# Patient Record
Sex: FEMALE | Race: WHITE | NOT HISPANIC OR LATINO | Employment: OTHER | URBAN - METROPOLITAN AREA
[De-identification: names, ages, dates, MRNs, and addresses within clinical notes are randomized per-mention and may not be internally consistent; named-entity substitution may affect disease eponyms.]

---

## 2018-03-08 RX ORDER — INSULIN ASPART 100 [IU]/ML
25 INJECTION, SUSPENSION SUBCUTANEOUS
Qty: 5 PEN | Refills: 5 | Status: SHIPPED | OUTPATIENT
Start: 2018-03-08 | End: 2018-07-19 | Stop reason: SDUPTHER

## 2018-03-14 RX ORDER — ATORVASTATIN CALCIUM 10 MG/1
10 TABLET, FILM COATED ORAL
COMMUNITY
Start: 2017-12-12 | End: 2018-03-14 | Stop reason: SDUPTHER

## 2018-03-14 RX ORDER — ATORVASTATIN CALCIUM 10 MG/1
10 TABLET, FILM COATED ORAL DAILY
Qty: 90 TABLET | Refills: 1 | Status: SHIPPED | OUTPATIENT
Start: 2018-03-14 | End: 2018-09-09 | Stop reason: SDUPTHER

## 2018-03-29 RX ORDER — LOSARTAN POTASSIUM AND HYDROCHLOROTHIAZIDE 12.5; 5 MG/1; MG/1
TABLET ORAL
COMMUNITY
Start: 2017-07-05 | End: 2018-03-29 | Stop reason: SDUPTHER

## 2018-03-29 RX ORDER — LOSARTAN POTASSIUM AND HYDROCHLOROTHIAZIDE 12.5; 5 MG/1; MG/1
1 TABLET ORAL DAILY
Qty: 90 TABLET | Refills: 1 | Status: SHIPPED | OUTPATIENT
Start: 2018-03-29 | End: 2018-09-09 | Stop reason: SDUPTHER

## 2018-06-22 RX ORDER — LEVOTHYROXINE SODIUM 25 UG/1
TABLET ORAL
Qty: 30 TABLET | Refills: 0 | Status: SHIPPED | OUTPATIENT
Start: 2018-06-22 | End: 2018-07-19 | Stop reason: SDUPTHER

## 2018-07-19 RX ORDER — INSULIN ASPART 100 [IU]/ML
25 INJECTION, SUSPENSION SUBCUTANEOUS
Qty: 15 PEN | Refills: 1 | Status: SHIPPED | OUTPATIENT
Start: 2018-07-19 | End: 2018-10-17 | Stop reason: HOSPADM

## 2018-07-19 RX ORDER — LEVOTHYROXINE SODIUM 25 UG/1
TABLET ORAL
Qty: 30 TABLET | Refills: 0 | Status: SHIPPED | OUTPATIENT
Start: 2018-07-19 | End: 2018-10-16 | Stop reason: SDUPTHER

## 2018-07-25 RX ORDER — AMLODIPINE BESYLATE 5 MG/1
TABLET ORAL
Qty: 90 TABLET | Refills: 0 | Status: SHIPPED | OUTPATIENT
Start: 2018-07-25 | End: 2018-10-21 | Stop reason: SDUPTHER

## 2018-07-30 ENCOUNTER — TELEPHONE (OUTPATIENT)
Dept: ENDOCRINOLOGY | Facility: CLINIC | Age: 58
End: 2018-07-30

## 2018-07-30 NOTE — TELEPHONE ENCOUNTER
Patient called to cancel 7/31/18 appointment having a boil removed. Rescheduled for 10/9/18 before her cataract surgery.

## 2018-08-10 RX ORDER — EMPAGLIFLOZIN 10 MG/1
TABLET, FILM COATED ORAL
Qty: 30 TABLET | Refills: 4 | Status: SHIPPED | OUTPATIENT
Start: 2018-08-10 | End: 2018-11-16 | Stop reason: SDUPTHER

## 2018-09-10 RX ORDER — ATORVASTATIN CALCIUM 10 MG/1
10 TABLET, FILM COATED ORAL DAILY
Qty: 90 TABLET | Refills: 1 | Status: SHIPPED | OUTPATIENT
Start: 2018-09-10 | End: 2019-03-06 | Stop reason: SDUPTHER

## 2018-09-10 RX ORDER — LOSARTAN POTASSIUM AND HYDROCHLOROTHIAZIDE 12.5; 5 MG/1; MG/1
1 TABLET ORAL DAILY
Qty: 90 TABLET | Refills: 1 | Status: SHIPPED | OUTPATIENT
Start: 2018-09-10 | End: 2019-02-15 | Stop reason: SDUPTHER

## 2018-10-16 ENCOUNTER — TRANSCRIBE ORDERS (OUTPATIENT)
Dept: RADIOLOGY | Facility: HOSPITAL | Age: 58
End: 2018-10-16

## 2018-10-16 ENCOUNTER — HOSPITAL ENCOUNTER (OUTPATIENT)
Dept: RADIOLOGY | Facility: HOSPITAL | Age: 58
Discharge: HOME | End: 2018-10-16
Attending: PHYSICAL MEDICINE & REHABILITATION
Payer: COMMERCIAL

## 2018-10-16 DIAGNOSIS — M17.12 PRIMARY OSTEOARTHRITIS OF LEFT KNEE: Primary | ICD-10-CM

## 2018-10-16 DIAGNOSIS — M17.12 PRIMARY OSTEOARTHRITIS OF LEFT KNEE: ICD-10-CM

## 2018-10-16 DIAGNOSIS — R26.2 DIFFICULTY IN WALKING, NOT ELSEWHERE CLASSIFIED: ICD-10-CM

## 2018-10-16 PROCEDURE — 73564 X-RAY EXAM KNEE 4 OR MORE: CPT | Mod: LT

## 2018-10-16 RX ORDER — LEVOTHYROXINE SODIUM 25 UG/1
TABLET ORAL
Qty: 90 TABLET | Refills: 0 | Status: SHIPPED | OUTPATIENT
Start: 2018-10-16 | End: 2019-01-16 | Stop reason: SDUPTHER

## 2018-10-22 RX ORDER — AMLODIPINE BESYLATE 5 MG/1
TABLET ORAL
Qty: 90 TABLET | Refills: 0 | Status: SHIPPED | OUTPATIENT
Start: 2018-10-22 | End: 2019-02-02 | Stop reason: SDUPTHER

## 2018-11-06 ENCOUNTER — ANESTHESIA (OUTPATIENT)
Dept: SURGERY | Facility: HOSPITAL | Age: 58
Setting detail: HOSPITAL OUTPATIENT SURGERY
End: 2018-11-06
Payer: COMMERCIAL

## 2018-11-06 ENCOUNTER — HOSPITAL ENCOUNTER (OUTPATIENT)
Facility: HOSPITAL | Age: 58
Setting detail: HOSPITAL OUTPATIENT SURGERY
Discharge: HOME | End: 2018-11-06
Attending: OPHTHALMOLOGY | Admitting: OPHTHALMOLOGY
Payer: COMMERCIAL

## 2018-11-06 VITALS
OXYGEN SATURATION: 97 % | TEMPERATURE: 98.8 F | SYSTOLIC BLOOD PRESSURE: 129 MMHG | RESPIRATION RATE: 17 BRPM | DIASTOLIC BLOOD PRESSURE: 61 MMHG | HEART RATE: 79 BPM

## 2018-11-06 LAB
GLUCOSE BLD-MCNC: 122 MG/DL (ref 70–99)
GLUCOSE BLD-MCNC: 92 MG/DL (ref 70–99)
POCT TEST: ABNORMAL
POCT TEST: NORMAL

## 2018-11-06 PROCEDURE — 71000001 HC PACU PHASE 1 INITIAL 30MIN: Performed by: OPHTHALMOLOGY

## 2018-11-06 PROCEDURE — 08RJ3JZ REPLACEMENT OF RIGHT LENS WITH SYNTHETIC SUBSTITUTE, PERCUTANEOUS APPROACH: ICD-10-PCS | Performed by: OPHTHALMOLOGY

## 2018-11-06 PROCEDURE — 71000011 HC PACU PHASE 1 EA ADDL MIN: Performed by: OPHTHALMOLOGY

## 2018-11-06 PROCEDURE — 25000000 HC PHARMACY GENERAL: Performed by: NURSE ANESTHETIST, CERTIFIED REGISTERED

## 2018-11-06 PROCEDURE — 36000013 HC OR LEVEL 3 EA ADDL MIN: Performed by: OPHTHALMOLOGY

## 2018-11-06 PROCEDURE — 37000002 HC ANESTHESIA MAC: Performed by: OPHTHALMOLOGY

## 2018-11-06 PROCEDURE — 71000002 HC PACU PHASE 2 INITIAL 30MIN: Performed by: OPHTHALMOLOGY

## 2018-11-06 PROCEDURE — V2632 POST CHMBR INTRAOCULAR LENS: HCPCS | Performed by: OPHTHALMOLOGY

## 2018-11-06 PROCEDURE — 27200000 HC STERILE SUPPLY: Performed by: OPHTHALMOLOGY

## 2018-11-06 PROCEDURE — 36000003 HC OR LEVEL 3 INITIAL 30MIN: Performed by: OPHTHALMOLOGY

## 2018-11-06 PROCEDURE — 25000000 HC PHARMACY GENERAL: Performed by: OPHTHALMOLOGY

## 2018-11-06 PROCEDURE — 63700000 HC SELF-ADMINISTRABLE DRUG: Performed by: OPHTHALMOLOGY

## 2018-11-06 PROCEDURE — 25800000 HC PHARMACY IV SOLUTIONS: Performed by: NURSE ANESTHETIST, CERTIFIED REGISTERED

## 2018-11-06 PROCEDURE — 63600000 HC DRUGS/DETAIL CODE: Performed by: OPHTHALMOLOGY

## 2018-11-06 PROCEDURE — 71000012 HC PACU PHASE 2 EA ADDL MIN: Performed by: OPHTHALMOLOGY

## 2018-11-06 PROCEDURE — 63600000 HC DRUGS/DETAIL CODE: Performed by: NURSE ANESTHETIST, CERTIFIED REGISTERED

## 2018-11-06 DEVICE — LENS IOL SN60WF 23.0D: Type: IMPLANTABLE DEVICE | Status: FUNCTIONAL

## 2018-11-06 RX ORDER — PROPARACAINE HYDROCHLORIDE 5 MG/ML
SOLUTION/ DROPS OPHTHALMIC AS NEEDED
Status: DISCONTINUED | OUTPATIENT
Start: 2018-11-06 | End: 2018-11-06 | Stop reason: HOSPADM

## 2018-11-06 RX ORDER — LIDOCAINE HYDROCHLORIDE 10 MG/ML
INJECTION, SOLUTION EPIDURAL; INFILTRATION; INTRACAUDAL; PERINEURAL AS NEEDED
Status: DISCONTINUED | OUTPATIENT
Start: 2018-11-06 | End: 2018-11-06 | Stop reason: SURG

## 2018-11-06 RX ORDER — SODIUM CHLORIDE 9 MG/ML
INJECTION, SOLUTION INTRAVENOUS CONTINUOUS
Status: DISCONTINUED | OUTPATIENT
Start: 2018-11-06 | End: 2018-11-06 | Stop reason: HOSPADM

## 2018-11-06 RX ORDER — SODIUM CHLORIDE 9 MG/ML
INJECTION, SOLUTION INTRAVENOUS CONTINUOUS PRN
Status: DISCONTINUED | OUTPATIENT
Start: 2018-11-06 | End: 2018-11-06 | Stop reason: SURG

## 2018-11-06 RX ORDER — FENTANYL CITRATE 50 UG/ML
INJECTION, SOLUTION INTRAMUSCULAR; INTRAVENOUS AS NEEDED
Status: DISCONTINUED | OUTPATIENT
Start: 2018-11-06 | End: 2018-11-06 | Stop reason: SURG

## 2018-11-06 RX ORDER — POVIDONE-IODINE 5 %
SOLUTION, NON-ORAL OPHTHALMIC (EYE) AS NEEDED
Status: DISCONTINUED | OUTPATIENT
Start: 2018-11-06 | End: 2018-11-06 | Stop reason: HOSPADM

## 2018-11-06 RX ORDER — ONDANSETRON HYDROCHLORIDE 2 MG/ML
INJECTION, SOLUTION INTRAVENOUS AS NEEDED
Status: DISCONTINUED | OUTPATIENT
Start: 2018-11-06 | End: 2018-11-06 | Stop reason: SURG

## 2018-11-06 RX ORDER — SODIUM CHLORIDE 5 %
OINTMENT (GRAM) OPHTHALMIC (EYE) AS NEEDED
Status: DISCONTINUED | OUTPATIENT
Start: 2018-11-06 | End: 2018-11-06 | Stop reason: HOSPADM

## 2018-11-06 RX ORDER — MIDAZOLAM HYDROCHLORIDE 2 MG/2ML
INJECTION, SOLUTION INTRAMUSCULAR; INTRAVENOUS AS NEEDED
Status: DISCONTINUED | OUTPATIENT
Start: 2018-11-06 | End: 2018-11-06 | Stop reason: SURG

## 2018-11-06 RX ORDER — TOBRAMYCIN AND DEXAMETHASONE 3; 1 MG/ML; MG/ML
SUSPENSION/ DROPS OPHTHALMIC AS NEEDED
Status: DISCONTINUED | OUTPATIENT
Start: 2018-11-06 | End: 2018-11-06 | Stop reason: HOSPADM

## 2018-11-06 RX ADMIN — LIDOCAINE HYDROCHLORIDE 2.5 ML: 10 INJECTION, SOLUTION EPIDURAL; INFILTRATION; INTRACAUDAL; PERINEURAL at 07:37

## 2018-11-06 RX ADMIN — FENTANYL CITRATE 25 MCG: 50 INJECTION, SOLUTION INTRAMUSCULAR; INTRAVENOUS at 07:45

## 2018-11-06 RX ADMIN — MIDAZOLAM HYDROCHLORIDE 1 MG: 1 INJECTION, SOLUTION INTRAMUSCULAR; INTRAVENOUS at 07:37

## 2018-11-06 RX ADMIN — MIDAZOLAM HYDROCHLORIDE 1 MG: 1 INJECTION, SOLUTION INTRAMUSCULAR; INTRAVENOUS at 07:32

## 2018-11-06 RX ADMIN — FENTANYL CITRATE 25 MCG: 50 INJECTION, SOLUTION INTRAMUSCULAR; INTRAVENOUS at 07:49

## 2018-11-06 RX ADMIN — FENTANYL CITRATE 25 MCG: 50 INJECTION, SOLUTION INTRAMUSCULAR; INTRAVENOUS at 07:54

## 2018-11-06 RX ADMIN — ONDANSETRON 4 MG: 2 INJECTION INTRAMUSCULAR; INTRAVENOUS at 07:37

## 2018-11-06 RX ADMIN — SODIUM CHLORIDE: 9 INJECTION, SOLUTION INTRAVENOUS at 07:27

## 2018-11-06 ASSESSMENT — PAIN SCALES - GENERAL: PAIN_LEVEL: 0

## 2018-11-06 ASSESSMENT — PAIN - FUNCTIONAL ASSESSMENT: PAIN_FUNCTIONAL_ASSESSMENT: NO/DENIES PAIN

## 2018-11-06 NOTE — ANESTHESIA PREPROCEDURE EVALUATION
ROS/Med Hx    Relevant Problems   No relevant active problems       Physical Exam    Airway   Mallampati: III   TM distance: >3 FB   Neck ROM: full  Cardiovascular    Rhythm: regular   Rate: normal  Pulmonary    clear to auscultation        Anesthesia Plan    Plan: MAC    Technique: MAC     Airway: natural airway / supplemental oxygen       patient did not smoke on day of surgery  ASA 3  Blood Products:   Use of Blood Products Discussed: No   Anesthetic plan and risks discussed with: patient  Induction:    intravenous   Parents Present: No  Postop Plan:   Patient Disposition: phase II then home   Pain Management: IV analgesics

## 2018-11-06 NOTE — ANESTHESIA POSTPROCEDURE EVALUATION
Patient: Bebe Burks    Procedure Summary     Date:  11/06/18 Room / Location:  [facility]    Anesthesia Start:  0727 Anesthesia Stop:  0807    Procedure:  CATARACT PHACO, IOL (Right ) Diagnosis:       Cortical age-related cataract, right eye      (Cataract right eye h25.011)    Surgeon:  Derrick Jeter MD Responsible Provider:  Josef Yip MD    Anesthesia Type:  MAC ASA Status:  3          Anesthesia Type: MAC  PACU Vitals     No data found.            Anesthesia Post Evaluation    Pain score: 0  Pain management: adequate  Patient location during evaluation: PACU  Patient participation: complete - patient participated  Level of consciousness: awake and alert  Cardiovascular status: acceptable  Airway Patency: adequate  Respiratory status: acceptable  Hydration status: acceptable  Anesthetic complications: no

## 2018-11-06 NOTE — OP NOTE
"Patient: Bebe Burks  [id number]    Date: 11/6/2018    Pre OP Dx: Cataract  right eye    Post OP Dx:Cataract  right eye    Procedure: Phacoemulsification of the right eye    IOL: Space Rohith, AcrySof    model: SN60 Wf,      23.0  diopters    Serial number:[id number]    Surgeon: Derrick Jeter MD  Assistant: None  Complications: none  Blood loss: None    Procedure:   Patient was brought to the OR, identified, and positioned appropriately.  A drop of topical proparacaine and 3.5% lidocaine gel was placed on the eye.  The eye was prepped and draped in usual sterile fashion.  MAC was administered and A lid speculum was positioned and an operating microscope was placed over the eye.  A \"timeout\" was done, and the surgery began. Following a clear temporal corneal incision, under Healon 5 and Healon, a continuous tear anterior capsulotomy was performed.  This was followed by hydrodissection, hydrodelineation, phacoemulsification, and irrigation/aspiration.  Now, with the capsular bag filled with Healon the folded IOL was passed through the corneal incision and placed into the bag under direct visualization.  The Healon was removed, Miochol was injected into the anterior chamber, and the wound was hydrated with BSS.  The lid speculum was removed.  A drop of TobraDex and a strip of sodium chloride 5% ointment was placed topically on the eye, and the eye was shielded.  The patient tolerated the procedure beautifully and left the OR for recovery room in satisfactory condition.      ________________________________ 11/6/2018     Derrick Jeter MD        "

## 2018-11-06 NOTE — PERIOPERATIVE NURSING NOTE
Dr Jeter in to speak to pt. Pt dcd to home via wheelchair without complaints. Right eye without redness or drg, shield intact to right eye

## 2018-11-06 NOTE — OR SURGEON
Pt seen  Forehead marked  Meds OK  No change in Dx  Cleared for surgery    Derrick Jeter MD    11/6/2018

## 2018-11-12 RX ORDER — PEN NEEDLE, DIABETIC 31 GX5/16"
NEEDLE, DISPOSABLE MISCELLANEOUS
Qty: 200 EACH | Refills: 3 | Status: SHIPPED | OUTPATIENT
Start: 2018-11-12 | End: 2019-11-29 | Stop reason: SDUPTHER

## 2018-11-16 RX ORDER — EMPAGLIFLOZIN 10 MG/1
TABLET, FILM COATED ORAL
Qty: 30 TABLET | Refills: 0 | Status: SHIPPED | OUTPATIENT
Start: 2018-11-16 | End: 2019-03-13 | Stop reason: SDUPTHER

## 2018-12-03 RX ORDER — INSULIN ASPART 100 [IU]/ML
INJECTION, SUSPENSION SUBCUTANEOUS
Qty: 6 PEN | Refills: 3 | Status: SHIPPED | OUTPATIENT
Start: 2018-12-03 | End: 2019-02-14 | Stop reason: SDUPTHER

## 2018-12-03 NOTE — TELEPHONE ENCOUNTER
----- Message from Speedy Mckeon MD sent at 7/19/2017  7:24 PM CDT -----  Looks Ok  Stable results  Please notify patient   Last ov was 12/2017, please verify and send. Thanks

## 2018-12-20 ENCOUNTER — TRANSCRIBE ORDERS (OUTPATIENT)
Dept: OPHTHALMOLOGY | Facility: HOSPITAL | Age: 58
End: 2018-12-20

## 2018-12-20 ENCOUNTER — APPOINTMENT (OUTPATIENT)
Dept: OPHTHALMOLOGY | Facility: HOSPITAL | Age: 58
End: 2018-12-20
Attending: OPHTHALMOLOGY
Payer: COMMERCIAL

## 2018-12-20 DIAGNOSIS — H26.492 OTHER SECONDARY CATARACT, LEFT EYE: ICD-10-CM

## 2018-12-20 DIAGNOSIS — H26.492 OTHER SECONDARY CATARACT, LEFT EYE: Primary | ICD-10-CM

## 2018-12-20 PROCEDURE — 66821 AFTER CATARACT LASER SURGERY: CPT

## 2018-12-20 PROCEDURE — 085K3ZZ DESTRUCTION OF LEFT LENS, PERCUTANEOUS APPROACH: ICD-10-PCS | Performed by: OPHTHALMOLOGY

## 2019-01-07 ENCOUNTER — TRANSCRIBE ORDERS (OUTPATIENT)
Dept: SCHEDULING | Age: 59
End: 2019-01-07

## 2019-01-07 DIAGNOSIS — M23.002: Primary | ICD-10-CM

## 2019-01-11 ENCOUNTER — HOSPITAL ENCOUNTER (OUTPATIENT)
Dept: RADIOLOGY | Facility: HOSPITAL | Age: 59
Discharge: HOME | End: 2019-01-11
Attending: PHYSICAL MEDICINE & REHABILITATION
Payer: COMMERCIAL

## 2019-01-11 DIAGNOSIS — M23.002: ICD-10-CM

## 2019-01-16 RX ORDER — LEVOTHYROXINE SODIUM 25 UG/1
TABLET ORAL
Qty: 90 TABLET | Refills: 0 | Status: SHIPPED | OUTPATIENT
Start: 2019-01-16 | End: 2019-04-23 | Stop reason: SDUPTHER

## 2019-02-04 RX ORDER — AMLODIPINE BESYLATE 5 MG/1
TABLET ORAL
Qty: 90 TABLET | Refills: 0 | Status: SHIPPED | OUTPATIENT
Start: 2019-02-04 | End: 2019-04-23 | Stop reason: SDUPTHER

## 2019-02-11 ENCOUNTER — TELEPHONE (OUTPATIENT)
Dept: ENDOCRINOLOGY | Facility: CLINIC | Age: 59
End: 2019-02-11

## 2019-02-11 DIAGNOSIS — E11.8 TYPE 2 DIABETES MELLITUS WITH COMPLICATION, WITH LONG-TERM CURRENT USE OF INSULIN (CMS/HCC): Primary | ICD-10-CM

## 2019-02-11 DIAGNOSIS — Z79.4 TYPE 2 DIABETES MELLITUS WITH COMPLICATION, WITH LONG-TERM CURRENT USE OF INSULIN (CMS/HCC): Primary | ICD-10-CM

## 2019-02-14 RX ORDER — INSULIN ASPART 100 [IU]/ML
INJECTION, SUSPENSION SUBCUTANEOUS
Qty: 18 PEN | Refills: 3 | Status: SHIPPED | OUTPATIENT
Start: 2019-02-14 | End: 2019-11-06 | Stop reason: SDUPTHER

## 2019-02-18 RX ORDER — EMPAGLIFLOZIN 10 MG/1
TABLET, FILM COATED ORAL
Qty: 30 TABLET | Refills: 4 | Status: SHIPPED | OUTPATIENT
Start: 2019-02-18 | End: 2019-03-13

## 2019-02-18 RX ORDER — LOSARTAN POTASSIUM AND HYDROCHLOROTHIAZIDE 12.5; 5 MG/1; MG/1
TABLET ORAL
Qty: 90 TABLET | Refills: 1 | Status: SHIPPED | OUTPATIENT
Start: 2019-02-18 | End: 2019-11-18 | Stop reason: ALTCHOICE

## 2019-03-06 RX ORDER — ATORVASTATIN CALCIUM 10 MG/1
TABLET, FILM COATED ORAL
Qty: 30 TABLET | Refills: 1 | Status: SHIPPED | OUTPATIENT
Start: 2019-03-06 | End: 2019-05-06 | Stop reason: SDUPTHER

## 2019-03-11 RX ORDER — EMPAGLIFLOZIN 10 MG/1
TABLET, FILM COATED ORAL
Qty: 30 TABLET | Refills: 4 | Status: SHIPPED | OUTPATIENT
Start: 2019-03-11 | End: 2019-04-01 | Stop reason: SDUPTHER

## 2019-03-13 NOTE — TELEPHONE ENCOUNTER
Per pt's insurance Jardiance #[id number] needs to be send to [facility] as it's currently a maintenance drug. Please verify 5 apts scheduled and cancelled.

## 2019-03-28 NOTE — ASSESSMENT & PLAN NOTE
1. Restart Jardiance 10 mg per day - increase fluids   2. Continue with Janumet XR 50/1000 mg 2 tabs daily   3. Continue with Novolog 70/30 mix 25 units with breakfast and 30 units with dinner   4. Rotate injection sites   5. Freestyle vin system

## 2019-03-28 NOTE — PROGRESS NOTES
Endocrinology  Note       PATIENTS: Bebe Burks  YOB: 1960  DATE: April 1, 2019  VISIT TYPE: follow up visit    History of Present Illness:   HPI  58 -year-old white female with type 2 diabetes. She had cataract removed and L vitrectomy. She does follow closely with ophthalmology for retinopathy.she   continues on Janumet XR 50/1000mg 2 tabs daily and  NovoLog 7030 mix 25 units with breakfast 30  units with dinner. She has not been  taken Jardiance due to the cost. She did not bring her glucometer to the visit today she reports no hypoglycemia.She does  rotate her injection sites. She  continues on Synthroid 25 ?g day. She  continues on Lipitor for  hyperlipidemia. She  does have a history of thyroid nodules reports no difficulty with swallowing or breathing.  She  reports no foot wounds at the present time.    Medical History:  Past Medical History:   Diagnosis Date   • Arthritis    • Hypertension    • Lipid disorder    • Type 2 diabetes mellitus (CMS/HCC) ([facility])        Surgical History:   Past Surgical History:   Procedure Laterality Date   • COMBINED HYSTEROSCOPY DIAGNOSTIC / D&C  2007   • EYE SURGERY Left 2017    cataract   • VITRECTOMY Left 2016       Family History:  No family history on file.    Social history:  Social History     Social History   • Marital status: Single     Spouse name: N/A   • Number of children: N/A   • Years of education: N/A     Occupational History   • Not on file.     Social History Main Topics   • Smoking status: Never Smoker   • Smokeless tobacco: Never Used   • Alcohol use 0.6 oz/week     1 Glasses of wine per week      Comment: occas   • Drug use: No   • Sexual activity: Not on file     Other Topics Concern   • Not on file     Social History Narrative   • No narrative on file       Medication(active prior to today):  Current Outpatient Prescriptions   Medication Sig Dispense Refill   • amLODIPine (NORVASC) 5 mg tablet TAKE 1 TABLET BY MOUTH  "EVERY DAY 90 tablet 0   • atorvastatin (LIPITOR) 10 mg tablet TAKE 1 TABLET BY MOUTH EVERY DAY 30 tablet 1   • BD ULTRA-FINE KRISTIN PEN NEEDLE 32 gauge x 5/32\" needle INJECT TWICE A  each 3   • empagliflozin (JARDIANCE) 10 mg tablet Take 1 tab daily (Patient not taking: Reported on 4/1/2019 ) 90 tablet 1   • empagliflozin (JARDIANCE) 10 mg tablet 1 tab daily 30 tablet 3   • FREESTYLE LAURA 14 DAY READER misc 1 each continuously. 1 reader dx code E 11.9 1 each 11   • FREESTYLE LAURA 14 DAY SENSOR kit 1 each continuously. 1 sensor every 14 days dx code E 11.9 1 kit 11   • gabapentin (NEURONTIN) 300 mg capsule Take 300 mg by mouth 2 (two) times a day.     • JANUMET 50-1,000 mg per tablet TAKE 1 TABLET BY ORAL ROUTE 2 TIMES EVERY DAY WITH MEALS 180 tablet 0   • levothyroxine (SYNTHROID) 25 mcg tablet TAKE 1 TABLET BY MOUTH EVERY DAY 90 tablet 0   • losartan-hydrochlorothiazide (HYZAAR) 50-12.5 mg per tablet TAKE 1 TABLET BY MOUTH EVERY DAY 90 tablet 1   • NOVOLOG MIX 70-30FLEXPEN U-100 100 unit/mL (70-30) subcutaneous pen INJECT SUBCUTANEOUSLY 25 UNITS IN THE MORNING AND 30 UNITS WITH THE EVENING 18 pen 3   • ONETOUCH ULTRA BLUE TEST STRIP strip PT CHECKS BLOOD SUGAR 4 TIMES A  strip 0   • sitagliptin phos/metformin HCl (JANUMET ORAL) Take 500 mg by mouth 2 (two) times a day.       No current facility-administered medications for this visit.        Allergies:  Patient has no known allergies.    Review of Systems:   Constitutional: Negative for fatigue and unexpected weight change.   HENT: Negative for postnasal drip, trouble swallowing and voice change.    Eyes: Negative for visual disturbance. + Decreased vision  Respiratory: Negative for apnea.    Cardiovascular: Negative for leg swelling.   Gastrointestinal: Negative for constipation, diarrhea and nausea.   Endocrine: Negative for polydipsia, polyphagia and polyuria.   Musculoskeletal:+for arthralgias, back pain, neck pain and neck stiffness. " "  Neurological: Negative for numbness and headaches.   Psychiatric/Behavioral: Negative for sleep disturbance.     Vitals Signs:  Vitals:    04/01/19 1056   BP: 122/62   BP Location: Left upper arm   Patient Position: Sitting   Pulse: 91   Weight: 92.4 kg (203 lb 12.8 oz)   Height: 1.702 m (5' 7\")     Body mass index is 31.92 kg/m².      Physical Exam:  Constitutional: Patient is oriented to person, place, and time.  Appears well-developed and well-nourished. + overweight   Eyes: EOM are normal. Pupils are equal, round, and reactive to light.   Neck: Normal range of motion. Neck supple.   Cardiovascular: Normal rate, regular rhythm and normal heart sounds.    Pulmonary/Chest: Effort normal and breath sounds normal.   Abdominal: Soft.   Musculoskeletal: Normal range of motion.   Neurological:  Alert and oriented to person, place, and time.   Skin: Skin is warm and dry.   Psychiatric:  Normal mood and affect. Behavior is normal. Judgment and thought content normal.       Assessment/Plan:  Uncontrolled diabetes mellitus type 2 without complications (CMS/HCC) ([facility])  1. Restart Jardiance 10 mg per day - increase fluids   2. Continue with Janumet XR 50/1000 mg 2 tabs daily   3. Continue with Novolog 70/30 mix 25 units with breakfast and 30 units with dinner   4. Rotate injection sites   5. Freestyle vin system     Essential hypertension  /62- continue with Norvasc     Hyperlipidemia  Continue with Lipitor     Hypothyroidism  Continue with Synthroid 25 mcg per day     Obesity  Continue with diet and exercise     Thyroid nodule  Call me for any difficulty with swallowing or breathing       Labs:  Lab Results   Component Value Date    WBC 6.73 05/02/2016    HGB 14.2 05/02/2016    HCT 41.1 05/02/2016     (L) 05/02/2016    CHOL 159 03/30/2019    TRIG 200 (H) 03/30/2019    HDL 34 (L) 03/30/2019    ALT 26 03/30/2019    AST 27 03/30/2019     03/30/2019    K 4.3 03/30/2019     03/30/2019    CREATININE " 0.8 03/30/2019    BUN 15 03/30/2019    CO2 23 03/30/2019    TSH 0.66 03/30/2019    HGBA1C 10.4 (H) 03/30/2019    MICROALBUR 45.6 (H) 03/30/2019       Total encounter time 25 minutes greater than 50% spent counseling/coordination of care.  I discussed the goals of diet and exercise with the patient. I discussed the goals of A1c, blood pressure, and cholesterol. I discussed the goals of blood sugar pre- and post meals with the patient.I encouraged the patient to rotate the injection sites.  I reviewed the patient's labs/imaging/medications/allergies/problem list.       Electronically signed by: PHIL Carrizales on 4/1/2019 5:15 PM

## 2019-03-30 ENCOUNTER — APPOINTMENT (OUTPATIENT)
Dept: LAB | Facility: HOSPITAL | Age: 59
End: 2019-03-30
Attending: NURSE PRACTITIONER
Payer: COMMERCIAL

## 2019-03-30 DIAGNOSIS — Z79.4 TYPE 2 DIABETES MELLITUS WITH COMPLICATION, WITH LONG-TERM CURRENT USE OF INSULIN (CMS/HCC): ICD-10-CM

## 2019-03-30 DIAGNOSIS — E11.8 TYPE 2 DIABETES MELLITUS WITH COMPLICATION, WITH LONG-TERM CURRENT USE OF INSULIN (CMS/HCC): ICD-10-CM

## 2019-03-30 LAB
ALBUMIN SERPL-MCNC: 4.1 G/DL (ref 3.4–5)
ALBUMIN/CREAT UR: 112 UG/MG
ALP SERPL-CCNC: 68 IU/L (ref 35–126)
ALT SERPL-CCNC: 26 IU/L (ref 11–54)
ANION GAP SERPL CALC-SCNC: 13 MEQ/L (ref 3–15)
AST SERPL-CCNC: 27 IU/L (ref 15–41)
BILIRUB SERPL-MCNC: 1.2 MG/DL (ref 0.3–1.2)
BUN SERPL-MCNC: 15 MG/DL (ref 8–20)
CALCIUM SERPL-MCNC: 9.4 MG/DL (ref 8.9–10.3)
CHLORIDE SERPL-SCNC: 104 MEQ/L (ref 98–109)
CHOLEST SERPL-MCNC: 159 MG/DL
CO2 SERPL-SCNC: 23 MEQ/L (ref 22–32)
CREAT SERPL-MCNC: 0.8 MG/DL
CREAT UR-MCNC: 40.7 MG/DL
GFR SERPL CREATININE-BSD FRML MDRD: >60 ML/MIN/1.73M*2
GLUCOSE SERPL-MCNC: 175 MG/DL (ref 70–99)
HDLC SERPL-MCNC: 34 MG/DL
HDLC SERPL: 4.7 {RATIO}
LDLC SERPL CALC-MCNC: 85 MG/DL
MICROALBUMIN UR-MCNC: 45.6 MG/L
NONHDLC SERPL-MCNC: 125 MG/DL
POTASSIUM SERPL-SCNC: 4.3 MEQ/L (ref 3.6–5.1)
PROT SERPL-MCNC: 6.3 G/DL (ref 6–8.2)
SODIUM SERPL-SCNC: 140 MEQ/L (ref 136–144)
TRIGL SERPL-MCNC: 200 MG/DL (ref 30–149)
TSH SERPL DL<=0.05 MIU/L-ACNC: 0.66 MIU/L (ref 0.34–5.6)

## 2019-03-30 PROCEDURE — 80061 LIPID PANEL: CPT

## 2019-03-30 PROCEDURE — 36415 COLL VENOUS BLD VENIPUNCTURE: CPT

## 2019-03-30 PROCEDURE — 80053 COMPREHEN METABOLIC PANEL: CPT

## 2019-03-30 PROCEDURE — 82043 UR ALBUMIN QUANTITATIVE: CPT

## 2019-03-30 PROCEDURE — 83036 HEMOGLOBIN GLYCOSYLATED A1C: CPT

## 2019-03-30 PROCEDURE — 84443 ASSAY THYROID STIM HORMONE: CPT

## 2019-04-01 ENCOUNTER — OFFICE VISIT (OUTPATIENT)
Dept: ENDOCRINOLOGY | Facility: CLINIC | Age: 59
End: 2019-04-01
Payer: COMMERCIAL

## 2019-04-01 VITALS
WEIGHT: 203.8 LBS | HEIGHT: 67 IN | SYSTOLIC BLOOD PRESSURE: 122 MMHG | HEART RATE: 91 BPM | DIASTOLIC BLOOD PRESSURE: 62 MMHG | BODY MASS INDEX: 31.99 KG/M2

## 2019-04-01 DIAGNOSIS — E66.09 OBESITY DUE TO EXCESS CALORIES, UNSPECIFIED CLASSIFICATION, UNSPECIFIED WHETHER SERIOUS COMORBIDITY PRESENT: ICD-10-CM

## 2019-04-01 DIAGNOSIS — E78.5 HYPERLIPIDEMIA, UNSPECIFIED HYPERLIPIDEMIA TYPE: ICD-10-CM

## 2019-04-01 DIAGNOSIS — E03.9 HYPOTHYROIDISM, UNSPECIFIED TYPE: ICD-10-CM

## 2019-04-01 DIAGNOSIS — I10 ESSENTIAL HYPERTENSION: ICD-10-CM

## 2019-04-01 DIAGNOSIS — E04.1 THYROID NODULE: ICD-10-CM

## 2019-04-01 LAB
EST. AVERAGE GLUCOSE BLD GHB EST-MCNC: 252 MG/DL
HBA1C MFR BLD HPLC: 10.4 %

## 2019-04-01 PROCEDURE — 99214 OFFICE O/P EST MOD 30 MIN: CPT | Performed by: NURSE PRACTITIONER

## 2019-04-01 RX ORDER — FLASH GLUCOSE SCANNING READER
1 EACH MISCELLANEOUS CONTINUOUS
Qty: 1 EACH | Refills: 11 | Status: ON HOLD | OUTPATIENT
Start: 2019-04-01 | End: 2024-03-01 | Stop reason: ENTERED-IN-ERROR

## 2019-04-01 RX ORDER — FLASH GLUCOSE SENSOR
1 KIT MISCELLANEOUS CONTINUOUS
Qty: 1 KIT | Refills: 11 | Status: ON HOLD | OUTPATIENT
Start: 2019-04-01 | End: 2024-03-01 | Stop reason: ENTERED-IN-ERROR

## 2019-04-01 NOTE — PATIENT INSTRUCTIONS
Uncontrolled diabetes mellitus type 2 without complications (CMS/HCC) (HCC)  1. Restart Jardiance 10 mg per day - increase fluids   2. Continue with Janumet XR 50/1000 mg 2 tabs daily   3. Continue with Novolog 70/30 mix 25 units with breakfast and 30 units with dinner   4. Rotate injection sites   5. Freestyle vin system     Essential hypertension  /62- continue with Norvasc     Hyperlipidemia  Continue with Lipitor     Hypothyroidism  Continue with Synthroid 25 mcg per day     Obesity  Continue with diet and exercise     Thyroid nodule  Call me for any difficulty with swallowing or breathing     Patient Education     Diabetes Mellitus and Exercise  Exercising regularly is important for your overall health, especially when you have diabetes (diabetes mellitus). Exercising is not only about losing weight. It has many health benefits, such as increasing muscle strength and bone density and reducing body fat and stress. This leads to improved fitness, flexibility, and endurance, all of which result in better overall health.  Exercise has additional benefits for people with diabetes, including:  · Reducing appetite.  · Helping to lower and control blood glucose.  · Lowering blood pressure.  · Helping to control amounts of fatty substances (lipids) in the blood, such as cholesterol and triglycerides.  · Helping the body to respond better to insulin (improving insulin sensitivity).  · Reducing how much insulin the body needs.  · Decreasing the risk for heart disease by:  ¨ Lowering cholesterol and triglyceride levels.  ¨ Increasing the levels of good cholesterol.  ¨ Lowering blood glucose levels.  What is my activity plan?  Your health care provider or certified diabetes educator can help you make a plan for the type and frequency of exercise (activity plan) that works for you. Make sure that you:  · Do at least 150 minutes of moderate-intensity or vigorous-intensity exercise each week. This could be brisk  walking, biking, or water aerobics.  ¨ Do stretching and strength exercises, such as yoga or weightlifting, at least 2 times a week.  ¨ Spread out your activity over at least 3 days of the week.  · Get some form of physical activity every day.  ¨ Do not go more than 2 days in a row without some kind of physical activity.  ¨ Avoid being inactive for more than 90 minutes at a time. Take frequent breaks to walk or stretch.  · Choose a type of exercise or activity that you enjoy, and set realistic goals.  · Start slowly, and gradually increase the intensity of your exercise over time.  What do I need to know about managing my diabetes?  · Check your blood glucose before and after exercising.  ¨ If your blood glucose is higher than 240 mg/dL (13.3 mmol/L) before you exercise, check your urine for ketones. If you have ketones in your urine, do not exercise until your blood glucose returns to normal.  · Know the symptoms of low blood glucose (hypoglycemia) and how to treat it. Your risk for hypoglycemia increases during and after exercise. Common symptoms of hypoglycemia can include:  ¨ Hunger.  ¨ Anxiety.  ¨ Sweating and feeling clammy.  ¨ Confusion.  ¨ Dizziness or feeling light-headed.  ¨ Increased heart rate or palpitations.  ¨ Blurry vision.  ¨ Tingling or numbness around the mouth, lips, or tongue.  ¨ Tremors or shakes.  ¨ Irritability.  · Keep a rapid-acting carbohydrate snack available before, during, and after exercise to help prevent or treat hypoglycemia.  · Avoid injecting insulin into areas of the body that are going to be exercised. For example, avoid injecting insulin into:  ¨ The arms, when playing tennis.  ¨ The legs, when jogging.  · Keep records of your exercise habits. Doing this can help you and your health care provider adjust your diabetes management plan as needed. Write down:  ¨ Food that you eat before and after you exercise.  ¨ Blood glucose levels before and after you exercise.  ¨ The type and  amount of exercise you have done.  ¨ When your insulin is expected to peak, if you use insulin. Avoid exercising at times when your insulin is peaking.  · When you start a new exercise or activity, work with your health care provider to make sure the activity is safe for you, and to adjust your insulin, medicines, or food intake as needed.  · Drink plenty of water while you exercise to prevent dehydration or heat stroke. Drink enough fluid to keep your urine clear or pale yellow.  This information is not intended to replace advice given to you by your health care provider. Make sure you discuss any questions you have with your health care provider.  Document Released: 03/09/2005 Document Revised: 07/07/2017 Document Reviewed: 05/29/2017  [employer] Interactive Patient Education © 2018 [employer]

## 2019-04-01 NOTE — LETTER
April 1, 2019     Paula Walker MD  [address]  [place] [address]    Patient: Bebe Burks   YOB: 1960   Date of Visit: 4/1/2019       Dear Dr. Walker:    Thank you for referring Bebe Burks to me for evaluation. Below are my notes for this consultation.    If you have questions, please do not hesitate to call me. I look forward to following your patient along with you.         Sincerely,        PHIL Carrizales        CC: No Recipients  Wanda Gipson CRNP  4/1/2019  5:15 PM  Sign at close encounter                        Endocrinology  Note       PATIENTS: Bebe Burks  YOB: 1960  DATE: April 1, 2019  VISIT TYPE: follow up visit    History of Present Illness:   HPI  58 -year-old white female with type 2 diabetes. She had cataract removed and L vitrectomy. She does follow closely with ophthalmology for retinopathy.she   continues on Janumet XR 50/1000mg 2 tabs daily and  NovoLog 7030 mix 25 units with breakfast 30  units with dinner. She has not been  taken Jardiance due to the cost. She did not bring her glucometer to the visit today she reports no hypoglycemia.She does  rotate her injection sites. She  continues on Synthroid 25 ?g day. She  continues on Lipitor for  hyperlipidemia. She  does have a history of thyroid nodules reports no difficulty with swallowing or breathing.  She  reports no foot wounds at the present time.    Medical History:  Past Medical History:   Diagnosis Date   • Arthritis    • Hypertension    • Lipid disorder    • Type 2 diabetes mellitus (CMS/HCC) (H[facility])        Surgical History:   Past Surgical History:   Procedure Laterality Date   • COMBINED HYSTEROSCOPY DIAGNOSTIC / D&C  2007   • EYE SURGERY Left 2017    cataract   • VITRECTOMY Left 2016       Family History:  No family history on file.    Social history:  Social History     Social History   • Marital status: Single     Spouse name: N/A   • Number of children: N/A   • Years of education:  "N/A     Occupational History   • Not on file.     Social History Main Topics   • Smoking status: Never Smoker   • Smokeless tobacco: Never Used   • Alcohol use 0.6 oz/week     1 Glasses of wine per week      Comment: occas   • Drug use: No   • Sexual activity: Not on file     Other Topics Concern   • Not on file     Social History Narrative   • No narrative on file       Medication(active prior to today):  Current Outpatient Prescriptions   Medication Sig Dispense Refill   • amLODIPine (NORVASC) 5 mg tablet TAKE 1 TABLET BY MOUTH EVERY DAY 90 tablet 0   • atorvastatin (LIPITOR) 10 mg tablet TAKE 1 TABLET BY MOUTH EVERY DAY 30 tablet 1   • BD ULTRA-FINE KRISTIN PEN NEEDLE 32 gauge x 5/32\" needle INJECT TWICE A  each 3   • empagliflozin (JARDIANCE) 10 mg tablet Take 1 tab daily (Patient not taking: Reported on 4/1/2019 ) 90 tablet 1   • empagliflozin (JARDIANCE) 10 mg tablet 1 tab daily 30 tablet 3   • FREESTYLE LAURA 14 DAY READER misc 1 each continuously. 1 reader dx code E 11.9 1 each 11   • FREESTYLE LAURA 14 DAY SENSOR kit 1 each continuously. 1 sensor every 14 days dx code E 11.9 1 kit 11   • gabapentin (NEURONTIN) 300 mg capsule Take 300 mg by mouth 2 (two) times a day.     • JANUMET 50-1,000 mg per tablet TAKE 1 TABLET BY ORAL ROUTE 2 TIMES EVERY DAY WITH MEALS 180 tablet 0   • levothyroxine (SYNTHROID) 25 mcg tablet TAKE 1 TABLET BY MOUTH EVERY DAY 90 tablet 0   • losartan-hydrochlorothiazide (HYZAAR) 50-12.5 mg per tablet TAKE 1 TABLET BY MOUTH EVERY DAY 90 tablet 1   • NOVOLOG MIX 70-30FLEXPEN U-100 100 unit/mL (70-30) subcutaneous pen INJECT SUBCUTANEOUSLY 25 UNITS IN THE MORNING AND 30 UNITS WITH THE EVENING 18 pen 3   • ONETOUCH ULTRA BLUE TEST STRIP strip PT CHECKS BLOOD SUGAR 4 TIMES A  strip 0   • sitagliptin phos/metformin HCl (JANUMET ORAL) Take 500 mg by mouth 2 (two) times a day.       No current facility-administered medications for this visit.        Allergies:  Patient has no known " "allergies.    Review of Systems:   Constitutional: Negative for fatigue and unexpected weight change.   HENT: Negative for postnasal drip, trouble swallowing and voice change.    Eyes: Negative for visual disturbance. + Decreased vision  Respiratory: Negative for apnea.    Cardiovascular: Negative for leg swelling.   Gastrointestinal: Negative for constipation, diarrhea and nausea.   Endocrine: Negative for polydipsia, polyphagia and polyuria.   Musculoskeletal:+for arthralgias, back pain, neck pain and neck stiffness.   Neurological: Negative for numbness and headaches.   Psychiatric/Behavioral: Negative for sleep disturbance.     Vitals Signs:  Vitals:    04/01/19 1056   BP: 122/62   BP Location: Left upper arm   Patient Position: Sitting   Pulse: 91   Weight: 92.4 kg (203 lb 12.8 oz)   Height: 1.702 m (5' 7\")     Body mass index is 31.92 kg/m².      Physical Exam:  Constitutional: Patient is oriented to person, place, and time.  Appears well-developed and well-nourished. + overweight   Eyes: EOM are normal. Pupils are equal, round, and reactive to light.   Neck: Normal range of motion. Neck supple.   Cardiovascular: Normal rate, regular rhythm and normal heart sounds.    Pulmonary/Chest: Effort normal and breath sounds normal.   Abdominal: Soft.   Musculoskeletal: Normal range of motion.   Neurological:  Alert and oriented to person, place, and time.   Skin: Skin is warm and dry.   Psychiatric:  Normal mood and affect. Behavior is normal. Judgment and thought content normal.       Assessment/Plan:  Uncontrolled diabetes mellitus type 2 without complications (CMS/HCC) ([facility])  1. Restart Jardiance 10 mg per day - increase fluids   2. Continue with Janumet XR 50/1000 mg 2 tabs daily   3. Continue with Novolog 70/30 mix 25 units with breakfast and 30 units with dinner   4. Rotate injection sites   5. Freestyle vin system     Essential hypertension  /62- continue with Norvasc     Hyperlipidemia  Continue with " Lipitor     Hypothyroidism  Continue with Synthroid 25 mcg per day     Obesity  Continue with diet and exercise     Thyroid nodule  Call me for any difficulty with swallowing or breathing       Labs:  Lab Results   Component Value Date    WBC 6.73 05/02/2016    HGB 14.2 05/02/2016    HCT 41.1 05/02/2016     (L) 05/02/2016    CHOL 159 03/30/2019    TRIG 200 (H) 03/30/2019    HDL 34 (L) 03/30/2019    ALT 26 03/30/2019    AST 27 03/30/2019     03/30/2019    K 4.3 03/30/2019     03/30/2019    CREATININE 0.8 03/30/2019    BUN 15 03/30/2019    CO2 23 03/30/2019    TSH 0.66 03/30/2019    HGBA1C 10.4 (H) 03/30/2019    MICROALBUR 45.6 (H) 03/30/2019       Total encounter time 25 minutes greater than 50% spent counseling/coordination of care.  I discussed the goals of diet and exercise with the patient. I discussed the goals of A1c, blood pressure, and cholesterol. I discussed the goals of blood sugar pre- and post meals with the patient.I encouraged the patient to rotate the injection sites.  I reviewed the patient's labs/imaging/medications/allergies/problem list.       Electronically signed by: PHIL Carrizales on 4/1/2019 5:15 PM

## 2019-04-23 RX ORDER — AMLODIPINE BESYLATE 5 MG/1
TABLET ORAL
Qty: 90 TABLET | Refills: 0 | Status: SHIPPED | OUTPATIENT
Start: 2019-04-23 | End: 2019-07-22 | Stop reason: SDUPTHER

## 2019-04-23 RX ORDER — LEVOTHYROXINE SODIUM 25 UG/1
TABLET ORAL
Qty: 90 TABLET | Refills: 0 | Status: SHIPPED | OUTPATIENT
Start: 2019-04-23 | End: 2019-07-22 | Stop reason: SDUPTHER

## 2019-05-06 RX ORDER — ATORVASTATIN CALCIUM 10 MG/1
TABLET, FILM COATED ORAL
Qty: 30 TABLET | Refills: 1 | Status: SHIPPED | OUTPATIENT
Start: 2019-05-06 | End: 2019-05-28 | Stop reason: SDUPTHER

## 2019-05-20 RX ORDER — SITAGLIPTIN AND METFORMIN HYDROCHLORIDE 1000; 50 MG/1; MG/1
TABLET, FILM COATED ORAL
Qty: 180 TABLET | Refills: 0 | Status: SHIPPED | OUTPATIENT
Start: 2019-05-20 | End: 2019-08-07 | Stop reason: SDUPTHER

## 2019-05-23 ENCOUNTER — TELEPHONE (OUTPATIENT)
Dept: ENDOCRINOLOGY | Facility: CLINIC | Age: 59
End: 2019-05-23

## 2019-05-23 RX ORDER — LOSARTAN POTASSIUM AND HYDROCHLOROTHIAZIDE 25; 100 MG/1; MG/1
0.5 TABLET ORAL DAILY
Qty: 45 TABLET | Refills: 1 | Status: SHIPPED | OUTPATIENT
Start: 2019-05-23 | End: 2019-11-18 | Stop reason: ALTCHOICE

## 2019-05-23 NOTE — TELEPHONE ENCOUNTER
Current dose on back order. Pharmacy is requesting losartan HCTZ 100/25 take 1/2 tab daily. Please verify and send. Thanks

## 2019-05-28 ENCOUNTER — TELEPHONE (OUTPATIENT)
Dept: ENDOCRINOLOGY | Facility: CLINIC | Age: 59
End: 2019-05-28

## 2019-05-28 RX ORDER — ATORVASTATIN CALCIUM 10 MG/1
10 TABLET, FILM COATED ORAL
Qty: 30 TABLET | Refills: 1 | Status: SHIPPED | OUTPATIENT
Start: 2019-05-28 | End: 2019-05-30 | Stop reason: SDUPTHER

## 2019-05-30 ENCOUNTER — TELEPHONE (OUTPATIENT)
Dept: ENDOCRINOLOGY | Facility: CLINIC | Age: 59
End: 2019-05-30

## 2019-05-30 RX ORDER — ATORVASTATIN CALCIUM 10 MG/1
10 TABLET, FILM COATED ORAL
Qty: 90 TABLET | Refills: 3 | Status: SHIPPED | OUTPATIENT
Start: 2019-05-30 | End: 2020-06-08

## 2019-07-22 RX ORDER — LEVOTHYROXINE SODIUM 25 UG/1
TABLET ORAL
Qty: 90 TABLET | Refills: 0 | Status: SHIPPED | OUTPATIENT
Start: 2019-07-22 | End: 2019-10-20 | Stop reason: SDUPTHER

## 2019-07-22 RX ORDER — AMLODIPINE BESYLATE 5 MG/1
TABLET ORAL
Qty: 90 TABLET | Refills: 0 | Status: SHIPPED | OUTPATIENT
Start: 2019-07-22 | End: 2019-10-16 | Stop reason: SDUPTHER

## 2019-08-07 RX ORDER — SITAGLIPTIN AND METFORMIN HYDROCHLORIDE 1000; 50 MG/1; MG/1
TABLET, FILM COATED ORAL
Qty: 180 TABLET | Refills: 0 | Status: SHIPPED | OUTPATIENT
Start: 2019-08-07 | End: 2019-11-16 | Stop reason: SDUPTHER

## 2019-10-16 RX ORDER — AMLODIPINE BESYLATE 5 MG/1
TABLET ORAL
Qty: 90 TABLET | Refills: 0 | Status: SHIPPED | OUTPATIENT
Start: 2019-10-16 | End: 2020-01-08

## 2019-10-21 RX ORDER — LEVOTHYROXINE SODIUM 25 UG/1
TABLET ORAL
Qty: 90 TABLET | Refills: 0 | Status: SHIPPED | OUTPATIENT
Start: 2019-10-21 | End: 2020-01-20

## 2019-11-06 RX ORDER — INSULIN ASPART 100 [IU]/ML
INJECTION, SUSPENSION SUBCUTANEOUS
Qty: 18 PEN | Refills: 3 | Status: SHIPPED | OUTPATIENT
Start: 2019-11-06 | End: 2020-01-20 | Stop reason: SDUPTHER

## 2019-11-07 RX ORDER — HYDROCHLOROTHIAZIDE 25 MG/1
25 TABLET ORAL DAILY
Qty: 90 TABLET | Refills: 3 | Status: CANCELLED | OUTPATIENT
Start: 2019-11-07 | End: 2020-11-06

## 2019-11-07 RX ORDER — LOSARTAN POTASSIUM 100 MG/1
100 TABLET ORAL DAILY
Qty: 90 TABLET | Refills: 1 | Status: CANCELLED | OUTPATIENT
Start: 2019-11-07 | End: 2020-05-05

## 2019-11-18 RX ORDER — SITAGLIPTIN AND METFORMIN HYDROCHLORIDE 1000; 50 MG/1; MG/1
TABLET, FILM COATED ORAL
Qty: 180 TABLET | Refills: 0 | Status: SHIPPED | OUTPATIENT
Start: 2019-11-18 | End: 2019-11-26 | Stop reason: ALTCHOICE

## 2019-11-18 RX ORDER — HYDROCHLOROTHIAZIDE 25 MG/1
25 TABLET ORAL DAILY
Qty: 90 TABLET | Refills: 3 | Status: SHIPPED | OUTPATIENT
Start: 2019-11-18 | End: 2019-12-23

## 2019-11-18 RX ORDER — LOSARTAN POTASSIUM 100 MG/1
100 TABLET ORAL DAILY
Qty: 90 TABLET | Refills: 1 | Status: SHIPPED | OUTPATIENT
Start: 2019-11-18 | End: 2019-12-20 | Stop reason: SDUPTHER

## 2019-11-24 NOTE — PROGRESS NOTES
Endocrinology  Note       PATIENTS: Bebe Burks  YOB: 1960  DATE: November 26, 2019  VISIT TYPE: follow up visit    History of Present Illness:   HPI 59 -year-old white female with type 2 diabetes.  She does follow closely with ophthalmology for retinopathy.She   continues on   NovoLog 7030 mix 25 units with breakfast 30 units with dinner.  She just restarted Jardiance in the past 2 weeks.  She stopped taking Janumet it was expensive and she did not feel she benefited from it she did not bring her glucometer to the visit today she reports no hypoglycemia.She does  rotate her injection sites. She  continues on Synthroid 25 ?g day. She  continues on Lipitor for  hyperlipidemia. She  does have a history of thyroid nodules reports no difficulty with swallowing or breathing.  She  reports no foot wounds at the present time.    Medical History:  Past Medical History:   Diagnosis Date   • Arthritis    • Hypertension    • Lipid disorder    • Type 2 diabetes mellitus (CMS/[facility])        Surgical History:   Past Surgical History:   Procedure Laterality Date   • COMBINED HYSTEROSCOPY DIAGNOSTIC / D&C  2007   • EYE SURGERY Left 2017    cataract   • VITRECTOMY Left 2016       Family History:  No family history on file.    Social history:  Social History     Socioeconomic History   • Marital status: Single     Spouse name: Not on file   • Number of children: Not on file   • Years of education: Not on file   • Highest education level: Not on file   Occupational History   • Not on file   Social Needs   • Financial resource strain: Not on file   • Food insecurity:     Worry: Not on file     Inability: Not on file   • Transportation needs:     Medical: Not on file     Non-medical: Not on file   Tobacco Use   • Smoking status: Never Smoker   • Smokeless tobacco: Never Used   Substance and Sexual Activity   • Alcohol use: Yes     Alcohol/week: 1.0 standard drinks     Types: 1 Glasses of wine per week  "    Comment: occas   • Drug use: No   • Sexual activity: Not on file   Lifestyle   • Physical activity:     Days per week: Not on file     Minutes per session: Not on file   • Stress: Not on file   Relationships   • Social connections:     Talks on phone: Not on file     Gets together: Not on file     Attends Anglican service: Not on file     Active member of club or organization: Not on file     Attends meetings of clubs or organizations: Not on file     Relationship status: Not on file   • Intimate partner violence:     Fear of current or ex partner: Not on file     Emotionally abused: Not on file     Physically abused: Not on file     Forced sexual activity: Not on file   Other Topics Concern   • Not on file   Social History Narrative   • Not on file       Medication(active prior to today):  Current Outpatient Medications   Medication Sig Dispense Refill   • amLODIPine (NORVASC) 5 mg tablet TAKE 1 TABLET BY MOUTH EVERY DAY 90 tablet 0   • atorvastatin (LIPITOR) 10 mg tablet Take 1 tablet (10 mg total) by mouth once daily. 90 tablet 3   • BD ULTRA-FINE KRISTIN PEN NEEDLE 32 gauge x 5/32\" needle INJECT TWICE A  each 3   • empagliflozin (JARDIANCE) 10 mg tablet Take 1 tab daily 90 tablet 1   • FREESTYLE LAURA 14 DAY READER misc 1 each continuously. 1 reader dx code E 11.9 1 each 11   • FREESTYLE LAURA 14 DAY SENSOR kit 1 each continuously. 1 sensor every 14 days dx code E 11.9 1 kit 11   • gabapentin (NEURONTIN) 300 mg capsule Take 300 mg by mouth 2 (two) times a day.     • hydrochlorothiazide (HYDRODIURIL) 25 mg tablet Take 1 tablet (25 mg total) by mouth daily. 90 tablet 3   • levothyroxine (SYNTHROID) 25 mcg tablet TAKE 1 TABLET BY MOUTH EVERY DAY 90 tablet 0   • losartan (COZAAR) 100 mg tablet Take 1 tablet (100 mg total) by mouth daily. 90 tablet 1   • NOVOLOG MIX 70-30FLEXPEN U-100 100 unit/mL (70-30) subcutaneous pen INJECT SUBCUTANEOUSLY 25 UNITS IN THE MORNING AND 30 UNITS IN THE EVENING 18 pen 3   • " "ONETOUCH ULTRA BLUE TEST STRIP strip PT CHECKS BLOOD SUGAR 4 TIMES A  strip 0   • empagliflozin (JARDIANCE) 10 mg tablet 1 tab daily (Patient not taking: Reported on 11/26/2019 ) 30 tablet 3   • semaglutide (RYBELSUS) 3 mg tablet Take 1 tablet (3 mg total) by mouth daily. 30 tablet 0     No current facility-administered medications for this visit.        Allergies:  Patient has no known allergies.    Review of Systems:   Constitutional: Negative for fatigue and unexpected weight change. + weight loss   HENT: Negative for postnasal drip, trouble swallowing and voice change.    Eyes: +for visual disturbance.   Respiratory: Negative for apnea.    Cardiovascular: Negative for leg swelling.   Gastrointestinal: Negative for constipation, diarrhea and nausea.   Endocrine: Negative for polydipsia, polyphagia and polyuria.   Musculoskeletal: Negative for arthralgias, back pain, neck pain and neck stiffness.   Neurological: Negative for numbness and headaches.   Psychiatric/Behavioral: Negative for sleep disturbance.     Vitals Signs:  Vitals:    11/26/19 1544   BP: 138/80   Pulse: 85   SpO2: 98%   Weight: 91.6 kg (202 lb)   Height: 1.702 m (5' 7\")     Body mass index is 31.64 kg/m².      Physical Exam:  Constitutional: Patient is oriented to person, place, and time.  Appears well-developed and well-nourished. + overweight   Eyes: EOM are normal. Pupils are equal, round, and reactive to light.   Neck: Normal range of motion. Neck supple.   Cardiovascular: Normal rate, regular rhythm and normal heart sounds.    Pulmonary/Chest: Effort normal and breath sounds normal.   Abdominal: Soft.   Musculoskeletal: Normal range of motion.   Neurological:  Alert and oriented to person, place, and time.   Skin: Skin is warm and dry.   Psychiatric:  Normal mood and affect. Behavior is normal. Judgment and thought content normal.   Foot Exam: Normal exam, no swelling, tenderness, instability; ligaments intact, full range of motion of " all ankle/foot joints    Assessment/Plan:  Uncontrolled diabetes mellitus type 2 without complications (CMS/HCC) (HCC)  1. continue with  Jardiance 10 mg per day - increase fluids   2. Stop Janumet - start Rybelsus 3 mg per day in am - with 4 ounces of water - no food/meds  for 30 min   3. Continue with Novolog 70/30 mix 25 units with breakfast and 30 units with dinner   4. Rotate injection sites   5. Freestyle vin system   6. A1c 10.0    Essential hypertension  /80- continue with Cozaar     Hyperlipidemia  Continue with Lipitor     Hypothyroidism  Continue with synthroid 25 mcg per day     Obesity  Goal for exercise 30 min 5 days per week     Thyroid nodule  Call me for any difficulty with swallowing       Labs:  Lab Results   Component Value Date    WBC 6.73 05/02/2016    HGB 14.2 05/02/2016    HCT 41.1 05/02/2016     (L) 05/02/2016    CHOL 158 11/25/2019    TRIG 144 11/25/2019    HDL 39 (L) 11/25/2019    ALT 26 11/25/2019    AST 29 11/25/2019     11/25/2019    K 3.6 11/25/2019     11/25/2019    CREATININE 0.8 11/25/2019    BUN 17 11/25/2019    CO2 25 11/25/2019    TSH 0.73 11/25/2019    HGBA1C 10.0 (H) 11/25/2019    MICROALBUR 47.3 (H) 11/25/2019       Total encounter time 25 minutes greater than 50% spent counseling/coordination of care.  I discussed the goals of diet and exercise with the patient. I discussed the goals of A1c, blood pressure, and cholesterol. I discussed the goals of blood sugar pre- and post meals with the patient.I encouraged the patient to rotate the injection sites.  I reviewed the patient's labs/imaging/medications/allergies/problem list.       Electronically signed by: PHIL Carrizales on 11/26/2019 5:28 PM

## 2019-11-24 NOTE — ASSESSMENT & PLAN NOTE
1. continue with  Jardiance 10 mg per day - increase fluids   2. Stop Janumet - start Rybelsus 3 mg per day in am - with 4 ounces of water - no food/meds  for 30 min   3. Continue with Novolog 70/30 mix 25 units with breakfast and 30 units with dinner   4. Rotate injection sites   5. Freestyle vin system   6. A1c 10.0

## 2019-11-25 ENCOUNTER — APPOINTMENT (OUTPATIENT)
Dept: LAB | Facility: HOSPITAL | Age: 59
End: 2019-11-25
Attending: NURSE PRACTITIONER
Payer: COMMERCIAL

## 2019-11-25 LAB
ALBUMIN SERPL-MCNC: 4.1 G/DL (ref 3.4–5)
ALBUMIN/CREAT UR: 77.9 UG/MG
ALP SERPL-CCNC: 57 IU/L (ref 35–126)
ALT SERPL-CCNC: 26 IU/L (ref 11–54)
ANION GAP SERPL CALC-SCNC: 11 MEQ/L (ref 3–15)
AST SERPL-CCNC: 29 IU/L (ref 15–41)
BILIRUB SERPL-MCNC: 1.3 MG/DL (ref 0.3–1.2)
BUN SERPL-MCNC: 17 MG/DL (ref 8–20)
CALCIUM SERPL-MCNC: 9.4 MG/DL (ref 8.9–10.3)
CHLORIDE SERPL-SCNC: 104 MEQ/L (ref 98–109)
CHOLEST SERPL-MCNC: 158 MG/DL
CO2 SERPL-SCNC: 25 MEQ/L (ref 22–32)
CREAT SERPL-MCNC: 0.8 MG/DL
CREAT UR-MCNC: 60.7 MG/DL
EST. AVERAGE GLUCOSE BLD GHB EST-MCNC: 240 MG/DL
GFR SERPL CREATININE-BSD FRML MDRD: >60 ML/MIN/1.73M*2
GLUCOSE SERPL-MCNC: 93 MG/DL (ref 70–99)
HBA1C MFR BLD HPLC: 10 %
HDLC SERPL-MCNC: 39 MG/DL
HDLC SERPL: 4.1 {RATIO}
LDLC SERPL CALC-MCNC: 90 MG/DL
MICROALBUMIN UR-MCNC: 47.3 MG/L
NONHDLC SERPL-MCNC: 119 MG/DL
POTASSIUM SERPL-SCNC: 3.6 MEQ/L (ref 3.6–5.1)
PROT SERPL-MCNC: 6.6 G/DL (ref 6–8.2)
SODIUM SERPL-SCNC: 140 MEQ/L (ref 136–144)
TRIGL SERPL-MCNC: 144 MG/DL (ref 30–149)
TSH SERPL DL<=0.05 MIU/L-ACNC: 0.73 MIU/L (ref 0.34–5.6)

## 2019-11-25 PROCEDURE — 80053 COMPREHEN METABOLIC PANEL: CPT

## 2019-11-25 PROCEDURE — 36415 COLL VENOUS BLD VENIPUNCTURE: CPT

## 2019-11-25 PROCEDURE — 82043 UR ALBUMIN QUANTITATIVE: CPT

## 2019-11-25 PROCEDURE — 80061 LIPID PANEL: CPT

## 2019-11-25 PROCEDURE — 84443 ASSAY THYROID STIM HORMONE: CPT

## 2019-11-25 PROCEDURE — 83036 HEMOGLOBIN GLYCOSYLATED A1C: CPT

## 2019-11-26 ENCOUNTER — OFFICE VISIT (OUTPATIENT)
Dept: ENDOCRINOLOGY | Facility: CLINIC | Age: 59
End: 2019-11-26
Payer: COMMERCIAL

## 2019-11-26 VITALS
BODY MASS INDEX: 31.71 KG/M2 | DIASTOLIC BLOOD PRESSURE: 80 MMHG | HEART RATE: 85 BPM | HEIGHT: 67 IN | WEIGHT: 202 LBS | SYSTOLIC BLOOD PRESSURE: 138 MMHG | OXYGEN SATURATION: 98 %

## 2019-11-26 DIAGNOSIS — I10 ESSENTIAL HYPERTENSION: ICD-10-CM

## 2019-11-26 DIAGNOSIS — E78.5 HYPERLIPIDEMIA, UNSPECIFIED HYPERLIPIDEMIA TYPE: ICD-10-CM

## 2019-11-26 DIAGNOSIS — E66.09 OBESITY DUE TO EXCESS CALORIES, UNSPECIFIED CLASSIFICATION, UNSPECIFIED WHETHER SERIOUS COMORBIDITY PRESENT: ICD-10-CM

## 2019-11-26 DIAGNOSIS — E04.1 THYROID NODULE: ICD-10-CM

## 2019-11-26 PROCEDURE — 99214 OFFICE O/P EST MOD 30 MIN: CPT | Performed by: NURSE PRACTITIONER

## 2019-11-26 NOTE — PATIENT INSTRUCTIONS
Uncontrolled diabetes mellitus type 2 without complications (CMS/HCC) (HCC)  1. continue with  Jardiance 10 mg per day - increase fluids   2. Stop Janumet - start Rybelsus 3 mg per day in am - with 4 ounces of water - no food/meds  for 30 min   3. Continue with Novolog 70/30 mix 25 units with breakfast and 30 units with dinner   4. Rotate injection sites   5. Freestyle vin system   6. A1c 10.0    Essential hypertension  /80- continue with Cozaar     Hyperlipidemia  Continue with Lipitor     Hypothyroidism  Continue with synthroid 25 mcg per day     Obesity  Goal for exercise 30 min 5 days per week     Thyroid nodule  Call me for any difficulty with swallowing     Patient Education     Diabetes Mellitus and Exercise  Exercising regularly is important for your overall health, especially when you have diabetes (diabetes mellitus). Exercising is not only about losing weight. It has many other health benefits, such as increasing muscle strength and bone density and reducing body fat and stress. This leads to improved fitness, flexibility, and endurance, all of which result in better overall health.  Exercise has additional benefits for people with diabetes, including:  · Reducing appetite.  · Helping to lower and control blood glucose.  · Lowering blood pressure.  · Helping to control amounts of fatty substances (lipids) in the blood, such as cholesterol and triglycerides.  · Helping the body to respond better to insulin (improving insulin sensitivity).  · Reducing how much insulin the body needs.  · Decreasing the risk for heart disease by:  ? Lowering cholesterol and triglyceride levels.  ? Increasing the levels of good cholesterol.  ? Lowering blood glucose levels.  What is my activity plan?  Your health care provider or certified diabetes educator can help you make a plan for the type and frequency of exercise (activity plan) that works for you. Make sure that you:  · Do at least 150 minutes of  moderate-intensity or vigorous-intensity exercise each week. This could be brisk walking, biking, or water aerobics.  ? Do stretching and strength exercises, such as yoga or weightlifting, at least 2 times a week.  ? Spread out your activity over at least 3 days of the week.  · Get some form of physical activity every day.  ? Do not go more than 2 days in a row without some kind of physical activity.  ? Avoid being inactive for more than 30 minutes at a time. Take frequent breaks to walk or stretch.  · Choose a type of exercise or activity that you enjoy, and set realistic goals.  · Start slowly, and gradually increase the intensity of your exercise over time.  What do I need to know about managing my diabetes?    · Check your blood glucose before and after exercising.  ? If your blood glucose is 240 mg/dL (13.3 mmol/L) or higher before you exercise, check your urine for ketones. If you have ketones in your urine, do not exercise until your blood glucose returns to normal.  ? If your blood glucose is 100 mg/dL (5.6 mmol/L) or lower, eat a snack containing 15-20 grams of carbohydrate. Check your blood glucose 15 minutes after the snack to make sure that your level is above 100 mg/dL (5.6 mmol/L) before you start your exercise.  · Know the symptoms of low blood glucose (hypoglycemia) and how to treat it. Your risk for hypoglycemia increases during and after exercise. Common symptoms of hypoglycemia can include:  ? Hunger.  ? Anxiety.  ? Sweating and feeling clammy.  ? Confusion.  ? Dizziness or feeling light-headed.  ? Increased heart rate or palpitations.  ? Blurry vision.  ? Tingling or numbness around the mouth, lips, or tongue.  ? Tremors or shakes.  ? Irritability.  · Keep a rapid-acting carbohydrate snack available before, during, and after exercise to help prevent or treat hypoglycemia.  · Avoid injecting insulin into areas of the body that are going to be exercised. For example, avoid injecting insulin  into:  ? The arms, when playing tennis.  ? The legs, when jogging.  · Keep records of your exercise habits. Doing this can help you and your health care provider adjust your diabetes management plan as needed. Write down:  ? Food that you eat before and after you exercise.  ? Blood glucose levels before and after you exercise.  ? The type and amount of exercise you have done.  ? When your insulin is expected to peak, if you use insulin. Avoid exercising at times when your insulin is peaking.  · When you start a new exercise or activity, work with your health care provider to make sure the activity is safe for you, and to adjust your insulin, medicines, or food intake as needed.  · Drink plenty of water while you exercise to prevent dehydration or heat stroke. Drink enough fluid to keep your urine clear or pale yellow.  Summary  · Exercising regularly is important for your overall health, especially when you have diabetes (diabetes mellitus).  · Exercising has many health benefits, such as increasing muscle strength and bone density and reducing body fat and stress.  · Your health care provider or certified diabetes educator can help you make a plan for the type and frequency of exercise (activity plan) that works for you.  · When you start a new exercise or activity, work with your health care provider to make sure the activity is safe for you, and to adjust your insulin, medicines, or food intake as needed.  This information is not intended to replace advice given to you by your health care provider. Make sure you discuss any questions you have with your health care provider.  Document Released: 03/09/2005 Document Revised: 06/28/2018 Document Reviewed: 05/29/2017  [employer] Interactive Patient Education © 2019 [employer]

## 2019-11-26 NOTE — LETTER
November 26, 2019     Paula Walker MD  [address] [facility] [place] [address]    Patient: Bebe Burks  YOB: 1960  Date of Visit: 11/26/2019    Dear Dr. Walker:    Thank you for referring Bebe Burks to me for evaluation. Below are the relevant portions of my assessment and plan of care.    If you have questions, please do not hesitate to call me. I look forward to following Bebe along with you.         Sincerely,        PHIL Carrizales        CC: No Recipients    Progress Notes:

## 2019-12-02 RX ORDER — PEN NEEDLE, DIABETIC 31 GX5/16"
NEEDLE, DISPOSABLE MISCELLANEOUS
Qty: 200 EACH | Refills: 3 | Status: SHIPPED | OUTPATIENT
Start: 2019-12-02 | End: 2020-12-15

## 2019-12-11 RX ORDER — GABAPENTIN 300 MG/1
CAPSULE ORAL
Qty: 360 CAPSULE | Refills: 3 | Status: SHIPPED | OUTPATIENT
Start: 2019-12-11 | End: 2020-11-10

## 2019-12-20 RX ORDER — LOSARTAN POTASSIUM 100 MG/1
100 TABLET ORAL DAILY
Qty: 90 TABLET | Refills: 1 | Status: SHIPPED | OUTPATIENT
Start: 2019-12-20 | End: 2019-12-23

## 2019-12-20 NOTE — TELEPHONE ENCOUNTER
Please verify rx for Losartan, is pt to take 1/2 tab (50mg) or 1 tab (100mg) and dispense. Thanks

## 2019-12-23 ENCOUNTER — TELEPHONE (OUTPATIENT)
Dept: ENDOCRINOLOGY | Facility: CLINIC | Age: 59
End: 2019-12-23

## 2019-12-23 ENCOUNTER — TELEPHONE (OUTPATIENT)
Dept: INTERNAL MEDICINE | Facility: CLINIC | Age: 59
End: 2019-12-23

## 2019-12-23 RX ORDER — HYDROCHLOROTHIAZIDE 12.5 MG/1
12.5 TABLET ORAL DAILY
Qty: 90 TABLET | Refills: 1 | Status: SHIPPED | OUTPATIENT
Start: 2019-12-23 | End: 2020-06-22

## 2019-12-23 RX ORDER — LOSARTAN POTASSIUM 50 MG/1
50 TABLET ORAL DAILY
Qty: 90 TABLET | Refills: 1 | Status: SHIPPED | OUTPATIENT
Start: 2019-12-23 | End: 2020-07-06

## 2019-12-23 NOTE — TELEPHONE ENCOUNTER
Sent new rx sent for decreased dose of losartan 50mg and Hctz 12.5mg and requested pharmacy to d/c losartan 100mg and HCTZ 25mg tabs.

## 2019-12-23 NOTE — TELEPHONE ENCOUNTER
Per pharmacy pt was on Losartan 50mg and HCTZ 12.5mg. Current rx dispensed was for Losartan 100mg and HCTZ 25mg. Which does would you like pt to continue on?

## 2020-01-08 RX ORDER — AMLODIPINE BESYLATE 5 MG/1
TABLET ORAL
Qty: 90 TABLET | Refills: 0 | Status: SHIPPED | OUTPATIENT
Start: 2020-01-08 | End: 2020-04-09

## 2020-01-20 ENCOUNTER — TELEPHONE (OUTPATIENT)
Dept: ENDOCRINOLOGY | Facility: CLINIC | Age: 60
End: 2020-01-20

## 2020-01-20 RX ORDER — INSULIN ASPART 100 [IU]/ML
INJECTION, SUSPENSION SUBCUTANEOUS
Qty: 45 ML | Refills: 3 | Status: SHIPPED | OUTPATIENT
Start: 2020-01-20 | End: 2020-10-28

## 2020-01-20 RX ORDER — LEVOTHYROXINE SODIUM 25 UG/1
TABLET ORAL
Qty: 90 TABLET | Refills: 0 | Status: SHIPPED | OUTPATIENT
Start: 2020-01-20 | End: 2020-04-13

## 2020-02-19 ENCOUNTER — TELEPHONE (OUTPATIENT)
Dept: ENDOCRINOLOGY | Facility: CLINIC | Age: 60
End: 2020-02-19

## 2020-02-28 ENCOUNTER — TELEPHONE (OUTPATIENT)
Dept: ENDOCRINOLOGY | Facility: CLINIC | Age: 60
End: 2020-02-28

## 2020-02-28 NOTE — TELEPHONE ENCOUNTER
Bebe Burks called stating she is out of the [facility] samples.  Sent an rx to local [facility]. Pt notified to activate coupon.

## 2020-04-09 RX ORDER — AMLODIPINE BESYLATE 5 MG/1
TABLET ORAL
Qty: 90 TABLET | Refills: 0 | Status: SHIPPED | OUTPATIENT
Start: 2020-04-09 | End: 2020-07-06

## 2020-04-13 RX ORDER — LEVOTHYROXINE SODIUM 25 UG/1
TABLET ORAL
Qty: 90 TABLET | Refills: 0 | Status: SHIPPED | OUTPATIENT
Start: 2020-04-13 | End: 2020-07-08

## 2020-05-01 ENCOUNTER — TELEPHONE (OUTPATIENT)
Dept: ENDOCRINOLOGY | Facility: CLINIC | Age: 60
End: 2020-05-01

## 2020-06-08 RX ORDER — ATORVASTATIN CALCIUM 10 MG/1
TABLET, FILM COATED ORAL
Qty: 90 TABLET | Refills: 3 | Status: SHIPPED | OUTPATIENT
Start: 2020-06-08 | End: 2021-04-26 | Stop reason: SDUPTHER

## 2020-06-17 RX ORDER — ORAL SEMAGLUTIDE 3 MG/1
TABLET ORAL
Qty: 30 TABLET | Refills: 2 | Status: SHIPPED | OUTPATIENT
Start: 2020-06-17 | End: 2020-09-09

## 2020-06-18 ENCOUNTER — TELEPHONE (OUTPATIENT)
Dept: ENDOCRINOLOGY | Facility: CLINIC | Age: 60
End: 2020-06-18

## 2020-06-18 DIAGNOSIS — E11.8 TYPE 2 DIABETES MELLITUS WITH COMPLICATION, WITH LONG-TERM CURRENT USE OF INSULIN (CMS/HCC): Primary | ICD-10-CM

## 2020-06-18 DIAGNOSIS — Z79.4 TYPE 2 DIABETES MELLITUS WITH COMPLICATION, WITH LONG-TERM CURRENT USE OF INSULIN (CMS/HCC): Primary | ICD-10-CM

## 2020-06-22 RX ORDER — HYDROCHLOROTHIAZIDE 12.5 MG/1
TABLET ORAL
Qty: 90 TABLET | Refills: 0 | Status: SHIPPED | OUTPATIENT
Start: 2020-06-22 | End: 2020-09-21

## 2020-07-06 RX ORDER — AMLODIPINE BESYLATE 5 MG/1
TABLET ORAL
Qty: 90 TABLET | Refills: 0 | Status: SHIPPED | OUTPATIENT
Start: 2020-07-06 | End: 2020-10-01

## 2020-07-06 RX ORDER — LOSARTAN POTASSIUM 50 MG/1
TABLET ORAL
Qty: 90 TABLET | Refills: 0 | Status: SHIPPED | OUTPATIENT
Start: 2020-07-06 | End: 2020-11-06 | Stop reason: SDUPTHER

## 2020-07-08 RX ORDER — LEVOTHYROXINE SODIUM 25 UG/1
TABLET ORAL
Qty: 30 TABLET | Refills: 0 | Status: SHIPPED | OUTPATIENT
Start: 2020-07-08 | End: 2020-08-13

## 2020-09-09 RX ORDER — ORAL SEMAGLUTIDE 3 MG/1
TABLET ORAL
Qty: 30 TABLET | Refills: 2 | Status: SHIPPED | OUTPATIENT
Start: 2020-09-09 | End: 2020-11-06 | Stop reason: ALTCHOICE

## 2020-10-01 RX ORDER — AMLODIPINE BESYLATE 5 MG/1
TABLET ORAL
Qty: 90 TABLET | Refills: 0 | Status: SHIPPED | OUTPATIENT
Start: 2020-10-01 | End: 2020-11-06 | Stop reason: SDUPTHER

## 2020-10-08 RX ORDER — LEVOTHYROXINE SODIUM 25 UG/1
25 TABLET ORAL
Qty: 30 TABLET | Refills: 1 | Status: SHIPPED | OUTPATIENT
Start: 2020-10-08 | End: 2020-11-30

## 2020-10-28 RX ORDER — INSULIN ASPART 100 [IU]/ML
INJECTION, SUSPENSION SUBCUTANEOUS
Qty: 5 PEN | Refills: 1 | Status: SHIPPED | OUTPATIENT
Start: 2020-10-28 | End: 2020-11-06 | Stop reason: SDUPTHER

## 2020-11-03 NOTE — ASSESSMENT & PLAN NOTE
1. continue with  Jardiance 10 mg per day - increase fluids   2.  increase Rybelsus 7 mg per day - after 1 month increase to 14 mg per day    3. Continue with Novolog 70/30 mix 25 units with breakfast and 30 units with dinner   4. Rotate injection sites   5. Freestyle vin system   6. A1c 12.2

## 2020-11-03 NOTE — PROGRESS NOTES
Endocrinology  Note       PATIENTS: Bebe Burks  YOB: 1960  DATE: November 6, 2020  VISIT TYPE: follow up visit    History of Present Illness:   HPI 59 -year-old white female with type 2 diabetes.  She does follow closely with ophthalmology for retinopathy.She   continues on   NovoLog 7030 mix 25 units with breakfast 30 units with dinner needs 10 mg a day and Rybelsus 3 mg daily.  She does have the freestyle vin system though states she has not been using it it keeps falling off.  She is going through a great deal of stress with work.. She  continues on Synthroid 25 ?g day. She  continues on Lipitor for  hyperlipidemia. She  does have a history of thyroid nodules reports no difficulty with swallowing or breathing.  She  reports no foot wounds at the present time.    Medical History:  Past Medical History:   Diagnosis Date   • Arthritis    • Hypertension    • Lipid disorder    • Type 2 diabetes mellitus (CMS/[facility])        Surgical History:   Past Surgical History:   Procedure Laterality Date   • COMBINED HYSTEROSCOPY DIAGNOSTIC / D&C  2007   • EYE SURGERY Left 2017    cataract   • VITRECTOMY Left 2016       Family History:  History reviewed. No pertinent family history.    Social history:  Social History     Socioeconomic History   • Marital status: Single     Spouse name: Not on file   • Number of children: Not on file   • Years of education: Not on file   • Highest education level: Not on file   Occupational History   • Not on file   Social Needs   • Financial resource strain: Not on file   • Food insecurity     Worry: Not on file     Inability: Not on file   • Transportation needs     Medical: Not on file     Non-medical: Not on file   Tobacco Use   • Smoking status: Never Smoker   • Smokeless tobacco: Never Used   Substance and Sexual Activity   • Alcohol use: Yes     Alcohol/week: 1.0 standard drinks     Types: 1 Glasses of wine per week     Comment: occas   • Drug use: No  "  • Sexual activity: Not on file   Lifestyle   • Physical activity     Days per week: Not on file     Minutes per session: Not on file   • Stress: Not on file   Relationships   • Social connections     Talks on phone: Not on file     Gets together: Not on file     Attends Jainism service: Not on file     Active member of club or organization: Not on file     Attends meetings of clubs or organizations: Not on file     Relationship status: Not on file   • Intimate partner violence     Fear of current or ex partner: Not on file     Emotionally abused: Not on file     Physically abused: Not on file     Forced sexual activity: Not on file   Other Topics Concern   • Not on file   Social History Narrative   • Not on file       Medication(active prior to today):  Current Outpatient Medications   Medication Sig Dispense Refill   • amLODIPine (NORVASC) 5 mg tablet Take 1 tablet (5 mg total) by mouth once daily. 90 tablet 3   • atorvastatin (LIPITOR) 10 mg tablet TAKE 1 TABLET BY MOUTH EVERY DAY 90 tablet 3   • BD ULTRA-FINE KRISTIN PEN NEEDLE 32 gauge x 5/32\" needle INJECT TWICE A  each 3   • empagliflozin (JARDIANCE) 10 mg tablet Take 1 tab daily 90 tablet 1   • FREESTYLE LAURA 14 DAY READER misc 1 each continuously. 1 reader dx code E 11.9 1 each 11   • gabapentin (NEURONTIN) 300 mg capsule TAKE ONE CAPSULE BY MOUTH EVERY 8 HOURS WHILE AWAKE & TAKE ONE CAPSULE AT BEDTIME 360 capsule 3   • hydrochlorothiazide (HYDRODIURIL) 12.5 mg tablet TAKE 1 TABLET BY MOUTH EVERY DAY 90 tablet 0   • insulin asp prot-insulin aspart, 70/30, (NovoLOG Mix 70-30FlexPen U-100) 100 unit/mL (70-30) pen INJECT 25 UNITS IN THE MORNING AND 30 UNITS IN THE EVENING 5 pen 3   • levothyroxine (SYNTHROID) 25 mcg tablet Take 1 tablet (25 mcg total) by mouth once daily. 30 tablet 1   • losartan (COZAAR) 50 mg tablet Take 1 tablet (50 mg total) by mouth once daily. 90 tablet 3   • ONETOUCH ULTRA BLUE TEST STRIP strip PT CHECKS BLOOD SUGAR 4 TIMES A " " strip 0   • FREESTYLE LAURA 14 DAY SENSOR kit 1 each continuously. 1 sensor every 14 days dx code E 11.9 1 kit 11   • ONETOUCH DELICA LANCETS 33 gauge misc 1 each 3 (three) times a day. 100 each 11   • ONETOUCH VERIO TEST STRIPS strip 1 each 3 (three) times a day. 100 strip 11   • semaglutide (RYBELSUS) 7 mg tablet Take 1 tablet (7 mg total) by mouth daily. 30 tablet 3     No current facility-administered medications for this visit.        Allergies:  Patient has no known allergies.    Review of Systems:   Constitutional: Negative for fatigue and unexpected weight change. + weight loss   HENT: Negative for postnasal drip, trouble swallowing and voice change.    Eyes: Negative for visual disturbance.   Respiratory: Negative for apnea.    Cardiovascular: Negative for leg swelling.   Gastrointestinal: Negative for constipation, diarrhea and nausea.   Endocrine: Negative for polydipsia, polyphagia and polyuria.   Musculoskeletal: Negative for arthralgias, back pain, neck pain and neck stiffness.   Neurological: Negative for numbness and headaches.   Psychiatric/Behavioral: Negative for sleep disturbance.     Vitals Signs:  Vitals:    11/06/20 0736   BP: 126/74   BP Location: Right upper arm   Patient Position: Sitting   Pulse: 84   Weight: 88.9 kg (196 lb)   Height: 1.676 m (5' 6\")     Body mass index is 31.64 kg/m².      Physical Exam:  Constitutional: Patient is oriented to person, place, and time.  Appears well-developed and well-nourished. + overweight   Eyes: EOM are normal. Pupils are equal, round, and reactive to light.   Neck: Normal range of motion. Neck supple.   Cardiovascular: Normal rate, regular rhythm and normal heart sounds.    Pulmonary/Chest: Effort normal and breath sounds normal.   Abdominal: Soft.   Musculoskeletal: Normal range of motion.   Neurological:  Alert and oriented to person, place, and time.   Skin: Skin is warm and dry.   Psychiatric:  Normal mood and affect. Behavior is normal. " Judgment and thought content normal.   Foot Exam: Normal exam, no swelling, tenderness, instability; ligaments intact, full range of motion of all ankle/foot joints    Assessment/Plan:  Controlled type 2 diabetes mellitus, with long-term current use of insulin (CMS/[facility])  1. continue with  Jardiance 10 mg per day - increase fluids   2.  increase Rybelsus 7 mg per day - after 1 month increase to 14 mg per day    3. Continue with Novolog 70/30 mix 25 units with breakfast and 30 units with dinner   4. Rotate injection sites   5. Freestyle vin system   6. A1c 12.2     Essential hypertension  /74 - continue with Cozaar     Hyperlipidemia  Continue with Lipitor     Hypothyroidism  Continue with Synthroid 25 mcg per day     Obesity  Goal for exercise 30 min 5 days per week     Thyroid nodule  Call me for any difficulty with swallowing or breathing       Labs:  Lab Results   Component Value Date    WBC 6.73 05/02/2016    HGB 14.2 05/02/2016    HCT 41.1 05/02/2016     (L) 05/02/2016    CHOL 185 11/05/2020    TRIG 117 11/05/2020    HDL 40 (L) 11/05/2020    ALT 32 11/05/2020    AST 25 11/05/2020     11/05/2020    K 3.8 11/05/2020     11/05/2020    CREATININE 0.8 11/05/2020    BUN 22 (H) 11/05/2020    CO2 26 11/05/2020    TSH 0.72 11/05/2020    HGBA1C 12.2 (H) 11/05/2020    MICROALBUR 29.0 (H) 11/05/2020       Total encounter time 25 minutes greater than 50% spent counseling/coordination of care.  I reviewed the patient's blood sugars.  I discussed the goals of diet and exercise with the patient. I discussed the goals of A1c, blood pressure, and cholesterol. I discussed the goals of blood sugar pre- and post meals with the patient.I encouraged the patient to rotate the injection sites.  I encouraged the patient to portal me her blood sugars weekly.  She was provided with a One Touch glucometer.  I reviewed the patient's labs/imaging/medications/allergies/problem list.       Electronically signed by:  PHIL Carrizales on 11/6/2020 9:55 AM

## 2020-11-05 ENCOUNTER — APPOINTMENT (OUTPATIENT)
Dept: LAB | Facility: HOSPITAL | Age: 60
End: 2020-11-05
Attending: NURSE PRACTITIONER
Payer: COMMERCIAL

## 2020-11-05 DIAGNOSIS — E11.8 TYPE 2 DIABETES MELLITUS WITH COMPLICATION, WITH LONG-TERM CURRENT USE OF INSULIN (CMS/HCC): ICD-10-CM

## 2020-11-05 DIAGNOSIS — Z79.4 TYPE 2 DIABETES MELLITUS WITH COMPLICATION, WITH LONG-TERM CURRENT USE OF INSULIN (CMS/HCC): ICD-10-CM

## 2020-11-05 DIAGNOSIS — E11.65 UNCONTROLLED TYPE 2 DIABETES MELLITUS WITH HYPERGLYCEMIA (CMS/HCC): ICD-10-CM

## 2020-11-05 LAB
ALBUMIN SERPL-MCNC: 4 G/DL (ref 3.4–5)
ALBUMIN/CREAT UR: 65.9 UG/MG
ALP SERPL-CCNC: 65 IU/L (ref 35–126)
ALT SERPL-CCNC: 32 IU/L (ref 11–54)
ANION GAP SERPL CALC-SCNC: 10 MEQ/L (ref 3–15)
AST SERPL-CCNC: 25 IU/L (ref 15–41)
BILIRUB SERPL-MCNC: 1.2 MG/DL (ref 0.3–1.2)
BUN SERPL-MCNC: 22 MG/DL (ref 8–20)
CALCIUM SERPL-MCNC: 9.6 MG/DL (ref 8.9–10.3)
CHLORIDE SERPL-SCNC: 103 MEQ/L (ref 98–109)
CHOLEST SERPL-MCNC: 185 MG/DL
CO2 SERPL-SCNC: 26 MEQ/L (ref 22–32)
CREAT SERPL-MCNC: 0.8 MG/DL (ref 0.6–1.1)
CREAT UR-MCNC: 44 MG/DL
EST. AVERAGE GLUCOSE BLD GHB EST-MCNC: 303 MG/DL
GFR SERPL CREATININE-BSD FRML MDRD: >60 ML/MIN/1.73M*2
GLUCOSE SERPL-MCNC: 128 MG/DL (ref 70–99)
HBA1C MFR BLD HPLC: 12.2 %
HDLC SERPL-MCNC: 40 MG/DL
HDLC SERPL: 4.6 {RATIO}
LDLC SERPL CALC-MCNC: 122 MG/DL
MICROALBUMIN UR-MCNC: 29 MG/L
NONHDLC SERPL-MCNC: 145 MG/DL
POTASSIUM SERPL-SCNC: 3.8 MEQ/L (ref 3.6–5.1)
PROT SERPL-MCNC: 6.1 G/DL (ref 6–8.2)
SODIUM SERPL-SCNC: 139 MEQ/L (ref 136–144)
TRIGL SERPL-MCNC: 117 MG/DL (ref 30–149)
TSH SERPL DL<=0.05 MIU/L-ACNC: 0.72 MIU/L (ref 0.34–5.6)

## 2020-11-05 PROCEDURE — 83036 HEMOGLOBIN GLYCOSYLATED A1C: CPT

## 2020-11-05 PROCEDURE — 82043 UR ALBUMIN QUANTITATIVE: CPT

## 2020-11-05 PROCEDURE — 84443 ASSAY THYROID STIM HORMONE: CPT

## 2020-11-05 PROCEDURE — 80053 COMPREHEN METABOLIC PANEL: CPT

## 2020-11-05 PROCEDURE — 36415 COLL VENOUS BLD VENIPUNCTURE: CPT

## 2020-11-05 PROCEDURE — 80061 LIPID PANEL: CPT

## 2020-11-06 ENCOUNTER — OFFICE VISIT (OUTPATIENT)
Dept: ENDOCRINOLOGY | Facility: CLINIC | Age: 60
End: 2020-11-06
Payer: COMMERCIAL

## 2020-11-06 VITALS
BODY MASS INDEX: 31.5 KG/M2 | SYSTOLIC BLOOD PRESSURE: 126 MMHG | DIASTOLIC BLOOD PRESSURE: 74 MMHG | HEART RATE: 84 BPM | HEIGHT: 66 IN | WEIGHT: 196 LBS

## 2020-11-06 DIAGNOSIS — I10 ESSENTIAL HYPERTENSION: ICD-10-CM

## 2020-11-06 DIAGNOSIS — E78.5 HYPERLIPIDEMIA, UNSPECIFIED HYPERLIPIDEMIA TYPE: ICD-10-CM

## 2020-11-06 DIAGNOSIS — E03.9 HYPOTHYROIDISM, UNSPECIFIED TYPE: ICD-10-CM

## 2020-11-06 DIAGNOSIS — E66.09 OBESITY DUE TO EXCESS CALORIES, UNSPECIFIED CLASSIFICATION, UNSPECIFIED WHETHER SERIOUS COMORBIDITY PRESENT: ICD-10-CM

## 2020-11-06 DIAGNOSIS — E04.1 THYROID NODULE: ICD-10-CM

## 2020-11-06 DIAGNOSIS — E11.8 CONTROLLED TYPE 2 DIABETES MELLITUS WITH COMPLICATION, WITH LONG-TERM CURRENT USE OF INSULIN (CMS/HCC): Primary | ICD-10-CM

## 2020-11-06 DIAGNOSIS — Z79.4 CONTROLLED TYPE 2 DIABETES MELLITUS WITH COMPLICATION, WITH LONG-TERM CURRENT USE OF INSULIN (CMS/HCC): Primary | ICD-10-CM

## 2020-11-06 LAB — GLUCOSE BLOOD, POC: 228

## 2020-11-06 PROCEDURE — 82962 GLUCOSE BLOOD TEST: CPT | Performed by: NURSE PRACTITIONER

## 2020-11-06 PROCEDURE — 99214 OFFICE O/P EST MOD 30 MIN: CPT | Performed by: NURSE PRACTITIONER

## 2020-11-06 RX ORDER — LOSARTAN POTASSIUM 50 MG/1
50 TABLET ORAL
Qty: 90 TABLET | Refills: 3 | Status: SHIPPED | OUTPATIENT
Start: 2020-11-06 | End: 2021-04-26 | Stop reason: SDUPTHER

## 2020-11-06 RX ORDER — ORAL SEMAGLUTIDE 7 MG/1
7 TABLET ORAL DAILY
Qty: 30 TABLET | Refills: 3 | Status: SHIPPED | OUTPATIENT
Start: 2020-11-06 | End: 2020-12-18

## 2020-11-06 RX ORDER — AMLODIPINE BESYLATE 5 MG/1
5 TABLET ORAL
Qty: 90 TABLET | Refills: 3 | Status: SHIPPED | OUTPATIENT
Start: 2020-11-06 | End: 2021-04-26 | Stop reason: SDUPTHER

## 2020-11-06 RX ORDER — BLOOD-GLUCOSE METER
1 EACH MISCELLANEOUS 3 TIMES DAILY
Qty: 100 STRIP | Refills: 11 | Status: SHIPPED | OUTPATIENT
Start: 2020-11-06 | End: 2023-03-01 | Stop reason: HOSPADM

## 2020-11-06 RX ORDER — INSULIN ASPART 100 [IU]/ML
INJECTION, SUSPENSION SUBCUTANEOUS
Qty: 5 PEN | Refills: 3 | Status: SHIPPED | OUTPATIENT
Start: 2020-11-06 | End: 2020-12-17

## 2020-11-06 NOTE — LETTER
November 6, 2020     Paula Walker MD  [address]  [place] [address]    Patient: Bebe Burks  YOB: 1960  Date of Visit: 11/6/2020      Dear Dr. Walker:    Thank you for referring Bebe Burks to me for evaluation. Below are my notes for this consultation.    If you have questions, please do not hesitate to call me. I look forward to following your patient along with you.         Sincerely,        PHIL Carrizales        CC: No Recipients  Wanda Gipson CRNP  11/6/2020  9:56 AM  Sign when Signing Visit                        Endocrinology  Note       PATIENTS: Bebe Burks  YOB: 1960  DATE: November 6, 2020  VISIT TYPE: follow up visit    History of Present Illness:   HPI 59 -year-old white female with type 2 diabetes.  She does follow closely with ophthalmology for retinopathy.She   continues on   NovoLog 7030 mix 25 units with breakfast 30 units with dinner needs 10 mg a day and Rybelsus 3 mg daily.  She does have the freestyle vin system though states she has not been using it it keeps falling off.  She is going through a great deal of stress with work.. She  continues on Synthroid 25 ?g day. She  continues on Lipitor for  hyperlipidemia. She  does have a history of thyroid nodules reports no difficulty with swallowing or breathing.  She  reports no foot wounds at the present time.    Medical History:  Past Medical History:   Diagnosis Date   • Arthritis    • Hypertension    • Lipid disorder    • Type 2 diabetes mellitus (CMS/HCC)        Surgical History:   Past Surgical History:   Procedure Laterality Date   • COMBINED HYSTEROSCOPY DIAGNOSTIC / D&C  2007   • EYE SURGERY Left 2017    cataract   • VITRECTOMY Left 2016       Family History:  History reviewed. No pertinent family history.    Social history:  Social History     Socioeconomic History   • Marital status: Single     Spouse name: Not on file   • Number of children: Not on file   • Years of education: Not on  "file   • Highest education level: Not on file   Occupational History   • Not on file   Social Needs   • Financial resource strain: Not on file   • Food insecurity     Worry: Not on file     Inability: Not on file   • Transportation needs     Medical: Not on file     Non-medical: Not on file   Tobacco Use   • Smoking status: Never Smoker   • Smokeless tobacco: Never Used   Substance and Sexual Activity   • Alcohol use: Yes     Alcohol/week: 1.0 standard drinks     Types: 1 Glasses of wine per week     Comment: occas   • Drug use: No   • Sexual activity: Not on file   Lifestyle   • Physical activity     Days per week: Not on file     Minutes per session: Not on file   • Stress: Not on file   Relationships   • Social connections     Talks on phone: Not on file     Gets together: Not on file     Attends Gnosticist service: Not on file     Active member of club or organization: Not on file     Attends meetings of clubs or organizations: Not on file     Relationship status: Not on file   • Intimate partner violence     Fear of current or ex partner: Not on file     Emotionally abused: Not on file     Physically abused: Not on file     Forced sexual activity: Not on file   Other Topics Concern   • Not on file   Social History Narrative   • Not on file       Medication(active prior to today):  Current Outpatient Medications   Medication Sig Dispense Refill   • amLODIPine (NORVASC) 5 mg tablet Take 1 tablet (5 mg total) by mouth once daily. 90 tablet 3   • atorvastatin (LIPITOR) 10 mg tablet TAKE 1 TABLET BY MOUTH EVERY DAY 90 tablet 3   • BD ULTRA-FINE KRISTIN PEN NEEDLE 32 gauge x 5/32\" needle INJECT TWICE A  each 3   • empagliflozin (JARDIANCE) 10 mg tablet Take 1 tab daily 90 tablet 1   • FREESTYLE LAURA 14 DAY READER misc 1 each continuously. 1 reader dx code E 11.9 1 each 11   • gabapentin (NEURONTIN) 300 mg capsule TAKE ONE CAPSULE BY MOUTH EVERY 8 HOURS WHILE AWAKE & TAKE ONE CAPSULE AT BEDTIME 360 capsule 3   • " "hydrochlorothiazide (HYDRODIURIL) 12.5 mg tablet TAKE 1 TABLET BY MOUTH EVERY DAY 90 tablet 0   • insulin asp prot-insulin aspart, 70/30, (NovoLOG Mix 70-30FlexPen U-100) 100 unit/mL (70-30) pen INJECT 25 UNITS IN THE MORNING AND 30 UNITS IN THE EVENING 5 pen 3   • levothyroxine (SYNTHROID) 25 mcg tablet Take 1 tablet (25 mcg total) by mouth once daily. 30 tablet 1   • losartan (COZAAR) 50 mg tablet Take 1 tablet (50 mg total) by mouth once daily. 90 tablet 3   • ONETOUCH ULTRA BLUE TEST STRIP strip PT CHECKS BLOOD SUGAR 4 TIMES A  strip 0   • FREESTYLE LAURA 14 DAY SENSOR kit 1 each continuously. 1 sensor every 14 days dx code E 11.9 1 kit 11   • ONETOUCH DELICA LANCETS 33 gauge misc 1 each 3 (three) times a day. 100 each 11   • ONETOUCH VERIO TEST STRIPS strip 1 each 3 (three) times a day. 100 strip 11   • semaglutide (RYBELSUS) 7 mg tablet Take 1 tablet (7 mg total) by mouth daily. 30 tablet 3     No current facility-administered medications for this visit.        Allergies:  Patient has no known allergies.    Review of Systems:   Constitutional: Negative for fatigue and unexpected weight change. + weight loss   HENT: Negative for postnasal drip, trouble swallowing and voice change.    Eyes: Negative for visual disturbance.   Respiratory: Negative for apnea.    Cardiovascular: Negative for leg swelling.   Gastrointestinal: Negative for constipation, diarrhea and nausea.   Endocrine: Negative for polydipsia, polyphagia and polyuria.   Musculoskeletal: Negative for arthralgias, back pain, neck pain and neck stiffness.   Neurological: Negative for numbness and headaches.   Psychiatric/Behavioral: Negative for sleep disturbance.     Vitals Signs:  Vitals:    11/06/20 0736   BP: 126/74   BP Location: Right upper arm   Patient Position: Sitting   Pulse: 84   Weight: 88.9 kg (196 lb)   Height: 1.676 m (5' 6\")     Body mass index is 31.64 kg/m².      Physical Exam:  Constitutional: Patient is oriented to person, " place, and time.  Appears well-developed and well-nourished. + overweight   Eyes: EOM are normal. Pupils are equal, round, and reactive to light.   Neck: Normal range of motion. Neck supple.   Cardiovascular: Normal rate, regular rhythm and normal heart sounds.    Pulmonary/Chest: Effort normal and breath sounds normal.   Abdominal: Soft.   Musculoskeletal: Normal range of motion.   Neurological:  Alert and oriented to person, place, and time.   Skin: Skin is warm and dry.   Psychiatric:  Normal mood and affect. Behavior is normal. Judgment and thought content normal.   Foot Exam: Normal exam, no swelling, tenderness, instability; ligaments intact, full range of motion of all ankle/foot joints    Assessment/Plan:  Controlled type 2 diabetes mellitus, with long-term current use of insulin (CMS/[facility])  1. continue with  Jardiance 10 mg per day - increase fluids   2.  increase Rybelsus 7 mg per day - after 1 month increase to 14 mg per day    3. Continue with Novolog 70/30 mix 25 units with breakfast and 30 units with dinner   4. Rotate injection sites   5. Freestyle vin system   6. A1c 12.2     Essential hypertension  /74 - continue with Cozaar     Hyperlipidemia  Continue with Lipitor     Hypothyroidism  Continue with Synthroid 25 mcg per day     Obesity  Goal for exercise 30 min 5 days per week     Thyroid nodule  Call me for any difficulty with swallowing or breathing       Labs:  Lab Results   Component Value Date    WBC 6.73 05/02/2016    HGB 14.2 05/02/2016    HCT 41.1 05/02/2016     (L) 05/02/2016    CHOL 185 11/05/2020    TRIG 117 11/05/2020    HDL 40 (L) 11/05/2020    ALT 32 11/05/2020    AST 25 11/05/2020     11/05/2020    K 3.8 11/05/2020     11/05/2020    CREATININE 0.8 11/05/2020    BUN 22 (H) 11/05/2020    CO2 26 11/05/2020    TSH 0.72 11/05/2020    HGBA1C 12.2 (H) 11/05/2020    MICROALBUR 29.0 (H) 11/05/2020       Total encounter time 25 minutes greater than 50% spent  counseling/coordination of care.  I reviewed the patient's blood sugars.  I discussed the goals of diet and exercise with the patient. I discussed the goals of A1c, blood pressure, and cholesterol. I discussed the goals of blood sugar pre- and post meals with the patient.I encouraged the patient to rotate the injection sites.  I encouraged the patient to portal me her blood sugars weekly.  She was provided with a One Touch glucometer.  I reviewed the patient's labs/imaging/medications/allergies/problem list.       Electronically signed by: PHIL Carrizales on 11/6/2020 9:55 AM

## 2020-11-06 NOTE — PATIENT INSTRUCTIONS
Controlled type 2 diabetes mellitus, with long-term current use of insulin (CMS/[facility])  1. continue with  Jardiance 10 mg per day - increase fluids   2.  increase Rybelsus 7 mg per day - after 1 month increase to 14 mg per day    3. Continue with Novolog 70/30 mix 25 units with breakfast and 30 units with dinner   4. Rotate injection sites   5. Freestyle vin system   6. A1c 12.2     Essential hypertension  /74 - continue with Cozaar     Hyperlipidemia  Continue with Lipitor     Hypothyroidism  Continue with Synthroid 25 mcg per day     Obesity  Goal for exercise 30 min 5 days per week     Thyroid nodule  Call me for any difficulty with swallowing or breathing     Patient Education     Diabetes and Exercise  Diabetes mellitus is a common, chronic disease in which a person's blood sugar (glucose) levels are often too high. Getting exercise on a regular basis is an important part of diabetes treatment.  WHY SHOULD I EXERCISE REGULARLY IF I HAVE DIABETES?  Exercising regularly provides many benefits for people who have diabetes. Some of these benefits include:  · Lowering your blood glucose level.  · Maintaining a healthy body weight and structure.  · Reducing your risk of heart disease by:    Lowering your LDL cholesterol levels. LDL is sometimes called bad cholesterol.    Lowering your triglyceride levels.    Raising your HDL cholesterol levels. HDL is sometimes called good cholesterol.    Decreasing your blood pressure.  · Lowering your hemoglobin A1C levels. A1C is a blood test that determines your average blood glucose level over a 3-month period. Doing resistance exercises on a regular basis can help to lower these levels.  · Improving your body's ability to use insulin and glucose.  WHAT TYPES OF EXERCISE ARE RECOMMENDED FOR PEOPLE WHO HAVE DIABETES?  A combination of resistance exercises and aerobic exercise is recommended for people who have diabetes. Resistance exercises are those that you do with free  weights or weight machines. Aerobic exercise includes any exercise that raises your heart rate and breathing rate for a sustained amount of time. This can include activities such as:  · Brisk walking.  · Water or dry-land aerobics.  · Bicycling or riding a stationary bike.  · Jogging.  · Dancing.  · Hiking.  · Moderate to vigorous gardening.  Ask your health care provider what types of exercise are safe for you.  HOW LONG AND HOW OFTEN SHOULD I EXERCISE?  The American Diabetes Association and the [place] Department of Health recommend the following:  · Children who have diabetes or are at risk of developing diabetes (have prediabetes) should get 1 hour or more of exercise every day.  · Adults who have diabetes should exercise with moderate intensity for 150 minutes or more per week. Moderate-intensity exercise is any exercise that raises your heart rate to 50-70% of your maximum heart rate. Ask your health care provider how to calculate this.  · Adults who have diabetes should spread exercise over at least 3 days per week, but they should not go more than 2 days in a row without exercising.  · Adults who have diabetes should do resistance exercise at least 2 days per week.  · Adults who have diabetes should avoid sitting for more than 90 minutes at a time.  WHAT SHOULD I DO BEFORE I START AN EXERCISE PROGRAM?  · Talk with your health care provider before you start a new exercise program. If you have, or are at risk for, certain medical conditions, you may need to have a physical exam and testing. Testing may include:    A chest X-ray.    An electrocardiogram (ECG). This test checks the electrical functioning of your heart.    Blood tests.  · If you have type 1 diabetes, talk with your health care provider about managing your blood glucose levels with insulin before, during, and after exercise.  · Understand that exercise affects blood glucose levels differently depending on how long you exercise and depending on other  factors such as whether you are sick. Be ready to treat high blood glucose (hyperglycemia) and low blood glucose (hypoglycemia) right away if either occurs during or after exercise.  · Talk with your health care provider about diabetes-related problems that can occur during exercise. Your health care provider can help you to decide on an exercise plan that allows you to avoid these problems. This plan might include instructions about choosing the proper footwear for exercise and staying well hydrated during and after exercise.  HOW CAN I MANAGE MY BLOOD GLUCOSE LEVEL WHILE I EXERCISE?  · Eat 1-3 hours before you exercise.  · Check your blood glucose level immediately before and after exercise.  · Do not start exercising if:    Your blood glucose is more than 250 mg/dL and you do not feel well.    You have ketones in your urine.    Your blood glucose is less than 100 mg/dL.  · If you do not feel well while exercising, take a break and check your blood glucose.  · Stop exercising if you find that your blood glucose has dropped below 100 mg/dL. If this occurs, do the following:    Eat a 15-gram to 20-gram carbohydrate-rich snack.    Recheck your blood glucose level.    If your blood glucose level does not rise above 100 mg/dL, have another 15-gram to 20-gram carbohydrate snack.  · Stop exercising if you find that your blood glucose has risen above 250 mg/dL while exercising.  · Do not go on with your workout until:    Your blood glucose level rises above 100 mg/dL if it was below this level.    Your blood glucose level drops below 250 mg/dL if it was above this level.  · If you take insulin, you may need to alter your insulin schedule on the days that you exercise. This depends on how your blood glucose responds to exercise. Ask your health care provider how to do this.  · Drink fluids during and after exercise to avoid dehydration. For exercise that lasts a long time, use a sports drink to maintain your glucose  level.  SEEK MEDICAL CARE IF:  · You have stopped exercising because of hypoglycemia and your blood glucose does not rise above 100 mg/dL after you have two 15-gram to 20-gram carbohydrate-rich snacks.  · You notice a loss of sensation in your feet during or after exercise.  · You have increased numbness, tingling, or pins-and-needles sensations after exercise.  · You have a fast, irregular heartbeat (palpitations) during or after exercise.  · Your exercise tolerance gets worse.  · You have dizzy spells or feel faint for brief periods during or after exercise.  SEEK IMMEDIATE MEDICAL CARE IF:  · You have chest pain during or after exercise.  · You pass out (lose consciousness) during or after exercise.  · You have the following in addition to hyperglycemia:    Fatigue.    Dry or flushed skin.    Difficulty breathing.    Confusion.    Nausea, vomiting, or pain in the abdomen.    Fruity-smelling breath.     This information is not intended to replace advice given to you by your health care provider. Make sure you discuss any questions you have with your health care provider.     Document Released: 12/18/2006 Document Revised: 09/07/2016 Document Reviewed: 02/16/2016  [employer] Patient Education ©2016 [employer]

## 2020-11-09 RX ORDER — HYDROCHLOROTHIAZIDE 12.5 MG/1
TABLET ORAL
Qty: 90 TABLET | Refills: 0 | Status: SHIPPED | OUTPATIENT
Start: 2020-11-09 | End: 2020-12-17

## 2020-11-10 RX ORDER — GABAPENTIN 300 MG/1
CAPSULE ORAL
Qty: 360 CAPSULE | Refills: 3 | Status: SHIPPED | OUTPATIENT
Start: 2020-11-10 | End: 2021-10-04

## 2020-12-15 RX ORDER — PEN NEEDLE, DIABETIC 31 GX5/16"
NEEDLE, DISPOSABLE MISCELLANEOUS
Qty: 200 EACH | Refills: 6 | Status: SHIPPED | OUTPATIENT
Start: 2020-12-15 | End: 2021-12-23

## 2020-12-18 RX ORDER — ORAL SEMAGLUTIDE 3 MG/1
TABLET ORAL
Qty: 30 TABLET | Refills: 2 | OUTPATIENT
Start: 2020-12-18

## 2020-12-20 RX ORDER — ORAL SEMAGLUTIDE 14 MG/1
14 TABLET ORAL DAILY
Qty: 30 TABLET | Refills: 2 | Status: SHIPPED | OUTPATIENT
Start: 2020-12-20 | End: 2021-03-01 | Stop reason: SDUPTHER

## 2021-01-22 ENCOUNTER — TELEPHONE (OUTPATIENT)
Dept: ENDOCRINOLOGY | Facility: CLINIC | Age: 61
End: 2021-01-22

## 2021-01-22 NOTE — TELEPHONE ENCOUNTER
I spoke with pt- she will increase her breakfast and dinner insulin by 5 units - she will call me Monday with blood sugars

## 2021-01-25 ENCOUNTER — TELEPHONE (OUTPATIENT)
Dept: ENDOCRINOLOGY | Facility: CLINIC | Age: 61
End: 2021-01-25

## 2021-01-25 NOTE — TELEPHONE ENCOUNTER
Said that tao upped her insulin she has definitely seen an improvement she wanted to let you know

## 2021-03-01 ENCOUNTER — TELEPHONE (OUTPATIENT)
Dept: ENDOCRINOLOGY | Facility: CLINIC | Age: 61
End: 2021-03-01

## 2021-03-01 DIAGNOSIS — E11.8 CONTROLLED TYPE 2 DIABETES MELLITUS WITH COMPLICATION, WITH LONG-TERM CURRENT USE OF INSULIN (CMS/HCC): Primary | ICD-10-CM

## 2021-03-01 DIAGNOSIS — Z79.4 CONTROLLED TYPE 2 DIABETES MELLITUS WITH COMPLICATION, WITH LONG-TERM CURRENT USE OF INSULIN (CMS/HCC): Primary | ICD-10-CM

## 2021-03-01 RX ORDER — ORAL SEMAGLUTIDE 14 MG/1
14 TABLET ORAL DAILY
Qty: 90 TABLET | Refills: 0 | Status: SHIPPED | OUTPATIENT
Start: 2021-03-01 | End: 2021-04-26 | Stop reason: SDUPTHER

## 2021-03-13 RX ORDER — INSULIN ASPART 100 [IU]/ML
INJECTION, SUSPENSION SUBCUTANEOUS
Qty: 5 PEN | Refills: 3 | Status: SHIPPED | OUTPATIENT
Start: 2021-03-13 | End: 2021-04-26 | Stop reason: SDUPTHER

## 2021-04-13 DIAGNOSIS — Z23 ENCOUNTER FOR IMMUNIZATION: ICD-10-CM

## 2021-04-22 NOTE — PROGRESS NOTES
Endocrinology  Note       PATIENTS: Bebe Burks  YOB: 1960  DATE: April 26, 2021  VISIT TYPE: follow up visit    History of Present Illness:   HPI 60 -year-old white female with type 2 diabetes.  She does follow closely with ophthalmology for retinopathy.She   continues on   NovoLog 7030 mix 25 units with breakfast 30 units with dinner Jardiance  10 mg a day and Rybelsus 14 mg daily. .  She is going through a great deal of stress with work.. She did not bring her glucometer to the visit.  She states she has few episodes of hypoglycemia she  continues on Synthroid 25 ?g day. She  continues on Lipitor for  hyperlipidemia. She  does have a history of thyroid nodules reports no difficulty with swallowing or breathing.  She  reports no foot wounds at the present time.    Medical History:  Past Medical History:   Diagnosis Date   • Arthritis    • Hypertension    • Lipid disorder    • Type 2 diabetes mellitus (CMS/HCC)        Surgical History:   Past Surgical History:   Procedure Laterality Date   • COMBINED HYSTEROSCOPY DIAGNOSTIC / D&C  2007   • EYE SURGERY Left 2017    cataract   • VITRECTOMY Left 2016       Family History:  No family history on file.    Social history:  Social History     Socioeconomic History   • Marital status: Single     Spouse name: Not on file   • Number of children: Not on file   • Years of education: Not on file   • Highest education level: Not on file   Occupational History   • Not on file   Tobacco Use   • Smoking status: Never Smoker   • Smokeless tobacco: Never Used   Substance and Sexual Activity   • Alcohol use: Yes     Alcohol/week: 1.0 standard drinks     Types: 1 Glasses of wine per week     Comment: occas   • Drug use: No   • Sexual activity: Not on file   Other Topics Concern   • Not on file   Social History Narrative   • Not on file     Social Determinants of Health     Financial Resource Strain:    • Difficulty of Paying Living Expenses:   "  Food Insecurity:    • Worried About Running Out of Food in the Last Year:    • Ran Out of Food in the Last Year:    Transportation Needs:    • Lack of Transportation (Medical):    • Lack of Transportation (Non-Medical):    Physical Activity:    • Days of Exercise per Week:    • Minutes of Exercise per Session:    Stress:    • Feeling of Stress :    Social Connections:    • Frequency of Communication with Friends and Family:    • Frequency of Social Gatherings with Friends and Family:    • Attends Shinto Services:    • Active Member of Clubs or Organizations:    • Attends Club or Organization Meetings:    • Marital Status:    Intimate Partner Violence:    • Fear of Current or Ex-Partner:    • Emotionally Abused:    • Physically Abused:    • Sexually Abused:        Medication(active prior to today):  Current Outpatient Medications   Medication Sig Dispense Refill   • amLODIPine (NORVASC) 5 mg tablet Take 1 tablet (5 mg total) by mouth once daily. 90 tablet 3   • atorvastatin (LIPITOR) 10 mg tablet Take 1 tablet (10 mg total) by mouth once daily. 90 tablet 3   • BD ULTRA-FINE KRISTIN PEN NEEDLE 32 gauge x 5/32\" needle INJECT TWICE A  each 6   • empagliflozin (JARDIANCE) 10 mg tablet TAKE 1 TABLET BY MOUTH EVERY DAY 90 tablet 3   • gabapentin (NEURONTIN) 300 mg capsule TAKE ONE CAPSULE BY MOUTH EVERY 8 HOURS WHILE AWAKE & TAKE ONE CAPSULE AT BEDTIME 360 capsule 3   • hydrochlorothiazide (HYDRODIURIL) 12.5 mg tablet TAKE 1 TABLET BY MOUTH EVERY DAY 90 tablet 0   • insulin asp prot-insulin aspart, 70/30, (NovoLOG Mix 70-30FlexPen U-100) 100 unit/mL (70-30) pen INJECT 25 UNITS IN THE MORNING AND 30 UNITS IN THE EVENING 5 pen 3   • levothyroxine (SYNTHROID) 25 mcg tablet TAKE 1 TABLET BY MOUTH EVERY DAY 90 tablet 0   • losartan (COZAAR) 50 mg tablet Take 1 tablet (50 mg total) by mouth once daily. 90 tablet 3   • ONETOUCH DELICA LANCETS 33 gauge misc 1 each 3 (three) times a day. 100 each 11   • ONETOUCH ULTRA BLUE " "TEST STRIP strip PT CHECKS BLOOD SUGAR 4 TIMES A  strip 0   • ONETOUCH VERIO TEST STRIPS strip 1 each 3 (three) times a day. 100 strip 11   • semaglutide (RYBELSUS) 14 mg tablet Take 1 tablet (14 mg total) by mouth daily. 90 tablet 3   • FREESTYLE LAURA 14 DAY READER misc 1 each continuously. 1 reader dx code E 11.9 1 each 11   • FREESTYLE LAURA 14 DAY SENSOR kit 1 each continuously. 1 sensor every 14 days dx code E 11.9 1 kit 11     No current facility-administered medications for this visit.       Allergies:  Patient has no known allergies.    Review of Systems:   Constitutional: Negative for fatigue and unexpected weight change. + weight loss   HENT: Negative for postnasal drip, trouble swallowing and voice change.    Eyes: + for visual disturbance.   Respiratory: Negative for apnea.    Cardiovascular: Negative for leg swelling.   Gastrointestinal: Negative for constipation, diarrhea and nausea.   Endocrine: Negative for polydipsia, polyphagia and polyuria.   Musculoskeletal: Negative for arthralgias, back pain, neck pain and neck stiffness.   Neurological: + for numbness and headaches.   Psychiatric/Behavioral: Negative for sleep disturbance. + stress    Vitals Signs:  Vitals:    04/26/21 0921   BP: 124/60   Pulse: 82   SpO2: 98%   Weight: 86.6 kg (191 lb)   Height: 1.702 m (5' 7\")     Body mass index is 29.91 kg/m².      Physical Exam:  Constitutional: Patient is oriented to person, place, and time.  Appears well-developed and well-nourished.   Eyes: EOM are normal. Pupils are equal, round, and reactive to light.   Neck: Normal range of motion. Neck supple.   Cardiovascular: Normal rate, regular rhythm and normal heart sounds.    Pulmonary/Chest: Effort normal and breath sounds normal.   Abdominal: Soft.   Musculoskeletal: Normal range of motion.   Neurological:  Alert and oriented to person, place, and time.   Skin: Skin is warm and dry.   Psychiatric:  Normal mood and affect. Behavior is normal. " Judgment and thought content normal.   Foot Exam: Normal exam, no swelling, tenderness, instability; ligaments intact, full range of motion of all ankle/foot joints    Assessment/Plan:  Controlled type 2 diabetes mellitus, with long-term current use of insulin (CMS/[facility])  1. continue with  Jardiance 10 mg per day - increase fluids   2.  continue with Rybelsus 14 mg per day    3. Continue with Novolog 70/30 mix 25 units with breakfast and 30 units with dinner   4. Rotate injection sites   5. Freestyle vin system   6. A1c 10.4      Essential hypertension  BP- 124/60- continue with Cozaar     Hyperlipidemia  Continue with Lipitor     Hypothyroidism  Continue with Synthroid 25 mcg per day     Obesity  Continue with diet and exercise     Thyroid nodule  Call me for any difficulty with swallowing or breathing       Labs:  Lab Results   Component Value Date    WBC 6.73 05/02/2016    HGB 14.2 05/02/2016    HCT 41.1 05/02/2016     (L) 05/02/2016    CHOL 172 04/24/2021    TRIG 107 04/24/2021    HDL 42 (L) 04/24/2021    ALT 22 04/24/2021    AST 20 04/24/2021     04/24/2021    K 3.6 04/24/2021     04/24/2021    CREATININE 0.7 04/24/2021    BUN 20 04/24/2021    CO2 28 04/24/2021    TSH 0.80 04/24/2021    HGBA1C 10.4 (H) 04/24/2021    MICROALBUR 44.0 (H) 04/24/2021       I spent  25 minutes  on this date of service performing the following activities: obtaining history, performing examination, entering orders, documenting, preparing for visit, obtaining / reviewing records, providing counseling and education, independently reviewing study/studies, and coordinating care.  I discussed the goals of diet and exercise with the patient. I discussed the goals of A1c, blood pressure, and cholesterol. I discussed the goals of blood sugar pre- and post meals with the patient.I encouraged the patient to rotate the injection sites..  I reviewed the patient's labs/imaging/medications/allergies/problem list.        Electronically signed by: PHIL Carrizales on 4/26/2021 4:58 PM

## 2021-04-22 NOTE — ASSESSMENT & PLAN NOTE
1. continue with  Jardiance 10 mg per day - increase fluids   2.  continue with Rybelsus 14 mg per day    3. Continue with Novolog 70/30 mix 25 units with breakfast and 30 units with dinner   4. Rotate injection sites   5. Freestyle vin system   6. A1c 10.4

## 2021-04-24 ENCOUNTER — APPOINTMENT (OUTPATIENT)
Dept: LAB | Facility: HOSPITAL | Age: 61
End: 2021-04-24
Attending: NURSE PRACTITIONER
Payer: COMMERCIAL

## 2021-04-24 DIAGNOSIS — Z79.4 CONTROLLED TYPE 2 DIABETES MELLITUS WITH COMPLICATION, WITH LONG-TERM CURRENT USE OF INSULIN (CMS/HCC): ICD-10-CM

## 2021-04-24 DIAGNOSIS — E11.8 CONTROLLED TYPE 2 DIABETES MELLITUS WITH COMPLICATION, WITH LONG-TERM CURRENT USE OF INSULIN (CMS/HCC): ICD-10-CM

## 2021-04-24 LAB
ALBUMIN SERPL-MCNC: 4 G/DL (ref 3.4–5)
ALBUMIN/CREAT UR: 85.1 UG/MG
ALP SERPL-CCNC: 56 IU/L (ref 35–126)
ALT SERPL-CCNC: 22 IU/L (ref 11–54)
ANION GAP SERPL CALC-SCNC: 8 MEQ/L (ref 3–15)
AST SERPL-CCNC: 20 IU/L (ref 15–41)
BILIRUB SERPL-MCNC: 1.2 MG/DL (ref 0.3–1.2)
BUN SERPL-MCNC: 20 MG/DL (ref 8–20)
CALCIUM SERPL-MCNC: 9.4 MG/DL (ref 8.9–10.3)
CHLORIDE SERPL-SCNC: 101 MEQ/L (ref 98–109)
CHOLEST SERPL-MCNC: 172 MG/DL
CO2 SERPL-SCNC: 28 MEQ/L (ref 22–32)
CREAT SERPL-MCNC: 0.7 MG/DL (ref 0.6–1.1)
CREAT UR-MCNC: 51.7 MG/DL
EST. AVERAGE GLUCOSE BLD GHB EST-MCNC: 252 MG/DL
GFR SERPL CREATININE-BSD FRML MDRD: >60 ML/MIN/1.73M*2
GLUCOSE SERPL-MCNC: 114 MG/DL (ref 70–99)
HBA1C MFR BLD HPLC: 10.4 %
HDLC SERPL-MCNC: 42 MG/DL
HDLC SERPL: 4.1 {RATIO}
LDLC SERPL CALC-MCNC: 109 MG/DL
MICROALBUMIN UR-MCNC: 44 MG/L
NONHDLC SERPL-MCNC: 130 MG/DL
POTASSIUM SERPL-SCNC: 3.6 MEQ/L (ref 3.6–5.1)
PROT SERPL-MCNC: 6.2 G/DL (ref 6–8.2)
SODIUM SERPL-SCNC: 137 MEQ/L (ref 136–144)
TRIGL SERPL-MCNC: 107 MG/DL (ref 30–149)
TSH SERPL DL<=0.05 MIU/L-ACNC: 0.8 MIU/L (ref 0.34–5.6)

## 2021-04-24 PROCEDURE — 84443 ASSAY THYROID STIM HORMONE: CPT

## 2021-04-24 PROCEDURE — 80061 LIPID PANEL: CPT

## 2021-04-24 PROCEDURE — 36415 COLL VENOUS BLD VENIPUNCTURE: CPT

## 2021-04-24 PROCEDURE — 82043 UR ALBUMIN QUANTITATIVE: CPT

## 2021-04-24 PROCEDURE — 82247 BILIRUBIN TOTAL: CPT

## 2021-04-24 PROCEDURE — 83036 HEMOGLOBIN GLYCOSYLATED A1C: CPT

## 2021-04-26 ENCOUNTER — OFFICE VISIT (OUTPATIENT)
Dept: ENDOCRINOLOGY | Facility: CLINIC | Age: 61
End: 2021-04-26
Payer: COMMERCIAL

## 2021-04-26 VITALS
WEIGHT: 191 LBS | DIASTOLIC BLOOD PRESSURE: 60 MMHG | SYSTOLIC BLOOD PRESSURE: 124 MMHG | OXYGEN SATURATION: 98 % | HEART RATE: 82 BPM | BODY MASS INDEX: 29.98 KG/M2 | HEIGHT: 67 IN

## 2021-04-26 DIAGNOSIS — I10 ESSENTIAL HYPERTENSION: ICD-10-CM

## 2021-04-26 DIAGNOSIS — E78.5 HYPERLIPIDEMIA, UNSPECIFIED HYPERLIPIDEMIA TYPE: ICD-10-CM

## 2021-04-26 DIAGNOSIS — E66.09 OBESITY DUE TO EXCESS CALORIES, UNSPECIFIED CLASSIFICATION, UNSPECIFIED WHETHER SERIOUS COMORBIDITY PRESENT: ICD-10-CM

## 2021-04-26 DIAGNOSIS — E04.1 THYROID NODULE: ICD-10-CM

## 2021-04-26 DIAGNOSIS — Z79.4 CONTROLLED TYPE 2 DIABETES MELLITUS WITH COMPLICATION, WITH LONG-TERM CURRENT USE OF INSULIN (CMS/HCC): Primary | ICD-10-CM

## 2021-04-26 DIAGNOSIS — E03.9 HYPOTHYROIDISM, UNSPECIFIED TYPE: ICD-10-CM

## 2021-04-26 DIAGNOSIS — E11.8 CONTROLLED TYPE 2 DIABETES MELLITUS WITH COMPLICATION, WITH LONG-TERM CURRENT USE OF INSULIN (CMS/HCC): Primary | ICD-10-CM

## 2021-04-26 LAB — GLUCOSE BLOOD, POC: 182

## 2021-04-26 PROCEDURE — 82962 GLUCOSE BLOOD TEST: CPT | Performed by: NURSE PRACTITIONER

## 2021-04-26 PROCEDURE — 3078F DIAST BP <80 MM HG: CPT | Performed by: NURSE PRACTITIONER

## 2021-04-26 PROCEDURE — 3074F SYST BP LT 130 MM HG: CPT | Performed by: NURSE PRACTITIONER

## 2021-04-26 PROCEDURE — 3008F BODY MASS INDEX DOCD: CPT | Performed by: NURSE PRACTITIONER

## 2021-04-26 PROCEDURE — 99214 OFFICE O/P EST MOD 30 MIN: CPT | Performed by: NURSE PRACTITIONER

## 2021-04-26 RX ORDER — LOSARTAN POTASSIUM 50 MG/1
50 TABLET ORAL
Qty: 90 TABLET | Refills: 3 | Status: SHIPPED | OUTPATIENT
Start: 2021-04-26 | End: 2022-04-25 | Stop reason: SDUPTHER

## 2021-04-26 RX ORDER — ORAL SEMAGLUTIDE 14 MG/1
14 TABLET ORAL DAILY
Qty: 90 TABLET | Refills: 3 | Status: SHIPPED | OUTPATIENT
Start: 2021-04-26 | End: 2022-04-25 | Stop reason: SDUPTHER

## 2021-04-26 RX ORDER — AMLODIPINE BESYLATE 5 MG/1
5 TABLET ORAL
Qty: 90 TABLET | Refills: 3 | Status: SHIPPED | OUTPATIENT
Start: 2021-04-26 | End: 2021-06-30

## 2021-04-26 RX ORDER — ATORVASTATIN CALCIUM 10 MG/1
10 TABLET, FILM COATED ORAL
Qty: 90 TABLET | Refills: 3 | Status: SHIPPED | OUTPATIENT
Start: 2021-04-26 | End: 2022-04-25 | Stop reason: SDUPTHER

## 2021-04-26 RX ORDER — INSULIN ASPART 100 [IU]/ML
INJECTION, SUSPENSION SUBCUTANEOUS
Qty: 5 PEN | Refills: 3 | Status: SHIPPED | OUTPATIENT
Start: 2021-04-26 | End: 2021-08-04

## 2021-04-26 NOTE — LETTER
April 26, 2021     Paula Walker MD  [address]  [place] [address]    Patient: Bebe Burks  YOB: 1960  Date of Visit: 4/26/2021      Dear Dr. Walker:    Thank you for referring Bebe Burks to me for evaluation. Below are my notes for this consultation.    If you have questions, please do not hesitate to call me. I look forward to following your patient along with you.         Sincerely,        PHIL Carrizales        CC: No Recipients  Wanda Gipson CRNP  4/26/2021  4:58 PM  Sign when Signing Visit                        Endocrinology  Note       PATIENTS: Bebe Burks  YOB: 1960  DATE: April 26, 2021  VISIT TYPE: follow up visit    History of Present Illness:   HPI 60 -year-old white female with type 2 diabetes.  She does follow closely with ophthalmology for retinopathy.She   continues on   NovoLog 7030 mix 25 units with breakfast 30 units with dinner Jardiance  10 mg a day and Rybelsus 14 mg daily. .  She is going through a great deal of stress with work.. She did not bring her glucometer to the visit.  She states she has few episodes of hypoglycemia she  continues on Synthroid 25 ?g day. She  continues on Lipitor for  hyperlipidemia. She  does have a history of thyroid nodules reports no difficulty with swallowing or breathing.  She  reports no foot wounds at the present time.    Medical History:  Past Medical History:   Diagnosis Date   • Arthritis    • Hypertension    • Lipid disorder    • Type 2 diabetes mellitus (CMS/HCC)        Surgical History:   Past Surgical History:   Procedure Laterality Date   • COMBINED HYSTEROSCOPY DIAGNOSTIC / D&C  2007   • EYE SURGERY Left 2017    cataract   • VITRECTOMY Left 2016       Family History:  No family history on file.    Social history:  Social History     Socioeconomic History   • Marital status: Single     Spouse name: Not on file   • Number of children: Not on file   • Years of education: Not on file   • Highest  "education level: Not on file   Occupational History   • Not on file   Tobacco Use   • Smoking status: Never Smoker   • Smokeless tobacco: Never Used   Substance and Sexual Activity   • Alcohol use: Yes     Alcohol/week: 1.0 standard drinks     Types: 1 Glasses of wine per week     Comment: occas   • Drug use: No   • Sexual activity: Not on file   Other Topics Concern   • Not on file   Social History Narrative   • Not on file     Social Determinants of Health     Financial Resource Strain:    • Difficulty of Paying Living Expenses:    Food Insecurity:    • Worried About Running Out of Food in the Last Year:    • Ran Out of Food in the Last Year:    Transportation Needs:    • Lack of Transportation (Medical):    • Lack of Transportation (Non-Medical):    Physical Activity:    • Days of Exercise per Week:    • Minutes of Exercise per Session:    Stress:    • Feeling of Stress :    Social Connections:    • Frequency of Communication with Friends and Family:    • Frequency of Social Gatherings with Friends and Family:    • Attends Spiritism Services:    • Active Member of Clubs or Organizations:    • Attends Club or Organization Meetings:    • Marital Status:    Intimate Partner Violence:    • Fear of Current or Ex-Partner:    • Emotionally Abused:    • Physically Abused:    • Sexually Abused:        Medication(active prior to today):  Current Outpatient Medications   Medication Sig Dispense Refill   • amLODIPine (NORVASC) 5 mg tablet Take 1 tablet (5 mg total) by mouth once daily. 90 tablet 3   • atorvastatin (LIPITOR) 10 mg tablet Take 1 tablet (10 mg total) by mouth once daily. 90 tablet 3   • BD ULTRA-FINE KRISTIN PEN NEEDLE 32 gauge x 5/32\" needle INJECT TWICE A  each 6   • empagliflozin (JARDIANCE) 10 mg tablet TAKE 1 TABLET BY MOUTH EVERY DAY 90 tablet 3   • gabapentin (NEURONTIN) 300 mg capsule TAKE ONE CAPSULE BY MOUTH EVERY 8 HOURS WHILE AWAKE & TAKE ONE CAPSULE AT BEDTIME 360 capsule 3   • " "hydrochlorothiazide (HYDRODIURIL) 12.5 mg tablet TAKE 1 TABLET BY MOUTH EVERY DAY 90 tablet 0   • insulin asp prot-insulin aspart, 70/30, (NovoLOG Mix 70-30FlexPen U-100) 100 unit/mL (70-30) pen INJECT 25 UNITS IN THE MORNING AND 30 UNITS IN THE EVENING 5 pen 3   • levothyroxine (SYNTHROID) 25 mcg tablet TAKE 1 TABLET BY MOUTH EVERY DAY 90 tablet 0   • losartan (COZAAR) 50 mg tablet Take 1 tablet (50 mg total) by mouth once daily. 90 tablet 3   • ONETOUCH DELICA LANCETS 33 gauge misc 1 each 3 (three) times a day. 100 each 11   • ONETOUCH ULTRA BLUE TEST STRIP strip PT CHECKS BLOOD SUGAR 4 TIMES A  strip 0   • ONETOUCH VERIO TEST STRIPS strip 1 each 3 (three) times a day. 100 strip 11   • semaglutide (RYBELSUS) 14 mg tablet Take 1 tablet (14 mg total) by mouth daily. 90 tablet 3   • FREESTYLE LAURA 14 DAY READER misc 1 each continuously. 1 reader dx code E 11.9 1 each 11   • FREESTYLE LAURA 14 DAY SENSOR kit 1 each continuously. 1 sensor every 14 days dx code E 11.9 1 kit 11     No current facility-administered medications for this visit.       Allergies:  Patient has no known allergies.    Review of Systems:   Constitutional: Negative for fatigue and unexpected weight change. + weight loss   HENT: Negative for postnasal drip, trouble swallowing and voice change.    Eyes: + for visual disturbance.   Respiratory: Negative for apnea.    Cardiovascular: Negative for leg swelling.   Gastrointestinal: Negative for constipation, diarrhea and nausea.   Endocrine: Negative for polydipsia, polyphagia and polyuria.   Musculoskeletal: Negative for arthralgias, back pain, neck pain and neck stiffness.   Neurological: + for numbness and headaches.   Psychiatric/Behavioral: Negative for sleep disturbance. + stress    Vitals Signs:  Vitals:    04/26/21 0921   BP: 124/60   Pulse: 82   SpO2: 98%   Weight: 86.6 kg (191 lb)   Height: 1.702 m (5' 7\")     Body mass index is 29.91 kg/m².      Physical Exam:  Constitutional: Patient " is oriented to person, place, and time.  Appears well-developed and well-nourished.   Eyes: EOM are normal. Pupils are equal, round, and reactive to light.   Neck: Normal range of motion. Neck supple.   Cardiovascular: Normal rate, regular rhythm and normal heart sounds.    Pulmonary/Chest: Effort normal and breath sounds normal.   Abdominal: Soft.   Musculoskeletal: Normal range of motion.   Neurological:  Alert and oriented to person, place, and time.   Skin: Skin is warm and dry.   Psychiatric:  Normal mood and affect. Behavior is normal. Judgment and thought content normal.   Foot Exam: Normal exam, no swelling, tenderness, instability; ligaments intact, full range of motion of all ankle/foot joints    Assessment/Plan:  Controlled type 2 diabetes mellitus, with long-term current use of insulin (CMS/formerly [facility])  1. continue with  Jardiance 10 mg per day - increase fluids   2.  continue with Rybelsus 14 mg per day    3. Continue with Novolog 70/30 mix 25 units with breakfast and 30 units with dinner   4. Rotate injection sites   5. Freestyle vin system   6. A1c 10.4      Essential hypertension  BP- 124/60- continue with Cozaar     Hyperlipidemia  Continue with Lipitor     Hypothyroidism  Continue with Synthroid 25 mcg per day     Obesity  Continue with diet and exercise     Thyroid nodule  Call me for any difficulty with swallowing or breathing       Labs:  Lab Results   Component Value Date    WBC 6.73 05/02/2016    HGB 14.2 05/02/2016    HCT 41.1 05/02/2016     (L) 05/02/2016    CHOL 172 04/24/2021    TRIG 107 04/24/2021    HDL 42 (L) 04/24/2021    ALT 22 04/24/2021    AST 20 04/24/2021     04/24/2021    K 3.6 04/24/2021     04/24/2021    CREATININE 0.7 04/24/2021    BUN 20 04/24/2021    CO2 28 04/24/2021    TSH 0.80 04/24/2021    HGBA1C 10.4 (H) 04/24/2021    MICROALBUR 44.0 (H) 04/24/2021       I spent  25 minutes  on this date of service performing the following activities: obtaining history,  performing examination, entering orders, documenting, preparing for visit, obtaining / reviewing records, providing counseling and education, independently reviewing study/studies, and coordinating care.  I discussed the goals of diet and exercise with the patient. I discussed the goals of A1c, blood pressure, and cholesterol. I discussed the goals of blood sugar pre- and post meals with the patient.I encouraged the patient to rotate the injection sites..  I reviewed the patient's labs/imaging/medications/allergies/problem list.       Electronically signed by: PHIL Carrizales on 4/26/2021 4:58 PM

## 2021-04-26 NOTE — PATIENT INSTRUCTIONS
Controlled type 2 diabetes mellitus, with long-term current use of insulin (CMS/[facility])  1. continue with  Jardiance 10 mg per day - increase fluids   2.  continue with Rybelsus 14 mg per day    3. Continue with Novolog 70/30 mix 25 units with breakfast and 30 units with dinner   4. Rotate injection sites   5. Freestyle vin system   6. A1c 10.4      Essential hypertension  BP- 124/60- continue with Cozaar     Hyperlipidemia  Continue with Lipitor     Hypothyroidism  Continue with Synthroid 25 mcg per day     Obesity  Continue with diet and exercise     Thyroid nodule  Call me for any difficulty with swallowing or breathing     Patient Education     Diabetes Mellitus and Exercise  Exercising regularly is important for your overall health, especially when you have diabetes (diabetes mellitus). Exercising is not only about losing weight. It has many other health benefits, such as increasing muscle strength and bone density and reducing body fat and stress. This leads to improved fitness, flexibility, and endurance, all of which result in better overall health.  Exercise has additional benefits for people with diabetes, including:  · Reducing appetite.  · Helping to lower and control blood glucose.  · Lowering blood pressure.  · Helping to control amounts of fatty substances (lipids) in the blood, such as cholesterol and triglycerides.  · Helping the body to respond better to insulin (improving insulin sensitivity).  · Reducing how much insulin the body needs.  · Decreasing the risk for heart disease by:  ? Lowering cholesterol and triglyceride levels.  ? Increasing the levels of good cholesterol.  ? Lowering blood glucose levels.  What is my activity plan?  Your health care provider or certified diabetes educator can help you make a plan for the type and frequency of exercise (activity plan) that works for you. Make sure that you:  · Do at least 150 minutes of moderate-intensity or vigorous-intensity exercise each week.  This could be brisk walking, biking, or water aerobics.  ? Do stretching and strength exercises, such as yoga or weightlifting, at least 2 times a week.  ? Spread out your activity over at least 3 days of the week.  · Get some form of physical activity every day.  ? Do not go more than 2 days in a row without some kind of physical activity.  ? Avoid being inactive for more than 30 minutes at a time. Take frequent breaks to walk or stretch.  · Choose a type of exercise or activity that you enjoy, and set realistic goals.  · Start slowly, and gradually increase the intensity of your exercise over time.  What do I need to know about managing my diabetes?    · Check your blood glucose before and after exercising.  ? If your blood glucose is 240 mg/dL (13.3 mmol/L) or higher before you exercise, check your urine for ketones. If you have ketones in your urine, do not exercise until your blood glucose returns to normal.  ? If your blood glucose is 100 mg/dL (5.6 mmol/L) or lower, eat a snack containing 15-20 grams of carbohydrate. Check your blood glucose 15 minutes after the snack to make sure that your level is above 100 mg/dL (5.6 mmol/L) before you start your exercise.  · Know the symptoms of low blood glucose (hypoglycemia) and how to treat it. Your risk for hypoglycemia increases during and after exercise. Common symptoms of hypoglycemia can include:  ? Hunger.  ? Anxiety.  ? Sweating and feeling clammy.  ? Confusion.  ? Dizziness or feeling light-headed.  ? Increased heart rate or palpitations.  ? Blurry vision.  ? Tingling or numbness around the mouth, lips, or tongue.  ? Tremors or shakes.  ? Irritability.  · Keep a rapid-acting carbohydrate snack available before, during, and after exercise to help prevent or treat hypoglycemia.  · Avoid injecting insulin into areas of the body that are going to be exercised. For example, avoid injecting insulin into:  ? The arms, when playing tennis.  ? The legs, when  jogging.  · Keep records of your exercise habits. Doing this can help you and your health care provider adjust your diabetes management plan as needed. Write down:  ? Food that you eat before and after you exercise.  ? Blood glucose levels before and after you exercise.  ? The type and amount of exercise you have done.  ? When your insulin is expected to peak, if you use insulin. Avoid exercising at times when your insulin is peaking.  · When you start a new exercise or activity, work with your health care provider to make sure the activity is safe for you, and to adjust your insulin, medicines, or food intake as needed.  · Drink plenty of water while you exercise to prevent dehydration or heat stroke. Drink enough fluid to keep your urine clear or pale yellow.  Summary  · Exercising regularly is important for your overall health, especially when you have diabetes (diabetes mellitus).  · Exercising has many health benefits, such as increasing muscle strength and bone density and reducing body fat and stress.  · Your health care provider or certified diabetes educator can help you make a plan for the type and frequency of exercise (activity plan) that works for you.  · When you start a new exercise or activity, work with your health care provider to make sure the activity is safe for you, and to adjust your insulin, medicines, or food intake as needed.  This information is not intended to replace advice given to you by your health care provider. Make sure you discuss any questions you have with your health care provider.  Document Released: 03/09/2005 Document Revised: 07/12/2018 Document Reviewed: 05/29/2017  Elsevier Patient Education © 2020 [employer]

## 2021-05-18 RX ORDER — LEVOTHYROXINE SODIUM 25 UG/1
TABLET ORAL
Qty: 90 TABLET | Refills: 0 | Status: SHIPPED | OUTPATIENT
Start: 2021-05-18 | End: 2021-11-23 | Stop reason: SDUPTHER

## 2021-05-18 RX ORDER — HYDROCHLOROTHIAZIDE 12.5 MG/1
TABLET ORAL
Qty: 90 TABLET | Refills: 0 | Status: SHIPPED | OUTPATIENT
Start: 2021-05-18 | End: 2021-08-17

## 2021-06-30 RX ORDER — AMLODIPINE BESYLATE 5 MG/1
TABLET ORAL
Qty: 90 TABLET | Refills: 3 | Status: SHIPPED | OUTPATIENT
Start: 2021-06-30 | End: 2022-04-25

## 2021-08-04 RX ORDER — INSULIN ASPART 100 [IU]/ML
INJECTION, SUSPENSION SUBCUTANEOUS
Qty: 5 PEN | Refills: 1 | Status: SHIPPED | OUTPATIENT
Start: 2021-08-04 | End: 2021-09-27

## 2021-08-17 RX ORDER — HYDROCHLOROTHIAZIDE 12.5 MG/1
TABLET ORAL
Qty: 90 TABLET | Refills: 0 | Status: SHIPPED | OUTPATIENT
Start: 2021-08-17 | End: 2022-01-31

## 2021-09-24 DIAGNOSIS — Z79.4 CONTROLLED TYPE 2 DIABETES MELLITUS WITH COMPLICATION, WITH LONG-TERM CURRENT USE OF INSULIN (CMS/HCC): Primary | ICD-10-CM

## 2021-09-24 DIAGNOSIS — E11.8 CONTROLLED TYPE 2 DIABETES MELLITUS WITH COMPLICATION, WITH LONG-TERM CURRENT USE OF INSULIN (CMS/HCC): Primary | ICD-10-CM

## 2021-09-27 RX ORDER — INSULIN ASPART 100 [IU]/ML
INJECTION, SUSPENSION SUBCUTANEOUS
Qty: 5 PEN | Refills: 1 | Status: SHIPPED | OUTPATIENT
Start: 2021-09-27 | End: 2021-12-02 | Stop reason: ENTERED-IN-ERROR

## 2021-10-04 RX ORDER — GABAPENTIN 300 MG/1
CAPSULE ORAL
Qty: 360 CAPSULE | Refills: 3 | Status: SHIPPED | OUTPATIENT
Start: 2021-10-04 | End: 2021-12-02 | Stop reason: ENTERED-IN-ERROR

## 2021-10-25 ENCOUNTER — TRANSCRIBE ORDERS (OUTPATIENT)
Dept: OPHTHALMOLOGY | Facility: HOSPITAL | Age: 61
End: 2021-10-25
Payer: COMMERCIAL

## 2021-10-25 ENCOUNTER — APPOINTMENT (OUTPATIENT)
Dept: OPHTHALMOLOGY | Facility: HOSPITAL | Age: 61
End: 2021-10-25
Attending: OPHTHALMOLOGY
Payer: COMMERCIAL

## 2021-10-25 DIAGNOSIS — H26.491 RIGHT POSTERIOR CAPSULAR OPACIFICATION: ICD-10-CM

## 2021-10-25 DIAGNOSIS — H26.491 RIGHT POSTERIOR CAPSULAR OPACIFICATION: Primary | ICD-10-CM

## 2021-10-25 PROCEDURE — 66821 AFTER CATARACT LASER SURGERY: CPT | Mod: RT

## 2021-10-25 PROCEDURE — 085J3ZZ DESTRUCTION OF RIGHT LENS, PERCUTANEOUS APPROACH: ICD-10-PCS | Performed by: OPHTHALMOLOGY

## 2021-11-18 NOTE — ASSESSMENT & PLAN NOTE
1. continue with  Jardiance 10 mg per day - increase fluids   2.  continue with Rybelsus 14 mg per day    3. Continue with Novolog 70/30 mix 25 units with breakfast and 30 units with dinner   4. Rotate injection sites   5.DEXCOM trial   6. A1c 9.9

## 2021-11-18 NOTE — PROGRESS NOTES
Endocrinology  Note       PATIENTS: Bebe Burks  YOB: 1960  DATE: November 23, 2021  VISIT TYPE: follow up visit    History of Present Illness:   HPI 61 -year-old white female with type 2 diabetes.  She does follow closely with ophthalmology for retinopathy.She   continues on   NovoLog 7030 mix 25 units with breakfast 30 units with dinner Jardiance  10 mg a day and Rybelsus 14 mg daily. .  She is going through a great deal of stress with work.. She did not bring her glucometer to the visit.  She states she has few episodes of hypoglycemia she  continues on Synthroid 25 ?g day. She  continues on Lipitor for  hyperlipidemia. She  does have a history of thyroid nodules reports no difficulty with swallowing or breathing.  She  reports no foot wounds at the present time.    Medical History:  Past Medical History:   Diagnosis Date   • Arthritis    • Hypertension    • Lipid disorder    • Type 2 diabetes mellitus (CMS/HCC)        Surgical History:   Past Surgical History:   Procedure Laterality Date   • COMBINED HYSTEROSCOPY DIAGNOSTIC / D&C  2007   • EYE SURGERY Left 2017    cataract   • VITRECTOMY Left 2016       Family History:  History reviewed. No pertinent family history.    Social history:  Social History     Socioeconomic History   • Marital status: Single     Spouse name: Not on file   • Number of children: Not on file   • Years of education: Not on file   • Highest education level: Not on file   Occupational History   • Not on file   Tobacco Use   • Smoking status: Never Smoker   • Smokeless tobacco: Never Used   Substance and Sexual Activity   • Alcohol use: Yes     Alcohol/week: 1.0 standard drink     Types: 1 Glasses of wine per week     Comment: occas   • Drug use: No   • Sexual activity: Not on file   Other Topics Concern   • Not on file   Social History Narrative   • Not on file     Social Determinants of Health     Financial Resource Strain:    • Difficulty of Paying  "Living Expenses: Not on file   Food Insecurity:    • Worried About Running Out of Food in the Last Year: Not on file   • Ran Out of Food in the Last Year: Not on file   Transportation Needs:    • Lack of Transportation (Medical): Not on file   • Lack of Transportation (Non-Medical): Not on file   Physical Activity:    • Days of Exercise per Week: Not on file   • Minutes of Exercise per Session: Not on file   Stress:    • Feeling of Stress : Not on file   Social Connections:    • Frequency of Communication with Friends and Family: Not on file   • Frequency of Social Gatherings with Friends and Family: Not on file   • Attends Oriental orthodox Services: Not on file   • Active Member of Clubs or Organizations: Not on file   • Attends Club or Organization Meetings: Not on file   • Marital Status: Not on file   Intimate Partner Violence:    • Fear of Current or Ex-Partner: Not on file   • Emotionally Abused: Not on file   • Physically Abused: Not on file   • Sexually Abused: Not on file   Housing Stability:    • Unable to Pay for Housing in the Last Year: Not on file   • Number of Places Lived in the Last Year: Not on file   • Unstable Housing in the Last Year: Not on file       Medication(active prior to today):  Current Outpatient Medications   Medication Sig Dispense Refill   • cyclobenzaprine 10 mg tablet 10 mg 2 (two) times a day as needed.       • amLODIPine (NORVASC) 5 mg tablet TAKE 1 TABLET BY MOUTH EVERY DAY 90 tablet 3   • atorvastatin (LIPITOR) 10 mg tablet Take 1 tablet (10 mg total) by mouth once daily. 90 tablet 3   • BD ULTRA-FINE KRISTIN PEN NEEDLE 32 gauge x 5/32\" needle INJECT TWICE A  each 6   • empagliflozin (JARDIANCE) 10 mg tablet TAKE 1 TABLET BY MOUTH EVERY DAY 90 tablet 3   • FREESTYLE LAURA 14 DAY READER misc 1 each continuously. 1 reader dx code E 11.9 1 each 11   • FREESTYLE LAURA 14 DAY SENSOR kit 1 each continuously. 1 sensor every 14 days dx code E 11.9 1 kit 11   • gabapentin (NEURONTIN) 300 " "mg capsule TAKE ONE CAPSULE BY MOUTH EVERY 8 HOURS WHILE AWAKE & TAKE ONE CAPSULE AT BEDTIME 360 capsule 3   • hydrochlorothiazide (HYDRODIURIL) 12.5 mg tablet TAKE 1 TABLET BY MOUTH EVERY DAY 90 tablet 0   • insulin asp prot-insulin aspart, 70/30, (NovoLOG Mix 70-30FlexPen U-100) 100 unit/mL (70-30) pen INJECT 25 UNITS IN THE MORNING AND 30 UNITS IN THE EVENING 5 pen 1   • levothyroxine 25 mcg tablet Take 1 tablet (25 mcg total) by mouth once daily. 90 tablet 3   • losartan (COZAAR) 50 mg tablet Take 1 tablet (50 mg total) by mouth once daily. 90 tablet 3   • ONETOUCH DELICA LANCETS 33 gauge misc 1 each 3 (three) times a day. 100 each 11   • ONETOUCH ULTRA BLUE TEST STRIP strip PT CHECKS BLOOD SUGAR 4 TIMES A  strip 0   • ONETOUCH VERIO TEST STRIPS strip 1 each 3 (three) times a day. 100 strip 11   • semaglutide (RYBELSUS) 14 mg tablet Take 1 tablet (14 mg total) by mouth daily. 90 tablet 3     No current facility-administered medications for this visit.       Allergies:  Patient has no known allergies.    Review of Systems:   Constitutional: Negative for fatigue and unexpected weight change.   HENT: Negative for postnasal drip, trouble swallowing and voice change.    Eyes: + for visual disturbance.   Respiratory: Negative for apnea.    Cardiovascular: Negative for leg swelling.   Gastrointestinal: Negative for constipation, diarrhea and nausea.   Endocrine: Negative for polydipsia, polyphagia and polyuria.   Musculoskeletal: Negative for arthralgias,+ back pain, neck pain and neck stiffness.   Neurological: + for numbness and headaches.   Psychiatric/Behavioral: Negative for sleep disturbance.     Vitals Signs:  Vitals:    11/23/21 0800   BP: 130/70   BP Location: Right upper arm   Patient Position: Sitting   Pulse: 89   Weight: 86.2 kg (190 lb)   Height: 1.676 m (5' 6\")     Body mass index is 30.67 kg/m².      Physical Exam:  Constitutional: Patient is oriented to person, place, and time.  Appears " well-developed and well-nourished.   Eyes: EOM are normal. Pupils are equal, round, and reactive to light.   Neck: Normal range of motion. Neck supple.   Cardiovascular: Normal rate, regular rhythm and normal heart sounds.    Pulmonary/Chest: Effort normal and breath sounds normal.   Abdominal: Soft.   Musculoskeletal: Normal range of motion.   Neurological:  Alert and oriented to person, place, and time.   Skin: Skin is warm and dry.   Psychiatric:  Normal mood and affect. Behavior is normal. Judgment and thought content normal.   Foot Exam: Normal exam, no swelling, tenderness, instability; ligaments intact, full range of motion of all ankle/foot joints    Assessment/Plan:  Controlled type 2 diabetes mellitus, with long-term current use of insulin (CMS/[facility])  1. continue with  Jardiance 10 mg per day - increase fluids   2.  continue with Rybelsus 14 mg per day    3. Continue with Novolog 70/30 mix 25 units with breakfast and 30 units with dinner   4. Rotate injection sites   5.DEXCOM trial   6. A1c 9.9    Essential hypertension  BP- 130/70- continue with Cozaar     Hyperlipidemia  Continue with Lipitor     Hypothyroidism  TSH - 1.00- continue with Synthroid 25 mcg per day     Obesity  Continue with diet and exercise     Thyroid nodule  Call me for any difficulty with swallowing or breathing       Labs:  Lab Results   Component Value Date    WBC 6.73 05/02/2016    HGB 14.2 05/02/2016    HCT 41.1 05/02/2016     (L) 05/02/2016    CHOL 157 11/22/2021    TRIG 106 11/22/2021    HDL 38 (L) 11/22/2021    ALT 26 11/22/2021    AST 22 11/22/2021     11/22/2021    K 3.8 11/22/2021     11/22/2021    CREATININE 0.7 11/22/2021    BUN 20 11/22/2021    CO2 28 11/22/2021    TSH 1.00 11/22/2021    HGBA1C 9.9 (H) 11/22/2021    MICROALBUR 44.0 (H) 04/24/2021       I spent  25 minutes  on this date of service performing the following activities: obtaining history, performing examination, entering orders, documenting,  preparing for visit, obtaining / reviewing records, providing counseling and education, independently reviewing study/studies, and coordinating care.  I discussed the goals of diet and exercise with the patient. I discussed the goals of A1c, blood pressure, and cholesterol. I discussed the goals of blood sugar pre- and post meals with the patient.I encouraged the patient to rotate the injection sites.  I reviewed the patient's labs/imaging/medications/allergies/problem list.       Electronically signed by: PHIL Carrizales on 11/23/2021 8:56 AM

## 2021-11-22 ENCOUNTER — APPOINTMENT (OUTPATIENT)
Dept: LAB | Facility: HOSPITAL | Age: 61
End: 2021-11-22
Attending: NURSE PRACTITIONER
Payer: COMMERCIAL

## 2021-11-22 DIAGNOSIS — E11.8 CONTROLLED TYPE 2 DIABETES MELLITUS WITH COMPLICATION, WITH LONG-TERM CURRENT USE OF INSULIN (CMS/HCC): ICD-10-CM

## 2021-11-22 DIAGNOSIS — Z79.4 CONTROLLED TYPE 2 DIABETES MELLITUS WITH COMPLICATION, WITH LONG-TERM CURRENT USE OF INSULIN (CMS/HCC): ICD-10-CM

## 2021-11-22 LAB
ALBUMIN SERPL-MCNC: 4 G/DL (ref 3.4–5)
ALP SERPL-CCNC: 60 IU/L (ref 35–126)
ALT SERPL-CCNC: 26 IU/L (ref 11–54)
ANION GAP SERPL CALC-SCNC: 10 MEQ/L (ref 3–15)
AST SERPL-CCNC: 22 IU/L (ref 15–41)
BILIRUB SERPL-MCNC: 1.1 MG/DL (ref 0.3–1.2)
BUN SERPL-MCNC: 20 MG/DL (ref 8–20)
CALCIUM SERPL-MCNC: 9.5 MG/DL (ref 8.9–10.3)
CHLORIDE SERPL-SCNC: 102 MEQ/L (ref 98–109)
CHOLEST SERPL-MCNC: 157 MG/DL
CO2 SERPL-SCNC: 28 MEQ/L (ref 22–32)
CREAT SERPL-MCNC: 0.7 MG/DL (ref 0.6–1.1)
EST. AVERAGE GLUCOSE BLD GHB EST-MCNC: 237 MG/DL
GFR SERPL CREATININE-BSD FRML MDRD: >60 ML/MIN/1.73M*2
GLUCOSE SERPL-MCNC: 111 MG/DL (ref 70–99)
HBA1C MFR BLD HPLC: 9.9 %
HDLC SERPL-MCNC: 38 MG/DL
HDLC SERPL: 4.1 {RATIO}
LDLC SERPL CALC-MCNC: 98 MG/DL
NONHDLC SERPL-MCNC: 119 MG/DL
POTASSIUM SERPL-SCNC: 3.8 MEQ/L (ref 3.6–5.1)
PROT SERPL-MCNC: 6.4 G/DL (ref 6–8.2)
SODIUM SERPL-SCNC: 140 MEQ/L (ref 136–144)
TRIGL SERPL-MCNC: 106 MG/DL (ref 30–149)
TSH SERPL DL<=0.05 MIU/L-ACNC: 1 MIU/L (ref 0.34–5.6)

## 2021-11-22 PROCEDURE — 84443 ASSAY THYROID STIM HORMONE: CPT

## 2021-11-22 PROCEDURE — 83036 HEMOGLOBIN GLYCOSYLATED A1C: CPT

## 2021-11-22 PROCEDURE — 36415 COLL VENOUS BLD VENIPUNCTURE: CPT

## 2021-11-22 PROCEDURE — 80061 LIPID PANEL: CPT

## 2021-11-22 PROCEDURE — 80053 COMPREHEN METABOLIC PANEL: CPT

## 2021-11-23 ENCOUNTER — OFFICE VISIT (OUTPATIENT)
Dept: ENDOCRINOLOGY | Facility: CLINIC | Age: 61
End: 2021-11-23
Payer: COMMERCIAL

## 2021-11-23 VITALS
HEART RATE: 89 BPM | SYSTOLIC BLOOD PRESSURE: 130 MMHG | WEIGHT: 190 LBS | DIASTOLIC BLOOD PRESSURE: 70 MMHG | BODY MASS INDEX: 30.53 KG/M2 | HEIGHT: 66 IN

## 2021-11-23 DIAGNOSIS — E04.1 THYROID NODULE: ICD-10-CM

## 2021-11-23 DIAGNOSIS — E03.9 HYPOTHYROIDISM, UNSPECIFIED TYPE: ICD-10-CM

## 2021-11-23 DIAGNOSIS — I10 ESSENTIAL HYPERTENSION: Primary | ICD-10-CM

## 2021-11-23 DIAGNOSIS — Z79.4 CONTROLLED TYPE 2 DIABETES MELLITUS WITH COMPLICATION, WITH LONG-TERM CURRENT USE OF INSULIN (CMS/HCC): ICD-10-CM

## 2021-11-23 DIAGNOSIS — E11.8 CONTROLLED TYPE 2 DIABETES MELLITUS WITH COMPLICATION, WITH LONG-TERM CURRENT USE OF INSULIN (CMS/HCC): ICD-10-CM

## 2021-11-23 DIAGNOSIS — E11.8 TYPE 2 DIABETES MELLITUS WITH COMPLICATION, WITH LONG-TERM CURRENT USE OF INSULIN (CMS/HCC): ICD-10-CM

## 2021-11-23 DIAGNOSIS — Z79.4 TYPE 2 DIABETES MELLITUS WITH COMPLICATION, WITH LONG-TERM CURRENT USE OF INSULIN (CMS/HCC): ICD-10-CM

## 2021-11-23 DIAGNOSIS — E66.09 OBESITY DUE TO EXCESS CALORIES, UNSPECIFIED CLASSIFICATION, UNSPECIFIED WHETHER SERIOUS COMORBIDITY PRESENT: ICD-10-CM

## 2021-11-23 LAB
EXPIRATION DATE: NORMAL
GLUCOSE BLOOD, POC: 118
Lab: NORMAL
POCT MANUFACTURER: NORMAL

## 2021-11-23 PROCEDURE — 99214 OFFICE O/P EST MOD 30 MIN: CPT | Performed by: NURSE PRACTITIONER

## 2021-11-23 PROCEDURE — 3075F SYST BP GE 130 - 139MM HG: CPT | Performed by: NURSE PRACTITIONER

## 2021-11-23 PROCEDURE — 3008F BODY MASS INDEX DOCD: CPT | Performed by: NURSE PRACTITIONER

## 2021-11-23 PROCEDURE — 82962 GLUCOSE BLOOD TEST: CPT | Performed by: NURSE PRACTITIONER

## 2021-11-23 PROCEDURE — 3078F DIAST BP <80 MM HG: CPT | Performed by: NURSE PRACTITIONER

## 2021-11-23 RX ORDER — LEVOTHYROXINE SODIUM 25 UG/1
25 TABLET ORAL
Qty: 90 TABLET | Refills: 3 | Status: SHIPPED | OUTPATIENT
Start: 2021-11-23 | End: 2022-09-06

## 2021-11-23 RX ORDER — CYCLOBENZAPRINE HCL 10 MG
10 TABLET ORAL 2 TIMES DAILY PRN
COMMUNITY
Start: 2021-11-10 | End: 2023-02-22

## 2021-11-23 NOTE — PATIENT INSTRUCTIONS
"Controlled type 2 diabetes mellitus, with long-term current use of insulin (CMS/Formerly [facility])  1. continue with  Jardiance 10 mg per day - increase fluids   2.  continue with Rybelsus 14 mg per day    3. Continue with Novolog 70/30 mix 25 units with breakfast and 30 units with dinner   4. Rotate injection sites   5.DEXCOM trial   6. A1c 9.9    Essential hypertension  BP- 130/70- continue with Cozaar     Hyperlipidemia  Continue with Lipitor     Hypothyroidism  TSH - 1.00- continue with Synthroid 25 mcg per day     Obesity  Continue with diet and exercise     Thyroid nodule  Call me for any difficulty with swallowing or breathing     Patient Education     Diabetes Mellitus and Exercise  Exercising regularly is important for overall health, especially for people who have diabetes mellitus. Exercising is not only about losing weight. It has many other health benefits, such as increasing muscle strength and bone density and reducing body fat and stress. This leads to improved fitness, flexibility, and endurance, all of which result in better overall health.  What are the benefits of exercise if I have diabetes?  Exercise has many benefits for people with diabetes. They include:  · Helping to lower and control blood sugar (glucose).  · Helping the body to respond better to the hormone insulin by improving insulin sensitivity.  · Reducing how much insulin the body needs.  · Lowering the risk for heart disease by:  ? Lowering \"bad\" cholesterol and triglyceride levels.  ? Increasing \"good\" cholesterol levels.  ? Lowering blood pressure.  ? Lowering blood glucose levels.  What is my activity plan?  Your health care provider or certified diabetes educator can help you make a plan for the type and frequency of exercise that works for you. This is called your activity plan. Be sure to:  · Get at least 150 minutes of medium-intensity or high-intensity exercise each week. Exercises may include brisk walking, biking, or water " aerobics.  · Do stretching and strengthening exercises, such as yoga or weight lifting, at least 2 times a week.  · Spread out your activity over at least 3 days of the week.  · Get some form of physical activity each day.  ? Do not go more than 2 days in a row without some kind of physical activity.  ? Avoid being inactive for more than 90 minutes at a time. Take frequent breaks to walk or stretch.  · Choose exercises or activities that you enjoy. Set realistic goals.  · Start slowly and gradually increase your exercise intensity over time.  How do I manage my diabetes during exercise?    Monitor your blood glucose  · Check your blood glucose before and after exercising. If your blood glucose is:  ? 240 mg/dL (13.3 mmol/L) or higher before you exercise, check your urine for ketones. These are chemicals created by the liver. If you have ketones in your urine, do not exercise until your blood glucose returns to normal.  ? 100 mg/dL (5.6 mmol/L) or lower, eat a snack containing 15-20 grams of carbohydrate. Check your blood glucose 15 minutes after the snack to make sure that your glucose level is above 100 mg/dL (5.6 mmol/L) before you start your exercise.  · Know the symptoms of low blood glucose (hypoglycemia) and how to treat it. Your risk for hypoglycemia increases during and after exercise.  Follow these tips and your health care provider's instructions  · Keep a carbohydrate snack that is fast-acting for use before, during, and after exercise to help prevent or treat hypoglycemia.  · Avoid injecting insulin into areas of the body that are going to be exercised. For example, avoid injecting insulin into:  ? Your arms, when you are about to play tennis.  ? Your legs, when you are about to go jogging.  · Keep records of your exercise habits. Doing this can help you and your health care provider adjust your diabetes management plan as needed. Write down:  ? Food that you eat before and after you exercise.  ? Blood  glucose levels before and after you exercise.  ? The type and amount of exercise you have done.  · Work with your health care provider when you start a new exercise or activity. He or she may need to:  ? Make sure that the activity is safe for you.  ? Adjust your insulin, other medicines, and food that you eat.  · Drink plenty of water while you exercise. This prevents loss of water (dehydration) and problems caused by a lot of heat in the body (heat stroke).  Where to find more information  · American Diabetes Association: www.diabetes.org  Summary  · Exercising regularly is important for overall health, especially for people who have diabetes mellitus.  · Exercising has many health benefits. It increases muscle strength and bone density and reduces body fat and stress. It also lowers and controls blood glucose.  · Your health care provider or certified diabetes educator can help you make an activity plan for the type and frequency of exercise that works for you.  · Work with your health care provider to make sure any new activity is safe for you. Also work with your health care provider to adjust your insulin, other medicines, and the food you eat.  This information is not intended to replace advice given to you by your health care provider. Make sure you discuss any questions you have with your health care provider.  Document Revised: 09/14/2020 Document Reviewed: 09/14/2020  Elsevier Patient Education © 2021 [employer]

## 2021-11-23 NOTE — ASSESSMENT & PLAN NOTE
BP- 130/70- continue with Cozaar    

## 2021-12-01 ENCOUNTER — TELEPHONE (OUTPATIENT)
Dept: INFECTIOUS DISEASES | Facility: HOSPITAL | Age: 61
End: 2021-12-01

## 2021-12-02 ENCOUNTER — APPOINTMENT (EMERGENCY)
Dept: RADIOLOGY | Facility: HOSPITAL | Age: 61
End: 2021-12-02
Attending: EMERGENCY MEDICINE
Payer: COMMERCIAL

## 2021-12-02 ENCOUNTER — HOSPITAL ENCOUNTER (OUTPATIENT)
Facility: HOSPITAL | Age: 61
Setting detail: OBSERVATION
Discharge: HOME | End: 2021-12-04
Attending: EMERGENCY MEDICINE | Admitting: INTERNAL MEDICINE
Payer: COMMERCIAL

## 2021-12-02 DIAGNOSIS — R53.1 GENERALIZED WEAKNESS: ICD-10-CM

## 2021-12-02 DIAGNOSIS — U07.1 COVID-19: ICD-10-CM

## 2021-12-02 DIAGNOSIS — R91.1 PULMONARY NODULE: Primary | ICD-10-CM

## 2021-12-02 LAB
ALBUMIN SERPL-MCNC: 3.5 G/DL (ref 3.4–5)
ALP SERPL-CCNC: 70 IU/L (ref 35–126)
ALT SERPL-CCNC: 22 IU/L (ref 11–54)
ANION GAP SERPL CALC-SCNC: 12 MEQ/L (ref 3–15)
AST SERPL-CCNC: 24 IU/L (ref 15–41)
BASOPHILS # BLD: 0.01 K/UL (ref 0.01–0.1)
BASOPHILS NFR BLD: 0.2 %
BILIRUB DIRECT SERPL-MCNC: 0.2 MG/DL
BILIRUB SERPL-MCNC: 1.2 MG/DL (ref 0.3–1.2)
BUN SERPL-MCNC: 20 MG/DL (ref 8–20)
CALCIUM SERPL-MCNC: 9.3 MG/DL (ref 8.9–10.3)
CHLORIDE SERPL-SCNC: 97 MEQ/L (ref 98–109)
CK SERPL-CCNC: 34 U/L (ref 15–200)
CO2 SERPL-SCNC: 24 MEQ/L (ref 22–32)
CREAT SERPL-MCNC: 0.5 MG/DL (ref 0.6–1.1)
CRP SERPL-MCNC: 49.3 MG/L
DIFFERENTIAL METHOD BLD: ABNORMAL
EOSINOPHIL # BLD: 0.16 K/UL (ref 0.04–0.36)
EOSINOPHIL NFR BLD: 3 %
ERYTHROCYTE [DISTWIDTH] IN BLOOD BY AUTOMATED COUNT: 13.2 % (ref 11.7–14.4)
GFR SERPL CREATININE-BSD FRML MDRD: >60 ML/MIN/1.73M*2
GLUCOSE BLD-MCNC: 238 MG/DL (ref 70–99)
GLUCOSE SERPL-MCNC: 296 MG/DL (ref 70–99)
HCT VFR BLDCO AUTO: 41.4 % (ref 35–45)
HGB BLD-MCNC: 14.1 G/DL (ref 11.8–15.7)
IMM GRANULOCYTES # BLD AUTO: 0.01 K/UL (ref 0–0.08)
IMM GRANULOCYTES NFR BLD AUTO: 0.2 %
LDH SERPL L TO P-CCNC: 158 IU/L (ref 98–192)
LYMPHOCYTES # BLD: 0.85 K/UL (ref 1.2–3.5)
LYMPHOCYTES NFR BLD: 15.9 %
MCH RBC QN AUTO: 29.7 PG (ref 28–33.2)
MCHC RBC AUTO-ENTMCNC: 34.1 G/DL (ref 32.2–35.5)
MCV RBC AUTO: 87.3 FL (ref 83–98)
MONOCYTES # BLD: 0.35 K/UL (ref 0.28–0.8)
MONOCYTES NFR BLD: 6.5 %
NEUTROPHILS # BLD: 3.97 K/UL (ref 1.7–7)
NEUTS SEG NFR BLD: 74.2 %
NRBC BLD-RTO: 0 %
PDW BLD AUTO: 11.5 FL (ref 9.4–12.3)
PHOSPHATE SERPL-MCNC: 2.4 MG/DL (ref 2.4–4.7)
PLATELET # BLD AUTO: 114 K/UL (ref 150–369)
PLATELET # BLD EST: ABNORMAL 10*3/UL
POCT TEST: ABNORMAL
POTASSIUM SERPL-SCNC: 4.2 MEQ/L (ref 3.6–5.1)
PROT SERPL-MCNC: 6.2 G/DL (ref 6–8.2)
RBC # BLD AUTO: 4.74 M/UL (ref 3.93–5.22)
SODIUM SERPL-SCNC: 133 MEQ/L (ref 136–144)
TROPONIN I SERPL-MCNC: <0.02 NG/ML
WBC # BLD AUTO: 5.35 K/UL (ref 3.8–10.5)

## 2021-12-02 PROCEDURE — 99220 PR INITIAL OBSERVATION CARE/DAY 70 MINUTES: CPT | Mod: CR | Performed by: INTERNAL MEDICINE

## 2021-12-02 PROCEDURE — 96361 HYDRATE IV INFUSION ADD-ON: CPT

## 2021-12-02 PROCEDURE — 63700000 HC SELF-ADMINISTRABLE DRUG: Performed by: INTERNAL MEDICINE

## 2021-12-02 PROCEDURE — 1124F ACP DISCUSS-NO DSCNMKR DOCD: CPT | Mod: CR | Performed by: INTERNAL MEDICINE

## 2021-12-02 PROCEDURE — 93005 ELECTROCARDIOGRAM TRACING: CPT | Performed by: PHYSICIAN ASSISTANT

## 2021-12-02 PROCEDURE — 25800000 HC PHARMACY IV SOLUTIONS: Performed by: INTERNAL MEDICINE

## 2021-12-02 PROCEDURE — 82550 ASSAY OF CK (CPK): CPT | Performed by: INTERNAL MEDICINE

## 2021-12-02 PROCEDURE — 63600000 HC DRUGS/DETAIL CODE: Performed by: PHYSICIAN ASSISTANT

## 2021-12-02 PROCEDURE — 36415 COLL VENOUS BLD VENIPUNCTURE: CPT | Performed by: PHYSICIAN ASSISTANT

## 2021-12-02 PROCEDURE — 63600000 HC DRUGS/DETAIL CODE: Performed by: INTERNAL MEDICINE

## 2021-12-02 PROCEDURE — G0378 HOSPITAL OBSERVATION PER HR: HCPCS

## 2021-12-02 PROCEDURE — 85025 COMPLETE CBC W/AUTO DIFF WBC: CPT | Performed by: PHYSICIAN ASSISTANT

## 2021-12-02 PROCEDURE — 82728 ASSAY OF FERRITIN: CPT | Performed by: INTERNAL MEDICINE

## 2021-12-02 PROCEDURE — 80048 BASIC METABOLIC PNL TOTAL CA: CPT | Performed by: PHYSICIAN ASSISTANT

## 2021-12-02 PROCEDURE — 83615 LACTATE (LD) (LDH) ENZYME: CPT | Performed by: INTERNAL MEDICINE

## 2021-12-02 PROCEDURE — 93971 EXTREMITY STUDY: CPT | Mod: LT

## 2021-12-02 PROCEDURE — 84100 ASSAY OF PHOSPHORUS: CPT | Performed by: INTERNAL MEDICINE

## 2021-12-02 PROCEDURE — 63700000 HC SELF-ADMINISTRABLE DRUG: Performed by: PHYSICIAN ASSISTANT

## 2021-12-02 PROCEDURE — 96372 THER/PROPH/DIAG INJ SC/IM: CPT | Mod: 59

## 2021-12-02 PROCEDURE — 86140 C-REACTIVE PROTEIN: CPT | Performed by: INTERNAL MEDICINE

## 2021-12-02 PROCEDURE — 96374 THER/PROPH/DIAG INJ IV PUSH: CPT

## 2021-12-02 PROCEDURE — 84484 ASSAY OF TROPONIN QUANT: CPT | Performed by: PHYSICIAN ASSISTANT

## 2021-12-02 PROCEDURE — 99285 EMERGENCY DEPT VISIT HI MDM: CPT | Mod: 25

## 2021-12-02 PROCEDURE — 94640 AIRWAY INHALATION TREATMENT: CPT

## 2021-12-02 PROCEDURE — 71045 X-RAY EXAM CHEST 1 VIEW: CPT

## 2021-12-02 PROCEDURE — 80076 HEPATIC FUNCTION PANEL: CPT | Performed by: INTERNAL MEDICINE

## 2021-12-02 RX ORDER — ENOXAPARIN SODIUM 100 MG/ML
40 INJECTION SUBCUTANEOUS
Status: DISCONTINUED | OUTPATIENT
Start: 2021-12-03 | End: 2021-12-04 | Stop reason: HOSPADM

## 2021-12-02 RX ORDER — IBUPROFEN 200 MG
16-32 TABLET ORAL AS NEEDED
Status: DISCONTINUED | OUTPATIENT
Start: 2021-12-02 | End: 2021-12-04 | Stop reason: HOSPADM

## 2021-12-02 RX ORDER — GABAPENTIN 300 MG/1
600 CAPSULE ORAL NIGHTLY
COMMUNITY
End: 2023-02-22

## 2021-12-02 RX ORDER — DEXTROSE 40 %
15-30 GEL (GRAM) ORAL AS NEEDED
Status: DISCONTINUED | OUTPATIENT
Start: 2021-12-02 | End: 2021-12-04 | Stop reason: HOSPADM

## 2021-12-02 RX ORDER — ALBUTEROL SULFATE 90 UG/1
4 INHALANT RESPIRATORY (INHALATION) ONCE
Status: COMPLETED | OUTPATIENT
Start: 2021-12-02 | End: 2021-12-02

## 2021-12-02 RX ORDER — ACETAMINOPHEN 325 MG/1
650 TABLET ORAL EVERY 4 HOURS PRN
Status: DISCONTINUED | OUTPATIENT
Start: 2021-12-02 | End: 2021-12-04 | Stop reason: HOSPADM

## 2021-12-02 RX ORDER — ALBUTEROL SULFATE 90 UG/1
2 INHALANT RESPIRATORY (INHALATION) EVERY 6 HOURS PRN
Status: DISCONTINUED | OUTPATIENT
Start: 2021-12-02 | End: 2021-12-04 | Stop reason: HOSPADM

## 2021-12-02 RX ORDER — DEXTROSE 50 % IN WATER (D50W) INTRAVENOUS SYRINGE
25 AS NEEDED
Status: DISCONTINUED | OUTPATIENT
Start: 2021-12-02 | End: 2021-12-04 | Stop reason: HOSPADM

## 2021-12-02 RX ORDER — GABAPENTIN 300 MG/1
300 CAPSULE ORAL
Status: DISCONTINUED | OUTPATIENT
Start: 2021-12-03 | End: 2021-12-04 | Stop reason: HOSPADM

## 2021-12-02 RX ORDER — SODIUM CHLORIDE 9 MG/ML
INJECTION, SOLUTION INTRAVENOUS CONTINUOUS
Status: DISCONTINUED | OUTPATIENT
Start: 2021-12-02 | End: 2021-12-03

## 2021-12-02 RX ORDER — ACETAMINOPHEN 650 MG/1
650 SUPPOSITORY RECTAL EVERY 4 HOURS PRN
Status: DISCONTINUED | OUTPATIENT
Start: 2021-12-02 | End: 2021-12-04 | Stop reason: HOSPADM

## 2021-12-02 RX ORDER — GABAPENTIN 300 MG/1
600 CAPSULE ORAL NIGHTLY
Status: DISCONTINUED | OUTPATIENT
Start: 2021-12-02 | End: 2021-12-04 | Stop reason: HOSPADM

## 2021-12-02 RX ORDER — ACETAMINOPHEN 650 MG/20.3ML
650 LIQUID ORAL EVERY 4 HOURS PRN
Status: DISCONTINUED | OUTPATIENT
Start: 2021-12-02 | End: 2021-12-04 | Stop reason: HOSPADM

## 2021-12-02 RX ORDER — ATORVASTATIN CALCIUM 10 MG/1
10 TABLET, FILM COATED ORAL
Status: DISCONTINUED | OUTPATIENT
Start: 2021-12-03 | End: 2021-12-04 | Stop reason: HOSPADM

## 2021-12-02 RX ORDER — ONDANSETRON HYDROCHLORIDE 2 MG/ML
4 INJECTION, SOLUTION INTRAVENOUS EVERY 8 HOURS PRN
Status: DISCONTINUED | OUTPATIENT
Start: 2021-12-02 | End: 2021-12-04 | Stop reason: HOSPADM

## 2021-12-02 RX ORDER — INSULIN ASPART 100 [IU]/ML
25-30 INJECTION, SUSPENSION SUBCUTANEOUS
COMMUNITY
End: 2022-01-31

## 2021-12-02 RX ORDER — LOSARTAN POTASSIUM 50 MG/1
50 TABLET ORAL
Status: DISCONTINUED | OUTPATIENT
Start: 2021-12-03 | End: 2021-12-04 | Stop reason: HOSPADM

## 2021-12-02 RX ORDER — INSULIN LISPRO 100 [IU]/ML
25-30 INJECTION, SUSPENSION SUBCUTANEOUS
Status: CANCELLED | OUTPATIENT
Start: 2021-12-03

## 2021-12-02 RX ORDER — ONDANSETRON HYDROCHLORIDE 2 MG/ML
4 INJECTION, SOLUTION INTRAVENOUS ONCE
Status: COMPLETED | OUTPATIENT
Start: 2021-12-02 | End: 2021-12-02

## 2021-12-02 RX ORDER — GABAPENTIN 300 MG/1
300 CAPSULE ORAL
COMMUNITY
End: 2023-03-29

## 2021-12-02 RX ORDER — AMLODIPINE BESYLATE 5 MG/1
5 TABLET ORAL
Status: DISCONTINUED | OUTPATIENT
Start: 2021-12-03 | End: 2021-12-04 | Stop reason: HOSPADM

## 2021-12-02 RX ORDER — ONDANSETRON 4 MG/1
4 TABLET, ORALLY DISINTEGRATING ORAL EVERY 8 HOURS PRN
Status: DISCONTINUED | OUTPATIENT
Start: 2021-12-02 | End: 2021-12-04 | Stop reason: HOSPADM

## 2021-12-02 RX ORDER — INSULIN ASPART 100 [IU]/ML
2-6 INJECTION, SOLUTION INTRAVENOUS; SUBCUTANEOUS
Status: DISCONTINUED | OUTPATIENT
Start: 2021-12-02 | End: 2021-12-04 | Stop reason: HOSPADM

## 2021-12-02 RX ORDER — ONDANSETRON 4 MG/1
TABLET, FILM COATED ORAL
COMMUNITY
Start: 2021-12-01 | End: 2023-02-22

## 2021-12-02 RX ORDER — LEVOTHYROXINE SODIUM 25 UG/1
25 TABLET ORAL
Status: DISCONTINUED | OUTPATIENT
Start: 2021-12-03 | End: 2021-12-04 | Stop reason: HOSPADM

## 2021-12-02 RX ADMIN — SODIUM CHLORIDE: 9 INJECTION, SOLUTION INTRAVENOUS at 21:33

## 2021-12-02 RX ADMIN — GABAPENTIN 600 MG: 300 CAPSULE ORAL at 23:19

## 2021-12-02 RX ADMIN — INSULIN ASPART 2 UNITS: 100 INJECTION, SOLUTION INTRAVENOUS; SUBCUTANEOUS at 23:28

## 2021-12-02 RX ADMIN — ONDANSETRON 4 MG: 2 INJECTION INTRAMUSCULAR; INTRAVENOUS at 19:16

## 2021-12-02 RX ADMIN — ALBUTEROL SULFATE 4 PUFF: 90 AEROSOL, METERED RESPIRATORY (INHALATION) at 18:06

## 2021-12-02 RX ADMIN — ACETAMINOPHEN 650 MG: 325 TABLET, FILM COATED ORAL at 23:18

## 2021-12-02 ASSESSMENT — ENCOUNTER SYMPTOMS
COUGH: 1
FEVER: 0
ABDOMINAL PAIN: 0
CHEST TIGHTNESS: 0
FATIGUE: 1
SHORTNESS OF BREATH: 1
APPETITE CHANGE: 1
VOMITING: 1
NAUSEA: 1
CHILLS: 1
DIARRHEA: 0
WEAKNESS: 1

## 2021-12-02 ASSESSMENT — PATIENT HEALTH QUESTIONNAIRE - PHQ9: SUM OF ALL RESPONSES TO PHQ9 QUESTIONS 1 & 2: 0

## 2021-12-02 NOTE — LETTER
TO WHOM IT MAY CONCERN        Bebe Burks was admitted to the hospital on 12/2/2021 and discharged on 12/4/2021.She has been sick and unable to work from home since 12/29/2021. She can return to her work-related activities on 12/13/2021. She will need to be excused in the near future for doctor's appointments. Thanks for understanding. Please do not hesitate to contact me if need be.                                                                                Mary Anne Silver MD                                                                Internal Medicine Hospitalist

## 2021-12-02 NOTE — ED ATTESTATION NOTE
Physician Attestation:     I personally saw and evaluated the patient, participated in the management, and agree with the findings in the above note except as where stated.  The Physician Assistant and I discussed  the case, workup, and disposition.      Chief Complaint  Chief Complaint   Patient presents with   • Shortness of Breath     61 f presents for eval  Pt states on Friday started with rhinorrhea and congestion  Turned into sob  Now covid +  Is vaccinated (no booster)  No fever/chills  Scheduled for ab tomorrow    Non toxic  nad  vss  No tachypnea or resp distress.  Bibasilar Conor Falk,   12/02/21 161

## 2021-12-02 NOTE — ED PROVIDER NOTES
Emergency Medicine Note  HPI   HISTORY OF PRESENT ILLNESS     Patient is a 61-year-old female with history of hypertension, hyperlipidemia, type 2 diabetes, tested positive for Covid on 11/29 (symptoms started 11/26) presenting with chief complaint of shortness of breath.  Patient reports on 11/26 she started to have a runny nose and then felt relatively okay 2 days after until she started to develop a cough on 11/29 in which she was tested and confirmed positive for Covid.  Patient reports she was supposed to have an antibody infusion tomorrow morning.  Patient started to feel more short of breath today and became concerned.  She does not have a pulse ox at home and medics stated the lowest pulse ox they got was 91%.  Upon arrival, patient's oxygen is normal on room air.  Patient denies any history of chest pain/tightness.  She has been having subjective chills but no recorded fever.  She reports she was nauseous and vomited once but states nausea has subsided.  Patient states she was vaccinated against Covid and was about to get the booster but did not yet get it.  She denies any history of cardiopulmonary disease.  Patient also mentions she has chronic back pain that radiates down her left leg and the last few days since she has not up walking around as much the left leg seems to be bothering her a little bit more.  Denies any pain in her right leg.  Denies any recent long travel, history of DVT/PE.      History provided by:  Patient        Patient History   PAST HISTORY     Reviewed from Nursing Triage: Tobacco  Med Hx  Surg Hx  Fam Hx  Soc Hx      Past Medical History:   Diagnosis Date   • Arthritis    • Hypertension    • Lipid disorder    • Type 2 diabetes mellitus (CMS/HCC)        Past Surgical History:   Procedure Laterality Date   • COMBINED HYSTEROSCOPY DIAGNOSTIC / D&C  2007   • EYE SURGERY Left 2017    cataract   • VITRECTOMY Left 2016       Family History   Problem Relation Age of Onset   • Diabetes  Biological Mother    • Hypertension Biological Mother    • Stroke Biological Father        Social History     Tobacco Use   • Smoking status: Never Smoker   • Smokeless tobacco: Never Used   Substance Use Topics   • Alcohol use: Yes     Alcohol/week: 1.0 standard drink     Types: 1 Glasses of wine per week     Comment: occas   • Drug use: No         Review of Systems   REVIEW OF SYSTEMS     Review of Systems   Constitutional: Positive for appetite change, chills and fatigue. Negative for fever.   Respiratory: Positive for cough and shortness of breath. Negative for chest tightness.    Cardiovascular: Negative for chest pain and leg swelling.   Gastrointestinal: Positive for nausea and vomiting. Negative for abdominal pain and diarrhea.   Neurological: Positive for weakness (generalized).         VITALS     ED Vitals    Date/Time Temp Pulse Resp BP SpO2 [facility]   12/02/21 1930 -- 96 15 140/69 96 % NMN   12/02/21 1815 -- 94 18 158/77 97 % NMN   12/02/21 1735 -- 93 -- -- -- KLM   12/02/21 1544 35.8 °C (96.5 °F) -- 18 155/66 97 % NMN        Pulse Ox %: 97 % (12/02/21 1611)  Pulse Ox Interpretation: Normal (12/02/21 1611)           Physical Exam   PHYSICAL EXAM     Physical Exam  Constitutional:       Appearance: She is not toxic-appearing.   HENT:      Head: Normocephalic and atraumatic.   Eyes:      Extraocular Movements: Extraocular movements intact.      Pupils: Pupils are equal, round, and reactive to light.   Cardiovascular:      Rate and Rhythm: Normal rate and regular rhythm.   Pulmonary:      Effort: No tachypnea or respiratory distress.      Breath sounds: Decreased breath sounds (mildly diminished throughout) and wheezing (scant, minimal) present.   Musculoskeletal:      Cervical back: Normal range of motion and neck supple.      Right lower leg: No tenderness. No edema.      Left lower leg: Tenderness (mild) present. No edema.   Skin:     General: Skin is warm and dry.   Neurological:      General: No focal  deficit present.      Mental Status: She is alert and oriented to person, place, and time.           PROCEDURES     Procedures     DATA     Results     Procedure Component Value Units Date/Time    Basic metabolic panel [[id number]]  (Abnormal) Collected: 12/02/21 1618    Specimen: Blood, Venous Updated: 12/02/21 1658     Sodium 133 mEQ/L      Potassium 4.2 mEQ/L      Chloride 97 mEQ/L      CO2 24 mEQ/L      BUN 20 mg/dL      Creatinine 0.5 mg/dL      Glucose 296 mg/dL      Calcium 9.3 mg/dL      eGFR >60.0 mL/min/1.73m*2      Anion Gap 12 mEQ/L     Troponin I [[id number]]  (Normal) Collected: 12/02/21 1618    Specimen: Blood, Venous Updated: 12/02/21 1656     Troponin I <0.02 ng/mL     CBC and differential [[id number]]  (Abnormal) Collected: 12/02/21 1618    Specimen: Blood, Venous Updated: 12/02/21 1647     WBC 5.35 K/uL      RBC 4.74 M/uL      Hemoglobin 14.1 g/dL      Hematocrit 41.4 %      MCV 87.3 fL      MCH 29.7 pg      MCHC 34.1 g/dL      RDW 13.2 %      Platelets 114 K/uL      Comment: ALL RESULTS HAVE BEEN RECHECKED        MPV 11.5 fL      Differential Type Auto     nRBC 0.0 %      Immature Granulocytes 0.2 %      Neutrophils 74.2 %      Lymphocytes 15.9 %      Monocytes 6.5 %      Eosinophils 3.0 %      Basophils 0.2 %      Immature Granulocytes, Absolute 0.01 K/uL      Neutrophils, Absolute 3.97 K/uL      Lymphocytes, Absolute 0.85 K/uL      Monocytes, Absolute 0.35 K/uL      Eosinophils, Absolute 0.16 K/uL      Basophils, Absolute 0.01 K/uL      Platelet Estimate Decreased (51,000-149,000)          Imaging Results          US venous leg, LL extremity (Final result)  Result time 12/02/21 17:16:57    Final result                 Impression:    IMPRESSION: No left femoropopliteal thrombosis.                 Narrative:    CLINICAL INDICATION: Left Lower extremity edema.    COMPARISON: None    TECHNIQUE: Duplex/color Doppler evaluation of the deep venous system of the left  lower extremity was  performed.    COMMENT:  LEFT:  COMMON FEMORAL VEIN: Normal compression  DEEP FEMORAL VEIN: Patent by color Doppler  FEMORAL VEIN: Normal compression  POPLITEAL VEIN: Normal compression    Visualized calf veins compress normally.                               X-RAY CHEST 1 VIEW (Final result)  Result time 12/02/21 17:09:20    Final result                 Impression:    IMPRESSION:  A 14 mm rounded opacity in the right lung base, new since 2008. Recommend  further evaluation with chest CT exam to rule out an underlying nodule.  Otherwise no consolidation.        Findings were discussed with Katerine GARCIA at 5:08 PM on 12/2/2021.             Narrative:    CLINICAL HISTORY:   Shortness of breath. COVID.    COMMENT:    Comparison:   Chest radiographs dating back to 6/25/2008.    Single frontal AP view of the chest was obtained.    The cardiac silhouette and mediastinal contour are unremarkable for technique.  There is a 14 mm rounded opacity in the right lung base, not definitively  visualized on prior chest radiographs. The remainder of the lungs are clear. No  pleural effusion or evidence of a pneumothorax.                    ED Interpretation    Nap                                ECG 12 lead             Scoring tools                                 ED Course & [facility]   MDM / ED COURSE and CLINICAL IMPRESSIONS     [facility]    ED Course as of 12/02/21 2021   Thu Dec 02, 2021   1719 X-RAY CHEST 1 VIEW  IMPRESSION:  A 14 mm rounded opacity in the right lung base, new since 2008. Recommend  further evaluation with chest CT exam to rule out an underlying nodule.  Otherwise no consolidation.    [KM]   1719 US venous leg, LL extremity  IMPRESSION: No left femoropopliteal thrombosis. [KM]   1723 Patient resting, discussed all results with her including opacity in right base that will need CT imaging.  Patient admits she still feels pretty tired.  Pulse ox 97% on room air.  Awaiting inhaler and will ambulate [KM]   1751 Spoke to RT  & will be down for inhaler/spacer teaching [KM]   1830 I got patient up to obtain ambulatory pulse ox and she really cannot even complete this trial, she was extremely generally weak and felt very nauseous, had to sit back down.  Patient is very concerned of how she feels and feels too weak to go home.  [facility] for hospitalization [KM]   1916 2nd page to [facility] [KM]   1925 D/w [facility] [KM]      ED Course User Index  [KM] Vanessa Blankenship PA C         Clinical Impressions as of 12/02/21 2021   Pulmonary nodule   COVID-19   Generalized weakness            Vanessa Blankenship PA C  12/02/21 2021

## 2021-12-03 LAB
ALBUMIN SERPL-MCNC: 3.5 G/DL (ref 3.4–5)
ALP SERPL-CCNC: 71 IU/L (ref 35–126)
ALT SERPL-CCNC: 23 IU/L (ref 11–54)
ANION GAP SERPL CALC-SCNC: 11 MEQ/L (ref 3–15)
APTT PPP: 33 SEC (ref 23–35)
AST SERPL-CCNC: 24 IU/L (ref 15–41)
ATRIAL RATE: 106
BASOPHILS # BLD: 0.01 K/UL (ref 0.01–0.1)
BASOPHILS NFR BLD: 0.2 %
BILIRUB SERPL-MCNC: 0.9 MG/DL (ref 0.3–1.2)
BUN SERPL-MCNC: 19 MG/DL (ref 8–20)
CALCIUM SERPL-MCNC: 8.9 MG/DL (ref 8.9–10.3)
CHLORIDE SERPL-SCNC: 100 MEQ/L (ref 98–109)
CO2 SERPL-SCNC: 27 MEQ/L (ref 22–32)
CREAT SERPL-MCNC: 0.5 MG/DL (ref 0.6–1.1)
D DIMER PPP IA.FEU-MCNC: 0.34 UG/ML FEU (ref 0–0.5)
DIFFERENTIAL METHOD BLD: ABNORMAL
EOSINOPHIL # BLD: 0.22 K/UL (ref 0.04–0.36)
EOSINOPHIL NFR BLD: 4.6 %
ERYTHROCYTE [DISTWIDTH] IN BLOOD BY AUTOMATED COUNT: 13.2 % (ref 11.7–14.4)
FERRITIN SERPL-MCNC: 314 NG/ML (ref 11–250)
GFR SERPL CREATININE-BSD FRML MDRD: >60 ML/MIN/1.73M*2
GLUCOSE BLD-MCNC: 132 MG/DL (ref 70–99)
GLUCOSE BLD-MCNC: 260 MG/DL (ref 70–99)
GLUCOSE BLD-MCNC: 271 MG/DL (ref 70–99)
GLUCOSE BLD-MCNC: 332 MG/DL (ref 70–99)
GLUCOSE SERPL-MCNC: 146 MG/DL (ref 70–99)
HCT VFR BLDCO AUTO: 41 % (ref 35–45)
HGB BLD-MCNC: 13.8 G/DL (ref 11.8–15.7)
IMM GRANULOCYTES # BLD AUTO: 0.02 K/UL (ref 0–0.08)
IMM GRANULOCYTES NFR BLD AUTO: 0.4 %
INR PPP: 1.2
LYMPHOCYTES # BLD: 0.95 K/UL (ref 1.2–3.5)
LYMPHOCYTES NFR BLD: 19.8 %
MCH RBC QN AUTO: 29.4 PG (ref 28–33.2)
MCHC RBC AUTO-ENTMCNC: 33.7 G/DL (ref 32.2–35.5)
MCV RBC AUTO: 87.2 FL (ref 83–98)
MONOCYTES # BLD: 0.37 K/UL (ref 0.28–0.8)
MONOCYTES NFR BLD: 7.7 %
NEUTROPHILS # BLD: 3.23 K/UL (ref 1.7–7)
NEUTS SEG NFR BLD: 67.3 %
NRBC BLD-RTO: 0 %
P AXIS: 49
PDW BLD AUTO: 11.3 FL (ref 9.4–12.3)
PLATELET # BLD AUTO: 120 K/UL (ref 150–369)
PLATELET # BLD EST: ABNORMAL 10*3/UL
POCT TEST: ABNORMAL
POTASSIUM SERPL-SCNC: 3.7 MEQ/L (ref 3.6–5.1)
PR INTERVAL: 198
PROT SERPL-MCNC: 6.4 G/DL (ref 6–8.2)
PROTHROMBIN TIME: 14.3 SEC (ref 12.2–14.5)
QRS DURATION: 84
QT INTERVAL: 340
QTC CALCULATION(BAZETT): 451
R AXIS: 2
RBC # BLD AUTO: 4.7 M/UL (ref 3.93–5.22)
SODIUM SERPL-SCNC: 138 MEQ/L (ref 136–144)
T WAVE AXIS: 13
VENTRICULAR RATE: 106
WBC # BLD AUTO: 4.8 K/UL (ref 3.8–10.5)

## 2021-12-03 PROCEDURE — 99225 PR SBSQ OBSERVATION CARE/DAY 25 MINUTES: CPT | Mod: CR | Performed by: INTERNAL MEDICINE

## 2021-12-03 PROCEDURE — 80053 COMPREHEN METABOLIC PANEL: CPT | Performed by: INTERNAL MEDICINE

## 2021-12-03 PROCEDURE — 96372 THER/PROPH/DIAG INJ SC/IM: CPT

## 2021-12-03 PROCEDURE — 63600000 HC DRUGS/DETAIL CODE: Performed by: INTERNAL MEDICINE

## 2021-12-03 PROCEDURE — 63700000 HC SELF-ADMINISTRABLE DRUG: Performed by: INTERNAL MEDICINE

## 2021-12-03 PROCEDURE — 85730 THROMBOPLASTIN TIME PARTIAL: CPT | Performed by: INTERNAL MEDICINE

## 2021-12-03 PROCEDURE — 85610 PROTHROMBIN TIME: CPT | Performed by: INTERNAL MEDICINE

## 2021-12-03 PROCEDURE — 85025 COMPLETE CBC W/AUTO DIFF WBC: CPT | Performed by: INTERNAL MEDICINE

## 2021-12-03 PROCEDURE — 36415 COLL VENOUS BLD VENIPUNCTURE: CPT | Performed by: INTERNAL MEDICINE

## 2021-12-03 PROCEDURE — 85379 FIBRIN DEGRADATION QUANT: CPT | Performed by: INTERNAL MEDICINE

## 2021-12-03 PROCEDURE — G0378 HOSPITAL OBSERVATION PER HR: HCPCS

## 2021-12-03 RX ORDER — IBUPROFEN/PSEUDOEPHEDRINE HCL 200MG-30MG
3 TABLET ORAL ONCE
Status: COMPLETED | OUTPATIENT
Start: 2021-12-03 | End: 2021-12-03

## 2021-12-03 RX ORDER — IBUPROFEN/PSEUDOEPHEDRINE HCL 200MG-30MG
3 TABLET ORAL NIGHTLY PRN
Status: DISCONTINUED | OUTPATIENT
Start: 2021-12-03 | End: 2021-12-04 | Stop reason: HOSPADM

## 2021-12-03 RX ADMIN — GABAPENTIN 300 MG: 300 CAPSULE ORAL at 08:24

## 2021-12-03 RX ADMIN — ENOXAPARIN SODIUM 40 MG: 40 INJECTION SUBCUTANEOUS at 06:16

## 2021-12-03 RX ADMIN — INSULIN ASPART 2 UNITS: 100 INJECTION, SOLUTION INTRAVENOUS; SUBCUTANEOUS at 18:08

## 2021-12-03 RX ADMIN — INSULIN ASPART 6 UNITS: 100 INJECTION, SOLUTION INTRAVENOUS; SUBCUTANEOUS at 13:05

## 2021-12-03 RX ADMIN — ACETAMINOPHEN 650 MG: 325 TABLET, FILM COATED ORAL at 08:58

## 2021-12-03 RX ADMIN — ACETAMINOPHEN 650 MG: 325 TABLET, FILM COATED ORAL at 18:07

## 2021-12-03 RX ADMIN — ATORVASTATIN CALCIUM 10 MG: 10 TABLET, FILM COATED ORAL at 06:17

## 2021-12-03 RX ADMIN — Medication 3 MG: at 23:14

## 2021-12-03 RX ADMIN — LEVOTHYROXINE SODIUM 25 MCG: 0.03 TABLET ORAL at 06:16

## 2021-12-03 RX ADMIN — AMLODIPINE BESYLATE 5 MG: 5 TABLET ORAL at 06:17

## 2021-12-03 RX ADMIN — INSULIN ASPART 2 UNITS: 100 INJECTION, SOLUTION INTRAVENOUS; SUBCUTANEOUS at 21:57

## 2021-12-03 RX ADMIN — GABAPENTIN 600 MG: 300 CAPSULE ORAL at 21:54

## 2021-12-03 RX ADMIN — LOSARTAN POTASSIUM 50 MG: 50 TABLET, FILM COATED ORAL at 06:17

## 2021-12-03 RX ADMIN — Medication 3 MG: at 02:00

## 2021-12-03 NOTE — H&P
"   Hospital Medicine Service -  History & Physical        CHIEF COMPLAINT   COVID infection and fatigue.     HISTORY OF PRESENT ILLNESS      Bebe Burks is a 61 y.o. female with a past medical history of arthritis, HTN, HLD, Type 2 DM who presents with COVID infection and fatigue.    Last Friday didn't feel good- \"cold\", HA, runny nose, cough, poor appetite, tired, couldn't get out of bed. Took Advil and Tylenol.  States was checking temp at home and it was normal/low  Then over the weekend Sat/Sat felt a little better.  On Monday went to get COVID test which came back positive.  On Monday noted lost of smell and taste.   Then since Tuesday having nausea and vomiting.  States since around noon today noted SOB when attempting to stand/exert herself.  C/o worsening fatigue.  Denies CP.  Denies diarrhea.  Denies urinary symptoms.  Blood sugar has been running lower, ~80's, so hasn't been taking some of her DM meds..  Has chronic back pain. Has falls 2/2 back pain and neuropathy.     She says her sister also has tested positive for COVID.  She is scheduled to have monoclonal antibody infusion tomorrow 12/3.  She was fully vaccinated with Moderna Covid vaccine on Feb 25 2021. Hasn't had the COVID vaccine booster.    In ED, on labs - Na 133, Plts 114K  CXR:  A 14 mm rounded opacity in the right lung base, new since 2008  US LLE: no DVT  In ED, pulse 97% on room air at rest.   Unable to ambulate to check ambulatory pulse ox due to generalized weakness/nausea/fatigue.    C/o feeling thirsty and hungry.    PAST MEDICAL AND SURGICAL HISTORY      Past Medical History:   Diagnosis Date   • Arthritis    • Hypertension    • Lipid disorder    • Type 2 diabetes mellitus (CMS/HCC)        Past Surgical History:   Procedure Laterality Date   • COMBINED HYSTEROSCOPY DIAGNOSTIC / D&C  2007   • EYE SURGERY Left 2017    cataract   • VITRECTOMY Left 2016       PCP: Paula Walker MD    MEDICATIONS      Prior to Admission medications  " "    Medications Prior to Admission   Medication Sig Dispense Refill Last Dose   • gabapentin 300 mg capsule Take 300 mg by mouth daily before breakfast.      • gabapentin 300 mg capsule Take 600 mg by mouth nightly.      • insulin asp prot-insulin aspart, 70/30, 100 unit/mL (70-30) pen Inject 25-30 Units under the skin 2 (two) times a day before breakfast and dinner. 25 units AM and 30 units PM  Hold for BS <120       • amLODIPine (NORVASC) 5 mg tablet TAKE 1 TABLET BY MOUTH EVERY DAY 90 tablet 3    • atorvastatin (LIPITOR) 10 mg tablet Take 1 tablet (10 mg total) by mouth once daily. 90 tablet 3    • BD ULTRA-FINE KRISTIN PEN NEEDLE 32 gauge x 5/32\" needle INJECT TWICE A  each 6    • cyclobenzaprine 10 mg tablet 10 mg 2 (two) times a day as needed.        • empagliflozin (JARDIANCE) 10 mg tablet TAKE 1 TABLET BY MOUTH EVERY DAY 90 tablet 3    • FREESTYLE LAURA 14 DAY READER misc 1 each continuously. 1 reader dx code E 11.9 1 each 11    • FREESTYLE LAURA 14 DAY SENSOR kit 1 each continuously. 1 sensor every 14 days dx code E 11.9 1 kit 11    • hydrochlorothiazide (HYDRODIURIL) 12.5 mg tablet TAKE 1 TABLET BY MOUTH EVERY DAY 90 tablet 0    • levothyroxine 25 mcg tablet Take 1 tablet (25 mcg total) by mouth once daily. 90 tablet 3    • losartan (COZAAR) 50 mg tablet Take 1 tablet (50 mg total) by mouth once daily. 90 tablet 3    • ondansetron 4 mg tablet       • ONETOUCH DELICA LANCETS 33 gauge misc 1 each 3 (three) times a day. 100 each 11    • ONETOUCH ULTRA BLUE TEST STRIP strip PT CHECKS BLOOD SUGAR 4 TIMES A  strip 0    • ONETOUCH VERIO TEST STRIPS strip 1 each 3 (three) times a day. 100 strip 11    • semaglutide (RYBELSUS) 14 mg tablet Take 1 tablet (14 mg total) by mouth daily. 90 tablet 3          ALLERGIES      Patient has no known allergies.    FAMILY HISTORY      Family History   Problem Relation Age of Onset   • Diabetes Biological Mother    • Hypertension Biological Mother    • Stroke " Biological Father        SOCIAL HISTORY      Social History     Socioeconomic History   • Marital status: Single     Spouse name: None   • Number of children: None   • Years of education: None   • Highest education level: None   Occupational History   • None   Tobacco Use   • Smoking status: Never Smoker   • Smokeless tobacco: Never Used   Substance and Sexual Activity   • Alcohol use: Yes     Alcohol/week: 1.0 standard drink     Types: 1 Glasses of wine per week     Comment: occas   • Drug use: No   • Sexual activity: None   Other Topics Concern   • None   Social History Narrative   • None     Social Determinants of Health     Financial Resource Strain:    • Difficulty of Paying Living Expenses: Not on file   Food Insecurity: No Food Insecurity   • Worried About Running Out of Food in the Last Year: Never true   • Ran Out of Food in the Last Year: Never true   Transportation Needs:    • Lack of Transportation (Medical): Not on file   • Lack of Transportation (Non-Medical): Not on file   Physical Activity:    • Days of Exercise per Week: Not on file   • Minutes of Exercise per Session: Not on file   Stress:    • Feeling of Stress : Not on file   Social Connections:    • Frequency of Communication with Friends and Family: Not on file   • Frequency of Social Gatherings with Friends and Family: Not on file   • Attends Anabaptism Services: Not on file   • Active Member of Clubs or Organizations: Not on file   • Attends Club or Organization Meetings: Not on file   • Marital Status: Not on file   Intimate Partner Violence:    • Fear of Current or Ex-Partner: Not on file   • Emotionally Abused: Not on file   • Physically Abused: Not on file   • Sexually Abused: Not on file   Housing Stability:    • Unable to Pay for Housing in the Last Year: Not on file   • Number of Places Lived in the Last Year: Not on file   • Unstable Housing in the Last Year: Not on file       REVIEW OF SYSTEMS      All other systems reviewed and  negative except as noted in HPI    PHYSICAL EXAMINATION      Temp:  [35.8 °C (96.5 °F)] 35.8 °C (96.5 °F)  Heart Rate:  [93-94] 94  Resp:  [18] 18  BP: (155-158)/(66-77) 158/77  Body mass index is 29.6 kg/m².    Physical Exam  Vitals and nursing note reviewed.   Constitutional:       General: She is not in acute distress.     Appearance: She is ill-appearing. She is not toxic-appearing.   HENT:      Head: Normocephalic and atraumatic.      Nose: Nose normal.      Mouth/Throat:      Mouth: Mucous membranes are dry.      Pharynx: Oropharynx is clear. No oropharyngeal exudate.   Eyes:      General: No scleral icterus.     Extraocular Movements: Extraocular movements intact.      Conjunctiva/sclera: Conjunctivae normal.      Pupils: Pupils are equal, round, and reactive to light.   Cardiovascular:      Rate and Rhythm: Normal rate and regular rhythm.      Pulses: Normal pulses.      Heart sounds: No murmur heard.      Pulmonary:      Effort: No respiratory distress.      Breath sounds: Rales present. No wheezing or rhonchi.   Abdominal:      General: Bowel sounds are normal. There is no distension.      Palpations: Abdomen is soft.      Tenderness: There is no abdominal tenderness. There is no guarding or rebound.   Musculoskeletal:      Cervical back: Neck supple. No tenderness.      Right lower leg: No edema.      Left lower leg: No edema.   Skin:     General: Skin is warm and dry.      Comments: Patch of dry skin on left shin   Neurological:      General: No focal deficit present.      Mental Status: She is alert and oriented to person, place, and time.      Cranial Nerves: No cranial nerve deficit.      Motor: No weakness.   Psychiatric:         Mood and Affect: Mood normal.         LABS / IMAGING / EKG        Labs  I have reviewed the patient's pertinent labs.   Labs Reviewed   CBC AND DIFF - Abnormal       Result Value    WBC 5.35      RBC 4.74      Hemoglobin 14.1      Hematocrit 41.4      MCV 87.3      MCH 29.7       MCHC 34.1      RDW 13.2      Platelets 114 (*)     MPV 11.5      Differential Type Auto      nRBC 0.0      Immature Granulocytes 0.2      Neutrophils 74.2      Lymphocytes 15.9      Monocytes 6.5      Eosinophils 3.0      Basophils 0.2      Immature Granulocytes, Absolute 0.01      Neutrophils, Absolute 3.97      Lymphocytes, Absolute 0.85 (*)     Monocytes, Absolute 0.35      Eosinophils, Absolute 0.16      Basophils, Absolute 0.01      Platelet Estimate Decreased (51,000-149,000)     BASIC METABOLIC PANEL - Abnormal    Sodium 133 (*)     Potassium 4.2      Chloride 97 (*)     CO2 24      BUN 20      Creatinine 0.5 (*)     Glucose 296 (*)     Calcium 9.3      eGFR >60.0      Anion Gap 12     TROPONIN I - Normal    Troponin I <0.02     HEPATIC FUNCTION PANEL   CK, TOTAL (NO REFLEX)   LACTATE DEHYDROGENASE   MAGNESIUM   PHOSPHORUS   C-REACTIVE PROTEIN   FERRITIN       Imaging  I have independently reviewed the patient's pertinent imaging for this hospital visit.   Results for orders placed during the hospital encounter of 12/02/21    X-RAY CHEST 1 VIEW    Narrative  CLINICAL HISTORY:   Shortness of breath. COVID.    COMMENT:    Comparison:   Chest radiographs dating back to 6/25/2008.    Single frontal AP view of the chest was obtained.    The cardiac silhouette and mediastinal contour are unremarkable for technique.  There is a 14 mm rounded opacity in the right lung base, not definitively  visualized on prior chest radiographs. The remainder of the lungs are clear. No  pleural effusion or evidence of a pneumothorax.    --    Impression  A 14 mm rounded opacity in the right lung base, new since 2008. Recommend  further evaluation with chest CT exam to rule out an underlying nodule.  Otherwise no consolidation.        Findings were discussed with Katerine GARCIA at 5:08 PM on 12/2/2021.    Results for orders placed during the hospital encounter of 12/02/21     venous leg, LL extremity    Narrative  CLINICAL  INDICATION: Left Lower extremity edema.    COMPARISON: None    TECHNIQUE: Duplex/color Doppler evaluation of the deep venous system of the left  lower extremity was performed.    COMMENT:  LEFT:  COMMON FEMORAL VEIN: Normal compression  DEEP FEMORAL VEIN: Patent by color Doppler  FEMORAL VEIN: Normal compression  POPLITEAL VEIN: Normal compression    Visualized calf veins compress normally.    --    Impression  No left femoropopliteal thrombosis.        No results found for: COVID19   COVID-19- positive on  on outpatient testing    ECG/Telemetry  I have independently reviewed the ECG.   Dec 2 2021 15:58PM: Sinus tachy 106bpm. Normal axis and intervals. No acute ischemic changes. Qtc 451ms    ASSESSMENT AND PLAN           * COVID-19 virus infection  Assessment & Plan  - Symptoms since   - tested positive   - fully vaccinated with Moderna COVID vaccine as of 2021  - c/o generalized weakness/fatigue and SOB with minimal exertion  - pulse ox at rest within normal limits on room air. Patient was too weak to ambulate to check ambulatory pulse ox  - CXR - 14mm opacity right lung base but no ground glass opacities or consolidation    P:  - admit to tele  - continuous pulse ox  - currently not candidate for decadron  - IVF   - COVID labs  - consult ID- ? Receive monoclonal Ab in hospital  - f/u labs in AM      Lung nodule  Assessment & Plan  CXR_- A 14 mm rounded opacity in the right lung base, new since     P:  - patient aware. recommended outpatient CT chest        Hypothyroidism  Assessment & Plan  - continue Levothyroxine    Hyperlipidemia  Assessment & Plan  - continue statin    Essential hypertension  Assessment & Plan  - continue amlodipine and losartan  - hold HCTZ with decreased PO intake and n/v    Controlled type 2 diabetes mellitus, with long-term current use of insulin (CMS/[facility])  Overview  HgA.9* % (21 0946), 10.4 (21)      Assessment & Plan  - hold oral DM medications and  home insulin regimen due to poor PO intake  - will do ISS       VTE Assessment: [place] VTE Score: 4  VTE Prophylaxis: Current anticoagulants:  enoxaparin (LOVENOX) syringe 40 mg, subcutaneous, Daily (6a)      Code Status: Full Code - discussed with patient  Emergency Contact(s)    Name Relation Home Work Anna Mar   [phone]           Estimated Discharge Date: 12/3/2021  Disposition Planning: HECTOR Collier DO  12/2/2021

## 2021-12-03 NOTE — CONSULTS
CWOCN consulted for prevention of pressure injury due to Covid 19 status and potentially for proning. Patient is currently not at high risk for skin breakdown and the PREVENT bundle in place where applicable

## 2021-12-03 NOTE — PROGRESS NOTES
Hospital Medicine Service -  Daily Progress Note       SUBJECTIVE   Interval History: Feels weak still and SOB. Still without full taste of smell or scent. No chest pain, abdominal, pain, and or n/v/d.      OBJECTIVE      Vital signs in last 24 hours:  Temp:  [35.8 °C (96.5 °F)-36.9 °C (98.4 °F)] 36.9 °C (98.4 °F)  Heart Rate:  [] 96  Resp:  [15-20] 16  BP: (114-169)/(66-81) 114/78  No intake or output data in the 24 hours ending 12/03/21 1133    PHYSICAL EXAMINATION      Physical Exam  Constitutional:       Appearance: Normal appearance.   HENT:      Head: Normocephalic and atraumatic.      Mouth/Throat:      Mouth: Mucous membranes are moist.   Eyes:      Extraocular Movements: Extraocular movements intact.      Pupils: Pupils are equal, round, and reactive to light.   Cardiovascular:      Rate and Rhythm: Normal rate and regular rhythm.   Pulmonary:      Effort: Pulmonary effort is normal. No respiratory distress.      Breath sounds: Normal breath sounds. No wheezing.   Abdominal:      General: Bowel sounds are normal. There is no distension.      Palpations: Abdomen is soft.      Tenderness: There is no abdominal tenderness.   Musculoskeletal:         General: Normal range of motion.      Cervical back: Normal range of motion and neck supple.   Skin:     General: Skin is warm.      Capillary Refill: Capillary refill takes less than 2 seconds.   Neurological:      General: No focal deficit present.      Mental Status: She is alert and oriented to person, place, and time.   Psychiatric:         Mood and Affect: Mood normal.         Behavior: Behavior normal.            LINES, CATHETERS, DRAINS, AIRWAYS, AND WOUNDS   Lines, Drains, and Airways:  Wounds (agree with documentation and present on admission):  Peripheral IV (Adult) 12/02/21 Left Antecubital (Active)   Number of days: 1         Comments:      LABS / IMAGING / TELE      Labs  BMP Results       12/03/21 12/02/21 11/22/21     0625 1618 0946    NA  138 133 140    K 3.7 4.2 3.8    Cl 100 97 102    CO2 27 24 28    Glucose 146 296 111    BUN 19 20 20    Creatinine 0.5 0.5 0.7    Calcium 8.9 9.3 9.5    Anion Gap 11 12 10    EGFR >60.0 >60.0 >60.0        CBC Results       12/03/21 12/02/21 05/02/16     0625 1618 0745    WBC 4.80 5.35 6.73    RBC 4.70 4.74 4.88    HGB 13.8 14.1 14.2    HCT 41.0 41.4 41.1    MCV 87.2 87.3 84.2    MCH 29.4 29.7 29.1    MCHC 33.7 34.1 34.5     114 140         Comment for PLT at 0625 on 12/03/21: ALL RESULTS HAVE BEEN RECHECKED    Comment for PLT at 1618 on 12/02/21: ALL RESULTS HAVE BEEN RECHECKED            No results found for: COVID19    Imaging  X-RAY CHEST 1 VIEW    Result Date: 12/2/2021  Narrative: CLINICAL HISTORY:   Shortness of breath. COVID. COMMENT: Comparison:   Chest radiographs dating back to 6/25/2008. Single frontal AP view of the chest was obtained. The cardiac silhouette and mediastinal contour are unremarkable for technique. There is a 14 mm rounded opacity in the right lung base, not definitively visualized on prior chest radiographs. The remainder of the lungs are clear. No pleural effusion or evidence of a pneumothorax.     Impression: IMPRESSION: A 14 mm rounded opacity in the right lung base, new since 2008. Recommend further evaluation with chest CT exam to rule out an underlying nodule. Otherwise no consolidation. Findings were discussed with Katerine GARCIA at 5:08 PM on 12/2/2021.    US venous leg, LL extremity    Result Date: 12/2/2021  Narrative: CLINICAL INDICATION: Left Lower extremity edema. COMPARISON: None TECHNIQUE: Duplex/color Doppler evaluation of the deep venous system of the left lower extremity was performed. COMMENT: LEFT: COMMON FEMORAL VEIN: Normal compression DEEP FEMORAL VEIN: Patent by color Doppler FEMORAL VEIN: Normal compression POPLITEAL VEIN: Normal compression Visualized calf veins compress normally.     Impression: IMPRESSION: No left femoropopliteal thrombosis.        ECG/Telemetry  SR   Reviewed     ASSESSMENT AND PLAN      * COVID-19 virus infection  Assessment & Plan  - Symptoms since , tested positive   - fully vaccinated with Moderna COVID vaccine as of 2021  - c/o generalized weakness/fatigue and SOB with minimal exertion  - pulse ox at rest within normal limits on room air.   - CXR - 14mm opacity right lung base but no ground glass opacities or consolidation  -Patient was supposed to recieve monoclonal antibody therapy today 12/3, per ID patient is in the window for monoclonal therapy, and would recommend patient to stay an additional night for observation to monitor for potential hypoxia.      Lung nodule  Assessment & Plan  CXR_- A 14 mm rounded opacity in the right lung base, new since     P:  - patient aware. recommended outpatient CT chest        Hypothyroidism  Assessment & Plan  - continue Levothyroxine    Hyperlipidemia  Assessment & Plan  - continue statin    Essential hypertension  Assessment & Plan  - continue amlodipine and losartan  - hold HCTZ with decreased PO intake and n/v    Controlled type 2 diabetes mellitus, with long-term current use of insulin (CMS/[facility])  Overview  HgA.9* % (21 0946), 10.4 (21)      Assessment & Plan  Hold oral DM agents  SSI  POCT ac/hs         VTE Assessment: Padua VTE Score: 4  VTE Prophylaxis:  Current anticoagulants:  enoxaparin (LOVENOX) syringe 40 mg, subcutaneous, Daily (6a)      Code Status: Full Code      Estimated Discharge Date: 2021     Disposition Planning: Plan to keep additional night to monitor oxygen, anticipate discharge tomorrow if stable.      PHIL Lazcano  12/3/2021

## 2021-12-03 NOTE — ASSESSMENT & PLAN NOTE
- Symptoms since 11/26, tested positive 11/29  - fully vaccinated with Moderna COVID vaccine as of Feb 2021  - c/o generalized weakness/fatigue and SOB with minimal exertion  - pulse ox at rest within normal limits on room air.   - CXR - 14mm opacity right lung base but no ground glass opacities or consolidation  -Patient was supposed to recieve monoclonal antibody therapy today 12/3, per ID patient is in the window for monoclonal therapy, and would recommend patient to stay an additional night for observation to monitor for potential hypoxia.

## 2021-12-03 NOTE — PLAN OF CARE
Problem: Adult Inpatient Plan of Care  Goal: Readiness for Transition of Care  Outcome: Progressing     Problem: Adult Inpatient Plan of Care  Goal: Readiness for Transition of Care  Intervention: Mutually Develop Transition Plan  Flowsheets (Taken 12/3/2021 7266)  Equipment Needed After Discharge: none  Assistive Device/Animal Currently Used at Home:   cane, straight   grab bar  Anticipated Changes Related to Illness: none  Transportation Concerns: car, none  Current Discharge Risk: lives alone  Readmission Within the Last 30 Days: no previous admission in last 30 days  Patient/Family Anticipated Services at Transition: (tbd) other (see comments)  Patient/Family Anticipates Transition to: home with family  Transportation Anticipated: family or friend will provide  Concerns to be Addressed: discharge planning     Pt presented with COVID-19 and fatigue. SW called and spoke with pt via her room phone this afternoon. Pt reported she lives alone in a 1st flr apt with 3STE with railing. Pt has a stall shower with SC and uses an SPC. Pt was independent with ADL PTA but expressed she has been having some issues with this since she got COVID. Pt has no O2, AD/LW, HC, or SNF/[facility] stay. PCP confirmed. Pharmacy is [facility] at [address] [facility] in [place]. Pt's family will transport.    Anticipated Discharge   Home

## 2021-12-03 NOTE — CONSULTS
Infectious Disease Consult Note    Patient Name: Bebe Burks  MR#: [id number]  : 1960  Admission Date: 2021  Consult Date: 21 1:29 PM   Consultant: Sander Ackerman MD    Reason for Consult: COVID-19 infection  Referring Provider:  Jason Burks is a 61 y.o. female who was admitted on 2021.  This patient who had 2 prior Covid shots, with loss of smell and taste and shortness of breath she is satting well on room air chest x-ray has a nodular opacity no consolidation vital signs include O2 sat of 98%  CRP was 49    Allergies: No Known Allergies    Medical History:   Past Medical History:   Diagnosis Date   • Arthritis    • Hypertension    • Lipid disorder    • Type 2 diabetes mellitus (CMS/HCC)        Surgical History:   Past Surgical History:   Procedure Laterality Date   • COMBINED HYSTEROSCOPY DIAGNOSTIC / D&C     • EYE SURGERY Left 2017    cataract   • VITRECTOMY Left 2016       Social History     Tobacco Use   • Smoking status: Never Smoker   • Smokeless tobacco: Never Used   Substance Use Topics   • Alcohol use: Yes     Alcohol/week: 1.0 standard drink     Types: 1 Glasses of wine per week     Comment: occas   • Drug use: No       Family History:   Family History   Problem Relation Age of Onset   • Diabetes Biological Mother    • Hypertension Biological Mother    • Stroke Biological Father        Review of Systems    Review of systems not obtained due to patient factors.    Medications:    Current IP Meds (From admission, onward)        Frequency     gabapentin (NEURONTIN) capsule 300 mg         Daily before breakfast     enoxaparin (LOVENOX) syringe 40 mg  (Non-Critically Ill COVID-19 Patients)         Daily (6a)     amLODIPine (NORVASC) tablet 5 mg         Daily (6a)     atorvastatin (LIPITOR) tablet 10 mg         Daily (6a)     levothyroxine (SYNTHROID) tablet 25 mcg         Daily (6a)     losartan (COZAAR) tablet 50 mg         Daily (6a)     melatonin ODT  "3 mg         Once     gabapentin (NEURONTIN) capsule 600 mg         Nightly     insulin aspart U-100 (NovoLOG) pen 2-6 Units         4 times daily with meals and nightly     glucose chewable tablet 16-32 g of dextrose  (Hypoglycemia Treatment Protocol and Hyperglycemia Validation Protocol)        \"Or\" Linked Group Details    As needed     dextrose 40 % oral gel 15-30 g of dextrose  (Hypoglycemia Treatment Protocol and Hyperglycemia Validation Protocol)        \"Or\" Linked Group Details    As needed     glucagon (GLUCAGEN) injection 1 mg  (Hypoglycemia Treatment Protocol and Hyperglycemia Validation Protocol)        \"Or\" Linked Group Details    As needed     dextrose in water injection 12.5 g  (Hypoglycemia Treatment Protocol and Hyperglycemia Validation Protocol)        \"Or\" Linked Group Details    As needed     acetaminophen (TYLENOL) tablet 650 mg  (Analgesics - acetaminophen pain or fever)        \"Or\" Linked Group Details    Every 4 hours PRN     acetaminophen (TYLENOL) suppository 650 mg  (Analgesics - acetaminophen pain or fever)        \"Or\" Linked Group Details    Every 4 hours PRN     acetaminophen (TYLENOL) 650 mg/20.3 mL solution 650 mg  (Analgesics - acetaminophen pain or fever)        \"Or\" Linked Group Details    Every 4 hours PRN     ondansetron ODT (ZOFRAN-ODT) disintegrating tablet 4 mg  (Nausea/Vomiting)        \"Or\" Linked Group Details    Every 8 hours PRN     ondansetron (ZOFRAN) injection 4 mg  (Nausea/Vomiting)        \"Or\" Linked Group Details    Every 8 hours PRN     albuterol HFA (VENTOLIN HFA) 90 mcg/actuation inhaler 2 puff  (albuterol sulfate (Ventolin HFA) inhaler)         Every 6 hours PRN     sodium chloride 0.9 % infusion  Status:  Discontinued         Continuous     ondansetron (ZOFRAN) injection 4 mg         Once     albuterol HFA (VENTOLIN HFA) 90 mcg/actuation inhaler 4 puff  (Albuterol MDI Subpanel)         Once                Vital Signs:    Temp:  [35.8 °C (96.5 °F)-36.9 °C (98.4 " °F)] 36.9 °C (98.4 °F)  Heart Rate:  [] 96  Resp:  [15-20] 16  BP: (114-169)/(66-81) 114/78    Temp (72hrs), Av.4 °C (97.6 °F), Min:35.8 °C (96.5 °F), Max:36.9 °C (98.4 °F)      Physical Exam     Gen: Aox3  HEENT: OP clear  Neck: Supple  LAD: No cervical LAD  Lungs: CTAB  CV: RRR no murmurs  Abd: Soft NTND +BS  Ext: No c/c/e  Skin: no rash  Neuro: II-XII intact        Lines, Drains, Airways, Wounds:  Peripheral IV (Adult) 21 Left Antecubital (Active)   Number of days: 1       Labs:    Lab Results   Component Value Date    WBC 4.80 2021    HGB 13.8 2021    HCT 41.0 2021    MCV 87.2 2021     (L) 2021     Lab Results   Component Value Date    GLUCOSE 146 (H) 2021    CALCIUM 8.9 2021     2021    K 3.7 2021    CO2 27 2021     2021    BUN 19 2021    CREATININE 0.5 (L) 2021     Lab Results   Component Value Date    ALT 23 2021    AST 24 2021    ALKPHOS 71 2021    BILITOT 0.9 2021     UA Results    No lab values to display.       Microbiology Results     ** No results found for the last 720 hours. **           Pathology Results     ** No results found for the last 720 hours. **          Echo:         Imaging:    Radiology Imaging    XR CHEST 1 VW    Narrative  CLINICAL HISTORY:   Shortness of breath. COVID.    COMMENT:    Comparison:   Chest radiographs dating back to 2008.    Single frontal AP view of the chest was obtained.    The cardiac silhouette and mediastinal contour are unremarkable for technique.  There is a 14 mm rounded opacity in the right lung base, not definitively  visualized on prior chest radiographs. The remainder of the lungs are clear. No  pleural effusion or evidence of a pneumothorax.    --    Impression  A 14 mm rounded opacity in the right lung base, new since . Recommend  further evaluation with chest CT exam to rule out an underlying nodule.  Otherwise  no consolidation.        Findings were discussed with Katerine GARCIA at 5:08 PM on 2021.      Patient Active Problem List   Diagnosis Code   • Controlled type 2 diabetes mellitus, with long-term current use of insulin (CMS/[facility]) E11.9, Z79.4   • Essential hypertension I10   • Hyperlipidemia E78.5   • Hypothyroidism E03.9   • Obesity E66.9   • Thyroid nodule E04.1   • COVID-19 virus infection U07.1   • Lung nodule R91.1     Controlled type 2 diabetes mellitus, with long-term current use of insulin (CMS/[facility])  Overview  HgA.9* % (21 0946), 10.4 (21)      * COVID-19 virus infection  Assessment & Plan  At this point the patient does not qualify for inpatient infusion of Regeneron monoclonal antibody because she was admitted to the hospital with COVID-19 symptoms, this is an exclusion criteria  She does have a relatively high CRP and although there is no pneumonia on the chest x-ray she needs to be monitored overnight to ensure she does not develop hypoxemia.  If she becomes hypoxemic she will qualify for Remdesivir and Decadron        Sander Ackerman MD  12/3/2021 1:29 PM

## 2021-12-03 NOTE — ASSESSMENT & PLAN NOTE
CXR_- A 14 mm rounded opacity in the right lung base, new since 2008    P:  - patient aware. recommended outpatient CT chest

## 2021-12-03 NOTE — PATIENT CARE CONFERENCE
Care Progression Rounds Note  Date: 12/3/2021  Time: 10:59 AM     Patient Name: Bebe Burks     Medical Record Number: [id number]   YOB: 1960  Sex: Female      Room/Bed: 0333    Admitting Diagnosis: Pulmonary nodule [R91.1]  Generalized weakness [R53.1]  COVID-19 virus infection [U07.1]  COVID-19 [U07.1]   Admit Date/Time: 12/2/2021  3:38 PM    Primary Diagnosis: COVID-19 virus infection  Principal Problem: COVID-19 virus infection    GMLOS: pending  Anticipated Discharge Date: 12/3/2021    AM-PAC:  Mobility Score:      Discharge Planning:  Anticipated Discharge Disposition: home without assistance or services    Barriers to Discharge:  None    Comments:  home today    Participants:  advanced practice provider,,nursing,physical therapy,social work/services

## 2021-12-04 VITALS
WEIGHT: 189 LBS | HEIGHT: 67 IN | TEMPERATURE: 97.8 F | HEART RATE: 90 BPM | OXYGEN SATURATION: 91 % | SYSTOLIC BLOOD PRESSURE: 147 MMHG | RESPIRATION RATE: 20 BRPM | BODY MASS INDEX: 29.66 KG/M2 | DIASTOLIC BLOOD PRESSURE: 71 MMHG

## 2021-12-04 LAB
ALBUMIN SERPL-MCNC: 3.5 G/DL (ref 3.4–5)
ALP SERPL-CCNC: 74 IU/L (ref 35–126)
ALT SERPL-CCNC: 26 IU/L (ref 11–54)
ANION GAP SERPL CALC-SCNC: 7 MEQ/L (ref 3–15)
AST SERPL-CCNC: 29 IU/L (ref 15–41)
BASOPHILS # BLD: 0.03 K/UL (ref 0.01–0.1)
BASOPHILS NFR BLD: 0.5 %
BILIRUB SERPL-MCNC: 0.9 MG/DL (ref 0.3–1.2)
BUN SERPL-MCNC: 19 MG/DL (ref 8–20)
CALCIUM SERPL-MCNC: 8.5 MG/DL (ref 8.9–10.3)
CHLORIDE SERPL-SCNC: 104 MEQ/L (ref 98–109)
CK SERPL-CCNC: 32 U/L (ref 15–200)
CO2 SERPL-SCNC: 26 MEQ/L (ref 22–32)
CREAT SERPL-MCNC: 0.6 MG/DL (ref 0.6–1.1)
CRP SERPL-MCNC: 45.5 MG/L
DIFFERENTIAL METHOD BLD: ABNORMAL
EOSINOPHIL # BLD: 0.3 K/UL (ref 0.04–0.36)
EOSINOPHIL NFR BLD: 4.7 %
ERYTHROCYTE [DISTWIDTH] IN BLOOD BY AUTOMATED COUNT: 13.1 % (ref 11.7–14.4)
FERRITIN SERPL-MCNC: 274 NG/ML (ref 11–250)
GFR SERPL CREATININE-BSD FRML MDRD: >60 ML/MIN/1.73M*2
GLUCOSE BLD-MCNC: 159 MG/DL (ref 70–99)
GLUCOSE SERPL-MCNC: 213 MG/DL (ref 70–99)
HCT VFR BLDCO AUTO: 42.8 % (ref 35–45)
HGB BLD-MCNC: 14.4 G/DL (ref 11.8–15.7)
IMM GRANULOCYTES # BLD AUTO: 0.03 K/UL (ref 0–0.08)
IMM GRANULOCYTES NFR BLD AUTO: 0.5 %
LDH SERPL L TO P-CCNC: 166 IU/L (ref 98–192)
LYMPHOCYTES # BLD: 0.93 K/UL (ref 1.2–3.5)
LYMPHOCYTES NFR BLD: 14.5 %
MCH RBC QN AUTO: 29.3 PG (ref 28–33.2)
MCHC RBC AUTO-ENTMCNC: 33.6 G/DL (ref 32.2–35.5)
MCV RBC AUTO: 87 FL (ref 83–98)
MONOCYTES # BLD: 0.43 K/UL (ref 0.28–0.8)
MONOCYTES NFR BLD: 6.7 %
NEUTROPHILS # BLD: 4.68 K/UL (ref 1.7–7)
NEUTS SEG NFR BLD: 73.1 %
NRBC BLD-RTO: 0 %
PDW BLD AUTO: 11 FL (ref 9.4–12.3)
PLATELET # BLD AUTO: 140 K/UL (ref 150–369)
POCT TEST: ABNORMAL
POTASSIUM SERPL-SCNC: 3.8 MEQ/L (ref 3.6–5.1)
PROT SERPL-MCNC: 6.5 G/DL (ref 6–8.2)
RBC # BLD AUTO: 4.92 M/UL (ref 3.93–5.22)
SODIUM SERPL-SCNC: 137 MEQ/L (ref 136–144)
WBC # BLD AUTO: 6.4 K/UL (ref 3.8–10.5)

## 2021-12-04 PROCEDURE — 82550 ASSAY OF CK (CPK): CPT | Performed by: INTERNAL MEDICINE

## 2021-12-04 PROCEDURE — 63700000 HC SELF-ADMINISTRABLE DRUG: Performed by: INTERNAL MEDICINE

## 2021-12-04 PROCEDURE — 97166 OT EVAL MOD COMPLEX 45 MIN: CPT | Mod: GO

## 2021-12-04 PROCEDURE — 63600000 HC DRUGS/DETAIL CODE: Performed by: INTERNAL MEDICINE

## 2021-12-04 PROCEDURE — 97162 PT EVAL MOD COMPLEX 30 MIN: CPT | Mod: GP

## 2021-12-04 PROCEDURE — 85025 COMPLETE CBC W/AUTO DIFF WBC: CPT | Performed by: INTERNAL MEDICINE

## 2021-12-04 PROCEDURE — 96372 THER/PROPH/DIAG INJ SC/IM: CPT

## 2021-12-04 PROCEDURE — 86140 C-REACTIVE PROTEIN: CPT | Performed by: INTERNAL MEDICINE

## 2021-12-04 PROCEDURE — 80053 COMPREHEN METABOLIC PANEL: CPT | Performed by: INTERNAL MEDICINE

## 2021-12-04 PROCEDURE — 83615 LACTATE (LD) (LDH) ENZYME: CPT | Performed by: INTERNAL MEDICINE

## 2021-12-04 PROCEDURE — G0378 HOSPITAL OBSERVATION PER HR: HCPCS

## 2021-12-04 PROCEDURE — 82728 ASSAY OF FERRITIN: CPT | Performed by: INTERNAL MEDICINE

## 2021-12-04 PROCEDURE — 36415 COLL VENOUS BLD VENIPUNCTURE: CPT | Performed by: INTERNAL MEDICINE

## 2021-12-04 RX ADMIN — ACETAMINOPHEN 650 MG: 325 TABLET, FILM COATED ORAL at 09:10

## 2021-12-04 RX ADMIN — ENOXAPARIN SODIUM 40 MG: 40 INJECTION SUBCUTANEOUS at 05:29

## 2021-12-04 RX ADMIN — AMLODIPINE BESYLATE 5 MG: 5 TABLET ORAL at 05:29

## 2021-12-04 RX ADMIN — LEVOTHYROXINE SODIUM 25 MCG: 0.03 TABLET ORAL at 05:29

## 2021-12-04 RX ADMIN — ATORVASTATIN CALCIUM 10 MG: 10 TABLET, FILM COATED ORAL at 05:29

## 2021-12-04 RX ADMIN — GABAPENTIN 300 MG: 300 CAPSULE ORAL at 09:11

## 2021-12-04 RX ADMIN — LOSARTAN POTASSIUM 50 MG: 50 TABLET, FILM COATED ORAL at 05:29

## 2021-12-04 ASSESSMENT — COGNITIVE AND FUNCTIONAL STATUS - GENERAL
MOVING TO AND FROM BED TO CHAIR: 4 - NONE
WALKING IN HOSPITAL ROOM: 4 - NONE
AFFECT: WFL
CLIMB 3 TO 5 STEPS WITH RAILING: 4 - NONE
HELP NEEDED FOR BATHING: 4 - NONE
AFFECT: WFL
TOILETING: 4 - NONE
EATING MEALS: 4 - NONE
DRESSING REGULAR LOWER BODY CLOTHING: 4 - NONE
STANDING UP FROM CHAIR USING ARMS: 4 - NONE
DRESSING REGULAR UPPER BODY CLOTHING: 4 - NONE
HELP NEEDED FOR PERSONAL GROOMING: 4 - NONE

## 2021-12-04 NOTE — HOSPITAL COURSE
Bebe is a 61 y.o. female admitted on 12/2/2021 with Pulmonary nodule [R91.1]  Generalized weakness [R53.1]  COVID-19 virus infection [U07.1]  COVID-19 [U07.1]. Principal problem is COVID-19 virus infection.    Past Medical History  Bebe has a past medical history of Arthritis, Hypertension, Lipid disorder, and Type 2 diabetes mellitus (CMS/HCC).    History of Present Illness  Pt is a 61 y.o. female Admit with SOB and found to be COVID (+)

## 2021-12-04 NOTE — PLAN OF CARE
Problem: Adult Inpatient Plan of Care  Goal: Plan of Care Review  12/4/2021 1356 by Barbara Calvillo OT  Outcome: Progressing  Flowsheets (Taken 12/4/2021 1356)  Progress: improving  Plan of Care Reviewed With: patient  Outcome Summary: OT joyce completed. Pt is a 61 y.o. female adm w/ COVID 2* SOB/fatigue. Pt overall MOD I for bed, STS, func mobility w/ SPC, and LBD. Pt feels safe and confident in her ability to return home and perform necessary ADLs and IADLs w/ some (A) from supportive family. Pt edu on econ/pacing techs and safety w/ ADLs. Plus edu on appropriate use of AD and DMEs to promote optimal IND and safety w/ ADLs - i.e. SC, GBs, and SPC. Pt receptive to all provided edu and recs. Pt safe for d/c home w/ (A) as needed. D/c OT, no further acute OT needs at this time

## 2021-12-04 NOTE — NURSING NOTE
Patient discharged home. IV and heart monitoring removed. Patient given discharge instructions bedside.

## 2021-12-04 NOTE — PROGRESS NOTES
Patient:  Bebe Burks  Location:  [facility] 3B 0333  MRN:  [id number]  Today's date:  12/4/2021     RN aware and cleared for therapy. Pt left seated in bedside recliner - RN cleared pt to be off bed/chair alarm. Call bell and personal items within reach. NAD and VSS. Nurse notified of session progress/outcome.      Bebe is a 61 y.o. female admitted on 12/2/2021 with Pulmonary nodule [R91.1]  Generalized weakness [R53.1]  COVID-19 virus infection [U07.1]  COVID-19 [U07.1]. Principal problem is COVID-19 virus infection.    Past Medical History  Bebe has a past medical history of Arthritis, Hypertension, Lipid disorder, and Type 2 diabetes mellitus (CMS/HCC).    History of Present Illness  Pt is a 61 y.o. female Admit with SOB and found to be COVID (+)      OT Vitals    Date/Time Pulse HR Source Resp SpO2 Pt Activity O2 Therapy BP BP Location BP Method Pt Position [facility]   12/04/21 0911 98 Monitor -- 96 % At rest None (Room air) 154/75 Right upper arm Automatic Sitting VTV   12/04/21 0915 97 Monitor 20 95 % At rest None (Room air) 142/69 Right upper arm Automatic Sitting KL   12/04/21 0928 80 -- -- 98 % At rest None (Room air) 154/74 -- -- Sitting ZJ   12/04/21 0929 90 -- -- 91 % Walking None (Room air) 147/71 -- -- Standing ZJ      OT Pain    Date/Time Pain Type Location Rating: Rest Rating: Activity Rating: Rest Rating: Activity [facility]   12/04/21 0910 Pain Assessment head -- -- 2 - mild pain 2 - mild pain [facility]   12/04/21 0911 Pain Assessment head -- -- 2 - mild pain 2 - mild pain VTV   12/04/21 0928 Pain Assessment -- 0 0 -- -- ZJ          Prior Living Environment      Most Recent Value   Current Living Arrangements apartment   Living Environment Comment Apt alone,  elevator access. [address].        Prior Level of Function      Most Recent Value   Dominant Hand right   Ambulation assistive equipment   Transferring independent   Toileting independent   Bathing assistive equipment   Dressing independent    Prior Level of Function Comment PTA pt reports mod (I) with SPC use and (I) with ADLs aside from shower chair use for bathing. + and retired   Assistive Device Currently Used at Home cane, straight, shower chair        Occupational Profile      Most Recent Value   Reason for Services/Referral Pt adm w/ SOB 2* COVID   Environmental Supports and Barriers Lives alone but supportive family           OT Evaluation and Treatment - 12/04/21 0910        OT Time Calculation    Start Time 0910     Stop Time 0930     Time Calculation (min) 20 min        Session Details    Document Type initial evaluation     Mode of Treatment occupational therapy        General Information    Patient Profile Reviewed yes     Onset of Illness/Injury or Date of Surgery 12/02/21     Patient/Family/Caregiver Comments/Observations Rn aware and cleared for therapy, present at initation of session     General Observations of Patient Pt r'cved in recliner, agreeable to OT and PT session     Existing Precautions/Restrictions contact;droplet;fall        Cognition/Psychosocial    Affect/Mental Status (Cognition) WFL     Orientation Status (Cognition) oriented x 4     Follows Commands (Cognition) WFL     Cognitive Function WFL     Comment, Cognition Pleasant and cooperative        Hearing Assessment    Hearing Status WFL        Vision Assessment/Intervention    Visual Impairment/Limitations WFL        Sensory Assessment (Somatosensory)    Sensory Assessment (Somatosensory) UE sensation intact        Range of Motion (ROM)    Range of Motion ROM is WFL;bilateral upper extremities        Strength (Manual Muscle Testing)    Strength (Manual Muscle Testing) strength is WFL;bilateral upper extremities        Bed Mobility    [place], Supine to Sit modified independence     Assistive Device none     Comment (Bed Mobility) OOB to R. No (A) requried. Denies any difficulties w/ bed mobility        Transfers    Transfers other (see comments)     Comment  Pt able to complete household distance func mobility w/ SPC at MOD I. No overt LOB noted. Good safety awareness and pacing t/o activity. Denies any dizziness or SOB while OOB. RA.        Sit to Stand Transfer    [place], Sit to Stand Transfer modified independence     Assistive Device cane, straight     Comment From EOB and recliner. No (A) required. Good steady rise        Stand to Sit Transfer    [place], Stand to Sit Transfer modified independence     Assistive Device cane, straight     Comment To recliner. No (A) required. Good eccentric control        Toilet Transfer    Comment Pt declined trail but reports she has no difficulties completing toilet transfers. Feels safe and confident in her ability to perform toilet transfers upon d/c. Has GBs at her apt/condo        Safety Issues, Functional Mobility    Comment, Safety Issues/Impairments (Mobility) No safety or func deficits noted at this time. Hx back problem w/ LE weakness - following outpt for it        Balance    Balance Assessment sitting static balance;sitting dynamic balance;sit to stand dynamic balance;standing static balance;standing dynamic balance     Static Sitting Balance WFL     Dynamic Sitting Balance WFL     Sit to Stand Dynamic Balance WFL;supported     Static Standing Balance WFL;supported     Dynamic Standing Balance mild impairment;supported     Comment, Balance Pt overall MOD I for OOB activity w/ SPC. No overt LOB noted. Almost at baseline, mildly limited by dcr strength w/ hospitalization. Edu on econ/pacing and safety w/ resumption of ADLs and IADLs        Lower Body Dressing    Tasks don;pants/bottoms;shoes/slippers;socks     Self-Performance threads left leg, underpants;threads right leg, underpants;pulls underpants up or down;threads left leg, pants/shorts;threads right leg, pants/shorts;pulls pants/shorts up or down;dons/doffs left sock;dons/doffs right sock;dons/doffs left shoe;dons/doffs right shoe     [place]  independent     Position unsupported sitting     Comment Pt able to complete all LBD tasks while seated edge of recliner. Good func reach and LE mobility to complete all LE adls. Figure four tech for socks. Good standing balance while pulling up pants        Toileting    Comment Reports IND w/ toileting tasks no concerns at this time        AM-PAC (TM) - ADL (Current Function)    Putting on and taking off regular lower body clothing? 4 - None     Bathing? 4 - None     Toileting? 4 - None     Putting on/taking off regular upper body clothing? 4 - None     How much help for taking care of personal grooming? 4 - None     Eating meals? 4 - None     AM-PAC (TM) ADL Score 24        Assessment/Plan (OT)    Daily Outcome Statement OT eval completed. Pt is a 61 y.o. female adm w/ COVID 2* SOB/fatigue. Pt overall MOD I for bed, STS, func mobility w/ SPC, and LBD. Pt feels safe and confident in her ability to return home and perform necessary ADLs and IADLs w/ some (A) from supportive family. Pt edu on econ/pacing techs and safety w/ ADLs. Plus edu on appropriate use of AD and DMEs to promote optimal IND and safety w/ ADLs - i.e. SC, GBs, and SPC. Pt receptive to all provided edu and recs. Pt safe for d/c home w/ (A) as needed. D/c OT, no further acute OT needs at this time     Rehab Potential --   d/c OT    Therapy Frequency evaluation only               OT Assessment/Plan      Most Recent Value   OT Recommended Discharge Disposition home with assistance, home at 12/04/2021 0910   Anticipated Equipment Needs At Discharge (OT) none  [has SPC, SC, GBs] at 12/04/2021 0910   Patient/Family Therapy Goal Statement To return home at d/c at 12/04/2021 0910                    Education Documentation  Treatment Plan, taught by Barbara Calvillo OT at 12/4/2021  1:56 PM.  Learner: Patient  Readiness: Acceptance  Method: Explanation  Response: Verbalizes Understanding  Comment: Educated on OT POC, safety and tech during func transfers and  mobility, econ and pacing techs, and adaptive ADL strategies to incr IND and safety w/ ADLs and func tasks to return to PLOF.

## 2021-12-04 NOTE — DISCHARGE INSTRUCTIONS
CXR_- A 14 mm rounded opacity in the right lung base, new since 2008    P:  - patient aware. recommended outpatient CT chest    Isolation for 10 days since your date on diagnosis on 11/29

## 2021-12-04 NOTE — PROGRESS NOTES
Patient: Bebe Burks  Location:  [facility] 3B 0333  MRN:  [id number]  Today's date:  12/4/2021     Pt in bedside chair, personal items and call bell in reach. VSS and NAD. Nurse aware.      Bebe is a 61 y.o. female admitted on 12/2/2021 with Pulmonary nodule [R91.1]  Generalized weakness [R53.1]  COVID-19 virus infection [U07.1]  COVID-19 [U07.1]. Principal problem is COVID-19 virus infection.    Past Medical History  Bebe has a past medical history of Arthritis, Hypertension, Lipid disorder, and Type 2 diabetes mellitus (CMS/HCC).    History of Present Illness   Admit with SOB and found to be COVID (+)      PT Vitals    Date/Time Pulse HR Source Resp SpO2 Pt Activity O2 Therapy BP BP Location BP Method Pt Position [facility]   12/04/21 0915 97 Monitor 20 95 % At rest None (Room air) 142/69 Right upper arm Automatic Sitting KL   12/04/21 0928 80 -- -- 98 % At rest None (Room air) 154/74 -- -- Sitting ZJ   12/04/21 0929 90 -- -- 91 % Walking None (Room air) 147/71 -- -- Standing ZJ      PT Pain    Date/Time Pain Type Location Rating: Rest Rating: Activity Rating: Rest Rating: Activity [facility]   12/04/21 0910 Pain Assessment head -- -- 2 - mild pain 2 - mild pain [facility]   12/04/21 0928 Pain Assessment -- 0 0 -- -- ZJ          Prior Living Environment      Most Recent Value   Current Living Arrangements apartment   Living Environment Comment Apt alone,  elevator access. [address].        Prior Level of Function      Most Recent Value   Dominant Hand right   Ambulation assistive equipment   Transferring independent   Toileting independent   Bathing assistive equipment   Dressing independent   Prior Level of Function Comment PTA pt reports mod (I) with SPC use and (I) with ADLs aside from shower chair use for bathing. + and retired   Assistive Device Currently Used at Home cane, straight, shower chair           PT Evaluation and Treatment - 12/04/21 0929        PT Time Calculation    Start Time 0905     Stop  Time 0929     Time Calculation (min) 24 min        Session Details    Document Type initial evaluation     Mode of Treatment physical therapy        General Information    Patient Profile Reviewed yes     Existing Precautions/Restrictions contact;droplet;fall        Cognition/Psychosocial    Affect/Mental Status (Cognition) WFL     Orientation Status (Cognition) oriented x 4     Follows Commands (Cognition) WFL     Cognitive Function WFL        Sensory    Hearing Status WFL        Vision Assessment/Intervention    Visual Impairment/Limitations WFL        Sensory Assessment (Somatosensory)    Sensory Assessment (Somatosensory) LE sensation intact        Range of Motion (ROM)    Range of Motion ROM is WFL;bilateral lower extremities        Strength (Manual Muscle Testing)    Left Lower Extremity Strength left LE strength is WFL except;ankle;hip     Hip, Left (Strength) grossly 4-/5     Ankle, Left (Strength) DF 3/5     Right Lower Extremity Strength right LE strength is WFL except;hip     Hip, Right (Strength) grossly 4-/5        Bed Mobility    [place], Supine to Sit modified independence     [place], Sit to Supine modified independence        Sit to Stand Transfer    [place], Sit to Stand Transfer modified independence        Stand to Sit Transfer    [place], Stand to Sit Transfer modified independence        Gait Training    [place], Gait modified independence     Assistive Device cane, straight     Distance in Feet 100 feet     Pattern (Gait) step-through     Deviations/Abnormal Patterns (Gait) step length decreased;stride length decreased     Comment (Gait/Stairs) mild step over step pattern with steppage gait on L LE due to impaired DF noted. Pt reports h/o spine dysfunction leading to L LE weakness        Stairs Training    [place], Stairs modified independence     Assistive Device railing;cane, straight     Handrail Location (Stairs) left side (ascending);left side (descending)      Number of Stairs 4     Ascending Stairs Technique step-to-step     Descending Stairs Technique step-to-step     Comment able to complete with mod (I) on 8 inch step stool        Balance    Balance Assessment sitting static balance;sitting dynamic balance;standing static balance;standing dynamic balance     Static Sitting Balance WFL     Dynamic Sitting Balance WFL     Static Standing Balance WFL     Dynamic Standing Balance mild impairment        AM-PAC (TM) - Mobility (Current Function)    Turning from your back to your side while in a flat bed without using bedrails? 4 - None     Moving from lying on your back to sitting on the side of a flat bed without using bedrails? 4 - None     Moving to and from a bed to a chair? 4 - None     Standing up from a chair using your arms? 4 - None     To walk in a hospital room? 4 - None     Climbing 3-5 steps with a railing? 4 - None     AM-PAC (TM) Mobility Score 24        Assessment/Plan (PT)    Daily Outcome Statement PT eval completed and pt mod (I) with all mobility and no further skilled IP PT needs. Rec IP OP given spine dysfunction and progression to LE weakness.     Therapy Frequency evaluation only               PT Assessment/Plan      Most Recent Value   PT Recommended Discharge Disposition home with outpatient services, home with assistance at 12/04/2021 0929   Anticipated Equipment Needs at Discharge (PT) none at 12/04/2021 0929   Patient/Family Therapy Goals Statement to move more at 12/04/2021 0929                    Education Documentation  Treatment Plan, taught by Bernardo Han, PT at 12/4/2021  9:45 AM.  Learner: Patient  Readiness: Acceptance  Method: Explanation, Demonstration  Response: Verbalizes Understanding, Demonstrated Understanding  Comment: PT POC

## 2021-12-04 NOTE — PLAN OF CARE
Problem: Adult Inpatient Plan of Care  Goal: Plan of Care Review  Outcome: Progressing  Flowsheets (Taken 12/4/2021 6695)  Progress: improving  Plan of Care Reviewed With: patient  Outcome Summary: PT eval completed and pt mod (I) with all mobility and no further skilled IP PT needs. Rec IP OP given spine dysfunction and progression to LE weakness.

## 2021-12-13 ENCOUNTER — HOSPITAL ENCOUNTER (EMERGENCY)
Facility: HOSPITAL | Age: 61
Discharge: HOME | End: 2021-12-13
Attending: STUDENT IN AN ORGANIZED HEALTH CARE EDUCATION/TRAINING PROGRAM
Payer: COMMERCIAL

## 2021-12-13 ENCOUNTER — APPOINTMENT (EMERGENCY)
Dept: RADIOLOGY | Facility: HOSPITAL | Age: 61
End: 2021-12-13
Attending: STUDENT IN AN ORGANIZED HEALTH CARE EDUCATION/TRAINING PROGRAM
Payer: COMMERCIAL

## 2021-12-13 VITALS
HEIGHT: 66 IN | TEMPERATURE: 98 F | DIASTOLIC BLOOD PRESSURE: 60 MMHG | SYSTOLIC BLOOD PRESSURE: 134 MMHG | HEART RATE: 90 BPM | WEIGHT: 180 LBS | RESPIRATION RATE: 16 BRPM | OXYGEN SATURATION: 99 % | BODY MASS INDEX: 28.93 KG/M2

## 2021-12-13 DIAGNOSIS — K59.00 CONSTIPATION, UNSPECIFIED CONSTIPATION TYPE: Primary | ICD-10-CM

## 2021-12-13 DIAGNOSIS — K56.41 FECAL IMPACTION (CMS/HCC): ICD-10-CM

## 2021-12-13 LAB
GLUCOSE BLD-MCNC: 204 MG/DL (ref 70–99)
POCT TEST: ABNORMAL

## 2021-12-13 PROCEDURE — 74022 RADEX COMPL AQT ABD SERIES: CPT

## 2021-12-13 PROCEDURE — 99283 EMERGENCY DEPT VISIT LOW MDM: CPT | Mod: 25

## 2021-12-13 NOTE — ED ATTESTATION NOTE
I personally saw and evaluated the patient, participated in the management, and agree with the findings in the note above except as where stated. The physician assistant and I discussed the case, workup, and disposition.     61-year-old female with past medical history significant for hypertension, hyperlipidemia, diabetes mellitus, tested positive for COVID-19 on 11/29 presenting secondary to rectal pain and constipation.  Patient reports that she has been constipated and has not had a bowel movement in the last 1 week.  She has had worsening rectal pain that became unbearable prompting EMS call.  She does report attempting MiraLAX and magnesium citrate as well as prune juice at home without relief.  She otherwise denies any fevers, nausea, vomiting, chest pain, difficulty breathing, abdominal pain.    AAOx3 resting comfortably in no acute distress  Heart is regular rate and rhythm normal S1-S2 no murmurs rubs or gallops  Lungs are clear to auscultation bilaterally  Abdomen nondistended with normoactive bowel sounds, soft, nontender in all quadrants  Rectal exam performed by PA which was notable for large amounts of, nonbloody stool    61-year-old female presenting secondary to  hard stool that were manually disimpacted by PA constipation with fecal impaction.  Patient with significant relief after disimpaction as well as enema.       Carolyn Baker MD  12/13/21 9826

## 2021-12-13 NOTE — ED PROVIDER NOTES
Emergency Medicine Note  HPI   HISTORY OF PRESENT ILLNESS     The patient is a 61-year-old female with a past medical history of diabetes, hypertension, hyperlipidemia, COVID-19 diagnosed on 11/29 who presents emergency room with rectal pain.  She states that she has not had a bowel movement in 1 week.  She states that she has tried everything including MiraLAX, mag citrate and prune juice.  She states tonight the pain in her rectum became unbearable and she had to call 911.  She states it feels like there is stool in her rectum but she cannot pass it.  She denies any abdominal pain, distention, nausea, vomiting, fevers.  She denies any urinary complaints including retention or dysuria. She states that the Covid symptoms have mostly resolved.  She has not had chest pain or shortness of breath.  She denies prior obstructions or abdominal surgeries.  She has been able to eat and drink.      HPI      Patient History   PAST HISTORY     Reviewed from Nursing Triage:       Past Medical History:   Diagnosis Date   • Arthritis    • COVID-19    • Hypertension    • Lipid disorder    • Type 2 diabetes mellitus (CMS/[facility])        Past Surgical History:   Procedure Laterality Date   • COMBINED HYSTEROSCOPY DIAGNOSTIC / D&C  2007   • EYE SURGERY Left 2017    cataract   • VITRECTOMY Left 2016       Family History   Problem Relation Age of Onset   • Diabetes Biological Mother    • Hypertension Biological Mother    • Stroke Biological Father        Social History     Tobacco Use   • Smoking status: Never Smoker   • Smokeless tobacco: Never Used   Substance Use Topics   • Alcohol use: Yes     Comment: occasional   • Drug use: No         Review of Systems   REVIEW OF SYSTEMS     Review of Systems   Constitutional: Negative for fever.   HENT: Negative for sore throat.    Respiratory: Negative for cough and shortness of breath.    Cardiovascular: Negative for chest pain.   Gastrointestinal: Positive for constipation and rectal pain.  Negative for abdominal distention, abdominal pain, blood in stool, diarrhea, nausea and vomiting.   Genitourinary: Negative for decreased urine volume, difficulty urinating and dysuria.   Musculoskeletal: Negative for back pain.   Skin: Negative for color change, pallor and rash.   Neurological: Negative for weakness, numbness and headaches.         VITALS     ED Vitals    Date/Time Temp Pulse Resp BP SpO2 [facility]   12/13/21 0717 -- 90 16 134/60 99 % JSN   12/13/21 0401 36.7 °C (98 °F) 100 20 178/85 100 % JSN        Pulse Ox %: 100 % (12/13/21 0618)  Pulse Ox Interpretation: Normal (12/13/21 0618)  Heart Rate: 100 (12/13/21 0618)        Physical Exam   PHYSICAL EXAM     Physical Exam  Vitals and nursing note reviewed. Exam conducted with a chaperone present.   Constitutional:       Appearance: Normal appearance. She is well-developed.   HENT:      Head: Normocephalic and atraumatic.   Eyes:      Conjunctiva/sclera: Conjunctivae normal.   Cardiovascular:      Rate and Rhythm: Normal rate and regular rhythm.   Pulmonary:      Effort: Pulmonary effort is normal.      Breath sounds: Normal breath sounds.   Abdominal:      General: There is no distension.      Palpations: Abdomen is soft. There is no mass.      Tenderness: There is no abdominal tenderness. There is no guarding or rebound.   Genitourinary:     Rectum: Tenderness present.      Comments: Large amount of hard stool in the rectum. Significant fecal impaction  Musculoskeletal:         General: No tenderness or deformity. Normal range of motion.      Cervical back: Normal range of motion and neck supple.   Skin:     General: Skin is warm and dry.   Neurological:      General: No focal deficit present.      Mental Status: She is alert. Mental status is at baseline.   Psychiatric:         Behavior: Behavior normal.           PROCEDURES     Procedures     The patient has a significant fecal impaction. I was able to digitally remove a large amount of brown hard stool  from the rectum. There was watery stool coming around the hard stool   I removed as much as I could and gave a soap suds enema which improved the patient's symptoms.         DATA     Results     None          Imaging Results          X-RAY OBSTRUCTION SERIES (ABDOMEN 2 VIEWS WITH CHEST 1 VIEW) (Final result)  Result time 12/13/21 15:06:19    Final result                 Impression:    IMPRESSION:  1.  Large colonic stool burden, in keeping with the clinical history of  constipation  2.  One or two air-fluid levels in the midabdomen on the erect view, most likely  ileus  3.  Faint opacity laterally in the right lower lung, likely corresponding to  questioned subtle nodular opacity in the right lower lung on recent chest x-ray  from 12/2/2021.  Nodule or other pleural-parenchymal process not excluded.  Findings could be further evaluated with CT when clinically appropriate.  4.  Mild right basilar subsegmental atelectasis/scarring.  No new lung  consolidation.             Narrative:    CLINICAL HISTORY: Constipation    COMMENT: Supine and erect views of the abdomen are obtained along with a frontal  view of the chest.    Large colonic stool burden, in keeping with the clinical history of  constipation.  One or two air-fluid levels on the erect view in the midabdomen  more likely reflect ileus than obstruction.  No free air.  No radiopaque urinary  tract calculi.  Known cholelithiasis not well seen radiographically.    Linear opacity at the right lung base, most likely subsegmental atelectasis or  scarring.  Faint 1.5 cm density projecting over the right lower lung laterally  probably corresponds with the opacity noted on recent chest x-ray from  12/2/2021; nodule or other pleural-parenchymal process not excluded.  Findings  could be further evaluated with CT if clinically appropriate.  No large pleural  effusion or pneumothorax.  Redemonstration of cardiomegaly.  Mild nonspecific  hilar prominence.                     ED Interpretation    No free air, air-fluid levels but no obvious signs of obstruction  Large amount of stool  Reviewed with Dr. Baker                              No orders to display       Scoring tools                                 ED Course & MDM   MDM / ED COURSE and CLINICAL IMPRESSIONS     MDM    ED Course as of 12/14/21 0901   Mon Dec 13, 2021   0618 Change of shift.  Signed out to the PA.  The patient received the enema and is currently having a bowel movement. [LB]   0630 Patient was able to have a bowel movement and admits to feeling better.  She is currently calling her family to come pick her up.  If they are unable to come, we will call a Lift [LB]      ED Course User Index  [LB] Emilia Marques PA C         Clinical Impressions as of 12/14/21 0901   Constipation, unspecified constipation type   Fecal impaction (CMS/HCC)            Emilia Marques PA C  12/14/21 0901

## 2021-12-13 NOTE — DISCHARGE INSTRUCTIONS
Make sure you are drinking fluids  You can take MiraLAX once a day to help keep your stools regulated.  You can use a butt paste or A&E ointment to help the irritation on the buttocks.    Return to the ER if any increase in fever, worsening abdominal pain, distention, vomiting, unable to eat or drink, chest pain, shortness of breath, headache, dizziness, any concerns

## 2021-12-14 ASSESSMENT — ENCOUNTER SYMPTOMS
BACK PAIN: 0
ABDOMINAL PAIN: 0
NUMBNESS: 0
WEAKNESS: 0
RECTAL PAIN: 1
COUGH: 0
NAUSEA: 0
DIARRHEA: 0
VOMITING: 0
SHORTNESS OF BREATH: 0
DYSURIA: 0
HEADACHES: 0
SORE THROAT: 0
FEVER: 0
DIFFICULTY URINATING: 0
CONSTIPATION: 1
COLOR CHANGE: 0
BLOOD IN STOOL: 0
ABDOMINAL DISTENTION: 0

## 2022-01-31 DIAGNOSIS — E11.8 TYPE 2 DIABETES MELLITUS WITH UNSPECIFIED COMPLICATIONS: ICD-10-CM

## 2022-01-31 RX ORDER — HYDROCHLOROTHIAZIDE 12.5 MG/1
TABLET ORAL
Qty: 90 TABLET | Refills: 0 | Status: SHIPPED | OUTPATIENT
Start: 2022-01-31 | End: 2022-04-25

## 2022-01-31 RX ORDER — INSULIN ASPART 100 [IU]/ML
INJECTION, SUSPENSION SUBCUTANEOUS
Qty: 15 PEN | Refills: 3 | Status: SHIPPED | OUTPATIENT
Start: 2022-01-31 | End: 2022-07-08

## 2022-02-01 ENCOUNTER — TELEPHONE (OUTPATIENT)
Dept: ENDOCRINOLOGY | Facility: CLINIC | Age: 62
End: 2022-02-01
Payer: COMMERCIAL

## 2022-04-25 DIAGNOSIS — E11.8 CONTROLLED TYPE 2 DIABETES MELLITUS WITH COMPLICATION, WITH LONG-TERM CURRENT USE OF INSULIN (CMS/HCC): ICD-10-CM

## 2022-04-25 DIAGNOSIS — Z79.4 CONTROLLED TYPE 2 DIABETES MELLITUS WITH COMPLICATION, WITH LONG-TERM CURRENT USE OF INSULIN (CMS/HCC): ICD-10-CM

## 2022-04-25 RX ORDER — LOSARTAN POTASSIUM 50 MG/1
50 TABLET ORAL
Qty: 90 TABLET | Refills: 3 | Status: SHIPPED | OUTPATIENT
Start: 2022-04-25 | End: 2023-02-22

## 2022-04-25 RX ORDER — LOSARTAN POTASSIUM 50 MG/1
TABLET ORAL
Qty: 90 TABLET | Refills: 3 | Status: SHIPPED
Start: 2022-04-25 | End: 2023-07-27

## 2022-04-25 RX ORDER — HYDROCHLOROTHIAZIDE 12.5 MG/1
TABLET ORAL
Qty: 90 TABLET | Refills: 0 | Status: SHIPPED | OUTPATIENT
Start: 2022-04-25 | End: 2022-08-01

## 2022-04-25 RX ORDER — ATORVASTATIN CALCIUM 10 MG/1
TABLET, FILM COATED ORAL
Qty: 90 TABLET | Refills: 3 | Status: SHIPPED
Start: 2022-04-25 | End: 2023-07-10

## 2022-04-25 RX ORDER — ATORVASTATIN CALCIUM 10 MG/1
10 TABLET, FILM COATED ORAL
Qty: 90 TABLET | Refills: 3 | Status: SHIPPED | OUTPATIENT
Start: 2022-04-25 | End: 2023-02-22 | Stop reason: ENTERED-IN-ERROR

## 2022-04-25 RX ORDER — ORAL SEMAGLUTIDE 14 MG/1
14 TABLET ORAL DAILY
Qty: 90 TABLET | Refills: 3 | Status: SHIPPED
Start: 2022-04-25 | End: 2023-02-22

## 2022-04-25 RX ORDER — AMLODIPINE BESYLATE 5 MG/1
TABLET ORAL
Qty: 90 TABLET | Refills: 3 | Status: SHIPPED | OUTPATIENT
Start: 2022-04-25 | End: 2023-01-16

## 2022-04-25 RX ORDER — ORAL SEMAGLUTIDE 14 MG/1
14 TABLET ORAL DAILY
Qty: 90 TABLET | Refills: 3 | Status: SHIPPED | OUTPATIENT
Start: 2022-04-25 | End: 2023-06-08

## 2022-05-05 ENCOUNTER — TRANSCRIBE ORDERS (OUTPATIENT)
Dept: SCHEDULING | Age: 62
End: 2022-05-05

## 2022-05-05 DIAGNOSIS — M25.561 PAIN IN RIGHT KNEE: Primary | ICD-10-CM

## 2022-05-06 ENCOUNTER — HOSPITAL ENCOUNTER (OUTPATIENT)
Dept: RADIOLOGY | Facility: HOSPITAL | Age: 62
Discharge: HOME | End: 2022-05-06
Attending: FAMILY MEDICINE
Payer: COMMERCIAL

## 2022-05-06 DIAGNOSIS — M25.561 PAIN IN RIGHT KNEE: ICD-10-CM

## 2022-05-06 PROCEDURE — 73564 X-RAY EXAM KNEE 4 OR MORE: CPT | Mod: RT

## 2022-08-01 RX ORDER — HYDROCHLOROTHIAZIDE 12.5 MG/1
TABLET ORAL
Qty: 90 TABLET | Refills: 0 | Status: SHIPPED | OUTPATIENT
Start: 2022-08-01 | End: 2022-11-16

## 2022-09-06 RX ORDER — LEVOTHYROXINE SODIUM 25 UG/1
TABLET ORAL
Qty: 90 TABLET | Refills: 3 | Status: SHIPPED | OUTPATIENT
Start: 2022-09-06 | End: 2023-10-11

## 2022-09-06 NOTE — TELEPHONE ENCOUNTER
Last Office Visit: 11/23/2021  Last Telemedicine Visit: Visit date not found    Next Office Visit: 10/18/2022  Next Telemedicine Visit: Visit date not found     Last labs 11/22/21

## 2022-09-15 RX ORDER — HYDROCHLOROTHIAZIDE 12.5 MG/1
TABLET ORAL
Qty: 90 TABLET | Refills: 0 | OUTPATIENT
Start: 2022-09-15

## 2022-09-15 RX ORDER — GABAPENTIN 300 MG/1
CAPSULE ORAL
Qty: 360 CAPSULE | Refills: 3 | OUTPATIENT
Start: 2022-09-15

## 2022-10-13 DIAGNOSIS — E11.8 TYPE 2 DIABETES MELLITUS WITH UNSPECIFIED COMPLICATIONS: ICD-10-CM

## 2022-10-13 RX ORDER — INSULIN ASPART 100 [IU]/ML
INJECTION, SUSPENSION SUBCUTANEOUS
Qty: 55 ML | Refills: 1 | Status: SHIPPED | OUTPATIENT
Start: 2022-10-13 | End: 2022-10-18 | Stop reason: SDUPTHER

## 2022-10-17 ENCOUNTER — APPOINTMENT (OUTPATIENT)
Dept: LAB | Facility: HOSPITAL | Age: 62
End: 2022-10-17
Attending: NURSE PRACTITIONER
Payer: COMMERCIAL

## 2022-10-17 DIAGNOSIS — Z79.4 TYPE 2 DIABETES MELLITUS WITH COMPLICATION, WITH LONG-TERM CURRENT USE OF INSULIN (CMS/HCC): ICD-10-CM

## 2022-10-17 DIAGNOSIS — E11.8 TYPE 2 DIABETES MELLITUS WITH COMPLICATION, WITH LONG-TERM CURRENT USE OF INSULIN (CMS/HCC): ICD-10-CM

## 2022-10-17 LAB
ALBUMIN SERPL-MCNC: 3.9 G/DL (ref 3.4–5)
ALBUMIN/CREAT UR: 55.1 UG/MG
ALP SERPL-CCNC: 72 IU/L (ref 35–126)
ALT SERPL-CCNC: 24 IU/L (ref 11–54)
ANION GAP SERPL CALC-SCNC: 9 MEQ/L (ref 3–15)
AST SERPL-CCNC: 22 IU/L (ref 15–41)
BILIRUB SERPL-MCNC: 0.6 MG/DL (ref 0.3–1.2)
BUN SERPL-MCNC: 19 MG/DL (ref 8–20)
CALCIUM SERPL-MCNC: 9.1 MG/DL (ref 8.9–10.3)
CHLORIDE SERPL-SCNC: 101 MEQ/L (ref 98–109)
CHOLEST SERPL-MCNC: 143 MG/DL
CO2 SERPL-SCNC: 28 MEQ/L (ref 22–32)
CREAT SERPL-MCNC: 0.7 MG/DL (ref 0.6–1.1)
CREAT UR-MCNC: 39.9 MG/DL
EST. AVERAGE GLUCOSE BLD GHB EST-MCNC: 223 MG/DL
GFR SERPL CREATININE-BSD FRML MDRD: >60 ML/MIN/1.73M*2
GLUCOSE SERPL-MCNC: 58 MG/DL (ref 70–99)
HBA1C MFR BLD HPLC: 9.4 %
HDLC SERPL-MCNC: 46 MG/DL
HDLC SERPL: 3.1 {RATIO}
LDLC SERPL CALC-MCNC: 85 MG/DL
MICROALBUMIN UR-MCNC: 22 MG/L
NONHDLC SERPL-MCNC: 97 MG/DL
POTASSIUM SERPL-SCNC: 4.2 MEQ/L (ref 3.6–5.1)
PROT SERPL-MCNC: 6.3 G/DL (ref 6–8.2)
SODIUM SERPL-SCNC: 138 MEQ/L (ref 136–144)
TRIGL SERPL-MCNC: 62 MG/DL (ref 30–149)
TSH SERPL DL<=0.05 MIU/L-ACNC: 0.54 MIU/L (ref 0.34–5.6)

## 2022-10-17 PROCEDURE — 83036 HEMOGLOBIN GLYCOSYLATED A1C: CPT

## 2022-10-17 PROCEDURE — 80053 COMPREHEN METABOLIC PANEL: CPT

## 2022-10-17 PROCEDURE — 84443 ASSAY THYROID STIM HORMONE: CPT

## 2022-10-17 PROCEDURE — 36415 COLL VENOUS BLD VENIPUNCTURE: CPT

## 2022-10-17 PROCEDURE — 80061 LIPID PANEL: CPT

## 2022-10-17 PROCEDURE — 82043 UR ALBUMIN QUANTITATIVE: CPT

## 2022-10-17 NOTE — ASSESSMENT & PLAN NOTE
1. continue with  Jardiance 10 mg per day - increase fluids   2.  continue with Rybelsus 14 mg per day    3. Continue with Novolog 70/30 mix 25 units with breakfast and 30 units with dinner   4. Rotate injection sites   5.DEXCOM trial   6. A1c 9.4

## 2022-10-17 NOTE — PROGRESS NOTES
Endocrinology  Note       PATIENTS: Bebe Burks  YOB: 1960  DATE: October 18, 2022  VISIT TYPE: follow up visit    History of Present Illness:   HPI 61 -year-old white female with type 2 diabetes.  She does follow closely with ophthalmology for retinopathy.she will be getting a brace for her left dropfoot.  Presently she is out on disability.  She   continues on   NovoLog 7030 mix 25 units with breakfast 30 units with dinner Jardiance  10 mg a day and Rybelsus 14 mg daily.  She did not bring her glucometer to the visit.  She states she has few episodes of hypoglycemia. She  continues on Synthroid 25 ?g day. She  continues on Lipitor for  hyperlipidemia. She  does have a history of thyroid nodules reports no difficulty with swallowing or breathing.  She  reports no foot wounds at the present time.  She has been going to the fitness center routinely    Medical History:  Past Medical History:   Diagnosis Date    Arthritis     COVID-19     Hypertension     Lipid disorder     Type 2 diabetes mellitus (CMS/[facility])        Surgical History:   Past Surgical History:   Procedure Laterality Date    COMBINED HYSTEROSCOPY DIAGNOSTIC / D&C  2007    EYE SURGERY Left 2017    cataract    VITRECTOMY Left 2016       Family History:  Family History   Problem Relation Age of Onset    Diabetes Biological Mother     Hypertension Biological Mother     Stroke Biological Father        Social history:  Social History     Socioeconomic History    Marital status: Single     Spouse name: Not on file    Number of children: Not on file    Years of education: Not on file    Highest education level: Not on file   Occupational History    Not on file   Tobacco Use    Smoking status: Never    Smokeless tobacco: Never   Substance and Sexual Activity    Alcohol use: Yes     Comment: occasional    Drug use: No    Sexual activity: Not on file   Other Topics Concern    Not on file   Social History  "Narrative    Not on file     Social Determinants of Health     Financial Resource Strain: Not on file   Food Insecurity: No Food Insecurity    Worried About Running Out of Food in the Last Year: Never true    Ran Out of Food in the Last Year: Never true   Transportation Needs: Not on file   Physical Activity: Not on file   Stress: Not on file   Social Connections: Not on file   Intimate Partner Violence: Not on file   Housing Stability: Not on file       Medication(active prior to today):  Current Outpatient Medications   Medication Sig Dispense Refill    insulin asp prt-insulin aspart (NovoLOG Mix 70-30FlexPen U-100) 100 unit/mL (70-30) pen INJECT 25 UNITS IN AM AND 30 UNITS IN PM  Strength: 100 unit/mL (70-30) 45 mL 3    levothyroxine (SYNTHROID) 25 mcg tablet TAKE 1 TABLET BY MOUTH ONCE DAILY. 90 tablet 3    amLODIPine (NORVASC) 5 mg tablet TAKE 1 TABLET BY MOUTH EVERY DAY 90 tablet 3    atorvastatin (LIPITOR) 10 mg tablet TAKE 1 TABLET BY MOUTH EVERY DAY 90 tablet 3    atorvastatin (LIPITOR) 10 mg tablet Take 1 tablet (10 mg total) by mouth once daily. 90 tablet 3    BD KRISTIN 2ND GEN PEN NEEDLE 32 gauge x 5/32\" needle INJECT TWICE A  each 6    cyclobenzaprine 10 mg tablet 10 mg 2 (two) times a day as needed.        empagliflozin (JARDIANCE) 10 mg tablet TAKE 1 TABLET BY MOUTH EVERY DAY 90 tablet 3    empagliflozin (JARDIANCE) 10 mg tablet TAKE 1 TABLET BY MOUTH EVERY DAY 90 tablet 3    FREESTYLE LAURA 14 DAY READER misc 1 each continuously. 1 reader dx code E 11.9 (Patient not taking: Reported on 10/18/2022) 1 each 11    FREESTYLE LAURA 14 DAY SENSOR kit 1 each continuously. 1 sensor every 14 days dx code E 11.9 (Patient not taking: Reported on 10/18/2022) 1 kit 11    gabapentin 300 mg capsule Take 300 mg by mouth daily before breakfast.      gabapentin 300 mg capsule Take 600 mg by mouth nightly.      hydrochlorothiazide (HYDRODIURIL) 12.5 mg tablet TAKE 1 TABLET BY MOUTH EVERY DAY 90 " "tablet 0    losartan (COZAAR) 50 mg tablet TAKE 1 TABLET BY MOUTH EVERY DAY 90 tablet 3    losartan (COZAAR) 50 mg tablet Take 1 tablet (50 mg total) by mouth once daily. 90 tablet 3    ondansetron 4 mg tablet       ONETOUCH DELICA LANCETS 33 gauge misc 1 each 3 (three) times a day. 100 each 11    ONETOUCH ULTRA BLUE TEST STRIP strip PT CHECKS BLOOD SUGAR 4 TIMES A  strip 0    ONETOUCH VERIO TEST STRIPS strip 1 each 3 (three) times a day. 100 strip 11    RYBELSUS 14 mg tablet TAKE 1 TABLET (14 MG TOTAL) BY MOUTH DAILY. 90 tablet 3    semaglutide (RYBELSUS) 14 mg tablet Take 1 tablet (14 mg total) by mouth daily. 90 tablet 3     No current facility-administered medications for this visit.       Allergies:  Patient has no known allergies.    Review of Systems:   Constitutional: Negative for fatigue and unexpected weight change. + weight gain   HENT: Negative for postnasal drip, trouble swallowing and voice change.    Eyes: +  for visual disturbance.   Respiratory: Negative for apnea.    Cardiovascular: Negative for leg swelling.   Gastrointestinal: Negative for constipation, diarrhea and nausea.   Endocrine: Negative for polydipsia, polyphagia and polyuria.   Musculoskeletal: + for arthralgias, back pain, neck pain and neck stiffness.   Neurological: + for numbness and headaches.   Psychiatric/Behavioral: Negative for sleep disturbance.     Vitals Signs:  Vitals:    10/18/22 0815   BP: 128/68   BP Location: Right upper arm   Patient Position: Sitting   Pulse: 88   Weight: 85.3 kg (188 lb)   Height: 1.702 m (5' 7\")     Body mass index is 29.44 kg/m².      Physical Exam:  Constitutional: Patient is oriented to person, place, and time.  Appears well-developed and well-nourished. + overweight   Eyes: EOM are normal. Pupils are equal, round, and reactive to light.   Neck: Normal range of motion. Neck supple.   Cardiovascular: Normal rate, regular rhythm and normal heart sounds.    Pulmonary/Chest: Effort " normal and breath sounds normal.   Abdominal: Soft.   Musculoskeletal: Normal range of motion.   Neurological:  Alert and oriented to person, place, and time.   Skin: Skin is warm and dry.   Psychiatric:  Normal mood and affect. Behavior is normal. Judgment and thought content normal.       Assessment/Plan:  Controlled type 2 diabetes mellitus, with long-term current use of insulin (CMS/[facility])  1. continue with  Jardiance 10 mg per day - increase fluids   2.  continue with Rybelsus 14 mg per day    3. Continue with Novolog 70/30 mix 25 units with breakfast and 30 units with dinner   4. Rotate injection sites   5.DEXCOM trial   6. A1c 9.4    Essential hypertension  /68 - continue with Cozaar     Hyperlipidemia  Continue with Lipitor     Hypothyroidism  Continue with Synthroid 25 mcg per day     Obesity  Goal for exercise 30 min 5 days per week     Thyroid nodule  Call me for any difficulty with swallowing or breathing       Labs:  Lab Results   Component Value Date    WBC 6.40 12/04/2021    HGB 14.4 12/04/2021    HCT 42.8 12/04/2021     (L) 12/04/2021    CHOL 143 10/17/2022    TRIG 62 10/17/2022    HDL 46 (L) 10/17/2022    ALT 24 10/17/2022    AST 22 10/17/2022     10/17/2022    K 4.2 10/17/2022     10/17/2022    CREATININE 0.7 10/17/2022    BUN 19 10/17/2022    CO2 28 10/17/2022    TSH 0.54 10/17/2022    INR 1.2 12/03/2021    HGBA1C 9.4 (H) 10/17/2022    MICROALBUR 22.0 (H) 10/17/2022       I spent  25 minutes  on this date of service performing the following activities: obtaining history, performing examination, entering orders, documenting, preparing for visit, obtaining / reviewing records, providing counseling and education, independently reviewing study/studies, and coordinating care.  I discussed the goals of diet and exercise with the patient. I discussed the goals of A1c, blood pressure, and cholesterol. I discussed the goals of blood sugar pre- and post meals with the patient.I  encouraged the patient to rotate the injection sites..  I reviewed the patient's labs/imaging/medications/allergies/problem list.       Electronically signed by: PHIL Carrizales on 10/18/2022 9:52 AM

## 2022-10-18 ENCOUNTER — OFFICE VISIT (OUTPATIENT)
Dept: ENDOCRINOLOGY | Facility: CLINIC | Age: 62
End: 2022-10-18
Payer: COMMERCIAL

## 2022-10-18 VITALS
HEART RATE: 88 BPM | SYSTOLIC BLOOD PRESSURE: 128 MMHG | HEIGHT: 67 IN | WEIGHT: 188 LBS | DIASTOLIC BLOOD PRESSURE: 68 MMHG | BODY MASS INDEX: 29.51 KG/M2

## 2022-10-18 DIAGNOSIS — E11.8 TYPE 2 DIABETES MELLITUS WITH UNSPECIFIED COMPLICATIONS: ICD-10-CM

## 2022-10-18 LAB
EXPIRATION DATE: NORMAL
GLUCOSE BLOOD, POC: 174
Lab: NORMAL
POCT MANUFACTURER: NORMAL

## 2022-10-18 PROCEDURE — 82962 GLUCOSE BLOOD TEST: CPT | Performed by: NURSE PRACTITIONER

## 2022-10-18 PROCEDURE — 3078F DIAST BP <80 MM HG: CPT | Performed by: NURSE PRACTITIONER

## 2022-10-18 PROCEDURE — 3008F BODY MASS INDEX DOCD: CPT | Performed by: NURSE PRACTITIONER

## 2022-10-18 PROCEDURE — 3074F SYST BP LT 130 MM HG: CPT | Performed by: NURSE PRACTITIONER

## 2022-10-18 PROCEDURE — 99214 OFFICE O/P EST MOD 30 MIN: CPT | Performed by: NURSE PRACTITIONER

## 2022-10-18 RX ORDER — INSULIN ASPART 100 [IU]/ML
INJECTION, SUSPENSION SUBCUTANEOUS
Qty: 45 ML | Refills: 3 | Status: SHIPPED | OUTPATIENT
Start: 2022-10-18 | End: 2023-10-30

## 2022-10-18 NOTE — PATIENT INSTRUCTIONS
Controlled type 2 diabetes mellitus, with long-term current use of insulin (CMS/[facility])  1. continue with  Jardiance 10 mg per day - increase fluids   2.  continue with Rybelsus 14 mg per day    3. Continue with Novolog 70/30 mix 25 units with breakfast and 30 units with dinner   4. Rotate injection sites   5.DEXCOM trial   6. A1c 9.4    Essential hypertension  /68 - continue with Cozaar     Hyperlipidemia  Continue with Lipitor     Hypothyroidism  Continue with Synthroid 25 mcg per day     Obesity  Goal for exercise 30 min 5 days per week     Thyroid nodule  Call me for any difficulty with swallowing or breathing

## 2022-11-16 RX ORDER — HYDROCHLOROTHIAZIDE 12.5 MG/1
TABLET ORAL
Qty: 90 TABLET | Refills: 0 | Status: SHIPPED | OUTPATIENT
Start: 2022-11-16 | End: 2022-12-12

## 2022-12-11 DIAGNOSIS — I10 ESSENTIAL HYPERTENSION: Primary | ICD-10-CM

## 2022-12-12 RX ORDER — HYDROCHLOROTHIAZIDE 12.5 MG/1
TABLET ORAL
Qty: 90 TABLET | Refills: 1 | Status: SHIPPED | OUTPATIENT
Start: 2022-12-12 | End: 2023-07-05

## 2023-01-16 DIAGNOSIS — I10 ESSENTIAL HYPERTENSION: Primary | ICD-10-CM

## 2023-01-16 RX ORDER — AMLODIPINE BESYLATE 5 MG/1
TABLET ORAL
Qty: 90 TABLET | Refills: 1 | Status: SHIPPED | OUTPATIENT
Start: 2023-01-16 | End: 2023-04-25

## 2023-02-15 DIAGNOSIS — E11.8 TYPE 2 DIABETES MELLITUS WITH UNSPECIFIED COMPLICATIONS: Primary | ICD-10-CM

## 2023-02-15 RX ORDER — PEN NEEDLE, DIABETIC 32GX 5/32"
NEEDLE, DISPOSABLE MISCELLANEOUS
Qty: 200 EACH | Refills: 1 | Status: SHIPPED | OUTPATIENT
Start: 2023-02-15 | End: 2023-08-22 | Stop reason: SDUPTHER

## 2023-02-22 ENCOUNTER — TRANSCRIBE ORDERS (OUTPATIENT)
Dept: RADIOLOGY | Facility: HOSPITAL | Age: 63
End: 2023-02-22

## 2023-02-22 ENCOUNTER — TRANSCRIBE ORDERS (OUTPATIENT)
Dept: LAB | Facility: HOSPITAL | Age: 63
End: 2023-02-22

## 2023-02-22 ENCOUNTER — OFFICE VISIT (OUTPATIENT)
Dept: PREADMISSION TESTING | Facility: HOSPITAL | Age: 63
DRG: 470 | End: 2023-02-22
Attending: ORTHOPAEDIC SURGERY
Payer: COMMERCIAL

## 2023-02-22 ENCOUNTER — HOSPITAL ENCOUNTER (OUTPATIENT)
Dept: RADIOLOGY | Facility: HOSPITAL | Age: 63
Discharge: HOME | DRG: 470 | End: 2023-02-22
Attending: ORTHOPAEDIC SURGERY
Payer: COMMERCIAL

## 2023-02-22 ENCOUNTER — APPOINTMENT (OUTPATIENT)
Dept: LAB | Facility: HOSPITAL | Age: 63
DRG: 470 | End: 2023-02-22
Attending: ORTHOPAEDIC SURGERY
Payer: COMMERCIAL

## 2023-02-22 VITALS
OXYGEN SATURATION: 100 % | WEIGHT: 185 LBS | HEART RATE: 88 BPM | RESPIRATION RATE: 18 BRPM | DIASTOLIC BLOOD PRESSURE: 66 MMHG | HEIGHT: 66 IN | TEMPERATURE: 98.3 F | SYSTOLIC BLOOD PRESSURE: 143 MMHG | BODY MASS INDEX: 29.73 KG/M2

## 2023-02-22 DIAGNOSIS — M21.372 LEFT FOOT DROP: ICD-10-CM

## 2023-02-22 DIAGNOSIS — M17.11 UNILATERAL PRIMARY OSTEOARTHRITIS, RIGHT KNEE: ICD-10-CM

## 2023-02-22 DIAGNOSIS — E03.9 HYPOTHYROIDISM, UNSPECIFIED TYPE: ICD-10-CM

## 2023-02-22 DIAGNOSIS — M17.11 UNILATERAL PRIMARY OSTEOARTHRITIS, RIGHT KNEE: Primary | ICD-10-CM

## 2023-02-22 DIAGNOSIS — E11.8 TYPE 2 DIABETES MELLITUS WITH UNSPECIFIED COMPLICATIONS: ICD-10-CM

## 2023-02-22 DIAGNOSIS — R94.31 ABNORMAL FINDING ON EKG: ICD-10-CM

## 2023-02-22 DIAGNOSIS — E78.5 HYPERLIPIDEMIA, UNSPECIFIED HYPERLIPIDEMIA TYPE: ICD-10-CM

## 2023-02-22 DIAGNOSIS — I10 ESSENTIAL HYPERTENSION: ICD-10-CM

## 2023-02-22 DIAGNOSIS — Z01.818 PREOP EXAMINATION: Primary | ICD-10-CM

## 2023-02-22 DIAGNOSIS — R93.89 ABNORMAL FINDING ON CHEST XRAY: ICD-10-CM

## 2023-02-22 PROBLEM — M51.26 DISPLACEMENT OF LUMBAR INTERVERTEBRAL DISC WITHOUT MYELOPATHY: Status: ACTIVE | Noted: 2023-02-22

## 2023-02-22 PROBLEM — R26.2 DISABILITY OF WALKING: Status: ACTIVE | Noted: 2023-02-22

## 2023-02-22 PROBLEM — E11.42 POLYNEUROPATHY DUE TO TYPE 2 DIABETES MELLITUS (CMS/HCC): Status: ACTIVE | Noted: 2023-02-22

## 2023-02-22 LAB
ABO + RH BLD: NORMAL
ATRIAL RATE: 86
BLD GP AB SCN SERPL QL: NEGATIVE
BLOOD BANK CMNT PATIENT-IMP: NORMAL
D AG BLD QL: POSITIVE
EST. AVERAGE GLUCOSE BLD GHB EST-MCNC: 206 MG/DL
HBA1C MFR BLD HPLC: 8.8 %
LABORATORY COMMENT REPORT: NORMAL
P AXIS: 46
PR INTERVAL: 200
QRS DURATION: 86
QT INTERVAL: 370
QTC CALCULATION(BAZETT): 442
R AXIS: 27
SARS-COV-2 RNA RESP QL NAA+PROBE: NEGATIVE
SPECIMEN EXP DATE BLD: NORMAL
T WAVE AXIS: 23
TSH SERPL DL<=0.05 MIU/L-ACNC: 0.63 MIU/L (ref 0.34–5.6)
VENTRICULAR RATE: 86

## 2023-02-22 PROCEDURE — 36415 COLL VENOUS BLD VENIPUNCTURE: CPT

## 2023-02-22 PROCEDURE — C9803 HOPD COVID-19 SPEC COLLECT: HCPCS

## 2023-02-22 PROCEDURE — 93005 ELECTROCARDIOGRAM TRACING: CPT

## 2023-02-22 PROCEDURE — U0003 INFECTIOUS AGENT DETECTION BY NUCLEIC ACID (DNA OR RNA); SEVERE ACUTE RESPIRATORY SYNDROME CORONAVIRUS 2 (SARS-COV-2) (CORONAVIRUS DISEASE [COVID-19]), AMPLIFIED PROBE TECHNIQUE, MAKING USE OF HIGH THROUGHPUT TECHNOLOGIES AS DESCRIBED BY CMS-2020-01-R: HCPCS | Performed by: HOSPITALIST

## 2023-02-22 PROCEDURE — 73564 X-RAY EXAM KNEE 4 OR MORE: CPT | Mod: RT

## 2023-02-22 PROCEDURE — 86900 BLOOD TYPING SEROLOGIC ABO: CPT

## 2023-02-22 PROCEDURE — 99214 OFFICE O/P EST MOD 30 MIN: CPT | Performed by: HOSPITALIST

## 2023-02-22 PROCEDURE — 3078F DIAST BP <80 MM HG: CPT | Performed by: HOSPITALIST

## 2023-02-22 PROCEDURE — 84443 ASSAY THYROID STIM HORMONE: CPT | Performed by: HOSPITALIST

## 2023-02-22 PROCEDURE — 3008F BODY MASS INDEX DOCD: CPT | Performed by: HOSPITALIST

## 2023-02-22 PROCEDURE — 93010 ELECTROCARDIOGRAM REPORT: CPT | Performed by: INTERNAL MEDICINE

## 2023-02-22 PROCEDURE — 83036 HEMOGLOBIN GLYCOSYLATED A1C: CPT | Performed by: HOSPITALIST

## 2023-02-22 PROCEDURE — 3077F SYST BP >= 140 MM HG: CPT | Performed by: HOSPITALIST

## 2023-02-22 RX ORDER — ASPIRIN 81 MG/1
81 TABLET ORAL DAILY
Status: ON HOLD | COMMUNITY
End: 2023-03-01 | Stop reason: SDUPTHER

## 2023-02-22 RX ORDER — MELOXICAM 15 MG/1
TABLET ORAL
COMMUNITY
Start: 2023-02-03 | End: 2023-02-23

## 2023-02-22 RX ORDER — ACETAMINOPHEN AND CODEINE PHOSPHATE 300; 30 MG/1; MG/1
TABLET ORAL
COMMUNITY
Start: 2023-02-08 | End: 2023-03-01 | Stop reason: HOSPADM

## 2023-02-22 ASSESSMENT — PAIN SCALES - GENERAL: PAINLEVEL: 0-NO PAIN

## 2023-02-22 NOTE — PRE-PROCEDURE INSTRUCTIONS
1.      You will be called between 3pm -7pm one business day before your procedure  February 24, 2023   to tell you where and when to report.  If you do not receive a phone call by 6pm, please call the Admissions office at [phone] to determine the arrival time for your procedure.      2.        Please report to Admissions or Short Procedure Unit, park in lot A, on the day of your procedure.  Detailed directions will be given to you when you are called with arrival time.      3.      No solid food for EIGHT HOURS prior to surgery- No food after midnight.    Unlimited CLEAR liquids, meaning water or PLAIN black coffee (WITHOUT any milk, cream, sugar, or sweetener) are permitted up to TWO HOURS prior to arrival at the hospital.     4.      Early on the morning of the procedure please take your usual dose of the listed medications  atorvastatin (LIPITOR), levothyroxine (SYNTHROID), gabapentin, amLODIPine (NORVASC), with a sip of water:    DO NOT TAKE losartan (COZAAR) ON THE NIGHT BEFORE YOUR SURGERY    DO NOT TAKE semaglutide (RYBELSUS) , insulin , ON THE MORNING OF SURGERY    STOP empagliflozin (JARDIANCE) 3 DAYS PRIOR TO SURGERY    NO ASPIRIN, MOTRIN, MELOXICAM, EXCEDRIN, IBUPROFEN, ADVIL, ALEVE, VITAMIN E, HERBAL SUPPLEMENTS 1 WEEK PRIOR TO SURGERY    YOU MAY TAKE TYLENOL FOR PAIN     5.      Other Instructions: You may brush your teeth the morning of the procedure. Rinse and spit, do not swallow.  Bring a list of your medications with dosages with you.  Use surgical wash as directed.   6.      If you develop a cold, cough, fever, rash, or other symptom prior to the data of the procedure, please report it to your physician immediately.     7.      If you need to cancel the procedure for any reason, please contact your physician.     8.      Make arrangements to have someone drive you home from the procedure. If you have not arranged for transportation home, your surgery may be cancelled.      9.      You may  not take public transportation unless you are accompanied by a responsible person.     10.      You may not drive a car or operate complex or potentially dangerous machinery for 24 hours following anesthesia and/or sedation.      11.      12.      If it is medically necessary for you to have a longer stay, you will be informed as soon as the decision is made.              Only bring essential items to the hospital.  Do not wear or bring anything of value to the hospital including jewelry of any kind, money, or wallet. Do not wear make-up or contact lenses. Do not BRING MEDICATIONS FROM HOME unless instructed to do so.  DO bring your hearing aids, glasses, and a case.        13.      No lotion, creams, powders, or oils on skin the morning of procedure        14.      Dress in comfortable clothes.     15.      If instructed, please bring a copy of your Advanced Directive (Living Will/Durable Power of ) on the day of your procedure.      16.      17.            18.       19.       Patients need to quarantine from the time of PAT COVID test to day of surgery, regardless of COVID vaccine status.         Ensuring your safety at all times is a very important part of out [facility] Culture of Safety . After having surgery and sedation, you are at risk for falling and balance issues.  Although you may feel awake, the effects of the medication can last up to 24 hours after anesthesia.  If you need to use the bathroom during your recovery period, nursing staff will escort you there and stay with you to ensure your safety.          Refrain from drinking alcohol and smoking cigarettes for 24 hours prior to surgery.         Shower with antibacterial soap (DIAL) the night before and morning of your procedure.  If your procedure indicates the need for CHG antiseptic was (Bactoshield or Hibiclens), please use this instead and follow instructions as discussed at the time of your Pre-Admission Testing visit or phone interview.      Above instructions reviewed with patient and patient acknowledges understanding.

## 2023-02-22 NOTE — CONSULTS
[facility] Medicine Service -  Pre-Operative Consultation       Patient Name: Bebe Burks  Referring Surgeon: Gideon Centeno MD   Reason for Referral: Pre-Operative Evaluation  Surgical Procedure: Right Total Knee Replacement  Operative Date: 2/27/23  Other Providers:      PCP: Paula Walker MD   Endocrinology: Wanda Gipson CRNP       HISTORY OF PRESENT ILLNESS      Bebe Burks is a 62 y.o. female presenting today to the [facility] at [facility] for pre-operative evaluation prior to planned surgery.    Patient reports progressive right knee pain over several years, particularly bothersome since 4/2022. She tells me she has severe pain at times in the right knee, radiating down into the lower leg, that is aggravated by activity, improves while sitting at rest. She tells me the knee is swollen and painful, and she reports instability in the joint and feeling that she will fall. She has been using a cane or walker for stability recently. She tells me the pain is so severe, she very much wants to proceed with joint replacement.   She recently saw her PCP for pre-operative evaluation and has been cleared for surgery.     In regards to medical history:  - Type 2 Diabetes requiring long term use of insulin, complicated by diabetic retinopathy and macular edema, diabetic polyneuropathy, microalbuminuria. Follows with Wanda Gipson for endocrinology as well as podiatry and ophthalmology. Medications include Novolog 70/30, Jardiance, and Rybelsus. She reports compliance with her medications. She tells me she has been stress eating and her sugars have been running high lately, up to 250. She is also taking gabapentin for the neuropathy.  - Essential Hypertension, medically managed with amlodipine, losartan, and hctz.   - Hyperlipidemia on atorvastatin.   - Hypothyroidism, on levothyroxine  - Pulmonary nodule- hospitalized for COVID 19 infwection 12/2021 at  "which time CXR showed a 14mm nodular opacity. Outpatient CT recommended. Reviewed with her and she has not done this yet.  - Left foot drop- wears brace  - Lumbar radiculopathy.     The patient denies any current or recent chest pain or pressure, dyspnea, cough, sputum, fevers, chills, abdominal pain, nausea, vomiting, diarrhea or other symptoms. She tells me other than her orthopedic issues, she feels quite well and has no complaints.   Denies a personal or family history of adverse reactions to anesthesia.   Denies a personal or family history of easy/excessive bleeding or thrombosis.  Functionally, she is quite limited. She is using a walker or cane with all ambulation. She said she hasn't been able to walk 2 blocks in over a year. She climbs the steps very slowly and carefully. She denies angina.      The patient denies, on specific questioning, the following:  No history of MI, arrhythmia,or CHF.  No history of QUINTEN.  No history of DVT/PE.  No history of COPD.  No history of CVA.  No history of CKD.     PAST MEDICAL AND SURGICAL HISTORY      Past Medical History:   Diagnosis Date   • Abnormal finding on chest xray 12/2021   • Arthritis    • COVID-19 12/2021   • COVID-19 vaccine series completed     Moderna: \" 3 doses\"   • Diabetic neuropathy (CMS/HCC)    • DM (diabetes mellitus), type 2 with ophthalmic complications (CMS/HCC)    • Hypertension    • Hypothyroidism    • Left foot drop    • Lipid disorder    • Type 2 diabetes mellitus treated with insulin (CMS/HCC)        Past Surgical History:   Procedure Laterality Date   • CATARACT EXTRACTION Right    • COLONOSCOPY     • COMBINED HYSTEROSCOPY DIAGNOSTIC / D&C  2007   • EYE SURGERY Left 2017    cataract   • VITRECTOMY Left 2016       MEDICATIONS        Current Outpatient Medications:   •  aspirin 81 mg enteric coated tablet, Take 81 mg by mouth daily., Disp: , Rfl:   •  levothyroxine (SYNTHROID) 25 mcg tablet, TAKE 1 TABLET BY MOUTH ONCE DAILY. (Patient taking " "differently: Take 25 mcg by mouth every morning.), Disp: 90 tablet, Rfl: 3  •  acetaminophen-codeine (TYLENOL #3) 300-30 mg per tablet, TAKE 1 TABLET BY MOUTH EVERY 4 TO 6 HOURS AS NEEDED, Disp: , Rfl:   •  amLODIPine (NORVASC) 5 mg tablet, TAKE 1 TABLET BY MOUTH EVERY DAY (Patient taking differently: Take 5 mg by mouth every morning.), Disp: 90 tablet, Rfl: 1  •  atorvastatin (LIPITOR) 10 mg tablet, TAKE 1 TABLET BY MOUTH EVERY DAY (Patient taking differently: Take 10 mg by mouth every morning.), Disp: 90 tablet, Rfl: 3  •  BD KRISTIN 2ND GEN PEN NEEDLE 32 gauge x 5/32\" needle, INJECT TWICE A DAY, Disp: 200 each, Rfl: 1  •  empagliflozin (JARDIANCE) 10 mg tablet, TAKE 1 TABLET BY MOUTH EVERY DAY (Patient taking differently: Take 10 mg by mouth daily. TAKE 1 TABLET BY MOUTH EVERY DAY), Disp: 90 tablet, Rfl: 3  •  FREESTYLE LAURA 14 DAY READER misc, 1 each continuously. 1 reader dx code E 11.9 (Patient not taking: Reported on 10/18/2022), Disp: 1 each, Rfl: 11  •  FREESTYLE LAURA 14 DAY SENSOR kit, 1 each continuously. 1 sensor every 14 days dx code E 11.9 (Patient not taking: Reported on 10/18/2022), Disp: 1 kit, Rfl: 11  •  gabapentin 300 mg capsule, Take 300 mg by mouth daily before breakfast. 600 mg in the evening, Disp: , Rfl:   •  hydrochlorothiazide (HYDRODIURIL) 12.5 mg tablet, TAKE 1 TABLET BY MOUTH EVERY DAY (Patient taking differently: Take 25 mg by mouth every evening.), Disp: 90 tablet, Rfl: 1  •  insulin asp prt-insulin aspart (NovoLOG Mix 70-30FlexPen U-100) 100 unit/mL (70-30) pen, INJECT 25 UNITS IN AM AND 30 UNITS IN PM Strength: 100 unit/mL (70-30) (Patient taking differently: 2 (two) times a day before breakfast and dinner. INJECT 25 UNITS IN AM AND 30 UNITS IN PM Strength: 100 unit/mL (70-30)), Disp: 45 mL, Rfl: 3  •  losartan (COZAAR) 50 mg tablet, TAKE 1 TABLET BY MOUTH EVERY DAY (Patient taking differently: Take 50 mg by mouth every evening.), Disp: 90 tablet, Rfl: 3  •  meloxicam (MOBIC) 15 mg " tablet, TAKE 1 TABLET BY MOUTH EVERY DAY FOR 30 DAYS  Stopped for eurgery, Disp: , Rfl:   •  ONETOUCH DELICA LANCETS 33 gauge misc, 1 each 3 (three) times a day., Disp: 100 each, Rfl: 11  •  ONETOUCH ULTRA BLUE TEST STRIP strip, PT CHECKS BLOOD SUGAR 4 TIMES A DAY, Disp: 200 strip, Rfl: 0  •  ONETOUCH VERIO TEST STRIPS strip, 1 each 3 (three) times a day., Disp: 100 strip, Rfl: 11  •  semaglutide (RYBELSUS) 14 mg tablet, Take 1 tablet (14 mg total) by mouth daily., Disp: 90 tablet, Rfl: 3    ALLERGIES      Citrus and derivatives    FAMILY HISTORY      family history includes Diabetes in her biological mother; Hypertension in her biological mother; Stroke in her biological father.    Denies any prior known family history of DVTs/PEs/clotting disorder    SOCIAL HISTORY      Social History     Tobacco Use   • Smoking status: Never     Passive exposure: Past   • Smokeless tobacco: Never   Substance Use Topics   • Alcohol use: Yes     Comment: occasionally   • Drug use: Never     Lives alone. [place] is all on one level, but there is a full flight of steps to get into her building.   Sister lives locally and is here today but sister cares for her  who is unwell. Patient hopes to go to inpatient rehab.    REVIEW OF SYSTEMS      Constitutional: Denies Fever, Chills, Fatigue, Malaise, Unintentional Weight loss; + anxiety surrounding upcoming surgery  HEENT: Denies Vision changes, Epistaxis, Trouble Swallowing, Sore Throat  Respiratory: Denies Cough, Shortness of Breath, Wheezing  Cardiovascular: Denies Chest Pain, Exertional Dyspnea, Palpitations, Edema  GI: Denies Abdominal pain, Nausea, Vomiting, Changes in bowel movements, Blood in stool; occasional constipation   : Denies Dysuria, Hematuria  Musculoskeletal: Denies Neck pain, + chronic right knee pain and chronic right lower extremity edema. + low back pain with sciatica, + left foot drop.   Skin: Denies rash  Neuro: Denies Headache, Syncope, Weakness,  "Numbness  Heme: Denies Bleeding      PHYSICAL EXAMINATION      Visit Vitals  BP (!) 143/66 (BP Location: Right upper arm, Patient Position: Sitting)   Pulse 88   Temp 36.8 °C (98.3 °F) (Temporal)   Resp 18   Ht 1.676 m (5' 6\")   Wt 83.9 kg (185 lb)   SpO2 100%   BMI 29.86 kg/m²     Body mass index is 29.86 kg/m².    Physical Exam   Constitutional: Well developed, Well Nourished, No apparent distress, Appears Comfortable  HEENT: NCAT, oropharynx clear  Neck: supple, no LAD  Cardiovascular: Normal rate, Regular Rhythm, Normal heart sounds, No murmur noted, No carotid bruits  Pulmonary: Normal effort without distress, Normal breath sounds without wheezing, rhonchi, or rales  Abdomen: Soft, no distension, nontender, normal bowel sounds  Extremities: No edema  Neurologic: No gross abnormalities, patient able to ambulate around exam room independently  Skin: Warm, No rash  Psychiatric: Patient tells me she is very anxious about upcoming surgery      LABS / EKG        Labs 2/17/23 from PCP  Hemoglobin 13.5  MCV 86  Hct 40.5  WBC 8.8  Plts 191  Glucose 71  Na 142  K 4.0  Cl 102  Ca 9.7  BUN 25  Cr 0.77  eGFR 86  AST 27  ALT 25  AlkPhos 96  Bili Total 0.7  PT 11.2  INR 1.1  PTT 30    2/22/23  TSH 0.63  A1C 8.8    ECG/Telemetry  Normal sinus rhythm, Septal infarct, unchanged from prior tEKG 12/2/21    ASSESSMENT AND PLAN         Pre-op examination  Medical management and tabby-operative risk commentary as noted below.    Localized osteoarthritis of right knee  Surgery as planned. Patient will hold her aspirin in preparation for surgery.    Type 2 diabetes mellitus treated with insulin (CMS/[facility])  Suboptimal glucose control, A1C 8.8 today with home glucose readings as high as 250 recently. Perioperative hyperglycemia places patient at increased risk of complications including infection. D/w Dr. Centeno's staff and recommended consideration of postponing surgery pending improved glycemic control and he will take that into " consideration. Should she proceed, she will need to hold her jardiance for 3 days prior to surgery, per protocol. Advised patient to contact her endocrinologist to review glucose readings and discuss mediation regimen adjustments now and leading up to surgery. I defer to her endorinologist's instructions regarding medication instructions and should she proceed with surgery, recommend endocrinology consultation to aid with glycemic control.     Essential hypertension  Blood pressure elevated today, but patient notes considerable anxiety surrounding this visit and upcoming surgery. She will continue her amlodipine uninterrupted. Hold losartan and hctz on the morning of surgery and these may be resumed post-op assuming no renal dysfunction, hypotension, nor volume depletion.     Hyperlipidemia  Continue atorvastatin uninterrupted.     Abnormal finding on EKG  EKG with septal infarct, unchanged from prior. Patient denies prior cardiac work up. Risk factors include insulin dependent diabetes, hypertension, hyperlipidemia. Denies angina, but functionally limited due to osteoarthritis, < 4 METs. Cardiac evaluation would be prudent and discussed with Dr. Centeno's staff.     Hypothyroidism  TSH wnl today. Continue levothyroxine uninterrupted.     Left foot drop  Noted for the record. Patient wears a brace. Falls precautions while in hospital.     Abnormal finding on chest xray  CXR 12/2021 noted 14 mm rounded opacity new from prior CXR. This study was done while patient was in the hospital with Covid. She was to get outpatient follow up CT for further evaluation and has not done that yet. Reviewed this with her and reminded her to get follow up imaging. She is asymptomatic. She verbalized agreement.        In regards to perioperative cardiac risk:  The patient denies any history of ischemic heart disease, denies any history of CHF, denies any history of CVA, and does not have a pre-operative creatinine > 2 mg/dL. She is on  insulin and has an abnormal EKG with a septal infarct, unchanged from prior. From a functional standpoint, she is greatly limited by her knee pain and does not attain 4 METs. She is at intermediate risk for cardiac death, nonfatal MI, and nonfatal cardiac arrest. Preoperative cardiac evaluation is recommended. D/w Dr. Centeno's staff.     Further comments:  Resume supplements when OK with surgical team.  I would encourage incentive spirometry to assist with minimizing tabby-operative pulmonary risk.  DVT prophylaxis and timing of such per the discretion of Dr. Centeno.     Please do not hesitate to contact [facility] during the upcoming hospitalization with any questions or concerns.     Frnaci Harris MD  2/23/2023

## 2023-02-22 NOTE — H&P (VIEW-ONLY)
[facility] Medicine Service -  Pre-Operative Consultation       Patient Name: Bebe Burks  Referring Surgeon: Gideon Centeno MD   Reason for Referral: Pre-Operative Evaluation  Surgical Procedure: Right Total Knee Replacement  Operative Date: 2/27/23  Other Providers:      PCP: Paula Walker MD   Endocrinology: Wanda Gipson CRNP       HISTORY OF PRESENT ILLNESS      Bebe Burks is a 62 y.o. female presenting today to the [facility] at [facility] for pre-operative evaluation prior to planned surgery.    Patient reports progressive right knee pain over several years, particularly bothersome since 4/2022. She tells me she has severe pain at times in the right knee, radiating down into the lower leg, that is aggravated by activity, improves while sitting at rest. She tells me the knee is swollen and painful, and she reports instability in the joint and feeling that she will fall. She has been using a cane or walker for stability recently. She tells me the pain is so severe, she very much wants to proceed with joint replacement.   She recently saw her PCP for pre-operative evaluation and has been cleared for surgery.     In regards to medical history:  - Type 2 Diabetes requiring long term use of insulin, complicated by diabetic retinopathy and macular edema, diabetic polyneuropathy, microalbuminuria. Follows with Wanda Gipson for endocrinology as well as podiatry and ophthalmology. Medications include Novolog 70/30, Jardiance, and Rybelsus. She reports compliance with her medications. She tells me she has been stress eating and her sugars have been running high lately, up to 250. She is also taking gabapentin for the neuropathy.  - Essential Hypertension, medically managed with amlodipine, losartan, and hctz.   - Hyperlipidemia on atorvastatin.   - Hypothyroidism, on levothyroxine  - Pulmonary nodule- hospitalized for COVID 19 infwection 12/2021 at  "which time CXR showed a 14mm nodular opacity. Outpatient CT recommended. Reviewed with her and she has not done this yet.  - Left foot drop- wears brace  - Lumbar radiculopathy.     The patient denies any current or recent chest pain or pressure, dyspnea, cough, sputum, fevers, chills, abdominal pain, nausea, vomiting, diarrhea or other symptoms. She tells me other than her orthopedic issues, she feels quite well and has no complaints.   Denies a personal or family history of adverse reactions to anesthesia.   Denies a personal or family history of easy/excessive bleeding or thrombosis.  Functionally, she is quite limited. She is using a walker or cane with all ambulation. She said she hasn't been able to walk 2 blocks in over a year. She climbs the steps very slowly and carefully. She denies angina.      The patient denies, on specific questioning, the following:  No history of MI, arrhythmia,or CHF.  No history of QUINTEN.  No history of DVT/PE.  No history of COPD.  No history of CVA.  No history of CKD.     PAST MEDICAL AND SURGICAL HISTORY      Past Medical History:   Diagnosis Date   • Abnormal finding on chest xray 12/2021   • Arthritis    • COVID-19 12/2021   • COVID-19 vaccine series completed     Moderna: \" 3 doses\"   • Diabetic neuropathy (CMS/HCC)    • DM (diabetes mellitus), type 2 with ophthalmic complications (CMS/HCC)    • Hypertension    • Hypothyroidism    • Left foot drop    • Lipid disorder    • Type 2 diabetes mellitus treated with insulin (CMS/HCC)        Past Surgical History:   Procedure Laterality Date   • CATARACT EXTRACTION Right    • COLONOSCOPY     • COMBINED HYSTEROSCOPY DIAGNOSTIC / D&C  2007   • EYE SURGERY Left 2017    cataract   • VITRECTOMY Left 2016       MEDICATIONS        Current Outpatient Medications:   •  aspirin 81 mg enteric coated tablet, Take 81 mg by mouth daily., Disp: , Rfl:   •  levothyroxine (SYNTHROID) 25 mcg tablet, TAKE 1 TABLET BY MOUTH ONCE DAILY. (Patient taking " "differently: Take 25 mcg by mouth every morning.), Disp: 90 tablet, Rfl: 3  •  acetaminophen-codeine (TYLENOL #3) 300-30 mg per tablet, TAKE 1 TABLET BY MOUTH EVERY 4 TO 6 HOURS AS NEEDED, Disp: , Rfl:   •  amLODIPine (NORVASC) 5 mg tablet, TAKE 1 TABLET BY MOUTH EVERY DAY (Patient taking differently: Take 5 mg by mouth every morning.), Disp: 90 tablet, Rfl: 1  •  atorvastatin (LIPITOR) 10 mg tablet, TAKE 1 TABLET BY MOUTH EVERY DAY (Patient taking differently: Take 10 mg by mouth every morning.), Disp: 90 tablet, Rfl: 3  •  BD KRISTIN 2ND GEN PEN NEEDLE 32 gauge x 5/32\" needle, INJECT TWICE A DAY, Disp: 200 each, Rfl: 1  •  empagliflozin (JARDIANCE) 10 mg tablet, TAKE 1 TABLET BY MOUTH EVERY DAY (Patient taking differently: Take 10 mg by mouth daily. TAKE 1 TABLET BY MOUTH EVERY DAY), Disp: 90 tablet, Rfl: 3  •  FREESTYLE LAURA 14 DAY READER misc, 1 each continuously. 1 reader dx code E 11.9 (Patient not taking: Reported on 10/18/2022), Disp: 1 each, Rfl: 11  •  FREESTYLE LAURA 14 DAY SENSOR kit, 1 each continuously. 1 sensor every 14 days dx code E 11.9 (Patient not taking: Reported on 10/18/2022), Disp: 1 kit, Rfl: 11  •  gabapentin 300 mg capsule, Take 300 mg by mouth daily before breakfast. 600 mg in the evening, Disp: , Rfl:   •  hydrochlorothiazide (HYDRODIURIL) 12.5 mg tablet, TAKE 1 TABLET BY MOUTH EVERY DAY (Patient taking differently: Take 25 mg by mouth every evening.), Disp: 90 tablet, Rfl: 1  •  insulin asp prt-insulin aspart (NovoLOG Mix 70-30FlexPen U-100) 100 unit/mL (70-30) pen, INJECT 25 UNITS IN AM AND 30 UNITS IN PM Strength: 100 unit/mL (70-30) (Patient taking differently: 2 (two) times a day before breakfast and dinner. INJECT 25 UNITS IN AM AND 30 UNITS IN PM Strength: 100 unit/mL (70-30)), Disp: 45 mL, Rfl: 3  •  losartan (COZAAR) 50 mg tablet, TAKE 1 TABLET BY MOUTH EVERY DAY (Patient taking differently: Take 50 mg by mouth every evening.), Disp: 90 tablet, Rfl: 3  •  meloxicam (MOBIC) 15 mg " tablet, TAKE 1 TABLET BY MOUTH EVERY DAY FOR 30 DAYS  Stopped for eurgery, Disp: , Rfl:   •  ONETOUCH DELICA LANCETS 33 gauge misc, 1 each 3 (three) times a day., Disp: 100 each, Rfl: 11  •  ONETOUCH ULTRA BLUE TEST STRIP strip, PT CHECKS BLOOD SUGAR 4 TIMES A DAY, Disp: 200 strip, Rfl: 0  •  ONETOUCH VERIO TEST STRIPS strip, 1 each 3 (three) times a day., Disp: 100 strip, Rfl: 11  •  semaglutide (RYBELSUS) 14 mg tablet, Take 1 tablet (14 mg total) by mouth daily., Disp: 90 tablet, Rfl: 3    ALLERGIES      Citrus and derivatives    FAMILY HISTORY      family history includes Diabetes in her biological mother; Hypertension in her biological mother; Stroke in her biological father.    Denies any prior known family history of DVTs/PEs/clotting disorder    SOCIAL HISTORY      Social History     Tobacco Use   • Smoking status: Never     Passive exposure: Past   • Smokeless tobacco: Never   Substance Use Topics   • Alcohol use: Yes     Comment: occasionally   • Drug use: Never     Lives alone. Guill[place] is all on one level, but there is a full flight of steps to get into her building.   Sister lives locally and is here today but sister cares for her  who is unwell. Patient hopes to go to inpatient rehab.    REVIEW OF SYSTEMS      Constitutional: Denies Fever, Chills, Fatigue, Malaise, Unintentional Weight loss; + anxiety surrounding upcoming surgery  HEENT: Denies Vision changes, Epistaxis, Trouble Swallowing, Sore Throat  Respiratory: Denies Cough, Shortness of Breath, Wheezing  Cardiovascular: Denies Chest Pain, Exertional Dyspnea, Palpitations, Edema  GI: Denies Abdominal pain, Nausea, Vomiting, Changes in bowel movements, Blood in stool; occasional constipation   : Denies Dysuria, Hematuria  Musculoskeletal: Denies Neck pain, + chronic right knee pain and chronic right lower extremity edema. + low back pain with sciatica, + left foot drop.   Skin: Denies rash  Neuro: Denies Headache, Syncope, Weakness,  "Numbness  Heme: Denies Bleeding      PHYSICAL EXAMINATION      Visit Vitals  BP (!) 143/66 (BP Location: Right upper arm, Patient Position: Sitting)   Pulse 88   Temp 36.8 °C (98.3 °F) (Temporal)   Resp 18   Ht 1.676 m (5' 6\")   Wt 83.9 kg (185 lb)   SpO2 100%   BMI 29.86 kg/m²     Body mass index is 29.86 kg/m².    Physical Exam   Constitutional: Well developed, Well Nourished, No apparent distress, Appears Comfortable  HEENT: NCAT, oropharynx clear  Neck: supple, no LAD  Cardiovascular: Normal rate, Regular Rhythm, Normal heart sounds, No murmur noted, No carotid bruits  Pulmonary: Normal effort without distress, Normal breath sounds without wheezing, rhonchi, or rales  Abdomen: Soft, no distension, nontender, normal bowel sounds  Extremities: No edema  Neurologic: No gross abnormalities, patient able to ambulate around exam room independently  Skin: Warm, No rash  Psychiatric: Patient tells me she is very anxious about upcoming surgery      LABS / EKG        Labs 2/17/23 from PCP  Hemoglobin 13.5  MCV 86  Hct 40.5  WBC 8.8  Plts 191  Glucose 71  Na 142  K 4.0  Cl 102  Ca 9.7  BUN 25  Cr 0.77  eGFR 86  AST 27  ALT 25  AlkPhos 96  Bili Total 0.7  PT 11.2  INR 1.1  PTT 30    2/22/23  TSH 0.63  A1C 8.8    ECG/Telemetry  Normal sinus rhythm, Septal infarct, unchanged from prior tEKG 12/2/21    ASSESSMENT AND PLAN         Pre-op examination  Medical management and tabby-operative risk commentary as noted below.    Localized osteoarthritis of right knee  Surgery as planned. Patient will hold her aspirin in preparation for surgery.    Type 2 diabetes mellitus treated with insulin (CMS/[facility])  Suboptimal glucose control, A1C 8.8 today with home glucose readings as high as 250 recently. Perioperative hyperglycemia places patient at increased risk of complications including infection. D/w Dr. Centeno's staff and recommended consideration of postponing surgery pending improved glycemic control and he will take that into " consideration. Should she proceed, she will need to hold her jardiance for 3 days prior to surgery, per protocol. Advised patient to contact her endocrinologist to review glucose readings and discuss mediation regimen adjustments now and leading up to surgery. I defer to her endorinologist's instructions regarding medication instructions and should she proceed with surgery, recommend endocrinology consultation to aid with glycemic control.     Essential hypertension  Blood pressure elevated today, but patient notes considerable anxiety surrounding this visit and upcoming surgery. She will continue her amlodipine uninterrupted. Hold losartan and hctz on the morning of surgery and these may be resumed post-op assuming no renal dysfunction, hypotension, nor volume depletion.     Hyperlipidemia  Continue atorvastatin uninterrupted.     Abnormal finding on EKG  EKG with septal infarct, unchanged from prior. Patient denies prior cardiac work up. Risk factors include insulin dependent diabetes, hypertension, hyperlipidemia. Denies angina, but functionally limited due to osteoarthritis, < 4 METs. Cardiac evaluation would be prudent and discussed with Dr. Centeno's staff.     Hypothyroidism  TSH wnl today. Continue levothyroxine uninterrupted.     Left foot drop  Noted for the record. Patient wears a brace. Falls precautions while in hospital.     Abnormal finding on chest xray  CXR 12/2021 noted 14 mm rounded opacity new from prior CXR. This study was done while patient was in the hospital with Covid. She was to get outpatient follow up CT for further evaluation and has not done that yet. Reviewed this with her and reminded her to get follow up imaging. She is asymptomatic. She verbalized agreement.        In regards to perioperative cardiac risk:  The patient denies any history of ischemic heart disease, denies any history of CHF, denies any history of CVA, and does not have a pre-operative creatinine > 2 mg/dL. She is on  insulin and has an abnormal EKG with a septal infarct, unchanged from prior. From a functional standpoint, she is greatly limited by her knee pain and does not attain 4 METs. She is at intermediate risk for cardiac death, nonfatal MI, and nonfatal cardiac arrest. Preoperative cardiac evaluation is recommended. D/w Dr. Centeno's staff.     Further comments:  Resume supplements when OK with surgical team.  I would encourage incentive spirometry to assist with minimizing tabby-operative pulmonary risk.  DVT prophylaxis and timing of such per the discretion of Dr. Centeno.     Please do not hesitate to contact [facility] during the upcoming hospitalization with any questions or concerns.     Franci Harris MD  2/23/2023

## 2023-02-23 ENCOUNTER — TELEPHONE (OUTPATIENT)
Dept: ENDOCRINOLOGY | Facility: CLINIC | Age: 63
End: 2023-02-23
Payer: COMMERCIAL

## 2023-02-23 ENCOUNTER — OFFICE VISIT (OUTPATIENT)
Dept: CARDIOLOGY | Facility: CLINIC | Age: 63
End: 2023-02-23
Payer: COMMERCIAL

## 2023-02-23 VITALS
DIASTOLIC BLOOD PRESSURE: 58 MMHG | WEIGHT: 185.2 LBS | OXYGEN SATURATION: 98 % | BODY MASS INDEX: 29.77 KG/M2 | HEART RATE: 96 BPM | HEIGHT: 66 IN | SYSTOLIC BLOOD PRESSURE: 132 MMHG

## 2023-02-23 DIAGNOSIS — Z01.810 PRE-OPERATIVE CARDIOVASCULAR EXAMINATION: Primary | ICD-10-CM

## 2023-02-23 DIAGNOSIS — R94.31 ABNORMAL EKG: ICD-10-CM

## 2023-02-23 DIAGNOSIS — I10 ESSENTIAL HYPERTENSION: ICD-10-CM

## 2023-02-23 DIAGNOSIS — E66.09 OBESITY DUE TO EXCESS CALORIES, UNSPECIFIED CLASSIFICATION, UNSPECIFIED WHETHER SERIOUS COMORBIDITY PRESENT: ICD-10-CM

## 2023-02-23 DIAGNOSIS — E78.5 HYPERLIPIDEMIA, UNSPECIFIED HYPERLIPIDEMIA TYPE: ICD-10-CM

## 2023-02-23 DIAGNOSIS — E11.9 TYPE 2 DIABETES MELLITUS TREATED WITH INSULIN (CMS/HCC): ICD-10-CM

## 2023-02-23 DIAGNOSIS — Z79.4 TYPE 2 DIABETES MELLITUS TREATED WITH INSULIN (CMS/HCC): ICD-10-CM

## 2023-02-23 PROBLEM — M17.11 LOCALIZED OSTEOARTHRITIS OF RIGHT KNEE: Status: ACTIVE | Noted: 2023-02-23

## 2023-02-23 PROBLEM — Z01.818 PRE-OP EXAMINATION: Status: ACTIVE | Noted: 2023-02-23

## 2023-02-23 PROBLEM — R93.89 ABNORMAL FINDING ON CHEST XRAY: Status: ACTIVE | Noted: 2021-12-01

## 2023-02-23 PROCEDURE — 3075F SYST BP GE 130 - 139MM HG: CPT | Performed by: INTERNAL MEDICINE

## 2023-02-23 PROCEDURE — 99205 OFFICE O/P NEW HI 60 MIN: CPT | Performed by: INTERNAL MEDICINE

## 2023-02-23 PROCEDURE — 3078F DIAST BP <80 MM HG: CPT | Performed by: INTERNAL MEDICINE

## 2023-02-23 PROCEDURE — 3008F BODY MASS INDEX DOCD: CPT | Performed by: INTERNAL MEDICINE

## 2023-02-23 NOTE — PROGRESS NOTES
"   [facility] Cardiology  [place] Office       Patient ID: Bebe Burks 62 y.o. female 1960  PCP: Paula Walker MD   Endocrine: Wanda Mai   HPI     Bebe Burks is a 62 y.o. female who presents for an initial office visit.    She has a past medical history significant for hypertension, hyperlipidemia, diabetes, hypothyroidism, obesity, and osteoarthritis.    She presents for preoperative cardiovascular risk assessment in preparation for right total knee replacement on 2/27/2023 by Dr. Centeno.    She was seen in preoperative consultation by Dr. Walker and then at the prevention testing center at [facility] and because she had a mildly abnormal ECG and cardiovascular risk factors in the setting of somewhat limited functional capacity she was referred for urgent cardiovascular preoperative risk assessment.     Most recent lipid panel from 10/17/2022 showed , TG 62, HDL 46, LDL 85..      She denies a history of chest pain or shortness of breath but admittedly she is quite limited by her osteoarthritis and has been for several years.  She does not exercise formally.  She is unable to go up a flight of steps.  When she does get around she uses a cane and she does not have any major chest pain or shortness of breath.    Her ECG in [facility] yesterday was personally reviewed and shows sinus rhythm 86 bpm and poor R wave progression for which an old anteroseptal infarct cannot be excluded.  This pattern is similar to a prior ECG.     Medications     Current Outpatient Medications   Medication Instructions   • acetaminophen-codeine (TYLENOL #3) 300-30 mg per tablet TAKE 1 TABLET BY MOUTH EVERY 4 TO 6 HOURS AS NEEDED   • amLODIPine (NORVASC) 5 mg tablet TAKE 1 TABLET BY MOUTH EVERY DAY   • aspirin 81 mg, oral, Daily   • atorvastatin (LIPITOR) 10 mg tablet TAKE 1 TABLET BY MOUTH EVERY DAY   • BD KRISTIN 2ND GEN PEN NEEDLE 32 gauge x 5/32\" needle INJECT TWICE A DAY   • empagliflozin (JARDIANCE) 10 mg tablet TAKE 1 " "TABLET BY MOUTH EVERY DAY   • FREESTYLE LAURA 14 DAY READER misc 1 each, miscellaneous (specify), Continuous, 1 reader dx code E 11.9   • FREESTYLE LAURA 14 DAY SENSOR kit 1 each, miscellaneous (specify), Continuous, 1 sensor every 14 days dx code E 11.9   • gabapentin (NEURONTIN) 300 mg, oral, Daily before breakfast, 600 mg in the evening   • hydrochlorothiazide (HYDRODIURIL) 12.5 mg tablet TAKE 1 TABLET BY MOUTH EVERY DAY   • insulin asp prt-insulin aspart (NovoLOG Mix 70-30FlexPen U-100) 100 unit/mL (70-30) pen INJECT 25 UNITS IN AM AND 30 UNITS IN PMStrength: 100 unit/mL (70-30)   • levothyroxine (SYNTHROID) 25 mcg tablet TAKE 1 TABLET BY MOUTH ONCE DAILY.   • losartan (COZAAR) 50 mg tablet TAKE 1 TABLET BY MOUTH EVERY DAY   • ONETOUCH DELICA LANCETS 33 gauge misc 1 each, miscellaneous (specify), 3 times daily   • ONETOUCH ULTRA BLUE TEST STRIP strip PT CHECKS BLOOD SUGAR 4 TIMES A DAY   • ONETOUCH VERIO TEST STRIPS strip 1 each, miscellaneous (specify), 3 times daily   • RYBELSUS 14 mg, oral, Daily         Allergies     Citrus and derivatives     Vital Signs/Exam     Vitals:    02/23/23 1412   BP: (!) 132/58   Pulse: 96   SpO2: 98%   Weight: 84 kg (185 lb 3.2 oz)   Height: 1.676 m (5' 6\")     BP Readings from Last 3 Encounters:   02/23/23 (!) 132/58   02/22/23 (!) 143/66   10/18/22 128/68     Wt Readings from Last 3 Encounters:   02/23/23 84 kg (185 lb 3.2 oz)   02/22/23 83.9 kg (185 lb)   10/18/22 85.3 kg (188 lb)      Body mass index is 29.89 kg/m².    Gen: no distress  HEENT: sclera anicteric  Neck: no JVD, no carotid bruit  Lungs: clear bilaterally; no crackles, rhonchi, wheezing  Heart/chest: normal rate and regular rhythm; no murmur  Extremities: no lower extremity edema  Neuro: nonfocal, alert and oriented  Psych: normal mood and affect     Diagnostic Data   Diagnostic data was personally reviewed. Available medical records were reviewed and counseling/education was performed where appropriate.  Speech " recognition software was used. Typographical errors may be present.    Labs:  Lab Results   Component Value Date    CHOL 143 10/17/2022    CHOL 157 11/22/2021     Lab Results   Component Value Date    HDL 46 (L) 10/17/2022    HDL 38 (L) 11/22/2021     Lab Results   Component Value Date    LDLCALC 85 10/17/2022    LDLCALC 98 11/22/2021     Lab Results   Component Value Date    TRIG 62 10/17/2022    TRIG 106 11/22/2021     Lab Results   Component Value Date    WBC 6.40 12/04/2021    HGB 14.4 12/04/2021     (L) 12/04/2021     Lab Results   Component Value Date    GLUCOSE 58 (L) 10/17/2022    CALCIUM 9.1 10/17/2022     10/17/2022    K 4.2 10/17/2022    CO2 28 10/17/2022     10/17/2022    BUN 19 10/17/2022    CREATININE 0.7 10/17/2022     Lab Results   Component Value Date    EGFR >60.0 10/17/2022     Lab Results   Component Value Date    ALBUMIN 3.9 10/17/2022    BILITOT 0.6 10/17/2022    BILIDIR 0.2 12/02/2021    ALKPHOS 72 10/17/2022    ALT 24 10/17/2022    AST 22 10/17/2022     Lab Results   Component Value Date    HGBA1C 8.8 (H) 02/22/2023     No results found for: HSTROPONINI  Lab Results   Component Value Date    TSH 0.63 02/22/2023     CrCl cannot be calculated (Patient's most recent lab result is older than the maximum 28 days allowed.).    Echocardiogram:      Stress tests:       Cardiac cath:      Vascular studies:  No results found for this or any previous visit from the past 730 days.      Peripheral:  No results found for this or any previous visit from the past 730 days.      Cardiac monitoring:     ECG independently reviewed: 2/23/2023 SR 86 GA WP STP   Assessment and Plan      Bebe Burks is a 62 y.o. female who was seen today for an office visit.       Diagnosis Plan   1. Pre-operative cardiovascular examination        2. Essential hypertension        3. Hyperlipidemia, unspecified hyperlipidemia type        4. Obesity due to excess calories, unspecified classification, unspecified  whether serious comorbidity present        5. Type 2 diabetes mellitus treated with insulin (CMS/[facility])              Preop evaluation  Abnormal ECG (PRWP, similar to prior)  Hypertension  Hyperlipidemia  Diabetes    She is scheduled for total knee replacement early next week.    Her ECG is abnormal in that an old anteroseptal MI cannot be excluded however she has no evidence of this by history and her ECG is unchanged compared to previous ECG from December 2021 so I am not highly suspicious about this.  In fact if anything the ECG is improved suggesting this is due to lead positioning.  She does have limited functional capacity but has not had any concerning cardiopulmonary symptoms.    She is at acceptable cardiovascular risk for planned orthopedic surgery on Monday.  I did advise an echocardiogram be done for completeness and suggested this could be done in a few months after recovery from knee surgery which she will schedule..    I advised continued treatment of cardiovascular risk factors including efforts at healthy diet and exercise habits and routine primary care follow-up with Dr. Walker and endocrinology follow-up with Ms. Mai.  I be happy to see her again in the future should the need arise and certainly should she have an unexpected abnormality on future echocardiogram I will plan to see her at that time      Charles Vaughan MD, FACC, RS  Cardiology and Cardiac Electrophysiology  [facility] [place] Office: [phone]  Primary [facility]: [facility]   2/23/2023

## 2023-02-23 NOTE — TELEPHONE ENCOUNTER
Spoke with patient she is not to take any insulin the morning of her surgery also hold Jardiance the day before clearance letter sent to Dr. Centeno

## 2023-02-23 NOTE — TELEPHONE ENCOUNTER
Patient left  stating that she will be having a knee replacement on Monday, 2/27/23.  Her NP advised her to contact you regarding her A1C of 8.8 to ask if there's anything that the patient should do differently prior to her surgery.  Best contact number is [phone].

## 2023-02-23 NOTE — ASSESSMENT & PLAN NOTE
EKG with septal infarct, unchanged from prior. Patient denies prior cardiac work up. Risk factors include insulin dependent diabetes, hypertension, hyperlipidemia. Denies angina, but functionally limited due to osteoarthritis, < 4 METs. Cardiac evaluation would be prudent and discussed with Dr. Centeno's staff.

## 2023-02-23 NOTE — ASSESSMENT & PLAN NOTE
Blood pressure elevated today, but patient notes considerable anxiety surrounding this visit and upcoming surgery. She will continue her amlodipine uninterrupted. Hold losartan and hctz on the morning of surgery and these may be resumed post-op assuming no renal dysfunction, hypotension, nor volume depletion.

## 2023-02-23 NOTE — LETTER
February 23, 2023     Paula Walker MD  [address]  [place] [id number]    Patient: Bebe Burks  YOB: 1960  Date of Visit: 2/23/2023      Dear Dr. Walker:    Thank you for referring Bebe Burks to me for evaluation. Below are my notes for this consultation.    If you have questions, please do not hesitate to call me. I look forward to following your patient along with you.         Sincerely,        Charles Vaughan MD        CC: MD Clement Jordan Matthew B, MD  2/23/2023  3:09 PM  Signed     [facility]  [place] Office       Patient ID: Bebe Burks 62 y.o. female 1960  PCP: Paula Walker MD   Endocrine: Wanda Mai   HPI     Bebe Burks is a 62 y.o. female who presents for an initial office visit.    She has a past medical history significant for hypertension, hyperlipidemia, diabetes, hypothyroidism, obesity, and osteoarthritis.    She presents for preoperative cardiovascular risk assessment in preparation for right total knee replacement on 2/27/2023 by Dr. Centeno.    She was seen in preoperative consultation by Dr. Walker and then at the prevention testing center at [facility] and because she had a mildly abnormal ECG and cardiovascular risk factors in the setting of somewhat limited functional capacity she was referred for urgent cardiovascular preoperative risk assessment.     Most recent lipid panel from 10/17/2022 showed , TG 62, HDL 46, LDL 85..      She denies a history of chest pain or shortness of breath but admittedly she is quite limited by her osteoarthritis and has been for several years.  She does not exercise formally.  She is unable to go up a flight of steps.  When she does get around she uses a cane and she does not have any major chest pain or shortness of breath.    Her ECG in [facility] yesterday was personally reviewed and shows sinus rhythm 86 bpm and poor R wave progression for which an old anteroseptal infarct cannot be excluded.   "This pattern is similar to a prior ECG.     Medications     Current Outpatient Medications   Medication Instructions   • acetaminophen-codeine (TYLENOL #3) 300-30 mg per tablet TAKE 1 TABLET BY MOUTH EVERY 4 TO 6 HOURS AS NEEDED   • amLODIPine (NORVASC) 5 mg tablet TAKE 1 TABLET BY MOUTH EVERY DAY   • aspirin 81 mg, oral, Daily   • atorvastatin (LIPITOR) 10 mg tablet TAKE 1 TABLET BY MOUTH EVERY DAY   • BD KRISTIN 2ND GEN PEN NEEDLE 32 gauge x 5/32\" needle INJECT TWICE A DAY   • empagliflozin (JARDIANCE) 10 mg tablet TAKE 1 TABLET BY MOUTH EVERY DAY   • FREESTYLE LAURA 14 DAY READER misc 1 each, miscellaneous (specify), Continuous, 1 reader dx code E 11.9   • FREESTYLE LAURA 14 DAY SENSOR kit 1 each, miscellaneous (specify), Continuous, 1 sensor every 14 days dx code E 11.9   • gabapentin (NEURONTIN) 300 mg, oral, Daily before breakfast, 600 mg in the evening   • hydrochlorothiazide (HYDRODIURIL) 12.5 mg tablet TAKE 1 TABLET BY MOUTH EVERY DAY   • insulin asp prt-insulin aspart (NovoLOG Mix 70-30FlexPen U-100) 100 unit/mL (70-30) pen INJECT 25 UNITS IN AM AND 30 UNITS IN PMStrength: 100 unit/mL (70-30)   • levothyroxine (SYNTHROID) 25 mcg tablet TAKE 1 TABLET BY MOUTH ONCE DAILY.   • losartan (COZAAR) 50 mg tablet TAKE 1 TABLET BY MOUTH EVERY DAY   • ONETOUCH DELICA LANCETS 33 gauge misc 1 each, miscellaneous (specify), 3 times daily   • ONETOUCH ULTRA BLUE TEST STRIP strip PT CHECKS BLOOD SUGAR 4 TIMES A DAY   • ONETOUCH VERIO TEST STRIPS strip 1 each, miscellaneous (specify), 3 times daily   • RYBELSUS 14 mg, oral, Daily         Allergies     Citrus and derivatives     Vital Signs/Exam     Vitals:    02/23/23 1412   BP: (!) 132/58   Pulse: 96   SpO2: 98%   Weight: 84 kg (185 lb 3.2 oz)   Height: 1.676 m (5' 6\")     BP Readings from Last 3 Encounters:   02/23/23 (!) 132/58   02/22/23 (!) 143/66   10/18/22 128/68     Wt Readings from Last 3 Encounters:   02/23/23 84 kg (185 lb 3.2 oz)   02/22/23 83.9 kg (185 lb) "   10/18/22 85.3 kg (188 lb)      Body mass index is 29.89 kg/m².    Gen: no distress  HEENT: sclera anicteric  Neck: no JVD, no carotid bruit  Lungs: clear bilaterally; no crackles, rhonchi, wheezing  Heart/chest: normal rate and regular rhythm; no murmur  Extremities: no lower extremity edema  Neuro: nonfocal, alert and oriented  Psych: normal mood and affect     Diagnostic Data   Diagnostic data was personally reviewed. Available medical records were reviewed and counseling/education was performed where appropriate.  Speech recognition software was used. Typographical errors may be present.    Labs:  Lab Results   Component Value Date    CHOL 143 10/17/2022    CHOL 157 11/22/2021     Lab Results   Component Value Date    HDL 46 (L) 10/17/2022    HDL 38 (L) 11/22/2021     Lab Results   Component Value Date    LDLCALC 85 10/17/2022    LDLCALC 98 11/22/2021     Lab Results   Component Value Date    TRIG 62 10/17/2022    TRIG 106 11/22/2021     Lab Results   Component Value Date    WBC 6.40 12/04/2021    HGB 14.4 12/04/2021     (L) 12/04/2021     Lab Results   Component Value Date    GLUCOSE 58 (L) 10/17/2022    CALCIUM 9.1 10/17/2022     10/17/2022    K 4.2 10/17/2022    CO2 28 10/17/2022     10/17/2022    BUN 19 10/17/2022    CREATININE 0.7 10/17/2022     Lab Results   Component Value Date    EGFR >60.0 10/17/2022     Lab Results   Component Value Date    ALBUMIN 3.9 10/17/2022    BILITOT 0.6 10/17/2022    BILIDIR 0.2 12/02/2021    ALKPHOS 72 10/17/2022    ALT 24 10/17/2022    AST 22 10/17/2022     Lab Results   Component Value Date    HGBA1C 8.8 (H) 02/22/2023     No results found for: HSTROPONINI  Lab Results   Component Value Date    TSH 0.63 02/22/2023     CrCl cannot be calculated (Patient's most recent lab result is older than the maximum 28 days allowed.).    Echocardiogram:      Stress tests:       Cardiac cath:      Vascular studies:  No results found for this or any previous visit from  the past 730 days.      Peripheral:  No results found for this or any previous visit from the past 730 days.      Cardiac monitoring:     ECG independently reviewed: 2/23/2023 SR 86 TN WP STP   Assessment and Plan      Bebe Burks is a 62 y.o. female who was seen today for an office visit.       Diagnosis Plan   1. Pre-operative cardiovascular examination        2. Essential hypertension        3. Hyperlipidemia, unspecified hyperlipidemia type        4. Obesity due to excess calories, unspecified classification, unspecified whether serious comorbidity present        5. Type 2 diabetes mellitus treated with insulin (CMS/[facility])              Preop evaluation  Abnormal ECG (PRWP, similar to prior)  Hypertension  Hyperlipidemia  Diabetes    She is scheduled for total knee replacement early next week.    Her ECG is abnormal in that an old anteroseptal MI cannot be excluded however she has no evidence of this by history and her ECG is unchanged compared to previous ECG from December 2021 so I am not highly suspicious about this.  In fact if anything the ECG is improved suggesting this is due to lead positioning.  She does have limited functional capacity but has not had any concerning cardiopulmonary symptoms.    She is at acceptable cardiovascular risk for planned orthopedic surgery on Monday.  I did advise an echocardiogram be done for completeness and suggested this could be done in a few months after recovery from knee surgery which she will schedule..    I advised continued treatment of cardiovascular risk factors including efforts at healthy diet and exercise habits and routine primary care follow-up with Dr. Walker and endocrinology follow-up with Ms. Mai.  I be happy to see her again in the future should the need arise and certainly should she have an unexpected abnormality on future echocardiogram I will plan to see her at that time      Charles Vaughan MD, [facility], [facility]  Cardiology and Cardiac  Electrophysiology  [place] Office: [phone]  Primary [facility] [place]: [facility]   2/23/2023

## 2023-02-23 NOTE — ASSESSMENT & PLAN NOTE
Suboptimal glucose control, A1C 8.8 today with home glucose readings as high as 250 recently. Perioperative hyperglycemia places patient at increased risk of complications including infection. D/w Dr. Centeno's staff and recommended consideration of postponing surgery pending improved glycemic control and he will take that into consideration. Should she proceed, she will need to hold her jardiance for 3 days prior to surgery, per protocol. Advised patient to contact her endocrinologist to review glucose readings and discuss mediation regimen adjustments now and leading up to surgery. I defer to her endorinologist's instructions regarding medication instructions and should she proceed with surgery, recommend endocrinology consultation to aid with glycemic control.

## 2023-02-23 NOTE — ASSESSMENT & PLAN NOTE
CXR 12/2021 noted 14 mm rounded opacity new from prior CXR. This study was done while patient was in the hospital with Covid. She was to get outpatient follow up CT for further evaluation and has not done that yet. Reviewed this with her and reminded her to get follow up imaging. She is asymptomatic. She verbalized agreement.

## 2023-02-24 ENCOUNTER — ANESTHESIA EVENT (OUTPATIENT)
Dept: OPERATING ROOM | Facility: HOSPITAL | Age: 63
Setting detail: HOSPITAL OUTPATIENT SURGERY
DRG: 470 | End: 2023-02-24
Payer: COMMERCIAL

## 2023-02-27 ENCOUNTER — APPOINTMENT (OUTPATIENT)
Dept: RADIOLOGY | Facility: HOSPITAL | Age: 63
DRG: 470 | End: 2023-02-27
Attending: PHYSICIAN ASSISTANT
Payer: COMMERCIAL

## 2023-02-27 ENCOUNTER — ANESTHESIA (OUTPATIENT)
Dept: OPERATING ROOM | Facility: HOSPITAL | Age: 63
Setting detail: HOSPITAL OUTPATIENT SURGERY
DRG: 470 | End: 2023-02-27
Payer: COMMERCIAL

## 2023-02-27 ENCOUNTER — HOSPITAL ENCOUNTER (INPATIENT)
Facility: HOSPITAL | Age: 63
LOS: 1 days | Discharge: NURSING, REHAB OR LONG-TERM CARE | DRG: 470 | End: 2023-03-01
Attending: ORTHOPAEDIC SURGERY | Admitting: ORTHOPAEDIC SURGERY
Payer: COMMERCIAL

## 2023-02-27 LAB
ABO + RH BLD: NORMAL
D AG BLD QL: POSITIVE
GLUCOSE BLD-MCNC: 136 MG/DL (ref 70–99)
GLUCOSE BLD-MCNC: 141 MG/DL (ref 70–99)
GLUCOSE BLD-MCNC: 193 MG/DL (ref 70–99)
GLUCOSE BLD-MCNC: 230 MG/DL (ref 70–99)
LABORATORY COMMENT REPORT: NORMAL
POCT TEST: ABNORMAL

## 2023-02-27 PROCEDURE — 63600000 HC DRUGS/DETAIL CODE: Performed by: PHYSICIAN ASSISTANT

## 2023-02-27 PROCEDURE — 25800000 HC PHARMACY IV SOLUTIONS: Performed by: PHYSICIAN ASSISTANT

## 2023-02-27 PROCEDURE — 71000011 HC PACU PHASE 1 EA ADDL MIN: Performed by: ORTHOPAEDIC SURGERY

## 2023-02-27 PROCEDURE — 63700000 HC SELF-ADMINISTRABLE DRUG: Performed by: INTERNAL MEDICINE

## 2023-02-27 PROCEDURE — 99254 IP/OBS CNSLTJ NEW/EST MOD 60: CPT | Performed by: INTERNAL MEDICINE

## 2023-02-27 PROCEDURE — 97530 THERAPEUTIC ACTIVITIES: CPT | Mod: GP

## 2023-02-27 PROCEDURE — 73560 X-RAY EXAM OF KNEE 1 OR 2: CPT | Mod: RT

## 2023-02-27 PROCEDURE — 25000000 HC PHARMACY GENERAL

## 2023-02-27 PROCEDURE — 25000000 HC PHARMACY GENERAL: Performed by: PHYSICIAN ASSISTANT

## 2023-02-27 PROCEDURE — 71000001 HC PACU PHASE 1 INITIAL 30MIN: Performed by: ORTHOPAEDIC SURGERY

## 2023-02-27 PROCEDURE — C1713 ANCHOR/SCREW BN/BN,TIS/BN: HCPCS | Performed by: ORTHOPAEDIC SURGERY

## 2023-02-27 PROCEDURE — 36000006 HC OR LEVEL 6 INITIAL 30MIN: Performed by: ORTHOPAEDIC SURGERY

## 2023-02-27 PROCEDURE — 25800000 HC PHARMACY IV SOLUTIONS

## 2023-02-27 PROCEDURE — C1776 JOINT DEVICE (IMPLANTABLE): HCPCS | Performed by: ORTHOPAEDIC SURGERY

## 2023-02-27 PROCEDURE — 63700000 HC SELF-ADMINISTRABLE DRUG: Performed by: PHYSICIAN ASSISTANT

## 2023-02-27 PROCEDURE — 25000000 HC PHARMACY GENERAL: Performed by: ORTHOPAEDIC SURGERY

## 2023-02-27 PROCEDURE — 27200000 HC STERILE SUPPLY: Performed by: ORTHOPAEDIC SURGERY

## 2023-02-27 PROCEDURE — 36415 COLL VENOUS BLD VENIPUNCTURE: CPT | Performed by: PHYSICIAN ASSISTANT

## 2023-02-27 PROCEDURE — 63600000 HC DRUGS/DETAIL CODE: Mod: JW | Performed by: STUDENT IN AN ORGANIZED HEALTH CARE EDUCATION/TRAINING PROGRAM

## 2023-02-27 PROCEDURE — 63600000 HC DRUGS/DETAIL CODE: Performed by: STUDENT IN AN ORGANIZED HEALTH CARE EDUCATION/TRAINING PROGRAM

## 2023-02-27 PROCEDURE — 63600000 HC DRUGS/DETAIL CODE

## 2023-02-27 PROCEDURE — 0SRC0J9 REPLACEMENT OF RIGHT KNEE JOINT WITH SYNTHETIC SUBSTITUTE, CEMENTED, OPEN APPROACH: ICD-10-PCS | Performed by: ORTHOPAEDIC SURGERY

## 2023-02-27 PROCEDURE — 36000016 HC OR LEVEL 6 EA ADDL MIN: Performed by: ORTHOPAEDIC SURGERY

## 2023-02-27 PROCEDURE — 37000010 HC ANESTHESIA SPINAL: Performed by: ORTHOPAEDIC SURGERY

## 2023-02-27 PROCEDURE — 97162 PT EVAL MOD COMPLEX 30 MIN: CPT | Mod: GP

## 2023-02-27 DEVICE — ATTUNE KNEE SYSTEM TIBIAL BASE FIXED BEARING SIZE 5 CEMENTED
Type: IMPLANTABLE DEVICE | Site: KNEE | Status: FUNCTIONAL
Brand: ATTUNE

## 2023-02-27 DEVICE — ATTUNE PATELLA MEDIALIZED DOME 38MM CEMENTED AOX
Type: IMPLANTABLE DEVICE | Site: KNEE | Status: FUNCTIONAL
Brand: ATTUNE

## 2023-02-27 DEVICE — ATTUNE KNEE SYSTEM FEMORAL POSTERIOR STABILIZED SIZE 7 RIGHT CEMENTED
Type: IMPLANTABLE DEVICE | Site: KNEE | Status: FUNCTIONAL
Brand: ATTUNE

## 2023-02-27 DEVICE — ATTUNE KNEE SYSTEM TIBIAL INSERT FIXED BEARING POSTERIOR STABILIZED 7 6MM AOX
Type: IMPLANTABLE DEVICE | Site: KNEE | Status: FUNCTIONAL
Brand: ATTUNE

## 2023-02-27 DEVICE — CEMENT BONE SIMPLEX FULL DOSE: Type: IMPLANTABLE DEVICE | Site: KNEE | Status: FUNCTIONAL

## 2023-02-27 RX ORDER — INSULIN LISPRO 100 [IU]/ML
3-5 INJECTION, SOLUTION INTRAVENOUS; SUBCUTANEOUS
Status: DISCONTINUED | OUTPATIENT
Start: 2023-02-27 | End: 2023-03-01 | Stop reason: HOSPADM

## 2023-02-27 RX ORDER — PROPOFOL 10 MG/ML
INJECTION, EMULSION INTRAVENOUS CONTINUOUS PRN
Status: DISCONTINUED | OUTPATIENT
Start: 2023-02-27 | End: 2023-02-27 | Stop reason: SURG

## 2023-02-27 RX ORDER — AMLODIPINE BESYLATE 5 MG/1
5 TABLET ORAL DAILY
Status: DISCONTINUED | OUTPATIENT
Start: 2023-02-27 | End: 2023-03-01 | Stop reason: HOSPADM

## 2023-02-27 RX ORDER — ONDANSETRON HYDROCHLORIDE 2 MG/ML
4 INJECTION, SOLUTION INTRAVENOUS EVERY 8 HOURS PRN
Status: DISCONTINUED | OUTPATIENT
Start: 2023-02-27 | End: 2023-03-01 | Stop reason: HOSPADM

## 2023-02-27 RX ORDER — IBUPROFEN 200 MG
16-32 TABLET ORAL AS NEEDED
Status: DISCONTINUED | OUTPATIENT
Start: 2023-02-27 | End: 2023-03-01 | Stop reason: HOSPADM

## 2023-02-27 RX ORDER — OXYCODONE HYDROCHLORIDE 5 MG/1
5 TABLET ORAL ONCE AS NEEDED
Status: DISCONTINUED | OUTPATIENT
Start: 2023-02-27 | End: 2023-02-27 | Stop reason: HOSPADM

## 2023-02-27 RX ORDER — OXYCODONE HYDROCHLORIDE 5 MG/1
10 TABLET ORAL EVERY 4 HOURS PRN
Status: DISCONTINUED | OUTPATIENT
Start: 2023-02-27 | End: 2023-03-01 | Stop reason: HOSPADM

## 2023-02-27 RX ORDER — DEXTROSE 50 % IN WATER (D50W) INTRAVENOUS SYRINGE
25 AS NEEDED
Status: DISCONTINUED | OUTPATIENT
Start: 2023-02-27 | End: 2023-02-27 | Stop reason: HOSPADM

## 2023-02-27 RX ORDER — TRAMADOL HYDROCHLORIDE 50 MG/1
50 TABLET ORAL EVERY 6 HOURS PRN
Status: DISCONTINUED | OUTPATIENT
Start: 2023-02-27 | End: 2023-03-01 | Stop reason: HOSPADM

## 2023-02-27 RX ORDER — LOSARTAN POTASSIUM 50 MG/1
50 TABLET ORAL DAILY
Status: DISCONTINUED | OUTPATIENT
Start: 2023-02-28 | End: 2023-03-01 | Stop reason: HOSPADM

## 2023-02-27 RX ORDER — SODIUM CHLORIDE, SODIUM LACTATE, POTASSIUM CHLORIDE, CALCIUM CHLORIDE 600; 310; 30; 20 MG/100ML; MG/100ML; MG/100ML; MG/100ML
INJECTION, SOLUTION INTRAVENOUS CONTINUOUS
Status: DISCONTINUED | OUTPATIENT
Start: 2023-02-27 | End: 2023-03-01 | Stop reason: HOSPADM

## 2023-02-27 RX ORDER — MIDAZOLAM HYDROCHLORIDE 2 MG/2ML
INJECTION, SOLUTION INTRAMUSCULAR; INTRAVENOUS AS NEEDED
Status: DISCONTINUED | OUTPATIENT
Start: 2023-02-27 | End: 2023-02-27 | Stop reason: SURG

## 2023-02-27 RX ORDER — IBUPROFEN 200 MG
16-32 TABLET ORAL AS NEEDED
Status: DISCONTINUED | OUTPATIENT
Start: 2023-02-27 | End: 2023-02-27 | Stop reason: HOSPADM

## 2023-02-27 RX ORDER — FENTANYL CITRATE/PF 250MCG/5ML
SYRINGE (ML) INTRAVENOUS AS NEEDED
Status: DISCONTINUED | OUTPATIENT
Start: 2023-02-27 | End: 2023-02-27 | Stop reason: SURG

## 2023-02-27 RX ORDER — DEXTROSE 40 %
15-30 GEL (GRAM) ORAL AS NEEDED
Status: DISCONTINUED | OUTPATIENT
Start: 2023-02-27 | End: 2023-02-27 | Stop reason: HOSPADM

## 2023-02-27 RX ORDER — FENTANYL CITRATE 50 UG/ML
50 INJECTION, SOLUTION INTRAMUSCULAR; INTRAVENOUS EVERY 5 MIN PRN
Status: COMPLETED | OUTPATIENT
Start: 2023-02-27 | End: 2023-02-27

## 2023-02-27 RX ORDER — KETOROLAC TROMETHAMINE 15 MG/ML
7.5 INJECTION, SOLUTION INTRAMUSCULAR; INTRAVENOUS
Status: DISPENSED | OUTPATIENT
Start: 2023-02-27 | End: 2023-03-01

## 2023-02-27 RX ORDER — NAPROXEN SODIUM 220 MG/1
81 TABLET, FILM COATED ORAL 2 TIMES DAILY
Status: DISCONTINUED | OUTPATIENT
Start: 2023-02-27 | End: 2023-03-01 | Stop reason: HOSPADM

## 2023-02-27 RX ORDER — HYDROCHLOROTHIAZIDE 12.5 MG/1
12.5 TABLET ORAL DAILY
Status: DISCONTINUED | OUTPATIENT
Start: 2023-02-28 | End: 2023-03-01 | Stop reason: HOSPADM

## 2023-02-27 RX ORDER — ONDANSETRON HYDROCHLORIDE 2 MG/ML
INJECTION, SOLUTION INTRAVENOUS AS NEEDED
Status: DISCONTINUED | OUTPATIENT
Start: 2023-02-27 | End: 2023-02-27 | Stop reason: SURG

## 2023-02-27 RX ORDER — DEXTROSE 40 %
15-30 GEL (GRAM) ORAL AS NEEDED
Status: DISCONTINUED | OUTPATIENT
Start: 2023-02-27 | End: 2023-03-01 | Stop reason: HOSPADM

## 2023-02-27 RX ORDER — ACETAMINOPHEN 325 MG/1
650 TABLET ORAL ONCE AS NEEDED
Status: DISCONTINUED | OUTPATIENT
Start: 2023-02-27 | End: 2023-02-27 | Stop reason: HOSPADM

## 2023-02-27 RX ORDER — ACETAMINOPHEN 325 MG/1
975 TABLET ORAL ONCE
Status: COMPLETED | OUTPATIENT
Start: 2023-02-27 | End: 2023-02-27

## 2023-02-27 RX ORDER — HYDROMORPHONE HYDROCHLORIDE 1 MG/ML
0.5 INJECTION, SOLUTION INTRAMUSCULAR; INTRAVENOUS; SUBCUTANEOUS
Status: DISCONTINUED | OUTPATIENT
Start: 2023-02-27 | End: 2023-02-27 | Stop reason: HOSPADM

## 2023-02-27 RX ORDER — ONDANSETRON 4 MG/1
4 TABLET, ORALLY DISINTEGRATING ORAL EVERY 8 HOURS PRN
Status: DISCONTINUED | OUTPATIENT
Start: 2023-02-27 | End: 2023-03-01 | Stop reason: HOSPADM

## 2023-02-27 RX ORDER — ATORVASTATIN CALCIUM 10 MG/1
10 TABLET, FILM COATED ORAL DAILY
Status: DISCONTINUED | OUTPATIENT
Start: 2023-02-27 | End: 2023-03-01 | Stop reason: HOSPADM

## 2023-02-27 RX ORDER — BISACODYL 10 MG/1
10 SUPPOSITORY RECTAL DAILY PRN
Status: DISCONTINUED | OUTPATIENT
Start: 2023-02-27 | End: 2023-03-01 | Stop reason: HOSPADM

## 2023-02-27 RX ORDER — BUPIVACAINE HYDROCHLORIDE AND EPINEPHRINE 5; 5 MG/ML; UG/ML
INJECTION, SOLUTION EPIDURAL; INTRACAUDAL; PERINEURAL
Status: DISCONTINUED | OUTPATIENT
Start: 2023-02-27 | End: 2023-02-27 | Stop reason: HOSPADM

## 2023-02-27 RX ORDER — ALUMINUM HYDROXIDE, MAGNESIUM HYDROXIDE, AND SIMETHICONE 1200; 120; 1200 MG/30ML; MG/30ML; MG/30ML
30 SUSPENSION ORAL EVERY 4 HOURS PRN
Status: DISCONTINUED | OUTPATIENT
Start: 2023-02-27 | End: 2023-03-01 | Stop reason: HOSPADM

## 2023-02-27 RX ORDER — LEVOTHYROXINE SODIUM 25 UG/1
25 TABLET ORAL DAILY
Status: DISCONTINUED | OUTPATIENT
Start: 2023-02-27 | End: 2023-03-01 | Stop reason: HOSPADM

## 2023-02-27 RX ORDER — GABAPENTIN 300 MG/1
600 CAPSULE ORAL NIGHTLY
Status: DISCONTINUED | OUTPATIENT
Start: 2023-02-27 | End: 2023-03-01 | Stop reason: HOSPADM

## 2023-02-27 RX ORDER — INSULIN LISPRO 100 [IU]/ML
6 INJECTION, SOLUTION INTRAVENOUS; SUBCUTANEOUS
Status: DISCONTINUED | OUTPATIENT
Start: 2023-02-27 | End: 2023-02-28

## 2023-02-27 RX ORDER — HYDROMORPHONE HYDROCHLORIDE 1 MG/ML
0.5 INJECTION, SOLUTION INTRAMUSCULAR; INTRAVENOUS; SUBCUTANEOUS
Status: DISCONTINUED | OUTPATIENT
Start: 2023-02-27 | End: 2023-03-01 | Stop reason: HOSPADM

## 2023-02-27 RX ORDER — FAMOTIDINE 10 MG/ML
INJECTION INTRAVENOUS AS NEEDED
Status: DISCONTINUED | OUTPATIENT
Start: 2023-02-27 | End: 2023-02-27 | Stop reason: SURG

## 2023-02-27 RX ORDER — ACETAMINOPHEN 325 MG/1
975 TABLET ORAL EVERY 8 HOURS
Status: DISCONTINUED | OUTPATIENT
Start: 2023-02-27 | End: 2023-03-01 | Stop reason: HOSPADM

## 2023-02-27 RX ORDER — POLYETHYLENE GLYCOL 3350 17 G/17G
17 POWDER, FOR SOLUTION ORAL DAILY
Status: DISCONTINUED | OUTPATIENT
Start: 2023-02-27 | End: 2023-03-01 | Stop reason: HOSPADM

## 2023-02-27 RX ORDER — AMOXICILLIN 250 MG
1 CAPSULE ORAL 2 TIMES DAILY
Status: DISCONTINUED | OUTPATIENT
Start: 2023-02-27 | End: 2023-03-01 | Stop reason: HOSPADM

## 2023-02-27 RX ORDER — GABAPENTIN 300 MG/1
300 CAPSULE ORAL
Status: DISCONTINUED | OUTPATIENT
Start: 2023-02-28 | End: 2023-03-01 | Stop reason: HOSPADM

## 2023-02-27 RX ORDER — INSULIN GLARGINE 100 [IU]/ML
20 INJECTION, SOLUTION SUBCUTANEOUS NIGHTLY
Status: DISCONTINUED | OUTPATIENT
Start: 2023-02-27 | End: 2023-02-28

## 2023-02-27 RX ORDER — DEXAMETHASONE SODIUM PHOSPHATE 4 MG/ML
6 INJECTION, SOLUTION INTRA-ARTICULAR; INTRALESIONAL; INTRAMUSCULAR; INTRAVENOUS; SOFT TISSUE ONCE
Status: COMPLETED | OUTPATIENT
Start: 2023-02-28 | End: 2023-02-28

## 2023-02-27 RX ORDER — VANCOMYCIN HYDROCHLORIDE
1.25
Status: COMPLETED | OUTPATIENT
Start: 2023-02-27 | End: 2023-02-27

## 2023-02-27 RX ORDER — DIPHENHYDRAMINE HCL 25 MG
25 CAPSULE ORAL EVERY 6 HOURS PRN
Status: DISCONTINUED | OUTPATIENT
Start: 2023-02-27 | End: 2023-03-01 | Stop reason: HOSPADM

## 2023-02-27 RX ORDER — DEXTROSE 50 % IN WATER (D50W) INTRAVENOUS SYRINGE
25 AS NEEDED
Status: DISCONTINUED | OUTPATIENT
Start: 2023-02-27 | End: 2023-03-01 | Stop reason: HOSPADM

## 2023-02-27 RX ORDER — SODIUM CHLORIDE 9 MG/ML
INJECTION, SOLUTION INTRAVENOUS CONTINUOUS PRN
Status: DISCONTINUED | OUTPATIENT
Start: 2023-02-27 | End: 2023-02-27 | Stop reason: SURG

## 2023-02-27 RX ORDER — DIPHENHYDRAMINE HCL 50 MG/ML
25 VIAL (ML) INJECTION EVERY 6 HOURS PRN
Status: DISCONTINUED | OUTPATIENT
Start: 2023-02-27 | End: 2023-03-01 | Stop reason: HOSPADM

## 2023-02-27 RX ORDER — OXYCODONE HYDROCHLORIDE 5 MG/1
5 TABLET ORAL EVERY 4 HOURS PRN
Status: DISCONTINUED | OUTPATIENT
Start: 2023-02-27 | End: 2023-03-01 | Stop reason: HOSPADM

## 2023-02-27 RX ORDER — ONDANSETRON HYDROCHLORIDE 2 MG/ML
4 INJECTION, SOLUTION INTRAVENOUS
Status: DISCONTINUED | OUTPATIENT
Start: 2023-02-27 | End: 2023-02-27 | Stop reason: HOSPADM

## 2023-02-27 RX ORDER — GABAPENTIN 300 MG/1
300 CAPSULE ORAL ONCE
Status: COMPLETED | OUTPATIENT
Start: 2023-02-27 | End: 2023-02-27

## 2023-02-27 RX ADMIN — MIDAZOLAM HYDROCHLORIDE 1 MG: 1 INJECTION, SOLUTION INTRAMUSCULAR; INTRAVENOUS at 09:51

## 2023-02-27 RX ADMIN — KETOROLAC TROMETHAMINE 7.5 MG: 15 INJECTION, SOLUTION INTRAMUSCULAR; INTRAVENOUS at 19:50

## 2023-02-27 RX ADMIN — ACETAMINOPHEN 975 MG: 325 TABLET ORAL at 15:54

## 2023-02-27 RX ADMIN — PROPOFOL 50 MCG/KG/MIN: 10 INJECTION, EMULSION INTRAVENOUS at 10:00

## 2023-02-27 RX ADMIN — OXYCODONE HYDROCHLORIDE 5 MG: 5 TABLET ORAL at 15:08

## 2023-02-27 RX ADMIN — INSULIN LISPRO 3 UNITS: 100 INJECTION, SOLUTION INTRAVENOUS; SUBCUTANEOUS at 18:04

## 2023-02-27 RX ADMIN — ACETAMINOPHEN 975 MG: 325 TABLET ORAL at 08:56

## 2023-02-27 RX ADMIN — FENTANYL CITRATE 50 MCG: 50 INJECTION INTRAMUSCULAR; INTRAVENOUS at 12:13

## 2023-02-27 RX ADMIN — INSULIN LISPRO 3 UNITS: 100 INJECTION, SOLUTION INTRAVENOUS; SUBCUTANEOUS at 23:23

## 2023-02-27 RX ADMIN — VANCOMYCIN HYDROCHLORIDE 1.25 G: 500 INJECTION, POWDER, LYOPHILIZED, FOR SOLUTION INTRAVENOUS at 09:15

## 2023-02-27 RX ADMIN — OXYCODONE HYDROCHLORIDE 5 MG: 5 TABLET ORAL at 15:53

## 2023-02-27 RX ADMIN — HYDROMORPHONE HYDROCHLORIDE 0.5 MG: 1 INJECTION, SOLUTION INTRAMUSCULAR; INTRAVENOUS; SUBCUTANEOUS at 13:04

## 2023-02-27 RX ADMIN — INSULIN LISPRO 6 UNITS: 100 INJECTION, SOLUTION INTRAVENOUS; SUBCUTANEOUS at 18:04

## 2023-02-27 RX ADMIN — GABAPENTIN 300 MG: 300 CAPSULE ORAL at 08:56

## 2023-02-27 RX ADMIN — ASPIRIN 81 MG CHEWABLE TABLET 81 MG: 81 TABLET CHEWABLE at 19:50

## 2023-02-27 RX ADMIN — MEPIVACAINE HYDROCHLORIDE 3.6 ML: 15 INJECTION, SOLUTION EPIDURAL; INFILTRATION at 09:58

## 2023-02-27 RX ADMIN — TRANEXAMIC ACID 1700 MG: 100 INJECTION, SOLUTION INTRAVENOUS at 10:00

## 2023-02-27 RX ADMIN — MIDAZOLAM HYDROCHLORIDE 1 MG: 1 INJECTION, SOLUTION INTRAMUSCULAR; INTRAVENOUS at 09:48

## 2023-02-27 RX ADMIN — ONDANSETRON HYDROCHLORIDE 4 MG: 2 SOLUTION INTRAMUSCULAR; INTRAVENOUS at 09:55

## 2023-02-27 RX ADMIN — INSULIN GLARGINE 20 UNITS: 100 INJECTION, SOLUTION SUBCUTANEOUS at 23:23

## 2023-02-27 RX ADMIN — FENTANYL CITRATE 50 MCG: 50 INJECTION INTRAMUSCULAR; INTRAVENOUS at 12:34

## 2023-02-27 RX ADMIN — SODIUM CHLORIDE, POTASSIUM CHLORIDE, SODIUM LACTATE AND CALCIUM CHLORIDE: 600; 310; 30; 20 INJECTION, SOLUTION INTRAVENOUS at 12:36

## 2023-02-27 RX ADMIN — SODIUM CHLORIDE, POTASSIUM CHLORIDE, SODIUM LACTATE AND CALCIUM CHLORIDE: 600; 310; 30; 20 INJECTION, SOLUTION INTRAVENOUS at 23:39

## 2023-02-27 RX ADMIN — FAMOTIDINE 20 MG: 10 INJECTION, SOLUTION INTRAVENOUS at 10:08

## 2023-02-27 RX ADMIN — Medication 25 MCG: at 09:54

## 2023-02-27 RX ADMIN — SENNOSIDES AND DOCUSATE SODIUM 1 TABLET: 50; 8.6 TABLET ORAL at 19:50

## 2023-02-27 RX ADMIN — OXYCODONE HYDROCHLORIDE 10 MG: 5 TABLET ORAL at 19:50

## 2023-02-27 RX ADMIN — CEFAZOLIN 2 G: 2 INJECTION, POWDER, FOR SOLUTION INTRAMUSCULAR; INTRAVENOUS at 18:05

## 2023-02-27 RX ADMIN — CEFAZOLIN 2 G: 2 INJECTION, POWDER, FOR SOLUTION INTRAMUSCULAR; INTRAVENOUS at 10:02

## 2023-02-27 RX ADMIN — SODIUM CHLORIDE: 9 INJECTION, SOLUTION INTRAVENOUS at 09:48

## 2023-02-27 ASSESSMENT — COGNITIVE AND FUNCTIONAL STATUS - GENERAL
WALKING IN HOSPITAL ROOM: 2 - A LOT
MOVING TO AND FROM BED TO CHAIR: 3 - A LITTLE
CLIMB 3 TO 5 STEPS WITH RAILING: 1 - TOTAL
AFFECT: ANXIOUS;EMOTIONALLY LABILE
STANDING UP FROM CHAIR USING ARMS: 3 - A LITTLE

## 2023-02-27 NOTE — ANESTHESIA PREPROCEDURE EVALUATION
Relevant Problems   CARDIOVASCULAR   (+) Essential hypertension      MUSCULOSKELETAL   (+) Localized osteoarthritis of right knee      NEUROLOGY   (+) Polyneuropathy due to type 2 diabetes mellitus (CMS/HCC)      Other   (+) COVID-19 virus infection   (+) Controlled type 2 diabetes mellitus, with long-term current use of insulin (CMS/HCC)   (+) Hypothyroidism   (+) Type 2 diabetes mellitus treated with insulin (CMS/[facility])       In regards to medical history:  - Type 2 Diabetes requiring long term use of insulin, complicated by diabetic retinopathy and macular edema, diabetic polyneuropathy, microalbuminuria. Follows with Wanda Gipson for endocrinology as well as podiatry and ophthalmology. Medications include Novolog 70/30, Jardiance, and Rybelsus. She reports compliance with her medications. She tells me she has been stress eating and her sugars have been running high lately, up to 250. She is also taking gabapentin for the neuropathy.  - Essential Hypertension, medically managed with amlodipine, losartan, and hctz.   - Hyperlipidemia on atorvastatin.   - Hypothyroidism, on levothyroxine  - Pulmonary nodule- hospitalized for COVID 19 infwection 12/2021 at which time CXR showed a 14mm nodular opacity. Outpatient CT recommended. Reviewed with her and she has not done this yet.  - Left foot drop- wears brace  - Lumbar radiculopathy    Labs 2/17/23 from PCP  Hemoglobin 13.5  MCV 86  Hct 40.5  WBC 8.8  Plts 191  Glucose 71  Na 142  K 4.0  Cl 102  Ca 9.7  BUN 25  Cr 0.77  eGFR 86  AST 27  ALT 25  AlkPhos 96  Bili Total 0.7  PT 11.2  INR 1.1  PTT 30     2/22/23  TSH 0.63  A1C 8.8    Anesthesia ROS/MED HX    Anesthesia History - neg  Pulmonary - neg  Neuro/Psych - neg  Cardiovascular   hypertension  Hematological - neg  GI/Hepatic- neg  Musculoskeletal- neg  Renal Disease- neg  Endo/Other   Diabetes   Hypothyroidism       Past Surgical History:   Procedure Laterality Date   • CATARACT EXTRACTION Right    • COLONOSCOPY     •  COMBINED HYSTEROSCOPY DIAGNOSTIC / D&C  2007   • EYE SURGERY Left 2017    cataract   • VITRECTOMY Left 2016       Physical Exam    Airway   Mallampati: II   TM distance: >3 FB   Neck ROM: full  Cardiovascular - normal   Rhythm: regular   Rate: normalPulmonary - normal   clear to auscultation  Dental - normal        Anesthesia Plan    Plan: spinal    Technique: spinal     Lines and Monitors: PIV     Airway: natural airway / supplemental oxygen   2 ASA  Blood Products:     Use of Blood Products Discussed: Yes     Consented to blood products  Anesthetic plan and risks discussed with: patient  Postop Plan:   Patient Disposition: phase II then home   Pain Management: IV analgesics

## 2023-02-27 NOTE — HOSPITAL COURSE
Bebe is a 62 y.o. female admitted on 2/27/2023 with Osteoarthritis of right knee, unspecified osteoarthritis type [M17.11]  Arthritis of right knee [M17.11]. Principal problem is No Principal Problem: There is no principal problem currently on the Problem List. Please update the Problem List and refresh..    Past Medical History  Bebe has a past medical history of Abnormal finding on chest xray (12/2021), Arthritis, COVID-19 (12/2021), COVID-19 vaccine series completed, Diabetic neuropathy (CMS/[facility]), DM (diabetes mellitus), type 2 with ophthalmic complications (CMS/[facility]), Hypertension, Hypothyroidism, Left foot drop, Lipid disorder, and Type 2 diabetes mellitus treated with insulin (CMS/[facility]).    History of Present Illness   Pt is s/p R TKA on 2/27 w/ Dr Centeno

## 2023-02-27 NOTE — PLAN OF CARE
Problem: Joint Function Impaired (Knee Arthroplasty)  Goal: Optimal Functional Ability  Outcome: Progressing     Problem: Adult Inpatient Plan of Care  Goal: Plan of Care Review  Outcome: Progressing  Flowsheets (Taken 2/27/2023 1630)  Progress: improving  Plan of Care Reviewed With:   patient   sibling  Outcome Summary: PT eval complete. Pt requires min A for bed mobility & transfer to commode w/ RW. Rec d/c to SNF

## 2023-02-27 NOTE — ANESTHESIA POSTPROCEDURE EVALUATION
Patient: Bebe Burks    Procedure Summary     Date: 02/27/23 Room / [facility]: [facility] OR 11 / [facility] OR    Anesthesia Start: 0948 Anesthesia Stop: 1159    Procedure: Right Total Knee Repalcement (Right: Knee) Diagnosis:       Osteoarthritis of right knee, unspecified osteoarthritis type      (M17.11 DJD Right Knee)    Surgeons: Gideon Centeno MD Responsible Provider: Vanessa Jean Baptiste MD    Anesthesia Type: spinal ASA Status: 2          Anesthesia Type: spinal  PACU Vitals  2/27/2023 1156 - 2/27/2023 1207      2/27/2023  1201             BP: 120/56    Temp: 36.8 °C (98.2 °F)    Pulse: 77    Resp: 14    SpO2: 100 %            Anesthesia Post Evaluation    Pain management: adequate  Patient participation: complete - patient participated  Level of consciousness: awake and alert  Cardiovascular status: acceptable  Airway Patency: adequate  Respiratory status: acceptable  Hydration status: acceptable  Anesthetic complications: no

## 2023-02-27 NOTE — PLAN OF CARE
Pt can move both lower extremities with positive sensation in both legs. Doppler used to obtain pluse in RLE.

## 2023-02-27 NOTE — OR SURGEON
Pre-Procedure patient identification:  I am the primary operating surgeon/proceduralist and I have identified the patient on 02/27/23 at 9:14 AM Gideon Centeno MD  Phone Number: [phone]

## 2023-02-27 NOTE — ANESTHESIA PROCEDURE NOTES
Spinal Block    Patient location during procedure: OR  Start time: 2/27/2023 9:55 AM  End time: 2/27/2023 9:58 AM  Staffing  Anesthesiologist: Vanessa Jean Baptiste MD  Reason for block: at surgeon's request  Preanesthetic Checklist  Completed: patient identified, surgical consent, pre-op evaluation, timeout performed, IV checked, risks and benefits discussed, monitors and equipment checked and sterile field maintained during procedure  Spinal Block  Patient position: sitting  Prep: ChloraPrep and site prepped and draped  Patient monitoring: heart rate, cardiac monitor, continuous pulse ox and blood pressure  Approach: midline  Location: L3-4  Injection technique: single-shot  Needle  Needle type: Quincke   Needle gauge: 24 G  Needle length: 3.5 in  Assessment  Events: cerebrospinal fluid  Additional Notes  Procedure well tolerated. Vital signs stable.    Medications Administered -   mepivacaine (PF) 15 mg/mL (1.5 %) - intrathecal   3.6 mL - 2/27/2023 9:58:00 AM           [As Noted in HPI] : as noted in HPI [Negative] : Heme/Lymph

## 2023-02-27 NOTE — CONSULTS
"PATIENTS: Bebe Burks  YOB: 1960  DATE: February 27, 2023  VISIT TYPE: consult  REASON FOR CONSULT: DM    History of Present Illness:   This is a 62-year-old female with past medical history as listed below who came in for right total knee replacement for severe degenerative osteoarthritis.  Endocrine consulted for diabetes management    Patient has type 2 diabetes.  Follows up with Wanda VILLARREAL in our practice.  Diabetes is complicated by retinopathy.  Prior to admission, patient was taking Jardiance 10 mg daily,  70/30 mixture 25 units in a.m. and 30 units p.m. in addition to Rybelsus 14 mg daily.  She has held Jardiance and Rybelsus several days prior to the surgery.  A1c 8.8.  Reports he was controlling his diet well and glucose mostly low 100s in the last week.  Denies significant hypoglycemia.     After admission, patient was started on Humalog sliding scale.  Jardiance and Rybelsus was reordered.  Regular diet is ordered.  Patient reports severe pain.  No nausea vomiting or abdominal pain.    Noted dexamethasone 6 mg 1 dose was ordered for tomorrow morning.    Medical History:  Past Medical History:   Diagnosis Date   • Abnormal finding on chest xray 12/2021   • Arthritis    • COVID-19 12/2021   • COVID-19 vaccine series completed     Moderna: \" 3 doses\"   • Diabetic neuropathy (CMS/HCC)    • DM (diabetes mellitus), type 2 with ophthalmic complications (CMS/HCC)    • Hypertension    • Hypothyroidism    • Left foot drop    • Lipid disorder    • Type 2 diabetes mellitus treated with insulin (CMS/HCC)        Surgical History:   Past Surgical History:   Procedure Laterality Date   • CATARACT EXTRACTION Right    • COLONOSCOPY     • COMBINED HYSTEROSCOPY DIAGNOSTIC / D&C  2007   • EYE SURGERY Left 2017    cataract   • VITRECTOMY Left 2016       Family History:  Family History   Problem Relation Age of Onset   • Diabetes Biological Mother    • Hypertension Biological Mother    • Stroke " Biological Father        Social history:  Social History     Tobacco Use   • Smoking status: Never     Passive exposure: Past   • Smokeless tobacco: Never   Vaping Use   • Vaping Use: Never used   Substance Use Topics   • Alcohol use: Yes     Comment: occasionally   • Drug use: Never       Medication(active prior to today):  Reviewed.     Allergies:  Citrus and derivatives    Review of Systems:   All negative except HPI.     Vitals Signs:  Vitals:    02/27/23 1300 02/27/23 1315 02/27/23 1348 02/27/23 1415   BP: 135/66 (!) 130/59 139/61 127/63   BP Location:   Right upper arm Right upper arm   Patient Position:   Lying Lying   Pulse: 76 76 83 82   Resp:   18 18   Temp:   36.2 °C (97.2 °F) 36.3 °C (97.4 °F)   TempSrc:   Oral Axillary   SpO2: 99% 96% 99% 98%   Weight:       Height:         Body mass index is 29.86 kg/m².    Physical Exam:  Constitutional: Patient is oriented to person, place, and time.  Appears well-developed and well-nourished.   Eyes: EOM are normal. No scleral icterus  Neck: Normal range of motion. Neck supple.   Cardiovascular: Normal rate, regular rhythm and normal heart sounds.    Pulmonary/Chest: Effort normal and breath sounds normal.   Abdominal: Soft. non tender  Musculoskeletal: s/p TKR on the R  Neurological:  Alert and oriented to person, place, and time.   Skin: Skin is warm and dry.   Psychiatric:  Normal mood and affect. Behavior is normal. Judgment and thought content normal.     Labs:  CBC Results       12/04/21 12/03/21 12/02/21     0327 0625 1618    WBC 6.40 4.80 5.35    RBC 4.92 4.70 4.74    HGB 14.4 13.8 14.1    HCT 42.8 41.0 41.4    MCV 87.0 87.2 87.3    MCH 29.3 29.4 29.7    MCHC 33.6 33.7 34.1     120 114         Comment for PLT at 0327 on 12/04/21: ALL RESULTS HAVE BEEN RECHECKED    Comment for PLT at 0625 on 12/03/21: ALL RESULTS HAVE BEEN RECHECKED    Comment for PLT at 1618 on 12/02/21: ALL RESULTS HAVE BEEN RECHECKED        Lab Results   Component Value Date     HGBA1C 8.8 (H) 02/22/2023     Lab Results   Component Value Date    GLUCOSE 58 (L) 10/17/2022    CALCIUM 9.1 10/17/2022     10/17/2022    K 4.2 10/17/2022    CO2 28 10/17/2022     10/17/2022    BUN 19 10/17/2022    CREATININE 0.7 10/17/2022     Lab Results   Component Value Date    ALT 24 10/17/2022    AST 22 10/17/2022    ALKPHOS 72 10/17/2022    BILITOT 0.6 10/17/2022     Lab Results   Component Value Date    CHOL 143 10/17/2022    CHOL 157 11/22/2021    CHOL 172 04/24/2021     Lab Results   Component Value Date    HDL 46 (L) 10/17/2022    HDL 38 (L) 11/22/2021    HDL 42 (L) 04/24/2021     Lab Results   Component Value Date    LDLCALC 85 10/17/2022    LDLCALC 98 11/22/2021    LDLCALC 109 (H) 04/24/2021     Lab Results   Component Value Date    TRIG 62 10/17/2022    TRIG 106 11/22/2021    TRIG 107 04/24/2021     No results found for: CHOLHDL    Lab Results   Component Value Date    TSH 0.63 02/22/2023     Assessment/Plan:  1, type 2 diabetes  - Uncontrolled baseline on 70/30, SGLT2 inhibitor and GLP-1 agonist.  A1c 8.8.   -GLP-1 agonist and SGLT2 inhibitor was held several days prior to surgery.  -Expect glucose will be worsened with stress/steroids use.     -Would use basal bolus in the hospital.   -Lantus 20 units tonight  -Humalog 6 units before each meal for today.      -Consider to increase Humalog to 10 units tomorrow.  Anticipating higher postmeal glucose with steroids tomorrow.   -Humalog sliding scale.   -Continue to hold SGLT2 inhibitor/GLP-1 agonist until patient is more stable    -Patient can be discharged with prior to admission meds.     2, status post right total knee replacement  -Management per primary team    3, hyperlipidemia  -On statin    4, hypertension  -Continue antihypertensives.       Thank you very much for allowing me participating in this patient's care. Please feel free to contact me if any questions.       Electronically signed by: Isabela Claire MD on 2/27/2023 2:46 PM

## 2023-02-27 NOTE — OP NOTE
REPORT TYPE: Operative Note    DATE OF OPERATION: 02/27/2023    PREOPERATIVE DIAGNOSIS:  Severe primary degenerative osteoarthritis, right knee with valgus deformity.    POSTOPERATIVE DIAGNOSIS:  Severe primary degenerative osteoarthritis, right knee with valgus deformity.    PROCEDURE:  Right total knee replacement (Attune).    SURGEON:  Gideon Centeno MD    ASSISTANT: Dr. Samuel Leonardo Ms. .    ANESTHESIA:  Spinal.    ESTIMATED BLOOD LOSS:  Minimal.    TOURNIQUET TIME:  70 minutes.    FLUIDS:  2 liters of crystalloid.    SPECIMENS:  Bony fragments, synovium and soft tissue.    DRAINS:  None.    DESCRIPTION OF PROCEDURE:  The patient was brought to the operating room and placed on the operating table in sitting position.  After induction of spinal anesthesia, placed in the supine position and the right knee was prepped and draped free in sterile   fashion.  After timeout, the limb was exsanguinated, tourniquet raised to 250 mmHg.  Midline longitudinal incision was used to gain access to the knee joint.  Dissection was carried down through the subcutaneous tissues through the medial retinaculum to   the knee joint itself.  Knee was examined.  There were severe degenerative changes throughout the knee with extensive osteophyte formation and bony sclerosis.  There was also attenuation of the MCL, although with good endpoint in full extension and 90   degrees of flexion.  Plans ensued for total knee arthroplasty.    An extramedullary tibia guide was used to osteotomize the proximal tibia, 90 degrees accessed tibia with a 5-degree posterior slope.  Proximal tibia bone was removed.  The PCL recessed.  In similar fashion, distal femur was osteotomized with a 5-degree   valgus cut.  The femur was measured to accept a size 7.  Jigs were applied in proper rotation to perform anterior, posterior oblique cuts as well as intercondylar box cut.  The knee was distracted in flexion.  Debridement of the posterior compartment  was   performed removing all osteophyte meniscal remnants.  Finally, the undersurface of the patella was osteotomized and 3 anchoring holes drilled to accept a 38 mm component.  Trial components were then applied using sizes 7 for the femur, 5 for the tibia   and with a 10 mm spacer, knee was checked for stability as noted above in full extension and 90 degrees of flexion.  The knee was found to be stable in valgus stress.  There was slight laxity at 45 degrees of flexion, although this was not felt to need a   constrained articulation.  At this point, all trial components were removed.  The central tibia stem was formed appropriately in proper rotation.  At this point, all bony surfaces irrigated with pulsatile lavage and dried.  Two bags of cement were mixed   and all components were cemented in place and held until the cement had hardened.  All excess cement was removed.  Tibial trial spacer was removed.  Final cement debridement and irrigation performed.  The actual 10 mm spacer was inserted, locked into   place.  Again, knee was found to be stable as above.  All bleeding was ceased by use of electrocautery.  Drains were not inserted.  Marcaine 0.5% with epinephrine was infiltrated in soft tissues to help with postoperative pain.  Closure was then   performed in layers using #1 Vicryl for the retinaculum, 2-0 Vicryl for subcutaneous tissue, staples for the skin.  Sterile compressive dressing was applied.  The tourniquet was released.  The patient was taken off the operating table, placed on her bed   and sent to the PACU in stable condition.        Gideon Centeno MD    DD: 02/27/2023 12:20  DT: 02/27/2023 13:44  Voice ID: [id number]/Report ID: [id number]

## 2023-02-27 NOTE — PLAN OF CARE
Plan of Care Review  Plan of Care Reviewed With: patient  Progress: improving  Outcome Summary: Received patient from recovery room, vitals stable, medicated for pain, out of bed with PT, call bell with in reach, will continue to monitor

## 2023-02-27 NOTE — ANESTHESIOLOGIST PRE-PROCEDURE ATTESTATION
Pre-Procedure Patient Identification:  I am the Primary Anesthesiologist and have identified the patient on 02/27/23 at 8:16 AM.   I have confirmed the procedure(s) will be performed by the following surgeon/proceduralist Gideon Centeno MD.

## 2023-02-27 NOTE — PROGRESS NOTES
Patient: Bebe Burks  Location:  [place] [address]  MRN:  [id number]  Today's date:  2/27/2023    Bebe is a 62 y.o. female admitted on 2/27/2023 with Osteoarthritis of right knee, unspecified osteoarthritis type [M17.11]  Arthritis of right knee [M17.11]. Principal problem is No Principal Problem: There is no principal problem currently on the Problem List. Please update the Problem List and refresh..    Past Medical History  Bebe has a past medical history of Abnormal finding on chest xray (12/2021), Arthritis, COVID-19 (12/2021), COVID-19 vaccine series completed, Diabetic neuropathy (CMS/[facility]), DM (diabetes mellitus), type 2 with ophthalmic complications (CMS/[facility]), Hypertension, Hypothyroidism, Left foot drop, Lipid disorder, and Type 2 diabetes mellitus treated with insulin (CMS/[facility]).    History of Present Illness   Pt is s/p R TKA on 2/27 w/ Dr Centeno      PT Vitals    Date/Time Pulse SpO2 Pt Activity O2 Therapy BP Pt Position [facility]   02/27/23 1545 82 98 % At rest None (Room air) NC not in pt nose upon arrival 122/63 Lying [facility]   02/27/23 1605 84 98 % At rest None (Room air) 116/64 Lying [facility]      PT Pain    Date/Time Pain Type Side/Orientation Location Rating: Rest Rating: Activity Interventions [facility]   02/27/23 1545 Pain Assessment right knee 10 9 position adjusted RN providing pain meds during session [facility]   02/27/23 1553 Pain Assessment -- knee 10 10 -- AE   02/27/23 1554 Pain Assessment -- knee 10 10 -- AE          Prior Living Environment    Flowsheet Row Most Recent Value   People in Home alone   Current Living Arrangements condominium   Home Accessibility stairs to enter home (Group)   Living Environment Comment lives alone in single-level condo, 8STE        Prior Level of Function    Flowsheet Row Most Recent Value   Dominant Hand right   Ambulation assistive equipment   Transferring assistive equipment   Toileting independent   Bathing independent   Dressing independent    Prior Level of Function Comment IND mobility (usually uses SPC, but recently using RW d/t pain), ADLs, IADLs, works for [employer]   Assistive Device Currently Used at Home walker, front-wheeled, cane, straight           PT Evaluation and Treatment - 02/27/23 1538        PT Time Calculation    Start Time 1538     Stop Time 1611     Time Calculation (min) 33 min        General Information    Document Type initial evaluation     Mode of Treatment physical therapy     Position at Start of Session supine in bed     Status at Start of Session agreeable to therapy;no issues or concerns identified by nurse prior to session     General Observations of Patient pt crying & emotional upon arrival, c/o severe pain but needing to go to the bathroom        Precautions/Limitations/Impairments    Existing Precautions/Restrictions fall;weight bearing     Right LE Weight-Bearing Status weight-bearing as tolerated (WBAT)     Limitations/Impairments safety/cognitive;other (see comments)   chronic L foot drop, wears AFO at baseline        Services    Is an  Needed/Used? No        Cognition/Psychosocial    Affect/Mental Status (Cognition) anxious;emotionally labile     Behavioral Issues (Cognition) difficulty managing stress;overwhelmed easily     Orientation Status (Cognition) oriented x 3     Follows Commands (Cognition) follows one-step commands;follows two-step commands;verbal cues/prompting required     Comment, Cognition pt emotionally labile, from calm to overwhelmed to crying, reports severe pain. follows simple commands, w/ cues for technique, sequencing, and approach to surfaces        Sensory    Hearing Status WFL        Vision Assessment/Intervention    Visual Impairment/Limitations WFL        Sensory Assessment (Somatosensory)    Left LE Sensory Assessment intact     Right LE Sensory Assessment intact        Range of Motion (ROM)    Left Lower Extremity (ROM) left LE ROM is WFL except;ankle     Ankle,  Left (ROM) to neutral DF     Right Lower Extremity (ROM) right LE ROM is WFL except;knee     Knee, Right (ROM) 5-80   limited by pain & bandages       Strength (Manual Muscle Testing)    Hip, Left (Strength) 5/5     Knee, Left (Strength) 5/5     Ankle, Left (Strength) 1/5   baseline foot drop    Hip, Right (Strength) 3+/5 pain limited     Knee, Right (Strength) 3+/5 pain limited     Ankle, Right (Strength) 5/5        Bed Mobility    [place], Roll Left supervision     [place], Roll Right supervision     [place], Supine to Sit minimum assist (75% or more patient effort)     [place], Sit to Supine minimum assist (75% or more patient effort)     Assistive Device head of bed elevated;bed rails     Comment (Bed Mobility) increased time to perform, support provided for RLE mgmt        Bed to Chair Transfer    [place], Bed to Chair minimum assist (75% or more patient effort)     Verbal Cues hand placement;technique;safety;proper use of assistive device     Assistive Device walker, front-wheeled     Comment from EOB to commode. pt took a few small shuffling steps, dec tolerance for WBing thru RLE limiting step length.        Chair to Bed Transfer    [place], Chair to Bed minimum assist (75% or more patient effort)     Verbal Cues hand placement;technique;safety;proper use of assistive device     Assistive Device walker, front-wheeled     Comment from commode to EOB. pt took a few small steps w/ improved step length although still short.        Sit to Stand Transfer    [place], Sit to Stand Transfer minimum assist (75% or more patient effort)     Verbal Cues hand placement;technique;safety;proper use of assistive device;maintaining center of gravity over base of support     Assistive Device walker, front-wheeled     Comment from elevated EOB & commode        Stand to Sit Transfer    [place], Stand to Sit Transfer minimum assist (75% or more patient effort)     Verbal Cues hand  placement;technique;safety;proper use of assistive device;maintaining center of gravity over base of support     Assistive Device walker, front-wheeled     Comment to commode & EOB.        Gait Training    [place], Gait minimum assist (75% or more patient effort)     Assistive Device walker, front-wheeled     Distance in Feet 4 feet   stepping from commode to EOB then sidestepping towards HOB    Comment (Gait/Stairs) dec delta, foot clearance, step length. inc UE support thru RW during RLE stance. dec R knee extension in standing. flexed posture req cues for extension & standing w/in RW.        Balance    Static Sitting Balance WFL     Dynamic Sitting Balance mild impairment     Static Standing Balance mild impairment     Dynamic Standing Balance mild impairment;supported     Comment, Balance standing balance w/ RW        Motor Skills    Functional Endurance limited to transfer to commode d/t pain. pt deferred OOB at this time d/t pain, positioned in modified chair position in bed.        AM-PAC (TM) - Mobility (Current Function)    Turning from your back to your side while in a flat bed without using bedrails? 3 - A Little     Moving from lying on your back to sitting on the side of a flat bed without using bedrails? 3 - A Little     Moving to and from a bed to a chair? 3 - A Little     Standing up from a chair using your arms? 3 - A Little     To walk in a hospital room? 2 - A Lot     Climbing 3-5 steps with a railing? 1 - Total     AM-PAC (TM) Mobility Score 15        Session Outcome    Daily Outcome Statement PT eval complete. [facility] min [facility] Pt limited by severe pain post-op. Pt also limited by dec balance, strength, coordination, activity tolerance. Pt lives alone & currently requires assist for all mobility. Rec d/c to SNF to maximize LOF & independence.     Position at End of Session supine in bed   in modified chair position (so that LLE maintained in  ext)    Safety Factors call light in reach;bed alarm     Status at End of Session all needs met;personal items in reach     Nursing Notified patient's performance;patient's position;patient's response to therapy/activity        Plan    Rehab Potential good, to achieve stated therapy goals     Therapy Frequency 5 times/wk     Planned Therapy Interventions balance training;bed mobility training;gait training;home exercise program;stair training;transfer training               PT Discharge Recommendations    Flowsheet Row Most Recent Value   PT Recommended Discharge Disposition skilled nursing facility at 02/27/2023 1538   Anticipated Equipment Needs at Discharge (PT) none at 02/27/2023 1538   Patient/Family Therapy Goals Statement to go to rehab to regain independence at 02/27/2023 1538                  Education Documentation  Treatment Plan, taught by Radha Witt, PT at 2/27/2023  4:31 PM.  Learner: Patient  Readiness: Acceptance  Method: Explanation  Response: Verbalizes Understanding, Needs Reinforcement  Comment: benefits of mobility, ROM, OOB for meals          PT Goals    Flowsheet Row Most Recent Value   Bed Mobility Goal 1    Activity/Assistive Device bed mobility activities, all at 02/27/2023 1538   [place] supervision required at 02/27/2023 1538   Time Frame short-term goal (STG) at 02/27/2023 1538   Progress/Outcome goal ongoing at 02/27/2023 1538   Transfer Goal 1    Activity/Assistive Device all transfers, walker, front-wheeled at 02/27/2023 1538   [place] supervision required at 02/27/2023 1538   Time Frame short-term goal (STG) at 02/27/2023 1538   Progress/Outcome goal ongoing at 02/27/2023 1538   Gait Training Goal 1    Activity/Assistive Device gait (walking locomotion), walker, front-wheeled at 02/27/2023 1538   [place] minimum assist (75% or more patient effort) at 02/27/2023 1538   Distance 50 at 02/27/2023 1538   Time Frame short-term goal (STG) at 02/27/2023 1538    Progress/Outcome goal ongoing at 02/27/2023 1538   Stairs Goal 1    Activity/Assistive Device stairs, all skills at 02/27/2023 1538   [place] minimum assist (75% or more patient effort) at 02/27/2023 1538   Number of Stairs 4 at 02/27/2023 1538   Time Frame short-term goal (STG) at 02/27/2023 1538   Progress/Outcome goal ongoing at 02/27/2023 1538

## 2023-02-27 NOTE — PLAN OF CARE
Care Coordination Admission Assessment Note  Date: 2/27/2023   Time: 2:49 PM    Patient Name: Bebe Burks  Medical Record Number: [id number]  YOB: 1960  Room/BED: 0193    General Information  Patient-Specific Goals (Include Timeframe)  To go to rehab at [place]  PCP: Paula Walker MD   Insurance Coverage: [employer]ET[employer]  Readmission Within the last 30 days  no previous admission in last 30 days    Advance Directives (for Healthcare)?  Does patient have advance directive?: No  Does patient have current OOH DNR form?: No  Does patient have current POLST?: No  Does patient have mental health advance directive?: No        Information       Living Arrangements  Arrived From: home  Current Living Arrangements: condominium  People in Home: alone  Home Accessibility: stairs to enter home (Group)  Living Arrangement Comments: [address], single-level condo  Able to Return to Prior Arrangements:  (pending therapy eval)    Housing Stability and Financial Resources (SDOH)  In the last 12 months, was there a time when you were not able to pay the mortgage or rent on time?: No  In the last 12 months, was there a time when you did not have a steady place to sleep or slept in a shelter (including now)?: No  How hard is it for you to pay for the very basics like food, housing, medical care, and heating?: Not hard at all    Current Outpatient/Agency/Support Group       Type of Current Home Care Services       Assistive Device/Animal Currently Used at Home  walker, front-wheeled, cane, straight    Functional Status Comments  Patient reports she generally uses SPC, has been using RW last few weeks    IADL Comments       Employment/ Status       Concerns to be Addressed  discharge planning    Current Discharge Risk  lives alone    Anticipated Changes Related to Illness  inability to care for self    Transportation Concerns (SDOH)  Transportation Anticipated: family or friend will provide  In the past 12  months, has lack of transportation kept you from medical appointments or from getting medications?: No  In the past 12 months, has lack of transportation kept you from meetings, work, or from getting things needed for daily living?: No    Concerns Comments  Patient lives alone. She has had 3 Covid shots, card is at home. She would like to go to rehab at d/c, preference for [facility] [place] [facility], [facility] or [facility]. If she does not qualify for rehab, she states she will look into stay with a family member. Would need home PT if she goes home. Has transport at d/c.    Anticipated Clinical Needs       Anticipated Discharge Plan   Anticipated Discharge Disposition: home with home health, skilled nursing facility, family member's home with services  Type of Home Care Services: home PT  Type of Skilled Nursing Care Services: PT, OT, nursing  Patient/Family Anticipates Transition to: family members' home, home with help/services, inpatient rehabilitation facility  Patient/Family Anticipated Services at Transition: home health care, skilled nursing  Met with patient. Provided education and contact information for Care Coordination services.: yes  Screening complete: yes

## 2023-02-28 PROBLEM — M17.11 OSTEOARTHRITIS OF RIGHT KNEE, UNSPECIFIED OSTEOARTHRITIS TYPE: Status: ACTIVE | Noted: 2023-02-28

## 2023-02-28 LAB
ANION GAP SERPL CALC-SCNC: 5 MEQ/L (ref 3–15)
BUN SERPL-MCNC: 16 MG/DL (ref 8–20)
CALCIUM SERPL-MCNC: 8.7 MG/DL (ref 8.9–10.3)
CHLORIDE SERPL-SCNC: 105 MEQ/L (ref 98–109)
CO2 SERPL-SCNC: 23 MEQ/L (ref 22–32)
CREAT SERPL-MCNC: 0.7 MG/DL (ref 0.6–1.1)
ERYTHROCYTE [DISTWIDTH] IN BLOOD BY AUTOMATED COUNT: 13.2 % (ref 11.7–14.4)
GFR SERPL CREATININE-BSD FRML MDRD: >60 ML/MIN/1.73M*2
GLUCOSE BLD-MCNC: 164 MG/DL (ref 70–99)
GLUCOSE BLD-MCNC: 242 MG/DL (ref 70–99)
GLUCOSE BLD-MCNC: 295 MG/DL (ref 70–99)
GLUCOSE BLD-MCNC: 343 MG/DL (ref 70–99)
GLUCOSE SERPL-MCNC: 183 MG/DL (ref 70–99)
HCT VFR BLDCO AUTO: 35.8 % (ref 35–45)
HGB BLD-MCNC: 12 G/DL (ref 11.8–15.7)
MCH RBC QN AUTO: 29.6 PG (ref 28–33.2)
MCHC RBC AUTO-ENTMCNC: 33.5 G/DL (ref 32.2–35.5)
MCV RBC AUTO: 88.4 FL (ref 83–98)
PDW BLD AUTO: 11.2 FL (ref 9.4–12.3)
PLATELET # BLD AUTO: 157 K/UL (ref 150–369)
POCT TEST: ABNORMAL
POTASSIUM SERPL-SCNC: 3.8 MEQ/L (ref 3.6–5.1)
RBC # BLD AUTO: 4.05 M/UL (ref 3.93–5.22)
SODIUM SERPL-SCNC: 133 MEQ/L (ref 136–144)
WBC # BLD AUTO: 11.27 K/UL (ref 3.8–10.5)

## 2023-02-28 PROCEDURE — 97535 SELF CARE MNGMENT TRAINING: CPT | Mod: GO

## 2023-02-28 PROCEDURE — 12000000 HC ROOM AND CARE MED/SURG

## 2023-02-28 PROCEDURE — 97116 GAIT TRAINING THERAPY: CPT | Mod: GP,CQ

## 2023-02-28 PROCEDURE — 63700000 HC SELF-ADMINISTRABLE DRUG: Performed by: PHYSICIAN ASSISTANT

## 2023-02-28 PROCEDURE — 25800000 HC PHARMACY IV SOLUTIONS: Performed by: PHYSICIAN ASSISTANT

## 2023-02-28 PROCEDURE — 99232 SBSQ HOSP IP/OBS MODERATE 35: CPT | Performed by: INTERNAL MEDICINE

## 2023-02-28 PROCEDURE — 85027 COMPLETE CBC AUTOMATED: CPT | Performed by: PHYSICIAN ASSISTANT

## 2023-02-28 PROCEDURE — 82310 ASSAY OF CALCIUM: CPT | Performed by: PHYSICIAN ASSISTANT

## 2023-02-28 PROCEDURE — 97530 THERAPEUTIC ACTIVITIES: CPT | Mod: GP,CQ

## 2023-02-28 PROCEDURE — 36415 COLL VENOUS BLD VENIPUNCTURE: CPT | Performed by: PHYSICIAN ASSISTANT

## 2023-02-28 PROCEDURE — 63600000 HC DRUGS/DETAIL CODE: Performed by: PHYSICIAN ASSISTANT

## 2023-02-28 PROCEDURE — 97166 OT EVAL MOD COMPLEX 45 MIN: CPT | Mod: GO

## 2023-02-28 RX ORDER — INSULIN LISPRO 100 [IU]/ML
12 INJECTION, SOLUTION INTRAVENOUS; SUBCUTANEOUS
Status: DISCONTINUED | OUTPATIENT
Start: 2023-02-28 | End: 2023-02-28

## 2023-02-28 RX ORDER — ACETAMINOPHEN 500 MG
1000 TABLET ORAL EVERY 8 HOURS PRN
Status: CANCELLED
Start: 2023-02-28 | End: 2023-03-30

## 2023-02-28 RX ORDER — OXYCODONE HYDROCHLORIDE 5 MG/1
5 TABLET ORAL EVERY 4 HOURS PRN
Qty: 30 TABLET | Refills: 0 | Status: CANCELLED | OUTPATIENT
Start: 2023-02-28 | End: 2023-03-05

## 2023-02-28 RX ORDER — LIDOCAINE 560 MG/1
1 PATCH PERCUTANEOUS; TOPICAL; TRANSDERMAL DAILY
Status: DISCONTINUED | OUTPATIENT
Start: 2023-02-28 | End: 2023-03-01 | Stop reason: HOSPADM

## 2023-02-28 RX ORDER — POLYETHYLENE GLYCOL 3350 17 G/17G
17 POWDER, FOR SOLUTION ORAL DAILY PRN
Status: CANCELLED
Start: 2023-02-28 | End: 2023-03-30

## 2023-02-28 RX ORDER — AMOXICILLIN 250 MG
1 CAPSULE ORAL 2 TIMES DAILY PRN
Status: CANCELLED
Start: 2023-02-28 | End: 2023-03-30

## 2023-02-28 RX ORDER — INSULIN LISPRO 100 [IU]/ML
20 INJECTION, SOLUTION INTRAVENOUS; SUBCUTANEOUS
Status: DISCONTINUED | OUTPATIENT
Start: 2023-02-28 | End: 2023-03-01

## 2023-02-28 RX ORDER — INSULIN GLARGINE 100 [IU]/ML
25 INJECTION, SOLUTION SUBCUTANEOUS NIGHTLY
Status: DISCONTINUED | OUTPATIENT
Start: 2023-02-28 | End: 2023-03-01 | Stop reason: HOSPADM

## 2023-02-28 RX ORDER — ASPIRIN 81 MG/1
81 TABLET ORAL 2 TIMES DAILY
Qty: 60 TABLET | Refills: 0 | Status: CANCELLED
Start: 2023-02-28 | End: 2023-03-30

## 2023-02-28 RX ADMIN — KETOROLAC TROMETHAMINE 7.5 MG: 15 INJECTION, SOLUTION INTRAMUSCULAR; INTRAVENOUS at 20:14

## 2023-02-28 RX ADMIN — CEFAZOLIN 2 G: 2 INJECTION, POWDER, FOR SOLUTION INTRAMUSCULAR; INTRAVENOUS at 02:09

## 2023-02-28 RX ADMIN — HYDROCHLOROTHIAZIDE 12.5 MG: 12.5 TABLET ORAL at 11:16

## 2023-02-28 RX ADMIN — ASPIRIN 81 MG CHEWABLE TABLET 81 MG: 81 TABLET CHEWABLE at 20:15

## 2023-02-28 RX ADMIN — GABAPENTIN 300 MG: 300 CAPSULE ORAL at 08:56

## 2023-02-28 RX ADMIN — DEXAMETHASONE SODIUM PHOSPHATE 6 MG: 4 INJECTION, SOLUTION INTRA-ARTICULAR; INTRALESIONAL; INTRAMUSCULAR; INTRAVENOUS; SOFT TISSUE at 08:52

## 2023-02-28 RX ADMIN — ACETAMINOPHEN 975 MG: 325 TABLET ORAL at 06:28

## 2023-02-28 RX ADMIN — SENNOSIDES AND DOCUSATE SODIUM 1 TABLET: 50; 8.6 TABLET ORAL at 08:46

## 2023-02-28 RX ADMIN — INSULIN LISPRO 6 UNITS: 100 INJECTION, SOLUTION INTRAVENOUS; SUBCUTANEOUS at 08:51

## 2023-02-28 RX ADMIN — INSULIN GLARGINE 25 UNITS: 100 INJECTION, SOLUTION SUBCUTANEOUS at 21:48

## 2023-02-28 RX ADMIN — POLYETHYLENE GLYCOL 3350 17 G: 17 POWDER, FOR SOLUTION ORAL at 08:46

## 2023-02-28 RX ADMIN — ASPIRIN 81 MG CHEWABLE TABLET 81 MG: 81 TABLET CHEWABLE at 08:47

## 2023-02-28 RX ADMIN — INSULIN LISPRO 5 UNITS: 100 INJECTION, SOLUTION INTRAVENOUS; SUBCUTANEOUS at 17:17

## 2023-02-28 RX ADMIN — KETOROLAC TROMETHAMINE 7.5 MG: 15 INJECTION, SOLUTION INTRAMUSCULAR; INTRAVENOUS at 14:18

## 2023-02-28 RX ADMIN — KETOROLAC TROMETHAMINE 7.5 MG: 15 INJECTION, SOLUTION INTRAMUSCULAR; INTRAVENOUS at 02:09

## 2023-02-28 RX ADMIN — INSULIN LISPRO 20 UNITS: 100 INJECTION, SOLUTION INTRAVENOUS; SUBCUTANEOUS at 17:21

## 2023-02-28 RX ADMIN — SENNOSIDES AND DOCUSATE SODIUM 1 TABLET: 50; 8.6 TABLET ORAL at 20:14

## 2023-02-28 RX ADMIN — OXYCODONE HYDROCHLORIDE 5 MG: 5 TABLET ORAL at 02:09

## 2023-02-28 RX ADMIN — LIDOCAINE 1 PATCH: 4 PATCH TOPICAL at 14:21

## 2023-02-28 RX ADMIN — ACETAMINOPHEN 975 MG: 325 TABLET ORAL at 21:48

## 2023-02-28 RX ADMIN — INSULIN LISPRO 5 UNITS: 100 INJECTION, SOLUTION INTRAVENOUS; SUBCUTANEOUS at 11:46

## 2023-02-28 RX ADMIN — ATORVASTATIN CALCIUM 10 MG: 10 TABLET, FILM COATED ORAL at 08:47

## 2023-02-28 RX ADMIN — GABAPENTIN 600 MG: 300 CAPSULE ORAL at 21:48

## 2023-02-28 RX ADMIN — LEVOTHYROXINE SODIUM 25 MCG: 25 TABLET ORAL at 06:28

## 2023-02-28 RX ADMIN — INSULIN LISPRO 3 UNITS: 100 INJECTION, SOLUTION INTRAVENOUS; SUBCUTANEOUS at 21:48

## 2023-02-28 RX ADMIN — ACETAMINOPHEN 975 MG: 325 TABLET ORAL at 14:21

## 2023-02-28 RX ADMIN — INSULIN LISPRO 12 UNITS: 100 INJECTION, SOLUTION INTRAVENOUS; SUBCUTANEOUS at 11:53

## 2023-02-28 RX ADMIN — AMLODIPINE BESYLATE 5 MG: 5 TABLET ORAL at 08:47

## 2023-02-28 RX ADMIN — OXYCODONE HYDROCHLORIDE 5 MG: 5 TABLET ORAL at 20:14

## 2023-02-28 RX ADMIN — OXYCODONE HYDROCHLORIDE 5 MG: 5 TABLET ORAL at 06:28

## 2023-02-28 RX ADMIN — OXYCODONE HYDROCHLORIDE 5 MG: 5 TABLET ORAL at 11:16

## 2023-02-28 RX ADMIN — OXYCODONE HYDROCHLORIDE 5 MG: 5 TABLET ORAL at 00:19

## 2023-02-28 RX ADMIN — KETOROLAC TROMETHAMINE 7.5 MG: 15 INJECTION, SOLUTION INTRAMUSCULAR; INTRAVENOUS at 08:47

## 2023-02-28 ASSESSMENT — COGNITIVE AND FUNCTIONAL STATUS - GENERAL
DRESSING REGULAR UPPER BODY CLOTHING: 3 - A LITTLE
HELP NEEDED FOR PERSONAL GROOMING: 3 - A LITTLE
WALKING IN HOSPITAL ROOM: 3 - A LITTLE
CLIMB 3 TO 5 STEPS WITH RAILING: 1 - TOTAL
STANDING UP FROM CHAIR USING ARMS: 3 - A LITTLE
MOVING TO AND FROM BED TO CHAIR: 3 - A LITTLE
HELP NEEDED FOR BATHING: 2 - A LOT
AFFECT: WFL;ANXIOUS
AFFECT: ANXIOUS
TOILETING: 2 - A LOT
EATING MEALS: 4 - NONE
DRESSING REGULAR LOWER BODY CLOTHING: 2 - A LOT

## 2023-02-28 NOTE — PROGRESS NOTES
Patient: Bebe Burks   MRN: [id number]   : 1960       Daily Progress Note 1 Day Post-Op s/p Procedure(s):  Right Total Knee Repalcement     Subjective     62 y.o. y/o female s/p Procedure(s):  Right Total Knee Repalcement.         Time of eval 0800.      Patient is doing well. Pain is suboptimally controlled. Denies chest pain, shortness of breath. Denies nausea or vomiting. Admits to flatus but no BM. Voiding without difficulties. Tolerating diet. OOB with Just OOB with PT today. Denies fever or chills.     Patient Active Problem List   Diagnosis   • Controlled type 2 diabetes mellitus, with long-term current use of insulin (CMS/HCC)   • Essential hypertension   • Hyperlipidemia   • Hypothyroidism   • Obesity   • Thyroid nodule   • COVID-19 virus infection   • Lung nodule   • Displacement of lumbar intervertebral disc without myelopathy   • Disability of walking   • Polyneuropathy due to type 2 diabetes mellitus (CMS/HCC)   • Pre-op examination   • Localized osteoarthritis of right knee   • Type 2 diabetes mellitus treated with insulin (CMS/HCC)   • Abnormal finding on EKG   • Left foot drop   • Abnormal finding on chest xray       Objective     LAST VITALS:    Vitals:    23 0445 23 0500 23 0812 23 1116   BP: (!) 141/71  134/60 126/74   BP Location: Right upper arm  Right upper arm    Patient Position: Lying  Sitting    Pulse: 90  92 88   Resp: 20      Temp: (!) 38.3 °C (100.9 °F) 37 °C (98.6 °F) 36.7 °C (98 °F)    TempSrc: Oral Oral Oral    SpO2: 93%  94%    Weight:       Height:           Temp (24hrs), Av.8 °C (98.2 °F), Min:36.1 °C (97 °F), Max:38.3 °C (100.9 °F)        Intake/Output Summary (Last 24 hours) at 2023 1140  Last data filed at 2023 1837  Gross per 24 hour   Intake 900 ml   Output 5 ml   Net 895 ml      No intake/output data recorded.    Allergies: Citrus and derivatives       PHYSICAL EXAM:    Incision: clean, dry and intact with expected postop  edema and ecchymosis. + 5/5 DF/PF/EHL bilateral. +NVI with sensation intact. Distal Pulses: 2+ bilateral,Calves: soft, non tender bilateral      LABS:     Results from last 7 days   Lab Units 02/28/23  0930   WBC K/uL 11.27*   HEMOGLOBIN g/dL 12.0   HEMATOCRIT % 35.8   PLATELETS K/uL 157   SODIUM mEQ/L 133*   POTASSIUM mEQ/L 3.8   CHLORIDE mEQ/L 105   CO2 mEQ/L 23   BUN mg/dL 16   CREATININE mg/dL 0.7   GLUCOSE mg/dL 183*   CALCIUM mg/dL 8.7*         Assessment/Plan     62 y.o. y/o female s/p Procedure(s):  Right Total Knee Repalcement     1 Day Post-Op     1. DVT prophylaxis: Asprin 81 mg po BID for 4 weeks and bilateral SCD  2. PT/OT per protocol.  PT yest amb to commode (4'), a bit better today @ 12' (in room).  Limited by pain.  Await OT.  Reeval later today/tomorrow AM.  Pt requests SNF and SW has acquired facility, goal d/c to SNF tomorrow.  3. Incentive spirometer  4. Pain management: Scheduled: tylenol, toradol PRN: oxycodone, tramadol, IV dilaudid.  Decadron 6mg IV x1 given this Am.  Only rec'd oxycodone 5mg x1 midnight and redose @ 0630.  She notes feels a touch loopy albeit awake/alert/conversant/appropriate.  Discussed consistent dosing intervals, and consider incr'd dose to 10mg oxycodone.  5. Bowel regimen: senokot, colace, miralax  6. IDDM, appreciate endo [facility]'s incr'd lantus to 25u/hs, cont humalog 6u AC, resumed oral antihyperglycemics as she is eating.    Dispo goal for d/c to snf tomorrow    RADHA Landrum  2/28/2023  11:40 AM

## 2023-02-28 NOTE — DISCHARGE INSTRUCTIONS
Admit to medicine   CBC and BMP weekly   PT/OT per [facility] protocol - pls consult PT/OT and PM&R for rehab eval and rec.   Hip dislocation precautions   WBAT   If diabetic: please put on 1800 obed ADA NCS diet with accuchecks 4 times day before meal and at bedtime  Please call or page the surgeon's service before ordering ultrasound to rule out DVT in lower extremities   See discharge instructions below   Bowel regimen: Continue to take either Miralax daily or senna/colace twice a day until your bowel function has returned to normal. If you have not had a bowel movement by postoperative day 3, increase your Miralax to twice a day and/or use a dulcolax suppository.  If you have not had a bowel movement by postoperative day 4 after increasing your bowel medications, contact your primary care provider for further management and treatment.     DR REYES - TOTAL KNEE  REPLACEMENT  DISCHARGE INSTRUCTIONS  You have just had a total knee replacement and it is important to follow these specific rules to ensure your safety. Please look over these instructions before your discharge and ask questions about anything you do not understand.  Call Dr Reyes’s office at Tel # [phone] - to schedule your post-operative appointment in 2 weeks with Dr. Reyes    Do the exercises as instructed by Dr. Reyes and the physical therapist. You will be given exercises to follow at discharge from the hospital.    No tub bath, pools or submerging wound until cleared by physician.  You may shower after 7 days and can get incision wet after that. You have staples and these will be removed in 10 days. NO TEDs or compression stockings    No ointments or creams on incision site for 6 weeks    Incision: remove ace wrap dressing the day after you get home. If incision is not draining, you may leave incision open to air. If there is drainage, cover incision until it is no longer draining.      Only short car rides after until your first post-operative visit  after discharge are permitted. No driving for at least 4 to 6 weeks after surgery.    Ice area several times daily on the inside and outside of the knee as well as the thigh of the operated leg.  Do not place ice directly on incision site. If possible may ice every 2 hours for 15 minutes while awake. Stop using ice 7 days from surgery.    Call ’s office at [phone] for:  Increased pain, redness, warmth, swelling, or drainage from your incision  Temperature elevation above 101 degrees     Whenever you have an operative or invasive procedure (dental, urologic, podiatric, etc.), check with your physician to remind her/him that you have a total knee prosthesis and may need antibiotics before the procedure.  This will be reviewed at your first post-op visit.

## 2023-02-28 NOTE — PLAN OF CARE
Care Coordination Discharge Plan Note   Date: 2/28/2023    Time: 1:26 PM    Patient Name: Bebe Burks  Medical Record Number: [id number]  YOB: 1960  Room/BED: 0193    Discharge Assessment  Current Discharge Risk: lives alone  Concerns to be Addressed: discharge planning  Anticipated Changes Related to Illness: inability to care for self    Concerns Comments: Per info in medical rounds, pt is not stable for d/c today, [facility] tmrw pending auth. MICHELET s/w Donaldo from [facility] who confirmed they can accept pt for admission pending auth. MICHELET s/w pt; she is agreeable to placement there. Donaldo initiated auth; determination pending. MICHELET tentatively arranged transport for tmrw pending auth.    Anticipated Discharge Plan  Anticipated Discharge Disposition: home with home health, skilled nursing facility, family member's home with services  Type of Home Care Services: home PT  Type of Skilled Nursing Care Services: PT, OT, nursing  Patient/Family Anticipates Transition to: family members' home, home with help/services, inpatient rehabilitation facility  Patient/Family Anticipated Services at Transition: home health care, skilled nursing    Patient Choice  Offered/Gave Vendor List: yes  Patient's Choice of Community Agency(s): [facility]    Patient and/or patient guardian/advocate was made aware of their right to choose a provider. A list of eligible providers was presented and reviewed with the patient and/or patient guardian/advocate in written and/or verbal form. The list delineates providers in the patient’s desired geographic area who are participating in the Medicare program and/or providers contracted with the patient’s primary insurance. The Medicare list and quality ratings were obtained from the Medicare.gov [medicare.gov] website.    Discharge Transportation   Does the patient need discharge transport arranged?: Yes  Has discharge transport been arranged?: Yes  Discharge Transportation Vendor:  [facility] ([phone], option 5)  Type of Transportation: wheelchair van  What day is the transport expected?: 03/01/23  What time is the transport expected?: 1500     Discharge Barriers   Barriers to Discharge: Delay in insurance authorization  Participants: advanced practice provider, physical therapy, , physician, nursing, occupational therapy, social work/services      Continued Care and Services - Admitted Since 2/27/2023     Destination Coordination complete.    Service Provider Request Status Selected Services Address Phone Fax Patient Preferred    [facility]  Selected Skilled Nursing [address] [place] [id number] [phone] --              SW will continue to follow for emotional support and dispo planning. KATARINA Gifford [phone]/

## 2023-02-28 NOTE — PLAN OF CARE
Problem: Adult Inpatient Plan of Care  Goal: Plan of Care Review  Outcome: Progressing  Flowsheets (Taken 2/28/2023 8876)  Progress: improving  Plan of Care Reviewed With: patient  Outcome Summary: OT Evaluation completed. minAx1-2 for all functional mobility. Recommending d/c to SNF.     Problem: Self-Care Deficit  Goal: Improved Ability to Complete Activities of Daily Living  Outcome: Progressing  Intervention: Promote Activity and Functional [place]  Flowsheets (Taken 2/28/2023 9359)  Activity Assistance Provided: assistance, 1 person  Self-Care Promotion: BADL personal objects within reach

## 2023-02-28 NOTE — PROGRESS NOTES
Patient:  Bebe Burks  Location:  [place] [address]  MRN:  [id number]  Today's date:  2/28/2023    Bebe is a 62 y.o. female admitted on 2/27/2023 with Osteoarthritis of right knee, unspecified osteoarthritis type [M17.11]  Arthritis of right knee [M17.11]. Principal problem is No Principal Problem: There is no principal problem currently on the Problem List. Please update the Problem List and refresh..    Past Medical History  Bebe has a past medical history of Abnormal finding on chest xray (12/2021), Arthritis, COVID-19 (12/2021), COVID-19 vaccine series completed, Diabetic neuropathy (CMS/[facility]), DM (diabetes mellitus), type 2 with ophthalmic complications (CMS/[facility]), Hypertension, Hypothyroidism, Left foot drop, Lipid disorder, and Type 2 diabetes mellitus treated with insulin (CMS/[facility]).    History of Present Illness   Pt is s/p R TKA on 2/27 w/ Dr Centeno      OT Vitals    Date/Time Pulse HR Source SpO2 Pt Activity O2 Therapy BP BP Location BP Method Pt Position Observations [facility]   02/28/23 1015 89 Monitor 92 % At rest None (Room air) 142/68 Right upper arm Automatic Lying OT Eval ATK      OT Pain    Date/Time Pain Type Side/Orientation Location Rating: Rest Rating: Activity Interventions [facility]   02/28/23 1015 Pain Assessment right knee 8 8 position adjusted ATK          Prior Living Environment    Flowsheet Row Most Recent Value   People in Home alone   Current Living Arrangements condominium   Home Accessibility stairs to enter home (Group)   Living Environment Comment lives alone in single-level condo, 8STE        Prior Level of Function    Flowsheet Row Most Recent Value   Dominant Hand right   Ambulation assistive equipment   Transferring assistive equipment   Toileting independent   Bathing independent   Dressing independent   Prior Level of Function Comment IND mobility (usually uses SPC, but recently using RW d/t pain), ADLs, IADLs, works for [employer]   Assistive Device  Currently Used at Home walker, front-wheeled, cane, straight        Occupational Profile    Flowsheet Row Most Recent Value   Reason for Services/Referral ADL Evaluation/dispo planning   Successful Occupations Works for [employer]   Environmental Supports and Barriers Lives alone   Patient Goals D/c to rehab         OT Evaluation and Treatment - 02/28/23 1013        OT Time Calculation    Start Time 1013     Stop Time 1036     Time Calculation (min) 23 min        General Information    Document Type initial evaluation     Mode of Treatment occupational therapy     Position at Start of Session supine in bed     Status at Start of Session agreeable to therapy     General Observations of Patient Rec'd seated on commode with PCT in room        Precautions/Limitations/Impairments    Existing Precautions/Restrictions fall;weight bearing     Right LE Weight-Bearing Status weight-bearing as tolerated (WBAT)        Cognition/Psychosocial    Affect/Mental Status (Cognition) WFL;anxious     Behavioral Issues (Cognition) difficulty managing stress     Orientation Status (Cognition) oriented x 3     Follows Commands (Cognition) follows two-step commands;75-90% accuracy;delayed response/completion;increased processing time needed;verbal cues/prompting required     Cognitive Function safety deficit     Safety Deficit (Cognition) minimal deficit;insight into deficits/self-awareness;judgment;safety precautions follow-through/compliance     Comment, Cognition AAOx3, very anxious due to increased pain. Benefitting from frequent encouragement. Mildly impulsive during mobility, frequent cues for sequencing and safety awareness with RW.        Hearing Assessment    Hearing Status WFL        Vision Assessment/Intervention    Visual Impairment/Limitations corrective lenses for reading        Sensory Assessment    Sensory Assessment (Somatosensory) UE sensation intact;bilateral UE        Range of Motion (ROM)    Range of Motion bilateral  upper extremities;ROM is WFL        Strength (Manual Muscle Testing)    Strength (Manual Muscle Testing) strength is WFL;bilateral upper extremities   Grossly 5/5 in B/L UEs       Bed Mobility    Bed Mobility scooting/bridging     [place], Scoot/Bridge moderate assist (50-74% patient effort);2 person assist     [place], Sit to Supine minimum assist (75% or more patient effort)     Assistive Device bed rails;draw sheet     Comment (Bed Mobility) Josefina to move back to supine, assist for elevation of LEs. ModAx2 to scoot to HOB        Sit to Stand Transfer    [place], Sit to Stand Transfer minimum assist (75% or more patient effort)     Verbal Cues safety;technique;hand placement     Assistive Device walker, front-wheeled     Comment Josefina to stand from commode, take steps toward EOB. Max cues for sequencing step pattern with RW        Stand to Sit Transfer    [place], Stand to Sit Transfer minimum assist (75% or more patient effort)     Verbal Cues technique;safety;hand placement     Assistive Device walker, front-wheeled     Comment Sitting back to EOB        Toilet Transfer    Transfer Technique stand-sit;sit-stand     [place], Toilet Transfer minimum assist (75% or more patient effort)     Verbal Cues safety;technique     Assistive Device commode, 3-in-1     Comment Josefina for STS from BSC, cues for hand placement        Safety Issues, Functional Mobility    Safety Issues Affecting Function (Mobility) awareness of need for assistance;insight into deficits/self-awareness;sequencing abilities;safety precautions follow-through/compliance     Impairments Affecting Function (Mobility) balance;endurance/activity tolerance;strength;pain        Balance    Static Sitting Balance WFL;sitting, edge of bed     Dynamic Sitting Balance mild impairment;supported     Static Standing Balance mild impairment;supported     Dynamic Standing Balance mild impairment;supported     Comment, Balance Benefitting from  Ax1 and use of RW for stability        Functional Reach Test    Trial One: Functional Reach Test (in) 0 inches   Not assessed in session, pt with increasing pain with BSC>EOB transfer    Trial Two: Functional Reach Test (in) 0 inches     Average (in) 0 inches        Motor Skills    Functional Endurance Pt with increased pain, only completing transfer from commode to EOB.        Upper Body Dressing    Tasks don;hospital gown     [place] minimum assist (75% or more patient effort)     Position edge of bed sitting        Lower Body Dressing    Tasks don;socks     [place] maximum assist (25-49% patient effort)     Position supine     Comment Unable to compelte safe functional reach to LEs        Grooming    Comment Offered, declined        Toileting    [place] perform bladder hygiene;adjust/manage clothing;moderate assist (50-74% patient effort)     Position supported sitting     Comment Able to void when seated on BSC, hygiene completed in static standing        BADL Safety/Performance    Impairments, BADL Safety/Performance balance;endurance/activity tolerance;pain;strength     Skilled BADL Treatment/Intervention BADL process/adaptation training        ADL Interventions    Energy Conservation Techniques activity adapted to sitting;activity pacing encouraged;asking for necessary assistance promoted     Self-Care (BADL) Promotion best position for BADL determined        AM-PAC (TM) - ADL (Current Function)    Putting on and taking off regular lower body clothing? 2 - A Lot     Bathing? 2 - A Lot     Toileting? 2 - A Lot     Putting on/taking off regular upper body clothing? 3 - A Little     How much help for taking care of personal grooming? 3 - A Little     Eating meals? 4 - None     AM-PAC (TM) ADL Score 16        Session Outcome    Daily Outcome Statement OT Evaluation completed. Pt POD#1 s/p R TKA. Session limited 2/2 increased pain. Josefina for commode transfer using RW, bed mobility. Min-maxA for UB/LB  dressing. To benefit from further inpatient OT to maximize functional independence and safety. Limited support available upon d/c, recommending d/c to SNF.     Position at End of Session supine in bed     Safety Factors call light in reach;bed alarm     Status at End of Session all needs met;personal items in reach     Nursing Notified patient's performance;patient's position;patient's response to therapy/activity        Plan    Rehab Potential good, to achieve stated therapy goals     Therapy Frequency 5 times/wk     Planned Therapy Interventions activity tolerance training;BADL retraining;adaptive equipment training;functional balance retraining;occupation/activity based interventions;strengthening exercise;transfer/mobility retraining;patient/caregiver education/training               OT Discharge Recommendations    Flowsheet Row Most Recent Value   OT Recommended Discharge Disposition skilled nursing facility at 02/28/2023 1013   Anticipated Equipment Needs At Discharge (OT) --  [TBD] at 02/28/2023 1013   Patient/Family Therapy Goal Statement D/c to rehab at 02/28/2023 1013                  Education Documentation  Rehabilitation Therapy, taught by Sangeetha Chilel OT at 2/28/2023  2:54 PM.  Learner: Patient  Readiness: Acceptance  Method: Explanation  Response: Verbalizes Understanding  Comment: Role of OT, OT POC, adapted self care strategies, energy conservation techniques, proper use of RW, d/c planning          OT Goals    Flowsheet Row Most Recent Value   Bed Mobility Goal 1    Activity/Assistive Device bed mobility activities, all at 02/28/2023 1013   [place] modified independence at 02/28/2023 1013   Time Frame by discharge at 02/28/2023 1013   Progress/Outcome goal ongoing at 02/28/2023 1013   Transfer Goal 1    Activity/Assistive Device all transfers at 02/28/2023 1013   [place] modified independence at 02/28/2023 1013   Time Frame by discharge at 02/28/2023 1013   Progress/Outcome  goal ongoing at 02/28/2023 1013   Dressing Goal 1    Activity/Adaptive Equipment dressing skills, all at 02/28/2023 1013   [place] modified independence at 02/28/2023 1013   Time Frame by discharge at 02/28/2023 1013   Progress/Outcome goal ongoing at 02/28/2023 1013   Toileting Goal 1    Activity/Assistive Device toileting skills, all at 02/28/2023 1013   [place] modified independence at 02/28/2023 1013   Time Frame by discharge at 02/28/2023 1013   Progress/Outcome goal ongoing at 02/28/2023 1013   Grooming Goal 1    Activity/Assistive Device grooming skills, all at 02/28/2023 1013   [place] modified independence at 02/28/2023 1013   Time Frame by discharge at 02/28/2023 1013   Progress/Outcome goal ongoing at 02/28/2023 1013

## 2023-02-28 NOTE — PLAN OF CARE
Plan of Care Review  Plan of Care Reviewed With: patient  Progress: improving  Outcome Summary: patient is alert, opriented, out of bed with assist, medicated for pain, right knee dressing intact, vitals stable, safety precautions maintained, call bell with in reach, will continue to monitor

## 2023-02-28 NOTE — PROGRESS NOTES
Endocrinology Progress Note       SUBJECTIVE   Interval History:  Patient seen in the morning.  Reports significant pain overnight.  Did not eat much.  Ordered breakfast.  Received the dexamethasone 6 mg this morning     OBJECTIVE      Vital signs in last 24 hours:  Temp:  [36.1 °C (97 °F)-38.3 °C (100.9 °F)] 36.7 °C (98 °F)  Heart Rate:  [76-92] 92  Resp:  [14-20] 20  BP: (106-149)/(56-71) 134/60      PHYSICAL EXAMINATION        Constitutional: well-developed. well- nourished. Not in distress  HENT: Head: Normocephalic and atraumatic.   Eyes: Conjunctivae and EOM are normal. PERRL  Neck: Normal range of motion. Neck supple.    Pulmonary/Chest: normal breathing effort.    Neurological: Alert and oriented to person, place, and time.   Psychiatric: Normal mood and affect. Judgment normal.          LABS / IMAGING / TELE      Labs    Lab Results   Component Value Date    GLUCOSE 183 (H) 02/28/2023    CALCIUM 8.7 (L) 02/28/2023     (L) 02/28/2023    K 3.8 02/28/2023    CO2 23 02/28/2023     02/28/2023    BUN 16 02/28/2023    CREATININE 0.7 02/28/2023     Lab Results   Component Value Date    HGBA1C 8.8 (H) 02/22/2023     Lab Results   Component Value Date    CHOL 143 10/17/2022     Lab Results   Component Value Date    HDL 46 (L) 10/17/2022     Lab Results   Component Value Date    LDLCALC 85 10/17/2022     Lab Results   Component Value Date    TRIG 62 10/17/2022     No results found for: CHOLHDL  Lab Results   Component Value Date    ALT 24 10/17/2022    AST 22 10/17/2022    ALKPHOS 72 10/17/2022    BILITOT 0.6 10/17/2022     Lab Results   Component Value Date    TSH 0.63 02/22/2023       ASSESSMENT AND PLAN      1, type 2 diabetes  - Uncontrolled baseline on 70/30, SGLT2 inhibitor and GLP-1 agonist.  A1c 8.8.   -GLP-1 agonist and SGLT2 inhibitor was held several days prior to surgery.  -Expect glucose will be worsened with stress/steroids use.      -Continue basal bolus in the hospital.   -Increase  Lantus to 25 units tonight  -Humalog 6 units before breakfast this morning.  Will adjust based glucose numbers/oral intake.   -Humalog sliding scale.   -Continue to hold SGLT2 inhibitor/GLP-1 agonist.  Okay to restart both if patient is eating well today     -Patient can be discharged with prior to admission meds.      2, status post right total knee replacement  -Management per primary team     3, hyperlipidemia  -On statin     4, hypertension  -Continue antihypertensives.     Discussed with her nurse.     Thank you very much for allowing me participating in this patient's care. Please feel free to contact me if any questions.          Isabela Claire MD

## 2023-02-28 NOTE — PROGRESS NOTES
Patient: Bebe Burks  Location:  [place] [address]  MRN:  [id number]  Today's date:  2/28/2023    Bebe is a 62 y.o. female admitted on 2/27/2023 with Osteoarthritis of right knee, unspecified osteoarthritis type [M17.11]  Arthritis of right knee [M17.11]. Principal problem is No Principal Problem: There is no principal problem currently on the Problem List. Please update the Problem List and refresh..    Past Medical History  Bebe has a past medical history of Abnormal finding on chest xray (12/2021), Arthritis, COVID-19 (12/2021), COVID-19 vaccine series completed, Diabetic neuropathy (CMS/[facility]), DM (diabetes mellitus), type 2 with ophthalmic complications (CMS/[facility]), Hypertension, Hypothyroidism, Left foot drop, Lipid disorder, and Type 2 diabetes mellitus treated with insulin (CMS/[facility]).    History of Present Illness   Pt is s/p R TKA on 2/27 w/ Dr Centeno      PT Vitals    Date/Time Pulse HR Source SpO2 Pt Activity O2 Therapy BP BP Location BP Method Pt Position [facility]   02/28/23 0812 92 Monitor 94 % At rest None (Room air) 134/60 Right upper arm Automatic Sitting [facility]      PT Pain    Date/Time Pain Type Side/Orientation Location Rating: Rest Rating: Activity Interventions [facility]   02/28/23 0812 Pain Assessment right knee 8 10 position adjusted [facility]          Prior Living Environment    Flowsheet Row Most Recent Value   People in Home alone   Current Living Arrangements condominium   Home Accessibility stairs to enter home (Group)   Living Environment Comment lives alone in single-level condo, 8STE        Prior Level of Function    Flowsheet Row Most Recent Value   Dominant Hand right   Ambulation assistive equipment   Transferring assistive equipment   Toileting independent   Bathing independent   Dressing independent   Prior Level of Function Comment IND mobility (usually uses SPC, but recently using RW d/t pain), ADLs, IADLs, works for [employer]   Assistive Device Currently Used at Home  walker, front-wheeled, cane, straight           PT Evaluation and Treatment - 02/28/23 0812        PT Time Calculation    Start Time 0812     Stop Time 0839     Time Calculation (min) 27 min        General Information    Document Type daily treatment/progress note     Mode of Treatment physical therapy     Position at Start of Session supine in bed     Status at Start of Session agreeable to therapy;no issues or concerns identified by nurse prior to session        Precautions/Limitations/Impairments    Existing Precautions/Restrictions fall;weight bearing     Right LE Weight-Bearing Status weight-bearing as tolerated (WBAT)     Limitations/Impairments other (see comments)   L foot drop       Cognition/Psychosocial    Affect/Mental Status (Cognition) anxious     Orientation Status (Cognition) oriented x 3        Range of Motion (ROM)    Knee, Right (ROM) 8-81        Bed Mobility    [place], Supine to Sit minimum assist (75% or more patient effort);1 person assist;verbal cues     Verbal Cues (Supine to Sit) hand placement;technique     Comment (Bed Mobility) A for RLE advancement off bed surface and upright trunk management.        Chair to Bed Transfer    [place], Chair to Bed minimum assist (75% or more patient effort);1 person assist;verbal cues     Verbal Cues hand placement;preparatory posture;technique     Assistive Device walker, front-wheeled     Comment from edge of bed and recliner.        Sit to Stand Transfer    [place], Sit to Stand Transfer minimum assist (75% or more patient effort);1 person assist;verbal cues     Verbal Cues hand placement;preparatory posture;technique     Assistive Device walker, front-wheeled        Gait Training    [place], Gait minimum assist (75% or more patient effort);1 person assist;verbal cues     Assistive Device walker, front-wheeled     Distance in Feet 12 feet     Pattern (Gait) step-to     Deviations/Abnormal Patterns (Gait) antalgic;delta  decreased;gait speed decreased;step length decreased     Maintains Weight-bearing Status (Gait) able to maintain     Comment (Gait/Stairs) 12' x 2, seated rest break between trials.  Limited distance due to pain.        10 Meter Walk Test (Self-Selected Velocity)    Trial One: Ten Meter Walk Test (sec) --   unable to complete distance safely.       Stairs Training    [place], Stairs unable to assess        Balance    Static Sitting Balance WFL     Static Standing Balance mild impairment     Dynamic Standing Balance mild impairment;supported        AM-PAC (TM) - Mobility (Current Function)    Turning from your back to your side while in a flat bed without using bedrails? 3 - A Little     Moving from lying on your back to sitting on the side of a flat bed without using bedrails? 3 - A Little     Moving to and from a bed to a chair? 3 - A Little     Standing up from a chair using your arms? 3 - A Little     To walk in a hospital room? 3 - A Little     Climbing 3-5 steps with a railing? 1 - Total     AM-PAC (TM) Mobility Score 16        Session Outcome    Daily Outcome Statement Pt requires min A for bed mob, STS transfers, and mob w/ RW.  Limited by dec balance and endurance.  Rec remains SNF at this time to max [facility] mobility and safety prior to return home alone.     Position at End of Session reclined in chair     Safety Factors call light in reach;chair alarm     Status at End of Session all needs met;personal items in reach     Nursing Notified patient's performance;patient's response to therapy/activity        Plan    Rehab Potential good, to achieve stated therapy goals     Therapy Frequency 5 times/wk     Planned Therapy Interventions balance training;bed mobility training;gait training;home exercise program;stair training;transfer training               PT Discharge Recommendations    Flowsheet Row Most Recent Value   PT Recommended Discharge Disposition skilled nursing facility at 02/28/2023 0812    Anticipated Equipment Needs at Discharge (PT) none at 02/28/2023 0812   Patient/Family Therapy Goals Statement to go to rehab to regain independence at 02/27/2023 1538                  Education Documentation  Rehabilitation Therapy, taught by Charles Amaro PTA at 2/28/2023  8:48 AM.  Learner: Patient  Readiness: Acceptance  Method: Explanation  Response: Needs Reinforcement  Comment: safe transfers and mob w/ RW.          PT Goals    Flowsheet Row Most Recent Value   Bed Mobility Goal 1    Activity/Assistive Device bed mobility activities, all at 02/27/2023 1538   [place] supervision required at 02/27/2023 1538   Time Frame short-term goal (STG) at 02/27/2023 1538   Progress/Outcome goal ongoing at 02/27/2023 1538   Transfer Goal 1    Activity/Assistive Device all transfers, walker, front-wheeled at 02/27/2023 1538   [place] supervision required at 02/27/2023 1538   Time Frame short-term goal (STG) at 02/27/2023 1538   Progress/Outcome goal ongoing at 02/27/2023 1538   Gait Training Goal 1    Activity/Assistive Device gait (walking locomotion), walker, front-wheeled at 02/27/2023 1538   [place] minimum assist (75% or more patient effort) at 02/27/2023 1538   Distance 50 at 02/27/2023 1538   Time Frame short-term goal (STG) at 02/27/2023 1538   Progress/Outcome goal ongoing at 02/27/2023 1538   Stairs Goal 1    Activity/Assistive Device stairs, all skills at 02/27/2023 1538   [place] minimum assist (75% or more patient effort) at 02/27/2023 1538   Number of Stairs 4 at 02/27/2023 1538   Time Frame short-term goal (STG) at 02/27/2023 1538   Progress/Outcome goal ongoing at 02/27/2023 1538

## 2023-02-28 NOTE — PLAN OF CARE
Plan of Care Review  Plan of Care Reviewed With: patient  Progress: improving  Outcome Summary: Patient AAOx4. VSS. C/O of pain, prn pain meds given. OOB assist x1-2 w walker to BSC. Voiding well. Call light in reach. Safety precautions maintained.

## 2023-03-01 VITALS
HEART RATE: 92 BPM | OXYGEN SATURATION: 97 % | WEIGHT: 186 LBS | SYSTOLIC BLOOD PRESSURE: 146 MMHG | BODY MASS INDEX: 29.89 KG/M2 | HEIGHT: 66 IN | TEMPERATURE: 97.8 F | RESPIRATION RATE: 18 BRPM | DIASTOLIC BLOOD PRESSURE: 66 MMHG

## 2023-03-01 LAB
GLUCOSE BLD-MCNC: 136 MG/DL (ref 70–99)
GLUCOSE BLD-MCNC: 171 MG/DL (ref 70–99)
GLUCOSE BLD-MCNC: 192 MG/DL (ref 70–99)
POCT TEST: ABNORMAL
SARS-COV-2 RNA RESP QL NAA+PROBE: NEGATIVE

## 2023-03-01 PROCEDURE — 63600000 HC DRUGS/DETAIL CODE: Performed by: PHYSICIAN ASSISTANT

## 2023-03-01 PROCEDURE — 97530 THERAPEUTIC ACTIVITIES: CPT | Mod: GO,CO

## 2023-03-01 PROCEDURE — U0003 INFECTIOUS AGENT DETECTION BY NUCLEIC ACID (DNA OR RNA); SEVERE ACUTE RESPIRATORY SYNDROME CORONAVIRUS 2 (SARS-COV-2) (CORONAVIRUS DISEASE [COVID-19]), AMPLIFIED PROBE TECHNIQUE, MAKING USE OF HIGH THROUGHPUT TECHNOLOGIES AS DESCRIBED BY CMS-2020-01-R: HCPCS | Performed by: PHYSICIAN ASSISTANT

## 2023-03-01 PROCEDURE — 97530 THERAPEUTIC ACTIVITIES: CPT | Mod: GP,CQ

## 2023-03-01 PROCEDURE — 63700000 HC SELF-ADMINISTRABLE DRUG: Performed by: PHYSICIAN ASSISTANT

## 2023-03-01 PROCEDURE — 25800000 HC PHARMACY IV SOLUTIONS: Performed by: PHYSICIAN ASSISTANT

## 2023-03-01 PROCEDURE — 97535 SELF CARE MNGMENT TRAINING: CPT | Mod: GO,CO

## 2023-03-01 PROCEDURE — 99232 SBSQ HOSP IP/OBS MODERATE 35: CPT | Performed by: INTERNAL MEDICINE

## 2023-03-01 RX ORDER — ASPIRIN 81 MG/1
81 TABLET ORAL 2 TIMES DAILY
Qty: 60 TABLET | Refills: 0
Start: 2023-03-01 | End: 2024-01-21 | Stop reason: HOSPADM

## 2023-03-01 RX ORDER — POLYETHYLENE GLYCOL 3350 17 G/17G
17 POWDER, FOR SOLUTION ORAL DAILY PRN
Status: ON HOLD
Start: 2023-03-01 | End: 2024-03-01 | Stop reason: ENTERED-IN-ERROR

## 2023-03-01 RX ORDER — OXYCODONE HYDROCHLORIDE 5 MG/1
5-10 TABLET ORAL EVERY 4 HOURS PRN
Qty: 30 TABLET | Refills: 0 | Status: SHIPPED | OUTPATIENT
Start: 2023-03-01 | End: 2023-03-06

## 2023-03-01 RX ORDER — ASPIRIN 81 MG/1
81 TABLET ORAL DAILY
Qty: 30 TABLET | Refills: 0
Start: 2023-04-05 | End: 2024-01-21 | Stop reason: HOSPADM

## 2023-03-01 RX ORDER — INSULIN LISPRO 100 [IU]/ML
15 INJECTION, SOLUTION INTRAVENOUS; SUBCUTANEOUS
Status: DISCONTINUED | OUTPATIENT
Start: 2023-03-01 | End: 2023-03-01 | Stop reason: HOSPADM

## 2023-03-01 RX ORDER — ACETAMINOPHEN 500 MG
500 TABLET ORAL EVERY 6 HOURS PRN
Start: 2023-03-01 | End: 2023-03-31

## 2023-03-01 RX ADMIN — POLYETHYLENE GLYCOL 3350 17 G: 17 POWDER, FOR SOLUTION ORAL at 08:02

## 2023-03-01 RX ADMIN — OXYCODONE HYDROCHLORIDE 10 MG: 5 TABLET ORAL at 04:08

## 2023-03-01 RX ADMIN — INSULIN LISPRO 15 UNITS: 100 INJECTION, SOLUTION INTRAVENOUS; SUBCUTANEOUS at 11:59

## 2023-03-01 RX ADMIN — KETOROLAC TROMETHAMINE 7.5 MG: 15 INJECTION, SOLUTION INTRAMUSCULAR; INTRAVENOUS at 07:59

## 2023-03-01 RX ADMIN — INSULIN LISPRO 20 UNITS: 100 INJECTION, SOLUTION INTRAVENOUS; SUBCUTANEOUS at 08:01

## 2023-03-01 RX ADMIN — INSULIN LISPRO 15 UNITS: 100 INJECTION, SOLUTION INTRAVENOUS; SUBCUTANEOUS at 16:41

## 2023-03-01 RX ADMIN — INSULIN LISPRO 3 UNITS: 100 INJECTION, SOLUTION INTRAVENOUS; SUBCUTANEOUS at 08:02

## 2023-03-01 RX ADMIN — HYDROCHLOROTHIAZIDE 12.5 MG: 12.5 TABLET ORAL at 11:58

## 2023-03-01 RX ADMIN — AMLODIPINE BESYLATE 5 MG: 5 TABLET ORAL at 08:00

## 2023-03-01 RX ADMIN — LEVOTHYROXINE SODIUM 25 MCG: 25 TABLET ORAL at 05:59

## 2023-03-01 RX ADMIN — LIDOCAINE 1 PATCH: 4 PATCH TOPICAL at 08:02

## 2023-03-01 RX ADMIN — SODIUM CHLORIDE 500 ML: 9 INJECTION, SOLUTION INTRAVENOUS at 11:02

## 2023-03-01 RX ADMIN — GABAPENTIN 300 MG: 300 CAPSULE ORAL at 07:59

## 2023-03-01 RX ADMIN — ATORVASTATIN CALCIUM 10 MG: 10 TABLET, FILM COATED ORAL at 08:00

## 2023-03-01 RX ADMIN — SENNOSIDES AND DOCUSATE SODIUM 1 TABLET: 50; 8.6 TABLET ORAL at 08:00

## 2023-03-01 RX ADMIN — ACETAMINOPHEN 975 MG: 325 TABLET ORAL at 05:59

## 2023-03-01 RX ADMIN — EMPAGLIFLOZIN 10 MG: 10 TABLET, FILM COATED ORAL at 08:00

## 2023-03-01 RX ADMIN — ASPIRIN 81 MG CHEWABLE TABLET 81 MG: 81 TABLET CHEWABLE at 08:00

## 2023-03-01 RX ADMIN — LOSARTAN POTASSIUM 50 MG: 50 TABLET, FILM COATED ORAL at 11:58

## 2023-03-01 RX ADMIN — ACETAMINOPHEN 975 MG: 325 TABLET ORAL at 13:58

## 2023-03-01 RX ADMIN — OXYCODONE HYDROCHLORIDE 5 MG: 5 TABLET ORAL at 13:59

## 2023-03-01 ASSESSMENT — COGNITIVE AND FUNCTIONAL STATUS - GENERAL
HELP NEEDED FOR BATHING: 2 - A LOT
AFFECT: WFL
MOVING TO AND FROM BED TO CHAIR: 3 - A LITTLE
EATING MEALS: 4 - NONE
CLIMB 3 TO 5 STEPS WITH RAILING: 1 - TOTAL
AFFECT: WFL
HELP NEEDED FOR PERSONAL GROOMING: 3 - A LITTLE
DRESSING REGULAR LOWER BODY CLOTHING: 2 - A LOT
TOILETING: 3 - A LITTLE
WALKING IN HOSPITAL ROOM: 3 - A LITTLE
DRESSING REGULAR UPPER BODY CLOTHING: 3 - A LITTLE
STANDING UP FROM CHAIR USING ARMS: 3 - A LITTLE

## 2023-03-01 NOTE — PROGRESS NOTES
Patient: Bebe Burks  Location:  [place] [address]  MRN:  [id number]  Today's date:  3/1/2023    Bebe is a 62 y.o. female admitted on 2/27/2023 with Osteoarthritis of right knee, unspecified osteoarthritis type [M17.11]  Arthritis of right knee [M17.11]. Principal problem is No Principal Problem: There is no principal problem currently on the Problem List. Please update the Problem List and refresh..    Past Medical History  Bebe has a past medical history of Abnormal finding on chest xray (12/2021), Arthritis, COVID-19 (12/2021), COVID-19 vaccine series completed, Diabetic neuropathy (CMS[facility]), DM (diabetes mellitus), type 2 with ophthalmic complications (CMS/[facility]), Hypertension, Hypothyroidism, Left foot drop, Lipid disorder, and Type 2 diabetes mellitus treated with insulin (CMS/[facility]).    History of Present Illness   Pt is s/p R TKA on 2/27 w/ Dr Centeno      PT Pain    Date/Time Pain Type Location Rating: Rest Who   03/01/23 1358 -- knee -- MS   03/01/23 1359 Pain Assessment knee 4 - moderate pain MS          Prior Living Environment    Flowsheet Row Most Recent Value   People in Home alone   Current Living Arrangements condominium   Home Accessibility stairs to enter home (Group)   Living Environment Comment lives alone in single-level condo, 8STE        Prior Level of Function    Flowsheet Row Most Recent Value   Dominant Hand right   Ambulation assistive equipment   Transferring assistive equipment   Toileting independent   Bathing independent   Dressing independent   Prior Level of Function Comment IND mobility (usually uses SPC, but recently using RW d/t pain), ADLs, IADLs, works for [employer]   Assistive Device Currently Used at Home walker, front-wheeled, cane, straight           PT Evaluation and Treatment - 03/01/23 1354        PT Time Calculation    Start Time 1354     Stop Time 1411     Time Calculation (min) 17 min        General Information    Document Type daily  treatment/progress note     Mode of Treatment physical therapy     Position at Start of Session seated in chair     Status at Start of Session agreeable to therapy;no issues or concerns identified by nurse prior to session        Precautions/Limitations/Impairments    Existing Precautions/Restrictions weight bearing     Right LE Weight-Bearing Status weight-bearing as tolerated (WBAT)     Limitations/Impairments other (see comments)   L foot drop       Cognition/Psychosocial    Affect/Mental Status (Cognition) WFL     Orientation Status (Cognition) oriented x 3        Bed Mobility    Comment (Bed Mobility) pt returned back to bed with HERNÁNDEZ at end of session        Sit to Stand Transfer    [place], Sit to Stand Transfer minimum assist (75% or more patient effort);1 person assist;verbal cues     Verbal Cues hand placement;safety;technique     Assistive Device walker, front-wheeled     Comment from recliner x 2 trials.        Stand to Sit Transfer    [place], Stand to Sit Transfer minimum assist (75% or more patient effort);1 person assist;verbal cues     Verbal Cues hand placement;safety;technique     Assistive Device walker, front-wheeled     Comment A for controlled descent to recliner.        Gait Training    [place], Gait minimum assist (75% or more patient effort);1 person assist;verbal cues     Assistive Device walker, front-wheeled     Distance in Feet 40 feet     Pattern (Gait) step-to     Deviations/Abnormal Patterns (Gait) antalgic;gait speed decreased;base of support, narrow;step length decreased;weight shifting decreased     Maintains Weight-bearing Status (Gait) able to maintain     Comment (Gait/Stairs) 40' x 2, seated rest between trials.  Very narrow base of support, poor safety awareness with use of Rw.        Stairs Training    [place], Stairs unable to assess;safety considerations        Safety Issues, Functional Mobility    Impairments Affecting Function (Mobility)  balance;endurance/activity tolerance;strength;pain        Balance    Static Sitting Balance WFL     Static Standing Balance mild impairment;supported     Dynamic Standing Balance mild impairment;supported        Therapeutic Exercise    Therapeutic Exercise --   reviewed ankle pumps.       AM-PAC (TM) - Mobility (Current Function)    Turning from your back to your side while in a flat bed without using bedrails? 3 - A Little     Moving from lying on your back to sitting on the side of a flat bed without using bedrails? 3 - A Little     Moving to and from a bed to a chair? 3 - A Little     Standing up from a chair using your arms? 3 - A Little     To walk in a hospital room? 3 - A Little     Climbing 3-5 steps with a railing? 1 - Total     AM-PAC (TM) Mobility Score 16        Session Outcome    Daily Outcome Statement Pt cont to require min A for STS transfers and mob w/ RW.  Cont to be limited by dec balance, endurance and strength.  Rec remains SNF at this time.     Position at End of Session seated in chair     Safety Factors call light in reach;chair alarm     Status at End of Session personal items in reach;all needs met     Nursing Notified patient's performance        Plan    Rehab Potential good, to achieve stated therapy goals     Therapy Frequency 5 times/wk     Planned Therapy Interventions balance training;bed mobility training;gait training;home exercise program;stair training;transfer training               PT Discharge Recommendations    Flowsheet Row Most Recent Value   PT Recommended Discharge Disposition skilled nursing facility at 03/01/2023 1354   Anticipated Equipment Needs at Discharge (PT) none at 03/01/2023 1354   Patient/Family Therapy Goals Statement to go to rehab to regain independence at 02/27/2023 1538                  Education Documentation  Rehabilitation Therapy, taught by Charles Amaro PTA at 3/1/2023  2:56 PM.  Learner: Patient  Readiness: Acceptance  Method:  Explanation  Response: Verbalizes Understanding  Comment: safe transfers, mob w/ RW, stair navigation.          PT Goals    Flowsheet Row Most Recent Value   Bed Mobility Goal 1    Activity/Assistive Device bed mobility activities, all at 02/27/2023 1538   [place] supervision required at 02/27/2023 1538   Time Frame short-term goal (STG) at 02/27/2023 1538   Progress/Outcome goal ongoing at 02/27/2023 1538   Transfer Goal 1    Activity/Assistive Device all transfers, walker, front-wheeled at 02/27/2023 1538   [place] supervision required at 02/27/2023 1538   Time Frame short-term goal (STG) at 02/27/2023 1538   Progress/Outcome goal ongoing at 02/27/2023 1538   Gait Training Goal 1    Activity/Assistive Device gait (walking locomotion), walker, front-wheeled at 02/27/2023 1538   [place] minimum assist (75% or more patient effort) at 02/27/2023 1538   Distance 50 at 02/27/2023 1538   Time Frame short-term goal (STG) at 02/27/2023 1538   Progress/Outcome goal ongoing at 02/27/2023 1538   Stairs Goal 1    Activity/Assistive Device stairs, all skills at 02/27/2023 1538   [place] minimum assist (75% or more patient effort) at 02/27/2023 1538   Number of Stairs 4 at 02/27/2023 1538   Time Frame short-term goal (STG) at 02/27/2023 1538   Progress/Outcome goal ongoing at 02/27/2023 1538

## 2023-03-01 NOTE — PROGRESS NOTES
Patient:  Bebe Burks  Location:  [place] [address]  MRN:  [id number]  Today's date:  3/1/2023    Bebe is a 62 y.o. female admitted on 2/27/2023 with Osteoarthritis of right knee, unspecified osteoarthritis type [M17.11]  Arthritis of right knee [M17.11]. Principal problem is No Principal Problem: There is no principal problem currently on the Problem List. Please update the Problem List and refresh..    Past Medical History  Bebe has a past medical history of Abnormal finding on chest xray (12/2021), Arthritis, COVID-19 (12/2021), COVID-19 vaccine series completed, Diabetic neuropathy (CMS/[facility]), DM (diabetes mellitus), type 2 with ophthalmic complications (CMS/[facility]), Hypertension, Hypothyroidism, Left foot drop, Lipid disorder, and Type 2 diabetes mellitus treated with insulin (CMS/[facility]).    History of Present Illness   Pt is s/p R TKA on 2/27 w/ Dr Jacobo PATTON Pain    Date/Time Pain Type Location Rating: Rest Who   03/01/23 1358 -- knee -- MS   03/01/23 1359 Pain Assessment knee 4 - moderate pain MS          Prior Living Environment    Flowsheet Row Most Recent Value   People in Home alone   Current Living Arrangements condominium   Home Accessibility stairs to enter home (Group)   Living Environment Comment lives alone in single-level condo, 8STE        Prior Level of Function    Flowsheet Row Most Recent Value   Dominant Hand right   Ambulation assistive equipment   Transferring assistive equipment   Toileting independent   Bathing independent   Dressing independent   Prior Level of Function Comment IND mobility (usually uses SPC, but recently using RW d/t pain), ADLs, IADLs, works for [employer]   Assistive Device Currently Used at Home walker, front-wheeled, cane, straight        Occupational Profile    Flowsheet Row Most Recent Value   Reason for Services/Referral ADL Evaluation/dispo planning   Successful Occupations Works for [employer]   Environmental Supports and Barriers  Lives alone   Patient Goals D/c to rehab         OT Evaluation and Treatment - 03/01/23 1355        OT Time Calculation    Start Time 1355     Stop Time 1420     Time Calculation (min) 25 min        General Information    Document Type daily treatment/progress note     Mode of Treatment occupational therapy;co-treatment     Position at Start of Session seated in chair     Status at Start of Session agreeable to therapy        Precautions/Limitations/Impairments    Right LE Weight-Bearing Status weight-bearing as tolerated (WBAT)        Cognition/Psychosocial    Affect/Mental Status (Cognition) WFL     Orientation Status (Cognition) oriented x 3     Follows Commands (Cognition) follows two-step commands     Cognitive Function safety deficit     Safety Deficit (Cognition) minimal deficit;insight into deficits/self-awareness;problem-solving     Comment, Cognition A+O x3 able to make needs known        Bed Mobility    [place], Sit to Supine minimum assist (75% or more patient effort);1 person assist     Assistive Device bed rails        Transfers    Transfers toilet transfer        Sit to Stand Transfer    [place], Sit to Stand Transfer minimum assist (75% or more patient effort);1 person assist     Verbal Cues hand placement;preparatory posture;safety;technique     Assistive Device walker, front-wheeled     Comment from recliner        Stand to Sit Transfer    [place], Stand to Sit Transfer minimum assist (75% or more patient effort);1 person assist     Verbal Cues hand placement;preparatory posture;proper use of assistive device;safety;technique     Assistive Device walker, front-wheeled     Comment to recliner        Toilet Transfer    [place], Toilet Transfer moderate assist (50-74% patient effort);1 person assist     Verbal Cues hand placement;proper use of assistive device;safety;technique     Assistive Device none     Comment low seat - required incr assist - rec commode        Safety Issues,  Functional Mobility    Safety Issues Affecting Function (Mobility) judgment;problem-solving     Impairments Affecting Function (Mobility) balance;endurance/activity tolerance;pain        Balance    Static Sitting Balance WFL     Dynamic Sitting Balance WFL     Static Standing Balance mild impairment     Dynamic Standing Balance mild impairment        Motor Skills    Functional Endurance fair - below baseline        Upper Body Dressing    Tasks don;hospital gown     [place] minimum assist (75% or more patient effort)     Position supported sitting        Lower Body Dressing    Tasks doff;don;shoes/slippers;socks     [place] moderate assist (50-74% patient effort)     Position supported sitting     Adaptive Equipment none     Comment incr assist for Rt        Grooming    Self-Performance washes, rinses and dries hands     [place] close supervision     Position sink side;supported standing        Toileting    [place] supervision;adjust/manage clothing;perform bladder hygiene     Position supported sitting     Adaptive Equipment none        BADL Safety/Performance    Impairments, BADL Safety/Performance balance;endurance/activity tolerance;pain;strength     Skilled BADL Treatment/Intervention adaptive equipment training;BADL process/adaptation training        ADL Interventions    Energy Conservation Techniques activity adapted to sitting     Self-Care (BADL) Promotion best position for BADL determined        Coping    Observed Emotional State cooperative     Verbalized Emotional State acceptance        AM-PAC (TM) - ADL (Current Function)    Putting on and taking off regular lower body clothing? 2 - A Lot     Bathing? 2 - A Lot     Toileting? 3 - A Little     Putting on/taking off regular upper body clothing? 3 - A Little     How much help for taking care of personal grooming? 3 - A Little     Eating meals? 4 - None     AM-PAC (TM) ADL Score 17        Session Outcome    Daily Outcome Statement Pt  seen for OT session.  [place] for functional txfers.  Mod A for LB drsg.  Pt requires VC's for safety with waler use and pacing activities.  Limited by incr pain, decr endurance and decr balance.  Rec SNF when medically stable.     Position at End of Session supine in bed     Safety Factors call light in reach;bed alarm     Status at End of Session all needs met;personal items in reach     Nursing Notified patient's performance;patient's position        Plan    Rehab Potential good, to achieve stated therapy goals     Therapy Frequency 5 times/wk     Planned Therapy Interventions activity tolerance training               OT Discharge Recommendations    Flowsheet Row Most Recent Value   OT Recommended Discharge Disposition skilled nursing facility at 03/01/2023 1355   Anticipated Equipment Needs At Discharge (OT) --  [TBD] at 02/28/2023 1013   Patient/Family Therapy Goal Statement D/c to rehab at 02/28/2023 1013                  Education Documentation  Equipment/Home Supplies, taught by Zena Hewitt COTA at 3/1/2023  3:59 PM.  Learner: Patient  Readiness: Acceptance  Method: Explanation  Response: Verbalizes Understanding, Needs Reinforcement  Comment: Pt ed on DME.          OT Goals    Flowsheet Row Most Recent Value   Bed Mobility Goal 1    Activity/Assistive Device bed mobility activities, all at 02/28/2023 1013   [place] modified independence at 02/28/2023 1013   Time Frame by discharge at 02/28/2023 1013   Progress/Outcome goal ongoing at 02/28/2023 1013   Transfer Goal 1    Activity/Assistive Device all transfers at 02/28/2023 1013   [place] modified independence at 02/28/2023 1013   Time Frame by discharge at 02/28/2023 1013   Progress/Outcome goal ongoing at 02/28/2023 1013   Dressing Goal 1    Activity/Adaptive Equipment dressing skills, all at 02/28/2023 1013   [place] modified independence at 02/28/2023 1013   Time Frame by discharge at 02/28/2023 1013   Progress/Outcome goal ongoing at  02/28/2023 1013   Toileting Goal 1    Activity/Assistive Device toileting skills, all at 02/28/2023 1013   [place] modified independence at 02/28/2023 1013   Time Frame by discharge at 02/28/2023 1013   Progress/Outcome goal ongoing at 02/28/2023 1013   Grooming Goal 1    Activity/Assistive Device grooming skills, all at 02/28/2023 1013   [place] modified independence at 02/28/2023 1013   Time Frame by discharge at 02/28/2023 1013   Progress/Outcome goal ongoing at 02/28/2023 1013

## 2023-03-01 NOTE — PLAN OF CARE
Plan of Care Review  Plan of Care Reviewed With: patient  Progress: improving  Outcome Summary: Patient AAOx4. VSS. C/O of pain, prn pain meds given. OOB assist x1 w walker. Dressing CDI. Call light in reach. Safety precautions maintained

## 2023-03-01 NOTE — PROGRESS NOTES
Patient: Bebe Burks   MRN: [id number]   : 1960       Daily Progress Note 2 Days Post-Op s/p Procedure(s):  Right Total Knee Repalcement     Subjective     62 y.o. y/o female s/p Procedure(s):  Right Total Knee Repalcement.     Patient is doing well. She endorses positional lightheadedness. Pain is controlled. Denies chest pain, shortness of breath. Admits to intermittent nausea but denies vomiting. Admits to flatus but no BM. Voiding without difficulties. Tolerating diet. OOB with PT today. Denies fever or chills.     Patient Active Problem List   Diagnosis   • Controlled type 2 diabetes mellitus, with long-term current use of insulin (CMS/HCC)   • Essential hypertension   • Hyperlipidemia   • Hypothyroidism   • Obesity   • Thyroid nodule   • COVID-19 virus infection   • Lung nodule   • Displacement of lumbar intervertebral disc without myelopathy   • Disability of walking   • Polyneuropathy due to type 2 diabetes mellitus (CMS/HCC)   • Pre-op examination   • Localized osteoarthritis of right knee   • Type 2 diabetes mellitus treated with insulin (CMS/HCC)   • Abnormal finding on EKG   • Left foot drop   • Abnormal finding on chest xray   • Osteoarthritis of right knee, unspecified osteoarthritis type       Objective     LAST VITALS:    Vitals:    23 1200 23 1600 23 2045 23 0752   BP: 132/62 (!) 129/59 (!) 141/66 136/61   BP Location: Right upper arm  Right upper arm Right upper arm   Patient Position: Sitting  Lying Lying   Pulse: 87 85 84 80   Resp:   18 18   Temp: 36.7 °C (98 °F)  36.7 °C (98.1 °F) 36.6 °C (97.8 °F)   TempSrc: Oral  Oral Oral   SpO2: 94% 96% 98% 99%   Weight:       Height:           Temp (24hrs), Av.7 °C (98 °F), Min:36.6 °C (97.8 °F), Max:36.7 °C (98.1 °F)      No intake or output data in the 24 hours ending 23 0907   No intake/output data recorded.    Allergies: Citrus and derivatives       PHYSICAL EXAM:    Aquacel: clean, dry and intact with  expected postop edema and ecchymosis. + 5/5 DF/PF/EHL bilateral. +NVI with sensation intact. Distal Pulses: 2+ bilateral,Calves: soft, non tender bilateral      LABS:     Results from last 7 days   Lab Units 02/28/23  0930   WBC K/uL 11.27*   HEMOGLOBIN g/dL 12.0   HEMATOCRIT % 35.8   PLATELETS K/uL 157   SODIUM mEQ/L 133*   POTASSIUM mEQ/L 3.8   CHLORIDE mEQ/L 105   CO2 mEQ/L 23   BUN mg/dL 16   CREATININE mg/dL 0.7   GLUCOSE mg/dL 183*   CALCIUM mg/dL 8.7*         Assessment/Plan     62 y.o. y/o female s/p Procedure(s):  Right Total Knee Repalcement     2 Days Post-Op     1. DVT prophylaxis: Asprin 81 mg po BID for 4 weeks and bilateral SCD  2. PT/OT per protocol  3. Incentive spirometer  4. Pain management: Scheduled: tylenol, gabapentin, toradol PRN: oxycodone, tramadol, IV dilaudid   5. Bowel regimen: senokot, colace, miralax  6. Lightheadedness: Fluid bolus given    Dispo: SNF today pending authorization d/c if stable per PT/OT    RADHA De La Cruz  3/1/2023  9:07 AM

## 2023-03-01 NOTE — PLAN OF CARE
Care Coordination Discharge Plan Note   Date: 3/1/2023    Time: 3:42 PM    Patient Name: Bebe Burks  Medical Record Number: [id number]  YOB: 1960  Room/BED: 0193    Discharge Assessment  Current Discharge Risk: lives alone  Concerns to be Addressed: discharge planning  Anticipated Changes Related to Illness: inability to care for self    Concerns Comments: Per info in medical rounds, pt is stable for d/c today pending auth. MICHELET s/w Debbie from [facility] who confirmed auth was approved and they can accept pt for admission today. MICHELET updated team, requested COVID swab, arranged transport. MICHELET s/w pt to confirm dispo plan. SW reviewed patient's medicare right to appeal. They are in agreement with dispo plan. Verbal consent for [facility] letter provided by pt.    Anticipated Discharge Plan  Anticipated Discharge Disposition: skilled nursing facility  Type of Home Care Services: home PT  Type of Skilled Nursing Care Services: PT, OT, nursing  Patient/Family Anticipates Transition to: family members' home, home with help/services, inpatient rehabilitation facility  Patient/Family Anticipated Services at Transition: skilled nursing    Patient Choice  Offered/Gave Vendor List: yes  Patient's Choice of Community Agency(s): [facility]    Patient and/or patient guardian/advocate was made aware of their right to choose a provider. A list of eligible providers was presented and reviewed with the patient and/or patient guardian/advocate in written and/or verbal form. The list delineates providers in the patient’s desired geographic area who are participating in the Medicare program and/or providers contracted with the patient’s primary insurance. The Medicare list and quality ratings were obtained from the Medicare.gov [medicare.gov] website.    Discharge Transportation   Does the patient need discharge transport arranged?: Yes  Has discharge transport been arranged?: Yes  Discharge Transportation Vendor: [employer]  ([phone], option 5)  Type of Transportation: wheelchair van  What day is the transport expected?: 03/01/23  What time is the transport expected?: 1700     Discharge Barriers   Barriers to Discharge: None  Participants: advanced practice provider, physical therapy, , physician, nursing, occupational therapy, social work/services      Continued Care and Services - Admitted Since 2/27/2023     Destination Coordination complete.    Service Provider Request Status Selected Services Address Phone Fax Patient Preferred    [facility]  Selected Skilled Nursing [address] [place] [id number] [phone] --              SW will continue to follow for emotional support and dispo planning. KATARINA Gifford [phone]/

## 2023-03-06 NOTE — DISCHARGE SUMMARY
Ortho Discharge Summary    Admitting Provider: Gideon Centeno MD  Discharge Provider: No att. providers found  Primary Care Physician at Discharge: Paula Walker [phone]     Admission Date: 2/27/2023     Discharge Date: 3/6/2023    Primary Discharge Diagnosis  Osteoarthritis of right knee, unspecified osteoarthritis type    Discharge Disposition  Skilled Nursing Facility - Other   Code Status at Discharge: Full Code    Discharge Medications     Medication List      START taking these medications    acetaminophen 500 mg tablet  Commonly known as: TYLENOL EXTRA STRENGTH  Take 1 tablet (500 mg total) by mouth every 6 (six) hours as needed for mild pain or fever (specify temp in comments):. Please obtain over the counter  Dose: 500 mg     oxyCODONE 5 mg immediate release tablet  Commonly known as: ROXICODONE  Take 1-2 tablets (5-10 mg total) by mouth every 4 (four) hours as needed for severe pain for up to 5 days.  Dose: 5-10 mg     polyethylene glycol 17 gram packet  Commonly known as: MIRALAX  Take 17 g by mouth daily as needed (constipation). Please obtain over the counter  Dose: 17 g        CHANGE how you take these medications    * aspirin 81 mg enteric coated tablet  Take 1 tablet (81 mg total) by mouth 2 (two) times a day. Take Asprin for 4 weeks. Please obtain over the counter.  Dose: 81 mg  What changed: You were already taking a medication with the same name, and this prescription was added. Make sure you understand how and when to take each.     * aspirin 81 mg enteric coated tablet  Start taking on: April 5, 2023  Take 1 tablet (81 mg total) by mouth daily. HOLD for 4 weeks  Dose: 81 mg  What changed:   · additional instructions  · These instructions start on April 5, 2023. If you are unsure what to do until then, ask your doctor or other care provider.         * This list has 2 medication(s) that are the same as other medications prescribed for you. Read the directions carefully, and ask your  "doctor or other care provider to review them with you.            CONTINUE taking these medications    amLODIPine 5 mg tablet  Commonly known as: NORVASC  TAKE 1 TABLET BY MOUTH EVERY DAY     atorvastatin 10 mg tablet  Commonly known as: LIPITOR  TAKE 1 TABLET BY MOUTH EVERY DAY     BD KRISTIN 2ND GEN PEN NEEDLE 32 gauge x 5/32\" needle  INJECT TWICE A DAY  Generic drug: pen needle, diabetic     empagliflozin 10 mg tablet  Commonly known as: JARDIANCE  TAKE 1 TABLET BY MOUTH EVERY DAY     FREESTYLE LAURA 14 DAY READER misc  1 each continuously. 1 reader dx code E 11.9  Dose: 1 each  Generic drug: flash glucose scanning reader     FREESTYLE LAURA 14 DAY SENSOR kit  1 each continuously. 1 sensor every 14 days dx code E 11.9  Dose: 1 each  Generic drug: flash glucose sensor     gabapentin 300 mg capsule  Commonly known as: NEURONTIN  Take 300 mg by mouth daily before breakfast. 600 mg in the evening  Dose: 300 mg     hydrochlorothiazide 12.5 mg tablet  Commonly known as: HYDRODIURIL  TAKE 1 TABLET BY MOUTH EVERY DAY     insulin asp prt-insulin aspart 100 unit/mL (70-30) pen  Commonly known as: NovoLOG Mix 70-30FlexPen U-100  INJECT 25 UNITS IN AM AND 30 UNITS IN PM  Strength: 100 unit/mL (70-30)     levothyroxine 25 mcg tablet  Commonly known as: SYNTHROID  TAKE 1 TABLET BY MOUTH ONCE DAILY.     losartan 50 mg tablet  Commonly known as: COZAAR  TAKE 1 TABLET BY MOUTH EVERY DAY     ONETOUCH DELICA LANCETS 33 gauge misc  1 each 3 (three) times a day.  Dose: 1 each  Generic drug: lancets     RYBELSUS 14 mg tablet  Take 1 tablet (14 mg total) by mouth daily.  Dose: 14 mg  Generic drug: semaglutide        STOP taking these medications    acetaminophen-codeine 300-30 mg per tablet  Commonly known as: TYLENOL #3     ONETOUCH ULTRA BLUE TEST STRIP strip  Generic drug: blood sugar diagnostic     ONETOUCH VERIO TEST STRIPS strip  Generic drug: blood sugar diagnostic          Outpatient Follow-Up            In 3 weeks Wanda Gipson, " [facility]    In 1 month [facility] [place] [facility] in [place]            Test Results Pending at Discharge  Unresulted Labs (From admission, onward)    None          DETAILS OF HOSPITAL STAY    Presenting Problem/History of Present Illness  Osteoarthritis of right knee, unspecified osteoarthritis type [M17.11]  Arthritis of right knee [M17.11]    Hospital Course  DISCHARGE SUMMARY    The patient is a 62 y.o. female who presented with a history of severe knee pain.  Dr. Centeno recommended Procedure(s):  Right Total Knee Repalcement. The patient underwent the procedure on 2/27/2023 and tolerated the procedure well. Details of the procedure can be found in the operative summary. The patient’s post-operative course was  unremarkable. The patient was seen postoperatively by PT, OT, and social work and progressed to the point that on the day of discharge was tolerating a house diet, voiding without difficulty and ambulating without assistance. At the time of discharge the patient’s vital signs were stable with a clean, dry and intact incision. The patient was discharged on Asprin 81 mg po BID for 4 weeks for DVT prophylaxis, continue pain medications, resume preoperative medications and to follow up with their surgeon. The patient was advised to call the office with any problems including drainage from the incision, redness around the incision, worsening pain and fever greater than 101 degrees F.         Operative Procedures Performed  Procedure(s):  Right Total Knee Repalcement  Consults: none

## 2023-03-21 ENCOUNTER — TRANSCRIBE ORDERS (OUTPATIENT)
Dept: SCHEDULING | Facility: REHABILITATION | Age: 63
End: 2023-03-21

## 2023-03-21 DIAGNOSIS — Z96.651 S/P TOTAL KNEE ARTHROPLASTY, RIGHT: Primary | ICD-10-CM

## 2023-03-23 NOTE — PROGRESS NOTES
Endocrinology  Note       PATIENTS: Bebe Burks  YOB: 1960  DATE: March 28, 2023  VISIT TYPE: telemedicine- virtual - audio         Verification of Patient Location:  The patient affirms they are currently located in the following state:[place]     Audio Only Telemedicine Visit    Request for Consent:  You and I are about to have a telemedicine check-in or visit. This is allowed because you are already my patient, and you have requested it.  This telemedicine visit will be billed to your health insurance or you, if you are self-insured.  You understand you will be responsible for any copayments or coinsurances that apply to your telemedicine visit.  Before starting our telemedicine visit, I am required to get your consent for this virtual check-in or visit by telemedicine. Do you consent?      Patient Response to Request for Consent: Yes    The following have been reviewed and updated as appropriate in this visit:   Tobacco  Allergies  Meds  Problems  Med Hx  Surg Hx  Fam Hx  Soc   Hx        Visit Documentation:  25 min         Time Spent:  I spent 25 minutes on this date of service performing the following activities: obtaining history, entering orders, documenting, preparing for visit, obtaining / reviewing records, providing counseling and education, independently reviewing study/studies and coordinating care.              History of Present Illness:   HPI 62 -year-old white female with type 2 diabetes.She did have a right total knee replacement last month and receiveing PT.   She does follow closely with ophthalmology for retinopathy.she will be getting a brace for her left dropfoot.  Presently she is out on disability.  She   continues on   NovoLog 7030 mix 25 units with breakfast 30 units with dinner Jardiance  10 mg a day and Rybelsus 14 mg daily.  She states her blood sugars have be stable. .  She states she has few episodes of hypoglycemia. She  continues  "on Synthroid 25 ?g day. She  continues on Lipitor for hyperlipidemia. She  does have a history of thyroid nodules reports no difficulty with swallowing or breathing.  She  reports no foot wounds at the present time.  She has been going to the fitness center routinely    Medical History:  Past Medical History:   Diagnosis Date   • Abnormal finding on chest xray 12/2021   • Arthritis    • COVID-19 12/2021   • COVID-19 vaccine series completed     Moderna: \" 3 doses\"   • Diabetic neuropathy (CMS/HCC)    • DM (diabetes mellitus), type 2 with ophthalmic complications (CMS/HCC)    • Hypertension    • Hypothyroidism    • Left foot drop    • Lipid disorder    • Type 2 diabetes mellitus treated with insulin (CMS/HCC)        Surgical History:   Past Surgical History:   Procedure Laterality Date   • CATARACT EXTRACTION Right    • COLONOSCOPY     • COMBINED HYSTEROSCOPY DIAGNOSTIC / D&C  2007   • EYE SURGERY Left 2017    cataract   • VITRECTOMY Left 2016       Family History:  Family History   Problem Relation Age of Onset   • Diabetes Biological Mother    • Hypertension Biological Mother    • Stroke Biological Father        Social history:  Social History     Socioeconomic History   • Marital status: Single     Spouse name: Not on file   • Number of children: 0   • Years of education: Not on file   • Highest education level: Not on file   Occupational History   • Occupation: Disabilty   Tobacco Use   • Smoking status: Never     Passive exposure: Past   • Smokeless tobacco: Never   Vaping Use   • Vaping status: Never Used   Substance and Sexual Activity   • Alcohol use: Yes     Comment: occasionally   • Drug use: Never   • Sexual activity: Defer   Other Topics Concern   • Not on file   Social History Narrative    Lives alone in a condo     Social Determinants of Health     Financial Resource Strain: Low Risk  (2/27/2023)    Overall Financial Resource Strain (CARDIA)    • Difficulty of Paying Living Expenses: Not hard at all " "  Food Insecurity: No Food Insecurity (12/2/2021)    Hunger Vital Sign    • Worried About Running Out of Food in the Last Year: Never true    • Ran Out of Food in the Last Year: Never true   Transportation Needs: No Transportation Needs (2/27/2023)    PRAPARE - Transportation    • Lack of Transportation (Medical): No    • Lack of Transportation (Non-Medical): No   Physical Activity: Not on file   Stress: Not on file   Social Connections: Not on file   Intimate Partner Violence: Not on file   Housing Stability: Unknown (2/27/2023)    Housing Stability Vital Sign    • Unable to Pay for Housing in the Last Year: No    • Number of Places Lived in the Last Year: Not on file    • Unstable Housing in the Last Year: No       Medication(active prior to today):  Current Outpatient Medications   Medication Sig Dispense Refill   • acetaminophen (TYLENOL EXTRA STRENGTH) 500 mg tablet Take 1 tablet (500 mg total) by mouth every 6 (six) hours as needed for mild pain or fever (specify temp in comments):. Please obtain over the counter     • amLODIPine (NORVASC) 5 mg tablet TAKE 1 TABLET BY MOUTH EVERY DAY (Patient taking differently: Take 5 mg by mouth every morning.) 90 tablet 1   • [START ON 4/5/2023] aspirin 81 mg enteric coated tablet Take 1 tablet (81 mg total) by mouth daily. HOLD for 4 weeks 30 tablet 0   • aspirin 81 mg enteric coated tablet Take 1 tablet (81 mg total) by mouth 2 (two) times a day. Take Asprin for 4 weeks. Please obtain over the counter. 60 tablet 0   • atorvastatin (LIPITOR) 10 mg tablet TAKE 1 TABLET BY MOUTH EVERY DAY (Patient taking differently: Take 10 mg by mouth every morning.) 90 tablet 3   • BD KRISTIN 2ND GEN PEN NEEDLE 32 gauge x 5/32\" needle INJECT TWICE A  each 1   • DEXCOM G7 SENSOR device device 1 each continuously. Change every 10 days 2 each 11   • empagliflozin (JARDIANCE) 10 mg tablet Take 1 tablet (10 mg total) by mouth daily. TAKE 1 TABLET BY MOUTH EVERY DAY 90 tablet 3   • " FREESTYLE LAURA 14 DAY READER misc 1 each continuously. 1 reader dx code E 11.9 1 each 11   • FREESTYLE LAURA 14 DAY SENSOR kit 1 each continuously. 1 sensor every 14 days dx code E 11.9 1 kit 11   • gabapentin 300 mg capsule Take 300 mg by mouth daily before breakfast. 600 mg in the evening     • hydrochlorothiazide (HYDRODIURIL) 12.5 mg tablet TAKE 1 TABLET BY MOUTH EVERY DAY (Patient taking differently: Take 12.5 mg by mouth every evening.) 90 tablet 1   • insulin asp prt-insulin aspart (NovoLOG Mix 70-30FlexPen U-100) 100 unit/mL (70-30) pen INJECT 25 UNITS IN AM AND 30 UNITS IN PM  Strength: 100 unit/mL (70-30) (Patient taking differently: 2 (two) times a day before breakfast and dinner. INJECT 25 UNITS IN AM AND 30 UNITS IN PM  Strength: 100 unit/mL (70-30)) 45 mL 3   • levothyroxine (SYNTHROID) 25 mcg tablet TAKE 1 TABLET BY MOUTH ONCE DAILY. (Patient taking differently: Take 25 mcg by mouth every morning.) 90 tablet 3   • losartan (COZAAR) 50 mg tablet TAKE 1 TABLET BY MOUTH EVERY DAY (Patient taking differently: Take 50 mg by mouth every evening.) 90 tablet 3   • ONETOUCH DELICA LANCETS 33 gauge misc 1 each 3 (three) times a day. 100 each 11   • polyethylene glycol (MIRALAX) 17 gram packet Take 17 g by mouth daily as needed (constipation). Please obtain over the counter     • semaglutide (RYBELSUS) 14 mg tablet Take 1 tablet (14 mg total) by mouth daily. 90 tablet 3     No current facility-administered medications for this visit.       Allergies:  Citrus and derivatives    Review of Systems:   Constitutional: Negative for fatigue and unexpected weight change.   HENT: Negative for postnasal drip, trouble swallowing and voice change.    Eyes: Negative for visual disturbance.   Respiratory: Negative for apnea.    Cardiovascular: Negative for leg swelling.   Gastrointestinal: Negative for constipation, diarrhea and nausea.   Endocrine: Negative for polydipsia, polyphagia and polyuria.   Musculoskeletal: +for  arthralgias, back pain, neck pain and neck stiffness.   Neurological: Negative for numbness and headaches.   Psychiatric/Behavioral: Negative for sleep disturbance.     Vitals Signs:none   There were no vitals filed for this visit.  There is no height or weight on file to calculate BMI.      Physical Exam:not applicable   Constitutional: Patient is oriented to person, place, and time.  Appears well-developed and well-nourished.   Eyes: EOM are normal. Pupils are equal, round, and reactive to light.   Neck: Normal range of motion. Neck supple.   Cardiovascular: Normal rate, regular rhythm and normal heart sounds.    Pulmonary/Chest: Effort normal and breath sounds normal.   Abdominal: Soft.   Musculoskeletal: Normal range of motion.   Neurological:  Alert and oriented to person, place, and time.   Skin: Skin is warm and dry.   Psychiatric:  Normal mood and affect. Behavior is normal. Judgment and thought content normal.   Foot Exam: Normal exam, no swelling, tenderness, instability; ligaments intact, full range of motion of all ankle/foot joints    Assessment/Plan:  Type 2 diabetes mellitus treated with insulin (CMS/formerly [facility])  1. continue with  Jardiance 10 mg per day - increase fluids   2.  continue with Rybelsus 14 mg per day    3. Continue with Novolog 70/30 mix 25 units with breakfast and 30 units with dinner   4. Rotate injection sites   5.DEXCOM trial   6. A1c 8.8  7. Schedule bone density     Thyroid nodule  Call me for any difficulty and swallowing or breathing     Obesity  Try to be active     Hypothyroidism  Continue with Synthroid 25 mcg per day     Hyperlipidemia  Continue with Lipitor     Essential hypertension  Continue with Cozaar       Labs:  Lab Results   Component Value Date    WBC 11.27 (H) 02/28/2023    HGB 12.0 02/28/2023    HCT 35.8 02/28/2023     02/28/2023    CHOL 143 10/17/2022    TRIG 62 10/17/2022    HDL 46 (L) 10/17/2022    ALT 24 10/17/2022    AST 22 10/17/2022     (L) 02/28/2023     K 3.8 02/28/2023     02/28/2023    CREATININE 0.7 02/28/2023    BUN 16 02/28/2023    CO2 23 02/28/2023    TSH 0.63 02/22/2023    INR 1.2 12/03/2021    HGBA1C 8.8 (H) 02/22/2023    MICROALBUR 22.0 (H) 10/17/2022       I spent  25 minutes  on this date of service performing the following activities: obtaining history, performing examination, entering orders, documenting, preparing for visit, obtaining / reviewing records, providing counseling and education, independently reviewing study/studies, and coordinating care.  I discussed the goals of diet and exercise with the patient. I discussed the goals of A1c, blood pressure, and cholesterol. I discussed the goals of blood sugar pre- and post meals with the patient.I encouraged the patient to rotate the injection sites.  I reviewed the patient's blood sugars.  I reviewed the patient's labs/imaging/medications/allergies/problem list.       Electronically signed by: PHIL Carrizales on 3/28/2023 4:58 PM

## 2023-03-28 ENCOUNTER — TELEMEDICINE (OUTPATIENT)
Dept: ENDOCRINOLOGY | Facility: CLINIC | Age: 63
End: 2023-03-28
Payer: COMMERCIAL

## 2023-03-28 DIAGNOSIS — Z79.4 TYPE 2 DIABETES MELLITUS WITH OTHER SPECIFIED COMPLICATION, WITH LONG-TERM CURRENT USE OF INSULIN: ICD-10-CM

## 2023-03-28 DIAGNOSIS — Z79.4 CONTROLLED TYPE 2 DIABETES MELLITUS WITH COMPLICATION, WITH LONG-TERM CURRENT USE OF INSULIN (CMS/HCC): Primary | ICD-10-CM

## 2023-03-28 DIAGNOSIS — E11.69 TYPE 2 DIABETES MELLITUS WITH OTHER SPECIFIED COMPLICATION, WITH LONG-TERM CURRENT USE OF INSULIN: ICD-10-CM

## 2023-03-28 DIAGNOSIS — E11.8 CONTROLLED TYPE 2 DIABETES MELLITUS WITH COMPLICATION, WITH LONG-TERM CURRENT USE OF INSULIN (CMS/HCC): Primary | ICD-10-CM

## 2023-03-28 PROCEDURE — 99443 PR PHYS/QHP TELEPHONE EVALUATION 21-30 MIN: CPT | Performed by: NURSE PRACTITIONER

## 2023-03-28 RX ORDER — BLOOD-GLUCOSE SENSOR
1 EACH MISCELLANEOUS CONTINUOUS
Qty: 2 EACH | Refills: 11 | Status: ON HOLD | OUTPATIENT
Start: 2023-03-28 | End: 2024-03-01 | Stop reason: ENTERED-IN-ERROR

## 2023-03-28 NOTE — PATIENT INSTRUCTIONS
Type 2 diabetes mellitus treated with insulin (CMS/[facility])  1. continue with  Jardiance 10 mg per day - increase fluids   2.  continue with Rybelsus 14 mg per day    3. Continue with Novolog 70/30 mix 25 units with breakfast and 30 units with dinner   4. Rotate injection sites   5.DEXCOM trial   6. A1c 8.8  7. Schedule bone density     Thyroid nodule  Call me for any difficulty and swallowing or breathing     Obesity  Try to be active     Hypothyroidism  Continue with Synthroid 25 mcg per day     Hyperlipidemia  Continue with Lipitor     Essential hypertension  Continue with Cozaar

## 2023-03-28 NOTE — ASSESSMENT & PLAN NOTE
1. continue with  Jardiance 10 mg per day - increase fluids   2.  continue with Rybelsus 14 mg per day    3. Continue with Novolog 70/30 mix 25 units with breakfast and 30 units with dinner   4. Rotate injection sites   5.DEXCOM trial   6. A1c 8.8  7. Schedule bone density

## 2023-03-29 RX ORDER — GABAPENTIN 300 MG/1
CAPSULE ORAL
Qty: 360 CAPSULE | Refills: 3 | Status: ON HOLD | OUTPATIENT
Start: 2023-03-29 | End: 2024-03-01 | Stop reason: ENTERED-IN-ERROR

## 2023-04-03 ENCOUNTER — HOSPITAL ENCOUNTER (OUTPATIENT)
Dept: PHYSICAL THERAPY | Facility: REHABILITATION | Age: 63
Setting detail: THERAPIES SERIES
Discharge: HOME | End: 2023-04-03
Attending: ORTHOPAEDIC SURGERY
Payer: COMMERCIAL

## 2023-04-03 ENCOUNTER — DOCUMENTATION (OUTPATIENT)
Dept: SOCIAL WORK | Facility: REHABILITATION | Age: 63
End: 2023-04-03
Payer: COMMERCIAL

## 2023-04-03 DIAGNOSIS — Z96.651 S/P TOTAL KNEE ARTHROPLASTY, RIGHT: ICD-10-CM

## 2023-04-03 PROCEDURE — 97162 PT EVAL MOD COMPLEX 30 MIN: CPT | Mod: GP

## 2023-04-03 PROCEDURE — 97530 THERAPEUTIC ACTIVITIES: CPT | Mod: GP

## 2023-04-03 ASSESSMENT — GAIT ASSESSMENTS: TUG TIME: NV

## 2023-04-03 NOTE — PROGRESS NOTES
4/3/23: CM was notified that the patient is in need of transportation. CM sent the request via epic decision tree. TANO Huerta.

## 2023-04-03 NOTE — OP PT TREATMENT LOG
IE 4/3/23   30 Day 5/3/23   60 Day 6/3/23   90 Day 7/3/23   Precautions LLE foot drop- has LLE AFO   Insurance Auth    Treatment  Current Session Time   Modalities Total Time for Session Not performed   Heat/Ice  CPT 45261 MHP/CP to R knee PRN   E. Stimulation Manual  CPT 11819    E. Stim Unattended  CPT 89189    Manual   CPT 79772 Total Time for Session Not performed   STM/MFR/TPR    Instrument Assisted STM     Mobilizations Gr II/III R patellar mobilizations in all direction   ROM/Flexibility R knee PROM flex/ext    Myofascial Decompression    Ther. Exercise  CPT 29485                   Group Total Time for Session Not performed     Sets Reps Load Comment    RB (NuStep if pt unable to handle)                Quad set        SAQ        SLR        Sidelying hip ABD        Prone hip ext        Hip adduction        Hip ABD        Clamshell        Bridge         STS        Toe taps        FSU        LSU                                                               Neuro Re-Ed  CPT 30456   Group Total Time for Session Not performed     Sets Reps Load Comment   FA EO EC     Firm surface   FT EO EC     Firm surface    FA EO EC     Foam    Tandem walking                                           Gait  CPT 49126 Group Total Time for Session Not performed                  Ther. Activity  CPT 26077 Group Total Time for Session 8-22 Minutes   Patient education  Pt educated on PT role, POC, safety during transfers/ambulation, proper use of RW, initial examination findings , future HEP   HEP NV    5x STS NV    OH/FGA NV    10MWT NV    R knee circumference assessment NV              Group  CPT 30355 Total Time for Session Not performed

## 2023-04-03 NOTE — PROGRESS NOTES
"  Referring Provider: By co-signing this Plan of Care (POC) either electronically or physically you agree to the following:    I have reviewed the the Plan of Care established by the therapist within this document and certify that the services are skilled and medically necessary. I have reviewed the plan and recommend that these services continue to meet the goals stated in this document.       EXTERNAL PROVIDER FAXING BACK:    PHYSICIAN SIGNATURE: __________________________________     DATE: ___________________  TIME: _____________    IMPORTANT:  If returning this Plan of Care by fax, please fax back ONLY the signature page.   _________________________________________________________________________      [facility] Fax: [phone]        PT EVALUATION FOR OUTPATIENT THERAPY    Patient: Bebe Burks  MRN: [id number]  : 1960 62 y.o.   Referring Physician: Gideon Centeno MD  Date of Visit: 4/3/2023      Certification Dates:  23 through 23  Begin with 3x/week and transition to 2x/week when appropriate      Recommended Frequency & Duration:  3 times/week for up to 3 months     Diagnosis:   1. S/P total knee arthroplasty, right        Chief Complaints:   Chief Complaint   Patient presents with   • Difficulty Walking   • Pain   • Dec ROM   • Balance Deficits   • Decreased Endurance   •  Decreased Community Integration   • Decreased recreational/play activity   • Decreased Mobility   • Abnormality Of Gait   • Dec Strength       Precautions: other (see comments)  Precautions additional comments: LLE foot drop - has LLE AFO will bring NV following IE    Past Medical History:   Past Medical History:   Diagnosis Date   • Abnormal finding on chest xray 2021   • Arthritis    • COVID-19 2021   • COVID-19 vaccine series completed     Moderna: \" 3 doses\"   • Diabetic neuropathy (CMS/HCC)    • DM (diabetes mellitus), type 2 with ophthalmic complications (CMS/HCC)    • Hypertension    • " Hypothyroidism    • Left foot drop    • Lipid disorder    • Type 2 diabetes mellitus treated with insulin (CMS/HCC)        Past Surgical History:   Past Surgical History:   Procedure Laterality Date   • CATARACT EXTRACTION Right    • COLONOSCOPY     • COMBINED HYSTEROSCOPY DIAGNOSTIC / D&C  2007   • EYE SURGERY Left 2017    cataract   • VITRECTOMY Left 2016         LEARNING ASSESSMENT    Assessment completed:  Yes    Learner name:  Bebe Burks      Learner: Patient    Learning Barriers:  Learning barriers: No Barriers    Preferred Language: English     Needed: No    Education Provided:   Method: Discussion, Handout and Demonstration  Readiness: acceptance  Response: Demonstrated understanding      CO-LEARNER ASSESSMENT:    Completed: Yes          Welcome letter discussed: Yes Patient provided with Welcome Letter, which includes attendance policy. Provided education regarding cancellation and no-show policy. Education regarding the importance of participation and regular attendance to maximize goal attainment.         OBJECTIVE MEASUREMENTS/DATA:    Time In Session:  Start Time: 1005  Stop Time: 1100  Time Calculation (min): 55 min   Assessment and Plan - 04/03/23 1003        Assessment    Plan of Care reviewed and patient/family in agreement Yes     System Pathology/Pathophysiology Noted musculoskeletal     Functional Limitations in Following Categories (PT Eval) self-care;community/leisure;home management;work     Rehab Potential/Prognosis good, to achieve stated therapy goals     Problem List decreased flexibility;decreased endurance;decreased ROM;decreased strength;pain;impaired motor control;impaired sensation;impaired balance     Clinical Assessment Bebe is a 63 yo female who presents to OPPT s/p R TKA. Initial examination findings reveal decreased R knee AROM, impaired BLE strength, (+) tenderness to palpation, impaired LEFS and PSFS outcome measure scoring, gait impairments, and patient  subjective report of decline in functional mobility from baseline. Patient will benefit from skilled PT 3x/week in order to return to highest level of function, address above impairments, and progress toward stated goals.     Plan and Recommendations See treatment log for further assessment including R knee circumference assessment, 5xSTS, OH/FGA, 10MWT, stairs assessment     Planned Services CPT 33412 Gait training;CPT 30714 Manual therapy;CPT 85259 Neuromuscular Reeducation;CPT 71159 Therapeutic exercises;CPT 55709 Therapeutic activities;CPT 39477 Electrical stimulation UNATTENDED;CPT 70542 Hot/Cold Packs therapy     Comments/Additional Services IASTM, taping                General Information - 04/03/23 1003        Session Details    Document Type initial evaluation     Mode of Treatment individual therapy        General Information    Referring Physician Dr. Centeno     History of present illness/functional impairment Bebe is a 63 yo female who presents to OPPT s/p R TKA 2/27/23 w/ Dr. Centeno at [facility] [place] (states no complications during/post surgery). Patient reports she had a 2 night stay at [facility] [place] and then was discharged to [facility] where she stayed for 5-6 nights. She was discharged home with home health PT and OT, she states home health PT stopped last Friday 3/31/23. She reports at baseline she ambulates without AD, but started using SPC about 6 months prior to surgery due to pain/weakness. She states at this time she is ambulating with SPC for household distances and RW for community distances. She reports she lives alone in single level [facility] with 4 JOSE RAFAEL w/ unilateral rail. She states she has a walkin shower with shower chair and grab bars. She reports since her R knee surgery her ability to ascend/descend stairs is difficult, she feels like her walking is impaired, and her balance/endurance decreased. She reports significant PMHx of LLE drop foot (which they are contributing to sciatica, states it began about  3 years ago) which she wears LLE brace for which was issued to her by Peter in September 2022, LBP w/ LLE radicular symptoms, DMII (controlled), HTN (controlled). Patient states her overall goal for PT is to walk again without a SPC. Patient reports she is currently working but on long term leave at this time. Patient reports current R knee pain 0/10 and pain at worst in the past week 1/10.     Patient/Family/Caregiver Comments/Observations Patient arrives ambulating mod(I) with RW accompanied by her sister. She reports chief complaint of R knee pain/impairments following R TKA 2/27/23 by Dr. Centeno at [facility] [place].     Existing Precautions/Restrictions other (see comments)     Precautions comments LLE foot drop - has LLE AFO will bring NV following IE         Services    Do You Speak a Language Other Than English at Home? no                Pain/Vitals - 04/03/23 1003        Pain Assessment    Currently in pain Yes     Preferred Pain Scale number (Numeric Rating Pain Scale)     Pain Side/Orientation right     Pain: Body location Knee     Pain Rating (0-10): Pre Activity 0     Pain Rating (0-10): Activity 2     Pain Rating (0-10): Post Activity 0        Pre Activity Vital Signs    Pulse 86     SpO2 99 %     Oxygen Therapy None (Room air)     /62     BP Location Right upper arm     BP Method Manual     Patient Position Sitting        Pain Intervention    Intervention  IE, pain monitored     Post Intervention Comments IE, pain monitored                Falls/Food Screening - 04/03/23 1003        Initial Falls Assessment    One or more falls in the last year No        Food Insecurity    Within the past 12 months, you worried that your food would run out before you got the money to buy more. Never true     Within the past 12 months, the food you bought just didn't last and you didn't have money to get more. Never true                PT - 04/03/23 1003        Physical Therapy    Physical Therapy Specialty Ortho and  Sports PT        PT Plan    Frequency of treatment 3 times/week     PT Duration 3 months     PT Custom Frequency and Duration Begin with 3x/week and transition to 2x/week when appropriate     PT Cert From 04/03/23     PT Cert To 06/26/23     Date PT POC was sent to provider 04/03/23     Signed PT Plan of Care received?  No                   Sensory Tests    Sensory Testing - 04/03/23 1003        Sensory Assessment    Sensory Assessment sensation intact except     Sensation Impaired Location(s) right LE;left LE     Left LE Sensory Impairment general sensation;light touch awareness   decreased LT to distal BLE due to neuropathy    Right LE Sensory Impairment general sensation;light touch awareness   decreased LT to distal BLE due to neuropathy              Skin    Skin Assessment - 04/03/23 1003        Skin    Skin Condition Intact     Notable findings incision 14 cm length wise, minimal redness, appears to be healing well     Wound Care Follow-Up Needed? No        Girth Measurements    Right Knee Girth measurement NV     Left Knee Girth measurement NV               ROM    Range of Motion - 04/03/23 1000        RIGHT: Lower Extremity AROM Assessment    Hip Flexion   74 degrees     Knee Flexion   101     Knee Extension   --   lacking about 7 deg knee ext    Ankle Dorsiflexion   --   lacking 10 deg       LEFT: Lower Extremity AROM Assessment    Hip Flexion   104 degrees     Knee Flexion   131     Knee Extension   0     Ankle Dorsiflexion   --   lacking ~10 deg DF              MMT    Manual Muscle Tests - 04/03/23 1003        RIGHT: Lower Extremity Manual Muscle Test Assessment    Hip Flexion gross movement (3+/5) fair plus     Hip Extension gross movement (3+/5) fair plus     Hip Abduction gross movement (3+/5) fair plus     Hip Adduction gross movement (3+/5) fair plus     Hip Internal Rotation gross movement (3+/5) fair plus     Hip External Rotation gross movement (3+/5) fair plus     Knee Flexion strength (3+/5)  fair plus     Knee Extension strength (3+/5) fair plus     Ankle Dorsiflexion gross movement (3+/5) fair plus     Ankle Plantarflexion gross movement (3+/5) fair plus        LEFT: Lower Extremity Manual Muscle Test Assessment    Hip Flexion gross movement (3+/5) fair plus     Hip Extension gross movement (3+/5) fair plus     Hip Abduction gross movement (3+/5) fair plus     Hip Adduction gross movement (3+/5) fair plus     Hip Internal Rotation gross movement (3/5) fair     Hip External Rotation gross movement (3/5) fair     Knee Flexion strength (3+/5) fair plus     Knee Extension strength (3+/5) fair plus     Ankle Dorsiflexion gross movement (3-/5) fair minus     Ankle Plantarflexion gross movement (3-/5) fair minus               Palpation    Palpation - 04/03/23 1450        Palpation    LE Palpation  (+) TTP to tabby-incisional area of R knee               Gait and Mobility    Gait and Mobility - 04/03/23 1450        Gait Training    [place], Gait modified independence     Variable surfaces Flat surface     Assistive Device walker, front-wheeled     Pattern step-to     Deviations/Abnormal Patterns antalgic;base of support, wide;delta decreased;step length decreased;stride length decreased     Comment (Gait/Stairs) Patient ambulates mod(I) with RW        Stairs Assessment    Comment NV               Outcome Measures    PT Outcome Measures - 04/03/23 1003        Objective Outcome Measures    6 Minute Walk Test NV     Five Times to Sit to Stand (FTSTS) NV        Subjective Outcome Measures    LEFS 21/80 (26%)        NEW (2/6/23):  PSFS     PSFS ACTIVITY 1 Steps     PSFS ANSWER 1 2     PSFS ACTIVITY 2 Walking     PSFS ANSWER 2 3     PSFS ACTIVITY 3 Kitchen activities/prolonged standing     PSFS ANSWER 3 3     PSFS Sum of Activity Scores 8     PSFS Number of Activities 3     PSFS Percent Score 26.67 %        Timed Up and Go Test    Results, Timed Up and Go Test (Balance) NV                  Goals     •   Mutually agreed upon pain goal       Mutually agreed upon pain goal: 0/10      •  Patient stated R TKA 2023 (pt-stated)       Patient states her overall goal for PT is to walk again without a SPC.       •  R TKA 2023       Short Term Goals Time Frame Result Comment/Progress   Patient will report < 2 /10 pain at worst on average for improved functional performance.    4 Weeks     Patient will improve LEFS outcome score by 7 points in order to demonstrate self-perceived improved functional performance.    4 Weeks  IE: 21/80 (26%)   Patient will improve R knee AROM flex/ext by 10 deg for improved squatting for functional tasks.    4 Weeks     Patient will be IND with HEP.    4 Weeks     Patient will improve PSFS outcome measure scoring by 2 points in all categories in order to demonstrate self-perceived improvement in function.  4 Weeks  IE: Steps (2/10), Walking (3/10), Kitchen activities/prolonged standing (3/10)      4 Weeks       Long Term Goals Time Frame Result Comment/Progress   Patient will report 0/10 pain at worst on average for improved functional performance.    8 Weeks     Patient will improve LEFS outcome score by 14 points in order to demonstrate self-perceived improved functional performance.    8 Weeks  IE: 21/80 (26%)   Patient will improve R knee AROM flex/ext by 20 deg for improved squatting for functional tasks.    8 Weeks     Patient will be IND with HEP upon discharge for carryover of functional gains.    8 Weeks     Patient will improve PSFS outcome measure scoring by 4 points in all categories in order to demonstrate self-perceived improvement in function.  8 Weeks   IE: Steps (2/10), Walking (3/10), Kitchen activities/prolonged standing (3/10)      8 Weeks                      TREATMENT PLAN:    IE 4/3/23   30 Day 5/3/23   60 Day 6/3/23   90 Day 7/3/23   Precautions LLE foot drop- has LLE AFO   Insurance Auth    Treatment  Current Session Time   Modalities Total Time for Session Not performed    Heat/Ice  CPT 27352 MHP/CP to R knee PRN   E. Stimulation Manual  CPT 56033    E. Stim Unattended  CPT 66003    Manual   CPT 87418 Total Time for Session Not performed   STM/MFR/TPR    Instrument Assisted STM     Mobilizations Gr II/III R patellar mobilizations in all direction   ROM/Flexibility R knee PROM flex/ext    Myofascial Decompression    Ther. Exercise  CPT 55423                   Group Total Time for Session Not performed     Sets Reps Load Comment    RB (NuStep if pt unable to handle)                Quad set        SAQ        SLR        Sidelying hip ABD        Prone hip ext        Hip adduction        Hip ABD        Clamshell        Bridge         STS        Toe taps        FSU        LSU                                                               Neuro Re-Ed  CPT 44231   Group Total Time for Session Not performed     Sets Reps Load Comment   FA EO EC     Firm surface   FT EO EC     Firm surface    FA EO EC     Foam    Tandem walking                                           Gait  CPT 90156 Group Total Time for Session Not performed                  Ther. Activity  CPT 21332 Group Total Time for Session 8-22 Minutes   Patient education  Pt educated on PT role, POC, safety during transfers/ambulation, proper use of RW, initial examination findings , future HEP   HEP NV    5x STS NV    OH/FGA NV    10MWT NV    R knee circumference assessment NV              Group  CPT 37816 Total Time for Session Not performed                 ASSESSMENT:    This 62 y.o. year old female presents to PT with above stated diagnosis. Physical Therapy evaluation reveals decreased flexibility, decreased endurance, decreased ROM, decreased strength, pain, impaired motor control, impaired sensation, impaired balance resulting in self-care, community/leisure, home management, work limitations. Bebe Burks will benefit from skilled PT services to address limitation, work towards rehab and patient goals and maximize PLOF of  chosen ADLs.     Planned Services: The patient's treatment will include CPT 40658 Gait training, CPT 40260 Manual therapy, CPT 81108 Neuromuscular Reeducation, CPT 23771 Therapeutic exercises, CPT 27915 Therapeutic activities, CPT 64448 Electrical stimulation UNATTENDED, CPT 37247 Hot/Cold Packs therapy,IASTM, taping.

## 2023-04-03 NOTE — LETTER
[address]  [facility] [place] [address]  [phone]      Dear DR. Centeno,      Thank you for this referral. Please review the attached notes and plan of care for your approval.  Please contact our department with any questions.     Sincerely,     Caitlyn Lagunas, PT      Referring Provider: By co-signing this Plan of Care (POC) either electronically or physically you agree to the following:    I have reviewed the the Plan of Care established by the therapist within this document and certify that the services are skilled and medically necessary. I have reviewed the plan and recommend that these services continue to meet the goals stated in this document.       EXTERNAL PROVIDER FAXING BACK:    PHYSICIAN SIGNATURE: __________________________________     DATE: ___________________  TIME: _____________    IMPORTANT:  If returning this Plan of Care by fax, please fax back ONLY the signature page.   _________________________________________________________________________      [facility] Fax: [phone]        PT EVALUATION FOR OUTPATIENT THERAPY    Patient: Bebe Burks  MRN: [id number]  : 1960 62 y.o.   Referring Physician: Gideon Centeno MD  Date of Visit: 4/3/2023      Certification Dates:  23 through 23  Begin with 3x/week and transition to 2x/week when appropriate      Recommended Frequency & Duration:  3 times/week for up to 3 months     Diagnosis:   1. S/P total knee arthroplasty, right        Chief Complaints:   Chief Complaint   Patient presents with   • Difficulty Walking   • Pain   • Dec ROM   • Balance Deficits   • Decreased Endurance   •  Decreased Community Integration   • Decreased recreational/play activity   • Decreased Mobility   • Abnormality Of Gait   • Dec Strength       Precautions: other (see comments)  Precautions additional comments: LLE foot drop - has LLE AFO will bring NV following IE    Past Medical History:   Past Medical History:   Diagnosis Date   • Abnormal finding on  "chest xray 12/2021   • Arthritis    • COVID-19 12/2021   • COVID-19 vaccine series completed     Moderna: \" 3 doses\"   • Diabetic neuropathy (CMS/HCC)    • DM (diabetes mellitus), type 2 with ophthalmic complications (CMS/HCC)    • Hypertension    • Hypothyroidism    • Left foot drop    • Lipid disorder    • Type 2 diabetes mellitus treated with insulin (CMS/HCC)        Past Surgical History:   Past Surgical History:   Procedure Laterality Date   • CATARACT EXTRACTION Right    • COLONOSCOPY     • COMBINED HYSTEROSCOPY DIAGNOSTIC / D&C  2007   • EYE SURGERY Left 2017    cataract   • VITRECTOMY Left 2016         LEARNING ASSESSMENT    Assessment completed:  Yes    Learner name:  Bebe Burks      Learner: Patient    Learning Barriers:  Learning barriers: No Barriers    Preferred Language: English     Needed: No    Education Provided:   Method: Discussion, Handout and Demonstration  Readiness: acceptance  Response: Demonstrated understanding      CO-LEARNER ASSESSMENT:    Completed: Yes          Welcome letter discussed: Yes Patient provided with Welcome Letter, which includes attendance policy. Provided education regarding cancellation and no-show policy. Education regarding the importance of participation and regular attendance to maximize goal attainment.         OBJECTIVE MEASUREMENTS/DATA:    Time In Session:  Start Time: 1005  Stop Time: 1100  Time Calculation (min): 55 min   Assessment and Plan - 04/03/23 1003        Assessment    Plan of Care reviewed and patient/family in agreement Yes     System Pathology/Pathophysiology Noted musculoskeletal     Functional Limitations in Following Categories (PT Eval) self-care;community/leisure;home management;work     Rehab Potential/Prognosis good, to achieve stated therapy goals     Problem List decreased flexibility;decreased endurance;decreased ROM;decreased strength;pain;impaired motor control;impaired sensation;impaired balance     Clinical Assessment " Bebe is a 63 yo female who presents to OPPT s/p R TKA. Initial examination findings reveal decreased R knee AROM, impaired BLE strength, (+) tenderness to palpation, impaired LEFS and PSFS outcome measure scoring, gait impairments, and patient subjective report of decline in functional mobility from baseline. Patient will benefit from skilled PT 3x/week in order to return to highest level of function, address above impairments, and progress toward stated goals.     Plan and Recommendations See treatment log for further assessment including R knee circumference assessment, 5xSTS, OH/FGA, 10MWT, stairs assessment     Planned Services CPT 30994 Gait training;CPT 88170 Manual therapy;CPT 38504 Neuromuscular Reeducation;CPT 17097 Therapeutic exercises;CPT 35652 Therapeutic activities;CPT 94842 Electrical stimulation UNATTENDED;CPT 62261 Hot/Cold Packs therapy     Comments/Additional Services IASTM, taping                General Information - 04/03/23 1003        Session Details    Document Type initial evaluation     Mode of Treatment individual therapy        General Information    Referring Physician Dr. Centeno     History of present illness/functional impairment Bebe is a 63 yo female who presents to OPPT s/p R TKA 2/27/23 w/ Dr. Centeno at [facility] [place] (states no complications during/post surgery). Patient reports she had a 2 night stay at [facility] and then was discharged to [facility] where she stayed for 5-6 nights. She was discharged home with home health PT and OT, she states home health PT stopped last Friday 3/31/23. She reports at baseline she ambulates without AD, but started using SPC about 6 months prior to surgery due to pain/weakness. She states at this time she is ambulating with SPC for household distances and RW for community distances. She reports she lives alone in single level [facility] with 4 JOSE RAFAEL w/ unilateral rail. She states she has a walkin shower with shower chair and grab bars. She reports since her R  knee surgery her ability to ascend/descend stairs is difficult, she feels like her walking is impaired, and her balance/endurance decreased. She reports significant PMHx of LLE drop foot (which they are contributing to sciatica, states it began about 3 years ago) which she wears LLE brace for which was issued to her by Peter in September 2022, LBP w/ LLE radicular symptoms, DMII (controlled), HTN (controlled). Patient states her overall goal for PT is to walk again without a SPC. Patient reports she is currently working but on long term leave at this time. Patient reports current R knee pain 0/10 and pain at worst in the past week 1/10.     Patient/Family/Caregiver Comments/Observations Patient arrives ambulating mod(I) with RW accompanied by her sister. She reports chief complaint of R knee pain/impairments following R TKA 2/27/23 [place] Dr[place] Centeno [place] [facility]     Existing Precautions/Restrictions other (see comments)     Precautions comments LLE foot drop - has LLE AFO will bring NV following IE         Services    Do You Speak a Language Other Than English at Home? no                Pain/Vitals - 04/03/23 1003        Pain Assessment    Currently in pain Yes     Preferred Pain Scale number (Numeric Rating Pain Scale)     Pain Side/Orientation right     Pain: Body location Knee     Pain Rating (0-10): Pre Activity 0     Pain Rating (0-10): Activity 2     Pain Rating (0-10): Post Activity 0        Pre Activity Vital Signs    Pulse 86     SpO2 99 %     Oxygen Therapy None (Room air)     /62     BP Location Right upper arm     BP Method Manual     Patient Position Sitting        Pain Intervention    Intervention  IE, pain monitored     Post Intervention Comments IE, pain monitored                Falls/Food Screening - 04/03/23 1003        Initial Falls Assessment    One or more falls in the last year No        Food Insecurity    Within the past 12 months, you worried that your food would run out before you got  the money to buy more. Never true     Within the past 12 months, the food you bought just didn't last and you didn't have money to get more. Never true                PT - 04/03/23 1003        Physical Therapy    Physical Therapy [facility] PT        PT Plan    Frequency of treatment 3 times/week     PT Duration 3 months     PT Custom Frequency and Duration Begin with 3x/week and transition to 2x/week when appropriate     PT Cert From 04/03/23     PT Cert To 06/26/23     Date PT POC was sent to provider 04/03/23     Signed PT Plan of Care received?  No                   Sensory Tests    Sensory Testing - 04/03/23 1003        Sensory Assessment    Sensory Assessment sensation intact except     Sensation Impaired Location(s) right LE;left LE     Left LE Sensory Impairment general sensation;light touch awareness   decreased LT to distal BLE due to neuropathy    Right LE Sensory Impairment general sensation;light touch awareness   decreased LT to distal BLE due to neuropathy              Skin    Skin Assessment - 04/03/23 1003        Skin    Skin Condition Intact     Notable findings incision 14 cm length wise, minimal redness, appears to be healing well     Wound Care Follow-Up Needed? No        Girth Measurements    Right Knee Girth measurement NV     Left Knee Girth measurement NV               ROM    Range of Motion - 04/03/23 1000        RIGHT: Lower Extremity AROM Assessment    Hip Flexion   74 degrees     Knee Flexion   101     Knee Extension   --   lacking about 7 deg knee ext    Ankle Dorsiflexion   --   lacking 10 deg       LEFT: Lower Extremity AROM Assessment    Hip Flexion   104 degrees     Knee Flexion   131     Knee Extension   0     Ankle Dorsiflexion   --   lacking ~10 deg DF              MMT    Manual Muscle Tests - 04/03/23 1003        RIGHT: Lower Extremity Manual Muscle Test Assessment    Hip Flexion gross movement (3+/5) fair plus     Hip Extension gross movement (3+/5) fair plus      Hip Abduction gross movement (3+/5) fair plus     Hip Adduction gross movement (3+/5) fair plus     Hip Internal Rotation gross movement (3+/5) fair plus     Hip External Rotation gross movement (3+/5) fair plus     Knee Flexion strength (3+/5) fair plus     Knee Extension strength (3+/5) fair plus     Ankle Dorsiflexion gross movement (3+/5) fair plus     Ankle Plantarflexion gross movement (3+/5) fair plus        LEFT: Lower Extremity Manual Muscle Test Assessment    Hip Flexion gross movement (3+/5) fair plus     Hip Extension gross movement (3+/5) fair plus     Hip Abduction gross movement (3+/5) fair plus     Hip Adduction gross movement (3+/5) fair plus     Hip Internal Rotation gross movement (3/5) fair     Hip External Rotation gross movement (3/5) fair     Knee Flexion strength (3+/5) fair plus     Knee Extension strength (3+/5) fair plus     Ankle Dorsiflexion gross movement (3-/5) fair minus     Ankle Plantarflexion gross movement (3-/5) fair minus               Palpation    Palpation - 04/03/23 1450        Palpation    LE Palpation  (+) TTP to tabby-incisional area of R knee               Gait and Mobility    Gait and Mobility - 04/03/23 1450        Gait Training    [place], Gait modified independence     Variable surfaces Flat surface     Assistive Device walker, front-wheeled     Pattern step-to     Deviations/Abnormal Patterns antalgic;base of support, wide;delta decreased;step length decreased;stride length decreased     Comment (Gait/Stairs) Patient ambulates mod(I) with RW        Stairs Assessment    Comment NV               Outcome Measures    PT Outcome Measures - 04/03/23 1003        Objective Outcome Measures    6 Minute Walk Test NV     Five Times to Sit to Stand (FTSTS) NV        Subjective Outcome Measures    LEFS 21/80 (26%)        NEW (2/6/23):  PSFS     PSFS ACTIVITY 1 Steps     PSFS ANSWER 1 2     PSFS ACTIVITY 2 Walking     PSFS ANSWER 2 3     PSFS ACTIVITY 3 Kitchen  activities/prolonged standing     PSFS ANSWER 3 3     PSFS Sum of Activity Scores 8     PSFS Number of Activities 3     PSFS Percent Score 26.67 %        Timed Up and Go Test    Results, Timed Up and Go Test (Balance) NV                  Goals     •  Mutually agreed upon pain goal       Mutually agreed upon pain goal: 0/10      •  Patient stated R TKA 2023 (pt-stated)       Patient states her overall goal for PT is to walk again without a SPC.       •  R TKA 2023       Short Term Goals Time Frame Result Comment/Progress   Patient will report < 2 /10 pain at worst on average for improved functional performance.    4 Weeks     Patient will improve LEFS outcome score by 7 points in order to demonstrate self-perceived improved functional performance.    4 Weeks  IE: 21/80 (26%)   Patient will improve R knee AROM flex/ext by 10 deg for improved squatting for functional tasks.    4 Weeks     Patient will be IND with HEP.    4 Weeks     Patient will improve PSFS outcome measure scoring by 2 points in all categories in order to demonstrate self-perceived improvement in function.  4 Weeks  IE: Steps (2/10), Walking (3/10), Kitchen activities/prolonged standing (3/10)      4 Weeks       Long Term Goals Time Frame Result Comment/Progress   Patient will report 0/10 pain at worst on average for improved functional performance.    8 Weeks     Patient will improve LEFS outcome score by 14 points in order to demonstrate self-perceived improved functional performance.    8 Weeks  IE: 21/80 (26%)   Patient will improve R knee AROM flex/ext by 20 deg for improved squatting for functional tasks.    8 Weeks     Patient will be IND with HEP upon discharge for carryover of functional gains.    8 Weeks     Patient will improve PSFS outcome measure scoring by 4 points in all categories in order to demonstrate self-perceived improvement in function.  8 Weeks   IE: Steps (2/10), Walking (3/10), Kitchen activities/prolonged standing (3/10)       8 Weeks                      TREATMENT PLAN:    IE 4/3/23   30 Day 5/3/23   60 Day 6/3/23   90 Day 7/3/23   Precautions LLE foot drop- has LLE AFO   Insurance Auth    Treatment  Current Session Time   Modalities Total Time for Session Not performed   Heat/Ice  CPT 74904 MHP/CP to R knee PRN   E. Stimulation Manual  CPT 38514    E. Stim Unattended  CPT 30760    Manual   CPT 36990 Total Time for Session Not performed   STM/MFR/TPR    Instrument Assisted STM     Mobilizations Gr II/III R patellar mobilizations in all direction   ROM/Flexibility R knee PROM flex/ext    Myofascial Decompression    Ther. Exercise  CPT 64040                   Group Total Time for Session Not performed     Sets Reps Load Comment    RB (NuStep if pt unable to handle)                Quad set        SAQ        SLR        Sidelying hip ABD        Prone hip ext        Hip adduction        Hip ABD        Clamshell        Bridge         STS        Toe taps        FSU        LSU                                                               Neuro Re-Ed  CPT 63231   Group Total Time for Session Not performed     Sets Reps Load Comment   FA EO EC     Firm surface   FT EO EC     Firm surface    FA EO EC     Foam    Tandem walking                                           Gait  CPT 16132 Group Total Time for Session Not performed                  Ther. Activity  CPT 11645 Group Total Time for Session 8-22 Minutes   Patient education  Pt educated on PT role, POC, safety during transfers/ambulation, proper use of RW, initial examination findings , future HEP   HEP NV    5x STS NV    OH/FGA NV    10MWT NV    R knee circumference assessment NV              Group  CPT 48647 Total Time for Session Not performed                 ASSESSMENT:    This 62 y.o. year old female presents to PT with above stated diagnosis. Physical Therapy evaluation reveals decreased flexibility, decreased endurance, decreased ROM, decreased strength, pain, impaired motor  control, impaired sensation, impaired balance resulting in self-care, community/leisure, home management, work limitations. Bebe Burks will benefit from skilled PT services to address limitation, work towards rehab and patient goals and maximize PLOF of chosen ADLs.     Planned Services: The patient's treatment will include CPT 98589 Gait training, CPT 30463 Manual therapy, CPT 06323 Neuromuscular Reeducation, CPT 62558 Therapeutic exercises, CPT 88674 Therapeutic activities, CPT 78963 Electrical stimulation UNATTENDED, CPT 84546 Hot/Cold Packs therapy,IASTM, taping.

## 2023-04-06 ENCOUNTER — HOSPITAL ENCOUNTER (OUTPATIENT)
Dept: PHYSICAL THERAPY | Facility: REHABILITATION | Age: 63
Setting detail: THERAPIES SERIES
Discharge: HOME | End: 2023-04-06
Attending: ORTHOPAEDIC SURGERY
Payer: COMMERCIAL

## 2023-04-06 DIAGNOSIS — Z96.651 S/P TOTAL KNEE ARTHROPLASTY, RIGHT: Primary | ICD-10-CM

## 2023-04-06 PROCEDURE — 97116 GAIT TRAINING THERAPY: CPT | Mod: GP

## 2023-04-06 PROCEDURE — 97110 THERAPEUTIC EXERCISES: CPT | Mod: GP

## 2023-04-06 PROCEDURE — 97530 THERAPEUTIC ACTIVITIES: CPT | Mod: GP

## 2023-04-06 NOTE — OP PT TREATMENT LOG
IE 4/3/23   30 Day 5/3/23   60 Day 6/3/23   90 Day 7/3/23   Precautions LLE foot drop- has LLE AFO but does not always wear    Insurance Auth    Treatment  Current Session Time   Modalities Total Time for Session Not performed   Heat/Ice  CPT 02915 MHP/CP to R knee PRN   E. Stimulation Manual  CPT 68521    E. Stim Unattended  CPT 15030    Manual   CPT 43638 Total Time for Session Not performed   STM/MFR/TPR    Instrument Assisted STM     Mobilizations Gr II/III R patellar mobilizations in all direction (NV)   ROM/Flexibility R knee PROM flex/ext (NV)   Myofascial Decompression    Ther. Exercise  CPT 79680                   Group Total Time for Session 8-22 Minutes     Sets Reps Load Comment    NuStep (progress to RB as able)  1 10 min Level 1 Seat 8?, arms 10, SPM 30 goal            Quad set NV       SAQ NV       SLR NV       Sidelying hip ABD NV       Prone hip ext NV    If able to tolerate, if not trial standing hip ABD   Hip adduction  2 10 3s hold Seated    Hip ABD  2 10 Lane Seated, trial pink NV?   LAQ  2 10 3s hold Seated   Clamshell NV       Bridge  NV       STS NV       Toe taps NV       FSU NV       LSU NV       Lateral sidestepping                                                       Neuro Re-Ed  CPT 78930   Group Total Time for Session Not performed     Sets Reps Load Comment   FA EO EC NV    Firm surface   FT EO EC NV    Firm surface    FA EO EC     Foam    Tandem walking                 Pre gait exercises        [place] step over                                  Gait  CPT 47887 Group Total Time for Session 8-22 Minutes   Gait training with SPC  Patient ambulated with  + 150 ft w/ SPC with 1x standing rest break. Verbal cueing for sequencing initially. Patient prefers to ambulate with SPC in RUE vs LUE due to LLE foot drop/weakness.               Ther. Activity  CPT 33426 Group Total Time for Session 23-37 Minutes   Patient education  Pt educated on updated HEP, symptom management   HEP  Issued  patient HEP including hip adduction, hip ABD, LAQ and STS with use of BUE   30 sec STS  See Assessment   OH/FGA NV    R knee circumference assessment NV    Stair assessment / training  Patient ascends/descends 4 6 inch steps w/ step to step pattern and unilateral rail, CS and gait belt.    Patient ascends/descends 4 6 inch steps w/ reciprocal pattern and unilateral rail, CS-Fay and gait belt, min-mod verbal cues for toe clearance (noted compensation of B foot ER to clear BLE)        Group  CPT 39254 Total Time for Session Not performed

## 2023-04-06 NOTE — PROGRESS NOTES
PT DAILY NOTE FOR OUTPATIENT THERAPY    Patient: Bebe Burks MRN: [id number]  : 1960 62 y.o.  Referring Physician: Gideon Centeno MD  Date of Visit: 2023    Certification Dates: 23 through 23    Diagnosis:   1. S/P total knee arthroplasty, right        Chief Complaints:  R TKA    Precautions:   Existing Precautions/Restrictions: other (see comments)  Precautions comments: LLE foot drop - has LLE AFO will bring NV following IE      TODAY'S VISIT    Time In Session:  Start Time: 1405  Stop Time: 1500  Time Calculation (min): 55 min   History/Vitals/Pain/Encounter Info - 23 1416        Injury History/Precautions/Daily Required Info    Document Type daily treatment     Primary Therapist Caitlyn Lagunas, PT     Chief Complaint/Reason for Visit  R TKA     Referring Physician Dr. Centeno     Existing Precautions/Restrictions other (see comments)     Precautions comments LLE foot drop - has LLE AFO will bring NV following IE     History of present illness/functional impairment Bebe is a 63 yo female who presents to OPPT s/p R TKA 23 w/ Dr. Cetneno at [facility] (states no complications during/post surgery). Patient reports she had a 2 night stay at [facility] and then was discharged to [facility] where she stayed for 5-6 nights. She was discharged home with home health PT and OT, she states home health PT stopped last Friday 3/31/23. She reports at baseline she ambulates without AD, but started using SPC about 6 months prior to surgery due to pain/weakness. She states at this time she is ambulating with SPC for household distances and RW for community distances. She reports she lives alone in single level [facility] with 4 JOSE RAFAEL w/ unilateral rail. She states she has a walkin shower with shower chair and grab bars. She reports since her R knee surgery her ability to ascend/descend stairs is difficult, she feels like her walking is impaired, and her balance/endurance decreased. She reports significant PMHx  of LLE drop foot (which they are contributing to sciatica, states it began about 3 years ago) which she wears LLE brace for which was issued to her by Peter in September 2022, LBP w/ LLE radicular symptoms, DMII (controlled), HTN (controlled). Patient states her overall goal for PT is to walk again without a SPC. Patient reports she is currently working but on long term leave at this time. Patient reports current R knee pain 0/10 and pain at worst in the past week 1/10.     Patient/Family/Caregiver Comments/Observations Patient arrives reporting no pain in R knee.     Patient reported fall since last visit No        Pain Assessment    Currently in pain Yes     Preferred Pain Scale number (Numeric Rating Pain Scale)     Pain Side/Orientation right     Pain: Body location Knee     Pain Rating (0-10): Pre Activity 0     Pain Rating (0-10): Activity 2     Pain Rating (0-10): Post Activity 0        Pain Intervention    Intervention  MT, CP, TE, monitored     Post Intervention Comments pain monitored        Pre Activity Vital Signs    Pulse 92     SpO2 99 %     Oxygen Therapy None (Room air)     /62     BP Location Right upper arm     BP Method Manual     Patient Position Sitting         Services    Do You Speak a Language Other Than English at Home? no                Daily Treatment Assessment and Plan - 04/06/23 1416        Daily Treatment Assessment and Plan    Progress toward goals Progressing     Daily Outcome Summary Continued with further assessment this date with patient completing 804 ft during 6MWT, 8 stands during 30 sec STS (with use of BUE due to pt unable to complete without), and requires CS-Fay during stairs when ascending / descending with unilateral hand rail and step over step gait pattern. Initiated POC with BLE strengthening focusing on quad and proximal hip strengthening. Issued HEP including LAQ, hip adduction, hip ABD, and STS w/ use of BUE. Patient will benefit from further  progression of POC including MT and RLE stretching NV in order to maximize R knee range of motion and functional mobility.     Plan and Recommendations See treatment log for further assessment including R knee circumference assessment,  OH/FGA                     OBJECTIVE DATA TAKEN TODAY:    None taken    Today's Treatment:    IE 4/3/23   30 Day 5/3/23   60 Day 6/3/23   90 Day 7/3/23   Precautions LLE foot drop- has LLE AFO but does not always wear    Insurance Auth    Treatment  Current Session Time   Modalities Total Time for Session Not performed   Heat/Ice  CPT 27721 MHP/CP to R knee PRN   E. Stimulation Manual  CPT 90724    E. Stim Unattended  CPT 17365    Manual   CPT 90550 Total Time for Session Not performed   STM/MFR/TPR    Instrument Assisted STM     Mobilizations Gr II/III R patellar mobilizations in all direction (NV)   ROM/Flexibility R knee PROM flex/ext (NV)   Myofascial Decompression    Ther. Exercise  CPT 44930                   Group Total Time for Session 8-22 Minutes     Sets Reps Load Comment    NuStep (progress to RB as able)  1 10 min Level 1 Seat 8?, arms 10, SPM 30 goal            Quad set NV       SAQ NV       SLR NV       Sidelying hip ABD NV       Prone hip ext NV    If able to tolerate, if not trial standing hip ABD   Hip adduction  2 10 3s hold Seated    Hip ABD  2 10 [place] Seated, trial pink NV?   LAQ  2 10 3s hold Seated   Clamshell NV       Bridge  NV       STS NV       Toe taps NV       FSU NV       LSU NV       Lateral sidestepping                                                       Neuro Re-Ed  CPT 98266   Group Total Time for Session Not performed     Sets Reps Load Comment   FA EO EC NV    Firm surface   FT EO EC NV    Firm surface    FA EO EC     Foam    Tandem walking                 Pre gait exercises        Cesia step over                                  Gait  CPT 43939 Group Total Time for Session 8-22 Minutes   Gait training with SPC  Patient ambulated with SPC  150 + 150 ft w/ SPC with 1x standing rest break. Verbal cueing for sequencing initially. Patient prefers to ambulate with SPC in RUE vs LUE due to LLE foot drop/weakness.               Ther. Activity  CPT 01582 Group Total Time for Session 23-37 Minutes   Patient education  Pt educated on updated HEP, symptom management   HEP  Issued patient HEP including hip adduction, hip ABD, LAQ and STS with use of BUE   30 sec STS  See Assessment   OH/FGA NV    R knee circumference assessment NV    Stair assessment / training  Patient ascends/descends 4 6 inch steps w/ step to step pattern and unilateral rail, CS and gait belt.    Patient ascends/descends 4 6 inch steps w/ reciprocal pattern and unilateral rail, CS-Fay and gait belt, min-mod verbal cues for toe clearance (noted compensation of B foot ER to clear BLE)        Group  CPT 60713 Total Time for Session Not performed

## 2023-04-07 ENCOUNTER — HOSPITAL ENCOUNTER (OUTPATIENT)
Dept: PHYSICAL THERAPY | Facility: REHABILITATION | Age: 63
Setting detail: THERAPIES SERIES
Discharge: HOME | End: 2023-04-07
Attending: ORTHOPAEDIC SURGERY
Payer: COMMERCIAL

## 2023-04-07 ENCOUNTER — APPOINTMENT (OUTPATIENT)
Dept: OTHER | Facility: REHABILITATION | Age: 63
Setting detail: THERAPIES SERIES
End: 2023-04-07
Payer: COMMERCIAL

## 2023-04-07 DIAGNOSIS — Z96.651 S/P TOTAL KNEE ARTHROPLASTY, RIGHT: Primary | ICD-10-CM

## 2023-04-07 PROCEDURE — 97140 MANUAL THERAPY 1/> REGIONS: CPT | Mod: GP

## 2023-04-07 PROCEDURE — 97110 THERAPEUTIC EXERCISES: CPT | Mod: GP

## 2023-04-07 NOTE — OP PT TREATMENT LOG
IE 4/3/23   30 Day 5/3/23   60 Day 6/3/23   90 Day 7/3/23   Precautions LLE foot drop- has LLE AFO but does not always wear    Insurance Auth    Treatment  Current Session Time   Modalities Total Time for Session Not performed   Heat/Ice  CPT 57867 MHP/CP to R knee PRN   E. Stimulation Manual  CPT 36649    E. Stim Unattended  CPT 18323    Manual   CPT 77120 Total Time for Session 8-22 Minutes   STM/MFR/TPR R gastroc, hamstring (y)   Instrument Assisted STM     Mobilizations Gr II/III R patellar mobilizations in all direction (y)   ROM/Flexibility R knee PROM flex/ext (y)   Myofascial Decompression    Ther. Exercise  CPT 58777                   Group Total Time for Session 38-52 Minutes     Sets Reps Load Comment    NuStep (progress to RB as able)  1 10 min Level 1 Seat 8?, arms 10, SPM 30 goal    recumbent bike y  6 min     Quad set y 1 10     SAQ y 1 10 3 sec    SLR y 10      Sidelying hip ABD y 1 10     Prone hip ext  1 10  If able to tolerate, if not trial standing hip ABD   Hip adduction  2 10 3s hold Seated    Hip ABD  2 10 [place] Seated, trial pink NV?   LAQ  2 10 3s hold Seated   Clamshell y 2 10     Bridge  y 2 10     STS NV       Toe taps NV       FSU NV       LSU NV       Lateral sidestepping                                                       Neuro Re-Ed  CPT 32897   Group Total Time for Session Not performed     Sets Reps Load Comment   FA EO EC NV    Firm surface   FT EO EC NV    Firm surface    FA EO EC     Foam    Tandem walking                 Pre gait exercises        Cesia step over                                  Gait  CPT 68641 Group Total Time for Session Not performed   Gait training with SPC  Patient ambulated with  + 150 ft w/ SPC with 1x standing rest break. Verbal cueing for sequencing initially. Patient prefers to ambulate with SPC in RUE vs LUE due to LLE foot drop/weakness.               Ther. Activity  CPT 34505 Group Total Time for Session Not performed   Patient education   Pt educated on updated HEP, symptom management   HEP  Issued patient HEP including hip adduction, hip ABD, LAQ and STS with use of BUE   30 sec STS  See Assessment   OH/FGA NV    R knee circumference assessment NV    Stair assessment / training  Patient ascends/descends 4 6 inch steps w/ step to step pattern and unilateral rail, CS and gait belt.    Patient ascends/descends 4 6 inch steps w/ reciprocal pattern and unilateral rail, CS-Fay and gait belt, min-mod verbal cues for toe clearance (noted compensation of B foot ER to clear BLE)        Group  CPT 14153 Total Time for Session Not performed

## 2023-04-07 NOTE — PROGRESS NOTES
PT DAILY NOTE FOR OUTPATIENT THERAPY    Patient: Bebe Burks MRN: [id number]  : 1960 62 y.o.  Referring Physician: Gideon Centeno MD  Date of Visit: 2023    Certification Dates: 23 through 23    Diagnosis:   1. S/P total knee arthroplasty, right        Chief Complaints:  R TKA    Precautions:   Existing Precautions/Restrictions: other (see comments)  Precautions comments: LLE foot drop - has LLE AFO will bring NV following IE      TODAY'S VISIT    Time In Session:  Start Time: 0900  Stop Time: 1000  Time Calculation (min): 60 min   History/Vitals/Pain/Encounter Info - 23 0904        Injury History/Precautions/Daily Required Info    Document Type daily treatment     Primary Therapist Caitlyn Lagunas, PT     Chief Complaint/Reason for Visit  R TKA     Referring Physician Dr. Centeno     Existing Precautions/Restrictions other (see comments)     Precautions comments LLE foot drop - has LLE AFO will bring NV following IE     History of present illness/functional impairment Bebe is a 63 yo female who presents to OPPT s/p R TKA 23 w/ Dr. Centeno [place] [facility] (states no complications during/post surgery). Patient reports she had a 2 night stay at [facility] and then was discharged to [facility] where she stayed for 5-6 nights. She was discharged home with home health PT and OT, she states home health PT stopped last Friday 3/31/23. She reports at baseline she ambulates without AD, but started using SPC about 6 months prior to surgery due to pain/weakness. She states at this time she is ambulating with SPC for household distances and RW for community distances. She reports she lives alone in single level [facility] with 4 JOSE RAFAEL w/ unilateral rail. She states she has a walkin shower with shower chair and grab bars. She reports since her R knee surgery her ability to ascend/descend stairs is difficult, she feels like her walking is impaired, and her balance/endurance decreased. She reports significant PMHx  of LLE drop foot (which they are contributing to sciatica, states it began about 3 years ago) which she wears LLE brace for which was issued to her by [facility] in September 2022, LBP w/ LLE radicular symptoms, DMII (controlled), HTN (controlled). Patient states her overall goal for PT is to walk again without a SPC. Patient reports she is currently working but on long term leave at this time. Patient reports current R knee pain 0/10 and pain at worst in the past week 1/10.     Patient/Family/Caregiver Comments/Observations Patient reports no issues since treatment session yesterday.     Patient reported fall since last visit No        Pain Assessment    Currently in pain No/Denies     Preferred Pain Scale number (Numeric Rating Pain Scale)     Pain: Body location Knee     Pain Rating (0-10): Pre Activity 0     Pain Rating (0-10): Activity 0     Pain Rating (0-10): Post Activity 0     Pain Management Interventions position adjusted        Pain Intervention    Intervention  MT     Post Intervention Comments pain as noted                Daily Treatment Assessment and Plan - 04/07/23 0904        Daily Treatment Assessment and Plan    Progress toward goals Progressing     Daily Outcome Summary Initiated session with STM to R hamstring and gastroc to promote flexibility with knee extension. 113 degrees of passive flexion observed today. Exercises completed as noted without pain, and session ended with recumbent bike. Patient reported feeling like she was having a bit of a low blood sugar at end of session, admits to not eating much this morning, /60, orange juice cup given. No pain post session.     Plan and Recommendations See treatment log for further assessment including R knee circumference assessment,  OH/FGA                     OBJECTIVE DATA TAKEN TODAY:    None taken    Today's Treatment:    IE 4/3/23   30 Day 5/3/23   60 Day 6/3/23   90 Day 7/3/23   Precautions LLE foot drop- has LLE AFO but does not always  wear    Insurance Auth    Treatment  Current Session Time   Modalities Total Time for Session Not performed   Heat/Ice  CPT 54078 MHP/CP to R knee PRN   E. Stimulation Manual  CPT 87415    E. Stim Unattended  CPT 91481    Manual   CPT 27545 Total Time for Session 8-22 Minutes   STM/MFR/TPR R gastroc, hamstring (y)   Instrument Assisted STM     Mobilizations Gr II/III R patellar mobilizations in all direction (y)   ROM/Flexibility R knee PROM flex/ext (y)   Myofascial Decompression    Ther. Exercise  CPT 57609                   Group Total Time for Session 38-52 Minutes     Sets Reps Load Comment    NuStep (progress to RB as able)  1 10 min Level 1 Seat 8?, arms 10, SPM 30 goal    recumbent bike y  6 min     Quad set y 1 10     SAQ y 1 10 3 sec    SLR y 10      Sidelying hip ABD y 1 10     Prone hip ext  1 10  If able to tolerate, if not trial standing hip ABD   Hip adduction  2 10 3s hold Seated    Hip ABD  2 10 [place] Seated, trial pink NV?   LAQ  2 10 3s hold Seated   Clamshell y 2 10     Bridge  y 2 10     STS NV       Toe taps NV       FSU NV       LSU NV       Lateral sidestepping                                                       Neuro Re-Ed  CPT 19273   Group Total Time for Session Not performed     Sets Reps Load Comment   FA EO EC NV    Firm surface   FT EO EC NV    Firm surface    FA EO EC     Foam    Tandem walking                 Pre gait exercises        Cesia step over                                  Gait  CPT 42782 Group Total Time for Session Not performed   Gait training with SPC  Patient ambulated with  + 150 ft w/ SPC with 1x standing rest break. Verbal cueing for sequencing initially. Patient prefers to ambulate with SPC in RUE vs LUE due to LLE foot drop/weakness.               Ther. Activity  CPT 86890 Group Total Time for Session Not performed   Patient education  Pt educated on updated HEP, symptom management   HEP  Issued patient HEP including hip adduction, hip ABD, LAQ and STS  with use of BUE   30 sec STS  See Assessment   OH/FGA NV    R knee circumference assessment NV    Stair assessment / training  Patient ascends/descends 4 6 inch steps w/ step to step pattern and unilateral rail, CS and gait belt.    Patient ascends/descends 4 6 inch steps w/ reciprocal pattern and unilateral rail, CS-Fay and gait belt, min-mod verbal cues for toe clearance (noted compensation of B foot ER to clear BLE)        Group  CPT 88101 Total Time for Session Not performed

## 2023-04-10 ENCOUNTER — APPOINTMENT (OUTPATIENT)
Dept: OTHER | Facility: REHABILITATION | Age: 63
Setting detail: THERAPIES SERIES
End: 2023-04-10
Payer: COMMERCIAL

## 2023-04-10 ENCOUNTER — HOSPITAL ENCOUNTER (OUTPATIENT)
Dept: PHYSICAL THERAPY | Facility: REHABILITATION | Age: 63
Setting detail: THERAPIES SERIES
Discharge: HOME | End: 2023-04-10
Attending: ORTHOPAEDIC SURGERY
Payer: COMMERCIAL

## 2023-04-10 DIAGNOSIS — Z96.651 S/P TOTAL KNEE ARTHROPLASTY, RIGHT: Primary | ICD-10-CM

## 2023-04-10 PROCEDURE — 97140 MANUAL THERAPY 1/> REGIONS: CPT | Mod: GP

## 2023-04-10 PROCEDURE — 97110 THERAPEUTIC EXERCISES: CPT | Mod: GP

## 2023-04-10 NOTE — OP PT TREATMENT LOG
IE 4/3/23   30 Day 5/3/23   60 Day 6/3/23   90 Day 7/3/23   Precautions LLE foot drop- has LLE AFO but does not always wear    Insurance Auth    Treatment  Current Session Time   Modalities Total Time for Session 0-7 Minutes   Heat/Ice  CPT 70791 CP to R knee post session 7 minutes    E. Stimulation Manual  CPT 35697    E. Stim Unattended  CPT 54091    Manual   CPT 18641 Total Time for Session 8-22 Minutes   STM/MFR/TPR R gastroc, hamstring    Instrument Assisted STM     Mobilizations Gr II/III R patellar mobilizations in all direction (y)   ROM/Flexibility R knee PROM flex/ext (y)   Myofascial Decompression    Ther. Exercise  CPT 50297                   Group Total Time for Session 38-52 Minutes     Sets Reps Load Comment    RB y 1 10 min Level 1 Seat 8?, arms 10, able to complete full revolution           Quad set y 2 10 3 sec     SAQ y 1 10 3 sec    SLR y 1 10  RLE only, quad set prior to lift    Sidelying hip ABD y 1 10  RLE only   Standing hip ext y 1 10     Hip adduction  2 10 3s hold Seated    Hip ABD  2 10 [place] Seated, trial pink NV?   LAQ  2 10 3s hold Seated   Clamshell y 2 10     Bridge  y 1 10  Difficulty achieving full ROM   STS y 1 10  From raised mat    Toe taps NV       FSU NV       LSU NV       Lateral sidestepping                                                       Neuro Re-Ed  CPT 56345   Group Total Time for Session Not performed     Sets Reps Load Comment   FA EO EC NV    Firm surface   FT EO EC NV    Firm surface    FA EO EC     Foam    Tandem walking                 Pre gait exercises        Cesia step over                                  Gait  CPT 07625 Group Total Time for Session 0-7 Minutes   Gait training with SPC  Patient ambulated with  + 150 ft w/ SPC. Verbal cueing for sequencing initially. Patient prefers to ambulate with SPC in RUE vs LUE due to LLE foot drop/weakness.               Ther. Activity  CPT 70008 Group Total Time for Session Not performed   Patient education   Pt educated on updated HEP, symptom management   HEP  Issued patient HEP including hip adduction, hip ABD, LAQ and STS with use of BUE   30 sec STS  See Assessment   OH/FGA NV    R knee circumference assessment NV    Stair assessment / training  Patient ascends/descends 4 6 inch steps w/ step to step pattern and unilateral rail, CS and gait belt.    Patient ascends/descends 4 6 inch steps w/ reciprocal pattern and unilateral rail, CS-Fay and gait belt, min-mod verbal cues for toe clearance (noted compensation of B foot ER to clear BLE)        Group  CPT 54261 Total Time for Session Not performed

## 2023-04-10 NOTE — PROGRESS NOTES
PT DAILY NOTE FOR OUTPATIENT THERAPY    Patient: Bebe Burks MRN: [id number]  : 1960 62 y.o.  Referring Physician: Gideon Centeno MD  Date of Visit: 4/10/2023    Certification Dates: 23 through 23    Diagnosis:   1. S/P total knee arthroplasty, right        Chief Complaints:  R TKA    Precautions:   Existing Precautions/Restrictions: other (see comments)  Precautions comments: LLE foot drop - has LLE AFO will bring NV following IE      TODAY'S VISIT    Time In Session:  Start Time: 1200  Stop Time: 1300  Time Calculation (min): 60 min   History/Vitals/Pain/Encounter Info - 04/10/23 1204        Injury History/Precautions/Daily Required Info    Document Type daily treatment     Primary Therapist Caitlyn Lagunas, PT     Chief Complaint/Reason for Visit  R TKA     Referring Physician Dr. Centeno     Existing Precautions/Restrictions other (see comments)     Precautions comments LLE foot drop - has LLE AFO will bring NV following IE     History of present illness/functional impairment Bebe is a 63 yo female who presents to OPPT s/p R TKA 23 w/ Dr. Centeno [place] [facility] [place] (states no complications during/post surgery). Patient reports she had a 2 night stay at [facility] and then was discharged to [facility] where she stayed for 5-6 nights. She was discharged home with home health PT and OT, she states home health PT stopped last Friday 3/31/23. She reports at baseline she ambulates without AD, but started using SPC about 6 months prior to surgery due to pain/weakness. She states at this time she is ambulating with SPC for household distances and RW for community distances. She reports she lives alone in single level [facility] with 4 JOSE RAFAEL w/ unilateral rail. She states she has a walkin shower with shower chair and grab bars. She reports since her R knee surgery her ability to ascend/descend stairs is difficult, she feels like her walking is impaired, and her balance/endurance decreased. She reports significant PMHx  of LLE drop foot (which they are contributing to sciatica, states it began about 3 years ago) which she wears LLE brace for which was issued to her by Peter in September 2022, LBP w/ LLE radicular symptoms, DMII (controlled), HTN (controlled). Patient states her overall goal for PT is to walk again without a SPC. Patient reports she is currently working but on long term leave at this time. Patient reports current R knee pain 0/10 and pain at worst in the past week 1/10.     Patient/Family/Caregiver Comments/Observations Patient arrives reporting she saw the surgeon last Friday who said everything was looking good. She states the surgeon said he believes she is ready to ambulate with the SPC > RW.     Patient reported fall since last visit No        Pain Assessment    Currently in pain No/Denies        Pain Intervention    Intervention  n/a     Post Intervention Comments n/a                Daily Treatment Assessment and Plan - 04/10/23 1433        Daily Treatment Assessment and Plan    Progress toward goals Progressing     Daily Outcome Summary Continued to progress POC with focus on R quad/proximal hip strengthening and promotion of R knee ROM. Patient arrives this date ambulating with SPC which is change from previous sessions of patient ambulating with RW. Patient demonstrates adequate RLE hip/knee flexion and heel strike/toe off, however noted LLE foot drop/slap when ambulating with SPC vs RW. PT educated patient on bringing in previously issued LLE brace to upcoming sessions for gait assessment with brace donned. Patient verbalized understanding and agreement. Initiated hamstring stretch and STS this date. Patient will benefit from continued skilled PT to return to highest level of function.     Plan and Recommendations See treatment log for further assessment including R knee circumference assessment,  OH/FGA. Progress ambulation w/ SPC, steps, assess gait with LLE brace donned.                     OBJECTIVE  DATA TAKEN TODAY:    None taken    Today's Treatment:    IE 4/3/23   30 Day 5/3/23   60 Day 6/3/23   90 Day 7/3/23   Precautions LLE foot drop- has LLE AFO but does not always wear    Insurance Auth    Treatment  Current Session Time   Modalities Total Time for Session 0-7 Minutes   Heat/Ice  CPT 36253 CP to R knee post session 7 minutes    E. Stimulation Manual  CPT 40920    E. Stim Unattended  CPT 23203    Manual   CPT 30231 Total Time for Session 8-22 Minutes   STM/MFR/TPR R gastroc, hamstring    Instrument Assisted STM     Mobilizations Gr II/III R patellar mobilizations in all direction (y)   ROM/Flexibility R knee PROM flex/ext (y)   Myofascial Decompression    Ther. Exercise  CPT 68235                   Group Total Time for Session 38-52 Minutes     Sets Reps Load Comment    RB y 1 10 min Level 1 Seat 8?, arms 10, able to complete full revolution           Quad set y 2 10 3 sec     SAQ y 1 10 3 sec    SLR y 1 10  RLE only, quad set prior to lift    Sidelying hip ABD y 1 10  RLE only   Standing hip ext y 1 10     Hip adduction  2 10 3s hold Seated    Hip ABD  2 10 [place] Seated, trial pink NV?   LAQ  2 10 3s hold Seated   Clamshell y 2 10     Bridge  y 1 10  Difficulty achieving full ROM   STS y 1 10  From raised mat    Toe taps NV       FSU NV       LSU NV       Lateral sidestepping                                                       Neuro Re-Ed  CPT 83242   Group Total Time for Session Not performed     Sets Reps Load Comment   FA EO EC NV    Firm surface   FT EO EC NV    Firm surface    FA EO EC     Foam    Tandem walking                 Pre gait exercises        Cesia step over                                  Gait  CPT 88752 Group Total Time for Session 0-7 Minutes   Gait training with SPC  Patient ambulated with  + 150 ft w/ SPC. Verbal cueing for sequencing initially. Patient prefers to ambulate with SPC in RUE vs LUE due to LLE foot drop/weakness.               Ther. Activity  CPT 62630 Group  Total Time for Session Not performed   Patient education  Pt educated on updated HEP, symptom management   HEP  Issued patient HEP including hip adduction, hip ABD, LAQ and STS with use of BUE   30 sec STS  See Assessment   OH/FGA NV    R knee circumference assessment NV    Stair assessment / training  Patient ascends/descends 4 6 inch steps w/ step to step pattern and unilateral rail, CS and gait belt.    Patient ascends/descends 4 6 inch steps w/ reciprocal pattern and unilateral rail, CS-Fay and gait belt, min-mod verbal cues for toe clearance (noted compensation of B foot ER to clear BLE)        Group  CPT 20483 Total Time for Session Not performed

## 2023-04-12 ENCOUNTER — APPOINTMENT (OUTPATIENT)
Dept: OTHER | Facility: REHABILITATION | Age: 63
Setting detail: THERAPIES SERIES
End: 2023-04-12
Payer: COMMERCIAL

## 2023-04-12 ENCOUNTER — HOSPITAL ENCOUNTER (OUTPATIENT)
Dept: PHYSICAL THERAPY | Facility: REHABILITATION | Age: 63
Setting detail: THERAPIES SERIES
Discharge: HOME | End: 2023-04-12
Attending: ORTHOPAEDIC SURGERY
Payer: COMMERCIAL

## 2023-04-12 DIAGNOSIS — Z96.651 S/P TOTAL KNEE ARTHROPLASTY, RIGHT: Primary | ICD-10-CM

## 2023-04-12 PROCEDURE — 97116 GAIT TRAINING THERAPY: CPT | Mod: GP

## 2023-04-12 PROCEDURE — 97530 THERAPEUTIC ACTIVITIES: CPT | Mod: GP

## 2023-04-12 PROCEDURE — 97110 THERAPEUTIC EXERCISES: CPT | Mod: GP

## 2023-04-12 ASSESSMENT — BALANCE ASSESSMENTS: TOTAL SCORE: 26

## 2023-04-12 NOTE — OP PT TREATMENT LOG
IE 4/3/23   30 Day 5/3/23   60 Day 6/3/23   90 Day 7/3/23   Precautions LLE foot drop- has LLE AFO but does not always wear    Insurance Auth    Treatment  Current Session Time   Modalities Total Time for Session Not performed   Heat/Ice  CPT 74978 CP to R knee post session   E. Stimulation Manual  CPT 41264    E. Stim Unattended  CPT 71498    Manual   CPT 65802 Total Time for Session 0-7 Minutes   STM/MFR/TPR R gastroc, hamstring -y   Instrument Assisted STM     Mobilizations Gr II/III R patellar mobilizations in all direction (y)   ROM/Flexibility R knee PROM flex/ext (y)   Myofascial Decompression    Ther. Exercise  CPT 94468                    Total Time for Session 23-37 Minutes     Sets Reps Load Comment    RB NV 1 10 min Level 1 Seat 8?, arms 10, able to complete full revolution           Quad set y 2 10 5 sec     SAQ y 1 10 3 sec bilateral   SLR y 1 10  RLE only, quad set prior to lift    Sidelying hip ABD y 1 10  RLE only   sidelying clamshell  y 1  10     Standing hip ext  1 10     Hip adduction  2 10 3s hold Seated    Hip ABD  2 10 [place] Seated, trial pink NV?   LAQ Y 2 10 3s hold Seated   Bridge   1 10  Difficulty achieving full ROM   STS y 1 10  From raised mat    Toe taps NV       FSU NV       LSU NV       Lateral sidestepping                                                       Neuro Re-Ed  CPT 00645  Total Time for Session Not performed     Sets Reps Load Comment   FA EO EC NV    Firm surface   FT EO EC NV    Firm surface    FA EO EC     Foam    Tandem walking                 Pre gait exercises        Cesia step over                                  Gait  CPT 67385  Total Time for Session 8-22 Minutes   Gait training with SPC Y        Y Patient ambulated with SPC 50 w/ SPC, with noodle AFO on L foot for foot drop. Also tried flexible L foot drop brace which patient purchased from [employer]- 50 feet, CS, SPC.  Again  ambulated 100 feet with flexible brace on L foot, SPC, CS             Ther.  Activity  CPT 95532  Total Time for Session 8-22 Minutes   Patient education  Pt educated on updated HEP, symptom management   HEP  Issued patient HEP including hip adduction, hip ABD, LAQ and STS with use of BUE   30 sec STS  See Assessment   ADRIANO/LIDIA Arredondo done 4/12/2023 26/56   R knee circumference assessment NV    Stair assessment / training  Patient ascends/descends 4 6 inch steps w/ step to step pattern and unilateral rail, CS and gait belt.    Patient ascends/descends 4 6 inch steps w/ reciprocal pattern and unilateral rail, CS-Fay and gait belt, min-mod verbal cues for toe clearance (noted compensation of B foot ER to clear BLE)        Group  CPT 66637 Total Time for Session Not performed

## 2023-04-12 NOTE — PROGRESS NOTES
PT DAILY NOTE FOR OUTPATIENT THERAPY    Patient: Bebe Burks MRN: [id number]  : 1960 62 y.o.  Referring Physician: Gideon Centeno MD  Date of Visit: 2023    Certification Dates: 23 through 23    Diagnosis:   1. S/P total knee arthroplasty, right        Chief Complaints:  R TKA    Precautions:   Existing Precautions/Restrictions: other (see comments)  Precautions comments: LLE foot drop - has LLE AFO will bring NV following IE      TODAY'S VISIT    Time In Session:  Start Time: 1105  Stop Time: 1207  Time Calculation (min): 62 min   History/Vitals/Pain/Encounter Info - 23 1103        Injury History/Precautions/Daily Required Info    Document Type daily treatment     Primary Therapist Caitlyn Lagunas, PT     Chief Complaint/Reason for Visit  R TKA     Referring Physician Dr. Centeno     Existing Precautions/Restrictions other (see comments)     Precautions comments LLE foot drop - has LLE AFO will bring NV following IE     History of present illness/functional impairment Bebe is a 63 yo female who presents to OPPT s/p R TKA 23 w/ Dr. Centeno at [facility] (states no complications during/post surgery). Patient reports she had a 2 night stay at [facility] and then was discharged to [facility] where she stayed for 5-6 nights. She was discharged home with home health PT and OT, she states home health PT stopped last Friday 3/31/23. She reports at baseline she ambulates without AD, but started using SPC about 6 months prior to surgery due to pain/weakness. She states at this time she is ambulating with SPC for household distances and RW for community distances. She reports she lives alone in single level [facility] with 4 JOSE RAFAEL w/ unilateral rail. She states she has a walkin shower with shower chair and grab bars. She reports since her R knee surgery her ability to ascend/descend stairs is difficult, she feels like her walking is impaired, and her balance/endurance decreased. She reports significant PMHx  of LLE drop foot (which they are contributing to sciatica, states it began about 3 years ago) which she wears LLE brace for which was issued to her by [facility] in September 2022, LBP w/ LLE radicular symptoms, DMII (controlled), HTN (controlled). Patient states her overall goal for PT is to walk again without a SPC. Patient reports she is currently working but on long term leave at this time. Patient reports current R knee pain 0/10 and pain at worst in the past week 1/10.     Patient/Family/Caregiver Comments/Observations Patient arrives with her L foot drop braces, wants to try then out.     Patient reported fall since last visit No        Pain Assessment    Currently in pain Yes     Preferred Pain Scale number (Numeric Rating Pain Scale)     Pain Side/Orientation right     Pain: Body location Knee     Pain Rating (0-10): Pre Activity 2     Pain Rating (0-10): Activity 2        Pain Intervention    Intervention  TE, TA     Post Intervention Comments low pain                Daily Treatment Assessment and Plan - 04/12/23 8059        Daily Treatment Assessment and Plan    Daily Outcome Summary Bebe brought her foot drop braces in, trialed the noodle and the flexible foot drop brace. Better stability with the noodle AFO but patient has not worn it , prefers the flexible brace due to same feeling more comfortable. patient to start wearing both braces at home to get used to them. TE per sheet, getting good AROM of flexion but oedema still present in R knee. Recommended to elevate and apply Cold packs more consistently. Pain low at end of session, Bergs balance test done, scored 26/56 - risk of falls present.     Plan and Recommendations See treatment log for further assessment including R knee circumference assessment, FGA. Progress ambulation w/ SPC, steps, continue gait training  with LLE brace donned.                     OBJECTIVE DATA TAKEN TODAY:    None taken    Today's Treatment:    IE 4/3/23   30 Day 5/3/23    60 Day 6/3/23   90 Day 7/3/23   Precautions LLE foot drop- has LLE AFO but does not always wear    Insurance Auth    Treatment  Current Session Time   Modalities Total Time for Session Not performed   Heat/Ice  CPT 26732 CP to R knee post session   E. Stimulation Manual  CPT 68134    E. Stim Unattended  CPT 35903    Manual   CPT 41581 Total Time for Session 0-7 Minutes   STM/MFR/TPR R gastroc, hamstring -y   Instrument Assisted STM     Mobilizations Gr II/III R patellar mobilizations in all direction (y)   ROM/Flexibility R knee PROM flex/ext (y)   Myofascial Decompression    Ther. Exercise  CPT 18112                    Total Time for Session 23-37 Minutes     Sets Reps Load Comment    RB NV 1 10 min Level 1 Seat 8?, arms 10, able to complete full revolution           Quad set y 2 10 5 sec     SAQ y 1 10 3 sec bilateral   SLR y 1 10  RLE only, quad set prior to lift    Sidelying hip ABD y 1 10  RLE only   sidelying clamshell  y 1  10     Standing hip ext  1 10     Hip adduction  2 10 3s hold Seated    Hip ABD  2 10 [place] Seated, trial pink NV?   LAQ Y 2 10 3s hold Seated   Bridge   1 10  Difficulty achieving full ROM   STS y 1 10  From raised mat    Toe taps NV       FSU NV       LSU NV       Lateral sidestepping                                                       Neuro Re-Ed  CPT 83796  Total Time for Session Not performed     Sets Reps Load Comment   FA EO EC NV    Firm surface   FT EO EC NV    Firm surface    FA EO EC     Foam    Tandem walking                 Pre gait exercises        Cesia step over                                  Gait  CPT 74873  Total Time for Session 8-22 Minutes   Gait training with SPC Y        Y Patient ambulated with SPC 50 w/ SPC, with noodle AFO on L foot for foot drop. Also tried flexible L foot drop brace which patient purchased from [employer]- 50 feet, CS, SPC.  Again  ambulated 100 feet with flexible brace on L foot, SPC, CS             Ther. Activity  CPT 72150  Total Time for  Session 8-22 Minutes   Patient education  Pt educated on updated HEP, symptom management   HEP  Issued patient HEP including hip adduction, hip ABD, LAQ and STS with use of BUE   30 sec STS  See Assessment   ADRIANO/LIDIA Arredondo done 4/12/2023 26/56   R knee circumference assessment NV    Stair assessment / training  Patient ascends/descends 4 6 inch steps w/ step to step pattern and unilateral rail, CS and gait belt.    Patient ascends/descends 4 6 inch steps w/ reciprocal pattern and unilateral rail, CS-Fay and gait belt, min-mod verbal cues for toe clearance (noted compensation of B foot ER to clear BLE)        Group  CPT 45386 Total Time for Session Not performed

## 2023-04-18 ENCOUNTER — HOSPITAL ENCOUNTER (OUTPATIENT)
Dept: PHYSICAL THERAPY | Facility: REHABILITATION | Age: 63
Setting detail: THERAPIES SERIES
Discharge: HOME | End: 2023-04-18
Attending: ORTHOPAEDIC SURGERY
Payer: COMMERCIAL

## 2023-04-18 ENCOUNTER — APPOINTMENT (OUTPATIENT)
Dept: OTHER | Facility: REHABILITATION | Age: 63
Setting detail: THERAPIES SERIES
End: 2023-04-18
Payer: COMMERCIAL

## 2023-04-18 DIAGNOSIS — Z96.651 S/P TOTAL KNEE ARTHROPLASTY, RIGHT: Primary | ICD-10-CM

## 2023-04-18 PROCEDURE — 97110 THERAPEUTIC EXERCISES: CPT | Mod: GP

## 2023-04-18 PROCEDURE — 97530 THERAPEUTIC ACTIVITIES: CPT | Mod: GP

## 2023-04-18 PROCEDURE — 97010 HOT OR COLD PACKS THERAPY: CPT | Mod: GP

## 2023-04-18 PROCEDURE — 97140 MANUAL THERAPY 1/> REGIONS: CPT | Mod: GP

## 2023-04-18 NOTE — OP PT TREATMENT LOG
IE 4/3/23   30 Day 5/3/23   60 Day 6/3/23   90 Day 7/3/23   Precautions LLE foot drop- has LLE AFO but does not always wear    Insurance Auth    Treatment  Current Session Time   Modalities Total Time for Session 0-7 Minutes   Heat/Ice  CPT 88983 CP to R knee post session   E. Stimulation Manual  CPT 98734    E. Stim Unattended  CPT 99493    Manual   CPT 76047 Total Time for Session 8-22 Minutes   STM/MFR/TPR R gastroc, hamstring -y   Instrument Assisted STM     Mobilizations Gr II/III R patellar mobilizations in all direction (y)   ROM/Flexibility R knee PROM flex/ext (y)   Myofascial Decompression    Ther. Exercise  CPT 08619                    Total Time for Session 23-37 Minutes     Sets Reps Load Comment    RB y 1 10 min Level 2 Seat 8?, arms 10, able to complete full revolution           Quad set y 1 10 5 sec     SAQ y 1 10 3 sec, 3lbs bilateral   SLR y 2 10  RLE only, quad set prior to lift    Sidelying hip ABD y 1 10  RLE only   sidelying clamshell  y 1  10     Standing hip ext  1 10     Hip adduction  2 10 3s hold Seated    Hip ABD  2 10 [place] Seated, trial pink NV?   LAQ Y 2 10 3s hold Seated   Bridge   1 10  Difficulty achieving full ROM   STS y 1 10  From raised mat    Toe taps y 2 5 6inch 1 finger tip support   FSU NV       LSU NV       Lateral sidestepping                                                       Neuro Re-Ed  CPT 01312  Total Time for Session Not performed     Sets Reps Load Comment   FA EO EC NV    Firm surface   FT EO EC NV    Firm surface    FA EO EC     Foam    Tandem walking                 Pre gait exercises        Cesia step over                                  Gait  CPT 94348  Total Time for Session Not performed   Gait training with SPC n        n Patient ambulated with SPC 50 w/ SPC, with noodle AFO on L foot for foot drop. Also tried flexible L foot drop brace which patient purchased from [employer]- 50 feet, CS, SPC.  Again  ambulated 100 feet with flexible brace on L foot, SPC,  CS             Ther. Activity  CPT 25358  Total Time for Session 8-22 Minutes   Patient education y Pt educated on knee extension ROM and importance, using L LE braces for safety, long term expectations and answered driving questions. Pt verbalized understanding.   HEP  Issued patient HEP including hip adduction, hip ABD, LAQ and STS with use of BUE   30 sec STS  See Assessment   ADRIANO/LINDAA  Arnulfo done 4/12/2023 26/56   R knee circumference assessment NV    Stair assessment / training  Patient ascends/descends 4 6 inch steps w/ step to step pattern and unilateral rail, CS and gait belt.    Patient ascends/descends 4 6 inch steps w/ reciprocal pattern and unilateral rail, CS-Fay and gait belt, min-mod verbal cues for toe clearance (noted compensation of B foot ER to clear BLE)        Group  CPT 79076 Total Time for Session Not performed

## 2023-04-18 NOTE — PROGRESS NOTES
PT DAILY NOTE FOR OUTPATIENT THERAPY    Patient: Bebe Burks MRN: [id number]  : 1960 62 y.o.  Referring Physician: Gideon Centeno MD  Date of Visit: 2023    Certification Dates: 23 through 23    Diagnosis:   1. S/P total knee arthroplasty, right        Chief Complaints:  R TKA    Precautions:   Existing Precautions/Restrictions: other (see comments)  Precautions comments: LLE foot drop - has LLE AFO will bring NV following IE      TODAY'S VISIT    Time In Session:  Start Time: 1501  Stop Time: 1600  Time Calculation (min): 59 min   History/Vitals/Pain/Encounter Info - 23 1449        Injury History/Precautions/Daily Required Info    Document Type daily treatment     Primary Therapist Caitlyn Lagunas, PT     Chief Complaint/Reason for Visit  R TKA     Referring Physician Dr. Centeno     Existing Precautions/Restrictions other (see comments)     Precautions comments LLE foot drop - has LLE AFO will bring NV following IE     History of present illness/functional impairment Bebe is a 61 yo female who presents to OPPT s/p R TKA 23 w/ Dr. Centeno at [facility] (states no complications during/post surgery). Patient reports she had a 2 night stay at [facility] and then was discharged to [facility] where she stayed for 5-6 nights. She was discharged home with home health PT and OT, she states home health PT stopped last Friday 3/31/23. She reports at baseline she ambulates without AD, but started using SPC about 6 months prior to surgery due to pain/weakness. She states at this time she is ambulating with SPC for household distances and RW for community distances. She reports she lives alone in single level [facility] with 4 JOSE RAFAEL w/ unilateral rail. She states she has a walkin shower with shower chair and grab bars. She reports since her R knee surgery her ability to ascend/descend stairs is difficult, she feels like her walking is impaired, and her balance/endurance decreased. She reports significant PMHx  of LLE drop foot (which they are contributing to sciatica, states it began about 3 years ago) which she wears LLE brace for which was issued to her by Peter in September 2022, LBP w/ LLE radicular symptoms, DMII (controlled), HTN (controlled). Patient states her overall goal for PT is to walk again without a SPC. Patient reports she is currently working but on long term leave at this time. Patient reports current R knee pain 0/10 and pain at worst in the past week 1/10.     Patient/Family/Caregiver Comments/Observations Pt reports a small amount of soreness after last visit but generally has been feeling good. Did have some R hip pain when getting into bed last night but not present this morning     Patient reported fall since last visit No        Pain Assessment    Currently in pain No/Denies                Daily Treatment Assessment and Plan - 04/18/23 1449        Daily Treatment Assessment and Plan    Progress toward goals Progressing     Daily Outcome Summary Presented without braces today so focused on LE strengthening to promote full knee extension range and control, as well as to promote increased carryover between sessions. Educated to bring at least one brace next session to work on standing and ambulation. She did well with increased volume today but still demos considerable L LE weakness that will benefit from continued tx.     Plan and Recommendations Progress ambulation with steps and gait training using brace, progress L quad strength                     OBJECTIVE DATA TAKEN TODAY:    None taken    Today's Treatment:    IE 4/3/23   30 Day 5/3/23   60 Day 6/3/23   90 Day 7/3/23   Precautions LLE foot drop- has LLE AFO but does not always wear    Insurance Auth    Treatment  Current Session Time   Modalities Total Time for Session 0-7 Minutes   Heat/Ice  CPT 62204 CP to R knee post session   E. Stimulation Manual  CPT 28458    E. Stim Unattended  CPT 63548    Manual   CPT 64562 Total Time for Session  8-22 Minutes   STM/MFR/TPR R gastroc, hamstring -y   Instrument Assisted STM     Mobilizations Gr II/III R patellar mobilizations in all direction (y)   ROM/Flexibility R knee PROM flex/ext (y)   Myofascial Decompression    Ther. Exercise  CPT 80306                    Total Time for Session 23-37 Minutes     Sets Reps Load Comment    RB y 1 10 min Level 2 Seat 8?, arms 10, able to complete full revolution           Quad set y 1 10 5 sec     SAQ y 1 10 3 sec, 3lbs bilateral   SLR y 2 10  RLE only, quad set prior to lift    Sidelying hip ABD y 1 10  RLE only   sidelying clamshell  y 1  10     Standing hip ext  1 10     Hip adduction  2 10 3s hold Seated    Hip ABD  2 10 [place] Seated, trial pink NV?   LAQ Y 2 10 3s hold Seated   Bridge   1 10  Difficulty achieving full ROM   STS y 1 10  From raised mat    Toe taps y 2 5 6inch 1 finger tip support   FSU NV       LSU NV       Lateral sidestepping                                                       Neuro Re-Ed  CPT 62021  Total Time for Session Not performed     Sets Reps Load Comment   FA EO EC NV    Firm surface   FT EO EC NV    Firm surface    FA EO EC     Foam    Tandem walking                 Pre gait exercises        Cesia step over                                  Gait  CPT 43694  Total Time for Session Not performed   Gait training with SPC n        n Patient ambulated with SPC 50 w/ SPC, with noodle AFO on L foot for foot drop. Also tried flexible L foot drop brace which patient purchased from [employer]- 50 feet, CS, SPC.  Again  ambulated 100 feet with flexible brace on L foot, SPC, CS             Ther. Activity  CPT 20863  Total Time for Session 8-22 Minutes   Patient education y Pt educated on knee extension ROM and importance, using L LE braces for safety, long term expectations and answered driving questions. Pt verbalized understanding.   HEP  Issued patient HEP including hip adduction, hip ABD, LAQ and STS with use of BUE   30 sec STS  See Assessment    ADRIANO/LIDIA Arredondo done 4/12/2023 26/56   R knee circumference assessment NV    Stair assessment / training  Patient ascends/descends 4 6 inch steps w/ step to step pattern and unilateral rail, CS and gait belt.    Patient ascends/descends 4 6 inch steps w/ reciprocal pattern and unilateral rail, CS-Fay and gait belt, min-mod verbal cues for toe clearance (noted compensation of B foot ER to clear BLE)        Group  CPT 90508 Total Time for Session Not performed

## 2023-04-19 ENCOUNTER — APPOINTMENT (OUTPATIENT)
Dept: OTHER | Facility: REHABILITATION | Age: 63
Setting detail: THERAPIES SERIES
End: 2023-04-19
Payer: COMMERCIAL

## 2023-04-19 ENCOUNTER — HOSPITAL ENCOUNTER (OUTPATIENT)
Dept: PHYSICAL THERAPY | Facility: REHABILITATION | Age: 63
Setting detail: THERAPIES SERIES
Discharge: HOME | End: 2023-04-19
Attending: ORTHOPAEDIC SURGERY
Payer: COMMERCIAL

## 2023-04-19 DIAGNOSIS — Z96.651 S/P TOTAL KNEE ARTHROPLASTY, RIGHT: Primary | ICD-10-CM

## 2023-04-19 PROCEDURE — 97140 MANUAL THERAPY 1/> REGIONS: CPT | Mod: GP,CQ

## 2023-04-19 PROCEDURE — 97530 THERAPEUTIC ACTIVITIES: CPT | Mod: GP,CQ

## 2023-04-19 PROCEDURE — 97110 THERAPEUTIC EXERCISES: CPT | Mod: GP,CQ

## 2023-04-19 NOTE — OP PT TREATMENT LOG
IE 4/3/23   30 Day 5/3/23   60 Day 6/3/23   90 Day 7/3/23   Precautions LLE foot drop- has LLE AFO but does not always wear    Insurance Auth    Treatment  Current Session Time   Modalities Total Time for Session 1 unit   Heat/Ice  CPT 42127 CP to R knee post session   E. Stimulation Manual  CPT 54066    E. Stim Unattended  CPT 89952    Manual   CPT 17972 Total Time for Session 8-22 Minutes   STM/MFR/TPR R gastroc, hamstring -y   Instrument Assisted STM     Mobilizations Gr II/III R patellar mobilizations in all direction    ROM/Flexibility R knee PROM flex/ext (y)   Myofascial Decompression    Ther. Exercise  CPT 71547                    Total Time for Session 23-37 Minutes     Sets Reps Load Comment    RB yes 1 10 min Level 2 Seat 8?, arms 10, able to complete full revolution           Quad set yes 1 10 5 sec     SAQ yes 1 10 3 sec, 3lbs bilateral   SLR yes 2 10  RLE only, quad set prior to lift    Sidelying hip ABD y 1 10  RLE only   sidelying clamshell  y 1  10     Standing hip ext  1 10     Hip adduction  2 10 3s hold Seated    Hip ABD  2 10 [place] Seated, trial pink NV?   LAQ Y 2 10 3s hold Seated   Bridge   1 10  Difficulty achieving full ROM   STS y 1 10  From raised mat    Toe taps y 2 5 6inch 1 finger tip support   FSU y  1o 6''    LSU y  10 6''    Lateral sidestepping                                                       Neuro Re-Ed  CPT 35414  Total Time for Session Billed w/ TE     Sets Reps Load Comment   FT HT/HN yes    Firm surface   FT EO EC yes    Firm surface    FA EO EC     Foam    Tandem walking                 Pre gait exercises        Cesia step over                                  Gait  CPT 69158  Total Time for Session Not performed   Gait training with SPC n        n Patient ambulated with SPC 50 w/ SPC, with noodle AFO on L foot for foot drop. Also tried flexible L foot drop brace which patient purchased from [employer]- 50 feet, CS, SPC.  Again  ambulated 100 feet with flexible brace on L  foot, SPC, CS             Ther. Activity  CPT 13347  Total Time for Session 8-22 Minutes   Patient education y Pt educated on knee extension ROM and importance, using L LE braces for safety, long term expectations and answered driving questions. Pt verbalized understanding.   HEP       yes Issued patient HEP including hip adduction, hip ABD, LAQ and STS with use of BUE    Updated HEP to include 3 way hip, hamcurls and step ups   30 sec STS  See Assessment   OH/FGA  Bergs done 4/12/2023 26/56   R knee circumference assessment NV    Stair assessment / training  Patient ascends/descends 4 6 inch steps w/ step to step pattern and unilateral rail, CS and gait belt.    Patient ascends/descends 4 6 inch steps w/ reciprocal pattern and unilateral rail, CS-Fay and gait belt, min-mod verbal cues for toe clearance (noted compensation of B foot ER to clear BLE)        Group  CPT 08660 Total Time for Session Not performed

## 2023-04-20 ENCOUNTER — APPOINTMENT (OUTPATIENT)
Dept: OTHER | Facility: REHABILITATION | Age: 63
Setting detail: THERAPIES SERIES
End: 2023-04-20
Payer: COMMERCIAL

## 2023-04-20 ENCOUNTER — HOSPITAL ENCOUNTER (OUTPATIENT)
Dept: PHYSICAL THERAPY | Facility: REHABILITATION | Age: 63
Setting detail: THERAPIES SERIES
Discharge: HOME | End: 2023-04-20
Attending: ORTHOPAEDIC SURGERY
Payer: COMMERCIAL

## 2023-04-20 DIAGNOSIS — Z96.651 S/P TOTAL KNEE ARTHROPLASTY, RIGHT: Primary | ICD-10-CM

## 2023-04-20 PROCEDURE — 97112 NEUROMUSCULAR REEDUCATION: CPT | Mod: GP

## 2023-04-20 PROCEDURE — 97140 MANUAL THERAPY 1/> REGIONS: CPT | Mod: GP

## 2023-04-20 PROCEDURE — 97110 THERAPEUTIC EXERCISES: CPT | Mod: GP

## 2023-04-20 PROCEDURE — 97010 HOT OR COLD PACKS THERAPY: CPT | Mod: GP

## 2023-04-20 NOTE — PROGRESS NOTES
PT DAILY NOTE FOR OUTPATIENT THERAPY    Patient: Bebe Burks MRN: [id number]  : 1960 62 y.o.  Referring Physician: Gideon Centeno MD  Date of Visit: 2023    Certification Dates: 23 through 23    Diagnosis:   1. S/P total knee arthroplasty, right        Chief Complaints:  R TKA    Precautions:   Existing Precautions/Restrictions: other (see comments)  Precautions comments: LLE foot drop - has LLE AFO will bring NV following IE      TODAY'S VISIT    Time In Session:  Start Time: 1300  Stop Time: 1400  Time Calculation (min): 60 min   History/Vitals/Pain/Encounter Info - 23 1330        Injury History/Precautions/Daily Required Info    Document Type daily treatment     Primary Therapist Caitlyn Lagunas, PT     Chief Complaint/Reason for Visit  R TKA     Referring Physician Dr. Centeno     Existing Precautions/Restrictions other (see comments)     Precautions comments LLE foot drop - has LLE AFO will bring NV following IE     History of present illness/functional impairment Bebe is a 61 yo female who presents to OPPT s/p R TKA 23 w/ Dr. Centeno at [facility] (states no complications during/post surgery). Patient reports she had a 2 night stay at [facility] and then was discharged to [facility] where she stayed for 5-6 nights. She was discharged home with home health PT and OT, she states home health PT stopped last Friday 3/31/23. She reports at baseline she ambulates without AD, but started using SPC about 6 months prior to surgery due to pain/weakness. She states at this time she is ambulating with SPC for household distances and RW for community distances. She reports she lives alone in single level [facility] with 4 JOSE RAFAEL w/ unilateral rail. She states she has a walkin shower with shower chair and grab bars. She reports since her R knee surgery her ability to ascend/descend stairs is difficult, she feels like her walking is impaired, and her balance/endurance decreased. She reports significant PMHx  of LLE drop foot (which they are contributing to sciatica, states it began about 3 years ago) which she wears LLE brace for which was issued to her by Peter in September 2022, LBP w/ LLE radicular symptoms, DMII (controlled), HTN (controlled). Patient states her overall goal for PT is to walk again without a SPC. Patient reports she is currently working but on long term leave at this time. Patient reports current R knee pain 0/10 and pain at worst in the past week 1/10.     Patient/Family/Caregiver Comments/Observations No significant pain after yesterday's session and only mild soreness at 2/10 today.     Patient reported fall since last visit No        Pain Assessment    Currently in pain Yes     Preferred Pain Scale number (Numeric Rating Pain Scale)     Pain Side/Orientation right     Pain: Body location Knee     Pain Rating (0-10): Pre Activity 2     Pain Rating (0-10): Post Activity 0        Pain Intervention    Intervention  manual, TE, ice     Post Intervention Comments decreased                Daily Treatment Assessment and Plan - 04/20/23 1524        Daily Treatment Assessment and Plan    Progress toward goals Progressing     Daily Outcome Summary Continues to make steady progress but prosper LE strength remains limited, especially quads, and will continue to benefit from skilled PT to address remaining deficits. Encouraged pt to bring brace to PT to practice so she feels more comfortable as their purpose is to increase safety.     Plan and Recommendations Continue prosper quad strengthening                     OBJECTIVE DATA TAKEN TODAY:    None taken    Today's Treatment:    IE 4/3/23   30 Day 5/3/23   60 Day 6/3/23   90 Day 7/3/23   Precautions LLE foot drop- has LLE AFO but does not always wear    Insurance Auth    Treatment  Current Session Time   Modalities Total Time for Session 1 unit   Heat/Ice  CPT 99949 CP to R knee post session   E. Stimulation Manual  CPT 90264    E. Stim Unattended  CPT 39010     Manual   CPT 84610 Total Time for Session 8-22 Minutes   STM/MFR/TPR R gastroc, hamstring -y   Instrument Assisted STM     Mobilizations Gr II/III R patellar mobilizations in all direction (y)   ROM/Flexibility R knee PROM flex/ext (y)   Myofascial Decompression    Ther. Exercise  CPT 78936                    Total Time for Session 23-37 Minutes     Sets Reps Load Comment    RB yes 1 10 min Level 2 Seat 8 able to complete full revolution           Quad set yes 1 10 10 sec     SAQ yes 2 10 3 sec, 3lbs bilateral   SLR yes 2 10  RLE only, quad set prior to lift    Sidelying hip ABD y 2 10  RLE only   sidelying clamshell  y 2 10     Standing hip ext  1 10     Hip adduction  2 10 3s hold Seated    Hip ABD  2 10 [place] Seated, trial pink NV?   LAQ N 2 10 3s hold Seated   Bridge   1 10  Difficulty achieving full ROM   STS y 1 10  From mat so hips are parallel to floor    Toe taps y 2 5 6inch 1 finger tip support   FSU nv  10 6''    LSU nv  10 6''    Lateral sidestepping y 1  6'                                                   Neuro Re-Ed  CPT 87118  Total Time for Session 8-22 minutes     Sets Reps Load Comment   FT HT/HN no    Firm surface   FT EO EC yes 1 30sec  Firm surface    FT EO  yes 1 30sec  Foam    Tandem  yes 1 30sec  foam           Pre gait exercises        Cesia step over                                  Gait  CPT 81978  Total Time for Session Not performed   Gait training with SPC n        n Patient ambulated with SPC 50 w/ SPC, with noodle AFO on L foot for foot drop. Also tried flexible L foot drop brace which patient purchased from [employer]- 50 feet, CS, SPC.  Again  ambulated 100 feet with flexible brace on L foot, SPC, CS             Ther. Activity  CPT 91073  Total Time for Session 8-22 Minutes   Patient education y Pt educated on, using L LE braces for safety, long term expectations. Pt verbalized understanding.   HEP      Issued patient HEP including hip adduction, hip ABD, LAQ and STS with use of  BUE    Updated HEP to include 3 way hip, hamcurls and step ups   30 sec STS  See Assessment   OH/FGA  Arnulfo done 4/12/2023 26/56   R knee circumference assessment NV    Stairs training  Patient ascends/descends 4 6 inch steps w/ reciprocal pattern, SPC, andunilateral rail, CS    Patient ascends/descends 4 6 inch steps w/ reciprocal pattern and unilateral rail, CS-Fay and gait belt, min-mod verbal cues for toe clearance (noted compensation of B foot ER to clear BLE)        Group  CPT 54522 Total Time for Session Not performed

## 2023-04-20 NOTE — OP PT TREATMENT LOG
IE 4/3/23   30 Day 5/3/23   60 Day 6/3/23   90 Day 7/3/23   Precautions LLE foot drop- has LLE AFO but does not always wear    Insurance Auth    Treatment  Current Session Time   Modalities Total Time for Session 1 unit   Heat/Ice  CPT 68764 CP to R knee post session   E. Stimulation Manual  CPT 65571    E. Stim Unattended  CPT 83585    Manual   CPT 41062 Total Time for Session 8-22 Minutes   STM/MFR/TPR R gastroc, hamstring -y   Instrument Assisted STM     Mobilizations Gr II/III R patellar mobilizations in all direction (y)   ROM/Flexibility R knee PROM flex/ext (y)   Myofascial Decompression    Ther. Exercise  CPT 96001                    Total Time for Session 23-37 Minutes     Sets Reps Load Comment    RB yes 1 10 min Level 2 Seat 8 able to complete full revolution           Quad set yes 1 10 10 sec     SAQ yes 2 10 3 sec, 3lbs bilateral   SLR yes 2 10  RLE only, quad set prior to lift    Sidelying hip ABD y 2 10  RLE only   sidelying clamshell  y 2 10     Standing hip ext  1 10     Hip adduction  2 10 3s hold Seated    Hip ABD  2 10 [place] Seated, trial pink NV?   LAQ N 2 10 3s hold Seated   Bridge   1 10  Difficulty achieving full ROM   STS y 1 10  From mat so hips are parallel to floor    Toe taps y 2 5 6inch 1 finger tip support   FSU nv  10 6''    LSU nv  10 6''    Lateral sidestepping y 1  6'                                                   Neuro Re-Ed  CPT 93438  Total Time for Session 8-22 minutes     Sets Reps Load Comment   FT HT/HN no    Firm surface   FT EO EC yes 1 30sec  Firm surface    FT EO  yes 1 30sec  Foam    Tandem  yes 1 30sec  foam           Pre gait exercises        Cesia step over                                  Gait  CPT 97999  Total Time for Session Not performed   Gait training with SPC n        n Patient ambulated with SPC 50 w/ SPC, with noodle AFO on L foot for foot drop. Also tried flexible L foot drop brace which patient purchased from [employer]- 50 feet, CS, SPC.  Again   ambulated 100 feet with flexible brace on L foot, SPC, CS             Ther. Activity  CPT 30435  Total Time for Session 8-22 Minutes   Patient education y Pt educated on, using L LE braces for safety, long term expectations. Pt verbalized understanding.   HEP      Issued patient HEP including hip adduction, hip ABD, LAQ and STS with use of BUE    Updated HEP to include 3 way hip, hamcurls and step ups   30 sec STS  See Assessment   OH/FGA  Bergs done 4/12/2023 26/56   R knee circumference assessment NV    Stairs training  Patient ascends/descends 4 6 inch steps w/ reciprocal pattern, SPC, andunilateral rail, CS    Patient ascends/descends 4 6 inch steps w/ reciprocal pattern and unilateral rail, CS-Fay and gait belt, min-mod verbal cues for toe clearance (noted compensation of B foot ER to clear BLE)        Group  CPT 16258 Total Time for Session Not performed

## 2023-04-24 ENCOUNTER — APPOINTMENT (OUTPATIENT)
Dept: OTHER | Facility: REHABILITATION | Age: 63
Setting detail: THERAPIES SERIES
End: 2023-04-24
Payer: COMMERCIAL

## 2023-04-24 ENCOUNTER — HOSPITAL ENCOUNTER (OUTPATIENT)
Dept: PHYSICAL THERAPY | Facility: REHABILITATION | Age: 63
Setting detail: THERAPIES SERIES
Discharge: HOME | End: 2023-04-24
Attending: ORTHOPAEDIC SURGERY
Payer: COMMERCIAL

## 2023-04-24 DIAGNOSIS — I10 ESSENTIAL HYPERTENSION: ICD-10-CM

## 2023-04-24 DIAGNOSIS — Z96.651 S/P TOTAL KNEE ARTHROPLASTY, RIGHT: Primary | ICD-10-CM

## 2023-04-24 PROCEDURE — 97110 THERAPEUTIC EXERCISES: CPT | Mod: GP

## 2023-04-24 PROCEDURE — 97140 MANUAL THERAPY 1/> REGIONS: CPT | Mod: GP

## 2023-04-24 PROCEDURE — 97530 THERAPEUTIC ACTIVITIES: CPT | Mod: GP

## 2023-04-24 NOTE — OP PT TREATMENT LOG
IE 4/3/23   30 Day 5/3/23   60 Day 6/3/23   90 Day 7/3/23   Precautions LLE foot drop- has LLE AFO but does not always wear    Insurance Auth    Treatment  Current Session Time   Modalities Total Time for Session  5 minutes   Heat/Ice  CPT 93543 CP to R knee post session   E. Stimulation Manual  CPT 79090    E. Stim Unattended  CPT 06931    Manual   CPT 75476 Total Time for Session 8-22 Minutes   STM/MFR/TPR R gastroc, hamstring -y   Instrument Assisted STM     Mobilizations Gr II/III R patellar mobilizations in all direction (y)   ROM/Flexibility R knee PROM flex/ext (y)   Myofascial Decompression    Ther. Exercise  CPT 09298                    Total Time for Session 23-37 Minutes     Sets Reps Load Comment    RB  1 10 min Level 2 Seat 8 able to complete full revolution           Quad set y 1 10 10 sec     SAQ  2 10 3 sec, 3lbs bilateral   SLR y 1 10  RLE only, quad set prior to lift    Sidelying hip ABD  2 10  RLE only   sidelying clamshell  y 2 10     Standing hip ext, abduction y 1 10 each  In // bars. Holding with UE   Hip adduction  2 10 3s hold Seated    Hip ABD y 1 10 [place] Supine, trial pink   LAQ y 1 15 3s hold 2# weight   Bridge  y 1 10  Difficulty achieving full ROM   STS y 1 10  From mat so hips are parallel to floor    Toe taps  2 5 6inch 1 finger tip support   FSU y 1 10 4''    LSU nv  10 6''    Lateral sidestepping  1  6'    stairs y   6 inch 4, 6 inch steps, up leading with L, down lead with R , SPC and bannister.                                          Neuro Re-Ed  CPT 78734  Total Time for Session brief     Sets Reps Load Comment   FT HT/HN no    Firm surface   FT EO y 1 30sec  Firm surface / floor   FT EO   1 30sec  Foam    Tandem  y 1 30sec  floor           Pre gait exercises        Cesia step over                                  Gait  CPT 94539  Total Time for Session Not performed   Gait training with SPC n        n Patient ambulated with SPC 50 w/ SPC, with noodle AFO on L foot for foot  drop. Also tried flexible L foot drop brace which patient purchased from [employer]- 50 feet, CS, SPC.  Again  ambulated 100 feet with flexible brace on L foot, SPC, CS             Ther. Activity  CPT 24625  Total Time for Session 8-22 Minutes   Patient education y Pt educated on, using L LE braces for consistently for safety as came to PT not wearing same.   HEP      Issued patient HEP including hip adduction, hip ABD, LAQ and STS with use of BUE    Updated HEP to include 3 way hip, hamcurls and step ups   30 sec STS  See Assessment   other yes 4/24/2023- donning soft brace for L foot drop.   Seated hip abduction / add 10x bilat to simulate going from accelerator to brake in car.   OH/FGA  Bergs done 4/12/2023 26/56   R knee circumference assessment NV    Stairs training  Patient ascends/descends 4 6 inch steps w/ reciprocal pattern, SPC, andunilateral rail, CS    Patient ascends/descends 4 6 inch steps w/ reciprocal pattern and unilateral rail, CS-Fay and gait belt, min-mod verbal cues for toe clearance (noted compensation of B foot ER to clear BLE)        Group  CPT 81794 Total Time for Session Not performed

## 2023-04-24 NOTE — PROGRESS NOTES
PT DAILY NOTE FOR OUTPATIENT THERAPY    Patient: Bebe Burks MRN: [id number]  : 1960 62 y.o.  Referring Physician: Gideon Centeno MD  Date of Visit: 2023    Certification Dates: 23 through 23    Diagnosis:   1. S/P total knee arthroplasty, right        Chief Complaints:  R TKA    Precautions:   Existing Precautions/Restrictions: other (see comments)  Precautions comments: LLE foot drop - has LLE AFO will bring NV following IE      TODAY'S VISIT    Time In Session:  Start Time: 1002  Stop Time: 1102  Time Calculation (min): 60 min   History/Vitals/Pain/Encounter Info - 23 1004        Injury History/Precautions/Daily Required Info    Document Type daily treatment     Primary Therapist Caitlyn Lagunas, PT     Chief Complaint/Reason for Visit  R TKA     Referring Physician Dr. Centeno     Existing Precautions/Restrictions other (see comments)     Precautions comments LLE foot drop - has LLE AFO will bring NV following IE     History of present illness/functional impairment Bebe is a 63 yo female who presents to OPPT s/p R TKA 23 w/ Dr. Centeno at [facility] (states no complications during/post surgery). Patient reports she had a 2 night stay at [facility] and then was discharged to [facility] where she stayed for 5-6 nights. She was discharged home with home health PT and OT, she states home health PT stopped last Friday 3/31/23. She reports at baseline she ambulates without AD, but started using SPC about 6 months prior to surgery due to pain/weakness. She states at this time she is ambulating with SPC for household distances and RW for community distances. She reports she lives alone in single level [facility] with 4 JOSE RAFAEL w/ unilateral rail. She states she has a walkin shower with shower chair and grab bars. She reports since her R knee surgery her ability to ascend/descend stairs is difficult, she feels like her walking is impaired, and her balance/endurance decreased. She reports significant PMHx  of LLE drop foot (which they are contributing to sciatica, states it began about 3 years ago) which she wears LLE brace for which was issued to her by Peter in September 2022, LBP w/ LLE radicular symptoms, DMII (controlled), HTN (controlled). Patient states her overall goal for PT is to walk again without a SPC. Patient reports she is currently working but on long term leave at this time. Patient reports current R knee pain 0/10 and pain at worst in the past week 1/10.     Patient reported fall since last visit No        Pain Assessment    Currently in pain Yes     Preferred Pain Scale number (Numeric Rating Pain Scale)     Pain Side/Orientation right;left     Pain: Body location Knee   non operated knee 5/10. R knee 1/10    Pain Rating (0-10): Pre Activity 5     Pain Rating (0-10): Activity 2     Pain Rating (0-10): Post Activity 2        Pain Intervention    Intervention  TE, manual     Post Intervention Comments pain low                Daily Treatment Assessment and Plan - 04/24/23 1235        Daily Treatment Assessment and Plan    Progress toward goals Progressing     Daily Outcome Summary No new c/o. TE per sheet, added 2# weights during LAQ and step ups on 4 inch box. Worked on stairs , 1 step at a time as has to go up and [place] stairs at [employer] this [place]. Will benefit from PT to improve balance, LE strength and re-inforce consistent use of foot drop brace.     Plan and Recommendations Continue bilateral strengthening.                     OBJECTIVE DATA TAKEN TODAY:    None taken    Today's Treatment:    IE 4/3/23   30 Day 5/3/23   60 Day 6/3/23   90 Day 7/3/23   Precautions LLE foot drop- has LLE AFO but does not always wear    Insurance Auth    Treatment  Current Session Time   Modalities Total Time for Session  5 minutes   Heat/Ice  CPT 81288 CP to R knee post session   E. Stimulation Manual  CPT 69295    E. Stim Unattended  CPT 21696    Manual   CPT 60854 Total Time for Session 8-22 Minutes    STM/MFR/TPR R gastroc, hamstring -y   Instrument Assisted STM     Mobilizations Gr II/III R patellar mobilizations in all direction (y)   ROM/Flexibility R knee PROM flex/ext (y)   Myofascial Decompression    Ther. Exercise  CPT 86413                    Total Time for Session 23-37 Minutes     Sets Reps Load Comment    RB  1 10 min Level 2 Seat 8 able to complete full revolution           Quad set y 1 10 10 sec     SAQ  2 10 3 sec, 3lbs bilateral   SLR y 1 10  RLE only, quad set prior to lift    Sidelying hip ABD  2 10  RLE only   sidelying clamshell  y 2 10     Standing hip ext, abduction y 1 10 each  In // bars. Holding with UE   Hip adduction  2 10 3s hold Seated    Hip ABD y 1 10 [place] Supine, trial pink   LAQ y 1 15 3s hold 2# weight   Bridge  y 1 10  Difficulty achieving full ROM   STS y 1 10  From mat so hips are parallel to floor    Toe taps  2 5 6inch 1 finger tip support   FSU y 1 10 4''    LSU nv  10 6''    Lateral sidestepping  1  6'    stairs y   6 inch 4, 6 inch steps, up leading with L, down lead with R , SPC and bannister.                                          Neuro Re-Ed  CPT 28012  Total Time for Session brief     Sets Reps Load Comment   FT HT/HN no    Firm surface   FT EO y 1 30sec  Firm surface / floor   FT EO   1 30sec  Foam    Tandem  y 1 30sec  floor           Pre gait exercises        Cesia step over                                  Gait  CPT 44309  Total Time for Session Not performed   Gait training with SPC n        n Patient ambulated with SPC 50 w/ SPC, with noodle AFO on L foot for foot drop. Also tried flexible L foot drop brace which patient purchased from [employer]- 50 feet, CS, SPC.  Again  ambulated 100 feet with flexible brace on L foot, SPC, CS             Ther. Activity  CPT 29339  Total Time for Session 8-22 Minutes   Patient education y Pt educated on, using L LE braces for consistently for safety as came to PT not wearing same.   HEP      Issued patient HEP including hip  adduction, hip ABD, LAQ and STS with use of BUE    Updated HEP to include 3 way hip, hamcurls and step ups   30 sec STS  See Assessment   other yes 4/24/2023- donning soft brace for L foot drop.   Seated hip abduction / add 10x bilat to simulate going from accelerator to brake in car.   ADRIANO/LIDIA Arredondo done 4/12/2023 26/56   R knee circumference assessment NV    Stairs training  Patient ascends/descends 4 6 inch steps w/ reciprocal pattern, SPC, andunilateral rail, CS    Patient ascends/descends 4 6 inch steps w/ reciprocal pattern and unilateral rail, CS-Fay and gait belt, min-mod verbal cues for toe clearance (noted compensation of B foot ER to clear BLE)        Group  CPT 83856 Total Time for Session Not performed

## 2023-04-25 RX ORDER — AMLODIPINE BESYLATE 5 MG/1
TABLET ORAL
Qty: 90 TABLET | Refills: 1 | Status: SHIPPED | OUTPATIENT
Start: 2023-04-25 | End: 2023-10-30

## 2023-04-26 ENCOUNTER — HOSPITAL ENCOUNTER (OUTPATIENT)
Dept: PHYSICAL THERAPY | Facility: REHABILITATION | Age: 63
Setting detail: THERAPIES SERIES
Discharge: HOME | End: 2023-04-26
Attending: ORTHOPAEDIC SURGERY
Payer: COMMERCIAL

## 2023-04-26 ENCOUNTER — APPOINTMENT (OUTPATIENT)
Dept: OTHER | Facility: REHABILITATION | Age: 63
Setting detail: THERAPIES SERIES
End: 2023-04-26
Payer: COMMERCIAL

## 2023-04-26 DIAGNOSIS — Z96.651 S/P TOTAL KNEE ARTHROPLASTY, RIGHT: Primary | ICD-10-CM

## 2023-04-26 PROCEDURE — 97116 GAIT TRAINING THERAPY: CPT | Mod: GP

## 2023-04-26 PROCEDURE — 97110 THERAPEUTIC EXERCISES: CPT | Mod: GP

## 2023-04-26 PROCEDURE — 97140 MANUAL THERAPY 1/> REGIONS: CPT | Mod: GP

## 2023-04-26 NOTE — PROGRESS NOTES
Physical Therapy Visit    PT DAILY NOTE FOR OUTPATIENT THERAPY    Patient: Bebe Burks MRN: [id number]  : 1960 62 y.o.  Referring Physician: Gideon Centeno MD  Date of Visit: 2023    Certification Dates: 23 through 23    Diagnosis:   1. S/P total knee arthroplasty, right        Chief Complaints:  R TKA    Precautions:   Existing Precautions/Restrictions: other (see comments)  Precautions comments: LLE foot drop - has LLE AFO will bring NV following IE      TODAY'S VISIT    Time In Session:  Start Time: 1402  Stop Time: 1502  Time Calculation (min): 60 min   History/Vitals/Pain/Encounter Info - 23 1418        Injury History/Precautions/Daily Required Info    Document Type daily treatment     Primary Therapist Caitlyn Lagunas, PT     Chief Complaint/Reason for Visit  R TKA     Referring Physician Dr. Centeno     Existing Precautions/Restrictions other (see comments)     Precautions comments LLE foot drop - has LLE AFO will bring NV following IE     History of present illness/functional impairment Bebe is a 61 yo female who presents to OPPT s/p R TKA 23 w/ Dr. Centeno at [facility] (states no complications during/post surgery). Patient reports she had a 2 night stay at [facility] and then was discharged to [facility] where she stayed for 5-6 nights. She was discharged home with home health PT and OT, she states home health PT stopped last Friday 3/31/23. She reports at baseline she ambulates without AD, but started using SPC about 6 months prior to surgery due to pain/weakness. She states at this time she is ambulating with SPC for household distances and RW for community distances. She reports she lives alone in single level [facility] with 4 JOSE RAFAEL w/ unilateral rail. She states she has a walkin shower with shower chair and grab bars. She reports since her R knee surgery her ability to ascend/descend stairs is difficult, she feels like her walking is impaired, and her balance/endurance decreased. She  reports significant PMHx of LLE drop foot (which they are contributing to sciatica, states it began about 3 years ago) which she wears LLE brace for which was issued to her by [facility] in September 2022, LBP w/ LLE radicular symptoms, DMII (controlled), HTN (controlled). Patient states her overall goal for PT is to walk again without a SPC. Patient reports she is currently working but on long term leave at this time. Patient reports current R knee pain 0/10 and pain at worst in the past week 1/10.     Patient reported fall since last visit No        Pain Assessment    Currently in pain Yes     Preferred Pain Scale number (Numeric Rating Pain Scale)     Pain Side/Orientation right     Pain: Body location Knee     Pain Rating (0-10): Pre Activity 2     Pain Rating (0-10): Activity 1     Pain Rating (0-10): Post Activity 1        Pain Intervention    Intervention  TE, manual     Post Intervention Comments pain mild                Daily Treatment Assessment and Plan - 04/26/23 1418        Daily Treatment Assessment and Plan    Progress toward goals Progressing     Daily Outcome Summary No new c/o TE per log in siiting and standing. Able to go up and down FF of stairs with reciprocal legs and SPC, 1 bannister. Had good range of motion, but will benefit from PT foer strengthening and baalnce exs. Challenged with static balance exs on the airex.     Plan and Recommendations Continue bilateral strengthening. Progress note next visit.                     OBJECTIVE DATA TAKEN TODAY:    None taken    Today's Treatment:    IE 4/3/23   30 Day 5/3/23   60 Day 6/3/23   90 Day 7/3/23   Precautions LLE foot drop- has LLE AFO but does not always wear    Insurance Auth    Treatment  Current Session Time   Modalities Total Time for Session  5 minutes   Heat/Ice  CPT 72208 CP to R knee post session   E. Stimulation Manual  CPT 75610    E. Stim Unattended  CPT 47957    Manual   CPT 14927 Total Time for Session 8-22 Minutes   STM/MFR/TPR R  gastroc, hamstring -y   Instrument Assisted STM     Mobilizations Gr II/III R patellar mobilizations in all direction (y)   ROM/Flexibility R knee PROM flex/ext (y)   Myofascial Decompression    Ther. Exercise  CPT 54488                    Total Time for Session 23-37 Minutes     Sets Reps Load Comment    RB  1 7 min Level 2  distance 0.53 mile distance           Quad set y 1 10 5 sec     SAQ  2 10 3 sec, 1 # Bilateral, cues to get to end range   SLR y 2 5  RLE only, quad set prior to lift    Sidelying hip ABD Y 2 10  RLE only   sidelying clamshell   2 10     Standing hip  abduction y 2 10 each  In // bars. Holding with UE   Hip adduction  2 10 3s hold Seated    Hip ABD  1 10 [place] Supine, trial pink   LAQ y 1 15 3s hold    Bridge  y 2 10  Cues to  Achieve  full ROM   STS  1 10  From mat so hips are parallel to floor    Toe taps  2 5 6inch 1 finger tip support   FSU Y 1 10 6 inch    LSU nv  10 6''    Lateral sidestepping  1  6'    stairs y   6 inch FF of stairs with reciprocal legs , cane and bannister , CS                                          Neuro Re-Ed  CPT 01581  Total Time for Session brief     Sets Reps Load Comment   FT HT/HN no    Firm surface   FT EO y 1 30sec  airex   FT EO   1 30sec  Foam    Tandem  y 1 30sec  floor           Pre gait exercises        Cesia step over                                  Gait  CPT 15059  Total Time for Session 8-22 Minutes   Gait training with SPC Y        n Patient ambulated with  w/ SPC, no brace, 2x             Ther. Activity  CPT 25488  Total Time for Session Not performed   Patient education y Pt educated on, using L LE braces for consistently for safety as came to PT not wearing same.   HEP      Issued patient HEP including hip adduction, hip ABD, LAQ and STS with use of BUE    Updated HEP to include 3 way hip, hamcurls and step ups   30 sec STS  See Assessment   other  4/24/2023- donning soft brace for L foot drop.   Seated hip abduction / add 10x bilat to  simulate going from accelerator to brake in car.   ADRIANO/LIDIA Arredondo done 4/12/2023 26/56   R knee circumference assessment NV    Stairs training  Patient ascends/descends 4 6 inch steps w/ reciprocal pattern, SPC, andunilateral rail, CS    Patient ascends/descends 4 6 inch steps w/ reciprocal pattern and unilateral rail, CS-Fay and gait belt, min-mod verbal cues for toe clearance (noted compensation of B foot ER to clear BLE)        Group  CPT 27355 Total Time for Session Not performed

## 2023-04-26 NOTE — OP PT TREATMENT LOG
IE 4/3/23   30 Day 5/3/23   60 Day 6/3/23   90 Day 7/3/23   Precautions LLE foot drop- has LLE AFO but does not always wear    Insurance Auth    Treatment  Current Session Time   Modalities Total Time for Session  5 minutes   Heat/Ice  CPT 24642 CP to R knee post session   E. Stimulation Manual  CPT 50624    E. Stim Unattended  CPT 27079    Manual   CPT 17384 Total Time for Session 8-22 Minutes   STM/MFR/TPR R gastroc, hamstring -y   Instrument Assisted STM     Mobilizations Gr II/III R patellar mobilizations in all direction (y)   ROM/Flexibility R knee PROM flex/ext (y)   Myofascial Decompression    Ther. Exercise  CPT 90409                    Total Time for Session 23-37 Minutes     Sets Reps Load Comment    RB  1 7 min Level 2  distance 0.53 mile distance           Quad set y 1 10 5 sec     SAQ  2 10 3 sec, 1 # Bilateral, cues to get to end range   SLR y 2 5  RLE only, quad set prior to lift    Sidelying hip ABD Y 2 10  RLE only   sidelying clamshell   2 10     Standing hip  abduction y 2 10 each  In // bars. Holding with UE   Hip adduction  2 10 3s hold Seated    Hip ABD  1 10 [place] Supine, trial pink   LAQ y 1 15 3s hold    Bridge  y 2 10  Cues to  Achieve  full ROM   STS  1 10  From mat so hips are parallel to floor    Toe taps  2 5 6inch 1 finger tip support   FSU Y 1 10 6 inch    LSU nv  10 6''    Lateral sidestepping  1  6'    stairs y   6 inch FF of stairs with reciprocal legs , cane and johnsonter , CS                                          Neuro Re-Ed  CPT 86731  Total Time for Session brief     Sets Reps Load Comment   FT HT/HN no    Firm surface   FT EO y 1 30sec  airex   FT EO   1 30sec  Foam    Tandem  y 1 30sec  floor           Pre gait exercises        Cesia step over                                  Gait  CPT 48276  Total Time for Session 8-22 Minutes   Gait training with SPC Y        n Patient ambulated with  w/ SPC, no brace, 2x             Ther. Activity  CPT 52879  Total Time for  Session Not performed   Patient education y Pt educated on, using L LE braces for consistently for safety as came to PT not wearing same.   HEP      Issued patient HEP including hip adduction, hip ABD, LAQ and STS with use of BUE    Updated HEP to include 3 way hip, hamcurls and step ups   30 sec STS  See Assessment   other  4/24/2023- donning soft brace for L foot drop.   Seated hip abduction / add 10x bilat to simulate going from accelerator to brake in car.   ADRIANO/LIDIA Arredondo done 4/12/2023 26/56   R knee circumference assessment NV    Stairs training  Patient ascends/descends 4 6 inch steps w/ reciprocal pattern, SPC, andunilateral rail, CS    Patient ascends/descends 4 6 inch steps w/ reciprocal pattern and unilateral rail, CS-Fay and gait belt, min-mod verbal cues for toe clearance (noted compensation of B foot ER to clear BLE)        Group  CPT 34512 Total Time for Session Not performed

## 2023-04-28 ENCOUNTER — APPOINTMENT (OUTPATIENT)
Dept: OTHER | Facility: REHABILITATION | Age: 63
Setting detail: THERAPIES SERIES
End: 2023-04-28
Payer: COMMERCIAL

## 2023-04-28 ENCOUNTER — HOSPITAL ENCOUNTER (OUTPATIENT)
Dept: PHYSICAL THERAPY | Facility: REHABILITATION | Age: 63
Setting detail: THERAPIES SERIES
Discharge: HOME | End: 2023-04-28
Attending: ORTHOPAEDIC SURGERY
Payer: COMMERCIAL

## 2023-04-28 DIAGNOSIS — Z96.651 S/P TOTAL KNEE ARTHROPLASTY, RIGHT: Primary | ICD-10-CM

## 2023-04-28 PROCEDURE — 97140 MANUAL THERAPY 1/> REGIONS: CPT | Mod: GP

## 2023-04-28 PROCEDURE — 97530 THERAPEUTIC ACTIVITIES: CPT | Mod: GP

## 2023-04-28 PROCEDURE — 97110 THERAPEUTIC EXERCISES: CPT | Mod: GP

## 2023-04-28 NOTE — PROGRESS NOTES
Physical Therapy Visit    PT DAILY NOTE FOR OUTPATIENT THERAPY    Patient: Bebe Burks MRN: [id number]  : 1960 62 y.o.  Referring Physician: Gideon Centeno MD  Date of Visit: 2023    Certification Dates: 23 through 23    Diagnosis:   1. S/P total knee arthroplasty, right        Chief Complaints:  R TKA    Precautions:   Existing Precautions/Restrictions: other (see comments)  Precautions comments: LLE foot drop - has LLE AFO will bring NV following IE      TODAY'S VISIT    Time In Session:  Start Time: 902  Stop Time: 1000  Time Calculation (min): 58 min   History/Vitals/Pain/Encounter Info - 23 09        Injury History/Precautions/Daily Required Info    Document Type daily treatment     Primary Therapist Caitlyn Lagunas, PT     Chief Complaint/Reason for Visit  R TKA     Referring Physician Dr. Centeno     Existing Precautions/Restrictions other (see comments)     Precautions comments LLE foot drop - has LLE AFO will bring NV following IE     History of present illness/functional impairment Bebe is a 63 yo female who presents to OPPT s/p R TKA 23 w/ Dr. Centeno at [facility] (states no complications during/post surgery). Patient reports she had a 2 night stay at [facility] and then was discharged to [facility] where she stayed for 5-6 nights. She was discharged home with home health PT and OT, she states home health PT stopped last Friday 3/31/23. She reports at baseline she ambulates without AD, but started using SPC about 6 months prior to surgery due to pain/weakness. She states at this time she is ambulating with SPC for household distances and RW for community distances. She reports she lives alone in single level [facility] with 4 JOSE RAFAEL w/ unilateral rail. She states she has a walkin shower with shower chair and grab bars. She reports since her R knee surgery her ability to ascend/descend stairs is difficult, she feels like her walking is impaired, and her balance/endurance decreased. She  reports significant PMHx of LLE drop foot (which they are contributing to sciatica, states it began about 3 years ago) which she wears LLE brace for which was issued to her by [facility] in September 2022, LBP w/ LLE radicular symptoms, DMII (controlled), HTN (controlled). Patient states her overall goal for PT is to walk again without a SPC. Patient reports she is currently working but on long term leave at this time. Patient reports current R knee pain 0/10 and pain at worst in the past week 1/10.     Patient/Family/Caregiver Comments/Observations Patient arrives ambulating with SPC reporting no R knee pain. She states [place] [place], she'll have to possibly navigate curbs, and will be walking on uneven ground.     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 Patient requires min verbal cueing for sequencing with SPC when ascending / descending stairs. Trialed ramp navigation which patient required Fay for due to difficulty decelerating when descending ramp. Patient initially required mod verbal cueing and demonstration for ascending/descending curb. Initiated slantboard gastroc and soleus stretch for continue promotion of RLE mobility. Patient with (+) gastroc/soleus tightness noted during MT interventions, improved at end of session. Patient demonstrates R knee flex AROM of 115 deg and R knee ext AROM lacking 4 deg.     Plan and Recommendations Follow up about  functional training previous session. Trial prone quad stretch if able to tolerate.                     OBJECTIVE DATA TAKEN TODAY:    None taken    Today's Treatment:    IE 4/3/23   30 Day 5/3/23   60 Day 6/3/23   90 Day 7/3/23   Precautions LLE foot drop- has LLE AFO but does not always wear    Insurance Auth    Treatment  Current Session Time   Modalities Total Time for Session  5 minutes   Heat/Ice  CPT 43662 CP to R knee post session   E. Stimulation Manual  CPT 77373    E. Stim Unattended  CPT 10587    Manual   CPT 82873 Total Time for Session 8-22 Minutes   STM/MFR/TPR R gastroc, hamstring -y   Instrument Assisted STM     Mobilizations Gr II/III R patellar mobilizations in all direction (y)  AP talocrural mobs NV ?   ROM/Flexibility R knee PROM flex/ext (y)  Passive soleus and gastroc stretch, pt prone (y)   Myofascial Decompression    Ther. Exercise  CPT 22596                    Total Time for Session 8-22 Minutes     Sets Reps Load Comment    RB y 1 7 min Level 2  distance 0.53 mile distance           Slantboard gastroc stretch y 1 3 30sec    Slantboard soleus stretch y 1 3 30sec     Hamstring stretch y 1 3 30sec Supine with strap    Quad set y 1 10 5 sec     SAQ  2 10 3 sec, 1 # Bilateral, cues to get to end range   SLR  2 5  RLE only, quad set prior to lift    Sidelying hip ABD  2 10  RLE only   sidelying clamshell   2 10     Standing hip  abduction  2 10 each  In // bars. Holding with UE   Hip adduction  2 10 3s hold Seated    Hip ABD  1 10 [place] Supine, trial pink   LAQ  1 15 3s hold    Bridge  y 2 10  Cues to  Achieve  full ROM   STS  1 10  From mat so hips are parallel to floor    Toe taps  2 5 6inch 1 finger tip support   FSU Y 1 10 6 inch    LSU nv  10 6''    Lateral sidestepping  1  6'    stairs y   6 inch FF of stairs with reciprocal legs , cane and bannister , CS   Prone quad stretch NV                                      Neuro Re-Ed  CPT 67655  Total Time for Session Not performed      Sets  Reps Load Comment   FT HT/HN no    Firm surface   FT EO y 1 30sec  airex   FT EO   1 30sec  Foam    Tandem  y 1 30sec  floor           Pre gait exercises        Cesia step over                                  Gait  CPT 53064  Total Time for Session 0-7 Minutes   Gait training with SPC Y         Patient ambulated with  w/ SPC, no brace, 1x             Ther. Activity  CPT 34927  Total Time for Session 23-37 Minutes   Patient education y Pt educated on, using L LE braces for consistently for safety as came to PT not wearing same.   HEP      Issued patient HEP including hip adduction, hip ABD, LAQ and STS with use of BUE    Updated HEP to include 3 way hip, hamcurls and step ups   30 sec STS  See Assessment   other  4/24/2023- donning soft brace for L foot drop.   Seated hip abduction / add 10x bilat to simulate going from accelerator to brake in car.   OH/LIDIA  Bergs done 4/12/2023 26/56   R knee circumference assessment     Stairs training y      y Patient ascends/descends 4 6 inch steps w/ reciprocal pattern, SPC, and unilateral rail, CS x2 trials.    Patient ascends/descends 4 6 inch steps w/ reciprocal pattern and unilateral rail, CS-Fay and gait belt, min-mod verbal cues for toe clearance (noted compensation of B foot ER to clear BLE)   Ramp training y 5 trials ascending and descending ramp with SPC and Fay. Patient demonstrates difficulty controlling deceleration when descending.    Curb training y 5 trials ascending and descending with SPC, initially req Fay with mod verbal cues and demonstration, progressed to supervision with no verbal cueing.              Group  CPT 08924 Total Time for Session Not performed

## 2023-04-28 NOTE — OP PT TREATMENT LOG
IE 4/3/23   30 Day 5/3/23   60 Day 6/3/23   90 Day 7/3/23   Precautions LLE foot drop- has LLE AFO but does not always wear    Insurance Auth    Treatment  Current Session Time   Modalities Total Time for Session  5 minutes   Heat/Ice  CPT 97551 CP to R knee post session   E. Stimulation Manual  CPT 11609    E. Stim Unattended  CPT 54624    Manual   CPT 00780 Total Time for Session 8-22 Minutes   STM/MFR/TPR R gastroc, hamstring -y   Instrument Assisted STM     Mobilizations Gr II/III R patellar mobilizations in all direction (y)  AP talocrural mobs NV ?   ROM/Flexibility R knee PROM flex/ext (y)  Passive soleus and gastroc stretch, pt prone (y)   Myofascial Decompression    Ther. Exercise  CPT 16729                    Total Time for Session 8-22 Minutes     Sets Reps Load Comment    RB y 1 7 min Level 2  distance 0.53 mile distance           Slantboard gastroc stretch y 1 3 30sec    Slantboard soleus stretch y 1 3 30sec     Hamstring stretch y 1 3 30sec Supine with strap    Quad set y 1 10 5 sec     SAQ  2 10 3 sec, 1 # Bilateral, cues to get to end range   SLR  2 5  RLE only, quad set prior to lift    Sidelying hip ABD  2 10  RLE only   sidelying clamshell   2 10     Standing hip  abduction  2 10 each  In // bars. Holding with UE   Hip adduction  2 10 3s hold Seated    Hip ABD  1 10 [place] Supine, trial pink   LAQ  1 15 3s hold    Bridge  y 2 10  Cues to  Achieve  full ROM   STS  1 10  From mat so hips are parallel to floor    Toe taps  2 5 6inch 1 finger tip support   FSU Y 1 10 6 inch    LSU nv  10 6''    Lateral sidestepping  1  6'    stairs y   6 inch FF of stairs with reciprocal legs , cane and bannister , CS   Prone quad stretch NV                                      Neuro Re-Ed  CPT 73521  Total Time for Session Not performed      Sets Reps Load Comment   FT HT/HN no    Firm surface   FT EO y 1 30sec  airex   FT EO   1 30sec  Foam    Tandem  y 1 30sec  floor           Pre gait exercises        Cesia step  over                                  Gait  CPT 82394  Total Time for Session 0-7 Minutes   Gait training with SPC Y         Patient ambulated with  w/ SPC, no brace, 1x             Ther. Activity  CPT 76967  Total Time for Session 23-37 Minutes   Patient education y Pt educated on, using L LE braces for consistently for safety as came to PT not wearing same.   HEP      Issued patient HEP including hip adduction, hip ABD, LAQ and STS with use of BUE    Updated HEP to include 3 way hip, hamcurls and step ups   30 sec STS  See Assessment   other  4/24/2023- donning soft brace for L foot drop.   Seated hip abduction / add 10x bilat to simulate going from accelerator to brake in car.   OH/LINDAA  Bergs done 4/12/2023 26/56   R knee circumference assessment     Stairs training y      y Patient ascends/descends 4 6 inch steps w/ reciprocal pattern, SPC, and unilateral rail, CS x2 trials.    Patient ascends/descends 4 6 inch steps w/ reciprocal pattern and unilateral rail, CS-Fay and gait belt, min-mod verbal cues for toe clearance (noted compensation of B foot ER to clear BLE)   Ramp training y 5 trials ascending and descending ramp with SPC and Fay. Patient demonstrates difficulty controlling deceleration when descending.    Curb training y 5 trials ascending and descending with SPC, initially req Fay with mod verbal cues and demonstration, progressed to supervision with no verbal cueing.              Group  CPT 60720 Total Time for Session Not performed

## 2023-05-01 ENCOUNTER — APPOINTMENT (OUTPATIENT)
Dept: OTHER | Facility: REHABILITATION | Age: 63
Setting detail: THERAPIES SERIES
End: 2023-05-01
Payer: COMMERCIAL

## 2023-05-01 ENCOUNTER — HOSPITAL ENCOUNTER (OUTPATIENT)
Dept: PHYSICAL THERAPY | Facility: REHABILITATION | Age: 63
Setting detail: THERAPIES SERIES
Discharge: HOME | End: 2023-05-01
Attending: ORTHOPAEDIC SURGERY
Payer: COMMERCIAL

## 2023-05-01 DIAGNOSIS — Z96.651 S/P TOTAL KNEE ARTHROPLASTY, RIGHT: Primary | ICD-10-CM

## 2023-05-01 PROCEDURE — 97140 MANUAL THERAPY 1/> REGIONS: CPT | Mod: GP

## 2023-05-01 PROCEDURE — 97110 THERAPEUTIC EXERCISES: CPT | Mod: GP

## 2023-05-01 PROCEDURE — 97530 THERAPEUTIC ACTIVITIES: CPT | Mod: GP

## 2023-05-01 NOTE — PROGRESS NOTES
Physical Therapy Visit    PT DAILY NOTE FOR OUTPATIENT THERAPY    Patient: Bebe Burks MRN: [id number]  : 1960 62 y.o.  Referring Physician: Gideon Centeno MD  Date of Visit: 2023    Certification Dates: 23 through 23    Diagnosis:   1. S/P total knee arthroplasty, right        Chief Complaints:  R TKA    Precautions:   Existing Precautions/Restrictions: other (see comments)  Precautions comments: LLE foot drop - has LLE AFO will bring NV following IE      TODAY'S VISIT    Time In Session:  Start Time: 1005  Stop Time: 1102  Time Calculation (min): 57 min   History/Vitals/Pain/Encounter Info - 23 1019        Injury History/Precautions/Daily Required Info    Document Type daily treatment     Primary Therapist Caitlyn Lagunas, PT     Chief Complaint/Reason for Visit  R TKA     Referring Physician Dr. Centeno     Existing Precautions/Restrictions other (see comments)     Precautions comments LLE foot drop - has LLE AFO will bring NV following IE     History of present illness/functional impairment Bebe is a 61 yo female who presents to OPPT s/p R TKA 23 w/ Dr. Centeno at [facility] (states no complications during/post surgery). Patient reports she had a 2 night stay at [facility] and then was discharged to [facility] where she stayed for 5-6 nights. She was discharged home with home health PT and OT, she states home health PT stopped last Friday 3/31/23. She reports at baseline she ambulates without AD, but started using SPC about 6 months prior to surgery due to pain/weakness. She states at this time she is ambulating with SPC for household distances and RW for community distances. She reports she lives alone in single level [facility] with 4 JOSE RAFAEL w/ unilateral rail. She states she has a walkin shower with shower chair and grab bars. She reports since her R knee surgery her ability to ascend/descend stairs is difficult, she feels like her walking is impaired, and her balance/endurance decreased. She  reports significant PMHx of LLE drop foot (which they are contributing to sciatica, states it began about 3 years ago) which she wears LLE brace for which was issued to her by Peter in September 2022, LBP w/ LLE radicular symptoms, DMII (controlled), HTN (controlled). Patient states her overall goal for PT is to walk again without a SPC. Patient reports she is currently working but on long term leave at this time. Patient reports current R knee pain 0/10 and pain at worst in the past week 1/10.     Patient/Family/Caregiver Comments/Observations Pt arrived without knee pain per report and states the wedding was okay but a lot of stairs and had to use an escalator which she needed help with. Brought DF assist brace she wants to review.     Patient reported fall since last visit No        Pain Assessment    Currently in pain No/Denies                Daily Treatment Assessment and Plan - 05/01/23 1019        Daily Treatment Assessment and Plan    Progress toward goals Progressing     Daily Outcome Summary Bebe continues to present without DF brace, but did bring new DF assist brace today and required min-mod assist to don. Would benefit from review of same. She did well with exercises otherwise, requiring min VCs for technique and control and will benefit from continued PT to address prosper quad strength and general functional mobility.     Plan and Recommendations Continue strengthening bilateral quads, review donning/doffing DF assist brace, trial prone quad stretch                     OBJECTIVE DATA TAKEN TODAY:    None taken    Today's Treatment:    IE 4/3/23   30 Day 5/3/23   60 Day 6/3/23   90 Day 7/3/23   Precautions LLE foot drop- has LLE AFO but does not always wear    Insurance Auth    Treatment  Current Session Time   Modalities Total Time for Session Not performed   Heat/Ice  CPT 06546 CP to R knee post session   E. Stimulation Manual  CPT 01203    E. Stim Unattended  CPT 07448    Manual   CPT 04757 Total  Time for Session 8-22 Minutes   STM/MFR/TPR R gastroc, hamstring -y   Instrument Assisted STM     Mobilizations Gr II/III R patellar mobilizations in all direction (y)  AP talocrural mobs NV ?   ROM/Flexibility R knee PROM flex/ext (y)  Passive soleus and gastroc stretch, pt prone (y)   Myofascial Decompression    Ther. Exercise  CPT 95245                    Total Time for Session 23-37 Minutes     Sets Reps Load Comment    RB y 1 10 min Level 2  distance not tracked today           Slantboard gastroc stretch y 1 3 30sec    Slantboard soleus stretch y 1 3 30sec     Hamstring stretch y 1 3 30sec Supine with strap    Quad set y 1 10 10 sec     SAQ  2 10 3 sec, 1 # Bilateral, cues to get to end range   SLR y 2 10  RLE only, quad set prior to lift    Sidelying hip ABD  2 10  RLE only   sidelying clamshell   2 10     Standing hip  abduction  2 10 each  In // bars. Holding with UE   Hip adduction  2 10 3s hold Seated    Hip ABD  1 10 [place] Supine, trial pink   LAQ  1 15 3s hold    Bridge  y 2 10  Cues to  Achieve  full ROM   STS  1 10  From mat so hips are parallel to floor    Toe taps  2 5 6inch 1 finger tip support   FSU Y 2 10 6 inch    LSU y 1 10 6''    Lateral sidestepping  1  6'    stairs n   6 inch FF of stairs with reciprocal legs , cane and bannister , CS   Prone quad stretch NV                                      Neuro Re-Ed  CPT 33121  Total Time for Session Not performed      Sets Reps Load Comment   FT HT/HN no    Firm surface   FT EO nv 1 30sec  airex   FT EO   1 30sec  Foam    Tandem  nv 1 30sec  floor           Pre gait exercises        Cesia step over                                  Gait  CPT 18335  Total Time for Session 0-7 Minutes   Gait training with SPC Y         Patient ambulated 200ft w/ SPC, no brace, 1x             Ther. Activity  CPT 58657  Total Time for Session 8-22 Minutes   Patient education y Educated on gait mechanics and drop foot contribution/need for SPC and rationale to promote  improved safety and independence both short and likely long term. Reviewed donning/doffing new shoe attached DF assist brace pt brought - pt needs reinforcement to become independent.    HEP      Issued patient HEP including hip adduction, hip ABD, LAQ and STS with use of BUE    Updated HEP to include 3 way hip, hamcurls and step ups   30 sec STS  See Assessment   other  4/24/2023- donning soft brace for L foot drop.   Seated hip abduction / add 10x bilat to simulate going from accelerator to brake in car.   ADRIANO/LIDIA Arredondo done 4/12/2023 26/56   R knee circumference assessment     Stairs training n        n Patient ascends/descends 4 6 inch steps w/ reciprocal pattern, SPC, and unilateral rail, CS x2 trials.    Patient ascends/descends 4 6 inch steps w/ reciprocal pattern and unilateral rail, CS-Fay and gait belt, min-mod verbal cues for toe clearance (noted compensation of B foot ER to clear BLE)   Ramp training n 5 trials ascending and descending ramp with SPC and Fay. Patient demonstrates difficulty controlling deceleration when descending.    Curb training n 5 trials ascending and descending with SPC, initially req Fay with mod verbal cues and demonstration, progressed to supervision with no verbal cueing.              Group  CPT 64615 Total Time for Session Not performed

## 2023-05-01 NOTE — OP PT TREATMENT LOG
IE 4/3/23   30 Day 5/3/23   60 Day 6/3/23   90 Day 7/3/23   Precautions LLE foot drop- has LLE AFO but does not always wear    Insurance Auth    Treatment  Current Session Time   Modalities Total Time for Session Not performed   Heat/Ice  CPT 01389 CP to R knee post session   E. Stimulation Manual  CPT 37670    E. Stim Unattended  CPT 53002    Manual   CPT 38569 Total Time for Session 8-22 Minutes   STM/MFR/TPR R gastroc, hamstring -y   Instrument Assisted STM     Mobilizations Gr II/III R patellar mobilizations in all direction (y)  AP talocrural mobs NV ?   ROM/Flexibility R knee PROM flex/ext (y)  Passive soleus and gastroc stretch, pt prone (y)   Myofascial Decompression    Ther. Exercise  CPT 42913                    Total Time for Session 23-37 Minutes     Sets Reps Load Comment    RB y 1 10 min Level 2  distance not tracked today           Slantboard gastroc stretch y 1 3 30sec    Slantboard soleus stretch y 1 3 30sec     Hamstring stretch y 1 3 30sec Supine with strap    Quad set y 1 10 10 sec     SAQ  2 10 3 sec, 1 # Bilateral, cues to get to end range   SLR y 2 10  RLE only, quad set prior to lift    Sidelying hip ABD  2 10  RLE only   sidelying clamshell   2 10     Standing hip  abduction  2 10 each  In // bars. Holding with UE   Hip adduction  2 10 3s hold Seated    Hip ABD  1 10 [place] Supine, trial pink   LAQ  1 15 3s hold    Bridge  y 2 10  Cues to  Achieve  full ROM   STS  1 10  From mat so hips are parallel to floor    Toe taps  2 5 6inch 1 finger tip support   FSU Y 2 10 6 inch    LSU y 1 10 6''    Lateral sidestepping  1  6'    stairs n   6 inch FF of stairs with reciprocal legs , cane and bannister , CS   Prone quad stretch NV                                      Neuro Re-Ed  CPT 14530  Total Time for Session Not performed      Sets Reps Load Comment   FT HT/HN no    Firm surface   FT EO nv 1 30sec  airex   FT EO   1 30sec  Foam    Tandem  nv 1 30sec  floor           Pre gait exercises         Cesia step over                                  Gait  CPT 48621  Total Time for Session 0-7 Minutes   Gait training with SPC Y         Patient ambulated 200ft w/ SPC, no brace, 1x             Ther. Activity  CPT 73741  Total Time for Session 8-22 Minutes   Patient education y Educated on gait mechanics and drop foot contribution/need for SPC and rationale to promote improved safety and independence both short and likely long term. Reviewed donning/doffing new shoe attached DF assist brace pt brought - pt needs reinforcement to become independent.    HEP      Issued patient HEP including hip adduction, hip ABD, LAQ and STS with use of BUE    Updated HEP to include 3 way hip, hamcurls and step ups   30 sec STS  See Assessment   other  4/24/2023- donning soft brace for L foot drop.   Seated hip abduction / add 10x bilat to simulate going from accelerator to brake in car.   OH/LIDIA Arredondo done 4/12/2023 26/56   R knee circumference assessment     Stairs training n        n Patient ascends/descends 4 6 inch steps w/ reciprocal pattern, SPC, and unilateral rail, CS x2 trials.    Patient ascends/descends 4 6 inch steps w/ reciprocal pattern and unilateral rail, CS-Fay and gait belt, min-mod verbal cues for toe clearance (noted compensation of B foot ER to clear BLE)   Ramp training n 5 trials ascending and descending ramp with SPC and Fay. Patient demonstrates difficulty controlling deceleration when descending.    Curb training n 5 trials ascending and descending with SPC, initially req Fay with mod verbal cues and demonstration, progressed to supervision with no verbal cueing.              Group  CPT 25718 Total Time for Session Not performed

## 2023-05-02 ENCOUNTER — APPOINTMENT (OUTPATIENT)
Dept: OTHER | Facility: REHABILITATION | Age: 63
Setting detail: THERAPIES SERIES
End: 2023-05-02
Payer: COMMERCIAL

## 2023-05-02 ENCOUNTER — HOSPITAL ENCOUNTER (OUTPATIENT)
Dept: PHYSICAL THERAPY | Facility: REHABILITATION | Age: 63
Setting detail: THERAPIES SERIES
Discharge: HOME | End: 2023-05-02
Attending: ORTHOPAEDIC SURGERY
Payer: COMMERCIAL

## 2023-05-02 DIAGNOSIS — Z96.651 S/P TOTAL KNEE ARTHROPLASTY, RIGHT: Primary | ICD-10-CM

## 2023-05-02 PROCEDURE — 97110 THERAPEUTIC EXERCISES: CPT | Mod: GP,CQ

## 2023-05-02 PROCEDURE — 97530 THERAPEUTIC ACTIVITIES: CPT | Mod: GP,CQ

## 2023-05-02 PROCEDURE — 97140 MANUAL THERAPY 1/> REGIONS: CPT | Mod: GP,CQ

## 2023-05-02 NOTE — PROGRESS NOTES
Physical Therapy Visit    PT DAILY NOTE FOR OUTPATIENT THERAPY    Patient: Bebe Burks MRN: [id number]  : 1960 62 y.o.  Referring Physician: Gideon Centeno MD  Date of Visit: 2023    Certification Dates: 23 through 23    Diagnosis:   1. S/P total knee arthroplasty, right        Chief Complaints:  R TKA    Precautions:   Existing Precautions/Restrictions: other (see comments)  Precautions comments: LLE foot drop - has LLE AFO will bring NV following IE      TODAY'S VISIT    Time In Session:  Start Time: 0900  Stop Time: 1000  Time Calculation (min): 60 min   History/Vitals/Pain/Encounter Info - 23 0901        Injury History/Precautions/Daily Required Info    Document Type daily treatment     Primary Therapist Caitlyn Lagunas, PT     Chief Complaint/Reason for Visit  R TKA     Referring Physician Dr. Centeno     Existing Precautions/Restrictions other (see comments)     Precautions comments LLE foot drop - has LLE AFO will bring NV following IE     History of present illness/functional impairment Bebe is a 63 yo female who presents to OPPT s/p R TKA 23 w/ Dr. Centeno at [facility] (states no complications during/post surgery). Patient reports she had a 2 night stay at [facility] and then was discharged to [facility] where she stayed for 5-6 nights. She was discharged home with home health PT and OT, she states home health PT stopped last Friday 3/31/23. She reports at baseline she ambulates without AD, but started using SPC about 6 months prior to surgery due to pain/weakness. She states at this time she is ambulating with SPC for household distances and RW for community distances. She reports she lives alone in single level [facility] with 4 JOSE RAFAEL w/ unilateral rail. She states she has a walkin shower with shower chair and grab bars. She reports since her R knee surgery her ability to ascend/descend stairs is difficult, she feels like her walking is impaired, and her balance/endurance decreased. She  reports significant PMHx of LLE drop foot (which they are contributing to sciatica, states it began about 3 years ago) which she wears LLE brace for which was issued to her by Peter in September 2022, LBP w/ LLE radicular symptoms, DMII (controlled), HTN (controlled). Patient states her overall goal for PT is to walk again without a SPC. Patient reports she is currently working but on long term leave at this time. Patient reports current R knee pain 0/10 and pain at worst in the past week 1/10.     Patient/Family/Caregiver Comments/Observations 0/10 pain No falls or change in med     Patient reported fall since last visit No        Pain Assessment    Currently in pain No/Denies     Pain Rating (0-10): Pre Activity 0        Pain Intervention    Intervention  no pain     Post Intervention Comments no pain        Pre Activity Vital Signs    /70     BP Location Left upper arm     BP Method Manual     Patient Position Sitting                Daily Treatment Assessment and Plan - 05/02/23 0901        Daily Treatment Assessment and Plan    Progress toward goals Progressing     Daily Outcome Summary Added prone quad stretch and prone ham curls. Pt was able to complete FT w/ EO/EC and HT/HN without UE assist. Pt continues to have difficulty w/ STS due to weakness. 2/10 pain post session. Pt will continue to benefit from review donning/doffing DF brace. CP given post session     Plan and Recommendations Continue strengthening bilateral quads, review donning/doffing DF assist brace,                     OBJECTIVE DATA TAKEN TODAY:    None taken    Today's Treatment:    IE 4/3/23   30 Day 5/3/23   60 Day 6/3/23   90 Day 7/3/23   Precautions LLE foot drop- has LLE AFO but does not always wear    Insurance Auth    Treatment  Current Session Time   Modalities Total Time for Session Not performed   Heat/Ice  CPT 22898 CP to R knee post session   E. Stimulation Manual  CPT 78081    E. Stim Unattended  CPT 25667    Manual   CPT  12359 Total Time for Session 8-22 Minutes   STM/MFR/TPR R gastroc, hamstring -y   Instrument Assisted STM     Mobilizations Gr II/III R patellar mobilizations in all direction   AP talocrural mobs NV ?   ROM/Flexibility R knee PROM flex/ext (y)  Passive soleus and gastroc stretch, pt prone (y)   Myofascial Decompression    Ther. Exercise  CPT 85531                    Total Time for Session 23-37 Minutes     Sets Reps Load Comment    RB yes 1 10 min Level 2  distance not tracked today           Slantboard gastroc stretch y 1 3 30sec    Slantboard soleus stretch y 1 3 30sec     Hamstring stretch yes 1 3 30sec Supine with strap    Quad set yes 1 10 10 sec     SAQ  2 10 3 sec, 1 # Bilateral, cues to get to end range   SLR yes 2 10  RLE only, quad set prior to lift    Sidelying hip ABD  2 10  RLE only   sidelying clamshell   2 10     Standing hip  abduction  2 10 each  In // bars. Holding with UE   Hip adduction  2 10 3s hold Seated    Hip ABD  1 10 [place] Supine, trial pink   LAQ  1 15 3s hold    Bridge  yes 2 10  Cues to  Achieve  full ROM   STS  1 10  From mat so hips are parallel to floor    Toe taps  2 5 6inch 1 finger tip support   FSU  2 10 6 inch    LSU  1 10 6''    Lateral sidestepping  1  6'    stairs n   6 inch FF of stairs with reciprocal legs , cane and bannister , CS   Prone quad   stretch    Prone hamcurls Yes      yes  30''x3      x10                                    Neuro Re-Ed  CPT 09821  Total Time for Session Billed w/ TE      Sets Reps Load Comment   FT HT/HN yes    Firm surface   FT EO yes 1 30sec  firm   FT EO  yes 1 30sec  Firm   Tandem  nv 1 30sec  floor           Pre gait exercises        Cesia step over                                  Gait  CPT 19793  Total Time for Session 0-7 Minutes   Gait training with SPC        Patient ambulated 200ft w/ SPC, no brace, 1x             Ther. Activity  CPT 56210  Total Time for Session 15 min   Patient education yes Educated on gait mechanics and drop  foot contribution/need for SPC and rationale to promote improved safety and independence both short and likely long term. Reviewed donning/doffing new shoe attached DF assist brace pt brought - pt needs reinforcement to become independent.    HEP      Issued patient HEP including hip adduction, hip ABD, LAQ and STS with use of BUE    Updated HEP to include 3 way hip, hamcurls and step ups   30 sec STS  See Assessment   other  4/24/2023- donning soft brace for L foot drop.   Seated hip abduction / add 10x bilat to simulate going from accelerator to brake in car.   OH/LIDIA Arredondo done 4/12/2023 26/56   R knee circumference assessment     Stairs training n        n Patient ascends/descends 4 6 inch steps w/ reciprocal pattern, SPC, and unilateral rail, CS x2 trials.    Patient ascends/descends 4 6 inch steps w/ reciprocal pattern and unilateral rail, CS-Fay and gait belt, min-mod verbal cues for toe clearance (noted compensation of B foot ER to clear BLE)   Ramp training n 5 trials ascending and descending ramp with SPC and Fay. Patient demonstrates difficulty controlling deceleration when descending.    Curb training n 5 trials ascending and descending with SPC, initially req Fay with mod verbal cues and demonstration, progressed to supervision with no verbal cueing.              Group  CPT 01291 Total Time for Session Not performed

## 2023-05-02 NOTE — OP PT TREATMENT LOG
IE 4/3/23   30 Day 5/3/23   60 Day 6/3/23   90 Day 7/3/23   Precautions LLE foot drop- has LLE AFO but does not always wear    Insurance Auth    Treatment  Current Session Time   Modalities Total Time for Session Not performed   Heat/Ice  CPT 59480 CP to R knee post session   E. Stimulation Manual  CPT 53670    E. Stim Unattended  CPT 48215    Manual   CPT 11575 Total Time for Session 8-22 Minutes   STM/MFR/TPR R gastroc, hamstring -y   Instrument Assisted STM     Mobilizations Gr II/III R patellar mobilizations in all direction   AP talocrural mobs NV ?   ROM/Flexibility R knee PROM flex/ext (y)  Passive soleus and gastroc stretch, pt prone (y)   Myofascial Decompression    Ther. Exercise  CPT 64590                    Total Time for Session 23-37 Minutes     Sets Reps Load Comment    RB yes 1 10 min Level 2  distance not tracked today           Slantboard gastroc stretch y 1 3 30sec    Slantboard soleus stretch y 1 3 30sec     Hamstring stretch yes 1 3 30sec Supine with strap    Quad set yes 1 10 10 sec     SAQ  2 10 3 sec, 1 # Bilateral, cues to get to end range   SLR yes 2 10  RLE only, quad set prior to lift    Sidelying hip ABD  2 10  RLE only   sidelying clamshell   2 10     Standing hip  abduction  2 10 each  In // bars. Holding with UE   Hip adduction  2 10 3s hold Seated    Hip ABD  1 10 [place] Supine, trial pink   LAQ  1 15 3s hold    Bridge  yes 2 10  Cues to  Achieve  full ROM   STS  1 10  From mat so hips are parallel to floor    Toe taps  2 5 6inch 1 finger tip support   FSU  2 10 6 inch    LSU  1 10 6''    Lateral sidestepping  1  6'    stairs n   6 inch FF of stairs with reciprocal legs , cane and bannister , CS   Prone quad   stretch    Prone hamcurls Yes      yes  30''x3      x10                                    Neuro Re-Ed  CPT 78914  Total Time for Session Billed w/ TE      Sets Reps Load Comment   FT HT/HN yes    Firm surface   FT EO yes 1 30sec  firm   FT EO  yes 1 30sec  Firm   Tandem  nv 1  30sec  floor           Pre gait exercises        Cesia step over                                  Gait  CPT 64241  Total Time for Session 0-7 Minutes   Gait training with SPC        Patient ambulated 200ft w/ SPC, no brace, 1x             Ther. Activity  CPT 02654  Total Time for Session 15 min   Patient education yes Educated on gait mechanics and drop foot contribution/need for SPC and rationale to promote improved safety and independence both short and likely long term. Reviewed donning/doffing new shoe attached DF assist brace pt brought - pt needs reinforcement to become independent.    HEP      Issued patient HEP including hip adduction, hip ABD, LAQ and STS with use of BUE    Updated HEP to include 3 way hip, hamcurls and step ups   30 sec STS  See Assessment   other  4/24/2023- donning soft brace for L foot drop.   Seated hip abduction / add 10x bilat to simulate going from accelerator to brake in car.   OH/LIDIA Arredondo done 4/12/2023 26/56   R knee circumference assessment     Stairs training n        n Patient ascends/descends 4 6 inch steps w/ reciprocal pattern, SPC, and unilateral rail, CS x2 trials.    Patient ascends/descends 4 6 inch steps w/ reciprocal pattern and unilateral rail, CS-Fay and gait belt, min-mod verbal cues for toe clearance (noted compensation of B foot ER to clear BLE)   Ramp training n 5 trials ascending and descending ramp with SPC and Fay. Patient demonstrates difficulty controlling deceleration when descending.    Curb training n 5 trials ascending and descending with SPC, initially req Fay with mod verbal cues and demonstration, progressed to supervision with no verbal cueing.              Group  CPT 54928 Total Time for Session Not performed

## 2023-05-04 ENCOUNTER — TELEPHONE (OUTPATIENT)
Dept: CARDIOLOGY | Facility: CLINIC | Age: 63
End: 2023-05-04

## 2023-05-04 ENCOUNTER — HOSPITAL ENCOUNTER (OUTPATIENT)
Dept: CARDIOLOGY | Facility: CLINIC | Age: 63
Discharge: HOME | End: 2023-05-04
Attending: INTERNAL MEDICINE
Payer: COMMERCIAL

## 2023-05-04 VITALS
DIASTOLIC BLOOD PRESSURE: 84 MMHG | HEIGHT: 66 IN | SYSTOLIC BLOOD PRESSURE: 166 MMHG | BODY MASS INDEX: 30.05 KG/M2 | WEIGHT: 187 LBS

## 2023-05-04 DIAGNOSIS — R94.31 ABNORMAL EKG: ICD-10-CM

## 2023-05-04 DIAGNOSIS — Z01.810 PRE-OPERATIVE CARDIOVASCULAR EXAMINATION: ICD-10-CM

## 2023-05-04 DIAGNOSIS — I34.2 NONRHEUMATIC MITRAL VALVE STENOSIS: Primary | ICD-10-CM

## 2023-05-04 LAB
AORTIC ROOT ANNULUS: 3.3 CM
ASCENDING AORTA: 3.4 CM
AV PEAK GRADIENT: 7 MMHG
AV PEAK VELOCITY-S: 1.33 M/S
AV VALVE AREA: 1.82 CM2
BSA FOR ECHO PROCEDURE: 1.99 M2
CUSP SEPARATION: 1.4 CM
E WAVE DECELERATION TIME: 85 MS
E/A RATIO: 0.8
E/E' RATIO: 13.1
E/LAT E' RATIO: 20.4
EDV (BP): 65.9 CM3
EF (A4C): 71.2 %
EF A2C: 62.6 %
EJECTION FRACTION: 66.6 %
EST RIGHT VENT SYSTOLIC PRESSURE BY TRICUSPID REGURGITATION JET: 30 MMHG
ESV (BP): 22 CM3
FRACTIONAL SHORTENING: 43.88 %
INTERVENTRICULAR SEPTUM: 1 CM
LA ESV (BP): 58 CM3
LA ESV INDEX (A2C): 28.04 CM3/M2
LA ESV INDEX (BP): 29.15 CM3/M2
LA/AORTA RATIO: 1.42
LAAS-AP2: 20.7 CM2
LAAS-AP4: 18.1 CM2
LAD 2D: 4.7 CM
LALD A4C: 5.18 CM
LALD A4C: 6.24 CM
LAV-S: 55.8 CM3
LEFT ATRIUM VOLUME INDEX: 25.23 CM3/M2
LEFT ATRIUM VOLUME: 50.2 CM3
LEFT INTERNAL DIMENSION IN SYSTOLE: 2.2 CM (ref 2.76–4.17)
LEFT VENTRICLE DIASTOLIC VOLUME INDEX: 36.08 CM3/M2
LEFT VENTRICLE DIASTOLIC VOLUME: 71.8 CM3
LEFT VENTRICLE SYSTOLIC VOLUME INDEX: 10.4 CM3/M2
LEFT VENTRICLE SYSTOLIC VOLUME: 20.7 CM3
LEFT VENTRICULAR INTERNAL DIMENSION IN DIASTOLE: 3.92 CM (ref 4.68–6.49)
LEFT VENTRICULAR POSTERIOR WALL IN END DIASTOLE: 1.14 CM (ref 0.61–1.14)
LV DIASTOLIC VOLUME: 60 CM3
LV ESV (APICAL 2 CHAMBER): 22.4 CM3
LVAD-AP2: 22.9 CM2
LVAD-AP4: 25 CM2
LVAS-AP2: 12.5 CM2
LVAS-AP4: 12.4 CM2
LVEDVI(A2C): 30.15 CM3/M2
LVEDVI(BP): 33.12 CM3/M2
LVESVI(A2C): 11.26 CM3/M2
LVESVI(BP): 11.06 CM3/M2
LVLD-AP2: 7.32 CM
LVLD-AP4: 7.4 CM
LVLS-AP2: 6.16 CM
LVLS-AP4: 6.5 CM
LVOT 2D: 2.1 CM
LVOT A: 3.46 CM2
LVOT PEAK VELOCITY: 0.89 M/S
LVOT PG: 3 MMHG
MLH CV ECHO AVA INDEX VELOCITY RATIO: 0.9
MV E'TISSUE VEL-LAT: 0.05 M/S
MV E'TISSUE VEL-MED: 0.08 M/S
MV MEAN GRADIENT: 4 MMHG
MV PEAK A VEL: 1.28 M/S
MV PEAK E VEL: 1.02 M/S
MV PEAK GRADIENT: 7 MMHG
MV VTI: 27.4 CM
POSTERIOR WALL: 1.14 CM
RAP: 10 MMHG
RVOT VMAX: 0.74 M/S
SEPTAL TISSUE DOPPLER FREE WALL LATE DIA VELOCITY (APICAL 4 CHAMBER VIEW): 0.12 M/S
TR MAX PG: 19.71 MMHG
TRICUSPID VALVE PEAK REGURGITATION VELOCITY: 2.22 M/S
Z-SCORE OF LEFT VENTRICULAR DIMENSION IN END DIASTOLE: -3.41
Z-SCORE OF LEFT VENTRICULAR DIMENSION IN END SYSTOLE: -3.42
Z-SCORE OF LEFT VENTRICULAR POSTERIOR WALL IN END DIASTOLE: 1.64

## 2023-05-04 PROCEDURE — 93306 TTE W/DOPPLER COMPLETE: CPT | Performed by: INTERNAL MEDICINE

## 2023-05-04 NOTE — PROGRESS NOTES
I called patient with echo result.  Showed normal EF and very mild mitral stenosis with restricted posterior leaflet.    She feels well and has no complaints.  Knee replacement surgery went well.    Given this echo abnormality I advised she see me for clinical follow-up in the office and repeat echocardiogram in 1 year which we will arrange.  I be happy to see her sooner should the need arise.

## 2023-05-05 ENCOUNTER — HOSPITAL ENCOUNTER (OUTPATIENT)
Dept: PHYSICAL THERAPY | Facility: REHABILITATION | Age: 63
Setting detail: THERAPIES SERIES
Discharge: HOME | End: 2023-05-05
Attending: ORTHOPAEDIC SURGERY
Payer: COMMERCIAL

## 2023-05-05 ENCOUNTER — APPOINTMENT (OUTPATIENT)
Dept: OTHER | Facility: REHABILITATION | Age: 63
Setting detail: THERAPIES SERIES
End: 2023-05-05
Payer: COMMERCIAL

## 2023-05-05 DIAGNOSIS — Z96.651 S/P TOTAL KNEE ARTHROPLASTY, RIGHT: Primary | ICD-10-CM

## 2023-05-05 PROCEDURE — 97530 THERAPEUTIC ACTIVITIES: CPT | Mod: GP

## 2023-05-05 PROCEDURE — 97110 THERAPEUTIC EXERCISES: CPT | Mod: GP

## 2023-05-05 ASSESSMENT — BALANCE ASSESSMENTS: TOTAL SCORE: 33

## 2023-05-05 NOTE — OP PT TREATMENT LOG
IE 4/3/23   30 Day 5/3/23   60 Day 6/3/23   90 Day 7/3/23   Precautions LLE foot drop- has LLE AFO but does not always wear    Insurance Auth    Treatment  Current Session Time   Modalities Total Time for Session Not performed   Heat/Ice  CPT 48808 CP to R knee post session   E. Stimulation Manual  CPT 99443    E. Stim Unattended  CPT 98933    Manual   CPT 64990 Total Time for Session Not performed   STM/MFR/TPR R gastroc, hamstring -y   Instrument Assisted STM     Mobilizations Gr II/III R patellar mobilizations in all direction   AP talocrural mobs NV ?   ROM/Flexibility R knee PROM flex/ext (y)  Passive soleus and gastroc stretch, pt prone (y)   Myofascial Decompression    Ther. Exercise  CPT 26577                    Total Time for Session 8-22 Minutes     Sets Reps Load Comment    RB yes 1 10 min Level 2  distance not tracked today           Slantboard gastroc stretch y 1 3 30sec    Slantboard soleus stretch y 1 3 30sec     Hamstring stretch yes 1 3 30sec Supine with strap    Quad set yes 1 10 10 sec     SAQ  2 10 3 sec, 1 # Bilateral, cues to get to end range   SLR yes 2 10  RLE only, quad set prior to lift    Sidelying hip ABD  2 10  RLE only   sidelying clamshell   2 10     Standing hip  abduction  2 10 each  In // bars. Holding with UE   Hip adduction  2 10 3s hold Seated    Hip ABD  1 10 [place] Supine, trial pink   LAQ  1 15 3s hold    Bridge  yes 2 10  Cues to  Achieve  full ROM   STS  1 10  From mat so hips are parallel to floor    Toe taps  2 5 6inch 1 finger tip support   FSU  2 10 6 inch    LSU  1 10 6''    Lateral sidestepping  1  6'    stairs n   6 inch FF of stairs with reciprocal legs , cane and bannister , CS   Prone quad   stretch    Prone hamcurls Yes      yes  30''x3      x10                                    Neuro Re-Ed  CPT 43864  Total Time for Session Not performed     Sets Reps Load Comment   FT HT/HN yes    Firm surface   FT EO yes 1 30sec  firm   FT EO  yes 1 30sec  Firm   Tandem  nv 1  30sec  floor           Pre gait exercises        Cesia step over                                  Gait  CPT 10147  Total Time for Session 0-7 Minutes   Gait training with SPC        Patient ambulated 200ft w/ SPC, no brace, 1x             Ther. Activity  CPT 16175  Total Time for Session 38-52 Minutes   Patient education               yes Educated on gait mechanics and drop foot contribution/need for SPC and rationale to promote improved safety and independence both short and likely long term. Reviewed donning/doffing new shoe attached DF assist brace pt brought - pt needs reinforcement to become independent.     Patient educated on re-examination findings, continued progression of POC, continued compliance with HEP, PT rec of continued use of SPC at this time,    HEP      Issued patient HEP including hip adduction, hip ABD, LAQ and STS with use of BUE    Updated HEP to include 3 way hip, hamcurls and step ups   30 sec STS  See Assessment   other  4/24/2023- donning soft brace for L foot drop.   Seated hip abduction / add 10x bilat to simulate going from accelerator to brake in car.   OH/LIDIA Arredondo done 4/12/2023 26/56   R knee circumference assessment     Stairs training n        n Patient ascends/descends 4 6 inch steps w/ reciprocal pattern, SPC, and unilateral rail, CS x2 trials.    Patient ascends/descends 4 6 inch steps w/ reciprocal pattern and unilateral rail, CS-Fay and gait belt, min-mod verbal cues for toe clearance (noted compensation of B foot ER to clear BLE)   Ramp training n 5 trials ascending and descending ramp with SPC and Fay. Patient demonstrates difficulty controlling deceleration when descending.    Curb training n 5 trials ascending and descending with SPC, initially req Fay with mod verbal cues and demonstration, progressed to supervision with no verbal cueing.    PN 5/5/23  All subjective and objective measurements completed         Group  CPT 63744 Total Time for Session Not  performed

## 2023-05-05 NOTE — PROGRESS NOTES
Physical Therapy Progress Note    Referring Provider: By co-signing this Plan of Care (POC) either electronically or physically you agree to the following:    I have reviewed the the Plan of Care established by the therapist within this document and certify that the services are skilled and medically necessary. I have reviewed the plan and recommend that these services continue to meet the goals stated in this document.       EXTERNAL PROVIDER FAXING BACK:    PHYSICIAN SIGNATURE: __________________________________     DATE: ___________________    TIME: _________    IMPORTANT:  If returning this Plan of Care by fax, please fax back ONLY the signature page.   _____________________________________________________________________    [facility] Fax: [phone]      PT RE-EVALUATION FOR OUTPATIENT THERAPY    Patient: Bebe Burks   MRN: [id number]  : 1960 62 y.o.  Referring Physician: Gideon Centeno MD  Date of Visit: 2023      New Certification Dates: 23 through 23    Recommended Frequency & Duration:  3 times/week for up to 3 months   Begin with 3x/week and transition to 2x/week when appropriate      Diagnosis:   1. S/P total knee arthroplasty, right        Chief Complaints:  Chief Complaint   Patient presents with   • Difficulty Walking   • Balance Deficits   • Pain   • Dec ROM   • Dec Strength   • Decreased Endurance   • Decreased recreational/play activity   • Decreased Mobility   • Abnormality Of Gait       Precautions:   Existing Precautions/Restrictions: other (see comments)  Precautions comments: LLE foot drop - has LLE AFO will bring NV following IE      TODAY'S VISIT:    Time In Session:  Start Time: 0900  Stop Time: 1000  Time Calculation (min): 60 min   General Information - 23 0920        Session Details    Document Type re-evaluation     Mode of Treatment individual therapy        General Information    Referring Physician Dr. Centeno     History of present  illness/functional impairment Bebe is a 61 yo female who presents to OPPT s/p R TKA 2/27/23 w/ Dr. Centeno at [facility] [place] (states no complications during/post surgery). Patient reports she had a 2 night stay at [facility] and then was discharged to [facility] where she stayed for 5-6 nights. She was discharged home with home health PT and OT, she states home health PT stopped last Friday 3/31/23. She reports at baseline she ambulates without AD, but started using SPC about 6 months prior to surgery due to pain/weakness. She states at this time she is ambulating with SPC for household distances and RW for community distances. She reports she lives alone in single level [facility] with 4 JOSE RAFAEL w/ unilateral rail. She states she has a walkin shower with shower chair and grab bars. She reports since her R knee surgery her ability to ascend/descend stairs is difficult, she feels like her walking is impaired, and her balance/endurance decreased. She reports significant PMHx of LLE drop foot (which they are contributing to sciatica, states it began about 3 years ago) which she wears LLE brace for which was issued to her by [employer] in September 2022, LBP w/ LLE radicular symptoms, DMII (controlled), HTN (controlled). Patient states her overall goal for PT is to walk again without a SPC. Patient reports she is currently working but on long term leave at this time. Patient reports current R knee pain 0/10 and pain at worst in the past week 1/10.     Patient/Family/Caregiver Comments/Observations Pt reports she feels like she has met her goals about 60% at this time. She reports 0/10 knee pain in the past week.     Existing Precautions/Restrictions other (see comments)     Precautions comments LLE foot drop - has LLE AFO will bring NV following IE         Services    Do You Speak a Language Other Than English at Home? no                Daily Falls Screen - 05/05/23 0920        Daily Falls Assessment    Patient reported fall since last  visit No                Pain/Vitals - 05/05/23 0920        Pain Assessment    Currently in pain No/Denies        Pre Activity Vital Signs    Pulse 86     SpO2 98 %     Oxygen Therapy None (Room air)     /74     BP Location Left upper arm     BP Method Manual     Patient Position Sitting        Pain Intervention    Intervention  n/a     Post Intervention Comments n/a                PT - 05/05/23 0920        Physical Therapy    Physical Therapy [facility] PT        PT Plan    Frequency of treatment 3 times/week     PT Duration 3 months     PT Custom Frequency and Duration Begin with 3x/week and transition to 2x/week when appropriate     PT Cert From 04/03/23     PT Cert To 06/26/23     Date PT POC was sent to provider 04/03/23     Signed PT Plan of Care received?  Yes                Assessment and Plan - 05/05/23 0920        Assessment    Plan of Care reviewed and patient/family in agreement Yes     System Pathology/Pathophysiology Noted musculoskeletal     Functional Limitations in Following Categories (PT Eval) self-care;community/leisure;home management;work     Rehab Potential/Prognosis good, to achieve stated therapy goals     Problem List decreased flexibility;decreased endurance;decreased ROM;decreased strength;pain;impaired motor control;impaired sensation;impaired balance     Clinical Assessment Bebe is a 63 yo female who presents today for re-examination s/p R TKA. Re-examination findings reveal improved BLE strength, improved 30 sec STS scoring indicating improved functional BLE strength, improved OH outcome measure testing indicating improved static balance, increased 6MWT distance indicating improved functional endurance, and improved PSFS and LEFS outcome measure scoring. At initial evaluation patient was ambulating mod(I) with use of RW, now able to ambulate mod(I) with SPC. Patient continues to be limited overall due to LLE foot drop, will continue to monitor. Patient will  continue to benefit from skilled PT 2x/week in order to address remaining deficits, progress toward stated goals, and return to highest level of function.     Plan and Recommendations Assess R knee ROM. Continue to progress static/ dynamic balance. progress STS challenge to progress toward able to complete without BUE.     Planned Services CPT 53816 Gait training;CPT 74283 Manual therapy;CPT 01269 Neuromuscular Reeducation;CPT 56542 Therapeutic activities;CPT 80479 Therapeutic exercises;CPT 88863 Electrical stimulation UNATTENDED;CPT 89199 Hot/Cold Packs therapy;CPT 83949 Orthotics training (Initial encounter);CPT 12335 Orthotics/Prosthetics management and training (Subsequent encounters);CPT 37529 Electrical stimulation ATTENDED     Comments/Additional Services IASTM, taping                   OBJECTIVE MEASUREMENTS/DATA:         MMT    Manual Muscle Tests - 05/05/23 0920        RIGHT: Lower Extremity Manual Muscle Test Assessment    Hip Flexion gross movement (4-/5) good minus     Hip Extension gross movement (4-/5) good minus     Hip Abduction gross movement (4-/5) good minus     Hip Adduction gross movement (4-/5) good minus     Hip Internal Rotation gross movement (4-/5) good minus     Hip External Rotation gross movement (4-/5) good minus     Knee Flexion strength (4-/5) good minus     Knee Extension strength (4-/5) good minus     Ankle Dorsiflexion gross movement (4-/5) good minus     Ankle Plantarflexion gross movement (4-/5) good minus        LEFT: Lower Extremity Manual Muscle Test Assessment    Hip Flexion gross movement (4-/5) good minus     Hip Extension gross movement (3+/5) fair plus     Hip Abduction gross movement (4-/5) good minus     Hip Adduction gross movement (4-/5) good minus     Hip Internal Rotation gross movement (3+/5) fair plus     Hip External Rotation gross movement (3+/5) fair plus     Knee Flexion strength (4-/5) good minus     Knee Extension strength (4-/5) good minus     Ankle  Dorsiflexion gross movement (3-/5) fair minus     Ankle Plantarflexion gross movement (3-/5) fair minus               Palpation    Palpation - 05/05/23 0920        Palpation    LE Palpation  (+) TTP to tabby-incisional area of R knee               Outcome Measures    PT Outcome Measures - 05/05/23 0920        Objective Outcome Measures    6 Minute Walk Test 811 ft w/ SPC, CS and gait belt, LLE brace donned (previously: 804 ft w/ RW, CS and gait belt)     2 Minute Walk Test TBD at next PN     30 Second Sit to Stand Test 10 repetitions   from standard red/purple chair w/ arms, use of BUE due to pt unable to complete without       Subjective Outcome Measures    LEFS 43/80= 53% (previously 21/80=26%)        NEW (2/6/23):  PSFS     PSFS ACTIVITY 1 Steps     PSFS ANSWER 1 5     PSFS ACTIVITY 2 Walking     PSFS ANSWER 2 5     PSFS ACTIVITY 3 Kitchen activities/prolonged standing     PSFS ANSWER 3 6     PSFS Sum of Activity Scores 16     PSFS Number of Activities 3     PSFS Percent Score 53.33 %        Person Balance Scale    Sitting to Standing Able to stand independently using hands     Standing Unsupported Able to stand safely for 2 minutes     Sitting with Back Unsupported but Feet Supported on Floor or on a Stool Able to sit safely and securely for 2 minutes     Standing to Sitting Controls descent by using hands     Transfers Able to transfer safely definite need of hands     Standing Unsupported with Eyes Closed Able to stand 10 seconds with supervision     Standing Unsupported with Feet Together Able to place feet together independently and stand 1 minute with supervision     Reach Forward with Outstretched Arm While Standing Can reach forward 5 cm (2 inches)      Object from Floor from a Standing Position Unable to  and needs supervision while trying     Turning to Look Behind Over Left and Right Shoulders While Standing Looks behind one side only other side shows less weight shift     Turn 360 Degrees  Needs close supervision or verbal cueing     Place Alternate Foot on Step or Stool While Standing Unsupported Able to complete greater than 2 steps needs minimal assist     Standing Unsupported One Foot in Front Needs help to step but can hold 15 seconds     Standing on One Leg Tries to lift leg unable to hold 3 seconds but remains standing independently     Person Balance Score 33                 ROM and MMT        4/3/2023 5/5/2023    09:20   PT LE ROM Measurements   AROM: Right Hip Flexion 74 degrees    AROM: Left Hip Flexion 104 degrees    AROM: Right Knee Flexion 101    AROM: Left Knee Flexion 131    AROM: Right Knee Extension --        lacking about 7 deg knee ext    AROM: Left Knee Extension 0    AROM: Right Ankle DF --        lacking 10 deg    AROM: Left Ankle DF --        lacking ~10 deg DF    PT Cervical/Lumbar/Other ROM Measurements   Right Knee Girth measurement NV    Left Knee Girth measurement NV    PT LE MMT   Right Hip Flexion (3+/5) fair plus (4-/5) good minus   Left Hip Flexion (3+/5) fair plus (4-/5) good minus   Right Hip Extension (3+/5) fair plus (4-/5) good minus   Left Hip Extension (3+/5) fair plus (3+/5) fair plus   Right Hip ABD (3+/5) fair plus (4-/5) good minus   Left Hip ABD (3+/5) fair plus (4-/5) good minus   Right Hip ADD (3+/5) fair plus (4-/5) good minus   Left Hip ADD (3+/5) fair plus (4-/5) good minus   Right Hip IR (3+/5) fair plus (4-/5) good minus   Left Hip IR (3/5) fair (3+/5) fair plus   Right Hip ER (3+/5) fair plus (4-/5) good minus   Left Hip ER (3/5) fair (3+/5) fair plus   Right Knee Flexion (3+/5) fair plus (4-/5) good minus   Left Knee Flexion (3+/5) fair plus (4-/5) good minus   Right Knee Extension (3+/5) fair plus (4-/5) good minus   Left Knee Extension (3+/5) fair plus (4-/5) good minus   Right Ankle DF (3+/5) fair plus (4-/5) good minus   Left Ankle DF (3-/5) fair minus (3-/5) fair minus   Right Ankle PF (3+/5) fair plus (4-/5) good minus   Left Ankle PF (3-/5)  fair minus (3-/5) fair minus     Outcome Measures        4/3/2023    10:03 4/6/2023    14:26 4/12/2023    11:52 5/5/2023    09:20   PT OBJECTIVE Outcome Measures   6 Minute Walk Test  ft w/ RW, CS and gait belt  811 ft w/ SPC, CS and gait belt, LLE brace donned (previously: 804 ft w/ RW, CS and gait belt)   2 Minute Walk Test  TBD at PN  TBD at next PN   Five Times Sit to Stand NV      30 Second Sit to Stand  8 repetitions       from standard red/purple chair w/ arms, use of BUE due to pt unable to complete without  10 repetitions       from standard red/purple chair w/ arms, use of BUE due to pt unable to complete without   Person Balance Score   26 33   TUG - Results NV      PT SUBJECTIVE Outcome Measures   LEFS 21/80 (26%)   43/80= 53% (previously 21/80=26%)   PSFS % Score 26.67 % 26.67 %  53.33 %       Today's Treatment::    IE 4/3/23   30 Day 5/3/23   60 Day 6/3/23   90 Day 7/3/23   Precautions LLE foot drop- has LLE AFO but does not always wear    Insurance Auth    Treatment  Current Session Time   Modalities Total Time for Session Not performed   Heat/Ice  CPT 63020 CP to R knee post session   E. Stimulation Manual  CPT 71803    E. Stim Unattended  CPT 02751    Manual   CPT 24759 Total Time for Session Not performed   STM/MFR/TPR R gastroc, hamstring -y   Instrument Assisted STM     Mobilizations Gr II/III R patellar mobilizations in all direction   AP talocrural mobs NV ?   ROM/Flexibility R knee PROM flex/ext (y)  Passive soleus and gastroc stretch, pt prone (y)   Myofascial Decompression    Ther. Exercise  CPT 80305                    Total Time for Session 8-22 Minutes     Sets Reps Load Comment    RB yes 1 10 min Level 2  distance not tracked today           Slantboard gastroc stretch y 1 3 30sec    Slantboard soleus stretch y 1 3 30sec     Hamstring stretch yes 1 3 30sec Supine with strap    Quad set yes 1 10 10 sec     SAQ  2 10 3 sec, 1 # Bilateral, cues to get to end range   SLR yes 2 10  RLE  only, quad set prior to lift    Sidelying hip ABD  2 10  RLE only   sidelying clamshell   2 10     Standing hip  abduction  2 10 each  In // bars. Holding with UE   Hip adduction  2 10 3s hold Seated    Hip ABD  1 10 Gentry Supine, trial pink   LAQ  1 15 3s hold    Bridge  yes 2 10  Cues to  Achieve  full ROM   STS  1 10  From mat so hips are parallel to floor    Toe taps  2 5 6inch 1 finger tip support   FSU  2 10 6 inch    LSU  1 10 6''    Lateral sidestepping  1  6'    stairs n   6 inch FF of stairs with reciprocal legs , cane and bannister , CS   Prone quad   stretch    Prone hamcurls Yes      yes  30''x3      x10                                    Neuro Re-Ed  CPT 14544  Total Time for Session Not performed     Sets Reps Load Comment   FT HT/HN yes    Firm surface   FT EO yes 1 30sec  firm   FT EO  yes 1 30sec  Firm   Tandem  nv 1 30sec  floor           Pre gait exercises        Ceisa step over                                  Gait  CPT 77095  Total Time for Session 0-7 Minutes   Gait training with SPC        Patient ambulated 200ft w/ SPC, no brace, 1x             Ther. Activity  CPT 93435  Total Time for Session 38-52 Minutes   Patient education               yes Educated on gait mechanics and drop foot contribution/need for SPC and rationale to promote improved safety and independence both short and likely long term. Reviewed donning/doffing new shoe attached DF assist brace pt brought - pt needs reinforcement to become independent.     Patient educated on re-examination findings, continued progression of POC, continued compliance with HEP, PT rec of continued use of SPC at this time,    HEP      Issued patient HEP including hip adduction, hip ABD, LAQ and STS with use of BUE    Updated HEP to include 3 way hip, hamcurls and step ups   30 sec STS  See Assessment   other  4/24/2023- donning soft brace for L foot drop.   Seated hip abduction / add 10x bilat to simulate going from accelerator to brake in car.    ADRIANO/LIDIA Arredondo done 4/12/2023 26/56   R knee circumference assessment     Stairs training n        n Patient ascends/descends 4 6 inch steps w/ reciprocal pattern, SPC, and unilateral rail, CS x2 trials.    Patient ascends/descends 4 6 inch steps w/ reciprocal pattern and unilateral rail, CS-Fay and gait belt, min-mod verbal cues for toe clearance (noted compensation of B foot ER to clear BLE)   Ramp training n 5 trials ascending and descending ramp with SPC and Fay. Patient demonstrates difficulty controlling deceleration when descending.    Curb training n 5 trials ascending and descending with SPC, initially req Fay with mod verbal cues and demonstration, progressed to supervision with no verbal cueing.    PN 5/5/23  All subjective and objective measurements completed         Group  CPT 80121 Total Time for Session Not performed             Goals:   Goals     •  Mutually agreed upon pain goal       Mutually agreed upon pain goal: 0/10    PN 5/5/23: MET - pt reports no R knee pain      •  Patient stated R TKA 2023 (pt-stated)       Patient states her overall goal for PT is to walk again without a SPC.     PN 5/5/23: pt reports feeling like she has met this goal 60% at this time      •  R TKA 2023       Short Term Goals Time Frame Result Comment/Progress   Patient will report < 2 /10 pain at worst on average for improved functional performance.    4 Weeks 5/5: MET    Patient will improve LEFS outcome score by 7 points in order to demonstrate self-perceived improved functional performance.    4 Weeks 5/5: MET IE: 21/80 (26%)    5/5: 43/80    Patient will improve R knee AROM flex by 10 deg for improved squatting for functional tasks.    4 Weeks 5/5: not assessed    Patient will be IND with HEP.    4 Weeks 5/5: MET    Patient will improve PSFS outcome measure scoring by 2 points in all categories in order to demonstrate self-perceived improvement in function.  4 Weeks 5/5:  IE: Steps (2/10), Walking (3/10),  Kitchen activities/prolonged standing (3/10)    5/5: Steps (5/10), Walking (5/10), Kitchen activities/prolonged standing (6/10)      4 Weeks       Long Term Goals Time Frame Result Comment/Progress   Patient will report 0/10 pain at worst on average for improved functional performance.    8 Weeks Ongoing    Patient will improve LEFS outcome score by 14 points  (from PN) in order to demonstrate self-perceived improved functional performance.    8 Weeks Ongoing IE: 21/80 (26%)   Patient will improve R knee AROM flex by 20 deg for improved squatting for functional tasks.    8 Weeks Ongoing    Patient will be IND with HEP upon discharge for carryover of functional gains.    8 Weeks Ongoing    Patient will improve PSFS outcome measure scoring by 4 points in all categories in order to demonstrate self-perceived improvement in function.  8 Weeks  Ongoing IE: Steps (2/10), Walking (3/10), Kitchen activities/prolonged standing (3/10)    5/5: Steps (5/10), Walking (5/10), Kitchen activities/prolonged standing (6/10)   Patient will achieve 0 deg R knee ext.   8 Weeks Ongoing                   This 62 y.o. year old female presents to PT with above stated diagnosis. Physical Therapy evaluation reveals decreased flexibility, decreased endurance, decreased ROM, decreased strength, pain, impaired motor control, impaired sensation, impaired balance resulting in self-care, community/leisure, home management, work limitations. Bebe Burks will benefit from skilled PT services to address limitation, work towards rehab and patient goals and maximize PLOF of chosen ADLs.     Planned Services: The patient's treatment will include CPT 66087 Gait training, CPT 16151 Manual therapy, CPT 63196 Neuromuscular Reeducation, CPT 17304 Therapeutic activities, CPT 65565 Therapeutic exercises, CPT 36761 Electrical stimulation UNATTENDED, CPT 32842 Hot/Cold Packs therapy, CPT 68879 Orthotics training (Initial encounter), CPT 53841  Orthotics/Prosthetics management and training (Subsequent encounters), CPT 20395 Electrical stimulation ATTENDED,IASTM, taping.

## 2023-05-08 ENCOUNTER — APPOINTMENT (OUTPATIENT)
Dept: OTHER | Facility: REHABILITATION | Age: 63
Setting detail: THERAPIES SERIES
End: 2023-05-08
Payer: COMMERCIAL

## 2023-05-08 ENCOUNTER — HOSPITAL ENCOUNTER (OUTPATIENT)
Dept: PHYSICAL THERAPY | Facility: REHABILITATION | Age: 63
Setting detail: THERAPIES SERIES
Discharge: HOME | End: 2023-05-08
Attending: ORTHOPAEDIC SURGERY
Payer: COMMERCIAL

## 2023-05-08 DIAGNOSIS — Z96.651 S/P TOTAL KNEE ARTHROPLASTY, RIGHT: Primary | ICD-10-CM

## 2023-05-08 PROCEDURE — 97110 THERAPEUTIC EXERCISES: CPT | Mod: GP

## 2023-05-08 PROCEDURE — 97530 THERAPEUTIC ACTIVITIES: CPT | Mod: GP

## 2023-05-08 PROCEDURE — 97116 GAIT TRAINING THERAPY: CPT | Mod: GP

## 2023-05-08 NOTE — OP PT TREATMENT LOG
IE 4/3/23   30 Day 5/3/23   60 Day 6/3/23   90 Day 7/3/23   Precautions LLE foot drop- has LLE AFO but does not always wear    Insurance Auth    Treatment  Current Session Time   Modalities Total Time for Session Not performed   Heat/Ice  CPT 09357 CP to R knee post session   E. Stimulation Manual  CPT 95583    E. Stim Unattended  CPT 73196    Manual   CPT 12179 Total Time for Session Not performed   STM/MFR/TPR R gastroc, hamstring -y   Instrument Assisted STM     Mobilizations Gr II/III R patellar mobilizations in all direction   AP talocrural mobs NV ?   ROM/Flexibility R knee PROM flex/ext (y)  Passive soleus and gastroc stretch, pt prone (y)   Myofascial Decompression    Ther. Exercise  CPT 46989                    Total Time for Session 23-37 Minutes     Sets Reps Load Comment    RB yes 1 10 min Level 2  distance not tracked today           Slantboard gastroc stretch yes 1 3 30sec    Slantboard soleus stretch yesw 1 3 30sec     Hamstring stretch yes 1 3 30sec Supine with strap    Quad set no 1 10 10 sec     SAQ  2 10 3 sec, 1 # Bilateral, cues to get to end range   SLR no 2 10  RLE only, quad set prior to lift    Sidelying hip ABD  2 10  RLE only   sidelying clamshell   2 10     Standing hip  abduction  2 10 each  In // bars. Holding with UE   Hip adduction  2 10 3s hold Seated    Hip ABD  1 10 [place] Supine, trial pink   LAQ  1 15 3s hold    Bridge  yes 2 10  Cues to  Achieve  full ROM   STS Yes  yes 1  1 10  10  Mat at 24 inches  Mat at 22 inches   Toe taps  2 5 6inch 1 finger tip support   FSU  2 10 6 inch    LSU  1 10 6''    Lateral sidestepping  1  6'    stairs n   6 inch FF of stairs with reciprocal legs , cane and bannister , CS   Prone quad   stretch    Prone hamcurls Yes      yes  30''x3      x10     Standing hip ext yes 1 10  B   Standing hip ABD yes 1 10  B   Standing hip flexion yes 1 10  B                                                  Neuro Re-Ed  CPT 93495  Total Time for Session Not performed      Sets Reps Load Comment   FT HT/HN yes    Firm surface   FT EO yes 1 30sec  firm   FT EO  yes 1 30sec  Firm   Tandem  nv 1 30sec  floor           Pre gait exercises        Cesia step over                                  Gait  CPT 57318  Total Time for Session 8-22 Minutes   Gait training with SPC and LLE AFO donned  yes     Patient ambulates 300 ft w/ SPC, LLE AFO, and CS. Patient demonstrates improved BLE step length, improved BLE weight shift, improved LLE clearance, and overall improvement in BLE gait mechanics.              Ther. Activity  CPT 17545  Total Time for Session 8-22 Minutes   Patient education               yes Educated on gait mechanics and drop foot contribution/need for SPC and rationale to promote improved safety and independence both short and likely long term. Reviewed donning/doffing new shoe attached DF assist brace pt brought - pt needs reinforcement to become independent.     Patient educated on re-examination findings, continued progression of POC, continued compliance with HEP, PT rec of continued use of SPC at this time,    HEP      Issued patient HEP including hip adduction, hip ABD, LAQ and STS with use of BUE    Updated HEP to include 3 way hip, hamcurls and step ups   30 sec STS  See Assessment   other  4/24/2023- donning soft brace for L foot drop.   Seated hip abduction / add 10x bilat to simulate going from accelerator to brake in car.   ADRIANO/LIDIA Arredondo done 4/12/2023 26/56   R knee circumference assessment     Stairs training n        n Patient ascends/descends 4 6 inch steps w/ reciprocal pattern, SPC, and unilateral rail, CS x2 trials.    Patient ascends/descends 4 6 inch steps w/ reciprocal pattern and unilateral rail, CS-Fay and gait belt, min-mod verbal cues for toe clearance (noted compensation of B foot ER to clear BLE)   Ramp training n 5 trials ascending and descending ramp with SPC and Fay. Patient demonstrates difficulty controlling deceleration when descending.     Curb training n 5 trials ascending and descending with SPC, initially req Fay with mod verbal cues and demonstration, progressed to supervision with no verbal cueing.    PN 5/5/23  All subjective and objective measurements completed    Gait assessment with LLE AFO donned yes Patient IND in donning/doffing LLE posterior leafspring AFO        Group  CPT 96277 Total Time for Session Not performed

## 2023-05-08 NOTE — PROGRESS NOTES
Physical Therapy Visit    PT DAILY NOTE FOR OUTPATIENT THERAPY    Patient: Bebe Burks MRN: [id number]  : 1960 62 y.o.  Referring Physician: Gideon Centeno MD  Date of Visit: 2023    Certification Dates: 23 through 23    Diagnosis:   1. S/P total knee arthroplasty, right        Chief Complaints:  R TKA    Precautions:   Existing Precautions/Restrictions: other (see comments)  Precautions comments: LLE foot drop - has LLE AFO will bring NV following IE      TODAY'S VISIT    Time In Session:  Start Time: 1000  Stop Time: 1100  Time Calculation (min): 60 min   History/Vitals/Pain/Encounter Info - 23 1035        Injury History/Precautions/Daily Required Info    Document Type daily treatment     Primary Therapist Caitlyn Lagunas, PT     Chief Complaint/Reason for Visit  R TKA     Referring Physician Dr. Centeno     Existing Precautions/Restrictions other (see comments)     Precautions comments LLE foot drop - has LLE AFO will bring NV following IE     History of present illness/functional impairment Bebe is a 63 yo female who presents to OPPT s/p R TKA 23 w/ Dr. Centeno [place] [facility] [place]states no complications during/post surgery). Patient reports she had a 2 night stay at [facility] and then was discharged to [facility] where she stayed for 5-6 nights. She was discharged home with home health PT and OT, she states home health PT stopped last Friday 3/31/23. She reports at baseline she ambulates without AD, but started using SPC about 6 months prior to surgery due to pain/weakness. She states at this time she is ambulating with SPC for household distances and RW for community distances. She reports she lives alone in single level [facility] with 4 JOSE RAFAEL w/ unilateral rail. She states she has a walkin shower with shower chair and grab bars. She reports since her R knee surgery her ability to ascend/descend stairs is difficult, she feels like her walking is impaired, and her balance/endurance decreased. She  reports significant PMHx of LLE drop foot (which they are contributing to sciatica, states it began about 3 years ago) which she wears LLE brace for which was issued to her by Peter in September 2022, LBP w/ LLE radicular symptoms, DMII (controlled), HTN (controlled). Patient states her overall goal for PT is to walk again without a SPC. Patient reports she is currently working but on long term leave at this time. Patient reports current R knee pain 0/10 and pain at worst in the past week 1/10.     Patient/Family/Caregiver Comments/Observations Patient arrives reporting she had a shower yesterday where she had to do a lot of steps. She reports she is starting to feel more confident on steps but still needs intermittent assistance.     Patient reported fall since last visit No        Pain Assessment    Currently in pain No/Denies        Pain Intervention    Intervention  n/a     Post Intervention Comments n/a        Pre Activity Vital Signs    Pulse 84     SpO2 98 %     Oxygen Therapy None (Room air)     /64     BP Location Left upper arm     BP Method Manual     Patient Position Sitting         Services    Do You Speak a Language Other Than English at Home? no                Daily Treatment Assessment and Plan - 05/08/23 1035        Daily Treatment Assessment and Plan    Progress toward goals Progressing     Daily Outcome Summary Patient arrives with LLE AFO this date. Assessed gait with LLE AFO donned, with patient appearing to have improved overall gait mechanics, improved LLE foot clearance, improved BLE weight shift and BLE step length. Patient able to perform STS from raised mat without use of BUE which is improvement from previous sessions. She will benefit from continued progression of POC NV.     Plan and Recommendations Assess R knee ROM. Continue to progress static/ dynamic balance. progress STS challenge to progress toward able to complete without BUE.                     OBJECTIVE DATA  TAKEN TODAY:    None taken    Today's Treatment:    IE 4/3/23   30 Day 5/3/23   60 Day 6/3/23   90 Day 7/3/23   Precautions LLE foot drop- has LLE AFO but does not always wear    Insurance Auth    Treatment  Current Session Time   Modalities Total Time for Session Not performed   Heat/Ice  CPT 26340 CP to R knee post session   E. Stimulation Manual  CPT 50453    E. Stim Unattended  CPT 39928    Manual   CPT 82945 Total Time for Session Not performed   STM/MFR/TPR R gastroc, hamstring -y   Instrument Assisted STM     Mobilizations Gr II/III R patellar mobilizations in all direction   AP talocrural mobs NV ?   ROM/Flexibility R knee PROM flex/ext (y)  Passive soleus and gastroc stretch, pt prone (y)   Myofascial Decompression    Ther. Exercise  CPT 77347                    Total Time for Session 23-37 Minutes     Sets Reps Load Comment    RB yes 1 10 min Level 2  distance not tracked today           Slantboard gastroc stretch yes 1 3 30sec    Slantboard soleus stretch yesw 1 3 30sec     Hamstring stretch yes 1 3 30sec Supine with strap    Quad set no 1 10 10 sec     SAQ  2 10 3 sec, 1 # Bilateral, cues to get to end range   SLR no 2 10  RLE only, quad set prior to lift    Sidelying hip ABD  2 10  RLE only   sidelying clamshell   2 10     Standing hip  abduction  2 10 each  In // bars. Holding with UE   Hip adduction  2 10 3s hold Seated    Hip ABD  1 10 [place] Supine, trial pink   LAQ  1 15 3s hold    Bridge  yes 2 10  Cues to  Achieve  full ROM   STS Yes  yes 1  1 10  10  Mat at 24 inches  Mat at 22 inches   Toe taps  2 5 6inch 1 finger tip support   FSU  2 10 6 inch    LSU  1 10 6''    Lateral sidestepping  1  6'    stairs n   6 inch FF of stairs with reciprocal legs , cane and bannister , CS   Prone quad   stretch    Prone hamcurls Yes      yes  30''x3      x10     Standing hip ext yes 1 10  B   Standing hip ABD yes 1 10  B   Standing hip flexion yes 1 10  B                                                  Neuro  Re-Ed  CPT 70009  Total Time for Session Not performed     Sets Reps Load Comment   FT HT/HN yes    Firm surface   FT EO yes 1 30sec  firm   FT EO  yes 1 30sec  Firm   Tandem  nv 1 30sec  floor           Pre gait exercises        Cesia step over                                  Gait  CPT 91011  Total Time for Session 8-22 Minutes   Gait training with SPC and LLE AFO donned  yes     Patient ambulates 300 ft w/ SPC, LLE AFO, and CS. Patient demonstrates improved BLE step length, improved BLE weight shift, improved LLE clearance, and overall improvement in BLE gait mechanics.              Ther. Activity  CPT 68926  Total Time for Session 8-22 Minutes   Patient education               yes Educated on gait mechanics and drop foot contribution/need for SPC and rationale to promote improved safety and independence both short and likely long term. Reviewed donning/doffing new shoe attached DF assist brace pt brought - pt needs reinforcement to become independent.     Patient educated on re-examination findings, continued progression of POC, continued compliance with HEP, PT rec of continued use of SPC at this time,    HEP      Issued patient HEP including hip adduction, hip ABD, LAQ and STS with use of BUE    Updated HEP to include 3 way hip, hamcurls and step ups   30 sec STS  See Assessment   other  4/24/2023- donning soft brace for L foot drop.   Seated hip abduction / add 10x bilat to simulate going from accelerator to brake in car.   ADRIANO/LIDIA Arredondo done 4/12/2023 26/56   R knee circumference assessment     Stairs training n        n Patient ascends/descends 4 6 inch steps w/ reciprocal pattern, SPC, and unilateral rail, CS x2 trials.    Patient ascends/descends 4 6 inch steps w/ reciprocal pattern and unilateral rail, CS-Fay and gait belt, min-mod verbal cues for toe clearance (noted compensation of B foot ER to clear BLE)   Ramp training n 5 trials ascending and descending ramp with SPC and Fay. Patient  demonstrates difficulty controlling deceleration when descending.    Curb training n 5 trials ascending and descending with SPC, initially req Fay with mod verbal cues and demonstration, progressed to supervision with no verbal cueing.    PN 5/5/23  All subjective and objective measurements completed    Gait assessment with LLE AFO donned yes Patient IND in donning/doffing LLE posterior leafspring AFO        Group  CPT 76271 Total Time for Session Not performed

## 2023-05-11 ENCOUNTER — APPOINTMENT (OUTPATIENT)
Dept: OTHER | Facility: REHABILITATION | Age: 63
Setting detail: THERAPIES SERIES
End: 2023-05-11
Payer: COMMERCIAL

## 2023-05-11 ENCOUNTER — HOSPITAL ENCOUNTER (OUTPATIENT)
Dept: PHYSICAL THERAPY | Facility: REHABILITATION | Age: 63
Setting detail: THERAPIES SERIES
Discharge: HOME | End: 2023-05-11
Attending: ORTHOPAEDIC SURGERY
Payer: COMMERCIAL

## 2023-05-11 DIAGNOSIS — Z96.651 S/P TOTAL KNEE ARTHROPLASTY, RIGHT: Primary | ICD-10-CM

## 2023-05-11 PROCEDURE — 97110 THERAPEUTIC EXERCISES: CPT | Mod: GP

## 2023-05-11 PROCEDURE — 97530 THERAPEUTIC ACTIVITIES: CPT | Mod: GP

## 2023-05-11 PROCEDURE — 97140 MANUAL THERAPY 1/> REGIONS: CPT | Mod: GP

## 2023-05-11 NOTE — OP PT TREATMENT LOG
IE 4/3/23   30 Day 5/3/23   60 Day 6/3/23   90 Day 7/3/23   Precautions LLE foot drop- has LLE AFO but does not always wear    Insurance Auth    Treatment  Current Session Time   Modalities Total Time for Session 5 minutes   Heat/Ice  CPT 55936 CP to R knee post session   E. Stimulation Manual  CPT 59276    E. Stim Unattended  CPT 66164    Manual   CPT 76323 Total Time for Session 23-37 Minutes   STM/MFR/TPR R gastroc, hamstring -n  R LE retrograde massage with elevation to reduce edema - y   Instrument Assisted STM     Mobilizations Gr II/III R patellar mobilizations in all direction   AP talocrural mobs NV ?   ROM/Flexibility R knee PROM flex/ext (y)  R HS, gastroc, soleus (y)  Passive soleus and gastroc stretch, pt prone (n)   Myofascial Decompression    Ther. Exercise  CPT 22429                    Total Time for Session 8-22 Minutes     Sets Reps Load Comment    RB yes 1 10 min  [employer] Bike, Seat 19, [place].            Slantboard gastroc stretch no 1 3 30sec    Slantboard soleus stretch no 1 3 30sec     Hamstring stretch no 1 3 30sec Supine with strap    Quad set no 1 10 10 sec     SAQ  2 10 3 sec, 1 # Bilateral, cues to get to end range   SLR no 2 10  RLE only, quad set prior to lift    Sidelying hip ABD  2 10  RLE only   sidelying clamshell   2 10     Standing hip  abduction  2 10 each  In // bars. Holding with UE   Hip adduction  2 10 3s hold Seated    Hip ABD  1 10 [place] Supine, trial pink   LAQ  1 15 3s hold    Bridge  yes 2 10  Cues to  Achieve  full ROM   STS Yes  yes 1  1 10  10  Mat at 22 inches  Mat at 21 inches   Toe taps  2 5 6inch 1 finger tip support   FSU  2 10 6 inch    LSU  1 10 6''    Lateral sidestepping  1  6'    stairs n   6 inch FF of stairs with reciprocal legs , cane and bannister , CS   Prone quad   stretch    Prone hamcurls no      n  30''x3      x10     Standing hip ext n 1 10  B   Standing hip ABD n 1 10  B   Standing hip flexion n 1 10  B                                                   Neuro Re-Ed  CPT 65091  Total Time for Session Not performed     Sets Reps Load Comment   FT HT/HN n    Firm surface   FT EO n 1 30sec  firm   FT EO  n 1 30sec  Firm   Tandem  nv 1 30sec  floor           Pre gait exercises        Cesia step over                                  Gait  CPT 02743  Total Time for Session 8-22 Minutes   Gait training with SPC and LLE AFO donned  yes     Patient ambulates 300 ft w/ SPC, LLE AFO, and CS. Patient demonstrates improved BLE step length, improved BLE weight shift, improved LLE clearance, and overall improvement in BLE gait mechanics. VC for upright posture, to look ahead.             Ther. Activity  CPT 39276  Total Time for Session 8-22 Minutes   VS, falls, medication review, subjective review yes Reviewed all with pt    Patient education               yes Educated pt on rationale of treatment with manual therapy, on use of compression socks for swelling control, on retrograde massage with elevation, on technique/form of exercises, on sit <> stand mechanics, on POC, review of HEP - pt verbalized and demonstrated understanding     Educated on gait mechanics and drop foot contribution/need for SPC and rationale to promote improved safety and independence both short and likely long term. Reviewed donning/doffing new shoe attached DF assist brace pt brought - pt needs reinforcement to become independent.     Patient educated on re-examination findings, continued progression of POC, continued compliance with HEP, PT rec of continued use of SPC at this time,    HEP      Issued patient HEP including hip adduction, hip ABD, LAQ and STS with use of BUE    Updated HEP to include 3 way hip, hamcurls and step ups   30 sec STS  See Assessment   other  4/24/2023- donning soft brace for L foot drop.   Seated hip abduction / add 10x bilat to simulate going from accelerator to brake in car.   ADRIANO/LIDIA Arredondo done 4/12/2023 26/56   R knee circumference assessment     R knee AROM y  See AROM chapter   Stairs training n        n Patient ascends/descends 4 6 inch steps w/ reciprocal pattern, SPC, and unilateral rail, CS x2 trials.    Patient ascends/descends 4 6 inch steps w/ reciprocal pattern and unilateral rail, CS-Fay and gait belt, min-mod verbal cues for toe clearance (noted compensation of B foot ER to clear BLE)   Ramp training n 5 trials ascending and descending ramp with SPC and Fay. Patient demonstrates difficulty controlling deceleration when descending.    Curb training n 5 trials ascending and descending with SPC, initially req Fay with mod verbal cues and demonstration, progressed to supervision with no verbal cueing.    PN 5/5/23  All subjective and objective measurements completed    Gait assessment with LLE AFO donned n Patient IND in donning/doffing LLE posterior leafspring AFO        Group  CPT 79317 Total Time for Session Not performed

## 2023-05-11 NOTE — PROGRESS NOTES
Physical Therapy Visit    PT DAILY NOTE FOR OUTPATIENT THERAPY    Patient: Bebe Burks MRN: [id number]  : 1960 62 y.o.  Referring Physician: Gideon Centeno MD  Date of Visit: 2023    Certification Dates: 23 through 23    Diagnosis:   1. S/P total knee arthroplasty, right        Chief Complaints:  R TKA    Precautions:   Existing Precautions/Restrictions: other (see comments)  Precautions comments: LLE foot drop - has LLE AFO will bring NV following IE      TODAY'S VISIT    Time In Session:  Start Time: 1000  Stop Time: 1100  Time Calculation (min): 60 min   History/Vitals/Pain/Encounter Info - 23 1008        Injury History/Precautions/Daily Required Info    Document Type daily treatment     Primary Therapist Caitlyn Lagunas, PT     Chief Complaint/Reason for Visit  R TKA     Referring Physician Dr. Centeno     Existing Precautions/Restrictions other (see comments)     Precautions comments LLE foot drop - has LLE AFO will bring NV following IE     History of present illness/functional impairment Bebe is a 61 yo female who presents to OPPT s/p R TKA 23 w/ Dr. Centeno at [facility] (states no complications during/post surgery). Patient reports she had a 2 night stay at [facility] and then was discharged to [facility] where she stayed for 5-6 nights. She was discharged home with home health PT and OT, she states home health PT stopped last Friday 3/31/23. She reports at baseline she ambulates without AD, but started using SPC about 6 months prior to surgery due to pain/weakness. She states at this time she is ambulating with SPC for household distances and RW for community distances. She reports she lives alone in single level [facility] with 4 JOSE RAFAEL w/ unilateral rail. She states she has a walkin shower with shower chair and grab bars. She reports since her R knee surgery her ability to ascend/descend stairs is difficult, she feels like her walking is impaired, and her balance/endurance decreased. She  "reports significant PMHx of LLE drop foot (which they are contributing to sciatica, states it began about 3 years ago) which she wears LLE brace for which was issued to her by [employer] in September 2022, LBP w/ LLE radicular symptoms, DMII (controlled), HTN (controlled). Patient states her overall goal for PT is to walk again without a SPC. Patient reports she is currently working but on long term leave at this time. Patient reports current R knee pain 0/10 and pain at worst in the past week 1/10.     Patient/Family/Caregiver Comments/Observations Pt received seated in main gym waiting room.  Pt observed ambulating to neuro gym with SPC in R hand, stating \"I have drop foot on the left.  I was wearing the brace from [employer] on Monday, but it was very uncomfortable and painful, so today I have on one of my other ones\".  Reports R knee pain 3/10.     Patient reported fall since last visit No        Pain Assessment    Currently in pain Yes     Preferred Pain Scale number (Numeric Rating Pain Scale)     Pain: Body location Knee     Pain Rating (0-10): Pre Activity 3     Pain Rating (0-10): Post Activity 1        Pain Intervention    Intervention  monitored, gentle movement, manual therapy, CP at end of session     Post Intervention Comments improvement        Pre Activity Vital Signs    Pulse 92     SpO2 98 %     Oxygen Therapy None (Room air)     /52     BP Location Left upper arm     BP Method Manual     Patient Position Sitting         Services    Do You Speak a Language Other Than English at Home? no                Daily Treatment Assessment and Plan - 05/11/23 1008        Daily Treatment Assessment and Plan    Progress toward goals Progressing     Daily Outcome Summary Pt ambulating with SPC in RUE, was advised to use in hand opposite weakest side however pt states her L is weaker compared to R despite R TKA.  AROM measurements reveal significant improvement in both flexion and extension since last " "assessed in early April, suggesting good response to current POC.  Pt able to progress sit <> stand as evidenced by ability to perform without UE support from lower height today, however benefitted from mod VC for technique of \"nose over toes\" and eccentric lowering.     Plan and Recommendations Continue per primary therapist POC: Progress static/dyn balance, progress sit <> stand challenge to progress toward able to complete without BUE.                     OBJECTIVE DATA TAKEN TODAY:    ROM    Range of Motion - 05/11/23 1200        RIGHT: Lower Extremity AROM Assessment    Knee Flexion   125     Knee Extension   0                 Today's Treatment:    IE 4/3/23   30 Day 5/3/23   60 Day 6/3/23   90 Day 7/3/23   Precautions LLE foot drop- has LLE AFO but does not always wear    Insurance Auth    Treatment  Current Session Time   Modalities Total Time for Session 5 minutes   Heat/Ice  CPT 91927 CP to R knee post session   E. Stimulation Manual  CPT 72441    E. Stim Unattended  CPT 94702    Manual   CPT 12956 Total Time for Session 23-37 Minutes   STM/MFR/TPR R gastroc, hamstring -n  R LE retrograde massage with elevation to reduce edema - y   Instrument Assisted STM     Mobilizations Gr II/III R patellar mobilizations in all direction   AP talocrural mobs NV ?   ROM/Flexibility R knee PROM flex/ext (y)  R HS, gastroc, soleus (y)  Passive soleus and gastroc stretch, pt prone (n)   Myofascial Decompression    Ther. Exercise  CPT 71729                    Total Time for Session 8-22 Minutes     Sets Reps Load Comment    RB yes 1 10 min  [employer]ike, Seat 19, [place] Dash.            Slantboard gastroc stretch no 1 3 30sec    Slantboard soleus stretch no 1 3 30sec     Hamstring stretch no 1 3 30sec Supine with strap    Quad set no 1 10 10 sec     SAQ  2 10 3 sec, 1 # Bilateral, cues to get to end range   SLR no 2 10  RLE only, quad set prior to lift    Sidelying hip ABD  2 10  RLE only   sidelying clamshell   2 10   "   Standing hip  abduction  2 10 each  In // bars. Holding with UE   Hip adduction  2 10 3s hold Seated    Hip ABD  1 10 [place] Supine, trial pink   LAQ  1 15 3s hold    Bridge  yes 2 10  Cues to  Achieve  full ROM   STS Yes  yes 1  1 10  10  Mat at 22 inches  Mat at 21 inches   Toe taps  2 5 6inch 1 finger tip support   FSU  2 10 6 inch    LSU  1 10 6''    Lateral sidestepping  1  6'    stairs n   6 inch FF of stairs with reciprocal legs , cane and bannister , CS   Prone quad   stretch    Prone hamcurls no      n  30''x3      x10     Standing hip ext n 1 10  B   Standing hip ABD n 1 10  B   Standing hip flexion n 1 10  B                                                  Neuro Re-Ed  CPT 41943  Total Time for Session Not performed     Sets Reps Load Comment   FT HT/HN n    Firm surface   FT EO n 1 30sec  firm   FT EO  n 1 30sec  Firm   Tandem  nv 1 30sec  floor           Pre gait exercises        Cesia step over                                  Gait  CPT 35439  Total Time for Session 8-22 Minutes   Gait training with SPC and LLE AFO donned  yes     Patient ambulates 300 ft w/ SPC, LLE AFO, and CS. Patient demonstrates improved BLE step length, improved BLE weight shift, improved LLE clearance, and overall improvement in BLE gait mechanics. VC for upright posture, to look ahead.             Ther. Activity  CPT 93117  Total Time for Session 8-22 Minutes   VS, falls, medication review, subjective review yes Reviewed all with pt    Patient education               yes Educated pt on rationale of treatment with manual therapy, on use of compression socks for swelling control, on retrograde massage with elevation, on technique/form of exercises, on sit <> stand mechanics, on POC, review of HEP - pt verbalized and demonstrated understanding     Educated on gait mechanics and drop foot contribution/need for SPC and rationale to promote improved safety and independence both short and likely long term. Reviewed donning/doffing  new shoe attached DF assist brace pt brought - pt needs reinforcement to become independent.     Patient educated on re-examination findings, continued progression of POC, continued compliance with HEP, PT rec of continued use of SPC at this time,    HEP      Issued patient HEP including hip adduction, hip ABD, LAQ and STS with use of BUE    Updated HEP to include 3 way hip, hamcurls and step ups   30 sec STS  See Assessment   other  4/24/2023- donning soft brace for L foot drop.   Seated hip abduction / add 10x bilat to simulate going from accelerator to brake in car.   OH/LIDIA Arredondo done 4/12/2023 26/56   R knee circumference assessment     R knee AROM y See AROM chapter   Stairs training n        n Patient ascends/descends 4 6 inch steps w/ reciprocal pattern, SPC, and unilateral rail, CS x2 trials.    Patient ascends/descends 4 6 inch steps w/ reciprocal pattern and unilateral rail, CS-Fay and gait belt, min-mod verbal cues for toe clearance (noted compensation of B foot ER to clear BLE)   Ramp training n 5 trials ascending and descending ramp with SPC and Fay. Patient demonstrates difficulty controlling deceleration when descending.    Curb training n 5 trials ascending and descending with SPC, initially req Fay with mod verbal cues and demonstration, progressed to supervision with no verbal cueing.    PN 5/5/23  All subjective and objective measurements completed    Gait assessment with LLE AFO donned n Patient IND in donning/doffing LLE posterior leafspring AFO        Group  CPT 27599 Total Time for Session Not performed

## 2023-05-12 ENCOUNTER — APPOINTMENT (OUTPATIENT)
Dept: OTHER | Facility: REHABILITATION | Age: 63
Setting detail: THERAPIES SERIES
End: 2023-05-12
Payer: COMMERCIAL

## 2023-05-12 ENCOUNTER — HOSPITAL ENCOUNTER (OUTPATIENT)
Dept: PHYSICAL THERAPY | Facility: REHABILITATION | Age: 63
Setting detail: THERAPIES SERIES
Discharge: HOME | End: 2023-05-12
Attending: ORTHOPAEDIC SURGERY
Payer: COMMERCIAL

## 2023-05-12 DIAGNOSIS — Z96.651 S/P TOTAL KNEE ARTHROPLASTY, RIGHT: Primary | ICD-10-CM

## 2023-05-12 PROCEDURE — 97110 THERAPEUTIC EXERCISES: CPT | Mod: GP

## 2023-05-12 PROCEDURE — 97140 MANUAL THERAPY 1/> REGIONS: CPT | Mod: GP

## 2023-05-12 NOTE — PROGRESS NOTES
Physical Therapy Visit    PT DAILY NOTE FOR OUTPATIENT THERAPY    Patient: Bebe Burks MRN: [id number]  : 1960 62 y.o.  Referring Physician: Gideon Centeno MD  Date of Visit: 2023    Certification Dates: 23 through 23    Diagnosis:   1. S/P total knee arthroplasty, right        Chief Complaints:  R TKA    Precautions:   Existing Precautions/Restrictions: other (see comments)  Precautions comments: LLE foot drop - has LLE AFO will bring NV following IE      TODAY'S VISIT    Time In Session:  Start Time: 0900  Stop Time: 1000  Time Calculation (min): 60 min   History/Vitals/Pain/Encounter Info - 23 0947        Injury History/Precautions/Daily Required Info    Document Type daily treatment     Primary Therapist Caitlyn Lagunas, PT     Chief Complaint/Reason for Visit  R TKA     Referring Physician Dr. Centeno     Existing Precautions/Restrictions other (see comments)     Precautions comments LLE foot drop - has LLE AFO will bring NV following IE     History of present illness/functional impairment Bebe is a 61 yo female who presents to OPPT s/p R TKA 23 w/ Dr. Centeno at [facility] (states no complications during/post surgery). Patient reports she had a 2 night stay at [facility] and then was discharged to [facility] where she stayed for 5-6 nights. She was discharged home with home health PT and OT, she states home health PT stopped last Friday 3/31/23. She reports at baseline she ambulates without AD, but started using SPC about 6 months prior to surgery due to pain/weakness. She states at this time she is ambulating with SPC for household distances and RW for community distances. She reports she lives alone in single level [facility] with 4 JOSE RAFAEL w/ unilateral rail. She states she has a walkin shower with shower chair and grab bars. She reports since her R knee surgery her ability to ascend/descend stairs is difficult, she feels like her walking is impaired, and her balance/endurance decreased. She  reports significant PMHx of LLE drop foot (which they are contributing to sciatica, states it began about 3 years ago) which she wears LLE brace for which was issued to her by [employer] in September 2022, LBP w/ LLE radicular symptoms, DMII (controlled), HTN (controlled). Patient states her overall goal for PT is to walk again without a SPC. Patient reports she is currently working but on long term leave at this time. Patient reports current R knee pain 0/10 and pain at worst in the past week 1/10.     Patient/Family/Caregiver Comments/Observations Patient arrives reporting she did have LLE discomfort following wearing LLE brace after Monday's session. She reports she felt good after Wednesdays session.     Patient reported fall since last visit No        Pain Assessment    Currently in pain No/Denies        Pain Intervention    Intervention  n/a     Post Intervention Comments n/a        Pre Activity Vital Signs    Pulse 78     SpO2 98 %     Oxygen Therapy None (Room air)     /64     BP Location Right upper arm     BP Method Manual     Patient Position Sitting         Services    Do You Speak a Language Other Than English at Home? no                Daily Treatment Assessment and Plan - 05/12/23 0947        Daily Treatment Assessment and Plan    Progress toward goals Progressing     Daily Outcome Summary Initiated session with active warm up on RB following by MT with focus on STM to gastroc/soleus with noted tightness specifically at posterior knee region, improved following MT. Continued focus on proximal hip and quad strengthening. Pt with slight R quad lag during SLR requiring tactile cues to correct - continue to monitor.     Plan and Recommendations Continue per primary therapist POC: Progress static/dyn balance, progress sit <> stand challenge to progress toward able to complete without BUE.                     OBJECTIVE DATA TAKEN TODAY:    None taken    Today's Treatment:    IE 4/3/23   30 Day  5/3/23   60 Day 6/3/23   90 Day 7/3/23   Precautions LLE foot drop- has LLE AFO but does not always wear    Insurance Auth    Treatment  Current Session Time   Modalities Total Time for Session 5 minutes   Heat/Ice  CPT 87606 CP to R knee post session   E. Stimulation Manual  CPT 32010    E. Stim Unattended  CPT 35826    Manual   CPT 02410 Total Time for Session 23-37 Minutes   STM/MFR/TPR R gastroc, hamstring -yes  R LE retrograde massage with elevation to reduce edema - yes   Instrument Assisted STM     Mobilizations Gr II/III R patellar mobilizations in all direction   AP talocrural mobs NV - yes   ROM/Flexibility R knee PROM flex/ext (y)  R HS, gastroc, soleus (y)  Passive soleus and gastroc stretch, pt prone (n)   Myofascial Decompression    Ther. Exercise  CPT 06696                    Total Time for Session 23-37 Minutes     Sets Reps Load Comment    RB yes 1 10 min  [employer], Seat 19, [place] Dash.            Slantboard gastroc stretch no 1 3 30sec    Slantboard soleus stretch no 1 3 30sec     Hamstring stretch no 1 3 30sec Supine with strap    Quad set yes 1 10 10 sec     SAQ Yes  2 10 3 sec, 1 # Bilateral, cues to get to end range (trial 2# NV)   SLR yes 2 10  RLE only, quad set prior to lift - watch for extensor lag   Sidelying hip ABD  2 10  RLE only   sidelying clamshell   2 10     Standing hip  abduction  2 10 each  In // bars. Holding with UE   Hip adduction yes 2 10 3s hold Seated    Hip ABD yes 1 10 [place] Supine, trial pink   LAQ  1 15 3s hold    Bridge  yes 2 10  Cues to  Achieve  full ROM   STS Yes  yes 1  1 10  10  Mat at 22 inches  Mat at 21 inches   Toe taps  2 5 6inch 1 finger tip support   FSU  2 10 6 inch    LSU  1 10 6''    Lateral sidestepping  1  6'    stairs n   6 inch FF of stairs with reciprocal legs , cane and bannister , CS   Prone quad   stretch    Prone hamcurls no      n  30''x3      x10     Standing hip ext n 1 10  B   Standing hip ABD n 1 10  B   Standing hip flexion n 1 10   B                                                  Neuro Re-Ed  CPT 04008  Total Time for Session Not performed     Sets Reps Load Comment   FT HT/HN n    Firm surface   FT EO n 1 30sec  firm   FT EO  n 1 30sec  Firm   Tandem  nv 1 30sec  floor           Pre gait exercises        Cesia step over                                  Gait  CPT 69074  Total Time for Session 0-7 Minutes   Gait training with SPC and LLE AFO donned  yes     Patient ambulates 300 ft w/ SPC, LLE AFO, and CS. Patient demonstrates improved BLE step length, improved BLE weight shift, improved LLE clearance, and overall improvement in BLE gait mechanics. VC for upright posture, to look ahead.             Ther. Activity  CPT 14750  Total Time for Session 0-7 Minutes   VS, falls, medication review, subjective review yes Reviewed all with pt    Patient education               yes Educated pt on rationale of treatment with manual therapy, on use of compression socks for swelling control, on retrograde massage with elevation, on technique/form of exercises, on sit <> stand mechanics, on POC, review of HEP - pt verbalized and demonstrated understanding     Educated on gait mechanics and drop foot contribution/need for SPC and rationale to promote improved safety and independence both short and likely long term. Reviewed donning/doffing new shoe attached DF assist brace pt brought - pt needs reinforcement to become independent.     Patient educated on re-examination findings, continued progression of POC, continued compliance with HEP, PT rec of continued use of SPC at this time,    HEP      Issued patient HEP including hip adduction, hip ABD, LAQ and STS with use of BUE    Updated HEP to include 3 way hip, hamcurls and step ups   30 sec STS  See Assessment   other  4/24/2023- donning soft brace for L foot drop.   Seated hip abduction / add 10x bilat to simulate going from accelerator to brake in car.   ADRIANO/LIDIA Arredondo done 4/12/2023 26/56   R knee  circumference assessment     R knee AROM n See AROM chapter   Stairs training n        n Patient ascends/descends 4 6 inch steps w/ reciprocal pattern, SPC, and unilateral rail, CS x2 trials.    Patient ascends/descends 4 6 inch steps w/ reciprocal pattern and unilateral rail, CS-Fay and gait belt, min-mod verbal cues for toe clearance (noted compensation of B foot ER to clear BLE)   Ramp training n 5 trials ascending and descending ramp with SPC and Fay. Patient demonstrates difficulty controlling deceleration when descending.    Curb training n 5 trials ascending and descending with SPC, initially req Fay with mod verbal cues and demonstration, progressed to supervision with no verbal cueing.    PN 5/5/23  All subjective and objective measurements completed    Gait assessment with LLE AFO donned n Patient IND in donning/doffing LLE posterior leafspring AFO        Group  CPT 81084 Total Time for Session Not performed

## 2023-05-12 NOTE — OP PT TREATMENT LOG
IE 4/3/23   30 Day 5/3/23   60 Day 6/3/23   90 Day 7/3/23   Precautions LLE foot drop- has LLE AFO but does not always wear    Insurance Auth    Treatment  Current Session Time   Modalities Total Time for Session 5 minutes   Heat/Ice  CPT 47812 CP to R knee post session   E. Stimulation Manual  CPT 75490    E. Stim Unattended  CPT 85449    Manual   CPT 53229 Total Time for Session 23-37 Minutes   STM/MFR/TPR R gastroc, hamstring -yes  R LE retrograde massage with elevation to reduce edema - yes   Instrument Assisted STM     Mobilizations Gr II/III R patellar mobilizations in all direction   AP talocrural mobs NV - yes   ROM/Flexibility R knee PROM flex/ext (y)  R HS, gastroc, soleus (y)  Passive soleus and gastroc stretch, pt prone (n)   Myofascial Decompression    Ther. Exercise  CPT 04223                    Total Time for Session 23-37 Minutes     Sets Reps Load Comment    RB yes 1 10 min  [employer] B[employer], Seat 19, [place] Dash.            Slantboard gastroc stretch no 1 3 30sec    Slantboard soleus stretch no 1 3 30sec     Hamstring stretch no 1 3 30sec Supine with strap    Quad set yes 1 10 10 sec     SAQ Yes  2 10 3 sec, 1 # Bilateral, cues to get to end range (trial 2# NV)   SLR yes 2 10  RLE only, quad set prior to lift - watch for extensor lag   Sidelying hip ABD  2 10  RLE only   sidelying clamshell   2 10     Standing hip  abduction  2 10 each  In // bars. Holding with UE   Hip adduction yes 2 10 3s hold Seated    Hip ABD yes 1 10 [place] Supine, trial pink   LAQ  1 15 3s hold    Bridge  yes 2 10  Cues to  Achieve  full ROM   STS Yes  yes 1  1 10  10  Mat at 22 inches  Mat at 21 inches   Toe taps  2 5 6inch 1 finger tip support   FSU  2 10 6 inch    LSU  1 10 6''    Lateral sidestepping  1  6'    stairs n   6 inch FF of stairs with reciprocal legs , cane and bannister , CS   Prone quad   stretch    Prone hamcurls no      n  30''x3      x10     Standing hip ext n 1 10  B   Standing hip ABD n 1 10  B   Standing  hip flexion n 1 10  B                                                  Neuro Re-Ed  CPT 00303  Total Time for Session Not performed     Sets Reps Load Comment   FT HT/HN n    Firm surface   FT EO n 1 30sec  firm   FT EO  n 1 30sec  Firm   Tandem  nv 1 30sec  floor           Pre gait exercises        Cesia step over                                  Gait  CPT 73651  Total Time for Session 0-7 Minutes   Gait training with SPC and LLE AFO donned  yes     Patient ambulates 300 ft w/ SPC, LLE AFO, and CS. Patient demonstrates improved BLE step length, improved BLE weight shift, improved LLE clearance, and overall improvement in BLE gait mechanics. VC for upright posture, to look ahead.             Ther. Activity  CPT 99074  Total Time for Session 0-7 Minutes   VS, falls, medication review, subjective review yes Reviewed all with pt    Patient education               yes Educated pt on rationale of treatment with manual therapy, on use of compression socks for swelling control, on retrograde massage with elevation, on technique/form of exercises, on sit <> stand mechanics, on POC, review of HEP - pt verbalized and demonstrated understanding     Educated on gait mechanics and drop foot contribution/need for SPC and rationale to promote improved safety and independence both short and likely long term. Reviewed donning/doffing new shoe attached DF assist brace pt brought - pt needs reinforcement to become independent.     Patient educated on re-examination findings, continued progression of POC, continued compliance with HEP, PT rec of continued use of SPC at this time,    HEP      Issued patient HEP including hip adduction, hip ABD, LAQ and STS with use of BUE    Updated HEP to include 3 way hip, hamcurls and step ups   30 sec STS  See Assessment   other  4/24/2023- donning soft brace for L foot drop.   Seated hip abduction / add 10x bilat to simulate going from accelerator to brake in car.   ADRIANO/LIDIA Arredondo done 4/12/2023  26/56   R knee circumference assessment     R knee AROM n See AROM chapter   Stairs training n        n Patient ascends/descends 4 6 inch steps w/ reciprocal pattern, SPC, and unilateral rail, CS x2 trials.    Patient ascends/descends 4 6 inch steps w/ reciprocal pattern and unilateral rail, CS-Fay and gait belt, min-mod verbal cues for toe clearance (noted compensation of B foot ER to clear BLE)   Ramp training n 5 trials ascending and descending ramp with SPC and Fay. Patient demonstrates difficulty controlling deceleration when descending.    Curb training n 5 trials ascending and descending with SPC, initially req Fay with mod verbal cues and demonstration, progressed to supervision with no verbal cueing.    PN 5/5/23  All subjective and objective measurements completed    Gait assessment with LLE AFO donned n Patient IND in donning/doffing LLE posterior leafspring AFO        Group  CPT 35600 Total Time for Session Not performed

## 2023-05-15 ENCOUNTER — HOSPITAL ENCOUNTER (OUTPATIENT)
Dept: PHYSICAL THERAPY | Facility: REHABILITATION | Age: 63
Setting detail: THERAPIES SERIES
Discharge: HOME | End: 2023-05-15
Attending: ORTHOPAEDIC SURGERY
Payer: COMMERCIAL

## 2023-05-15 ENCOUNTER — APPOINTMENT (OUTPATIENT)
Dept: OTHER | Facility: REHABILITATION | Age: 63
Setting detail: THERAPIES SERIES
End: 2023-05-15
Payer: COMMERCIAL

## 2023-05-15 DIAGNOSIS — Z96.651 S/P TOTAL KNEE ARTHROPLASTY, RIGHT: Primary | ICD-10-CM

## 2023-05-15 PROCEDURE — 97110 THERAPEUTIC EXERCISES: CPT | Mod: GP

## 2023-05-15 PROCEDURE — 97112 NEUROMUSCULAR REEDUCATION: CPT | Mod: GP

## 2023-05-15 PROCEDURE — 97140 MANUAL THERAPY 1/> REGIONS: CPT | Mod: GP

## 2023-05-15 NOTE — PROGRESS NOTES
Physical Therapy Visit    PT DAILY NOTE FOR OUTPATIENT THERAPY    Patient: Bebe Burks MRN: [id number]  : 1960 62 y.o.  Referring Physician: Gideon Centeno MD  Date of Visit: 5/15/2023    Certification Dates: 23 through 23    Diagnosis:   1. S/P total knee arthroplasty, right        Chief Complaints:  R TKA    Precautions:   Existing Precautions/Restrictions: other (see comments)  Precautions comments: LLE foot drop - has LLE AFO will bring NV following IE      TODAY'S VISIT    Time In Session:  Start Time: 1000  Stop Time: 1100  Time Calculation (min): 60 min   History/Vitals/Pain/Encounter Info - 05/15/23 1009        Injury History/Precautions/Daily Required Info    Document Type daily treatment     Primary Therapist Caitlyn Lagunas, PT     Chief Complaint/Reason for Visit  R TKA     Referring Physician Dr. Centeno     Existing Precautions/Restrictions other (see comments)     Precautions comments LLE foot drop - has LLE AFO will bring NV following IE     History of present illness/functional impairment Bebe is a 63 yo female who presents to OPPT s/p R TKA 23 w/ Dr. Centeno at [facility] (states no complications during/post surgery). Patient reports she had a 2 night stay at [facility] and then was discharged to [facility] where she stayed for 5-6 nights. She was discharged home with home health PT and OT, she states home health PT stopped last Friday 3/31/23. She reports at baseline she ambulates without AD, but started using SPC about 6 months prior to surgery due to pain/weakness. She states at this time she is ambulating with SPC for household distances and RW for community distances. She reports she lives alone in single level [facility] with 4 JOSE RAFAEL w/ unilateral rail. She states she has a walkin shower with shower chair and grab bars. She reports since her R knee surgery her ability to ascend/descend stairs is difficult, she feels like her walking is impaired, and her balance/endurance decreased. She  reports significant PMHx of LLE drop foot (which they are contributing to sciatica, states it began about 3 years ago) which she wears LLE brace for which was issued to her by Peter in September 2022, LBP w/ LLE radicular symptoms, DMII (controlled), HTN (controlled). Patient states her overall goal for PT is to walk again without a SPC. Patient reports she is currently working but on long term leave at this time. Patient reports current R knee pain 0/10 and pain at worst in the past week 1/10.     Patient/Family/Caregiver Comments/Observations Patient arrives reporting feeling tired from the weekend.     Patient reported fall since last visit No        Pain Assessment    Currently in pain No/Denies        Pain Intervention    Intervention  n/a     Post Intervention Comments n/a        Pre Activity Vital Signs    Pulse 78     SpO2 98 %     Oxygen Therapy None (Room air)     /62     BP Location Left upper arm     BP Method Manual     Patient Position Sitting         Services    Do You Speak a Language Other Than English at Home? no                Daily Treatment Assessment and Plan - 05/15/23 1009        Daily Treatment Assessment and Plan    Progress toward goals Progressing     Daily Outcome Summary Continued with progression of POC this date with increased resistance during hip ABD and SAQ demonstrating continued improvement in BLE strength. Patient continues to demonstrate (+) gastroc/soleus tightness, specifically at location of posterior knee benefiting from STM to this area. Continued intermittent R quad lag during SLR, continue to monitor and focus on progression of R quad strengthening. Ended session focusing on static NMRE which patient req CS-Fay to maintain balance throughout with most difficulty performing FT EO.     Plan and Recommendations Continue per primary therapist POC: Progress static/dyn balance, progress sit <> stand challenge to progress toward able to complete without BUE.                      OBJECTIVE DATA TAKEN TODAY:    None taken    Today's Treatment:    IE 4/3/23   30 Day 5/3/23   60 Day 6/3/23   90 Day 7/3/23   Precautions LLE foot drop- has LLE AFO but does not always wear    Insurance Auth    Treatment  Current Session Time   Modalities Total Time for Session 5 minutes   Heat/Ice  CPT 56290 CP to R knee post session   E. Stimulation Manual  CPT 51742    E. Stim Unattended  CPT 26470    Manual   CPT 26293 Total Time for Session 8-22 Minutes   STM/MFR/TPR R gastroc/soleus, hamstring -yes  R LE retrograde massage with elevation to reduce edema - no   Instrument Assisted STM     Mobilizations Gr II/III R patellar mobilizations in all direction -yes  AP talocrural mobs NV - yes   ROM/Flexibility R knee PROM flex/ext (n)  R HS, gastroc, soleus (n)  Passive soleus and gastroc stretch, pt prone (n)   Myofascial Decompression    Ther. Exercise  CPT 06256                    Total Time for Session 23-37 Minutes     Sets Reps Load Comment    RB yes 1 6 min  [employer], [address]at [address], [place] [address].            Slantboard gastroc stretch no 1 3 30sec    Slantboard soleus stretch no 1 3 30sec     Hamstring stretch no 1 3 30sec Supine with strap    Quad set no 1 10 10 sec     SAQ Yes  2 10 3 sec, 2 # Bilateral, cues to get to end range    SLR yes 2 10 Trial 1/2# NV RLE only, quad set prior to lift - watch for extensor lag   Sidelying hip ABD  2 10  RLE only   sidelying clamshell   2 10     Standing hip  abduction  2 10 each  In // bars. Holding with UE   Hip adduction yes 2 10 3s hold Supine   Hip ABD yes 1 10 Pink Supine   LAQ  1 15 3s hold    Bridge  yes 2 10  Cues to  Achieve  full ROM, trial with add NV   STS Yes  yes 1  1 10  10  Mat at 22 inches  Mat at 21 inches   Toe taps  2 5 6inch 1 finger tip support   FSU  2 10 6 inch    LSU  1 10 6''    Lateral sidestepping  1  6'    stairs n   6 inch FF of stairs with reciprocal legs , cane and bannister , CS   Prone quad   stretch    Prone hamcurls  no      n  30''x3      x10     Standing hip ext n 1 10  B   Standing hip ABD n 1 10  B   Standing hip flexion n 1 10  B                                                  Neuro Re-Ed  CPT 22753  Total Time for Session 8-22 Minutes     Sets Reps Load Comment   FT HT/HN n    Firm    FT EO yes 3 30sec  Firm   FA EC yes 3 30sec  Firm   FA EO yes 1 30sec  FOAM   Tandem  nv 1 30sec  Floor           Pre gait exercises        Cesia step over                                  Gait  CPT 88224  Total Time for Session 0-7 Minutes   Gait training with SPC and LLE AFO donned  yes     Patient ambulates 300 ft w/ SPC, LLE AFO, and CS. Patient demonstrates improved BLE step length, improved BLE weight shift, improved LLE clearance, and overall improvement in BLE gait mechanics. VC for upright posture, to look ahead.             Ther. Activity  CPT 00276  Total Time for Session 0-7 Minutes   VS, falls, medication review, subjective review yes Reviewed all with pt    Patient education               no Educated pt on rationale of treatment with manual therapy, on use of compression socks for swelling control, on retrograde massage with elevation, on technique/form of exercises, on sit <> stand mechanics, on POC, review of HEP - pt verbalized and demonstrated understanding     Educated on gait mechanics and drop foot contribution/need for SPC and rationale to promote improved safety and independence both short and likely long term. Reviewed donning/doffing new shoe attached DF assist brace pt brought - pt needs reinforcement to become independent.     Patient educated on re-examination findings, continued progression of POC, continued compliance with HEP, PT rec of continued use of SPC at this time,    HEP      Issued patient HEP including hip adduction, hip ABD, LAQ and STS with use of BUE    Updated HEP to include 3 way hip, hamcurls and step ups   30 sec STS  See Assessment   other  4/24/2023- donning soft brace for L foot drop.    Seated hip abduction / add 10x bilat to simulate going from accelerator to brake in car.   ADRIANO/LIDIA Arredondo done 4/12/2023 26/56   R knee circumference assessment     R knee AROM n See AROM chapter   Stairs training n        n Patient ascends/descends 4 6 inch steps w/ reciprocal pattern, SPC, and unilateral rail, CS x2 trials.    Patient ascends/descends 4 6 inch steps w/ reciprocal pattern and unilateral rail, CS-Fay and gait belt, min-mod verbal cues for toe clearance (noted compensation of B foot ER to clear BLE)   Ramp training n 5 trials ascending and descending ramp with SPC and Fay. Patient demonstrates difficulty controlling deceleration when descending.    Curb training n 5 trials ascending and descending with SPC, initially req Fay with mod verbal cues and demonstration, progressed to supervision with no verbal cueing.    PN 5/5/23  All subjective and objective measurements completed    Gait assessment with LLE AFO donned n Patient IND in donning/doffing LLE posterior leafspring AFO        Group  CPT 38069 Total Time for Session Not performed

## 2023-05-15 NOTE — OP PT TREATMENT LOG
IE 4/3/23   30 Day 5/3/23   60 Day 6/3/23   90 Day 7/3/23   Precautions LLE foot drop- has LLE AFO but does not always wear    Insurance Auth    Treatment  Current Session Time   Modalities Total Time for Session 5 minutes   Heat/Ice  CPT 54815 CP to R knee post session   E. Stimulation Manual  CPT 48961    E. Stim Unattended  CPT 23548    Manual   CPT 28156 Total Time for Session 8-22 Minutes   STM/MFR/TPR R gastroc/soleus, hamstring -yes  R LE retrograde massage with elevation to reduce edema - no   Instrument Assisted STM     Mobilizations Gr II/III R patellar mobilizations in all direction -yes  AP talocrural mobs NV - yes   ROM/Flexibility R knee PROM flex/ext (n)  R HS, gastroc, soleus (n)  Passive soleus and gastroc stretch, pt prone (n)   Myofascial Decompression    Ther. Exercise  CPT 22859                    Total Time for Session 23-37 Minutes     Sets Reps Load Comment    RB yes 1 6 min  [employer], Seat 19, [place] Dash.            Slantboard gastroc stretch no 1 3 30sec    Slantboard soleus stretch no 1 3 30sec     Hamstring stretch no 1 3 30sec Supine with strap    Quad set no 1 10 10 sec     SAQ Yes  2 10 3 sec, 2 # Bilateral, cues to get to end range    SLR yes 2 10 Trial 1/2# NV RLE only, quad set prior to lift - watch for extensor lag   Sidelying hip ABD  2 10  RLE only   sidelying clamshell   2 10     Standing hip  abduction  2 10 each  In // bars. Holding with UE   Hip adduction yes 2 10 3s hold Supine   Hip ABD yes 1 10 Pink Supine   LAQ  1 15 3s hold    Bridge  yes 2 10  Cues to  Achieve  full ROM, trial with add NV   STS Yes  yes 1  1 10  10  Mat at 22 inches  Mat at 21 inches   Toe taps  2 5 6inch 1 finger tip support   FSU  2 10 6 inch    LSU  1 10 6''    Lateral sidestepping  1  6'    stairs n   6 inch FF of stairs with reciprocal legs , cane and bannister , CS   Prone quad   stretch    Prone hamcurls no      n  30''x3      x10     Standing hip ext n 1 10  B   Standing hip ABD n 1 10  B    Standing hip flexion n 1 10  B                                                  Neuro Re-Ed  CPT 73886  Total Time for Session 8-22 Minutes     Sets Reps Load Comment   FT HT/HN n    Firm    FT EO yes 3 30sec  Firm   FA EC yes 3 30sec  Firm   FA EO yes 1 30sec  FOAM   Tandem  nv 1 30sec  Floor           Pre gait exercises        Cesia step over                                  Gait  CPT 38489  Total Time for Session 0-7 Minutes   Gait training with SPC and LLE AFO donned  yes     Patient ambulates 300 ft w/ SPC, LLE AFO, and CS. Patient demonstrates improved BLE step length, improved BLE weight shift, improved LLE clearance, and overall improvement in BLE gait mechanics. VC for upright posture, to look ahead.             Ther. Activity  CPT 11336  Total Time for Session 0-7 Minutes   VS, falls, medication review, subjective review yes Reviewed all with pt    Patient education               no Educated pt on rationale of treatment with manual therapy, on use of compression socks for swelling control, on retrograde massage with elevation, on technique/form of exercises, on sit <> stand mechanics, on POC, review of HEP - pt verbalized and demonstrated understanding     Educated on gait mechanics and drop foot contribution/need for SPC and rationale to promote improved safety and independence both short and likely long term. Reviewed donning/doffing new shoe attached DF assist brace pt brought - pt needs reinforcement to become independent.     Patient educated on re-examination findings, continued progression of POC, continued compliance with HEP, PT rec of continued use of SPC at this time,    HEP      Issued patient HEP including hip adduction, hip ABD, LAQ and STS with use of BUE    Updated HEP to include 3 way hip, hamcurls and step ups   30 sec STS  See Assessment   other  4/24/2023- donning soft brace for L foot drop.   Seated hip abduction / add 10x bilat to simulate going from accelerator to brake in car.    ADRIANO/LIDIA Arredondo done 4/12/2023 26/56   R knee circumference assessment     R knee AROM n See AROM chapter   Stairs training n        n Patient ascends/descends 4 6 inch steps w/ reciprocal pattern, SPC, and unilateral rail, CS x2 trials.    Patient ascends/descends 4 6 inch steps w/ reciprocal pattern and unilateral rail, CS-Fay and gait belt, min-mod verbal cues for toe clearance (noted compensation of B foot ER to clear BLE)   Ramp training n 5 trials ascending and descending ramp with SPC and Fay. Patient demonstrates difficulty controlling deceleration when descending.    Curb training n 5 trials ascending and descending with SPC, initially req Fay with mod verbal cues and demonstration, progressed to supervision with no verbal cueing.    PN 5/5/23  All subjective and objective measurements completed    Gait assessment with LLE AFO donned n Patient IND in donning/doffing LLE posterior leafspring AFO        Group  CPT 93310 Total Time for Session Not performed

## 2023-05-19 ENCOUNTER — HOSPITAL ENCOUNTER (OUTPATIENT)
Dept: PHYSICAL THERAPY | Facility: REHABILITATION | Age: 63
Setting detail: THERAPIES SERIES
Discharge: HOME | End: 2023-05-19
Attending: ORTHOPAEDIC SURGERY
Payer: COMMERCIAL

## 2023-05-19 ENCOUNTER — APPOINTMENT (OUTPATIENT)
Dept: OTHER | Facility: REHABILITATION | Age: 63
Setting detail: THERAPIES SERIES
End: 2023-05-19
Payer: COMMERCIAL

## 2023-05-19 DIAGNOSIS — Z96.651 S/P TOTAL KNEE ARTHROPLASTY, RIGHT: Primary | ICD-10-CM

## 2023-05-19 PROCEDURE — 97110 THERAPEUTIC EXERCISES: CPT | Mod: GP

## 2023-05-19 PROCEDURE — 97140 MANUAL THERAPY 1/> REGIONS: CPT | Mod: GP

## 2023-05-19 PROCEDURE — 97112 NEUROMUSCULAR REEDUCATION: CPT | Mod: GP

## 2023-05-19 NOTE — OP PT TREATMENT LOG
IE 4/3/23   30 Day 5/3/23   60 Day 6/3/23   90 Day 7/3/23   Precautions LLE foot drop- has LLE AFO but does not always wear    Insurance Auth    Treatment  Current Session Time   Modalities Total Time for Session Not performed   Heat/Ice  CPT 01052 CP to R knee post session   E. Stimulation Manual  CPT 19132    E. Stim Unattended  CPT 17093    Manual   CPT 47781 Total Time for Session 8-22 Minutes   STM/MFR/TPR R gastroc/soleus, hamstring -yes  R LE retrograde massage with elevation to reduce edema - no   Instrument Assisted STM     Mobilizations Gr II/III R patellar mobilizations in all direction -yes  AP talocrural mobs NV - yes   ROM/Flexibility R knee PROM flex/ext (n)  R HS, gastroc, soleus (n)  Passive soleus and gastroc stretch, pt prone (n)   Myofascial Decompression    Ther. Exercise  CPT 32592                    Total Time for Session 23-37 Minutes     Sets Reps Load Comment    RB no 1 6 min  [employer], Se[address] 19, [place] Dash.            Slantboard gastroc stretch no 1 3 30sec    Slantboard soleus stretch no 1 3 30sec     Hamstring stretch no 1 3 30sec Supine with strap    Quad set no 1 10 10 sec     SAQ Yes  2 10 3 sec,  3# Bilateral, cues to get to end range    SLR yes 2 10 0# RLE only, quad set prior to lift - watch for extensor lag   Sidelying hip ABD  2 10  RLE only   sidelying clamshell   2 10     Standing hip  abduction  2 10 each  In // bars. Holding with UE   Hip adduction yes 2 10 3s hold Supine   Hip ABD yes 1 10 [place] Supine   LAQ NV 1 15 3s hold    Bridge with hip add yes 2 10  Cues to  Achieve  full ROM, trial with add NV   STS no BUE use Yes  yes 1  1 10  10  Mat at 22 inches  Mat at 21 inches   Toe taps  2 5 6inch 1 finger tip support   FSU NV 2 10 6 inch    LSU NV 1 10 6''    Lateral sidestepping  1  6'    stairs n   6 inch FF of stairs with reciprocal legs , cane and bannister , CS   Prone quad   stretch    Prone hamcurls no      n  30''x3      x10     Standing hip ext n 1 10  B    Standing hip ABD n 1 10  B   Standing hip flexion n 1 10  B                                                  Neuro Re-Ed  CPT 20583  Total Time for Session 8-22 Minutes     Sets Reps Load Comment   FT HT/HN n    Firm    FT EO yes 3 30sec  Firm   FA EC yes 3 30sec  Firm   FA EO yes 1 30sec  FOAM   Modified tandem yes 1 30sec  Firm   Tandem  nv 1 30sec  Floor           Pre gait exercises        Cesia step over                                  Gait  CPT 06519  Total Time for Session Not performed   Gait training with SPC and LLE AFO donned  yes     Patient ambulates 300 ft w/ SPC, LLE AFO, and CS. Patient demonstrates improved BLE step length, improved BLE weight shift, improved LLE clearance, and overall improvement in BLE gait mechanics. VC for upright posture, to look ahead.             Ther. Activity  CPT 63308  Total Time for Session 0-7 Minutes   VS, falls, medication review, subjective review yes Reviewed all with pt    Patient education               no Educated pt on rationale of treatment with manual therapy, on use of compression socks for swelling control, on retrograde massage with elevation, on technique/form of exercises, on sit <> stand mechanics, on POC, review of HEP - pt verbalized and demonstrated understanding     Educated on gait mechanics and drop foot contribution/need for SPC and rationale to promote improved safety and independence both short and likely long term. Reviewed donning/doffing new shoe attached DF assist brace pt brought - pt needs reinforcement to become independent.     Patient educated on re-examination findings, continued progression of POC, continued compliance with HEP, PT rec of continued use of SPC at this time,    HEP      Issued patient HEP including hip adduction, hip ABD, LAQ and STS with use of BUE    Updated HEP to include 3 way hip, hamcurls and step ups   30 sec STS  See Assessment   other  4/24/2023- donning soft brace for L foot drop.   Seated hip abduction /  add 10x bilat to simulate going from accelerator to brake in car.   ADRIANO/LIDIA Arredondo done 4/12/2023 26/56   R knee circumference assessment     R knee AROM n See AROM chapter   Stairs training n        n Patient ascends/descends 4 6 inch steps w/ reciprocal pattern, SPC, and unilateral rail, CS x2 trials.    Patient ascends/descends 4 6 inch steps w/ reciprocal pattern and unilateral rail, CS-Fay and gait belt, min-mod verbal cues for toe clearance (noted compensation of B foot ER to clear BLE)   Ramp training n 5 trials ascending and descending ramp with SPC and Fay. Patient demonstrates difficulty controlling deceleration when descending.    Curb training n 5 trials ascending and descending with SPC, initially req Fay with mod verbal cues and demonstration, progressed to supervision with no verbal cueing.    PN 5/5/23  All subjective and objective measurements completed    Gait assessment with LLE AFO donned n Patient IND in donning/doffing LLE posterior leafspring AFO        Group  CPT 22132 Total Time for Session Not performed

## 2023-05-19 NOTE — PROGRESS NOTES
Physical Therapy Visit    PT DAILY NOTE FOR OUTPATIENT THERAPY    Patient: Bebe Burks MRN: [id number]  : 1960 62 y.o.  Referring Physician: Gideon Centeno MD  Date of Visit: 2023    Certification Dates: 23 through 23    Diagnosis:   1. S/P total knee arthroplasty, right        Chief Complaints:  R TKA    Precautions:   Existing Precautions/Restrictions: other (see comments)  Precautions comments: LLE foot drop - has LLE AFO will bring NV following IE      TODAY'S VISIT    Time In Session:  Start Time: 905  Stop Time: 1000  Time Calculation (min): 55 min   History/Vitals/Pain/Encounter Info - 23 1119        Injury History/Precautions/Daily Required Info    Document Type daily treatment     Primary Therapist Caitlyn Lagunas, PT     Chief Complaint/Reason for Visit  R TKA     Referring Physician Dr. Centeno     Existing Precautions/Restrictions other (see comments)     Precautions comments LLE foot drop - has LLE AFO will bring NV following IE     History of present illness/functional impairment Bebe is a 63 yo female who presents to OPPT s/p R TKA 23 w/ Dr. Centeno at [facility] (states no complications during/post surgery). Patient reports she had a 2 night stay at [facility] and then was discharged to [facility] where she stayed for 5-6 nights. She was discharged home with home health PT and OT, she states home health PT stopped last Friday 3/31/23. She reports at baseline she ambulates without AD, but started using SPC about 6 months prior to surgery due to pain/weakness. She states at this time she is ambulating with SPC for household distances and RW for community distances. She reports she lives alone in single level [facility] with 4 JOSE RAFAEL w/ unilateral rail. She states she has a walkin shower with shower chair and grab bars. She reports since her R knee surgery her ability to ascend/descend stairs is difficult, she feels like her walking is impaired, and her balance/endurance decreased. She  reports significant PMHx of LLE drop foot (which they are contributing to sciatica, states it began about 3 years ago) which she wears LLE brace for which was issued to her by Peter in September 2022, LBP w/ LLE radicular symptoms, DMII (controlled), HTN (controlled). Patient states her overall goal for PT is to walk again without a SPC. Patient reports she is currently working but on long term leave at this time. Patient reports current R knee pain 0/10 and pain at worst in the past week 1/10.     Patient/Family/Caregiver Comments/Observations Patient arrives she was sick earlier this week so is feeling wiped out today.     Patient reported fall since last visit No        Pain Assessment    Currently in pain No/Denies        Pain Intervention    Intervention  n/a     Post Intervention Comments n/a        Pre Activity Vital Signs    Oxygen Therapy None (Room air)         Services    Do You Speak a Language Other Than English at Home? no                Daily Treatment Assessment and Plan - 05/19/23 1119        Daily Treatment Assessment and Plan    Progress toward goals Progressing     Daily Outcome Summary Deferred active warm up on recumbant bike this date due to patient reporting feeling worn out upon arrival due to sickness earlier in the week. Initiated session with MT interventions, followed by mat Cesar focusing on BLE strengthening. Patient continues to demonstrate intermittent RLE quad lag during SLR- will benefit from continued focus on quad strengthening and monitoring this to progress toward maximal RLE strength. Followed mat Cesar with seated Cesar including STS and hamstring curl. Ended session with static NMRE which patient will benefit from focus of in future sessions.     Plan and Recommendations Continue per primary therapist POC: Progress static/dyn balance, progress sit <> stand challenge to progress toward able to complete without BUE.                     OBJECTIVE DATA TAKEN TODAY:    None  taken    Today's Treatment:    IE 4/3/23   30 Day 5/3/23   60 Day 6/3/23   90 Day 7/3/23   Precautions LLE foot drop- has LLE AFO but does not always wear    Insurance Auth    Treatment  Current Session Time   Modalities Total Time for Session Not performed   Heat/Ice  CPT 54525 CP to R knee post session   E. Stimulation Manual  CPT 80285    E. Stim Unattended  CPT 74358    Manual   CPT 15027 Total Time for Session 8-22 Minutes   STM/MFR/TPR R gastroc/soleus, hamstring -yes  R LE retrograde massage with elevation to reduce edema - no   Instrument Assisted STM     Mobilizations Gr II/III R patellar mobilizations in all direction -yes  AP talocrural mobs NV - yes   ROM/Flexibility R knee PROM flex/ext (n)  R HS, gastroc, soleus (n)  Passive soleus and gastroc stretch, pt prone (n)   Myofascial Decompression    Ther. Exercise  CPT 76210                    Total Time for Session 23-37 Minutes     Sets Reps Load Comment    RB no 1 6 min  [employer] Bike, Seat 19, Garvin Dash.            Slantboard gastroc stretch no 1 3 30sec    Slantboard soleus stretch no 1 3 30sec     Hamstring stretch no 1 3 30sec Supine with strap    Quad set no 1 10 10 sec     SAQ Yes  2 10 3 sec,  3# Bilateral, cues to get to end range    SLR yes 2 10 0# RLE only, quad set prior to lift - watch for extensor lag   Sidelying hip ABD  2 10  RLE only   sidelying clamshell   2 10     Standing hip  abduction  2 10 each  In // bars. Holding with UE   Hip adduction yes 2 10 3s hold Supine   Hip ABD yes 1 10 [place] Supine   LAQ NV 1 15 3s hold    Bridge with hip add yes 2 10  Cues to  Achieve  full ROM, trial with add NV   STS no BUE use Yes  yes 1  1 10  10  Mat at 22 inches  Mat at 21 inches   Toe taps  2 5 6inch 1 finger tip support   FSU NV 2 10 6 inch    LSU NV 1 10 6''    Lateral sidestepping  1  6'    stairs n   6 inch FF of stairs with reciprocal legs , cane and bannister , CS   Prone quad   stretch    Prone hamcurls no      n  30''x3      x10      Standing hip ext n 1 10  B   Standing hip ABD n 1 10  B   Standing hip flexion n 1 10  B                                                  Neuro Re-Ed  CPT 22953  Total Time for Session 8-22 Minutes     Sets Reps Load Comment   FT HT/HN n    Firm    FT EO yes 3 30sec  Firm   FA EC yes 3 30sec  Firm   FA EO yes 1 30sec  FOAM   Modified tandem yes 1 30sec  Firm   Tandem  nv 1 30sec  Floor           Pre gait exercises        Cesia step over                                  Gait  CPT 98676  Total Time for Session Not performed   Gait training with SPC and LLE AFO donned  yes     Patient ambulates 300 ft w/ SPC, LLE AFO, and CS. Patient demonstrates improved BLE step length, improved BLE weight shift, improved LLE clearance, and overall improvement in BLE gait mechanics. VC for upright posture, to look ahead.             Ther. Activity  CPT 61325  Total Time for Session 0-7 Minutes   VS, falls, medication review, subjective review yes Reviewed all with pt    Patient education               no Educated pt on rationale of treatment with manual therapy, on use of compression socks for swelling control, on retrograde massage with elevation, on technique/form of exercises, on sit <> stand mechanics, on POC, review of HEP - pt verbalized and demonstrated understanding     Educated on gait mechanics and drop foot contribution/need for SPC and rationale to promote improved safety and independence both short and likely long term. Reviewed donning/doffing new shoe attached DF assist brace pt brought - pt needs reinforcement to become independent.     Patient educated on re-examination findings, continued progression of POC, continued compliance with HEP, PT rec of continued use of SPC at this time,    HEP      Issued patient HEP including hip adduction, hip ABD, LAQ and STS with use of BUE    Updated HEP to include 3 way hip, hamcurls and step ups   30 sec STS  See Assessment   other  4/24/2023- donning soft brace for L foot  drop.   Seated hip abduction / add 10x bilat to simulate going from accelerator to brake in car.   ADRIANO/LIDIA Arredondo done 4/12/2023 26/56   R knee circumference assessment     R knee AROM n See AROM chapter   Stairs training n        n Patient ascends/descends 4 6 inch steps w/ reciprocal pattern, SPC, and unilateral rail, CS x2 trials.    Patient ascends/descends 4 6 inch steps w/ reciprocal pattern and unilateral rail, CS-Fay and gait belt, min-mod verbal cues for toe clearance (noted compensation of B foot ER to clear BLE)   Ramp training n 5 trials ascending and descending ramp with SPC and Fay. Patient demonstrates difficulty controlling deceleration when descending.    Curb training n 5 trials ascending and descending with SPC, initially req Fay with mod verbal cues and demonstration, progressed to supervision with no verbal cueing.    PN 5/5/23  All subjective and objective measurements completed    Gait assessment with LLE AFO donned n Patient IND in donning/doffing LLE posterior leafspring AFO        Group  CPT 06200 Total Time for Session Not performed

## 2023-05-22 ENCOUNTER — APPOINTMENT (OUTPATIENT)
Dept: OTHER | Facility: REHABILITATION | Age: 63
Setting detail: THERAPIES SERIES
End: 2023-05-22
Payer: COMMERCIAL

## 2023-05-22 ENCOUNTER — HOSPITAL ENCOUNTER (OUTPATIENT)
Dept: PHYSICAL THERAPY | Facility: REHABILITATION | Age: 63
Setting detail: THERAPIES SERIES
Discharge: HOME | End: 2023-05-22
Attending: ORTHOPAEDIC SURGERY
Payer: COMMERCIAL

## 2023-05-22 DIAGNOSIS — Z96.651 S/P TOTAL KNEE ARTHROPLASTY, RIGHT: Primary | ICD-10-CM

## 2023-05-22 PROCEDURE — 97112 NEUROMUSCULAR REEDUCATION: CPT | Mod: GP

## 2023-05-22 PROCEDURE — 97530 THERAPEUTIC ACTIVITIES: CPT | Mod: GP

## 2023-05-22 PROCEDURE — 97110 THERAPEUTIC EXERCISES: CPT | Mod: GP

## 2023-05-22 ASSESSMENT — BALANCE ASSESSMENTS: TOTAL SCORE: 39

## 2023-05-22 NOTE — OP PT TREATMENT LOG
IE 4/3/23   30 Day 5/3/23   60 Day 6/3/23   90 Day 7/3/23   Precautions LLE foot drop- has LLE AFO but does not always wear    Insurance Auth    Treatment  Current Session Time   Modalities Total Time for Session Not performed   Heat/Ice  CPT 78958 CP to R knee post session   E. Stimulation Manual  CPT 90857    E. Stim Unattended  CPT 69080    Manual   CPT 27575 Total Time for Session Not performed   STM/MFR/TPR R gastroc/soleus, hamstring -yes  R LE retrograde massage with elevation to reduce edema - no   Instrument Assisted STM     Mobilizations Gr II/III R patellar mobilizations in all direction -yes  AP talocrural mobs NV - yes   ROM/Flexibility R knee PROM flex/ext (n)  R HS, gastroc, soleus (n)  Passive soleus and gastroc stretch, pt prone (n)   Myofascial Decompression    Ther. Exercise  CPT 17706                    Total Time for Session 8-22 Minutes     Sets Reps Load Comment    RB yes 1 8 min  [employer] Bike, Seat 19, [place] Dash.            Slantboard gastroc stretch no 1 3 30sec    Slantboard soleus stretch no 1 3 30sec     Hamstring stretch no 1 3 30sec Supine with strap    Quad set no 1 10 10 sec     SAQ Yes  2 10 3 sec,  3# Bilateral, cues to get to end range    SLR yes 2 10 0# RLE only, quad set prior to lift - watch for extensor lag   Sidelying hip ABD  2 10  RLE only   sidelying clamshell   2 10     Standing hip  abduction  2 10 each  In // bars. Holding with UE   Hip adduction yes 2 10 3s hold Supine   Hip ABD yes 1 10 [place] Supine   LAQ NV 1 15 3s hold    Bridge with hip add yes 2 10  Cues to  Achieve  full ROM, trial with add NV   STS no BUE use Yes  yes 1  1 10  10  Mat at 22 inches  Mat at 21 inches   Toe taps  2 5 6inch 1 finger tip support   FSU NV 2 10 6 inch    LSU NV 1 10 6''    Lateral sidestepping  1  6'    stairs n   6 inch FF of stairs with reciprocal legs , cane and bannister , CS   Prone quad   stretch    Prone hamcurls no      n  30''x3      x10     Standing hip ext n 1 10  B    Standing hip ABD n 1 10  B   Standing hip flexion n 1 10  B                                                  Neuro Re-Ed  CPT 40657  Total Time for Session 8-22 Minutes     Sets Reps Load Comment   FT HT/HN n    Firm    FT EO yes 3 30sec  Firm   FA EC yes 3 30sec  Firm   FA EO yes 1 30sec  FOAM   Modified tandem yes 1 30sec  Firm   Tandem  nv 1 30sec  Floor           Pre gait exercises        Cesia step over                                  Gait  CPT 25685  Total Time for Session Not performed   Gait training with SPC and LLE AFO donned  yes     Patient ambulates 300 ft w/ SPC, LLE AFO, and CS. Patient demonstrates improved BLE step length, improved BLE weight shift, improved LLE clearance, and overall improvement in BLE gait mechanics. VC for upright posture, to look ahead.             Ther. Activity  CPT 34733  Total Time for Session 23-37 Minutes   VS, falls, medication review, subjective review yes Reviewed all with pt    Patient education                                     yes Educated pt on rationale of treatment with manual therapy, on use of compression socks for swelling control, on retrograde massage with elevation, on technique/form of exercises, on sit <> stand mechanics, on POC, review of HEP - pt verbalized and demonstrated understanding     Educated on gait mechanics and drop foot contribution/need for SPC and rationale to promote improved safety and independence both short and likely long term. Reviewed donning/doffing new shoe attached DF assist brace pt brought - pt needs reinforcement to become independent.     Patient educated on re-examination findings, continued progression of POC, continued compliance with HEP, PT rec of continued use of SPC at this time    Patient educated on re-examination findings, continued focus of POC, fall prevention strategies    HEP      Issued patient HEP including hip adduction, hip ABD, LAQ and STS with use of BUE    Updated HEP to include 3 way hip, hamcurls  and step ups   30 sec STS  See Assessment   other  4/24/2023- donning soft brace for L foot drop.   Seated hip abduction / add 10x bilat to simulate going from accelerator to brake in car.   ADRIANO/LIDIA Arredondo done 4/12/2023 26/56   R knee circumference assessment     R knee AROM n See AROM chapter   Stairs training n        n Patient ascends/descends 4 6 inch steps w/ reciprocal pattern, SPC, and unilateral rail, CS x2 trials.    Patient ascends/descends 4 6 inch steps w/ reciprocal pattern and unilateral rail, CS-Fay and gait belt, min-mod verbal cues for toe clearance (noted compensation of B foot ER to clear BLE)   Ramp training n 5 trials ascending and descending ramp with SPC and Fay. Patient demonstrates difficulty controlling deceleration when descending.    Curb training n 5 trials ascending and descending with SPC, initially req Fay with mod verbal cues and demonstration, progressed to supervision with no verbal cueing.    PN 5/22/23 yes All subjective and objective measurements completed    Gait assessment with LLE AFO donned n Patient IND in donning/doffing LLE posterior leafspring AFO        Group  CPT 23892 Total Time for Session Not performed

## 2023-05-22 NOTE — PROGRESS NOTES
Physical Therapy Progress Note    PT PROGRESS NOTE FOR OUTPATIENT THERAPY    Patient: Bebe Burks MRN: [id number]  : 1960 62 y.o.  Referring Physician: Gideon Centeno MD  Date of Visit: 2023      Certification Dates: 23 through 23    Recommended Frequency & Duration:  3 times/week for up to 3 months   Begin with 3x/week and transition to 2x/week when appropriate      Diagnosis:   1. S/P total knee arthroplasty, right        Chief Complaints:  Chief Complaint   Patient presents with   • Difficulty Walking   • Balance Deficits   • Dec ROM   • Pain   • Dec Strength   • Decreased Endurance   •  Decreased Community Integration   • Decreased recreational/play activity   • Decreased Mobility       Precautions:   Existing Precautions/Restrictions: other (see comments)  Precautions comments: LLE foot drop - has LLE AFO will bring NV following IE      TODAY'S VISIT:    Time In Session:  Start Time: 1000  Stop Time: 1100  Time Calculation (min): 60 min   General Information - 23 0953        Session Details    Document Type progress note     Mode of Treatment individual therapy        General Information    Referring Physician Dr. Centeno     History of present illness/functional impairment Bebe is a 63 yo female who presents to OPPT s/p R TKA 23 w/ Dr. Centeno at [facility] (states no complications during/post surgery). Patient reports she had a 2 night stay at [facility] and then was discharged to [facility] where she stayed for 5-6 nights. She was discharged home with home health PT and OT, she states home health PT stopped last Friday 3/31/23. She reports at baseline she ambulates without AD, but started using SPC about 6 months prior to surgery due to pain/weakness. She states at this time she is ambulating with SPC for household distances and RW for community distances. She reports she lives alone in single level [facility] with 4 JOSE RAFAEL w/ unilateral rail. She states she has a walkin shower with shower  chair and grab bars. She reports since her R knee surgery her ability to ascend/descend stairs is difficult, she feels like her walking is impaired, and her balance/endurance decreased. She reports significant PMHx of LLE drop foot (which they are contributing to sciatica, states it began about 3 years ago) which she wears LLE brace for which was issued to her by Peter in September 2022, LBP w/ LLE radicular symptoms, DMII (controlled), HTN (controlled). Patient states her overall goal for PT is to walk again without a SPC. Patient reports she is currently working but on long term leave at this time. Patient reports current R knee pain 0/10 and pain at worst in the past week 1/10.     Patient/Family/Caregiver Comments/Observations Patient arrives she had a busy weekend but is feeling okay coming in today. She denies pain, falls, or changes in medication.     Existing Precautions/Restrictions other (see comments)     Precautions comments LLE foot drop - has LLE AFO will bring NV following IE         Services    Do You Speak a Language Other Than English at Home? no                Daily Falls Screen - 05/22/23 0953        Daily Falls Assessment    Patient reported fall since last visit No                Pain/Vitals - 05/22/23 0953        Pain Assessment    Currently in pain No/Denies        Pre Activity Vital Signs    Pulse 82     SpO2 98 %     Oxygen Therapy None (Room air)     /64     BP Location Left upper arm     BP Method Manual     Patient Position Sitting        Pain Intervention    Intervention  n/a     Post Intervention Comments n/a                PT - 05/22/23 0953        Physical Therapy    Physical Therapy [facility] PT        PT Plan    Frequency of treatment 3 times/week     PT Duration 3 months     PT Custom Frequency and Duration Begin with 3x/week and transition to 2x/week when appropriate     PT Cert From 04/03/23     PT Cert To 06/26/23     Date PT POC was sent to  provider 04/03/23     Signed PT Plan of Care received?  Yes                Assessment and Plan - 05/22/23 1257        Assessment    Plan of Care reviewed and patient/family in agreement Yes     System Pathology/Pathophysiology Noted musculoskeletal     Functional Limitations in Following Categories (PT Eval) self-care;community/leisure;home management;work     Rehab Potential/Prognosis good, to achieve stated therapy goals     Problem List decreased flexibility;decreased endurance;decreased ROM;decreased strength;pain;impaired motor control;impaired sensation;impaired balance     Clinical Assessment Bebe is a 61 yo female who presents today for re-examination following R TKA. Re-examination findings reveal improved BLE strength, maintained 30 sec STS scoring, improved OH outcome measure testing indicating improved static balance, improved PSFS outcome measure scoring in regards to prolonged standing and daily activities, and patient subjective report of overall improved function. Patient continues to be limited overall due to LLE foot drop, will continue to monitor. Patient will continue to benefit from skilled PT 2x/week in order to address remaining deficits, progress toward stated goals, and return to highest level of function.     Plan and Recommendations Continue per primary therapist POC: Progress static/dyn balance, progress sit <> stand challenge to progress toward able to complete without BUE.     Planned Services CPT 11205 Gait training;CPT 57023 Manual therapy;CPT 88447 Neuromuscular Reeducation;CPT 62973 Orthotics training (Initial encounter);CPT 24774 Orthotics/Prosthetics management and training (Subsequent encounters);CPT 13934 Therapeutic activities;CPT 19178 Therapeutic exercises;CPT 44476 Electrical stimulation ATTENDED;CPT 03508 Electrical stimulation UNATTENDED;CPT 07343 Hot/Cold Packs therapy     Comments/Additional Services IASTM, taping                 OBJECTIVE  MEASUREMENTS/DATA:        MMT    Manual Muscle Tests - 05/22/23 1257        RIGHT: Lower Extremity Manual Muscle Test Assessment    Hip Flexion gross movement (4-/5) good minus     Hip Extension gross movement (4-/5) good minus     Hip Abduction gross movement (4-/5) good minus     Hip Adduction gross movement (4-/5) good minus     Hip Internal Rotation gross movement (4-/5) good minus     Hip External Rotation gross movement (4-/5) good minus     Knee Flexion strength (4-/5) good minus     Knee Extension strength (4-/5) good minus     Ankle Dorsiflexion gross movement (4-/5) good minus     Ankle Plantarflexion gross movement (4-/5) good minus        LEFT: Lower Extremity Manual Muscle Test Assessment    Hip Flexion gross movement (4-/5) good minus     Hip Extension gross movement (3+/5) fair plus     Hip Abduction gross movement (4-/5) good minus     Hip Adduction gross movement (4-/5) good minus     Hip Internal Rotation gross movement (3+/5) fair plus     Hip External Rotation gross movement (3+/5) fair plus     Knee Flexion strength (4-/5) good minus     Knee Extension strength (4-/5) good minus     Ankle Dorsiflexion gross movement (3-/5) fair minus     Ankle Plantarflexion gross movement (3-/5) fair minus               Palpation    Palpation - 05/22/23 0953        Palpation    LE Palpation  (-) TTP to tabby-incisional area of R knee               Gait and Mobility    Gait and Mobility - 05/22/23 1257        Gait Training    Bacon, Gait modified independence     Variable surfaces Flat surface     Assistive Device cane, straight     Deviations/Abnormal Patterns left sided deviations;delta decreased;gait speed decreased;stride length decreased;step length decreased;weight shifting decreased     Comment (Gait/Stairs) Patient ambulates mod(I) with SPC.        Gait/Stairs (Locomotion)    Left Sided Gait Deviations foot drop/toe drag               Outcome Measures    PT Outcome Measures - 05/22/23 0920         Objective Outcome Measures    6 Minute Walk Test Not assessed 5/22/23- (previously 811 ft w/ SPC, CS and gait belt, LLE brace donned)     2 Minute Walk Test TBD at next PN     30 Second Sit to Stand Test 10 repetitions   from standard red/purple chair w/ arms, use of BUE due to pt unable to complete without       Subjective Outcome Measures    LEFS 35/80= 43% (previously 43/80=53%)        Person Balance Scale    Sitting to Standing Able to stand independently using hands     Standing Unsupported Able to stand safely for 2 minutes     Sitting with Back Unsupported but Feet Supported on Floor or on a Stool Able to sit safely and securely for 2 minutes     Standing to Sitting Controls descent by using hands     Transfers Able to transfer safely definite need of hands     Standing Unsupported with Eyes Closed Able to stand 10 seconds with supervision     Standing Unsupported with Feet Together Able to place feet together independently and stand 1 minute safely     Reach Forward with Outstretched Arm While Standing Can reach forward 12 cm (5 inches)      Object from Floor from a Standing Position Able to  slipper but needs supervision     Turning to Look Behind Over Left and Right Shoulders While Standing Looks behind one side only other side shows less weight shift     Turn 360 Degrees Able to turn 360 degrees safely but slowly     Place Alternate Foot on Step or Stool While Standing Unsupported Able to complete 4 steps without aid with supervision     Standing Unsupported One Foot in Front Needs help to step but can hold 15 seconds     Standing on One Leg Tries to lift leg unable to hold 3 seconds but remains standing independently     Person Balance Score 39        NEW (2/6/23):  PSFS     PSFS ANSWER 1 4   previously 5/10    PSFS ACTIVITY 2 Walking     PSFS ANSWER 2 4   previously 5/10    PSFS ACTIVITY 3 Kitchen activities/prolonged standing     PSFS ANSWER 3 8   previously 6/10    PSFS Sum of Activity  Scores 16     PSFS Number of Activities 3     PSFS Percent Score 53.33 %                 ROM and MMT        4/3/2023 5/5/2023    09:20 5/11/2023    12:00 5/22/2023    12:57   PT LE ROM Measurements   AROM: Right Hip Flexion 74 degrees      AROM: Left Hip Flexion 104 degrees      AROM: Right Knee Flexion 101  125    AROM: Left Knee Flexion 131      AROM: Right Knee Extension --        lacking about 7 deg knee ext  0    AROM: Left Knee Extension 0      AROM: Right Ankle DF --        lacking 10 deg      AROM: Left Ankle DF --        lacking ~10 deg DF      PT Cervical/Lumbar/Other ROM Measurements   Right Knee Girth measurement NV      Left Knee Girth measurement NV      PT LE MMT   Right Hip Flexion (3+/5) fair plus (4-/5) good minus  (4-/5) good minus   Left Hip Flexion (3+/5) fair plus (4-/5) good minus  (4-/5) good minus   Right Hip Extension (3+/5) fair plus (4-/5) good minus  (4-/5) good minus   Left Hip Extension (3+/5) fair plus (3+/5) fair plus  (3+/5) fair plus   Right Hip ABD (3+/5) fair plus (4-/5) good minus  (4-/5) good minus   Left Hip ABD (3+/5) fair plus (4-/5) good minus  (4-/5) good minus   Right Hip ADD (3+/5) fair plus (4-/5) good minus  (4-/5) good minus   Left Hip ADD (3+/5) fair plus (4-/5) good minus  (4-/5) good minus   Right Hip IR (3+/5) fair plus (4-/5) good minus  (4-/5) good minus   Left Hip IR (3/5) fair (3+/5) fair plus  (3+/5) fair plus   Right Hip ER (3+/5) fair plus (4-/5) good minus  (4-/5) good minus   Left Hip ER (3/5) fair (3+/5) fair plus  (3+/5) fair plus   Right Knee Flexion (3+/5) fair plus (4-/5) good minus  (4-/5) good minus   Left Knee Flexion (3+/5) fair plus (4-/5) good minus  (4-/5) good minus   Right Knee Extension (3+/5) fair plus (4-/5) good minus  (4-/5) good minus   Left Knee Extension (3+/5) fair plus (4-/5) good minus  (4-/5) good minus   Right Ankle DF (3+/5) fair plus (4-/5) good minus  (4-/5) good minus   Left Ankle DF (3-/5) fair minus (3-/5) fair minus   (3-/5) fair minus   Right Ankle PF (3+/5) fair plus (4-/5) good minus  (4-/5) good minus   Left Ankle PF (3-/5) fair minus (3-/5) fair minus  (3-/5) fair minus     Outcome Measures        4/3/2023    10:03 4/6/2023    14:26 4/12/2023    11:52 5/5/2023    09:20 5/22/2023    09:53   PT OBJECTIVE Outcome Measures   6 Minute Walk Test  ft w/ RW, CS and gait belt  811 ft w/ SPC, CS and gait belt, LLE brace donned (previously: 804 ft w/ RW, CS and gait belt) Not assessed 5/22/23- (previously 811 ft w/ SPC, CS and gait belt, LLE brace donned)   2 Minute Walk Test  TBD at PN  TBD at next PN TBD at next PN   Five Times Sit to Stand NV       30 Second Sit to Stand  8 repetitions       from standard red/purple chair w/ arms, use of BUE due to pt unable to complete without  10 repetitions       from standard red/purple chair w/ arms, use of BUE due to pt unable to complete without 10 repetitions       from standard red/purple chair w/ arms, use of BUE due to pt unable to complete without   Person Balance Score   26 33 39   TUG - Results NV       PT SUBJECTIVE Outcome Measures   LEFS 21/80 (26%)   43/80= 53% (previously 21/80=26%) 35/80= 43% (previously 43/80=53%)   PSFS % Score 26.67 % 26.67 %  53.33 % 53.33 %       Today's Treatment::    Education provided:  Yes: See treatment log for details of education provided    IE 4/3/23   30 Day 5/3/23   60 Day 6/3/23   90 Day 7/3/23   Precautions LLE foot drop- has LLE AFO but does not always wear    Insurance Auth    Treatment  Current Session Time   Modalities Total Time for Session Not performed   Heat/Ice  CPT 47899 CP to R knee post session   E. Stimulation Manual  CPT 51802    E. Stim Unattended  CPT 70381    Manual   CPT 93487 Total Time for Session Not performed   STM/MFR/TPR R gastroc/soleus, hamstring -yes  R LE retrograde massage with elevation to reduce edema - no   Instrument Assisted STM     Mobilizations Gr II/III R patellar mobilizations in all direction -yes  AP  talocrural mobs NV - yes   ROM/Flexibility R knee PROM flex/ext (n)  R HS, gastroc, soleus (n)  Passive soleus and gastroc stretch, pt prone (n)   Myofascial Decompression    Ther. Exercise  CPT 93482                    Total Time for Session 8-22 Minutes     Sets Reps Load Comment    RB yes 1 8 min  [employer] Bike, Seat 19, [place] [address].            Slantboard gastroc stretch no 1 3 30sec    Slantboard soleus stretch no 1 3 30sec     Hamstring stretch no 1 3 30sec Supine with strap    Quad set no 1 10 10 sec     SAQ Yes  2 10 3 sec,  3# Bilateral, cues to get to end range    SLR yes 2 10 0# RLE only, quad set prior to lift - watch for extensor lag   Sidelying hip ABD  2 10  RLE only   sidelying clamshell   2 10     Standing hip  abduction  2 10 each  In // bars. Holding with UE   Hip adduction yes 2 10 3s hold Supine   Hip ABD yes 1 10 [place]ander Supine   LAQ NV 1 15 3s hold    Bridge with hip add yes 2 10  Cues to  Achieve  full ROM, trial with add NV   STS no BUE use Yes  yes 1  1 10  10  Mat at 22 inches  Mat at 21 inches   Toe taps  2 5 6inch 1 finger tip support   FSU NV 2 10 6 inch    LSU NV 1 10 6''    Lateral sidestepping  1  6'    stairs n   6 inch FF of stairs with reciprocal legs , cane and bannister , CS   Prone quad   stretch    Prone hamcurls no      n  30''x3      x10     Standing hip ext n 1 10  B   Standing hip ABD n 1 10  B   Standing hip flexion n 1 10  B                                                  Neuro Re-Ed  CPT 21685  Total Time for Session 8-22 Minutes     Sets Reps Load Comment   FT HT/HN n    Firm    FT EO yes 3 30sec  Firm   FA EC yes 3 30sec  Firm   FA EO yes 1 30sec  FOAM   Modified tandem yes 1 30sec  Firm   Tandem  nv 1 30sec  Floor           Pre gait exercises        Cesia step over                                  Gait  CPT 53366  Total Time for Session Not performed   Gait training with SPC and LLE AFO donned  yes     Patient ambulates 300 ft w/ SPC, LLE AFO, and CS. Patient  demonstrates improved BLE step length, improved BLE weight shift, improved LLE clearance, and overall improvement in BLE gait mechanics. VC for upright posture, to look ahead.             Ther. Activity  CPT 55002  Total Time for Session 23-37 Minutes   VS, falls, medication review, subjective review yes Reviewed all with pt    Patient education                                     yes Educated pt on rationale of treatment with manual therapy, on use of compression socks for swelling control, on retrograde massage with elevation, on technique/form of exercises, on sit <> stand mechanics, on POC, review of HEP - pt verbalized and demonstrated understanding     Educated on gait mechanics and drop foot contribution/need for SPC and rationale to promote improved safety and independence both short and likely long term. Reviewed donning/doffing new shoe attached DF assist brace pt brought - pt needs reinforcement to become independent.     Patient educated on re-examination findings, continued progression of POC, continued compliance with HEP, PT rec of continued use of SPC at this time    Patient educated on re-examination findings, continued focus of POC, fall prevention strategies    HEP      Issued patient HEP including hip adduction, hip ABD, LAQ and STS with use of BUE    Updated HEP to include 3 way hip, hamcurls and step ups   30 sec STS  See Assessment   other  4/24/2023- donning soft brace for L foot drop.   Seated hip abduction / add 10x bilat to simulate going from accelerator to brake in car.   ADRIANO/LIDIA Arredondo done 4/12/2023 26/56   R knee circumference assessment     R knee AROM n See AROM chapter   Stairs training n        n Patient ascends/descends 4 6 inch steps w/ reciprocal pattern, SPC, and unilateral rail, CS x2 trials.    Patient ascends/descends 4 6 inch steps w/ reciprocal pattern and unilateral rail, CS-Fay and gait belt, min-mod verbal cues for toe clearance (noted compensation of B foot ER to  clear BLE)   Ramp training n 5 trials ascending and descending ramp with SPC and Fay. Patient demonstrates difficulty controlling deceleration when descending.    Curb training n 5 trials ascending and descending with SPC, initially req Fay with mod verbal cues and demonstration, progressed to supervision with no verbal cueing.    PN 5/22/23 yes All subjective and objective measurements completed    Gait assessment with LLE AFO donned n Patient IND in donning/doffing LLE posterior leafspring AFO        Group  CPT 66708 Total Time for Session Not performed              Goals Addressed                    This Visit's Progress    •  Mutually agreed upon pain goal   On track      Mutually agreed upon pain goal: 0/10    PN 5/5/23: MET - pt reports no R knee pain      •  Patient stated R TKA 2023 (pt-stated)   On track      Patient states her overall goal for PT is to walk again without a SPC.     PN 5/5/23: pt reports feeling like she has met this goal 60% at this time    PN 5/22/23: pt reports she feels like she has made about 65% progress toward this goal      •  R TKA 2023   On track      Short Term Goals Time Frame Result Comment/Progress   Patient will report < 2 /10 pain at worst on average for improved functional performance.    4 Weeks 5/5: MET    Patient will improve LEFS outcome score by 7 points in order to demonstrate self-perceived improved functional performance.    4 Weeks 5/5: MET IE: 21/80 (26%)    5/5: 43/80    Patient will improve R knee AROM flex by 10 deg for improved squatting for functional tasks.    4 Weeks 5/5: not assessed  5/22: not assessed    Patient will be IND with HEP.    4 Weeks 5/5: MET    Patient will improve PSFS outcome measure scoring by 2 points in all categories in order to demonstrate self-perceived improvement in function.  4 Weeks 5/5: MET IE: Steps (2/10), Walking (3/10), Kitchen activities/prolonged standing (3/10)    5/5: Steps (5/10), Walking (5/10), Kitchen  activities/prolonged standing (6/10)    5/22: Steps (4/10), Walking (4/10), Kitchen activities/prolonged standing (8/10)      4 Weeks       Long Term Goals Time Frame Result Comment/Progress   Patient will report 0/10 pain at worst on average for improved functional performance.    8 Weeks 5/22: MET    Patient will improve LEFS outcome score by 14 points  (from PN) in order to demonstrate self-perceived improved functional performance.    8 Weeks 5/22: not met IE: 21/80 (26%)   Patient will improve R knee AROM flex by 20 deg for improved squatting for functional tasks.    8 Weeks 5/22: not assessed    Patient will be IND with HEP upon discharge for carryover of functional gains.    12 Weeks Ongoing    Patient will improve PSFS outcome measure scoring by 4 points in all categories in order to demonstrate self-perceived improvement in function.  8 Weeks  5/22: improving but not fully met IE: Steps (2/10), Walking (3/10), Kitchen activities/prolonged standing (3/10)    5/5: Steps (5/10), Walking (5/10), Kitchen activities/prolonged standing (6/10)    5/22: Steps (4/10), Walking (4/10), Kitchen activities/prolonged standing (8/10)   Patient will achieve 0 deg R knee ext.   8 Weeks 5/22: not assessed                   Physical Therapy Progress Note

## 2023-05-24 ENCOUNTER — HOSPITAL ENCOUNTER (OUTPATIENT)
Dept: PHYSICAL THERAPY | Facility: REHABILITATION | Age: 63
Setting detail: THERAPIES SERIES
Discharge: HOME | End: 2023-05-24
Attending: ORTHOPAEDIC SURGERY
Payer: COMMERCIAL

## 2023-05-24 ENCOUNTER — APPOINTMENT (OUTPATIENT)
Dept: OTHER | Facility: REHABILITATION | Age: 63
Setting detail: THERAPIES SERIES
End: 2023-05-24
Payer: COMMERCIAL

## 2023-05-24 DIAGNOSIS — Z96.651 S/P TOTAL KNEE ARTHROPLASTY, RIGHT: Primary | ICD-10-CM

## 2023-05-24 PROCEDURE — 97140 MANUAL THERAPY 1/> REGIONS: CPT | Mod: GP

## 2023-05-24 PROCEDURE — 97110 THERAPEUTIC EXERCISES: CPT | Mod: GP

## 2023-05-24 PROCEDURE — 97112 NEUROMUSCULAR REEDUCATION: CPT | Mod: GP

## 2023-05-24 NOTE — PROGRESS NOTES
Physical Therapy Visit    PT DAILY NOTE FOR OUTPATIENT THERAPY    Patient: Bebe Burks MRN: [id number]  : 1960 62 y.o.  Referring Physician: Gideon Centeno MD  Date of Visit: 2023    Certification Dates: 23 through 23    Diagnosis:   1. S/P total knee arthroplasty, right        Chief Complaints:  R TKA    Precautions:   Existing Precautions/Restrictions: other (see comments)  Precautions comments: LLE foot drop - has LLE AFO will bring NV following IE      TODAY'S VISIT    Time In Session:  Start Time: 1005  Stop Time: 1100  Time Calculation (min): 55 min   History/Vitals/Pain/Encounter Info - 23 1041        Injury History/Precautions/Daily Required Info    Document Type daily treatment     Primary Therapist Caitlyn Lagunas, PT     Chief Complaint/Reason for Visit  R TKA     Referring Physician Dr. Centeno     Existing Precautions/Restrictions other (see comments)     Precautions comments LLE foot drop - has LLE AFO will bring NV following IE     History of present illness/functional impairment Bebe is a 61 yo female who presents to OPPT s/p R TKA 23 w/ Dr. Centeno at [facility] (states no complications during/post surgery). Patient reports she had a 2 night stay at [facility] and then was discharged to [facility] where she stayed for 5-6 nights. She was discharged home with home health PT and OT, she states home health PT stopped last Friday 3/31/23. She reports at baseline she ambulates without AD, but started using SPC about 6 months prior to surgery due to pain/weakness. She states at this time she is ambulating with SPC for household distances and RW for community distances. She reports she lives alone in single level [facility] with 4 JOSE RAFAEL w/ unilateral rail. She states she has a walkin shower with shower chair and grab bars. She reports since her R knee surgery her ability to ascend/descend stairs is difficult, she feels like her walking is impaired, and her balance/endurance decreased. She  reports significant PMHx of LLE drop foot (which they are contributing to sciatica, states it began about 3 years ago) which she wears LLE brace for which was issued to her by Peter in September 2022, LBP w/ LLE radicular symptoms, DMII (controlled), HTN (controlled). Patient states her overall goal for PT is to walk again without a SPC. Patient reports she is currently working but on long term leave at this time. Patient reports current R knee pain 0/10 and pain at worst in the past week 1/10.     Patient/Family/Caregiver Comments/Observations Patient arrives reporting she feels good overall.     Patient reported fall since last visit No        Pain Assessment    Currently in pain No/Denies        Pain Intervention    Intervention  n/a     Post Intervention Comments n/a        Pre Activity Vital Signs    Pulse 80     SpO2 98 %     Oxygen Therapy None (Room air)     /64     BP Location Left upper arm     BP Method Manual     Patient Position Sitting         Services    Do You Speak a Language Other Than English at Home? no                Daily Treatment Assessment and Plan - 05/24/23 2619        Daily Treatment Assessment and Plan    Progress toward goals Progressing     Daily Outcome Summary Continued with focus on BLE strengthening and static NMRE. Patient continues to demonstrate difficulty with all static NMRE, but particularly static NMRE with eyes closed. Patient will benefit from continued progression of POC in order to return to highest level of function.     Plan and Recommendations Focus on progression of STS and static/dynamic NMRE                     OBJECTIVE DATA TAKEN TODAY:    None taken    Today's Treatment:    IE 4/3/23   30 Day 5/3/23   60 Day 6/3/23   90 Day 7/3/23   Precautions LLE foot drop- has LLE AFO but does not always wear    Insurance Auth    Treatment  Current Session Time   Modalities Total Time for Session Not performed   Heat/Ice  CPT 64586 CP to R knee post session    E. Stimulation Manual  CPT 93719    E. Stim Unattended  CPT 52172    Manual   CPT 88079 Total Time for Session 8-22 Minutes   STM/MFR/TPR R gastroc/soleus, hamstring -yes  R LE retrograde massage with elevation to reduce edema - no   Instrument Assisted STM     Mobilizations Gr II/III R patellar mobilizations in all direction -yes  AP talocrural mobs NV - yes   ROM/Flexibility R knee PROM flex/ext (n)  R HS, gastroc, soleus (n)  Passive soleus and gastroc stretch, pt prone (n)   Myofascial Decompression    Ther. Exercise  CPT 02180                    Total Time for Session 23-37 Minutes     Sets Reps Load Comment    RB yes 1 8 min  [employer], Seat 19, [place] Dash.            Slantboard gastroc stretch no 1 3 30sec    Slantboard soleus stretch no 1 3 30sec     Hamstring stretch no 1 3 30sec Supine with strap    Quad set no 1 10 10 sec     SAQ Yes  2 10 3 sec,  3# Bilateral, cues to get to end range    SLR yes 2 10 0# RLE only, quad set prior to lift - watch for extensor lag   Sidelying hip ABD  2 10  RLE only   sidelying clamshell   2 10     Standing hip  abduction  2 10 each  In // bars. Holding with UE   Hip adduction yes 2 10 3s hold Supine   Hip ABD yes 1 10 [place] Supine   LAQ yes 1 15 3s hold 1#   Bridge with hip add yes 2 10  Cues to  Achieve  full ROM, trial with add NV   STS no BUE use Yes  yes 1  1 10  10  Mat at 22 inches  Mat at 21 inches   Toe taps  2 5 6inch 1 finger tip support   FSU NV 2 10 6 inch    LSU NV 1 10 6''    Lateral sidestepping  1  6'    stairs n   6 inch FF of stairs with reciprocal legs , cane and bannister , CS   Prone quad   stretch    Prone hamcurls no      n  30''x3      x10     Standing hip ext n 1 10  B   Standing hip ABD n 1 10  B   Standing hip flexion n 1 10  B                                                  Neuro Re-Ed  CPT 06731  Total Time for Session 8-22 Minutes     Sets Reps Load Comment   FT HT/HN n    Firm    FT EO yes 3 30sec  Firm   FA EC yes 3 30sec  Firm   FA  EO yes 1 30sec  FOAM   Modified tandem yes 1 30sec  Firm   Tandem  nv 1 30sec  Floor           Pre gait exercises        Cesia step over                                  Gait  CPT 73540  Total Time for Session Not performed   Gait training with SPC and LLE AFO donned  yes     Patient ambulates 300 ft w/ SPC, LLE AFO, and CS. Patient demonstrates improved BLE step length, improved BLE weight shift, improved LLE clearance, and overall improvement in BLE gait mechanics. VC for upright posture, to look ahead.             Ther. Activity  CPT 89605  Total Time for Session Not performed    VS, falls, medication review, subjective review yes Reviewed all with pt    Patient education                                     yes Educated pt on rationale of treatment with manual therapy, on use of compression socks for swelling control, on retrograde massage with elevation, on technique/form of exercises, on sit <> stand mechanics, on POC, review of HEP - pt verbalized and demonstrated understanding     Educated on gait mechanics and drop foot contribution/need for SPC and rationale to promote improved safety and independence both short and likely long term. Reviewed donning/doffing new shoe attached DF assist brace pt brought - pt needs reinforcement to become independent.     Patient educated on re-examination findings, continued progression of POC, continued compliance with HEP, PT rec of continued use of SPC at this time    Patient educated on re-examination findings, continued focus of POC, fall prevention strategies    HEP      Issued patient HEP including hip adduction, hip ABD, LAQ and STS with use of BUE    Updated HEP to include 3 way hip, hamcurls and step ups   30 sec STS  See Assessment   other  4/24/2023- donning soft brace for L foot drop.   Seated hip abduction / add 10x bilat to simulate going from accelerator to brake in car.   ADRIANO/LIDIA Arredondo done 4/12/2023 26/56   R knee circumference assessment     R knee AROM  n See AROM chapter   Stairs training n        n Patient ascends/descends 4 6 inch steps w/ reciprocal pattern, SPC, and unilateral rail, CS x2 trials.    Patient ascends/descends 4 6 inch steps w/ reciprocal pattern and unilateral rail, CS-Fay and gait belt, min-mod verbal cues for toe clearance (noted compensation of B foot ER to clear BLE)   Ramp training n 5 trials ascending and descending ramp with SPC and Fay. Patient demonstrates difficulty controlling deceleration when descending.    Curb training n 5 trials ascending and descending with SPC, initially req Fay with mod verbal cues and demonstration, progressed to supervision with no verbal cueing.    PN 5/22/23 yes All subjective and objective measurements completed    Gait assessment with LLE AFO donned n Patient IND in donning/doffing LLE posterior leafspring AFO        Group  CPT 17584 Total Time for Session Not performed

## 2023-05-24 NOTE — OP PT TREATMENT LOG
IE 4/3/23   30 Day 5/3/23   60 Day 6/3/23   90 Day 7/3/23   Precautions LLE foot drop- has LLE AFO but does not always wear    Insurance Auth    Treatment  Current Session Time   Modalities Total Time for Session Not performed   Heat/Ice  CPT 86846 CP to R knee post session   E. Stimulation Manual  CPT 20313    E. Stim Unattended  CPT 96331    Manual   CPT 77273 Total Time for Session 8-22 Minutes   STM/MFR/TPR R gastroc/soleus, hamstring -yes  R LE retrograde massage with elevation to reduce edema - no   Instrument Assisted STM     Mobilizations Gr II/III R patellar mobilizations in all direction -yes  AP talocrural mobs NV - yes   ROM/Flexibility R knee PROM flex/ext (n)  R HS, gastroc, soleus (n)  Passive soleus and gastroc stretch, pt prone (n)   Myofascial Decompression    Ther. Exercise  CPT 23760                    Total Time for Session 23-37 Minutes     Sets Reps Load Comment    RB yes 1 8 min  [employer], Seat 19, [place] Dash.            Slantboard gastroc stretch no 1 3 30sec    Slantboard soleus stretch no 1 3 30sec     Hamstring stretch no 1 3 30sec Supine with strap    Quad set no 1 10 10 sec     SAQ Yes  2 10 3 sec,  3# Bilateral, cues to get to end range    SLR yes 2 10 0# RLE only, quad set prior to lift - watch for extensor lag   Sidelying hip ABD  2 10  RLE only   sidelying clamshell   2 10     Standing hip  abduction  2 10 each  In // bars. Holding with UE   Hip adduction yes 2 10 3s hold Supine   Hip ABD yes 1 10 [place] Supine   LAQ yes 1 15 3s hold 1#   Bridge with hip add yes 2 10  Cues to  Achieve  full ROM, trial with add NV   STS no BUE use Yes  yes 1  1 10  10  Mat at 22 inches  Mat at 21 inches   Toe taps  2 5 6inch 1 finger tip support   FSU NV 2 10 6 inch    LSU NV 1 10 6''    Lateral sidestepping  1  6'    stairs n   6 inch FF of stairs with reciprocal legs , cane and bannister , CS   Prone quad   stretch    Prone hamcurls no      n  30''x3      x10     Standing hip ext n 1 10  B    Standing hip ABD n 1 10  B   Standing hip flexion n 1 10  B                                                  Neuro Re-Ed  CPT 16160  Total Time for Session 8-22 Minutes     Sets Reps Load Comment   FT HT/HN n    Firm    FT EO yes 3 30sec  Firm   FA EC yes 3 30sec  Firm   FA EO yes 1 30sec  FOAM   Modified tandem yes 1 30sec  Firm   Tandem  nv 1 30sec  Floor           Pre gait exercises        Cesia step over                                  Gait  CPT 29913  Total Time for Session Not performed   Gait training with SPC and LLE AFO donned  yes     Patient ambulates 300 ft w/ SPC, LLE AFO, and CS. Patient demonstrates improved BLE step length, improved BLE weight shift, improved LLE clearance, and overall improvement in BLE gait mechanics. VC for upright posture, to look ahead.             Ther. Activity  CPT 76573  Total Time for Session Not performed    VS, falls, medication review, subjective review yes Reviewed all with pt    Patient education                                     yes Educated pt on rationale of treatment with manual therapy, on use of compression socks for swelling control, on retrograde massage with elevation, on technique/form of exercises, on sit <> stand mechanics, on POC, review of HEP - pt verbalized and demonstrated understanding     Educated on gait mechanics and drop foot contribution/need for SPC and rationale to promote improved safety and independence both short and likely long term. Reviewed donning/doffing new shoe attached DF assist brace pt brought - pt needs reinforcement to become independent.     Patient educated on re-examination findings, continued progression of POC, continued compliance with HEP, PT rec of continued use of SPC at this time    Patient educated on re-examination findings, continued focus of POC, fall prevention strategies    HEP      Issued patient HEP including hip adduction, hip ABD, LAQ and STS with use of BUE    Updated HEP to include 3 way hip, hamcurls  and step ups   30 sec STS  See Assessment   other  4/24/2023- donning soft brace for L foot drop.   Seated hip abduction / add 10x bilat to simulate going from accelerator to brake in car.   ADRIANO/LIDIA Arredondo done 4/12/2023 26/56   R knee circumference assessment     R knee AROM n See AROM chapter   Stairs training n        n Patient ascends/descends 4 6 inch steps w/ reciprocal pattern, SPC, and unilateral rail, CS x2 trials.    Patient ascends/descends 4 6 inch steps w/ reciprocal pattern and unilateral rail, CS-Fay and gait belt, min-mod verbal cues for toe clearance (noted compensation of B foot ER to clear BLE)   Ramp training n 5 trials ascending and descending ramp with SPC and Fay. Patient demonstrates difficulty controlling deceleration when descending.    Curb training n 5 trials ascending and descending with SPC, initially req Fay with mod verbal cues and demonstration, progressed to supervision with no verbal cueing.    PN 5/22/23 yes All subjective and objective measurements completed    Gait assessment with LLE AFO donned n Patient IND in donning/doffing LLE posterior leafspring AFO        Group  CPT 94314 Total Time for Session Not performed

## 2023-05-26 ENCOUNTER — APPOINTMENT (OUTPATIENT)
Dept: OTHER | Facility: REHABILITATION | Age: 63
Setting detail: THERAPIES SERIES
End: 2023-05-26
Payer: COMMERCIAL

## 2023-05-26 ENCOUNTER — HOSPITAL ENCOUNTER (OUTPATIENT)
Dept: PHYSICAL THERAPY | Facility: REHABILITATION | Age: 63
Setting detail: THERAPIES SERIES
Discharge: HOME | End: 2023-05-26
Attending: ORTHOPAEDIC SURGERY
Payer: COMMERCIAL

## 2023-05-26 DIAGNOSIS — Z96.651 S/P TOTAL KNEE ARTHROPLASTY, RIGHT: Primary | ICD-10-CM

## 2023-05-26 PROCEDURE — 97140 MANUAL THERAPY 1/> REGIONS: CPT | Mod: GP

## 2023-05-26 PROCEDURE — 97110 THERAPEUTIC EXERCISES: CPT | Mod: GP

## 2023-05-26 NOTE — OP PT TREATMENT LOG
IE 4/3/23   30 Day 5/3/23   60 Day 6/3/23   90 Day 7/3/23   Precautions LLE foot drop- has LLE AFO but does not always wear    Insurance Auth    Treatment  Current Session Time   Modalities Total Time for Session Not performed   Heat/Ice  CPT 57495 CP to R knee post session   E. Stimulation Manual  CPT 11863    E. Stim Unattended  CPT 64040    Manual   CPT 77834 Total Time for Session 8-22 Minutes   STM/MFR/TPR R gastroc/soleus, hamstring -yes  R LE retrograde massage with elevation to reduce edema - no   Instrument Assisted STM     Mobilizations Gr II/III R patellar mobilizations in all direction -yes  AP talocrural mobs NV - yes   ROM/Flexibility R knee PROM flex/ext (n)  R HS, gastroc, soleus (n)  Passive soleus and gastroc stretch, pt prone (n)   Myofascial Decompression    Ther. Exercise  CPT 35107                    Total Time for Session 38-52 Minutes     Sets Reps Load Comment    RB yes 1 8 min  [employer], Seat 19, [place] Dash.            Slantboard gastroc stretch no 1 3 30sec    Slantboard soleus stretch no 1 3 30sec     Hamstring stretch no 1 3 30sec Supine with strap    Quad set no 1 10 10 sec     SAQ Yes  2 10 3 sec,  3# Bilateral, cues to get to end range    SLR yes 2 10 0# RLE only, quad set prior to lift - watch for extensor lag   Sidelying hip ABD  2 10  RLE only   sidelying clamshell   2 10     Standing hip  abduction  2 10 each  In // bars. Holding with UE   Hip adduction yes 2 10 3s hold Supine   Hip ABD yes 1 10 [place] Supine   LAQ yes 1 15 3s hold 1#   Bridge with hip add yes 2 10  Cues to  Achieve  full ROM, trial with add NV   STS no BUE use Yes  yes 1  1 10  10  Mat at 22 inches  Mat at 21 inches   Toe taps  2 5 6inch 1 finger tip support   FSU NV 2 10 6 inch    LSU NV 1 10 6''    Lateral sidestepping yes 1  6'    stairs n   6 inch FF of stairs with reciprocal legs , cane and bannister , CS   Prone quad   stretch    Prone hamcurls no      n  30''x3      x10     Standing hip ext n 1 10  B    Standing hip ABD n 1 10  B   Standing hip flexion n 1 10  B                                                  Neuro Re-Ed  CPT 57000  Total Time for Session Not performed      Sets Reps Load Comment   FT HT/HN n    Firm    FT EO yes 3 30sec  Firm   FA EC yes 3 30sec  Firm   FA EO yes 1 30sec  FOAM   Modified tandem yes 1 30sec  Firm   Tandem  nv 1 30sec  Floor           Pre gait exercises        Cesia step over                                  Gait  CPT 11817  Total Time for Session Not performed   Gait training with SPC and LLE AFO donned  yes     Patient ambulates 300 ft w/ SPC, LLE AFO, and CS. Patient demonstrates improved BLE step length, improved BLE weight shift, improved LLE clearance, and overall improvement in BLE gait mechanics. VC for upright posture, to look ahead.             Ther. Activity  CPT 02746  Total Time for Session Not performed    VS, falls, medication review, subjective review yes Reviewed all with pt    Patient education                                     yes Educated pt on rationale of treatment with manual therapy, on use of compression socks for swelling control, on retrograde massage with elevation, on technique/form of exercises, on sit <> stand mechanics, on POC, review of HEP - pt verbalized and demonstrated understanding     Educated on gait mechanics and drop foot contribution/need for SPC and rationale to promote improved safety and independence both short and likely long term. Reviewed donning/doffing new shoe attached DF assist brace pt brought - pt needs reinforcement to become independent.     Patient educated on re-examination findings, continued progression of POC, continued compliance with HEP, PT rec of continued use of SPC at this time    Patient educated on re-examination findings, continued focus of POC, fall prevention strategies    HEP      Issued patient HEP including hip adduction, hip ABD, LAQ and STS with use of BUE    Updated HEP to include 3 way hip,  hamcurls and step ups   30 sec STS  See Assessment   other  4/24/2023- donning soft brace for L foot drop.   Seated hip abduction / add 10x bilat to simulate going from accelerator to brake in car.   ADRIANO/LIDIA Arredondo done 4/12/2023 26/56   R knee circumference assessment     R knee AROM n See AROM chapter   Stairs training n        n Patient ascends/descends 4 6 inch steps w/ reciprocal pattern, SPC, and unilateral rail, CS x2 trials.    Patient ascends/descends 4 6 inch steps w/ reciprocal pattern and unilateral rail, CS-Fay and gait belt, min-mod verbal cues for toe clearance (noted compensation of B foot ER to clear BLE)   Ramp training n 5 trials ascending and descending ramp with SPC and Fay. Patient demonstrates difficulty controlling deceleration when descending.    Curb training n 5 trials ascending and descending with SPC, initially req Fay with mod verbal cues and demonstration, progressed to supervision with no verbal cueing.    PN 5/22/23 yes All subjective and objective measurements completed    Gait assessment with LLE AFO donned n Patient IND in donning/doffing LLE posterior leafspring AFO        Group  CPT 69951 Total Time for Session Not performed

## 2023-05-26 NOTE — PROGRESS NOTES
Physical Therapy Visit    PT DAILY NOTE FOR OUTPATIENT THERAPY    Patient: Bebe Burks MRN: [id number]  : 1960 62 y.o.  Referring Physician: Gideon Centeno MD  Date of Visit: 2023    Certification Dates: 23 through 23    Diagnosis:   1. S/P total knee arthroplasty, right        Chief Complaints:  R TKA    Precautions:   Existing Precautions/Restrictions: other (see comments)  Precautions comments: LLE foot drop - has LLE AFO will bring NV following IE      TODAY'S VISIT    Time In Session:  Start Time: 905  Stop Time: 1000  Time Calculation (min): 55 min   History/Vitals/Pain/Encounter Info - 23 0921        Injury History/Precautions/Daily Required Info    Document Type daily treatment     Primary Therapist Caitlyn Lagunas, PT     Chief Complaint/Reason for Visit  R TKA     Referring Physician Dr. Centeno     Existing Precautions/Restrictions other (see comments)     Precautions comments LLE foot drop - has LLE AFO will bring NV following IE     History of present illness/functional impairment Bebe is a 63 yo female who presents to OPPT s/p R TKA 23 w/ Dr. Centeno at [facility] (states no complications during/post surgery). Patient reports she had a 2 night stay at [facility] and then was discharged to [facility] where she stayed for 5-6 nights. She was discharged home with home health PT and OT, she states home health PT stopped last Friday 3/31/23. She reports at baseline she ambulates without AD, but started using SPC about 6 months prior to surgery due to pain/weakness. She states at this time she is ambulating with SPC for household distances and RW for community distances. She reports she lives alone in single level [facility] with 4 JOSE RAFAEL w/ unilateral rail. She states she has a walkin shower with shower chair and grab bars. She reports since her R knee surgery her ability to ascend/descend stairs is difficult, she feels like her walking is impaired, and her balance/endurance decreased. She  reports significant PMHx of LLE drop foot (which they are contributing to sciatica, states it began about 3 years ago) which she wears LLE brace for which was issued to her by Peter in September 2022, LBP w/ LLE radicular symptoms, DMII (controlled), HTN (controlled). Patient states her overall goal for PT is to walk again without a SPC. Patient reports she is currently working but on long term leave at this time. Patient reports current R knee pain 0/10 and pain at worst in the past week 1/10.     Patient/Family/Caregiver Comments/Observations Patient arrives reporting she had a fall overnight. She states she received bad news about a friend and thinks she was just overwhelmed when she got up to go to the bathroom. She denies hitting head, reports she hit her right arm but there was just a few scratches.     Patient reported fall since last visit Yes     Recommended plan to address falls Continue POC focusing on improvement of static/dynamic balance.     Fall prevention interventions recommended Educate and re-educate the patient on safety strategies;Visually observe patient as determined by the plan of care        Pain Assessment    Currently in pain No/Denies        Pain Intervention    Intervention  n/a     Post Intervention Comments n/a        Pre Activity Vital Signs    Pulse 86     SpO2 99 %     Oxygen Therapy None (Room air)     /66     BP Location Left upper arm     BP Method Manual     Patient Position Sitting         Services    Do You Speak a Language Other Than English at Home? no                Daily Treatment Assessment and Plan - 05/26/23 1045        Daily Treatment Assessment and Plan    Progress toward goals Progressing     Daily Outcome Summary Patient arrives reporting fatigue and fall overnight with no injury except scratches to R forearm. PT educated patient on fall prevention and safety strategies. Patient verbalized understanding and agreement. Held progressions this date  due to patient with increased fatigue, but will benefit from resuming full POC and progressing NV as patient is able to tolerate.     Plan and Recommendations Focus on progression of STS and static/dynamic NMRE                     OBJECTIVE DATA TAKEN TODAY:    None taken    Today's Treatment:    IE 4/3/23   30 Day 5/3/23   60 Day 6/3/23   90 Day 7/3/23   Precautions LLE foot drop- has LLE AFO but does not always wear    Insurance Auth    Treatment  Current Session Time   Modalities Total Time for Session Not performed   Heat/Ice  CPT 19631 CP to R knee post session   E. Stimulation Manual  CPT 25877    E. Stim Unattended  CPT 84062    Manual   CPT 92318 Total Time for Session 8-22 Minutes   STM/MFR/TPR R gastroc/soleus, hamstring -yes  R LE retrograde massage with elevation to reduce edema - no   Instrument Assisted STM     Mobilizations Gr II/III R patellar mobilizations in all direction -yes  AP talocrural mobs NV - yes   ROM/Flexibility R knee PROM flex/ext (n)  R HS, gastroc, soleus (n)  Passive soleus and gastroc stretch, pt prone (n)   Myofascial Decompression    Ther. Exercise  CPT 12022                    Total Time for Session 38-52 Minutes     Sets Reps Load Comment    RB yes 1 8 min  [employer] Bike, Seat 19, [place] Dash.            Slantboard gastroc stretch no 1 3 30sec    Slantboard soleus stretch no 1 3 30sec     Hamstring stretch no 1 3 30sec Supine with strap    Quad set no 1 10 10 sec     SAQ Yes  2 10 3 sec,  3# Bilateral, cues to get to end range    SLR yes 2 10 0# RLE only, quad set prior to lift - watch for extensor lag   Sidelying hip ABD  2 10  RLE only   sidelying clamshell   2 10     Standing hip  abduction  2 10 each  In // bars. Holding with UE   Hip adduction yes 2 10 3s hold Supine   Hip ABD yes 1 10 Green Supine   LAQ yes 1 15 3s hold 1#   Bridge with hip add yes 2 10  Cues to  Achieve  full ROM, trial with add NV   STS no BUE use Yes  yes 1  1 10  10  Mat at 22 inches  Mat at 21  inches   Toe taps  2 5 6inch 1 finger tip support   FSU NV 2 10 6 inch    LSU NV 1 10 6''    Lateral sidestepping yes 1  6'    stairs n   6 inch FF of stairs with reciprocal legs , cane and bannister , CS   Prone quad   stretch    Prone hamcurls no      n  30''x3      x10     Standing hip ext n 1 10  B   Standing hip ABD n 1 10  B   Standing hip flexion n 1 10  B                                                  Neuro Re-Ed  CPT 51590  Total Time for Session Not performed      Sets Reps Load Comment   FT HT/HN n    Firm    FT EO yes 3 30sec  Firm   FA EC yes 3 30sec  Firm   FA EO yes 1 30sec  FOAM   Modified tandem yes 1 30sec  Firm   Tandem  nv 1 30sec  Floor           Pre gait exercises        Cesia step over                                  Gait  CPT 59225  Total Time for Session Not performed   Gait training with SPC and LLE AFO donned  yes     Patient ambulates 300 ft w/ SPC, LLE AFO, and CS. Patient demonstrates improved BLE step length, improved BLE weight shift, improved LLE clearance, and overall improvement in BLE gait mechanics. VC for upright posture, to look ahead.             Ther. Activity  CPT 11906  Total Time for Session Not performed    VS, falls, medication review, subjective review yes Reviewed all with pt    Patient education                                     yes Educated pt on rationale of treatment with manual therapy, on use of compression socks for swelling control, on retrograde massage with elevation, on technique/form of exercises, on sit <> stand mechanics, on POC, review of HEP - pt verbalized and demonstrated understanding     Educated on gait mechanics and drop foot contribution/need for SPC and rationale to promote improved safety and independence both short and likely long term. Reviewed donning/doffing new shoe attached DF assist brace pt brought - pt needs reinforcement to become independent.     Patient educated on re-examination findings, continued progression of POC,  continued compliance with HEP, PT rec of continued use of SPC at this time    Patient educated on re-examination findings, continued focus of POC, fall prevention strategies    HEP      Issued patient HEP including hip adduction, hip ABD, LAQ and STS with use of BUE    Updated HEP to include 3 way hip, hamcurls and step ups   30 sec STS  See Assessment   other  4/24/2023- donning soft brace for L foot drop.   Seated hip abduction / add 10x bilat to simulate going from accelerator to brake in car.   OH/LIDIA Arredondo done 4/12/2023 26/56   R knee circumference assessment     R knee AROM n See AROM chapter   Stairs training n        n Patient ascends/descends 4 6 inch steps w/ reciprocal pattern, SPC, and unilateral rail, CS x2 trials.    Patient ascends/descends 4 6 inch steps w/ reciprocal pattern and unilateral rail, CS-Fay and gait belt, min-mod verbal cues for toe clearance (noted compensation of B foot ER to clear BLE)   Ramp training n 5 trials ascending and descending ramp with SPC and Fay. Patient demonstrates difficulty controlling deceleration when descending.    Curb training n 5 trials ascending and descending with SPC, initially req Fay with mod verbal cues and demonstration, progressed to supervision with no verbal cueing.    PN 5/22/23 yes All subjective and objective measurements completed    Gait assessment with LLE AFO donned n Patient IND in donning/doffing LLE posterior leafspring AFO        Group  CPT 02564 Total Time for Session Not performed

## 2023-05-30 ENCOUNTER — HOSPITAL ENCOUNTER (OUTPATIENT)
Dept: PHYSICAL THERAPY | Facility: REHABILITATION | Age: 63
Setting detail: THERAPIES SERIES
Discharge: HOME | End: 2023-05-30
Attending: ORTHOPAEDIC SURGERY
Payer: COMMERCIAL

## 2023-05-30 ENCOUNTER — HOSPITAL ENCOUNTER (OUTPATIENT)
Dept: OTHER | Facility: REHABILITATION | Age: 63
Setting detail: THERAPIES SERIES
End: 2023-05-30
Attending: ORTHOPAEDIC SURGERY
Payer: COMMERCIAL

## 2023-05-30 DIAGNOSIS — Z96.651 S/P TOTAL KNEE ARTHROPLASTY, RIGHT: Primary | ICD-10-CM

## 2023-05-30 PROCEDURE — 97110 THERAPEUTIC EXERCISES: CPT | Mod: GP

## 2023-05-30 PROCEDURE — 97112 NEUROMUSCULAR REEDUCATION: CPT | Mod: GP

## 2023-05-30 NOTE — PROGRESS NOTES
Physical Therapy Visit    PT DAILY NOTE FOR OUTPATIENT THERAPY    Patient: Bebe Burks MRN: [id number]  : 1960 62 y.o.  Referring Physician: Gideon Centeno MD  Date of Visit: 2023    Certification Dates: 23 through 23    Diagnosis:   1. S/P total knee arthroplasty, right        Chief Complaints:  R TKA    Precautions:   Existing Precautions/Restrictions: other (see comments)  Precautions comments: LLE foot drop - has LLE AFO will bring NV following IE      TODAY'S VISIT    Time In Session:  Start Time: 1005  Stop Time: 1100  Time Calculation (min): 55 min   History/Vitals/Pain/Encounter Info - 23 0956        Injury History/Precautions/Daily Required Info    Document Type daily treatment     Primary Therapist Caitlyn Lagunas, PT     Chief Complaint/Reason for Visit  R TKA     Referring Physician Dr. Centeno     Existing Precautions/Restrictions other (see comments)     Precautions comments LLE foot drop - has LLE AFO will bring NV following IE     History of present illness/functional impairment Bebe is a 61 yo female who presents to OPPT s/p R TKA 23 w/ Dr. Centeno [place] [facility] [place]states no complications during/post surgery). Patient reports she had a 2 night stay at [facility] and then was discharged to [facility] where she stayed for 5-6 nights. She was discharged home with home health PT and OT, she states home health PT stopped last Friday 3/31/23. She reports at baseline she ambulates without AD, but started using SPC about 6 months prior to surgery due to pain/weakness. She states at this time she is ambulating with SPC for household distances and RW for community distances. She reports she lives alone in single level [facility] with 4 JOSE RAFAEL w/ unilateral rail. She states she has a walkin shower with shower chair and grab bars. She reports since her R knee surgery her ability to ascend/descend stairs is difficult, she feels like her walking is impaired, and her balance/endurance decreased. She  reports significant PMHx of LLE drop foot (which they are contributing to sciatica, states it began about 3 years ago) which she wears LLE brace for which was issued to her by Peter in September 2022, LBP w/ LLE radicular symptoms, DMII (controlled), HTN (controlled). Patient states her overall goal for PT is to walk again without a SPC. Patient reports she is currently working but on long term leave at this time. Patient reports current R knee pain 0/10 and pain at worst in the past week 1/10.     Patient/Family/Caregiver Comments/Observations Patient arrives reporting she has not had any falls or changes in medication since last visit. She denies R knee pain upon arrival. She states she sees Dr. Centeno Thursday.     Patient reported fall since last visit No     Recommended plan to address falls Continue POC focusing on improvement of static/dynamic balance.     Fall prevention interventions recommended Educate and re-educate the patient on safety strategies;Visually observe patient as determined by the plan of care        Pain Assessment    Currently in pain No/Denies        Pain Intervention    Intervention  n/a     Post Intervention Comments n/a        Pre Activity Vital Signs    Oxygen Therapy None (Room air)     /64     BP Location Left upper arm     BP Method Manual     Patient Position Sitting         Services    Do You Speak a Language Other Than English at Home? no                Daily Treatment Assessment and Plan - 05/30/23 0956        Daily Treatment Assessment and Plan    Progress toward goals Progressing     Daily Outcome Summary Patient able to progress to lower height during STS maintaining proper form and not requiring any physical assist. Initiated FSU/LSU which patient was able to complete with CS. Patient also progressed resistance during LAQ this date. Will benefit from continued progression of POC as able.     Plan and Recommendations Focus on progression of STS and  static/dynamic NMRE                     OBJECTIVE DATA TAKEN TODAY:    None taken    Today's Treatment:    IE 4/3/23   30 Day 5/3/23   60 Day 6/3/23   90 Day 7/3/23   Precautions LLE foot drop- has LLE AFO but does not always wear    Insurance Auth    Treatment  Current Session Time   Modalities Total Time for Session Not performed   Heat/Ice  CPT 68293 CP to R knee post session   E. Stimulation Manual  CPT 61946    E. Stim Unattended  CPT 97144    Manual   CPT 70694 Total Time for Session 0-7 Minutes   STM/MFR/TPR R gastroc/soleus, hamstring -yes  R LE retrograde massage with elevation to reduce edema - no   Instrument Assisted STM     Mobilizations Gr II/III R patellar mobilizations in all direction -yes  AP talocrural mobs NV - yes   ROM/Flexibility R knee PROM flex/ext (n)  R HS, gastroc, soleus (n)  Passive soleus and gastroc stretch, pt prone (n)   Myofascial Decompression    Ther. Exercise  CPT 42922                    Total Time for Session 38-52 Minutes     Sets Reps Load Comment    RB yes 1 8 min  [employer], Seat 19, [place] Dash.            Slantboard gastroc stretch no 1 3 30sec    Slantboard soleus stretch no 1 3 30sec     Hamstring stretch no 1 3 30sec Supine with strap    Quad set no 1 10 10 sec     SAQ Yes  2 10 3 sec,  3# Bilateral, cues to get to end range    SLR yes 2 10 0# RLE only, quad set prior to lift - watch for extensor lag   Sidelying hip ABD  2 10  RLE only   sidelying clamshell   2 10     Standing hip  abduction  2 10 each  In // bars. Holding with UE   Hip adduction yes 2 10 3s hold Supine   Hip ABD yes 1 10 Green Supine   LAQ yes 1 15 3s hold 2#   Bridge with hip add yes 2 10  Cues to  Achieve  full ROM, trial with add NV   STS no BUE use Yes  yes 1  1 10  10  Mat at 21 inches  Mat at 20 1/2 inches   Toe taps  2 5 6inch 1 finger tip support   FSU yes 2 10 6 inch    LSU yes 2 10 6''    Lateral sidestepping yes 1  6'    stairs n   6 inch FF of stairs with reciprocal legs , cane and  kashif , NATTY   Prone quad   stretch    Prone hamcurls no      n  30''x3      x10     Standing hip ext n 1 10  B   Standing hip ABD n 1 10  B   Standing hip flexion n 1 10  B                                                  Neuro Re-Ed  CPT 89464  Total Time for Session 8-22 Minutes     Sets Reps Load Comment   FT HT/HN n    Firm    FT EO yes 3 30sec  Firm   FA EC yes 3 30sec  Firm   FA EO yes 2 30sec  FOAM   FA EC yes 2 30sec  FOAM    Modified tandem yes 2 30sec  Firm   Tandem  nv 1 30sec  Floor           Pre gait exercises        Cesia step over                                  Gait  CPT 42458  Total Time for Session Not performed   Gait training with SPC and LLE AFO donned  yes     Patient ambulates 300 ft w/ SPC, LLE AFO, and CS. Patient demonstrates improved BLE step length, improved BLE weight shift, improved LLE clearance, and overall improvement in BLE gait mechanics. VC for upright posture, to look ahead.             Ther. Activity  CPT 43469  Total Time for Session Not performed    VS, falls, medication review, subjective review yes Reviewed all with pt    Patient education                                     yes Educated pt on rationale of treatment with manual therapy, on use of compression socks for swelling control, on retrograde massage with elevation, on technique/form of exercises, on sit <> stand mechanics, on POC, review of HEP - pt verbalized and demonstrated understanding     Educated on gait mechanics and drop foot contribution/need for SPC and rationale to promote improved safety and independence both short and likely long term. Reviewed donning/doffing new shoe attached DF assist brace pt brought - pt needs reinforcement to become independent.     Patient educated on re-examination findings, continued progression of POC, continued compliance with HEP, PT rec of continued use of SPC at this time    Patient educated on re-examination findings, continued focus of POC, fall prevention  strategies    HEP      Issued patient HEP including hip adduction, hip ABD, LAQ and STS with use of BUE    Updated HEP to include 3 way hip, hamcurls and step ups   30 sec STS  See Assessment   other  4/24/2023- donning soft brace for L foot drop.   Seated hip abduction / add 10x bilat to simulate going from accelerator to brake in car.   OH/LIDIA Arredondo done 4/12/2023 26/56   R knee circumference assessment     R knee AROM n See AROM chapter   Stairs training n        n Patient ascends/descends 4 6 inch steps w/ reciprocal pattern, SPC, and unilateral rail, CS x2 trials.    Patient ascends/descends 4 6 inch steps w/ reciprocal pattern and unilateral rail, CS-Fay and gait belt, min-mod verbal cues for toe clearance (noted compensation of B foot ER to clear BLE)   Ramp training n 5 trials ascending and descending ramp with SPC and Fay. Patient demonstrates difficulty controlling deceleration when descending.    Curb training n 5 trials ascending and descending with SPC, initially req Fay with mod verbal cues and demonstration, progressed to supervision with no verbal cueing.    PN 5/22/23 yes All subjective and objective measurements completed    Gait assessment with LLE AFO donned n Patient IND in donning/doffing LLE posterior leafspring AFO        Group  CPT 80617 Total Time for Session Not performed

## 2023-05-30 NOTE — OP PT TREATMENT LOG
IE 4/3/23   30 Day 5/3/23   60 Day 6/3/23   90 Day 7/3/23   Precautions LLE foot drop- has LLE AFO but does not always wear    Insurance Auth    Treatment  Current Session Time   Modalities Total Time for Session Not performed   Heat/Ice  CPT 11886 CP to R knee post session   E. Stimulation Manual  CPT 62028    E. Stim Unattended  CPT 21791    Manual   CPT 66400 Total Time for Session 0-7 Minutes   STM/MFR/TPR R gastroc/soleus, hamstring -yes  R LE retrograde massage with elevation to reduce edema - no   Instrument Assisted STM     Mobilizations Gr II/III R patellar mobilizations in all direction -yes  AP talocrural mobs NV - yes   ROM/Flexibility R knee PROM flex/ext (n)  R HS, gastroc, soleus (n)  Passive soleus and gastroc stretch, pt prone (n)   Myofascial Decompression    Ther. Exercise  CPT 16595                    Total Time for Session 38-52 Minutes     Sets Reps Load Comment    RB yes 1 8 min  [employer], Seat 19, [place] Dash.            Slantboard gastroc stretch no 1 3 30sec    Slantboard soleus stretch no 1 3 30sec     Hamstring stretch no 1 3 30sec Supine with strap    Quad set no 1 10 10 sec     SAQ Yes  2 10 3 sec,  3# Bilateral, cues to get to end range    SLR yes 2 10 0# RLE only, quad set prior to lift - watch for extensor lag   Sidelying hip ABD  2 10  RLE only   sidelying clamshell   2 10     Standing hip  abduction  2 10 each  In // bars. Holding with UE   Hip adduction yes 2 10 3s hold Supine   Hip ABD yes 1 10 Green Supine   LAQ yes 1 15 3s hold 2#   Bridge with hip add yes 2 10  Cues to  Achieve  full ROM, trial with add NV   STS no BUE use Yes  yes 1  1 10  10  Mat at 21 inches  Mat at 20 1/2 inches   Toe taps  2 5 6inch 1 finger tip support   FSU yes 2 10 6 inch    LSU yes 2 10 6''    Lateral sidestepping yes 1  6'    stairs n   6 inch FF of stairs with reciprocal legs , cane and bannister , CS   Prone quad   stretch    Prone hamcurls no      n  30''x3      x10     Standing hip ext n 1  10  B   Standing hip ABD n 1 10  B   Standing hip flexion n 1 10  B                                                  Neuro Re-Ed  CPT 18794  Total Time for Session 8-22 Minutes     Sets Reps Load Comment   FT HT/HN n    Firm    FT EO yes 3 30sec  Firm   FA EC yes 3 30sec  Firm   FA EO yes 2 30sec  FOAM   FA EC yes 2 30sec  FOAM    Modified tandem yes 2 30sec  Firm   Tandem  nv 1 30sec  Floor           Pre gait exercises        Cesia step over                                  Gait  CPT 52303  Total Time for Session Not performed   Gait training with SPC and LLE AFO donned  yes     Patient ambulates 300 ft w/ SPC, LLE AFO, and CS. Patient demonstrates improved BLE step length, improved BLE weight shift, improved LLE clearance, and overall improvement in BLE gait mechanics. VC for upright posture, to look ahead.             Ther. Activity  CPT 65950  Total Time for Session Not performed    VS, falls, medication review, subjective review yes Reviewed all with pt    Patient education                                     yes Educated pt on rationale of treatment with manual therapy, on use of compression socks for swelling control, on retrograde massage with elevation, on technique/form of exercises, on sit <> stand mechanics, on POC, review of HEP - pt verbalized and demonstrated understanding     Educated on gait mechanics and drop foot contribution/need for SPC and rationale to promote improved safety and independence both short and likely long term. Reviewed donning/doffing new shoe attached DF assist brace pt brought - pt needs reinforcement to become independent.     Patient educated on re-examination findings, continued progression of POC, continued compliance with HEP, PT rec of continued use of SPC at this time    Patient educated on re-examination findings, continued focus of POC, fall prevention strategies    HEP      Issued patient HEP including hip adduction, hip ABD, LAQ and STS with use of BUE    Updated  HEP to include 3 way hip, hamcurls and step ups   30 sec STS  See Assessment   other  4/24/2023- donning soft brace for L foot drop.   Seated hip abduction / add 10x bilat to simulate going from accelerator to brake in car.   ADRIANO/LIDIA Arredondo done 4/12/2023 26/56   R knee circumference assessment     R knee AROM n See AROM chapter   Stairs training n        n Patient ascends/descends 4 6 inch steps w/ reciprocal pattern, SPC, and unilateral rail, CS x2 trials.    Patient ascends/descends 4 6 inch steps w/ reciprocal pattern and unilateral rail, CS-Fay and gait belt, min-mod verbal cues for toe clearance (noted compensation of B foot ER to clear BLE)   Ramp training n 5 trials ascending and descending ramp with SPC and Fay. Patient demonstrates difficulty controlling deceleration when descending.    Curb training n 5 trials ascending and descending with SPC, initially req Fay with mod verbal cues and demonstration, progressed to supervision with no verbal cueing.    PN 5/22/23 yes All subjective and objective measurements completed    Gait assessment with LLE AFO donned n Patient IND in donning/doffing LLE posterior leafspring AFO        Group  CPT 13227 Total Time for Session Not performed

## 2023-06-05 ENCOUNTER — HOSPITAL ENCOUNTER (OUTPATIENT)
Dept: PHYSICAL THERAPY | Facility: REHABILITATION | Age: 63
Setting detail: THERAPIES SERIES
Discharge: HOME | End: 2023-06-05
Attending: ORTHOPAEDIC SURGERY
Payer: COMMERCIAL

## 2023-06-05 ENCOUNTER — APPOINTMENT (OUTPATIENT)
Dept: OTHER | Facility: REHABILITATION | Age: 63
Setting detail: THERAPIES SERIES
End: 2023-06-05
Payer: COMMERCIAL

## 2023-06-05 DIAGNOSIS — Z96.651 S/P TOTAL KNEE ARTHROPLASTY, RIGHT: Primary | ICD-10-CM

## 2023-06-05 PROCEDURE — 97110 THERAPEUTIC EXERCISES: CPT | Mod: GP

## 2023-06-05 NOTE — PROGRESS NOTES
Physical Therapy Visit    PT DAILY NOTE FOR OUTPATIENT THERAPY    Patient: Bebe Burks MRN: [id number]  : 1960 62 y.o.  Referring Physician: Gideon Centeno MD  Date of Visit: 2023    Certification Dates: 23 through 23    Diagnosis:   1. S/P total knee arthroplasty, right        Chief Complaints:  R TKA    Precautions:   Existing Precautions/Restrictions: other (see comments)  Precautions comments: LLE foot drop - has LLE AFO will bring NV following IE      TODAY'S VISIT    Time In Session:  Start Time: 902  Stop Time:   Time Calculation (min): 60 min   History/Vitals/Pain/Encounter Info - 23 0917        Injury History/Precautions/Daily Required Info    Document Type daily treatment     Primary Therapist Caitlyn Lagunas, PT     Chief Complaint/Reason for Visit  R TKA     Referring Physician Dr. Centeno     Existing Precautions/Restrictions other (see comments)     Precautions comments LLE foot drop - has LLE AFO will bring NV following IE     History of present illness/functional impairment Bebe is a 61 yo female who presents to OPPT s/p R TKA 23 w/ Dr. Centeno at [facility] [place] (states no complications during/post surgery). Patient reports she had a 2 night stay at [facility] [place] and then was discharged to [facility] where she stayed for 5-6 nights. She was discharged home with home health PT and OT, she states home health PT stopped last Friday 3/31/23. She reports at baseline she ambulates without AD, but started using SPC about 6 months prior to surgery due to pain/weakness. She states at this time she is ambulating with SPC for household distances and RW for community distances. She reports she lives alone in single level [facility] with 4 JOSE RAFAEL w/ unilateral rail. She states she has a walkin shower with shower chair and grab bars. She reports since her R knee surgery her ability to ascend/descend stairs is difficult, she feels like her walking is impaired, and her balance/endurance decreased. She  reports significant PMHx of LLE drop foot (which they are contributing to sciatica, states it began about 3 years ago) which she wears LLE brace for which was issued to her by Peter in September 2022, LBP w/ LLE radicular symptoms, DMII (controlled), HTN (controlled). Patient states her overall goal for PT is to walk again without a SPC. Patient reports she is currently working but on long term leave at this time. Patient reports current R knee pain 0/10 and pain at worst in the past week 1/10.     Patient reported fall since last visit No     Recommended plan to address falls Continue POC focusing on improvement of static/dynamic balance.     Fall prevention interventions recommended Educate and re-educate the patient on safety strategies;Visually observe patient as determined by the plan of care        Pain Assessment    Currently in pain No/Denies        Pain Intervention    Intervention  Exs     Post Intervention Comments NA        Pre Activity Vital Signs    Oxygen Therapy None (Room air)         Services    Do You Speak a Language Other Than English at Home? no                Daily Treatment Assessment and Plan - 06/05/23 0917        Daily Treatment Assessment and Plan    Progress toward goals Progressing     Daily Outcome Summary No pain on arrival,continued to work on LE strenghtening and sit to stands, needs cues to perform STS correctly without pushing knees back. Difficulty with side stepping, occasioanl loss of balance when done in open space. Doing better on stairs.     Plan and Recommendations Focus on progression of STS and static/dynamic NMRE                     OBJECTIVE DATA TAKEN TODAY:    None taken    Today's Treatment:    IE 4/3/23   30 Day 5/3/23   60 Day 6/3/23   90 Day 7/3/23   Precautions LLE foot drop- has LLE AFO but does not always wear    Insurance Auth    Treatment  Current Session Time   Modalities Total Time for Session Not performed   Heat/Ice  CPT 15329 CP to R knee  post session   E. Stimulation Manual  CPT 97350    E. Stim Unattended  CPT 32639    Manual   CPT 44861 Total Time for Session 0-7 Minutes   STM/MFR/TPR R gastroc/soleus, hamstring   R LE retrograde massage with elevation to reduce edema - no   Instrument Assisted STM     Mobilizations Gr II/III R patellar mobilizations in all direction -yes  AP talocrural mobs NV - yes   ROM/Flexibility R knee PROM flex/ext yes  R HS, gastroc, soleus yes  Passive soleus and gastroc stretch, pt prone (n)   Myofascial Decompression    Ther. Exercise  CPT 95370                    Total Time for Session 53-67 Minutes     Sets Reps Load Comment    RB yes 1 10 min  [employer], Seat 19, [place] Dash.            Slantboard gastroc stretch no 1 3 30sec    Slantboard soleus stretch no 1 3 30sec     Hamstring stretch no 1 3 30sec Supine by therapist   Quad set no 1 10 10 sec     SAQ Yes  2 10 3 sec,  3# Bilateral, cues to get to end range    SLR  2 10 0# RLE only, quad set prior to lift - watch for extensor lag   Sidelying hip ABD  2 10  RLE only   sidelying clamshell  yes 2 10     Standing hip  abduction  2 10 each  In // bars. Holding with UE   Hip adduction  2 10 3s hold Supine   Hip ABD yes 1 10 Green Supine   LAQ yes 1 15 3s hold 2#   Bridge with hip add yes 2 10  Cues to  Achieve  full ROM, trial with add NV   STS no BUE use Yes  yes 1  1 10  10  Mat at 21 inches  Mat at 20 1/2 inches   Toe taps  2 5 6inch 1 finger tip support   FSU yes 1 10 6 inch    LSU yes 1 10 6''    Lateral sidestepping yes 1  With cane and CS    stairs y   6 inch FF of stairs with reciprocal legs , 2 bannisters bannister , CS   Prone quad   stretch    Prone hamcurls no      n  30''x3      x10     Standing hip ext n 1 10  B   Standing hip ABD n 1 10  B   Standing hip flexion n 1 10  B                                                  Neuro Re-Ed  CPT 14906  Total Time for Session 8-22 Minutes     Sets Reps Load Comment   FT HT/HN n    Firm    FT EO yes 3 30sec   Firm   FA EC yes 3 30sec  Firm   FA EO yes 2 30sec  FOAM   FA EC yes 2 30sec  FOAM    Modified tandem yes 2 30sec  Firm   Tandem  nv 1 30sec  Floor           Pre gait exercises        Cesia step over                                  Gait  CPT 88472  Total Time for Session Not performed   Gait training with SPC and LLE AFO donned  yes     Patient ambulates 300 ft w/ SPC, LLE AFO, and CS. Patient demonstrates improved BLE step length, improved BLE weight shift, improved LLE clearance, and overall improvement in BLE gait mechanics. VC for upright posture, to look ahead.             Ther. Activity  CPT 56610  Total Time for Session Not performed    VS, falls, medication review, subjective review yes Reviewed all with pt    Patient education                                     yes Educated pt on rationale of treatment with manual therapy, on use of compression socks for swelling control, on retrograde massage with elevation, on technique/form of exercises, on sit <> stand mechanics, on POC, review of HEP - pt verbalized and demonstrated understanding     Educated on gait mechanics and drop foot contribution/need for SPC and rationale to promote improved safety and independence both short and likely long term. Reviewed donning/doffing new shoe attached DF assist brace pt brought - pt needs reinforcement to become independent.     Patient educated on re-examination findings, continued progression of POC, continued compliance with HEP, PT rec of continued use of SPC at this time    Patient educated on re-examination findings, continued focus of POC, fall prevention strategies    HEP      Issued patient HEP including hip adduction, hip ABD, LAQ and STS with use of BUE    Updated HEP to include 3 way hip, hamcurls and step ups   30 sec STS  See Assessment   other  4/24/2023- donning soft brace for L foot drop.   Seated hip abduction / add 10x bilat to simulate going from accelerator to brake in car.   ADRIANO/LIDIA Arredondo done  4/12/2023 26/56   R knee circumference assessment     R knee AROM n See AROM chapter   Stairs training n        n Patient ascends/descends 4 6 inch steps w/ reciprocal pattern, SPC, and unilateral rail, CS x2 trials.    Patient ascends/descends 4 6 inch steps w/ reciprocal pattern and unilateral rail, CS-Fay and gait belt, min-mod verbal cues for toe clearance (noted compensation of B foot ER to clear BLE)   Ramp training n 5 trials ascending and descending ramp with SPC and Fay. Patient demonstrates difficulty controlling deceleration when descending.    Curb training n 5 trials ascending and descending with SPC, initially req Fay with mod verbal cues and demonstration, progressed to supervision with no verbal cueing.    PN 5/22/23 yes All subjective and objective measurements completed    Gait assessment with LLE AFO donned n Patient IND in donning/doffing LLE posterior leafspring AFO        Group  CPT 99341 Total Time for Session Not performed

## 2023-06-05 NOTE — OP PT TREATMENT LOG
IE 4/3/23   30 Day 5/3/23   60 Day 6/3/23   90 Day 7/3/23   Precautions LLE foot drop- has LLE AFO but does not always wear    Insurance Auth    Treatment  Current Session Time   Modalities Total Time for Session Not performed   Heat/Ice  CPT 75018 CP to R knee post session   E. Stimulation Manual  CPT 81983    E. Stim Unattended  CPT 96471    Manual   CPT 44834 Total Time for Session 0-7 Minutes   STM/MFR/TPR R gastroc/soleus, hamstring   R LE retrograde massage with elevation to reduce edema - no   Instrument Assisted STM     Mobilizations Gr II/III R patellar mobilizations in all direction -yes  AP talocrural mobs NV - yes   ROM/Flexibility R knee PROM flex/ext yes  R HS, gastroc, soleus yes  Passive soleus and gastroc stretch, pt prone (n)   Myofascial Decompression    Ther. Exercise  CPT 98463                    Total Time for Session 53-67 Minutes     Sets Reps Load Comment    RB yes 1 10 min  [employer], Seat 19, [place] Dash.            Slantboard gastroc stretch no 1 3 30sec    Slantboard soleus stretch no 1 3 30sec     Hamstring stretch no 1 3 30sec Supine by therapist   Quad set no 1 10 10 sec     SAQ Yes  2 10 3 sec,  3# Bilateral, cues to get to end range    SLR  2 10 0# RLE only, quad set prior to lift - watch for extensor lag   Sidelying hip ABD  2 10  RLE only   sidelying clamshell  yes 2 10     Standing hip  abduction  2 10 each  In // bars. Holding with UE   Hip adduction  2 10 3s hold Supine   Hip ABD yes 1 10 Green Supine   LAQ yes 1 15 3s hold 2#   Bridge with hip add yes 2 10  Cues to  Achieve  full ROM, trial with add NV   STS no BUE use Yes  yes 1  1 10  10  Mat at 21 inches  Mat at 20 1/2 inches   Toe taps  2 5 6inch 1 finger tip support   FSU yes 1 10 6 inch    LSU yes 1 10 6''    Lateral sidestepping yes 1  With cane and CS    stairs y   6 inch FF of stairs with reciprocal legs , 2 bannisters bannister , CS   Prone quad   stretch    Prone hamcurls no      n  30''x3      x10     Standing  hip ext n 1 10  B   Standing hip ABD n 1 10  B   Standing hip flexion n 1 10  B                                                  Neuro Re-Ed  CPT 68833  Total Time for Session 8-22 Minutes     Sets Reps Load Comment   FT HT/HN n    Firm    FT EO yes 3 30sec  Firm   FA EC yes 3 30sec  Firm   FA EO yes 2 30sec  FOAM   FA EC yes 2 30sec  FOAM    Modified tandem yes 2 30sec  Firm   Tandem  nv 1 30sec  Floor           Pre gait exercises        Cesia step over                                  Gait  CPT 83622  Total Time for Session Not performed   Gait training with SPC and LLE AFO donned  yes     Patient ambulates 300 ft w/ SPC, LLE AFO, and CS. Patient demonstrates improved BLE step length, improved BLE weight shift, improved LLE clearance, and overall improvement in BLE gait mechanics. VC for upright posture, to look ahead.             Ther. Activity  CPT 68309  Total Time for Session Not performed    VS, falls, medication review, subjective review yes Reviewed all with pt    Patient education                                     yes Educated pt on rationale of treatment with manual therapy, on use of compression socks for swelling control, on retrograde massage with elevation, on technique/form of exercises, on sit <> stand mechanics, on POC, review of HEP - pt verbalized and demonstrated understanding     Educated on gait mechanics and drop foot contribution/need for SPC and rationale to promote improved safety and independence both short and likely long term. Reviewed donning/doffing new shoe attached DF assist brace pt brought - pt needs reinforcement to become independent.     Patient educated on re-examination findings, continued progression of POC, continued compliance with HEP, PT rec of continued use of SPC at this time    Patient educated on re-examination findings, continued focus of POC, fall prevention strategies    HEP      Issued patient HEP including hip adduction, hip ABD, LAQ and STS with use of  VERONA    Updated HEP to include 3 way hip, hamcurls and step ups   30 sec STS  See Assessment   other  4/24/2023- donning soft brace for L foot drop.   Seated hip abduction / add 10x bilat to simulate going from accelerator to brake in car.   ADRIANO/LIDIA Arredondo done 4/12/2023 26/56   R knee circumference assessment     R knee AROM n See AROM chapter   Stairs training n        n Patient ascends/descends 4 6 inch steps w/ reciprocal pattern, SPC, and unilateral rail, CS x2 trials.    Patient ascends/descends 4 6 inch steps w/ reciprocal pattern and unilateral rail, CS-Fay and gait belt, min-mod verbal cues for toe clearance (noted compensation of B foot ER to clear BLE)   Ramp training n 5 trials ascending and descending ramp with SPC and Fay. Patient demonstrates difficulty controlling deceleration when descending.    Curb training n 5 trials ascending and descending with SPC, initially req Fay with mod verbal cues and demonstration, progressed to supervision with no verbal cueing.    PN 5/22/23 yes All subjective and objective measurements completed    Gait assessment with LLE AFO donned n Patient IND in donning/doffing LLE posterior leafspring AFO        Group  CPT 45985 Total Time for Session Not performed

## 2023-06-07 DIAGNOSIS — E11.8 CONTROLLED TYPE 2 DIABETES MELLITUS WITH COMPLICATION, WITH LONG-TERM CURRENT USE OF INSULIN (CMS/HCC): ICD-10-CM

## 2023-06-07 DIAGNOSIS — Z79.4 CONTROLLED TYPE 2 DIABETES MELLITUS WITH COMPLICATION, WITH LONG-TERM CURRENT USE OF INSULIN (CMS/HCC): ICD-10-CM

## 2023-06-08 ENCOUNTER — APPOINTMENT (OUTPATIENT)
Dept: OTHER | Facility: REHABILITATION | Age: 63
Setting detail: THERAPIES SERIES
End: 2023-06-08
Payer: COMMERCIAL

## 2023-06-08 ENCOUNTER — HOSPITAL ENCOUNTER (OUTPATIENT)
Dept: PHYSICAL THERAPY | Facility: REHABILITATION | Age: 63
Setting detail: THERAPIES SERIES
Discharge: HOME | End: 2023-06-08
Attending: ORTHOPAEDIC SURGERY
Payer: COMMERCIAL

## 2023-06-08 DIAGNOSIS — Z96.651 S/P TOTAL KNEE ARTHROPLASTY, RIGHT: Primary | ICD-10-CM

## 2023-06-08 PROCEDURE — 97110 THERAPEUTIC EXERCISES: CPT | Mod: GP

## 2023-06-08 PROCEDURE — 97112 NEUROMUSCULAR REEDUCATION: CPT | Mod: GP

## 2023-06-08 RX ORDER — ORAL SEMAGLUTIDE 14 MG/1
14 TABLET ORAL DAILY
Qty: 90 TABLET | Refills: 1 | Status: SHIPPED | OUTPATIENT
Start: 2023-06-08 | End: 2023-12-28

## 2023-06-08 NOTE — PROGRESS NOTES
Physical Therapy Visit    PT DAILY NOTE FOR OUTPATIENT THERAPY    Patient: Bebe Burks MRN: [id number]  : 1960 62 y.o.  Referring Physician: Gideon Centeno MD  Date of Visit: 2023    Certification Dates: 23 through 23    Diagnosis:   1. S/P total knee arthroplasty, right        Chief Complaints:  R TKA    Precautions:   Existing Precautions/Restrictions: other (see comments)  Precautions comments: LLE foot drop - has LLE AFO will bring NV following IE      TODAY'S VISIT    Time In Session:  Start Time: 1000  Stop Time: 1100  Time Calculation (min): 60 min   History/Vitals/Pain/Encounter Info - 23 1003        Injury History/Precautions/Daily Required Info    Document Type daily treatment     Primary Therapist Caitlyn Lagunas, PT     Chief Complaint/Reason for Visit  R TKA     Referring Physician Dr. Centeno     Existing Precautions/Restrictions other (see comments)     Precautions comments LLE foot drop - has LLE AFO will bring NV following IE     History of present illness/functional impairment Bebe is a 61 yo female who presents to OPPT s/p R TKA 23 w/ Dr. Centeno at [facility] [place] (states no complications during/post surgery). Patient reports she had a 2 night stay at [facility] [place] and then was discharged to [facility] where she stayed for 5-6 nights. She was discharged home with home health PT and OT, she states home health PT stopped last Friday 3/31/23. She reports at baseline she ambulates without AD, but started using SPC about 6 months prior to surgery due to pain/weakness. She states at this time she is ambulating with SPC for household distances and RW for community distances. She reports she lives alone in single level [facility] with 4 JOSE RAFAEL w/ unilateral rail. She states she has a walkin shower with shower chair and grab bars. She reports since her R knee surgery her ability to ascend/descend stairs is difficult, she feels like her walking is impaired, and her balance/endurance decreased. She  reports significant PMHx of LLE drop foot (which they are contributing to sciatica, states it began about 3 years ago) which she wears LLE brace for which was issued to her by Peter in September 2022, LBP w/ LLE radicular symptoms, DMII (controlled), HTN (controlled). Patient states her overall goal for PT is to walk again without a SPC. Patient reports she is currently working but on long term leave at this time. Patient reports current R knee pain 0/10 and pain at worst in the past week 1/10.     Patient/Family/Caregiver Comments/Observations Patient arrives reporting her appointment with Dr. Centeno got changed to tomorrow (Friday) at 9:30.     Patient reported fall since last visit No     Recommended plan to address falls Continue POC focusing on improvement of static/dynamic balance.     Fall prevention interventions recommended Educate and re-educate the patient on safety strategies;Visually observe patient as determined by the plan of care        Pain Assessment    Currently in pain No/Denies        Pain Intervention    Intervention  n/a     Post Intervention Comments n/a        Pre Activity Vital Signs    Pulse 86     SpO2 100 %     Oxygen Therapy None (Room air)     /66     BP Location Right upper arm     BP Method Manual     Patient Position Sitting         Services    Do You Speak a Language Other Than English at Home? no                Daily Treatment Assessment and Plan - 06/08/23 1003        Daily Treatment Assessment and Plan    Progress toward goals Progressing     Daily Outcome Summary Patient demonstrates improvement in static NMRE this date, with no noted LOB and decreased use of reaching / hip strategy to maintain balance. Progressed POC with addition of tandem walking with SPC. Patient demonstrates good ability to maintain stance on RLE, however difficulty clearing LLE during swing through during tandem walking secondary to patient's LLE foot drop. Discussed with patient  importance of use of LLE brace for improved gait. Also discussed future script for LLE to address LLE foot drop and maximize return to highest level of function. patient issued updated HEP this date including hip adduction, hip ABD, LAQ, hip 3 way, and heel / toe raises.     Plan and Recommendations Focus on progression of STS and static/dynamic NMRE                     OBJECTIVE DATA TAKEN TODAY:    None taken    Today's Treatment:    IE 4/3/23   30 Day 5/3/23   60 Day 6/3/23   90 Day 7/3/23   Precautions LLE foot drop- has LLE AFO but does not always wear    Insurance Auth    Treatment  Current Session Time   Modalities Total Time for Session Not performed   Heat/Ice  CPT 03177 CP to R knee post session   E. Stimulation Manual  CPT 76072    E. Stim Unattended  CPT 66888    Manual   CPT 79567 Total Time for Session 0-7 Minutes   STM/MFR/TPR R gastroc/soleus, hamstring- yes   R LE retrograde massage with elevation to reduce edema - no   Instrument Assisted STM     Mobilizations Gr II/III R patellar mobilizations in all direction -yes  AP talocrural mobs NV - no   ROM/Flexibility R knee PROM flex/ext - yes  R HS, gastroc, soleus - yes  Passive soleus and gastroc stretch, pt prone (n)   Myofascial Decompression    Ther. Exercise  CPT 43078                    Total Time for Session 23-37 Minutes     Sets Reps Load Comment    RB yes 1 8 min  [employer], Seat 19, [place] Dash.            Slantboard gastroc stretch no 1 3 30sec    Slantboard soleus stretch no 1 3 30sec     Hamstring stretch no 1 3 30sec Supine by therapist   Quad set no 1 10 10 sec     SAQ no 2 10 3 sec,  3# Bilateral, cues to get to end range    SLR  2 10 0# RLE only, quad set prior to lift - watch for extensor lag   Sidelying hip ABD  2 10  RLE only   sidelying clamshell  no 2 10     Standing hip  abduction  2 10 each  In // bars. Holding with UE   Hip adduction  2 10 3s hold Supine   Hip ABD no 1 10 Green Supine   LAQ no 1 15 3s hold 2#   Bridge  with hip add no 2 10  Cues to  Achieve  full ROM, trial with add NV   STS no BUE use Yes  yes 1  1 10  10  Mat at 21 inches  Mat at 20 1/2 inches   Toe taps  2 5 6inch 1 finger tip support   FSU yes 1 10 6 inch    LSU yes 1 10 6''    Lateral sidestepping yes 1  With cane and CS    stairs y   6 inch FF of stairs with reciprocal legs , 2 bannisters bannister , CS   Prone quad   stretch    Prone hamcurls no      n  30''x3      x10     Standing hip ext n 1 10  B   Standing hip ABD n 1 10  B   Standing hip flexion n 1 10  B   Heel raises yes 2 10  BUE support   Toe raises yes 2 10  BUE support                                   Neuro Re-Ed  CPT 69605  Total Time for Session 23-37 Minutes     Sets Reps Load Comment   FT HT/HN yes 2 10  Firm    FT EO yes 1 30sec  Firm   FT EC yes 3 30sec  Firm   FA EO yes 1 30sec  FOAM   FA EC yes 2 30sec  FOAM    FT EO  yes 3 30sec  FOAM   Modified tandem yes 3 30sec  Firm   Modified tandem on FOAM NV       Tandem  yes 2 10ft  Floor, with SPC           Pre gait exercises        Cesia step over NV                                 Gait  CPT 79683  Total Time for Session Not performed   Gait training with SPC and LLE AFO donned  yes     Patient ambulates 300 ft w/ SPC, LLE AFO, and CS. Patient demonstrates improved BLE step length, improved BLE weight shift, improved LLE clearance, and overall improvement in BLE gait mechanics. VC for upright posture, to look ahead.             Ther. Activity  CPT 18372  Total Time for Session 0-7 Minutes   VS, falls, medication review, subjective review yes Reviewed all with pt    Patient education                                     yes Educated pt on rationale of treatment with manual therapy, on use of compression socks for swelling control, on retrograde massage with elevation, on technique/form of exercises, on sit <> stand mechanics, on POC, review of HEP - pt verbalized and demonstrated understanding     Educated on gait mechanics and drop foot  contribution/need for SPC and rationale to promote improved safety and independence both short and likely long term. Reviewed donning/doffing new shoe attached DF assist brace pt brought - pt needs reinforcement to become independent.     Patient educated on re-examination findings, continued progression of POC, continued compliance with HEP, PT rec of continued use of SPC at this time    Patient educated on re-examination findings, continued focus of POC, fall prevention strategies    HEP       yes Issued patient HEP including hip adduction, hip ABD, LAQ and STS with use of BUE    Patient provided on updated HEP including    30 sec STS  See Assessment   other  4/24/2023- donning soft brace for L foot drop.   Seated hip abduction / add 10x bilat to simulate going from accelerator to brake in car.   ADRIANO/LIDIA Arredondo done 4/12/2023 26/56   R knee circumference assessment     R knee AROM n See AROM chapter   Stairs training n        n Patient ascends/descends 4 6 inch steps w/ reciprocal pattern, SPC, and unilateral rail, CS x2 trials.    Patient ascends/descends 4 6 inch steps w/ reciprocal pattern and unilateral rail, CS-Fay and gait belt, min-mod verbal cues for toe clearance (noted compensation of B foot ER to clear BLE)   Ramp training n 5 trials ascending and descending ramp with SPC and Fay. Patient demonstrates difficulty controlling deceleration when descending.    Curb training n 5 trials ascending and descending with SPC, initially req Fay with mod verbal cues and demonstration, progressed to supervision with no verbal cueing.    PN 5/22/23 yes All subjective and objective measurements completed    Gait assessment with LLE AFO donned n Patient IND in donning/doffing LLE posterior leafspring AFO        Group  CPT 85464 Total Time for Session Not performed     HEP 6/8: Heel raises, toe raises, hip 3 way, LAQ, hip adduction, hip ABD

## 2023-06-08 NOTE — OP PT TREATMENT LOG
IE 4/3/23   30 Day 5/3/23   60 Day 6/3/23   90 Day 7/3/23   Precautions LLE foot drop- has LLE AFO but does not always wear    Insurance Auth    Treatment  Current Session Time   Modalities Total Time for Session Not performed   Heat/Ice  CPT 81679 CP to R knee post session   E. Stimulation Manual  CPT 69498    E. Stim Unattended  CPT 35461    Manual   CPT 32989 Total Time for Session 0-7 Minutes   STM/MFR/TPR R gastroc/soleus, hamstring- yes   R LE retrograde massage with elevation to reduce edema - no   Instrument Assisted STM     Mobilizations Gr II/III R patellar mobilizations in all direction -yes  AP talocrural mobs NV - no   ROM/Flexibility R knee PROM flex/ext - yes  R HS, gastroc, soleus - yes  Passive soleus and gastroc stretch, pt prone (n)   Myofascial Decompression    Ther. Exercise  CPT 51954                    Total Time for Session 23-37 Minutes     Sets Reps Load Comment    RB yes 1 8 min  [employer] Bike, Seat 19, [place].            Slantboard gastroc stretch no 1 3 30sec    Slantboard soleus stretch no 1 3 30sec     Hamstring stretch no 1 3 30sec Supine by therapist   Quad set no 1 10 10 sec     SAQ no 2 10 3 sec,  3# Bilateral, cues to get to end range    SLR  2 10 0# RLE only, quad set prior to lift - watch for extensor lag   Sidelying hip ABD  2 10  RLE only   sidelying clamshell  no 2 10     Standing hip  abduction  2 10 each  In // bars. Holding with UE   Hip adduction  2 10 3s hold Supine   Hip ABD no 1 10 Green Supine   LAQ no 1 15 3s hold 2#   Bridge with hip add no 2 10  Cues to  Achieve  full ROM, trial with add NV   STS no BUE use Yes  yes 1  1 10  10  Mat at 21 inches  Mat at 20 1/2 inches   Toe taps  2 5 6inch 1 finger tip support   FSU yes 1 10 6 inch    LSU yes 1 10 6''    Lateral sidestepping yes 1  With cane and CS    stairs y   6 inch FF of stairs with reciprocal legs , 2 bannisters bannister , CS   Prone quad   stretch    Prone hamcurls no      n  30''x3      x10      Standing hip ext n 1 10  B   Standing hip ABD n 1 10  B   Standing hip flexion n 1 10  B   Heel raises yes 2 10  BUE support   Toe raises yes 2 10  BUE support                                   Neuro Re-Ed  CPT 99819  Total Time for Session 23-37 Minutes     Sets Reps Load Comment   FT HT/HN yes 2 10  Firm    FT EO yes 1 30sec  Firm   FT EC yes 3 30sec  Firm   FA EO yes 1 30sec  FOAM   FA EC yes 2 30sec  FOAM    FT EO  yes 3 30sec  FOAM   Modified tandem yes 3 30sec  Firm   Modified tandem on FOAM NV       Tandem  yes 2 10ft  Floor, with SPC           Pre gait exercises        Cesia step over NV                                 Gait  CPT 66922  Total Time for Session Not performed   Gait training with SPC and LLE AFO donned  yes     Patient ambulates 300 ft w/ SPC, LLE AFO, and CS. Patient demonstrates improved BLE step length, improved BLE weight shift, improved LLE clearance, and overall improvement in BLE gait mechanics. VC for upright posture, to look ahead.             Ther. Activity  CPT 59119  Total Time for Session 0-7 Minutes   VS, falls, medication review, subjective review yes Reviewed all with pt    Patient education                                     yes Educated pt on rationale of treatment with manual therapy, on use of compression socks for swelling control, on retrograde massage with elevation, on technique/form of exercises, on sit <> stand mechanics, on POC, review of HEP - pt verbalized and demonstrated understanding     Educated on gait mechanics and drop foot contribution/need for SPC and rationale to promote improved safety and independence both short and likely long term. Reviewed donning/doffing new shoe attached DF assist brace pt brought - pt needs reinforcement to become independent.     Patient educated on re-examination findings, continued progression of POC, continued compliance with HEP, PT rec of continued use of SPC at this time    Patient educated on re-examination findings,  continued focus of POC, fall prevention strategies    HEP       yes Issued patient HEP including hip adduction, hip ABD, LAQ and STS with use of BUE    Patient provided on updated HEP including    30 sec STS  See Assessment   other  4/24/2023- donning soft brace for L foot drop.   Seated hip abduction / add 10x bilat to simulate going from accelerator to brake in car.   ADRIANO/LIDIA Arredondo done 4/12/2023 26/56   R knee circumference assessment     R knee AROM n See AROM chapter   Stairs training n        n Patient ascends/descends 4 6 inch steps w/ reciprocal pattern, SPC, and unilateral rail, CS x2 trials.    Patient ascends/descends 4 6 inch steps w/ reciprocal pattern and unilateral rail, CS-Fay and gait belt, min-mod verbal cues for toe clearance (noted compensation of B foot ER to clear BLE)   Ramp training n 5 trials ascending and descending ramp with SPC and Fay. Patient demonstrates difficulty controlling deceleration when descending.    Curb training n 5 trials ascending and descending with SPC, initially req Fay with mod verbal cues and demonstration, progressed to supervision with no verbal cueing.    PN 5/22/23 yes All subjective and objective measurements completed    Gait assessment with LLE AFO donned n Patient IND in donning/doffing LLE posterior leafspring AFO        Group  CPT 42906 Total Time for Session Not performed     HEP 6/8: Heel raises, toe raises, hip 3 way, LAQ, hip adduction, hip ABD

## 2023-06-12 ENCOUNTER — APPOINTMENT (OUTPATIENT)
Dept: OTHER | Facility: REHABILITATION | Age: 63
Setting detail: THERAPIES SERIES
End: 2023-06-12
Payer: COMMERCIAL

## 2023-06-12 ENCOUNTER — HOSPITAL ENCOUNTER (OUTPATIENT)
Dept: PHYSICAL THERAPY | Facility: REHABILITATION | Age: 63
Setting detail: THERAPIES SERIES
Discharge: HOME | End: 2023-06-12
Attending: ORTHOPAEDIC SURGERY
Payer: COMMERCIAL

## 2023-06-12 DIAGNOSIS — Z96.651 S/P TOTAL KNEE ARTHROPLASTY, RIGHT: Primary | ICD-10-CM

## 2023-06-12 PROCEDURE — 97530 THERAPEUTIC ACTIVITIES: CPT | Mod: GP,CQ

## 2023-06-12 PROCEDURE — 97140 MANUAL THERAPY 1/> REGIONS: CPT | Mod: GP,CQ

## 2023-06-12 PROCEDURE — 97110 THERAPEUTIC EXERCISES: CPT | Mod: GP,CQ

## 2023-06-12 NOTE — PROGRESS NOTES
Physical Therapy Visit    PT DAILY NOTE FOR OUTPATIENT THERAPY    Patient: Bebe uBrks MRN: [id number]  : 1960 62 y.o.  Referring Physician: Gideon Centeno MD  Date of Visit: 2023    Certification Dates: 23 through 23    Diagnosis:   1. S/P total knee arthroplasty, right        Chief Complaints:  R TKA    Precautions:   Existing Precautions/Restrictions: other (see comments)  Precautions comments: LLE foot drop - has LLE AFO will bring NV following IE      TODAY'S VISIT    Time In Session:  Start Time: 905  Stop Time: 1000  Time Calculation (min): 55 min   History/Vitals/Pain/Encounter Info - 23 0859        Injury History/Precautions/Daily Required Info    Document Type daily treatment     Primary Therapist Caitlyn Lagunas, PT     Chief Complaint/Reason for Visit  R TKA     Referring Physician Dr. Centeno     Existing Precautions/Restrictions other (see comments)     Precautions comments LLE foot drop - has LLE AFO will bring NV following IE     History of present illness/functional impairment Bebe is a 61 yo female who presents to OPPT s/p R TKA 23 [place] [facility] [place] complications during/post [place]). Patient reports she had a 2 night stay [place] [facility] [place] and then was discharged to [facility] where she stayed for 5-6 nights. She was discharged home with home health PT and OT, she states home health PT stopped last Friday 3/31/23. She reports at baseline she ambulates without AD, but started using SPC about 6 months prior to surgery due to pain/weakness. She states at this time she is ambulating with SPC for household distances and RW for community distances. She reports she lives alone in single level [facility] with 4 JOSE RAFAEL w/ unilateral rail. She states she has a walkin shower with shower chair and grab bars. She reports since her R knee surgery her ability to ascend/descend stairs is difficult, she feels like her walking is impaired, and her balance/endurance decreased. She  reports significant PMHx of LLE drop foot (which they are contributing to sciatica, states it began about 3 years ago) which she wears LLE brace for which was issued to her by Peter in September 2022, LBP w/ LLE radicular symptoms, DMII (controlled), HTN (controlled). Patient states her overall goal for PT is to walk again without a SPC. Patient reports she is currently working but on long term leave at this time. Patient reports current R knee pain 0/10 and pain at worst in the past week 1/10.     Patient/Family/Caregiver Comments/Observations Sydney arrived with no pain, just stiffness. No falls or medication changes. She said Dr. Centeno was pleased with her progress. Gave her a script for the L foot drop, but she forgot to bring it today. She also requested to review some of her HEP exercises and manual work to B calves as she is very tight today due to being very busy over the weekend and not being able to do her HEP.     Patient reported fall since last visit No        Pain Assessment    Currently in pain No/Denies        Pain Intervention    Intervention  monitored     Post Intervention Comments no pain         Services    Do You Speak a Language Other Than English at Home? no                Daily Treatment Assessment and Plan - 06/12/23 1003        Daily Treatment Assessment and Plan    Progress toward goals Progressing     Daily Outcome Summary Bebe arrived with no pain, just very tight and stiff due to being busy over the weekend and not able to perform her HEP. She asked to review some of the exercises. She needs a lot of work with hip abduction due to weakness and cues for proper form. She has a script for her L foot drop, but forgot to bring it today. She will bring it next visit.     Plan and Recommendations Continue with POC per primary PT.                       Today's Treatment:    IE  4/3/23   30 Day  5/3/23   60 Day  6/3/23   90 Day  7/3/23   Precautions  LLE foot drop- has LLE AFO but  does not always wear    Insurance Auth     Treatment   Current Session Time   Modalities  Total Time for Session Not performed   Heat/Ice  CPT 48458  CP to R knee post session   E. Stimulation Manual  CPT 93971     E. Stim Unattended  CPT 00850     Manual   CPT 89554  Total Time for Session 15 minutes    STM/MFR/TPR y  y R gastroc/soleus, hamstring  R LE retrograde massage with elevation to reduce edema    Instrument Assisted STM      Mobilizations n  n Gr II/III R patellar mobilizations in all direction  AP talocrural mobs NV    ROM/Flexibility y  n  n R knee PROM flex/ext   R HS, gastroc, soleus  Passive soleus and gastroc stretch, pt prone    Myofascial Decompression     Ther. Exercise  CPT 31288                     Total Time for Session 30 minutes      Sets Reps Load Comment    RB seat #12 y  1 10 min               Slantboard gastroc stretch n  1 3 30sec    Slantboard soleus stretch n  1 3 30sec     Hamstring stretch n  1 3 30sec Supine by therapist   Quad set n  1 10 10 sec     SAQ n  2 10 3 sec,  3# Bilateral, cues to get to end range    SLR y  2 10 0# RLE only, quad set prior to lift - watch for extensor lag   Sidelying hip ABD y  2 10  Needed extensive cues   sidelying clamshell  y  2 10  B   Standing hip  abduction   2 10 each  In // bars. Holding with UE   Hip adduction   2 10 3s hold Supine   Hip ABD n  1 10 Green Supine   LAQ n  1 15 3s hold 2#   Bridge with hip add y  2 10  Cues to  Achieve  full ROM, trial with add NV   STS no BUE use n  y  1  1 10  10  Mat at 21 inches  Mat at 20 1/2 inches   Toe taps   2 5 6inch 1 finger tip support   Step up forward n  1 10 6 inch    Step up lateral n  1 10 6''    Lateral sidestepping n  1  With cane and CS    stairs n    6 inch FF of stairs with reciprocal legs , 2 bannisters bannister , CS   Prone quad   stretch    Prone hamcurls n      n   30''x3      x10     Standing hip ext n  1 10  B   Standing hip ABD n  1 10  B   Standing hip flexion n  1 10  B   Heel  raises n  2 10  BUE support   Toe raises n  2 10  BUE support                                        Neuro Re-Ed  CPT 70794   Total Time for Session Not performed      Sets Reps Load Comment   FT HT/HN n  2 10  Firm    FT EO n  1 30sec  Firm   FT EC n  3 30sec  Firm   FA EO n  1 30sec  FOAM   FA EC n  2 30sec  FOAM    FT EO  n  3 30sec  FOAM   Modified tandem n  3 30sec  Firm   Modified tandem on FOAM NV        Tandem  n  2 10ft  Floor, with SPC            Pre gait exercises         Cesia step over NV                                      Gait  CPT 33491   Total Time for Session Not performed   Gait training with SPC and LLE AFO donned  n      Patient ambulates 300 ft w/ SPC, LLE AFO, and CS. Patient demonstrates improved BLE step length, improved BLE weight shift, improved LLE clearance, and overall improvement in BLE gait mechanics. VC for upright posture, to look ahead.               Ther. Activity  CPT 12608   Total Time for Session 10 minutes   VS, falls, medication review, subjective review y  Reviewed all with pt    Patient education                                     n  Educated pt on rationale of treatment with manual therapy, on use of compression socks for swelling control, on retrograde massage with elevation, on technique/form of exercises, on sit <> stand mechanics, on POC, review of HEP - pt verbalized and demonstrated understanding     Educated on gait mechanics and drop foot contribution/need for SPC and rationale to promote improved safety and independence both short and likely long term. Reviewed donning/doffing new shoe attached DF assist brace pt brought - pt needs reinforcement to become independent.     Patient educated on re-examination findings, continued progression of POC, continued compliance with HEP, PT rec of continued use of SPC at this time    Patient educated on re-examination findings, continued focus of POC, fall prevention strategies    HEP       n  y  Issued patient HEP  including hip adduction, hip ABD, LAQ and STS with use of BUE    Patient provided on updated HEP including   Encouraged HEP consistency especially Hip Abd - patient with good understanding   30 sec STS   See Assessment   other   4/24/2023- donning soft brace for L foot drop.   Seated hip abduction / add 10x bilat to simulate going from accelerator to brake in car.   ADRIANO/LIDIA Arredondo done 4/12/2023 26/56   R knee circumference assessment      R knee AROM n  See AROM chapter   Stairs training n        n  Patient ascends/descends 4 6 inch steps w/ reciprocal pattern, SPC, and unilateral rail, CS x2 trials.    Patient ascends/descends 4 6 inch steps w/ reciprocal pattern and unilateral rail, CS-Fay and gait belt, min-mod verbal cues for toe clearance (noted compensation of B foot ER to clear BLE)   Ramp training n  5 trials ascending and descending ramp with SPC and Fay. Patient demonstrates difficulty controlling deceleration when descending.    Curb training n  5 trials ascending and descending with SPC, initially req Fay with mod verbal cues and demonstration, progressed to supervision with no verbal cueing.    PN 5/22/23 n  All subjective and objective measurements completed    Gait assessment with LLE AFO donned n  Patient IND in donning/doffing LLE posterior leafspring AFO         Group  CPT 70888  Total Time for Session Not performed     HEP 6/8: Heel raises, toe raises, hip 3 way, LAQ, hip adduction, hip ABD

## 2023-06-12 NOTE — OP PT TREATMENT LOG
IE  4/3/23   30 Day  5/3/23   60 Day  6/3/23   90 Day  7/3/23   Precautions  LLE foot drop- has LLE AFO but does not always wear    Insurance Auth     Treatment   Current Session Time   Modalities  Total Time for Session Not performed   Heat/Ice  CPT 60843  CP to R knee post session   E. Stimulation Manual  CPT 88826     E. Stim Unattended  CPT 33786     Manual   CPT 78880  Total Time for Session 15 minutes    STM/MFR/TPR y  y R gastroc/soleus, hamstring  R LE retrograde massage with elevation to reduce edema    Instrument Assisted STM      Mobilizations n  n Gr II/III R patellar mobilizations in all direction  AP talocrural mobs NV    ROM/Flexibility y  n  n R knee PROM flex/ext   R HS, gastroc, soleus  Passive soleus and gastroc stretch, pt prone    Myofascial Decompression     Ther. Exercise  CPT 94159                     Total Time for Session 30 minutes      Sets Reps Load Comment    RB seat #12 y  1 10 min               Slantboard gastroc stretch n  1 3 30sec    Slantboard soleus stretch n  1 3 30sec     Hamstring stretch n  1 3 30sec Supine by therapist   Quad set n  1 10 10 sec     SAQ n  2 10 3 sec,  3# Bilateral, cues to get to end range    SLR y  2 10 0# RLE only, quad set prior to lift - watch for extensor lag   Sidelying hip ABD y  2 10  Needed extensive cues   sidelying clamshell  y  2 10  B   Standing hip  abduction   2 10 each  In // bars. Holding with UE   Hip adduction   2 10 3s hold Supine   Hip ABD n  1 10 Green Supine   LAQ n  1 15 3s hold 2#   Bridge with hip add y  2 10  Cues to  Achieve  full ROM, trial with add NV   STS no BUE use n  y  1  1 10  10  Mat at 21 inches  Mat at 20 1/2 inches   Toe taps   2 5 6inch 1 finger tip support   Step up forward n  1 10 6 inch    Step up lateral n  1 10 6''    Lateral sidestepping n  1  With cane and CS    stairs n    6 inch FF of stairs with reciprocal legs , 2 bannisters bannister , CS   Prone quad   stretch    Prone hamcurls n      n   30''x3      x10      Standing hip ext n  1 10  B   Standing hip ABD n  1 10  B   Standing hip flexion n  1 10  B   Heel raises n  2 10  BUE support   Toe raises n  2 10  BUE support                                        Neuro Re-Ed  CPT 36383   Total Time for Session Not performed      Sets Reps Load Comment   FT HT/HN n  2 10  Firm    FT EO n  1 30sec  Firm   FT EC n  3 30sec  Firm   FA EO n  1 30sec  FOAM   FA EC n  2 30sec  FOAM    FT EO  n  3 30sec  FOAM   Modified tandem n  3 30sec  Firm   Modified tandem on FOAM NV        Tandem  n  2 10ft  Floor, with SPC            Pre gait exercises         Cesia step over NV                                      Gait  CPT 06035   Total Time for Session Not performed   Gait training with SPC and LLE AFO donned  n      Patient ambulates 300 ft w/ SPC, LLE AFO, and CS. Patient demonstrates improved BLE step length, improved BLE weight shift, improved LLE clearance, and overall improvement in BLE gait mechanics. VC for upright posture, to look ahead.               Ther. Activity  CPT 61623   Total Time for Session 10 minutes   VS, falls, medication review, subjective review y  Reviewed all with pt    Patient education                                     n  Educated pt on rationale of treatment with manual therapy, on use of compression socks for swelling control, on retrograde massage with elevation, on technique/form of exercises, on sit <> stand mechanics, on POC, review of HEP - pt verbalized and demonstrated understanding     Educated on gait mechanics and drop foot contribution/need for SPC and rationale to promote improved safety and independence both short and likely long term. Reviewed donning/doffing new shoe attached DF assist brace pt brought - pt needs reinforcement to become independent.     Patient educated on re-examination findings, continued progression of POC, continued compliance with HEP, PT rec of continued use of SPC at this time    Patient educated on re-examination  findings, continued focus of POC, fall prevention strategies    HEP       n  y  Issued patient HEP including hip adduction, hip ABD, LAQ and STS with use of BUE    Patient provided on updated HEP including   Encouraged HEP consistency especially Hip Abd - patient with good understanding   30 sec STS   See Assessment   other   4/24/2023- donning soft brace for L foot drop.   Seated hip abduction / add 10x bilat to simulate going from accelerator to brake in car.   OH/FGA   Bergs done 4/12/2023 26/56   R knee circumference assessment      R knee AROM n  See AROM chapter   Stairs training n        n  Patient ascends/descends 4 6 inch steps w/ reciprocal pattern, SPC, and unilateral rail, CS x2 trials.    Patient ascends/descends 4 6 inch steps w/ reciprocal pattern and unilateral rail, CS-Fay and gait belt, min-mod verbal cues for toe clearance (noted compensation of B foot ER to clear BLE)   Ramp training n  5 trials ascending and descending ramp with SPC and Fay. Patient demonstrates difficulty controlling deceleration when descending.    Curb training n  5 trials ascending and descending with SPC, initially req Fay with mod verbal cues and demonstration, progressed to supervision with no verbal cueing.    PN 5/22/23 n  All subjective and objective measurements completed    Gait assessment with LLE AFO donned n  Patient IND in donning/doffing LLE posterior leafspring AFO         Group  CPT 42580  Total Time for Session Not performed     HEP 6/8: Heel raises, toe raises, hip 3 way, LAQ, hip adduction, hip ABD

## 2023-06-16 ENCOUNTER — HOSPITAL ENCOUNTER (OUTPATIENT)
Dept: PHYSICAL THERAPY | Facility: REHABILITATION | Age: 63
Setting detail: THERAPIES SERIES
Discharge: HOME | End: 2023-06-16
Attending: ORTHOPAEDIC SURGERY
Payer: COMMERCIAL

## 2023-06-16 ENCOUNTER — APPOINTMENT (OUTPATIENT)
Dept: OTHER | Facility: REHABILITATION | Age: 63
Setting detail: THERAPIES SERIES
End: 2023-06-16
Payer: COMMERCIAL

## 2023-06-16 ENCOUNTER — DOCUMENTATION (OUTPATIENT)
Dept: SOCIAL WORK | Facility: REHABILITATION | Age: 63
End: 2023-06-16
Payer: COMMERCIAL

## 2023-06-16 DIAGNOSIS — Z96.651 S/P TOTAL KNEE ARTHROPLASTY, RIGHT: Primary | ICD-10-CM

## 2023-06-16 PROCEDURE — 97116 GAIT TRAINING THERAPY: CPT | Mod: GP

## 2023-06-16 PROCEDURE — 97110 THERAPEUTIC EXERCISES: CPT | Mod: GP

## 2023-06-16 NOTE — OP PT TREATMENT LOG
IE  4/3/23   30 Day  5/3/23   60 Day  6/3/23   90 Day  7/3/23   Precautions  LLE foot drop- has LLE AFO but does not always wear    Insurance Auth     Treatment   Current Session Time   Modalities  Total Time for Session Not performed   Heat/Ice  CPT 53446  CP to R knee post session   E. Stimulation Manual  CPT 79696     E. Stim Unattended  CPT 74042     Manual   CPT 11551  Total Time for Session minutes    STM/MFR/TPR  R gastroc/soleus, hamstring  R LE retrograde massage with elevation to reduce edema    Instrument Assisted STM      Mobilizations n  n Gr II/III R patellar mobilizations in all direction  AP talocrural mobs NV    ROM/Flexibility   n  n R knee PROM flex/ext   R HS, gastroc, soleus  Passive soleus and gastroc stretch, pt prone    Myofascial Decompression     Ther. Exercise  CPT 14388                     Total Time for Session 45 minutes      Sets Reps Load Comment    RB seat #12 y  1 10 min               Slantboard gastroc stretch Y  1 3 30sec    Slantboard soleus stretch n  1 3 30sec     Hamstring stretch n  1 3 30sec Supine by therapist   Quad set n  1 10 10 sec     SAQ n  2 10 3 sec,  3# Bilateral, cues to get to end range    SLR   2 10 0# RLE only, quad set prior to lift - watch for extensor lag   Sidelying hip ABD   2 10  Needed extensive cues   sidelying clamshell  Y  2 10  B   Standing hip  abduction   2 10 each  In // bars. Holding with UE   Hip adduction   2 10 3s hold Supine   Hip ABD n  1 10 Green Supine   LAQ n  1 15 3s hold 2#   Bridge with hip add y  2 10  Will ball squeeze   STS no BUE use n  y  1  1 10  10  Mat at 21 inches  Mat at 20 1/2 inches   Toe taps   2 5 6inch 1 finger tip support   Step up forward  Step overs Y  Y  1 5   5 8 inch   bilateral   Step up lateral n  1 10 6''    Lateral sidestepping n  1  With cane and CS    stairs n    6 inch FF of stairs with reciprocal legs , 2 bannisters bannister , CS   Prone quad   stretch    Prone hamcurls Y      n   30''x3      x10   bilateral   Standing hip ext Y  1 10  B orange TB   Standing hip ABD Y  1 10  B OTB   Standing hip flexion Y  1 10  B OTB   Heel raises n  2 10  BUE support   Toe raises n  2 10  BUE support                                        Neuro Re-Ed  CPT 95931   Total Time for Session Yes/ billed with TE      Sets Reps Load Comment   FT HT/HN n  2 10  Firm    FT EO n  1 30sec  Firm   FT EC n  3 30sec  Firm   FA EO Y  2 30sec  FOAM, dynamic arms with ball   FA EC n  2 30sec  FOAM    FT EO  n  3 30sec  FOAM   Modified tandem Y  3 30sec  airex   Modified tandem on FOAM NV        Tandem  n  2 10ft  Floor, with SPC            Pre gait exercises         Cesia step over NV                                      Gait  CPT 66607   Total Time for Session 10 minutes   Gait training with SPC and LLE AFO donned  n      Patient ambulates 300 ft w/ SPC, LLE AFO, and CS. Patient demonstrates improved BLE step length, improved BLE weight shift, improved LLE clearance, and overall improvement in BLE gait mechanics. VC for upright posture, to look ahead.   Gait training Y  With SPC, outside on uneven areas, on concrete, grass, needs gait belt and minimum to mod assist on grass 100 feet         Ther. Activity  CPT 18819   Total Time for Session 10 minutes   VS, falls, medication review, subjective review y  Reviewed all with pt    Patient education                                     n  Educated pt on rationale of treatment with manual therapy, on use of compression socks for swelling control, on retrograde massage with elevation, on technique/form of exercises, on sit <> stand mechanics, on POC, review of HEP - pt verbalized and demonstrated understanding     Educated on gait mechanics and drop foot contribution/need for SPC and rationale to promote improved safety and independence both short and likely long term. Reviewed donning/doffing new shoe attached DF assist brace pt brought - pt needs reinforcement to become independent.     Patient  educated on re-examination findings, continued progression of POC, continued compliance with HEP, PT rec of continued use of SPC at this time    Patient educated on re-examination findings, continued focus of POC, fall prevention strategies    HEP       n  y  Issued patient HEP including hip adduction, hip ABD, LAQ and STS with use of BUE    Patient provided on updated HEP including   Encouraged HEP consistency especially Hip Abd - patient with good understanding   30 sec STS   See Assessment   other   4/24/2023- donning soft brace for L foot drop.   Seated hip abduction / add 10x bilat to simulate going from accelerator to brake in car.   OH/LINDAA   Bergs done 4/12/2023 26/56   R knee circumference assessment      R knee AROM n  See AROM chapter   Stairs training n        n  Patient ascends/descends 4 6 inch steps w/ reciprocal pattern, SPC, and unilateral rail, CS x2 trials.    Patient ascends/descends 4 6 inch steps w/ reciprocal pattern and unilateral rail, CS-Fay and gait belt, min-mod verbal cues for toe clearance (noted compensation of B foot ER to clear BLE)   Ramp training n  5 trials ascending and descending ramp with SPC and Fay. Patient demonstrates difficulty controlling deceleration when descending.    Curb training n  5 trials ascending and descending with SPC, initially req Fay with mod verbal cues and demonstration, progressed to supervision with no verbal cueing.    PN 5/22/23 n  All subjective and objective measurements completed    Gait assessment with LLE AFO donned n  Patient IND in donning/doffing LLE posterior leafspring AFO         Group  CPT 46153  Total Time for Session Not performed     HEP 6/8: Heel raises, toe raises, hip 3 way, LAQ, hip adduction, hip ABD

## 2023-06-19 ENCOUNTER — APPOINTMENT (OUTPATIENT)
Dept: OTHER | Facility: REHABILITATION | Age: 63
Setting detail: THERAPIES SERIES
End: 2023-06-19
Payer: COMMERCIAL

## 2023-06-19 ENCOUNTER — HOSPITAL ENCOUNTER (OUTPATIENT)
Dept: PHYSICAL THERAPY | Facility: REHABILITATION | Age: 63
Setting detail: THERAPIES SERIES
Discharge: HOME | End: 2023-06-19
Attending: ORTHOPAEDIC SURGERY
Payer: COMMERCIAL

## 2023-06-19 DIAGNOSIS — Z96.651 S/P TOTAL KNEE ARTHROPLASTY, RIGHT: Primary | ICD-10-CM

## 2023-06-19 PROCEDURE — 97140 MANUAL THERAPY 1/> REGIONS: CPT | Mod: GP

## 2023-06-19 PROCEDURE — 97112 NEUROMUSCULAR REEDUCATION: CPT | Mod: GP

## 2023-06-19 PROCEDURE — 97110 THERAPEUTIC EXERCISES: CPT | Mod: GP

## 2023-06-19 NOTE — PROGRESS NOTES
Physical Therapy Visit    PT DAILY NOTE FOR OUTPATIENT THERAPY    Patient: Bebe Burks MRN: [id number]  : 1960 62 y.o.  Referring Physician: Gideon Centeno MD  Date of Visit: 2023    Certification Dates: 23 through 23    Diagnosis:   1. S/P total knee arthroplasty, right        Chief Complaints:  R TKA    Precautions:   Existing Precautions/Restrictions: other (see comments)  Precautions comments: LLE foot drop - has LLE AFO will bring NV following IE      TODAY'S VISIT    Time In Session:  Start Time: 901  Stop Time: 1000  Time Calculation (min): 59 min   History/Vitals/Pain/Encounter Info - 23 0930        Injury History/Precautions/Daily Required Info    Document Type daily treatment     Primary Therapist Caitlyn Lagunas, PT     Chief Complaint/Reason for Visit  R TKA     Referring Physician Dr. Centeno     Existing Precautions/Restrictions other (see comments)     Precautions comments LLE foot drop - has LLE AFO will bring NV following IE     History of present illness/functional impairment Bebe is a 63 yo female who presents to OPPT s/p R TKA 23 w/ Dr. Centeno [place] [facility] [place]states no complications during/post surgery). Patient reports she had a 2 night stay [place] [facility] and then was discharged to [facility] where she stayed for 5-6 nights. She was discharged home with home health PT and OT, she states home health PT stopped last Friday 3/31/23. She reports at baseline she ambulates without AD, but started using SPC about 6 months prior to surgery due to pain/weakness. She states at this time she is ambulating with SPC for household distances and RW for community distances. She reports she lives alone in single level [facility] with 4 JOSE RAFAEL w/ unilateral rail. She states she has a walkin shower with shower chair and grab bars. She reports since her R knee surgery her ability to ascend/descend stairs is difficult, she feels like her walking is impaired, and her balance/endurance decreased. She  reports significant PMHx of LLE drop foot (which they are contributing to sciatica, states it began about 3 years ago) which she wears LLE brace for which was issued to her by Peter in September 2022, LBP w/ LLE radicular symptoms, DMII (controlled), HTN (controlled). Patient states her overall goal for PT is to walk again without a SPC. Patient reports she is currently working but on long term leave at this time. Patient reports current R knee pain 0/10 and pain at worst in the past week 1/10.     Patient reported fall since last visit No        Pain Assessment    Currently in pain Yes     Pain Side/Orientation left     Pain Rating (0-10): Pre Activity 5   L calf, non operated leg    Pain Rating (0-10): Activity 3     Pain Rating (0-10): Post Activity 2        Pain Intervention    Intervention  exs, manual     Post Intervention Comments L calf pain lowered         Services    Do You Speak a Language Other Than English at Home? no                Daily Treatment Assessment and Plan - 06/19/23 0930        Daily Treatment Assessment and Plan    Progress toward goals Progressing     Daily Outcome Summary Bebe arrived with L calf pain . Has been walking more on the weekend. TE per sheet, Continues to have difficulty with balance exercises on the airex.L calf pain lowered after gentle soft tissue mobilization, stretcing. Discussed DC next vist. Patient agreeable.     Plan and Recommendations Dc next visit.                     OBJECTIVE DATA TAKEN TODAY:    None taken    Today's Treatment:    IE  4/3/23   30 Day  5/3/23   60 Day  6/3/23   90 Day  7/3/23   Precautions  LLE foot drop- has LLE AFO but does not always wear    Insurance Auth     Treatment   Current Session Time   Modalities  Total Time for Session Not performed   Heat/Ice  CPT 28953  CP to R knee post session   E. Stimulation Manual  CPT 17881     E. Stim Unattended  CPT 85492     Manual   CPT 61184  Total Time for Session  8 minutes     STM/MFR/TPR Y  N    Y bilat gastroc/soleus, hamstring  R LE retrograde massage with elevation to reduce edema   L calf massage   Instrument Assisted STM      Mobilizations n  n Gr II/III R patellar mobilizations in all direction  AP talocrural mobs NV    ROM/Flexibility   n  n R knee PROM flex/ext   R HS, gastroc, soleus  Passive soleus and gastroc stretch, pt prone    Myofascial Decompression     Ther. Exercise  CPT 07341                     Total Time for Session 35 minutes      Sets Reps Load Comment    RB seat #12 y  1 10 min   level 3            Slantboard gastroc stretch Y  1 3 30sec    Slantboard soleus stretch n  1 3 30sec     Hamstring stretch Y  1 3 30sec    Quad set n  1 10 10 sec     SAQ n  2 10 3 sec,  3# Bilateral, cues to get to end range    SLR Y  1 10 0# RLE only, quad set prior to lift - watch for extensor lag   Sidelying hip ABD Y  1 10  Needed extensive cues   sidelying clamshell    2 10  B   Standing hip  abduction   2 10 each  In // bars. Holding with UE   Hip adduction   2 10 3s hold Supine   Hip ABD n  1 10 Green Supine   LAQ n  1 15 3s hold 2#   Bridge with hip add y  2 10  Will ball squeeze   STS no BUE use n  y  1  2 10  10  Mat at 21 inches  Mat at 20 1/2 inches   Toe taps   2 5 6inch 1 finger tip support   Step up forward  Step overs   Y    1 10   10x 8 inch     bilateral   Step up lateral n  1 10 6''    Lateral sidestepping n  1  With cane and CS    stairs n    6 inch FF of stairs with reciprocal legs , 2 bannisters bannister , CS   Prone quad   stretch    Prone hamcurls       n   30''x3      x10  bilateral   Standing hip ext   1 10  B orange TB   Standing hip ABD   1 10  B OTB   Standing hip flexion Y  1 10  B OTB   Heel raises n  2 10  BUE support   Toe raises n  2 10  BUE support                                        Neuro Re-Ed  CPT 08117   Total Time for Session 15       Sets Reps Load Comment   FT HT/HN n  2 10  Firm    FT EO n  1 30sec     FT EC n  3 30sec  Firm   FA EO Y   multiple  times 30sec  FOAM, dynamic arms with ball   FA EC n  2 30sec  FOAM    FT EO  n  3 30sec  FOAM   Modified tandem Y  3 30sec     Modified tandem on FOAM Y   30 sec  Mini hold on bars   Tandem  n  2 10ft  Floor, with SPC            Pre gait exercises         Cesia step over                                       Gait  CPT 23253   Total Time for Session 10 minutes   Gait training with SPC and LLE AFO donned  n      Patient ambulates 300 ft w/ SPC, LLE AFO, and CS. Patient demonstrates improved BLE step length, improved BLE weight shift, improved LLE clearance, and overall improvement in BLE gait mechanics. VC for upright posture, to look ahead.   Gait training Y  With SPC, outside on uneven areas, on concrete, grass, needs gait belt and minimum to mod assist on grass 100 feet         Ther. Activity  CPT 56480   Total Time for Session 10 minutes   VS, falls, medication review, subjective review y  Reviewed all with pt    Patient education                                     n  Educated pt on rationale of treatment with manual therapy, on use of compression socks for swelling control, on retrograde massage with elevation, on technique/form of exercises, on sit <> stand mechanics, on POC, review of HEP - pt verbalized and demonstrated understanding     Educated on gait mechanics and drop foot contribution/need for SPC and rationale to promote improved safety and independence both short and likely long term. Reviewed donning/doffing new shoe attached DF assist brace pt brought - pt needs reinforcement to become independent.     Patient educated on re-examination findings, continued progression of POC, continued compliance with HEP, PT rec of continued use of SPC at this time    Patient educated on re-examination findings, continued focus of POC, fall prevention strategies    HEP       n  y  Issued patient HEP including hip adduction, hip ABD, LAQ and STS with use of BUE    Patient provided on updated HEP including    Encouraged HEP consistency especially Hip Abd - patient with good understanding   30 sec STS   See Assessment   other   4/24/2023- donning soft brace for L foot drop.   Seated hip abduction / add 10x bilat to simulate going from accelerator to brake in car.   ADRIANO/LIDIA Arredondo done 4/12/2023 26/56   R knee circumference assessment      R knee AROM n  See AROM chapter   Stairs training n        n  Patient ascends/descends 4 6 inch steps w/ reciprocal pattern, SPC, and unilateral rail, CS x2 trials.    Patient ascends/descends 4 6 inch steps w/ reciprocal pattern and unilateral rail, CS-Fay and gait belt, min-mod verbal cues for toe clearance (noted compensation of B foot ER to clear BLE)   Ramp training n  5 trials ascending and descending ramp with SPC and Fay. Patient demonstrates difficulty controlling deceleration when descending.    Curb training n  5 trials ascending and descending with SPC, initially req Fay with mod verbal cues and demonstration, progressed to supervision with no verbal cueing.    PN 5/22/23 n  All subjective and objective measurements completed    Gait assessment with LLE AFO donned n  Patient IND in donning/doffing LLE posterior leafspring AFO         Group  CPT 83772  Total Time for Session Not performed     HEP 6/8: Heel raises, toe raises, hip 3 way, LAQ, hip adduction, hip ABD

## 2023-06-19 NOTE — OP PT TREATMENT LOG
IE  4/3/23   30 Day  5/3/23   60 Day  6/3/23   90 Day  7/3/23   Precautions  LLE foot drop- has LLE AFO but does not always wear    Insurance Auth     Treatment   Current Session Time   Modalities  Total Time for Session Not performed   Heat/Ice  CPT 94769  CP to R knee post session   E. Stimulation Manual  CPT 58380     E. Stim Unattended  CPT 65964     Manual   CPT 51838  Total Time for Session  8 minutes    STM/MFR/TPR Y  N    Y bilat gastroc/soleus, hamstring  R LE retrograde massage with elevation to reduce edema   L calf massage   Instrument Assisted STM      Mobilizations n  n Gr II/III R patellar mobilizations in all direction  AP talocrural mobs NV    ROM/Flexibility   n  n R knee PROM flex/ext   R HS, gastroc, soleus  Passive soleus and gastroc stretch, pt prone    Myofascial Decompression     Ther. Exercise  CPT 68161                     Total Time for Session 35 minutes      Sets Reps Load Comment    RB seat #12 y  1 10 min   level 3            Slantboard gastroc stretch Y  1 3 30sec    Slantboard soleus stretch n  1 3 30sec     Hamstring stretch Y  1 3 30sec    Quad set n  1 10 10 sec     SAQ n  2 10 3 sec,  3# Bilateral, cues to get to end range    SLR Y  1 10 0# RLE only, quad set prior to lift - watch for extensor lag   Sidelying hip ABD Y  1 10  Needed extensive cues   sidelying clamshell    2 10  B   Standing hip  abduction   2 10 each  In // bars. Holding with UE   Hip adduction   2 10 3s hold Supine   Hip ABD n  1 10 Green Supine   LAQ n  1 15 3s hold 2#   Bridge with hip add y  2 10  Will ball squeeze   STS no BUE use n  y  1  2 10  10  Mat at 21 inches  Mat at 20 1/2 inches   Toe taps   2 5 6inch 1 finger tip support   Step up forward  Step overs   Y    1 10   10x 8 inch     bilateral   Step up lateral n  1 10 6''    Lateral sidestepping n  1  With cane and CS    stairs n    6 inch FF of stairs with reciprocal legs , 2 bannisters bannister , CS   Prone quad   stretch    Prone hamcurls       n    30''x3      x10  bilateral   Standing hip ext   1 10  B orange TB   Standing hip ABD   1 10  B OTB   Standing hip flexion Y  1 10  B OTB   Heel raises n  2 10  BUE support   Toe raises n  2 10  BUE support                                        Neuro Re-Ed  CPT 99290   Total Time for Session 15       Sets Reps Load Comment   FT HT/HN n  2 10  Firm    FT EO n  1 30sec     FT EC n  3 30sec  Firm   FA EO Y  multiple  times 30sec  FOAM, dynamic arms with ball   FA EC n  2 30sec  FOAM    FT EO  n  3 30sec  FOAM   Modified tandem Y  3 30sec     Modified tandem on FOAM Y   30 sec  Mini hold on bars   Tandem  n  2 10ft  Floor, with SPC            Pre gait exercises         Cesia step over                                       Gait  CPT 67617   Total Time for Session 10 minutes   Gait training with SPC and LLE AFO donned  n      Patient ambulates 300 ft w/ SPC, LLE AFO, and CS. Patient demonstrates improved BLE step length, improved BLE weight shift, improved LLE clearance, and overall improvement in BLE gait mechanics. VC for upright posture, to look ahead.   Gait training Y  With SPC, outside on uneven areas, on concrete, grass, needs gait belt and minimum to mod assist on grass 100 feet         Ther. Activity  CPT 29011   Total Time for Session 10 minutes   VS, falls, medication review, subjective review y  Reviewed all with pt    Patient education                                     n  Educated pt on rationale of treatment with manual therapy, on use of compression socks for swelling control, on retrograde massage with elevation, on technique/form of exercises, on sit <> stand mechanics, on POC, review of HEP - pt verbalized and demonstrated understanding     Educated on gait mechanics and drop foot contribution/need for SPC and rationale to promote improved safety and independence both short and likely long term. Reviewed donning/doffing new shoe attached DF assist brace pt brought - pt needs reinforcement to become  independent.     Patient educated on re-examination findings, continued progression of POC, continued compliance with HEP, PT rec of continued use of SPC at this time    Patient educated on re-examination findings, continued focus of POC, fall prevention strategies    HEP       n  y  Issued patient HEP including hip adduction, hip ABD, LAQ and STS with use of BUE    Patient provided on updated HEP including   Encouraged HEP consistency especially Hip Abd - patient with good understanding   30 sec STS   See Assessment   other   4/24/2023- donning soft brace for L foot drop.   Seated hip abduction / add 10x bilat to simulate going from accelerator to brake in car.   OH/LINDAA   Bergs done 4/12/2023 26/56   R knee circumference assessment      R knee AROM n  See AROM chapter   Stairs training n        n  Patient ascends/descends 4 6 inch steps w/ reciprocal pattern, SPC, and unilateral rail, CS x2 trials.    Patient ascends/descends 4 6 inch steps w/ reciprocal pattern and unilateral rail, CS-Fay and gait belt, min-mod verbal cues for toe clearance (noted compensation of B foot ER to clear BLE)   Ramp training n  5 trials ascending and descending ramp with SPC and Fay. Patient demonstrates difficulty controlling deceleration when descending.    Curb training n  5 trials ascending and descending with SPC, initially req Fay with mod verbal cues and demonstration, progressed to supervision with no verbal cueing.    PN 5/22/23 n  All subjective and objective measurements completed    Gait assessment with LLE AFO donned n  Patient IND in donning/doffing LLE posterior leafspring AFO         Group  CPT 15193  Total Time for Session Not performed     HEP 6/8: Heel raises, toe raises, hip 3 way, LAQ, hip adduction, hip ABD

## 2023-06-23 ENCOUNTER — HOSPITAL ENCOUNTER (OUTPATIENT)
Dept: OTHER | Facility: REHABILITATION | Age: 63
Setting detail: THERAPIES SERIES
End: 2023-06-23
Attending: ORTHOPAEDIC SURGERY
Payer: COMMERCIAL

## 2023-06-23 ENCOUNTER — HOSPITAL ENCOUNTER (OUTPATIENT)
Dept: PHYSICAL THERAPY | Facility: REHABILITATION | Age: 63
Setting detail: THERAPIES SERIES
Discharge: HOME | End: 2023-06-23
Attending: ORTHOPAEDIC SURGERY
Payer: COMMERCIAL

## 2023-06-23 DIAGNOSIS — Z96.651 S/P TOTAL KNEE ARTHROPLASTY, RIGHT: Primary | ICD-10-CM

## 2023-06-23 PROCEDURE — 97110 THERAPEUTIC EXERCISES: CPT | Mod: GP

## 2023-06-23 PROCEDURE — 97530 THERAPEUTIC ACTIVITIES: CPT | Mod: GP

## 2023-06-23 ASSESSMENT — BALANCE ASSESSMENTS: TOTAL SCORE: 37

## 2023-06-24 NOTE — ADDENDUM NOTE
Encounter addended by: Laura Beard, PT on: 6/23/2023 10:13 PM   Actions taken: Patient Goal completed

## 2023-06-24 NOTE — PROGRESS NOTES
Physical Therapy Discharge      PT DISCHARGE NOTE FOR OUTPATIENT THERAPY    Patient: Bebe Burks MRN: [id number]  : 1960 62 y.o.  Referring Physician: Gideon Centeno MD  Date of Visit: 2023      Certification Dates: 23 through 23    Total Visit Count: 29    Chief Complaints:  Chief Complaint   Patient presents with   • Pain     L calf ( non operated leg) 6/10   • Difficulty Walking     Has foot drop L side, peripheral neuropathy       Precautions:  Existing Precautions/Restrictions: other (see comments)  Precautions comments: LLE foot drop - has LLE AFO not wearing      TODAY'S VISIT:    Time In Session:  Start Time: 901  Stop Time: 1000  Time Calculation (min): 59 min   General Information - 23 0911        Session Details    Document Type discharge evaluation     Mode of Treatment individual therapy        General Information    Referring Physician Dr. Centeno     History of present illness/functional impairment Bebe is a 61 yo female who presents to OPPT s/p R TKA 23 w/ Dr. Centeno at [facility] (states no complications during/post surgery). Patient reports she had a 2 night stay at [facility] and then was discharged to [facility] where she stayed for 5-6 nights. She was discharged home with home health PT and OT, she states home health PT stopped last Friday 3/31/23. She reports at baseline she ambulates without AD, but started using SPC about 6 months prior to surgery due to pain/weakness. She states at this time she is ambulating with SPC for household distances and RW for community distances. She reports she lives alone in single level [facility] with 4 JOSE RAFAEL w/ unilateral rail. She states she has a walkin shower with shower chair and grab bars. She reports since her R knee surgery her ability to ascend/descend stairs is difficult, she feels like her walking is impaired, and her balance/endurance decreased. She reports significant PMHx of LLE drop foot (which they are contributing to  sciatica, states it began about 3 years ago) which she wears LLE brace for which was issued to her by Peter in September 2022, LBP w/ LLE radicular symptoms, DMII (controlled), HTN (controlled). Patient states her overall goal for PT is to walk again without a SPC. Patient reports she is currently working but on long term leave at this time. Patient reports current R knee pain 0/10 and pain at worst in the past week 1/10.     Patient/Family/Caregiver Comments/Observations Sydney is planning to get PT for L foot drop after finishing PT for R TKR.     Existing Precautions/Restrictions other (see comments)     Precautions comments LLE foot drop - has LLE AFO not wearing         Services    Do You Speak a Language Other Than English at Home? no                Daily Falls Screen - 06/23/23 0911        Daily Falls Assessment    Patient reported fall since last visit No                Pain/Vitals - 06/23/23 0911        Pain Assessment    Currently in pain Yes     Preferred Pain Scale number (Numeric Rating Pain Scale)     Pain Side/Orientation left     Pain: Body location Leg     Pain Rating (0-10): Pre Activity 6   L calf    Pain Rating (0-10): Activity 3     Pain Rating (0-10): Post Activity 3        Pain Intervention    Intervention  exs     Post Intervention Comments pain lowered in L Leg                PT - 06/23/23 0911        Physical Therapy    Physical Therapy [facility] PT        PT Plan    Frequency of treatment 3 times/week     PT Duration 3 months     PT Custom Frequency and Duration Begin with 3x/week and transition to 2x/week when appropriate     PT Cert From 04/03/23     PT Cert To 06/26/23     Date PT POC was sent to provider 04/03/23     Signed PT Plan of Care received?  Yes                Assessment and Plan - 06/23/23 1948        Assessment    Clinical Assessment Bebe has attended 29 visits to date in physical therapy for her R TKR. She has made nice gains in physical  therapy with her R knee AROM , which has improved from 101 deg knee flexion on evalaution  to 122 def  on dc. Her extension has improved from - 7 degrees to 0 deg on dc. Her Bergs balance score improved for 33/56 on eval to 37/56 on dc but she is still at a risk of falls as falls cut off score is 45/56. She has a L foot drop due to peripheral neuropathy and has a flexible brace and an AFO but does not wear her braces. Her PSFS has improved from 53.3 %( last progress note) to 63.3% on dc. Her 6MAT has improved from 811 feet to 849 feet on dc  , using a SPC. She has made nice gains in her R LE strength and is independent with her HEP. She has met many of her Long term goals in PT for her R knee and hence is DC. She is plaaning to seek further pT for L foot drop, get adjustments made to her L AFO and for gait training with same. She has new script for same and will scheule PT in near future, DC PT from R TKR episode today.     Plan and Recommendations Dc PT                 OBJECTIVE MEASUREMENTS/DATA:    ROM    Range of Motion - 06/23/23 0900        RIGHT: Lower Extremity AROM Assessment    Knee Flexion   122     Knee Extension   0               Transfers    Transfers - 06/23/23 1948        Sit to Stand Transfer    Andrew, Sit to Stand Transfer independent     Comment needs arm rests for sit to stand               Gait and Mobility    Gait and Mobility - 06/23/23 1948        Gait Training    Andrew, Gait modified independence     Assistive Device cane, straight     Deviations/Abnormal Patterns left sided deviations;gait speed decreased     Comment (Gait/Stairs) Left foot drop present, wears flexible brace( similar to Elevate brace) but not consistenlty. Has AFO but does not wear.               Outcome Measures    PT Outcome Measures - 06/23/23 0911        Objective Outcome Measures    6 Minute Walk Test 849 feet with SPC     2 Minute Walk Test 324 feet with SPC     30 Second Sit to Stand Test 9 repetitions    from high mat, no arms    Five Times to Sit to Stand (FTSTS) 14.43 sec from high mat        Subjective Outcome Measures    LEFS 45/80        Person Balance Scale    Sitting to Standing Able to stand independently using hands     Standing Unsupported Able to stand safely for 2 minutes     Sitting with Back Unsupported but Feet Supported on Floor or on a Stool Able to sit safely and securely for 2 minutes     Standing to Sitting Controls descent by using hands     Transfers Able to transfer safely definite need of hands     Standing Unsupported with Eyes Closed Able to stand 10 seconds safely     Standing Unsupported with Feet Together Able to place feet together independently but unable to hold for 30 seconds     Reach Forward with Outstretched Arm While Standing Can reach forward 5 cm (2 inches)      Object from Floor from a Standing Position Able to  slipper but needs supervision     Turning to Look Behind Over Left and Right Shoulders While Standing Looks behind from both sides and weight shifts well     Turn 360 Degrees Able to turn 360 degrees safely but slowly     Place Alternate Foot on Step or Stool While Standing Unsupported Able to complete greater than 2 steps needs minimal assist     Standing Unsupported One Foot in Front Needs help to step but can hold 15 seconds     Standing on One Leg Tries to lift leg unable to hold 3 seconds but remains standing independently     Person Balance Score 37        NEW (2/6/23):  PSFS     PSFS ACTIVITY 1 steps     PSFS ANSWER 1 5     PSFS ACTIVITY 2 walking     PSFS ANSWER 2 6     PSFS ACTIVITY 3 Kitchen work, including standing     PSFS ANSWER 3 8     PSFS Sum of Activity Scores 19     PSFS Number of Activities 3     PSFS Percent Score 63.33 %                 ROM and MMT        4/3/2023 5/5/2023    09:20 5/11/2023    12:00 5/22/2023    12:57 6/23/2023   PT LE ROM Measurements   AROM: Right Hip Flexion 74 degrees       AROM: Left Hip Flexion 104 degrees        AROM: Right Knee Flexion 101  125  122   AROM: Left Knee Flexion 131       AROM: Right Knee Extension --        lacking about 7 deg knee ext  0  0   AROM: Left Knee Extension 0       AROM: Right Ankle DF --        lacking 10 deg       AROM: Left Ankle DF --        lacking ~10 deg DF       PT Cervical/Lumbar/Other ROM Measurements   Right Knee Girth measurement NV       Left Knee Girth measurement NV       PT LE MMT   Right Hip Flexion (3+/5) fair plus (4-/5) good minus  (4-/5) good minus (4+/5) good plus   Left Hip Flexion (3+/5) fair plus (4-/5) good minus  (4-/5) good minus (4/5) good   Right Hip Extension (3+/5) fair plus (4-/5) good minus  (4-/5) good minus (4/5) good   Left Hip Extension (3+/5) fair plus (3+/5) fair plus  (3+/5) fair plus (4-/5) good minus   Right Hip ABD (3+/5) fair plus (4-/5) good minus  (4-/5) good minus (4-/5) good minus   Left Hip ABD (3+/5) fair plus (4-/5) good minus  (4-/5) good minus (4-/5) good minus   Right Hip ADD (3+/5) fair plus (4-/5) good minus  (4-/5) good minus    Left Hip ADD (3+/5) fair plus (4-/5) good minus  (4-/5) good minus    Right Hip IR (3+/5) fair plus (4-/5) good minus  (4-/5) good minus (4+/5) good plus   Left Hip IR (3/5) fair (3+/5) fair plus  (3+/5) fair plus    Right Hip ER (3+/5) fair plus (4-/5) good minus  (4-/5) good minus (4+/5) good plus   Left Hip ER (3/5) fair (3+/5) fair plus  (3+/5) fair plus    Right Knee Flexion (3+/5) fair plus (4-/5) good minus  (4-/5) good minus (4/5) good   Left Knee Flexion (3+/5) fair plus (4-/5) good minus  (4-/5) good minus (4/5) good   Right Knee Extension (3+/5) fair plus (4-/5) good minus  (4-/5) good minus (4+/5) good plus   Left Knee Extension (3+/5) fair plus (4-/5) good minus  (4-/5) good minus (4/5) good   Right Ankle DF (3+/5) fair plus (4-/5) good minus  (4-/5) good minus (4+/5) good plus   Left Ankle DF (3-/5) fair minus (3-/5) fair minus  (3-/5) fair minus    Right Ankle PF (3+/5) fair plus (4-/5) good minus   (4-/5) good minus (4+/5) good plus   Left Ankle PF (3-/5) fair minus (3-/5) fair minus  (3-/5) fair minus      Outcome Measures        4/3/2023    10:03 4/6/2023    14:26 4/12/2023    11:52 5/5/2023    09:20 5/22/2023    09:53   PT OBJECTIVE Outcome Measures   6 Minute Walk Test  ft w/ RW, CS and gait belt  811 ft w/ SPC, CS and gait belt, LLE brace donned (previously: 804 ft w/ RW, CS and gait belt) Not assessed 5/22/23- (previously 811 ft w/ SPC, CS and gait belt, LLE brace donned)   2 Minute Walk Test  TBD at PN  TBD at next PN TBD at next PN   Five Times Sit to Stand NV       30 Second Sit to Stand  8 repetitions       from standard red/purple chair w/ arms, use of BUE due to pt unable to complete without  10 repetitions       from standard red/purple chair w/ arms, use of BUE due to pt unable to complete without 10 repetitions       from standard red/purple chair w/ arms, use of BUE due to pt unable to complete without   Person Balance Score   26 33 39   TUG - Results NV       PT SUBJECTIVE Outcome Measures   LEFS 21/80 (26%)   43/80= 53% (previously 21/80=26%) 35/80= 43% (previously 43/80=53%)   PSFS % Score 26.67 % 26.67 %  53.33 % 53.33 %           6/23/2023    09:11   PT OBJECTIVE Outcome Measures   6 Minute Walk Test 849 feet with SPC   2 Minute Walk Test 324 feet with SPC   Five Times Sit to Stand 14.43 sec from high mat   30 Second Sit to Stand 9 repetitions       from high mat, no arms   Person Balance Score 37   TUG - Results    PT SUBJECTIVE Outcome Measures   LEFS 45/80   PSFS % Score 63.33 %       Today's Treatment:    Education provided:  Yes: See treatment log for details of education provided  Methods: Discussion  Response: Verbalizes understanding    IE  4/3/23   30 Day  5/3/23   60 Day  6/3/23   90 Day  7/3/23   Precautions  LLE foot drop- has LLE AFO but does not always wear    Insurance Auth     Treatment   Current Session Time   Modalities  Total Time for Session Not performed    Heat/Ice  CPT 30758  CP to R knee post session   E. Stimulation Manual  CPT 13254     E. Stim Unattended  CPT 70101     Manual   CPT 08551  Total Time for Session   minutes    STM/MFR/TPR  bilat gastroc/soleus, hamstring  R LE retrograde massage with elevation to reduce edema   L calf massage   Instrument Assisted STM      Mobilizations n  n Gr II/III R patellar mobilizations in all direction  AP talocrural mobs NV    ROM/Flexibility   n  n R knee PROM flex/ext   R HS, gastroc, soleus  Passive soleus and gastroc stretch, pt prone    Myofascial Decompression     Ther. Exercise  CPT 81406                     Total Time for Session 15 minutes      Sets Reps Load Comment    RB seat #12 y  1 10 min   level 3            Slantboard gastroc stretch Y  1 3 30sec    Slantboard soleus stretch n  1 3 30sec     Hamstring stretch   1 3 30sec    Quad set n  1 10 10 sec     SAQ n  2 10 3 sec,  3# Bilateral, cues to get to end range    SLR   1 10 0# RLE only, quad set prior to lift - watch for extensor lag   Sidelying hip ABD   1 10  Needed extensive cues   sidelying clamshell    2 10  B   Standing hip  abduction   2 10 each  In // bars. Holding with UE   Hip adduction   2 10 3s hold Supine   Hip ABD n  1 10 Green Supine   LAQ n  1 15 3s hold 2#   Bridge with hip add   2 10  Will ball squeeze   STS no BUE use n  y  1  1 10  10  Mat at 21 inches   Toe taps   2 5 6inch 1 finger tip support   Step up forward  Step overs       1 10   10x 8 inch     bilateral   Step up lateral n  1 10 6''    Lateral sidestepping n  1  With cane and CS    stairs n    6 inch FF of stairs with reciprocal legs , 2 bannisters bannister , CS   Prone quad   stretch    Prone hamcurls       n   30''x3      x10  bilateral   Standing hip ext   1 10  B orange TB   Standing hip ABD   1 10  B OTB   Standing hip flexion   1 10  B OTB   Heel raises n  2 10  BUE support   Toe raises n  2 10  BUE support                                        Neuro Re-Ed  CPT 26398    Total Time for Session        Sets Reps Load Comment   FT HT/HN n  2 10  Firm    FT EO n  1 30sec     FT EC n  3 30sec  Firm   FA EO Y  multiple  times 30sec  FOAM, dynamic arms with ball   FA EC n  2 30sec  FOAM    FT EO  n  3 30sec  FOAM   Modified tandem   3 30sec     Modified tandem on FOAM    30 sec  Mini hold on bars   Tandem  n  2 10ft  Floor, with SPC            Pre gait exercises         Cesia step over                                       Gait  CPT 49804   Total Time for Session  minutes   Gait training with SPC and LLE AFO donned  n      Patient ambulates 300 ft w/ SPC, LLE AFO, and CS. Patient demonstrates improved BLE step length, improved BLE weight shift, improved LLE clearance, and overall improvement in BLE gait mechanics. VC for upright posture, to look ahead.   Gait training   With SPC, outside on uneven areas, on concrete, grass, needs gait belt and minimum to mod assist on grass 100 feet         Ther. Activity  CPT 93258   Total Time for Session 40 minutes   VS, falls, medication review, subjective review   Reviewed all with pt    Patient education                                     n  Educated pt on rationale of treatment with manual therapy, on use of compression socks for swelling control, on retrograde massage with elevation, on technique/form of exercises, on sit <> stand mechanics, on POC, review of HEP - pt verbalized and demonstrated understanding     Educated on gait mechanics and drop foot contribution/need for SPC and rationale to promote improved safety and independence both short and likely long term. Reviewed donning/doffing new shoe attached DF assist brace pt brought - pt needs reinforcement to become independent.     Patient educated on re-examination findings, continued progression of POC, continued compliance with HEP, PT rec of continued use of SPC at this time    Patient educated on re-examination findings, continued focus of POC, fall prevention strategies    HEP        n    Issued patient HEP including hip adduction, hip ABD, LAQ and STS with use of BUE    Patient provided on updated HEP including   Encouraged HEP consistency especially Hip Abd - patient with good understanding   30 sec STS   See Assessment   other   4/24/2023- donning soft brace for L foot drop.   Seated hip abduction / add 10x bilat to simulate going from accelerator to brake in car.   Dc assessment y  Bergs done , 6MWT, 30 sec STS, ROM, MMT and discussion on goals done. Discussion on progess made.   R knee circumference assessment      R knee AROM n  See AROM chapter   Stairs training n        n  Patient ascends/descends 4 6 inch steps w/ reciprocal pattern, SPC, and unilateral rail, CS x2 trials.    Patient ascends/descends 4 6 inch steps w/ reciprocal pattern and unilateral rail, CS-Fay and gait belt, min-mod verbal cues for toe clearance (noted compensation of B foot ER to clear BLE)   Ramp training n  5 trials ascending and descending ramp with SPC and Fay. Patient demonstrates difficulty controlling deceleration when descending.    Curb training n  5 trials ascending and descending with SPC, initially req Fay with mod verbal cues and demonstration, progressed to supervision with no verbal cueing.    PN 5/22/23 n  All subjective and objective measurements completed    Gait assessment with LLE AFO donned n  Patient IND in donning/doffing LLE posterior leafspring AFO         Group  CPT 07168  Total Time for Session Not performed     HEP 6/8: Heel raises, toe raises, hip 3 way, LAQ, hip adduction, hip ABD          Goals Addressed                    This Visit's Progress       Patient Stated    •  Patient stated R TKA 2023 (pt-stated)         Patient states her overall goal for PT is to walk again without a SPC.     PN 5/5/23: pt reports feeling like she has met this goal 60% at this time-   met 80% progress /goals regarding R TKR on dc- 6/23/2023    PN 5/22/23: pt reports she feels like she has made  about 65% progress toward this goal         Other    •  R TKA 2023         Short Term Goals Time Frame Result Comment/Progress   Patient will report < 2 /10 pain at worst on average for improved functional performance.    4 Weeks 5/5: MET    Patient will improve LEFS outcome score by 7 points in order to demonstrate self-perceived improved functional performance.    4 Weeks 5/5: MET IE: 21/80 (26%)    5/5: 43/80    Patient will improve R knee AROM flex by 10 deg for improved squatting for functional tasks.    4 Weeks 5/5: not assessed  5/22: not assessed    Patient will be IND with HEP.    4 Weeks 5/5: MET    Patient will improve PSFS outcome measure scoring by 2 points in all categories in order to demonstrate self-perceived improvement in function.  4 Weeks 5/5: MET IE: Steps (2/10), Walking (3/10), Kitchen activities/prolonged standing (3/10)    5/5: Steps (5/10), Walking (5/10), Kitchen activities/prolonged standing (6/10)    5/22: Steps (4/10), Walking (4/10), Kitchen activities/prolonged standing (8/10)      4 Weeks       Long Term Goals Time Frame Result Comment/Progress   Patient will report 0/10 pain at worst on average for improved functional performance.    8 Weeks 5/22: MET    Patient will improve LEFS outcome score by 14 points  (from PN) in order to demonstrate self-perceived improved functional performance.    8 Weeks 5/22: not met IE: 21/80 (26%)    Dc improved to 45/80 on dc   Patient will improve R knee AROM flex by 20 deg for improved squatting for functional tasks.    8 Weeks Met  On dc AROM R knee flexion 120deg   Patient will be IND with HEP upon discharge for carryover of functional gains.    12 Weeks met    Patient will improve PSFS outcome measure scoring by 4 points in all categories in order to demonstrate self-perceived improvement in function.  8 Weeks  On dc improved to 63.3% IE: Steps (2/10), Walking (3/10), Kitchen activities/prolonged standing (3/10)    5/5: Steps (5/10), Walking  (5/10), Kitchen activities/prolonged standing (6/10)    5/22: Steps (4/10), Walking (4/10), Kitchen activities/prolonged standing (8/10)   Patient will achieve 0 deg R knee ext.   8 Weeks met                 Discharge information for CAR[facility]:    Reason for D/C: Goals met  Was care interrupted for medical reason?: No  Did the patient demonstrate increased independence: Yes  Demonstration of independece:: Becker in HEP, Increased functional activity  Has the patient returned to productive activity?: Yes  Increased production assessment:: Return to driving  Skin integrity: Intact

## 2023-06-24 NOTE — OP PT TREATMENT LOG
IE  4/3/23   30 Day  5/3/23   60 Day  6/3/23   90 Day  7/3/23   Precautions  LLE foot drop- has LLE AFO but does not always wear    Insurance Auth     Treatment   Current Session Time   Modalities  Total Time for Session Not performed   Heat/Ice  CPT 28239  CP to R knee post session   E. Stimulation Manual  CPT 95929     E. Stim Unattended  CPT 40531     Manual   CPT 24300  Total Time for Session   minutes    STM/MFR/TPR  bilat gastroc/soleus, hamstring  R LE retrograde massage with elevation to reduce edema   L calf massage   Instrument Assisted STM      Mobilizations n  n Gr II/III R patellar mobilizations in all direction  AP talocrural mobs NV    ROM/Flexibility   n  n R knee PROM flex/ext   R HS, gastroc, soleus  Passive soleus and gastroc stretch, pt prone    Myofascial Decompression     Ther. Exercise  CPT 28328                     Total Time for Session 15 minutes      Sets Reps Load Comment    RB seat #12 y  1 10 min   level 3            Slantboard gastroc stretch Y  1 3 30sec    Slantboard soleus stretch n  1 3 30sec     Hamstring stretch   1 3 30sec    Quad set n  1 10 10 sec     SAQ n  2 10 3 sec,  3# Bilateral, cues to get to end range    SLR   1 10 0# RLE only, quad set prior to lift - watch for extensor lag   Sidelying hip ABD   1 10  Needed extensive cues   sidelying clamshell    2 10  B   Standing hip  abduction   2 10 each  In // bars. Holding with UE   Hip adduction   2 10 3s hold Supine   Hip ABD n  1 10 Green Supine   LAQ n  1 15 3s hold 2#   Bridge with hip add   2 10  Will ball squeeze   STS no BUE use n  y  1  1 10  10  Mat at 21 inches   Toe taps   2 5 6inch 1 finger tip support   Step up forward  Step overs       1 10   10x 8 inch     bilateral   Step up lateral n  1 10 6''    Lateral sidestepping n  1  With cane and CS    stairs n    6 inch FF of stairs with reciprocal legs , 2 bannisters bannister , CS   Prone quad   stretch    Prone hamcurls       n   30''x3      x10  bilateral    Standing hip ext   1 10  B orange TB   Standing hip ABD   1 10  B OTB   Standing hip flexion   1 10  B OTB   Heel raises n  2 10  BUE support   Toe raises n  2 10  BUE support                                        Neuro Re-Ed  CPT 42696   Total Time for Session        Sets Reps Load Comment   FT HT/HN n  2 10  Firm    FT EO n  1 30sec     FT EC n  3 30sec  Firm   FA EO Y  multiple  times 30sec  FOAM, dynamic arms with ball   FA EC n  2 30sec  FOAM    FT EO  n  3 30sec  FOAM   Modified tandem   3 30sec     Modified tandem on FOAM    30 sec  Mini hold on bars   Tandem  n  2 10ft  Floor, with SPC            Pre gait exercises         Cesia step over                                       Gait  CPT 42146   Total Time for Session  minutes   Gait training with SPC and LLE AFO donned  n      Patient ambulates 300 ft w/ SPC, LLE AFO, and CS. Patient demonstrates improved BLE step length, improved BLE weight shift, improved LLE clearance, and overall improvement in BLE gait mechanics. VC for upright posture, to look ahead.   Gait training   With SPC, outside on uneven areas, on concrete, grass, needs gait belt and minimum to mod assist on grass 100 feet         Ther. Activity  CPT 89728   Total Time for Session 40 minutes   VS, falls, medication review, subjective review   Reviewed all with pt    Patient education                                     n  Educated pt on rationale of treatment with manual therapy, on use of compression socks for swelling control, on retrograde massage with elevation, on technique/form of exercises, on sit <> stand mechanics, on POC, review of HEP - pt verbalized and demonstrated understanding     Educated on gait mechanics and drop foot contribution/need for SPC and rationale to promote improved safety and independence both short and likely long term. Reviewed donning/doffing new shoe attached DF assist brace pt brought - pt needs reinforcement to become independent.     Patient educated on  re-examination findings, continued progression of POC, continued compliance with HEP, PT rec of continued use of SPC at this time    Patient educated on re-examination findings, continued focus of POC, fall prevention strategies    HEP       n    Issued patient HEP including hip adduction, hip ABD, LAQ and STS with use of BUE    Patient provided on updated HEP including   Encouraged HEP consistency especially Hip Abd - patient with good understanding   30 sec STS   See Assessment   other   4/24/2023- donning soft brace for L foot drop.   Seated hip abduction / add 10x bilat to simulate going from accelerator to brake in car.   Dc assessment y  Bergs done , 6MWT, 30 sec STS, ROM, MMT and discussion on goals done. Discussion on progess made.   R knee circumference assessment      R knee AROM n  See AROM chapter   Stairs training n        n  Patient ascends/descends 4 6 inch steps w/ reciprocal pattern, SPC, and unilateral rail, CS x2 trials.    Patient ascends/descends 4 6 inch steps w/ reciprocal pattern and unilateral rail, CS-Fay and gait belt, min-mod verbal cues for toe clearance (noted compensation of B foot ER to clear BLE)   Ramp training n  5 trials ascending and descending ramp with SPC and Fay. Patient demonstrates difficulty controlling deceleration when descending.    Curb training n  5 trials ascending and descending with SPC, initially req Fay with mod verbal cues and demonstration, progressed to supervision with no verbal cueing.    PN 5/22/23 n  All subjective and objective measurements completed    Gait assessment with LLE AFO donned n  Patient IND in donning/doffing LLE posterior leafspring AFO         Group  CPT 09372  Total Time for Session Not performed     HEP 6/8: Heel raises, toe raises, hip 3 way, LAQ, hip adduction, hip ABD

## 2023-07-01 DIAGNOSIS — I10 ESSENTIAL HYPERTENSION: ICD-10-CM

## 2023-07-05 RX ORDER — HYDROCHLOROTHIAZIDE 12.5 MG/1
TABLET ORAL
Qty: 90 TABLET | Refills: 1 | Status: SHIPPED | OUTPATIENT
Start: 2023-07-05 | End: 2023-12-29

## 2023-07-10 RX ORDER — ATORVASTATIN CALCIUM 10 MG/1
TABLET, FILM COATED ORAL
Qty: 90 TABLET | Refills: 1 | Status: SHIPPED | OUTPATIENT
Start: 2023-07-10 | End: 2023-11-28

## 2023-07-27 DIAGNOSIS — I10 ESSENTIAL HYPERTENSION: Primary | ICD-10-CM

## 2023-07-27 RX ORDER — LOSARTAN POTASSIUM 50 MG/1
50 TABLET ORAL DAILY
Qty: 90 TABLET | Refills: 1 | Status: SHIPPED | OUTPATIENT
Start: 2023-07-27 | End: 2023-10-30

## 2023-08-04 ENCOUNTER — TRANSCRIBE ORDERS (OUTPATIENT)
Dept: REGISTRATION | Facility: REHABILITATION | Age: 63
End: 2023-08-04

## 2023-08-04 DIAGNOSIS — R29.898 OTHER SYMPTOMS AND SIGNS INVOLVING THE MUSCULOSKELETAL SYSTEM: Primary | ICD-10-CM

## 2023-08-14 ENCOUNTER — TRANSCRIBE ORDERS (OUTPATIENT)
Dept: REGISTRATION | Facility: REHABILITATION | Age: 63
End: 2023-08-14

## 2023-08-14 DIAGNOSIS — M17.11 UNILATERAL PRIMARY OSTEOARTHRITIS, RIGHT KNEE: Primary | ICD-10-CM

## 2023-08-16 ENCOUNTER — HOSPITAL ENCOUNTER (OUTPATIENT)
Dept: PHYSICAL THERAPY | Facility: REHABILITATION | Age: 63
Setting detail: THERAPIES SERIES
Discharge: HOME | End: 2023-08-16
Attending: ORTHOPAEDIC SURGERY
Payer: MEDICARE

## 2023-08-16 DIAGNOSIS — R26.9 GAIT ABNORMALITY: ICD-10-CM

## 2023-08-16 DIAGNOSIS — M21.372 FOOT DROP, LEFT FOOT: ICD-10-CM

## 2023-08-16 DIAGNOSIS — M17.11 UNILATERAL PRIMARY OSTEOARTHRITIS, RIGHT KNEE: ICD-10-CM

## 2023-08-16 DIAGNOSIS — R29.898 OTHER SYMPTOMS AND SIGNS INVOLVING THE MUSCULOSKELETAL SYSTEM: Primary | ICD-10-CM

## 2023-08-16 PROCEDURE — 97112 NEUROMUSCULAR REEDUCATION: CPT | Mod: GP

## 2023-08-16 PROCEDURE — 97530 THERAPEUTIC ACTIVITIES: CPT | Mod: GP

## 2023-08-16 PROCEDURE — 97162 PT EVAL MOD COMPLEX 30 MIN: CPT | Mod: GP

## 2023-08-16 ASSESSMENT — BALANCE ASSESSMENTS: TOTAL SCORE: 36

## 2023-08-16 NOTE — LETTER
Dear DR. Centeno,    Thank you for this referral. Please review the attached notes and plan of care for your approval.  Please contact our department with any questions.     Sincerely,     Shauna Way, PT  [address] [place] [place] [address]  Phone [phone]  Fax  [phone]    By co-signing this Plan of Care (POC) you agree to the following:  I have reviewed the the Plan of Care established by the therapist within this document and certify that the services are skilled and medically necessary. I have reviewed the plan and recommend that these services continue to meet the goals stated in this document.    PHYSICIAN SIGNATURE: __________________________________     DATE: ___________________  TIME: _____________           Physical Therapy Plan of Care 23   Effective from: 2023  Effective to: 2023    Plan ID: [id number]            Participants as of 2023    Name Type Comments Contact Info    Gideon Centeno MD Surgeon  [phone]    Shauna Way, PT Physical Therapist         Last Progress Notes Note     Author: Shauna Way, PT Status: Signed Last edited: 2023 12:00 PM         Physical Therapy Evaluation    [facility] Fax: [phone]    PT EVALUATION FOR OUTPATIENT THERAPY    Patient: Bebe Burks    MRN: [id number]  : 1960 62 y.o.     Referring Physician: Gideon Centeno MD  Date of Visit: 2023      Certification Dates:  23 through 23         Recommended Frequency & Duration:  2 times/week for up to 3 months     Diagnosis:   1. Other symptoms and signs involving the musculoskeletal system    2. Foot drop, left foot    3. Gait abnormality    4. Unilateral primary osteoarthritis, right knee        Chief Complaints:   Chief Complaint   Patient presents with   • Difficulty Walking   • Balance Deficits   • Dec Strength   • Dec ROM   • Dec Coordination   • Decreased Endurance   • Abnormality Of Gait   •  Decreased Community Integration   •  "Decreased recreational/play activity       Precautions:    Precautions additional comments:      Past Medical History:   Past Medical History:   Diagnosis Date   • Abnormal finding on chest xray 12/2021   • Arthritis    • COVID-19 12/2021   • COVID-19 vaccine series completed     Moderna: \" 3 doses\"   • Diabetic neuropathy (CMS/HCC)    • DM (diabetes mellitus), type 2 with ophthalmic complications (CMS/[facility])    • Hypertension    • Hypothyroidism    • Left foot drop    • Lipid disorder    • Type 2 diabetes mellitus treated with insulin (CMS/[facility])        Past Surgical History:   Past Surgical History:   Procedure Laterality Date   • CATARACT EXTRACTION Right    • COLONOSCOPY     • COMBINED HYSTEROSCOPY DIAGNOSTIC / D&C  2007   • EYE SURGERY Left 2017    cataract   • VITRECTOMY Left 2016         LEARNING ASSESSMENT    Assessment completed:  Yes    Learner name:  Bebe    Learner: Patient    Learning Barriers:  Learning barriers: No Barriers    Preferred Language: English     Needed: No    Education Provided:   Method: Discussion and Demonstration  Readiness: acceptance  Response: Demonstrated understanding and Verbalizes understanding      CO-LEARNER ASSESSMENT:    Completed: No          Welcome letter discussed: Yes Patient provided with Welcome Letter, which includes attendance policy. Provided education regarding cancellation and no-show policy. Education regarding the importance of participation and regular attendance to maximize goal attainment.         OBJECTIVE MEASUREMENTS/DATA:    Time In Session:  Start Time: 1202  Stop Time: 1305  Time Calculation (min): 63 min   Assessment and Plan - 08/21/23 1511        Assessment    Plan of Care reviewed and patient/family in agreement Yes     System Pathology/Pathophysiology Noted cardiovascular;musculoskeletal;neuromuscular     Functional Limitations in Following Categories (PT Eval) self-care;home management;community/leisure     Rehab Potential/Prognosis " adequate, monitor progress closely;good, to achieve stated therapy goals     Problem List decreased endurance;decreased flexibility;decreased ROM;decreased strength;impaired balance     Clinical Assessment Bebe is a 63 y/o female who presents to OP PT with chief complaints of LLE weakness with L foot drop that has progressively worsened over the last few years effecting her balance and overall confidence with ambulation. She demonstrates increased risk for falls per performance on the OH (36/56), LE weakness especially in the distal LLE with functional LE strength per the 30 sec STS below age matched norms (7 repetitions). Pt is able to activate the muscles in the distal LLE but demonstrates limited ROM and notable weakness and would benefit from an AFO which she reports she was prescribed but it is too uncomfortable to wear so she wears a soft velcro ankle brace which does not provide sufficient support. Discussed the benefits of an AFO and realistic goals due to the chronicity of her LLE symptoms but that she is a good candidate for skilled physical therapy to maximize her LE strength and ROM as well as her balance to optimize her gait mechanics for improved safety, functional independence, and overall quality of life.     Plan and Recommendations Pt returning ABC and LEFS. Complete TUG, 6MWT and FGA w SPC. Stretches to improve L DF ROM + LE strength to improve distal LLE and B hip extension. Static balance tasks w NBOS     Planned Services CPT 16479 Gait training;CPT 09023 Manual therapy;CPT 75661 Neuromuscular Reeducation;CPT 49443 Orthotics training (Initial encounter);CPT 46844 Orthotics/Prosthetics management and training (Subsequent encounters);CPT 23635 Self-care/Home management training;CPT 51241 Therapeutic activities;CPT 03091 Therapeutic exercises;CPT 22554 Therapeutic Massage;CPT 63774 Electrical stimulation ATTENDED;CPT 28397 Electrical stimulation UNATTENDED;CPT 78611 Hot/Cold Packs therapy                 General Information - 08/16/23 1203        Session Details    Document Type initial evaluation     Mode of Treatment individual therapy        General Information    Referring Physician Dr. Centeno     History of present illness/functional impairment Bebe is a 61 yo female who presents to OPPT with chief complaint of L foot drop and weakness impacting her gait and balance. She reports pain and weakness in the LLE began about 9 yeas ago with involvement of her sciatic nerve that progressively worsended over time but was able to make improvements with physical therapy walking independently for many years without use of bracing for the LLE. Then, September 2021, she had a bad fall and hit her right knee which she feels caused a slow regression resulting in use of cane due to the damage to her R knee and overall deconditioning. She eventually had a R TKA on 2/27/23 w/ Dr. Centeno at  and was discharged to [facility] where she stayed for 5-6 nights. She was discharged home with home health PT and OT and then began outpatient PT with improvements in her R knee ROM and LE strength but continued weakness/deficits of the LLE. She reports she lives alone in single level condo with 4 [address] w/ rail with notable difficulty on the steps. She continues to use a cane for ambulation and has a few braces, two over the counter and one which was issued to her by [employer] in September 2022 that she feels is poorly fit so doesn't wear.                Pain/Vitals - 08/16/23 1203        Pain Assessment    Currently in pain No/Denies        Pre Activity Vital Signs    Pulse 75     SpO2 97 %     Oxygen Therapy None (Room air)     /58     BP Location Left upper arm     BP Method Manual     Patient Position Sitting        Pain Intervention    Intervention  n/a     Post Intervention Comments n/a                Falls/Food Screening - 08/16/23 1203        Initial Falls Assessment    One or more falls in the last year Yes     How  many times 2 or more   3-4    Was the patient injured in any fall No     Fall prevention interventions recommended Keep personal items within reach;Use assistive and mobility devices;[place] and re-orient the patient to the environment;Educate and re-educate the patient on safety strategies;Instruct the patient to change position slowly;Visually observe patient as determined by the plan of care;Schedule individual therapy sessions as appropriate        Food Insecurity    Within the past 12 months, you worried that your food would run out before you got the money to buy more. Never true     Within the past 12 months, the food you bought just didn't last and you didn't have money to get more. Never true                PT - 08/16/23 1203        Physical Therapy    Physical Therapy Specialty Traditional Neuro PT        PT Plan    Frequency of treatment 2 times/week     PT Duration 3 months     PT Cert From 08/16/23     PT Cert To 11/14/23     Date PT POC was sent to provider 08/16/23     Signed PT Plan of Care received?  No                   Living Environment    Living Environment - 08/16/23 1203        Living Environment    People in Home alone     Living Arrangements condominium     Living Environment Comment 2 BR, BATH condo no steps inside. 3 [address] with railing in middle of steps. Shower chair in stall shower and grab bars     Transportation Concerns other (see comments)   visual deficits       Resource/Environmental Concerns    Transportation Concerns Other Pt has an eye surgery on 9/11 so is unable to drive until 2 weeks post               PLOF:    Prior Level of Function - 08/16/23 1203        OTHER    Previous level of function Ambulation ind about 2 years ago, stairs reciprocally               Sensory Tests    Sensory Testing - 08/21/23 1511        Sensory Assessment    Sensory Assessment sensation intact except     Sensation Impaired Location(s) left LE;right LE     Left UE Sensory Impairment light touch  awareness;light touch localization;diminished;absent   stocking pattern BLE diminished in distal BLE, absent in B feet    Left LE Sensory Impairment --   proprioception at ankle intact, hallux 3/5    Right LE Sensory Impairment --   RLE proprioception intact at ankle and hallux              Skin    Skin Assessment - 08/21/23 1511        LE Skin    Skin Condition Intact               ROM    Range of Motion - 08/21/23 1500        RIGHT: Lower Extremity PROM Assessment    Ankle Dorsiflexion  5 degrees        LEFT: Lower Extremity PROM Assessment    Ankle Dorsiflexion  0 degrees        RIGHT: Lower Extremity AROM Assessment    Ankle Dorsiflexion   0 degrees        LEFT: Lower Extremity AROM Assessment    Ankle Dorsiflexion   -10 degrees               MMT    Manual Muscle Tests - 08/16/23 1203        RIGHT: Lower Extremity Manual Muscle Test Assessment    Hip Flexion gross movement (4/5) good     Hip Internal Rotation gross movement (4+/5) good plus     Hip External Rotation gross movement (4+/5) good plus     Knee Flexion strength (5/5) normal     Knee Extension strength (5/5) normal     Ankle Dorsiflexion gross movement (4+/5) good plus     Ankle Plantarflexion gross movement (4+/5) good plus     Ankle Inversion gross movement (4/5) good     Ankle Eversion gross movement (4/5) good        LEFT: Lower Extremity Manual Muscle Test Assessment    Hip Flexion gross movement (4+/5) good plus     Hip Internal Rotation gross movement (3+/5) fair plus     Hip External Rotation gross movement (4/5) good     Knee Flexion strength (4+/5) good plus     Knee Extension strength (4/5) good     Ankle Dorsiflexion gross movement (3-/5) fair minus     Ankle Plantarflexion gross movement (3/5) fair     Ankle Inversion gross movement (2+/5) poor plus     Ankle Eversion gross movement (2/5) poor               Gait and Mobility    Gait and Mobility - 08/16/23 1203        Gait Training    [place], Gait modified independence     Variable  surfaces Flat surface     Assistive Device cane, straight     Deviations/Abnormal Patterns --   Pt ambulates with decreaed heel strike due to L foot drop and poor eccentric control through stance phase with increaed knee flexion, minimal toe-off in terminal stance B, slowed gait speed with heavy reliance on SPC              Orthotic Management    Orthotics - 08/21/23 1511        Orthotics & Prosthetics Management    Orthosis Location AFO (ankle foot orthosis)     Additional Documentation Orthosis Location (Row)        Ankle Foot Orthosis Management    Type (Ankle/Foot Orthosis) left     Fabrication Comment (Ankle Foot Orthosis) TBA               Outcome Measures    PT Outcome Measures - 08/16/23 1203        Objective Outcome Measures    6 Minute Walk Test TBA w SPC     30 Second Sit to Stand Test 7 repetitions   from EOM no UE use       Subjective Outcome Measures    ABC To be returned nv     LEFS To be returned nv        Person Balance Scale    Sitting to Standing Able to stand independently using hands     Standing Unsupported Able to stand safely for 2 minutes     Sitting with Back Unsupported but Feet Supported on Floor or on a Stool Able to sit safely and securely for 2 minutes     Standing to Sitting Controls descent by using hands     Transfers Able to transfer safely definite need of hands     Standing Unsupported with Eyes Closed Able to stand 10 seconds with supervision     Standing Unsupported with Feet Together Able to place feet together independently and stand 1 minute with supervision     Reach Forward with Outstretched Arm While Standing Can reach forward 5 cm (2 inches)      Object from Floor from a Standing Position Able to  slipper but needs supervision     Turning to Look Behind Over Left and Right Shoulders While Standing Turns sideways only but maintains balance     Turn 360 Degrees Able to turn 360 degrees safely but slowly     Place Alternate Foot on Step or Stool While Standing  Unsupported Able to complete greater than 2 steps needs minimal assist     Standing Unsupported One Foot in Front Able to take small step independently and hold 30 seconds     Standing on One Leg Tries to lift leg unable to hold 3 seconds but remains standing independently     Oh Balance Score 36        Timed Up and Go Test    Results, Timed Up and Go Test (Balance) TBA w SPC        NEW (2/6/23):  PSFS     PSFS ACTIVITY 1 Navigate stairs     PSFS ANSWER 1 4     PSFS ACTIVITY 2  the kitchen to make a meal     PSFS ANSWER 2 5     PSFS ACTIVITY 3 Walk without fear of falling or tripping     PSFS ANSWER 3 0     PSFS Sum of Activity Scores 9     PSFS Number of Activities 3     PSFS Percent Score 30 %                  Goals     •  Mutually agreed upon pain goal       Mutually agreed upon pain goal: Pain is not an issue for the pt currently.       •  OP PT - L foot drop Goals       Short term goals   Short Term Goals Time Frame Result Comment/Progress   Pt will complete 6MWT, FGA, TUG 6 weeks     Pt will be mod I for FF with rail  6 weeks     Pt will report improvements in her confidence in her balance with ADLs via improvements in ABC by =/> 20% 6 weeks     Tolerate 10  min of CV activity with VSS 6 weeks     Progress Timed Up and Go by =/> 5 sec sec without decline in safety or quality 6 weeks     Pt and caregiver will be mod I with HEP 6 weeks     Pt will demonstrate improvements in static balance via improvements in OH by =/> 5 points  6 weeks     Pts current AFO will be assessed and adjusted to allow for comfort with daily use 6 weeks       Short term goals   Short Term Goals Time Frame Result Comment/Progress   Pt will demonstrate improved ankle DF ROM to =/> 10 degrees to allow for normalized gait mechanics and stair use 12 weeks     Pt will report improvements in her confidence in her balance with ADLs via improvements in ABC by =/> 30% 12 weeks     Pt will demonstrate improvements in functional LE  strength via improvements in the 30 second STS to =/> 10 repetitions 12 weeks     Pt will demonstrate improved functional endurance via improvements in 6MWT distance by =/> 150ft 12 weeks     Progress Timed Up and Go by =/> 10 sec (or =/<13.5 sec) without decline in safety or quality for decreased falls risk 12 weeks     Pt will be mod I with updated HEP 12 weeks     Pt will demonstrate improvements in static balance via improvements in OH by =/> 45 points  12 weeks     Pt will demonstrate improvements in dynamic balance via improvements in performance on the FGA by =/> 4 points 12 weeks           •  Patient stated (pt-stated)       Patient reports she would like to improve her LLE strength and balance to improve her confidence and ability to ambulate community distances.               TREATMENT PLAN:    PT Neuro Exercises Current Session Time   THER ACT   CPT 60246 TOTAL TIME FOR SESSION 8-22 Minutes   Assessment Pain, vitals, subjective  LEFS  ABC   Home exercise program    Patient Education Educated pt on realistic expectations due to chronicity of her LLE symptoms and need for further assessment of her LLE brace to allow for daily use and improved LLE clearance. Reviewed goals and POC w pt report of understanding.   THER EX  CPT 59132 TOTAL TIME FOR SESSION Not performed   STRETCHING    Stretching by patient    CARDIOVASCULAR    Nu Step    Stationary Bike    Treadmill    Standing Ther-Ex    Supine Ther-Ex    NEURO RE-ED  CPT 70742 TOTAL TIME FOR SESSION 8-22 Minutes   ASSESSMENT OH  FGA   POSTURAL RE-ED    PRE-GAIT ACTIVITIES     BALANCE TRAINING     Sitting balance    Standing balance - static FA w lateral weight shifts   Standing balance - dynamic    Standing balance - varied surface    GAIT TRAINING  CPT 72437 TOTAL TIME FOR SESSION 8-22 Minutes   Ambulation with AD  w SPC and soft ankle brace with emphasis on heel strike and control through stance phase over level ground around main gym and down ramp to  waiting room   Dynamic gait     Stair negotiation    Curb negotiation    Ramp negotiation    Outdoor ambulation    MANUAL  CPT 59165 TOTAL TIME FOR SESSION Not performed   Stretching by therapist/PROM    Mobilization     MODALITIES  CPT 17087 TOTAL TIME FOR SESSION Not performed   Ice    Heat    ATTENDED E-STIM  CPT 87003 TOTAL TIME FOR SESSION Not performed   Attended E-Stim      GROUP  CPT 23279 TOTAL TIME FOR SESSION Not performed                ASSESSMENT:    This 62 y.o. year old female presents to PT with above stated diagnosis. Physical Therapy evaluation reveals decreased endurance, decreased flexibility, decreased ROM, decreased strength, impaired balance resulting in self-care, home management, community/leisure limitations. Bebe Burks will benefit from skilled PT services to address limitation, work towards rehab and patient goals and maximize PLOF of chosen ADLs.     Planned Services: The patient's treatment will include CPT 43786 Gait training, CPT 36407 Manual therapy, CPT 87321 Neuromuscular Reeducation, CPT 33599 Orthotics training (Initial encounter), CPT 65770 Orthotics/Prosthetics management and training (Subsequent encounters), CPT 56452 Self-care/Home management training, CPT 83316 Therapeutic activities, CPT 47187 Therapeutic exercises, CPT 29476 Therapeutic Massage, CPT 67404 Electrical stimulation ATTENDED, CPT 93332 Electrical stimulation UNATTENDED, CPT 58083 Hot/Cold Packs therapy, .     Shauna Way, PT                         Current Participants as of 8/21/2023    Name Type Comments Contact Info    Gideon Centeno MD Surgeon  [phone]    Signature pending    Shauna Way, PT Physical Therapist      Signature pending

## 2023-08-16 NOTE — OP PT TREATMENT LOG
PT Neuro Exercises Current Session Time   THER ACT   CPT 14401 TOTAL TIME FOR SESSION 8-22 Minutes   Assessment Pain, vitals, subjective  LEFS  ABC   Home exercise program    Patient Education Educated pt on realistic expectations due to chronicity of her LLE symptoms and need for further assessment of her LLE brace to allow for daily use and improved LLE clearance. Reviewed goals and POC w pt report of understanding.   THER EX  CPT 75650 TOTAL TIME FOR SESSION Not performed   STRETCHING    Stretching by patient    CARDIOVASCULAR    Nu Step    Stationary Bike    Treadmill    Standing Ther-Ex    Supine Ther-Ex    NEURO RE-ED  CPT 05519 TOTAL TIME FOR SESSION 8-22 Minutes   ASSESSMENT OH  FGA   POSTURAL RE-ED    PRE-GAIT ACTIVITIES     BALANCE TRAINING     Sitting balance    Standing balance - static FA w lateral weight shifts   Standing balance - dynamic    Standing balance - varied surface    GAIT TRAINING  CPT 77229 TOTAL TIME FOR SESSION 8-22 Minutes   Ambulation with AD  w SPC and soft ankle brace with emphasis on heel strike and control through stance phase over level ground around main gym and down ramp to waiting room   Dynamic gait     Stair negotiation    Curb negotiation    Ramp negotiation    Outdoor ambulation    MANUAL  CPT 13091 TOTAL TIME FOR SESSION Not performed   Stretching by therapist/PROM    Mobilization     MODALITIES  CPT 20141 TOTAL TIME FOR SESSION Not performed   Ice    Heat    ATTENDED E-STIM  CPT 11908 TOTAL TIME FOR SESSION Not performed   Attended E-Stim      GROUP  CPT 22273 TOTAL TIME FOR SESSION Not performed

## 2023-08-21 ENCOUNTER — TELEPHONE (OUTPATIENT)
Dept: REGISTRATION | Facility: REHABILITATION | Age: 63
End: 2023-08-21
Payer: MEDICARE

## 2023-08-21 NOTE — TELEPHONE ENCOUNTER
Left vm to go over schedule and let patient know transportation was provided for visits, but there are some days transportation is not provided 5000# call back

## 2023-08-21 NOTE — PROGRESS NOTES
"Physical Therapy Evaluation    [facility] Fax: [phone]    PT EVALUATION FOR OUTPATIENT THERAPY    Patient: Bebe Burks    MRN: [id number]  : 1960 62 y.o.     Referring Physician: Gideon Centeno MD  Date of Visit: 2023      Certification Dates:  23 through 23         Recommended Frequency & Duration:  2 times/week for up to 3 months     Diagnosis:   1. Other symptoms and signs involving the musculoskeletal system    2. Foot drop, left foot    3. Gait abnormality    4. Unilateral primary osteoarthritis, right knee        Chief Complaints:   Chief Complaint   Patient presents with   • Difficulty Walking   • Balance Deficits   • Dec Strength   • Dec ROM   • Dec Coordination   • Decreased Endurance   • Abnormality Of Gait   •  Decreased Community Integration   • Decreased recreational/play activity       Precautions:    Precautions additional comments:      Past Medical History:   Past Medical History:   Diagnosis Date   • Abnormal finding on chest xray 2021   • Arthritis    • COVID-19 2021   • COVID-19 vaccine series completed     Moderna: \" 3 doses\"   • Diabetic neuropathy (CMS/HCC)    • DM (diabetes mellitus), type 2 with ophthalmic complications (CMS/HCC)    • Hypertension    • Hypothyroidism    • Left foot drop    • Lipid disorder    • Type 2 diabetes mellitus treated with insulin (CMS/HCC)        Past Surgical History:   Past Surgical History:   Procedure Laterality Date   • CATARACT EXTRACTION Right    • COLONOSCOPY     • COMBINED HYSTEROSCOPY DIAGNOSTIC / D&C     • EYE SURGERY Left 2017    cataract   • VITRECTOMY Left          LEARNING ASSESSMENT    Assessment completed:  Yes    Learner name:  Bebe    Learner: Patient    Learning Barriers:  Learning barriers: No Barriers    Preferred Language: English     Needed: No    Education Provided:   Method: Discussion and Demonstration  Readiness: acceptance  Response: Demonstrated understanding and " Verbalizes understanding      CO-LEARNER ASSESSMENT:    Completed: No          Welcome letter discussed: Yes Patient provided with Welcome Letter, which includes attendance policy. Provided education regarding cancellation and no-show policy. Education regarding the importance of participation and regular attendance to maximize goal attainment.         OBJECTIVE MEASUREMENTS/DATA:    Time In Session:  Start Time: 1202  Stop Time: 1305  Time Calculation (min): 63 min   Assessment and Plan - 08/21/23 1511        Assessment    Plan of Care reviewed and patient/family in agreement Yes     System Pathology/Pathophysiology Noted cardiovascular;musculoskeletal;neuromuscular     Functional Limitations in Following Categories (PT Eval) self-care;home management;community/leisure     Rehab Potential/Prognosis adequate, monitor progress closely;good, to achieve stated therapy goals     Problem List decreased endurance;decreased flexibility;decreased ROM;decreased strength;impaired balance     Clinical Assessment Bebe is a 63 y/o female who presents to OP PT with chief complaints of LLE weakness with L foot drop that has progressively worsened over the last few years effecting her balance and overall confidence with ambulation. She demonstrates increased risk for falls per performance on the OH (36/56), LE weakness especially in the distal LLE with functional LE strength per the 30 sec STS below age matched norms (7 repetitions). Pt is able to activate the muscles in the distal LLE but demonstrates limited ROM and notable weakness and would benefit from an AFO which she reports she was prescribed but it is too uncomfortable to wear so she wears a soft velcro ankle brace which does not provide sufficient support. Discussed the benefits of an AFO and realistic goals due to the chronicity of her LLE symptoms but that she is a good candidate for skilled physical therapy to maximize her LE strength and ROM as well as her  balance to optimize her gait mechanics for improved safety, functional independence, and overall quality of life.     Plan and Recommendations Pt returning ABC and LEFS. Complete TUG, 6MWT and FGA w SPC. Stretches to improve L DF ROM + LE strength to improve distal LLE and B hip extension. Static balance tasks w NBOS     Planned Services CPT 59110 Gait training;CPT 42669 Manual therapy;CPT 03904 Neuromuscular Reeducation;CPT 94630 Orthotics training (Initial encounter);CPT 57528 Orthotics/Prosthetics management and training (Subsequent encounters);CPT 84519 Self-care/Home management training;CPT 64245 Therapeutic activities;CPT 90499 Therapeutic exercises;CPT 20853 Therapeutic Massage;CPT 84016 Electrical stimulation ATTENDED;CPT 77444 Electrical stimulation UNATTENDED;CPT 15919 Hot/Cold Packs therapy                General Information - 08/16/23 1203        Session Details    Document Type initial evaluation     Mode of Treatment individual therapy        General Information    Referring Physician Dr. Centeno     History of present illness/functional impairment Bebe is a 61 yo female who presents to OPPT with chief complaint of L foot drop and weakness impacting her gait and balance. She reports pain and weakness in the LLE began about 9 yeas ago with involvement of her sciatic nerve that progressively worsended over time but was able to make improvements with physical therapy walking independently for many years without use of bracing for the LLE. Then, September 2021, she had a bad fall and hit her right knee which she feels caused a slow regression resulting in use of cane due to the damage to her R knee and overall deconditioning. She eventually had a R TKA on 2/27/23 w/ Dr. Centeno at  and was discharged to [facility] where she stayed for 5-6 nights. She was discharged home with home health PT and OT and then began outpatient PT with improvements in her R knee ROM and LE strength but continued  weakness/deficits of the LLE. She reports she lives alone in single level [facility] [employer] with 4 [address] w/ rail with notable difficulty on the steps. She continues to use a cane for ambulation and has a few braces, two over the counter and one which was issued to her by Peter in September 2022 that she feels is poorly fit so doesn't wear.                Pain/Vitals - 08/16/23 1203        Pain Assessment    Currently in pain No/Denies        Pre Activity Vital Signs    Pulse 75     SpO2 97 %     Oxygen Therapy None (Room air)     /58     BP Location Left upper arm     BP Method Manual     Patient Position Sitting        Pain Intervention    Intervention  n/a     Post Intervention Comments n/a                Falls/Food Screening - 08/16/23 1203        Initial Falls Assessment    One or more falls in the last year Yes     How many times 2 or more   3-4    Was the patient injured in any fall No     Fall prevention interventions recommended Keep personal items within reach;Use assistive and mobility devices;Winters and re-orient the patient to the environment;Educate and re-educate the patient on safety strategies;Instruct the patient to change position slowly;Visually observe patient as determined by the plan of care;Schedule individual therapy sessions as appropriate        Food Insecurity    Within the past 12 months, you worried that your food would run out before you got the money to buy more. Never true     Within the past 12 months, the food you bought just didn't last and you didn't have money to get more. Never true                PT - 08/16/23 1203        Physical Therapy    Physical Therapy Specialty Traditional Neuro PT        PT Plan    Frequency of treatment 2 times/week     PT Duration 3 months     PT Cert From 08/16/23     PT Cert To 11/14/23     Date PT POC was sent to provider 08/16/23     Signed PT Plan of Care received?  No                   Living Environment    Living Environment - 08/16/23 1203         Living Environment    People in Home alone     Living Arrangements condominium     Living Environment Comment 2 BR, BATH condo no steps inside. 3 [address] with railing in middle of steps. Shower chair in stall shower and grab bars     Transportation Concerns other (see comments)   visual deficits       Resource/Environmental Concerns    Transportation Concerns Other Pt has an eye surgery on 9/11 so is unable to drive until 2 weeks post               PLOF:    Prior Level of Function - 08/16/23 1203        OTHER    Previous level of function Ambulation ind about 2 years ago, stairs reciprocally               Sensory Tests    Sensory Testing - 08/21/23 1511        Sensory Assessment    Sensory Assessment sensation intact except     Sensation Impaired Location(s) left LE;right LE     Left UE Sensory Impairment light touch awareness;light touch localization;diminished;absent   stocking pattern BLE diminished in distal BLE, absent in B feet    Left LE Sensory Impairment --   proprioception at ankle intact, hallux 3/5    Right LE Sensory Impairment --   RLE proprioception intact at ankle and hallux              Skin    Skin Assessment - 08/21/23 1511        LE Skin    Skin Condition Intact               ROM    Range of Motion - 08/21/23 1500        RIGHT: Lower Extremity PROM Assessment    Ankle Dorsiflexion  5 degrees        LEFT: Lower Extremity PROM Assessment    Ankle Dorsiflexion  0 degrees        RIGHT: Lower Extremity AROM Assessment    Ankle Dorsiflexion   0 degrees        LEFT: Lower Extremity AROM Assessment    Ankle Dorsiflexion   -10 degrees               MMT    Manual Muscle Tests - 08/16/23 1203        RIGHT: Lower Extremity Manual Muscle Test Assessment    Hip Flexion gross movement (4/5) good     Hip Internal Rotation gross movement (4+/5) good plus     Hip External Rotation gross movement (4+/5) good plus     Knee Flexion strength (5/5) normal     Knee Extension strength (5/5) normal     Ankle Dorsiflexion  gross movement (4+/5) good plus     Ankle Plantarflexion gross movement (4+/5) good plus     Ankle Inversion gross movement (4/5) good     Ankle Eversion gross movement (4/5) good        LEFT: Lower Extremity Manual Muscle Test Assessment    Hip Flexion gross movement (4+/5) good plus     Hip Internal Rotation gross movement (3+/5) fair plus     Hip External Rotation gross movement (4/5) good     Knee Flexion strength (4+/5) good plus     Knee Extension strength (4/5) good     Ankle Dorsiflexion gross movement (3-/5) fair minus     Ankle Plantarflexion gross movement (3/5) fair     Ankle Inversion gross movement (2+/5) poor plus     Ankle Eversion gross movement (2/5) poor               Gait and Mobility    Gait and Mobility - 08/16/23 1203        Gait Training    Cascade, Gait modified independence     Variable surfaces Flat surface     Assistive Device cane, straight     Deviations/Abnormal Patterns --   Pt ambulates with decreaed heel strike due to L foot drop and poor eccentric control through stance phase with increaed knee flexion, minimal toe-off in terminal stance B, slowed gait speed with heavy reliance on SPC              Orthotic Management    Orthotics - 08/21/23 1511        Orthotics & Prosthetics Management    Orthosis Location AFO (ankle foot orthosis)     Additional Documentation Orthosis Location (Row)        Ankle Foot Orthosis Management    Type (Ankle/Foot Orthosis) left     Fabrication Comment (Ankle Foot Orthosis) TBA               Outcome Measures    PT Outcome Measures - 08/16/23 1203        Objective Outcome Measures    6 Minute Walk Test TBA w SPC     30 Second Sit to Stand Test 7 repetitions   from EOM no UE use       Subjective Outcome Measures    ABC To be returned nv     LEFS To be returned nv        Person Balance Scale    Sitting to Standing Able to stand independently using hands     Standing Unsupported Able to stand safely for 2 minutes     Sitting with Back Unsupported but Feet  Supported on Floor or on a Stool Able to sit safely and securely for 2 minutes     Standing to Sitting Controls descent by using hands     Transfers Able to transfer safely definite need of hands     Standing Unsupported with Eyes Closed Able to stand 10 seconds with supervision     Standing Unsupported with Feet Together Able to place feet together independently and stand 1 minute with supervision     Reach Forward with Outstretched Arm While Standing Can reach forward 5 cm (2 inches)      Object from Floor from a Standing Position Able to  slipper but needs supervision     Turning to Look Behind Over Left and Right Shoulders While Standing Turns sideways only but maintains balance     Turn 360 Degrees Able to turn 360 degrees safely but slowly     Place Alternate Foot on Step or Stool While Standing Unsupported Able to complete greater than 2 steps needs minimal assist     Standing Unsupported One Foot in Front Able to take small step independently and hold 30 seconds     Standing on One Leg Tries to lift leg unable to hold 3 seconds but remains standing independently     Person Balance Score 36        Timed Up and Go Test    Results, Timed Up and Go Test (Balance) TBA w SPC        NEW (2/6/23):  PSFS     PSFS ACTIVITY 1 Navigate stairs     PSFS ANSWER 1 4     PSFS ACTIVITY 2  the kitchen to make a meal     PSFS ANSWER 2 5     PSFS ACTIVITY 3 Walk without fear of falling or tripping     PSFS ANSWER 3 0     PSFS Sum of Activity Scores 9     PSFS Number of Activities 3     PSFS Percent Score 30 %                  Goals     •  Mutually agreed upon pain goal       Mutually agreed upon pain goal: Pain is not an issue for the pt currently.       •  OP PT - L foot drop Goals       Short term goals   Short Term Goals Time Frame Result Comment/Progress   Pt will complete 6MWT, FGA, TUG 6 weeks     Pt will be mod I for FF with rail  6 weeks     Pt will report improvements in her confidence in her balance  with ADLs via improvements in ABC by =/> 20% 6 weeks     Tolerate 10  min of CV activity with VSS 6 weeks     Progress Timed Up and Go by =/> 5 sec sec without decline in safety or quality 6 weeks     Pt and caregiver will be mod I with HEP 6 weeks     Pt will demonstrate improvements in static balance via improvements in OH by =/> 5 points  6 weeks     Pts current AFO will be assessed and adjusted to allow for comfort with daily use 6 weeks       Short term goals   Short Term Goals Time Frame Result Comment/Progress   Pt will demonstrate improved ankle DF ROM to =/> 10 degrees to allow for normalized gait mechanics and stair use 12 weeks     Pt will report improvements in her confidence in her balance with ADLs via improvements in ABC by =/> 30% 12 weeks     Pt will demonstrate improvements in functional LE strength via improvements in the 30 second STS to =/> 10 repetitions 12 weeks     Pt will demonstrate improved functional endurance via improvements in 6MWT distance by =/> 150ft 12 weeks     Progress Timed Up and Go by =/> 10 sec (or =/<13.5 sec) without decline in safety or quality for decreased falls risk 12 weeks     Pt will be mod I with updated HEP 12 weeks     Pt will demonstrate improvements in static balance via improvements in OH by =/> 45 points  12 weeks     Pt will demonstrate improvements in dynamic balance via improvements in performance on the FGA by =/> 4 points 12 weeks           •  Patient stated (pt-stated)       Patient reports she would like to improve her LLE strength and balance to improve her confidence and ability to ambulate community distances.               TREATMENT PLAN:    PT Neuro Exercises Current Session Time   THER ACT   CPT 16568 TOTAL TIME FOR SESSION 8-22 Minutes   Assessment Pain, vitals, subjective  LEFS  ABC   Home exercise program    Patient Education Educated pt on realistic expectations due to chronicity of her LLE symptoms and need for further assessment of her  LLE brace to allow for daily use and improved LLE clearance. Reviewed goals and POC w pt report of understanding.   THER EX  CPT 65545 TOTAL TIME FOR SESSION Not performed   STRETCHING    Stretching by patient    CARDIOVASCULAR    Nu Step    Stationary Bike    Treadmill    Standing Ther-Ex    Supine Ther-Ex    NEURO RE-ED  CPT 88541 TOTAL TIME FOR SESSION 8-22 Minutes   ASSESSMENT OH  FGA   POSTURAL RE-ED    PRE-GAIT ACTIVITIES     BALANCE TRAINING     Sitting balance    Standing balance - static FA w lateral weight shifts   Standing balance - dynamic    Standing balance - varied surface    GAIT TRAINING  CPT 84945 TOTAL TIME FOR SESSION 8-22 Minutes   Ambulation with AD  w SPC and soft ankle brace with emphasis on heel strike and control through stance phase over level ground around main gym and down ramp to waiting room   Dynamic gait     Stair negotiation    Curb negotiation    Ramp negotiation    Outdoor ambulation    MANUAL  CPT 62307 TOTAL TIME FOR SESSION Not performed   Stretching by therapist/PROM    Mobilization     MODALITIES  CPT 73034 TOTAL TIME FOR SESSION Not performed   Ice    Heat    ATTENDED E-STIM  CPT 75077 TOTAL TIME FOR SESSION Not performed   Attended E-Stim      GROUP  CPT 23850 TOTAL TIME FOR SESSION Not performed                ASSESSMENT:    This 62 y.o. year old female presents to PT with above stated diagnosis. Physical Therapy evaluation reveals decreased endurance, decreased flexibility, decreased ROM, decreased strength, impaired balance resulting in self-care, home management, community/leisure limitations. Bebe Burks will benefit from skilled PT services to address limitation, work towards rehab and patient goals and maximize PLOF of chosen ADLs.     Planned Services: The patient's treatment will include CPT 01842 Gait training, CPT 64624 Manual therapy, CPT 01930 Neuromuscular Reeducation, CPT 42396 Orthotics training (Initial encounter), CPT 68019 Orthotics/Prosthetics  management and training (Subsequent encounters), CPT 91423 Self-care/Home management training, CPT 90774 Therapeutic activities, CPT 36401 Therapeutic exercises, CPT 33269 Therapeutic Massage, CPT 69153 Electrical stimulation ATTENDED, CPT 00755 Electrical stimulation UNATTENDED, CPT 26279 Hot/Cold Packs therapy, .     Shauna Way, PT

## 2023-08-22 DIAGNOSIS — E11.8 TYPE 2 DIABETES MELLITUS WITH UNSPECIFIED COMPLICATIONS: ICD-10-CM

## 2023-08-22 RX ORDER — PEN NEEDLE, DIABETIC 30 GX3/16"
NEEDLE, DISPOSABLE MISCELLANEOUS
Qty: 200 EACH | Refills: 1 | Status: SHIPPED | OUTPATIENT
Start: 2023-08-22 | End: 2024-02-23

## 2023-08-29 ENCOUNTER — HOSPITAL ENCOUNTER (OUTPATIENT)
Dept: PHYSICAL THERAPY | Facility: REHABILITATION | Age: 63
Setting detail: THERAPIES SERIES
Discharge: HOME | End: 2023-08-29
Attending: ORTHOPAEDIC SURGERY
Payer: MEDICARE

## 2023-08-29 ENCOUNTER — APPOINTMENT (OUTPATIENT)
Dept: OTHER | Facility: REHABILITATION | Age: 63
Setting detail: THERAPIES SERIES
End: 2023-08-29
Payer: MEDICARE

## 2023-08-29 DIAGNOSIS — R29.898 OTHER SYMPTOMS AND SIGNS INVOLVING THE MUSCULOSKELETAL SYSTEM: Primary | ICD-10-CM

## 2023-08-29 DIAGNOSIS — R26.9 GAIT ABNORMALITY: ICD-10-CM

## 2023-08-29 DIAGNOSIS — M21.372 FOOT DROP, LEFT FOOT: ICD-10-CM

## 2023-08-29 PROCEDURE — 97116 GAIT TRAINING THERAPY: CPT | Mod: GP

## 2023-08-29 PROCEDURE — 97112 NEUROMUSCULAR REEDUCATION: CPT | Mod: GP

## 2023-08-29 PROCEDURE — 97530 THERAPEUTIC ACTIVITIES: CPT | Mod: GP

## 2023-08-29 ASSESSMENT — GAIT ASSESSMENTS
TUG TIME: 12.9
TUG TIME: 13.4
TUG TIME: 13.61
TUG TIME: 13.78

## 2023-08-29 NOTE — PROGRESS NOTES
Physical Therapy Visit    PT DAILY NOTE FOR OUTPATIENT THERAPY    Patient: Bebe Burks MRN: [id number]  : 1960 63 y.o.  Referring Physician: Gideon Centeno MD  Date of Visit: 2023    Certification Dates: 23 through 23    Diagnosis:   1. Other symptoms and signs involving the musculoskeletal system    2. Foot drop, left foot    3. Gait abnormality        Chief Complaints:       Precautions:   Existing Precautions/Restrictions: fall  Precautions comments: LLE foot drop      TODAY'S VISIT    Time In Session:  Start Time: 08  Stop Time: 858  Time Calculation (min): 58 min     Daily Treatment Assessment and Plan - 23 0851        Daily Treatment Assessment and Plan    Progress toward goals Progressing     Daily Outcome Summary Bebe arrives with soft ankle dorsiflexion support brace but brought her PLS with report of trying to wear it once in a while last week with report of pain in the calf and overall 'uncomfortable' when walking with it donned. Assessed TUG with and without SPC with notable challenge turning without AD resulting in increased time to complete, 13.4 seconds with SPC and 19.3 seconds without AD. Patients FGA, , indicative of increased risk of falls and 6MWT below age matched norms at 858ft with increased steppage gait to clear LLE after ~3 minutes of ambulation. Pt forgot to bring back ABC and LEFS and forgot her glasses so unable to complete during session, educated pt on completion at home to return next visit as well as reasoning behind PLS and that we will assess fit and make adjustment as able next session w pt report of understanding.     Plan and Recommendations Stretches to improve L DF ROM, + LE strength to improve distal LLE and B hip strength. Static balance tasks w NBOS                Daily Falls Screen - 23 0809        Daily Falls Assessment    Patient reported fall since last visit No                   OBJECTIVE DATA TAKEN  TODAY:    Outcome Measures    PT Outcome Measures - 08/29/23 0809        Objective Outcome Measures    6 Minute Walk Test 858     FGA Score (out of 30 total) 12   1, 2, 2, 1, 2, 1, 0, 1, 1, 1       Timed Up and Go Test    Trial One: Timed Up and Go Test 13.78     Trial Two: Timed Up and Go Test 13.61     Trial Three: Timed Up and Go Test 12.9     Mean of 3 Trials: Timed Up and Go Test 13.4     Results, Timed Up and Go Test (Balance) from red chair w SPC, 19.3 sec from red chair w/o AD and Cl S (most challenge turning)                 Today's Treatment:    PT Neuro Exercises Current Session Time   THER ACT   CPT 26204 TOTAL TIME FOR SESSION 23-37 Minutes   Assessment Pain, vitals, subjective  6 MWT w SPC  TUG w SPC and no AD   Home exercise program    Patient Education Educated pt on realistic expectations due to chronicity of her LLE symptoms and need for further assessment of her LLE brace to allow for daily use and improved LLE clearance. Discussed performance on objective testing indicative of increased falls risk and need for use of SPC w pt report of understanding. Provided ABC and LEFS to pt again to complete and return next visit.    THER EX  CPT 72292 TOTAL TIME FOR SESSION Not performed   STRETCHING    Stretching by patient    CARDIOVASCULAR    Nu Step    Stationary Bike    Treadmill    Standing Ther-Ex    Supine Ther-Ex    NEURO RE-ED  CPT 22109 TOTAL TIME FOR SESSION 8-22 Minutes   ASSESSMENT FGA   POSTURAL RE-ED    PRE-GAIT ACTIVITIES     BALANCE TRAINING     Sitting balance    Standing balance - static    Standing balance - dynamic    Standing balance - varied surface    GAIT TRAINING  CPT 54752 TOTAL TIME FOR SESSION 8-22 Minutes   Ambulation with AD  w SPC and soft ankle brace with emphasis on heel strike and control through stance phase over level ground around main gym and down ramp to waiting room   Dynamic gait     Stair negotiation    Curb negotiation    Ramp negotiation    Outdoor ambulation     MANUAL  CPT 31995 TOTAL TIME FOR SESSION Not performed   Stretching by therapist/PROM    Mobilization     MODALITIES  CPT 30144 TOTAL TIME FOR SESSION Not performed   Ice    Heat    ATTENDED E-STIM  CPT 32221 TOTAL TIME FOR SESSION Not performed   Attended E-Stim      GROUP  CPT 17355 TOTAL TIME FOR SESSION Not performed

## 2023-08-29 NOTE — OP PT TREATMENT LOG
PT Neuro Exercises Current Session Time   THER ACT   CPT 82175 TOTAL TIME FOR SESSION 23-37 Minutes   Assessment Pain, vitals, subjective  6 MWT w SPC  TUG w SPC and no AD   Home exercise program    Patient Education Educated pt on realistic expectations due to chronicity of her LLE symptoms and need for further assessment of her LLE brace to allow for daily use and improved LLE clearance. Discussed performance on objective testing indicative of increased falls risk and need for use of SPC w pt report of understanding. Provided ABC and LEFS to pt again to complete and return next visit.    THER EX  CPT 03620 TOTAL TIME FOR SESSION Not performed   STRETCHING    Stretching by patient    CARDIOVASCULAR    Nu Step    Stationary Bike    Treadmill    Standing Ther-Ex    Supine Ther-Ex    NEURO RE-ED  CPT 85235 TOTAL TIME FOR SESSION 8-22 Minutes   ASSESSMENT FGA   POSTURAL RE-ED    PRE-GAIT ACTIVITIES     BALANCE TRAINING     Sitting balance    Standing balance - static    Standing balance - dynamic    Standing balance - varied surface    GAIT TRAINING  CPT 32677 TOTAL TIME FOR SESSION 8-22 Minutes   Ambulation with AD  w SPC and soft ankle brace with emphasis on heel strike and control through stance phase over level ground around main gym and down ramp to waiting room   Dynamic gait     Stair negotiation    Curb negotiation    Ramp negotiation    Outdoor ambulation    MANUAL  CPT 49709 TOTAL TIME FOR SESSION Not performed   Stretching by therapist/PROM    Mobilization     MODALITIES  CPT 16513 TOTAL TIME FOR SESSION Not performed   Ice    Heat    ATTENDED E-STIM  CPT 20978 TOTAL TIME FOR SESSION Not performed   Attended E-Stim      GROUP  CPT 65209 TOTAL TIME FOR SESSION Not performed

## 2023-08-30 NOTE — PROGRESS NOTES
Detail Level: Detailed Physical Therapy Visit    PT DAILY NOTE FOR OUTPATIENT THERAPY    Patient: Bebe Burks MRN: [id number]  : 1960 62 y.o.  Referring Physician: Gideon Centeno MD  Date of Visit: 2023    Certification Dates: 23 through 23    Diagnosis:   1. S/P total knee arthroplasty, right        Chief Complaints:  R TKA    Precautions:   Existing Precautions/Restrictions: other (see comments)  Precautions comments: LLE foot drop - has LLE AFO will bring NV following IE      TODAY'S VISIT    Time In Session:  Start Time: 902  Stop Time: 1000  Time Calculation (min): 58 min   History/Vitals/Pain/Encounter Info - 23 0924        Injury History/Precautions/Daily Required Info    Document Type daily treatment     Primary Therapist Caitlyn Lagunas, PT     Chief Complaint/Reason for Visit  R TKA     Referring Physician Dr. Centeno     Existing Precautions/Restrictions other (see comments)     Precautions comments LLE foot drop - has LLE AFO will bring NV following IE     History of present illness/functional impairment Bebe is a 61 yo female who presents to OPPT s/p R TKA 23 w/ Dr. Centeno [place] [facility] (states no complications during/post surgery). Patient reports she had a 2 night stay at [facility] and then was discharged to [facility] where she stayed for 5-6 nights. She was discharged home with home health PT and OT, she states home health PT stopped last Friday 3/31/23. She reports at baseline she ambulates without AD, but started using SPC about 6 months prior to surgery due to pain/weakness. She states at this time she is ambulating with SPC for household distances and RW for community distances. She reports she lives alone in single level [facility] with 4 JOSE RAFAEL w/ unilateral rail. She states she has a walkin shower with shower chair and grab bars. She reports since her R knee surgery her ability to ascend/descend stairs is difficult, she feels like her walking is impaired, and her balance/endurance decreased. She  reports significant PMHx of LLE drop foot (which they are contributing to sciatica, states it began about 3 years ago) which she wears LLE brace for which was issued to her by Peter in September 2022, LBP w/ LLE radicular symptoms, DMII (controlled), HTN (controlled). Patient states her overall goal for PT is to walk again without a SPC. Patient reports she is currently working but on long term leave at this time. Patient reports current R knee pain 0/10 and pain at worst in the past week 1/10.     Patient reported fall since last visit No        Pain Assessment    Currently in pain No/Denies     Pain: Body location Knee        Pain Intervention    Intervention  exs     Post Intervention Comments no pain         Services    Do You Speak a Language Other Than English at Home? no                Daily Treatment Assessment and Plan - 06/16/23 0924        Daily Treatment Assessment and Plan    Daily Outcome Summary Bebe arrived with no pain, has been doing stat bike at home which ahs helped lower stiffness reported in previous session. Worked on gait outside on uneven areas, did on patient[place] request [place]. Advised not to walk on sand but stay on baord walk and supervision of family. Worked more on standing exs, increased step up height to 8 inches, tolerated well. Added airex with static balance exs.     Plan and Recommendations Dc next week. Patient aware.                     OBJECTIVE DATA TAKEN TODAY:    None taken    Today's Treatment:    IE  4/3/23   30 Day  5/3/23   60 Day  6/3/23   90 Day  7/3/23   Precautions  LLE foot drop- has LLE AFO but does not always wear    Insurance Auth     Treatment   Current Session Time   Modalities  Total Time for Session Not performed   Heat/Ice  CPT 01165  CP to R knee post session   E. Stimulation Manual  CPT 12339     E. Stim Unattended  CPT 74535     Manual   CPT 72062  Total Time for Session minutes    STM/MFR/TPR  R  gastroc/soleus, hamstring  R LE retrograde massage with elevation to reduce edema    Instrument Assisted STM      Mobilizations n  n Gr II/III R patellar mobilizations in all direction  AP talocrural mobs NV    ROM/Flexibility   n  n R knee PROM flex/ext   R HS, gastroc, soleus  Passive soleus and gastroc stretch, pt prone    Myofascial Decompression     Ther. Exercise  CPT 06437                     Total Time for Session 45 minutes      Sets Reps Load Comment    RB seat #12 y  1 10 min               Slantboard gastroc stretch Y  1 3 30sec    Slantboard soleus stretch n  1 3 30sec     Hamstring stretch n  1 3 30sec Supine by therapist   Quad set n  1 10 10 sec     SAQ n  2 10 3 sec,  3# Bilateral, cues to get to end range    SLR   2 10 0# RLE only, quad set prior to lift - watch for extensor lag   Sidelying hip ABD   2 10  Needed extensive cues   sidelying clamshell  Y  2 10  B   Standing hip  abduction   2 10 each  In // bars. Holding with UE   Hip adduction   2 10 3s hold Supine   Hip ABD n  1 10 Green Supine   LAQ n  1 15 3s hold 2#   Bridge with hip add y  2 10  Will ball squeeze   STS no BUE use n  y  1  1 10  10  Mat at 21 inches  Mat at 20 1/2 inches   Toe taps   2 5 6inch 1 finger tip support   Step up forward  Step overs Y  Y  1 5   5 8 inch   bilateral   Step up lateral n  1 10 6''    Lateral sidestepping n  1  With cane and CS    stairs n    6 inch FF of stairs with reciprocal legs , 2 bannisters bannister , CS   Prone quad   stretch    Prone hamcurls Y      n   30''x3      x10  bilateral   Standing hip ext Y  1 10  B orange TB   Standing hip ABD Y  1 10  B OTB   Standing hip flexion Y  1 10  B OTB   Heel raises n  2 10  BUE support   Toe raises n  2 10  BUE support                                        Neuro Re-Ed  CPT 49554   Total Time for Session Yes/ billed with TE      Sets Reps Load Comment   FT HT/HN n  2 10  Firm    FT EO n  1 30sec  Firm   FT EC n  3 30sec  Firm   FA EO Y  2 30sec  FOAM,  dynamic arms with ball   FA EC n  2 30sec  FOAM    FT EO  n  3 30sec  FOAM   Modified tandem Y  3 30sec  airex   Modified tandem on FOAM NV        Tandem  n  2 10ft  Floor, with SPC            Pre gait exercises         Cesia step over NV                                      Gait  CPT 68332   Total Time for Session 10 minutes   Gait training with SPC and LLE AFO donned  n      Patient ambulates 300 ft w/ SPC, LLE AFO, and CS. Patient demonstrates improved BLE step length, improved BLE weight shift, improved LLE clearance, and overall improvement in BLE gait mechanics. VC for upright posture, to look ahead.   Gait training Y  With SPC, outside on uneven areas, on concrete, grass, needs gait belt and minimum to mod assist on grass 100 feet         Ther. Activity  CPT 70685   Total Time for Session 10 minutes   VS, falls, medication review, subjective review y  Reviewed all with pt    Patient education                                     n  Educated pt on rationale of treatment with manual therapy, on use of compression socks for swelling control, on retrograde massage with elevation, on technique/form of exercises, on sit <> stand mechanics, on POC, review of HEP - pt verbalized and demonstrated understanding     Educated on gait mechanics and drop foot contribution/need for SPC and rationale to promote improved safety and independence both short and likely long term. Reviewed donning/doffing new shoe attached DF assist brace pt brought - pt needs reinforcement to become independent.     Patient educated on re-examination findings, continued progression of POC, continued compliance with HEP, PT rec of continued use of SPC at this time    Patient educated on re-examination findings, continued focus of POC, fall prevention strategies    HEP       n  y  Issued patient HEP including hip adduction, hip ABD, LAQ and STS with use of BUE    Patient provided on updated HEP including   Encouraged HEP consistency especially Hip  Abd - patient with good understanding   30 sec STS   See Assessment   other   4/24/2023- donning soft brace for L foot drop.   Seated hip abduction / add 10x bilat to simulate going from accelerator to brake in car.   ADRIANO/LIDIA Arredondo done 4/12/2023 26/56   R knee circumference assessment      R knee AROM n  See AROM chapter   Stairs training n        n  Patient ascends/descends 4 6 inch steps w/ reciprocal pattern, SPC, and unilateral rail, CS x2 trials.    Patient ascends/descends 4 6 inch steps w/ reciprocal pattern and unilateral rail, CS-Fay and gait belt, min-mod verbal cues for toe clearance (noted compensation of B foot ER to clear BLE)   Ramp training n  5 trials ascending and descending ramp with SPC and Fay. Patient demonstrates difficulty controlling deceleration when descending.    Curb training n  5 trials ascending and descending with SPC, initially req Fay with mod verbal cues and demonstration, progressed to supervision with no verbal cueing.    PN 5/22/23 n  All subjective and objective measurements completed    Gait assessment with LLE AFO donned n  Patient IND in donning/doffing LLE posterior leafspring AFO         Group  CPT 91917  Total Time for Session Not performed     HEP 6/8: Heel raises, toe raises, hip 3 way, LAQ, hip adduction, hip ABD

## 2023-08-31 ENCOUNTER — HOSPITAL ENCOUNTER (OUTPATIENT)
Dept: PHYSICAL THERAPY | Facility: REHABILITATION | Age: 63
Setting detail: THERAPIES SERIES
Discharge: HOME | End: 2023-08-31
Attending: ORTHOPAEDIC SURGERY
Payer: MEDICARE

## 2023-08-31 ENCOUNTER — APPOINTMENT (OUTPATIENT)
Dept: OTHER | Facility: REHABILITATION | Age: 63
Setting detail: THERAPIES SERIES
End: 2023-08-31
Payer: MEDICARE

## 2023-08-31 DIAGNOSIS — R29.898 OTHER SYMPTOMS AND SIGNS INVOLVING THE MUSCULOSKELETAL SYSTEM: Primary | ICD-10-CM

## 2023-08-31 DIAGNOSIS — M21.372 FOOT DROP, LEFT FOOT: ICD-10-CM

## 2023-08-31 DIAGNOSIS — R26.9 GAIT ABNORMALITY: ICD-10-CM

## 2023-08-31 PROCEDURE — 97112 NEUROMUSCULAR REEDUCATION: CPT | Mod: GP

## 2023-08-31 PROCEDURE — 97110 THERAPEUTIC EXERCISES: CPT | Mod: GP

## 2023-08-31 PROCEDURE — 97530 THERAPEUTIC ACTIVITIES: CPT | Mod: GP

## 2023-08-31 NOTE — OP PT TREATMENT LOG
PT Neuro Exercises Current Session Time   THER ACT   CPT 90148 TOTAL TIME FOR SESSION 8-22 Minutes   Assessment Pain, vitals, subjective  LEFS, KAELA returned   Home exercise program    Patient Education Educated pt on use of sock/stockinette under AFO to protect her skin w pt report of plan to go to store and get better socks. Also educated on reasoning for carbon fiber PLS and the benefits to her gait cycle but that it will feel unusual and take time to get comfortable as it will alter GRF/forces on the knee and hip - pt agreed to wear brace in PT and at home as much as she can to improve confidence and tolerance with daily skin checks to progress to full time wear once she is confident. Educated on proper use of soft ankle brace to improve amount of dorsiflexion assist for community ambulation for now w pt demonstration and report of understanding.     THER EX  CPT 34712 TOTAL TIME FOR SESSION 23-37 Minutes   STRETCHING    Stretching by therapist L Gastroc/soleus, toe flexors, hamstrings in supine, 30-60sec holds   Stretching by patient Slantboard lvl 2 3 x 30sec   CARDIOVASCULAR    Nu Step Lvl 3 Seat 10, UEs 12 x 8 min    Stationary Bike    Treadmill    Standing Ther-Ex    Seated Ther- Ex Rockerboard a/p taps, Fay/MCs for max DF, 2 x 60sec  Seated calf raises on 1/2 foam roll, x 10   Mat based Ther-Ex L Ankle DF pink TB, x 10  SLR w dorsiflexion x 10   Bridges x 5, + DF x 10 (max VCs)  Sidelying clamshells x 10  Sidelying hip abduction x10    NEURO RE-ED  CPT 39475 TOTAL TIME FOR SESSION 8-22 Minutes   ASSESSMENT    POSTURAL RE-ED    PRE-GAIT ACTIVITIES     BALANCE TRAINING     Sitting balance    Standing balance - static   --w AFO donned -- FT x 30sec, HT/HN x 30 sec ea  FT EC 2 x 30sec  Semi-tandem x 30sec ea  Split stance x 30 sec ea  Split stance w HT x 30sec ea   Standing balance - dynamic    Standing balance - varied surface    GAIT TRAINING  CPT 58717 TOTAL TIME FOR SESSION 8-22 Minutes   Ambulation with  AD  w SPC and L carbon fiber PLS with emphasis on heel strike and control through stance phase over tile around main gym turning L and R, down ramp to waiting room   Dynamic gait     Stair negotiation    Curb negotiation    Ramp negotiation    Outdoor ambulation    MANUAL  CPT 46722 TOTAL TIME FOR SESSION Not performed   Stretching by therapist/PROM    Mobilization     MODALITIES  CPT 04264 TOTAL TIME FOR SESSION Not performed   Ice    Heat    ATTENDED E-STIM  CPT 84555 TOTAL TIME FOR SESSION Not performed   Attended E-Stim      GROUP  CPT 98352 TOTAL TIME FOR SESSION Not performed

## 2023-09-03 NOTE — PROGRESS NOTES
Physical Therapy Visit    PT DAILY NOTE FOR OUTPATIENT THERAPY    Patient: Bebe Burks MRN: [id number]  : 1960 63 y.o.  Referring Physician: Gideon Centeno MD  Date of Visit: 2023    Certification Dates: 23 through 23    Diagnosis:   1. Other symptoms and signs involving the musculoskeletal system    2. Foot drop, left foot    3. Gait abnormality        Chief Complaints:  L foot drop, LE weakness, difficulty walking    Precautions:   Existing Precautions/Restrictions: fall  Precautions comments: LLE foot drop      TODAY'S VISIT    Time In Session:  Start Time: 1000  Stop Time: 1100  Time Calculation (min): 60 min   History/Vitals/Pain/Encounter Info - 23 1012        Injury History/Precautions/Daily Required Info    Document Type daily treatment     Primary Therapist Shauna Way, PT     Chief Complaint/Reason for Visit  L foot drop, LE weakness, difficulty walking     Referring Physician Dr. Centeno     Existing Precautions/Restrictions fall     Precautions comments LLE foot drop     History of present illness/functional impairment Bebe is a 61 yo female who presents to OPPT with chief complaint of L foot drop and weakness impacting her gait and balance. She reports pain and weakness in the LLE began about 9 yeas ago with involvement of her sciatic nerve that progressively worsended over time but was able to make improvements with physical therapy walking independently for many years without use of bracing for the LLE. Then, 2021, she had a bad fall and hit her right knee which she feels caused a slow regression resulting in use of cane due to the damage to her R knee and overall deconditioning. She eventually had a R TKA on 23 w/ Dr. Centeno at  and was discharged to [facility] where she stayed for 5-6 nights. She was discharged home with home health PT and OT and then began outpatient PT with improvements in her R knee ROM and LE strength but continued  weakness/deficits of the LLE. She reports she lives alone in single level condo with 4 JOSE RAFAEL w/ rail with notable difficulty on the steps. She continues to use a cane for ambulation and has a few braces, two over the counter and one which was issued to her by Peter in September 2022 that she feels is poorly fit so doesn't wear.     Patient/Family/Caregiver Comments/Observations Pt reports she tried to wear her AFO but felt off balance with little confidence walking so stopped.     Patient reported fall since last visit No        Pain Assessment    Currently in pain No/Denies        Pain Intervention    Intervention  n/a     Post Intervention Comments n/a        Pre Activity Vital Signs    Pulse 80     Oxygen Therapy None (Room air)     /64     BP Location Right upper arm     BP Method Automatic     Patient Position Sitting         Services    Do You Speak a Language Other Than English at Home? no                Daily Treatment Assessment and Plan - 08/31/23 1315        Daily Treatment Assessment and Plan    Progress toward goals Progressing     Daily Outcome Summary Able to initiate cardiovascular exercise on the NuStep today to address endurance as well as provide increased dorsiflexion ROM with LLE strapped into foot plate with pt report of feeling a 'good stretch' in the lower left leg. Pt reported improvements in stiffness and overall better feeling of the L foot and ankle after manual stretches into dorsiflexion intermittently with toe extension with goal of improved LE clearance as well as tibial translation through L stance phase with ambulation. Trial of L carbon fiber AFO today with notable improvements in heel strike and pt report of feeling unsteady initially which improved after education on the effects of the AFO on her gait and practice walking over level ground with SPC. Discussed plan to continue to work on gait training with the brace in PT sessions on various terrain and levels as  well as use at home until she feels confident to wear it for community ambulation, with use of soft brace in the mean time to provide mild dorsiflexion assist with pt report of understanding.     Plan and Recommendations NuStep w feet straps 'press through heel' to promote inc DF. Stretches to improve L DF ROM, + LE strength (inc to 2 sets as tolerated) with goal of transition to an HEP. NMRE (challenged w NBOS layered with UE/head movements). GT w L carbon fiber AFO and SPC to improve confidence/tolerance for full time wear                     OBJECTIVE DATA TAKEN TODAY:    Outcome Measures    PT Outcome Measures - 08/31/23 1004        Subjective Outcome Measures    /1600: 21% confidence     LEFS 28/80: 35% function                 Today's Treatment:    PT Neuro Exercises Current Session Time   THER ACT   CPT 18726 TOTAL TIME FOR SESSION 8-22 Minutes   Assessment Pain, vitals, subjective  LEFS, KAELA returned   Home exercise program    Patient Education Educated pt on use of sock/stockinette under AFO to protect her skin w pt report of plan to go to store and get better socks. Also educated on reasoning for carbon fiber PLS and the benefits to her gait cycle but that it will feel unusual and take time to get comfortable as it will alter GRF/forces on the knee and hip - pt agreed to wear brace in PT and at home as much as she can to improve confidence and tolerance with daily skin checks to progress to full time wear once she is confident. Educated on proper use of soft ankle brace to improve amount of dorsiflexion assist for community ambulation for now w pt demonstration and report of understanding.     THER EX  CPT 81447 TOTAL TIME FOR SESSION 23-37 Minutes   STRETCHING    Stretching by therapist L Gastroc/soleus, toe flexors, hamstrings in supine, 30-60sec holds   Stretching by patient Slantboard lvl 2 3 x 30sec   CARDIOVASCULAR    Nu Step Lvl 3 Seat 10, UEs 12 x 8 min    Stationary Bike    Treadmill     Standing Ther-Ex    Seated Ther- Ex Rockerboard a/p taps, Fay/MCs for max DF, 2 x 60sec  Seated calf raises on 1/2 foam roll, x 10   Mat based Ther-Ex L Ankle DF pink TB, x 10  SLR w dorsiflexion x 10   Bridges x 5, + DF x 10 (max VCs)  Sidelying clamshells x 10  Sidelying hip abduction x10    NEURO RE-ED  CPT 50387 TOTAL TIME FOR SESSION 8-22 Minutes   ASSESSMENT    POSTURAL RE-ED    PRE-GAIT ACTIVITIES     BALANCE TRAINING     Sitting balance    Standing balance - static   --w AFO donned -- FT x 30sec, HT/HN x 30 sec ea  FT EC 2 x 30sec  Semi-tandem x 30sec ea  Split stance x 30 sec ea  Split stance w HT x 30sec ea   Standing balance - dynamic    Standing balance - varied surface    GAIT TRAINING  CPT 16069 TOTAL TIME FOR SESSION 8-22 Minutes   Ambulation with AD  w SPC and L carbon fiber PLS with emphasis on heel strike and control through stance phase over tile around main gym turning L and R, down ramp to waiting room   Dynamic gait     Stair negotiation    Curb negotiation    Ramp negotiation    Outdoor ambulation    MANUAL  CPT 32829 TOTAL TIME FOR SESSION Not performed   Stretching by therapist/PROM    Mobilization     MODALITIES  CPT 90382 TOTAL TIME FOR SESSION Not performed   Ice    Heat    ATTENDED E-STIM  CPT 01405 TOTAL TIME FOR SESSION Not performed   Attended E-Stim      GROUP  CPT 99126 TOTAL TIME FOR SESSION Not performed

## 2023-09-05 ENCOUNTER — APPOINTMENT (OUTPATIENT)
Dept: OTHER | Facility: REHABILITATION | Age: 63
Setting detail: THERAPIES SERIES
End: 2023-09-05
Payer: MEDICARE

## 2023-09-05 ENCOUNTER — HOSPITAL ENCOUNTER (OUTPATIENT)
Dept: PHYSICAL THERAPY | Facility: REHABILITATION | Age: 63
Setting detail: THERAPIES SERIES
Discharge: HOME | End: 2023-09-05
Attending: ORTHOPAEDIC SURGERY
Payer: MEDICARE

## 2023-09-05 DIAGNOSIS — R29.898 OTHER SYMPTOMS AND SIGNS INVOLVING THE MUSCULOSKELETAL SYSTEM: Primary | ICD-10-CM

## 2023-09-05 DIAGNOSIS — M21.372 FOOT DROP, LEFT FOOT: ICD-10-CM

## 2023-09-05 PROCEDURE — 97530 THERAPEUTIC ACTIVITIES: CPT | Mod: GP

## 2023-09-05 PROCEDURE — 97032 APPL MODALITY 1+ESTIM EA 15: CPT | Mod: GP

## 2023-09-05 PROCEDURE — 97110 THERAPEUTIC EXERCISES: CPT | Mod: GP

## 2023-09-05 NOTE — OP PT TREATMENT LOG
PT Neuro Exercises Current Session Time   THER ACT   CPT 36948 TOTAL TIME FOR SESSION 23-37 Minutes   Assessment Pain, vitals, subjective  LEFS, KAELA returned   Brace trial Trialed elevate brace for improved R ankle DF with ambulation with SPC. Noted improved toe clearance but also steppage gait pattern.    Home exercise program Educated on runners stretch for improved ankle ROM   Patient Education Continued education on how to tighten current [employer] brace to improve DF with gait. Educated on possible risks and benefits of FES and patient in agreement with trial. Educated on use of elevate brace. Educated on ankle DF stretch in runners position. Educated on importance of proximal strength and stability. Bebe verbalized understanding of all education but will benefit from continued reinforcement   THER EX  CPT 92175 TOTAL TIME FOR SESSION 8-22 Minutes   STRETCHING    Stretching by therapist L ankle DF stretching x 2 min; L AP talocrural mobs grade IV for increased ankle DF ROM 3 x 30 sec bout as well as AP distal tibialfibular mobs 3 x 30 sec bout grade IV.   Stretching by patient Runners stretch at sink 3 x 30 sec B - added for home    CARDIOVASCULAR    Nu Step Lvl 3 Seat 10, UEs 12 x 10 min for increased strength, endurance and ROM   Stationary Bike    Treadmill    Standing Ther-Ex    Seated Ther- Ex    Mat based Ther-Ex L Ankle DF AROM x 15 seated - encouraged frequent performance  SLR w dorsiflexion x 10      NEURO RE-ED  CPT 76062 TOTAL TIME FOR SESSION Not performed   ASSESSMENT    POSTURAL RE-ED    PRE-GAIT ACTIVITIES     BALANCE TRAINING     Sitting balance    Standing balance - static   --w AFO donned --    Standing balance - dynamic    Standing balance - varied surface    GAIT TRAINING  CPT 14792 TOTAL TIME FOR SESSION Not performed   Ambulation with AD     Dynamic gait     Stair negotiation    Curb negotiation    Ramp negotiation    Outdoor ambulation    MANUAL  CPT 03568 TOTAL TIME FOR SESSION Not  performed   Stretching by therapist/PROM    Mobilization     MODALITIES  CPT 19617 TOTAL TIME FOR SESSION Not performed   Ice    Heat    ATTENDED E-STIM  CPT 76180 TOTAL TIME FOR SESSION 8-22 Minutes   Attended E-Stim Trialed NMES to L ankle dorsiflexors to assess for response and see if NMES could be utilized to help with improved ankle DF ROM. No muscular response to stim at various settings, although patient did feel it slightly.     GROUP  CPT 59379 TOTAL TIME FOR SESSION Not performed

## 2023-09-05 NOTE — PROGRESS NOTES
Physical Therapy Visit    PT DAILY NOTE FOR OUTPATIENT THERAPY    Patient: Bebe Burks MRN: [id number]  : 1960 63 y.o.  Referring Physician: Gideon Centeno MD  Date of Visit: 2023    Certification Dates: 23 through 23    Diagnosis:   1. Other symptoms and signs involving the musculoskeletal system    2. Foot drop, left foot        Chief Complaints:  L foot drop, LE weakness, difficulty walking    Precautions:   Existing Precautions/Restrictions: fall  Precautions comments: LLE foot drop      TODAY'S VISIT    Time In Session:  Start Time: 1100  Stop Time: 1200  Time Calculation (min): 60 min   History/Vitals/Pain/Encounter Info - 23 1110        Injury History/Precautions/Daily Required Info    Document Type daily treatment     Primary Therapist Shauna Way, PT     Chief Complaint/Reason for Visit  L foot drop, LE weakness, difficulty walking     Referring Physician Dr. Centeno     Existing Precautions/Restrictions fall     Precautions comments LLE foot drop     History of present illness/functional impairment Bebe is a 63 yo female who presents to OPPT with chief complaint of L foot drop and weakness impacting her gait and balance. She reports pain and weakness in the LLE began about 9 yeas ago with involvement of her sciatic nerve that progressively worsended over time but was able to make improvements with physical therapy walking independently for many years without use of bracing for the LLE. Then, 2021, she had a bad fall and hit her right knee which she feels caused a slow regression resulting in use of cane due to the damage to her R knee and overall deconditioning. She eventually had a R TKA on 23 w/ Dr. Centeno at  and was discharged to [facility] where she stayed for 5-6 nights. She was discharged home with home health PT and OT and then began outpatient PT with improvements in her R knee ROM and LE strength but continued weakness/deficits of the LLE.  She reports [facility] with 4 JOSE RAFAEL w/ rail with notable difficulty on the steps. She continues to use a cane for ambulation and has a few braces, two over the counter and one which was issued to her by Peter in September 2022 that she feels is poorly fit so doesn't wear.     Patient/Family/Caregiver Comments/Observations No new complaints/concerns. reny reports the stretching has felt good. She did a lot of walking at the beach this past weekend.     Patient reported fall since last visit No        Pain Assessment    Currently in pain No/Denies        Pain Intervention    Intervention  n/a     Post Intervention Comments n/a        Pre Activity Vital Signs    Oxygen Therapy None (Room air)         Services    Do You Speak a Language Other Than English at Home? no                Daily Treatment Assessment and Plan - 09/05/23 1110        Daily Treatment Assessment and Plan    Progress toward goals Progressing     Daily Outcome Summary Reny arrived with her amazon brace again not donned tight enough - practiced and continued education on how to julian this brace tight enough. Traled elevate brace and she reported liking it and feeling like it was helpful - provided information for ordering. Also trialed handheld NMES unit for DF musculature but unable to ellicit any motor response so discontinued. Encouraged frequent performance of ankle DF stretching in runners stretch position making sure to prevent knee hyperextension     Plan and Recommendations Follow up on stretching at home, NuStep w feet straps 'press through heel' to promote inc DF. Stretches to improve L DF ROM, + LE strength (inc to 2 sets as tolerated) with goal of transition to an HEP. NMRE (challenged w NBOS layered with UE/head movements). GT w L carbon fiber AFO and SPC to improve confidence/tolerance for full time wear                     OBJECTIVE DATA TAKEN TODAY:    None taken    Today's Treatment:    PT  Neuro Exercises Current Session Time   THER ACT   CPT 44313 TOTAL TIME FOR SESSION 23-37 Minutes   Assessment Pain, vitals, subjective  LEFS, KAELA returned   Brace trial Trialed elevate brace for improved R ankle DF with ambulation with SPC. Noted improved toe clearance but also steppage gait pattern.    Home exercise program Educated on runners stretch for improved ankle ROM   Patient Education Continued education on how to tighten current amazon brace to improve DF with gait. Educated on possible risks and benefits of FES and patient in agreement with trial. Educated on use of elevate brace. Educated on ankle DF stretch in runners position. Educated on importance of proximal strength and stability. Bebe verbalized understanding of all education but will benefit from continued reinforcement   THER EX  CPT 77979 TOTAL TIME FOR SESSION 8-22 Minutes   STRETCHING    Stretching by therapist L ankle DF stretching x 2 min; L AP talocrural mobs grade IV for increased ankle DF ROM 3 x 30 sec bout as well as AP distal tibialfibular mobs 3 x 30 sec bout grade IV.   Stretching by patient Runners stretch at sink 3 x 30 sec B - added for home    CARDIOVASCULAR    Nu Step Lvl 3 Seat 10, UEs 12 x 10 min for increased strength, endurance and ROM   Stationary Bike    Treadmill    Standing Ther-Ex    Seated Ther- Ex    Mat based Ther-Ex L Ankle DF AROM x 15 seated - encouraged frequent performance  SLR w dorsiflexion x 10      NEURO RE-ED  CPT 70334 TOTAL TIME FOR SESSION Not performed   ASSESSMENT    POSTURAL RE-ED    PRE-GAIT ACTIVITIES     BALANCE TRAINING     Sitting balance    Standing balance - static   --w AFO donned --    Standing balance - dynamic    Standing balance - varied surface    GAIT TRAINING  CPT 36036 TOTAL TIME FOR SESSION Not performed   Ambulation with AD     Dynamic gait     Stair negotiation    Curb negotiation    Ramp negotiation    Outdoor ambulation    MANUAL  CPT 16357 TOTAL TIME FOR SESSION Not  performed   Stretching by therapist/PROM    Mobilization     MODALITIES  CPT 71130 TOTAL TIME FOR SESSION Not performed   Ice    Heat    ATTENDED E-STIM  CPT 45290 TOTAL TIME FOR SESSION 8-22 Minutes   Attended E-Stim Trialed NMES to L ankle dorsiflexors to assess for response and see if NMES could be utilized to help with improved ankle DF ROM. No muscular response to stim at various settings, although patient did feel it slightly.     GROUP  CPT 85406 TOTAL TIME FOR SESSION Not performed

## 2023-09-07 ENCOUNTER — HOSPITAL ENCOUNTER (OUTPATIENT)
Dept: PHYSICAL THERAPY | Facility: REHABILITATION | Age: 63
Setting detail: THERAPIES SERIES
Discharge: HOME | End: 2023-09-07
Attending: ORTHOPAEDIC SURGERY
Payer: MEDICARE

## 2023-09-07 ENCOUNTER — APPOINTMENT (OUTPATIENT)
Dept: OTHER | Facility: REHABILITATION | Age: 63
Setting detail: THERAPIES SERIES
End: 2023-09-07
Payer: MEDICARE

## 2023-09-07 DIAGNOSIS — R29.898 OTHER SYMPTOMS AND SIGNS INVOLVING THE MUSCULOSKELETAL SYSTEM: Primary | ICD-10-CM

## 2023-09-07 PROCEDURE — 97116 GAIT TRAINING THERAPY: CPT | Mod: GP,CQ

## 2023-09-07 PROCEDURE — 97110 THERAPEUTIC EXERCISES: CPT | Mod: GP,CQ

## 2023-09-07 NOTE — OP PT TREATMENT LOG
PT Neuro Exercises Current Session Time    THER ACT   CPT 75852 TOTAL TIME FOR SESSION 0-7 Minutes   Y Assessment Pain, vitals, subjective  LEFS, KAELA returned   Y Brace trial Trialed elevate brace for improved R ankle DF with ambulation with SPC. Noted improved toe clearance but also steppage gait pattern.     Home exercise program Educated on runners stretch for improved ankle ROM    Patient Education Continued education on how to tighten current amazon brace to improve DF with gait. Educated on possible risks and benefits of FES and patient in agreement with trial. Educated on use of elevate brace. Educated on ankle DF stretch in runners position. Educated on importance of proximal strength and stability. Bebe verbalized understanding of all education but will benefit from continued reinforcement    THER EX  CPT 55833 TOTAL TIME FOR SESSION 38-52 Minutes    STRETCHING    Yes Stretching by therapist L ankle DF stretching x 2 min; L AP talocrural mobs grade IV for increased ankle DF ROM 3 x 30 sec bout as well as AP distal tibialfibular mobs 3 x 30 sec bout grade IV.    Stretching by patient Runners stretch at sink 3 x 30 sec B - added for home     CARDIOVASCULAR    Yes Nu Step Lvl 3 Seat 10, UEs 5(inc NV to 10) min for increased strength, endurance and ROM    Stationary Bike     Treadmill     Standing Ther-Ex     Seated Ther- Ex    Yes    Yes  Yes  Yes Mat based Ther-Ex L Ankle DF AROM x 20 supine - encouraged frequent performance  SLR w dorsiflexion x 10   Bridges  DF w/ PTB 20x      NEURO RE-ED  CPT 30396 TOTAL TIME FOR SESSION Not performed    ASSESSMENT     POSTURAL RE-ED     PRE-GAIT ACTIVITIES      BALANCE TRAINING      Sitting balance     Standing balance - static   --w AFO donned --     Standing balance - dynamic     Standing balance - varied surface     GAIT TRAINING  CPT 88887 TOTAL TIME FOR SESSION 8-22 Minutes    Ambulation with AD  Amb 400ft around main gym w/ SPC and emphasis on heel strike and  "DF for LLE.    Dynamic gait  Amb w/ SPC over 4\" hurdles w/ emphasis on DF and heel strike    Stair negotiation     Curb negotiation     Ramp negotiation     Outdoor ambulation     MANUAL  CPT 46324 TOTAL TIME FOR SESSION Not performed    Stretching by therapist/PROM     Mobilization      MODALITIES  CPT 57366 TOTAL TIME FOR SESSION Not performed    Ice     Heat     ATTENDED E-STIM  CPT 25178 TOTAL TIME FOR SESSION 8-22 Minutes    Attended E-Stim Trialed NMES to L ankle dorsiflexors to assess for response and see if NMES could be utilized to help with improved ankle DF ROM. No muscular response to stim at various settings, although patient did feel it slightly.     GROUP  CPT 18570 TOTAL TIME FOR SESSION Not performed            "

## 2023-09-07 NOTE — PROGRESS NOTES
Physical Therapy Visit    PT DAILY NOTE FOR OUTPATIENT THERAPY    Patient: Bebe Burks MRN: [id number]  : 1960 63 y.o.  Referring Physician: Gideon Centeno MD  Date of Visit: 2023    Certification Dates: 23 through 23    Diagnosis:   1. Other symptoms and signs involving the musculoskeletal system        Chief Complaints:  L foot drop, LE weakness, difficulty walking    Precautions:   Existing Precautions/Restrictions: fall  Precautions comments: LLE foot drop      TODAY'S VISIT    Time In Session:  Start Time: 08  Stop Time: 900  Time Calculation (min): 60 min   History/Vitals/Pain/Encounter Info - 23 0759        Injury History/Precautions/Daily Required Info    Document Type daily treatment     Primary Therapist Shauna Way, PT     Chief Complaint/Reason for Visit  L foot drop, LE weakness, difficulty walking     Referring Physician Dr. Centeno     Existing Precautions/Restrictions fall     Precautions comments LLE foot drop     History of present illness/functional impairment Bebe is a 61 yo female who presents to OPPT with chief complaint of L foot drop and weakness impacting her gait and balance. She reports pain and weakness in the LLE began about 9 yeas ago with involvement of her sciatic nerve that progressively worsended over time but was able to make improvements with physical therapy walking independently for many years without use of bracing for the LLE. Then, 2021, she had a bad fall and hit her right knee which she feels caused a slow regression resulting in use of cane due to the damage to her R knee and overall deconditioning. She eventually had a R TKA on 23 w/ Dr. Centeno at  and was discharged to [facility] where she stayed for 5-6 nights. She was discharged home with home health PT and OT and then began outpatient PT with improvements in her R knee ROM and LE strength but continued weakness/deficits of the LLE. She reports she lives  alone in single level condo with 4 [address] w/ rail with notable difficulty on the steps. She continues to use a cane for ambulation and has a few braces, two over the counter and one which was issued to her by [facility] in September 2022 that she feels is poorly fit so doesn't wear.     Patient/Family/Caregiver Comments/Observations Pt reports some stiffness in L ankle and foot but no pain. She is utilizing the elevate brace which she believes his helping her w/ toe clearance during ambulation. New script provided by MD. Pt has eye surgery scheduled for next week and will need MD clearance to return to PT.     Patient reported fall since last visit No     Recommended plan to address falls Continue POC focusing on improvement of static/dynamic balance.     Fall prevention interventions recommended Educate and re-educate the patient on safety strategies;Visually observe patient as determined by the plan of care        Pain Assessment    Currently in pain No/Denies        Pre Activity Vital Signs    Oxygen Therapy None (Room air)     /54     BP Location Left upper arm     Patient Position Sitting         Services    Do You Speak a Language Other Than English at Home? no                Daily Treatment Assessment and Plan - 09/07/23 5662        Daily Treatment Assessment and Plan    Progress toward goals Progressing     Daily Outcome Summary Pt arrives w/ elevate brace donned. Subjective report of improved quality of gait w/ brace. Pt denies any benefit to utilizing L carbon fiber AFO. Pt presents w/ inc stiffness/tightness in L ankle and hamstrings and benefits from extensive manual therapy. She benefits from hip and core stability/strengthening exercises and tolerates well. She tends to use toe flexion vs DF when performing AROM exercise and benefits from vc/tc for correct body mechanics. Pt requires cueing for heel strike and DF w/ gait training activity. Reduced active hip/knee flexion noted while  ambulating. She is able to achieve heel strike w/ momentum and  kicking through swing phase of LLE. Pt again encouraged to focus on active DF as often as possible.     Plan and Recommendations Follow up on stretching at home, NuStep w feet straps 'press through heel' to promote inc DF. Stretches to improve L DF ROM, + LE strength (inc to 2 sets as tolerated) with goal of transition to an HEP. NMRE (challenged w NBOS layered with UE/head movements). GT w L carbon fiber AFO and SPC to improve confidence/tolerance for full time wear                     OBJECTIVE DATA TAKEN TODAY:    None taken    Today's Treatment:     PT Neuro Exercises Current Session Time    THER ACT   CPT 75289 TOTAL TIME FOR SESSION 0-7 Minutes   Y Assessment Pain, vitals, subjective  LEFS, KAELA returned   Y Brace trial Trialed elevate brace for improved R ankle DF with ambulation with SPC. Noted improved toe clearance but also steppage gait pattern.     Home exercise program Educated on runners stretch for improved ankle ROM    Patient Education Continued education on how to tighten current amazon brace to improve DF with gait. Educated on possible risks and benefits of FES and patient in agreement with trial. Educated on use of elevate brace. Educated on ankle DF stretch in runners position. Educated on importance of proximal strength and stability. Bebe verbalized understanding of all education but will benefit from continued reinforcement    THER EX  CPT 30654 TOTAL TIME FOR SESSION 38-52 Minutes    STRETCHING    Yes Stretching by therapist L ankle DF stretching x 2 min; L AP talocrural mobs grade IV for increased ankle DF ROM 3 x 30 sec bout as well as AP distal tibialfibular mobs 3 x 30 sec bout grade IV.    Stretching by patient Runners stretch at sink 3 x 30 sec B - added for home     CARDIOVASCULAR    Yes Nu Step Lvl 3 Seat 10, UEs 5(inc NV to 10) min for increased strength, endurance and ROM    Stationary Bike     Treadmill      "Standing Ther-Ex     Seated Ther- Ex    Yes    Yes  Yes  Yes Mat based Ther-Ex L Ankle DF AROM x 20 supine - encouraged frequent performance  SLR w dorsiflexion x 10   Bridges  DF w/ PTB 20x      NEURO RE-ED  CPT 36467 TOTAL TIME FOR SESSION Not performed    ASSESSMENT     POSTURAL RE-ED     PRE-GAIT ACTIVITIES      BALANCE TRAINING      Sitting balance     Standing balance - static   --w AFO donned --     Standing balance - dynamic     Standing balance - varied surface     GAIT TRAINING  CPT 88463 TOTAL TIME FOR SESSION 8-22 Minutes    Ambulation with AD  Amb 400ft around main gym w/ SPC and emphasis on heel strike and DF for LLE.    Dynamic gait  Amb w/ SPC over 4\" hurdles w/ emphasis on DF and heel strike    Stair negotiation     Curb negotiation     Ramp negotiation     Outdoor ambulation     MANUAL  CPT 57083 TOTAL TIME FOR SESSION Not performed    Stretching by therapist/PROM     Mobilization      MODALITIES  CPT 30678 TOTAL TIME FOR SESSION Not performed    Ice     Heat     ATTENDED E-STIM  CPT 29590 TOTAL TIME FOR SESSION 8-22 Minutes    Attended E-Stim Trialed NMES to L ankle dorsiflexors to assess for response and see if NMES could be utilized to help with improved ankle DF ROM. No muscular response to stim at various settings, although patient did feel it slightly.     GROUP  CPT 93603 TOTAL TIME FOR SESSION Not performed                                   "

## 2023-09-27 ENCOUNTER — APPOINTMENT (OUTPATIENT)
Dept: OTHER | Facility: REHABILITATION | Age: 63
Setting detail: THERAPIES SERIES
End: 2023-09-27
Payer: MEDICARE

## 2023-09-27 ENCOUNTER — HOSPITAL ENCOUNTER (OUTPATIENT)
Dept: PHYSICAL THERAPY | Facility: REHABILITATION | Age: 63
Setting detail: THERAPIES SERIES
Discharge: HOME | End: 2023-09-27
Attending: ORTHOPAEDIC SURGERY
Payer: MEDICARE

## 2023-09-27 DIAGNOSIS — R29.898 OTHER SYMPTOMS AND SIGNS INVOLVING THE MUSCULOSKELETAL SYSTEM: Primary | ICD-10-CM

## 2023-09-27 DIAGNOSIS — M21.372 FOOT DROP, LEFT FOOT: ICD-10-CM

## 2023-09-27 PROCEDURE — 97116 GAIT TRAINING THERAPY: CPT | Mod: GP,CQ

## 2023-09-27 PROCEDURE — 97110 THERAPEUTIC EXERCISES: CPT | Mod: GP,CQ

## 2023-09-27 PROCEDURE — 97112 NEUROMUSCULAR REEDUCATION: CPT | Mod: GP,CQ

## 2023-09-27 NOTE — OP PT TREATMENT LOG
Time         THER ACT   CPT 81369 TOTAL TIME FOR SESSION 0-7 Minutes   Y Assessment Pain, vitals, subjective  LEFS, KAELA returned    Brace trial Trialed elevate brace for improved R ankle DF with ambulation with SPC. Noted improved toe clearance but also steppage gait pattern.      Home exercise program Educated on runners stretch for improved ankle ROM     Patient Education                        Y Continued education on how to tighten current amazon brace to improve DF with gait. Educated on possible risks and benefits of FES and patient in agreement with trial. Educated on use of elevate brace. Educated on ankle DF stretch in runners position. Educated on importance of proximal strength and stability. Bebe verbalized understanding of all education but will benefit from continued reinforcement    Pt reeducated on HEP and importance of compliance to promote carry over w/ therapy     THER EX  CPT 34788 TOTAL TIME FOR SESSION 22-37 minutes     STRETCHING     Yes Stretching by therapist L ankle DF stretching x 2 min; L AP talocrural mobs grade IV for increased ankle DF ROM 3 x 30 sec bout as well as AP distal tibialfibular mobs 3 x 30 sec bout grade IV.     Stretching by patient Runners stretch at sink 3 x 30 sec B - added for home      CARDIOVASCULAR     Yes Nu Step Lvl 3 Seat 10, UEs 10 min for increased strength, endurance and ROM     Stationary Bike       Treadmill       Standing Ther-Ex       Seated Ther- Ex     Yes     Yes  Yes  Yes Mat based Ther-Ex L Ankle DF AROM x 20 supine - encouraged frequent performance  SLR w dorsiflexion x 10   Bridges  DF w/ PTB 20x        NEURO RE-ED  CPT 84098 TOTAL TIME FOR SESSION 8-22minutes     ASSESSMENT       POSTURAL RE-ED       PRE-GAIT ACTIVITIES        BALANCE TRAINING        Sitting balance      Y Standing balance - static   --w AFO donned --  FT w/ HT/HN [id number]     Standing balance - dynamic       Standing balance - varied surface       GAIT TRAINING  CPT 65958 TOTAL  "TIME FOR SESSION 8-22 Minutes    Y Ambulation with AD  Amb 400ft around main gym w/ SPC and emphasis on heel strike and DF for LLE.     Dynamic gait  Amb w/ SPC over 4\" hurdles w/ emphasis on DF and heel strike     Stair negotiation       Curb negotiation       Ramp negotiation       Outdoor ambulation       MANUAL  CPT 43061 TOTAL TIME FOR SESSION Not performed     Stretching by therapist/PROM       Mobilization        MODALITIES  CPT 19877 TOTAL TIME FOR SESSION Not performed     Ice       Heat       ATTENDED E-STIM  CPT 52138 TOTAL TIME FOR SESSION 8-22 Minutes     Attended E-Stim Trialed NMES to L ankle dorsiflexors to assess for response and see if NMES could be utilized to help with improved ankle DF ROM. No muscular response to stim at various settings, although patient did feel it slightly.      GROUP  CPT 46268 TOTAL TIME FOR SESSION Not performed              "

## 2023-09-27 NOTE — PROGRESS NOTES
Physical Therapy Visit    PT DAILY NOTE FOR OUTPATIENT THERAPY    Patient: Bebe Burks MRN: [id number]  : 1960 63 y.o.  Referring Physician: Gideon Centeno MD  Date of Visit: 2023    Certification Dates: 23 through 23    Diagnosis:   1. Other symptoms and signs involving the musculoskeletal system    2. Foot drop, left foot        Chief Complaints:  L foot drop, LE weakness, difficulty walking    Precautions:   Existing Precautions/Restrictions: fall  Precautions comments: LLE foot drop      TODAY'S VISIT    Time In Session:  Start Time: 0800  Stop Time: 0855  Time Calculation (min): 55 min   History/Vitals/Pain/Encounter Info - 23 0958        Injury History/Precautions/Daily Required Info    Document Type daily treatment     Primary Therapist Shauna Way, PT     Chief Complaint/Reason for Visit  L foot drop, LE weakness, difficulty walking     Referring Physician Dr. Centeno     Existing Precautions/Restrictions fall     Precautions comments LLE foot drop     History of present illness/functional impairment Bebe is a 61 yo female who presents to OPPT with chief complaint of L foot drop and weakness impacting her gait and balance. She reports pain and weakness in the LLE began about 9 yeas ago with involvement of her sciatic nerve that progressively worsended over time but was able to make improvements with physical therapy walking independently for many years without use of bracing for the LLE. Then, 2021, she had a bad fall and hit her right knee which she feels caused a slow regression resulting in use of cane due to the damage to her R knee and overall deconditioning. She eventually had a R TKA on 23 w/ Dr. Centeno at  and was discharged to [facility] where she stayed for 5-6 nights. She was discharged home with home health PT and OT and then began outpatient PT with improvements in her R knee ROM and LE strength but continued weakness/deficits of the  LLE. She reports she lives alone in single level [facility] [place] with 4 JOSE RAFAEL w/ rail with notable difficulty on the steps. She continues to use a cane for ambulation and has a few braces, two over the counter and one which was issued to her by Peter in September 2022 that she feels is poorly fit so doesn't wear.     Patient/Family/Caregiver Comments/Observations Pt states eye surgery went well. She has a follow up w/ MD next Thursday. She notes no changes to pain, meds and no falls. Stiffness in L foot reduced w/ stretches.     Patient reported fall since last visit No     Recommended plan to address falls Continue POC focusing on improvement of static/dynamic balance.     Fall prevention interventions recommended Educate and re-educate the patient on safety strategies;Visually observe patient as determined by the plan of care        Pain Assessment    Currently in pain Yes     Pain Side/Orientation right     Pain: Body location Knee   medial    Pain Rating (0-10): Pre Activity 1        Pain Intervention    Intervention  monitored     Post Intervention Comments No change        Pre Activity Vital Signs    Pulse 80     SpO2 99 %     Oxygen Therapy None (Room air)     /57     BP Location Left upper arm     BP Method Manual     Patient Position Sitting         Services    Do You Speak a Language Other Than English at Home? no                Daily Treatment Assessment and Plan - 09/27/23 0958        Daily Treatment Assessment and Plan    Progress toward goals Progressing     Daily Outcome Summary Reviewed HEP and encouraged pt to work on diligence w/ stretches and strengthening at home. Noted persistant stiffness w/ manual DF stretches, but it is reduced post treatment. Pt demonstrates improved gait fluidity and no toe catching during gait training w/ SPC. Initial cues for heel strike proved benefitial and pt was able to maintain correct technique for the remainder of the session.     Plan and Recommendations  Follow up on stretching at home, NuStep w feet straps 'press through heel' to promote inc DF. Stretches to improve L DF ROM, + LE strength (inc to 2 sets as tolerated) with goal of transition to an HEP. NMRE (challenged w NBOS layered with UE/head movements). GT w L carbon fiber AFO and SPC to improve confidence/tolerance for full time wear                     OBJECTIVE DATA TAKEN TODAY:    None taken    Today's Treatment:    Time         THER ACT   CPT 13176 TOTAL TIME FOR SESSION 0-7 Minutes   Y Assessment Pain, vitals, subjective  LEFS, KAELA returned    Brace trial Trialed elevate brace for improved R ankle DF with ambulation with SPC. Noted improved toe clearance but also steppage gait pattern.      Home exercise program Educated on runners stretch for improved ankle ROM     Patient Education                        Y Continued education on how to tighten current amazon brace to improve DF with gait. Educated on possible risks and benefits of FES and patient in agreement with trial. Educated on use of elevate brace. Educated on ankle DF stretch in runners position. Educated on importance of proximal strength and stability. Bebe verbalized understanding of all education but will benefit from continued reinforcement    Pt reeducated on HEP and importance of compliance to promote carry over w/ therapy     THER EX  CPT 86645 TOTAL TIME FOR SESSION 22-37 minutes     STRETCHING     Yes Stretching by therapist L ankle DF stretching x 2 min; L AP talocrural mobs grade IV for increased ankle DF ROM 3 x 30 sec bout as well as AP distal tibialfibular mobs 3 x 30 sec bout grade IV.     Stretching by patient Runners stretch at sink 3 x 30 sec B - added for home      CARDIOVASCULAR     Yes Nu Step Lvl 3 Seat 10, UEs 10 min for increased strength, endurance and ROM     Stationary Bike       Treadmill       Standing Ther-Ex       Seated Ther- Ex     Yes     Yes  Yes  Yes Mat based Ther-Ex L Ankle DF AROM x 20 supine -  "encouraged frequent performance  SLR w dorsiflexion x 10   Bridges  DF w/ PTB 20x        NEURO RE-ED  CPT 60128 TOTAL TIME FOR SESSION 8-22minutes     ASSESSMENT       POSTURAL RE-ED       PRE-GAIT ACTIVITIES        BALANCE TRAINING        Sitting balance      Y Standing balance - static   --w AFO donned --  FT w/ HT/HN [id number]     Standing balance - dynamic       Standing balance - varied surface       GAIT TRAINING  CPT 59625 TOTAL TIME FOR SESSION 8-22 Minutes    Y Ambulation with AD  Amb 400ft around main gym w/ SPC and emphasis on heel strike and DF for LLE.     Dynamic gait  Amb w/ SPC over 4\" hurdles w/ emphasis on DF and heel strike     Stair negotiation       Curb negotiation       Ramp negotiation       Outdoor ambulation       MANUAL  CPT 31688 TOTAL TIME FOR SESSION Not performed     Stretching by therapist/PROM       Mobilization        MODALITIES  CPT 69332 TOTAL TIME FOR SESSION Not performed     Ice       Heat       ATTENDED E-STIM  CPT 18168 TOTAL TIME FOR SESSION 8-22 Minutes     Attended E-Stim Trialed NMES to L ankle dorsiflexors to assess for response and see if NMES could be utilized to help with improved ankle DF ROM. No muscular response to stim at various settings, although patient did feel it slightly.      GROUP  CPT 26069 TOTAL TIME FOR SESSION Not performed                                     "

## 2023-09-29 ENCOUNTER — HOSPITAL ENCOUNTER (OUTPATIENT)
Dept: PHYSICAL THERAPY | Facility: REHABILITATION | Age: 63
Setting detail: THERAPIES SERIES
Discharge: HOME | End: 2023-09-29
Attending: ORTHOPAEDIC SURGERY
Payer: MEDICARE

## 2023-09-29 ENCOUNTER — APPOINTMENT (OUTPATIENT)
Dept: OTHER | Facility: REHABILITATION | Age: 63
Setting detail: THERAPIES SERIES
End: 2023-09-29
Payer: MEDICARE

## 2023-09-29 DIAGNOSIS — R29.898 OTHER SYMPTOMS AND SIGNS INVOLVING THE MUSCULOSKELETAL SYSTEM: Primary | ICD-10-CM

## 2023-09-29 DIAGNOSIS — M21.372 FOOT DROP, LEFT FOOT: ICD-10-CM

## 2023-09-29 DIAGNOSIS — R26.9 GAIT ABNORMALITY: ICD-10-CM

## 2023-09-29 PROCEDURE — 97112 NEUROMUSCULAR REEDUCATION: CPT | Mod: GP

## 2023-09-29 PROCEDURE — 97110 THERAPEUTIC EXERCISES: CPT | Mod: GP

## 2023-09-29 NOTE — OP PT TREATMENT LOG
Time         THER ACT   CPT 16870 TOTAL TIME FOR SESSION 0-7 Minutes Brief billed w TE   Yes Assessment Pain, vitals, subjective  LEFS, KAELA returned    Brace trial Trialed elevate brace for improved R ankle DF with ambulation with SPC. Noted improved toe clearance but also steppage gait pattern.      Home exercise program Educated on runners stretch for improved ankle ROM     Patient Education                      Yes Continued education on how to tighten current amazon brace to improve DF with gait. Educated on possible risks and benefits of FES and patient in agreement with trial. Educated on use of elevate brace. Educated on ankle DF stretch in runners position. Educated on importance of proximal strength and stability. Bebe verbalized understanding of all education but will benefit from continued reinforcement    Pt re-educated on HEP and importance of compliance to promote carry over w/ therapy     THER EX  CPT 54086 TOTAL TIME FOR SESSION 22-37 minutes     STRETCHING     Yes Stretching by therapist L ankle DF stretching x 2 min; L AP talocrural mobs grade IV for increased ankle DF ROM 3 x 30 sec bout as well as AP distal tibialfibular mobs 3 x 30 sec bout grade IV.     Stretching by patient Runners stretch at sink 3 x 30 sec B - added for home      CARDIOVASCULAR     Yes Nu Step Lvl 3 Seat 10, LE/UEs 10 min for increased strength, endurance and ROM     Stationary Bike       Treadmill       Standing Ther-Ex      yes    yes Seated Ther- Ex -rockerboard a/p tap just LLE x 10  -STS from elevated mat w toes elevated on black foam wedge for increased DF x 10, Cl S   Yes         Yes  Yes  Yes  Yes    nv Mat based Ther-Ex L Ankle DF AROM x 10 supine - encouraged frequent performance at home w or w/o resistance  SLR w dorsiflexion x 10   Bridges w dorsiflexion x 10  DF w/ OTB 20x  Sidelying clamshell LLE BTB 2 x10  sidelying hip abduction LLE 2 x 10        NEURO RE-ED  CPT 68745 TOTAL TIME FOR SESSION 8-22 minutes  "    ASSESSMENT       POSTURAL RE-ED       PRE-GAIT ACTIVITIES        BALANCE TRAINING        Sitting balance     Yes  yes Standing balance - static   --w AFO donned --  -FT w/ HT/HN 2 x 30 sec ea  -Split stance w HT/HN x30 sec ea, ea position     Standing balance - dynamic      yes Standing balance - varied surface -Airex FA HT/HN 2 x 30sec ea  -Airex FA EC 2 x 30 sec      GAIT TRAINING  CPT 78454 TOTAL TIME FOR SESSION 0-7 Minutes    Y Ambulation with AD  Amb [id number] around main gym w/ SPC and emphasis on heel strike and DF for LLE.     Dynamic gait  Amb w/ SPC over 4\" hurdles w/ emphasis on DF and heel strike     Stair negotiation       Curb negotiation       Ramp negotiation       Outdoor ambulation       MANUAL  CPT 85478 TOTAL TIME FOR SESSION Not performed     Stretching by therapist/PROM       Mobilization        MODALITIES  CPT 63866 TOTAL TIME FOR SESSION Not performed     Ice       Heat       ATTENDED E-STIM  CPT 70694 TOTAL TIME FOR SESSION Not performed     Attended E-Stim Trialed NMES to L ankle dorsiflexors to assess for response and see if NMES could be utilized to help with improved ankle DF ROM. No muscular response to stim at various settings, although patient did feel it slightly.      GROUP  CPT 37542 TOTAL TIME FOR SESSION Not performed              "

## 2023-10-02 NOTE — PROGRESS NOTES
Physical Therapy Visit    PT DAILY NOTE FOR OUTPATIENT THERAPY    Patient: Bebe Burks MRN: [id number]  : 1960 63 y.o.  Referring Physician: Gideon Centeno MD  Date of Visit: 2023    Certification Dates: 23 through 23    Diagnosis:   1. Other symptoms and signs involving the musculoskeletal system    2. Foot drop, left foot    3. Gait abnormality        Chief Complaints:  L foot drop, LE weakness, difficulty walking    Precautions:   Existing Precautions/Restrictions: fall  Precautions comments: LLE foot drop      TODAY'S VISIT    Time In Session:  Start Time: 1002  Stop Time: 1100  Time Calculation (min): 58 min   History/Vitals/Pain/Encounter Info - 23 1010        Injury History/Precautions/Daily Required Info    Document Type daily treatment     Primary Therapist Shauna Way, PT     Chief Complaint/Reason for Visit  L foot drop, LE weakness, difficulty walking     Referring Physician Dr. Centeno     Existing Precautions/Restrictions fall     Precautions comments LLE foot drop     History of present illness/functional impairment Bebe is a 63 yo female who presents to OPPT with chief complaint of L foot drop and weakness impacting her gait and balance. She reports pain and weakness in the LLE began about 9 yeas ago with involvement of her sciatic nerve that progressively worsended over time but was able to make improvements with physical therapy walking independently for many years without use of bracing for the LLE. Then, 2021, she had a bad fall and hit her right knee which she feels caused a slow regression resulting in use of cane due to the damage to her R knee and overall deconditioning. She eventually had a R TKA on 23 w/ Dr. Centeno at  and was discharged to [facility] where she stayed for 5-6 nights. She was discharged home with home health PT and OT and then began outpatient PT with improvements in her R knee ROM and LE strength but continued  weakness/deficits of the LLE. She reports she lives alone in single level condo with 4 JO[address] RAFAEL w/ rail with notable difficulty on the steps. She continues to use a cane for ambulation and has a few braces, two over the counter and one which was issued to her by [facility] in September 2022 that she feels is poorly fit so doesn't wear.     Patient/Family/Caregiver Comments/Observations Pt reports she has been doing better since her eye surgery and has been trying to wear her 'real brace' at home but is not comfortable wearing outside of the house anymore.     Patient reported fall since last visit No     Recommended plan to address falls Continue POC focusing on improvement of static/dynamic balance.     Fall prevention interventions recommended Educate and re-educate the patient on safety strategies;Visually observe patient as determined by the plan of care        Pain Assessment    Currently in pain No/Denies        Pain Intervention    Intervention  n/a     Post Intervention Comments n/a        Pre Activity Vital Signs    Oxygen Therapy None (Room air)     /76     BP Location Right upper arm     BP Method Automatic     Patient Position Sitting         Services    Do You Speak a Language Other Than English at Home? no                Daily Treatment Assessment and Plan - 10/01/23 2322        Daily Treatment Assessment and Plan    Progress toward goals Progressing     Daily Outcome Summary Continue to emphasize importance of home program w pt report of understanding. Progressed balance activities today following mat based exercises to improve L dorsiflexion ROM and LLE strength w most notable challeng balancing on compliant airex pad.     Plan and Recommendations Follow up on stretching at home, NuStep w feet straps 'press through heel' to promote inc DF. Stretches to improve L DF ROM, + LE strength (inc to 2 sets as tolerated) with goal of transition to an HEP. NMRE (challenged w NBOS layered with UE/head  movements). GT w L carbon fiber AFO and SPC to improve confidence/tolerance for full time wear                     OBJECTIVE DATA TAKEN TODAY:    None taken    Today's Treatment:    Time         THER ACT   CPT 12054 TOTAL TIME FOR SESSION 0-7 Minutes Brief billed w TE   Yes Assessment Pain, vitals, subjective  LEFS, KAELA returned    Brace trial Trialed elevate brace for improved R ankle DF with ambulation with SPC. Noted improved toe clearance but also steppage gait pattern.      Home exercise program Educated on runners stretch for improved ankle ROM     Patient Education                      Yes Continued education on how to tighten current amazon brace to improve DF with gait. Educated on possible risks and benefits of FES and patient in agreement with trial. Educated on use of elevate brace. Educated on ankle DF stretch in runners position. Educated on importance of proximal strength and stability. Bebe verbalized understanding of all education but will benefit from continued reinforcement    Pt re-educated on HEP and importance of compliance to promote carry over w/ therapy     THER EX  CPT 49216 TOTAL TIME FOR SESSION 22-37 minutes     STRETCHING     Yes Stretching by therapist L ankle DF stretching x 2 min; L AP talocrural mobs grade IV for increased ankle DF ROM 3 x 30 sec bout as well as AP distal tibialfibular mobs 3 x 30 sec bout grade IV.     Stretching by patient Runners stretch at sink 3 x 30 sec B - added for home      CARDIOVASCULAR     Yes Nu Step Lvl 3 Seat 10, LE/UEs 10 min for increased strength, endurance and ROM     Stationary Bike       Treadmill       Standing Ther-Ex      yes    yes Seated Ther- Ex -rockerboard a/p tap just LLE x 10  -STS from elevated mat w toes elevated on black foam wedge for increased DF x 10, Cl S   Yes         Yes  Yes  Yes  Yes    nv Mat based Ther-Ex L Ankle DF AROM x 10 supine - encouraged frequent performance at home w or w/o resistance  SLR w dorsiflexion x 10  "  Bridges w dorsiflexion x 10  DF w/ OTB 20x  Sidelying clamshell LLE BTB 2 x10  sidelying hip abduction LLE 2 x 10        NEURO RE-ED  CPT 84506 TOTAL TIME FOR SESSION 8-22 minutes     ASSESSMENT       POSTURAL RE-ED       PRE-GAIT ACTIVITIES        BALANCE TRAINING        Sitting balance     Yes  yes Standing balance - static   --w AFO donned --  -FT w/ HT/HN 2 x 30 sec ea  -Split stance w HT/HN x30 sec ea, ea position     Standing balance - dynamic      yes Standing balance - varied surface -Airex FA HT/HN 2 x 30sec ea  -Airex FA EC 2 x 30 sec      GAIT TRAINING  CPT 57543 TOTAL TIME FOR SESSION 0-7 Minutes    Y Ambulation with AD  Amb [id number] around main gym w/ SPC and emphasis on heel strike and DF for LLE.     Dynamic gait  Amb w/ SPC over 4\" hurdles w/ emphasis on DF and heel strike     Stair negotiation       Curb negotiation       Ramp negotiation       Outdoor ambulation       MANUAL  CPT 07118 TOTAL TIME FOR SESSION Not performed     Stretching by therapist/PROM       Mobilization        MODALITIES  CPT 07221 TOTAL TIME FOR SESSION Not performed     Ice       Heat       ATTENDED E-STIM  CPT 29633 TOTAL TIME FOR SESSION Not performed     Attended E-Stim Trialed NMES to L ankle dorsiflexors to assess for response and see if NMES could be utilized to help with improved ankle DF ROM. No muscular response to stim at various settings, although patient did feel it slightly.      GROUP  CPT 63975 TOTAL TIME FOR SESSION Not performed                                     "

## 2023-10-03 ENCOUNTER — APPOINTMENT (OUTPATIENT)
Dept: OTHER | Facility: REHABILITATION | Age: 63
Setting detail: THERAPIES SERIES
End: 2023-10-03
Payer: MEDICARE

## 2023-10-03 ENCOUNTER — HOSPITAL ENCOUNTER (OUTPATIENT)
Dept: PHYSICAL THERAPY | Facility: REHABILITATION | Age: 63
Setting detail: THERAPIES SERIES
Discharge: HOME | End: 2023-10-03
Attending: ORTHOPAEDIC SURGERY
Payer: MEDICARE

## 2023-10-03 DIAGNOSIS — M21.372 FOOT DROP, LEFT FOOT: ICD-10-CM

## 2023-10-03 DIAGNOSIS — R26.9 GAIT ABNORMALITY: ICD-10-CM

## 2023-10-03 DIAGNOSIS — M17.11 UNILATERAL PRIMARY OSTEOARTHRITIS, RIGHT KNEE: ICD-10-CM

## 2023-10-03 DIAGNOSIS — R29.898 OTHER SYMPTOMS AND SIGNS INVOLVING THE MUSCULOSKELETAL SYSTEM: Primary | ICD-10-CM

## 2023-10-03 PROCEDURE — 97110 THERAPEUTIC EXERCISES: CPT | Mod: GP

## 2023-10-03 PROCEDURE — 97530 THERAPEUTIC ACTIVITIES: CPT | Mod: GP

## 2023-10-03 PROCEDURE — 97116 GAIT TRAINING THERAPY: CPT | Mod: GP

## 2023-10-03 ASSESSMENT — GAIT ASSESSMENTS
TUG TIME: 13.1
TUG TIME: 13.3
TUG TIME: 13.2
TUG TIME: 12.8

## 2023-10-03 ASSESSMENT — BALANCE ASSESSMENTS: TOTAL SCORE: 42

## 2023-10-03 NOTE — OP PT TREATMENT LOG
Time         THER ACT   CPT 01892 TOTAL TIME FOR SESSION 23-37 minutes   Yes  Yes  yes Assessment Pain, vitals, subjective  LEFS, KAELA   TUG   yes Brace trial Trialed elevate brace for improved R ankle DF with ambulation with SPC. Noted improved toe clearance and decreased steppage gait pattern.      Home exercise program Educated on runners stretch for improved ankle ROM     Patient Education                      Yes Continued education on how to tighten current amazon brace to improve DF with gait. Educated on possible risks and benefits of FES and patient in agreement with trial. Educated on use of elevate brace. Educated on ankle DF stretch in runners position. Educated on importance of proximal strength and stability. Bebe verbalized understanding of all education but will benefit from continued reinforcement    Pt re-educated on HEP and importance of compliance to promote carry over w/ therapy     THER EX  CPT 27576 TOTAL TIME FOR SESSION 8-22 minutes     STRETCHING       Stretching by patient Runners stretch at sink 3 x 30 sec B - added for home      CARDIOVASCULAR     Yes Nu Step Seat 10, LEs x5 min lvl 1, LE/UEs lvl3 x5min      Stationary Bike       Treadmill       Standing Ther-Ex      Seated Ther- Ex -rockerboard a/p tap just LLE x 10  -STS from elevated mat w toes elevated on black foam wedge for increased DF x 10, Cl S   Yes         Yes  Yes  Yes  Yes    nv Mat based Ther-Ex L Ankle DF AROM x 10 supine - encouraged frequent performance at home w or w/o resistance  SLR w dorsiflexion x 10   Bridges w dorsiflexion x 10  DF w/ OTB 20x  Sidelying clamshell LLE BTB 2 x10  sidelying hip abduction LLE 2 x 10        NEURO RE-ED  CPT 95161 TOTAL TIME FOR SESSION Billed w TA    yes ASSESSMENT  OH     POSTURAL RE-ED       PRE-GAIT ACTIVITIES        BALANCE TRAINING        Sitting balance      Standing balance - static   --w AFO donned --  -FT w/ HT/HN 2 x 30 sec ea  -Split stance w HT/HN x30 sec ea, ea  "position     Standing balance - dynamic       Standing balance - varied surface -Airex FA HT/HN 2 x 30sec ea  -Airex FA EC 2 x 30 sec      GAIT TRAINING  CPT 85442 TOTAL TIME FOR SESSION 8-22 Minutes    Y Ambulation with AD  Amb x200ft around main gym w/ SPC and emphasis on heel strike and DF for LLE    Amb x200ft around main gym w SPC and elevate 360 brace     amb x100ft over tile no AD w elevate 360 brace,  ClS      Dynamic gait  Amb w/ SPC over 4\" hurdles w/ emphasis on DF and heel strike     Stair negotiation       Curb negotiation       Ramp negotiation       Outdoor ambulation       MANUAL  CPT 30565 TOTAL TIME FOR SESSION 7 minutes     Stretching by therapist/PROM Into ankle dorsiflexion + eversion, hamstring stretch, L gluteals     Mobilization   AP TC mob w passive DF ROM     MODALITIES  CPT 23773 TOTAL TIME FOR SESSION Not performed     Ice       Heat       ATTENDED E-STIM  CPT 26147 TOTAL TIME FOR SESSION Not performed     Attended E-Stim Trialed NMES to L ankle dorsiflexors to assess for response and see if NMES could be utilized to help with improved ankle DF ROM. No muscular response to stim at various settings, although patient did feel it slightly.      GROUP  CPT 94389 TOTAL TIME FOR SESSION Not performed              "

## 2023-10-07 NOTE — PROGRESS NOTES
Physical Therapy Progress Note    PT PROGRESS NOTE FOR OUTPATIENT THERAPY    Patient: Bebe Burks   MRN: [id number]  : 1960 63 y.o.    Referring Physician: Gideon Centeno MD  Date of Visit: 10/3/2023    Certification Dates: 23 through 23    Recommended Frequency & Duration:  2 times/week for up to 3 months        Diagnosis:   1. Other symptoms and signs involving the musculoskeletal system    2. Foot drop, left foot    3. Gait abnormality    4. Unilateral primary osteoarthritis, right knee        Chief Complaints:  Chief Complaint   Patient presents with    Difficulty Walking    Balance Deficits    Dec Strength    Abnormality Of Gait     Decreased Community Integration       Precautions:   Existing Precautions/Restrictions: fall  Precautions comments: LLE foot drop    TODAY'S VISIT:    Time In Session:  Start Time: 1004  Stop Time: 1104  Time Calculation (min): 60 min   General Information - 10/03/23 1104        Session Details    Document Type progress note     Mode of Treatment individual therapy        General Information    Referring Physician Dr. Centeno     History of present illness/functional impairment Bebe is a 61 yo female who presents to OPPT with chief complaint of L foot drop and weakness impacting her gait and balance. She reports pain and weakness in the LLE began about 9 yeas ago with involvement of her sciatic nerve that progressively worsended over time but was able to make improvements with physical therapy walking independently for many years without use of bracing for the LLE. Then, 2021, she had a bad fall and hit her right knee which she feels caused a slow regression resulting in use of cane due to the damage to her R knee and overall deconditioning. She eventually had a R TKA on 23 w/ Dr. Centeno at  and was discharged to [facility] where she stayed for 5-6 nights. She was discharged home with home health PT and OT and then began outpatient PT  with improvements in her R knee ROM and LE strength but continued weakness/deficits of the LLE. She reports she lives alone in single level condo with 4 [address] w/ rail with notable difficulty on the steps. She continues to use a cane for ambulation and has a few braces, two over the counter and one which was issued to her by Peter in September 2022 that she feels is poorly fit so doesn't wear.     Patient/Family/Caregiver Comments/Observations Pt reports she had some spasms by the L heel after last session but feels it was a good thing, it was better by the next day. she has been working on her heel strike more.     Existing Precautions/Restrictions fall     Precautions comments LLE foot drop         Services    Do You Speak a Language Other Than English at Home? no                Daily Falls Screen - 10/03/23 1104        Daily Falls Assessment    Patient reported fall since last visit No                Pain/Vitals - 10/03/23 1104        Pain Assessment    Currently in pain No/Denies        Pre Activity Vital Signs    Oxygen Therapy None (Room air)        Pain Intervention    Intervention  n/a     Post Intervention Comments n/a                PT - 10/03/23 1249        Physical Therapy    Physical Therapy Specialty Traditional Neuro PT        PT Plan    Frequency of treatment 2 times/week     PT Duration 3 months     PT Cert From 08/16/23     PT Cert To 11/14/23     Date PT POC was sent to provider 08/16/23     Signed PT Plan of Care received?  Yes                Assessment and Plan - 10/06/23 2025        Assessment    Clinical Assessment Bebe has demonstrated improvements in her functional LE strength (30 second sit to stand of 9 repeitions) as well as her dynamic balance (FGA 15/30) and static balance (OH 42/56) with report of more confidence walking in the community with a SPC. However, she is still below cut off for the OH and FGA putting her at an increased risk of falls with continued LLE  weakness and limited dorsiflexion ROM. Bebe reports her carbon fiber AFO still causes her to feel imbalanced but that she feels more confident with the elevate 360 brace for dorsiflexion assist and agrees to continue to trial various dorsiflexion assist braces with ambulation to provide her with optimal gait mechanics and safety with ambulation. Therefore Bebe would benefit from continued skilled physical therapy at this time to maximize her safety and functional mobility.     Plan and Recommendations Follow up on stretching at home, NuStep w feet straps 'press through heel' to promote inc DF. Stretches to improve L DF ROM, + LE strength (inc to 2 sets as tolerated) with goal of transition to an HEP. NMRE (challenged w NBOS layered with UE/head movements). GT w L carbon fiber AFO and SPC to improve confidence/tolerance for full time wear                 OBJECTIVE MEASUREMENTS/DATA:    Outcome Measures    PT Outcome Measures - 10/03/23 2117        Objective Outcome Measures    6 Minute Walk Test TBA nv     FGA Score (out of 30 total) 15     30 Second Sit to Stand Test 9 repetitions   from EOM no UE use       Subjective Outcome Measures    /1600: 40%     LEFS 36/80: 45% function        Person Balance Scale    Sitting to Standing Able to stand without using hands and stabilize independently     Standing Unsupported Able to stand safely for 2 minutes     Sitting with Back Unsupported but Feet Supported on Floor or on a Stool Able to sit safely and securely for 2 minutes     Standing to Sitting Sits safely with minimal use of hands     Transfers Able to transfer safely with minor use of hands     Standing Unsupported with Eyes Closed Able to stand 10 seconds with supervision     Standing Unsupported with Feet Together Able to place feet together independently and stand 1 minute with supervision     Reach Forward with Outstretched Arm While Standing Can reach forward 12 cm (5 inches)      Object from  Floor from a Standing Position Able to  slipper but needs supervision     Turning to Look Behind Over Left and Right Shoulders While Standing Looks behind one side only other side shows less weight shift     Turn 360 Degrees Able to turn 360 degrees safely but slowly     Place Alternate Foot on Step or Stool While Standing Unsupported Able to complete greater than 2 steps needs minimal assist     Standing Unsupported One Foot in Front Able to place foot ahead independently and hold 30 seconds     Standing on One Leg Tries to lift leg unable to hold 3 seconds but remains standing independently     Person Balance Score 42        Timed Up and Go Test    Trial One: Timed Up and Go Test 13.2     Trial Two: Timed Up and Go Test 12.8     Trial Three: Timed Up and Go Test 13.3     Mean of 3 Trials: Timed Up and Go Test 13.1     Results, Timed Up and Go Test (Balance) from red chair w SPC, 16.4 sec from red chair w/o AD and Cl S                 ROM and MMT        5/11/2023    12:00 5/22/2023    12:57 6/23/2023 8/16/2023    12:03 8/21/2023    15:00 10/3/2023    12:00   PT LE ROM Measurements   AROM: Right Knee Flexion 125  122      AROM: Right Knee Extension 0  0      PROM: Right Ankle DF     5 degrees    AROM: Right Ankle DF     0 degrees    PROM: Left Ankle DF     0 degrees 6 degrees   AROM: Left Ankle DF     -10 degrees -4 degrees   PT LE MMT   Right Hip Flexion  (4-/5) good minus (4+/5) good plus (4/5) good     Left Hip Flexion  (4-/5) good minus (4/5) good (4+/5) good plus     Right Hip Extension  (4-/5) good minus (4/5) good      Left Hip Extension  (3+/5) fair plus (4-/5) good minus      Right Hip ABD  (4-/5) good minus (4-/5) good minus      Left Hip ABD  (4-/5) good minus (4-/5) good minus      Right Hip ADD  (4-/5) good minus       Left Hip ADD  (4-/5) good minus       Right Hip IR  (4-/5) good minus (4+/5) good plus (4+/5) good plus     Left Hip IR  (3+/5) fair plus  (3+/5) fair plus     Right Hip ER  (4-/5)  good minus (4+/5) good plus (4+/5) good plus     Left Hip ER  (3+/5) fair plus  (4/5) good     Right Knee Flexion  (4-/5) good minus (4/5) good (5/5) normal     Left Knee Flexion  (4-/5) good minus (4/5) good (4+/5) good plus     Right Knee Extension  (4-/5) good minus (4+/5) good plus (5/5) normal     Left Knee Extension  (4-/5) good minus (4/5) good (4/5) good     Right Ankle DF  (4-/5) good minus (4+/5) good plus (4+/5) good plus     Left Ankle DF  (3-/5) fair minus  (3-/5) fair minus     Right Ankle PF  (4-/5) good minus (4+/5) good plus (4+/5) good plus     Left Ankle PF  (3-/5) fair minus  (3/5) fair     Right Ankle Inversion    (4/5) good     Left Ankle Inversion    (2+/5) poor plus     Right Ankle Eversion    (4/5) good     Left Ankle Eversion    (2/5) poor       Outcome Measures        5/22/2023    09:53 6/23/2023    09:11 8/16/2023    12:03 8/29/2023    08:09 8/31/2023    10:04 10/3/2023    21:17   PT OBJECTIVE Outcome Measures   6 Minute Walk Test Not assessed 5/22/23- (previously 811 ft w/ SPC, CS and gait belt, LLE brace donned) 849 feet with SPC TBA w   TBA nv   2 Minute Walk Test TBD at next  feet with SPC       Five Times Sit to Stand  14.43 sec from high mat       30 Second Sit to Stand 10 repetitions       from standard red/purple chair w/ arms, use of BUE due to pt unable to complete without 9 repetitions       from high mat, no arms 7 repetitions       from EOM no UE use   9 repetitions       from EOM no UE use   Person Balance Score 39 37 36   42   FGA    12       1, 2, 2, 1, 2, 1, 0, 1, 1, 1  15   TUG (Mean)    13.4  13.1   TUG - Results   TBA w SPC from red chair w SPC, 19.3 sec from red chair w/o AD and Cl S (most challenge turning)  from red chair w SPC, 16.4 sec from red chair w/o AD and Cl S   PT SUBJECTIVE Outcome Measures   ABC   To be returned nv  340/1600: 21% confidence 630/1600: 40%   LEFS 35/80= 43% (previously 43/80=53%) 45/80 To be returned nv  28/80: 35% function  36/80: 45% function   PSFS % Score 53.33 % 63.33 % 30 %          Today's Treatment::    Education provided:  Yes: See treatment log for details of education provided    Time         THER ACT   CPT 15782 TOTAL TIME FOR SESSION 23-37 minutes   Yes  Yes  yes Assessment Pain, vitals, subjective  LEFS, KAELA   TUG   yes Brace trial Trialed elevate brace for improved R ankle DF with ambulation with SPC. Noted improved toe clearance and decreased steppage gait pattern.      Home exercise program Educated on runners stretch for improved ankle ROM     Patient Education                      Yes Continued education on how to tighten current [employer] brace to improve DF with gait. Educated on possible risks and benefits of FES and patient in agreement with trial. Educated on use of elevate brace. Educated on ankle DF stretch in runners position. Educated on importance of proximal strength and stability. Bebe verbalized understanding of all education but will benefit from continued reinforcement    Pt re-educated on HEP and importance of compliance to promote carry over w/ therapy     THER EX  CPT 19848 TOTAL TIME FOR SESSION 8-22 minutes     STRETCHING       Stretching by patient Runners stretch at sink 3 x 30 sec B - added for home      CARDIOVASCULAR     Yes Nu Step Seat 10, LEs x5 min lvl 1, LE/UEs lvl3 x5min      Stationary Bike       Treadmill       Standing Ther-Ex      Seated Ther- Ex -rockerboard a/p tap just LLE x 10  -STS from elevated mat w toes elevated on black foam wedge for increased DF x 10, Cl S   Yes         Yes  Yes  Yes  Yes    nv Mat based Ther-Ex L Ankle DF AROM x 10 supine - encouraged frequent performance at home w or w/o resistance  SLR w dorsiflexion x 10   Bridges w dorsiflexion x 10  DF w/ OTB 20x  Sidelying clamshell LLE BTB 2 x10  sidelying hip abduction LLE 2 x 10        NEURO RE-ED  CPT 53050 TOTAL TIME FOR SESSION Billed w TA    yes ASSESSMENT  HO     POSTURAL RE-ED       PRE-GAIT ACTIVITIES   "      BALANCE TRAINING        Sitting balance      Standing balance - static   --w AFO donned --  -FT w/ HT/HN 2 x 30 sec ea  -Split stance w HT/HN x30 sec ea, ea position     Standing balance - dynamic       Standing balance - varied surface -Airex FA HT/HN 2 x 30sec ea  -Airex FA EC 2 x 30 sec      GAIT TRAINING  CPT 00478 TOTAL TIME FOR SESSION 8-22 Minutes    Y Ambulation with AD  Amb x200ft around main gym w/ SPC and emphasis on heel strike and DF for LLE    Amb x200ft around main gym w SPC and elevate 360 brace     amb x100ft over tile no AD w elevate 360 brace,  ClS      Dynamic gait  Amb w/ SPC over 4\" hurdles w/ emphasis on DF and heel strike     Stair negotiation       Curb negotiation       Ramp negotiation       Outdoor ambulation       MANUAL  CPT 98581 TOTAL TIME FOR SESSION 7 minutes     Stretching by therapist/PROM Into ankle dorsiflexion + eversion, hamstring stretch, L gluteals     Mobilization   AP TC mob w passive DF ROM     MODALITIES  CPT 57432 TOTAL TIME FOR SESSION Not performed     Ice       Heat       ATTENDED E-STIM  CPT 24645 TOTAL TIME FOR SESSION Not performed     Attended E-Stim Trialed NMES to L ankle dorsiflexors to assess for response and see if NMES could be utilized to help with improved ankle DF ROM. No muscular response to stim at various settings, although patient did feel it slightly.      GROUP  CPT 06836 TOTAL TIME FOR SESSION Not performed                   Goals Addressed                    This Visit's Progress      OP PT - L foot drop Goals         Short term goals   Short Term Goals Time Frame Result Comment/Progress   Pt will complete 6MWT, FGA, TUG 6 weeks met See 8/29 note   Pt will be mod I for FF with rail  6 weeks ongoing Pt reports uneasiness performing without supervision from PT   Pt will report improvements in her confidence in her balance with ADLs via improvements in ABC by =/> 20% 6 weeks met    Tolerate 10  min of CV activity with VSS 6 weeks met  "   Progress Timed Up and Go by =/> 5 sec sec without decline in safety or quality 6 weeks ongoing    Pt will be mod I with HEP 6 weeks met    Pt will demonstrate improvements in static balance via improvements in OH by =/> 5 points  6 weeks met    Pts current AFO will be assessed and adjusted to allow for comfort with daily use 6 weeks ongoing Pt only wearing AFO in her home, trial of elevate 360 with pt report of more comfort/stability     Short term goals   Short Term Goals Time Frame Result Comment/Progress   Pt will demonstrate improved ankle DF ROM to =/> 10 degrees to allow for normalized gait mechanics and stair use 12 weeks     Pt will report improvements in her confidence in her balance with ADLs via improvements in ABC by =/> 30% 12 weeks     Pt will demonstrate improvements in functional LE strength via improvements in the 30 second STS to =/> 10 repetitions 12 weeks     Pt will demonstrate improved functional endurance via improvements in 6MWT distance by =/> 150ft 12 weeks     Progress Timed Up and Go by =/> 10 sec (or =/<13.5 sec) without decline in safety or quality for decreased falls risk 12 weeks     Pt will be mod I with updated HEP 12 weeks     Pt will demonstrate improvements in static balance via improvements in OH by =/> 45 points  12 weeks     Pt will demonstrate improvements in dynamic balance via improvements in performance on the FGA by =/> 4 points 12 weeks             Patient stated (pt-stated)         Patient reports she would like to improve her LLE strength and balance to improve her confidence and ability to ambulate community distances.     10/3: Pt reports her balance is better allowing her to walk some household distances without her cane. She is more conscious of her gait mechanics (heel strike) but finds some difficulty still w community ambulation. Ongoing            Shauna Way, PT

## 2023-10-11 RX ORDER — LEVOTHYROXINE SODIUM 25 UG/1
25 TABLET ORAL DAILY
Qty: 90 TABLET | Refills: 3 | Status: ON HOLD | OUTPATIENT
Start: 2023-10-11 | End: 2024-03-01 | Stop reason: ENTERED-IN-ERROR

## 2023-10-17 ENCOUNTER — DOCUMENTATION (OUTPATIENT)
Dept: PHYSICAL THERAPY | Facility: REHABILITATION | Age: 63
End: 2023-10-17
Payer: MEDICARE

## 2023-10-17 ENCOUNTER — TELEPHONE (OUTPATIENT)
Dept: PHYSICAL THERAPY | Facility: REHABILITATION | Age: 63
End: 2023-10-17
Payer: MEDICARE

## 2023-10-17 NOTE — PROGRESS NOTES
Physical Therapy Discharge      PT DISCHARGE NOTE FOR OUTPATIENT THERAPY    Patient: Bebe Burks MRN: [id number]  : 1960 63 y.o.  Referring Physician: No ref. provider found  Date of Visit: 10/17/2023      Certification Dates: 23 through 23    Total Visit Count: 8    Chief Complaints:  Chief Complaint   Patient presents with    PT Discharge     No visit discharge       Precautions:         TODAY'S VISIT:    Time In Session:            PT - 10/17/23 1022        Physical Therapy    Physical Therapy Specialty Traditional Neuro PT        PT Plan    Frequency of treatment 2 times/week     PT Duration 3 months     PT Cert From 23     PT Cert To 23     Date PT POC was sent to provider 23     Signed PT Plan of Care received?  Yes                   OBJECTIVE MEASUREMENTS/DATA:    None taken    ROM and MMT        2023    12:00 2023    12:57 2023    12:03 2023    15:00 10/3/2023    12:00   PT LE ROM Measurements   AROM: Right Knee Flexion 125  122      AROM: Right Knee Extension 0  0      PROM: Right Ankle DF     5 degrees    AROM: Right Ankle DF     0 degrees    PROM: Left Ankle DF     0 degrees 6 degrees   AROM: Left Ankle DF     -10 degrees -4 degrees   PT LE MMT   Right Hip Flexion  (4-/5) good minus (4+/5) good plus (4/5) good     Left Hip Flexion  (4-/5) good minus (4/5) good (4+/5) good plus     Right Hip Extension  (4-/5) good minus (4/5) good      Left Hip Extension  (3+/5) fair plus (4-/5) good minus      Right Hip ABD  (4-/5) good minus (4-/5) good minus      Left Hip ABD  (4-/5) good minus (4-/5) good minus      Right Hip ADD  (4-/5) good minus       Left Hip ADD  (4-/5) good minus       Right Hip IR  (4-/5) good minus (4+/5) good plus (4+/5) good plus     Left Hip IR  (3+/5) fair plus  (3+/5) fair plus     Right Hip ER  (4-/5) good minus (4+/5) good plus (4+/5) good plus     Left Hip ER  (3+/5) fair plus  (4/5) good     Right Knee Flexion   (4-/5) good minus (4/5) good (5/5) normal     Left Knee Flexion  (4-/5) good minus (4/5) good (4+/5) good plus     Right Knee Extension  (4-/5) good minus (4+/5) good plus (5/5) normal     Left Knee Extension  (4-/5) good minus (4/5) good (4/5) good     Right Ankle DF  (4-/5) good minus (4+/5) good plus (4+/5) good plus     Left Ankle DF  (3-/5) fair minus  (3-/5) fair minus     Right Ankle PF  (4-/5) good minus (4+/5) good plus (4+/5) good plus     Left Ankle PF  (3-/5) fair minus  (3/5) fair     Right Ankle Inversion    (4/5) good     Left Ankle Inversion    (2+/5) poor plus     Right Ankle Eversion    (4/5) good     Left Ankle Eversion    (2/5) poor       Outcome Measures        5/22/2023    09:53 6/23/2023    09:11 8/16/2023    12:03 8/29/2023    08:09 8/31/2023    10:04 10/3/2023    21:17   PT OBJECTIVE Outcome Measures   6 Minute Walk Test Not assessed 5/22/23- (previously 811 ft w/ SPC, CS and gait belt, LLE brace donned) 849 feet with SPC TBA w   TBA nv   2 Minute Walk Test TBD at next  feet with SPC       Five Times Sit to Stand  14.43 sec from high mat       30 Second Sit to Stand 10 repetitions       from standard red/purple chair w/ arms, use of BUE due to pt unable to complete without 9 repetitions       from high mat, no arms 7 repetitions       from EOM no UE use   9 repetitions       from EOM no UE use   Person Balance Score 39 37 36   42   FGA    12       1, 2, 2, 1, 2, 1, 0, 1, 1, 1  15   TUG (Mean)    13.4  13.1   TUG - Results   TBA w SPC from red chair w SPC, 19.3 sec from red chair w/o AD and Cl S (most challenge turning)  from red chair w SPC, 16.4 sec from red chair w/o AD and Cl S   PT SUBJECTIVE Outcome Measures   ABC   To be returned nv  340/1600: 21% confidence 630/1600: 40%   LEFS 35/80= 43% (previously 43/80=53%) 45/80 To be returned nv  28/80: 35% function 36/80: 45% function   PSFS % Score 53.33 % 63.33 % 30 %          Today's Treatment:    Education  provided:  None/NA         Goals Addressed                    This Visit's Progress      COMPLETED: Mutually agreed upon pain goal         Mutually agreed upon pain goal: Pain is not an issue for the pt currently.         OP PT - L foot drop Goals         Short term goals   Short Term Goals Time Frame Result Comment/Progress   Pt will complete 6MWT, FGA, TUG 6 weeks met See 8/29 note   Pt will be mod I for FF with rail  6 weeks * Pt reports uneasiness performing without supervision from PT   Pt will report improvements in her confidence in her balance with ADLs via improvements in ABC by =/> 20% 6 weeks met    Tolerate 10  min of CV activity with VSS 6 weeks met    Progress Timed Up and Go by =/> 5 sec sec without decline in safety or quality 6 weeks *    Pt will be mod I with HEP 6 weeks met    Pt will demonstrate improvements in static balance via improvements in OH by =/> 5 points  6 weeks met    Pts current AFO will be assessed and adjusted to allow for comfort with daily use 6 weeks * Pt only wearing AFO in her home, trial of elevate 360 with pt report of more comfort/stability     Short term goals   Short Term Goals Time Frame Result Comment/Progress   Pt will demonstrate improved ankle DF ROM to =/> 10 degrees to allow for normalized gait mechanics and stair use 12 weeks *    Pt will report improvements in her confidence in her balance with ADLs via improvements in ABC by =/> 30% 12 weeks *    Pt will demonstrate improvements in functional LE strength via improvements in the 30 second STS to =/> 10 repetitions 12 weeks *    Pt will demonstrate improved functional endurance via improvements in 6MWT distance by =/> 150ft 12 weeks *    Progress Timed Up and Go by =/> 10 sec (or =/<13.5 sec) without decline in safety or quality for decreased falls risk 12 weeks *    Pt will be mod I with updated HEP 12 weeks *    Pt will demonstrate improvements in static balance via improvements in OH by =/> 45 points  12  weeks *    Pt will demonstrate improvements in dynamic balance via improvements in performance on the FGA by =/> 4 points 12 weeks *      *Unable to assess goals at this time - no visit discharge due to difficulty with consistent attendance attributed to unrelated medical procedures.         Patient stated (pt-stated)         Patient reports she would like to improve her LLE strength and balance to improve her confidence and ability to ambulate community distances.     10/3: Pt reports her balance is better allowing her to walk some household distances without her cane. She is more conscious of her gait mechanics (heel strike) but finds some difficulty still w community ambulation. Ongoing    Discharge: unable to assess officially - no visit discharge            Discharge information for [facility]:    Reason for D/C:  (Lack of consistent attendance, discharge until pt able to return consistently)  Was care interrupted for medical reason?: Yes  Care was interrupted due to hospitalization?: No  Did the patient demonstrate increased independence: Yes  Demonstration of independece:: [place] in HEP  Has the patient returned to productive activity?: Yes  Increased production assessment:: Increased community independence  Skin integrity: Unable to assess

## 2023-10-26 DIAGNOSIS — E11.8 TYPE 2 DIABETES MELLITUS WITH UNSPECIFIED COMPLICATIONS: ICD-10-CM

## 2023-10-26 DIAGNOSIS — I10 ESSENTIAL HYPERTENSION: ICD-10-CM

## 2023-10-30 RX ORDER — LOSARTAN POTASSIUM 50 MG/1
50 TABLET ORAL DAILY
Qty: 90 TABLET | Refills: 1 | Status: ON HOLD | OUTPATIENT
Start: 2023-10-30 | End: 2024-03-01 | Stop reason: ENTERED-IN-ERROR

## 2023-10-30 RX ORDER — AMLODIPINE BESYLATE 5 MG/1
TABLET ORAL
Qty: 90 TABLET | Refills: 1 | Status: ON HOLD | OUTPATIENT
Start: 2023-10-30 | End: 2024-03-01 | Stop reason: ENTERED-IN-ERROR

## 2023-10-30 RX ORDER — INSULIN ASPART 100 [IU]/ML
INJECTION, SUSPENSION SUBCUTANEOUS
Qty: 45 ML | Refills: 1 | Status: SHIPPED | OUTPATIENT
Start: 2023-10-30 | End: 2024-02-20

## 2023-11-28 RX ORDER — ATORVASTATIN CALCIUM 10 MG/1
TABLET, FILM COATED ORAL
Qty: 90 TABLET | Refills: 1 | Status: SHIPPED | OUTPATIENT
Start: 2023-11-28 | End: 2024-01-21 | Stop reason: HOSPADM

## 2023-12-25 DIAGNOSIS — E11.8 CONTROLLED TYPE 2 DIABETES MELLITUS WITH COMPLICATION, WITH LONG-TERM CURRENT USE OF INSULIN (CMS/HCC): ICD-10-CM

## 2023-12-25 DIAGNOSIS — Z79.4 CONTROLLED TYPE 2 DIABETES MELLITUS WITH COMPLICATION, WITH LONG-TERM CURRENT USE OF INSULIN (CMS/HCC): ICD-10-CM

## 2023-12-28 DIAGNOSIS — I10 ESSENTIAL HYPERTENSION: ICD-10-CM

## 2023-12-28 RX ORDER — ORAL SEMAGLUTIDE 14 MG/1
14 TABLET ORAL DAILY
Qty: 90 TABLET | Refills: 1 | Status: ON HOLD | OUTPATIENT
Start: 2023-12-28 | End: 2024-03-01 | Stop reason: ENTERED-IN-ERROR

## 2023-12-29 RX ORDER — HYDROCHLOROTHIAZIDE 12.5 MG/1
TABLET ORAL
Qty: 90 TABLET | Refills: 1 | Status: ON HOLD | OUTPATIENT
Start: 2023-12-29 | End: 2024-03-01 | Stop reason: ENTERED-IN-ERROR

## 2024-01-15 ENCOUNTER — HOSPITAL ENCOUNTER (INPATIENT)
Facility: HOSPITAL | Age: 64
LOS: 6 days | Discharge: HOME HEALTH | DRG: 854 | End: 2024-01-21
Attending: EMERGENCY MEDICINE | Admitting: STUDENT IN AN ORGANIZED HEALTH CARE EDUCATION/TRAINING PROGRAM
Payer: MEDICARE

## 2024-01-15 ENCOUNTER — APPOINTMENT (EMERGENCY)
Dept: RADIOLOGY | Facility: HOSPITAL | Age: 64
DRG: 854 | End: 2024-01-15
Payer: MEDICARE

## 2024-01-15 DIAGNOSIS — M86.8X7 OTHER OSTEOMYELITIS OF LEFT FOOT: ICD-10-CM

## 2024-01-15 DIAGNOSIS — M86.9 OSTEOMYELITIS OF SECOND TOE OF LEFT FOOT (CMS/HCC): Primary | ICD-10-CM

## 2024-01-15 DIAGNOSIS — R11.2 NAUSEA AND VOMITING, UNSPECIFIED VOMITING TYPE: ICD-10-CM

## 2024-01-15 PROBLEM — R10.9 ABDOMINAL PAIN: Status: ACTIVE | Noted: 2024-01-15

## 2024-01-15 PROBLEM — A41.9 SEPSIS (CMS/HCC): Status: ACTIVE | Noted: 2024-01-15

## 2024-01-15 PROBLEM — G62.9 PERIPHERAL NEUROPATHY: Status: ACTIVE | Noted: 2024-01-15

## 2024-01-15 LAB
ALBUMIN SERPL-MCNC: 4.2 G/DL (ref 3.5–5.7)
ALP SERPL-CCNC: 70 IU/L (ref 34–125)
ALT SERPL-CCNC: 14 IU/L (ref 7–52)
ANION GAP SERPL CALC-SCNC: 15 MEQ/L (ref 3–15)
AST SERPL-CCNC: 18 IU/L (ref 13–39)
BASOPHILS # BLD: 0.57 K/UL (ref 0.01–0.1)
BASOPHILS NFR BLD: 1 %
BILIRUB SERPL-MCNC: 1.5 MG/DL (ref 0.3–1.2)
BUN SERPL-MCNC: 29 MG/DL (ref 7–25)
BURR CELLS BLD QL SMEAR: ABNORMAL
CALCIUM SERPL-MCNC: 10.7 MG/DL (ref 8.6–10.3)
CHLORIDE SERPL-SCNC: 97 MEQ/L (ref 98–107)
CO2 SERPL-SCNC: 24 MEQ/L (ref 21–31)
CREAT SERPL-MCNC: 0.8 MG/DL (ref 0.6–1.2)
CRP SERPL-MCNC: 158 MG/L
DIFFERENTIAL METHOD BLD: ABNORMAL
EGFRCR SERPLBLD CKD-EPI 2021: >60 ML/MIN/1.73M*2
EOSINOPHIL # BLD: 1.14 K/UL (ref 0.04–0.36)
EOSINOPHIL NFR BLD: 2 %
ERYTHROCYTE [DISTWIDTH] IN BLOOD BY AUTOMATED COUNT: 13.6 % (ref 11.7–14.4)
ERYTHROCYTE [SEDIMENTATION RATE] IN BLOOD BY WESTERGREN METHOD: 65 MM/HR
FLUAV RNA SPEC QL NAA+PROBE: NEGATIVE
FLUBV RNA SPEC QL NAA+PROBE: NEGATIVE
GIANT PLATELETS BLD QL SMEAR: ABNORMAL
GLUCOSE BLD-MCNC: 213 MG/DL (ref 70–99)
GLUCOSE SERPL-MCNC: 269 MG/DL (ref 70–99)
HCT VFR BLDCO AUTO: 45.2 % (ref 35–45)
HGB BLD-MCNC: 14.3 G/DL (ref 11.8–15.7)
LACTATE SERPL-SCNC: 1.7 MMOL/L (ref 0.4–2)
LIPASE SERPL-CCNC: 4 U/L (ref 11–82)
LYMPHOCYTES # BLD: 5.69 K/UL (ref 1.2–3.5)
LYMPHOCYTES NFR BLD: 10 %
MCH RBC QN AUTO: 28.7 PG (ref 28–33.2)
MCHC RBC AUTO-ENTMCNC: 31.6 G/DL (ref 32.2–35.5)
MCV RBC AUTO: 90.6 FL (ref 83–98)
MONOCYTES # BLD: 3.42 K/UL (ref 0.28–0.8)
MONOCYTES NFR BLD: 6 %
NEUTS BAND # BLD: 46.11 K/UL (ref 1.7–7)
NEUTS SEG NFR BLD: 81 %
PDW BLD AUTO: 11.1 FL (ref 9.4–12.3)
PLATELET # BLD AUTO: 499 K/UL (ref 150–369)
PLATELET # BLD EST: ABNORMAL 10*3/UL
PLATELET CLUMP BLD QL SMEAR: PRESENT
POCT TEST: ABNORMAL
POTASSIUM SERPL-SCNC: 4.4 MEQ/L (ref 3.5–5.1)
PROT SERPL-MCNC: 7.6 G/DL (ref 6–8.2)
RBC # BLD AUTO: 4.99 M/UL (ref 3.93–5.22)
RSV RNA SPEC QL NAA+PROBE: NEGATIVE
SARS-COV-2 RNA RESP QL NAA+PROBE: NEGATIVE
SODIUM SERPL-SCNC: 136 MEQ/L (ref 136–145)
WBC # BLD AUTO: 56.92 K/UL (ref 3.8–10.5)

## 2024-01-15 PROCEDURE — 63600000 HC DRUGS/DETAIL CODE: Mod: JZ | Performed by: PHYSICAL THERAPIST

## 2024-01-15 PROCEDURE — 96376 TX/PRO/DX INJ SAME DRUG ADON: CPT

## 2024-01-15 PROCEDURE — 87040 BLOOD CULTURE FOR BACTERIA: CPT | Mod: 91

## 2024-01-15 PROCEDURE — 96361 HYDRATE IV INFUSION ADD-ON: CPT

## 2024-01-15 PROCEDURE — 87637 SARSCOV2&INF A&B&RSV AMP PRB: CPT | Performed by: EMERGENCY MEDICINE

## 2024-01-15 PROCEDURE — 25000000 HC PHARMACY GENERAL: Performed by: EMERGENCY MEDICINE

## 2024-01-15 PROCEDURE — 80053 COMPREHEN METABOLIC PANEL: CPT | Performed by: EMERGENCY MEDICINE

## 2024-01-15 PROCEDURE — 83605 ASSAY OF LACTIC ACID: CPT | Performed by: EMERGENCY MEDICINE

## 2024-01-15 PROCEDURE — 85652 RBC SED RATE AUTOMATED: CPT

## 2024-01-15 PROCEDURE — 85025 COMPLETE CBC W/AUTO DIFF WBC: CPT | Performed by: EMERGENCY MEDICINE

## 2024-01-15 PROCEDURE — 99285 EMERGENCY DEPT VISIT HI MDM: CPT | Mod: 25

## 2024-01-15 PROCEDURE — 63600000 HC DRUGS/DETAIL CODE: Performed by: EMERGENCY MEDICINE

## 2024-01-15 PROCEDURE — 25800000 HC PHARMACY IV SOLUTIONS: Performed by: PHYSICAL THERAPIST

## 2024-01-15 PROCEDURE — 63700000 HC SELF-ADMINISTRABLE DRUG: Performed by: PHYSICAL THERAPIST

## 2024-01-15 PROCEDURE — 83036 HEMOGLOBIN GLYCOSYLATED A1C: CPT | Performed by: STUDENT IN AN ORGANIZED HEALTH CARE EDUCATION/TRAINING PROGRAM

## 2024-01-15 PROCEDURE — 86140 C-REACTIVE PROTEIN: CPT

## 2024-01-15 PROCEDURE — 25800000 HC PHARMACY IV SOLUTIONS

## 2024-01-15 PROCEDURE — 83690 ASSAY OF LIPASE: CPT

## 2024-01-15 PROCEDURE — 99223 1ST HOSP IP/OBS HIGH 75: CPT | Performed by: STUDENT IN AN ORGANIZED HEALTH CARE EDUCATION/TRAINING PROGRAM

## 2024-01-15 PROCEDURE — 36415 COLL VENOUS BLD VENIPUNCTURE: CPT | Performed by: EMERGENCY MEDICINE

## 2024-01-15 PROCEDURE — 25800000 HC PHARMACY IV SOLUTIONS: Performed by: EMERGENCY MEDICINE

## 2024-01-15 PROCEDURE — 1123F ACP DISCUSS/DSCN MKR DOCD: CPT | Performed by: STUDENT IN AN ORGANIZED HEALTH CARE EDUCATION/TRAINING PROGRAM

## 2024-01-15 PROCEDURE — 63600000 HC DRUGS/DETAIL CODE

## 2024-01-15 PROCEDURE — 63600000 HC DRUGS/DETAIL CODE: Mod: JZ

## 2024-01-15 PROCEDURE — 21400000 HC ROOM AND CARE CCU/INTERMEDIATE

## 2024-01-15 PROCEDURE — 93005 ELECTROCARDIOGRAM TRACING: CPT

## 2024-01-15 PROCEDURE — 63700000 HC SELF-ADMINISTRABLE DRUG

## 2024-01-15 PROCEDURE — 96374 THER/PROPH/DIAG INJ IV PUSH: CPT

## 2024-01-15 PROCEDURE — 73660 X-RAY EXAM OF TOE(S): CPT | Mod: LT

## 2024-01-15 RX ORDER — INSULIN LISPRO 100 [IU]/ML
3-5 INJECTION, SOLUTION INTRAVENOUS; SUBCUTANEOUS
Status: DISCONTINUED | OUTPATIENT
Start: 2024-01-15 | End: 2024-01-21 | Stop reason: HOSPADM

## 2024-01-15 RX ORDER — AMLODIPINE BESYLATE 5 MG/1
5 TABLET ORAL DAILY
Status: DISCONTINUED | OUTPATIENT
Start: 2024-01-16 | End: 2024-01-21 | Stop reason: HOSPADM

## 2024-01-15 RX ORDER — IBUPROFEN 200 MG
16-32 TABLET ORAL AS NEEDED
Status: DISCONTINUED | OUTPATIENT
Start: 2024-01-15 | End: 2024-01-21 | Stop reason: HOSPADM

## 2024-01-15 RX ORDER — ATORVASTATIN CALCIUM 20 MG/1
10 TABLET, FILM COATED ORAL DAILY
Status: DISCONTINUED | OUTPATIENT
Start: 2024-01-15 | End: 2024-01-18

## 2024-01-15 RX ORDER — HYDROCHLOROTHIAZIDE 12.5 MG/1
12.5 TABLET ORAL DAILY
Status: DISCONTINUED | OUTPATIENT
Start: 2024-01-16 | End: 2024-01-21 | Stop reason: HOSPADM

## 2024-01-15 RX ORDER — LEVOTHYROXINE SODIUM 25 UG/1
25 TABLET ORAL DAILY
Status: DISCONTINUED | OUTPATIENT
Start: 2024-01-16 | End: 2024-01-21 | Stop reason: HOSPADM

## 2024-01-15 RX ORDER — VANCOMYCIN HYDROCHLORIDE
1250 EVERY 12 HOURS
Status: DISCONTINUED | OUTPATIENT
Start: 2024-01-16 | End: 2024-01-18

## 2024-01-15 RX ORDER — LOSARTAN POTASSIUM 25 MG/1
50 TABLET ORAL DAILY
Status: DISCONTINUED | OUTPATIENT
Start: 2024-01-15 | End: 2024-01-21 | Stop reason: HOSPADM

## 2024-01-15 RX ORDER — DEXTROSE 50 % IN WATER (D50W) INTRAVENOUS SYRINGE
25 AS NEEDED
Status: DISCONTINUED | OUTPATIENT
Start: 2024-01-15 | End: 2024-01-21 | Stop reason: HOSPADM

## 2024-01-15 RX ORDER — ACETAMINOPHEN 325 MG/1
650 TABLET ORAL EVERY 6 HOURS PRN
Status: DISCONTINUED | OUTPATIENT
Start: 2024-01-15 | End: 2024-01-21 | Stop reason: HOSPADM

## 2024-01-15 RX ORDER — INSULIN GLARGINE 100 [IU]/ML
10 INJECTION, SOLUTION SUBCUTANEOUS NIGHTLY
Status: DISCONTINUED | OUTPATIENT
Start: 2024-01-15 | End: 2024-01-16

## 2024-01-15 RX ORDER — ONDANSETRON HYDROCHLORIDE 2 MG/ML
4 INJECTION, SOLUTION INTRAVENOUS ONCE
Status: COMPLETED | OUTPATIENT
Start: 2024-01-15 | End: 2024-01-15

## 2024-01-15 RX ORDER — DEXTROSE 40 %
15-30 GEL (GRAM) ORAL AS NEEDED
Status: DISCONTINUED | OUTPATIENT
Start: 2024-01-15 | End: 2024-01-21 | Stop reason: HOSPADM

## 2024-01-15 RX ORDER — VANCOMYCIN 1.75 GRAM/500 ML IN 0.9 % SODIUM CHLORIDE INTRAVENOUS
20 ONCE
Status: COMPLETED | OUTPATIENT
Start: 2024-01-15 | End: 2024-01-15

## 2024-01-15 RX ORDER — ONDANSETRON HYDROCHLORIDE 2 MG/ML
4 INJECTION, SOLUTION INTRAVENOUS EVERY 6 HOURS PRN
Status: DISCONTINUED | OUTPATIENT
Start: 2024-01-15 | End: 2024-01-21 | Stop reason: HOSPADM

## 2024-01-15 RX ORDER — POLYETHYLENE GLYCOL 3350 17 G/17G
17 POWDER, FOR SOLUTION ORAL DAILY
Status: DISCONTINUED | OUTPATIENT
Start: 2024-01-15 | End: 2024-01-21

## 2024-01-15 RX ORDER — SODIUM CHLORIDE 9 MG/ML
INJECTION, SOLUTION INTRAVENOUS CONTINUOUS
Status: ACTIVE | OUTPATIENT
Start: 2024-01-15 | End: 2024-01-16

## 2024-01-15 RX ORDER — GABAPENTIN 300 MG/1
300 CAPSULE ORAL 3 TIMES DAILY
Status: DISCONTINUED | OUTPATIENT
Start: 2024-01-15 | End: 2024-01-21 | Stop reason: HOSPADM

## 2024-01-15 RX ADMIN — ONDANSETRON 4 MG: 2 INJECTION INTRAMUSCULAR; INTRAVENOUS at 14:27

## 2024-01-15 RX ADMIN — ONDANSETRON 4 MG: 2 INJECTION INTRAMUSCULAR; INTRAVENOUS at 15:04

## 2024-01-15 RX ADMIN — SODIUM CHLORIDE: 9 INJECTION, SOLUTION INTRAVENOUS at 18:34

## 2024-01-15 RX ADMIN — ONDANSETRON 4 MG: 2 INJECTION INTRAMUSCULAR; INTRAVENOUS at 22:34

## 2024-01-15 RX ADMIN — INSULIN LISPRO 2 UNITS: 100 INJECTION, SOLUTION INTRAVENOUS; SUBCUTANEOUS at 22:54

## 2024-01-15 RX ADMIN — GABAPENTIN 300 MG: 300 CAPSULE ORAL at 22:35

## 2024-01-15 RX ADMIN — LOSARTAN POTASSIUM 50 MG: 25 TABLET, FILM COATED ORAL at 22:29

## 2024-01-15 RX ADMIN — VANCOMYCIN HYDROCHLORIDE 1750 MG: 500 INJECTION, POWDER, LYOPHILIZED, FOR SOLUTION INTRAVENOUS at 15:07

## 2024-01-15 RX ADMIN — POLYETHYLENE GLYCOL 3350 17 G: 17 POWDER, FOR SOLUTION ORAL at 18:37

## 2024-01-15 RX ADMIN — ACETAMINOPHEN 650 MG: 325 TABLET ORAL at 22:33

## 2024-01-15 RX ADMIN — SODIUM CHLORIDE 1000 ML: 9 INJECTION, SOLUTION INTRAVENOUS at 14:27

## 2024-01-15 RX ADMIN — ATORVASTATIN CALCIUM 10 MG: 20 TABLET, FILM COATED ORAL at 18:36

## 2024-01-15 RX ADMIN — INSULIN GLARGINE 5 UNITS: 100 INJECTION, SOLUTION SUBCUTANEOUS at 22:54

## 2024-01-15 RX ADMIN — CEFTRIAXONE SODIUM 2 G: 2 INJECTION, POWDER, FOR SOLUTION INTRAMUSCULAR; INTRAVENOUS at 18:36

## 2024-01-15 ASSESSMENT — ENCOUNTER SYMPTOMS
RHINORRHEA: 0
APPETITE CHANGE: 0
FREQUENCY: 0
SHORTNESS OF BREATH: 0
FEVER: 0
ARTHRALGIAS: 0
WEAKNESS: 0
ABDOMINAL PAIN: 0
HEMATURIA: 0
DIARRHEA: 0
SORE THROAT: 0
LIGHT-HEADEDNESS: 0
BACK PAIN: 0
BLOOD IN STOOL: 0
CHILLS: 0
DYSURIA: 0
COUGH: 0
MYALGIAS: 0
VOMITING: 1
WOUND: 1
NAUSEA: 1

## 2024-01-15 NOTE — H&P
"   Hospital Medicine Service -  History & Physical        CHIEF COMPLAINT   (L) foot 2nd toe pain      HISTORY OF PRESENT ILLNESS      Bebe Burks is a 63 y.o. female with a past medical history of type 2 diabetes on insulin, peripheral neuropathy, hypertension who presents with initial c/o persistent vomiting for approximately 24 hours (1/14- present, and was found to significant ulceration and necrosis of the (L) foot 2nd metatarsal.  Patient reports injuring left second toe after dropping an object on it approximately 2 weeks ago after having a callus removed.  Patient followed up with her podiatrist Dr. Bassett, who started patient on 2-week course of doxycycline.  Currently patient reports 6-7/10 pain in (L) foot, described as \"dull, pressure\".     Pt admit to chills, fatigue, abdominal pain, vomiting   Pt denies dizziness, SOB, Chest pain, diarrhea,     Patient presented to the ED, vital signs noted for heart rate 100 to 114 bpm;   BP:175/78 mmHg -> 137/68 mmHg -   LABS: WBC: 56 ; Plt; 499; ANC 46;  Glucose: 269  CRP: 158    Started on IV Vancomycin; IV fluid bolus 1000 mL and Zofran 4mg IV x 2     PAST MEDICAL AND SURGICAL HISTORY      Past Medical History:   Diagnosis Date   • Abnormal finding on chest xray 12/2021   • Arthritis    • COVID-19 12/2021   • COVID-19 vaccine series completed     Moderna: \" 3 doses\"   • Diabetic neuropathy (CMS/HCC)    • DM (diabetes mellitus), type 2 with ophthalmic complications (CMS/[facility])    • Hypertension    • Hypothyroidism    • Left foot drop    • Lipid disorder    • Type 2 diabetes mellitus treated with insulin (CMS/[facility])        Past Surgical History:   Procedure Laterality Date   • CATARACT EXTRACTION Right    • COLONOSCOPY     • COMBINED HYSTEROSCOPY DIAGNOSTIC / D&C  2007   • EYE SURGERY Left 2017    cataract   • VITRECTOMY Left 2016       PCP: Paula Walker MD    MEDICATIONS      Prior to Admission medications    Medication Sig Start Date End Date Taking? " "Authorizing Provider   amLODIPine (NORVASC) 5 mg tablet TAKE 1 TABLET BY MOUTH EVERY DAY 10/30/23  Yes Wanda Gipson CRNP   atorvastatin (LIPITOR) 10 mg tablet TAKE 1 TABLET BY MOUTH EVERY DAY 11/28/23  Yes Wanda Gipson CRNP   DEXCOM G7 SENSOR device device 1 each continuously. Change every 10 days 3/28/23  Yes Wanda Gipson CRNP   empagliflozin (JARDIANCE) 10 mg tablet Take 1 tablet (10 mg total) by mouth daily. TAKE 1 TABLET BY MOUTH EVERY DAY 3/28/23  Yes Wanda Gipson CRNP   FREESTYLE LAURA 14 DAY READER misc 1 each continuously. 1 reader dx code E 11.9 4/1/19  Yes Wanda Gipson CRNP   FREESTYLE LAURA 14 DAY SENSOR kit 1 each continuously. 1 sensor every 14 days dx code E 11.9 4/1/19  Yes Wanda Gipson CRNP   gabapentin (NEURONTIN) 300 mg capsule TAKE ONE CAPSULE BY MOUTH EVERY 8 HOURS WHILE AWAKE & TAKE ONE CAPSULE AT BEDTIME 3/29/23  Yes Wanda Gipson CRNP   hydrochlorothiazide 12.5 mg tablet TAKE 1 TABLET BY MOUTH EVERY DAY 12/29/23  Yes Wanda Gipson CRNP   insulin asp prt-insulin aspart (NovoLOG Mix 70-30FlexPen U-100) 100 unit/mL (70-30) pen INJECT 25 UNITS under the skin  IN AM AND 30 UNITS IN PM 10/30/23  Yes Wanda Gipson CRNP   levothyroxine (SYNTHROID) 25 mcg tablet TAKE 1 TABLET BY MOUTH EVERY DAY 10/11/23  Yes Wanda Gipson CRNP   losartan (COZAAR) 50 mg tablet TAKE 1 TABLET BY MOUTH EVERY DAY 10/30/23  Yes Wanda Gipson CRNP   ONETOUCH DELICA LANCETS 33 gauge misc 1 each 3 (three) times a day. 11/6/20  Yes Wanda Gipson CRNP   pen needle, diabetic (BD KRISTIN 2ND GEN PEN NEEDLE) 32 gauge x 5/32\" needle USE TWICE A DAY 8/22/23  Yes Wanda Gipson CRNP   RYBELSUS 14 mg tablet TAKE 1 TABLET (14 MG TOTAL) BY MOUTH DAILY 12/28/23  Yes Wanda Gipson CRNP   aspirin 81 mg enteric coated tablet Take 1 tablet (81 mg total) by mouth daily. HOLD for 4 weeks 4/5/23 5/5/23  Miranda Williamson, RADHA VALENZUELA   aspirin 81 mg enteric coated tablet Take 1 tablet (81 mg total) by mouth 2 (two) times a " day. Take Asprin for 4 weeks. Please obtain over the counter. 3/1/23 3/31/23  Miranda Williamson PA C   polyethylene glycol (MIRALAX) 17 gram packet Take 17 g by mouth daily as needed (constipation). Please obtain over the counter 3/1/23 3/31/23  Miranda Williamson PA C       ALLERGIES      Citrus and derivatives    FAMILY HISTORY      Family History   Problem Relation Age of Onset   • Diabetes Biological Mother    • Hypertension Biological Mother    • Stroke Biological Father        SOCIAL HISTORY      Social History     Socioeconomic History   • Marital status: Single     Spouse name: None   • Number of children: 0   • Years of education: None   • Highest education level: None   Occupational History   • Occupation: Disabilty   Tobacco Use   • Smoking status: Never     Passive exposure: Past   • Smokeless tobacco: Never   Vaping Use   • Vaping Use: Never used   Substance and Sexual Activity   • Alcohol use: Yes     Comment: occasionally   • Drug use: Never   • Sexual activity: Defer   Social History Narrative    Lives alone in a condo     Social Determinants of Health     Financial Resource Strain: Low Risk  (2/27/2023)    Overall Financial Resource Strain (CARDIA)    • Difficulty of Paying Living Expenses: Not hard at all   Food Insecurity: No Food Insecurity (1/15/2024)    Hunger Vital Sign    • Worried About Running Out of Food in the Last Year: Never true    • Ran Out of Food in the Last Year: Never true   Transportation Needs: No Transportation Needs (2/27/2023)    PRAPARE - Transportation    • Lack of Transportation (Medical): No    • Lack of Transportation (Non-Medical): No   Housing Stability: Unknown (2/27/2023)    Housing Stability Vital Sign    • Unable to Pay for Housing in the Last Year: No    • Unstable Housing in the Last Year: No       REVIEW OF SYSTEMS      All other systems reviewed and negative except as noted in HPI    PHYSICAL EXAMINATION      Temp:  [36.2 °C (97.2 °F)-37.3 °C (99.2 °F)] 37.3 °C  (99.2 °F)  Heart Rate:  [] 95  Resp:  [17-20] 18  BP: (137-175)/(63-78) 144/66  Body mass index is 28.98 kg/m².    Physical Exam  Vitals and nursing note reviewed.   Constitutional:       General: She is not in acute distress.     Appearance: She is ill-appearing.   HENT:      Head: Normocephalic.      Right Ear: External ear normal.      Left Ear: External ear normal.   Eyes:      General: No scleral icterus.        Right eye: No discharge.         Left eye: No discharge.      Extraocular Movements: Extraocular movements intact.   Cardiovascular:      Rate and Rhythm: Regular rhythm. Tachycardia present.      Pulses:           Dorsalis pedis pulses are 1+ on the left side.        Posterior tibial pulses are 2+ on the right side and 2+ on the left side.      Heart sounds: Normal heart sounds. No murmur heard.     No gallop.   Pulmonary:      Effort: Pulmonary effort is normal. No respiratory distress.      Breath sounds: Normal breath sounds. No wheezing or rales.   Abdominal:      General: There is no distension.      Palpations: Abdomen is soft. There is no mass.      Tenderness: There is abdominal tenderness (all 4 quadrants). There is no right CVA tenderness, left CVA tenderness, guarding or rebound.      Hernia: No hernia is present.   Musculoskeletal:         General: Normal range of motion.      Right lower leg: No edema.      Left lower leg: No edema.      Right foot: No foot drop.      Left foot: Deformity present. No foot drop.        Feet:    Feet:      Right foot:      Skin integrity: Warmth present. No skin breakdown.      Toenail Condition: Right toenails are normal.      Left foot:      Skin integrity: Ulcer, skin breakdown and warmth present.      Toenail Condition: Fungal disease present.  Skin:     General: Skin is warm and dry.      Capillary Refill: Capillary refill takes less than 2 seconds.      Coloration: Skin is not jaundiced.      Findings: Lesion present.   Neurological:      Mental  "Status: She is alert and oriented to person, place, and time.      Sensory: Sensory deficit (light touch, bilateral plantar surface of feet) present.   Psychiatric:         Mood and Affect: Mood normal.         LABS / IMAGING / EKG        Labs    Results from last 7 days   Lab Units 01/15/24  1345   WBC K/uL 56.92*   HEMOGLOBIN g/dL 14.3   HEMATOCRIT % 45.2*   PLATELETS K/uL 499*     basic metabolic panel  Results from last 7 days   Lab Units 01/15/24  1345   SODIUM mEQ/L 136   POTASSIUM mEQ/L 4.4   CHLORIDE mEQ/L 97*   CO2 mEQ/L 24   BUN mg/dL 29*   CREATININE mg/dL 0.8   GLUCOSE mg/dL 269*   CALCIUM mg/dL 10.7*       Imaging  X-RAY TOE LEFT 2+ VIEWS    Result Date: 1/15/2024  IMPRESSION: Ulceration of the distal aspect of the left second digit with findings of osteomyelitis involving the distal phalanx and possibly the mid phalanx as well.      ECG/Telemetry  I have independently reviewed the ECG. No significant findings.    ASSESSMENT AND PLAN           Sepsis (CMS/[facility])  Assessment & Plan  Sepsis 2/2 Osteomyelitis of the (L) 2nd toe  ED: afebrile , HR: 100-114 bpm,   Labs: Leukocytosis WBC 56; CRP: 158; ESR: 65  Xray (L) Foot: Ulcerations of (L) 2nd digit consistent with OM.  Started IV Vancomycin,  IVF 1L, dietary consulted      Start empiric ceftriaxone 2 g and continue IV vancomycin (1/15-  NPO at Midnight,    IVF NS 1L bolus  F/U Blood cultures  Follow-up podiatry recs  ID consulted    * Osteomyelitis of second toe of left foot (CMS/[facility])  Assessment & Plan  See \"sepsis \"problem    Peripheral neuropathy  Assessment & Plan  Hx of Peripheral neuropathy  PTA meds Gabapentin 300mg, 3 times days    Plan  Continue PTA Gabapentin 300mg, 3 times daily    Controlled type 2 diabetes mellitus, with long-term current use of insulin (CMS/[facility])  Assessment & Plan  Home meds: Jardiance, insulin, rybelsus   Last A1c 8.8  3/2023  OA: Bld glucose 269    Plan  Start Lantus 10 + SSI  Hold Home Meds Jardiance, Rybelsus, home " insulin regiment      Essential hypertension  Assessment & Plan  Home Meds: Amlodipine, Hctz, and Losartan (last taken 1/14/2024)  OA: BP: 175/78 mmHg -> 137/68 mmHg    Plan  Cont PTA Losartan and amlodipine   Will start PTA Hctz on 1/16/204    Abdominal pain  Assessment & Plan  C/o persistent vomiting for 24 hrs  Recent hx doxycycline course for 2 weeks  PE: Tenderness in all 4 quadrants,  Negative rebound tenderness  Received Zofran 4mg x 2 in the ED (2:30pm and 3:00pm)    Plan  Cont Zofran 4mg every 6 hrs prn   Start Miralax 17g daily     Hypothyroidism  Assessment & Plan  Hx of hypothyroid, currently taking synthroid     Plan  Continue PTA Synthroid        VTE Assessment: Padua VTE Score: 1  VTE Prophylaxis: SCDS  Code Status: Full Code      Discussed advanced care planning. Bebe has an ACP and Bebe's surrogate decision maker is Anna Paz   Estimated Discharge Date: 1/19/2024  Disposition Planning: HECTOR Thornton DO  1/15/2024

## 2024-01-15 NOTE — ASSESSMENT & PLAN NOTE
Osteomyelitis of the (L) 2nd toe.  Patient with improving leukocytosis since admission.  Afebrile.  On exam patient denies any pain and on day of discharge patient with dressing changed and transition to oral antibiotics.    Xray (L) Foot: Ulcerations of (L) 2nd digit consistent with OM.  Blood cultures negative 18 to 24 hours    MRI left foot showing gangrenous second toe with marked erosion in the second distal phalanx suggestive of osteomyelitis    S/p peripheral angiogram with atherectomy\PTA of left AT artery 1/17  S/p left foot I&D, left second toe amputation 1/18    - Start Keflex 500 mg twice daily for 10 days, ending on 1/21/2024  - Discontinue IV vancomycin  - Follow-up with primary care physician for repeat CBC  - Continue with wound care upon discharge  - Follow-up with podiatry upon discharge

## 2024-01-15 NOTE — ASSESSMENT & PLAN NOTE
Resolved.     Sepsis 2/2 Osteomyelitis of the (L) 2nd toe  Xray (L) Foot: Ulcerations of (L) 2nd digit consistent with OM.  Blood cultures negative 18 to 24 hours  MRI left foot showing gangrenous second toe with marked erosion in the second distal phalanx suggestive of osteomyelitis  S/p peripheral angiogram with atherectomy\PTA of left AT artery 1/17  S/p left foot I&D, left second toe amputation 1/18

## 2024-01-15 NOTE — ED PROVIDER NOTES
"Emergency Medicine Note  HPI   HISTORY OF PRESENT ILLNESS     63-year-old female with history of insulin-dependent diabetes, hypertension presents to the ED for evaluation of nausea and vomiting.  Patient states she has a wound to left second toe after dropping something on it 2 weeks ago.  Patient was evaluated by her podiatrist, Dr. Bassett, who started her on a course of doxycycline which she has been taking for the last 2 weeks or so.  Patient states she has had persistent nausea with vomiting over the last 24 hours which prompted her to call podiatry who subsequently referred her to the ED.  Denies fevers, black or bloody stools, abdominal pain, hematemesis, dysuria, hematuria, urinary frequency, upper respiratory symptoms, chest pain, shortness of breath.      History provided by:  Patient   used: No          Patient History   PAST HISTORY     Reviewed from Nursing Triage:       Past Medical History:   Diagnosis Date   • Abnormal finding on chest xray 12/2021   • Arthritis    • COVID-19 12/2021   • COVID-19 vaccine series completed     Moderna: \" 3 doses\"   • Diabetic neuropathy (CMS/HCC)    • DM (diabetes mellitus), type 2 with ophthalmic complications (CMS/HCC)    • Hypertension    • Hypothyroidism    • Left foot drop    • Lipid disorder    • Type 2 diabetes mellitus treated with insulin (CMS/HCC)        Past Surgical History:   Procedure Laterality Date   • CATARACT EXTRACTION Right    • COLONOSCOPY     • COMBINED HYSTEROSCOPY DIAGNOSTIC / D&C  2007   • EYE SURGERY Left 2017    cataract   • VITRECTOMY Left 2016       Family History   Problem Relation Age of Onset   • Diabetes Biological Mother    • Hypertension Biological Mother    • Stroke Biological Father        Social History     Tobacco Use   • Smoking status: Never     Passive exposure: Past   • Smokeless tobacco: Never   Vaping Use   • Vaping Use: Never used   Substance Use Topics   • Alcohol use: Yes     Comment: occasionally "   • Drug use: Never         Review of Systems   REVIEW OF SYSTEMS     Review of Systems   Constitutional: Negative for appetite change, chills and fever.   HENT: Negative for congestion, rhinorrhea and sore throat.    Respiratory: Negative for cough and shortness of breath.    Cardiovascular: Negative for chest pain.   Gastrointestinal: Positive for nausea and vomiting. Negative for abdominal pain, blood in stool and diarrhea.   Genitourinary: Negative for dysuria, frequency and hematuria.   Musculoskeletal: Negative for arthralgias, back pain and myalgias.   Skin: Positive for wound. Negative for rash.   Neurological: Negative for syncope, weakness and light-headedness.         VITALS     ED Vitals    Date/Time Temp Pulse Resp BP SpO2 [facility]   01/15/24 1840 -- 95 18 144/66 98 % MB   01/15/24 1743 37.3 °C (99.2 °F) 96 18 138/64 96 % MB   01/15/24 1642 -- 105 18 137/63 97 % MB   01/15/24 1510 -- 114 17 171/76 98 % MB   01/15/24 1337 36.2 °C (97.2 °F) 100 20 175/78 98 % JLG        Pulse Ox %: 98 % (01/15/24 1401)  Pulse Ox Interpretation: Normal (01/15/24 1401)           Physical Exam   PHYSICAL EXAM     Physical Exam  Vitals and nursing note reviewed.   Constitutional:       General: She is not in acute distress.     Appearance: Normal appearance. She is well-developed.   HENT:      Head: Normocephalic and atraumatic.      Right Ear: External ear normal.      Left Ear: External ear normal.   Eyes:      Conjunctiva/sclera: Conjunctivae normal.   Cardiovascular:      Rate and Rhythm: Normal rate and regular rhythm.      Pulses: Normal pulses.           Dorsalis pedis pulses are 2+ on the right side and 2+ on the left side.        Posterior tibial pulses are 2+ on the right side and 2+ on the left side.   Pulmonary:      Effort: Pulmonary effort is normal.      Breath sounds: Normal breath sounds.   Abdominal:      Palpations: Abdomen is soft.      Tenderness: There is no abdominal tenderness. There is no right CVA  tenderness, left CVA tenderness, guarding or rebound.   Musculoskeletal:         General: Normal range of motion.      Cervical back: Neck supple.      Right lower leg: No edema.      Left lower leg: No edema.   Skin:     General: Skin is warm and dry.      Capillary Refill: Capillary refill takes less than 2 seconds.      Findings: No rash.      Comments: Wound to left second toe with purulence, necrosis, foul-smelling drainage, erythema, warmth extending to the dorsal aspect of left foot   Neurological:      Mental Status: She is alert and oriented to person, place, and time. Mental status is at baseline.      Sensory: No sensory deficit.      Motor: No weakness.           PROCEDURES     Procedures     DATA     Results     Procedure Component Value Units Date/Time    [place] ([place] [facility] [[id number]]  (Abnormal) Collected: 01/15/24 1345    Specimen: Blood, Venous Updated: 01/15/24 1820     Sed Rate 65 mm/hr     Lactate, w/ reflex repeat if > 2.0 [[id number]]  (Normal) Collected: 01/15/24 1443    Specimen: Blood, Venous Updated: 01/15/24 1452     Lactate 1.7 mmol/L     Blood Culture Blood, Venous [[id number]] Collected: 01/15/24 1427    Specimen: Blood, Venous Updated: 01/15/24 1437    Blood Culture Blood, Venous [[id number]] Collected: 01/15/24 1427    Specimen: Blood, Venous Updated: 01/15/24 1436    SARS-CoV-2 (COVID-19) Nasopharynx [[id number]]  (Normal) Collected: 01/15/24 1345    Specimen: Nasopharyngeal Swab from Nasopharynx Updated: 01/15/24 1434    Narrative:      The following orders were created for panel order SARS-CoV-2 (COVID-19) Nasopharynx.  Procedure                               Abnormality         Status                     ---------                               -----------         ------                     SARS-COV-2 (COVID-19)/ F...[[id number]]  Normal              Final result                 Please view results for these tests on the individual orders.    SARS-COV-2 (COVID-19)/ FLU A/B, AND RSV,  PCR Nasopharynx [[id number]]  (Normal) Collected: 01/15/24 1345    Specimen: Nasopharyngeal Swab from Nasopharynx Updated: 01/15/24 1434     SARS-CoV-2 (COVID-19) Negative     Influenza A Negative     Influenza B Negative     Respiratory Syncytial Virus Negative    Narrative:      Testing performed using real-time PCR for detection of COVID-19. EUA approved validation studies performed on site.     Comprehensive metabolic panel [[id number]]  (Abnormal) Collected: 01/15/24 1345    Specimen: Blood, Venous Updated: 01/15/24 1431     Sodium 136 mEQ/L      Potassium 4.4 mEQ/L      Comment: Results obtained on plasma. Plasma Potassium values may be up to 0.4 mEQ/L less than serum values. The differences may be greater for patients with high platelet or white cell counts.        Chloride 97 mEQ/L      CO2 24 mEQ/L      BUN 29 mg/dL      Creatinine 0.8 mg/dL      Glucose 269 mg/dL      Calcium 10.7 mg/dL      AST (SGOT) 18 IU/L      ALT (SGPT) 14 IU/L      Alkaline Phosphatase 70 IU/L      Total Protein 7.6 g/dL      Comment: Test performed on plasma which typically contains approximately 0.4 g/dL more protein than serum.        Albumin 4.2 g/dL      Bilirubin, Total 1.5 mg/dL      eGFR >60.0 mL/min/1.73m*2      Comment: Calculation based on the Chronic Kidney Disease Epidemiology Collaboration (CKD-EPI) equation refit without adjustment for race.        Anion Gap 15 mEQ/L     Lipase [[id number]]  (Abnormal) Collected: 01/15/24 1345    Specimen: Blood, Venous Updated: 01/15/24 1431     Lipase 4 U/L     C-reactive protein [[id number]]  (Abnormal) Collected: 01/15/24 1345    Specimen: Blood, Venous Updated: 01/15/24 1431     .00 mg/L     CBC and differential [[id number]]  (Abnormal) Collected: 01/15/24 1345    Specimen: Blood, Venous Updated: 01/15/24 1428     WBC 56.92 K/uL      Comment: This result has been called to DR AUSTYN AGUILAR by Tana Urban on 01/15/2024 14:10:15, and has been read back.         RBC 4.99 M/uL       Hemoglobin 14.3 g/dL      Hematocrit 45.2 %      MCV 90.6 fL      MCH 28.7 pg      MCHC 31.6 g/dL      RDW 13.6 %      Platelets 499 K/uL      MPV 11.1 fL      Differential Type Manu     Neutrophils 81 %      Lymphocytes 10 %      Monocytes 6 %      Eosinophils 2 %      Basophils 1 %      Neutrophils, Absolute 46.11 K/uL      Lymphocytes, Absolute 5.69 K/uL      Monocytes, Absolute 3.42 K/uL      Eosinophils, Absolute 1.14 K/uL      Basophils, Absolute 0.57 K/uL      Platelet Estimate Adequate (150,000-400,000)     Giant Platelets Occasional     Clumped Platelets Present     [place] Cells Occasional          Imaging Results          X-RAY TOE LEFT 2+ VIEWS (Final result)  Result time 01/15/24 15:36:59    Final result                 Impression:    IMPRESSION: Ulceration of the distal aspect of the left second digit with  findings of osteomyelitis involving the distal phalanx and possibly the mid  phalanx as well.             Narrative:    CLINICAL HISTORY: Diabetic wound.    COMMENT: 3 views of the left foot is performed with attention to the second toe.  There is soft tissue swelling of the second toe and ulceration in the distal  aspect of the second toe. There is fragmentation involving the distal phalanx of  the second digit. These findings are suspicious for osteomyelitis. Question of  erosion along the medial aspect of the mid phalanx of the second digit. These  findings could be assessed with MRI.                                ECG 12 lead          Scoring tools                                  ED Course & MDM   MDM / ED COURSE / CLINICAL IMPRESSION / DISPO     Medical Decision Making  Nausea and vomiting, unspecified vomiting type: acute illness or injury  Osteomyelitis of second toe of left foot (CMS/HCC): acute illness or injury  Amount and/or Complexity of Data Reviewed  Labs: ordered.  Radiology: ordered. Decision-making details documented in ED Course.  ECG/medicine tests: ordered.      Risk  Prescription  drug management.  Decision regarding hospitalization.          ED Course as of 01/15/24 1929   Mon David 15, 2024   1423 PT with very elevated lactate.  Will give dose of IV vanc [WS]   1438 Paged podiatry [EG]   1511 Paged podiatry x 2  [EG]   1549 X-RAY TOE LEFT 2+ VIEWS  --  IMPRESSION: Ulceration of the distal aspect of the left second digit with  findings of osteomyelitis involving the distal phalanx and possibly the mid  phalanx as well. [EG]   1610 We have attempted multiple pages to podiatry.  2nd pager is off.  US working on reaching out to answering service [EG]   1612 S/w podiatry resident who will evaluate  [EG]   1615 Case s/o at change of shift [EG]   1655 [place]iatry [place] [facility] [place]. [place] [facility] [place]EK]      ED Course User Index  [EG] Radha Villanueva PA C  [EK] Radha Doty PA C  [WS] Neil Fletcher MD     Clinical Impression      Osteomyelitis of second toe of left foot (CMS/HCC)   Nausea and vomiting, unspecified vomiting type     _________________     ED Disposition   Admit / Observation                   Radha Villanueva PA C  01/15/24 1929

## 2024-01-15 NOTE — PROGRESS NOTES
"Vancomycin Dosing by Pharmacy Consult Initiated    Bebe Burks is a 63 y.o. female who has been consulted for vancomycin dosing for bone/joint infection prescribed by Howie .    Reviewed relevant clinical data including weight, renal function, previous vancomycin doses, and vancomycin levels:  Creatinine   Date/Time Value Ref Range Status   01/15/2024 1345 0.8 0.6 - 1.2 mg/dL Final     No results found for: \"VANCORANDOM\", \"VANCOTROUGH\", \"VANCOPEAK\"      Vancomycin Administrations (last 96 hours)       Date/Time Action Medication Dose    01/15/24 1507 New Bag    vancomycin 1.75 gram/500 mL IVPB in NSS 1,750 mg            Assessment/Plan  The patient is ordered vancomycin dosing by pharmacy.    The dose will be based on:         Wt Readings from Last 1 Encounters:   01/15/24 83.9 kg (185 lb)         Estimated Creatinine Clearance: 78 mL/min by (C-G formula)      Will initiate a loading dose   1750 mg IV x1 and maintenance dose of 1250 mg IV every 12 hours.    Target a trough of 15-20 ug/mL per vancomycin dosing per pharmacy order.  Pharmacy will continue to follow the patient's vancomycin dosing daily.      Please call vancomycin levels to the pharmacy.  Jerod Soria, PharmD    "

## 2024-01-15 NOTE — ASSESSMENT & PLAN NOTE
Home meds: Jardiance, insulin, rybelsus   Last A1c 10.5 (8.8 on 3/2023)  OA: Bld glucose 269      - Follow-up outpatient with PCP regarding diabetes management  - Continue Home Meds Eduardiance, Rybelsus, home insulin regiment

## 2024-01-15 NOTE — ED ATTESTATION NOTE
I have personally seen and examined the patient.  I reviewed and agree with physician assistant / nurse practitioner’s assessment and plan of care, with the following exceptions: None    I personally performed the key components of the encounter and provided a substantive portion of the care and medical decision making    My examination, assessment, and plan of care of Bebe Burks is as follows:     PT injured left second toe 2 weeks ago. Seen by podiatry, on antibiotics.  CO NV over the last 24 hours.  Exam: awake, alert. Abd soft, nt.  LLE: necrotic second toe with erythema extending to the foot.       Neil Fletcher MD  01/15/24 1580

## 2024-01-16 ENCOUNTER — APPOINTMENT (INPATIENT)
Dept: RADIOLOGY | Facility: HOSPITAL | Age: 64
DRG: 854 | End: 2024-01-16
Payer: MEDICARE

## 2024-01-16 ENCOUNTER — APPOINTMENT (INPATIENT)
Dept: CARDIOLOGY | Facility: HOSPITAL | Age: 64
DRG: 854 | End: 2024-01-16
Payer: MEDICARE

## 2024-01-16 LAB
ANION GAP SERPL CALC-SCNC: 13 MEQ/L (ref 3–15)
ATRIAL RATE: 120
BASOPHILS # BLD: 0 K/UL (ref 0.01–0.1)
BASOPHILS NFR BLD: 0 %
BSA FOR ECHO PROCEDURE: 1.99 M2
BUN SERPL-MCNC: 31 MG/DL (ref 7–25)
BURR CELLS BLD QL SMEAR: ABNORMAL
CALCIUM SERPL-MCNC: 8.9 MG/DL (ref 8.6–10.3)
CHLORIDE SERPL-SCNC: 105 MEQ/L (ref 98–107)
CO2 SERPL-SCNC: 21 MEQ/L (ref 21–31)
CREAT SERPL-MCNC: 0.7 MG/DL (ref 0.6–1.2)
DIFFERENTIAL METHOD BLD: ABNORMAL
EGFRCR SERPLBLD CKD-EPI 2021: >60 ML/MIN/1.73M*2
EOSINOPHIL # BLD: 0 K/UL (ref 0.04–0.36)
EOSINOPHIL NFR BLD: 0 %
ERYTHROCYTE [DISTWIDTH] IN BLOOD BY AUTOMATED COUNT: 13.7 % (ref 11.7–14.4)
EST. AVERAGE GLUCOSE BLD GHB EST-MCNC: 255 MG/DL
GLUCOSE BLD-MCNC: 143 MG/DL (ref 70–99)
GLUCOSE BLD-MCNC: 160 MG/DL (ref 70–99)
GLUCOSE BLD-MCNC: 188 MG/DL (ref 70–99)
GLUCOSE BLD-MCNC: 236 MG/DL (ref 70–99)
GLUCOSE SERPL-MCNC: 180 MG/DL (ref 70–99)
HBA1C MFR BLD: 10.5 %
HCT VFR BLDCO AUTO: 41.6 % (ref 35–45)
HGB BLD-MCNC: 12.5 G/DL (ref 11.8–15.7)
LEFT 1ST TOE INDEX: 0.53
LEFT 1ST TOE: 61 MMHG
LEFT ARM BP: 115 MMHG
LEFT TBI: 0.53
LYMPHOCYTES # BLD: 1.47 K/UL (ref 1.2–3.5)
LYMPHOCYTES NFR BLD: 3 %
MAGNESIUM SERPL-MCNC: 2.2 MG/DL (ref 1.8–2.5)
MCH RBC QN AUTO: 28.3 PG (ref 28–33.2)
MCHC RBC AUTO-ENTMCNC: 30 G/DL (ref 32.2–35.5)
MCV RBC AUTO: 94.1 FL (ref 83–98)
METAMYELOCYTES # BLD MANUAL: 1.47 K/UL
METAMYELOCYTES NFR BLD MANUAL: 3 %
MONOCYTES # BLD: 0.49 K/UL (ref 0.28–0.8)
MONOCYTES NFR BLD: 1 %
MYELOCYTES # BLD MANUAL: 1.47 K/UL
MYELOCYTES NFR BLD MANUAL: 3 %
NEUTS BAND # BLD: 43.59 K/UL (ref 1.7–7)
NEUTS SEG NFR BLD: 89 %
P AXIS: 38
PDW BLD AUTO: 11.3 FL (ref 9.4–12.3)
PLAT MORPH BLD: NORMAL
PLATELET # BLD AUTO: 420 K/UL (ref 150–369)
PLATELET # BLD EST: ABNORMAL 10*3/UL
POCT TEST: ABNORMAL
POTASSIUM SERPL-SCNC: 4.5 MEQ/L (ref 3.5–5.1)
QRS DURATION: 76
QT INTERVAL: 334
QTC CALCULATION(BAZETT): 447
R AXIS: 20
RBC # BLD AUTO: 4.42 M/UL (ref 3.93–5.22)
RIGHT 1ST TOE INDEX: 0.63
RIGHT 1ST TOE: 73 MMHG
RIGHT ARM BP: 114 MMHG
RIGHT TBI: 0.63
SODIUM SERPL-SCNC: 139 MEQ/L (ref 136–145)
T WAVE AXIS: 17
VARIANT LYMPHS # BLD MANUAL: 0.49 K/UL
VARIANT LYMPHS NFR BLD: 1 %
VENTRICULAR RATE: 108
WBC # BLD AUTO: 48.98 K/UL (ref 3.8–10.5)

## 2024-01-16 PROCEDURE — 21400000 HC ROOM AND CARE CCU/INTERMEDIATE

## 2024-01-16 PROCEDURE — 63600000 HC DRUGS/DETAIL CODE

## 2024-01-16 PROCEDURE — 80048 BASIC METABOLIC PNL TOTAL CA: CPT

## 2024-01-16 PROCEDURE — A9573: HCPCS

## 2024-01-16 PROCEDURE — 25000000 HC PHARMACY GENERAL

## 2024-01-16 PROCEDURE — 85025 COMPLETE CBC W/AUTO DIFF WBC: CPT

## 2024-01-16 PROCEDURE — 25800000 HC PHARMACY IV SOLUTIONS

## 2024-01-16 PROCEDURE — 63700000 HC SELF-ADMINISTRABLE DRUG: Performed by: PHYSICAL THERAPIST

## 2024-01-16 PROCEDURE — 83735 ASSAY OF MAGNESIUM: CPT

## 2024-01-16 PROCEDURE — 63700000 HC SELF-ADMINISTRABLE DRUG

## 2024-01-16 PROCEDURE — 99233 SBSQ HOSP IP/OBS HIGH 50: CPT | Performed by: STUDENT IN AN ORGANIZED HEALTH CARE EDUCATION/TRAINING PROGRAM

## 2024-01-16 PROCEDURE — 73720 MRI LWR EXTREMITY W/O&W/DYE: CPT | Mod: LT,MG

## 2024-01-16 PROCEDURE — 93922 UPR/L XTREMITY ART 2 LEVELS: CPT

## 2024-01-16 PROCEDURE — 36415 COLL VENOUS BLD VENIPUNCTURE: CPT

## 2024-01-16 PROCEDURE — A9579 GAD-BASE MR CONTRAST NOS,1ML: HCPCS

## 2024-01-16 RX ORDER — BISACODYL 5 MG
5 TABLET, DELAYED RELEASE (ENTERIC COATED) ORAL DAILY PRN
Status: DISCONTINUED | OUTPATIENT
Start: 2024-01-16 | End: 2024-01-17

## 2024-01-16 RX ORDER — INSULIN GLARGINE 100 [IU]/ML
5 INJECTION, SOLUTION SUBCUTANEOUS NIGHTLY
Status: DISCONTINUED | OUTPATIENT
Start: 2024-01-16 | End: 2024-01-16

## 2024-01-16 RX ORDER — LIDOCAINE 560 MG/1
1 PATCH PERCUTANEOUS; TOPICAL; TRANSDERMAL DAILY PRN
Status: DISCONTINUED | OUTPATIENT
Start: 2024-01-16 | End: 2024-01-21 | Stop reason: HOSPADM

## 2024-01-16 RX ORDER — ACETAMINOPHEN 325 MG/1
650 TABLET ORAL EVERY 6 HOURS PRN
Status: DISCONTINUED | OUTPATIENT
Start: 2024-01-16 | End: 2024-01-16

## 2024-01-16 RX ORDER — INSULIN GLARGINE 100 [IU]/ML
8 INJECTION, SOLUTION SUBCUTANEOUS NIGHTLY
Status: DISCONTINUED | OUTPATIENT
Start: 2024-01-16 | End: 2024-01-17

## 2024-01-16 RX ADMIN — HYDROCHLOROTHIAZIDE 12.5 MG: 12.5 TABLET ORAL at 09:19

## 2024-01-16 RX ADMIN — VANCOMYCIN HYDROCHLORIDE 1250 MG: 500 INJECTION, POWDER, LYOPHILIZED, FOR SOLUTION INTRAVENOUS at 17:32

## 2024-01-16 RX ADMIN — BISACODYL 5 MG: 5 TABLET, COATED ORAL at 22:59

## 2024-01-16 RX ADMIN — LOSARTAN POTASSIUM 50 MG: 25 TABLET, FILM COATED ORAL at 09:19

## 2024-01-16 RX ADMIN — INSULIN LISPRO 3 UNITS: 100 INJECTION, SOLUTION INTRAVENOUS; SUBCUTANEOUS at 22:59

## 2024-01-16 RX ADMIN — INSULIN GLARGINE 8 UNITS: 100 INJECTION, SOLUTION SUBCUTANEOUS at 22:57

## 2024-01-16 RX ADMIN — VANCOMYCIN HYDROCHLORIDE 1250 MG: 500 INJECTION, POWDER, LYOPHILIZED, FOR SOLUTION INTRAVENOUS at 05:30

## 2024-01-16 RX ADMIN — GABAPENTIN 300 MG: 300 CAPSULE ORAL at 09:18

## 2024-01-16 RX ADMIN — GABAPENTIN 300 MG: 300 CAPSULE ORAL at 19:42

## 2024-01-16 RX ADMIN — GABAPENTIN 300 MG: 300 CAPSULE ORAL at 15:24

## 2024-01-16 RX ADMIN — ATORVASTATIN CALCIUM 10 MG: 20 TABLET, FILM COATED ORAL at 09:18

## 2024-01-16 RX ADMIN — GADOPICLENOL 8.6 ML: 485.1 INJECTION INTRAVENOUS at 13:58

## 2024-01-16 RX ADMIN — ACETAMINOPHEN 650 MG: 325 TABLET ORAL at 23:00

## 2024-01-16 RX ADMIN — LIDOCAINE 1 PATCH: 4 PATCH TOPICAL at 15:24

## 2024-01-16 RX ADMIN — INSULIN LISPRO 3 UNITS: 100 INJECTION, SOLUTION INTRAVENOUS; SUBCUTANEOUS at 09:17

## 2024-01-16 RX ADMIN — LEVOTHYROXINE SODIUM 25 MCG: 0.03 TABLET ORAL at 05:30

## 2024-01-16 RX ADMIN — AMLODIPINE BESYLATE 5 MG: 5 TABLET ORAL at 09:19

## 2024-01-16 RX ADMIN — ACETAMINOPHEN 650 MG: 325 TABLET ORAL at 09:18

## 2024-01-16 ASSESSMENT — COGNITIVE AND FUNCTIONAL STATUS - GENERAL
WALKING IN HOSPITAL ROOM: 3 - A LITTLE
CLIMB 3 TO 5 STEPS WITH RAILING: 2 - A LOT
MOVING TO AND FROM BED TO CHAIR: 3 - A LITTLE
STANDING UP FROM CHAIR USING ARMS: 3 - A LITTLE

## 2024-01-16 NOTE — ASSESSMENT & PLAN NOTE
Home Meds: Amlodipine, Hctz, and Losartan (last taken 1/14/2024)  OA: BP: 175/78 mmHg -> 137/68 mmHg    - Continue home losartan and amlodipine  - Hydrochlorothiazide started on 1/16/2024  - Follow-up with PCP for further blood pressure management

## 2024-01-16 NOTE — PROGRESS NOTES
Subjective:   Pt seen at bedside for continued care of left second digit.NAD. No overnight events. Dressing clean, dry, and intact. Denies any N/V/F/C/CP/SOB.     ROS:   Past Medical/Surgical history, Allergies, Meds reviewed in detail as charted  FH/SH reviewed in detail as charted  Review of Systems:  Head and Neck: No complaints  Chest : No complaints  Abdomen: No Complaints  Constitutional: Unremarkable     Vitals: I have reviewed the patient's current vital signs as documented in the patient's EMR.    Radiology: No new Radiology.    Labs:   CBC Results       01/16/24 01/15/24 02/28/23     0757 1345 0930    WBC 48.98 56.92 11.27    RBC 4.42 4.99 4.05    HGB 12.5 14.3 12.0    HCT 41.6 45.2 35.8    MCV 94.1 90.6 88.4    MCH 28.3 28.7 29.6    MCHC 30.0 31.6 33.5     499 157         Comment for WBC at 0757 on 01/16/24: This result has been called to NONE by Casper on 01/16/2024 08:18:59, and has not been read back. CONSISTENT WITH PREVIOUS RESULTS.    Comment for WBC at 1345 on 01/15/24: This result has been called to DR AUSTYN AGUILAR by Tana Urban on 01/15/2024 14:10:15, and has been read back.            Objective:     Physical Exam:  -Vascular: dp/pt pulses palpable bilaterally, CRT <3s  -Orthopedic: decreased ROM at ankle jt b/l, +DF/PF all digits  -Neurologic: light touch and epicritic sensation diminished  -Dermatologic:   Left second digit has necrotic changes to the distal aspect of the toe. The proximal toe has edema and erythema. The toe is noted to be enlarged with xerotic skin peeling off. Erythema noted to the dorsum of the foot extending back to the midfoot. Mild malodor. No purulence, no crepitus, no fluctuance.         Assessment : Bebe Burks is a 63 year old with t2dm presenting with concern of osteomyelitis to the left 2nd digit        Plan   -Examined and evaluated patient with all questions and concerns addressed.  -Discussed plan with attending   -Dressed with betadine dsd.  -Will follow  patient while in hospital.   -X ray: Ulceration of the distal aspect of the left second digit with  findings of osteomyelitis involving the distal phalanx and possibly the mid  phalanx as well.  -MRI ordered:   Gangrenous appearing second toe with marked erosion of the second distal phalanx  either due to osteomalacia gangrene.  Findings are also suggestive of osteomyelitis in the middle phalanx of the  second toe with nonspecific marrow changes in the distal portions of the  proximal phalanx of the second toe.  No evidence of osteomyelitis elsewhere.  -Blood cultures pending.  -Plan to take patient to OR Thursday for amputation with Dr. Damian.  - Labs and radiographs reviewed.  -please contact on call podiatry resident with any questions or concerns     RENEE Cee DPM  PGY-1 #[id number]

## 2024-01-16 NOTE — CONSULTS
"  Division of Infectious Diseases - Consultation Report    Patient Name: Bebe Burks  MR#: [id number]  : 1960  Admission Date: 1/15/2024  Consult Date: 24 9:12 AM   Consultant: Rambo Gonzalez MD  Requesting: Dr Thornton  Reason for Consult: antibiotic stewardship  History of Present Illness:  Bebe Burks is a 63 y.o. woman with peripheral neuropathy due to diabetes mellitus who was admitted to [facility] yesterday with nausea and vomiting. She was recently prescribed doxycycline for an infection of the left second toe. The tip of her toe looked darker to her. She developed fever, chills, nausea and vomiting. She came to the emergency room. Her temperature was 97.2F upon arrival to the hospital and later abebe to 99.2F; WBC was 56,920 81P 10L 6M 2eos 1 basophil. Platelet count was 499,000. Distal phalanx of left second toe appeared necrotic.     Allergies:   Allergies   Allergen Reactions   • Citrus And Derivatives      \" Flushed\"     Medical History:   Past Medical History:   Diagnosis Date   • Abnormal finding on chest xray 2021   • Arthritis    • COVID-19 2021   • COVID-19 vaccine series completed     Moderna: \" 3 doses\"   • Diabetic neuropathy (CMS/HCC)    • DM (diabetes mellitus), type 2 with ophthalmic complications (CMS/HCC)    • Hypertension    • Hypothyroidism    • Left foot drop    • Lipid disorder    • Type 2 diabetes mellitus treated with insulin (CMS/HCC)      Surgical History:   Past Surgical History:   Procedure Laterality Date   • CATARACT EXTRACTION Right    • COLONOSCOPY     • COMBINED HYSTEROSCOPY DIAGNOSTIC / D&C     • EYE SURGERY Left 2017    cataract   • VITRECTOMY Left 2016     Social History     Tobacco Use   • Smoking status: Never     Passive exposure: Past   • Smokeless tobacco: Never   Vaping Use   • Vaping Use: Never used   Substance Use Topics   • Alcohol use: Yes     Comment: occasionally   • Drug use: Never   No IVDA  Family History: " noncontributory  Medications:  Current IP Meds (From admission, onward)        Frequency     amLODIPine (NORVASC) tablet 5 mg         Daily     hydrochlorothiazide (HYDRODIURIL) tablet 12.5 mg         Daily     acetaminophen (TYLENOL) tablet 650 mg  Status:  Discontinued         Every 6 hours PRN     levothyroxine (SYNTHROID) tablet 25 mcg         Daily     vancomycin 1.25 gram/250 mL IVPB in NSS  (Vancomycin IV therapy by Pharmacy Protocol)        See Hyperspace for full Linked Orders Report.    Every 12 hours     insulin lispro U-100 (HumaLOG) pen 3-5 Units  (Humalog Insulin Correction Factor for patient weight < 200 lb)         4 times daily with meals and nightly     insulin glargine U-100 (LANTUS/BASAGLAR) pen 10 Units         Nightly     gabapentin (NEURONTIN) capsule 300 mg         3 times daily     acetaminophen (TYLENOL) tablet 650 mg         Every 6 hours PRN     ondansetron (ZOFRAN) injection 4 mg         Every 6 hours PRN     atorvastatin (LIPITOR) tablet 10 mg         Daily     losartan (COZAAR) tablet 50 mg         Daily     glucose chewable tablet 16-32 g of dextrose  (Hypoglycemia Treatment Protocol and Hyperglycemia Validation Protocol)        See Hyperspace for full Linked Orders Report.    As needed     dextrose 40 % oral gel 15-30 g of dextrose  (Hypoglycemia Treatment Protocol and Hyperglycemia Validation Protocol)        See Hyperspace for full Linked Orders Report.    As needed     glucagon (GLUCAGEN) injection 1 mg  (Hypoglycemia Treatment Protocol and Hyperglycemia Validation Protocol)        See Hyperspace for full Linked Orders Report.    As needed     dextrose 50 % in water (D50) injection 12.5 g  (Hypoglycemia Treatment Protocol and Hyperglycemia Validation Protocol)        See Hyperspace for full Linked Orders Report.    As needed     polyethylene glycol (MIRALAX) 17 gram packet 17 g         Daily     cefTRIAXone (ROCEPHIN) IVPB 2 g in 100 mL NSS vial in bag         Every 24 hours  "interval     sodium chloride 0.9 % infusion         Continuous     ondansetron (ZOFRAN) injection 4 mg         Once     vancomycin 1.75 gram/500 mL IVPB in NSS         Once     sodium chloride 0.9 % bolus 1,000 mL         Once     ondansetron (ZOFRAN) injection 4 mg         Once        Physical Examination:  Vital signs reviewed  Temp (24hrs), Av.7 °C (98.1 °F), Min:36.2 °C (97.2 °F), Max:37.3 °C (99.2 °F)    Visit Vitals  BP (!) 142/65 (Patient Position: Lying)   Pulse 85   Temp 36.5 °C (97.7 °F) (Oral)   Resp 18   Ht 1.702 m (5' 7\")   Wt 86.3 kg (190 lb 4.8 oz)   SpO2 98%   BMI 29.81 kg/m²     General: no respiratory distress   Neurologic: awake and alert, moves all extremities  Head: normocephalic, atraumatic  Eyes: conjunctivae normal without injection or discharge, no scleral icterus  Ears: external ears normal, no discharge from canals  Oropharynx: moist mucous membranes with no lesions  Heart: regular rate, regular rhythm, normal S1, normal S2  Lungs: clear to auscultation bilaterally  Abdomen: soft, normal bowel sounds, no distension, no mass, no tenderness  Skin: warm and dry, no rash  Musculoskeletal: left second toe necrotic, foul odor, no purulence  Labs:  CBC Results       01/16/24 01/15/24 02/28/23     0757 1345 0930    WBC 48.98 56.92 11.27    RBC 4.42 4.99 4.05    HGB 12.5 14.3 12.0    HCT 41.6 45.2 35.8    MCV 94.1 90.6 88.4    MCH 28.3 28.7 29.6    MCHC 30.0 31.6 33.5     499 157         Comment for WBC at 0757 on 24: This result has been called to NONE by Casper on 2024 08:18:59, and has not been read back. CONSISTENT WITH PREVIOUS RESULTS.    Comment for WBC at 1345 on 01/15/24: This result has been called to DR AUSTYN AGUILAR by Tana Urban on 01/15/2024 14:10:15, and has been read back.       BMP Results       01/16/24 01/15/24 02/28/23     0757 1345 0930     136 133    K 4.5 4.4 3.8    Cl 105 97 105    CO2 21 24 23    Glucose 180 269 183    BUN 31 29 16    Creatinine " 0.7 0.8 0.7    Calcium 8.9 10.7 8.7    Anion Gap 13 15 5    EGFR >60.0 >60.0 >60.0         Comment for K at 1345 on 01/15/24: Results obtained on plasma. Plasma Potassium values may be up to 0.4 mEQ/L less than serum values. The differences may be greater for patients with high platelet or white cell counts.    Comment for EGFR at 0757 on 01/16/24: Calculation based on the Chronic Kidney Disease Epidemiology Collaboration (CKD-EPI) equation refit without adjustment for race.    Comment for EGFR at 1345 on 01/15/24: Calculation based on the Chronic Kidney Disease Epidemiology Collaboration (CKD-EPI) equation refit without adjustment for race.      PT/PTT Results       12/03/21     0625    PT 14.3    INR 1.2    PTT 33         Comment for INR at 0625 on 12/03/21: INR has no defined significance when PT is within Reference Range.      UA Results    No lab values to display.     Lactate Results       01/15/24     1443    Lactate 1.7        Microbiology Results     Procedure Component Value Units Date/Time    SARS-CoV-2 (COVID-19) Nasopharynx [[id number]]  (Normal) Collected: 01/15/24 1345    Specimen: Nasopharyngeal Swab from Nasopharynx Updated: 01/15/24 1434    Narrative:      The following orders were created for panel order SARS-CoV-2 (COVID-19) Nasopharynx.  Procedure                               Abnormality         Status                     ---------                               -----------         ------                     SARS-COV-2 (COVID-19)/ F...[[id number]]  Normal              Final result                 Please view results for these tests on the individual orders.    SARS-COV-2 (COVID-19)/ FLU A/B, AND RSV, PCR Nasopharynx [[id number]]  (Normal) Collected: 01/15/24 1345    Specimen: Nasopharyngeal Swab from Nasopharynx Updated: 01/15/24 1434     SARS-CoV-2 (COVID-19) Negative     Influenza A Negative     Influenza B Negative     Respiratory Syncytial Virus Negative    Narrative:      Testing performed  using real-time PCR for detection of COVID-19. EUA approved validation studies performed on site.           Imaging:   Recent Results (from the past 1008 hour(s))   X-RAY TOE LEFT 2+ VIEWS    Collection Time: 01/15/24  3:14 PM    Narrative    CLINICAL HISTORY: Diabetic wound.    COMMENT: 3 views of the left foot is performed with attention to the second toe.  There is soft tissue swelling of the second toe and ulceration in the distal  aspect of the second toe. There is fragmentation involving the distal phalanx of  the second digit. These findings are suspicious for osteomyelitis. Question of  erosion along the medial aspect of the mid phalanx of the second digit. These  findings could be assessed with MRI.      Impression    IMPRESSION: Ulceration of the distal aspect of the left second digit with  findings of osteomyelitis involving the distal phalanx and possibly the mid  phalanx as well.       Anti-infectives (From admission, onward)    Start     Dose/Rate Route Frequency Ordered Stop    01/16/24 0600  vancomycin 1.25 gram/250 mL IVPB in NSS        See Hyperspace for full Linked Orders Report.    1,250 mg  166.7 mL/hr over 90 Minutes intravenous Every 12 hours 01/15/24 1802      01/15/24 1815  cefTRIAXone (ROCEPHIN) IVPB 2 g in 100 mL NSS vial in bag         2 g  200 mL/hr over 30 Minutes intravenous Every 24 hours interval 01/15/24 1814            Assessment:  1. Osteomyelitis left second toe, microbiology typically Staphylococcus aureus or beta hemolytic streptococci  2. Type 2 diabetes mellitus with polyneuropathy, managed by primary service    Plan:   continue vancomycin dosing by pharmacy protocol   discontinue ceftriaxone    monitor symptom report, temperature and other vital signs, examination   follow up on pending microbiology    Thank you for the courtesy of the consultation request. Please contact me if you have any questions about this case.     Rambo Gonzalez MD    [phone]    1/16/2024  9:12 AM

## 2024-01-16 NOTE — ASSESSMENT & PLAN NOTE
Hx of Peripheral neuropathy  PTA meds Gabapentin 300mg, 3 times days    Plan  Continue PTA Gabapentin 300mg, 3 times daily

## 2024-01-16 NOTE — ASSESSMENT & PLAN NOTE
Resolved.    C/o persistent vomiting for 24 hrs, reports constipation for 2 days  Recent hx doxycycline course for 2 weeks  PE: Tenderness in all 4 quadrants,  Negative rebound tenderness  Received Zofran 4mg x 2 in the ED (2:30pm and 3:00pm)  Suspect abdominal pain secondary to constipation versus septic      - Continue Miralax 17g daily as needed  - Given one-time dose Dulcolax suppository

## 2024-01-16 NOTE — CONSULTS
"  Podiatry Consult Note    Subjective     Bebe Burks is a 63 y.o. female who was admitted for Osteomyelitis of second toe of left foot (CMS/HCC) [M86.9]. Patient was referred by primary for management recommendations. Patient is being consulted for left second toe wound. Patient states that she had a callus debridement done months ago on that toe and then over the holidays she dropped an object on her toe. She states that last week it was looking better. Then she noticed this week it was turning dark. She reports having constant vomiting and fever for over 24 hours. She is known to Dr. Damian. Patient has PMH of t2dm, hypothyroidism, neuropathy.     Outside records reviewed..    Medical History:   Past Medical History:   Diagnosis Date   • Abnormal finding on chest xray 12/2021   • Arthritis    • COVID-19 12/2021   • COVID-19 vaccine series completed     Moderna: \" 3 doses\"   • Diabetic neuropathy (CMS/HCC)    • DM (diabetes mellitus), type 2 with ophthalmic complications (CMS/HCC)    • Hypertension    • Hypothyroidism    • Left foot drop    • Lipid disorder    • Type 2 diabetes mellitus treated with insulin (CMS/HCC)        Surgical History:   Past Surgical History:   Procedure Laterality Date   • CATARACT EXTRACTION Right    • COLONOSCOPY     • COMBINED HYSTEROSCOPY DIAGNOSTIC / D&C  2007   • EYE SURGERY Left 2017    cataract   • VITRECTOMY Left 2016       Social History:   Social History     Social History Narrative    Lives alone in a condo       Family History:   Family History   Problem Relation Age of Onset   • Diabetes Biological Mother    • Hypertension Biological Mother    • Stroke Biological Father        Allergies: Citrus and derivatives    Home Medications:  Not in a hospital admission.    Current Medications:  •  acetaminophen, 650 mg, oral, q6h PRN  •  [START ON 1/16/2024] amLODIPine, 5 mg, oral, Daily  •  atorvastatin, 10 mg, oral, Daily  •  cefTRIAXone, 2 g, intravenous, q24h INT  •  glucose, " "16-32 g of dextrose, oral, PRN **OR** dextrose, 15-30 g of dextrose, oral, PRN **OR** glucagon, 1 mg, intramuscular, PRN **OR** dextrose 50 % in water (D50), 25 mL, intravenous, PRN  •  gabapentin, 300 mg, oral, TID  •  [START ON 2024] hydrochlorothiazide, 12.5 mg, oral, Daily  •  insulin glargine U-100, 10 Units, subcutaneous, Nightly  •  insulin lispro U-100, 3-5 Units, subcutaneous, With meals & nightly  •  [START ON 2024] levothyroxine, 25 mcg, oral, Daily  •  losartan, 50 mg, oral, Daily  •  ondansetron, 4 mg, intravenous, q6h PRN  •  polyethylene glycol, 17 g, oral, Daily  •  sodium chloride 0.9 %, , intravenous, Continuous  •  [START ON 2024] vancomycin, 1,250 mg, intravenous, q12h HENOK **AND** [] IV Vancomycin Therapy by Pharmacy Protocol, , , Once  •  amLODIPine  •  atorvastatin  •  DEXCOM G7 SENSOR  •  empagliflozin  •  FREESTYLE LAURA 14 DAY READER  •  FREESTYLE LAURA 14 DAY SENSOR  •  gabapentin  •  hydrochlorothiazide  •  insulin asp prt-insulin aspart  •  levothyroxine  •  losartan  •  ONETOUCH DELICA LANCETS  •  pen needle, diabetic  •  RYBELSUS  •  aspirin  •  aspirin  •  polyethylene glycol    Labs  No new labs.    Imaging  I have reviewed the Imaging from the last 24 hrs.    Review of Systems  Pertinent items are noted in HPI.    Objective     Physicial Exam  Visit Vitals  BP (!) 121/58 (BP Location: Right upper arm, Patient Position: Lying)   Pulse 98   Temp 37.3 °C (99.2 °F) (Tympanic)   Resp 18   Ht 1.702 m (5' 7\")   Wt 83.9 kg (185 lb)   SpO2 98%   BMI 28.98 kg/m²         General appearance: alert, appears stated age and cooperative  Head: normocephalic, without obvious abnormality, atraumatic  Eyes: conjunctivae/corneas clear. PERRL, EOM's intact. Fundi benign.  Ears: normal TM's and external ear canals both ears  Nose: Nares normal. Septum midline. Mucosa normal. No drainage or sinus tenderness.    Physical Exam:  -Vascular: dp/pt pulses palpable bilaterally, CRT " <3s  -Orthopedic: decreased ROM at ankle jt b/l, +DF/PF all digits  -Neurologic: light touch and epicritic sensation diminished  -Dermatologic:   Left second digit has necrotic changes to the distal aspect of the toe. The proximal toe has edema and erythema. The toe is noted to be enlarged with xerotic skin peeling off. Erythema noted to the dorsum of the foot extending back to the midfoot. Mild malodor. No purulence, no crepitus, no fluctuance.       Assessment : Bebe Burks is a 63 year old with t2dm presenting with concern of osteomyelitis to the left 2nd digit          Plan   -Examined and evaluated patient with all questions and concerns addressed.  -Discussed plan with attending   -Dressed with betadine dsd.  -Will follow patient while in hospital.   -X ray: Ulceration of the distal aspect of the left second digit with  findings of osteomyelitis involving the distal phalanx and possibly the mid  phalanx as well.  -MRI ordered: pending  -Following MRI results will plan for surgical intervention.  - Labs and radiographs reviewed.  -Thank you for this consult, please contact on call podiatry resident with any questions or concerns    RENEE Cee DPM  PGY-1 #[id number]    RENEE Cee DPM  PGY-1 #[id number]

## 2024-01-16 NOTE — PLAN OF CARE
Problem: Adult Inpatient Plan of Care  Goal: Plan of Care Review  Outcome: Progressing  Flowsheets (Taken 1/16/2024 2166)  Progress: no change  Outcome Evaluation: Patient remains free from falls throughout shift. No acute event VSS. MRI done.  Plan of Care Reviewed With: patient  Goal: Patient-Specific Goal (Individualized)  Outcome: Progressing  Goal: Absence of Hospital-Acquired Illness or Injury  Outcome: Progressing  Goal: Optimal Comfort and Wellbeing  Outcome: Progressing  Goal: Readiness for Transition of Care  Outcome: Progressing     Problem: Skin Injury Risk Increased  Goal: Skin Health and Integrity  Outcome: Progressing     Problem: Pain Acute  Goal: Optimal Pain Control and Function  Outcome: Progressing     Problem: Fall Injury Risk  Goal: Absence of Fall and Fall-Related Injury  Outcome: Progressing   Plan of Care Review  Plan of Care Reviewed With: patient  Progress: no change  Outcome Evaluation: Patient remains free from falls throughout shift. No acute event VSS. MRI done.

## 2024-01-16 NOTE — PLAN OF CARE
Care Coordination Admission Assessment Note    General Information:  Readmission Within the last 30 days: no previous admission in last 30 days  Does patient have a :    Patient-Specific Goals (include timeframe): discharge home once medically cleared    Living Arrangements:  Arrived From: home  Current Living Arrangements: home  People in Home: alone  Home Accessibility: not wheelchair accessible, stairs to enter home (Group)  Living Arrangement Comments: pt resides in an apartment, 2 steps to enter, pt resides alone    Housing Stability and Utility Access (SDOH):  In the last 12 months, was there a time when you were not able to pay the mortgage or rent on time?: No  In the last 12 months, how many places have you lived?:    In the last 12 months, was there a time when you did not have a steady place to sleep or slept in a shelter (including now)?: No  In the past 12 months has the electric, gas, oil, or water company threatened to shut off services in your home?: No    Functional Status Prior to Admission:   Assistive Device/Animal Currently Used at Home: cane, straight, walker, front-wheeled  Functional Status Comments: pt occassionally utilized a cane to assist with ambulation, independent.  Pt does have access to a walker from a previous knee surgey last year  IADL Comments: at baseline pt is independent with ADL's     Supports and Services:  Current Outpatient/Agency/Support Group:    Type of Current Home Care Services: home PT, nursing  History of home care episode or rehab stay: Pt has a hx of [facility] and hx of SNF stay at [facility].    Discharge Needs Assessment:   Concerns to be Addressed: discharge planning  Current Discharge Risk: lives alone  Anticipated Changes Related to Illness:      Patient/Family Anticipated Discharge Plan:  Patient/Family Anticipates Transition To:    Patient/Family Anticipated Services at Transition: skilled nursing, home health care    Connection to  Community  Patient declined offered resources.      Patient Choice:   Offered/Gave Vendor List: yes  Patient's Choice of Community Agency(s): if pt needs HC at d/c, pt prefers [facility].  Pt noted if needs SNF LOC would be interested in [facility]  Patient and/or patient guardian/advocate was made aware of their right to choose a provider. A list of eligible providers was presented and reviewed with the patient and/or patient guardian/advocate in written and/or verbal form. The list delineates providers in the patient’s desired geographic area who are participating in the Medicare program and/or providers contracted with the patient’s primary insurance. The Medicare list and quality ratings were obtained from the Medicare.gov [medicare.gov] website.    Anticipated Discharge Plan:  Met with patient. Provided education and contact information for Care Coordination services.: yes  Anticipated Discharge Disposition: home with home health, skilled nursing facility  Type of Home Care Services: home PT, home OT, nursing    Type of Skilled Nursing Care Services: OT, PT, nursing      Transportation Needs (SDOH):  Transportation Concerns:    Transportation Anticipated: car, drives self, family or friend will provide  Is Out of Hospital DNR needed at discharge?:      In the past 12 months, has lack of transportation kept you from medical appointments or from getting medications?: No  In the past 12 months, has lack of transportation kept you from meetings, work, or from getting things needed for daily living?: No    Concerns - comments: CC met w/ pt bedside, pt resides alone in an apartment, at baseline pt independent and occassionally uses cane.  Pt has railings to enter into apartments with 2 steps to enter.  Pt confirmed preferred pharmacy is the [facility] at [address] [place] [address] to have no problems filling medication.  Pt did confirm PCP and states to see PCP quarterly.  Pt states to have a good support system with sisters,  emergency contact info confirmed.  Pt has a hx of SNF at [facility], pt states she would not want to return.  Pt noted that if needs SNF at d/c she would be interested in [facility].  Pt also states if cleared for home would like [facility].  Pt went for MRI this morning which will determine if pt requires surgery.  Will look for podiatry plan.       Discharge plan  TBD  [facility]- vs SNF: [facility]- alerted and following

## 2024-01-16 NOTE — PROGRESS NOTES
Hospital Medicine Service -  Daily Progress Note       SUBJECTIVE   Interval History:   DUY CATALINA YUEN  Patient seen this morning at bedside.  Patient denies pain in right foot, however, reports right-sided hip pain worse on palpation and mentions resolution of nausea and bilious vomiting from day prior. Patient denies any complaints of N/V/D, SOB, CP, Headaches.   OBJECTIVE      Vital signs in last 24 hours:  Temp:  [36.2 °C (97.2 °F)-37.3 °C (99.2 °F)] 36.5 °C (97.7 °F)  Heart Rate:  [] 85  Resp:  [17-20] 18  BP: (108-175)/(52-78) 114/54    Intake/Output Summary (Last 24 hours) at 1/16/2024 1222  Last data filed at 1/16/2024 0800  Gross per 24 hour   Intake 110 ml   Output --   Net 110 ml       PHYSICAL EXAMINATION      Physical Exam  Vitals reviewed.   Constitutional:       Appearance: She is not ill-appearing.   HENT:      Head: Normocephalic and atraumatic.   Cardiovascular:      Rate and Rhythm: Normal rate and regular rhythm.      Pulses:           Dorsalis pedis pulses are 2+ on the right side and 2+ on the left side.        Posterior tibial pulses are 2+ on the right side and 2+ on the left side.   Pulmonary:      Effort: Pulmonary effort is normal.      Breath sounds: Normal breath sounds.   Musculoskeletal:         General: Tenderness present.      Right lower leg: No edema.      Left lower leg: No edema.      Comments: Right-sided tenderness to palpation along the IT band, no erythema/edema   Feet:      Comments: Left foot second digit gangrenous in appearance with erythema and edema extending to midfoot; no tenderness to palpation  Neurological:      Mental Status: She is alert and oriented to person, place, and time.   Psychiatric:         Mood and Affect: Mood normal.         Behavior: Behavior normal.        LINES, CATHETERS, DRAINS, AIRWAYS, AND WOUNDS   Lines, Drains, and Airways:  Wounds (agree with documentation and present on admission):  Peripheral IV (Adult) 01/15/24 Right Antecubital  (Active)   Number of days: 1       Peripheral IV (Adult) 01/15/24 Left Antecubital (Active)   Number of days: 1       Wound Anterior;Left Toe (2nd) (Active)   Number of days: 1          LABS / IMAGING / TELE      Labs  Results from last 7 days   Lab Units 01/16/24  0757 01/15/24  1345   WBC K/uL 48.98* 56.92*   HEMOGLOBIN g/dL 12.5 14.3   HEMATOCRIT % 41.6 45.2*   PLATELETS K/uL 420* 499*         Results from last 7 days   Lab Units 01/16/24  0757 01/15/24  1345   SODIUM mEQ/L 139 136   POTASSIUM mEQ/L 4.5 4.4   CHLORIDE mEQ/L 105 97*   CO2 mEQ/L 21 24   BUN mg/dL 31* 29*   CREATININE mg/dL 0.7 0.8   CALCIUM mg/dL 8.9 10.7*   ALBUMIN g/dL  --  4.2   BILIRUBIN TOTAL mg/dL  --  1.5*   ALK PHOS IU/L  --  70   ALT IU/L  --  14   AST IU/L  --  18   GLUCOSE mg/dL 180* 269*               SARS-CoV-2 (COVID-19) (no units)   Date/Time Value   01/15/2024 1345 Negative       Imaging  I have independently reviewed the pertinent imaging from the last 24 hrs.  X-RAY TOE LEFT 2+ VIEWS    Result Date: 1/15/2024  CLINICAL HISTORY: Diabetic wound. COMMENT: 3 views of the left foot is performed with attention to the second toe. There is soft tissue swelling of the second toe and ulceration in the distal aspect of the second toe. There is fragmentation involving the distal phalanx of the second digit. These findings are suspicious for osteomyelitis. Question of erosion along the medial aspect of the mid phalanx of the second digit. These findings could be assessed with MRI.     IMPRESSION: Ulceration of the distal aspect of the left second digit with findings of osteomyelitis involving the distal phalanx and possibly the mid phalanx as well.        ECG/Telemetry  I have independently reviewed the telemetry. No events for the last 24 hours.    ASSESSMENT AND PLAN      Sepsis (CMS/[facility])  Assessment & Plan  Sepsis 2/2 Osteomyelitis of the (L) 2nd toe  ED: afebrile , HR: 100-114 bpm,   Labs: Leukocytosis WBC 56; CRP: 158; ESR: 65  Xray (L)  "Foot: Ulcerations of (L) 2nd digit consistent with OM.  Started IV Vancomycin,  IVF 1L, dietary consulted  S/p  1L IV fluid bolus    Start empiric ceftriaxone 2 g and continue IV vancomycin (1/15-  NPO pending possible surgery  MRI pending  F/U Blood cultures  Follow-up podiatry recs  ID consulted    Abdominal pain  Assessment & Plan  C/o persistent vomiting for 24 hrs  Recent hx doxycycline course for 2 weeks  PE: Tenderness in all 4 quadrants,  Negative rebound tenderness  Received Zofran 4mg x 2 in the ED (2:30pm and 3:00pm)  Resolved     Plan  Cont Zofran 4mg every 6 hrs prn   Start Miralax 17g daily     Peripheral neuropathy  Assessment & Plan  Hx of Peripheral neuropathy  PTA meds Gabapentin 300mg, 3 times days    Plan  Continue PTA Gabapentin 300mg, 3 times daily    Hypothyroidism  Assessment & Plan  Hx of hypothyroid, currently taking synthroid     Plan  Continue PTA Synthroid     Essential hypertension  Assessment & Plan  Home Meds: Amlodipine, Hctz, and Losartan (last taken 1/14/2024)  OA: BP: 175/78 mmHg -> 137/68 mmHg    Plan  Cont PTA Losartan and amlodipine   Will start PTA Hctz on 1/16/204    Controlled type 2 diabetes mellitus, with long-term current use of insulin (CMS/[facility])  Assessment & Plan  Home meds: Jardiance, insulin, rybelsus   Last A1c 8.8  3/2023  OA: Bld glucose 269    Plan  Start Lantus 10 + SSI  Hold Home Meds Jardiance, Rybelsus, home insulin regiment      * Osteomyelitis of second toe of left foot (CMS/[facility])  Assessment & Plan  See \"sepsis \"problem       VTE Assessment: Padua VTE Score: 1  VTE Prophylaxis:  Current anticoagulants:    •None      Code Status: Full Code      Estimated Discharge Date: 1/19/2024     Disposition Planning: Pending further medical management     Bryanna Solorio DO Pager #[phone]  Hospital Medicine Service  [facility] Resident  1/16/2024   Please see attending attestation for final recommendations             "

## 2024-01-17 PROBLEM — I96 GANGRENE (CMS/HCC): Status: ACTIVE | Noted: 2024-01-17

## 2024-01-17 PROBLEM — K59.04 CHRONIC IDIOPATHIC CONSTIPATION: Status: ACTIVE | Noted: 2024-01-17

## 2024-01-17 LAB
ANION GAP SERPL CALC-SCNC: 8 MEQ/L (ref 3–15)
BUN SERPL-MCNC: 24 MG/DL (ref 7–25)
CALCIUM SERPL-MCNC: 8.7 MG/DL (ref 8.6–10.3)
CHLORIDE SERPL-SCNC: 105 MEQ/L (ref 98–107)
CO2 SERPL-SCNC: 25 MEQ/L (ref 21–31)
CREAT SERPL-MCNC: 0.5 MG/DL (ref 0.6–1.2)
EGFRCR SERPLBLD CKD-EPI 2021: >60 ML/MIN/1.73M*2
ERYTHROCYTE [DISTWIDTH] IN BLOOD BY AUTOMATED COUNT: 13.6 % (ref 11.7–14.4)
GLUCOSE BLD-MCNC: 146 MG/DL (ref 70–99)
GLUCOSE BLD-MCNC: 151 MG/DL (ref 70–99)
GLUCOSE BLD-MCNC: 195 MG/DL (ref 70–99)
GLUCOSE BLD-MCNC: 241 MG/DL (ref 70–99)
GLUCOSE SERPL-MCNC: 171 MG/DL (ref 70–99)
HCT VFR BLDCO AUTO: 38 % (ref 35–45)
HGB BLD-MCNC: 11.9 G/DL (ref 11.8–15.7)
MAGNESIUM SERPL-MCNC: 2.1 MG/DL (ref 1.8–2.5)
MCH RBC QN AUTO: 28.3 PG (ref 28–33.2)
MCHC RBC AUTO-ENTMCNC: 31.3 G/DL (ref 32.2–35.5)
MCV RBC AUTO: 90.3 FL (ref 83–98)
PDW BLD AUTO: 11.2 FL (ref 9.4–12.3)
PLATELET # BLD AUTO: 418 K/UL (ref 150–369)
POCT ACT-LR: 199 SEC (ref 116–155)
POCT TEST: ABNORMAL
POTASSIUM SERPL-SCNC: 3.7 MEQ/L (ref 3.5–5.1)
RBC # BLD AUTO: 4.21 M/UL (ref 3.93–5.22)
SODIUM SERPL-SCNC: 138 MEQ/L (ref 136–145)
WBC # BLD AUTO: 36.81 K/UL (ref 3.8–10.5)

## 2024-01-17 PROCEDURE — 83735 ASSAY OF MAGNESIUM: CPT

## 2024-01-17 PROCEDURE — 75716 ARTERY X-RAYS ARMS/LEGS: CPT | Mod: XU | Performed by: INTERNAL MEDICINE

## 2024-01-17 PROCEDURE — C1760 CLOSURE DEV, VASC: HCPCS | Performed by: INTERNAL MEDICINE

## 2024-01-17 PROCEDURE — 63700000 HC SELF-ADMINISTRABLE DRUG: Performed by: PHYSICAL THERAPIST

## 2024-01-17 PROCEDURE — 63600000 HC DRUGS/DETAIL CODE: Mod: JW | Performed by: INTERNAL MEDICINE

## 2024-01-17 PROCEDURE — C1887 CATHETER, GUIDING: HCPCS | Performed by: INTERNAL MEDICINE

## 2024-01-17 PROCEDURE — C1894 INTRO/SHEATH, NON-LASER: HCPCS | Performed by: INTERNAL MEDICINE

## 2024-01-17 PROCEDURE — 25800000 HC PHARMACY IV SOLUTIONS

## 2024-01-17 PROCEDURE — 99233 SBSQ HOSP IP/OBS HIGH 50: CPT | Performed by: STUDENT IN AN ORGANIZED HEALTH CARE EDUCATION/TRAINING PROGRAM

## 2024-01-17 PROCEDURE — 047S3ZZ DILATION OF LEFT POSTERIOR TIBIAL ARTERY, PERCUTANEOUS APPROACH: ICD-10-PCS | Performed by: INTERNAL MEDICINE

## 2024-01-17 PROCEDURE — 21400000 HC ROOM AND CARE CCU/INTERMEDIATE

## 2024-01-17 PROCEDURE — 25000000 HC PHARMACY GENERAL: Performed by: INTERNAL MEDICINE

## 2024-01-17 PROCEDURE — 63700000 HC SELF-ADMINISTRABLE DRUG

## 2024-01-17 PROCEDURE — 85027 COMPLETE CBC AUTOMATED: CPT

## 2024-01-17 PROCEDURE — 25000000 HC PHARMACY GENERAL

## 2024-01-17 PROCEDURE — 04CU3ZZ EXTIRPATION OF MATTER FROM LEFT PERONEAL ARTERY, PERCUTANEOUS APPROACH: ICD-10-PCS | Performed by: INTERNAL MEDICINE

## 2024-01-17 PROCEDURE — 63600000 HC DRUGS/DETAIL CODE

## 2024-01-17 PROCEDURE — 36415 COLL VENOUS BLD VENIPUNCTURE: CPT

## 2024-01-17 PROCEDURE — C1726 CATH, BAL DIL, NON-VASCULAR: HCPCS | Performed by: INTERNAL MEDICINE

## 2024-01-17 PROCEDURE — 63700000 HC SELF-ADMINISTRABLE DRUG: Performed by: FAMILY MEDICINE

## 2024-01-17 PROCEDURE — 80048 BASIC METABOLIC PNL TOTAL CA: CPT

## 2024-01-17 PROCEDURE — 63600105 HC IODINE BASED CONTRAST: Performed by: INTERNAL MEDICINE

## 2024-01-17 PROCEDURE — 85347 COAGULATION TIME ACTIVATED: CPT | Performed by: INTERNAL MEDICINE

## 2024-01-17 PROCEDURE — 71000011 HC PACU PHASE 1 EA ADDL MIN: Performed by: INTERNAL MEDICINE

## 2024-01-17 PROCEDURE — C1725 CATH, TRANSLUMIN NON-LASER: HCPCS | Performed by: INTERNAL MEDICINE

## 2024-01-17 PROCEDURE — 63700000 HC SELF-ADMINISTRABLE DRUG: Performed by: INTERNAL MEDICINE

## 2024-01-17 PROCEDURE — 71000001 HC PACU PHASE 1 INITIAL 30MIN: Performed by: INTERNAL MEDICINE

## 2024-01-17 PROCEDURE — 047U3ZZ DILATION OF LEFT PERONEAL ARTERY, PERCUTANEOUS APPROACH: ICD-10-PCS | Performed by: INTERNAL MEDICINE

## 2024-01-17 PROCEDURE — 75710 ARTERY X-RAYS ARM/LEG: CPT | Mod: XU,LT | Performed by: INTERNAL MEDICINE

## 2024-01-17 PROCEDURE — B41D1ZZ FLUOROSCOPY OF AORTA AND BILATERAL LOWER EXTREMITY ARTERIES USING LOW OSMOLAR CONTRAST: ICD-10-PCS | Performed by: INTERNAL MEDICINE

## 2024-01-17 PROCEDURE — 63700000 HC SELF-ADMINISTRABLE DRUG: Performed by: NURSE PRACTITIONER

## 2024-01-17 PROCEDURE — 25800000 HC PHARMACY IV SOLUTIONS: Performed by: NURSE PRACTITIONER

## 2024-01-17 PROCEDURE — 37229 HC REVASC TIB,PERO,UNI W/ ATHER: CPT | Mod: LT | Performed by: INTERNAL MEDICINE

## 2024-01-17 PROCEDURE — C1769 GUIDE WIRE: HCPCS | Performed by: INTERNAL MEDICINE

## 2024-01-17 PROCEDURE — 04CS3ZZ EXTIRPATION OF MATTER FROM LEFT POSTERIOR TIBIAL ARTERY, PERCUTANEOUS APPROACH: ICD-10-PCS | Performed by: INTERNAL MEDICINE

## 2024-01-17 PROCEDURE — C1885 CATH, TRANSLUMIN ANGIO LASER: HCPCS | Performed by: INTERNAL MEDICINE

## 2024-01-17 DEVICE — ANGIO-SEAL VIP VASCULAR CLOSURE DEVICE
Type: IMPLANTABLE DEVICE | Site: GROIN | Status: FUNCTIONAL
Brand: ANGIO-SEAL

## 2024-01-17 RX ORDER — MIDAZOLAM HYDROCHLORIDE 2 MG/2ML
INJECTION, SOLUTION INTRAMUSCULAR; INTRAVENOUS
Status: DISCONTINUED | OUTPATIENT
Start: 2024-01-17 | End: 2024-01-17 | Stop reason: HOSPADM

## 2024-01-17 RX ORDER — ASPIRIN 81 MG/1
81 TABLET ORAL DAILY
Status: DISCONTINUED | OUTPATIENT
Start: 2024-01-18 | End: 2024-01-18

## 2024-01-17 RX ORDER — CLOPIDOGREL BISULFATE 75 MG/1
TABLET ORAL
Status: DISCONTINUED | OUTPATIENT
Start: 2024-01-17 | End: 2024-01-17 | Stop reason: HOSPADM

## 2024-01-17 RX ORDER — IOPAMIDOL 612 MG/ML
INJECTION, SOLUTION INTRAVASCULAR
Status: DISCONTINUED | OUTPATIENT
Start: 2024-01-17 | End: 2024-01-17 | Stop reason: HOSPADM

## 2024-01-17 RX ORDER — MELATONIN 5 MG
5 CAPSULE ORAL ONCE
Status: COMPLETED | OUTPATIENT
Start: 2024-01-18 | End: 2024-01-18

## 2024-01-17 RX ORDER — CLOPIDOGREL BISULFATE 75 MG/1
75 TABLET ORAL DAILY
Status: DISCONTINUED | OUTPATIENT
Start: 2024-01-18 | End: 2024-01-18

## 2024-01-17 RX ORDER — SODIUM CHLORIDE 9 MG/ML
INJECTION, SOLUTION INTRAVENOUS CONTINUOUS
Status: ACTIVE | OUTPATIENT
Start: 2024-01-17 | End: 2024-01-18

## 2024-01-17 RX ORDER — HEPARIN SODIUM 1000 [USP'U]/ML
INJECTION, SOLUTION INTRAVENOUS; SUBCUTANEOUS
Status: DISCONTINUED | OUTPATIENT
Start: 2024-01-17 | End: 2024-01-17 | Stop reason: HOSPADM

## 2024-01-17 RX ORDER — FENTANYL CITRATE 50 UG/ML
INJECTION, SOLUTION INTRAMUSCULAR; INTRAVENOUS
Status: DISCONTINUED | OUTPATIENT
Start: 2024-01-17 | End: 2024-01-17 | Stop reason: HOSPADM

## 2024-01-17 RX ORDER — LIDOCAINE HYDROCHLORIDE 10 MG/ML
INJECTION, SOLUTION INFILTRATION; PERINEURAL
Status: DISCONTINUED | OUTPATIENT
Start: 2024-01-17 | End: 2024-01-17 | Stop reason: HOSPADM

## 2024-01-17 RX ORDER — BISACODYL 10 MG/1
10 SUPPOSITORY RECTAL ONCE
Status: COMPLETED | OUTPATIENT
Start: 2024-01-17 | End: 2024-01-17

## 2024-01-17 RX ORDER — INSULIN GLARGINE 100 [IU]/ML
4 INJECTION, SOLUTION SUBCUTANEOUS NIGHTLY
Status: DISCONTINUED | OUTPATIENT
Start: 2024-01-17 | End: 2024-01-18

## 2024-01-17 RX ORDER — NAPROXEN SODIUM 220 MG/1
324 TABLET, FILM COATED ORAL ONCE
Status: COMPLETED | OUTPATIENT
Start: 2024-01-17 | End: 2024-01-17

## 2024-01-17 RX ORDER — BISACODYL 10 MG/1
10 SUPPOSITORY RECTAL DAILY PRN
Status: DISCONTINUED | OUTPATIENT
Start: 2024-01-17 | End: 2024-01-17

## 2024-01-17 RX ADMIN — HYDROCHLOROTHIAZIDE 12.5 MG: 12.5 TABLET ORAL at 08:06

## 2024-01-17 RX ADMIN — GABAPENTIN 300 MG: 300 CAPSULE ORAL at 21:28

## 2024-01-17 RX ADMIN — ACETAMINOPHEN 650 MG: 325 TABLET ORAL at 21:28

## 2024-01-17 RX ADMIN — GABAPENTIN 300 MG: 300 CAPSULE ORAL at 08:06

## 2024-01-17 RX ADMIN — LOSARTAN POTASSIUM 50 MG: 25 TABLET, FILM COATED ORAL at 08:07

## 2024-01-17 RX ADMIN — SODIUM CHLORIDE: 9 INJECTION, SOLUTION INTRAVENOUS at 18:49

## 2024-01-17 RX ADMIN — BISACODYL 10 MG: 10 SUPPOSITORY RECTAL at 11:59

## 2024-01-17 RX ADMIN — INSULIN LISPRO 3 UNITS: 100 INJECTION, SOLUTION INTRAVENOUS; SUBCUTANEOUS at 11:53

## 2024-01-17 RX ADMIN — ASPIRIN 81 MG CHEWABLE TABLET 324 MG: 81 TABLET CHEWABLE at 11:59

## 2024-01-17 RX ADMIN — VANCOMYCIN HYDROCHLORIDE 1250 MG: 500 INJECTION, POWDER, LYOPHILIZED, FOR SOLUTION INTRAVENOUS at 20:30

## 2024-01-17 RX ADMIN — ATORVASTATIN CALCIUM 10 MG: 20 TABLET, FILM COATED ORAL at 08:05

## 2024-01-17 RX ADMIN — LEVOTHYROXINE SODIUM 25 MCG: 0.03 TABLET ORAL at 05:32

## 2024-01-17 RX ADMIN — INSULIN LISPRO 3 UNITS: 100 INJECTION, SOLUTION INTRAVENOUS; SUBCUTANEOUS at 22:02

## 2024-01-17 RX ADMIN — POLYETHYLENE GLYCOL 3350 17 G: 17 POWDER, FOR SOLUTION ORAL at 08:07

## 2024-01-17 RX ADMIN — VANCOMYCIN HYDROCHLORIDE 1250 MG: 500 INJECTION, POWDER, LYOPHILIZED, FOR SOLUTION INTRAVENOUS at 05:32

## 2024-01-17 RX ADMIN — AMLODIPINE BESYLATE 5 MG: 5 TABLET ORAL at 08:05

## 2024-01-17 RX ADMIN — INSULIN GLARGINE 4 UNITS: 100 INJECTION, SOLUTION SUBCUTANEOUS at 22:02

## 2024-01-17 ASSESSMENT — ENCOUNTER SYMPTOMS
BRUISES/BLEEDS EASILY: 0
PALPITATIONS: 0
CHEST TIGHTNESS: 0
HEMATURIA: 0
SHORTNESS OF BREATH: 0
LIGHT-HEADEDNESS: 0
WEAKNESS: 0
FATIGUE: 0
SEIZURES: 0
SPEECH DIFFICULTY: 0
COLOR CHANGE: 0
COUGH: 0
ACTIVITY CHANGE: 0
BLOOD IN STOOL: 0
UNEXPECTED WEIGHT CHANGE: 0
APPETITE CHANGE: 0
MYALGIAS: 0
NAUSEA: 0
WOUND: 1
DIAPHORESIS: 0
VOMITING: 0
NUMBNESS: 0
FEVER: 0
APNEA: 0
DIZZINESS: 0

## 2024-01-17 NOTE — DISCHARGE INSTRUCTIONS
1. Hospital course  You were admitted to the hospital for reason of bone infection of your toe.  During hospitalization you received intravenous antibiotics and seen by a vascular surgeon and podiatry.  You had a surgery of your toe performed to resect the infection and you were started on IV antibiotics and transition to oral antibiotics for total of 10 days.  It is recommended that you follow-up with your primary care physician upon discharge to have repeat lab work performed and to follow-up with wound care.    We did basic test investigations to determine the cause of this and consulted relevant specialist doctors to assist with your management.  Please follow the following instructions on the day of your discharge.    2. Medications    START taking:  Keflex 500 mg twice daily for 10 days  Plavix 75 mg, daily  Aspirin 81 mg, daily    CHANGE how you take:  Atorvastatin 40 mg, nightly    CONTINUE ALL OTHER HOME MEDICATIONS      3. Follow up    Please make an appointment with your Primary Care Provider Dr. Denise MD. Anytime you are in the hospital you should be seen by your PCP within one to two weeks after discharge.    Please follow up with wound care and your primary care physician to have a repeat CBC lab drawn to monitor your white blood cell count.    Please follow-up with cardiology upon your discharge restarted on aspirin and Plavix 75 mg, daily aspirin 81 mg Gaffney no one read this      Periperhal angiogram with atherectomy\PTA left AT performed by Dr Adam Aguilar 1-17-24    Avoid ibuprofen-containing products such as Motrin, Advil, Aleve etc. as they can increase your risk of bleeding. You may use Tylenol or its generic, Acetaminophen as a substitute as directed on the label.     Report any bleeding problems immediately: blood in urine, stool, frequent or severe nosebleeds, or dark or tarry stools.           Puncture Site Care Instructions:  Report any puncture site problems immediately: Redness,  swelling > golf ball size, drainage, increasing pain, numbness, or weakness of the extremity.   Remove dressing 24 hours after catheterization. May shower 24 hours after catheterization. No vigorous rubbing at the puncture site. Rinse, pat dry.   No tub baths, hot tubs, or swimming X 5 days. No lotions, colognes, perfumes, or powders at the puncture site X 1 week.   No heavy lifting of > 8 pounds for 1 week.   Avoid any strenuous activity x 1 week.       [facility]  [address], [place] [address]  [phone]    Dr. Getachew Damian      Post Operative Instructions    Keep foot elevated as much as possible above the level of the heart.    Keep bandage clean and dry.      Wound care: betadine, adaptic, 4x4 gauze, kerlix, light ACE wrap.     Take mediation as prescribed:  Keflex twice a day for 10 days    Weight bearing as tolerated in surgical shoe.       Should you have excessive bleeding (through the bandage) or excessive discomfort, please call the office immediately.    Please call the office number above: to set up your 1st follow up appointment.

## 2024-01-17 NOTE — ASSESSMENT & PLAN NOTE
Continues on Rybelsus, Jardiance and insulin.  Followed by [facility].

## 2024-01-17 NOTE — PROGRESS NOTES
Infectious Disease Progress Note    Patient Name: Bebe Burks  MR#: [id number]  : 1960  Admission Date: 1/15/2024  Date: 24   Time: 4:42 PM   Author: Sander Ackerman MD    OBJECTIVE:  The patient went for angiogram with cardiology    Antibiotics:    Anti-infectives (From admission, onward)    Start     Dose/Rate Route Frequency Ordered Stop    24 0600  [MAR Hold]  vancomycin 1.25 gram/250 mL IVPB in NSS        (MAR Hold since Wed 2024 at 1540.Hold Reason: Procedural Area Transfer Orders)   See Hyperspace for full Linked Orders Report.    1,250 mg  166.7 mL/hr over 90 Minutes intravenous Every 12 hours 01/15/24 1802            ROS    Vital Signs:    Temp:  [36.1 °C (97 °F)-37 °C (98.6 °F)] 37 °C (98.6 °F)  Heart Rate:  [] 73  Resp:  [14-20] 14  BP: (117-173)/(55-80) 173/80    Temp (72hrs), Av.7 °C (98 °F), Min:36.1 °C (97 °F), Max:37.3 °C (99.2 °F)      Physical Exam    Gen: Aox3  HEENT: OP clear  Neck: Supple  LAD: No cervical LAD  Lungs: CTAB  CV: RRR no murmurs  Abd: Soft NTND +BS  Ext: Gangrenous foot  Skin: no rash  Neuro: II-XII intact        Lines, Drains, Airways, Wounds:  Peripheral IV (Adult) 01/15/24 Right Antecubital (Active)   Number of days: 2       Wound Anterior;Left Toe (2nd) (Active)   Number of days: 2       Labs:    Lab Results   Component Value Date    WBC 36.81 (HH) 2024    HGB 11.9 2024    HCT 38.0 2024    MCV 90.3 2024     (H) 2024     Lab Results   Component Value Date    GLUCOSE 171 (H) 2024    CALCIUM 8.7 2024     2024    K 3.7 2024    CO2 25 2024     2024    BUN 24 2024    CREATININE 0.5 (L) 2024     Lab Results   Component Value Date    ALT 14 01/15/2024    AST 18 01/15/2024    ALKPHOS 70 01/15/2024    BILITOT 1.5 (H) 01/15/2024         Patient Active Problem List   Diagnosis Code   • Controlled type 2 diabetes mellitus, with long-term current use of  insulin (CMS/[facility]) E11.9, Z79.4   • Essential hypertension I10   • Hyperlipidemia E78.5   • Hypothyroidism E03.9   • Obesity E66.9   • Thyroid nodule E04.1   • COVID-19 virus infection U07.1   • Lung nodule R91.1   • Displacement of lumbar intervertebral disc without myelopathy M51.26   • Disability of walking R26.2   • Polyneuropathy due to type 2 diabetes mellitus (CMS/[facility]) E11.42   • Pre-op examination Z01.818   • Localized osteoarthritis of right knee M17.11   • Type 2 diabetes mellitus treated with insulin (CMS/[facility]) E11.9, Z79.4   • Abnormal finding on EKG R94.31   • Left foot drop M21.372   • Abnormal finding on chest xray R93.89   • Osteoarthritis of right knee, unspecified osteoarthritis type M17.11   • S/P total knee arthroplasty, right Z96.651   • Sepsis (CMS/[facility]) A41.9   • Osteomyelitis of left foot (CMS/[facility]) M86.9   • Peripheral neuropathy G62.9   • Abdominal pain R10.9   • Chronic idiopathic constipation K59.04   • Gangrene (CMS/H[facility]) I96     Gangrene (CMS/[facility])  Assessment & Plan  Continue with vancomycin  Recommend obtaining cultures    * Osteomyelitis of left foot (CMS/[facility])  Assessment & Plan  Awaiting for surgical treatment plan following his angiogram          Sander Ackerman MD  1/17/20244:42 PM

## 2024-01-17 NOTE — PROGRESS NOTES
Subjective:   Pt seen at bedside for continued care of left second digit.NAD. No overnight events. Dressing clean, dry, and intact. Denies any N/V/F/C/CP/SOB.     ROS:   Past Medical/Surgical history, Allergies, Meds reviewed in detail as charted  FH/SH reviewed in detail as charted  Review of Systems:  Head and Neck: No complaints  Chest : No complaints  Abdomen: No Complaints  Constitutional: Unremarkable     Vitals: I have reviewed the patient's current vital signs as documented in the patient's EMR.    Radiology: No new Radiology.  Labs:   CBC Results       01/17/24 01/16/24 01/15/24     0536 0757 1345    WBC 36.81 48.98 56.92    RBC 4.21 4.42 4.99    HGB 11.9 12.5 14.3    HCT 38.0 41.6 45.2    MCV 90.3 94.1 90.6    MCH 28.3 28.3 28.7    MCHC 31.3 30.0 31.6     420 499         Comment for WBC at 0536 on 01/17/24: ALL RESULTS HAVE BEEN CHECKED This result has been called to ashley hein by Cait on 01/17/2024 06:28:34, and has been read back.     Comment for WBC at 0757 on 01/16/24: This result has been called to JARED by Casper on 01/16/2024 08:18:59, and has not been read back. CONSISTENT WITH PREVIOUS RESULTS.    Comment for WBC at 1345 on 01/15/24: This result has been called to DR AUSTYN AGUILAR by Tana Urban on 01/15/2024 14:10:15, and has been read back.           BMP Results       01/17/24 01/16/24 01/15/24     0536 0757 1345     139 136    K 3.7 4.5 4.4    Cl 105 105 97    CO2 25 21 24    Glucose 171 180 269    BUN 24 31 29    Creatinine 0.5 0.7 0.8    Calcium 8.7 8.9 10.7    Anion Gap 8 13 15    EGFR >60.0 >60.0 >60.0         Comment for K at 1345 on 01/15/24: Results obtained on plasma. Plasma Potassium values may be up to 0.4 mEQ/L less than serum values. The differences may be greater for patients with high platelet or white cell counts.    Comment for EGFR at 0536 on 01/17/24: Calculation based on the Chronic Kidney Disease Epidemiology Collaboration (CKD-EPI) equation refit without  adjustment for race.    Comment for EGFR at 0757 on 01/16/24: Calculation based on the Chronic Kidney Disease Epidemiology Collaboration (CKD-EPI) equation refit without adjustment for race.    Comment for EGFR at 1345 on 01/15/24: Calculation based on the Chronic Kidney Disease Epidemiology Collaboration (CKD-EPI) equation refit without adjustment for race.          Objective:     Physical Exam:  -Vascular: dp/pt pulses palpable bilaterally, CRT <3s  -Orthopedic: decreased ROM at ankle jt b/l, +DF/PF all digits  -Neurologic: light touch and epicritic sensation diminished  -Dermatologic:   Left second digit has necrotic changes to the distal aspect of the toe. The proximal toe has edema and erythema. The toe is noted to be enlarged with xerotic skin peeling off. Erythema noted to the dorsum of the foot extending back to the midfoot. Mild malodor. No purulence, no crepitus, no fluctuance.         Assessment : Bebe Burks is a 63 year old with t2dm presenting with concern of osteomyelitis to the left 2nd digit        Plan   -Patient seen at bedside by Dr. Damian, discussed with patient concern for blood flow and told her that there would be a vascular consult placed before surgical intervention.  Also discussed courses of treatment between IV antibiotics and amputation.  Advised patient that amputation would be the best course of action to ensure removal of infection from foot.  Patient agreeable to second digit amputation.  Patient's sister on the phone during encounter also agreeable to the second digit amputation.  -Interventional cardiology consulted Dr. Aguilar for evaluation of blood flow and pre-operative clearance  -Examined and evaluated patient with all questions and concerns addressed.  -Dressed with betadine dsd.  -Will follow patient while in hospital.   -X ray: Ulceration of the distal aspect of the left second digit with  findings of osteomyelitis involving the distal phalanx and possibly the  mid  phalanx as well.  -MRI :   Gangrenous appearing second toe with marked erosion of the second distal phalanx  either due to osteomalacia gangrene.  Findings are also suggestive of osteomyelitis in the middle phalanx of the  second toe with nonspecific marrow changes in the distal portions of the  proximal phalanx of the second toe.  No evidence of osteomyelitis elsewhere.  -Blood cultures pending.  -Plan to take patient to OR Thursday for amputation with Dr. Damian.  - Labs and radiographs reviewed.  -please contact on call podiatry resident with any questions or concerns     RENEE Cee DPM  PGY-1 #[id number]

## 2024-01-17 NOTE — PROGRESS NOTES
Status post peripheral angiogram with atherectomy\PTA of left AT artery.  Right groin site stable with no bleeding or hematoma noted.  Distal pulses verified with Doppler.  Verified Plavix load.  She will need to continue on Plavix 75 mg daily as well as aspirin 81 mg daily thru her surgery.  Reviewed with patient activity restrictions and puncture site care and the importance of DAPT compliance. Also, updated patient's sister's per pt request.

## 2024-01-17 NOTE — ASSESSMENT & PLAN NOTE
Continues on atorvastatin.  Patient states her lipids are managed by PHIL Valdez.  Consider up titration after results of angiogram are known.

## 2024-01-17 NOTE — PRE-PROCEDURE NOTE
Peripheral Vascular Pre-procedure Note    - Patient was seen and examined at bedside.  - The patient's chart and all data was reviewed.  - The procedure, treatment alternatives, risks and benefits were explained with specific risks discussed.  - Patient's case was found appropriate for dual antiplatelet therapy.    Pertinent Past Medical History  Prior CABG status: none  Prior PCI status: none  Prior CEA/DESMOND status: Neither:   Prior aneurysm repair status: None    Previous Peripheral Vascular Treatment  Inflow treatment: Right - None, Left - None  Leg treatment: Right - None, Left - None  Femoral endarterectomy: None      Current Physical Presentation  Pathology - Occlusive disease  Leg Symptoms:   · Right lower extremity - Asymptomatic  · Left lower extremity - Ulcer/necrosis         Pre-sedation assessment  ASA 2   Mallampati class: II - soft palate, uvula, fauces visible.

## 2024-01-17 NOTE — PROGRESS NOTES
Hospital Medicine Service -  Daily Progress Note       SUBJECTIVE   Interval History:   TOMMY GORDILLO    Patient seen this morning reports resolution of right-sided hip pain.  Slight nausea, however was able to tolerate dinner last night.  Discussed recent A1c and blood work as well as recent MRI. Patient denies any complaints of N/V/D, SOB, CP, Headaches. Patient is resting comfortably in bed.      OBJECTIVE      Vital signs in last 24 hours:  Temp:  [36.1 °C (97 °F)-37 °C (98.6 °F)] 37 °C (98.6 °F)  Heart Rate:  [] 89  Resp:  [18-20] 18  BP: (117-172)/(55-80) 142/69  No intake or output data in the 24 hours ending 01/17/24 1328    PHYSICAL EXAMINATION      Physical Exam  Vitals reviewed.   Constitutional:       Appearance: She is not ill-appearing.   HENT:      Head: Normocephalic and atraumatic.   Cardiovascular:      Rate and Rhythm: Normal rate and regular rhythm.      Pulses:           Dorsalis pedis pulses are 2+ on the right side and 2+ on the left side.        Posterior tibial pulses are 2+ on the right side and 2+ on the left side.   Pulmonary:      Effort: Pulmonary effort is normal.      Breath sounds: Normal breath sounds.   Musculoskeletal:         General: Tenderness present.      Right lower leg: No edema.      Left lower leg: No edema.      Comments: Right-sided tenderness to palpation along the IT band, no erythema/edema   Feet:      Comments: Left foot second digit gangrenous in appearance with erythema and edema extending to midfoot; reports no sensation on second digit left foot  Neurological:      Mental Status: She is alert and oriented to person, place, and time.   Psychiatric:         Mood and Affect: Mood normal.         Behavior: Behavior normal.    LINES, CATHETERS, DRAINS, AIRWAYS, AND WOUNDS   Lines, Drains, and Airways:  Wounds (agree with documentation and present on admission):  Peripheral IV (Adult) 01/15/24 Right Antecubital (Active)   Number of days: 2       Peripheral IV  (Adult) 01/15/24 Left Antecubital (Active)   Number of days: 2       Wound Anterior;Left Toe (2nd) (Active)   Number of days: 2          LABS / IMAGING / TELE      Labs  Results from last 7 days   Lab Units 01/17/24  0536 01/16/24  0757 01/15/24  1345   WBC K/uL 36.81* 48.98* 56.92*   HEMOGLOBIN g/dL 11.9 12.5 14.3   HEMATOCRIT % 38.0 41.6 45.2*   PLATELETS K/uL 418* 420* 499*         Results from last 7 days   Lab Units 01/17/24  0536 01/16/24  0757 01/15/24  1345   SODIUM mEQ/L 138 139 136   POTASSIUM mEQ/L 3.7 4.5 4.4   CHLORIDE mEQ/L 105 105 97*   CO2 mEQ/L 25 21 24   BUN mg/dL 24 31* 29*   CREATININE mg/dL 0.5* 0.7 0.8   CALCIUM mg/dL 8.7 8.9 10.7*   ALBUMIN g/dL  --   --  4.2   BILIRUBIN TOTAL mg/dL  --   --  1.5*   ALK PHOS IU/L  --   --  70   ALT IU/L  --   --  14   AST IU/L  --   --  18   GLUCOSE mg/dL 171* 180* 269*               SARS-CoV-2 (COVID-19) (no units)   Date/Time Value   01/15/2024 1345 Negative       Imaging  I have independently reviewed the pertinent imaging from the last 24 hrs.  MRI FOOT LEFT WITH AND WITHOUT CONTRAST    Result Date: 1/16/2024  CLINICAL HISTORY: Abscess / om/ gas    second toe wound.     IMPRESSION: Gangrenous appearing second toe with marked erosion of the second distal phalanx either due to osteomalacia gangrene. Findings are also suggestive of osteomyelitis in the middle phalanx of the second toe with nonspecific marrow changes in the distal portions of the proximal phalanx of the second toe. No evidence of osteomyelitis elsewhere. Limited study due to significant motion artifact. COMMENT: Comparison: Wound care pictures from 1/16/2024 and x-rays from 1/15/2024. Technique: MRI of the left foot without and with contrast. Study is significantly limited due to motion artifact. 8.6mL of gadopiclenol (VUEWAY) injection 8.6 mL Findings: In the second toe the distal phalanx is not identified and is probably nearly completely eroded with a tiny irregular stump visible on the  x-ray. This could be related to gangrene and osteomyelitis. Patellar and wound care pictures does appear to demonstrate severe wound possibly gangrene. Please correlate with clinical evaluation. Abnormal marrow edema in the second toe middle phalanx. The T1 signal is somewhat difficult to assess although does appear to be slightly replaced suspicious for gangrene and/or osteomyelitis. Small amount of nonspecific marrow edema in the head/distal portions of the proximal phalanx of the second toe without evidence of definite T1 marrow signal abnormality. No evidence of osteomyelitis elsewhere in the toes. No fracture. Alignment appears anatomic with intact Lisfranc ligament and satisfactory Lisfranc alignment. Mild scattered degenerative joint disease is noted. Nonspecific edema and partial fatty atrophy of the intrinsic musculature of the foot suspicious for denervation. Tendons are difficult to assess due to degraded image quality particularly distally. There is no evidence of drainable soft tissue abscess.    Ultrasound ERIKA extremity    Result Date: 1/16/2024  •  Comments:      The blood pressure cuff at the right post tibial, right dorsalis pedis, left post tibial and left dorsalis pedis is unable to compress the underlying vessel. ERIKA and waveform analysis complete Noncompressible DP PT bilaterally preclude accurate assessment of ABIs.  Likely secondary to significant calcification. Dampening of waveforms bilaterally indicative of at least moderate proximal atherosclerotic disease Reduction in toe pressures bilaterally likely indicative of at least moderate microvascular disease         ECG/Telemetry  I have independently reviewed the telemetry. No events for the last 24 hours.    ASSESSMENT AND PLAN      Sepsis (CMS/[facility])  Assessment & Plan  Sepsis 2/2 Osteomyelitis of the (L) 2nd toe  Xray (L) Foot: Ulcerations of (L) 2nd digit consistent with OM.  Blood cultures negative 18 to 24 hours  MRI left foot showing  "gangrenous second toe with marked erosion in the second distal phalanx suggestive of osteomyelitis    P:   Discontinued ceftriaxone 2 g on 1/16  Continue IV vancomycin (1/15-  Surgery scheduled for tomorrow  NPO at midnight  Podiatry/ID following    * Osteomyelitis of left foot (CMS/[facility])  Assessment & Plan  See \"sepsis \"problem    Abdominal pain  Assessment & Plan  C/o persistent vomiting for 24 hrs, reports constipation for 2 days  Recent hx doxycycline course for 2 weeks  PE: Tenderness in all 4 quadrants,  Negative rebound tenderness  Received Zofran 4mg x 2 in the ED (2:30pm and 3:00pm)  Suspect abdominal pain secondary to constipation versus septic    Plan  Cont Zofran 4mg every 6 hrs prn   Continue Miralax 17g daily   Given one-time dose Dulcolax suppository    Chronic idiopathic constipation  Assessment & Plan  Patient complains of constipation today.  Has been ongoing for 2 days.   Normally takes Dulcolax at home    P:   Abdominal pain    Peripheral neuropathy  Assessment & Plan  Hx of Peripheral neuropathy  PTA meds Gabapentin 300mg, 3 times days    Plan  Continue PTA Gabapentin 300mg, 3 times daily    Hypothyroidism  Assessment & Plan  Hx of hypothyroid, currently taking synthroid     Plan  Continue PTA Synthroid     Essential hypertension  Assessment & Plan  Home Meds: Amlodipine, Hctz, and Losartan (last taken 1/14/2024)  OA: BP: 175/78 mmHg -> 137/68 mmHg    Plan  Cont PTA Losartan and amlodipine   Will start PTA Hctz on 1/16/204    Controlled type 2 diabetes mellitus, with long-term current use of insulin (CMS/[facility])  Assessment & Plan  Home meds: Jardiance, insulin, rybelsus   Last A1c 10.5 (8.8 on 3/2023)  OA: Bld glucose 269    Plan  Lantus 4 units + SSI (due to n.p.o. at midnight).  Return to Lantus 10 units following operation.   Follow-up outpatient with PCP regarding diabetes management  Hold Home Meds Ld Blandbelsus, home insulin regiment         VTE Assessment: [place] VTE Score: 1  VTE " Prophylaxis:  Current anticoagulants:    •None      Code Status: Full Code      Estimated Discharge Date: 1/19/2024     Disposition Planning: Pending further medical management     Bryanna Solorio DO Pager #[phone]  Hospital Medicine Service  [facility] Resident  1/17/2024   Please see attending attestation for final recommendations

## 2024-01-17 NOTE — ASSESSMENT & PLAN NOTE
EKG notes sinus rhythm with septal infarct in which anteriorlateral MI cannot be ruled out.  Her EKG is unchanged from prior.  Reports not having a stress test in years.  She denies chest pain and shortness of breath at her limited functional capacity due to left foot drop and use of a cane.  She will require cardiac clearance prior to her upcoming podiatry surgery.  Consider cardiac catheterization at the same time of her angiogram to help risk stratify.  Patient has multiple cardiac risk factors as well as a family history of premature coronary disease in her mother who had an MI at 64 and her father had a CVA at age 56.

## 2024-01-17 NOTE — ASSESSMENT & PLAN NOTE
Osteomyelitis left second toe.  Arterial studies noted noncompressible vessels.  Distal pulses diminished.  High likelihood of PAD.  Recommend peripheral angiogram with endovascular intervention as needed.  The risks and benefits of the procedure were explained.  Dr. Aguilar will obtain consent.  To get an aspirin load precath.  Patient reports having urinary retention after anesthesia in the past.  Consider giving more Versed then fentanyl to minimize risk of urinary retention.

## 2024-01-17 NOTE — H&P (VIEW-ONLY)
Cardiology Consult      Subjective     Bebe Burks is a 63 y.o. female who was admitted for Osteomyelitis of second toe of left foot (CMS/[facility]) [M86.9]  Nausea and vomiting, unspecified vomiting type [R11.2]. Patient was referred by podiatry for vascular evaluation. Patient has a history of DM II on insulin, periperhal neuropathy, abnormal EKG that an anterior septal MI cannot be excluded, hyperlipidemia and HTN who presented to [facility] with N/V chills, fatigue abdominal pain and found to have a significant gangrenous left 2nd toe necrosis. She reports dropping an objet on her left foot about 2 weeks ago after having a callus removed. On presentation in the ER, her WBC count was elevated to 56 and she was tachycardiac.Pt was seen by ID, antibiotics started MRI was suggested of osteomyelitis in the middle phalanx of the second toe with nonspecific marrow changes in the distal portions of the proximal phalanx of the second toe.  He reports limited functional activity due to left foot drop and the use of a cane and denies having any obvious claudication symptoms prior to injuring her left foot.  She denies having any leg pain at rest but notes she has some peripheral neuropathy.  She underwent arterial studies which noted noncompressible vessels.  She denies having any chest pain, shortness of breath, lightheadedness, dizziness, syncope or presyncope.  She denies having ever smoked.  She states she had a stress test years ago and had an echocardiogram in May when she was told she had an abnormal EKG in February.  The echo was fairly unremarkable with a preserved ejection fraction with mild mitral stenosis noted.  Podiatry plans to take her to the OR for possible toe amputation on January 18.  She is recommended to undergo a peripheral angiogram with possible vascular intervention to promote wound healing potential.    Paula Walker MD  Medical History:   Past Medical History:   Diagnosis Date   • Abnormal ECG    •  "Abnormal finding on chest xray 12/2021   • Arthritis    • Colon cancer (CMS/HCC)    • COVID-19 12/2021   • COVID-19 vaccine series completed     Moderna: \" 3 doses\"   • Diabetic neuropathy (CMS/HCC)    • DM (diabetes mellitus), type 2 with ophthalmic complications (CMS/HCC)    • Hypertension    • Hypothyroidism    • Left foot drop    • Lipid disorder    • Type 2 diabetes mellitus treated with insulin (CMS/HCC)        Surgical History:   Past Surgical History:   Procedure Laterality Date   • ABCESS DRAINAGE  10/2023   • CATARACT EXTRACTION Bilateral    • COLONOSCOPY     • COMBINED HYSTEROSCOPY DIAGNOSTIC / D&C  2007   • EYE SURGERY Left 2017    cataract   • JOINT REPLACEMENT Right 02/2023    knee replacement   • VITRECTOMY Left 2016       Social History:   Social History     Social History Narrative    Lives alone in a condo     Social History     Tobacco Use   • Smoking status: Never     Passive exposure: Past   • Smokeless tobacco: Never   Vaping Use   • Vaping Use: Never used   Substance Use Topics   • Alcohol use: Yes     Comment: occasionally   • Drug use: Never       Family History:   Family History   Problem Relation Age of Onset   • Diabetes Biological Mother    • Hypertension Biological Mother    • Stroke Biological Father        Allergies: Citrus and derivatives    Home Medications:  •  amLODIPine, TAKE 1 TABLET BY MOUTH EVERY DAY  •  atorvastatin, TAKE 1 TABLET BY MOUTH EVERY DAY  •  DEXCOM G7 SENSOR, 1 each continuously. Change every 10 days  •  empagliflozin, Take 1 tablet (10 mg total) by mouth daily. TAKE 1 TABLET BY MOUTH EVERY DAY  •  FREESTYLE LAURA 14 DAY READER, 1 each continuously. 1 reader dx code E 11.9  •  FREESTYLE LAURA 14 DAY SENSOR, 1 each continuously. 1 sensor every 14 days dx code E 11.9  •  gabapentin, TAKE ONE CAPSULE BY MOUTH EVERY 8 HOURS WHILE AWAKE & TAKE ONE CAPSULE AT BEDTIME  •  hydrochlorothiazide, TAKE 1 TABLET BY MOUTH EVERY DAY  •  insulin asp prt-insulin aspart, INJECT 25 " UNITS under the skin  IN AM AND 30 UNITS IN PM  •  levothyroxine, TAKE 1 TABLET BY MOUTH EVERY DAY  •  losartan, TAKE 1 TABLET BY MOUTH EVERY DAY  •  ONETOUCH DELICA LANCETS, 1 each 3 (three) times a day.  •  pen needle, diabetic, USE TWICE A DAY  •  RYBELSUS, TAKE 1 TABLET (14 MG TOTAL) BY MOUTH DAILY  •  aspirin, Take 1 tablet (81 mg total) by mouth daily. HOLD for 4 weeks  •  aspirin, Take 1 tablet (81 mg total) by mouth 2 (two) times a day. Take Asprin for 4 weeks. Please obtain over the counter.  •  polyethylene glycol, Take 17 g by mouth daily as needed (constipation). Please obtain over the counter    Current Facility-Administered Medications:   •  acetaminophen (TYLENOL) tablet 650 mg, 650 mg, oral, q6h PRN, Oyesile, Shon, DO, 650 mg at 01/16/24 2300  •  amLODIPine (NORVASC) tablet 5 mg, 5 mg, oral, Daily, Oyesile, Shon, DO, 5 mg at 01/17/24 0805  •  atorvastatin (LIPITOR) tablet 10 mg, 10 mg, oral, Daily, Oyesile, Shon, DO, 10 mg at 01/17/24 0805  •  glucose chewable tablet 16-32 g of dextrose, 16-32 g of dextrose, oral, PRN **OR** dextrose 40 % oral gel 15-30 g of dextrose, 15-30 g of dextrose, oral, PRN **OR** glucagon (GLUCAGEN) injection 1 mg, 1 mg, intramuscular, PRN **OR** dextrose 50 % in water (D50) injection 12.5 g, 25 mL, intravenous, PRN, Oyesile, Shon, DO  •  gabapentin (NEURONTIN) capsule 300 mg, 300 mg, oral, TID, Oyesile, Shon, DO, 300 mg at 01/17/24 0806  •  hydrochlorothiazide (HYDRODIURIL) tablet 12.5 mg, 12.5 mg, oral, Daily, Oyesile, Shon, DO, 12.5 mg at 01/17/24 0806  •  insulin glargine U-100 (LANTUS/BASAGLAR) pen 4 Units, 4 Units, subcutaneous, Nightly, Bárbara Brown MD  •  insulin lispro U-100 (HumaLOG) pen 3-5 Units, 3-5 Units, subcutaneous, With meals & nightly, Emanuel Denise DO, 3 Units at 01/17/24 1153  •  levothyroxine (SYNTHROID) tablet 25 mcg, 25 mcg, oral, Daily, Shon Thornton DO, 25 mcg at 01/17/24 0568  •  lidocaine (ASPERCREME) 4 % topical  patch 1 patch, 1 patch, Topical, Daily PRN, Bryanna Elliott, , 1 patch at 24 1524  •  losartan (COZAAR) tablet 50 mg, 50 mg, oral, Daily, OyesilDaisy plazaShon, , 50 mg at 24 0807  •  ondansetron (ZOFRAN) injection 4 mg, 4 mg, intravenous, q6h PRN, OyesileDaisyShon, DO, 4 mg at 01/15/24 2234  •  polyethylene glycol (MIRALAX) 17 gram packet 17 g, 17 g, oral, Daily, Emanuel Denise DO, 17 g at 24 0807  •  vancomycin 1.25 gram/250 mL IVPB in NSS, 1,250 mg, intravenous, q12h HENOK, Stopped at 24 0700 **AND** [] IV Vancomycin Therapy by Pharmacy Protocol, , , Once, Emanuel Denise DO  Review of Systems  Review of Systems   Constitutional: Negative for activity change, appetite change, diaphoresis, fatigue, fever and unexpected weight change.   HENT: Negative for congestion and nosebleeds.    Eyes: Negative for visual disturbance.   Respiratory: Negative for apnea, cough, chest tightness and shortness of breath.    Cardiovascular: Negative for chest pain, palpitations and leg swelling.   Gastrointestinal: Negative for blood in stool, nausea and vomiting.   Genitourinary: Negative for hematuria.   Musculoskeletal: Negative for myalgias.   Skin: Positive for wound (left 2nd toe). Negative for color change and pallor.   Neurological: Negative for dizziness, seizures, syncope, speech difficulty, weakness, light-headedness and numbness.        Balance issues, walks with cane reports left foot drop   Hematological: Does not bruise/bleed easily.       Remaining 14 point review of systems are negative.  Objective   Labs      Results from last 7 days   Lab Units 24  0536   SODIUM mEQ/L 138   POTASSIUM mEQ/L 3.7   CHLORIDE mEQ/L 105   CO2 mEQ/L 25   BUN mg/dL 24   CREATININE mg/dL 0.5*   GLUCOSE mg/dL 171*   CALCIUM mg/dL 8.7     Results from last 7 days   Lab Units 24  0536   WBC K/uL 36.81*   HEMOGLOBIN g/dL 11.9   HEMATOCRIT % 38.0   PLATELETS K/uL 418*     Troponin I Results        12/02/21     1618    Troponin I <0.02        Microbiology Results     Procedure Component Value Units Date/Time    Blood Culture Blood, Venous [[id number]]  (Normal) Collected: 01/15/24 1427    Specimen: Blood, Venous Updated: 01/16/24 1901     Culture No growth at 18-24 hours    Blood Culture Blood, Venous [[id number]]  (Normal) Collected: 01/15/24 1427    Specimen: Blood, Venous Updated: 01/16/24 1901     Culture No growth at 18-24 hours    SARS-CoV-2 (COVID-19) Nasopharynx [[id number]]  (Normal) Collected: 01/15/24 1345    Specimen: Nasopharyngeal Swab from Nasopharynx Updated: 01/15/24 1434    Narrative:      The following orders were created for panel order SARS-CoV-2 (COVID-19) Nasopharynx.  Procedure                               Abnormality         Status                     ---------                               -----------         ------                     SARS-COV-2 (COVID-19)/ F...[[id number]]  Normal              Final result                 Please view results for these tests on the individual orders.    SARS-COV-2 (COVID-19)/ FLU A/B, AND RSV, PCR Nasopharynx [[id number]]  (Normal) Collected: 01/15/24 1345    Specimen: Nasopharyngeal Swab from Nasopharynx Updated: 01/15/24 1434     SARS-CoV-2 (COVID-19) Negative     Influenza A Negative     Influenza B Negative     Respiratory Syncytial Virus Negative    Narrative:      Testing performed using real-time PCR for detection of COVID-19. EUA approved validation studies performed on site.       US Arterial 1-16-24  Interpretation Summary       •  Comments:      The blood pressure cuff at the right post tibial, right dorsalis pedis, left post tibial and left dorsalis pedis is unable to compress the underlying vessel.     ERIKA and waveform analysis complete     Noncompressible DP PT bilaterally preclude accurate assessment of ABIs.  Likely secondary to significant calcification.  Dampening of waveforms bilaterally indicative of at least moderate proximal  atherosclerotic disease  Reduction in toe pressures bilaterally likely indicative of at least moderate microvascular disease       MRI FOOT LEFT WITH AND WITHOUT CONTRAST    Result Date: 1/16/2024  Narrative: CLINICAL HISTORY: Abscess / om/ gas    second toe wound.     Impression: IMPRESSION: Gangrenous appearing second toe with marked erosion of the second distal phalanx either due to osteomalacia gangrene. Findings are also suggestive of osteomyelitis in the middle phalanx of the second toe with nonspecific marrow changes in the distal portions of the proximal phalanx of the second toe. No evidence of osteomyelitis elsewhere. Limited study due to significant motion artifact. COMMENT: Comparison: Wound care pictures from 1/16/2024 and x-rays from 1/15/2024. Technique: MRI of the left foot without and with contrast. Study is significantly limited due to motion artifact. 8.6mL of gadopiclenol (VUEWAY) injection 8.6 mL Findings: In the second toe the distal phalanx is not identified and is probably nearly completely eroded with a tiny irregular stump visible on the x-ray. This could be related to gangrene and osteomyelitis. Patellar and wound care pictures does appear to demonstrate severe wound possibly gangrene. Please correlate with clinical evaluation. Abnormal marrow edema in the second toe middle phalanx. The T1 signal is somewhat difficult to assess although does appear to be slightly replaced suspicious for gangrene and/or osteomyelitis. Small amount of nonspecific marrow edema in the head/distal portions of the proximal phalanx of the second toe without evidence of definite T1 marrow signal abnormality. No evidence of osteomyelitis elsewhere in the toes. No fracture. Alignment appears anatomic with intact Lisfranc ligament and satisfactory Lisfranc alignment. Mild scattered degenerative joint disease is noted. Nonspecific edema and partial fatty atrophy of the intrinsic musculature of the foot suspicious for  denervation. Tendons are difficult to assess due to degraded image quality particularly distally. There is no evidence of drainable soft tissue abscess.    Ultrasound ERIKA extremity    Result Date: 1/16/2024  Narrative: •  Comments:      The blood pressure cuff at the right post tibial, right dorsalis pedis, left post tibial and left dorsalis pedis is unable to compress the underlying vessel. ERIKA and waveform analysis complete Noncompressible DP PT bilaterally preclude accurate assessment of ABIs.  Likely secondary to significant calcification. Dampening of waveforms bilaterally indicative of at least moderate proximal atherosclerotic disease Reduction in toe pressures bilaterally likely indicative of at least moderate microvascular disease     X-RAY TOE LEFT 2+ VIEWS    Result Date: 1/15/2024  Narrative: CLINICAL HISTORY: Diabetic wound. COMMENT: 3 views of the left foot is performed with attention to the second toe. There is soft tissue swelling of the second toe and ulceration in the distal aspect of the second toe. There is fragmentation involving the distal phalanx of the second digit. These findings are suspicious for osteomyelitis. Question of erosion along the medial aspect of the mid phalanx of the second digit. These findings could be assessed with MRI.     Impression: IMPRESSION: Ulceration of the distal aspect of the left second digit with findings of osteomyelitis involving the distal phalanx and possibly the mid phalanx as well.    Imaging:    Cardiovascular testing:    Echocardiogram:5-4-23    Interpretation Summary       •  Left Ventricle: Normal ventricle size. Normal wall thickness. Preserved systolic function. Estimated EF 65%. No regional wall motion abnormalities. Normal diastolic filling pattern for age.  •  Right Ventricle: Normal ventricle size. Normal systolic function.  •  Right Atrium: Normal sized atrium.  •  Left Atrium: Normal sized atrium.  •  Aortic Valve: Tricuspid valve. No  "regurgitation. No stenosis.  •  Mitral Valve:.Moderate calcification of the posterior leaflet. No regurgitation. There appears to be very mild nonrheumatic mitral stenosis as a result of relative immobility of the posterior mitral valve leaflet.  Peak gradient 7 mean gradient 4 at a heart rate of 70 bpm.  Valve area was not calculated.  •  Tricuspid Valve: Normal structure. Trace regurgitation. Estimated RVSP = 30 mmHg.  •  Pulmonic Valve: Normal structure. Trace regurgitation. No stenosis.  •  Pericardium: No evidence of pericardial effusion.  •  IVC/SVC: Inferior vena cava is dilated (>2.1cm). Inferior vena cava demonstrates normal respiratory collapse.  •  Aorta: Aortic root normal. Sinuses of Valsalva normal-sized. Ascending aorta normal-sized. Aortic arch normal-sized. Descending aorta normal-sized.       Stress test: 'years ago\"    Cardiac catheterization: none    Peripheral angiogram:none    EKG:     Physical Exam  Physical Exam  Vitals reviewed.   Constitutional:       Appearance: She is well-developed.   HENT:      Head: Normocephalic and atraumatic.   Eyes:      Conjunctiva/sclera: Conjunctivae normal.      Pupils: Pupils are equal, round, and reactive to light.   Neck:      Thyroid: No thyromegaly.      Vascular: No JVD.   Cardiovascular:      Rate and Rhythm: Normal rate and regular rhythm.      Pulses:           Radial pulses are 2+ on the right side and 2+ on the left side.        Femoral pulses are 1+ on the right side and 1+ on the left side.       Dorsalis pedis pulses are detected w/ Doppler on the right side and detected w/ Doppler on the left side.        Posterior tibial pulses are detected w/ Doppler on the right side and detected w/ Doppler on the left side.      Heart sounds: Normal heart sounds. No murmur heard.     No friction rub. No gallop.      Comments: Normal Jason's test bilaterally. No femoral bruits noted bilaterally.  Left foot drop with dressing dry and intact.    Pulmonary:      " Effort: Pulmonary effort is normal. No respiratory distress.      Breath sounds: Normal breath sounds. No wheezing or rales.   Chest:      Chest wall: No tenderness.   Abdominal:      General: Bowel sounds are normal.      Palpations: Abdomen is soft.   Musculoskeletal:         General: Normal range of motion.      Cervical back: Neck supple.      Right lower leg: No edema.      Left lower leg: No edema.   Skin:     General: Skin is warm and dry.   Neurological:      Mental Status: She is alert and oriented to person, place, and time.   Psychiatric:         Behavior: Behavior normal.         Sedation Preparation  The procedure, treatment alternatives, risks and benefits were explained with specific risks discussed.  Patient's case was found appropriate for dual antiplatelet therapy.  Sedation Preparation  Assessment/Plan:  Assessment    * Osteomyelitis of second toe of left foot (CMS/[facility])  Assessment & Plan  Osteomyelitis left second toe.  Arterial studies noted noncompressible vessels.  Distal pulses diminished.  High likelihood of PAD.  Recommend peripheral angiogram with endovascular intervention as needed.  The risks and benefits of the procedure were explained.  Dr. Aguilar will obtain consent.  To get an aspirin load precath.    Abnormal finding on EKG  Assessment & Plan  EKG notes sinus rhythm with septal infarct in which anteriorlateral MI cannot be ruled out.  Her EKG is unchanged from prior.  Reports not having a stress test in years.  She denies chest pain and shortness of breath at her limited functional capacity due to left foot drop and use of a cane.  She will require cardiac clearance prior to her upcoming podiatry surgery.  Consider cardiac catheterization at the same time of her angiogram to help risk stratify.  Patient has multiple cardiac risk factors as well as a family history of premature coronary disease in her mother who had an MI at 64 and her father had a CVA at age 56.    Type 2  diabetes mellitus treated with insulin (CMS/[facility])  Assessment & Plan  Continues on Rybelsus, Jardiance and insulin.  Followed by [facility].    Hyperlipidemia  Assessment & Plan  Continues on atorvastatin.  Patient states her lipids are managed by PHIL Valdez.  Consider up titration after results of angiogram are known.    Essential hypertension  Assessment & Plan  Continues on amlodipine, losartan, & Hydrochlorothiazide.    Per patient request, spoke to patient's Sister -Anna and reviewed angiogram with her as well as risks and potential benefits.  PHIL Juan

## 2024-01-17 NOTE — ASSESSMENT & PLAN NOTE
Patient complains of constipation today.  Has been ongoing for 2 days.   Normally takes Dulcolax at home    P:   Abdominal pain

## 2024-01-17 NOTE — CONSULTS
Cardiology Consult      Subjective     Bebe Burks is a 63 y.o. female who was admitted for Osteomyelitis of second toe of left foot (CMS/[facility]) [M86.9]  Nausea and vomiting, unspecified vomiting type [R11.2]. Patient was referred by podiatry for vascular evaluation. Patient has a history of DM II on insulin, periperhal neuropathy, abnormal EKG that an anterior septal MI cannot be excluded, hyperlipidemia and HTN who presented to [facility] with N/V chills, fatigue abdominal pain and found to have a significant gangrenous left 2nd toe necrosis. She reports dropping an objet on her left foot about 2 weeks ago after having a callus removed. On presentation in the ER, her WBC count was elevated to 56 and she was tachycardiac.Pt was seen by ID, antibiotics started MRI was suggested of osteomyelitis in the middle phalanx of the second toe with nonspecific marrow changes in the distal portions of the proximal phalanx of the second toe.  He reports limited functional activity due to left foot drop and the use of a cane and denies having any obvious claudication symptoms prior to injuring her left foot.  She denies having any leg pain at rest but notes she has some peripheral neuropathy.  She underwent arterial studies which noted noncompressible vessels.  She denies having any chest pain, shortness of breath, lightheadedness, dizziness, syncope or presyncope.  She denies having ever smoked.  She states she had a stress test years ago and had an echocardiogram in May when she was told she had an abnormal EKG in February.  The echo was fairly unremarkable with a preserved ejection fraction with mild mitral stenosis noted.  Podiatry plans to take her to the OR for possible toe amputation on January 18.  She is recommended to undergo a peripheral angiogram with possible vascular intervention to promote wound healing potential.    Paula Walker MD  Medical History:   Past Medical History:   Diagnosis Date   • Abnormal ECG    •  "Abnormal finding on chest xray 12/2021   • Arthritis    • Colon cancer (CMS/HCC)    • COVID-19 12/2021   • COVID-19 vaccine series completed     Moderna: \" 3 doses\"   • Diabetic neuropathy (CMS/HCC)    • DM (diabetes mellitus), type 2 with ophthalmic complications (CMS/HCC)    • Hypertension    • Hypothyroidism    • Left foot drop    • Lipid disorder    • Type 2 diabetes mellitus treated with insulin (CMS/HCC)        Surgical History:   Past Surgical History:   Procedure Laterality Date   • ABCESS DRAINAGE  10/2023   • CATARACT EXTRACTION Bilateral    • COLONOSCOPY     • COMBINED HYSTEROSCOPY DIAGNOSTIC / D&C  2007   • EYE SURGERY Left 2017    cataract   • JOINT REPLACEMENT Right 02/2023    knee replacement   • VITRECTOMY Left 2016       Social History:   Social History     Social History Narrative    Lives alone in a condo     Social History     Tobacco Use   • Smoking status: Never     Passive exposure: Past   • Smokeless tobacco: Never   Vaping Use   • Vaping Use: Never used   Substance Use Topics   • Alcohol use: Yes     Comment: occasionally   • Drug use: Never       Family History:   Family History   Problem Relation Age of Onset   • Diabetes Biological Mother    • Hypertension Biological Mother    • Stroke Biological Father        Allergies: Citrus and derivatives    Home Medications:  •  amLODIPine, TAKE 1 TABLET BY MOUTH EVERY DAY  •  atorvastatin, TAKE 1 TABLET BY MOUTH EVERY DAY  •  DEXCOM G7 SENSOR, 1 each continuously. Change every 10 days  •  empagliflozin, Take 1 tablet (10 mg total) by mouth daily. TAKE 1 TABLET BY MOUTH EVERY DAY  •  FREESTYLE LAURA 14 DAY READER, 1 each continuously. 1 reader dx code E 11.9  •  FREESTYLE LAURA 14 DAY SENSOR, 1 each continuously. 1 sensor every 14 days dx code E 11.9  •  gabapentin, TAKE ONE CAPSULE BY MOUTH EVERY 8 HOURS WHILE AWAKE & TAKE ONE CAPSULE AT BEDTIME  •  hydrochlorothiazide, TAKE 1 TABLET BY MOUTH EVERY DAY  •  insulin asp prt-insulin aspart, INJECT 25 " UNITS under the skin  IN AM AND 30 UNITS IN PM  •  levothyroxine, TAKE 1 TABLET BY MOUTH EVERY DAY  •  losartan, TAKE 1 TABLET BY MOUTH EVERY DAY  •  ONETOUCH DELICA LANCETS, 1 each 3 (three) times a day.  •  pen needle, diabetic, USE TWICE A DAY  •  RYBELSUS, TAKE 1 TABLET (14 MG TOTAL) BY MOUTH DAILY  •  aspirin, Take 1 tablet (81 mg total) by mouth daily. HOLD for 4 weeks  •  aspirin, Take 1 tablet (81 mg total) by mouth 2 (two) times a day. Take Asprin for 4 weeks. Please obtain over the counter.  •  polyethylene glycol, Take 17 g by mouth daily as needed (constipation). Please obtain over the counter    Current Facility-Administered Medications:   •  acetaminophen (TYLENOL) tablet 650 mg, 650 mg, oral, q6h PRN, Oyesile, Shon, DO, 650 mg at 01/16/24 2300  •  amLODIPine (NORVASC) tablet 5 mg, 5 mg, oral, Daily, Oyesile, Shon, DO, 5 mg at 01/17/24 0805  •  atorvastatin (LIPITOR) tablet 10 mg, 10 mg, oral, Daily, Oyesile, Shon, DO, 10 mg at 01/17/24 0805  •  glucose chewable tablet 16-32 g of dextrose, 16-32 g of dextrose, oral, PRN **OR** dextrose 40 % oral gel 15-30 g of dextrose, 15-30 g of dextrose, oral, PRN **OR** glucagon (GLUCAGEN) injection 1 mg, 1 mg, intramuscular, PRN **OR** dextrose 50 % in water (D50) injection 12.5 g, 25 mL, intravenous, PRN, Oyesile, Shon, DO  •  gabapentin (NEURONTIN) capsule 300 mg, 300 mg, oral, TID, Oyesile, Shon, DO, 300 mg at 01/17/24 0806  •  hydrochlorothiazide (HYDRODIURIL) tablet 12.5 mg, 12.5 mg, oral, Daily, Oyesile, Shon, DO, 12.5 mg at 01/17/24 0806  •  insulin glargine U-100 (LANTUS/BASAGLAR) pen 4 Units, 4 Units, subcutaneous, Nightly, Bárbara Brown MD  •  insulin lispro U-100 (HumaLOG) pen 3-5 Units, 3-5 Units, subcutaneous, With meals & nightly, Emanuel Denise DO, 3 Units at 01/17/24 1153  •  levothyroxine (SYNTHROID) tablet 25 mcg, 25 mcg, oral, Daily, Shon Thornton DO, 25 mcg at 01/17/24 0527  •  lidocaine (ASPERCREME) 4 % topical  patch 1 patch, 1 patch, Topical, Daily PRN, Bryanna Elliott, , 1 patch at 24 1524  •  losartan (COZAAR) tablet 50 mg, 50 mg, oral, Daily, OyesilDaisy plazaShon, , 50 mg at 24 0807  •  ondansetron (ZOFRAN) injection 4 mg, 4 mg, intravenous, q6h PRN, OyesileDaisyShon, DO, 4 mg at 01/15/24 2234  •  polyethylene glycol (MIRALAX) 17 gram packet 17 g, 17 g, oral, Daily, Emanuel Denise DO, 17 g at 24 0807  •  vancomycin 1.25 gram/250 mL IVPB in NSS, 1,250 mg, intravenous, q12h HENOK, Stopped at 24 0700 **AND** [] IV Vancomycin Therapy by Pharmacy Protocol, , , Once, Emanuel Denise DO  Review of Systems  Review of Systems   Constitutional: Negative for activity change, appetite change, diaphoresis, fatigue, fever and unexpected weight change.   HENT: Negative for congestion and nosebleeds.    Eyes: Negative for visual disturbance.   Respiratory: Negative for apnea, cough, chest tightness and shortness of breath.    Cardiovascular: Negative for chest pain, palpitations and leg swelling.   Gastrointestinal: Negative for blood in stool, nausea and vomiting.   Genitourinary: Negative for hematuria.   Musculoskeletal: Negative for myalgias.   Skin: Positive for wound (left 2nd toe). Negative for color change and pallor.   Neurological: Negative for dizziness, seizures, syncope, speech difficulty, weakness, light-headedness and numbness.        Balance issues, walks with cane reports left foot drop   Hematological: Does not bruise/bleed easily.       Remaining 14 point review of systems are negative.  Objective   Labs      Results from last 7 days   Lab Units 24  0536   SODIUM mEQ/L 138   POTASSIUM mEQ/L 3.7   CHLORIDE mEQ/L 105   CO2 mEQ/L 25   BUN mg/dL 24   CREATININE mg/dL 0.5*   GLUCOSE mg/dL 171*   CALCIUM mg/dL 8.7     Results from last 7 days   Lab Units 24  0536   WBC K/uL 36.81*   HEMOGLOBIN g/dL 11.9   HEMATOCRIT % 38.0   PLATELETS K/uL 418*     Troponin I Results        12/02/21     1618    Troponin I <0.02        Microbiology Results     Procedure Component Value Units Date/Time    Blood Culture Blood, Venous [[id number]]  (Normal) Collected: 01/15/24 1427    Specimen: Blood, Venous Updated: 01/16/24 1901     Culture No growth at 18-24 hours    Blood Culture Blood, Venous [[id number]]  (Normal) Collected: 01/15/24 1427    Specimen: Blood, Venous Updated: 01/16/24 1901     Culture No growth at 18-24 hours    SARS-CoV-2 (COVID-19) Nasopharynx [[id number]]  (Normal) Collected: 01/15/24 1345    Specimen: Nasopharyngeal Swab from Nasopharynx Updated: 01/15/24 1434    Narrative:      The following orders were created for panel order SARS-CoV-2 (COVID-19) Nasopharynx.  Procedure                               Abnormality         Status                     ---------                               -----------         ------                     SARS-COV-2 (COVID-19)/ F...[[id number]]  Normal              Final result                 Please view results for these tests on the individual orders.    SARS-COV-2 (COVID-19)/ FLU A/B, AND RSV, PCR Nasopharynx [[id number]]  (Normal) Collected: 01/15/24 1345    Specimen: Nasopharyngeal Swab from Nasopharynx Updated: 01/15/24 1434     SARS-CoV-2 (COVID-19) Negative     Influenza A Negative     Influenza B Negative     Respiratory Syncytial Virus Negative    Narrative:      Testing performed using real-time PCR for detection of COVID-19. EUA approved validation studies performed on site.       US Arterial 1-16-24  Interpretation Summary       •  Comments:      The blood pressure cuff at the right post tibial, right dorsalis pedis, left post tibial and left dorsalis pedis is unable to compress the underlying vessel.     ERIKA and waveform analysis complete     Noncompressible DP PT bilaterally preclude accurate assessment of ABIs.  Likely secondary to significant calcification.  Dampening of waveforms bilaterally indicative of at least moderate proximal  atherosclerotic disease  Reduction in toe pressures bilaterally likely indicative of at least moderate microvascular disease       MRI FOOT LEFT WITH AND WITHOUT CONTRAST    Result Date: 1/16/2024  Narrative: CLINICAL HISTORY: Abscess / om/ gas    second toe wound.     Impression: IMPRESSION: Gangrenous appearing second toe with marked erosion of the second distal phalanx either due to osteomalacia gangrene. Findings are also suggestive of osteomyelitis in the middle phalanx of the second toe with nonspecific marrow changes in the distal portions of the proximal phalanx of the second toe. No evidence of osteomyelitis elsewhere. Limited study due to significant motion artifact. COMMENT: Comparison: Wound care pictures from 1/16/2024 and x-rays from 1/15/2024. Technique: MRI of the left foot without and with contrast. Study is significantly limited due to motion artifact. 8.6mL of gadopiclenol (VUEWAY) injection 8.6 mL Findings: In the second toe the distal phalanx is not identified and is probably nearly completely eroded with a tiny irregular stump visible on the x-ray. This could be related to gangrene and osteomyelitis. Patellar and wound care pictures does appear to demonstrate severe wound possibly gangrene. Please correlate with clinical evaluation. Abnormal marrow edema in the second toe middle phalanx. The T1 signal is somewhat difficult to assess although does appear to be slightly replaced suspicious for gangrene and/or osteomyelitis. Small amount of nonspecific marrow edema in the head/distal portions of the proximal phalanx of the second toe without evidence of definite T1 marrow signal abnormality. No evidence of osteomyelitis elsewhere in the toes. No fracture. Alignment appears anatomic with intact Lisfranc ligament and satisfactory Lisfranc alignment. Mild scattered degenerative joint disease is noted. Nonspecific edema and partial fatty atrophy of the intrinsic musculature of the foot suspicious for  denervation. Tendons are difficult to assess due to degraded image quality particularly distally. There is no evidence of drainable soft tissue abscess.    Ultrasound ERIKA extremity    Result Date: 1/16/2024  Narrative: •  Comments:      The blood pressure cuff at the right post tibial, right dorsalis pedis, left post tibial and left dorsalis pedis is unable to compress the underlying vessel. ERIKA and waveform analysis complete Noncompressible DP PT bilaterally preclude accurate assessment of ABIs.  Likely secondary to significant calcification. Dampening of waveforms bilaterally indicative of at least moderate proximal atherosclerotic disease Reduction in toe pressures bilaterally likely indicative of at least moderate microvascular disease     X-RAY TOE LEFT 2+ VIEWS    Result Date: 1/15/2024  Narrative: CLINICAL HISTORY: Diabetic wound. COMMENT: 3 views of the left foot is performed with attention to the second toe. There is soft tissue swelling of the second toe and ulceration in the distal aspect of the second toe. There is fragmentation involving the distal phalanx of the second digit. These findings are suspicious for osteomyelitis. Question of erosion along the medial aspect of the mid phalanx of the second digit. These findings could be assessed with MRI.     Impression: IMPRESSION: Ulceration of the distal aspect of the left second digit with findings of osteomyelitis involving the distal phalanx and possibly the mid phalanx as well.    Imaging:    Cardiovascular testing:    Echocardiogram:5-4-23    Interpretation Summary       •  Left Ventricle: Normal ventricle size. Normal wall thickness. Preserved systolic function. Estimated EF 65%. No regional wall motion abnormalities. Normal diastolic filling pattern for age.  •  Right Ventricle: Normal ventricle size. Normal systolic function.  •  Right Atrium: Normal sized atrium.  •  Left Atrium: Normal sized atrium.  •  Aortic Valve: Tricuspid valve. No  "regurgitation. No stenosis.  •  Mitral Valve:.Moderate calcification of the posterior leaflet. No regurgitation. There appears to be very mild nonrheumatic mitral stenosis as a result of relative immobility of the posterior mitral valve leaflet.  Peak gradient 7 mean gradient 4 at a heart rate of 70 bpm.  Valve area was not calculated.  •  Tricuspid Valve: Normal structure. Trace regurgitation. Estimated RVSP = 30 mmHg.  •  Pulmonic Valve: Normal structure. Trace regurgitation. No stenosis.  •  Pericardium: No evidence of pericardial effusion.  •  IVC/SVC: Inferior vena cava is dilated (>2.1cm). Inferior vena cava demonstrates normal respiratory collapse.  •  Aorta: Aortic root normal. Sinuses of Valsalva normal-sized. Ascending aorta normal-sized. Aortic arch normal-sized. Descending aorta normal-sized.       Stress test: 'years ago\"    Cardiac catheterization: none    Peripheral angiogram:none    EKG:     Physical Exam  Physical Exam  Vitals reviewed.   Constitutional:       Appearance: She is well-developed.   HENT:      Head: Normocephalic and atraumatic.   Eyes:      Conjunctiva/sclera: Conjunctivae normal.      Pupils: Pupils are equal, round, and reactive to light.   Neck:      Thyroid: No thyromegaly.      Vascular: No JVD.   Cardiovascular:      Rate and Rhythm: Normal rate and regular rhythm.      Pulses:           Radial pulses are 2+ on the right side and 2+ on the left side.        Femoral pulses are 1+ on the right side and 1+ on the left side.       Dorsalis pedis pulses are detected w/ Doppler on the right side and detected w/ Doppler on the left side.        Posterior tibial pulses are detected w/ Doppler on the right side and detected w/ Doppler on the left side.      Heart sounds: Normal heart sounds. No murmur heard.     No friction rub. No gallop.      Comments: Normal Jason's test bilaterally. No femoral bruits noted bilaterally.  Left foot drop with dressing dry and intact.    Pulmonary:      " Effort: Pulmonary effort is normal. No respiratory distress.      Breath sounds: Normal breath sounds. No wheezing or rales.   Chest:      Chest wall: No tenderness.   Abdominal:      General: Bowel sounds are normal.      Palpations: Abdomen is soft.   Musculoskeletal:         General: Normal range of motion.      Cervical back: Neck supple.      Right lower leg: No edema.      Left lower leg: No edema.   Skin:     General: Skin is warm and dry.   Neurological:      Mental Status: She is alert and oriented to person, place, and time.   Psychiatric:         Behavior: Behavior normal.         Sedation Preparation  The procedure, treatment alternatives, risks and benefits were explained with specific risks discussed.  Patient's case was found appropriate for dual antiplatelet therapy.  Sedation Preparation  Assessment/Plan:  Assessment    * Osteomyelitis of second toe of left foot (CMS/[facility])  Assessment & Plan  Osteomyelitis left second toe.  Arterial studies noted noncompressible vessels.  Distal pulses diminished.  High likelihood of PAD.  Recommend peripheral angiogram with endovascular intervention as needed.  The risks and benefits of the procedure were explained.  Dr. Aguilar will obtain consent.  To get an aspirin load precath.    Abnormal finding on EKG  Assessment & Plan  EKG notes sinus rhythm with septal infarct in which anteriorlateral MI cannot be ruled out.  Her EKG is unchanged from prior.  Reports not having a stress test in years.  She denies chest pain and shortness of breath at her limited functional capacity due to left foot drop and use of a cane.  She will require cardiac clearance prior to her upcoming podiatry surgery.  Consider cardiac catheterization at the same time of her angiogram to help risk stratify.  Patient has multiple cardiac risk factors as well as a family history of premature coronary disease in her mother who had an MI at 64 and her father had a CVA at age 56.    Type 2  diabetes mellitus treated with insulin (CMS/[facility])  Assessment & Plan  Continues on Rybelsus, Jardiance and insulin.  Followed by [facility].    Hyperlipidemia  Assessment & Plan  Continues on atorvastatin.  Patient states her lipids are managed by PHIL Valdez.  Consider up titration after results of angiogram are known.    Essential hypertension  Assessment & Plan  Continues on amlodipine, losartan, & Hydrochlorothiazide.    Per patient request, spoke to patient's Sister -Anna and reviewed angiogram with her as well as risks and potential benefits.  PHIL Juan

## 2024-01-18 ENCOUNTER — ANESTHESIA (INPATIENT)
Dept: OPERATING ROOM | Facility: HOSPITAL | Age: 64
DRG: 854 | End: 2024-01-18
Payer: MEDICARE

## 2024-01-18 ENCOUNTER — ANESTHESIA EVENT (INPATIENT)
Dept: OPERATING ROOM | Facility: HOSPITAL | Age: 64
DRG: 854 | End: 2024-01-18
Payer: MEDICARE

## 2024-01-18 PROBLEM — I70.202: Status: ACTIVE | Noted: 2024-01-18

## 2024-01-18 LAB
ANION GAP SERPL CALC-SCNC: 7 MEQ/L (ref 3–15)
BUN SERPL-MCNC: 16 MG/DL (ref 7–25)
CALCIUM SERPL-MCNC: 8.4 MG/DL (ref 8.6–10.3)
CHLORIDE SERPL-SCNC: 105 MEQ/L (ref 98–107)
CO2 SERPL-SCNC: 25 MEQ/L (ref 21–31)
CREAT SERPL-MCNC: 0.4 MG/DL (ref 0.6–1.2)
DATE+TIME DOSE: NORMAL
DATE+TIME DOSE: NORMAL
EGFRCR SERPLBLD CKD-EPI 2021: >60 ML/MIN/1.73M*2
ERYTHROCYTE [DISTWIDTH] IN BLOOD BY AUTOMATED COUNT: 13.6 % (ref 11.7–14.4)
GLUCOSE BLD-MCNC: 135 MG/DL (ref 70–99)
GLUCOSE BLD-MCNC: 135 MG/DL (ref 70–99)
GLUCOSE BLD-MCNC: 140 MG/DL (ref 70–99)
GLUCOSE BLD-MCNC: 198 MG/DL (ref 70–99)
GLUCOSE BLD-MCNC: 214 MG/DL (ref 70–99)
GLUCOSE BLD-MCNC: 263 MG/DL (ref 70–99)
GLUCOSE SERPL-MCNC: 154 MG/DL (ref 70–99)
HCT VFR BLD AUTO: 37.7 % (ref 35–45)
HGB BLD-MCNC: 12 G/DL (ref 11.8–15.7)
MAGNESIUM SERPL-MCNC: 2 MG/DL (ref 1.8–2.5)
MCH RBC QN AUTO: 28.3 PG (ref 28–33.2)
MCHC RBC AUTO-ENTMCNC: 31.8 G/DL (ref 32.2–35.5)
MCV RBC AUTO: 88.9 FL (ref 83–98)
PDW BLD AUTO: 10.8 FL (ref 9.4–12.3)
PLATELET # BLD AUTO: 433 K/UL (ref 150–369)
POCT TEST: ABNORMAL
POTASSIUM SERPL-SCNC: 3.3 MEQ/L (ref 3.5–5.1)
RBC # BLD AUTO: 4.24 M/UL (ref 3.93–5.22)
SODIUM SERPL-SCNC: 137 MEQ/L (ref 136–145)
VANCOMYCIN TROUGH SERPL-MCNC: 10.2 UG/ML (ref 10–15)
WBC # BLD AUTO: 32.01 K/UL (ref 3.8–10.5)

## 2024-01-18 PROCEDURE — 25000000 HC PHARMACY GENERAL

## 2024-01-18 PROCEDURE — 63600000 HC DRUGS/DETAIL CODE: Mod: JZ | Performed by: STUDENT IN AN ORGANIZED HEALTH CARE EDUCATION/TRAINING PROGRAM

## 2024-01-18 PROCEDURE — 80202 ASSAY OF VANCOMYCIN: CPT | Performed by: INTERNAL MEDICINE

## 2024-01-18 PROCEDURE — 87070 CULTURE OTHR SPECIMN AEROBIC: CPT | Performed by: PODIATRIST

## 2024-01-18 PROCEDURE — 36415 COLL VENOUS BLD VENIPUNCTURE: CPT | Performed by: NURSE PRACTITIONER

## 2024-01-18 PROCEDURE — 36000012 HC OR LEVEL 2 EA ADDL MIN: Performed by: PODIATRIST

## 2024-01-18 PROCEDURE — 25800000 HC PHARMACY IV SOLUTIONS: Performed by: NURSE PRACTITIONER

## 2024-01-18 PROCEDURE — 25800000 HC PHARMACY IV SOLUTIONS

## 2024-01-18 PROCEDURE — 63600000 HC DRUGS/DETAIL CODE: Mod: JG | Performed by: PODIATRIST

## 2024-01-18 PROCEDURE — 71000011 HC PACU PHASE 1 EA ADDL MIN: Performed by: PODIATRIST

## 2024-01-18 PROCEDURE — 71000001 HC PACU PHASE 1 INITIAL 30MIN: Performed by: PODIATRIST

## 2024-01-18 PROCEDURE — 88305 TISSUE EXAM BY PATHOLOGIST: CPT | Performed by: PODIATRIST

## 2024-01-18 PROCEDURE — 0Y6S0Z0 DETACHMENT AT LEFT 2ND TOE, COMPLETE, OPEN APPROACH: ICD-10-PCS | Performed by: PODIATRIST

## 2024-01-18 PROCEDURE — 25000000 HC PHARMACY GENERAL: Performed by: STUDENT IN AN ORGANIZED HEALTH CARE EDUCATION/TRAINING PROGRAM

## 2024-01-18 PROCEDURE — 99233 SBSQ HOSP IP/OBS HIGH 50: CPT | Performed by: STUDENT IN AN ORGANIZED HEALTH CARE EDUCATION/TRAINING PROGRAM

## 2024-01-18 PROCEDURE — 27200000 HC STERILE SUPPLY: Performed by: PODIATRIST

## 2024-01-18 PROCEDURE — 0L9W0ZZ DRAINAGE OF LEFT FOOT TENDON, OPEN APPROACH: ICD-10-PCS | Performed by: PODIATRIST

## 2024-01-18 PROCEDURE — 25000000 HC PHARMACY GENERAL: Performed by: PODIATRIST

## 2024-01-18 PROCEDURE — 36000002 HC OR LEVEL 2 INITIAL 30MIN: Performed by: PODIATRIST

## 2024-01-18 PROCEDURE — 63600000 HC DRUGS/DETAIL CODE

## 2024-01-18 PROCEDURE — 83735 ASSAY OF MAGNESIUM: CPT | Performed by: STUDENT IN AN ORGANIZED HEALTH CARE EDUCATION/TRAINING PROGRAM

## 2024-01-18 PROCEDURE — 63700000 HC SELF-ADMINISTRABLE DRUG

## 2024-01-18 PROCEDURE — 63700000 HC SELF-ADMINISTRABLE DRUG: Performed by: NURSE PRACTITIONER

## 2024-01-18 PROCEDURE — 21400000 HC ROOM AND CARE CCU/INTERMEDIATE

## 2024-01-18 PROCEDURE — 63700000 HC SELF-ADMINISTRABLE DRUG: Performed by: PHYSICAL THERAPIST

## 2024-01-18 PROCEDURE — 85027 COMPLETE CBC AUTOMATED: CPT | Performed by: NURSE PRACTITIONER

## 2024-01-18 PROCEDURE — 37000001 HC ANESTHESIA GENERAL: Performed by: PODIATRIST

## 2024-01-18 PROCEDURE — 80048 BASIC METABOLIC PNL TOTAL CA: CPT | Performed by: NURSE PRACTITIONER

## 2024-01-18 PROCEDURE — 88311 DECALCIFY TISSUE: CPT | Performed by: PODIATRIST

## 2024-01-18 RX ORDER — OXYCODONE HYDROCHLORIDE 5 MG/1
5 TABLET ORAL EVERY 4 HOURS PRN
Status: DISCONTINUED | OUTPATIENT
Start: 2024-01-18 | End: 2024-01-21 | Stop reason: HOSPADM

## 2024-01-18 RX ORDER — EPHEDRINE SULFATE/0.9% NACL/PF 50 MG/5 ML
10 SYRINGE (ML) INTRAVENOUS AS NEEDED
Status: DISCONTINUED | OUTPATIENT
Start: 2024-01-18 | End: 2024-01-18 | Stop reason: HOSPADM

## 2024-01-18 RX ORDER — PROPOFOL 10 MG/ML
INJECTION, EMULSION INTRAVENOUS AS NEEDED
Status: DISCONTINUED | OUTPATIENT
Start: 2024-01-18 | End: 2024-01-18 | Stop reason: SURG

## 2024-01-18 RX ORDER — DIPHENHYDRAMINE HCL 50 MG/ML
12.5 VIAL (ML) INJECTION
Status: DISCONTINUED | OUTPATIENT
Start: 2024-01-18 | End: 2024-01-18 | Stop reason: HOSPADM

## 2024-01-18 RX ORDER — ATORVASTATIN CALCIUM 40 MG/1
40 TABLET, FILM COATED ORAL DAILY
Status: DISCONTINUED | OUTPATIENT
Start: 2024-01-19 | End: 2024-01-21 | Stop reason: HOSPADM

## 2024-01-18 RX ORDER — FENTANYL CITRATE 50 UG/ML
50 INJECTION, SOLUTION INTRAMUSCULAR; INTRAVENOUS EVERY 5 MIN PRN
Status: DISCONTINUED | OUTPATIENT
Start: 2024-01-18 | End: 2024-01-18 | Stop reason: HOSPADM

## 2024-01-18 RX ORDER — MORPHINE SULFATE 4 MG/ML
2 INJECTION, SOLUTION INTRAMUSCULAR; INTRAVENOUS EVERY 4 HOURS PRN
Status: DISCONTINUED | OUTPATIENT
Start: 2024-01-18 | End: 2024-01-21 | Stop reason: HOSPADM

## 2024-01-18 RX ORDER — HYDRALAZINE HYDROCHLORIDE 20 MG/ML
5 INJECTION INTRAMUSCULAR; INTRAVENOUS
Status: DISCONTINUED | OUTPATIENT
Start: 2024-01-18 | End: 2024-01-18 | Stop reason: HOSPADM

## 2024-01-18 RX ORDER — DEXTROSE 50 % IN WATER (D50W) INTRAVENOUS SYRINGE
25 AS NEEDED
Status: DISCONTINUED | OUTPATIENT
Start: 2024-01-18 | End: 2024-01-18 | Stop reason: HOSPADM

## 2024-01-18 RX ORDER — POTASSIUM CHLORIDE 750 MG/1
40 TABLET, EXTENDED RELEASE ORAL ONCE
Status: COMPLETED | OUTPATIENT
Start: 2024-01-18 | End: 2024-01-18

## 2024-01-18 RX ORDER — MEPERIDINE HYDROCHLORIDE 25 MG/ML
12.5 INJECTION INTRAMUSCULAR; INTRAVENOUS; SUBCUTANEOUS EVERY 10 MIN PRN
Status: DISCONTINUED | OUTPATIENT
Start: 2024-01-18 | End: 2024-01-18 | Stop reason: HOSPADM

## 2024-01-18 RX ORDER — LIDOCAINE HYDROCHLORIDE 10 MG/ML
INJECTION, SOLUTION EPIDURAL; INFILTRATION; INTRACAUDAL; PERINEURAL AS NEEDED
Status: DISCONTINUED | OUTPATIENT
Start: 2024-01-18 | End: 2024-01-18 | Stop reason: SURG

## 2024-01-18 RX ORDER — BUPIVACAINE HYDROCHLORIDE 5 MG/ML
INJECTION, SOLUTION PERINEURAL
Status: DISCONTINUED | OUTPATIENT
Start: 2024-01-18 | End: 2024-01-18 | Stop reason: HOSPADM

## 2024-01-18 RX ORDER — IBUPROFEN 200 MG
16-32 TABLET ORAL AS NEEDED
Status: DISCONTINUED | OUTPATIENT
Start: 2024-01-18 | End: 2024-01-18 | Stop reason: HOSPADM

## 2024-01-18 RX ORDER — DEXTROSE 40 %
15-30 GEL (GRAM) ORAL AS NEEDED
Status: DISCONTINUED | OUTPATIENT
Start: 2024-01-18 | End: 2024-01-18 | Stop reason: HOSPADM

## 2024-01-18 RX ORDER — VANCOMYCIN 1.75 GRAM/500 ML IN 0.9 % SODIUM CHLORIDE INTRAVENOUS
1750 EVERY 12 HOURS
Status: DISCONTINUED | OUTPATIENT
Start: 2024-01-18 | End: 2024-01-21 | Stop reason: HOSPADM

## 2024-01-18 RX ORDER — FENTANYL CITRATE 50 UG/ML
INJECTION, SOLUTION INTRAMUSCULAR; INTRAVENOUS AS NEEDED
Status: DISCONTINUED | OUTPATIENT
Start: 2024-01-18 | End: 2024-01-18 | Stop reason: SURG

## 2024-01-18 RX ORDER — METOCLOPRAMIDE HYDROCHLORIDE 5 MG/ML
10 INJECTION INTRAMUSCULAR; INTRAVENOUS
Status: DISCONTINUED | OUTPATIENT
Start: 2024-01-18 | End: 2024-01-18 | Stop reason: HOSPADM

## 2024-01-18 RX ORDER — ONDANSETRON HYDROCHLORIDE 2 MG/ML
INJECTION, SOLUTION INTRAVENOUS AS NEEDED
Status: DISCONTINUED | OUTPATIENT
Start: 2024-01-18 | End: 2024-01-18 | Stop reason: SURG

## 2024-01-18 RX ORDER — LIDOCAINE HYDROCHLORIDE 10 MG/ML
INJECTION, SOLUTION INFILTRATION; PERINEURAL
Status: DISCONTINUED | OUTPATIENT
Start: 2024-01-18 | End: 2024-01-18 | Stop reason: HOSPADM

## 2024-01-18 RX ORDER — MIDAZOLAM HYDROCHLORIDE 2 MG/2ML
INJECTION, SOLUTION INTRAMUSCULAR; INTRAVENOUS AS NEEDED
Status: DISCONTINUED | OUTPATIENT
Start: 2024-01-18 | End: 2024-01-18 | Stop reason: SURG

## 2024-01-18 RX ORDER — HYDROMORPHONE HYDROCHLORIDE 1 MG/ML
0.5 INJECTION, SOLUTION INTRAMUSCULAR; INTRAVENOUS; SUBCUTANEOUS
Status: DISCONTINUED | OUTPATIENT
Start: 2024-01-18 | End: 2024-01-18 | Stop reason: HOSPADM

## 2024-01-18 RX ORDER — ONDANSETRON HYDROCHLORIDE 2 MG/ML
4 INJECTION, SOLUTION INTRAVENOUS
Status: DISCONTINUED | OUTPATIENT
Start: 2024-01-18 | End: 2024-01-18 | Stop reason: HOSPADM

## 2024-01-18 RX ORDER — METOCLOPRAMIDE HYDROCHLORIDE 5 MG/ML
INJECTION INTRAMUSCULAR; INTRAVENOUS AS NEEDED
Status: DISCONTINUED | OUTPATIENT
Start: 2024-01-18 | End: 2024-01-18 | Stop reason: SURG

## 2024-01-18 RX ORDER — INSULIN GLARGINE 100 [IU]/ML
8 INJECTION, SOLUTION SUBCUTANEOUS NIGHTLY
Status: DISCONTINUED | OUTPATIENT
Start: 2024-01-18 | End: 2024-01-19

## 2024-01-18 RX ADMIN — ATORVASTATIN CALCIUM 10 MG: 20 TABLET, FILM COATED ORAL at 09:02

## 2024-01-18 RX ADMIN — VANCOMYCIN HYDROCHLORIDE 1250 MG: 500 INJECTION, POWDER, LYOPHILIZED, FOR SOLUTION INTRAVENOUS at 08:59

## 2024-01-18 RX ADMIN — LIDOCAINE HYDROCHLORIDE 10 ML: 10 INJECTION, SOLUTION EPIDURAL; INFILTRATION; INTRACAUDAL; PERINEURAL at 11:30

## 2024-01-18 RX ADMIN — ACETAMINOPHEN 650 MG: 325 TABLET ORAL at 07:01

## 2024-01-18 RX ADMIN — VANCOMYCIN HYDROCHLORIDE 1750 MG: 500 INJECTION, POWDER, LYOPHILIZED, FOR SOLUTION INTRAVENOUS at 20:12

## 2024-01-18 RX ADMIN — INSULIN LISPRO 3 UNITS: 100 INJECTION, SOLUTION INTRAVENOUS; SUBCUTANEOUS at 18:09

## 2024-01-18 RX ADMIN — INSULIN LISPRO 3 UNITS: 100 INJECTION, SOLUTION INTRAVENOUS; SUBCUTANEOUS at 22:12

## 2024-01-18 RX ADMIN — GABAPENTIN 300 MG: 300 CAPSULE ORAL at 09:03

## 2024-01-18 RX ADMIN — INSULIN GLARGINE 8 UNITS: 100 INJECTION, SOLUTION SUBCUTANEOUS at 22:13

## 2024-01-18 RX ADMIN — GABAPENTIN 300 MG: 300 CAPSULE ORAL at 20:16

## 2024-01-18 RX ADMIN — LEVOTHYROXINE SODIUM 25 MCG: 0.03 TABLET ORAL at 05:34

## 2024-01-18 RX ADMIN — CLOPIDOGREL BISULFATE 75 MG: 75 TABLET ORAL at 09:02

## 2024-01-18 RX ADMIN — ACETAMINOPHEN 650 MG: 325 TABLET ORAL at 17:51

## 2024-01-18 RX ADMIN — ONDANSETRON 4 MG: 2 INJECTION INTRAMUSCULAR; INTRAVENOUS at 11:57

## 2024-01-18 RX ADMIN — HYDROCHLOROTHIAZIDE 12.5 MG: 12.5 TABLET ORAL at 09:04

## 2024-01-18 RX ADMIN — POTASSIUM CHLORIDE 40 MEQ: 750 TABLET, EXTENDED RELEASE ORAL at 09:04

## 2024-01-18 RX ADMIN — GABAPENTIN 300 MG: 300 CAPSULE ORAL at 15:16

## 2024-01-18 RX ADMIN — FENTANYL CITRATE 50 MCG: 50 INJECTION INTRAMUSCULAR; INTRAVENOUS at 11:30

## 2024-01-18 RX ADMIN — ASPIRIN 81 MG: 81 TABLET, COATED ORAL at 09:02

## 2024-01-18 RX ADMIN — AMLODIPINE BESYLATE 5 MG: 5 TABLET ORAL at 09:03

## 2024-01-18 RX ADMIN — FENTANYL CITRATE 25 MCG: 50 INJECTION INTRAMUSCULAR; INTRAVENOUS at 11:59

## 2024-01-18 RX ADMIN — OXYCODONE HYDROCHLORIDE 5 MG: 5 TABLET ORAL at 20:15

## 2024-01-18 RX ADMIN — SODIUM CHLORIDE: 9 INJECTION, SOLUTION INTRAVENOUS at 11:24

## 2024-01-18 RX ADMIN — METOCLOPRAMIDE 10 MG: 5 INJECTION, SOLUTION INTRAMUSCULAR; INTRAVENOUS at 11:43

## 2024-01-18 RX ADMIN — MIDAZOLAM HYDROCHLORIDE 2 MG: 1 INJECTION, SOLUTION INTRAMUSCULAR; INTRAVENOUS at 11:22

## 2024-01-18 RX ADMIN — Medication 5 MG: at 00:18

## 2024-01-18 RX ADMIN — PROPOFOL 150 MG: 10 INJECTION, EMULSION INTRAVENOUS at 11:30

## 2024-01-18 ASSESSMENT — COGNITIVE AND FUNCTIONAL STATUS - GENERAL
WALKING IN HOSPITAL ROOM: 4 - NONE
CLIMB 3 TO 5 STEPS WITH RAILING: 3 - A LITTLE
MOVING TO AND FROM BED TO CHAIR: 4 - NONE
STANDING UP FROM CHAIR USING ARMS: 4 - NONE

## 2024-01-18 NOTE — PROGRESS NOTES
Vancomycin Dosing by Pharmacy Consult Follow up    Bebe Burks is a 63 y.o. female who has been consulted for vancomycin dosing for bone/joint infection.      Reviewed relevant clinical data including weight, renal function, previous vancomycin doses, and vancomycin levels  Creatinine   Date/Time Value Ref Range Status   01/18/2024 0510 0.4 (L) 0.6 - 1.2 mg/dL Final   01/17/2024 0536 0.5 (L) 0.6 - 1.2 mg/dL Final   01/16/2024 0757 0.7 0.6 - 1.2 mg/dL Final     Vancomycin Tr   Date/Time Value Ref Range Status   01/18/2024 0815 10.2 10.0 - 15.0 ug/mL Final     Comment:     For all patients with complicated infections with methicillin resistant Staphylococcus aureus (MRSA), e.g. endocarditis, osteomyelitis, meningitis, pneumonia; the vancomycin trough therpeutic range is 15-20 ug/mL.          Vancomycin Administrations (last 96 hours)       Date/Time Action Medication Dose Rate    01/18/24 0859 New Bag    vancomycin 1.25 gram/250 mL IVPB in NSS 1,250 mg 166.7 mL/hr    01/17/24 2030 New Bag    vancomycin 1.25 gram/250 mL IVPB in NSS 1,250 mg 166.7 mL/hr    01/17/24 0532 New Bag    vancomycin 1.25 gram/250 mL IVPB in NSS 1,250 mg 166.7 mL/hr    01/16/24 1732 New Bag    vancomycin 1.25 gram/250 mL IVPB in NSS 1,250 mg 166.7 mL/hr    01/16/24 0530 New Bag    vancomycin 1.25 gram/250 mL IVPB in NSS 1,250 mg 166.7 mL/hr    01/15/24 1507 New Bag    vancomycin 1.75 gram/500 mL IVPB in NSS 1,750 mg               Assessment/Plan  The patient is ordered vancomycin dosing by pharmacy.      Vancomycin trough level 10.2 on maintenance dose of 1250 mg IV Q 12 H with a goal trough 15-20.     Will increase dose to vancomycin 1750 mg IV Q 12 H.    Pharmacy will continue to follow the patient’s vancomycin dosing daily during this course of therapy.      Please call vancomycin levels to the pharmacy.  Franci Knott, [facility]

## 2024-01-18 NOTE — ASSESSMENT & PLAN NOTE
Peripheral catheterization showing  99% stenosis proximally in the left anterior tibial artery and left and the right posterior tibial artery chronically occluded.  S/p peripheral angiogram with atherectomy\PTA of left AT artery    P:  DAPT completed  Wound care following

## 2024-01-18 NOTE — ANESTHESIA POSTPROCEDURE EVALUATION
Patient: Bebe Burks    Procedure Summary     Date: 01/18/24 Room / Location: [facility] OR  / [facility] OR [facility]    Anesthesia Start: 1124 Anesthesia Stop: 1216    Procedure: AMPUTATION TOE (RAY RESECTION) (Left: Second Toe) Diagnosis:       Osteomyelitis of second toe of left foot (CMS/HCC)      (Osteomyelitis of second toe of left foot (CMS/HCC) [M86.9])    Surgeons: Getachew Damian DPM Responsible Provider: Efrain Wren MD    Anesthesia Type: general ASA Status: 4          Anesthesia Type: general  PACU Vitals  1/18/2024 1211 - 1/18/2024 1219      1/18/2024  1215             BP: 138/81    Temp: 36.7 °C (98.1 °F)    Pulse: 82    Resp: 21    SpO2: 97 %            Anesthesia Post Evaluation    Pain management: adequate  Patient location during evaluation: PACU  Patient participation: complete - patient participated  Level of consciousness: awake and alert  Cardiovascular status: acceptable  Airway Patency: adequate  Respiratory status: acceptable  Hydration status: acceptable  Anesthetic complications: no

## 2024-01-18 NOTE — PROGRESS NOTES
Infectious Disease Progress Note    Patient Name: Bebe Burks  MR#: [id number]  : 1960  Admission Date: 1/15/2024  Date: 24   Time: 6:01 PM   Author: Sander Ackerman MD    OBJECTIVE:  Went for toe amputation today cultures are pending  The white count is 32 which is lower than before  Antibiotics:    Anti-infectives (From admission, onward)    Start     Dose/Rate Route Frequency Ordered Stop    24  vancomycin 1.75 gram/500 mL IVPB in NSS        See Hyperspace for full Linked Orders Report.    1,750 mg  over 120 Minutes intravenous Every 12 hours 24 1430            ROS  As above  Vital Signs:    Temp:  [35.7 °C (96.2 °F)-37 °C (98.6 °F)] 36.7 °C (98 °F)  Heart Rate:  [78-92] 87  Resp:  [12-21] 18  BP: (116-178)/(58-97) 149/97    Temp (72hrs), Av.6 °C (97.9 °F), Min:35.7 °C (96.2 °F), Max:37.3 °C (99.2 °F)      Physical Exam    Gen: Aox3  HEENT: OP clear  Neck: Supple  LAD: No cervical LAD  Lungs: CTAB  CV: RRR no murmurs  Abd: Soft NTND +BS  Ext: Foot dressing is in place  Skin: no rash  Neuro: II-XII intact        Lines, Drains, Airways, Wounds:  Peripheral IV (Adult) 01/15/24 Right Antecubital (Active)   Number of days: 2       Wound Anterior;Left Toe (2nd) (Active)   Number of days: 2       Labs:    Lab Results   Component Value Date    WBC 32.01 (HH) 2024    HGB 12.0 2024    HCT 37.7 2024    MCV 88.9 2024     (H) 2024     Lab Results   Component Value Date    GLUCOSE 154 (H) 2024    CALCIUM 8.4 (L) 2024     2024    K 3.3 (L) 2024    CO2 25 2024     2024    BUN 16 2024    CREATININE 0.4 (L) 2024     Lab Results   Component Value Date    ALT 14 01/15/2024    AST 18 01/15/2024    ALKPHOS 70 01/15/2024    BILITOT 1.5 (H) 01/15/2024         Patient Active Problem List   Diagnosis Code   • Controlled type 2 diabetes mellitus, with long-term current use of insulin (CMS/[facility]) E11.9,  Z79.4   • Essential hypertension I10   • Hyperlipidemia E78.5   • Hypothyroidism E03.9   • Obesity E66.9   • Thyroid nodule E04.1   • COVID-19 virus infection U07.1   • Lung nodule R91.1   • Displacement of lumbar intervertebral disc without myelopathy M51.26   • Disability of walking R26.2   • Polyneuropathy due to type 2 diabetes mellitus (CMS/[facility]) E11.42   • Pre-op examination Z01.818   • Localized osteoarthritis of right knee M17.11   • Type 2 diabetes mellitus treated with insulin (CMS/[facility]) E11.9, Z79.4   • Abnormal finding on EKG R94.31   • Left foot drop M21.372   • Abnormal finding on chest xray R93.89   • Osteoarthritis of right knee, unspecified osteoarthritis type M17.11   • S/P total knee arthroplasty, right Z96.651   • Sepsis (CMS/[facility]) A41.9   • Osteomyelitis of left foot (CMS/[facility]) M86.9   • Peripheral neuropathy G62.9   • Abdominal pain R10.9   • Chronic idiopathic constipation K59.04   • Gangrene (CMS/[facility]) I96   • Tibial artery stenosis, left (CMS/[facility]) I70.202     Gangrene (CMS/[facility])  Assessment & Plan  If podiatry thinks that negative clean margins are obtained then treatment will consist of soft tissue coverage with oral antibiotics we will continue with vancomycin while we await for final culture results        Sander Ackerman MD  1/18/20246:01 PM

## 2024-01-18 NOTE — ANESTHESIA PREPROCEDURE EVALUATION
Relevant Problems   CARDIOVASCULAR   (+) Essential hypertension      MUSCULOSKELETAL   (+) Localized osteoarthritis of right knee   (+) Osteoarthritis of right knee, unspecified osteoarthritis type      NEUROLOGY   (+) Peripheral neuropathy   (+) Polyneuropathy due to type 2 diabetes mellitus (CMS/HCC)      Other   (+) COVID-19 virus infection   (+) Controlled type 2 diabetes mellitus, with long-term current use of insulin (CMS/HCC)   (+) Hypothyroidism   (+) Osteomyelitis of left foot (CMS/HCC)   (+) Type 2 diabetes mellitus treated with insulin (CMS/HCC)        Narrative    Sinus tachycardia with 2nd degree A-V block (Mobitz I) with Premature supraventricular complexes   Septal infarct , age undetermined   Confirmed by Cecy Pickard ([id number]) on 1/16/2024 10:11:54 PM         •  Left Ventricle: Normal ventricle size. Normal wall thickness. Preserved systolic function. Estimated EF 65%. No regional wall motion abnormalities. Normal diastolic filling pattern for age.  •  Right Ventricle: Normal ventricle size. Normal systolic function.  •  Right Atrium: Normal sized atrium.  •  Left Atrium: Normal sized atrium.  •  Aortic Valve: Tricuspid valve. No regurgitation. No stenosis.  •  Mitral Valve:.Moderate calcification of the posterior leaflet. No regurgitation. There appears to be very mild nonrheumatic mitral stenosis as a result of relative immobility of the posterior mitral valve leaflet.  Peak gradient 7 mean gradient 4 at a heart rate of 70 bpm.  Valve area was not calculated.  •  Tricuspid Valve: Normal structure. Trace regurgitation. Estimated RVSP = 30 mmHg.  •  Pulmonic Valve: Normal structure. Trace regurgitation. No stenosis.  •  Pericardium: No evidence of pericardial effusion.  •  IVC/SVC: Inferior vena cava is dilated (>2.1cm). Inferior vena cava demonstrates normal respiratory collapse.  •  Aorta: Aortic root normal. Sinuses of Valsalva normal-sized. Ascending aorta normal-sized. Aortic arch normal-sized.  Descending aorta normal-sized.        Anesthesia ROS/MED HX    Anesthesia History    History of anesthetic complications (urinary retention)  Neuro/Psych    Neuropathy  Cardiovascular   hypertension   Echocardiogram reviewed and ECG reviewed  Comments: Poor dentition  Hematological - neg  GI/Hepatic- neg  Renal Disease- neg  Endo/Other   Diabetes   Hypothyroidism  ROS/MED HX Comments:    Pulmonary: Lung nodule   Cardiology: Tibial artery stenosis   Musculoskeletal: Gangrene  Osteomyelitis  Left foot drop       Past Surgical History:   Procedure Laterality Date   • ABCESS DRAINAGE  10/2023   • CATARACT EXTRACTION Bilateral    • COLONOSCOPY     • COMBINED HYSTEROSCOPY DIAGNOSTIC / D&C  2007   • EYE SURGERY Left 2017    cataract   • JOINT REPLACEMENT Right 02/2023    knee replacement   • VITRECTOMY Left 2016       Physical Exam    Airway   Mallampati: III   TM distance: >3 FB   Neck ROM: full  Cardiovascular - normal   Rhythm: regular   Rate: abnormalPulmonary - normal   clear to auscultation  Other Findings   Poor dentition        Anesthesia Plan    Plan: general    Technique: general LMA   4 ASA  Comments:    Plan: Risk of cardiac arrest/aspiration/airway trauma/prolonged intubation/bleeding/nerve injury discussed. Patient would like to proceed.    Medicine and cariology notes read and appreciated.

## 2024-01-18 NOTE — PROGRESS NOTES
Hospital Medicine Service -  Daily Progress Note       SUBJECTIVE   Interval History:   TOMMY GORDILLO  Patient seen this AM at bedside. Endorses left sided ear pain, denies discharge from ear/ tinnitus/ change in hearing. States she has a history of otitis media.   Patient denies any complaints of N/V/D, SOB, CP, Headaches. Patient is resting comfortably in bed.    OBJECTIVE      Vital signs in last 24 hours:  Temp:  [36.1 °C (97 °F)-37 °C (98.6 °F)] 36.6 °C (97.9 °F)  Heart Rate:  [] 81  Resp:  [13-18] 18  BP: (116-173)/(58-80) 130/60    Intake/Output Summary (Last 24 hours) at 1/18/2024 0700  Last data filed at 1/17/2024 1747  Gross per 24 hour   Intake --   Output 10 ml   Net -10 ml       PHYSICAL EXAMINATION      Physical Exam  Vitals reviewed.   Constitutional:       Appearance: She is not ill-appearing.   HENT:      Head: Normocephalic and atraumatic.   Cardiovascular:      Rate and Rhythm: Normal rate and regular rhythm.      Pulses:           Dorsalis pedis pulses are 2+ on the right side and 2+ on the left side.        Posterior tibial pulses are 2+ on the right side and 2+ on the left side.   Pulmonary:      Effort: Pulmonary effort is normal.      Breath sounds: Normal breath sounds.   Musculoskeletal:         General: Tenderness present.      Right lower leg: No edema.      Left lower leg: No edema.      Comments: Right-sided tenderness to palpation along the IT band, no erythema/edema   Feet:      Comments: Left foot second digit gangrenous in appearance with erythema and edema extending to midfoot; reports no sensation on second digit left foot  Neurological:      Mental Status: She is alert and oriented to person, place, and time.   Psychiatric:         Mood and Affect: Mood anxious.         Behavior: Behavior anxious.     LINES, CATHETERS, DRAINS, AIRWAYS, AND WOUNDS   Lines, Drains, and Airways:  Wounds (agree with documentation and present on admission):  Peripheral IV (Adult) 01/17/24  Anterior;Left Forearm (Active)   Number of days: 1       Wound Anterior;Left Toe (2nd) (Active)   Number of days: 3       Catheterization Site - Arterial Right Femoral 6 Fr. (Active)   Number of days: 1          LABS / IMAGING / TELE      Labs  Results from last 7 days   Lab Units 01/18/24  0510 01/17/24  0536 01/16/24  0757   WBC K/uL 32.01* 36.81* 48.98*   HEMOGLOBIN g/dL 12.0 11.9 12.5   HEMATOCRIT % 37.7 38.0 41.6   PLATELETS K/uL 433* 418* 420*         Results from last 7 days   Lab Units 01/18/24  0510 01/17/24  0536 01/16/24  0757 01/15/24  1345   SODIUM mEQ/L 137 138 139 136   POTASSIUM mEQ/L 3.3* 3.7 4.5 4.4   CHLORIDE mEQ/L 105 105 105 97*   CO2 mEQ/L 25 25 21 24   BUN mg/dL 16 24 31* 29*   CREATININE mg/dL 0.4* 0.5* 0.7 0.8   CALCIUM mg/dL 8.4* 8.7 8.9 10.7*   ALBUMIN g/dL  --   --   --  4.2   BILIRUBIN TOTAL mg/dL  --   --   --  1.5*   ALK PHOS IU/L  --   --   --  70   ALT IU/L  --   --   --  14   AST IU/L  --   --   --  18   GLUCOSE mg/dL 154* 171* 180* 269*               SARS-CoV-2 (COVID-19) (no units)   Date/Time Value   01/15/2024 1345 Negative       Imaging  I have independently reviewed the pertinent imaging from the last 24 hrs.  Peripheral catheterization    Result Date: 1/17/2024  •  Patent bilateral iliac arteries. •  Luminal irregularities in the bilateral femoral-popliteal arteries. •  RIGHT posterior tibial artery is chronically occluded with two-vessel runoff to the right foot.  Severe AT disease. •  LEFT posterior tibial artery is chronically occluded with two-vessel runoff to the left foot.  LEFT anterior tibial artery exhibits 99% stenosis proximally. •  Laser arthrectomy and serranator PTA of the LEFT AT reducing the lesion to luminal irregularities and maintaining excellent distal flow. Recommendations: Aspirin and clopidogrel. Wound care.         ECG/Telemetry  I have independently reviewed the telemetry. No events for the last 24 hours.    ASSESSMENT AND PLAN      Sepsis  "(CMS/[facility])  Assessment & Plan  Sepsis 2/2 Osteomyelitis of the (L) 2nd toe  Xray (L) Foot: Ulcerations of (L) 2nd digit consistent with OM.  Blood cultures negative 18 to 24 hours  MRI left foot showing gangrenous second toe with marked erosion in the second distal phalanx suggestive of osteomyelitis  S/p peripheral angiogram with atherectomy\PTA of left AT artery     P:   Continue IV vancomycin (1/15-  Surgery scheduled for today  NPO at midnight  Podiatry/ID following    * Osteomyelitis of left foot (CMS/[facility])  Assessment & Plan  See \"sepsis \"problem    Abdominal pain  Assessment & Plan  C/o persistent vomiting for 24 hrs, reports constipation for 2 days  Recent hx doxycycline course for 2 weeks  PE: Tenderness in all 4 quadrants,  Negative rebound tenderness  Received Zofran 4mg x 2 in the ED (2:30pm and 3:00pm)  Suspect abdominal pain secondary to constipation versus septic    Plan  Cont Zofran 4mg every 6 hrs prn   Continue Miralax 17g daily   Given one-time dose Dulcolax suppository    Tibial artery stenosis, left (CMS/[facility])  Assessment & Plan  Peripheral catheterization showing  99% stenosis proximally in the left anterior tibial artery and left and the right posterior tibial artery chronically occluded.  S/p peripheral angiogram with atherectomy\PTA of left AT artery    P:  DAPT started  Wound care    Chronic idiopathic constipation  Assessment & Plan  Patient complains of constipation today.  Has been ongoing for 2 days.   Normally takes Dulcolax at home    P:   Abdominal pain    Peripheral neuropathy  Assessment & Plan  Hx of Peripheral neuropathy  PTA meds Gabapentin 300mg, 3 times days    Plan  Continue PTA Gabapentin 300mg, 3 times daily    Hypothyroidism  Assessment & Plan  Hx of hypothyroid, currently taking synthroid     Plan  Continue PTA Synthroid     Essential hypertension  Assessment & Plan  Home Meds: Amlodipine, Hctz, and Losartan (last taken 1/14/2024)  OA: BP: 175/78 mmHg -> 137/68 " mmHg    Plan  Cont PTA Losartan and amlodipine   Will start PTA Hctz on 1/16/204    Controlled type 2 diabetes mellitus, with long-term current use of insulin (CMS/[facility])  Assessment & Plan  Home meds: Jardiance, insulin, rybelsus   Last A1c 10.5 (8.8 on 3/2023)  OA: Bld glucose 269    Plan  Lantus 4 units + SSI (due to n.p.o. at midnight).  Return to Lantus 10 units following operation.   Follow-up outpatient with PCP regarding diabetes management  Hold Home Meds Elmira Bland, home insulin regiment         VTE Assessment: Padua VTE Score: 1  VTE Prophylaxis:  Current anticoagulants:    •None      Code Status: Full Code      Estimated Discharge Date: 1/19/2024     Disposition Planning: Pending further medical management     Bryanna Solorio DO Pager #[phone]  Hospital Medicine Service  [facility] Resident  1/18/2024   Please see attending attestation for final recommendations

## 2024-01-18 NOTE — PROGRESS NOTES
A/P: 63 y.o. patient POD#0 s/p left foot I&D, left second toe amputation  -Patient tolerated the procedure well, stable in PACU. Will return to floor when ready per PACU protocol  -Abx per ID  -OR findings: Left second toe is necrotic with approximately 1cc of purulence noted at the MTPJ. After debridement, no further purulence is noted and remaining soft tissue appears viable. Adequate soft tissue bleeding noted   -Bone from the left second toe to path: pending  -Deep wound culture of left second toe: pending   -Surgical site was dressed with betadine soaked adaptic, gauze, chio, web roll, coban  -neurovascular checks every 4 hours  -Ice and elevate to the LLE  -Heel WB to LLE  -DVT ppx: may be resumed tomorrow   -Pain management ordered: as needed by patient  -continue home meds  -adult regular diet ordered  -Case management to set up home dressing care changes consisting of adaptic, betadine soaked gauze,chio, coban  -PT/OT to evaluate ambulatory ability/strenthening  -Nursing to reinforce strike through. If strike through is excessive, please page Podiatry resident on call    -Please contact on call podiatry resident with any questions or concerns. #[phone] before 5 pm

## 2024-01-18 NOTE — ANESTHESIA PROCEDURE NOTES
Airway  Urgency: elective    Start Time: 1/18/2024 11:32 AM  Airway not difficult    General Information and Staff    Patient location during procedure: OR  Anesthesiologist: Efrain Wren MD  Resident/CRNA: Charles Mckenna CRNA  Performed: resident/CRNA   Performed by: Charles Mckenna CRNA  Authorized by: Efrain Wren MD      Indications and Patient Condition  Indications for airway management: anesthesia  Sedation level: deep  Preoxygenated: yes  Mask difficulty assessment: 0 - not attempted    Final Airway Details  Final airway type: supraglottic airway      Successful airway: iGel  Size 4     Number of attempts at approach: 1  Number of other approaches attempted: 0  Atraumatic airway insertion

## 2024-01-18 NOTE — PLAN OF CARE
Care Coordination Discharge Plan Note     Discharge Needs Assessment  Concerns to be Addressed: discharge planning  Current Discharge Risk: lives alone    Anticipated Discharge Plan  Anticipated Discharge Disposition: home with home health, skilled nursing facility  Type of Home Care Services: home PT, home OT, nursing    Type of Skilled Nursing Care Services: OT, PT, nursing        Patient Choice  Offered/Gave Vendor List: yes  Patient's Choice of Community Agency(s): if pt needs HC at d/c, pt prefers [facility].  Pt noted if needs SNF LOC would be interested in [facility]    Patient and/or patient guardian/advocate was made aware of their right to choose a provider. A list of eligible providers was presented and reviewed with the patient and/or patient guardian/advocate in written and/or verbal form. The list delineates providers in the patient’s desired geographic area who are participating in the Medicare program and/or providers contracted with the patient’s primary insurance. The Medicare list and quality ratings were obtained from the Medicare.gov [medicare.gov] website.    ---------------------------------------------------------------------------------------------------------------------    Interdisciplinary Discharge Plan Review:  Participants:nursing, social work/services    Concerns Comments: PT back from the OR. Will follow for post op PT/OT and abx plan.    Discharge Plan:   Disposition/Destination:   /    Discharge Facility:    Community Resources:      Discharge Transportation:  Is Out of Hospital DNR needed at Discharge:    Does patient need discharge transport?        Dispo- TBD, hopeful home  Follow for PT/OT recs and abx plan

## 2024-01-18 NOTE — OP NOTE
Hospital: [facility]  Medical Record Number:  [id number]  Patient Name:  Bebe Burks  YOB: 1960   Date of Operation: 1/18/2024  Primary Surgeon:  Getachew Damian DPM  First Assistant: Mic Renteria DPM    Preoperative Diagnosis:  Left foot infection    Postoperative Diagnosis: same as above    Procedure:   1. Left foot incision and drainage  2. Left second toe amputation    Anesthesia: MAC + local (5ml of 1% lidocaine plain + 5ml of 0.5% marcaine plain)    Hemostasis: anatomic dissection    Operative Findings: none    Estimated Blood Loss: 10 mL    Specimens:   1. Deep wound culture of left second toe  2. Left second toe to pathology     Implants: * No implants in log *    Injectables: none          Complications:  None; patient tolerated the procedure well.           Disposition: PACU - hemodynamically stable.           Condition: stable    Operative Note:   Bebe Burks  presents to  for surgical intervention of their left foot. The patient has failed most conservative modalities and has opted for surgical intervention at this time. In pre-op the patients’ vital signs are stable and neurovascular status is intact.     The patient was transferred to the operating room and placed on the operating room table in the supine position.  The correct surgical site was identified which is the left side. Once anesthesia was achieved, the above mentioned blocked was administered. The foot and ankle were prepped and draped in the usual aseptic technique and a timeout was performed identifying the patient side as well as procedure.  Due to patient's recent vascular procedure, will the decision was made to not utilize the tourniquet during this procedure.    1.  Left second toe amputation  Attention was then brought to left foot, where the second toe was noted to be necrotic with macerated soft tissue.  A #15 blade was used to make a modified racquet shaped incision just proximal to the MTPJ,  circumscribing the base of the second toe.  The #15 blade was used to disarticulate the second toe at the level of the MTPJ.  Trace purulence is noted at the joint at this time.  A deep wound culture swab of the abscess was obtained. The disarticulated toe was passed off the field and sent to pathology for further evaluation.    2.  Left foot incision and drainage  A  hemostat was used to bluntly dissect through the soft tissue plane to assess for any remaining abscess formations.  No further purulence is noted along the flexor or extensor tendons.  The extensor tendon was retracted proximally and transected.  A rongeur was used to remove all fibronecrotic soft tissue from the surgical field.  Adequate soft tissue bleeding is noted this time.  The surgical sites copiously irrigated with normal sterile saline.  3-0 Vicryl was used for deep closure.  3-0 nylon in interrupted, horizontal mattress stitches were used for skin closure.    Betadine soaked Adaptic, gauze, Cesar, Webril, Coban were used for dressings.    The patient tolerated the procedure well with all vital signs stable and neurovascular status intact. Once aroused from anesthesia the patient was transferred from the operating room to PACU and discharged to the floors per protocol.    Patient is instructed to be heel weightbearing to the left foot.  We are confident the clean margin was achieved.  Podiatry will continue to follow along and monitor.  We will perform first dressing change over the weekend.       Getachew Damian DPM  Phone Number: [phone]

## 2024-01-18 NOTE — ANESTHESIOLOGIST PRE-PROCEDURE ATTESTATION
Pre-Procedure Patient Identification:  I am the Primary Anesthesiologist and have identified the patient on 01/18/24 at 11:14 AM.   I have confirmed the procedure(s) will be performed by the following surgeon/proceduralist Getachew Damian DPM.

## 2024-01-18 NOTE — OR SURGEON
Pre-Procedure patient identification:  I am the primary operating surgeon/proceduralist and I have reviewed the applicable pathology reports and radiology studies for this procedure. I have identified the patient and confirmed laterality is {left 2nd toe on 01/18/24 at 10:30 AM Getachew Damian DPM  Phone Number: [phone]

## 2024-01-18 NOTE — PROGRESS NOTES
GEOVANNI with Dr. Damian to follow his morning cases. NPO intact since midnight.    RENEE Cee DPM  PGY-1 #[id number]

## 2024-01-19 PROBLEM — Z87.2 H/O PILONIDAL CYST: Status: ACTIVE | Noted: 2024-01-19

## 2024-01-19 LAB
ANION GAP SERPL CALC-SCNC: 8 MEQ/L (ref 3–15)
BACTERIA BLD CULT: NORMAL
BACTERIA BLD CULT: NORMAL
BUN SERPL-MCNC: 12 MG/DL (ref 7–25)
CALCIUM SERPL-MCNC: 8.1 MG/DL (ref 8.6–10.3)
CHLORIDE SERPL-SCNC: 107 MEQ/L (ref 98–107)
CO2 SERPL-SCNC: 23 MEQ/L (ref 21–31)
CREAT SERPL-MCNC: 0.4 MG/DL (ref 0.6–1.2)
EGFRCR SERPLBLD CKD-EPI 2021: >60 ML/MIN/1.73M*2
ERYTHROCYTE [DISTWIDTH] IN BLOOD BY AUTOMATED COUNT: 13.7 % (ref 11.7–14.4)
GLUCOSE BLD-MCNC: 252 MG/DL (ref 70–99)
GLUCOSE BLD-MCNC: 285 MG/DL (ref 70–99)
GLUCOSE BLD-MCNC: 307 MG/DL (ref 70–99)
GLUCOSE BLD-MCNC: 317 MG/DL (ref 70–99)
GLUCOSE SERPL-MCNC: 191 MG/DL (ref 70–99)
HCT VFR BLD AUTO: 37.5 % (ref 35–45)
HGB BLD-MCNC: 11.9 G/DL (ref 11.8–15.7)
MAGNESIUM SERPL-MCNC: 1.8 MG/DL (ref 1.8–2.5)
MCH RBC QN AUTO: 28.5 PG (ref 28–33.2)
MCHC RBC AUTO-ENTMCNC: 31.7 G/DL (ref 32.2–35.5)
MCV RBC AUTO: 89.7 FL (ref 83–98)
PDW BLD AUTO: 10.7 FL (ref 9.4–12.3)
PLATELET # BLD AUTO: 413 K/UL (ref 150–369)
POCT TEST: ABNORMAL
POTASSIUM SERPL-SCNC: 3.7 MEQ/L (ref 3.5–5.1)
RBC # BLD AUTO: 4.18 M/UL (ref 3.93–5.22)
SODIUM SERPL-SCNC: 138 MEQ/L (ref 136–145)
WBC # BLD AUTO: 31.67 K/UL (ref 3.8–10.5)

## 2024-01-19 PROCEDURE — 63700000 HC SELF-ADMINISTRABLE DRUG

## 2024-01-19 PROCEDURE — 12000000 HC ROOM AND CARE MED/SURG

## 2024-01-19 PROCEDURE — 83735 ASSAY OF MAGNESIUM: CPT

## 2024-01-19 PROCEDURE — 85027 COMPLETE CBC AUTOMATED: CPT

## 2024-01-19 PROCEDURE — 63600000 HC DRUGS/DETAIL CODE: Mod: JZ

## 2024-01-19 PROCEDURE — 25000000 HC PHARMACY GENERAL

## 2024-01-19 PROCEDURE — 63600000 HC DRUGS/DETAIL CODE

## 2024-01-19 PROCEDURE — 99233 SBSQ HOSP IP/OBS HIGH 50: CPT | Performed by: STUDENT IN AN ORGANIZED HEALTH CARE EDUCATION/TRAINING PROGRAM

## 2024-01-19 PROCEDURE — 36415 COLL VENOUS BLD VENIPUNCTURE: CPT

## 2024-01-19 PROCEDURE — 80048 BASIC METABOLIC PNL TOTAL CA: CPT

## 2024-01-19 PROCEDURE — 25800000 HC PHARMACY IV SOLUTIONS

## 2024-01-19 RX ORDER — ASPIRIN 81 MG/1
81 TABLET ORAL DAILY
Status: DISCONTINUED | OUTPATIENT
Start: 2024-01-19 | End: 2024-01-19

## 2024-01-19 RX ORDER — CLOPIDOGREL BISULFATE 75 MG/1
75 TABLET ORAL DAILY
Status: DISCONTINUED | OUTPATIENT
Start: 2024-01-19 | End: 2024-01-19

## 2024-01-19 RX ORDER — INSULIN GLARGINE 100 [IU]/ML
10 INJECTION, SOLUTION SUBCUTANEOUS NIGHTLY
Status: DISCONTINUED | OUTPATIENT
Start: 2024-01-19 | End: 2024-01-20

## 2024-01-19 RX ORDER — INSULIN LISPRO 100 [IU]/ML
3 INJECTION, SOLUTION INTRAVENOUS; SUBCUTANEOUS ONCE
Status: COMPLETED | OUTPATIENT
Start: 2024-01-19 | End: 2024-01-19

## 2024-01-19 RX ORDER — INSULIN LISPRO 100 [IU]/ML
4 INJECTION, SOLUTION INTRAVENOUS; SUBCUTANEOUS
Status: DISCONTINUED | OUTPATIENT
Start: 2024-01-19 | End: 2024-01-20

## 2024-01-19 RX ORDER — ENOXAPARIN SODIUM 100 MG/ML
1 INJECTION SUBCUTANEOUS
Status: DISCONTINUED | OUTPATIENT
Start: 2024-01-19 | End: 2024-01-19

## 2024-01-19 RX ORDER — ENOXAPARIN SODIUM 100 MG/ML
40 INJECTION SUBCUTANEOUS
Status: DISCONTINUED | OUTPATIENT
Start: 2024-01-19 | End: 2024-01-21 | Stop reason: HOSPADM

## 2024-01-19 RX ADMIN — GABAPENTIN 300 MG: 300 CAPSULE ORAL at 21:57

## 2024-01-19 RX ADMIN — ENOXAPARIN SODIUM 40 MG: 40 INJECTION SUBCUTANEOUS at 18:35

## 2024-01-19 RX ADMIN — GABAPENTIN 300 MG: 300 CAPSULE ORAL at 08:22

## 2024-01-19 RX ADMIN — VANCOMYCIN HYDROCHLORIDE 1750 MG: 500 INJECTION, POWDER, LYOPHILIZED, FOR SOLUTION INTRAVENOUS at 08:23

## 2024-01-19 RX ADMIN — OXYCODONE HYDROCHLORIDE 5 MG: 5 TABLET ORAL at 08:22

## 2024-01-19 RX ADMIN — ATORVASTATIN CALCIUM 40 MG: 40 TABLET, FILM COATED ORAL at 08:22

## 2024-01-19 RX ADMIN — INSULIN LISPRO 3 UNITS: 100 INJECTION, SOLUTION INTRAVENOUS; SUBCUTANEOUS at 13:00

## 2024-01-19 RX ADMIN — OXYCODONE HYDROCHLORIDE 5 MG: 5 TABLET ORAL at 00:09

## 2024-01-19 RX ADMIN — LOSARTAN POTASSIUM 50 MG: 25 TABLET, FILM COATED ORAL at 08:27

## 2024-01-19 RX ADMIN — INSULIN LISPRO 5 UNITS: 100 INJECTION, SOLUTION INTRAVENOUS; SUBCUTANEOUS at 22:42

## 2024-01-19 RX ADMIN — INSULIN LISPRO 1 UNITS: 100 INJECTION, SOLUTION INTRAVENOUS; SUBCUTANEOUS at 18:33

## 2024-01-19 RX ADMIN — HYDROCHLOROTHIAZIDE 12.5 MG: 12.5 TABLET ORAL at 08:22

## 2024-01-19 RX ADMIN — AMLODIPINE BESYLATE 5 MG: 5 TABLET ORAL at 08:22

## 2024-01-19 RX ADMIN — INSULIN LISPRO 3 UNITS: 100 INJECTION, SOLUTION INTRAVENOUS; SUBCUTANEOUS at 08:28

## 2024-01-19 RX ADMIN — OXYCODONE HYDROCHLORIDE 5 MG: 5 TABLET ORAL at 22:13

## 2024-01-19 RX ADMIN — ACETAMINOPHEN 650 MG: 325 TABLET ORAL at 00:09

## 2024-01-19 RX ADMIN — INSULIN LISPRO 4 UNITS: 100 INJECTION, SOLUTION INTRAVENOUS; SUBCUTANEOUS at 18:34

## 2024-01-19 RX ADMIN — INSULIN GLARGINE 10 UNITS: 100 INJECTION, SOLUTION SUBCUTANEOUS at 22:42

## 2024-01-19 RX ADMIN — GABAPENTIN 300 MG: 300 CAPSULE ORAL at 13:11

## 2024-01-19 RX ADMIN — LEVOTHYROXINE SODIUM 25 MCG: 0.03 TABLET ORAL at 06:40

## 2024-01-19 RX ADMIN — VANCOMYCIN HYDROCHLORIDE 1750 MG: 500 INJECTION, POWDER, LYOPHILIZED, FOR SOLUTION INTRAVENOUS at 22:19

## 2024-01-19 RX ADMIN — ACETAMINOPHEN 650 MG: 325 TABLET ORAL at 22:13

## 2024-01-19 RX ADMIN — POLYETHYLENE GLYCOL 3350 17 G: 17 POWDER, FOR SOLUTION ORAL at 08:23

## 2024-01-19 ASSESSMENT — COGNITIVE AND FUNCTIONAL STATUS - GENERAL
CLIMB 3 TO 5 STEPS WITH RAILING: 3 - A LITTLE
MOVING TO AND FROM BED TO CHAIR: 4 - NONE
CLIMB 3 TO 5 STEPS WITH RAILING: 3 - A LITTLE
STANDING UP FROM CHAIR USING ARMS: 4 - NONE
STANDING UP FROM CHAIR USING ARMS: 4 - NONE
CLIMB 3 TO 5 STEPS WITH RAILING: 3 - A LITTLE
WALKING IN HOSPITAL ROOM: 4 - NONE
STANDING UP FROM CHAIR USING ARMS: 4 - NONE
WALKING IN HOSPITAL ROOM: 4 - NONE
WALKING IN HOSPITAL ROOM: 4 - NONE

## 2024-01-19 NOTE — PROGRESS NOTES
Infectious Disease Progress Note    Patient Name: Bebe Burks  MR#: [id number]  : 1960  Admission Date: 1/15/2024  Date: 24   Time: 12:36 PM   Author: Sander Ackerman MD    OBJECTIVE:    Cultures are pending  Antibiotics:    Anti-infectives (From admission, onward)    Start     Dose/Rate Route Frequency Ordered Stop    24  vancomycin 1.75 gram/500 mL IVPB in NSS        See Hyperspace for full Linked Orders Report.    1,750 mg  over 120 Minutes intravenous Every 12 hours 24 1430            ROS  As above  Vital Signs:    Temp:  [36.3 °C (97.4 °F)-36.7 °C (98.1 °F)] 36.7 °C (98.1 °F)  Heart Rate:  [78-91] 86  Resp:  [12-18] 16  BP: (112-169)/(59-97) 165/74    Temp (72hrs), Av.6 °C (97.8 °F), Min:35.7 °C (96.2 °F), Max:37 °C (98.6 °F)      Physical Exam    Gen: Aox3  HEENT: OP clear  Neck: Supple  LAD: No cervical LAD  Lungs: CTAB  CV: RRR no murmurs  Abd: Soft NTND +BS  Ext: Left foot dressing is clean dry intact no rash  Neuro: II-XII intact        Lines, Drains, Airways, Wounds:  Peripheral IV (Adult) 24 Anterior;Left Forearm (Active)   Number of days: 2       Wound Anterior;Left Toe (2nd) (Active)   Number of days: 4       Surgical Incision Foot Left (Active)   Number of days: 1       Catheterization Site - Arterial Right Femoral 6 Fr. (Active)   Number of days: 2       Labs:    Lab Results   Component Value Date    WBC 31.67 (HH) 2024    HGB 11.9 2024    HCT 37.5 2024    MCV 89.7 2024     (H) 2024     Lab Results   Component Value Date    GLUCOSE 191 (H) 2024    CALCIUM 8.1 (L) 2024     2024    K 3.7 2024    CO2 23 2024     2024    BUN 12 2024    CREATININE 0.4 (L) 2024     Lab Results   Component Value Date    ALT 14 01/15/2024    AST 18 01/15/2024    ALKPHOS 70 01/15/2024    BILITOT 1.5 (H) 01/15/2024         Patient Active Problem List   Diagnosis Code   • Controlled  type 2 diabetes mellitus, with long-term current use of insulin (CMS/[facility]) E11.9, Z79.4   • Essential hypertension I10   • Hyperlipidemia E78.5   • Hypothyroidism E03.9   • Obesity E66.9   • Thyroid nodule E04.1   • COVID-19 virus infection U07.1   • Lung nodule R91.1   • Displacement of lumbar intervertebral disc without myelopathy M51.26   • Disability of walking R26.2   • Polyneuropathy due to type 2 diabetes mellitus (CMS/[facility]) E11.42   • Pre-op examination Z01.818   • Localized osteoarthritis of right knee M17.11   • Type 2 diabetes mellitus treated with insulin (CMS/[facility]) E11.9, Z79.4   • Abnormal finding on EKG R94.31   • Left foot drop M21.372   • Abnormal finding on chest xray R93.89   • Osteoarthritis of right knee, unspecified osteoarthritis type M17.11   • S/P total knee arthroplasty, right Z96.651   • Sepsis (CMS/[facility]) A41.9   • Osteomyelitis of left foot (CMS/[facility]) M86.9   • Peripheral neuropathy G62.9   • Abdominal pain R10.9   • Chronic idiopathic constipation K59.04   • Gangrene (CMS/[facility]) I96   • Tibial artery stenosis, left (CMS/[facility]) I70.202   • H/O pilonidal cyst Z87.2     Gangrene (CMS/[facility])  Assessment & Plan  If all infected bone was comfortably removed by podiatry the treatment plan will be for 10 days of oral antibiotics for soft tissue coverage, we are still waiting for final culture results the white count is still high but better 31,000          Sander Ackerman MD  1/19/202412:36 PM

## 2024-01-19 NOTE — PROGRESS NOTES
Subjective:   Pt seen at bedside POD#1 s/p left foot I&D, left second toe amputation .NAD. No overnight events. Dressing clean, dry, and intact. Denies any N/V/F/C/CP/SOB.     ROS:   Past Medical/Surgical history, Allergies, Meds reviewed in detail as charted  FH/SH reviewed in detail as charted  Review of Systems:  Head and Neck: No complaints  Chest : No complaints  Abdomen: No Complaints  Constitutional: Unremarkable     Vitals: I have reviewed the patient's current vital signs as documented in the patient's EMR.    Radiology: No new Radiology.    Labs:   CBC Results       01/19/24 01/18/24 01/17/24     0718 0510 0536    WBC 31.67- 32.01- 36.81-    RBC 4.18 4.24 4.21    HGB 11.9 12.0 11.9    HCT 37.5 37.7 38.0    MCV 89.7 88.9 90.3    MCH 28.5 28.3 28.3    MCHC 31.7- 31.8- 31.3-    - 433- 418-         Comment for WBC at 0718 on 01/19/24: ALL RESULTS HAVE BEEN CHECKED This result has been called to dinora bowen by Jeevan on 01/19/2024 07:38:00, and has been read back.     Comment for WBC at 0510 on 01/18/24: CONSISTENT WITH PREVIOUS RESULTS    Comment for WBC at 0536 on 01/17/24: ALL RESULTS HAVE BEEN CHECKED This result has been called to ashley hein by Cait on 01/17/2024 06:28:34, and has been read back.     Comment for PLT at 0510 on 01/18/24: RESULTS OBTAINED AFTER VORTEXING TO ELIMINATE PLT CLUMPS           Objective:     LE Exam  Vascular: Dressings remained intact.  No strike through noted.         Capillary refill time <3 seconds.  MSK: +DP/PF ankle intact.   No pain on palpation to the posterior legs B/L. No signs of clinical DVT B/L.   Derm: Dressings C/D/I. No strike through.  Neuro: Light touch and protective sensation is intact.       Assessment:  Bebe Burks is a 63 y.o. female presents POD#1 s/p left foot I&D, left second toe amputation    Plan:  -Patient examined, evaluated, and treated. All questions and concerns addressed  -Abx per ID  -OR findings: Left second toe is necrotic  with approximately 1cc of purulence noted at the MTPJ. After debridement, no further purulence is noted and remaining soft tissue appears viable. Adequate soft tissue bleeding noted   -Bone from the left second toe to path: pending  -Deep wound culture of left second toe: pending   -Surgical site was dressed with betadine soaked adaptic, gauze, chio, web roll, coban  -neurovascular checks every 4 hours  -Ice and elevate to the LLE  -Heel WB to LLE  -Pain management ordered: as needed by patient  -Case management to set up home dressing care changes consisting of adaptic, betadine soaked gauze,chio, coban  -PT/OT to evaluate ambulatory ability/strenthening  -Nursing to reinforce strike through. If strike through is excessive, please page Podiatry resident on call    RENEE Cee DPM  PGY-1 #[id number]

## 2024-01-19 NOTE — PLAN OF CARE
Care Coordination Discharge Plan Note     Discharge Needs Assessment  Concerns to be Addressed: discharge planning  Current Discharge Risk: lives alone    Anticipated Discharge Plan  Anticipated Discharge Disposition: home with home health, skilled nursing facility  Type of Home Care Services: home PT, home OT, nursing    Type of Skilled Nursing Care Services: OT, PT, nursing        Patient Choice  Offered/Gave Vendor List: yes  Patient's Choice of Community Agency(s): if pt needs HC at d/c, pt prefers [facility].  Pt noted if needs SNF LOC would be interested in [facility]    Patient and/or patient guardian/advocate was made aware of their right to choose a provider. A list of eligible providers was presented and reviewed with the patient and/or patient guardian/advocate in written and/or verbal form. The list delineates providers in the patient’s desired geographic area who are participating in the Medicare program and/or providers contracted with the patient’s primary insurance. The Medicare list and quality ratings were obtained from the Medicare.gov [medicare.gov] website.    ---------------------------------------------------------------------------------------------------------------------    Interdisciplinary Discharge Plan Review:  Participants:nursing, social work/services    Concerns Comments: Waiting on PT/OT evals for dispo planning. Put on priority list for tomorrow. Per ID, if margins clear no IV abx needed. Waiting on final cultures [facility] following and SW sent SNF refs in case she needs rehab.    Discharge Plan:   Disposition/Destination:   /    Discharge Facility:    Community Resources:      Discharge Transportation:  Is Out of Hospital DNR needed at Discharge:    Does patient need discharge transport?        Dispo- TBD pending PT/OT evals  [facility] vs SNF- refs sent

## 2024-01-19 NOTE — NURSING NOTE
Report given to malu SANTOS. Pt will be transferred to [facility], all belongings packed up for transfer

## 2024-01-19 NOTE — PLAN OF CARE
Plan of Care Review  Plan of Care Reviewed With: patient  Progress: no change  Outcome Evaluation: Transferred from , O with supervision with walker. Minimal pain reported. Cultures pending, IV abx continued. Plan for d/c home when medically cleared.

## 2024-01-19 NOTE — NURSING NOTE
. Dr. Rodríguez, [facility], made aware. Orders received to administer standing order for 4 units of insulin plus 1 unit of SSI for a total of 5 units of insulin at this time.

## 2024-01-19 NOTE — PROGRESS NOTES
Hospital Medicine Service -  Daily Progress Note       SUBJECTIVE   Interval History:   TOMMY GORDILLO  Patient seen this morning at bedside.  States she believes that the surgery went well and is feeling good today.  Reports that following OR yesterday diet allowed sweets; requesting a diabetic diet.  States that she has been having really great urine output following her procedure. Patient denies any complaints of N/V/D, SOB, CP, Headaches. Patient is resting comfortably in bed.      OBJECTIVE      Vital signs in last 24 hours:  Temp:  [36.3 °C (97.4 °F)-36.7 °C (98.1 °F)] 36.7 °C (98.1 °F)  Heart Rate:  [81-91] 86  Resp:  [16-18] 16  BP: (112-169)/(59-97) 165/74  No intake or output data in the 24 hours ending 01/19/24 1346    PHYSICAL EXAMINATION      Physical Exam  Vitals reviewed.   Constitutional:       Appearance: She is not ill-appearing.   HENT:      Head: Normocephalic and atraumatic.   Cardiovascular:      Rate and Rhythm: Normal rate and regular rhythm.      Pulses:           Dorsalis pedis pulses are 2+ on the right side and 2+ on the left side.        Posterior tibial pulses are 2+ on the right side and 2+ on the left side.   Pulmonary:      Effort: Pulmonary effort is normal.      Breath sounds: Normal breath sounds.   Musculoskeletal:        Right lower leg: No edema.      Left lower leg: No edema.      Feet:      Comments: Left foot dressings intact, no pain on palpation to the posterior legs bilaterally.  No signs of clinical DVT bilaterally.  Sensation of the left big toe intact.  neurological:      Mental Status: She is alert and oriented to person, place, and time.   Psychiatric:         Mood and Affect: Mood anxious.         Behavior: Behavior anxious.     LINES, CATHETERS, DRAINS, AIRWAYS, AND WOUNDS   Lines, Drains, and Airways:  Wounds (agree with documentation and present on admission):  Peripheral IV (Adult) 01/17/24 Anterior;Left Forearm (Active)   Number of days: 2       Wound  "Anterior;Left Toe (2nd) (Active)   Number of days: 4       Surgical Incision Foot Left (Active)   Number of days: 1       Catheterization Site - Arterial Right Femoral 6 Fr. (Active)   Number of days: 2          LABS / IMAGING / TELE      Labs  Results from last 7 days   Lab Units 01/19/24  0718 01/18/24  0510 01/17/24  0536   WBC K/uL 31.67* 32.01* 36.81*   HEMOGLOBIN g/dL 11.9 12.0 11.9   HEMATOCRIT % 37.5 37.7 38.0   PLATELETS K/uL 413* 433* 418*         Results from last 7 days   Lab Units 01/19/24  0718 01/18/24  0510 01/17/24  0536 01/16/24  0757 01/15/24  1345   SODIUM mEQ/L 138 137 138   < > 136   POTASSIUM mEQ/L 3.7 3.3* 3.7   < > 4.4   CHLORIDE mEQ/L 107 105 105   < > 97*   CO2 mEQ/L 23 25 25   < > 24   BUN mg/dL 12 16 24   < > 29*   CREATININE mg/dL 0.4* 0.4* 0.5*   < > 0.8   CALCIUM mg/dL 8.1* 8.4* 8.7   < > 10.7*   ALBUMIN g/dL  --   --   --   --  4.2   BILIRUBIN TOTAL mg/dL  --   --   --   --  1.5*   ALK PHOS IU/L  --   --   --   --  70   ALT IU/L  --   --   --   --  14   AST IU/L  --   --   --   --  18   GLUCOSE mg/dL 191* 154* 171*   < > 269*    < > = values in this interval not displayed.               SARS-CoV-2 (COVID-19) (no units)   Date/Time Value   01/15/2024 1345 Negative       ECG/Telemetry  I have independently reviewed the telemetry. No events for the last 24 hours.    ASSESSMENT AND PLAN      Sepsis (CMS/[facility])  Assessment & Plan  Sepsis 2/2 Osteomyelitis of the (L) 2nd toe  Xray (L) Foot: Ulcerations of (L) 2nd digit consistent with OM.  Blood cultures negative 18 to 24 hours  MRI left foot showing gangrenous second toe with marked erosion in the second distal phalanx suggestive of osteomyelitis  S/p peripheral angiogram with atherectomy\PTA of left AT artery 1/17  S/p left foot I&D, left second toe amputation 1/18    P:   Continue IV vancomycin (1/15-  Wound following   PT/ OT  Podiatry/ID following    * Osteomyelitis of left foot (CMS/[facility])  Assessment & Plan  See \"sepsis " "\"problem    Abdominal pain  Assessment & Plan  C/o persistent vomiting for 24 hrs, reports constipation for 2 days  Recent hx doxycycline course for 2 weeks  PE: Tenderness in all 4 quadrants,  Negative rebound tenderness  Received Zofran 4mg x 2 in the ED (2:30pm and 3:00pm)  Suspect abdominal pain secondary to constipation versus septic    Plan  Cont Zofran 4mg every 6 hrs prn   Continue Miralax 17g daily   Given one-time dose Dulcolax suppository    H/O pilonidal cyst  Assessment & Plan  Pt reports removal in Oct 2023  Continues to have irritation at site  PE: no erythema, tenderness; notable for excessive granulation    Would care c/s    Tibial artery stenosis, left (CMS/[facility])  Assessment & Plan  Peripheral catheterization showing  99% stenosis proximally in the left anterior tibial artery and left and the right posterior tibial artery chronically occluded.  S/p peripheral angiogram with atherectomy\PTA of left AT artery    P:  DAPT completed  Wound care following      Chronic idiopathic constipation  Assessment & Plan  Patient complains of constipation today.  Has been ongoing for 2 days.   Normally takes Dulcolax at home    P:   Abdominal pain    Peripheral neuropathy  Assessment & Plan  Hx of Peripheral neuropathy  PTA meds Gabapentin 300mg, 3 times days    Plan  Continue PTA Gabapentin 300mg, 3 times daily    Hypothyroidism  Assessment & Plan  Hx of hypothyroid, currently taking synthroid     Plan  Continue PTA Synthroid     Essential hypertension  Assessment & Plan  Home Meds: Amlodipine, Hctz, and Losartan (last taken 1/14/2024)  OA: BP: 175/78 mmHg -> 137/68 mmHg    Plan  Cont PTA Losartan and amlodipine   Will start PTA Hctz on 1/16/204    Controlled type 2 diabetes mellitus, with long-term current use of insulin (CMS/[facility])  Assessment & Plan  Home meds: Jardiance, insulin, rybelsus   Last A1c 10.5 (8.8 on 3/2023)  OA: Bld glucose 269    Plan  Lantus 4 units + SSI (due to n.p.o. at midnight).  Return to " Lantus 10 units following operation.   Follow-up outpatient with PCP regarding diabetes management  Hold Home Meds Elmira Bland, home insulin regiment         VTE Assessment: Padua VTE Score: 1  VTE Prophylaxis:  Current anticoagulants:  enoxaparin (LOVENOX) syringe 40 mg, subcutaneous, Daily (6p)      Code Status: Full Code      Estimated Discharge Date: 1/20/2024     Disposition Planning: Pending further medical management     Bryanna Solorio DO Pager #[phone]  Hospital Medicine Service  [facility] Resident  1/19/2024   Please see attending attestation for final recommendations

## 2024-01-19 NOTE — ASSESSMENT & PLAN NOTE
Pt reports removal in Oct 2023  Continues to have irritation at site  PE: no erythema, tenderness; notable for excessive granulation    Would care c/s

## 2024-01-20 LAB
ANION GAP SERPL CALC-SCNC: 5 MEQ/L (ref 3–15)
BACTERIA URNS QL MICRO: ABNORMAL /HPF
BILIRUB UR QL STRIP.AUTO: NEGATIVE MG/DL
BUN SERPL-MCNC: 11 MG/DL (ref 7–25)
CALCIUM SERPL-MCNC: 8.2 MG/DL (ref 8.6–10.3)
CHLORIDE SERPL-SCNC: 105 MEQ/L (ref 98–107)
CLARITY UR REFRACT.AUTO: CLEAR
CO2 SERPL-SCNC: 27 MEQ/L (ref 21–31)
COLOR UR AUTO: YELLOW
CREAT SERPL-MCNC: 0.4 MG/DL (ref 0.6–1.2)
EGFRCR SERPLBLD CKD-EPI 2021: >60 ML/MIN/1.73M*2
ERYTHROCYTE [DISTWIDTH] IN BLOOD BY AUTOMATED COUNT: 13.8 % (ref 11.7–14.4)
GLUCOSE BLD-MCNC: 184 MG/DL (ref 70–99)
GLUCOSE BLD-MCNC: 204 MG/DL (ref 70–99)
GLUCOSE BLD-MCNC: 276 MG/DL (ref 70–99)
GLUCOSE BLD-MCNC: 298 MG/DL (ref 70–99)
GLUCOSE SERPL-MCNC: 196 MG/DL (ref 70–99)
GLUCOSE UR STRIP.AUTO-MCNC: >=1000 MG/DL
HCT VFR BLD AUTO: 37.5 % (ref 35–45)
HGB BLD-MCNC: 11.7 G/DL (ref 11.8–15.7)
HGB UR QL STRIP.AUTO: NEGATIVE
HYALINE CASTS #/AREA URNS LPF: ABNORMAL /LPF
KETONES UR STRIP.AUTO-MCNC: NEGATIVE MG/DL
LEUKOCYTE ESTERASE UR QL STRIP.AUTO: NEGATIVE
MAGNESIUM SERPL-MCNC: 1.9 MG/DL (ref 1.8–2.5)
MCH RBC QN AUTO: 27.9 PG (ref 28–33.2)
MCHC RBC AUTO-ENTMCNC: 31.2 G/DL (ref 32.2–35.5)
MCV RBC AUTO: 89.5 FL (ref 83–98)
MUCOUS THREADS URNS QL MICRO: ABNORMAL /LPF
NITRITE UR QL STRIP.AUTO: NEGATIVE
PDW BLD AUTO: 10.7 FL (ref 9.4–12.3)
PH UR STRIP.AUTO: 6 [PH]
PLATELET # BLD AUTO: 434 K/UL (ref 150–369)
POCT TEST: ABNORMAL
POTASSIUM SERPL-SCNC: 3.8 MEQ/L (ref 3.5–5.1)
PROT UR QL STRIP.AUTO: NEGATIVE
RBC # BLD AUTO: 4.19 M/UL (ref 3.93–5.22)
RBC #/AREA URNS HPF: ABNORMAL /HPF
SODIUM SERPL-SCNC: 137 MEQ/L (ref 136–145)
SP GR UR REFRACT.AUTO: 1.03
SQUAMOUS URNS QL MICRO: ABNORMAL /HPF
UROBILINOGEN UR STRIP-ACNC: 0.2 EU/DL
WBC # BLD AUTO: 30.39 K/UL (ref 3.8–10.5)
WBC #/AREA URNS HPF: ABNORMAL /HPF

## 2024-01-20 PROCEDURE — 63700000 HC SELF-ADMINISTRABLE DRUG: Performed by: STUDENT IN AN ORGANIZED HEALTH CARE EDUCATION/TRAINING PROGRAM

## 2024-01-20 PROCEDURE — 81001 URINALYSIS AUTO W/SCOPE: CPT

## 2024-01-20 PROCEDURE — 25800000 HC PHARMACY IV SOLUTIONS

## 2024-01-20 PROCEDURE — 63600000 HC DRUGS/DETAIL CODE: Mod: JZ

## 2024-01-20 PROCEDURE — 83735 ASSAY OF MAGNESIUM: CPT

## 2024-01-20 PROCEDURE — 63700000 HC SELF-ADMINISTRABLE DRUG

## 2024-01-20 PROCEDURE — 63600000 HC DRUGS/DETAIL CODE

## 2024-01-20 PROCEDURE — 25000000 HC PHARMACY GENERAL

## 2024-01-20 PROCEDURE — 12000000 HC ROOM AND CARE MED/SURG

## 2024-01-20 PROCEDURE — 97162 PT EVAL MOD COMPLEX 30 MIN: CPT | Mod: GP

## 2024-01-20 PROCEDURE — 36415 COLL VENOUS BLD VENIPUNCTURE: CPT

## 2024-01-20 PROCEDURE — 97166 OT EVAL MOD COMPLEX 45 MIN: CPT | Mod: GO

## 2024-01-20 PROCEDURE — 85027 COMPLETE CBC AUTOMATED: CPT

## 2024-01-20 PROCEDURE — 80048 BASIC METABOLIC PNL TOTAL CA: CPT

## 2024-01-20 PROCEDURE — 99233 SBSQ HOSP IP/OBS HIGH 50: CPT | Performed by: STUDENT IN AN ORGANIZED HEALTH CARE EDUCATION/TRAINING PROGRAM

## 2024-01-20 PROCEDURE — 81003 URINALYSIS AUTO W/O SCOPE: CPT

## 2024-01-20 RX ORDER — SENNOSIDES 8.6 MG/1
2 TABLET ORAL DAILY
Status: DISCONTINUED | OUTPATIENT
Start: 2024-01-20 | End: 2024-01-21 | Stop reason: HOSPADM

## 2024-01-20 RX ORDER — INSULIN LISPRO 100 [IU]/ML
5 INJECTION, SOLUTION INTRAVENOUS; SUBCUTANEOUS
Status: DISCONTINUED | OUTPATIENT
Start: 2024-01-20 | End: 2024-01-21 | Stop reason: HOSPADM

## 2024-01-20 RX ORDER — INSULIN GLARGINE 100 [IU]/ML
13 INJECTION, SOLUTION SUBCUTANEOUS NIGHTLY
Status: DISCONTINUED | OUTPATIENT
Start: 2024-01-20 | End: 2024-01-20

## 2024-01-20 RX ORDER — INSULIN LISPRO 100 [IU]/ML
5 INJECTION, SOLUTION INTRAVENOUS; SUBCUTANEOUS
Status: DISCONTINUED | OUTPATIENT
Start: 2024-01-20 | End: 2024-01-20

## 2024-01-20 RX ORDER — INSULIN LISPRO 100 [IU]/ML
7 INJECTION, SOLUTION INTRAVENOUS; SUBCUTANEOUS
Status: DISCONTINUED | OUTPATIENT
Start: 2024-01-20 | End: 2024-01-20

## 2024-01-20 RX ORDER — INSULIN GLARGINE 100 [IU]/ML
14 INJECTION, SOLUTION SUBCUTANEOUS NIGHTLY
Status: DISCONTINUED | OUTPATIENT
Start: 2024-01-20 | End: 2024-01-21 | Stop reason: HOSPADM

## 2024-01-20 RX ADMIN — GABAPENTIN 300 MG: 300 CAPSULE ORAL at 13:22

## 2024-01-20 RX ADMIN — ENOXAPARIN SODIUM 40 MG: 40 INJECTION SUBCUTANEOUS at 18:23

## 2024-01-20 RX ADMIN — GABAPENTIN 300 MG: 300 CAPSULE ORAL at 08:37

## 2024-01-20 RX ADMIN — HYDROCHLOROTHIAZIDE 12.5 MG: 12.5 TABLET ORAL at 08:37

## 2024-01-20 RX ADMIN — LOSARTAN POTASSIUM 50 MG: 25 TABLET, FILM COATED ORAL at 08:36

## 2024-01-20 RX ADMIN — INSULIN LISPRO 3 UNITS: 100 INJECTION, SOLUTION INTRAVENOUS; SUBCUTANEOUS at 18:22

## 2024-01-20 RX ADMIN — VANCOMYCIN HYDROCHLORIDE 1750 MG: 500 INJECTION, POWDER, LYOPHILIZED, FOR SOLUTION INTRAVENOUS at 20:07

## 2024-01-20 RX ADMIN — SENNOSIDES 2 TABLET: 8.6 TABLET, FILM COATED ORAL at 11:06

## 2024-01-20 RX ADMIN — VANCOMYCIN HYDROCHLORIDE 1750 MG: 500 INJECTION, POWDER, LYOPHILIZED, FOR SOLUTION INTRAVENOUS at 08:40

## 2024-01-20 RX ADMIN — INSULIN LISPRO 3 UNITS: 100 INJECTION, SOLUTION INTRAVENOUS; SUBCUTANEOUS at 13:21

## 2024-01-20 RX ADMIN — INSULIN LISPRO 5 UNITS: 100 INJECTION, SOLUTION INTRAVENOUS; SUBCUTANEOUS at 21:56

## 2024-01-20 RX ADMIN — ACETAMINOPHEN 650 MG: 325 TABLET ORAL at 08:36

## 2024-01-20 RX ADMIN — POLYETHYLENE GLYCOL 3350 17 G: 17 POWDER, FOR SOLUTION ORAL at 08:36

## 2024-01-20 RX ADMIN — OXYCODONE HYDROCHLORIDE 5 MG: 5 TABLET ORAL at 08:36

## 2024-01-20 RX ADMIN — ATORVASTATIN CALCIUM 40 MG: 40 TABLET, FILM COATED ORAL at 08:37

## 2024-01-20 RX ADMIN — GABAPENTIN 300 MG: 300 CAPSULE ORAL at 20:07

## 2024-01-20 RX ADMIN — INSULIN LISPRO 5 UNITS: 100 INJECTION, SOLUTION INTRAVENOUS; SUBCUTANEOUS at 18:22

## 2024-01-20 RX ADMIN — OXYCODONE HYDROCHLORIDE 5 MG: 5 TABLET ORAL at 22:24

## 2024-01-20 RX ADMIN — INSULIN LISPRO 5 UNITS: 100 INJECTION, SOLUTION INTRAVENOUS; SUBCUTANEOUS at 13:21

## 2024-01-20 RX ADMIN — INSULIN LISPRO 3 UNITS: 100 INJECTION, SOLUTION INTRAVENOUS; SUBCUTANEOUS at 09:07

## 2024-01-20 RX ADMIN — INSULIN LISPRO 5 UNITS: 100 INJECTION, SOLUTION INTRAVENOUS; SUBCUTANEOUS at 09:07

## 2024-01-20 RX ADMIN — AMLODIPINE BESYLATE 5 MG: 5 TABLET ORAL at 08:37

## 2024-01-20 RX ADMIN — INSULIN GLARGINE 14 UNITS: 100 INJECTION, SOLUTION SUBCUTANEOUS at 21:59

## 2024-01-20 RX ADMIN — LEVOTHYROXINE SODIUM 25 MCG: 0.03 TABLET ORAL at 05:44

## 2024-01-20 RX ADMIN — ACETAMINOPHEN 650 MG: 325 TABLET ORAL at 18:25

## 2024-01-20 ASSESSMENT — COGNITIVE AND FUNCTIONAL STATUS - GENERAL
CLIMB 3 TO 5 STEPS WITH RAILING: 3 - A LITTLE
MOVING TO AND FROM BED TO CHAIR: 4 - NONE
STANDING UP FROM CHAIR USING ARMS: 4 - NONE
WALKING IN HOSPITAL ROOM: 4 - NONE
HELP NEEDED FOR PERSONAL GROOMING: 4 - NONE
TOILETING: 3 - A LITTLE
CLIMB 3 TO 5 STEPS WITH RAILING: 4 - NONE
STANDING UP FROM CHAIR USING ARMS: 4 - NONE
AFFECT: WFL
WALKING IN HOSPITAL ROOM: 4 - NONE
MOVING TO AND FROM BED TO CHAIR: 4 - NONE
WALKING IN HOSPITAL ROOM: 4 - NONE
DRESSING REGULAR UPPER BODY CLOTHING: 4 - NONE
EATING MEALS: 4 - NONE
DRESSING REGULAR LOWER BODY CLOTHING: 4 - NONE
HELP NEEDED FOR BATHING: 4 - NONE
STANDING UP FROM CHAIR USING ARMS: 4 - NONE
MOVING TO AND FROM BED TO CHAIR: 4 - NONE
CLIMB 3 TO 5 STEPS WITH RAILING: 3 - A LITTLE
AFFECT: WFL

## 2024-01-20 NOTE — PROGRESS NOTES
Physical Therapy -  Initial Evaluation     Patient: Bebe Burks  Location: [facility] 5PAV [id number]  MRN: [id number]  Today's date: 1/20/2024    HISTORY OF PRESENT ILLNESS     Bebe is a 63 y.o. female admitted on 1/15/2024 with Osteomyelitis of second toe of left foot (CMS/HCC) [M86.9]  Nausea and vomiting, unspecified vomiting type [R11.2]. Principal problem is Osteomyelitis of left foot (CMS/HCC).    Past Medical History  Bebe has a past medical history of Abnormal ECG, Abnormal finding on chest xray (12/2021), Arthritis, Colon cancer (CMS/[facility]), COVID-19 (12/2021), COVID-19 vaccine series completed, Diabetic neuropathy (CMS/[facility]), DM (diabetes mellitus), type 2 with ophthalmic complications (CMS/[facility]), Hypertension, Hypothyroidism, Left foot drop, Lipid disorder, and Type 2 diabetes mellitus treated with insulin (CMS/[facility]).    History of Present Illness   Sepsis 2/2 Osteomyelitis of the (L) 2nd toe. S/p peripheral angiogram with atherectomy\PTA of left AT artery 1/17  S/p left foot I&D, left second toe amputation 1/18. Heel WB L LE    PRIOR LEVEL OF FUNCTION AND LIVING ENVIRONMENT     Prior Level of Function    Flowsheet Row Most Recent Value   Dominant Hand right   Ambulation assistive equipment   Transferring assistive equipment   Toileting independent   Bathing independent   Dressing independent   Eating independent   IADLs independent   Prior Level of Function Comment Patient reports at home she ambulates using a SPC, independent for ADLs   Assistive Device Currently Used at Home cane, straight, walker, front-wheeled        Prior Living Environment    Flowsheet Row Most Recent Value   People in Home alone   Current Living Arrangements condominium   Home Accessibility not wheelchair accessible, stairs to enter home (Group)   Living Environment Comment resides in a 1 floor condo, no JOSE RAFAEL, stall shower, will have family members staying with patient at [place]        VITALS AND PAIN     PT Vitals     Date/Time Pulse SpO2 Pt Activity O2 Therapy BP BP Method Pt Position [facility]   01/20/24 0725 83 96 % At rest None (Room air) 145/63 Automatic Lying LA      PT Pain    Date/Time Pain Type Side/Orientation Location Rating: Rest Rating: Activity Interventions [facility]   01/20/24 0725 Pain Assessment left foot 4 - moderate pain 6 - moderate-severe pain position adjusted LA           Objective   OBJECTIVE     Start time:  0725  End time:  0740  Session Length: 15 min  Mode of Treatment: individual therapy, physical therapy    General Observations  Patient received supine, in bed. She was no issues or concerns identified by nurse prior to session, agreeable to therapy.      Precautions: weight bearing, brace worn when out of bed, fall  Extremity Weight-Bearing Status: left lower extremity  Left LE Weight-Bearing Status: partial weight-bearing (PWB), other (see comments) (HWB with surgical shoe)           PT Eval and Treat - 01/20/24 0725        Cognition    Orientation Status oriented x 4     Affect/Mental Status WFL     Follows Commands WFL     Cognitive Function WFL        Vision Assessment/Intervention    Vision Assessment corrective lenses for reading        Hearing Assessment    Hearing Status WFL        Sensory Assessment    Sensory Assessment sensation intact, lower extremities        Lower Extremity Assessment    LE Assessment ROM and Strength WFL        Bed Mobility    Bed Mobility Activities supine to sit     [place] modified independence     Assistive Device head of bed elevated     Comment bed exit to the L, no (A) needed, denies dizziness sitting at EOB        Mobility Belt    Mobility Belt Used for All Out of Bed Activity no     Reason Mobility Belt Not Used other (see comments)     Reason Mobility Belt Not Used pt is modified independent for mobility        Sit/Stand Transfer    Surface edge of bed     [place] modified independence     Assistive Device walker, front-wheeled     Transfer Comments from  EOB, surgical shoe donned, cues provided for HWB to LLE, no LOB        Gait Training    [place], Gait modified independence     Assistive Device walker, front-wheeled     Distance in Feet 50 feet     Pattern step-to     Deviations/Abnormal Patterns stride length decreased;step length decreased;weight shifting decreased;antalgic     Comment (Gait/Stairs) ambulated using RW, maintains HWB to LLE, no overt LOB, denies dizziness        Stairs Training    [place], Stairs other (see comments)     Comment 1 floor condo, no JOSE RAFAEL        Balance    Static Sitting Balance WFL     Sit to Stand Dynamic Balance WFL     Static Standing Balance WFL     Dynamic Standing Balance WFL     Comment, Balance no balance deficits        Lower Extremity (Therapeutic Exercise)    Exercise Position/Type seated;AROM (active range of motion)     General Exercise bilateral;LAQ (long arc quad);gluteal sets     Comment Educated on the role of exercises to promote ROM and LE strength                        Orthosis Foot Left (Active)   Date Obtained/Time Obtained: 01/18/24 1700   Location: Foot  Laterality: Left  Type: Healing Shoe  Features: Surgical Shoe  Therapeutic Indications: stabilization and support  Wearing Schedule: wear when out of bed     Orthosis Foot Left (Active)   Skin Assessment intact;no redness;no breakdown 01/20/24 0836   Compliance/Wearing Issues patient;compliant with wearing schedule 01/20/24 0836       Education Documentation  Unresolved/Worsening Symptoms, taught by Marita Estes PT at 1/20/2024 10:05 AM.  Learner: Patient  Readiness: Acceptance  Method: Explanation, Demonstration  Response: Demonstrated Understanding, Verbalizes Understanding  Comment: Reviewed with pt the role of PT in acute care, importance of mobility, safety with mobility, proper transfer technique, gait training using an AD. WB precautions    Joint Mobility/Strength, taught by Marita Estes PT at 1/20/2024 10:05 AM.  Learner:  Patient  Readiness: Acceptance  Method: Explanation, Demonstration  Response: Demonstrated Understanding, Verbalizes Understanding  Comment: Reviewed with pt the role of PT in acute care, importance of mobility, safety with mobility, proper transfer technique, gait training using an AD. WB precautions    Home Safety, taught by Marita Estes PT at 1/20/2024 10:05 AM.  Learner: Patient  Readiness: Acceptance  Method: Explanation, Demonstration  Response: Demonstrated Understanding, Verbalizes Understanding  Comment: Reviewed with pt the role of PT in acute care, importance of mobility, safety with mobility, proper transfer technique, gait training using an AD. WB precautions    Energy Conservation, taught by Marita Estes PT at 1/20/2024 10:05 AM.  Learner: Patient  Readiness: Acceptance  Method: Explanation, Demonstration  Response: Demonstrated Understanding, Verbalizes Understanding  Comment: Reviewed with pt the role of PT in acute care, importance of mobility, safety with mobility, proper transfer technique, gait training using an AD. WB precautions    Assistive/Adaptive Devices, taught by Marita Estes PT at 1/20/2024 10:05 AM.  Learner: Patient  Readiness: Acceptance  Method: Explanation, Demonstration  Response: Demonstrated Understanding, Verbalizes Understanding  Comment: Reviewed with pt the role of PT in acute care, importance of mobility, safety with mobility, proper transfer technique, gait training using an AD. WB precautions    Signs/Symptoms, taught by Marita Estes PT at 1/20/2024 10:05 AM.  Learner: Patient  Readiness: Acceptance  Method: Explanation, Demonstration  Response: Demonstrated Understanding, Verbalizes Understanding  Comment: Reviewed with pt the role of PT in acute care, importance of mobility, safety with mobility, proper transfer technique, gait training using an AD. WB precautions    Risk Factors, taught by Marita Estes PT at 1/20/2024 10:05 AM.  Learner:  Patient  Readiness: Acceptance  Method: Explanation, Demonstration  Response: Demonstrated Understanding, Verbalizes Understanding  Comment: Reviewed with pt the role of PT in acute care, importance of mobility, safety with mobility, proper transfer technique, gait training using an AD. WB precautions        Session Outcome  Patient upright, in chair at end of session, call light in reach, personal items in reach, all needs met, chair alarm on. Nursing notified about patient's performance, patient's position, and patient's response to therapy/activity.    AM-PAC™ - Mobility (Current Function)     Turning form your back to your side while in flat bed without using bedrails 4 - None   Moving from lying on your back to sitting on the side of a flat bed without using bedrails 4 - None   Moving to and from a bed to a chair 4 - None   Standing up from a chair using your arms 4 - None   To walk in a hospital room 4 - None   Climbing 3-5 steps with a railing 4 - None   AM-PAC™ Mobility Score 24      ASSESSMENT AND PLAN     Progress Summary  1/20/24 Pt is a 63 year old female s/p left foot I&D, left second toe amputation. Pt seen for PT session. The patient was educated on the role of PT in the acute care setting. The patient was instructed on proper technique when performing OOB transfers to ensure their safety. Gait training was performed using the necessary AD along with instructing the patient regarding the proper set up and use of the device. All questions and concerns addressed and answered during session. Pt tolerated session well  The pt can complete functional tasks including performing transfers from low surface using a RW without assist, she is ambulating community length distances without difficulty using a RW,  pt was receptive to PT education; no further skilled PT needs identified; pt plans to return home at [place] with home care         PT Plan    Flowsheet Row Most Recent Value   Therapy Frequency evaluation  only at 01/20/2024 0725          PT Discharge Recommendations    Flowsheet Row Most Recent Value   PT Recommended Discharge Disposition home with assistance, home with home health at 01/20/2024 0725   Anticipated Equipment Needs if Discharged Home (PT) none at 01/20/2024 0725

## 2024-01-20 NOTE — HOSPITAL COURSE
Bebe is a 63 y.o. female admitted on 1/15/2024 with Osteomyelitis of second toe of left foot (CMS/[facility]) [M86.9]  Nausea and vomiting, unspecified vomiting type [R11.2]. Principal problem is Osteomyelitis of left foot (CMS/[facility]).    Past Medical History  Bebe has a past medical history of Abnormal ECG, Abnormal finding on chest xray (12/2021), Arthritis, Colon cancer (CMS/[facility]), COVID-19 (12/2021), COVID-19 vaccine series completed, Diabetic neuropathy (CMS/[facility]), DM (diabetes mellitus), type 2 with ophthalmic complications (CMS/[facility]), Hypertension, Hypothyroidism, Left foot drop, Lipid disorder, and Type 2 diabetes mellitus treated with insulin (CMS/[facility]).    History of Present Illness   Sepsis 2/2 Osteomyelitis of the (L) 2nd toe. S/p peripheral angiogram with atherectomy\PTA of left AT artery 1/17  S/p left foot I&D, left second toe amputation 1/18. Heel WB L LE

## 2024-01-20 NOTE — PROGRESS NOTES
Occupational Therapy -  Initial Evaluation     Patient: Bebe Burks  Location: [facility] 5PAV 5408  MRN: [id number]  Today's date: 1/20/2024    HISTORY OF PRESENT ILLNESS     Bebe is a 63 y.o. female admitted on 1/15/2024 with Osteomyelitis of second toe of left foot (CMS/HCC) [M86.9]  Nausea and vomiting, unspecified vomiting type [R11.2]. Principal problem is Osteomyelitis of left foot (CMS/HCC).    Past Medical History  Bebe has a past medical history of Abnormal ECG, Abnormal finding on chest xray (12/2021), Arthritis, Colon cancer (CMS/[facility]), COVID-19 (12/2021), COVID-19 vaccine series completed, Diabetic neuropathy (CMS/[facility]), DM (diabetes mellitus), type 2 with ophthalmic complications (CMS/[facility]), Hypertension, Hypothyroidism, Left foot drop, Lipid disorder, and Type 2 diabetes mellitus treated with insulin (CMS/[facility]).    History of Present Illness   Sepsis 2/2 Osteomyelitis of the (L) 2nd toe. S/p peripheral angiogram with atherectomy\PTA of left AT artery 1/17  S/p left foot I&D, left second toe amputation 1/18. Heel WB L LE    PRIOR LEVEL OF FUNCTION AND LIVING ENVIRONMENT     Prior Level of Function    Flowsheet Row Most Recent Value   Dominant Hand right   Ambulation assistive equipment   Transferring assistive equipment   Toileting independent   Bathing independent   Dressing independent   Eating independent   IADLs independent   Prior Level of Function Comment Patient reports at home she ambulates using a SPC, independent for ADLs   Assistive Device Currently Used at Home cane, straight, walker, front-wheeled        Prior Living Environment    Flowsheet Row Most Recent Value   People in Home alone   Current Living Arrangements condominium   Home Accessibility not wheelchair accessible, stairs to enter home (Group)   Living Environment Comment resides in a 1 floor condo, no JOSE RAFAEL, stall shower, will have family members staying with patient at [place]        Occupational Profile    Flowsheet  Row Most Recent Value   Successful Occupations retired   Environmental Supports and Barriers Has supportive assistance available to stay with pt at time of dc        VITALS AND PAIN     OT Vitals    Date/Time Pulse SpO2 Pt Activity O2 Therapy BP BP Location BP Method Pt Position [facility]   01/20/24 0951 85 97 % At rest None (Room air) 128/60 Left upper arm Automatic Sitting [place]   01/20/24 1005 87 97 % At rest None (Room air) 138/83 Left upper arm Automatic Sitting [facility]      OT Pain    Date/Time Pain Type Rating: Rest [facility]   01/20/24 0951 Pain Assessment 0 [place]           Objective   OBJECTIVE     Start time:  0949  End time:  1009  Session Length: 20 min  Mode of Treatment: individual therapy, occupational therapy    General Observations  Patient received upright, in chair. She was agreeable to therapy, no issues or concerns identified by nurse prior to session. Seated in bedside chair; agreeable to therapy- L foot surgical shoe donned    Precautions: weight bearing, brace worn when out of bed, fall  Extremity Weight-Bearing Status: left lower extremity  Left LE Weight-Bearing Status: partial weight-bearing (PWB), other (see comments) (Heel WB'ing to L LE)           OT Eval and Treat - 01/20/24 0949        Cognition    Orientation Status oriented x 4     Affect/Mental Status WFL     Follows Commands WFL     Cognitive Function WFL     Comment, Cognition pleasant and cooperative        Upper Extremity Assessment    UE Assessment ROM and Strength WFL     General Observations no deformities noted        Bed Mobility    Comment rec'd OOB        Mobility Belt    Mobility Belt Used for All Out of Bed Activity no     Reason Mobility Belt Not Used patient refused        Sit/Stand Transfer    Surface chair with arm rests     [place] supervision     Assistive Device walker, front-wheeled     Transfer Comments to / from recliner; cueing for heel WB'ing        Toilet Transfer    Transfer Technique sit/stand     [place]  supervision     Safety/Cues verbal cues;hand placement;increased time to complete     Assistive Device grab bars/safety frame;walker, front-wheeled     Comment incr'd time; grab bar assist for controlled STS from standard height toilet; reports GB assist is baseline for toilet transfer        Functional Mobility    Distance in room/bathroom     Functional Mobility Cook supervision     Safety/Cues increased time to complete;verbal cues;technique     Assistive Device walker, front-wheeled     Functional Mobility Comments cueing for heel WB'ing; ambulated short room distance to/from bathroom w/ RW & surgcal show; denied LH/dizziness        Lower Body Dressing    Tasks doff;don;socks     Cook set up     Position supported sitting     Comment figure four tech for donning sock to R LE        Grooming    Tasks washes, rinses and dries hands     Cook supervision     Safety/Cues --     Position sink side     Comment cueing for positioning to maintain heel WB'ing; pt able to adjust w/o assist        Toileting    Cook supervision     Position supported sitting     Adaptive Equipment grab bar/safety frame     Comment no assist for tabby care/ hygiene following continent void to toilet        Balance    Static Sitting Balance WFL     Dynamic Sitting Balance WFL     Sit to Stand Dynamic Balance WFL     Static Standing Balance WFL     Dynamic Standing Balance WFL     Comment, Balance no overt LOB noted; Sup w/ RW; limited by heel WB to L LE + surgical shoe        Impairments/Safety Issues    Impairments Affecting Function --                        Orthosis Foot Left (Active)   Date Obtained/Time Obtained: 01/18/24 1700   Location: Foot  Laterality: Left  Type: Healing Shoe  Features: Surgical Shoe  Therapeutic Indications: stabilization and support  Wearing Schedule: wear when out of bed     Orthosis Foot Left (Active)   Skin Assessment intact;no redness;no breakdown 01/20/24 0967    Compliance/Wearing Issues patient;compliant with wearing schedule 01/20/24 0836       Education Documentation  Safety Techniques, taught by Mario Kebede OT at 1/20/2024 12:32 PM.  Learner: Patient  Readiness: Acceptance  Method: Explanation  Response: Verbalizes Understanding  Comment: OT POC; safety with ADLs and functional transfers; Post op precautions - WB'ing status    Equipment/Methods, taught by Mario Kebede OT at 1/20/2024 12:32 PM.  Learner: Patient  Readiness: Acceptance  Method: Explanation  Response: Verbalizes Understanding  Comment: OT POC; safety with ADLs and functional transfers; Post op precautions - WB'ing status    Self-Care, taught by Mario Kebede OT at 1/20/2024 12:32 PM.  Learner: Patient  Readiness: Acceptance  Method: Explanation  Response: Verbalizes Understanding  Comment: OT POC; safety with ADLs and functional transfers; Post op precautions - WB'ing status    Treatment Plan, taught by Mario Kebede OT at 1/20/2024 12:32 PM.  Learner: Patient  Readiness: Acceptance  Method: Explanation  Response: Verbalizes Understanding  Comment: OT POC; safety with ADLs and functional transfers; Post op precautions - WB'ing status        Session Outcome  Patient upright, in chair at end of session, call light in reach, chair alarm on, personal items in reach, all needs met. Nursing notified about patient's response to therapy/activity, patient's performance, and patient's position.    AM-PAC™ - ADL (Current Function)     Putting on/taking off regular lower body clothing 4 - None   Bathing 4 - None   Toileting 3 - A Little   Putting on/taking off regular upper body clothing 4 - None   Help for taking care of personal grooming 4 - None   Eating meals 4 - None   AM-PAC™ ADL Score 23      ASSESSMENT AND PLAN     Progress Summary  OT eval complete; Pt presents close to baseline completing func tasks during today's session. Pt overall req's S/u - Sup (A) for completing  Functional transfers, Ambulating short room distances w/ RW and completing LBD and Bathroom ADLs. AM PAC 23/24; Pt maintains Heel WB'ing status follwing edu on positioning & tech. OT to follow acutely. No further OT services indicated at this time. Rec pt return Home with Family Assist & HH OT once medically cleared for dc.    Patient/Family Therapy Goal Statement: to go home    OT Plan    Flowsheet Row Most Recent Value   Therapy Frequency evaluation only at 01/20/2024 0949          OT Discharge Recommendations    Flowsheet Row Most Recent Value   OT Recommended Discharge Disposition home with assistance, home with home health at 01/20/2024 0949   Anticipated Equipment Needs if Discharged Home (OT) walker, 4-wheeled, bathing equipment, dressing equipment, reacher at 01/20/2024 0949               OT Goals    Flowsheet Row Most Recent Value   Dressing Goal 1    Activity/Adaptive Equipment dressing skills, all at 01/20/2024 0949   [place] modified independence at 01/20/2024 0949   Time Frame by discharge at 01/20/2024 0949   Progress/Outcome goal ongoing at 01/20/2024 0949

## 2024-01-20 NOTE — PLAN OF CARE
Plan of Care Review  Plan of Care Reviewed With: patient  Progress: improving  Outcome Evaluation: pt pain control, slept during the night. bedalarm activated. no new concern reported

## 2024-01-20 NOTE — PLAN OF CARE
Care Coordination Discharge Plan Note     Discharge Needs Assessment  Concerns to be Addressed: care coordination/care conferences, discharge planning  Current Discharge Risk: lives alone    Anticipated Discharge Plan  Anticipated Discharge Disposition: home with home health  Type of Home Care Services: home PT, home OT, nursing    Patient Choice  Offered/Gave Vendor List: yes  Patient's Choice of Community Agency(s): if pt needs HC at d/c, pt prefers [facility].  Pt noted if needs SNF LOC would be interested in [facility]    Patient and/or patient guardian/advocate was made aware of their right to choose a provider. A list of eligible providers was presented and reviewed with the patient and/or patient guardian/advocate in written and/or verbal form. The list delineates providers in the patient’s desired geographic area who are participating in the Medicare program and/or providers contracted with the patient’s primary insurance. The Medicare list and quality ratings were obtained from the Medicare.gov [medicare.gov] website.    ---------------------------------------------------------------------------------------------------------------------    Interdisciplinary Discharge Plan Review:  Participants:nursing, physician, social work/services    Concerns Comments: Pt listed on weekend handoff to follow. Chart reviewed. Secure chat sent to podiatry resident and RN for updates/anticipated DC. Per resident, cultures are pending and will result tomorrow. Anticipated DC Sun/Mon. Pending ID recs/abx plan. Care Coordination will continue to follow.    Discharge Plan:   Disposition/Destination: Home Health Care - [facility] / Home  Discharge Facility:    Community Resources:      Discharge Transportation:  Is Out of Hospital DNR needed at Discharge:    Does patient need discharge transport?      Anticipated Discharge Plan: Home alone with [facility]  Pending ID recs/abx plan

## 2024-01-20 NOTE — PROGRESS NOTES
Hospital Medicine Service -  Daily Progress Note       SUBJECTIVE   Interval History:   Elevated  blood sugars overnight, AFVSS    Patient seen this morning sitting in chair.  Reports tenderness in the right hip along the IT band, helped in the past with lidocaine patch.  Requesting another lidocaine patch today.  Offers no other complaints. Denies any complaints of N/V/D, SOB, CP, Headaches.    OBJECTIVE      Vital signs in last 24 hours:  Temp:  [36.6 °C (97.8 °F)-37.3 °C (99.1 °F)] 36.9 °C (98.5 °F)  Heart Rate:  [82-91] 88  Resp:  [16-18] 16  BP: (128-160)/(60-83) 130/67    Intake/Output Summary (Last 24 hours) at 1/20/2024 1636  Last data filed at 1/20/2024 0836  Gross per 24 hour   Intake 240 ml   Output --   Net 240 ml       PHYSICAL EXAMINATION      Physical Exam  Vitals reviewed.   Constitutional:       Appearance: She is not ill-appearing.   HENT:      Head: Normocephalic and atraumatic.   Cardiovascular:      Rate and Rhythm: Normal rate and regular rhythm.      Pulses:           Dorsalis pedis pulses are 2+ on the right side and 2+ on the left side.        Posterior tibial pulses are 2+ on the right side and 2+ on the left side.   Pulmonary:      Effort: Pulmonary effort is normal.      Breath sounds: Normal breath sounds.   Musculoskeletal:        Right lower leg: No edema.      Left lower leg: No edema.      Feet:      Comments: Left foot dressings intact, no pain on palpation to the posterior legs bilaterally.  No signs of clinical DVT bilaterally.  Sensation of the left big toe intact.  neurological:      Mental Status: She is alert and oriented to person, place, and time.   Psychiatric:         Mood and Affect: Mood normal.         Behavior: Behavior normal.     LINES, CATHETERS, DRAINS, AIRWAYS, AND WOUNDS   Lines, Drains, and Airways:  Wounds (agree with documentation and present on admission):  Peripheral IV (Adult) 01/17/24 Anterior;Left Forearm (Active)   Number of days: 3       Wound  "Anterior;Left Toe (2nd) (Active)   Number of days: 5       Surgical Incision Foot Left (Active)   Number of days: 2       Catheterization Site - Arterial Right Femoral 6 Fr. (Active)   Number of days: 3          LABS / IMAGING / TELE      Labs  Results from last 7 days   Lab Units 01/20/24  0537 01/19/24  0718 01/18/24  0510   WBC K/uL 30.39* 31.67* 32.01*   HEMOGLOBIN g/dL 11.7* 11.9 12.0   HEMATOCRIT % 37.5 37.5 37.7   PLATELETS K/uL 434* 413* 433*         Results from last 7 days   Lab Units 01/20/24  0537 01/19/24  0718 01/18/24  0510 01/16/24  0757 01/15/24  1345   SODIUM mEQ/L 137 138 137   < > 136   POTASSIUM mEQ/L 3.8 3.7 3.3*   < > 4.4   CHLORIDE mEQ/L 105 107 105   < > 97*   CO2 mEQ/L 27 23 25   < > 24   BUN mg/dL 11 12 16   < > 29*   CREATININE mg/dL 0.4* 0.4* 0.4*   < > 0.8   CALCIUM mg/dL 8.2* 8.1* 8.4*   < > 10.7*   ALBUMIN g/dL  --   --   --   --  4.2   BILIRUBIN TOTAL mg/dL  --   --   --   --  1.5*   ALK PHOS IU/L  --   --   --   --  70   ALT IU/L  --   --   --   --  14   AST IU/L  --   --   --   --  18   GLUCOSE mg/dL 196* 191* 154*   < > 269*    < > = values in this interval not displayed.               SARS-CoV-2 (COVID-19) (no units)   Date/Time Value   01/15/2024 1345 Negative       ECG/Telemetry  Patient is not on telemetry.    ASSESSMENT AND PLAN      Sepsis (CMS/Formerly [facility])  Assessment & Plan  Sepsis 2/2 Osteomyelitis of the (L) 2nd toe  Xray (L) Foot: Ulcerations of (L) 2nd digit consistent with OM.  Blood cultures negative 18 to 24 hours  MRI left foot showing gangrenous second toe with marked erosion in the second distal phalanx suggestive of osteomyelitis  S/p peripheral angiogram with atherectomy\PTA of left AT artery 1/17  S/p left foot I&D, left second toe amputation 1/18    P:   Continue IV vancomycin (1/15-1/25 AM dose)  Wound following   PT/ OT  Podiatry/ID following    * Osteomyelitis of left foot (CMS/Formerly [facility])  Assessment & Plan  See \"sepsis \"problem    Abdominal pain  Assessment & Plan  C/o " persistent vomiting for 24 hrs, reports constipation for 2 days  Recent hx doxycycline course for 2 weeks  PE: Tenderness in all 4 quadrants,  Negative rebound tenderness  Received Zofran 4mg x 2 in the ED (2:30pm and 3:00pm)  Suspect abdominal pain secondary to constipation versus septic    Plan  Cont Zofran 4mg every 6 hrs prn   Continue Miralax 17g daily   Given one-time dose Dulcolax suppository    H/O pilonidal cyst  Assessment & Plan  Pt reports removal in Oct 2023  Continues to have irritation at site  PE: no erythema, tenderness; notable for excessive granulation    Would care c/s    Tibial artery stenosis, left (CMS/Formerly [facility])  Assessment & Plan  Peripheral catheterization showing  99% stenosis proximally in the left anterior tibial artery and left and the right posterior tibial artery chronically occluded.  S/p peripheral angiogram with atherectomy\PTA of left AT artery    P:  DAPT completed  Wound care following      Chronic idiopathic constipation  Assessment & Plan  Patient complains of constipation today.  Has been ongoing for 2 days.   Normally takes Dulcolax at home    P:   Abdominal pain    Peripheral neuropathy  Assessment & Plan  Hx of Peripheral neuropathy  PTA meds Gabapentin 300mg, 3 times days    Plan  Continue PTA Gabapentin 300mg, 3 times daily    Hypothyroidism  Assessment & Plan  Hx of hypothyroid, currently taking synthroid     Plan  Continue PTA Synthroid     Essential hypertension  Assessment & Plan  Home Meds: Amlodipine, Hctz, and Losartan (last taken 1/14/2024)  OA: BP: 175/78 mmHg -> 137/68 mmHg    Plan  Cont PTA Losartan and amlodipine   Will start PTA Hctz on 1/16/204    Controlled type 2 diabetes mellitus, with long-term current use of insulin (CMS/Formerly [facility])  Assessment & Plan  Home meds: Jardiance, insulin, rybelsus   Last A1c 10.5 (8.8 on 3/2023)  OA: Bld glucose 269    P:  Lantus increased to 14 units  Lispro increased to 5 units  SSI  Monitor point-of-care glucose  Follow-up outpatient  with PCP regarding diabetes management  Hold Home Meds Elmira Bland, home insulin regiment         VTE Assessment: Padua VTE Score: 1  VTE Prophylaxis:  Current anticoagulants:  enoxaparin (LOVENOX) syringe 40 mg, subcutaneous, Daily (6p)      Code Status: Full Code      Estimated Discharge Date: 1/21/2024     Disposition Planning: Pending further medical management     Bryanna Solorio DO Pager #[phone]  Hospital Medicine Service  [facility] Resident  1/20/2024   Please see attending attestation for final recommendations

## 2024-01-20 NOTE — PLAN OF CARE
Problem: Adult Inpatient Plan of Care  Goal: Plan of Care Review  Outcome: Progressing  Flowsheets (Taken 1/20/2024 1232)  Progress: improving  Outcome Evaluation: OT eval complete  Plan of Care Reviewed With: patient     Problem: BADL (Basic Activities of Daily Living) Impairment  Goal: Optimal Safe BADL Performance  Outcome: Progressing

## 2024-01-20 NOTE — PLAN OF CARE
Problem: Adult Inpatient Plan of Care  Goal: Plan of Care Review  Outcome: Progressing  Flowsheets (Taken 1/20/2024 1005)  Outcome Evaluation:   1/20/24 Pt is a 63 year old female s/p left foot I&D, left second toe amputation. Pt seen for PT session. The patient was educated on the role of PT in the acute care setting. The patient was instructed on proper technique when performing OOB transfers to ensure their safety. Gait training was performed using the necessary AD along with instructing the patient regarding the proper set up and use of the device. All questions and concerns addressed and answered during session. Pt tolerated session well  The pt can complete functional tasks including performing transfers from low surface using a RW without assist, she is ambulating community length distances without difficulty using a RW,  pt was receptive to PT education   no further skilled PT needs identified   pt plans to return home at [place] with home care  Plan of Care Reviewed With: patient

## 2024-01-20 NOTE — PROGRESS NOTES
Subjective:   Pt seen at bedside POD#2 s/p left foot I&D, left second toe amputation .NAD. No overnight events. Dressing clean, dry, and intact. Denies any N/V/F/C/CP/SOB.     ROS:   Past Medical/Surgical history, Allergies, Meds reviewed in detail as charted  FH/SH reviewed in detail as charted  Review of Systems:  Head and Neck: No complaints  Chest : No complaints  Abdomen: No Complaints  Constitutional: Unremarkable     Vitals: I have reviewed the patient's current vital signs as documented in the patient's EMR.    Radiology: No new Radiology.    Labs:   CBC Results       01/20/24 01/19/24 01/18/24     0537 0718 0510    WBC 30.39 31.67 32.01    RBC 4.19 4.18 4.24    HGB 11.7 11.9 12.0    HCT 37.5 37.5 37.7    MCV 89.5 89.7 88.9    MCH 27.9 28.5 28.3    MCHC 31.2 31.7 31.8     413 433         Comment for WBC at 0537 on 01/20/24: CONSISTENT WITH PREVIOUS RESULTS    Comment for WBC at 0718 on 01/19/24: ALL RESULTS HAVE BEEN CHECKED This result has been called to dinora bowen by Jeevan on 01/19/2024 07:38:00, and has been read back.     Comment for WBC at 0510 on 01/18/24: CONSISTENT WITH PREVIOUS RESULTS    Comment for PLT at 0537 on 01/20/24: RESULTS CHECKED    Comment for PLT at 0510 on 01/18/24: RESULTS OBTAINED AFTER VORTEXING TO ELIMINATE PLT CLUMPS          BMP Results       01/20/24 01/19/24 01/18/24     0537 0718 0510     138 137    K 3.8 3.7 3.3    Cl 105 107 105    CO2 27 23 25    Glucose 196 191 154    BUN 11 12 16    Creatinine 0.4 0.4 0.4    Calcium 8.2 8.1 8.4    Anion Gap 5 8 7    EGFR >60.0 >60.0 >60.0         Comment for EGFR at 0537 on 01/20/24: Calculation based on the Chronic Kidney Disease Epidemiology Collaboration (CKD-EPI) equation refit without adjustment for race.    Comment for EGFR at 0718 on 01/19/24: Calculation based on the Chronic Kidney Disease Epidemiology Collaboration (CKD-EPI) equation refit without adjustment for race.    Comment for EGFR at 0510 on 01/18/24:  Calculation based on the Chronic Kidney Disease Epidemiology Collaboration (CKD-EPI) equation refit without adjustment for race.          Objective:     LE Exam  Vascular: Dressings remained intact.  No strike through noted.         Capillary refill time <3 seconds.  MSK: +DP/PF ankle intact.   No pain on palpation to the posterior legs B/L. No signs of clinical DVT B/L.   Neuro: Light touch and protective sensation is intact.   Derm: Dressings C/D/I. No strike through. Incision site is well coapted, with no signs of dehiscence. Sutures are intact. There is moderate serosanguinous drainage on dressing. There is erythema and edema localized to surgical site consistent with post op changes. Periwound maceration noted. Dorsal foot erythema noted extending back to the midfoot.             Assessment:  Bebe Burks is a 63 y.o. female presents POD#2 s/p left foot I&D, left second toe amputation    Plan:  -Patient examined, evaluated, and treated. All questions and concerns addressed  -Abx per ID  -Dressed with betadine, adaptic, dsd, ace  -OR findings: Left second toe is necrotic with approximately 1cc of purulence noted at the MTPJ. After debridement, no further purulence is noted and remaining soft tissue appears viable. Adequate soft tissue bleeding noted.  -WBC count still elevated: recommend UA if one not ordered.   -Bone from the left second toe to path: pending  -Deep wound culture of left second toe: pending   -Surgical site was dressed with betadine soaked adaptic, gauze, chio, web roll, coban  -Ice and elevate to the LLE  -Heel WB to LLE  -Pain management ordered: as needed by patient  -Case management to set up home dressing care changes consisting of adaptic, betadine soaked gauze,chio, coban   -PT/OT to evaluate ambulatory ability/strenthening  -Nursing to reinforce strike through. If strike through is excessive, please page Podiatry resident on call    RENEE Cee DPM  PGY-1 #[phone]

## 2024-01-21 VITALS
DIASTOLIC BLOOD PRESSURE: 65 MMHG | OXYGEN SATURATION: 94 % | TEMPERATURE: 98.5 F | BODY MASS INDEX: 29.87 KG/M2 | SYSTOLIC BLOOD PRESSURE: 146 MMHG | HEART RATE: 81 BPM | WEIGHT: 190.3 LBS | HEIGHT: 67 IN | RESPIRATION RATE: 16 BRPM

## 2024-01-21 LAB
ANION GAP SERPL CALC-SCNC: 6 MEQ/L (ref 3–15)
BASOPHILS # BLD: 1.73 K/UL (ref 0.01–0.1)
BASOPHILS NFR BLD: 5 %
BUN SERPL-MCNC: 13 MG/DL (ref 7–25)
BURR CELLS BLD QL SMEAR: ABNORMAL
CALCIUM SERPL-MCNC: 8.2 MG/DL (ref 8.6–10.3)
CHLORIDE SERPL-SCNC: 106 MEQ/L (ref 98–107)
CO2 SERPL-SCNC: 25 MEQ/L (ref 21–31)
CREAT SERPL-MCNC: 0.5 MG/DL (ref 0.6–1.2)
DIFFERENTIAL METHOD BLD: ABNORMAL
EGFRCR SERPLBLD CKD-EPI 2021: >60 ML/MIN/1.73M*2
EOSINOPHIL # BLD: 1.73 K/UL (ref 0.04–0.36)
EOSINOPHIL NFR BLD: 5 %
ERYTHROCYTE [DISTWIDTH] IN BLOOD BY AUTOMATED COUNT: 13.7 % (ref 11.7–14.4)
GIANT PLATELETS BLD QL SMEAR: ABNORMAL
GLUCOSE BLD-MCNC: 237 MG/DL (ref 70–99)
GLUCOSE BLD-MCNC: 262 MG/DL (ref 70–99)
GLUCOSE SERPL-MCNC: 256 MG/DL (ref 70–99)
HCT VFR BLD AUTO: 36 % (ref 35–45)
HGB BLD-MCNC: 11.7 G/DL (ref 11.8–15.7)
HYPOCHROMIA BLD QL SMEAR: ABNORMAL
LYMPHOCYTES # BLD: 2.42 K/UL (ref 1.2–3.5)
LYMPHOCYTES NFR BLD: 7 %
MCH RBC QN AUTO: 28.5 PG (ref 28–33.2)
MCHC RBC AUTO-ENTMCNC: 32.5 G/DL (ref 32.2–35.5)
MCV RBC AUTO: 87.8 FL (ref 83–98)
METAMYELOCYTES # BLD MANUAL: 1.39 K/UL
METAMYELOCYTES NFR BLD MANUAL: 4 %
MONOCYTES # BLD: 1.39 K/UL (ref 0.28–0.8)
MONOCYTES NFR BLD: 4 %
MYELOCYTES # BLD MANUAL: 1.04 K/UL
MYELOCYTES NFR BLD MANUAL: 3 %
NEUTS BAND # BLD: 1.04 K/UL (ref 0–0.53)
NEUTS BAND # BLD: 23.55 K/UL (ref 1.7–7)
NEUTS BAND NFR BLD: 3 %
NEUTS SEG NFR BLD: 68 %
PDW BLD AUTO: 10.4 FL (ref 9.4–12.3)
PLATELET # BLD AUTO: 475 K/UL (ref 150–369)
PLATELET # BLD EST: ABNORMAL 10*3/UL
POCT TEST: ABNORMAL
POCT TEST: ABNORMAL
POTASSIUM SERPL-SCNC: 4.1 MEQ/L (ref 3.5–5.1)
RBC # BLD AUTO: 4.1 M/UL (ref 3.93–5.22)
SODIUM SERPL-SCNC: 137 MEQ/L (ref 136–145)
VARIANT LYMPHS # BLD MANUAL: 0.35 K/UL
VARIANT LYMPHS NFR BLD: 1 %
WBC # BLD AUTO: 34.63 K/UL (ref 3.8–10.5)

## 2024-01-21 PROCEDURE — 63600000 HC DRUGS/DETAIL CODE

## 2024-01-21 PROCEDURE — 25000000 HC PHARMACY GENERAL

## 2024-01-21 PROCEDURE — 25800000 HC PHARMACY IV SOLUTIONS

## 2024-01-21 PROCEDURE — 63700000 HC SELF-ADMINISTRABLE DRUG: Performed by: STUDENT IN AN ORGANIZED HEALTH CARE EDUCATION/TRAINING PROGRAM

## 2024-01-21 PROCEDURE — 63700000 HC SELF-ADMINISTRABLE DRUG

## 2024-01-21 PROCEDURE — 80048 BASIC METABOLIC PNL TOTAL CA: CPT

## 2024-01-21 PROCEDURE — 99239 HOSP IP/OBS DSCHRG MGMT >30: CPT | Performed by: STUDENT IN AN ORGANIZED HEALTH CARE EDUCATION/TRAINING PROGRAM

## 2024-01-21 PROCEDURE — 36415 COLL VENOUS BLD VENIPUNCTURE: CPT

## 2024-01-21 PROCEDURE — 85027 COMPLETE CBC AUTOMATED: CPT

## 2024-01-21 RX ORDER — POLYETHYLENE GLYCOL 3350 17 G/17G
34 POWDER, FOR SOLUTION ORAL DAILY
Status: DISCONTINUED | OUTPATIENT
Start: 2024-01-21 | End: 2024-01-21 | Stop reason: HOSPADM

## 2024-01-21 RX ORDER — ATORVASTATIN CALCIUM 40 MG/1
40 TABLET, FILM COATED ORAL DAILY
Qty: 30 TABLET | Refills: 0 | Status: ON HOLD | OUTPATIENT
Start: 2024-01-22 | End: 2024-03-01 | Stop reason: ENTERED-IN-ERROR

## 2024-01-21 RX ORDER — OXYCODONE HYDROCHLORIDE 5 MG/1
5 TABLET ORAL EVERY 8 HOURS PRN
Qty: 9 TABLET | Refills: 0 | Status: SHIPPED | OUTPATIENT
Start: 2024-01-21 | End: 2024-01-24

## 2024-01-21 RX ORDER — CLOPIDOGREL BISULFATE 75 MG/1
75 TABLET ORAL DAILY
Qty: 30 TABLET | Refills: 0 | Status: ON HOLD | OUTPATIENT
Start: 2024-01-21 | End: 2024-03-01 | Stop reason: ENTERED-IN-ERROR

## 2024-01-21 RX ORDER — CEPHALEXIN 500 MG/1
500 CAPSULE ORAL 2 TIMES DAILY
Qty: 20 CAPSULE | Refills: 0 | Status: SHIPPED | OUTPATIENT
Start: 2024-01-21 | End: 2024-01-31

## 2024-01-21 RX ORDER — ASPIRIN 81 MG/1
81 TABLET ORAL DAILY
Qty: 30 TABLET | Refills: 0 | Status: ON HOLD | OUTPATIENT
Start: 2024-01-21 | End: 2024-03-01 | Stop reason: ENTERED-IN-ERROR

## 2024-01-21 RX ADMIN — INSULIN LISPRO 5 UNITS: 100 INJECTION, SOLUTION INTRAVENOUS; SUBCUTANEOUS at 09:14

## 2024-01-21 RX ADMIN — INSULIN LISPRO 3 UNITS: 100 INJECTION, SOLUTION INTRAVENOUS; SUBCUTANEOUS at 13:02

## 2024-01-21 RX ADMIN — POLYETHYLENE GLYCOL 3350 34 G: 17 POWDER, FOR SOLUTION ORAL at 09:18

## 2024-01-21 RX ADMIN — ATORVASTATIN CALCIUM 40 MG: 40 TABLET, FILM COATED ORAL at 09:18

## 2024-01-21 RX ADMIN — OXYCODONE HYDROCHLORIDE 5 MG: 5 TABLET ORAL at 13:06

## 2024-01-21 RX ADMIN — AMLODIPINE BESYLATE 5 MG: 5 TABLET ORAL at 09:18

## 2024-01-21 RX ADMIN — GABAPENTIN 300 MG: 300 CAPSULE ORAL at 13:06

## 2024-01-21 RX ADMIN — LOSARTAN POTASSIUM 50 MG: 25 TABLET, FILM COATED ORAL at 09:18

## 2024-01-21 RX ADMIN — SENNOSIDES 2 TABLET: 8.6 TABLET, FILM COATED ORAL at 09:18

## 2024-01-21 RX ADMIN — HYDROCHLOROTHIAZIDE 12.5 MG: 12.5 TABLET ORAL at 09:18

## 2024-01-21 RX ADMIN — GABAPENTIN 300 MG: 300 CAPSULE ORAL at 09:18

## 2024-01-21 RX ADMIN — LEVOTHYROXINE SODIUM 25 MCG: 0.03 TABLET ORAL at 05:49

## 2024-01-21 RX ADMIN — ACETAMINOPHEN 650 MG: 325 TABLET ORAL at 13:06

## 2024-01-21 RX ADMIN — ACETAMINOPHEN 650 MG: 325 TABLET ORAL at 05:49

## 2024-01-21 RX ADMIN — INSULIN LISPRO 5 UNITS: 100 INJECTION, SOLUTION INTRAVENOUS; SUBCUTANEOUS at 13:02

## 2024-01-21 RX ADMIN — INSULIN LISPRO 3 UNITS: 100 INJECTION, SOLUTION INTRAVENOUS; SUBCUTANEOUS at 09:13

## 2024-01-21 RX ADMIN — VANCOMYCIN HYDROCHLORIDE 1750 MG: 500 INJECTION, POWDER, LYOPHILIZED, FOR SOLUTION INTRAVENOUS at 09:18

## 2024-01-21 ASSESSMENT — COGNITIVE AND FUNCTIONAL STATUS - GENERAL
CLIMB 3 TO 5 STEPS WITH RAILING: 3 - A LITTLE
MOVING TO AND FROM BED TO CHAIR: 4 - NONE
WALKING IN HOSPITAL ROOM: 4 - NONE
STANDING UP FROM CHAIR USING ARMS: 4 - NONE

## 2024-01-21 NOTE — PLAN OF CARE
Care Coordination Discharge Plan Summary    Admission Assessment Summary    General Information  Readmission Within the last 30 days: no previous admission in last 30 days  Does patient have a :    Patient-Specific Goals (include timeframe): discharge home once medically cleared    Living Arrangements  Arrived From: home  Current Living Arrangements: condominium  People in Home: alone  Home Accessibility: not wheelchair accessible, stairs to enter home (Group)  Living Arrangement Comments: pt resides in an apartment, 2 steps to enter, pt resides alone    Social Determinants of Health - Screenings  Housing Stability  In the last 12 months, was there a time when you were not able to pay the mortgage or rent on time?: No  In the last 12 months, was there a time when you did not have a steady place to sleep or slept in a shelter (including now)?: No  Utility Access  In the past 12 months has the electric, gas, oil, or water company threatened to shut off services in your home?: No  Transportation Needs  In the past 12 months, has lack of transportation kept you from medical appointments or from getting medications?: No  In the past 12 months, has lack of transportation kept you from meetings, work, or from getting things needed for daily living?: No    Functional Status Prior to Admission  Assistive Device/Animal Currently Used at Home: cane, straight, walker, front-wheeled  Functional Status Comments: pt occassionally utilized a cane to assist with ambulation, independent.  Pt does have access to a walker from a previous knee surgey last year  IADL Comments: at baseline pt is independent with ADL's    Discharge Needs Assessment    Concerns to be Addressed: care coordination/care conferences, discharge planning  Current Discharge Risk: lives alone  Anticipated Changes Related to Illness: none    Discharge Plan Summary    Patient Choice  Offered/Gave Vendor List: yes  Patient's Choice of Community  Agency(s): if pt needs HC at d/c, pt prefers [facility].  Pt noted if needs SNF LOC would be interested in [facility]  Patient and/or patient guardian/advocate was made aware of their right to choose a provider. A list of eligible providers was presented and reviewed with the patient and/or patient guardian/advocate in written and/or verbal form. The list delineates providers in the patient’s desired geographic area who are participating in the Medicare program and/or providers contracted with the patient’s primary insurance. The Medicare list and quality ratings were obtained from the Medicare.gov [medicare.gov] website.    Concerns / Comments: Chart reviewed. MICHELET s/w [facility] resident and RN - pt for DC today, DC with PO keflex BID 10 days. MICHELET s/joey Scott ([facility]) to notify of DC. MICHELET s/w pt to confirm DC details, verbally delivered IMM. Care Coordination will remain available.    Discharge Plan:  Disposition/Destination: Home Health Care - [facility] / Home    Connection to Community  Patient declined offered resources.    Community Resources:      Discharge Transportation:  Is Out of Hospital DNR needed at Discharge:  no  Does patient need discharge transport?  no    Discharge Plan: Home alone with [facility]

## 2024-01-21 NOTE — DISCHARGE SUMMARY
[facility] Medicine Service -  Inpatient Discharge Summary        BRIEF OVERVIEW   Admitting Provider: Yifan Rivera DO  Attending Provider: Yifan Rivera, * Attending phys phone: [phone]    PCP: Paula Walker [phone]    Admission Date: 1/15/2024  Discharge Date: 1/21/2024     DISCHARGE DIAGNOSES      Primary Discharge Diagnosis  Osteomyelitis of left foot (CMS/[facility])    Secondary Discharge Diagnoses  Active Hospital Problems    Diagnosis Date Noted   • Osteomyelitis of left foot (CMS/[facility]) 01/15/2024     Priority: High   • H/O pilonidal cyst 01/19/2024   • Tibial artery stenosis, left (CMS/[facility]) 01/18/2024   • Chronic idiopathic constipation 01/17/2024   • Gangrene (CMS/HCC) 01/17/2024   • Sepsis (CMS/HCC) 01/15/2024   • Peripheral neuropathy 01/15/2024   • Abdominal pain 01/15/2024   • Type 2 diabetes mellitus treated with insulin (CMS/HCC)    • Abnormal finding on EKG    • Controlled type 2 diabetes mellitus, with long-term current use of insulin (CMS/HCC) 10/03/2014   • Essential hypertension 10/03/2014   • Hypothyroidism 10/03/2014   • Hyperlipidemia 10/03/2014      Resolved Hospital Problems   No resolved problems to display.       Problem List on Day of Discharge  * Osteomyelitis of left foot (CMS/[facility])  Assessment & Plan  Osteomyelitis of the (L) 2nd toe.  Patient with improving leukocytosis since admission.  Afebrile.  On exam patient denies any pain and on day of discharge patient with dressing changed and transition to oral antibiotics.    Xray (L) Foot: Ulcerations of (L) 2nd digit consistent with OM.  Blood cultures negative 18 to 24 hours    MRI left foot showing gangrenous second toe with marked erosion in the second distal phalanx suggestive of osteomyelitis    S/p peripheral angiogram with atherectomy\PTA of left AT artery 1/17  S/p left foot I&D, left second toe amputation 1/18    - Start Keflex 500 mg twice daily for 10 days, ending on 1/21/2024  - Discontinue IV  vancomycin  - Follow-up with primary care physician for repeat CBC  - Continue with wound care upon discharge  - Follow-up with podiatry upon discharge      H/O pilonidal cyst  Assessment & Plan  Pt reports removal in Oct 2023  Continues to have irritation at site  PE: no erythema, tenderness; notable for excessive granulation    Would care c/s    Tibial artery stenosis, left (CMS/HCC)  Assessment & Plan  Peripheral catheterization showing  99% stenosis proximally in the left anterior tibial artery and left and the right posterior tibial artery chronically occluded.  S/p peripheral angiogram with atherectomy\PTA of left AT artery    P:  DAPT completed  Wound care following      Chronic idiopathic constipation  Assessment & Plan  Patient complains of constipation today.  Has been ongoing for 2 days.   Normally takes Dulcolax at home    P:   Abdominal pain    Abdominal pain  Assessment & Plan  Resolved.    C/o persistent vomiting for 24 hrs, reports constipation for 2 days  Recent hx doxycycline course for 2 weeks  PE: Tenderness in all 4 quadrants,  Negative rebound tenderness  Received Zofran 4mg x 2 in the ED (2:30pm and 3:00pm)  Suspect abdominal pain secondary to constipation versus septic      - Continue Miralax 17g daily as needed  - Given one-time dose Dulcolax suppository    Peripheral neuropathy  Assessment & Plan  Hx of Peripheral neuropathy  PTA meds Gabapentin 300mg, 3 times days    Plan  Continue PTA Gabapentin 300mg, 3 times daily    Sepsis (CMS/HCC)  Assessment & Plan  Resolved.     Sepsis 2/2 Osteomyelitis of the (L) 2nd toe  Xray (L) Foot: Ulcerations of (L) 2nd digit consistent with OM.  Blood cultures negative 18 to 24 hours  MRI left foot showing gangrenous second toe with marked erosion in the second distal phalanx suggestive of osteomyelitis  S/p peripheral angiogram with atherectomy\PTA of left AT artery 1/17  S/p left foot I&D, left second toe amputation 1/18        Hypothyroidism  Assessment  "& Plan  Hx of hypothyroid, currently taking synthroid     Plan  Continue PTA Synthroid     Essential hypertension  Assessment & Plan  Home Meds: Amlodipine, Hctz, and Losartan (last taken 1/14/2024)  OA: BP: 175/78 mmHg -> 137/68 mmHg    - Continue home losartan and amlodipine  - Hydrochlorothiazide started on 1/16/2024  - Follow-up with PCP for further blood pressure management    Controlled type 2 diabetes mellitus, with long-term current use of insulin (CMS/[facility])  Assessment & Plan  Home meds: Jardiance, insulin, rybelsus   Last A1c 10.5 (8.8 on 3/2023)  OA: Bld glucose 269      - Follow-up outpatient with PCP regarding diabetes management  - Continue Home Meds Jardiance, Rybelsus, home insulin regiment      SUMMARY OF HOSPITALIZATION      Presenting Problem/History of Present Illness  Osteomyelitis of second toe of left foot (CMS/[facility]) [M86.9]  Nausea and vomiting, unspecified vomiting type [R11.2]    This is a 63 y.o. year-old female admitted on 1/15/2024 with Osteomyelitis of second toe of left foot (CMS/[facility]) [M86.9]  Nausea and vomiting, unspecified vomiting type [R11.2].    As per HPI  \"Bebe Burks is a 63 y.o. female with a past medical history of type 2 diabetes on insulin, peripheral neuropathy, hypertension who presents with initial c/o persistent vomiting for approximately 24 hours (1/14- present, and was found to significant ulceration and necrosis of the (L) foot 2nd metatarsal.  Patient reports injuring left second toe after dropping an object on it approximately 2 weeks ago after having a callus removed.  Patient followed up with her podiatrist Dr. Bassett, who started patient on 2-week course of doxycycline.  Currently patient reports 6-7/10 pain in (L) foot, described as \"dull, pressure\".      Pt admit to chills, fatigue, abdominal pain, vomiting   Pt denies dizziness, SOB, Chest pain, diarrhea,      Patient presented to the ED, vital signs noted for heart rate 100 to 114 bpm;   BP:175/78 mmHg " "-> 137/68 mmHg -   LABS: WBC: 56 ; Plt; 499; ANC 46;  Glucose: 269  CRP: 158     Started on IV Vancomycin; IV fluid bolus 1000 mL and Zofran 4mg IV x 2\"    Hospital Course    #Osteomyelitis of second toe  #Peripheral neuropathy  Patient presented with pain of the left foot and was found to be septic in the emergency department with documented tachycardia, white blood cell count elevation and source of left foot wound.  Blood cultures were obtained which were negative throughout hospitalization.  MRI left foot showed gangrenous second toe with marked erosion in the second distal phalanx suggestive of osteomyelitis.  Patient received a peripheral angiogram with PTA of left AT artery on 1/17.  Podiatry was consulted and patient underwent a left foot incision and drainage with left second toe amputation on 1/18.  Patient was continued on IV vancomycin and transition to oral Keflex for 10 days starting on day of discharge on 1/21.  Patient was seen by infectious disease and given markedly elevated white blood cell count patient was recommended to follow-up with a repeat CBC upon completion of antibiotic course.  Patient's leukocytosis significantly decreased throughout hospitalization.  On day of discharge patient was afebrile and vital signs were stable.  Patient was able to ambulate and was recommended to follow-up with wound care and primary care physician upon discharge.      Things to follow-up on:  - Follow-up with primary care physician for repeat CBC upon antibiotic completion  - Start Keflex 500 mg twice daily for 10 days on 1/21/2024  - Follow-up with wound care  - Follow-up with Dr. Bassett of podiatry    Exam on Day of Discharge    Physical Exam  Vitals reviewed.   Constitutional:       Appearance: She is not ill-appearing.   HENT:      Head: Normocephalic and atraumatic.   Cardiovascular:      Rate and Rhythm: Normal rate and regular rhythm.      Pulses:           Dorsalis pedis pulses are 2+ on the right " side and 2+ on the left side.        Posterior tibial pulses are 2+ on the right side and 2+ on the left side.   Pulmonary:      Effort: Pulmonary effort is normal.      Breath sounds: Normal breath sounds.   Musculoskeletal:        Right lower leg: No edema.      Left lower leg: No edema.      Feet:      Comments: Left foot dressings intact, no pain on palpation to the posterior legs bilaterally.  Sensation of the left big toe intact.  neurological:      Mental Status: She is alert and oriented to person, place, and time.   Psychiatric:         Mood and Affect: Mood normal.         Behavior: Behavior normal.     Consults During Admission  IP CONSULT TO PODIATRY  IP CONSULT TO INFECTIOUS DISEASE  IP CONSULT TO CARDIOLOGY  IP CONSULT TO WOUND OSTOMY CONTINENCE    DISCHARGE MEDICATIONS        Medication List      START taking these medications    cephalexin 500 mg capsule  Commonly known as: KEFLEX  Take 1 capsule (500 mg total) by mouth 2 (two) times a day for 10 days.  Dose: 500 mg        CHANGE how you take these medications    atorvastatin 40 mg tablet  Commonly known as: LIPITOR  Start taking on: January 22, 2024  Take 1 tablet (40 mg total) by mouth daily.  Dose: 40 mg  What changed:   · medication strength  · how much to take        CONTINUE taking these medications    amLODIPine 5 mg tablet  Commonly known as: NORVASC  TAKE 1 TABLET BY MOUTH EVERY DAY     DEXCOM G7 SENSOR device device  1 each continuously. Change every 10 days  Dose: 1 each  Generic drug: blood-glucose sensor     empagliflozin 10 mg tablet  Commonly known as: JARDIANCE  Take 1 tablet (10 mg total) by mouth daily. TAKE 1 TABLET BY MOUTH EVERY DAY  Dose: 10 mg     FREESTYLE LAURA 14 DAY READER misc  1 each continuously. 1 reader dx code E 11.9  Dose: 1 each  Generic drug: flash glucose scanning reader     FREESTYLE LAURA 14 DAY SENSOR kit  1 each continuously. 1 sensor every 14 days dx code E 11.9  Dose: 1 each  Generic drug: flash glucose  "sensor     gabapentin 300 mg capsule  Commonly known as: NEURONTIN  TAKE ONE CAPSULE BY MOUTH EVERY 8 HOURS WHILE AWAKE & TAKE ONE CAPSULE AT BEDTIME     hydrochlorothiazide 12.5 mg tablet  TAKE 1 TABLET BY MOUTH EVERY DAY     insulin asp prt-insulin aspart 100 unit/mL (70-30) pen  Commonly known as: NovoLOG Mix 70-30FlexPen U-100  INJECT 25 UNITS under the skin  IN AM AND 30 UNITS IN PM     levothyroxine 25 mcg tablet  Commonly known as: SYNTHROID  TAKE 1 TABLET BY MOUTH EVERY DAY  Dose: 25 mcg     losartan 50 mg tablet  Commonly known as: COZAAR  TAKE 1 TABLET BY MOUTH EVERY DAY  Dose: 50 mg     ONETOUCH DELICA LANCETS 33 gauge misc  1 each 3 (three) times a day.  Dose: 1 each  Generic drug: lancets     pen needle, diabetic 32 gauge x 5/32\" needle  Commonly known as: BD KRISTIN 2ND GEN PEN NEEDLE  USE TWICE A DAY     polyethylene glycol 17 gram packet  Commonly known as: MIRALAX  Take 17 g by mouth daily as needed (constipation). Please obtain over the counter  Dose: 17 g     RYBELSUS 14 mg tablet  TAKE 1 TABLET (14 MG TOTAL) BY MOUTH DAILY  Dose: 14 mg  Generic drug: semaglutide        STOP taking these medications    aspirin 81 mg enteric coated tablet            Instructions for after discharge     Apply dressing      Call provider for:  severe uncontrolled pain      Call provider for:  temperature >100.4      Change dressing      Follow-up with primary physician (PCP)      Post-Discharge Activity: Normal activity as tolerated.      Normal activity as tolerated.    CBC      Which Provider would you like to Cc?: NEVAEH ROTHMAN    Release to patient: Immediate             PROCEDURES / LABS / IMAGING      Operative Procedures  S/p peripheral angiogram with atherectomy\PTA of left AT artery 1/17  S/p left foot I&D, left second toe amputation 1/18      Pertinent Labs  Recent Labs   Lab 01/21/24  0554 01/20/24  0537 01/19/24  0718   WBC 34.63* 30.39* 31.67*   HGB 11.7* 11.7* 11.9   * 434* 413*     Results from " last 7 days   Lab Units 01/21/24  0554 01/16/24  0757 01/15/24  1345   SODIUM mEQ/L 137   < > 136   POTASSIUM mEQ/L 4.1   < > 4.4   CHLORIDE mEQ/L 106   < > 97*   CO2 mEQ/L 25   < > 24   BUN mg/dL 13   < > 29*   CREATININE mg/dL 0.5*   < > 0.8   CALCIUM mg/dL 8.2*   < > 10.7*   ALBUMIN g/dL  --   --  4.2   BILIRUBIN TOTAL mg/dL  --   --  1.5*   ALK PHOS IU/L  --   --  70   ALT IU/L  --   --  14   AST IU/L  --   --  18   GLUCOSE mg/dL 256*   < > 269*    < > = values in this interval not displayed.       Pertinent Imaging  MRI FOOT LEFT WITH AND WITHOUT CONTRAST    Result Date: 1/16/2024  IMPRESSION: Gangrenous appearing second toe with marked erosion of the second distal phalanx either due to osteomalacia gangrene. Findings are also suggestive of osteomyelitis in the middle phalanx of the second toe with nonspecific marrow changes in the distal portions of the proximal phalanx of the second toe. No evidence of osteomyelitis elsewhere. Limited study due to significant motion artifact. COMMENT: Comparison: Wound care pictures from 1/16/2024 and x-rays from 1/15/2024. Technique: MRI of the left foot without and with contrast. Study is significantly limited due to motion artifact. 8.6mL of gadopiclenol (VUEWAY) injection 8.6 mL Findings: In the second toe the distal phalanx is not identified and is probably nearly completely eroded with a tiny irregular stump visible on the x-ray. This could be related to gangrene and osteomyelitis. Patellar and wound care pictures does appear to demonstrate severe wound possibly gangrene. Please correlate with clinical evaluation. Abnormal marrow edema in the second toe middle phalanx. The T1 signal is somewhat difficult to assess although does appear to be slightly replaced suspicious for gangrene and/or osteomyelitis. Small amount of nonspecific marrow edema in the head/distal portions of the proximal phalanx of the second toe without evidence of definite T1 marrow signal abnormality.  No evidence of osteomyelitis elsewhere in the toes. No fracture. Alignment appears anatomic with intact Lisfranc ligament and satisfactory Lisfranc alignment. Mild scattered degenerative joint disease is noted. Nonspecific edema and partial fatty atrophy of the intrinsic musculature of the foot suspicious for denervation. Tendons are difficult to assess due to degraded image quality particularly distally. There is no evidence of drainable soft tissue abscess.    X-RAY TOE LEFT 2+ VIEWS    Result Date: 1/15/2024  IMPRESSION: Ulceration of the distal aspect of the left second digit with findings of osteomyelitis involving the distal phalanx and possibly the mid phalanx as well.      OUTPATIENT  FOLLOW-UP / REFERRALS / PENDING TESTS        Outpatient Follow-Up Appointments            In 1 week PHIL Carrizales [facility] at [facility]    In 3 months [facility] in [place]    In 3 months Charles Vaughan MD [facility] in [place]          Referrals  No orders of the defined types were placed in this encounter.      Test Results Pending at Discharge  Unresulted Labs (From admission, onward)    None          Important Issues to Address in Follow-Up  As seen above    DISCHARGE DISPOSITION      Disposition: Home     Code Status At Discharge: Full Code    Physician Order for Life-Sustaining Treatment Document Status      No documents found

## 2024-01-21 NOTE — PLAN OF CARE
Plan of Care Review  Plan of Care Reviewed With: patient  Progress: improving  Outcome Evaluation: OOB with superviison with walker. Tylenol and oxycodone PRN for pain relief. Plan for d/c home this afternoon with PO abx.

## 2024-01-21 NOTE — PROGRESS NOTES
Subjective:   Pt seen at bedside POD#3 s/p left foot I&D, left second toe amputation .NAD. No overnight events. Dressing clean, dry, and intact. Denies any N/V/F/C/CP/SOB.     ROS:   Past Medical/Surgical history, Allergies, Meds reviewed in detail as charted  FH/SH reviewed in detail as charted  Review of Systems:  Head and Neck: No complaints  Chest : No complaints  Abdomen: No Complaints  Constitutional: Unremarkable     Vitals: I have reviewed the patient's current vital signs as documented in the patient's EMR.    Radiology: No new Radiology.    Labs:   Labs:   CBC Results       01/21/24 01/20/24 01/19/24     0554 0537 0718    WBC 34.63 30.39 31.67    RBC 4.10 4.19 4.18    HGB 11.7 11.7 11.9    HCT 36.0 37.5 37.5    MCV 87.8 89.5 89.7    MCH 28.5 27.9 28.5    MCHC 32.5 31.2 31.7     434 413         Comment for WBC at 0537 on 01/20/24: CONSISTENT WITH PREVIOUS RESULTS    Comment for WBC at 0718 on 01/19/24: ALL RESULTS HAVE BEEN CHECKED This result has been called to dinora bowen by Jeevan on 01/19/2024 07:38:00, and has been read back.     Comment for PLT at 0537 on 01/20/24: RESULTS CHECKED          BMP Results       01/21/24 01/20/24 01/19/24     0554 0537 0718     137 138    K 4.1 3.8 3.7    Cl 106 105 107    CO2 25 27 23    Glucose 256 196 191    BUN 13 11 12    Creatinine 0.5 0.4 0.4    Calcium 8.2 8.2 8.1    Anion Gap 6 5 8    EGFR >60.0 >60.0 >60.0         Comment for EGFR at 0554 on 01/21/24: Calculation based on the Chronic Kidney Disease Epidemiology Collaboration (CKD-EPI) equation refit without adjustment for race.    Comment for EGFR at 0537 on 01/20/24: Calculation based on the Chronic Kidney Disease Epidemiology Collaboration (CKD-EPI) equation refit without adjustment for race.    Comment for EGFR at 0718 on 01/19/24: Calculation based on the Chronic Kidney Disease Epidemiology Collaboration (CKD-EPI) equation refit without adjustment for race.          Objective:     LE  Exam  Vascular: Dressings remained intact.  No strike through noted.         Capillary refill time <3 seconds.  MSK: +DP/PF ankle intact.   No pain on palpation to the posterior legs B/L. No signs of clinical DVT B/L.   Neuro: Light touch and protective sensation is intact.   Derm: Dressings C/D/I. No strike through. Incision site is well coapted, with no signs of dehiscence. Sutures are intact. There is moderate serosanguinous drainage on dressing. There is erythema and edema localized to surgical site consistent with post op changes. Periwound maceration noted. Dorsal foot erythema noted extending back to the midfoot.             Assessment:  Bebe Burks is a 63 y.o. female presents POD#3 s/p left foot I&D, left second toe amputation    Plan:  -Patient examined, evaluated, and treated. All questions and concerns addressed  -Abx per ID  -Dressed with betadine, adaptic, dsd, ace  -OR findings: Left second toe is necrotic with approximately 1cc of purulence noted at the MTPJ. After debridement, no further purulence is noted and remaining soft tissue appears viable. Adequate soft tissue bleeding noted.  -WBC count still elevated: recommend UA if one not ordered.   -Bone from the left second toe to path: pending  -Deep wound culture of left second toe: pending   -Ice and elevate to the LLE  -Heel WB to LLE  -Pain management ordered: as needed by patient  -Case management to set up home dressing care changes consisting of adaptic, betadine soaked gauze,chio, coban   -PT/OT to evaluate ambulatory ability/strenthening  -Patient spoke with Dr. Damian at length on the phone. Patient is stable for discharge from podiatry standpoint. Surgical cure obtained in OR. To follow up with Dr. Damian for wound care.   -Nursing to reinforce strike through. If strike through is excessive, please page Podiatry resident on call    RENEE Cee DPM  PGY-1 #[id number]

## 2024-01-21 NOTE — PLAN OF CARE
Plan of Care Review  Plan of Care Reviewed With: patient  Progress: no change  Outcome Evaluation: AAOx4. OOB x joby CASTILLO. Pain controlled with tylenol and oxy. Voiding in the bathroom. Recieving IV vanco. OR cultures pending. D/c when medically cleared.

## 2024-01-22 LAB
CASE RPRT: NORMAL
CLINICAL INFO: NORMAL
PATH REPORT.FINAL DX SPEC: NORMAL
PATH REPORT.GROSS SPEC: NORMAL

## 2024-01-23 LAB
GRAM STN SPEC: ABNORMAL
GRAM STN SPEC: ABNORMAL
MICROORGANISM SPEC CULT: ABNORMAL

## 2024-01-24 ASSESSMENT — ENCOUNTER SYMPTOMS: FEVER: 1

## 2024-01-25 ENCOUNTER — DOCUMENTATION (OUTPATIENT)
Dept: FAMILY MEDICINE | Facility: CLINIC | Age: 64
End: 2024-01-25
Payer: MEDICARE

## 2024-01-25 RX ORDER — METRONIDAZOLE 500 MG/1
500 TABLET ORAL 3 TIMES DAILY
Qty: 30 TABLET | Refills: 0 | Status: SHIPPED | OUTPATIENT
Start: 2024-01-25 | End: 2024-02-04

## 2024-01-25 RX ORDER — FLUCONAZOLE 150 MG/1
150 TABLET ORAL ONCE
Qty: 1 TABLET | Refills: 0 | Status: SHIPPED | OUTPATIENT
Start: 2024-01-25 | End: 2024-01-25

## 2024-01-25 NOTE — PROGRESS NOTES
Called patient to discuss adding Flagyl to current antibiotic regimen given that recent cultures growing bacteria already.  Antibiotic change was discussed with infectious disease at [facility] [place].  Patient also states she has a history of yeast infections while taking antibiotics.  1 dose of Diflucan was sent in addition to Flagyl 500 mg 3 times daily for 10 days.

## 2024-01-30 NOTE — PROGRESS NOTES
Endocrinology  Note       PATIENTS: Bebe Burks  YOB: 1960  DATE: January 31, 2024  VISIT TYPE: telemedicine- virtual-audio        Verification of Patient Location:  The patient affirms they are currently located in the following state:[place]     Audio Only Telemedicine Visit    Request for Consent:  You and I are about to have a telemedicine check-in or visit. This is allowed because you are already my patient, and you have requested it.  This telemedicine visit will be billed to your health insurance or you, if you are self-insured.  You understand you will be responsible for any copayments or coinsurances that apply to your telemedicine visit.  Before starting our telemedicine visit, I am required to get your consent for this virtual check-in or visit by telemedicine. Do you consent?      Patient Response to Request for Consent: Yes    The following have been reviewed and updated as appropriate in this visit:   Tobacco  Allergies  Med Hx  Surg Hx  Fam Hx  Soc Hx        Visit Documentation:  40 min         Time Spent:  I spent 40  minutes on this date of service performing the following activities: obtaining history, entering orders, documenting, preparing for visit, obtaining / reviewing records, providing counseling and education, independently reviewing study/studies and coordinating care.              History of Present Illness:   HPI 62 -year-old white female with type 2 diabetes.She did have a  Left 2nd toe amputation and is currenlty on antibiotics. She just saw vascular yesterday.   She does follow closely with ophthalmology for retinopathy.  She   continues on   NovoLog 7030 mix 25 units with breakfast 30 units with dinner Jardiance  10 mg a day and Rybelsus 14 mg daily.  She states her blood sugars have be stable. .  She states she has few episodes of hypoglycemia. She  continues on Synthroid 25 ?g day. She  continues on Lipitor for  "hyperlipidemia. She  does have a history of thyroid nodules reports no difficulty with swallowing or breathing.  She  reports no foot wounds at the present time.  She has been going to the fitness center routinely    Medical History:  Past Medical History:   Diagnosis Date   • Abnormal ECG    • Abnormal finding on chest xray 12/2021   • Arthritis    • Colon cancer (CMS/HCC)    • COVID-19 12/2021   • COVID-19 vaccine series completed     Moderna: \" 3 doses\"   • Diabetic neuropathy (CMS/HCC)    • DM (diabetes mellitus), type 2 with ophthalmic complications (CMS/HCC)    • Hypertension    • Hypothyroidism    • Left foot drop    • Lipid disorder    • Type 2 diabetes mellitus treated with insulin (CMS/HCC)        Surgical History:   Past Surgical History:   Procedure Laterality Date   • ABCESS DRAINAGE  10/2023   • CATARACT EXTRACTION Bilateral    • COLONOSCOPY     • COMBINED HYSTEROSCOPY DIAGNOSTIC / D&C  2007   • EYE SURGERY Left 2017    cataract   • JOINT REPLACEMENT Right 02/2023    knee replacement   • TOE AMPUTATION     • VITRECTOMY Left 2016       Family History:  Family History   Problem Relation Age of Onset   • Heart disease Biological Mother    • Diabetes Biological Mother    • Hypertension Biological Mother    • Stroke Biological Father        Social history:  Social History     Socioeconomic History   • Marital status: Single     Spouse name: Not on file   • Number of children: 0   • Years of education: Not on file   • Highest education level: Not on file   Occupational History   • Occupation: Disabilty   Tobacco Use   • Smoking status: Never     Passive exposure: Past   • Smokeless tobacco: Never   Vaping Use   • Vaping Use: Never used   Substance and Sexual Activity   • Alcohol use: Yes     Comment: occasionally   • Drug use: Never   • Sexual activity: Defer   Other Topics Concern   • Not on file   Social History Narrative    Lives alone in a condo     Social Determinants of Health     Financial Resource " Strain: Low Risk  (2/27/2023)    Overall Financial Resource Strain (CARDIA)    • Difficulty of Paying Living Expenses: Not hard at all   Food Insecurity: No Food Insecurity (1/15/2024)    Hunger Vital Sign    • Worried About Running Out of Food in the Last Year: Never true    • Ran Out of Food in the Last Year: Never true   Transportation Needs: No Transportation Needs (1/16/2024)    PRAPARE - Transportation    • Lack of Transportation (Medical): No    • Lack of Transportation (Non-Medical): No   Physical Activity: Not on file   Stress: Not on file   Social Connections: Not on file   Intimate Partner Violence: Not on file   Housing Stability: Unknown (1/16/2024)    Housing Stability Vital Sign    • Unable to Pay for Housing in the Last Year: No    • Number of Places Lived in the Last Year: Not on file    • Unstable Housing in the Last Year: No       Medication(active prior to today):  Current Outpatient Medications   Medication Sig Dispense Refill   • amLODIPine (NORVASC) 5 mg tablet TAKE 1 TABLET BY MOUTH EVERY DAY 90 tablet 1   • aspirin 81 mg enteric coated tablet Take 1 tablet (81 mg total) by mouth daily. 30 tablet 0   • atorvastatin (LIPITOR) 40 mg tablet Take 1 tablet (40 mg total) by mouth daily. 30 tablet 0   • cephalexin (KEFLEX) 500 mg capsule Take 1 capsule (500 mg total) by mouth 2 (two) times a day for 10 days. 20 capsule 0   • clopidogreL (PLAVIX) 75 mg tablet Take 1 tablet (75 mg total) by mouth daily. 30 tablet 0   • empagliflozin (JARDIANCE) 10 mg tablet Take 1 tablet (10 mg total) by mouth daily. TAKE 1 TABLET BY MOUTH EVERY DAY 90 tablet 3   • gabapentin (NEURONTIN) 300 mg capsule TAKE ONE CAPSULE BY MOUTH EVERY 8 HOURS WHILE AWAKE & TAKE ONE CAPSULE AT BEDTIME 360 capsule 3   • hydrochlorothiazide 12.5 mg tablet TAKE 1 TABLET BY MOUTH EVERY DAY 90 tablet 1   • insulin asp prt-insulin aspart (NovoLOG Mix 70-30FlexPen U-100) 100 unit/mL (70-30) pen INJECT 25 UNITS under the skin  IN AM AND 30  "UNITS IN PM 45 mL 1   • levothyroxine (SYNTHROID) 25 mcg tablet TAKE 1 TABLET BY MOUTH EVERY DAY 90 tablet 3   • losartan (COZAAR) 50 mg tablet TAKE 1 TABLET BY MOUTH EVERY DAY 90 tablet 1   • metroNIDAZOLE (FLAGYL) 500 mg tablet Take 1 tablet (500 mg total) by mouth 3 (three) times a day for 10 days. 30 tablet 0   • ONETOUCH DELICA LANCETS 33 gauge misc 1 each 3 (three) times a day. 100 each 11   • pen needle, diabetic (BD KRISTIN 2ND GEN PEN NEEDLE) 32 gauge x 5/32\" needle USE TWICE A  each 1   • polyethylene glycol (MIRALAX) 17 gram packet Take 17 g by mouth daily as needed (constipation). Please obtain over the counter     • RYBELSUS 14 mg tablet TAKE 1 TABLET (14 MG TOTAL) BY MOUTH DAILY 90 tablet 1   • DEXCOM G7 SENSOR device device 1 each continuously. Change every 10 days (Patient not taking: Reported on 1/31/2024) 2 each 11   • FREESTYLE LAURA 14 DAY READER misc 1 each continuously. 1 reader dx code E 11.9 (Patient not taking: Reported on 1/31/2024) 1 each 11   • FREESTYLE LAURA 14 DAY SENSOR kit 1 each continuously. 1 sensor every 14 days dx code E 11.9 (Patient not taking: Reported on 1/31/2024) 1 kit 11     No current facility-administered medications for this visit.       Allergies:  Citrus and derivatives    Review of Systems:   Constitutional: Negative for fatigue and unexpected weight change.   HENT: Negative for postnasal drip, trouble swallowing and voice change.    Eyes: + for visual disturbance.   Respiratory: Negative for apnea.    Cardiovascular: Negative for leg swelling.   Gastrointestinal: Negative for constipation, diarrhea and nausea.   Endocrine: Negative for polydipsia, polyphagia and polyuria.   Musculoskeletal: Negative for arthralgias, back pain, neck pain and neck stiffness.   Neurological: + for numbness and headaches.   Psychiatric/Behavioral: Negative for sleep disturbance.     Vitals Signs:none   There were no vitals filed for this visit.  There is no height or weight on file " to calculate BMI.      Physical Exam:nto applicable   Constitutional: Patient is oriented to person, place, and time.  Appears well-developed and well-nourished.   Eyes: EOM are normal. Pupils are equal, round, and reactive to light.   Neck: Normal range of motion. Neck supple.   Cardiovascular: Normal rate, regular rhythm and normal heart sounds.    Pulmonary/Chest: Effort normal and breath sounds normal.   Abdominal: Soft.   Musculoskeletal: Normal range of motion.   Neurological:  Alert and oriented to person, place, and time.   Skin: Skin is warm and dry.   Psychiatric:  Normal mood and affect. Behavior is normal. Judgment and thought content normal.   Foot Exam: Normal exam, no swelling, tenderness, instability; ligaments intact, full range of motion of all ankle/foot joints    Assessment/Plan:  Type 2 diabetes mellitus treated with insulin (CMS/[facility])  1. continue with  Jardiance 10 mg per day - increase fluids   2.  continue with Rybelsus 14 mg per day    3. Continue with Novolog 70/30 mix 25 units with breakfast and 30 units with dinner   4. Rotate injection sites   5.DEXCOM trial   6. A1c 8.8  7. Schedule bone density     Thyroid nodule  Call me for any difficulty with swallowing or breathing     Obesity  Goal for exercise 30 min 5 days per week     Hypothyroidism  Continue with Synthroid 25 mcg per day     Hyperlipidemia  Continue with Lipitor       Labs:  Lab Results   Component Value Date    WBC 34.63 (HH) 01/21/2024    HGB 11.7 (L) 01/21/2024    HCT 36.0 01/21/2024     (H) 01/21/2024    CHOL 143 10/17/2022    TRIG 62 10/17/2022    HDL 46 (L) 10/17/2022    ALT 14 01/15/2024    AST 18 01/15/2024     01/21/2024    K 4.1 01/21/2024     01/21/2024    CREATININE 0.5 (L) 01/21/2024    BUN 13 01/21/2024    CO2 25 01/21/2024    TSH 0.63 02/22/2023    INR 1.2 12/03/2021    HGBA1C 10.5 (H) 01/15/2024    MICROALBUR 22.0 (H) 10/17/2022       I spent  40 minutes  on this date of service performing  the following activities: obtaining history, performing examination, entering orders, documenting, preparing for visit, obtaining / reviewing records, providing counseling and education, independently reviewing study/studies, and coordinating care.  I discussed the goals of diet and exercise with the patient. I discussed the goals of A1c, blood pressure, and cholesterol. I discussed the goals of blood sugar pre- and post meals with the patient.I encouraged the patient to rotate the injection sites.  I reviewed the patient's blood sugars.  I reviewed the patient's labs/imaging/medications/allergies/problem list.       Electronically signed by: PHIL Carrizales on 1/31/2024 5:39 PM

## 2024-01-31 ENCOUNTER — APPOINTMENT (OUTPATIENT)
Dept: LAB | Facility: HOSPITAL | Age: 64
End: 2024-01-31
Attending: NURSE PRACTITIONER
Payer: MEDICARE

## 2024-01-31 ENCOUNTER — TELEMEDICINE (OUTPATIENT)
Dept: ENDOCRINOLOGY | Facility: CLINIC | Age: 64
End: 2024-01-31
Payer: MEDICARE

## 2024-01-31 DIAGNOSIS — E04.1 THYROID NODULE: ICD-10-CM

## 2024-01-31 DIAGNOSIS — Z79.4 TYPE 2 DIABETES MELLITUS TREATED WITH INSULIN (CMS/HCC): ICD-10-CM

## 2024-01-31 DIAGNOSIS — E66.09 OBESITY DUE TO EXCESS CALORIES, UNSPECIFIED CLASSIFICATION, UNSPECIFIED WHETHER SERIOUS COMORBIDITY PRESENT: ICD-10-CM

## 2024-01-31 DIAGNOSIS — E11.9 TYPE 2 DIABETES MELLITUS TREATED WITH INSULIN (CMS/HCC): ICD-10-CM

## 2024-01-31 DIAGNOSIS — E03.9 HYPOTHYROIDISM, UNSPECIFIED TYPE: ICD-10-CM

## 2024-01-31 DIAGNOSIS — E78.5 HYPERLIPIDEMIA, UNSPECIFIED HYPERLIPIDEMIA TYPE: ICD-10-CM

## 2024-01-31 LAB
ALBUMIN SERPL-MCNC: 4 G/DL (ref 3.5–5.7)
ALP SERPL-CCNC: 71 IU/L (ref 34–125)
ALT SERPL-CCNC: 28 IU/L (ref 7–52)
ANION GAP SERPL CALC-SCNC: 6 MEQ/L (ref 3–15)
AST SERPL-CCNC: 23 IU/L (ref 13–39)
BILIRUB SERPL-MCNC: 0.7 MG/DL (ref 0.3–1.2)
BUN SERPL-MCNC: 24 MG/DL (ref 7–25)
CALCIUM SERPL-MCNC: 9.2 MG/DL (ref 8.6–10.3)
CHLORIDE SERPL-SCNC: 102 MEQ/L (ref 98–107)
CHOLEST SERPL-MCNC: 121 MG/DL
CO2 SERPL-SCNC: 28 MEQ/L (ref 21–31)
CREAT SERPL-MCNC: 0.7 MG/DL (ref 0.6–1.2)
EGFRCR SERPLBLD CKD-EPI 2021: >60 ML/MIN/1.73M*2
EST. AVERAGE GLUCOSE BLD GHB EST-MCNC: 243 MG/DL
GLUCOSE SERPL-MCNC: 203 MG/DL (ref 70–99)
HBA1C MFR BLD: 10.1 %
HDLC SERPL-MCNC: 36 MG/DL
HDLC SERPL: 3.4 {RATIO}
LDLC SERPL CALC-MCNC: 59 MG/DL
NONHDLC SERPL-MCNC: 85 MG/DL
POTASSIUM SERPL-SCNC: 4.4 MEQ/L (ref 3.5–5.1)
PROT SERPL-MCNC: 7 G/DL (ref 6–8.2)
SODIUM SERPL-SCNC: 136 MEQ/L (ref 136–145)
TRIGL SERPL-MCNC: 132 MG/DL
TSH SERPL DL<=0.05 MIU/L-ACNC: 0.51 MIU/L (ref 0.34–5.6)

## 2024-01-31 PROCEDURE — 83036 HEMOGLOBIN GLYCOSYLATED A1C: CPT

## 2024-01-31 PROCEDURE — 80053 COMPREHEN METABOLIC PANEL: CPT

## 2024-01-31 PROCEDURE — 80061 LIPID PANEL: CPT

## 2024-01-31 PROCEDURE — 36415 COLL VENOUS BLD VENIPUNCTURE: CPT

## 2024-01-31 PROCEDURE — 99443 PR PHYS/QHP TELEPHONE EVALUATION 21-30 MIN: CPT | Mod: 95 | Performed by: NURSE PRACTITIONER

## 2024-01-31 PROCEDURE — 84443 ASSAY THYROID STIM HORMONE: CPT

## 2024-01-31 NOTE — PATIENT INSTRUCTIONS
Type 2 diabetes mellitus treated with insulin (CMS/[facility])  1. continue with  Jardiance 10 mg per day - increase fluids   2.  continue with Rybelsus 14 mg per day    3. Continue with Novolog 70/30 mix 25 units with breakfast and 30 units with dinner   4. Rotate injection sites   5.DEXCOM trial   6. A1c 8.8  7. Schedule bone density     Thyroid nodule  Call me for any difficulty with swallowing or breathing     Obesity  Goal for exercise 30 min 5 days per week     Hypothyroidism  Continue with Synthroid 25 mcg per day     Hyperlipidemia  Continue with Lipitor

## 2024-02-01 ENCOUNTER — TELEPHONE (OUTPATIENT)
Dept: ENDOCRINOLOGY | Facility: CLINIC | Age: 64
End: 2024-02-01
Payer: MEDICARE

## 2024-02-01 DIAGNOSIS — Z79.4 TYPE 2 DIABETES MELLITUS TREATED WITH INSULIN (CMS/HCC): Primary | ICD-10-CM

## 2024-02-01 DIAGNOSIS — E11.9 TYPE 2 DIABETES MELLITUS TREATED WITH INSULIN (CMS/HCC): Primary | ICD-10-CM

## 2024-02-01 NOTE — TELEPHONE ENCOUNTER
Please call  Pt - I did not order a cbc - she said her surgeon would - her A1c was 10.1- please let me know if you need anything

## 2024-02-01 NOTE — TELEPHONE ENCOUNTER
----- Message from Jeanette Toussaint, Med Ass't sent at 3/3/2020 12:02 PM PST -----    ----- Message -----  From: Brenda Mornoy M.D.  Sent: 3/3/2020  11:54 AM PST  To: Jeanette Toussaint, Med Ass't    Please call don and say his PSA is climbing and I need him to keep his urology appt and go to it. I also need him to start an iron supplement over the counter  ----- Message -----  From: Urmila, Lab  Sent: 3/2/2020   5:43 PM PST  To: Brenda Monroy M.D.         rn called pt and let pt know and reviewed A1c.

## 2024-02-01 NOTE — TELEPHONE ENCOUNTER
Patient left .  Lab results back.  Patient states that unless she is missing it, no WBC listed.  She is seeing her surgeon today.  Please advise.

## 2024-02-02 ENCOUNTER — TRANSCRIBE ORDERS (OUTPATIENT)
Dept: LAB | Facility: CLINIC | Age: 64
End: 2024-02-02

## 2024-02-02 ENCOUNTER — APPOINTMENT (OUTPATIENT)
Dept: LAB | Facility: CLINIC | Age: 64
End: 2024-02-02
Payer: MEDICARE

## 2024-02-02 DIAGNOSIS — M86.8X7 OTHER OSTEOMYELITIS, ANKLE AND FOOT: Primary | ICD-10-CM

## 2024-02-02 DIAGNOSIS — M86.8X7 OTHER OSTEOMYELITIS, ANKLE AND FOOT: ICD-10-CM

## 2024-02-02 LAB
ERYTHROCYTE [DISTWIDTH] IN BLOOD BY AUTOMATED COUNT: 14.6 % (ref 11.7–14.4)
HCT VFR BLD AUTO: 43.2 % (ref 35–45)
HGB BLD-MCNC: 13.3 G/DL (ref 11.8–15.7)
MCH RBC QN AUTO: 28.1 PG (ref 28–33.2)
MCHC RBC AUTO-ENTMCNC: 30.8 G/DL (ref 32.2–35.5)
MCV RBC AUTO: 91.3 FL (ref 83–98)
PDW BLD AUTO: 10.8 FL (ref 9.4–12.3)
PLATELET # BLD AUTO: 538 K/UL (ref 150–369)
RBC # BLD AUTO: 4.73 M/UL (ref 3.93–5.22)
WBC # BLD AUTO: 37.38 K/UL (ref 3.8–10.5)

## 2024-02-02 PROCEDURE — 36415 COLL VENOUS BLD VENIPUNCTURE: CPT

## 2024-02-02 PROCEDURE — 85027 COMPLETE CBC AUTOMATED: CPT

## 2024-02-06 RX ORDER — ASPIRIN 81 MG/1
81 TABLET ORAL DAILY
Qty: 30 TABLET | Refills: 0 | OUTPATIENT
Start: 2024-02-06

## 2024-02-06 RX ORDER — CLOPIDOGREL BISULFATE 75 MG/1
75 TABLET ORAL DAILY
Qty: 30 TABLET | Refills: 0 | OUTPATIENT
Start: 2024-02-06

## 2024-02-09 NOTE — PROGRESS NOTES
PT DAILY NOTE FOR OUTPATIENT THERAPY    Patient: Bebe Burks MRN: [id number]  : 1960 62 y.o.  Referring Physician: Gideon Centeno MD  Date of Visit: 2023    Certification Dates: 23 through 23    Diagnosis:   1. S/P total knee arthroplasty, right        Chief Complaints:  R TKA    Precautions:   Existing Precautions/Restrictions: other (see comments)  Precautions comments: LLE foot drop - has LLE AFO will bring NV following IE      TODAY'S VISIT    Time In Session:  Start Time: 1200  Stop Time: 1300  Time Calculation (min): 60 min   History/Vitals/Pain/Encounter Info - 23 1202        Injury History/Precautions/Daily Required Info    Document Type daily treatment     Primary Therapist Caitlyn Lagunas, PT     Chief Complaint/Reason for Visit  R TKA     Referring Physician Dr. Centeno     Existing Precautions/Restrictions other (see comments)     Precautions comments LLE foot drop - has LLE AFO will bring NV following IE     History of present illness/functional impairment Bebe is a 61 yo female who presents to OPPT s/p R TKA 23 w/ Dr. Centeno at [facility] (states no complications during/post surgery). Patient reports she had a 2 night stay at [facility] and then was discharged to [facility] where she stayed for 5-6 nights. She was discharged home with home health PT and OT, she states home health PT stopped last Friday 3/31/23. She reports at baseline she ambulates without AD, but started using SPC about 6 months prior to surgery due to pain/weakness. She states at this time she is ambulating with SPC for household distances and RW for community distances. She reports she lives alone in single level [facility] with 4 JOSE RAFAEL w/ unilateral rail. She states she has a walkin shower with shower chair and grab bars. She reports since her R knee surgery her ability to ascend/descend stairs is difficult, she feels like her walking is impaired, and her balance/endurance decreased. She reports significant PMHx  14 days of LLE drop foot (which they are contributing to sciatica, states it began about 3 years ago) which she wears LLE brace for which was issued to her by Peter in September 2022, LBP w/ LLE radicular symptoms, DMII (controlled), HTN (controlled). Patient states her overall goal for PT is to walk again without a SPC. Patient reports she is currently working but on long term leave at this time. Patient reports current R knee pain 0/10 and pain at worst in the past week 1/10.     Patient/Family/Caregiver Comments/Observations 1-2/10 pain. No falls or change in meds     Patient reported fall since last visit No        Pain Assessment    Currently in pain Yes     Pain Rating (0-10): Pre Activity 2        Pain Intervention    Intervention  monitored     Post Intervention Comments see daily assessment                Daily Treatment Assessment and Plan - 04/19/23 1202        Daily Treatment Assessment and Plan    Daily Outcome Summary R knee AROM 111*, PROM 116*. reviewed scar massage and different brace options with patient. Issued/reviewed HEP to include standing 3 way hip, hamcurls and step ups. Pt verbalized good understanding to education provided. Added step ups and FT EO/EC and FT HT/HN. CP given post session for pain relief     Plan and Recommendations continue w/ primary PT POC                     OBJECTIVE DATA TAKEN TODAY:    None taken    Today's Treatment:    IE 4/3/23   30 Day 5/3/23   60 Day 6/3/23   90 Day 7/3/23   Precautions LLE foot drop- has LLE AFO but does not always wear    Insurance Auth    Treatment  Current Session Time   Modalities Total Time for Session 1 unit   Heat/Ice  CPT 70218 CP to R knee post session   E. Stimulation Manual  CPT 33050    E. Stim Unattended  CPT 94116    Manual   CPT 90071 Total Time for Session 8-22 Minutes   STM/MFR/TPR R gastroc, hamstring -y   Instrument Assisted STM     Mobilizations Gr II/III R patellar mobilizations in all direction    ROM/Flexibility R knee PROM  flex/ext (y)   Myofascial Decompression    Ther. Exercise  CPT 45581                    Total Time for Session 23-37 Minutes     Sets Reps Load Comment    RB yes 1 10 min Level 2 Seat 8?, arms 10, able to complete full revolution           Quad set yes 1 10 5 sec     SAQ yes 1 10 3 sec, 3lbs bilateral   SLR yes 2 10  RLE only, quad set prior to lift    Sidelying hip ABD y 1 10  RLE only   sidelying clamshell  y 1  10     Standing hip ext  1 10     Hip adduction  2 10 3s hold Seated    Hip ABD  2 10 [place] Seated, trial pink NV?   LAQ Y 2 10 3s hold Seated   Bridge   1 10  Difficulty achieving full ROM   STS y 1 10  From raised mat    Toe taps y 2 5 6inch 1 finger tip support   FSU y  1o 6''    LSU y  10 6''    Lateral sidestepping                                                       Neuro Re-Ed  CPT 89669  Total Time for Session Billed w/ TE     Sets Reps Load Comment   FT HT/HN yes    Firm surface   FT EO EC yes    Firm surface    FA EO EC     Foam    Tandem walking                 Pre gait exercises        Cesia step over                                  Gait  CPT 61201  Total Time for Session Not performed   Gait training with SPC n        n Patient ambulated with SPC 50 w/ SPC, with noodle AFO on L foot for foot drop. Also tried flexible L foot drop brace which patient purchased from [employer]- 50 feet, CS, SPC.  Again  ambulated 100 feet with flexible brace on L foot, SPC, CS             Ther. Activity  CPT 51825  Total Time for Session 8-22 Minutes   Patient education y Pt educated on knee extension ROM and importance, using L LE braces for safety, long term expectations and answered driving questions. Pt verbalized understanding.   HEP       yes Issued patient HEP including hip adduction, hip ABD, LAQ and STS with use of BUE    Updated HEP to include 3 way hip, hamcurls and step ups   30 sec STS  See Assessment   OH/FGA  Bergs done 4/12/2023 26/56   R knee circumference assessment NV    Stair assessment /  training  Patient ascends/descends 4 6 inch steps w/ step to step pattern and unilateral rail, CS and gait belt.    Patient ascends/descends 4 6 inch steps w/ reciprocal pattern and unilateral rail, CS-Fay and gait belt, min-mod verbal cues for toe clearance (noted compensation of B foot ER to clear BLE)        Group  CPT 53095 Total Time for Session Not performed

## 2024-02-12 ENCOUNTER — TRANSCRIBE ORDERS (OUTPATIENT)
Dept: WOUND CARE | Facility: HOSPITAL | Age: 64
End: 2024-02-12

## 2024-02-12 ENCOUNTER — APPOINTMENT (OUTPATIENT)
Dept: LAB | Facility: CLINIC | Age: 64
End: 2024-02-12
Attending: PODIATRIST
Payer: MEDICARE

## 2024-02-12 DIAGNOSIS — L03.116 CELLULITIS OF LEFT LOWER LIMB: Primary | ICD-10-CM

## 2024-02-12 DIAGNOSIS — L03.116 CELLULITIS OF LEFT LOWER LIMB: ICD-10-CM

## 2024-02-12 LAB
ALBUMIN SERPL-MCNC: 3.6 G/DL (ref 3.5–5.7)
ALP SERPL-CCNC: 82 IU/L (ref 34–125)
ALT SERPL-CCNC: 26 IU/L (ref 7–52)
ANION GAP SERPL CALC-SCNC: 10 MEQ/L (ref 3–15)
AST SERPL-CCNC: 20 IU/L (ref 13–39)
BASOPHILS # BLD: 1.13 K/UL (ref 0.01–0.1)
BASOPHILS NFR BLD: 3 %
BILIRUB SERPL-MCNC: 0.6 MG/DL (ref 0.3–1.2)
BUN SERPL-MCNC: 16 MG/DL (ref 7–25)
BURR CELLS BLD QL SMEAR: ABNORMAL
CALCIUM SERPL-MCNC: 9.6 MG/DL (ref 8.6–10.3)
CHLORIDE SERPL-SCNC: 101 MEQ/L (ref 98–107)
CO2 SERPL-SCNC: 28 MEQ/L (ref 21–31)
CREAT SERPL-MCNC: 0.6 MG/DL (ref 0.6–1.2)
DIFFERENTIAL METHOD BLD: ABNORMAL
EGFRCR SERPLBLD CKD-EPI 2021: >60 ML/MIN/1.73M*2
EOSINOPHIL # BLD: 0.75 K/UL (ref 0.04–0.36)
EOSINOPHIL NFR BLD: 2 %
ERYTHROCYTE [DISTWIDTH] IN BLOOD BY AUTOMATED COUNT: 14.7 % (ref 11.7–14.4)
ERYTHROCYTE [SEDIMENTATION RATE] IN BLOOD BY WESTERGREN METHOD: 102 MM/HR
GLUCOSE SERPL-MCNC: 155 MG/DL (ref 70–99)
HCT VFR BLD AUTO: 41.3 % (ref 35–45)
HGB BLD-MCNC: 12.9 G/DL (ref 11.8–15.7)
LYMPHOCYTES # BLD: 3.01 K/UL (ref 1.2–3.5)
LYMPHOCYTES NFR BLD: 8 %
MCH RBC QN AUTO: 28.4 PG (ref 28–33.2)
MCHC RBC AUTO-ENTMCNC: 31.2 G/DL (ref 32.2–35.5)
MCV RBC AUTO: 90.8 FL (ref 83–98)
METAMYELOCYTES # BLD MANUAL: 1.13 K/UL
METAMYELOCYTES NFR BLD MANUAL: 3 %
MONOCYTES # BLD: 1.51 K/UL (ref 0.28–0.8)
MONOCYTES NFR BLD: 4 %
MYELOCYTES # BLD MANUAL: 1.51 K/UL
MYELOCYTES NFR BLD MANUAL: 4 %
NEUTS BAND # BLD: 0.38 K/UL (ref 0–0.53)
NEUTS BAND # BLD: 27.86 K/UL (ref 1.7–7)
NEUTS BAND NFR BLD: 1 %
NEUTS SEG NFR BLD: 74 %
PDW BLD AUTO: 10.5 FL (ref 9.4–12.3)
PLAT MORPH BLD: NORMAL
PLATELET # BLD AUTO: 614 K/UL (ref 150–369)
PLATELET # BLD EST: ABNORMAL 10*3/UL
POTASSIUM SERPL-SCNC: 4.8 MEQ/L (ref 3.5–5.1)
PROT SERPL-MCNC: 7 G/DL (ref 6–8.2)
RBC # BLD AUTO: 4.55 M/UL (ref 3.93–5.22)
SODIUM SERPL-SCNC: 139 MEQ/L (ref 136–145)
VARIANT LYMPHS # BLD MANUAL: 0.38 K/UL
VARIANT LYMPHS NFR BLD: 1 %
WBC # BLD AUTO: 37.65 K/UL (ref 3.8–10.5)

## 2024-02-12 PROCEDURE — 85025 COMPLETE CBC W/AUTO DIFF WBC: CPT

## 2024-02-12 PROCEDURE — 80053 COMPREHEN METABOLIC PANEL: CPT

## 2024-02-12 PROCEDURE — 85652 RBC SED RATE AUTOMATED: CPT

## 2024-02-12 PROCEDURE — 36415 COLL VENOUS BLD VENIPUNCTURE: CPT

## 2024-02-15 ENCOUNTER — LAB REQUISITION (OUTPATIENT)
Dept: LAB | Facility: HOSPITAL | Age: 64
End: 2024-02-15
Attending: PODIATRIST
Payer: MEDICARE

## 2024-02-15 DIAGNOSIS — L03.116 CELLULITIS OF LEFT LOWER LIMB: ICD-10-CM

## 2024-02-15 PROCEDURE — 87186 SC STD MICRODIL/AGAR DIL: CPT | Performed by: PODIATRIST

## 2024-02-20 LAB
GRAM STN SPEC: ABNORMAL
GRAM STN SPEC: ABNORMAL
MICROORGANISM SPEC CULT: ABNORMAL

## 2024-02-22 RX ORDER — CLOPIDOGREL BISULFATE 75 MG/1
75 TABLET ORAL DAILY
Qty: 30 TABLET | Refills: 0 | OUTPATIENT
Start: 2024-02-22

## 2024-02-23 DIAGNOSIS — E11.8 TYPE 2 DIABETES MELLITUS WITH UNSPECIFIED COMPLICATIONS: ICD-10-CM

## 2024-02-23 RX ORDER — PEN NEEDLE, DIABETIC 32GX 5/32"
NEEDLE, DISPOSABLE MISCELLANEOUS 2 TIMES DAILY
Qty: 200 EACH | Refills: 2 | Status: ON HOLD | OUTPATIENT
Start: 2024-02-23 | End: 2024-03-01 | Stop reason: ENTERED-IN-ERROR

## 2024-02-29 ENCOUNTER — APPOINTMENT (EMERGENCY)
Dept: RADIOLOGY | Facility: HOSPITAL | Age: 64
DRG: 629 | End: 2024-02-29
Payer: MEDICARE

## 2024-02-29 ENCOUNTER — APPOINTMENT (EMERGENCY)
Dept: RADIOLOGY | Facility: HOSPITAL | Age: 64
DRG: 629 | End: 2024-02-29
Attending: HOSPITALIST
Payer: MEDICARE

## 2024-02-29 ENCOUNTER — HOSPITAL ENCOUNTER (INPATIENT)
Facility: HOSPITAL | Age: 64
LOS: 8 days | Discharge: NURSING, REHAB OR LONG-TERM CARE | DRG: 629 | End: 2024-03-08
Attending: EMERGENCY MEDICINE | Admitting: HOSPITALIST
Payer: MEDICARE

## 2024-02-29 DIAGNOSIS — S92.332A: ICD-10-CM

## 2024-02-29 DIAGNOSIS — S92.322A CLOSED FRACTURE OF SECOND METATARSAL BONE OF LEFT FOOT, PHYSEAL INVOLVEMENT UNSPECIFIED, INITIAL ENCOUNTER: ICD-10-CM

## 2024-02-29 DIAGNOSIS — L08.9 TOE INFECTION: Primary | ICD-10-CM

## 2024-02-29 LAB
ALBUMIN SERPL-MCNC: 3.8 G/DL (ref 3.5–5.7)
ALP SERPL-CCNC: 93 IU/L (ref 34–125)
ALT SERPL-CCNC: 22 IU/L (ref 7–52)
ANION GAP SERPL CALC-SCNC: 8 MEQ/L (ref 3–15)
AST SERPL-CCNC: 19 IU/L (ref 13–39)
BASOPHILS # BLD: 2.62 K/UL (ref 0.01–0.1)
BASOPHILS NFR BLD: 5 %
BILIRUB SERPL-MCNC: 0.5 MG/DL (ref 0.3–1.2)
BNP SERPL-MCNC: 52 PG/ML
BUN SERPL-MCNC: 21 MG/DL (ref 7–25)
CALCIUM SERPL-MCNC: 9.4 MG/DL (ref 8.6–10.3)
CHLORIDE SERPL-SCNC: 102 MEQ/L (ref 98–107)
CO2 SERPL-SCNC: 28 MEQ/L (ref 21–31)
CREAT SERPL-MCNC: 0.7 MG/DL (ref 0.6–1.2)
CRP SERPL-MCNC: 37 MG/L
DIFFERENTIAL METHOD BLD: ABNORMAL
EGFRCR SERPLBLD CKD-EPI 2021: >60 ML/MIN/1.73M*2
EOSINOPHIL # BLD: 3.67 K/UL (ref 0.04–0.36)
EOSINOPHIL NFR BLD: 7 %
ERYTHROCYTE [DISTWIDTH] IN BLOOD BY AUTOMATED COUNT: 15.2 % (ref 11.7–14.4)
ERYTHROCYTE [SEDIMENTATION RATE] IN BLOOD BY WESTERGREN METHOD: 89 MM/HR
GLUCOSE SERPL-MCNC: 157 MG/DL (ref 70–99)
HCT VFR BLD AUTO: 39 % (ref 35–45)
HGB BLD-MCNC: 12.1 G/DL (ref 11.8–15.7)
LACTATE SERPL-SCNC: 1 MMOL/L (ref 0.4–2)
LYMPHOCYTES # BLD: 3.15 K/UL (ref 1.2–3.5)
LYMPHOCYTES NFR BLD: 6 %
MCH RBC QN AUTO: 27.3 PG (ref 28–33.2)
MCHC RBC AUTO-ENTMCNC: 31 G/DL (ref 32.2–35.5)
MCV RBC AUTO: 88 FL (ref 83–98)
METAMYELOCYTES # BLD MANUAL: 1.57 K/UL
METAMYELOCYTES NFR BLD MANUAL: 3 %
MONOCYTES # BLD: 0.52 K/UL (ref 0.28–0.8)
MONOCYTES NFR BLD: 1 %
MYELOCYTES # BLD MANUAL: 2.62 K/UL
MYELOCYTES NFR BLD MANUAL: 5 %
NEUTS BAND # BLD: 1.05 K/UL (ref 0–0.53)
NEUTS BAND # BLD: 37.22 K/UL (ref 1.7–7)
NEUTS BAND NFR BLD: 2 %
NEUTS SEG NFR BLD: 71 %
PDW BLD AUTO: 9.7 FL (ref 9.4–12.3)
PLAT MORPH BLD: NORMAL
PLATELET # BLD AUTO: 633 K/UL (ref 150–369)
PLATELET # BLD EST: ABNORMAL 10*3/UL
POTASSIUM SERPL-SCNC: 4 MEQ/L (ref 3.5–5.1)
PROT SERPL-MCNC: 7.6 G/DL (ref 6–8.2)
RBC # BLD AUTO: 4.43 M/UL (ref 3.93–5.22)
RBC MORPH BLD: NORMAL
SODIUM SERPL-SCNC: 138 MEQ/L (ref 136–145)
WBC # BLD AUTO: 52.42 K/UL (ref 3.8–10.5)

## 2024-02-29 PROCEDURE — 81001 URINALYSIS AUTO W/SCOPE: CPT

## 2024-02-29 PROCEDURE — 83880 ASSAY OF NATRIURETIC PEPTIDE: CPT

## 2024-02-29 PROCEDURE — 83605 ASSAY OF LACTIC ACID: CPT

## 2024-02-29 PROCEDURE — 36415 COLL VENOUS BLD VENIPUNCTURE: CPT | Performed by: EMERGENCY MEDICINE

## 2024-02-29 PROCEDURE — 87040 BLOOD CULTURE FOR BACTERIA: CPT

## 2024-02-29 PROCEDURE — 1123F ACP DISCUSS/DSCN MKR DOCD: CPT | Performed by: HOSPITALIST

## 2024-02-29 PROCEDURE — 71045 X-RAY EXAM CHEST 1 VIEW: CPT

## 2024-02-29 PROCEDURE — 86140 C-REACTIVE PROTEIN: CPT

## 2024-02-29 PROCEDURE — 25800000 HC PHARMACY IV SOLUTIONS: Performed by: HOSPITALIST

## 2024-02-29 PROCEDURE — 80053 COMPREHEN METABOLIC PANEL: CPT | Performed by: EMERGENCY MEDICINE

## 2024-02-29 PROCEDURE — 73630 X-RAY EXAM OF FOOT: CPT | Mod: LT

## 2024-02-29 PROCEDURE — 25800000 HC PHARMACY IV SOLUTIONS

## 2024-02-29 PROCEDURE — 84100 ASSAY OF PHOSPHORUS: CPT | Performed by: HOSPITALIST

## 2024-02-29 PROCEDURE — 96374 THER/PROPH/DIAG INJ IV PUSH: CPT

## 2024-02-29 PROCEDURE — 12000000 HC ROOM AND CARE MED/SURG

## 2024-02-29 PROCEDURE — 99285 EMERGENCY DEPT VISIT HI MDM: CPT | Mod: 25

## 2024-02-29 PROCEDURE — 25000000 HC PHARMACY GENERAL

## 2024-02-29 PROCEDURE — 85025 COMPLETE CBC W/AUTO DIFF WBC: CPT | Performed by: EMERGENCY MEDICINE

## 2024-02-29 PROCEDURE — 99223 1ST HOSP IP/OBS HIGH 75: CPT | Performed by: HOSPITALIST

## 2024-02-29 PROCEDURE — 73660 X-RAY EXAM OF TOE(S): CPT | Mod: LT

## 2024-02-29 PROCEDURE — 85652 RBC SED RATE AUTOMATED: CPT

## 2024-02-29 PROCEDURE — 63600000 HC DRUGS/DETAIL CODE

## 2024-02-29 RX ORDER — AMOXICILLIN AND CLAVULANATE POTASSIUM 875; 125 MG/1; MG/1
1 TABLET, FILM COATED ORAL 2 TIMES DAILY
Status: ON HOLD | COMMUNITY
Start: 2024-02-20 | End: 2024-03-01 | Stop reason: ENTERED-IN-ERROR

## 2024-02-29 RX ORDER — VANCOMYCIN HYDROCHLORIDE
15
Status: DISCONTINUED | OUTPATIENT
Start: 2024-02-29 | End: 2024-03-01

## 2024-02-29 RX ORDER — CIPROFLOXACIN 500 MG/1
TABLET ORAL
Status: ON HOLD | COMMUNITY
Start: 2024-02-20 | End: 2024-03-01 | Stop reason: ENTERED-IN-ERROR

## 2024-02-29 RX ADMIN — VANCOMYCIN HYDROCHLORIDE 1250 MG: 500 INJECTION, POWDER, LYOPHILIZED, FOR SOLUTION INTRAVENOUS at 20:59

## 2024-02-29 RX ADMIN — SODIUM CHLORIDE 1000 ML: 9 INJECTION, SOLUTION INTRAVENOUS at 22:40

## 2024-02-29 ASSESSMENT — ENCOUNTER SYMPTOMS
SHORTNESS OF BREATH: 0
CHILLS: 0
COLOR CHANGE: 1
ABDOMINAL PAIN: 0
FEVER: 0
WOUND: 1
MYALGIAS: 1

## 2024-02-29 NOTE — ED PROVIDER NOTES
"Emergency Medicine Note  HPI   HISTORY OF PRESENT ILLNESS     Patient is a 63-year-old female with past medical history of osteomyelitis of the 2nd left toe with amputation on 1/15/24, T2DM, HTN, hypothyroidism, HLD, colon cancer who presents for evaluation of left 3rd toe pain x 1 month but worse over the last day.  Patient also complains of new color change compared to yesterday of that digit.  Patient reports she saw her podiatrist Dr. Bassett yesterday who rewrapped the dressing and considered debriding it but did not at that time according to the patient.  She is currently taking Augmentin and ciprofloxacin for this.  She denies fever, chills, chest pain, abdominal pain.            Patient History   PAST HISTORY     Reviewed from Nursing Triage:       Past Medical History:   Diagnosis Date   • Abnormal ECG    • Abnormal finding on chest xray 12/2021   • Arthritis    • Colon cancer (CMS/HCC)    • COVID-19 12/2021   • COVID-19 vaccine series completed     Moderna: \" 3 doses\"   • Diabetic neuropathy (CMS/HCC)    • DM (diabetes mellitus), type 2 with ophthalmic complications (CMS/HCC)    • Hypertension    • Hypothyroidism    • Left foot drop    • Lipid disorder    • Type 2 diabetes mellitus treated with insulin (CMS/HCC)        Past Surgical History:   Procedure Laterality Date   • ABCESS DRAINAGE  10/2023   • CATARACT EXTRACTION Bilateral    • COLONOSCOPY     • COMBINED HYSTEROSCOPY DIAGNOSTIC / D&C  2007   • EYE SURGERY Left 2017    cataract   • JOINT REPLACEMENT Right 02/2023    knee replacement   • TOE AMPUTATION     • VITRECTOMY Left 2016       Family History   Problem Relation Age of Onset   • Heart disease Biological Mother    • Diabetes Biological Mother    • Hypertension Biological Mother    • Stroke Biological Father        Social History     Tobacco Use   • Smoking status: Never     Passive exposure: Past   • Smokeless tobacco: Never   Vaping Use   • Vaping Use: Never used   Substance Use Topics   • " Alcohol use: Yes     Comment: occasionally   • Drug use: Never         Review of Systems   REVIEW OF SYSTEMS     Review of Systems   Constitutional: Negative for chills and fever.   Respiratory: Negative for shortness of breath.    Cardiovascular: Negative for chest pain.   Gastrointestinal: Negative for abdominal pain.   Musculoskeletal: Positive for myalgias (L 3rd toe pain).   Skin: Positive for color change (Left third toe) and wound.         VITALS     ED Vitals    Date/Time Temp Pulse Resp BP SpO2 [facility]   24 2240 -- 90 16 157/90 97 % CK   24 2059 -- 90 -- 149/73 100 % CK   24 1826 36.4 °C (97.5 °F) 98 16 155/68 100 % NADIA        Pulse Ox %: 100 % (24)  Pulse Ox Interpretation: Normal (24)           Physical Exam   PHYSICAL EXAM     Physical Exam  Constitutional:       General: She is not in acute distress.     Appearance: She is not ill-appearing, toxic-appearing or diaphoretic.   HENT:      Head: Normocephalic and atraumatic.   Cardiovascular:      Rate and Rhythm: Normal rate.      Pulses:           Dorsalis pedis pulses are 1+ on the right side and 1+ on the left side.      Heart sounds: No murmur heard.     No friction rub. No gallop.   Pulmonary:      Effort: Pulmonary effort is normal. No respiratory distress.      Breath sounds: No wheezing, rhonchi or rales.   Abdominal:      General: There is no distension.      Palpations: Abdomen is soft.      Tenderness: There is no abdominal tenderness. There is no guarding or rebound.   Musculoskeletal:      Right lower le+ Pitting Edema present.      Left lower le+ Pitting Edema present.   Feet:      Comments: Left 3rd digit: black discoloration distally.  Purulent discharge from the area between the 2nd and 3rd digits.      2nd digit of L foot: amputated  Neurological:      Mental Status: She is alert.           PROCEDURES     Procedures     DATA     Results     Procedure Component Value Units Date/Time    B-type  natriuretic peptide [[id number]]  (Normal) Collected: 02/29/24 1928    Specimen: Blood, Venous Updated: 02/29/24 2023     BNP 52 pg/mL     Lactic acid, Venous [[id number]]  (Normal) Collected: 02/29/24 1928    Specimen: Blood, Venous Updated: 02/29/24 1952     Lactate 1.0 mmol/L     Blood Culture Blood, Venous [[id number]] Collected: 02/29/24 1928    Specimen: Blood, Venous Updated: 02/29/24 1944    Comprehensive metabolic panel [[id number]]  (Abnormal) Collected: 02/29/24 1830    Specimen: Blood, Venous Updated: 02/29/24 1918     Sodium 138 mEQ/L      Potassium 4.0 mEQ/L      Comment: Results obtained on plasma. Plasma Potassium values may be up to 0.4 mEQ/L less than serum values. The differences may be greater for patients with high platelet or white cell counts.        Chloride 102 mEQ/L      CO2 28 mEQ/L      BUN 21 mg/dL      Creatinine 0.7 mg/dL      Glucose 157 mg/dL      Calcium 9.4 mg/dL      AST (SGOT) 19 IU/L      ALT (SGPT) 22 IU/L      Alkaline Phosphatase 93 IU/L      Total Protein 7.6 g/dL      Comment: Test performed on plasma which typically contains approximately 0.4 g/dL more protein than serum.        Albumin 3.8 g/dL      Bilirubin, Total 0.5 mg/dL      eGFR >60.0 mL/min/1.73m*2      Comment: Calculation based on the Chronic Kidney Disease Epidemiology Collaboration (CKD-EPI) equation refit without adjustment for race.        Anion Gap 8 mEQ/L     C-reactive protein [[id number]]  (Abnormal) Collected: 02/29/24 1830    Specimen: Blood, Venous Updated: 02/29/24 1918     CRP 37.00 mg/L     CBC and differential [[id number]]  (Abnormal) Collected: 02/29/24 1830    Specimen: Blood, Venous Updated: 02/29/24 1910     WBC 52.42 K/uL      Comment: ALL RESULTS HAVE BEEN CHECKED This result has been called to Jt Lo by Lisa Ornelas on 02/29/2024 18:50:51, and has been read back.         RBC 4.43 M/uL      Hemoglobin 12.1 g/dL      Hematocrit 39.0 %      MCV 88.0 fL      MCH 27.3 pg      MCHC 31.0  g/dL      RDW 15.2 %      Platelets 633 K/uL      MPV 9.7 fL      Differential Type Manu     Neutrophils 71 %      Lymphocytes 6 %      Monocytes 1 %      Eosinophils 7 %      Basophils 5 %      Bands 2 %      Metamyelocytes 3 %      Myelocytes 5 %      Neutrophils, Absolute 37.22 K/uL      Lymphocytes, Absolute 3.15 K/uL      Monocytes, Absolute 0.52 K/uL      Eosinophils, Absolute 3.67 K/uL      Basophils, Absolute 2.62 K/uL      Bands, Absolute 1.05 K/uL      Metamyelocytes, Absolute 1.57 K/uL      Myelocytes, Absolute 2.62 K/uL      RBC Morphology Normal     PLT Morphology Normal     Platelet Estimate Increased (>400,000)    Blood Culture Blood, Venous [[id number]] Collected: 02/29/24 1830    Specimen: Blood, Venous Updated: 02/29/24 1858    Sedimentation rate [[id number]] Collected: 02/29/24 1830    Specimen: Blood, Venous Updated: 02/29/24 1858          Imaging Results          X-RAY FOOT LEFT 3+ VIEWS (Final result)  Result time 02/29/24 20:27:12    Final result                 Impression:    IMPRESSION:  Prior second toe amputation with new acute osteomyelitis throughout the second  metatarsal where there is pathological fracturing.  Acute osteomyelitis and displaced pathological fracturing of the third  metatarsal.  Probable mild osteomyelitis in the base of the proximal third phalanx.  Probable osteomyelitis involving the thi fourth metatarsal head medially as well  as probably in the fourth proximal phalanx.  Suggest MRI for more definitive assessment of the extent of infection.  Plantar calcaneal spurring and mild degenerative disease. Extensive  atherosclerotic vascular calcifications.  Wound overlying the second toe amputation site with irregular soft tissue.  Possible small amounts of gas in the third toe.    4 views of the left foot.  COMMENT:    Comparison: X-rays from 1 1/15/2024.    See impression             Narrative:    CLINICAL HISTORY: r/o gas gangrene                               X-RAY TOE  LEFT 2+ VIEWS (In process)                 No orders to display       Scoring tools                                  ED Course & MDM   MDM / ED COURSE / CLINICAL IMPRESSION / DISPO     Medical Decision Making  Patient is a 63-year-old female with past medical history of osteomyelitis of the 2nd left toe with amputation on 1/15/24, T2DM, HTN, hypothyroidism, HLD, colon cancer who presents for evaluation of left 3rd toe pain x 1 month but worse over the last day.  Patient also complains of new color change compared to yesterday of that digit. Pt currently taking augmentin and ciprofloxacin.  On exam patient has black discoloration to the L 3rd toe with surrounding purulent discharge.  DP pulses 1+.  Diminished light touch sensation to remaining toes.  WBC count elevated to 52.42.  Vital signs stable.  Will plan for inflammatory markers, CXR, CRP, ESR, blood cultures, lactate, and consult podiatry.      Amount and/or Complexity of Data Reviewed  Labs: ordered. Decision-making details documented in ED Course.  Radiology: ordered.          ED Course as of 02/29/24 2257   Thu Feb 29, 2024   1852 WBC(!!): 52.42  Called from lab [CM]   1919 Podiatry paged [CM]   1928 D/w with podiatry on call Dr. Houston, will call Dr. Bassett and call back regarding dispo [CM]   1952 D/w podiatry, patient has a chronically elevated WBC count and has been seen by hematology.  Per him Dr. Bassett saw the patient 2 days ago and reports the toe would not cause that high of a WBC count after 2 days.  They do not recommend admission to their service at this time but if the patient is admitted they will follow the patient as a consulting service.   [CM]   2001 [facility] paged [CM]   2014 D/w [facility], will see pt [CM]   2123 X-RAY FOOT LEFT 3+ VIEWS  Concerning for osteomyelitis, possible small amount of gas in third toe, and new 2nd/3rd metatarsal fractures, will recall podiatry [CM]   2142 Recalled podiatry regarding x ray findings, would not be anything  to reduce here today.  They will still follow up with the patient in the hospital tomorrow.  They are ok with starting vancomycin [CM]      ED Course User Index  [CM] Spenser Lo PA C     Clinical Impression      Toe infection   Closed fracture of second metatarsal bone of left foot, physeal involvement unspecified, initial encounter   Closed fracture of third metatarsal bone of left foot, physeal involvement unspecified, initial encounter     _________________     ED Disposition   Admit / Observation                   Spenser Lo PA C  02/29/24 [id number]       Spenser Lo PA C  03/01/24 0317

## 2024-03-01 ENCOUNTER — APPOINTMENT (INPATIENT)
Dept: RADIOLOGY | Facility: HOSPITAL | Age: 64
DRG: 629 | End: 2024-03-01
Attending: HOSPITALIST
Payer: MEDICARE

## 2024-03-01 PROBLEM — D72.823 LEUKEMOID REACTION: Status: ACTIVE | Noted: 2024-03-01

## 2024-03-01 PROBLEM — E78.5 HLD (HYPERLIPIDEMIA): Status: ACTIVE | Noted: 2024-03-01

## 2024-03-01 PROBLEM — E03.9 HYPOTHYROIDISM: Status: ACTIVE | Noted: 2024-03-01

## 2024-03-01 PROBLEM — E11.628 TYPE 2 DIABETES MELLITUS WITH LEFT DIABETIC FOOT INFECTION (CMS/HCC): Status: ACTIVE | Noted: 2024-02-29

## 2024-03-01 PROBLEM — E11.40 TYPE 2 DIABETES MELLITUS WITH DIABETIC NEUROPATHY (CMS/HCC): Status: ACTIVE | Noted: 2024-03-01

## 2024-03-01 PROBLEM — M86.172 ACUTE OSTEOMYELITIS OF LEFT FOOT (CMS/HCC): Status: ACTIVE | Noted: 2024-03-01

## 2024-03-01 PROBLEM — I10 PRIMARY HYPERTENSION: Status: ACTIVE | Noted: 2024-03-01

## 2024-03-01 LAB
ALBUMIN SERPL-MCNC: 3.1 G/DL (ref 3.5–5.7)
ALP SERPL-CCNC: 77 IU/L (ref 34–125)
ALT SERPL-CCNC: 17 IU/L (ref 7–52)
ANION GAP SERPL CALC-SCNC: 8 MEQ/L (ref 3–15)
ANISOCYTOSIS BLD QL SMEAR: ABNORMAL
AST SERPL-CCNC: 17 IU/L (ref 13–39)
BACTERIA URNS QL MICRO: ABNORMAL /HPF
BASOPHILS # BLD: 0.87 K/UL (ref 0.01–0.1)
BASOPHILS NFR BLD: 2 %
BILIRUB SERPL-MCNC: 0.5 MG/DL (ref 0.3–1.2)
BILIRUB UR QL STRIP.AUTO: NEGATIVE MG/DL
BILIRUB UR QL STRIP.AUTO: NEGATIVE MG/DL
BUN SERPL-MCNC: 15 MG/DL (ref 7–25)
CALCIUM SERPL-MCNC: 8.3 MG/DL (ref 8.6–10.3)
CHLORIDE SERPL-SCNC: 105 MEQ/L (ref 98–107)
CLARITY UR REFRACT.AUTO: CLEAR
CLARITY UR REFRACT.AUTO: CLEAR
CO2 SERPL-SCNC: 26 MEQ/L (ref 21–31)
COLOR UR AUTO: YELLOW
COLOR UR AUTO: YELLOW
CREAT SERPL-MCNC: 0.5 MG/DL (ref 0.6–1.2)
DIFFERENTIAL METHOD BLD: ABNORMAL
EGFRCR SERPLBLD CKD-EPI 2021: >60 ML/MIN/1.73M*2
EOSINOPHIL # BLD: 3.48 K/UL (ref 0.04–0.36)
EOSINOPHIL NFR BLD: 8 %
ERYTHROCYTE [DISTWIDTH] IN BLOOD BY AUTOMATED COUNT: 14.9 % (ref 11.7–14.4)
GLUCOSE BLD-MCNC: 115 MG/DL (ref 70–99)
GLUCOSE BLD-MCNC: 124 MG/DL (ref 70–99)
GLUCOSE BLD-MCNC: 187 MG/DL (ref 70–99)
GLUCOSE BLD-MCNC: 195 MG/DL (ref 70–99)
GLUCOSE BLD-MCNC: 277 MG/DL (ref 70–99)
GLUCOSE SERPL-MCNC: 128 MG/DL (ref 70–99)
GLUCOSE UR STRIP.AUTO-MCNC: >=1000 MG/DL
GLUCOSE UR STRIP.AUTO-MCNC: >=1000 MG/DL
HCT VFR BLD AUTO: 34.5 % (ref 35–45)
HGB BLD-MCNC: 10.7 G/DL (ref 11.8–15.7)
HGB UR QL STRIP.AUTO: NEGATIVE
HGB UR QL STRIP.AUTO: NEGATIVE
HYALINE CASTS #/AREA URNS LPF: ABNORMAL /LPF
KETONES UR STRIP.AUTO-MCNC: ABNORMAL MG/DL
KETONES UR STRIP.AUTO-MCNC: ABNORMAL MG/DL
LEUKOCYTE ESTERASE UR QL STRIP.AUTO: NEGATIVE
LEUKOCYTE ESTERASE UR QL STRIP.AUTO: NEGATIVE
LYMPHOCYTES # BLD: 2.17 K/UL (ref 1.2–3.5)
LYMPHOCYTES NFR BLD: 5 %
MAGNESIUM SERPL-MCNC: 1.8 MG/DL (ref 1.8–2.5)
MCH RBC QN AUTO: 27.2 PG (ref 28–33.2)
MCHC RBC AUTO-ENTMCNC: 31 G/DL (ref 32.2–35.5)
MCV RBC AUTO: 87.6 FL (ref 83–98)
METAMYELOCYTES # BLD MANUAL: 2.17 K/UL
METAMYELOCYTES NFR BLD MANUAL: 5 %
MONOCYTES # BLD: 0.87 K/UL (ref 0.28–0.8)
MONOCYTES NFR BLD: 2 %
MYELOCYTES # BLD MANUAL: 2.61 K/UL
MYELOCYTES NFR BLD MANUAL: 6 %
NEUTS BAND # BLD: 2.17 K/UL (ref 0–0.53)
NEUTS BAND # BLD: 29.11 K/UL (ref 1.7–7)
NEUTS BAND NFR BLD: 5 %
NEUTS HYPERSEG BLD QL SMEAR: ABNORMAL
NEUTS SEG NFR BLD: 67 %
NITRITE UR QL STRIP.AUTO: NEGATIVE
NITRITE UR QL STRIP.AUTO: NEGATIVE
PDW BLD AUTO: 9.7 FL (ref 9.4–12.3)
PH UR STRIP.AUTO: 6 [PH]
PH UR STRIP.AUTO: 6 [PH]
PHOSPHATE SERPL-MCNC: 3.5 MG/DL (ref 2.4–4.7)
PLAT MORPH BLD: NORMAL
PLATELET # BLD AUTO: 539 K/UL (ref 150–369)
PLATELET # BLD EST: ABNORMAL 10*3/UL
POCT TEST: ABNORMAL
POTASSIUM SERPL-SCNC: 3.5 MEQ/L (ref 3.5–5.1)
PROT SERPL-MCNC: 5.7 G/DL (ref 6–8.2)
PROT UR QL STRIP.AUTO: ABNORMAL
PROT UR QL STRIP.AUTO: ABNORMAL
RBC # BLD AUTO: 3.94 M/UL (ref 3.93–5.22)
RBC #/AREA URNS HPF: ABNORMAL /HPF
SODIUM SERPL-SCNC: 139 MEQ/L (ref 136–145)
SP GR UR REFRACT.AUTO: 1.02
SP GR UR REFRACT.AUTO: 1.02
SQUAMOUS URNS QL MICRO: ABNORMAL /HPF
UROBILINOGEN UR STRIP-ACNC: 0.2 EU/DL
UROBILINOGEN UR STRIP-ACNC: 0.2 EU/DL
WBC # BLD AUTO: 43.45 K/UL (ref 3.8–10.5)
WBC #/AREA URNS HPF: ABNORMAL /HPF

## 2024-03-01 PROCEDURE — 99233 SBSQ HOSP IP/OBS HIGH 50: CPT | Performed by: STUDENT IN AN ORGANIZED HEALTH CARE EDUCATION/TRAINING PROGRAM

## 2024-03-01 PROCEDURE — 63600000 HC DRUGS/DETAIL CODE: Mod: JZ | Performed by: HOSPITALIST

## 2024-03-01 PROCEDURE — 85025 COMPLETE CBC W/AUTO DIFF WBC: CPT | Performed by: HOSPITALIST

## 2024-03-01 PROCEDURE — 63600000 HC DRUGS/DETAIL CODE: Performed by: INTERNAL MEDICINE

## 2024-03-01 PROCEDURE — 25000000 HC PHARMACY GENERAL: Performed by: HOSPITALIST

## 2024-03-01 PROCEDURE — 63600000 HC DRUGS/DETAIL CODE: Mod: JZ | Performed by: INTERNAL MEDICINE

## 2024-03-01 PROCEDURE — 63700000 HC SELF-ADMINISTRABLE DRUG: Performed by: STUDENT IN AN ORGANIZED HEALTH CARE EDUCATION/TRAINING PROGRAM

## 2024-03-01 PROCEDURE — 0Q9P0ZX DRAINAGE OF LEFT METATARSAL, OPEN APPROACH, DIAGNOSTIC: ICD-10-PCS | Performed by: PODIATRIST

## 2024-03-01 PROCEDURE — 87077 CULTURE AEROBIC IDENTIFY: CPT

## 2024-03-01 PROCEDURE — 36415 COLL VENOUS BLD VENIPUNCTURE: CPT | Performed by: HOSPITALIST

## 2024-03-01 PROCEDURE — 63700000 HC SELF-ADMINISTRABLE DRUG: Performed by: HOSPITALIST

## 2024-03-01 PROCEDURE — 25800000 HC PHARMACY IV SOLUTIONS: Performed by: INTERNAL MEDICINE

## 2024-03-01 PROCEDURE — G1004 CDSM NDSC: HCPCS

## 2024-03-01 PROCEDURE — 21400000 HC ROOM AND CARE CCU/INTERMEDIATE

## 2024-03-01 PROCEDURE — 83735 ASSAY OF MAGNESIUM: CPT | Performed by: HOSPITALIST

## 2024-03-01 PROCEDURE — 82310 ASSAY OF CALCIUM: CPT | Performed by: HOSPITALIST

## 2024-03-01 PROCEDURE — 25800000 HC PHARMACY IV SOLUTIONS: Performed by: HOSPITALIST

## 2024-03-01 PROCEDURE — 87186 SC STD MICRODIL/AGAR DIL: CPT

## 2024-03-01 PROCEDURE — 87205 SMEAR GRAM STAIN: CPT

## 2024-03-01 RX ORDER — INSULIN LISPRO 100 [IU]/ML
7 INJECTION, SOLUTION INTRAVENOUS; SUBCUTANEOUS
Status: DISCONTINUED | OUTPATIENT
Start: 2024-03-01 | End: 2024-03-04

## 2024-03-01 RX ORDER — CLOPIDOGREL BISULFATE 75 MG/1
75 TABLET ORAL DAILY
Status: DISCONTINUED | OUTPATIENT
Start: 2024-03-01 | End: 2024-03-08 | Stop reason: HOSPADM

## 2024-03-01 RX ORDER — IBUPROFEN/PSEUDOEPHEDRINE HCL 200MG-30MG
6 TABLET ORAL ONCE
Status: COMPLETED | OUTPATIENT
Start: 2024-03-01 | End: 2024-03-01

## 2024-03-01 RX ORDER — LOSARTAN POTASSIUM 50 MG/1
50 TABLET ORAL NIGHTLY
COMMUNITY
End: 2024-06-30

## 2024-03-01 RX ORDER — TRAMADOL HYDROCHLORIDE 50 MG/1
50 TABLET ORAL 2 TIMES DAILY PRN
Status: ON HOLD | COMMUNITY
End: 2024-03-07

## 2024-03-01 RX ORDER — ATORVASTATIN CALCIUM 40 MG/1
40 TABLET, FILM COATED ORAL NIGHTLY
COMMUNITY
End: 2024-12-10 | Stop reason: HOSPADM

## 2024-03-01 RX ORDER — INSULIN LISPRO 100 [IU]/ML
30 INJECTION, SUSPENSION SUBCUTANEOUS
Status: DISCONTINUED | OUTPATIENT
Start: 2024-03-01 | End: 2024-03-01

## 2024-03-01 RX ORDER — INSULIN LISPRO 100 [IU]/ML
2-10 INJECTION, SOLUTION INTRAVENOUS; SUBCUTANEOUS
Status: DISCONTINUED | OUTPATIENT
Start: 2024-03-01 | End: 2024-03-08 | Stop reason: HOSPADM

## 2024-03-01 RX ORDER — AMOXICILLIN AND CLAVULANATE POTASSIUM 875; 125 MG/1; MG/1
1 TABLET, FILM COATED ORAL 2 TIMES DAILY
COMMUNITY
Start: 2024-02-20 | End: 2024-03-08 | Stop reason: HOSPADM

## 2024-03-01 RX ORDER — INSULIN GLARGINE 100 [IU]/ML
20 INJECTION, SOLUTION SUBCUTANEOUS NIGHTLY
Status: DISCONTINUED | OUTPATIENT
Start: 2024-03-01 | End: 2024-03-06

## 2024-03-01 RX ORDER — LEVOTHYROXINE SODIUM 25 UG/1
25 TABLET ORAL
Status: ON HOLD | COMMUNITY
End: 2024-12-09

## 2024-03-01 RX ORDER — GABAPENTIN 300 MG/1
300 CAPSULE ORAL 3 TIMES DAILY
COMMUNITY
End: 2024-04-30

## 2024-03-01 RX ORDER — SODIUM CHLORIDE 9 MG/ML
INJECTION, SOLUTION INTRAVENOUS ONCE
Status: COMPLETED | OUTPATIENT
Start: 2024-03-01 | End: 2024-03-01

## 2024-03-01 RX ORDER — CLOPIDOGREL BISULFATE 75 MG/1
75 TABLET ORAL DAILY
COMMUNITY
End: 2024-12-10 | Stop reason: HOSPADM

## 2024-03-01 RX ORDER — VANCOMYCIN HYDROCHLORIDE
1250
Status: DISCONTINUED | OUTPATIENT
Start: 2024-03-01 | End: 2024-03-02

## 2024-03-01 RX ORDER — LEVOTHYROXINE SODIUM 25 UG/1
25 TABLET ORAL
Status: DISCONTINUED | OUTPATIENT
Start: 2024-03-01 | End: 2024-03-08 | Stop reason: HOSPADM

## 2024-03-01 RX ORDER — ORAL SEMAGLUTIDE 14 MG/1
14 TABLET ORAL DAILY
COMMUNITY
End: 2024-12-13 | Stop reason: SDUPTHER

## 2024-03-01 RX ORDER — OXYCODONE HYDROCHLORIDE 5 MG/1
5 TABLET ORAL EVERY 4 HOURS PRN
Status: DISCONTINUED | OUTPATIENT
Start: 2024-03-01 | End: 2024-03-01

## 2024-03-01 RX ORDER — INSULIN LISPRO 100 [IU]/ML
15 INJECTION, SUSPENSION SUBCUTANEOUS ONCE
Status: COMPLETED | OUTPATIENT
Start: 2024-03-01 | End: 2024-03-01

## 2024-03-01 RX ORDER — NAPROXEN SODIUM 220 MG/1
81 TABLET, FILM COATED ORAL DAILY
COMMUNITY
End: 2024-12-10 | Stop reason: HOSPADM

## 2024-03-01 RX ORDER — INSULIN LISPRO 100 [IU]/ML
2-10 INJECTION, SOLUTION INTRAVENOUS; SUBCUTANEOUS
Status: DISCONTINUED | OUTPATIENT
Start: 2024-03-01 | End: 2024-03-01

## 2024-03-01 RX ORDER — DOCUSATE SODIUM 100 MG/1
100 CAPSULE, LIQUID FILLED ORAL 2 TIMES DAILY
Status: DISCONTINUED | OUTPATIENT
Start: 2024-03-01 | End: 2024-03-08 | Stop reason: HOSPADM

## 2024-03-01 RX ORDER — TRAMADOL HYDROCHLORIDE 50 MG/1
50 TABLET ORAL EVERY 6 HOURS PRN
Status: DISCONTINUED | OUTPATIENT
Start: 2024-03-01 | End: 2024-03-05

## 2024-03-01 RX ORDER — ACETAMINOPHEN 325 MG/1
650 TABLET ORAL EVERY 4 HOURS PRN
Status: DISCONTINUED | OUTPATIENT
Start: 2024-03-01 | End: 2024-03-05

## 2024-03-01 RX ORDER — CIPROFLOXACIN 500 MG/1
500 TABLET ORAL 2 TIMES DAILY
COMMUNITY
Start: 2024-02-20 | End: 2024-03-08 | Stop reason: HOSPADM

## 2024-03-01 RX ORDER — DOCUSATE SODIUM 100 MG/1
100 CAPSULE, LIQUID FILLED ORAL 2 TIMES DAILY
COMMUNITY
End: 2024-12-10 | Stop reason: HOSPADM

## 2024-03-01 RX ORDER — POLYETHYLENE GLYCOL 3350 17 G/17G
17 POWDER, FOR SOLUTION ORAL DAILY PRN
Status: DISCONTINUED | OUTPATIENT
Start: 2024-03-01 | End: 2024-03-08 | Stop reason: HOSPADM

## 2024-03-01 RX ORDER — LOSARTAN POTASSIUM 50 MG/1
50 TABLET ORAL NIGHTLY
Status: DISCONTINUED | OUTPATIENT
Start: 2024-03-01 | End: 2024-03-08 | Stop reason: HOSPADM

## 2024-03-01 RX ORDER — AMLODIPINE BESYLATE 5 MG/1
5 TABLET ORAL DAILY
Status: DISCONTINUED | OUTPATIENT
Start: 2024-03-01 | End: 2024-03-08 | Stop reason: HOSPADM

## 2024-03-01 RX ORDER — AMLODIPINE BESYLATE 5 MG/1
5 TABLET ORAL EVERY MORNING
COMMUNITY
End: 2024-07-13

## 2024-03-01 RX ORDER — GABAPENTIN 300 MG/1
300 CAPSULE ORAL 3 TIMES DAILY
Status: DISCONTINUED | OUTPATIENT
Start: 2024-03-01 | End: 2024-03-08 | Stop reason: HOSPADM

## 2024-03-01 RX ORDER — OXYCODONE HYDROCHLORIDE 5 MG/1
10 TABLET ORAL ONCE
Status: DISPENSED | OUTPATIENT
Start: 2024-03-01 | End: 2024-03-01

## 2024-03-01 RX ORDER — OXYCODONE HYDROCHLORIDE 5 MG/1
10 TABLET ORAL EVERY 4 HOURS PRN
Status: DISCONTINUED | OUTPATIENT
Start: 2024-03-01 | End: 2024-03-01

## 2024-03-01 RX ORDER — ATORVASTATIN CALCIUM 40 MG/1
40 TABLET, FILM COATED ORAL
Status: DISCONTINUED | OUTPATIENT
Start: 2024-03-01 | End: 2024-03-08 | Stop reason: HOSPADM

## 2024-03-01 RX ORDER — HYDROCHLOROTHIAZIDE 12.5 MG/1
12.5 TABLET ORAL DAILY
COMMUNITY
End: 2024-07-15

## 2024-03-01 RX ORDER — ASPIRIN 81 MG/1
81 TABLET ORAL DAILY
Status: DISCONTINUED | OUTPATIENT
Start: 2024-03-01 | End: 2024-03-08 | Stop reason: HOSPADM

## 2024-03-01 RX ORDER — HYDROCHLOROTHIAZIDE 12.5 MG/1
12.5 TABLET ORAL DAILY
Status: DISCONTINUED | OUTPATIENT
Start: 2024-03-01 | End: 2024-03-08 | Stop reason: HOSPADM

## 2024-03-01 RX ADMIN — INSULIN LISPRO 6 UNITS: 100 INJECTION, SOLUTION INTRAVENOUS; SUBCUTANEOUS at 12:25

## 2024-03-01 RX ADMIN — GABAPENTIN 300 MG: 300 CAPSULE ORAL at 14:28

## 2024-03-01 RX ADMIN — HYDROCHLOROTHIAZIDE 12.5 MG: 12.5 TABLET ORAL at 09:21

## 2024-03-01 RX ADMIN — INSULIN LISPRO 2 UNITS: 100 INJECTION, SOLUTION INTRAVENOUS; SUBCUTANEOUS at 22:38

## 2024-03-01 RX ADMIN — PIPERACILLIN AND TAZOBACTAM 4.5 G: 4; .5 INJECTION, POWDER, LYOPHILIZED, FOR SOLUTION INTRAVENOUS; PARENTERAL at 21:14

## 2024-03-01 RX ADMIN — DOCUSATE SODIUM 100 MG: 100 CAPSULE, LIQUID FILLED ORAL at 09:21

## 2024-03-01 RX ADMIN — ATORVASTATIN CALCIUM 40 MG: 40 TABLET, FILM COATED ORAL at 18:16

## 2024-03-01 RX ADMIN — CLOPIDOGREL BISULFATE 75 MG: 75 TABLET ORAL at 09:57

## 2024-03-01 RX ADMIN — ASPIRIN 81 MG: 81 TABLET, COATED ORAL at 09:57

## 2024-03-01 RX ADMIN — PIPERACILLIN AND TAZOBACTAM 3.38 G: 3; .375 INJECTION, POWDER, LYOPHILIZED, FOR SOLUTION INTRAVENOUS; PARENTERAL at 02:13

## 2024-03-01 RX ADMIN — TRAMADOL HYDROCHLORIDE 50 MG: 50 TABLET, COATED ORAL at 09:57

## 2024-03-01 RX ADMIN — INSULIN LISPRO 2 UNITS: 100 INJECTION, SOLUTION INTRAVENOUS; SUBCUTANEOUS at 18:16

## 2024-03-01 RX ADMIN — VANCOMYCIN HYDROCHLORIDE 1250 MG: 500 INJECTION, POWDER, LYOPHILIZED, FOR SOLUTION INTRAVENOUS at 21:24

## 2024-03-01 RX ADMIN — AMLODIPINE BESYLATE 5 MG: 5 TABLET ORAL at 09:21

## 2024-03-01 RX ADMIN — TRAMADOL HYDROCHLORIDE 50 MG: 50 TABLET, COATED ORAL at 21:13

## 2024-03-01 RX ADMIN — GABAPENTIN 300 MG: 300 CAPSULE ORAL at 02:22

## 2024-03-01 RX ADMIN — LEVOTHYROXINE SODIUM 25 MCG: 0.03 TABLET ORAL at 06:07

## 2024-03-01 RX ADMIN — OXYCODONE HYDROCHLORIDE 10 MG: 5 TABLET ORAL at 02:21

## 2024-03-01 RX ADMIN — ACETAMINOPHEN 650 MG: 325 TABLET ORAL at 21:13

## 2024-03-01 RX ADMIN — INSULIN LISPRO 7 UNITS: 100 INJECTION, SOLUTION INTRAVENOUS; SUBCUTANEOUS at 12:25

## 2024-03-01 RX ADMIN — VANCOMYCIN HYDROCHLORIDE 1250 MG: 500 INJECTION, POWDER, LYOPHILIZED, FOR SOLUTION INTRAVENOUS at 10:24

## 2024-03-01 RX ADMIN — DOCUSATE SODIUM 100 MG: 100 CAPSULE, LIQUID FILLED ORAL at 02:22

## 2024-03-01 RX ADMIN — PIPERACILLIN AND TAZOBACTAM 3.38 G: 3; .375 INJECTION, POWDER, LYOPHILIZED, FOR SOLUTION INTRAVENOUS; PARENTERAL at 09:23

## 2024-03-01 RX ADMIN — GABAPENTIN 300 MG: 300 CAPSULE ORAL at 09:21

## 2024-03-01 RX ADMIN — DOCUSATE SODIUM 100 MG: 100 CAPSULE, LIQUID FILLED ORAL at 21:07

## 2024-03-01 RX ADMIN — INSULIN LISPRO 15 UNITS: 100 INJECTION, SUSPENSION SUBCUTANEOUS at 02:07

## 2024-03-01 RX ADMIN — SODIUM CHLORIDE: 9 INJECTION, SOLUTION INTRAVENOUS at 04:06

## 2024-03-01 RX ADMIN — PIPERACILLIN AND TAZOBACTAM 4.5 G: 4; .5 INJECTION, POWDER, LYOPHILIZED, FOR SOLUTION INTRAVENOUS; PARENTERAL at 15:34

## 2024-03-01 RX ADMIN — GABAPENTIN 300 MG: 300 CAPSULE ORAL at 21:06

## 2024-03-01 RX ADMIN — INSULIN GLARGINE 20 UNITS: 100 INJECTION, SOLUTION SUBCUTANEOUS at 22:39

## 2024-03-01 RX ADMIN — INSULIN LISPRO 7 UNITS: 100 INJECTION, SOLUTION INTRAVENOUS; SUBCUTANEOUS at 18:17

## 2024-03-01 RX ADMIN — VANCOMYCIN HYDROCHLORIDE 750 MG: 750 INJECTION, POWDER, LYOPHILIZED, FOR SOLUTION INTRAVENOUS at 02:52

## 2024-03-01 RX ADMIN — Medication 6 MG: at 02:21

## 2024-03-01 RX ADMIN — LOSARTAN POTASSIUM 50 MG: 50 TABLET, FILM COATED ORAL at 21:54

## 2024-03-01 ASSESSMENT — COGNITIVE AND FUNCTIONAL STATUS - GENERAL
STANDING UP FROM CHAIR USING ARMS: 3 - A LITTLE
CLIMB 3 TO 5 STEPS WITH RAILING: 3 - A LITTLE
CLIMB 3 TO 5 STEPS WITH RAILING: 3 - A LITTLE
WALKING IN HOSPITAL ROOM: 3 - A LITTLE
MOVING TO AND FROM BED TO CHAIR: 4 - NONE
WALKING IN HOSPITAL ROOM: 3 - A LITTLE
MOVING TO AND FROM BED TO CHAIR: 4 - NONE
CLIMB 3 TO 5 STEPS WITH RAILING: 3 - A LITTLE
MOVING TO AND FROM BED TO CHAIR: 3 - A LITTLE
STANDING UP FROM CHAIR USING ARMS: 4 - NONE
STANDING UP FROM CHAIR USING ARMS: 3 - A LITTLE
WALKING IN HOSPITAL ROOM: 3 - A LITTLE

## 2024-03-01 NOTE — PROCEDURES
Hospital:  [facility]  Medical Record Number:  [id number]  Patient Name:  Bebe Burks  YOB: 1960  Date of procedure:   3/1/2024  Procedure performed by: Dr. Cee PGY1     Preoperative Diagnosis: Left foot abscess     Postoperative Diagnosis: Same as above     Procedure: Incision and drainage     Specimens: Wound culture     Indications for procedure:  Abscess and osteomyelitis            Procedure:  Incision and drainage     The left foot was prepped and draped according to proper aseptic technique.      Attention was directed to the left plantar forefoot at the level of the third metatarsal head.  A #15 blade was used to make a small incision down to level of bone at the plantar aspect of the third metatarsal head.  Sterile suture scissors were used for blunt dissection to open up the planes within the soft tissues. Manual expression was performed to decompress the tissues.    Less than 1 cc of purulent drainage was expressed.    Deep culture taken down to bone at the third metatarsal head.     A dressing consisting of Betadine soaked 4 x 4 gauze, 4 x 4 gauze, Kerlix was then applied. The patient tolerated the procedure well without complication.    RENEE Cee DPM   PGY-1 #[id number]

## 2024-03-01 NOTE — PLAN OF CARE
Care Coordination Admission Assessment Note    General Information:  Readmission Within the last 30 days: no previous admission in last 30 days  Does patient have a : No  Patient-Specific Goals (include timeframe): to safely return home with [facility] for PT and RN (wound care)    Living Arrangements:  Arrived From: home  Current Living Arrangements: condominium  People in Home: alone  Home Accessibility: stairs to enter home (Group), railings on outside stairs  Living Arrangement Comments: pt lives alone in a 1st fl condo with 2STE with HR. Walk-in shower.    Housing Stability and Utility Access (SDOH):  In the last 12 months, was there a time when you were not able to pay the mortgage or rent on time?: No  In the last 12 months, how many places have you lived?: 1  In the last 12 months, was there a time when you did not have a steady place to sleep or slept in a shelter (including now)?: No  In the past 12 months has the electric, gas, oil, or water company threatened to shut off services in your home?: No    Functional Status Prior to Admission:   Assistive Device/Animal Currently Used at Home: cane, straight, walker, front-wheeled  Functional Status Comments: Pt reported she recently started using a walker. Prior to walker she was using a cane.  IADL Comments: IND with IADLs and ADLs PTA     Supports and Services:  Current Outpatient/Agency/Support Group: homecare agency ([facility]  Type of Current Home Care Services: home PT, nursing  History of home care episode or rehab stay: Pt is currently open to [facility] for RN (wound care) PT just DC'd pt.    Discharge Needs Assessment:   Concerns to be Addressed: care coordination/care conferences, discharge planning  Current Discharge Risk: lives alone  Anticipated Changes Related to Illness: none    Patient/Family Anticipated Discharge Plan:  Patient/Family Anticipates Transition To: home with help/services  Patient/Family Anticipated Services at Transition:  home health care    Connection to Community  Not applicable      Patient Choice:   Offered/Gave Vendor List: no  Patient's Choice of Community Agency(s): open to [facility]- PTA       Anticipated Discharge Plan:  Met with patient. Provided education and contact information for Care Coordination services.: yes  Anticipated Discharge Disposition: home with home health  Type of Home Care Services: home PT, nursing      Transportation Needs (SDOH):  Transportation Concerns: none  Transportation Anticipated: family or friend will provide  Is Out of Hospital DNR needed at discharge?: no    In the past 12 months, has lack of transportation kept you from medical appointments or from getting medications?: No  In the past 12 months, has lack of transportation kept you from meetings, work, or from getting things needed for daily living?: No    Concerns - comments: Pt open to [facility]- PTA. Pt currently has [facility] for wound care. PT DC'd pt. Pt would like PT after DC. Carla called Fatoumata with [facility] to inform her.     Pt presented with type 2 diabetes mellitus with let foot infection. Carla met with pt at bedside this morning. Pts sister was present. Pt fine with sw speaking in front of her sister. Carla provided pt with POA brochure. Pt confirmed demo, PCP, pharm, insurance, and emergency contacts. Pts sister to transport home.     CARROLL reviewed    Anticipated DC Plans  Home with [facility]- open Yessi SOFIA with [facility] following  Family will transport

## 2024-03-01 NOTE — H&P
Hospital Medicine Service -  History & Physical        CHIEF COMPLAINT   Pain in my left foot and toes     HISTORY OF PRESENT ILLNESS      Bebe Burks is a 63 y.o. female with a past medical history of peripheral arterial disease in her left lower extremity.  From personal review of records tonight, I did note on January 17, 2024, patient underwent a catheterization by Dr. Aguilar, interventional cardiologist who performed a left tibial/peroneal angioplasty, atherectomy and stenting of the left anterior tibial artery due to significant occlusion.  She was maintained on aspirin and Plavix.  She has a history of type 2 diabetes mellitus on insulin with diabetic foot neuropathy.  She has a history of diabetic retinopathy.  She follows with [facility] in Endocrinology, Wanda Gipson at [facility].  Patient has a history of chronic left foot infection with osteomyelitis, most recently of her left second toe.  On January 18, 2024, underwent a left foot I&D along with left second toe amputation by Dr. Getachew Damian, her podiatrist.  The patient was treated with IV antibiotics with vancomycin under the direction of infectious disease who transitioned the patient on hospital discharge to Keflex.    Patient tells me that when she left the hospital, she felt that her infection did not improve. The patient had outpatient follow-up with her podiatrist, Dr. Damian, who continued to monitor and treat her infection.  Patient was on doxycycline, then switched to Keflex on February 12, 2024.  Eventually, patient tells me that she went to see infectious disease.  She saw Dr. Gonzalez on February 20, 2024, who changed the antibiotics to ciprofloxacin as well as Augmentin to continue for 30 days.    Patient tells me that over the course of the last week she had noted some worsening changes.  Last Friday, February 23, 2024, she noted that her third left toe appeared purple.  On Monday, when patient's home visiting nurse evaluated her, she  "felt that the patient's wound looked worse.  She noted darkened areas along the distal aspect of her toes raising the concerns for necrotic toes.  Patient made an appointment with Dr. Damian, who noted that her left third toe was darkened, erythematous.  She was advised to continue on the antibiotics.    Patient tells me that she has been very frustrated with her whole situation.  She tells me that she came to the emergency department today to get further evaluated as she started experiencing sudden onset of pain in her left foot and toes this afternoon. She tells me she is afraid of losing her foot.    She tells me that she normally has diabetic peripheral neuropathy in both of her feet and it is extremely unusual for her to have any sensation or pain like this.  Her pain level was moderate-severe as discussed with the patient.  She could not find any relief at home with tramadol, gabapentin.    Patient denies fever, chills and rigors.    Patient has noted increased erythema, darkening of the distal third toe along with denuded skin and some drainage from the left foot.    Radiologist on-call noting X-rays of her Left foot and toe -     \"Prior second toe amputation with new acute osteomyelitis throughout the second  metatarsal where there is pathological fracturing.  Acute osteomyelitis and displaced pathological fracturing of the third  metatarsal.  Probable mild osteomyelitis in the base of the proximal third phalanx.  Probable osteomyelitis involving the thi fourth metatarsal head medially as well  as probably in the fourth proximal phalanx.  Suggest MRI for more definitive assessment of the extent of infection.  Plantar calcaneal spurring and mild degenerative disease. Extensive  atherosclerotic vascular calcifications.  Wound overlying the second toe amputation site with irregular soft tissue.  Possible small amounts of gas in the third toe.\"    Blood cultures were drawn.  Patient was started on empiric " antibiotics with vancomycin by ER staff.    Patient's white count was noted to be 52.42 in the emergency department.    The patient denies history of known leukemia/lymphoma.    During my evaluation, I ordered further studies including a chest x-ray which I personally reviewed noting no acute consolidation, infiltrates (pneumonia).    I also ordered a urinalysis which I reviewed noting no acute urinary infection.  Negative leukocyte esterase.  Negative nitrite.  0-3 white blood cells per high-power field.  No bacteria in the urine    Review of systems:    Patient reports a history of chronic low back pain without symptoms of saddle anesthesia, urinary retention, urinary or stool incontinence.    No rhinorrhea, sore throat, otalgia, postnasal drip.  No visual changes, headaches, lightheadedness or dizziness, focal weakness, speech disturbances.  No dysphagia.  No cough or shortness of breath or dyspnea on exertion.  No chest pain, palpitations, leg edema, orthopnea, paroxysmal nocturnal dyspnea.  No melena or bright red blood per rectum.  No abdominal pain, nausea or vomiting, diarrhea or constipation.  No dysuria or hematuria.  No flank pain.  No myalgias or joint pain.  No fever, chills, rigors.  No depression. No suicidal thoughts or homicidal thoughts.     Other than what has been mentioned, the remainder of the review of systems otherwise negative.        PAST MEDICAL AND SURGICAL HISTORY        Past medical history: Type 2 diabetes mellitus on insulin with history of diabetic peripheral neuropathy in her feet bilaterally.  She has a history of diabetic retinopathy.  Patient follows with [facility] in Endocrinology, Wanda Gipson.  Hypertension, hyperlipidemia, hypothyroidism.  Patient tells me that she was vaccinated against COVID and had booster vaccination.  She did contract COVID infection in December 2021.  History of chronic low back pain with right-sided sciatica.  History of osteomyelitis of her left second toe.   Peripheral arterial disease.      Past surgical history: Cataract surgery bilaterally.  History of pilonidal cyst excision.  On January 18, 2024, she underwent I&D of her left foot and amputation of the left second toe secondary to osteomyelitis.  History of left vitrectomy in 2016.  Right total knee arthroplasty.  History of hysteroscopy with D&C in 2007.  History of colonoscopy.  On January 17, 2024, patient underwent a peripheral catheterization by Dr. Aguilar of her left lower extremity where it was noted from personal review of records tonight that she had a left anterior tibial artery with significant occlusion.  She underwent a tibial and peroneal angioplasty, atherectomy and stenting of the left anterior tibial artery.      MEDICATIONS      Prior to Admission medications    Medication Sig Start Date End Date Taking? Authorizing Provider   amLODIPine (NORVASC) 5 mg tablet Take 5 mg by mouth every morning.   Yes ProviderJillian MD   amoxicillin-pot clavulanate (AUGMENTIN) 875-125 mg per tablet Take 1 tablet by mouth 2 (two) times a day. 2/20/24 3/21/24 Yes ProviderJillian MD   aspirin 81 mg chewable tablet Take 81 mg by mouth daily.   Yes ProviderJillian MD   atorvastatin (LIPITOR) 40 mg tablet Take 40 mg by mouth nightly.   Yes Jillian Loving MD   ciprofloxacin (CIPRO) 500 mg tablet Take 500 mg by mouth 2 (two) times a day. 2/20/24 3/21/24 Yes ProviderJillian MD   clopidogreL (PLAVIX) 75 mg tablet Take 75 mg by mouth daily.   Yes ProviderJillian MD   docusate sodium (COLACE) 100 mg capsule Take 100 mg by mouth 2 (two) times a day.   Yes ProviderJillian MD   empagliflozin (JARDIANCE) 10 mg tablet Take 10 mg by mouth daily.   Yes ProviderJillian MD   gabapentin (NEURONTIN) 300 mg capsule Take 300 mg by mouth 3 (three) times a day.   Yes Jillian Loving MD   hydrochlorothiazide 12.5 mg tablet Take 12.5 mg by mouth  daily.   Yes Provider, Jillian Orellana MD   levothyroxine (SYNTHROID) 25 mcg tablet Take 25 mcg by mouth daily.   Yes ProviderJillian MD   losartan (COZAAR) 50 mg tablet Take 50 mg by mouth nightly.   Yes ProviderJillian MD   semaglutide (RYBELSUS) 14 mg tablet Take 14 mg by mouth daily.   Yes ProviderJillian MD   traMADoL (ULTRAM) 50 mg tablet Take 50 mg by mouth 2 (two) times a day as needed for pain.   Yes ProviderJillian MD                     ALLERGIES      Citrus and derivatives    FAMILY HISTORY        Family history: Mother  of complications of heart failure at age 82.  She had a history of diabetes.    Her father  of complications from asbestosis.  He  at age 75.  Her father had a prior history of CVA.    SOCIAL HISTORY        Social history: The patient is single and has no children.  Patient designates her sister, Anna Paz, [phone], as her emergency medical proxy.    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    Patient denies tobacco use.  Patient currently does not consume alcohol.  In the past, she consumes alcohol very rarely.  No substance use.        REVIEW OF SYSTEMS      All other systems reviewed and negative except as noted in HPI    PHYSICAL EXAMINATION      Temp:  [36.3 °C (97.4 °F)-36.4 °C (97.5 °F)] 36.3 °C (97.4 °F)  Heart Rate:  [90-98] 92  Resp:  [16] 16  BP: (149-169)/(68-90) 169/79  Body mass index is 29.69 kg/m².    Physical Exam     General: Alert and oriented ×3, not in acute distress.  HEENT: Normocephalic atraumatic.  Pupils equal round reactive to light.  No anisocoria or nystagmus.  Extraocular movements are intact.  Sclera anicteric.  No tragus tenderness.  Nares are patent.  Oropharynx is clear without exudates.  There is no sinus tenderness.  Moist mucous membranes.  Cardiovascular: S1, S2 without S3 or S4.  There is no murmurs, rubs, or gallops.  Pulses are 2+.  Neck: No jugular venous distention,  no carotid bruits, no thyromegaly.  Pulmonary: Clear to auscultation bilaterally.  There is no wheezing, rhonchi, or crackles.  Abdomen: Soft, bowel sounds are present, nontender, nondistended.  No rebound or guarding.  Extremities: The patient has noted bilateral lower extremity trace pitting edema.  No calf tenderness.  Patient's left foot is notable for amputation of her left second toe.  She has noted overlying erythema from the distal left midfoot to her toes.  There are noted concerns for left third toe necrotic areas with noted deformities.  Left fourth toe is notable for erythema, tenderness to palpation.  Denuded skin is noted.  Patient also has noted some purulence emanating from the left first and second webspace.  Midfoot is tender to palpation.  No cyanosis or lymphadenopathy, or tenderness or significant findings of her other extremities.  Neurologic examination: Cranial nerves II through XII are grossly intact.  Sensation intact to light touch in her upper extremities.  She has diminished sensation in her lower legs.  Absent sensation in her feet bilaterally.  Power is noted to be equal and symmetric at 5 out of 5.  There is no pronator drift, slurring of speech or facial droop.  Babinski signs are downgoing bilaterally.  Deep tendon reflexes noted be equal and symmetric at 2+.  Finger to nose and heel to shin coordination or intact and symmetric.  Skin:  Patient's left foot is notable for amputation of her left second toe.  She has noted overlying erythema from the distal left midfoot to her toes.  There are noted concerns for left third toe necrotic areas with noted deformities.  Left fourth toe is notable for erythema, tenderness to palpation.  Denuded skin is noted.  Patient also has noted some purulence emanating from the left first and second webspace.  Midfoot is tender to palpation.  No cyanosis or lymphadenopathy, or tenderness or significant findings of her other extremities. Skin is warm,  dry, well perfused.  Musculoskeletal: Full range of motion in all extremities without joint effusions or noted tenderness.  No CVA tenderness.  No palpable step-offs, crepitus, tenderness on palpation over vertebral bodies or spinous processes.  Negative straight leg raise testing bilaterally.  Psychiatric: Patient appears very frustrated, dejected.  Fair insight and cognition.  No hallucinations, delusions, or suicidal thoughts.      LABS / IMAGING / EKG        Labs    Sodium 138.  Potassium 4.0.  Chloride 102.  Bicarb 28.  BUN 21.  Creatinine 0.7.  Glucose 157.  Calcium 9.4.  AST 19.  ALT 22.  Alkaline phosphatase 93.  Total protein 7.6.  Albumin 3.8.  Total bilirubin 0.5.  Anion gap 8.  Phosphorus 3.5.    Lactate 1.0.    BNP 52.    CRP 37.  ESR 89.    White cell count 52.42.  Hemoglobin 12.1.  Hematocrit 39.  MCV 88.  MCH 27.3.  MCHC 31.  RDW 15.2.  Platelet count 633.  MPV 9.7.  Differential: 71% neutrophils, 6% lymphocytes, 1% monocytes, 7% eosinophils, 5% basophils, 2% bands, 3% metamyelocytes, 5% monocytes.    Urinalysis was reviewed noting no acute urinary infection.  Negative leukocyte esterase.  Negative nitrite.  0-3 white blood cells per high-power field.  No bacteria in the urine.  5-9 red blood cells per high-power field.  Trace protein.  Greater than 1000 mg/deciliter of glucose, trace ketones.    Blood cultures pending.    Accu-Chek 124.      Imaging    X-RAY FOOT LEFT 3+ VIEWS [[id number]]  Status: Final result     PACS Images     Show images for X-RAY FOOT LEFT 3+ VIEWS    Comments Released to [employer]     Add Comments   Add Notifications      Study Result    Narrative & Impression   CLINICAL HISTORY: r/o gas gangrene     --  IMPRESSION:  Prior second toe amputation with new acute osteomyelitis throughout the second  metatarsal where there is pathological fracturing.  Acute osteomyelitis and displaced pathological fracturing of the third  metatarsal.  Probable mild osteomyelitis in the base of the  proximal third phalanx.  Probable osteomyelitis involving the thi fourth metatarsal head medially as well  as probably in the fourth proximal phalanx.  Suggest MRI for more definitive assessment of the extent of infection.  Plantar calcaneal spurring and mild degenerative disease. Extensive  atherosclerotic vascular calcifications.  Wound overlying the second toe amputation site with irregular soft tissue.  Possible small amounts of gas in the third toe.     4 views of the left foot.  COMMENT:     Comparison: X-rays from 1 1/15/2024.     See impression     Result History    X-RAY FOOT LEFT 3+ VIEWS (Order #[id number]) on 2/29/2024 - Order Result History Report    Result Read / Acknowledged    No acknowledgement history exists for this order.     Signed by    Signed Time Phone Pager   Duy White MD 2/29/2024 20:27 [phone]         Resident Info    Reading Resident Date/Time     2/29/2024 20:24        Transcription Info    Transcribed by: Date/Time     2/29/2024 20:24         Preliminary Info    Preliminary Radiologist Radiology Status Prelimed Date    Final [99]      Exam Information    Status Exam Begun  Exam Ended    Final [99] 2/29/2024 19:26 2/29/2024 19:36           Chest x-ray which I personally reviewed notes low lung volumes, film is rotated to the patient's left.  No acute consolidation, infiltrates, effusions or pneumothorax.  No pneumonia.        ASSESSMENT AND PLAN           * Type 2 diabetes mellitus with left diabetic foot infection   Assessment & Plan  Patient has noted concerns for osteomyelitis of her Left foot. X-rays noting these findings of Left 2nd/3rd toe with involvement of the 2nd and 3rd metatarsals and ?proximal base OM of 3rd phalanx and 4th phalanx.    [place] [facility] [place] care and evaluation  Check MRI Left foot to evaluate extent of infection  Follow pending blood cultures  Empiric Abx with Vancomycin and Zosyn  ID consult for anti-infective stewardship  Consult  Podiatry  Patient voiced secondary opinion - to consider vascular surgery evaluation      Patient confirms she is a full code today. She designates her sister, Anna Paz, [phone], as her emergency medical proxy. Plan of care d/w patient and her sister tonight. All of their questions were answered to their satisfaction. Anna was given [facility] main [phone] and [facility] [phone] to call for updates        Leukemoid reaction  Assessment & Plan  Suspect elevated WBC is a leukemoid reaction from severe diabetic left foot infection  She has no history of leukemia/lymphoma  I checked a U/A and reviewed which did not reveal a UTI.  I checked a CXR which I reviewed and didn't note a pneumonia  Recheck CBC with differential in AM  ID consult  Empiric Abx with Vanc and Zosyn for Left foot infection    Hypothyroidism  Assessment & Plan  I reviewed her most recent TSH from 1/31/2024 = 0.51 which is WNL  Restart Levothyroxine 25 mcg daily without any dosing adjustments    Type 2 diabetes mellitus with diabetic neuropathy (CMS/HCC)  Assessment & Plan  I personally reviewed patient's last HbA1c from 1/31/2024 = 10.1 indicating poor control  She follows with Wanda VILLARREAL Endocrinology at [facility]  Restart patient on Humalog 75/25 (NPH 70/30 is not on hospital formulary) =  she didn't eat much for dinner - will administer half of normal dose tonight = 15 Units.   Start Accucheks  Start SSI  ADA, NCS diet  Restart Gabapentin for neuropathy pain  Hold oral antihyperglycemics at this time (Rybelsus and Jardiance) in setting of potential surgical planning  Endocrinology consult to assist with DM management    HLD (hyperlipidemia)  Assessment & Plan  Restart Atorvastatin    Primary hypertension  Assessment & Plan  Monitor BP closely  Restart Amlodipine, HCTZ, Losartan with hold parameters  Recheck electrolytes and RFTs in AM       VTE Assessment: Pad[place] VTE Score: 1  Code Status: Full Code  Estimated discharge date:  3/4/2024     Maxwell Linda MD  3/1/2024

## 2024-03-01 NOTE — ASSESSMENT & PLAN NOTE
Patient presenting with worsening left foot wound  - MRI Left foot 3/1/2024: Plantar wound with multifocal acute osteomyelitis   - Wound Cx: Serratia, Diptheroids, Enterobacter, Acinetobacter- sensitivities noted    - Podiatry consult appreciated: rec surgical intervention but patient declining amputation at this time and would like to try abx therapy alone first  - ID consult appreciated: rec 6 weeks of IV rocephin  - Patient requested secondary opinion - vascular Surgery consult appreciated   - Pain control with Tylenol prn, Tramadol prn  - Patient will require a PICC line for IV abx outpatient  - PT/OT consulted for dispo planning

## 2024-03-01 NOTE — ASSESSMENT & PLAN NOTE
Longstanding history of uncontrolled type 2 diabetes, here with worsening left foot infection after recent toe amputation.  Last A1c on file worsened to 10.1%.  Reports a home regimen of NovoLog 70/30 25 units with breakfast and 30 units with dinner, Rybelsus 14 mg daily, and Jardiance 10 mg daily.  She is not quite able to tell me whether or not if her uncontrolled diabetes is related to her foot/wound issues.  Is amenable to switching to basal bolus on discharge for tighter glycemic control.    While inpatient:  Basal insulin:  glargine 20 units nightly  Nutritional insulin:  lispro 9 units TIDAC  Correction insulin: Lispro 2:50 > 150 ACHS and 3 am    On discharge:  - Final insulin recs TBD - will stop mixed insulin  - She can resume her home Rybelsus and Jardiance  - She is welcome to follow up with her endocrine NP or she can follow up in our office (she expressed interest in transferring care here)

## 2024-03-01 NOTE — ED ATTESTATION NOTE
I have personally seen and examined the patient.  I reviewed and agree with physician assistant / nurse practitioner’s assessment and plan of care, with the following exceptions: None    I personally performed the key components of the encounter and provided a substantive portion of the care and medical decision making    My examination, assessment, and plan of care of Bebe Burks is as follows:     Pt was here last month, had toe on left foot amputated. Has had some pain since, but has got progressively worse over the last few weeks. Has been seeing podiatry and is on cipro and augmentin.  Exam: Left foot: 3rd toe has black tip. The web spaces between the toes have whitish exudate present and there is erythema on the dorsum of foot.     Neil Fletcher MD  02/29/24 1913

## 2024-03-01 NOTE — PROGRESS NOTES
[facility] following. Patient is open to home care for RN/PT/RD services.Will resume services when ready for discharge.

## 2024-03-01 NOTE — PROGRESS NOTES
Hospital Medicine Service -  Daily Progress Note       SUBJECTIVE   Interval History:     Patient was seen and examined at bedside.  She reports her foot pain is better controlled with tramadol. She is aware that MRI left foot is concerning for osteomyelitis. She would like to defer amputation for now and treat with antibiotics     OBJECTIVE      Vital signs in last 24 hours:  Temp:  [36.3 °C (97.4 °F)-37.1 °C (98.7 °F)] 37.1 °C (98.7 °F)  Heart Rate:  [83-98] 94  Resp:  [16-18] 18  BP: (130-169)/(61-90) 130/61    Intake/Output Summary (Last 24 hours) at 3/1/2024 1331  Last data filed at 3/1/2024 0921  Gross per 24 hour   Intake 490 ml   Output --   Net 490 ml       PHYSICAL EXAMINATION        GENERAL APPEARANCE Well developed, well nourished, NAD   MUCUS MEMBRANES Moist   LUNGS CTAB, no signs of respiratory distress   CHEST S1/S2, RRR, no m/r/g appreciated   EXTREMITIES CHOE x4, left foot dressing CDI +pedal pulse   SKIN No obvious rash   HEENT  NEURO Anicteric  Follows commands, no dysarthria or facial asymmetry         LINES, CATHETERS, DRAINS, AIRWAYS, AND WOUNDS   Lines, Drains, and Airways:  Wounds (agree with documentation and present on admission):  Peripheral IV (Adult) 02/29/24 Left Antecubital (Active)   Number of days: 1       Peripheral IV (Adult) 02/29/24 Right Hand (Active)   Number of days: 1       Wound Anterior;Left Toe (2nd) (Active)   Number of days: 46       Wound Other (comment) Anterior;Right Toe (5th) (Active)   Number of days: 0         Comments:      LABS / IMAGING / TELE      Labs  Results from last 7 days   Lab Units 03/01/24  0335 02/29/24  1830   SODIUM mEQ/L 139 138   POTASSIUM mEQ/L 3.5 4.0   CHLORIDE mEQ/L 105 102   CO2 mEQ/L 26 28   BUN mg/dL 15 21   CREATININE mg/dL 0.5* 0.7   GLUCOSE mg/dL 128* 157*   CALCIUM mg/dL 8.3* 9.4      Results from last 7 days   Lab Units 03/01/24  0335 02/29/24  1830   WBC K/uL 43.45* 52.42*   HEMOGLOBIN g/dL 10.7* 12.1   HEMATOCRIT % 34.5* 39.0    PLATELETS K/uL 539* 633*        SARS-CoV-2 (COVID-19) (no units)   Date/Time Value   01/15/2024 1345 Negative       Imaging  MRI FOOT LEFT WITHOUT CONTRAST    Result Date: 3/1/2024  Narrative: CLINICAL HISTORY: Osteomyelitis, foot Left foot infection/concerns for osteomyelitis     Impression: IMPRESSION: Plantar wound with multifocal acute osteomyelitis and pathological fractures in the left foot as described in detail below. Pockets of abscess are seen extending from the plantar wound about the regions of osteomyelitis. Please see the body of the report for additional findings and descriptions. COMMENT: Comparison: X-rays dated 2/29/2024. MRI from January 1624. Technique: Noncontrast MRI left forefoot. Findings: Since the previous MRI patient has undergone second toe amputation. However, there is new osteomyelitis in pathological fracture involving the head, neck and distal to mid shaft of the second metatarsal. There is also nonspecific marrow changes possibly representing early osteomyelitis extending to the second metatarsal base. - Marked bony erosion, pathological displaced fracture in and osteomyelitis is present throughout the third metatarsal with the distal shaft/neck fracture displaced laterally. Nonspecific marrow edema without T1 replacement in the proximal portions of the third metatarsal shaft are noted. There is acute osteomyelitis in the third toe and the proximal phalanx and possibly very small amounts in the middle phalanx but not in the distal phalanx. - Acute osteomyelitis in the head and neck of the fourth metatarsal. Erosions and small osteomyelitis in the base of the fourth proximal phalanx with septic arthritis of the fourth metatarsal phalangeal joint. Suspected pathological fracture through osteomyelitis of the fourth proximal phalanx best seen on sagittal image 7. - In the fifth proximal phalanx there is marrow edema like signal but no convincing T1 marrow replacement. Findings are  nonspecific. In the fifth metatarsal head medially there is a small amount of bone marrow edema as well as some early patchy T1 marrow replacement axial image 19 where early ostomy myelitis cannot be excluded. Otherwise no fifth metatarsal osteomyelitis. - At the first metatarsophalangeal joint there is osteoarthritis with mild areas of patchy bone marrow edema in the first metatarsal distally as well as the proximal phalanx. In the lateral aspect of the first metatarsal head there is a small focus of questionable early bony erosion and T1 marrow replacement on axial image 13 where small focus of osteitis cannot be excluded. There is a nonspecific first metatarsophalangeal joint effusion. Nonspecific bone marrow edema in the sesamoids more so in the fibular sesamoid with there is some questionable early T1 marrow replacement and small focus of early isthmus cannot be excluded. --- No evidence of osteomyelitis in the imaged tarsal bones noting mild degenerative joint disease. - There is a wound in the plantar forefoot deep to the third metatarsophalangeal joint with multiple pockets of abscess about the regions of osteomyelitis described above. Diffuse abnormal muscle edema could be related to myositis in the appropriate clinical setting although denervation changes are also probably present. - The Lisfranc ligament appears intact. Diffuse nonspecific soft tissue edema is noted. This should be correlated with the clinical extent of soft tissue infection.     X-RAY CHEST 1 VIEW    Result Date: 3/1/2024  Narrative: CLINICAL HISTORY: Leukocytosis r/o pneumonia     Impression: IMPRESSION: No acute disease in chest. Right rotator cuff calcific tendinitis. COMMENT: Comparison: Chest x-ray 12/13/2021. Technique: A single frontal AP portable upright projection of the chest was obtained. FINDINGS: The lungs are hypoexpanded but grossly clear without focal airspace consolidation, pleural effusion or pneumothorax. The  cardiomediastinal silhouette is within normal limits. The upper abdomen is unremarkable. There is no acute osseous abnormality.  There is amorphous calcification adjacent to the right greater tuberosity which can be seen in the setting of rotator cuff calcific tendinitis.     X-RAY FOOT LEFT 3+ VIEWS    Result Date: 2/29/2024  Narrative: CLINICAL HISTORY: r/o gas gangrene     Impression: IMPRESSION: Prior second toe amputation with new acute osteomyelitis throughout the second metatarsal where there is pathological fracturing. Acute osteomyelitis and displaced pathological fracturing of the third metatarsal. Probable mild osteomyelitis in the base of the proximal third phalanx. Probable osteomyelitis involving the thi fourth metatarsal head medially as well as probably in the fourth proximal phalanx. Suggest MRI for more definitive assessment of the extent of infection. Plantar calcaneal spurring and mild degenerative disease. Extensive atherosclerotic vascular calcifications. Wound overlying the second toe amputation site with irregular soft tissue. Possible small amounts of gas in the third toe. 4 views of the left foot. COMMENT: Comparison: X-rays from 1 1/15/2024. See impression    ASSESSMENT AND PLAN      * Type 2 diabetes mellitus with left diabetic foot infection   Assessment & Plan  Patient presenting with worsening left foot wound  Recent admission  1/17/2024 peripheral angiogram with atherectomy\PTA of left AT artery   1/18/2024 left foot I&D, left second toe amputation  S/p IV vancomycin and discharged on Keflex through 1/21/2024  - MRI Left foot 3/1/2024: Plantar wound with multifocal acute osteomyelitis     - Patient would like to defer amputation if indicated and continue with abx therapy  - Podiatry consult appreciated  - ID consult appreciated: continue Vanc/Fotaz, follow wound cx  - Patient requested secondary opinion -vascular Surgery consult appreciated   - Pain control with Tylenol prn, Tramadol  prn    HLD (hyperlipidemia)  Assessment & Plan  - Continue Atorvastatin    Leukemoid reaction  Assessment & Plan  Suspect elevated WBC is a leukemoid reaction from severe diabetic left foot infection  She has no history of leukemia/lymphoma  UA and CXR not consistent with infection    - Abx as above under diabetic foot infection  - WBC improving  - Follow CBC    Primary hypertension  Assessment & Plan  BP stable     - Continue home Amlodipine, HCTZ, Losartan with hold parameters    Hypothyroidism  Assessment & Plan  - Continue Levothyroxine    Type 2 diabetes mellitus with diabetic neuropathy (CMS/HCC)  Assessment & Plan  A1c 1/31/2024 10.1 indicating poor control  She follows with W[facility]    [facility]  - ADA, NCS diet  - Restart Gabapentin for neuropathy pain  - Hold oral antihyperglycemics at this time (Rybelsus and Jardiance) in setting of potential surgical planning  - Endocrinology consult to assist with DM management       VTE Assessment: Padua VTE Score: 1  VTE Prophylaxis:  Current anticoagulants:    •None      Code Status: Full Code      Estimated Discharge Date: 3/4/2024   Disposition Planning: Continue as inpatient     Luisa JOHAN Herrera DO  3/1/2024

## 2024-03-01 NOTE — PROGRESS NOTES
"Vancomycin Dosing by Pharmacy Consult Initiated    Bebe Burks is a 63 y.o. female who has been consulted for vancomycin dosing for bacterial skin and skin structure infection prescribed by RADHA Lo .    Reviewed relevant clinical data including weight, renal function, previous vancomycin doses, and vancomycin levels:  Creatinine   Date/Time Value Ref Range Status   02/29/2024 1830 0.7 0.6 - 1.2 mg/dL Final     No results found for: \"VANCORANDOM\", \"VANCOTROUGH\", \"VANCOPEAK\"      Vancomycin Administrations (last 96 hours)       None            Assessment/Plan  The patient is ordered vancomycin dosing by pharmacy.    The dose will be based on:         Wt Readings from Last 1 Encounters:   02/29/24 83 kg (183 lb)         Estimated Creatinine Clearance: 88.8 mL/min by (C-G formula)      Will initiate maintenance dose of 1250 mg IV every 12 hours.    Target a trough of 10-15 ug/mL per vancomycin dosing per pharmacy order.  Pharmacy will continue to follow the patient's vancomycin dosing daily.      Please call vancomycin levels to the pharmacy.  Patricia Reyes, PharmD    "

## 2024-03-01 NOTE — CONSULTS
"Infectious Disease Consult Note    Patient Name: Bebe Burks  MR#: [id number]  : 1960  Admission Date: 2024  Consult Date: 24 11:50 AM   Consultant: Sander Ackerman MD    Reason for Consult: Left foot/toe infection, concerns for acute osteomyelitis, anti-infective stewardship  Referring Provider: Dr. Sharon Burks is a 63 y.o. female who was admitted on 2024.  This patient was hospitalized in mid January with toe infection that was amputated.  She received IV antibiotics and was discharged on oral.  Unfortunately the infection came back and new culture showed Acinetobacter and Enterobacter.  Previously she had Streptococcus and Bacteroides.  She was seen by Dr. Gonzalez in the office was started Cipro and Augmentin.  Despite antibiotics her infection progressed.  She is now admitted with foot pain and swelling she has discharge.  MRI performed this morning is consistent with a plantar wound and multifocal acute osteomyelitis with pathological fractures.  There is a pocket of abscess extending from the plantar wound into the regions of osteomyelitis.  Vital signs show no fever but her white count is elevated to 43,000.      Allergies:   Allergies   Allergen Reactions   • Citrus And Derivatives Other (see comments)     \" Flushed\"       Medical History:   Past Medical History:   Diagnosis Date   • Abnormal ECG    • Abnormal finding on chest xray 2021   • Arthritis    • Colon cancer (CMS/HCC)    • COVID-19 2021   • COVID-19 vaccine series completed     Moderna: \" 3 doses\"   • Diabetic neuropathy (CMS/HCC)    • DM (diabetes mellitus), type 2 with ophthalmic complications (CMS/HCC)    • Hypertension    • Hypothyroidism    • Left foot drop    • Lipid disorder    • Type 2 diabetes mellitus treated with insulin (CMS/HCC)        Surgical History:   Past Surgical History:   Procedure Laterality Date   • ABCESS DRAINAGE  10/2023   • CATARACT EXTRACTION Bilateral    • COLONOSCOPY "     • COMBINED HYSTEROSCOPY DIAGNOSTIC / D&C  2007   • EYE SURGERY Left 2017    cataract   • JOINT REPLACEMENT Right 02/2023    knee replacement   • TOE AMPUTATION     • VITRECTOMY Left 2016       Social History     Tobacco Use   • Smoking status: Never     Passive exposure: Past   • Smokeless tobacco: Never   Vaping Use   • Vaping Use: Never used   Substance Use Topics   • Alcohol use: Not Currently     Comment: occasional in past, none current   • Drug use: Never       Family History:   Family History   Problem Relation Age of Onset   • Heart disease Biological Mother    • Diabetes Biological Mother    • Hypertension Biological Mother    • Stroke Biological Father        Review of Systems    All other systems reviewed and negative except as noted in the HPI.    Medications:    Current IP Meds (From admission, onward)        Frequency     losartan (COZAAR) tablet 50 mg         Nightly     insulin glargine U-100 (LANTUS/BASAGLAR) pen 20 Units         Nightly     atorvastatin (LIPITOR) tablet 40 mg         Daily (6p)     insulin lispro U-100 (HumaLOG) pen 7 Units         3 times daily with meals     insulin lispro U-100 (HumaLOG) pen 2-10 Units         4 times daily with meals and nightly     traMADoL (ULTRAM) tablet 50 mg         Every 6 hours PRN     vancomycin 1.25 gram/250 mL IVPB in NSS  (Vancomycin IV therapy by Pharmacy Protocol)        See Hyperspace for full Linked Orders Report.    Every 12 hours interval     hydrochlorothiazide tablet 12.5 mg         Daily     amLODIPine (NORVASC) tablet 5 mg         Daily     aspirin enteric coated tablet 81 mg         Daily     clopidogreL (PLAVIX) tablet 75 mg         Daily     insulin lispro protamine-lispro (75/25) (HumaLOG MIX 75/25) pen 30 Units  Status:  Discontinued         2 times daily before breakfast and dinner     levothyroxine (SYNTHROID) tablet 25 mcg         Daily (6:30a)     sodium chloride 0.9 % infusion         Once     melatonin ODT 6 mg         Once      docusate sodium (COLACE) capsule 100 mg         2 times daily     gabapentin (NEURONTIN) capsule 300 mg         3 times daily     oxyCODONE (ROXICODONE) immediate release tablet 10 mg         Once     insulin lispro protamine-lispro (75/25) (HumaLOG MIX 75/25) pen 15 Units         Once     polyethylene glycol (MIRALAX) 17 gram packet 17 g         Daily PRN     vancomycin 750 mg/250 mL IVPB in NSS         Once     acetaminophen (TYLENOL) tablet 650 mg         Every 4 hours PRN     oxyCODONE (ROXICODONE) immediate release tablet 5 mg  Status:  Discontinued         Every 4 hours PRN     oxyCODONE (ROXICODONE) immediate release tablet 10 mg  Status:  Discontinued         Every 4 hours PRN     piperacillin-tazobactam (ZOSYN) 3.375 g/100 mL IVPB in NSS  (piperacillin-tazobactam (ZOSYN) IV (standard or extended infusion) and consult to infectious disease)         Every 6 hours     sodium chloride 0.9 % bolus 1,000 mL         Once     vancomycin 1.25 gram/250 mL IVPB in NSS  (Vancomycin IV therapy by Pharmacy Protocol)  Status:  Discontinued        See Hyperspace for full Linked Orders Report.    Every 12 hours interval     vancomycin (VANCOCIN) IV therapy by pharmacy protocol  (Vancomycin IV therapy by Pharmacy Protocol)  Status:  Discontinued        See Hyperspace for full Linked Orders Report.    As needed          Anti-infectives (From admission, onward)    Start     Dose/Rate Route Frequency Ordered Stop    03/01/24 0900  vancomycin 1.25 gram/250 mL IVPB in NSS        See Hyperspace for full Linked Orders Report.    1,250 mg  166.7 mL/hr over 90 Minutes intravenous Every 12 hours interval 03/01/24 0135      03/01/24 0115  piperacillin-tazobactam (ZOSYN) 3.375 g/100 mL IVPB in NSS         3.375 g  200 mL/hr over 30 Minutes intravenous Every 6 hours 03/01/24 0021              Vital Signs:    Temp:  [36.3 °C (97.4 °F)-37.1 °C (98.7 °F)] 37.1 °C (98.7 °F)  Heart Rate:  [83-98] 94  Resp:  [16-18] 18  BP:  (130-169)/(61-90) 130/61    Temp (72hrs), Av.7 °C (98 °F), Min:36.3 °C (97.4 °F), Max:37.1 °C (98.7 °F)      Physical Exam     Gen: Aox3  HEENT: OP clear  Neck: Supple  LAD: No cervical LAD  Lungs: CTAB  CV: RRR no murmurs  Abd: Soft NTND +BS  Ext: The left foot is malodorous with purulence swelling and tenderness  Skin: no rash  Neuro: II-XII intact        Lines, Drains, Airways, Wounds:  Peripheral IV (Adult) 24 Left Antecubital (Active)   Number of days: 1       Peripheral IV (Adult) 24 Right Hand (Active)   Number of days: 1       Wound Anterior;Left Toe (2nd) (Active)   Number of days: 46       Wound Other (comment) Anterior;Right Toe (5th) (Active)   Number of days: 0       Labs:    Lab Results   Component Value Date    WBC 43.45 (HH) 2024    HGB 10.7 (L) 2024    HCT 34.5 (L) 2024    MCV 87.6 2024     (H) 2024     Lab Results   Component Value Date    GLUCOSE 128 (H) 2024    CALCIUM 8.3 (L) 2024     2024    K 3.5 2024    CO2 26 2024     2024    BUN 15 2024    CREATININE 0.5 (L) 2024     Lab Results   Component Value Date    ALT 17 2024    AST 17 2024    ALKPHOS 77 2024    BILITOT 0.5 2024     UA Results       242    Color Yellow     Yellow    Clarity Clear     Clear    Glucose >=1000     >=1000    Bilirubin Negative     Negative    Ketones Trace     Trace    Sp Grav 1.023     1.023    Blood Negative     Negative    Ph 6.0     6.0    Protein Trace     Trace    Urobilinogen 0.2     0.2    Nitrite Negative     Negative    Leuk Est Negative     Negative    WBC 0 TO 3    RBC 5 TO 9    Bacteria None Seen         Comment for Ketones at 2352 on 24: Free sulfhydryl drugs such as Mesna, Capoten, and Acetylcysteine (Mucomyst) may cause false positive ketonuria.    Comment for Ketones at 2352 on 24: Free sulfhydryl drugs such as Mesna, Capoten, and  Acetylcysteine (Mucomyst) may cause false positive ketonuria.    Comment for Blood at 2352 on 02/29/24: The sensitivity of the occult blood test is equivalent to approximately 4 intact RBC/HPF.    Comment for Blood at 2352 on 02/29/24: The sensitivity of the occult blood test is equivalent to approximately 4 intact RBC/HPF.    Comment for Protein at 2352 on 02/29/24: Trace False Positive Protein can be seen with alkaline or highly buffered urines or urine with high specific gravity. Suggest clinical correlation.    Comment for Protein at 2352 on 02/29/24: Trace False Positive Protein can be seen with alkaline or highly buffered urines or urine with high specific gravity. Suggest clinical correlation.    Comment for Leuk Est at 2352 on 02/29/24: Results can be falsely negative due to high specific gravity, some antibiotics, glucose >3 g/dl, or WBC other than neutrophils.  Results can be falsely negative due to high specific gravity, some antibiotics, glucose >3 g/dl, or WBC other than neutrophils.    Comment for Leuk Est at 2352 on 02/29/24: Results can be falsely negative due to high specific gravity, some antibiotics, glucose >3 g/dl, or WBC other than neutrophils.        Microbiology Results     Procedure Component Value Units Date/Time    Anaerobic Culture / Smear (includes Aerobic Culture) [[id number]]  (Abnormal)  (Susceptibility) Collected: 02/15/24 1200    Specimen: Wound Swab from Toe, Left Updated: 02/20/24 1035     Culture **Positive Culture**      4+ Enterobacter cloacae Complex      4+ Acinetobacter species      No Anaerobes Recovered     Gram Stain Result No WBC Seen      No organisms seen           Pathology Results     ** No results found for the last 720 hours. **          Echo:     Cardiac Imaging    TRANSTHORACIC ECHO (TTE) COMPLETE 05/04/2023    Interpretation Summary  •  Left Ventricle: Normal ventricle size. Normal wall thickness. Preserved systolic function. Estimated EF 65%. No regional wall  motion abnormalities. Normal diastolic filling pattern for age.  •  Right Ventricle: Normal ventricle size. Normal systolic function.  •  Right Atrium: Normal sized atrium.  •  Left Atrium: Normal sized atrium.  •  Aortic Valve: Tricuspid valve. No regurgitation. No stenosis.  •  Mitral Valve:.Moderate calcification of the posterior leaflet. No regurgitation. There appears to be very mild nonrheumatic mitral stenosis as a result of relative immobility of the posterior mitral valve leaflet.  Peak gradient 7 mean gradient 4 at a heart rate of 70 bpm.  Valve area was not calculated.  •  Tricuspid Valve: Normal structure. Trace regurgitation. Estimated RVSP = 30 mmHg.  •  Pulmonic Valve: Normal structure. Trace regurgitation. No stenosis.  •  Pericardium: No evidence of pericardial effusion.  •  IVC/SVC: Inferior vena cava is dilated (>2.1cm). Inferior vena cava demonstrates normal respiratory collapse.  •  Aorta: Aortic root normal. Sinuses of Valsalva normal-sized. Ascending aorta normal-sized. Aortic arch normal-sized. Descending aorta normal-sized.      Imaging:    Radiology Imaging    XR CHEST 1 VW    Narrative  CLINICAL HISTORY: Leukocytosis r/o pneumonia    Impression  IMPRESSION:  No acute disease in chest.    Right rotator cuff calcific tendinitis.    COMMENT:    Comparison: Chest x-ray 12/13/2021.    Technique: A single frontal AP portable upright projection of the chest was  obtained.    FINDINGS:    The lungs are hypoexpanded but grossly clear without focal airspace  consolidation, pleural effusion or pneumothorax. The cardiomediastinal  silhouette is within normal limits. The upper abdomen is unremarkable. There is  no acute osseous abnormality.  There is amorphous calcification adjacent to the  right greater tuberosity which can be seen in the setting of rotator cuff  calcific tendinitis.      Patient Active Problem List   Diagnosis Code   • Controlled type 2 diabetes mellitus, with long-term current use  of insulin (CMS/[facility]) E11.9, Z79.4   • Essential hypertension I10   • Hyperlipidemia E78.5   • Hypothyroidism E03.9   • Obesity E66.9   • Thyroid nodule E04.1   • COVID-19 virus infection U07.1   • Lung nodule R91.1   • Displacement of lumbar intervertebral disc without myelopathy M51.26   • Disability of walking R26.2   • Polyneuropathy due to type 2 diabetes mellitus (CMS/[facility]) E11.42   • Pre-op examination Z01.818   • Localized osteoarthritis of right knee M17.11   • Type 2 diabetes mellitus treated with insulin (CMS/[facility]) E11.9, Z79.4   • Abnormal finding on EKG R94.31   • Left foot drop M21.372   • Abnormal finding on chest xray R93.89   • Osteoarthritis of right knee, unspecified osteoarthritis type M17.11   • S/P total knee arthroplasty, right Z96.651   • Sepsis (CMS/[facility]) A41.9   • Osteomyelitis of left foot (CMS/[facility]) M86.9   • Peripheral neuropathy G62.9   • Abdominal pain R10.9   • Chronic idiopathic constipation K59.04   • Gangrene (CMS/[facility]) I96   • Tibial artery stenosis, left (CMS/[facility]) I70.202   • H/O pilonidal cyst Z87.2   • Type 2 diabetes mellitus with left diabetic foot infection  E11.628, L08.9   • Type 2 diabetes mellitus with diabetic neuropathy (CMS/[facility]) E11.40   • Hypothyroidism E03.9   • Primary hypertension I10   • Leukemoid reaction D72.823   • HLD (hyperlipidemia) E78.5   • Acute osteomyelitis of left foot (CMS/HCC) M86.172     Acute osteomyelitis of left foot (CMS/[facility])  Assessment & Plan  I changed the Zosyn to 4.5 g every 6 extended dosing and we will continue with vancomycin dosed by pharmacy with a trough goal between 15 and 20 while we await from surgical plans per podiatry        Sander Ackerman MD  3/1/2024 11:50 AM

## 2024-03-01 NOTE — ASSESSMENT & PLAN NOTE
Suspect elevated WBC is a leukemoid reaction from severe diabetic left foot infection  She has no history of leukemia/lymphoma  UA and CXR not consistent with infection  - WBC elevated and fluctuating    - Abx as above under diabetic foot infection  - Follow CBC  - may need continued hematologic work up outpatient is no further improvements

## 2024-03-01 NOTE — ASSESSMENT & PLAN NOTE
A1c 1/31/2024 10.1 indicating poor control  She follows with Wanda VILLARREAL Endocrinology at [facility]    - [facility] glycemic control  - continue Accucheks, SSI  - ADA, NCS diet  - Continue Gabapentin  - On Rybelsus (non formulary) and Jardiance  - Endocrinology consult to assist with DM management

## 2024-03-01 NOTE — PLAN OF CARE
Plan of Care Review  Plan of Care Reviewed With: patient  Progress: improving  Outcome Evaluation: Patient OOB w/ 1x assist, WBAT w/ surgi-shoe. PRN Tramadol given for pain. Continues on IV Vanco and Zosyn, cultures pending. VSS.

## 2024-03-01 NOTE — PLAN OF CARE
Plan of Care Review  Plan of Care Reviewed With: patient  Progress: no change  Outcome Evaluation: Pt. up ad edel. Pain controlled with prn Oxycodone. IV Vanco and Zosyn given. R foot wound cleansed with wound spray and wrapped with gauze. Thick yellow drainage. NSR on monitor.

## 2024-03-01 NOTE — CONSULTS
"Endocrinology Consult Note  Subjective     Bebe Burks is a 63 y.o. female who was admitted for Toe infection [L08.9]  Closed fracture of second metatarsal bone of left foot, physeal involvement unspecified, initial encounter [S92.322A]  Closed fracture of third metatarsal bone of left foot, physeal involvement unspecified, initial encounter [S92.332A].     63-year-old woman with PMH uncontrolled type 2 diabetes, hypothyroidism, PAD, HTN, HLD with recent admission for left toe osteomyelitis s/p left lower extremity atherectomy and left second toe amputation who presented yesterday with poor healing of left foot infection, found to have hyperglycemia.  Endocrinology consulted for further diabetes management.    She follows with Wanda Gipson at [facility] endocrine for DM management.  Last visit just a few weeks ago noted regimen of NovoLog 70/30 25 units with breakfast and 30 units with dinner, Rybelsus 14 mg daily, and Jardiance 10 mg daily.    Since admission, her blood sugars have been well-controlled in the 120s to 150s.  She is currently ordered for 75/25 mixed insulin 30 units before breakfast and dinner.  It appears that she received a half dose of this at 2 AM today.    Currently, she is feeling well.  Is on IV antibiotics.  Would be amenable to switching to basal bolus insulin on discharge to ensure proper wound healing.    Medical History:   Past Medical History:   Diagnosis Date   • Abnormal ECG    • Abnormal finding on chest xray 12/2021   • Arthritis    • Colon cancer (CMS/HCC)    • COVID-19 12/2021   • COVID-19 vaccine series completed     Moderna: \" 3 doses\"   • Diabetic neuropathy (CMS/HCC)    • DM (diabetes mellitus), type 2 with ophthalmic complications (CMS/HCC)    • Hypertension    • Hypothyroidism    • Left foot drop    • Lipid disorder    • Type 2 diabetes mellitus treated with insulin (CMS/HCC)        Surgical History:   Past Surgical History:   Procedure Laterality Date   • ABCESS DRAINAGE  10/2023 "   • CATARACT EXTRACTION Bilateral    • COLONOSCOPY     • COMBINED HYSTEROSCOPY DIAGNOSTIC / D&C  2007   • EYE SURGERY Left 2017    cataract   • JOINT REPLACEMENT Right 02/2023    knee replacement   • TOE AMPUTATION     • VITRECTOMY Left 2016       Social History:   Social History     Social History Narrative    Patient is single and has no children. She       Family History:   Family History   Problem Relation Age of Onset   • Heart disease Biological Mother    • Diabetes Biological Mother    • Hypertension Biological Mother    • Stroke Biological Father        Allergies: Citrus and derivatives    Current Inpatient Medications   Medication Dose Route Frequency Provider Last Rate Last Admin   • acetaminophen (TYLENOL) tablet 650 mg  650 mg oral q4h PRN Maxwell Linda MD       • amLODIPine (NORVASC) tablet 5 mg  5 mg oral Daily Maxwell Linda MD   5 mg at 03/01/24 0921   • aspirin enteric coated tablet 81 mg  81 mg oral Daily Maxwell Linda MD   81 mg at 03/01/24 0957   • atorvastatin (LIPITOR) tablet 40 mg  40 mg oral Daily (6p) Maxwell Linda MD       • clopidogreL (PLAVIX) tablet 75 mg  75 mg oral Daily Maxwell Linda MD   75 mg at 03/01/24 0957   • docusate sodium (COLACE) capsule 100 mg  100 mg oral BID Maxwell Linda MD   100 mg at 03/01/24 0921   • gabapentin (NEURONTIN) capsule 300 mg  300 mg oral TID Maxwell Linda MD   300 mg at 03/01/24 1428   • hydrochlorothiazide tablet 12.5 mg  12.5 mg oral Daily Maxwell Linda MD   12.5 mg at 03/01/24 0921   • insulin glargine U-100 (LANTUS/BASAGLAR) pen 20 Units  20 Units subcutaneous Nightly Gail Deleon DO       • insulin lispro U-100 (HumaLOG) pen 2-10 Units  2-10 Units subcutaneous With meals & nightly Gail Deleon DO   6 Units at 03/01/24 1225   • insulin lispro U-100 (HumaLOG) pen 7 Units  7 Units subcutaneous TID with meals Gail Deleon DO   7 Units at 03/01/24 1225   • levothyroxine (SYNTHROID) tablet 25 mcg  25 mcg oral Daily (6:30a) Maxwell Linda MD    25 mcg at 03/01/24 0607   • losartan (COZAAR) tablet 50 mg  50 mg oral Nightly Maxwell Linda MD       • piperacillin-tazobactam (ZOSYN) 4.5 g/100 mL IVPB in NSS  4.5 g intravenous q6h INT Sander Ackerman MD       • polyethylene glycol (MIRALAX) 17 gram packet 17 g  17 g oral Daily PRN Maxwell Linda MD       • traMADoL (ULTRAM) tablet 50 mg  50 mg oral q6h PRN Luias Herrera Q, DO   50 mg at 03/01/24 0957   • vancomycin 1.25 gram/250 mL IVPB in NSS  1,250 mg intravenous q12h INT Maxwell Linda MD   Stopped at 03/01/24 1156       Review of Systems  All other systems reviewed and negative except as noted in the HPI.    Objective   Physical Exam  Gen: well nourished, no acute distress  Eyes:  normal sclera, no proptosis  HENT:  normocephalic, normal tongue, MMM  Neck:  no thyromegaly  CV:  no edema bilaterally  Pulm:  no use of accessory muscles  Abd:  obese, soft, nontender  Neuro:  CN 2-12 grossly intact  Skin:  no rashes, warm and dry, LLE dressing c/d/i  Psych: normal mood, affect    Labs  Lab Results   Component Value Date    GLUCOSE 128 (H) 03/01/2024    CALCIUM 8.3 (L) 03/01/2024     03/01/2024    K 3.5 03/01/2024    CO2 26 03/01/2024     03/01/2024    BUN 15 03/01/2024    CREATININE 0.5 (L) 03/01/2024     Lab Results   Component Value Date    ALT 17 03/01/2024    AST 17 03/01/2024    ALKPHOS 77 03/01/2024    BILITOT 0.5 03/01/2024     Lab Results   Component Value Date    WBC 43.45 (HH) 03/01/2024    HGB 10.7 (L) 03/01/2024    HCT 34.5 (L) 03/01/2024    MCV 87.6 03/01/2024     (H) 03/01/2024     Lab Results   Component Value Date    TSH 0.51 01/31/2024    P3VEFAE 98 05/02/2016     Lab Results   Component Value Date    HGBA1C 10.1 (H) 01/31/2024           * Type 2 diabetes mellitus with left diabetic foot infection   Assessment & Plan  Longstanding history of uncontrolled type 2 diabetes, here with worsening left foot infection after recent toe amputation.  Last A1c on file worsened to  10.1%.  Reports a home regimen of NovoLog 70/30 25 units with breakfast and 30 units with dinner, Rybelsus 14 mg daily, and Jardiance 10 mg daily.  She is not quite able to tell me whether or not if her uncontrolled diabetes is related to her foot/wound issues.  Is amenable to switching to basal bolus on discharge for tighter glycemic control.    While inpatient:  Basal insulin: STOP mixed insulin and start glargine 20 units nightly  Nutritional insulin: Start lispro 7 units TIDAC  Correction insulin: Lispro 2:50 > 150 ACHS and 3 am    On discharge:  - Final insulin recs TBD  - She can resume her home Rybelsus and Jardiance  - She is welcome to follow up with her endocrine NP or she can follow up in our office (she expressed interest in transferring care here)    Acute osteomyelitis of left foot (CMS/HCC)  Assessment & Plan  S/p recent left toe amputation, on vancomycin and pip-tazo    Hypothyroidism  Assessment & Plan  Clinically and biochemically euthyroid on levothyroxine 25 mcg daily. Continue at this dose,    Primary hypertension  Assessment & Plan  Controlled on HCTZ and losartan      Thank you for this consult. Endocrine will continue to follow.    Gail Deleon DO   Endocrinology, Diabetes and Metabolism  [facility]  Epic Chat preferred  Office: [phone]

## 2024-03-01 NOTE — CONSULTS
"  Podiatry Consult Note    Subjective     Bebe Burks is a 63 y.o. female who was admitted for Toe infection [L08.9]  Closed fracture of second metatarsal bone of left foot, physeal involvement unspecified, initial encounter [S92.322A]  Closed fracture of third metatarsal bone of left foot, physeal involvement unspecified, initial encounter [S92.332A]. Patient was referred by  for management recommendations. Patient is being consulted on for left foot wound.  Patient known to Dr. Damian.  Patient was admitted in January for osteomyelitis of the left second digit where she had amputation.  Patient had high white blood cell count during that encounter.  Patient was discharged with remaining high white blood cell count.  Patient reports that she sees a hematologist in [place] who mentioned her neutrophils count being abnormal.  She states that her third toe has become more painful and she saw her podiatrist in the office 3 days ago.  She reports that she has had incision and drainages done several times as well as multiple cultures been taken.  She denies fever, nausea, vomiting.  Patient states that she is adamant that she does not want any amputation or surgical intervention at this time.    Outside records reviewed..    Medical History:   Past Medical History:   Diagnosis Date   • Abnormal ECG    • Abnormal finding on chest xray 12/2021   • Arthritis    • Colon cancer (CMS/HCC)    • COVID-19 12/2021   • COVID-19 vaccine series completed     Moderna: \" 3 doses\"   • Diabetic neuropathy (CMS/HCC)    • DM (diabetes mellitus), type 2 with ophthalmic complications (CMS/HCC)    • Hypertension    • Hypothyroidism    • Left foot drop    • Lipid disorder    • Type 2 diabetes mellitus treated with insulin (CMS/HCC)        Surgical History:   Past Surgical History:   Procedure Laterality Date   • ABCESS DRAINAGE  10/2023   • CATARACT EXTRACTION Bilateral    • COLONOSCOPY     • COMBINED HYSTEROSCOPY DIAGNOSTIC / D&C  2007 "   • EYE SURGERY Left 2017    cataract   • JOINT REPLACEMENT Right 02/2023    knee replacement   • TOE AMPUTATION     • VITRECTOMY Left 2016       Social History:   Social History     Social History Narrative    Patient is single and has no children. She       Family History:   Family History   Problem Relation Age of Onset   • Heart disease Biological Mother    • Diabetes Biological Mother    • Hypertension Biological Mother    • Stroke Biological Father        Allergies: Citrus and derivatives    Home Medications:  •  amLODIPine, Take 5 mg by mouth every morning.  •  amoxicillin-pot clavulanate, Take 1 tablet by mouth 2 (two) times a day.  •  aspirin, Take 81 mg by mouth daily.  •  atorvastatin, Take 40 mg by mouth nightly.  •  ciprofloxacin, Take 500 mg by mouth 2 (two) times a day.  •  clopidogreL, Take 75 mg by mouth daily.  •  docusate sodium, Take 100 mg by mouth 2 (two) times a day.  •  empagliflozin, Take 10 mg by mouth daily.  •  gabapentin, Take 300 mg by mouth 3 (three) times a day.  •  hydrochlorothiazide, Take 12.5 mg by mouth daily.  •  levothyroxine, Take 25 mcg by mouth daily.  •  losartan, Take 50 mg by mouth nightly.  •  RYBELSUS, Take 14 mg by mouth daily.  •  traMADoL, Take 50 mg by mouth 2 (two) times a day as needed for pain.    Current Medications:  •  acetaminophen, 650 mg, oral, q4h PRN  •  amLODIPine, 5 mg, oral, Daily  •  aspirin, 81 mg, oral, Daily  •  atorvastatin, 40 mg, oral, Daily (6p)  •  clopidogreL, 75 mg, oral, Daily  •  docusate sodium, 100 mg, oral, BID  •  gabapentin, 300 mg, oral, TID  •  hydrochlorothiazide, 12.5 mg, oral, Daily  •  insulin glargine U-100, 20 Units, subcutaneous, Nightly  •  insulin lispro U-100, 2-10 Units, subcutaneous, ACHS+3AM  •  insulin lispro U-100, 7 Units, subcutaneous, TID with meals  •  levothyroxine, 25 mcg, oral, Daily (6:30a)  •  losartan, 50 mg, oral, Nightly  •  piperacillin-tazobactam, 4.5 g, intravenous, q6h INT  •  polyethylene glycol, 17  g, oral, Daily PRN  •  traMADoL, 50 mg, oral, q6h PRN  •  vancomycin, 1,250 mg, intravenous, q12h INT **AND** [] IV Vancomycin Therapy by Pharmacy Protocol, , , Once    Labs  Labs:   CBC Results       24     0335 1830 1346    WBC 43.45 52.42 37.65    RBC 3.94 4.43 4.55    HGB 10.7 12.1 12.9    HCT 34.5 39.0 41.3    MCV 87.6 88.0 90.8    MCH 27.2 27.3 28.4    MCHC 31.0 31.0 31.2     633 614         Comment for WBC at 0335 on 24: CONSISTENT WITH PREVIOUS RESULTS    Comment for WBC at 1830 on 24: ALL RESULTS HAVE BEEN CHECKED This result has been called to Jt Lo by Lisa Ornelas on 2024 18:50:51, and has been read back.     Comment for WBC at 1346 on 24: CONSISTENT WITH PREVIOUS RESULTS    Comment for PLT at 0335 on 24: RESULTS CHECKED          BMP Results       24     0335 1830 1346     138 139    K 3.5 4.0 4.8    Cl 105 102 101    CO2 26 28 28    Glucose 128 157 155    BUN 15 21 16    Creatinine 0.5 0.7 0.6    Calcium 8.3 9.4 9.6    Anion Gap 8 8 10    EGFR >60.0 >60.0 >60.0         Comment for K at 1830 on 24: Results obtained on plasma. Plasma Potassium values may be up to 0.4 mEQ/L less than serum values. The differences may be greater for patients with high platelet or white cell counts.    Comment for EGFR at 0335 on 24: Calculation based on the Chronic Kidney Disease Epidemiology Collaboration (CKD-EPI) equation refit without adjustment for race.    Comment for EGFR at 1830 on 24: Calculation based on the Chronic Kidney Disease Epidemiology Collaboration (CKD-EPI) equation refit without adjustment for race.    Comment for EGFR at 1346 on 24: Calculation based on the Chronic Kidney Disease Epidemiology Collaboration (CKD-EPI) equation refit without adjustment for race.            Imaging  I have reviewed the Imaging from the last 24 hrs.    Review of Systems  Pertinent items are  "noted in HPI.    Objective     Physicial Exam  Visit Vitals  /63 (BP Location: Right upper arm, Patient Position: Sitting)   Pulse 73   Temp 36.7 °C (98 °F) (Oral)   Resp 18   Ht 1.702 m (5' 7\")   Wt 86 kg (189 lb 9.5 oz)   SpO2 96%   BMI 29.69 kg/m²         Physical Exam:  -Vascular: DP/PT 1/4 bilaterally, CRT <3 seconds  -Orthopedic: decreased ROM at ankle jt b/l, +DF/PF all remaining digits  -Neurologic: light touch and epicritic sensation diminished  -Dermatologic:   LLE: Fibrotic and macerated wound of the plantar aspect of the forefoot.  Third digit noted to be necrotic.  Mild fluctuance noted over the third metatarsal head consistent with MRI concern of abscess.  Periwound is macerated.  Interdigital spaces are macerated.  Mild serosanguineous drainage on dressings noted.  Mild malodor noted.  Less than 1 cc of purulence expressed on incision and drainage.      Assessment Bebe Burks is a 63-year-old being consulted on for osteomyelitis and pathological fractures of the left foot          Plan   -Examined and evaluated patient with all questions and concerns addressed  -Patient seen at bedside by Dr. Vernon.   -Patient adamant about not having amputation and no surgical intervention during this encounter.  She would like to pursue IV antibiotics  -Bedside incision and drainage done at the left third metatarsal head.  Mild purulence expressed.  Deep culture taken down to bone.  See procedure note.  -X-ray showing: Acute osteomyelitis of the second metatarsal where there is pathological fracturing.  Acute osteomyelitis and displaced pathological fracture and third metatarsal.  Probable mild osteomyelitis in the base of the proximal third phalanx.  Probable osteomyelitis involving the fourth metatarsal head medially as well as probably in the fourth proximal phalanx.  -MRI: Plantar wound with multifocal acute osteomyelitis and pathological fractures in the left foot.  Pockets of abscess are seen " extending from the plantar wound about the regions of osteomyelitis.  See MRI for full description.  -Discussed with patient that recommendation from podiatry is surgical intervention. Patient understands but would like to do IV antibiotics at this time.   -Appreciate infectious disease input on patient pursuing 6 weeks IV antibiotic course using deep cultures taken down to bone about the third metatarsal head.  - Labs and radiographs reviewed.  -Thank you for this consult, please contact on call podiatry resident with any questions or concerns      RENEE Cee DPM   PGY-1 #[id number]    I spent greater than 40 minutes with the patient and her family discussing treatment plans and alternatives.  I do feel that primarily her elevated white blood cell count could also be from something systemic as it is quite elevated for an osteomyelitic infection.  Medicine currently ruling out any systemic ailments.  I spoke with the patient and family regarding her condition, and my recommendation was a transmetatarsal amputation for source control.  Patient, and her family are not interested in surgical intervention and would like to discuss conservative alternatives.  I did tell them that this would be IV antibiotics as dictated by the infectious disease team.  My concern is that with the level of infection, patient will likely at some point require the transmetatarsal amputation.  They are aware of this and would like to proceed with conservative modalities.

## 2024-03-02 LAB
ANION GAP SERPL CALC-SCNC: 7 MEQ/L (ref 3–15)
ANISOCYTOSIS BLD QL SMEAR: ABNORMAL
BASOPHILS # BLD: 0.39 K/UL (ref 0.01–0.1)
BASOPHILS NFR BLD: 1 %
BUN SERPL-MCNC: 14 MG/DL (ref 7–25)
CALCIUM SERPL-MCNC: 8.6 MG/DL (ref 8.6–10.3)
CHLORIDE SERPL-SCNC: 104 MEQ/L (ref 98–107)
CO2 SERPL-SCNC: 27 MEQ/L (ref 21–31)
CREAT SERPL-MCNC: 0.7 MG/DL (ref 0.6–1.2)
DIFFERENTIAL METHOD BLD: ABNORMAL
EGFRCR SERPLBLD CKD-EPI 2021: >60 ML/MIN/1.73M*2
EOSINOPHIL # BLD: 1.57 K/UL (ref 0.04–0.36)
EOSINOPHIL NFR BLD: 4 %
ERYTHROCYTE [DISTWIDTH] IN BLOOD BY AUTOMATED COUNT: 15.3 % (ref 11.7–14.4)
GLUCOSE BLD-MCNC: 135 MG/DL (ref 70–99)
GLUCOSE BLD-MCNC: 168 MG/DL (ref 70–99)
GLUCOSE BLD-MCNC: 197 MG/DL (ref 70–99)
GLUCOSE BLD-MCNC: 210 MG/DL (ref 70–99)
GLUCOSE BLD-MCNC: 276 MG/DL (ref 70–99)
GLUCOSE SERPL-MCNC: 255 MG/DL (ref 70–99)
HCT VFR BLD AUTO: 36.1 % (ref 35–45)
HGB BLD-MCNC: 11 G/DL (ref 11.8–15.7)
LYMPHOCYTES # BLD: 4.71 K/UL (ref 1.2–3.5)
LYMPHOCYTES NFR BLD: 12 %
MAGNESIUM SERPL-MCNC: 2 MG/DL (ref 1.8–2.5)
MCH RBC QN AUTO: 27.3 PG (ref 28–33.2)
MCHC RBC AUTO-ENTMCNC: 30.5 G/DL (ref 32.2–35.5)
MCV RBC AUTO: 89.6 FL (ref 83–98)
METAMYELOCYTES # BLD MANUAL: 2.75 K/UL
METAMYELOCYTES NFR BLD MANUAL: 7 %
MONOCYTES # BLD: 0 K/UL (ref 0.28–0.8)
MONOCYTES NFR BLD: 0 %
MYELOCYTES # BLD MANUAL: 0.78 K/UL
MYELOCYTES NFR BLD MANUAL: 2 %
NEUTS BAND # BLD: 1.18 K/UL (ref 0–0.53)
NEUTS BAND # BLD: 27.08 K/UL (ref 1.7–7)
NEUTS BAND NFR BLD: 3 %
NEUTS HYPERSEG BLD QL SMEAR: ABNORMAL
NEUTS SEG NFR BLD: 69 %
PDW BLD AUTO: 9.9 FL (ref 9.4–12.3)
PLAT MORPH BLD: NORMAL
PLATELET # BLD AUTO: 542 K/UL (ref 150–369)
PLATELET # BLD EST: ABNORMAL 10*3/UL
POCT TEST: ABNORMAL
POIKILOCYTOSIS BLD QL SMEAR: ABNORMAL
POLYCHROMASIA BLD QL SMEAR: ABNORMAL
POTASSIUM SERPL-SCNC: 4 MEQ/L (ref 3.5–5.1)
RBC # BLD AUTO: 4.03 M/UL (ref 3.93–5.22)
SODIUM SERPL-SCNC: 138 MEQ/L (ref 136–145)
VARIANT LYMPHS # BLD MANUAL: 0.78 K/UL
VARIANT LYMPHS NFR BLD: 2 %
WBC # BLD AUTO: 39.24 K/UL (ref 3.8–10.5)

## 2024-03-02 PROCEDURE — 99233 SBSQ HOSP IP/OBS HIGH 50: CPT | Performed by: STUDENT IN AN ORGANIZED HEALTH CARE EDUCATION/TRAINING PROGRAM

## 2024-03-02 PROCEDURE — 36415 COLL VENOUS BLD VENIPUNCTURE: CPT | Performed by: STUDENT IN AN ORGANIZED HEALTH CARE EDUCATION/TRAINING PROGRAM

## 2024-03-02 PROCEDURE — 63700000 HC SELF-ADMINISTRABLE DRUG: Performed by: STUDENT IN AN ORGANIZED HEALTH CARE EDUCATION/TRAINING PROGRAM

## 2024-03-02 PROCEDURE — 80048 BASIC METABOLIC PNL TOTAL CA: CPT | Performed by: STUDENT IN AN ORGANIZED HEALTH CARE EDUCATION/TRAINING PROGRAM

## 2024-03-02 PROCEDURE — 25800000 HC PHARMACY IV SOLUTIONS: Performed by: INTERNAL MEDICINE

## 2024-03-02 PROCEDURE — 83735 ASSAY OF MAGNESIUM: CPT | Performed by: STUDENT IN AN ORGANIZED HEALTH CARE EDUCATION/TRAINING PROGRAM

## 2024-03-02 PROCEDURE — 63700000 HC SELF-ADMINISTRABLE DRUG: Performed by: HOSPITALIST

## 2024-03-02 PROCEDURE — 25000000 HC PHARMACY GENERAL: Performed by: HOSPITALIST

## 2024-03-02 PROCEDURE — 85025 COMPLETE CBC W/AUTO DIFF WBC: CPT | Performed by: STUDENT IN AN ORGANIZED HEALTH CARE EDUCATION/TRAINING PROGRAM

## 2024-03-02 PROCEDURE — 21400000 HC ROOM AND CARE CCU/INTERMEDIATE

## 2024-03-02 PROCEDURE — 63600000 HC DRUGS/DETAIL CODE: Performed by: HOSPITALIST

## 2024-03-02 PROCEDURE — 63600000 HC DRUGS/DETAIL CODE: Mod: JZ | Performed by: INTERNAL MEDICINE

## 2024-03-02 PROCEDURE — 25800000 HC PHARMACY IV SOLUTIONS: Performed by: HOSPITALIST

## 2024-03-02 RX ORDER — IBUPROFEN/PSEUDOEPHEDRINE HCL 200MG-30MG
6 TABLET ORAL NIGHTLY
Status: DISCONTINUED | OUTPATIENT
Start: 2024-03-02 | End: 2024-03-08 | Stop reason: HOSPADM

## 2024-03-02 RX ADMIN — DOCUSATE SODIUM 100 MG: 100 CAPSULE, LIQUID FILLED ORAL at 09:13

## 2024-03-02 RX ADMIN — LEVOTHYROXINE SODIUM 25 MCG: 0.03 TABLET ORAL at 05:55

## 2024-03-02 RX ADMIN — GABAPENTIN 300 MG: 300 CAPSULE ORAL at 15:08

## 2024-03-02 RX ADMIN — CLOPIDOGREL BISULFATE 75 MG: 75 TABLET ORAL at 09:13

## 2024-03-02 RX ADMIN — ACETAMINOPHEN 650 MG: 325 TABLET ORAL at 15:08

## 2024-03-02 RX ADMIN — DOCUSATE SODIUM 100 MG: 100 CAPSULE, LIQUID FILLED ORAL at 21:24

## 2024-03-02 RX ADMIN — INSULIN LISPRO 7 UNITS: 100 INJECTION, SOLUTION INTRAVENOUS; SUBCUTANEOUS at 09:13

## 2024-03-02 RX ADMIN — GABAPENTIN 300 MG: 300 CAPSULE ORAL at 21:24

## 2024-03-02 RX ADMIN — GABAPENTIN 300 MG: 300 CAPSULE ORAL at 09:13

## 2024-03-02 RX ADMIN — PIPERACILLIN AND TAZOBACTAM 4.5 G: 4; .5 INJECTION, POWDER, LYOPHILIZED, FOR SOLUTION INTRAVENOUS; PARENTERAL at 22:28

## 2024-03-02 RX ADMIN — TRAMADOL HYDROCHLORIDE 50 MG: 50 TABLET, COATED ORAL at 21:27

## 2024-03-02 RX ADMIN — PIPERACILLIN AND TAZOBACTAM 4.5 G: 4; .5 INJECTION, POWDER, LYOPHILIZED, FOR SOLUTION INTRAVENOUS; PARENTERAL at 16:59

## 2024-03-02 RX ADMIN — VANCOMYCIN HYDROCHLORIDE 1250 MG: 500 INJECTION, POWDER, LYOPHILIZED, FOR SOLUTION INTRAVENOUS at 09:20

## 2024-03-02 RX ADMIN — PIPERACILLIN AND TAZOBACTAM 4.5 G: 4; .5 INJECTION, POWDER, LYOPHILIZED, FOR SOLUTION INTRAVENOUS; PARENTERAL at 03:02

## 2024-03-02 RX ADMIN — ACETAMINOPHEN 650 MG: 325 TABLET ORAL at 21:24

## 2024-03-02 RX ADMIN — ASPIRIN 81 MG: 81 TABLET, COATED ORAL at 09:13

## 2024-03-02 RX ADMIN — ACETAMINOPHEN 650 MG: 325 TABLET ORAL at 05:55

## 2024-03-02 RX ADMIN — AMLODIPINE BESYLATE 5 MG: 5 TABLET ORAL at 09:13

## 2024-03-02 RX ADMIN — INSULIN LISPRO 7 UNITS: 100 INJECTION, SOLUTION INTRAVENOUS; SUBCUTANEOUS at 12:49

## 2024-03-02 RX ADMIN — INSULIN LISPRO 6 UNITS: 100 INJECTION, SOLUTION INTRAVENOUS; SUBCUTANEOUS at 12:48

## 2024-03-02 RX ADMIN — TRAMADOL HYDROCHLORIDE 50 MG: 50 TABLET, COATED ORAL at 15:08

## 2024-03-02 RX ADMIN — INSULIN LISPRO 7 UNITS: 100 INJECTION, SOLUTION INTRAVENOUS; SUBCUTANEOUS at 17:49

## 2024-03-02 RX ADMIN — TRAMADOL HYDROCHLORIDE 50 MG: 50 TABLET, COATED ORAL at 05:58

## 2024-03-02 RX ADMIN — INSULIN LISPRO 2 UNITS: 100 INJECTION, SOLUTION INTRAVENOUS; SUBCUTANEOUS at 09:14

## 2024-03-02 RX ADMIN — INSULIN LISPRO 4 UNITS: 100 INJECTION, SOLUTION INTRAVENOUS; SUBCUTANEOUS at 17:48

## 2024-03-02 RX ADMIN — PIPERACILLIN AND TAZOBACTAM 4.5 G: 4; .5 INJECTION, POWDER, LYOPHILIZED, FOR SOLUTION INTRAVENOUS; PARENTERAL at 11:14

## 2024-03-02 RX ADMIN — Medication 6 MG: at 23:33

## 2024-03-02 RX ADMIN — INSULIN LISPRO 2 UNITS: 100 INJECTION, SOLUTION INTRAVENOUS; SUBCUTANEOUS at 22:28

## 2024-03-02 RX ADMIN — ATORVASTATIN CALCIUM 40 MG: 40 TABLET, FILM COATED ORAL at 17:48

## 2024-03-02 RX ADMIN — INSULIN GLARGINE 20 UNITS: 100 INJECTION, SOLUTION SUBCUTANEOUS at 22:28

## 2024-03-02 RX ADMIN — LOSARTAN POTASSIUM 50 MG: 50 TABLET, FILM COATED ORAL at 21:24

## 2024-03-02 ASSESSMENT — COGNITIVE AND FUNCTIONAL STATUS - GENERAL
WALKING IN HOSPITAL ROOM: 3 - A LITTLE
CLIMB 3 TO 5 STEPS WITH RAILING: 3 - A LITTLE
STANDING UP FROM CHAIR USING ARMS: 3 - A LITTLE
CLIMB 3 TO 5 STEPS WITH RAILING: 3 - A LITTLE
MOVING TO AND FROM BED TO CHAIR: 4 - NONE
WALKING IN HOSPITAL ROOM: 3 - A LITTLE
MOVING TO AND FROM BED TO CHAIR: 4 - NONE
MOVING TO AND FROM BED TO CHAIR: 4 - NONE
STANDING UP FROM CHAIR USING ARMS: 3 - A LITTLE
CLIMB 3 TO 5 STEPS WITH RAILING: 3 - A LITTLE
STANDING UP FROM CHAIR USING ARMS: 3 - A LITTLE
WALKING IN HOSPITAL ROOM: 3 - A LITTLE

## 2024-03-02 NOTE — PLAN OF CARE
Plan of Care Review  Progress: improving  Outcome Evaluation: Pt OOB x 1 with RW.  Cultures pending, ID consulted-IV Vanco and IV Zosyn.  VSS, SR on tele.  Pain controlled with PRN tylenol and tramadol.  Able to sleep better tonight per patient.

## 2024-03-02 NOTE — PLAN OF CARE
Problem: Adult Inpatient Plan of Care  Goal: Plan of Care Review  Outcome: Progressing  Flowsheets (Taken 3/2/2024 3658)  Progress: improving

## 2024-03-02 NOTE — PLAN OF CARE
Care Coordination Discharge Plan Note     Discharge Needs Assessment  Concerns to be Addressed: care coordination/care conferences, discharge planning  Current Discharge Risk: lives alone    Anticipated Discharge Plan  Anticipated Discharge Disposition: home with home health  Type of Home Care Services: home PT, nursing    Patient Choice  Offered/Gave Vendor List: no  Patient's Choice of Community Agency(s): open to [facility]    ---------------------------------------------------------------------------------------------------------------------    Interdisciplinary Discharge Plan Review:  Participants:social work/services    Concerns Comments: Pt listed on weekend handoff to follow. Chart reviewed. Per notes, pt not stable for DC today. [facility] confirmed [facility] in 2 days. Remains on IV abx, following for plan. Care Coordination will continue to follow.    Discharge Plan:   Disposition/Destination: Home Health Care - [facility] / Home  Discharge Facility:    Community Resources:      Discharge Transportation:  Is Out of Hospital DNR needed at Discharge: no  Does patient need discharge transport? No (family)    Anticipated Discharge Plan: Home alone[facility]  Following for abx plan  Family to transport   I will sign up on any home health orders for the patient.

## 2024-03-02 NOTE — PLAN OF CARE
Plan of Care Review  Plan of Care Reviewed With: patient  Progress: no change  Outcome Evaluation: pt aaox4. Assist x1 with RW. Receiving iv vanco and zosyn...cultures pending. Tele shows NSR. Drsg changed to left foot as ordered. pain controlled with tylenol and tramadol.

## 2024-03-02 NOTE — PROGRESS NOTES
Hospital Medicine Service -  Daily Progress Note       SUBJECTIVE   Interval History:     Patient was seen and examined with her sister at the bedside. She reports pain is currently controlled. She reiterated that she does not want surgical intervention on her foot and would like to treat with antibiotics first.     OBJECTIVE      Vital signs in last 24 hours:  Temp:  [36.5 °C (97.7 °F)-37.1 °C (98.7 °F)] 36.6 °C (97.8 °F)  Heart Rate:  [73-98] 85  Resp:  [14-18] 18  BP: (108-158)/(54-72) 136/63    Intake/Output Summary (Last 24 hours) at 3/2/2024 1038  Last data filed at 3/2/2024 0602  Gross per 24 hour   Intake 710 ml   Output --   Net 710 ml       PHYSICAL EXAMINATION        GENERAL APPEARANCE Well developed, well nourished, NAD   MUCUS MEMBRANES Moist   LUNGS CTAB, no signs of respiratory distress   CHEST S1/S2, RRR, no m/r/g appreciated   EXTREMITIES CHOE x4, left foot dressing with purulent drainage between 2nd and 3rd digits, +pedal pulse   SKIN No obvious rash   HEENT  NEURO Anicteric  Follows commands, no dysarthria or facial asymmetry         LINES, CATHETERS, DRAINS, AIRWAYS, AND WOUNDS   Lines, Drains, and Airways:  Wounds (agree with documentation and present on admission):  Peripheral IV (Adult) 02/29/24 Left Antecubital (Active)   Number of days: 1       Peripheral IV (Adult) 02/29/24 Right Hand (Active)   Number of days: 1       Wound Anterior;Left Toe (2nd) (Active)   Number of days: 46       Wound Other (comment) Anterior;Right Toe (5th) (Active)   Number of days: 0         Comments:      LABS / IMAGING / TELE      Labs  Results from last 7 days   Lab Units 03/01/24  0335 02/29/24  1830   SODIUM mEQ/L 139 138   POTASSIUM mEQ/L 3.5 4.0   CHLORIDE mEQ/L 105 102   CO2 mEQ/L 26 28   BUN mg/dL 15 21   CREATININE mg/dL 0.5* 0.7   GLUCOSE mg/dL 128* 157*   CALCIUM mg/dL 8.3* 9.4      Results from last 7 days   Lab Units 03/01/24  0335 02/29/24  1830   WBC K/uL 43.45* 52.42*   HEMOGLOBIN g/dL 10.7* 12.1    HEMATOCRIT % 34.5* 39.0   PLATELETS K/uL 539* 633*        SARS-CoV-2 (COVID-19) (no units)   Date/Time Value   01/15/2024 1345 Negative       Imaging  MRI FOOT LEFT WITHOUT CONTRAST    Result Date: 3/1/2024  Narrative: CLINICAL HISTORY: Osteomyelitis, foot Left foot infection/concerns for osteomyelitis     Impression: IMPRESSION: Plantar wound with multifocal acute osteomyelitis and pathological fractures in the left foot as described in detail below. Pockets of abscess are seen extending from the plantar wound about the regions of osteomyelitis. Please see the body of the report for additional findings and descriptions. COMMENT: Comparison: X-rays dated 2/29/2024. MRI from January 1624. Technique: Noncontrast MRI left forefoot. Findings: Since the previous MRI patient has undergone second toe amputation. However, there is new osteomyelitis in pathological fracture involving the head, neck and distal to mid shaft of the second metatarsal. There is also nonspecific marrow changes possibly representing early osteomyelitis extending to the second metatarsal base. - Marked bony erosion, pathological displaced fracture in and osteomyelitis is present throughout the third metatarsal with the distal shaft/neck fracture displaced laterally. Nonspecific marrow edema without T1 replacement in the proximal portions of the third metatarsal shaft are noted. There is acute osteomyelitis in the third toe and the proximal phalanx and possibly very small amounts in the middle phalanx but not in the distal phalanx. - Acute osteomyelitis in the head and neck of the fourth metatarsal. Erosions and small osteomyelitis in the base of the fourth proximal phalanx with septic arthritis of the fourth metatarsal phalangeal joint. Suspected pathological fracture through osteomyelitis of the fourth proximal phalanx best seen on sagittal image 7. - In the fifth proximal phalanx there is marrow edema like signal but no convincing T1 marrow  replacement. Findings are nonspecific. In the fifth metatarsal head medially there is a small amount of bone marrow edema as well as some early patchy T1 marrow replacement axial image 19 where early ostomy myelitis cannot be excluded. Otherwise no fifth metatarsal osteomyelitis. - At the first metatarsophalangeal joint there is osteoarthritis with mild areas of patchy bone marrow edema in the first metatarsal distally as well as the proximal phalanx. In the lateral aspect of the first metatarsal head there is a small focus of questionable early bony erosion and T1 marrow replacement on axial image 13 where small focus of osteitis cannot be excluded. There is a nonspecific first metatarsophalangeal joint effusion. Nonspecific bone marrow edema in the sesamoids more so in the fibular sesamoid with there is some questionable early T1 marrow replacement and small focus of early isthmus cannot be excluded. --- No evidence of osteomyelitis in the imaged tarsal bones noting mild degenerative joint disease. - There is a wound in the plantar forefoot deep to the third metatarsophalangeal joint with multiple pockets of abscess about the regions of osteomyelitis described above. Diffuse abnormal muscle edema could be related to myositis in the appropriate clinical setting although denervation changes are also probably present. - The Lisfranc ligament appears intact. Diffuse nonspecific soft tissue edema is noted. This should be correlated with the clinical extent of soft tissue infection.     X-RAY CHEST 1 VIEW    Result Date: 3/1/2024  Narrative: CLINICAL HISTORY: Leukocytosis r/o pneumonia     Impression: IMPRESSION: No acute disease in chest. Right rotator cuff calcific tendinitis. COMMENT: Comparison: Chest x-ray 12/13/2021. Technique: A single frontal AP portable upright projection of the chest was obtained. FINDINGS: The lungs are hypoexpanded but grossly clear without focal airspace consolidation, pleural effusion or  pneumothorax. The cardiomediastinal silhouette is within normal limits. The upper abdomen is unremarkable. There is no acute osseous abnormality.  There is amorphous calcification adjacent to the right greater tuberosity which can be seen in the setting of rotator cuff calcific tendinitis.     X-RAY FOOT LEFT 3+ VIEWS    Result Date: 2/29/2024  Narrative: CLINICAL HISTORY: r/o gas gangrene     Impression: IMPRESSION: Prior second toe amputation with new acute osteomyelitis throughout the second metatarsal where there is pathological fracturing. Acute osteomyelitis and displaced pathological fracturing of the third metatarsal. Probable mild osteomyelitis in the base of the proximal third phalanx. Probable osteomyelitis involving the thi fourth metatarsal head medially as well as probably in the fourth proximal phalanx. Suggest MRI for more definitive assessment of the extent of infection. Plantar calcaneal spurring and mild degenerative disease. Extensive atherosclerotic vascular calcifications. Wound overlying the second toe amputation site with irregular soft tissue. Possible small amounts of gas in the third toe. 4 views of the left foot. COMMENT: Comparison: X-rays from 1 1/15/2024. See impression    ASSESSMENT AND PLAN      * Type 2 diabetes mellitus with left diabetic foot infection   Assessment & Plan  Patient presenting with worsening left foot wound  Recent admission  1/17/2024 peripheral angiogram with atherectomy\PTA of left AT artery   1/18/2024 left foot I&D, left second toe amputation  S/p IV vancomycin and discharged on Keflex through 1/21/2024  - MRI Left foot 3/1/2024: Plantar wound with multifocal acute osteomyelitis     - Podiatry consult appreciated: rec surgical intervention but patient declining amputation at this time and would like to try abx therapy alone first  - ID consult appreciated: continue Vanc/Fotaz, follow wound cx  - Patient requested secondary opinion - vascular Surgery consult  appreciated   - Pain control with Tylenol prn, Tramadol prn    HLD (hyperlipidemia)  Assessment & Plan  - Continue Atorvastatin    Leukemoid reaction  Assessment & Plan  Suspect elevated WBC is a leukemoid reaction from severe diabetic left foot infection  She has no history of leukemia/lymphoma  UA and CXR not consistent with infection    - Abx as above under diabetic foot infection  - WBC improving  - Follow CBC    Primary hypertension  Assessment & Plan  BP stable     - Continue home Amlodipine, HCTZ, Losartan with hold parameters    Hypothyroidism  Assessment & Plan  - Continue Levothyroxine    Type 2 diabetes mellitus with diabetic neuropathy (CMS/HCC)  Assessment & Plan  A1c 1/31/2024 10.1 indicating poor control  She follows with Wanda VILLARREAL Endocrinology at [facility], [facility]  - ADA, NCS diet  - Continue Gabapentin  - On Rybelsus (non formulary) and Jardiance) in setting of potential surgical planning  - Endocrinology consult to assist with DM management       VTE Assessment: Padua VTE Score: 1  VTE Prophylaxis:  Current anticoagulants:    •None      Code Status: Full Code      Estimated Discharge Date: 3/4/2024   Disposition Planning: Continue as inpatient     Luisa JOHAN Herrera DO  3/2/2024

## 2024-03-02 NOTE — CONSULTS
Vascular Surgery Consult Note    Subjective   Pt is a 63 year old female who presents with nonhealing wound of the LEFT lower extremtiy. 2nd toe previously amputated but reportedly healing, but 3rd toe that is also necrotic.     Of note; patient has recently undergone intervention with Dr. Aguilar with tibial stenting.    She is motor and sensory exam at baseline her wounds are chronic, MRI with plantar osteo.      Assessment   63 y.o. female being consulted for wounds of the left forefoot.  Plan     I will defer care of our patient's forefoot gangrene to Dr. aguilar as he sees patients in [facility] and our patient voiced that she would like to continue under his care for continuity purposes.    As always as vascular surgeons, we are available to assist in any way including catheter based interventions as well as traditional surgical bypasses. If there is no further catheter based interventions that can optimize our patient's revascularization attempts, please feel free to call us again to re-evaluate for surgical bypass options.    If the foot becomes nonsalvageable, and requires major amputation, please reach out and alert us as well.    Please call back with any further questions or concerns but vascular will sign off.

## 2024-03-03 LAB
ANION GAP SERPL CALC-SCNC: 7 MEQ/L (ref 3–15)
ANISOCYTOSIS BLD QL SMEAR: ABNORMAL
BASOPHILS # BLD: 1.1 K/UL (ref 0.01–0.1)
BASOPHILS NFR BLD: 3 %
BUN SERPL-MCNC: 16 MG/DL (ref 7–25)
CALCIUM SERPL-MCNC: 8.4 MG/DL (ref 8.6–10.3)
CHLORIDE SERPL-SCNC: 105 MEQ/L (ref 98–107)
CO2 SERPL-SCNC: 26 MEQ/L (ref 21–31)
CREAT SERPL-MCNC: 0.7 MG/DL (ref 0.6–1.2)
DIFFERENTIAL METHOD BLD: ABNORMAL
EGFRCR SERPLBLD CKD-EPI 2021: >60 ML/MIN/1.73M*2
EOSINOPHIL # BLD: 2.21 K/UL (ref 0.04–0.36)
EOSINOPHIL NFR BLD: 6 %
ERYTHROCYTE [DISTWIDTH] IN BLOOD BY AUTOMATED COUNT: 15.2 % (ref 11.7–14.4)
GLUCOSE BLD-MCNC: 135 MG/DL (ref 70–99)
GLUCOSE BLD-MCNC: 162 MG/DL (ref 70–99)
GLUCOSE BLD-MCNC: 187 MG/DL (ref 70–99)
GLUCOSE BLD-MCNC: 192 MG/DL (ref 70–99)
GLUCOSE BLD-MCNC: 215 MG/DL (ref 70–99)
GLUCOSE BLD-MCNC: 221 MG/DL (ref 70–99)
GLUCOSE SERPL-MCNC: 166 MG/DL (ref 70–99)
HCT VFR BLD AUTO: 33.8 % (ref 35–45)
HGB BLD-MCNC: 10.4 G/DL (ref 11.8–15.7)
LYMPHOCYTES # BLD: 6.62 K/UL (ref 1.2–3.5)
LYMPHOCYTES NFR BLD: 18 %
MAGNESIUM SERPL-MCNC: 1.8 MG/DL (ref 1.8–2.5)
MCH RBC QN AUTO: 27.3 PG (ref 28–33.2)
MCHC RBC AUTO-ENTMCNC: 30.8 G/DL (ref 32.2–35.5)
MCV RBC AUTO: 88.7 FL (ref 83–98)
MONOCYTES # BLD: 0.74 K/UL (ref 0.28–0.8)
MONOCYTES NFR BLD: 2 %
NEUTS BAND # BLD: 0.37 K/UL (ref 0–0.53)
NEUTS BAND # BLD: 25.75 K/UL (ref 1.7–7)
NEUTS BAND NFR BLD: 1 %
NEUTS SEG NFR BLD: 70 %
PDW BLD AUTO: 10.1 FL (ref 9.4–12.3)
PLAT MORPH BLD: NORMAL
PLATELET # BLD AUTO: 496 K/UL (ref 150–369)
PLATELET # BLD EST: ABNORMAL 10*3/UL
POCT TEST: ABNORMAL
POTASSIUM SERPL-SCNC: 4 MEQ/L (ref 3.5–5.1)
RBC # BLD AUTO: 3.81 M/UL (ref 3.93–5.22)
SODIUM SERPL-SCNC: 138 MEQ/L (ref 136–145)
WBC # BLD AUTO: 36.79 K/UL (ref 3.8–10.5)

## 2024-03-03 PROCEDURE — 99233 SBSQ HOSP IP/OBS HIGH 50: CPT | Performed by: STUDENT IN AN ORGANIZED HEALTH CARE EDUCATION/TRAINING PROGRAM

## 2024-03-03 PROCEDURE — 83735 ASSAY OF MAGNESIUM: CPT | Performed by: STUDENT IN AN ORGANIZED HEALTH CARE EDUCATION/TRAINING PROGRAM

## 2024-03-03 PROCEDURE — 63700000 HC SELF-ADMINISTRABLE DRUG: Performed by: HOSPITALIST

## 2024-03-03 PROCEDURE — 63700000 HC SELF-ADMINISTRABLE DRUG: Performed by: STUDENT IN AN ORGANIZED HEALTH CARE EDUCATION/TRAINING PROGRAM

## 2024-03-03 PROCEDURE — 36415 COLL VENOUS BLD VENIPUNCTURE: CPT | Performed by: STUDENT IN AN ORGANIZED HEALTH CARE EDUCATION/TRAINING PROGRAM

## 2024-03-03 PROCEDURE — 80048 BASIC METABOLIC PNL TOTAL CA: CPT | Performed by: STUDENT IN AN ORGANIZED HEALTH CARE EDUCATION/TRAINING PROGRAM

## 2024-03-03 PROCEDURE — 85025 COMPLETE CBC W/AUTO DIFF WBC: CPT | Performed by: STUDENT IN AN ORGANIZED HEALTH CARE EDUCATION/TRAINING PROGRAM

## 2024-03-03 PROCEDURE — 25800000 HC PHARMACY IV SOLUTIONS: Performed by: INTERNAL MEDICINE

## 2024-03-03 PROCEDURE — 21400000 HC ROOM AND CARE CCU/INTERMEDIATE

## 2024-03-03 PROCEDURE — 63600000 HC DRUGS/DETAIL CODE: Mod: JZ | Performed by: INTERNAL MEDICINE

## 2024-03-03 RX ADMIN — PIPERACILLIN AND TAZOBACTAM 4.5 G: 4; .5 INJECTION, POWDER, LYOPHILIZED, FOR SOLUTION INTRAVENOUS; PARENTERAL at 17:32

## 2024-03-03 RX ADMIN — INSULIN LISPRO 2 UNITS: 100 INJECTION, SOLUTION INTRAVENOUS; SUBCUTANEOUS at 18:50

## 2024-03-03 RX ADMIN — INSULIN LISPRO 7 UNITS: 100 INJECTION, SOLUTION INTRAVENOUS; SUBCUTANEOUS at 18:46

## 2024-03-03 RX ADMIN — ATORVASTATIN CALCIUM 40 MG: 40 TABLET, FILM COATED ORAL at 17:32

## 2024-03-03 RX ADMIN — ACETAMINOPHEN 650 MG: 325 TABLET ORAL at 22:33

## 2024-03-03 RX ADMIN — GABAPENTIN 300 MG: 300 CAPSULE ORAL at 20:38

## 2024-03-03 RX ADMIN — INSULIN LISPRO 4 UNITS: 100 INJECTION, SOLUTION INTRAVENOUS; SUBCUTANEOUS at 13:23

## 2024-03-03 RX ADMIN — DOCUSATE SODIUM 100 MG: 100 CAPSULE, LIQUID FILLED ORAL at 20:38

## 2024-03-03 RX ADMIN — AMLODIPINE BESYLATE 5 MG: 5 TABLET ORAL at 08:46

## 2024-03-03 RX ADMIN — DOCUSATE SODIUM 100 MG: 100 CAPSULE, LIQUID FILLED ORAL at 08:46

## 2024-03-03 RX ADMIN — GABAPENTIN 300 MG: 300 CAPSULE ORAL at 08:46

## 2024-03-03 RX ADMIN — PIPERACILLIN AND TAZOBACTAM 4.5 G: 4; .5 INJECTION, POWDER, LYOPHILIZED, FOR SOLUTION INTRAVENOUS; PARENTERAL at 12:25

## 2024-03-03 RX ADMIN — ASPIRIN 81 MG: 81 TABLET, COATED ORAL at 08:46

## 2024-03-03 RX ADMIN — INSULIN GLARGINE 20 UNITS: 100 INJECTION, SOLUTION SUBCUTANEOUS at 22:35

## 2024-03-03 RX ADMIN — PIPERACILLIN AND TAZOBACTAM 4.5 G: 4; .5 INJECTION, POWDER, LYOPHILIZED, FOR SOLUTION INTRAVENOUS; PARENTERAL at 22:32

## 2024-03-03 RX ADMIN — TRAMADOL HYDROCHLORIDE 50 MG: 50 TABLET, COATED ORAL at 22:33

## 2024-03-03 RX ADMIN — CLOPIDOGREL BISULFATE 75 MG: 75 TABLET ORAL at 08:46

## 2024-03-03 RX ADMIN — TRAMADOL HYDROCHLORIDE 50 MG: 50 TABLET, COATED ORAL at 03:36

## 2024-03-03 RX ADMIN — INSULIN LISPRO 4 UNITS: 100 INJECTION, SOLUTION INTRAVENOUS; SUBCUTANEOUS at 22:35

## 2024-03-03 RX ADMIN — INSULIN LISPRO 7 UNITS: 100 INJECTION, SOLUTION INTRAVENOUS; SUBCUTANEOUS at 13:23

## 2024-03-03 RX ADMIN — HYDROCHLOROTHIAZIDE 12.5 MG: 12.5 TABLET ORAL at 08:46

## 2024-03-03 RX ADMIN — GABAPENTIN 300 MG: 300 CAPSULE ORAL at 14:51

## 2024-03-03 RX ADMIN — LEVOTHYROXINE SODIUM 25 MCG: 0.03 TABLET ORAL at 05:29

## 2024-03-03 RX ADMIN — INSULIN LISPRO 7 UNITS: 100 INJECTION, SOLUTION INTRAVENOUS; SUBCUTANEOUS at 09:30

## 2024-03-03 RX ADMIN — LOSARTAN POTASSIUM 50 MG: 50 TABLET, FILM COATED ORAL at 22:33

## 2024-03-03 RX ADMIN — Medication 6 MG: at 22:40

## 2024-03-03 RX ADMIN — PIPERACILLIN AND TAZOBACTAM 4.5 G: 4; .5 INJECTION, POWDER, LYOPHILIZED, FOR SOLUTION INTRAVENOUS; PARENTERAL at 05:29

## 2024-03-03 RX ADMIN — ACETAMINOPHEN 650 MG: 325 TABLET ORAL at 09:28

## 2024-03-03 RX ADMIN — INSULIN LISPRO 2 UNITS: 100 INJECTION, SOLUTION INTRAVENOUS; SUBCUTANEOUS at 03:43

## 2024-03-03 ASSESSMENT — COGNITIVE AND FUNCTIONAL STATUS - GENERAL
STANDING UP FROM CHAIR USING ARMS: 3 - A LITTLE
WALKING IN HOSPITAL ROOM: 3 - A LITTLE
MOVING TO AND FROM BED TO CHAIR: 4 - NONE
WALKING IN HOSPITAL ROOM: 3 - A LITTLE
MOVING TO AND FROM BED TO CHAIR: 4 - NONE
STANDING UP FROM CHAIR USING ARMS: 3 - A LITTLE
CLIMB 3 TO 5 STEPS WITH RAILING: 3 - A LITTLE
CLIMB 3 TO 5 STEPS WITH RAILING: 3 - A LITTLE

## 2024-03-03 NOTE — PLAN OF CARE
Care Coordination Discharge Plan Note     Discharge Needs Assessment  Concerns to be Addressed: care coordination/care conferences, discharge planning  Current Discharge Risk: lives alone    Anticipated Discharge Plan  Anticipated Discharge Disposition: home with home health  Type of Home Care Services: home PT, nursing    Patient Choice  Offered/Gave Vendor List: no  Patient's Choice of Community Agency(s): open to [facility]    ---------------------------------------------------------------------------------------------------------------------    Interdisciplinary Discharge Plan Review:  Participants:physician, social work/services    Concerns Comments: Chart reviewed. [place] [facility] [place] not for DC today, WBC trending down but still high, cx pending. Pt remains on IV abx. Care Coordination will continue to follow.    Discharge Plan:   Disposition/Destination: Home Health Care - [facility] / Home  Discharge Facility:    Community Resources:      Discharge Transportation:  Is Out of Hospital DNR needed at Discharge: no  Does patient need discharge transport? No (family)    Anticipated Discharge Plan: Home alone, resume [facility]  Following for abx plan  Family to transport

## 2024-03-03 NOTE — PROGRESS NOTES
Hospital Medicine Service -  Daily Progress Note       SUBJECTIVE   Interval History:     Patient was seen and examined at the bedside. She reports right foot pain is well controlled. She states she would like to go to a SNF if IV abx are indicated on discharge     OBJECTIVE      Vital signs in last 24 hours:  Temp:  [36.2 °C (97.2 °F)-36.8 °C (98.2 °F)] 36.2 °C (97.2 °F)  Heart Rate:  [76-93] 85  Resp:  [16-18] 16  BP: (125-163)/(59-74) 125/62    Intake/Output Summary (Last 24 hours) at 3/3/2024 1242  Last data filed at 3/3/2024 1100  Gross per 24 hour   Intake 346 ml   Output --   Net 346 ml       PHYSICAL EXAMINATION        GENERAL APPEARANCE Well developed, well nourished, NAD   MUCUS MEMBRANES Moist   LUNGS CTAB, no signs of respiratory distress   CHEST S1/S2, RRR, no m/r/g appreciated   EXTREMITIES CHOE x4, left foot dressing CDI, +pedal pulse   SKIN No obvious rash   HEENT  NEURO Anicteric  Follows commands, no dysarthria or facial asymmetry         LINES, CATHETERS, DRAINS, AIRWAYS, AND WOUNDS   Lines, Drains, and Airways:  Wounds (agree with documentation and present on admission):  Peripheral IV (Adult) 02/29/24 Left Antecubital (Active)   Number of days: 1       Peripheral IV (Adult) 02/29/24 Right Hand (Active)   Number of days: 1       Wound Anterior;Left Toe (2nd) (Active)   Number of days: 46       Wound Other (comment) Anterior;Right Toe (5th) (Active)   Number of days: 0         Comments:      LABS / IMAGING / TELE      Labs  Results from last 7 days   Lab Units 03/03/24  0342 03/02/24  1053 03/01/24  0335   SODIUM mEQ/L 138 138 139   POTASSIUM mEQ/L 4.0 4.0 3.5   CHLORIDE mEQ/L 105 104 105   CO2 mEQ/L 26 27 26   BUN mg/dL 16 14 15   CREATININE mg/dL 0.7 0.7 0.5*   GLUCOSE mg/dL 166* 255* 128*   CALCIUM mg/dL 8.4* 8.6 8.3*      Results from last 7 days   Lab Units 03/03/24  0342 03/02/24  1053 03/01/24  0335   WBC K/uL 36.79* 39.24* 43.45*   HEMOGLOBIN g/dL 10.4* 11.0* 10.7*   HEMATOCRIT % 33.8*  36.1 34.5*   PLATELETS K/uL 496* 542* 539*        SARS-CoV-2 (COVID-19) (no units)   Date/Time Value   01/15/2024 1345 Negative       Imaging  MRI FOOT LEFT WITHOUT CONTRAST    Result Date: 3/1/2024  Narrative: CLINICAL HISTORY: Osteomyelitis, foot Left foot infection/concerns for osteomyelitis     Impression: IMPRESSION: Plantar wound with multifocal acute osteomyelitis and pathological fractures in the left foot as described in detail below. Pockets of abscess are seen extending from the plantar wound about the regions of osteomyelitis. Please see the body of the report for additional findings and descriptions. COMMENT: Comparison: X-rays dated 2/29/2024. MRI from January 1624. Technique: Noncontrast MRI left forefoot. Findings: Since the previous MRI patient has undergone second toe amputation. However, there is new osteomyelitis in pathological fracture involving the head, neck and distal to mid shaft of the second metatarsal. There is also nonspecific marrow changes possibly representing early osteomyelitis extending to the second metatarsal base. - Marked bony erosion, pathological displaced fracture in and osteomyelitis is present throughout the third metatarsal with the distal shaft/neck fracture displaced laterally. Nonspecific marrow edema without T1 replacement in the proximal portions of the third metatarsal shaft are noted. There is acute osteomyelitis in the third toe and the proximal phalanx and possibly very small amounts in the middle phalanx but not in the distal phalanx. - Acute osteomyelitis in the head and neck of the fourth metatarsal. Erosions and small osteomyelitis in the base of the fourth proximal phalanx with septic arthritis of the fourth metatarsal phalangeal joint. Suspected pathological fracture through osteomyelitis of the fourth proximal phalanx best seen on sagittal image 7. - In the fifth proximal phalanx there is marrow edema like signal but no convincing T1 marrow replacement.  Findings are nonspecific. In the fifth metatarsal head medially there is a small amount of bone marrow edema as well as some early patchy T1 marrow replacement axial image 19 where early ostomy myelitis cannot be excluded. Otherwise no fifth metatarsal osteomyelitis. - At the first metatarsophalangeal joint there is osteoarthritis with mild areas of patchy bone marrow edema in the first metatarsal distally as well as the proximal phalanx. In the lateral aspect of the first metatarsal head there is a small focus of questionable early bony erosion and T1 marrow replacement on axial image 13 where small focus of osteitis cannot be excluded. There is a nonspecific first metatarsophalangeal joint effusion. Nonspecific bone marrow edema in the sesamoids more so in the fibular sesamoid with there is some questionable early T1 marrow replacement and small focus of early isthmus cannot be excluded. --- No evidence of osteomyelitis in the imaged tarsal bones noting mild degenerative joint disease. - There is a wound in the plantar forefoot deep to the third metatarsophalangeal joint with multiple pockets of abscess about the regions of osteomyelitis described above. Diffuse abnormal muscle edema could be related to myositis in the appropriate clinical setting although denervation changes are also probably present. - The Lisfranc ligament appears intact. Diffuse nonspecific soft tissue edema is noted. This should be correlated with the clinical extent of soft tissue infection.     X-RAY CHEST 1 VIEW    Result Date: 3/1/2024  Narrative: CLINICAL HISTORY: Leukocytosis r/o pneumonia     Impression: IMPRESSION: No acute disease in chest. Right rotator cuff calcific tendinitis. COMMENT: Comparison: Chest x-ray 12/13/2021. Technique: A single frontal AP portable upright projection of the chest was obtained. FINDINGS: The lungs are hypoexpanded but grossly clear without focal airspace consolidation, pleural effusion or pneumothorax.  The cardiomediastinal silhouette is within normal limits. The upper abdomen is unremarkable. There is no acute osseous abnormality.  There is amorphous calcification adjacent to the right greater tuberosity which can be seen in the setting of rotator cuff calcific tendinitis.     X-RAY FOOT LEFT 3+ VIEWS    Result Date: 2/29/2024  Narrative: CLINICAL HISTORY: r/o gas gangrene     Impression: IMPRESSION: Prior second toe amputation with new acute osteomyelitis throughout the second metatarsal where there is pathological fracturing. Acute osteomyelitis and displaced pathological fracturing of the third metatarsal. Probable mild osteomyelitis in the base of the proximal third phalanx. Probable osteomyelitis involving the thi fourth metatarsal head medially as well as probably in the fourth proximal phalanx. Suggest MRI for more definitive assessment of the extent of infection. Plantar calcaneal spurring and mild degenerative disease. Extensive atherosclerotic vascular calcifications. Wound overlying the second toe amputation site with irregular soft tissue. Possible small amounts of gas in the third toe. 4 views of the left foot. COMMENT: Comparison: X-rays from 1 1/15/2024. See impression    ASSESSMENT AND PLAN      * Type 2 diabetes mellitus with left diabetic foot infection   Assessment & Plan  Patient presenting with worsening left foot wound  Recent admission  1/17/2024 peripheral angiogram with atherectomy\PTA of left AT artery   1/18/2024 left foot I&D, left second toe amputation  S/p IV vancomycin and discharged on Keflex through 1/21/2024  - MRI Left foot 3/1/2024: Plantar wound with multifocal acute osteomyelitis   - Wound Cx: Serratia, Diptheroids, Enterobacter, Acinetobacter- sensitivities pending    - Podiatry consult appreciated: rec surgical intervention but patient declining amputation at this time and would like to try abx therapy alone first  - ID consult appreciated: continue Vanc/Fotaz, follow wound  cx  - Patient requested secondary opinion - vascular Surgery consult appreciated   - Pain control with Tylenol prn, Tramadol prn    HLD (hyperlipidemia)  Assessment & Plan  - Continue Atorvastatin    Leukemoid reaction  Assessment & Plan  Suspect elevated WBC is a leukemoid reaction from severe diabetic left foot infection  She has no history of leukemia/lymphoma  UA and CXR not consistent with infection  - WBC elevated but improving    - Abx as above under diabetic foot infection  - Follow CBC    Primary hypertension  Assessment & Plan  BP stable     - Continue home Amlodipine, HCTZ, Losartan with hold parameters    Hypothyroidism  Assessment & Plan  - Continue Levothyroxine    Type 2 diabetes mellitus with diabetic neuropathy (CMS/HCC)  Assessment & Plan  A1c 1/31/2024 10.1 indicating poor control  She follows with Wanda VILLARREAL Endocrinology at [facility]    - [place], [facility]  - ADA, NCS diet  - Continue Gabapentin  - On Rybelsus (non formulary) and Jardiance) in setting of potential surgical planning  - Endocrinology consult to assist with DM management       VTE Assessment: Padua VTE Score: 1  VTE Prophylaxis:  Current anticoagulants:    •None      Code Status: Full Code      Estimated Discharge Date: 3/4/2024   Disposition Planning: Continue as inpatient. If recommended for IV abx on discharge, patient prefers dc to SNF     Luisa Herrera, DO  3/3/2024

## 2024-03-03 NOTE — PLAN OF CARE
Plan of Care Review  Plan of Care Reviewed With: patient  Progress: improving  Outcome Evaluation: Pt AAOx4.  OOB with assist x1.  IV Zosyn continues. VSS.  Blood cultures pending. SR on telemonitor. Denied chest pain or sob.  Left foot Dressing is CDI.  left foot pain controlled with tramadol. Blood sugar was 187 mg/DL at 3 AM. Insulin given per SSI. WBC trending down to 36.79 in AM.

## 2024-03-03 NOTE — PROGRESS NOTES
Vancomycin Dosing by Pharmacy Consult Discontinued    IV Vancomycin Dosing by Pharmacy Protocol was discontinued.  Pharmacy will sign off and no longer dose vancomycin for this patient.    Danielle Damico, PharmD

## 2024-03-04 LAB
ANISOCYTOSIS BLD QL SMEAR: ABNORMAL
BACTERIA BLD CULT: NORMAL
BACTERIA BLD CULT: NORMAL
BASOPHILS # BLD: 1.69 K/UL (ref 0.01–0.1)
BASOPHILS NFR BLD: 4 %
DIFFERENTIAL METHOD BLD: ABNORMAL
EOSINOPHIL # BLD: 0.84 K/UL (ref 0.04–0.36)
EOSINOPHIL NFR BLD: 2 %
ERYTHROCYTE [DISTWIDTH] IN BLOOD BY AUTOMATED COUNT: 15.5 % (ref 11.7–14.4)
GLUCOSE BLD-MCNC: 136 MG/DL (ref 70–99)
GLUCOSE BLD-MCNC: 161 MG/DL (ref 70–99)
GLUCOSE BLD-MCNC: 181 MG/DL (ref 70–99)
GLUCOSE BLD-MCNC: 220 MG/DL (ref 70–99)
GLUCOSE BLD-MCNC: 227 MG/DL (ref 70–99)
HCT VFR BLD AUTO: 35.9 % (ref 35–45)
HGB BLD-MCNC: 11.1 G/DL (ref 11.8–15.7)
HYPOCHROMIA BLD QL SMEAR: ABNORMAL
LYMPHOCYTES # BLD: 6.33 K/UL (ref 1.2–3.5)
LYMPHOCYTES NFR BLD: 15 %
MCH RBC QN AUTO: 27.5 PG (ref 28–33.2)
MCHC RBC AUTO-ENTMCNC: 30.9 G/DL (ref 32.2–35.5)
MCV RBC AUTO: 88.9 FL (ref 83–98)
MONOCYTES # BLD: 2.11 K/UL (ref 0.28–0.8)
MONOCYTES NFR BLD: 5 %
NEUTS BAND # BLD: 31.23 K/UL (ref 1.7–7)
NEUTS SEG NFR BLD: 74 %
PDW BLD AUTO: 10.1 FL (ref 9.4–12.3)
PLAT MORPH BLD: NORMAL
PLATELET # BLD AUTO: 476 K/UL (ref 150–369)
PLATELET # BLD EST: ABNORMAL 10*3/UL
POCT TEST: ABNORMAL
RBC # BLD AUTO: 4.04 M/UL (ref 3.93–5.22)
WBC # BLD AUTO: 42.2 K/UL (ref 3.8–10.5)

## 2024-03-04 PROCEDURE — 63600000 HC DRUGS/DETAIL CODE: Mod: JZ | Performed by: STUDENT IN AN ORGANIZED HEALTH CARE EDUCATION/TRAINING PROGRAM

## 2024-03-04 PROCEDURE — 63700000 HC SELF-ADMINISTRABLE DRUG: Performed by: HOSPITALIST

## 2024-03-04 PROCEDURE — 02HV33Z INSERTION OF INFUSION DEVICE INTO SUPERIOR VENA CAVA, PERCUTANEOUS APPROACH: ICD-10-PCS | Performed by: STUDENT IN AN ORGANIZED HEALTH CARE EDUCATION/TRAINING PROGRAM

## 2024-03-04 PROCEDURE — 85025 COMPLETE CBC W/AUTO DIFF WBC: CPT | Performed by: STUDENT IN AN ORGANIZED HEALTH CARE EDUCATION/TRAINING PROGRAM

## 2024-03-04 PROCEDURE — 21400000 HC ROOM AND CARE CCU/INTERMEDIATE

## 2024-03-04 PROCEDURE — 99232 SBSQ HOSP IP/OBS MODERATE 35: CPT | Performed by: STUDENT IN AN ORGANIZED HEALTH CARE EDUCATION/TRAINING PROGRAM

## 2024-03-04 PROCEDURE — 63600000 HC DRUGS/DETAIL CODE: Mod: JZ | Performed by: INTERNAL MEDICINE

## 2024-03-04 PROCEDURE — 25800000 HC PHARMACY IV SOLUTIONS: Performed by: INTERNAL MEDICINE

## 2024-03-04 PROCEDURE — 63700000 HC SELF-ADMINISTRABLE DRUG: Performed by: STUDENT IN AN ORGANIZED HEALTH CARE EDUCATION/TRAINING PROGRAM

## 2024-03-04 PROCEDURE — 36415 COLL VENOUS BLD VENIPUNCTURE: CPT | Performed by: STUDENT IN AN ORGANIZED HEALTH CARE EDUCATION/TRAINING PROGRAM

## 2024-03-04 PROCEDURE — 25800000 HC PHARMACY IV SOLUTIONS: Performed by: STUDENT IN AN ORGANIZED HEALTH CARE EDUCATION/TRAINING PROGRAM

## 2024-03-04 RX ORDER — ENOXAPARIN SODIUM 100 MG/ML
40 INJECTION SUBCUTANEOUS
Status: DISCONTINUED | OUTPATIENT
Start: 2024-03-04 | End: 2024-03-08 | Stop reason: HOSPADM

## 2024-03-04 RX ORDER — SODIUM CHLORIDE 0.9 % (FLUSH) 0.9 %
10 SYRINGE (ML) INJECTION
Status: DISCONTINUED | OUTPATIENT
Start: 2024-03-04 | End: 2024-03-08 | Stop reason: HOSPADM

## 2024-03-04 RX ORDER — SODIUM CHLORIDE 0.9 % (FLUSH) 0.9 %
10 SYRINGE (ML) INJECTION AS NEEDED
Status: DISCONTINUED | OUTPATIENT
Start: 2024-03-04 | End: 2024-03-08 | Stop reason: HOSPADM

## 2024-03-04 RX ORDER — INSULIN LISPRO 100 [IU]/ML
9 INJECTION, SOLUTION INTRAVENOUS; SUBCUTANEOUS
Status: DISCONTINUED | OUTPATIENT
Start: 2024-03-04 | End: 2024-03-06

## 2024-03-04 RX ADMIN — INSULIN LISPRO 4 UNITS: 100 INJECTION, SOLUTION INTRAVENOUS; SUBCUTANEOUS at 13:40

## 2024-03-04 RX ADMIN — INSULIN GLARGINE 20 UNITS: 100 INJECTION, SOLUTION SUBCUTANEOUS at 23:08

## 2024-03-04 RX ADMIN — TRAMADOL HYDROCHLORIDE 50 MG: 50 TABLET, COATED ORAL at 07:47

## 2024-03-04 RX ADMIN — GABAPENTIN 300 MG: 300 CAPSULE ORAL at 14:54

## 2024-03-04 RX ADMIN — LOSARTAN POTASSIUM 50 MG: 50 TABLET, FILM COATED ORAL at 23:05

## 2024-03-04 RX ADMIN — ASPIRIN 81 MG: 81 TABLET, COATED ORAL at 08:58

## 2024-03-04 RX ADMIN — TRAMADOL HYDROCHLORIDE 50 MG: 50 TABLET, COATED ORAL at 23:05

## 2024-03-04 RX ADMIN — HYDROCHLOROTHIAZIDE 12.5 MG: 12.5 TABLET ORAL at 08:58

## 2024-03-04 RX ADMIN — ATORVASTATIN CALCIUM 40 MG: 40 TABLET, FILM COATED ORAL at 17:31

## 2024-03-04 RX ADMIN — INSULIN LISPRO 9 UNITS: 100 INJECTION, SOLUTION INTRAVENOUS; SUBCUTANEOUS at 13:40

## 2024-03-04 RX ADMIN — PIPERACILLIN AND TAZOBACTAM 4.5 G: 4; .5 INJECTION, POWDER, LYOPHILIZED, FOR SOLUTION INTRAVENOUS; PARENTERAL at 05:48

## 2024-03-04 RX ADMIN — DOCUSATE SODIUM 100 MG: 100 CAPSULE, LIQUID FILLED ORAL at 08:59

## 2024-03-04 RX ADMIN — LEVOTHYROXINE SODIUM 25 MCG: 0.03 TABLET ORAL at 05:48

## 2024-03-04 RX ADMIN — Medication 6 MG: at 23:05

## 2024-03-04 RX ADMIN — ACETAMINOPHEN 650 MG: 325 TABLET ORAL at 07:47

## 2024-03-04 RX ADMIN — ACETAMINOPHEN 650 MG: 325 TABLET ORAL at 23:05

## 2024-03-04 RX ADMIN — DOCUSATE SODIUM 100 MG: 100 CAPSULE, LIQUID FILLED ORAL at 20:43

## 2024-03-04 RX ADMIN — ACETAMINOPHEN 650 MG: 325 TABLET ORAL at 17:37

## 2024-03-04 RX ADMIN — AMLODIPINE BESYLATE 5 MG: 5 TABLET ORAL at 08:59

## 2024-03-04 RX ADMIN — INSULIN LISPRO 2 UNITS: 100 INJECTION, SOLUTION INTRAVENOUS; SUBCUTANEOUS at 02:48

## 2024-03-04 RX ADMIN — GABAPENTIN 300 MG: 300 CAPSULE ORAL at 08:59

## 2024-03-04 RX ADMIN — CLOPIDOGREL BISULFATE 75 MG: 75 TABLET ORAL at 08:59

## 2024-03-04 RX ADMIN — ENOXAPARIN SODIUM 40 MG: 40 INJECTION SUBCUTANEOUS at 17:31

## 2024-03-04 RX ADMIN — INSULIN LISPRO 9 UNITS: 100 INJECTION, SOLUTION INTRAVENOUS; SUBCUTANEOUS at 17:40

## 2024-03-04 RX ADMIN — GABAPENTIN 300 MG: 300 CAPSULE ORAL at 20:44

## 2024-03-04 RX ADMIN — INSULIN LISPRO 4 UNITS: 100 INJECTION, SOLUTION INTRAVENOUS; SUBCUTANEOUS at 23:08

## 2024-03-04 RX ADMIN — SODIUM CHLORIDE 2 G: 900 INJECTION INTRAVENOUS at 11:50

## 2024-03-04 RX ADMIN — INSULIN LISPRO 9 UNITS: 100 INJECTION, SOLUTION INTRAVENOUS; SUBCUTANEOUS at 08:57

## 2024-03-04 ASSESSMENT — COGNITIVE AND FUNCTIONAL STATUS - GENERAL
STANDING UP FROM CHAIR USING ARMS: 4 - NONE
CLIMB 3 TO 5 STEPS WITH RAILING: 3 - A LITTLE
STANDING UP FROM CHAIR USING ARMS: 3 - A LITTLE
MOVING TO AND FROM BED TO CHAIR: 4 - NONE
WALKING IN HOSPITAL ROOM: 4 - NONE
WALKING IN HOSPITAL ROOM: 3 - A LITTLE
MOVING TO AND FROM BED TO CHAIR: 4 - NONE
CLIMB 3 TO 5 STEPS WITH RAILING: 4 - NONE

## 2024-03-04 NOTE — PLAN OF CARE
Plan of Care Review  Plan of Care Reviewed With: patient  Progress: improving  Outcome Evaluation: Pt oob assist x1 with wlaker. Tramadol and Tylenol given for pain to left foor. SR on the monitor. Continues on Zosyn. Dressin CDI. Blood cultures pending.

## 2024-03-04 NOTE — PLAN OF CARE
Plan of Care Review  Plan of Care Reviewed With: patient  Progress: improving  Outcome Evaluation: OOB assist x1 w RW to bathroom. Ceftriaxone Q24hr. NSR w PVC on tele. Daily dressing changes. Tylenol + tramadol for pain control. Will need PICC at d/c. Plan for 6 weeks of abx therapy.

## 2024-03-04 NOTE — PROGRESS NOTES
Endocrinology Progress Note  Subjective     Bebe Burks is a 63 y.o. female who was admitted for Toe infection [L08.9]  Closed fracture of second metatarsal bone of left foot, physeal involvement unspecified, initial encounter [S92.322A]  Closed fracture of third metatarsal bone of left foot, physeal involvement unspecified, initial encounter [S92.332A].     She has no complaints this morning. Amenable to basal/bolus insulin on discharge.    Vitals:    03/04/24 1045   BP: (!) 110/54   Pulse: 86   Resp: 20   Temp: 36.4 °C (97.6 °F)   SpO2: 98%       Objective   Physical Exam  Gen: well nourished, no acute distress  Eyes:  normal sclera, no proptosis  HENT:  normocephalic, normal tongue, MMM  Neck:  no thyromegaly  CV:  no edema bilaterally  Pulm:  no use of accessory muscles  Abd:  obese, soft, nontender  Neuro:  CN 2-12 grossly intact  Skin:  no rashes, warm and dry, LLE dressing c/d/i  Psych: normal mood, affect    Labs  Lab Results   Component Value Date    GLUCOSE 166 (H) 03/03/2024    CALCIUM 8.4 (L) 03/03/2024     03/03/2024    K 4.0 03/03/2024    CO2 26 03/03/2024     03/03/2024    BUN 16 03/03/2024    CREATININE 0.7 03/03/2024     Lab Results   Component Value Date    ALT 17 03/01/2024    AST 17 03/01/2024    ALKPHOS 77 03/01/2024    BILITOT 0.5 03/01/2024     Lab Results   Component Value Date    WBC 42.20 (HH) 03/04/2024    HGB 11.1 (L) 03/04/2024    HCT 35.9 03/04/2024    MCV 88.9 03/04/2024     (H) 03/04/2024     Lab Results   Component Value Date    TSH 0.51 01/31/2024    I2JVVGY 98 05/02/2016     Lab Results   Component Value Date    HGBA1C 10.1 (H) 01/31/2024           * Type 2 diabetes mellitus with left diabetic foot infection   Assessment & Plan  Longstanding history of uncontrolled type 2 diabetes, here with worsening left foot infection after recent toe amputation.  Last A1c on file worsened to 10.1%.  Reports a home regimen of NovoLog 70/30 25 units with breakfast and 30  units with dinner, Rybelsus 14 mg daily, and Jardiance 10 mg daily.  She is not quite able to tell me whether or not if her uncontrolled diabetes is related to her foot/wound issues.  Is amenable to switching to basal bolus on discharge for tighter glycemic control.    While inpatient:  Basal insulin:  glargine 20 units nightly  Nutritional insulin:  lispro 9 units TIDAC  Correction insulin: Lispro 2:50 > 150 ACHS and 3 am    On discharge:  - Final insulin recs TBD - will stop mixed insulin  - She can resume her home Rybelsus and Jardiance  - She is welcome to follow up with her endocrine NP or she can follow up in our office (she expressed interest in transferring care here)    Acute osteomyelitis of left foot (CMS/HCC)  Assessment & Plan  S/p recent left toe amputation, on vancomycin and pip-tazo    Hypothyroidism  Assessment & Plan  Clinically and biochemically euthyroid on levothyroxine 25 mcg daily. Continue at this dose,    Primary hypertension  Assessment & Plan  Controlled on HCTZ and losartan      Gail Deleon DO   Endocrinology, Diabetes and Metabolism  [facility]  Epic Chat preferred  Office: [phone]

## 2024-03-04 NOTE — PROGRESS NOTES
Hospital Medicine Service -  Daily Progress Note       SUBJECTIVE   Interval History: Patient was seen and examined at bedside. No acute events overnight. No fever, chills, cp, sob, nausea, vomiting, abdominal pain, headache or dizziness. Reports no significant left foot pain. Endorses chronic right sciatic pain, but this is fairly managed with tramadol     OBJECTIVE      Vital signs in last 24 hours:  Temp:  [36.4 °C (97.6 °F)-36.7 °C (98 °F)] 36.7 °C (98 °F)  Heart Rate:  [79-90] 90  Resp:  [16-20] 18  BP: (110-165)/(54-73) 165/73    Intake/Output Summary (Last 24 hours) at 3/4/2024 1627  Last data filed at 3/4/2024 0832  Gross per 24 hour   Intake 240 ml   Output --   Net 240 ml       Current Facility-Administered Medications   Medication Dose Route Frequency   • acetaminophen  650 mg oral q4h PRN   • amLODIPine  5 mg oral Daily   • aspirin  81 mg oral Daily   • atorvastatin  40 mg oral Daily (6p)   • cefTRIAXone  2 g intravenous q24h INT   • clopidogreL  75 mg oral Daily   • docusate sodium  100 mg oral BID   • enoxaparin  40 mg subcutaneous Daily (6p)   • gabapentin  300 mg oral TID   • hydrochlorothiazide  12.5 mg oral Daily   • insulin glargine U-100  20 Units subcutaneous Nightly   • insulin lispro U-100  2-10 Units subcutaneous ACHS+3AM   • insulin lispro U-100  9 Units subcutaneous TID with meals   • levothyroxine  25 mcg oral Daily (6:30a)   • losartan  50 mg oral Nightly   • melatonin  6 mg oral Nightly   • polyethylene glycol  17 g oral Daily PRN   • sodium chloride  10 mL intravenous q8h INT   • sodium chloride  10 mL intravenous PRN   • traMADoL  50 mg oral q6h PRN        PHYSICAL EXAMINATION      Physical Exam  General: NAD, pleasant, nontoxic, well developed, appears stated age, sitting up in chair comfortably  HEENT: atraumatic, normocephalic, mmm, sclera anicteric  Cardiovascular: RRR, no murmurs; S1/S2+  Pulmonary: CTAB; no rales, rhonchi or wheezing  Gi: Soft, nontender, nondistended, BS  present  Ext: trace peripheral edema, left foot wrapped and in surgical boot  Neuro: Alert and oriented x3; sensations and motor function grossly intact  Psych: Calm, cooperative, appropriate answers       LINES, CATHETERS, DRAINS, AIRWAYS, AND WOUNDS   Lines, Drains, and Airways:  Wounds (agree with documentation and present on admission):  Peripheral IV (Adult) 03/04/24 Left;Posterior Forearm (Active)   Number of days: 0       Wound Anterior;Left Toe (2nd) (Active)   Number of days: 49       Wound Other (comment) Anterior;Right Toe (5th) (Active)   Number of days: 3         Comments:      LABS / IMAGING / TELE      Labs  I have reviewed the patient's pertinent labs.  Significant abnormals are as below.  Results from last 7 days   Lab Units 03/04/24  1102 03/03/24  0342 03/02/24  1053   WBC K/uL 42.20* 36.79* 39.24*   HEMOGLOBIN g/dL 11.1* 10.4* 11.0*   HEMATOCRIT % 35.9 33.8* 36.1   PLATELETS K/uL 476* 496* 542*    Results from last 7 days   Lab Units 03/03/24  0342 03/02/24  1053 03/01/24  0335   SODIUM mEQ/L 138 138 139   POTASSIUM mEQ/L 4.0 4.0 3.5   CHLORIDE mEQ/L 105 104 105   CO2 mEQ/L 26 27 26   BUN mg/dL 16 14 15   CREATININE mg/dL 0.7 0.7 0.5*   GLUCOSE mg/dL 166* 255* 128*            Results from last 7 days   Lab Units 02/29/24  1830   CRP mg/L 37.00*    Results from last 7 days   Lab Units 02/29/24  1830   SED RATE mm/hr 89*          Results from last 7 days   Lab Units 03/01/24  0335 02/29/24  1830   AST IU/L 17 19   ALT IU/L 17 22   ALK PHOS IU/L 77 93   BILIRUBIN TOTAL mg/dL 0.5 0.5   PROTEIN TOTAL g/dL 5.7* 7.6   ALBUMIN g/dL 3.1* 3.8                   Microbiology Results     Procedure Component Value Units Date/Time    Anaerobic Culture / Smear (includes Aerobic Culture) Foot, Right [[id number]]  (Abnormal)  (Susceptibility) Collected: 03/01/24 1046    Specimen: Wound Swab from Foot, Right Updated: 03/04/24 0905     Culture **Positive Culture**      3+ Serratia marcescens      4+ Diphtheroids      Gram Stain Result No WBC Seen      2+ Gram positive bacilli    Blood Culture Blood, Venous [[id number]]  (Normal) Collected: 02/29/24 1928    Specimen: Blood, Venous Updated: 03/03/24 2300     Culture No growth at 72 hours    Blood Culture Blood, Venous [[id number]]  (Normal) Collected: 02/29/24 1830    Specimen: Blood, Venous Updated: 03/03/24 2300     Culture No growth at 72 hours    Anaerobic Culture / Smear (includes Aerobic Culture) [[id number]]  (Abnormal)  (Susceptibility) Collected: 02/15/24 1200    Specimen: Wound Swab from Toe, Left Updated: 02/20/24 1035     Culture **Positive Culture**      4+ Enterobacter cloacae Complex      4+ Acinetobacter species      No Anaerobes Recovered     Gram Stain Result No WBC Seen      No organisms seen             Imaging  I have independently reviewed the patient's pertinent imaging for this hospital visit.   MRI FOOT LEFT WITHOUT CONTRAST    Result Date: 3/1/2024  CLINICAL HISTORY: Osteomyelitis, foot Left foot infection/concerns for osteomyelitis     IMPRESSION: Plantar wound with multifocal acute osteomyelitis and pathological fractures in the left foot as described in detail below. Pockets of abscess are seen extending from the plantar wound about the regions of osteomyelitis. Please see the body of the report for additional findings and descriptions. COMMENT: Comparison: X-rays dated 2/29/2024. MRI from January 1624. Technique: Noncontrast MRI left forefoot. Findings: Since the previous MRI patient has undergone second toe amputation. However, there is new osteomyelitis in pathological fracture involving the head, neck and distal to mid shaft of the second metatarsal. There is also nonspecific marrow changes possibly representing early osteomyelitis extending to the second metatarsal base. - Marked bony erosion, pathological displaced fracture in and osteomyelitis is present throughout the third metatarsal with the distal shaft/neck fracture displaced laterally.  Nonspecific marrow edema without T1 replacement in the proximal portions of the third metatarsal shaft are noted. There is acute osteomyelitis in the third toe and the proximal phalanx and possibly very small amounts in the middle phalanx but not in the distal phalanx. - Acute osteomyelitis in the head and neck of the fourth metatarsal. Erosions and small osteomyelitis in the base of the fourth proximal phalanx with septic arthritis of the fourth metatarsal phalangeal joint. Suspected pathological fracture through osteomyelitis of the fourth proximal phalanx best seen on sagittal image 7. - In the fifth proximal phalanx there is marrow edema like signal but no convincing T1 marrow replacement. Findings are nonspecific. In the fifth metatarsal head medially there is a small amount of bone marrow edema as well as some early patchy T1 marrow replacement axial image 19 where early ostomy myelitis cannot be excluded. Otherwise no fifth metatarsal osteomyelitis. - At the first metatarsophalangeal joint there is osteoarthritis with mild areas of patchy bone marrow edema in the first metatarsal distally as well as the proximal phalanx. In the lateral aspect of the first metatarsal head there is a small focus of questionable early bony erosion and T1 marrow replacement on axial image 13 where small focus of osteitis cannot be excluded. There is a nonspecific first metatarsophalangeal joint effusion. Nonspecific bone marrow edema in the sesamoids more so in the fibular sesamoid with there is some questionable early T1 marrow replacement and small focus of early isthmus cannot be excluded. --- No evidence of osteomyelitis in the imaged tarsal bones noting mild degenerative joint disease. - There is a wound in the plantar forefoot deep to the third metatarsophalangeal joint with multiple pockets of abscess about the regions of osteomyelitis described above. Diffuse abnormal muscle edema could be related to myositis in the  appropriate clinical setting although denervation changes are also probably present. - The Lisfranc ligament appears intact. Diffuse nonspecific soft tissue edema is noted. This should be correlated with the clinical extent of soft tissue infection.     X-RAY CHEST 1 VIEW    Result Date: 3/1/2024  CLINICAL HISTORY: Leukocytosis r/o pneumonia     IMPRESSION: No acute disease in chest. Right rotator cuff calcific tendinitis. COMMENT: Comparison: Chest x-ray 12/13/2021. Technique: A single frontal AP portable upright projection of the chest was obtained. FINDINGS: The lungs are hypoexpanded but grossly clear without focal airspace consolidation, pleural effusion or pneumothorax. The cardiomediastinal silhouette is within normal limits. The upper abdomen is unremarkable. There is no acute osseous abnormality.  There is amorphous calcification adjacent to the right greater tuberosity which can be seen in the setting of rotator cuff calcific tendinitis.     X-RAY FOOT LEFT 3+ VIEWS    Result Date: 2/29/2024  CLINICAL HISTORY: r/o gas gangrene     IMPRESSION: Prior second toe amputation with new acute osteomyelitis throughout the second metatarsal where there is pathological fracturing. Acute osteomyelitis and displaced pathological fracturing of the third metatarsal. Probable mild osteomyelitis in the base of the proximal third phalanx. Probable osteomyelitis involving the thi fourth metatarsal head medially as well as probably in the fourth proximal phalanx. Suggest MRI for more definitive assessment of the extent of infection. Plantar calcaneal spurring and mild degenerative disease. Extensive atherosclerotic vascular calcifications. Wound overlying the second toe amputation site with irregular soft tissue. Possible small amounts of gas in the third toe. 4 views of the left foot. COMMENT: Comparison: X-rays from 1 1/15/2024. See impression        ECG/Telemetry  I have independently reviewed the telemetry. No events for  the last 24 hours.    ASSESSMENT AND PLAN      * Type 2 diabetes mellitus with left diabetic foot infection   Assessment & Plan  Patient presenting with worsening left foot wound  - MRI Left foot 3/1/2024: Plantar wound with multifocal acute osteomyelitis   - Wound Cx: Serratia, Diptheroids, Enterobacter, Acinetobacter- sensitivities noted    - Podiatry consult appreciated: rec surgical intervention but patient declining amputation at this time and would like to try abx therapy alone first  - ID consult appreciated: rec 6 weeks of IV rocephin  - Patient requested secondary opinion - vascular Surgery consult appreciated   - Pain control with Tylenol prn, Tramadol prn  - Patient will require a PICC line for IV abx outpatient  - PT/OT consulted for dispo planning    Leukemoid reaction  Assessment & Plan  Suspect elevated WBC is a leukemoid reaction from severe diabetic left foot infection  She has no history of leukemia/lymphoma  UA and CXR not consistent with infection  - WBC elevated and fluctuating    - Abx as above under diabetic foot infection  - Follow CBC  - may need continued hematologic work up outpatient is no further improvements    Type 2 diabetes mellitus with diabetic neuropathy (CMS/HCC)  Assessment & Plan  A1c 1/31/2024 10.1 indicating poor control  She follows with Wanda [facility]ILLARRE[facility] Endocrin[facility] at [facility]    - fair glycemic control  - continue Accucheks, SSI  - ADA, NCS diet  - Continue Gabapentin  - On Rybelsus (non formulary) and Jardiance  - Endocrinology consult to assist with DM management    HLD (hyperlipidemia)  Assessment & Plan  - Continue Atorvastatin    Primary hypertension  Assessment & Plan  BP stable     - Continue home Amlodipine, HCTZ, Losartan with hold parameters    Hypothyroidism  Assessment & Plan  - Continue Levothyroxine       VTE Assessment: Padua VTE Score: 1  VTE Prophylaxis:  Current anticoagulants:  enoxaparin (LOVENOX) syringe 40 mg, subcutaneous, Daily (6p)      Code  Status: Full Code      Estimated Discharge Date: 3/5/2024     Disposition Planning: SW assisting with placement, PT/OT consulted, IV team consult for picc prior to dc     Lisa Monk,   3/4/2024

## 2024-03-04 NOTE — PROGRESS NOTES
Infectious Disease Progress Note    Patient Name: Bebe Burks  MR#: [id number]  : 1960  Admission Date: 2024  Date: 24   Time: 2:19 PM   Author: Sander Ackerman MD    OBJECTIVE:  The patient was offered amputation but she refused  The culture showed Serratia and diphtheroids  Antibiotics:    Anti-infectives (From admission, onward)    Start     Dose/Rate Route Frequency Ordered Stop    24 1100  cefTRIAXone (ROCEPHIN) IVPB 2 g in 100 mL NSS vial in bag         2 g  200 mL/hr over 30 Minutes intravenous Every 24 hours interval 24 1009 24 2359          ROS  As above  Vital Signs:    Temp:  [36.4 °C (97.6 °F)-36.7 °C (98 °F)] 36.4 °C (97.6 °F)  Heart Rate:  [79-87] 86  Resp:  [16-20] 20  BP: (110-163)/(54-72) 110/54    Temp (72hrs), Av.6 °C (97.8 °F), Min:36.2 °C (97.2 °F), Max:36.8 °C (98.2 °F)      Physical Exam    Gen: Aox3  HEENT: OP clear  Neck: Supple  LAD: No cervical LAD  Lungs: CTAB  CV: RRR no murmurs  Abd: Soft NTND +BS  Ext: Foot dressing in place  Skin: no rash  Neuro: II-XII intact        Lines, Drains, Airways, Wounds:  Peripheral IV (Adult) 24 Left;Posterior Forearm (Active)   Number of days: 0       Wound Anterior;Left Toe (2nd) (Active)   Number of days: 49       Wound Other (comment) Anterior;Right Toe (5th) (Active)   Number of days: 3       Labs:    Lab Results   Component Value Date    WBC 42.20 (HH) 2024    HGB 11.1 (L) 2024    HCT 35.9 2024    MCV 88.9 2024     (H) 2024     Lab Results   Component Value Date    GLUCOSE 166 (H) 2024    CALCIUM 8.4 (L) 2024     2024    K 4.0 2024    CO2 26 2024     2024    BUN 16 2024    CREATININE 0.7 2024     Lab Results   Component Value Date    ALT 17 2024    AST 17 2024    ALKPHOS 77 2024    BILITOT 0.5 2024         Patient Active Problem List   Diagnosis Code   • Controlled type 2  diabetes mellitus, with long-term current use of insulin (CMS/[facility]) E11.9, Z79.4   • Essential hypertension I10   • Hyperlipidemia E78.5   • Hypothyroidism E03.9   • Obesity E66.9   • Thyroid nodule E04.1   • COVID-19 virus infection U07.1   • Lung nodule R91.1   • Displacement of lumbar intervertebral disc without myelopathy M51.26   • Disability of walking R26.2   • Polyneuropathy due to type 2 diabetes mellitus (CMS/[facility]) E11.42   • Pre-op examination Z01.818   • Localized osteoarthritis of right knee M17.11   • Type 2 diabetes mellitus treated with insulin (CMS/[facility]) E11.9, Z79.4   • Abnormal finding on EKG R94.31   • Left foot drop M21.372   • Abnormal finding on chest xray R93.89   • Osteoarthritis of right knee, unspecified osteoarthritis type M17.11   • S/P total knee arthroplasty, right Z96.651   • Sepsis (CMS/[facility]) A41.9   • Osteomyelitis of left foot (CMS/[facility]) M86.9   • Peripheral neuropathy G62.9   • Abdominal pain R10.9   • Chronic idiopathic constipation K59.04   • Gangrene (CMS/[facility]) I96   • Tibial artery stenosis, left (CMS/[facility]) I70.202   • H/O pilonidal cyst Z87.2   • Type 2 diabetes mellitus with left diabetic foot infection  E11.628, L08.9   • Type 2 diabetes mellitus with diabetic neuropathy (CMS/[facility]) E11.40   • Hypothyroidism E03.9   • Primary hypertension I10   • Leukemoid reaction D72.823   • HLD (hyperlipidemia) E78.5   • Acute osteomyelitis of left foot (CMS/[facility]) M86.172     Acute osteomyelitis of left foot (CMS/[facility])  Assessment & Plan  I had a alexa conversation with the patient and I told her that the chances of clearing the infection without further surgery are small to nil however she is not ready to commit to surgery and would like to try with 6 weeks of IV antibiotics.  We can set her up for Rocephin 2 g daily via PICC line with weekly labs          Sander Ackerman MD  3/4/06410:19 PM

## 2024-03-04 NOTE — PROGRESS NOTES
Subjective:   Pt seen at bedside for  Left foot infection      .NAD. No overnight events. Pt denies any pedal pain. Dressing clean, dry, and intact. Denies any N/V/F/C/CP/SOB.        Past Medical/Surgical history, Allergies, Meds reviewed in detail as charted  FH/SH reviewed in detail as charted    ROS:  Head and Neck: No complaints  Chest : No complaints  Abdomen: No Complaints  Constitutional: Unremarkable     Vitals: I have reviewed the patient's pertinent vital signs.     Radiology: Reviewed     Labs:   CBC Results       03/03/24 03/02/24 03/01/24     0342 1053 0335    WBC 36.79 39.24 43.45    RBC 3.81 4.03 3.94    HGB 10.4 11.0 10.7    HCT 33.8 36.1 34.5    MCV 88.7 89.6 87.6    MCH 27.3 27.3 27.2    MCHC 30.8 30.5 31.0     542 539         Comment for WBC at 0342 on 03/03/24: ALL RESULTS HAVE BEEN CHECKED This result has been called to larissa cooper by [place] on 03/03/2024 04:34:21, and has been read back.     Comment for WBC at 1053 on 03/02/24: ALL RESULTS HAVE BEEN CHECKEDCONSISTENT WITH PREVIOUS RESULTS This result has been called to marta walters by Yarelis on 03/02/2024 11:11:57, and has been read back.     Comment for WBC at 0335 on 03/01/24: CONSISTENT WITH PREVIOUS RESULTS    Comment for HGB at 0342 on 03/03/24: RESULT CHECKED    Comment for PLT at 0342 on 03/03/24: RESULTS CHECKED    Comment for PLT at 0335 on 03/01/24: RESULTS CHECKED        BMP Results       03/03/24 03/02/24 03/01/24     0342 1053 0335     138 139    K 4.0 4.0 3.5    Cl 105 104 105    CO2 26 27 26    Glucose 166 255 128    BUN 16 14 15    Creatinine 0.7 0.7 0.5    Calcium 8.4 8.6 8.3    Anion Gap 7 7 8    EGFR >60.0 >60.0 >60.0         Comment for EGFR at 0342 on 03/03/24: Calculation based on the Chronic Kidney Disease Epidemiology Collaboration (CKD-EPI) equation refit without adjustment for race.    Comment for EGFR at 1053 on 03/02/24: Calculation based on the Chronic Kidney Disease Epidemiology Collaboration  (CKD-EPI) equation refit without adjustment for race.    Comment for EGFR at 0335 on 03/01/24: Calculation based on the Chronic Kidney Disease Epidemiology Collaboration (CKD-EPI) equation refit without adjustment for race.            LE Objective:   -Vascular: DP/PT 1/4 bilaterally, CRT <3 seconds  -Orthopedic: decreased ROM at ankle jt b/l, +DF/PF all remaining digits  -Neurologic: light touch and epicritic sensation diminished  -Dermatologic:   LLE: Fibrotic and macerated wound of the plantar aspect of the forefoot.  Third digit noted to be necrotic.  Mild fluctuance noted over the third metatarsal head consistent with MRI concern of abscess.  Periwound is macerated.  Interdigital spaces are macerated.  Mild serosanguineous drainage on dressings noted.  Mild malodor noted.  Less than 1 cc of purulence expressed on incision and drainage.             Assessment: 63-year-old being consulted on for osteomyelitis and pathological fractures of the left foot    Plan:     -Patient seen by attending Dr. Vernon   -Pt to dc to snf with Picc line and IV abx  -Dressing changed with beta dsd   -Pt will require close follow up outpatient  -Patient high risk for limb loss  --X-ray showing: Acute osteomyelitis of the second metatarsal where there is pathological fracturing.  Acute osteomyelitis and displaced pathological fracture and third metatarsal.  Probable mild osteomyelitis in the base of the proximal third phalanx.  Probable osteomyelitis involving the fourth metatarsal head medially as well as probably in the fourth proximal phalanx.  -MRI: Plantar wound with multifocal acute osteomyelitis and pathological fractures in the left foot.  Pockets of abscess are seen extending from the plantar wound about the regions of osteomyelitis.  See MRI for full description.  -Discussed with patient that recommendation from podiatry is surgical intervention. Patient understands but would like to do IV antibiotics at this time.    -Appreciate infectious disease input on patient pursuing 6 weeks IV antibiotic course using deep cultures taken down to bone about the third metatarsal head.  - Labs and radiographs reviewed.  -Thank you for this consult, please contact on call podiatry resident with any questions or concerns     Dwight Mills PGY-2  #[phone]

## 2024-03-04 NOTE — NURSING NOTE
VAT Note:  Patient for discharge to SNF with 6 weeks of IV Abx therapy.  PICC can be placed on day of discharge once disposition is finalized.

## 2024-03-05 ENCOUNTER — APPOINTMENT (INPATIENT)
Dept: RADIOLOGY | Facility: HOSPITAL | Age: 64
DRG: 629 | End: 2024-03-05
Attending: STUDENT IN AN ORGANIZED HEALTH CARE EDUCATION/TRAINING PROGRAM
Payer: MEDICARE

## 2024-03-05 LAB
ANION GAP SERPL CALC-SCNC: 5 MEQ/L (ref 3–15)
ANISOCYTOSIS BLD QL SMEAR: ABNORMAL
BASOPHILS # BLD: 1.29 K/UL (ref 0.01–0.1)
BASOPHILS NFR BLD: 3 %
BUN SERPL-MCNC: 19 MG/DL (ref 7–25)
CALCIUM SERPL-MCNC: 8.9 MG/DL (ref 8.6–10.3)
CHLORIDE SERPL-SCNC: 103 MEQ/L (ref 98–107)
CO2 SERPL-SCNC: 29 MEQ/L (ref 21–31)
CREAT SERPL-MCNC: 0.6 MG/DL (ref 0.6–1.2)
DIFFERENTIAL METHOD BLD: ABNORMAL
EGFRCR SERPLBLD CKD-EPI 2021: >60 ML/MIN/1.73M*2
EOSINOPHIL # BLD: 2.15 K/UL (ref 0.04–0.36)
EOSINOPHIL NFR BLD: 5 %
ERYTHROCYTE [DISTWIDTH] IN BLOOD BY AUTOMATED COUNT: 15.4 % (ref 11.7–14.4)
GLUCOSE BLD-MCNC: 202 MG/DL (ref 70–99)
GLUCOSE BLD-MCNC: 203 MG/DL (ref 70–99)
GLUCOSE BLD-MCNC: 205 MG/DL (ref 70–99)
GLUCOSE BLD-MCNC: 214 MG/DL (ref 70–99)
GLUCOSE BLD-MCNC: 243 MG/DL (ref 70–99)
GLUCOSE BLD-MCNC: 265 MG/DL (ref 70–99)
GLUCOSE SERPL-MCNC: 167 MG/DL (ref 70–99)
HCT VFR BLD AUTO: 36.7 % (ref 35–45)
HGB BLD-MCNC: 11.5 G/DL (ref 11.8–15.7)
HYPOCHROMIA BLD QL SMEAR: ABNORMAL
LYMPHOCYTES # BLD: 3.87 K/UL (ref 1.2–3.5)
LYMPHOCYTES NFR BLD: 9 %
MCH RBC QN AUTO: 27.7 PG (ref 28–33.2)
MCHC RBC AUTO-ENTMCNC: 31.3 G/DL (ref 32.2–35.5)
MCV RBC AUTO: 88.4 FL (ref 83–98)
METAMYELOCYTES # BLD MANUAL: 2.15 K/UL
METAMYELOCYTES NFR BLD MANUAL: 5 %
MONOCYTES # BLD: 1.72 K/UL (ref 0.28–0.8)
MONOCYTES NFR BLD: 4 %
MYELOCYTES # BLD MANUAL: 2.58 K/UL
MYELOCYTES NFR BLD MANUAL: 6 %
NEUTS BAND # BLD: 0.86 K/UL (ref 0–0.53)
NEUTS BAND # BLD: 28.36 K/UL (ref 1.7–7)
NEUTS BAND NFR BLD: 2 %
NEUTS SEG NFR BLD: 66 %
PATH REV BLD -IMP: NORMAL
PDW BLD AUTO: 10.2 FL (ref 9.4–12.3)
PLAT MORPH BLD: NORMAL
PLATELET # BLD AUTO: 508 K/UL (ref 150–369)
PLATELET # BLD EST: ABNORMAL 10*3/UL
POCT TEST: ABNORMAL
POTASSIUM SERPL-SCNC: 4.1 MEQ/L (ref 3.5–5.1)
RBC # BLD AUTO: 4.15 M/UL (ref 3.93–5.22)
SODIUM SERPL-SCNC: 137 MEQ/L (ref 136–145)
WBC # BLD AUTO: 42.97 K/UL (ref 3.8–10.5)

## 2024-03-05 PROCEDURE — 63700000 HC SELF-ADMINISTRABLE DRUG: Performed by: HOSPITALIST

## 2024-03-05 PROCEDURE — 82728 ASSAY OF FERRITIN: CPT | Performed by: PHYSICIAN ASSISTANT

## 2024-03-05 PROCEDURE — 80048 BASIC METABOLIC PNL TOTAL CA: CPT | Performed by: STUDENT IN AN ORGANIZED HEALTH CARE EDUCATION/TRAINING PROGRAM

## 2024-03-05 PROCEDURE — 63700000 HC SELF-ADMINISTRABLE DRUG: Performed by: STUDENT IN AN ORGANIZED HEALTH CARE EDUCATION/TRAINING PROGRAM

## 2024-03-05 PROCEDURE — 97162 PT EVAL MOD COMPLEX 30 MIN: CPT | Mod: GP

## 2024-03-05 PROCEDURE — 36573 INSJ PICC RS&I 5 YR+: CPT

## 2024-03-05 PROCEDURE — 25800000 HC PHARMACY IV SOLUTIONS: Performed by: STUDENT IN AN ORGANIZED HEALTH CARE EDUCATION/TRAINING PROGRAM

## 2024-03-05 PROCEDURE — 81206 BCR/ABL1 GENE MAJOR BP: CPT | Performed by: PHYSICIAN ASSISTANT

## 2024-03-05 PROCEDURE — 63600000 HC DRUGS/DETAIL CODE: Mod: JZ | Performed by: STUDENT IN AN ORGANIZED HEALTH CARE EDUCATION/TRAINING PROGRAM

## 2024-03-05 PROCEDURE — 63600105 HC IODINE BASED CONTRAST: Mod: JZ | Performed by: STUDENT IN AN ORGANIZED HEALTH CARE EDUCATION/TRAINING PROGRAM

## 2024-03-05 PROCEDURE — 82607 VITAMIN B-12: CPT | Performed by: PHYSICIAN ASSISTANT

## 2024-03-05 PROCEDURE — C1751 CATH, INF, PER/CENT/MIDLINE: HCPCS

## 2024-03-05 PROCEDURE — 88185 FLOWCYTOMETRY/TC ADD-ON: CPT | Performed by: PHYSICIAN ASSISTANT

## 2024-03-05 PROCEDURE — 74177 CT ABD & PELVIS W/CONTRAST: CPT

## 2024-03-05 PROCEDURE — 21400000 HC ROOM AND CARE CCU/INTERMEDIATE

## 2024-03-05 PROCEDURE — 36415 COLL VENOUS BLD VENIPUNCTURE: CPT | Performed by: STUDENT IN AN ORGANIZED HEALTH CARE EDUCATION/TRAINING PROGRAM

## 2024-03-05 PROCEDURE — 82746 ASSAY OF FOLIC ACID SERUM: CPT | Performed by: PHYSICIAN ASSISTANT

## 2024-03-05 PROCEDURE — 81270 JAK2 GENE: CPT | Performed by: PHYSICIAN ASSISTANT

## 2024-03-05 PROCEDURE — 99232 SBSQ HOSP IP/OBS MODERATE 35: CPT | Performed by: STUDENT IN AN ORGANIZED HEALTH CARE EDUCATION/TRAINING PROGRAM

## 2024-03-05 PROCEDURE — 85025 COMPLETE CBC W/AUTO DIFF WBC: CPT | Performed by: STUDENT IN AN ORGANIZED HEALTH CARE EDUCATION/TRAINING PROGRAM

## 2024-03-05 PROCEDURE — 83550 IRON BINDING TEST: CPT | Performed by: PHYSICIAN ASSISTANT

## 2024-03-05 RX ORDER — ACETAMINOPHEN 325 MG/1
975 TABLET ORAL EVERY 6 HOURS
Status: DISCONTINUED | OUTPATIENT
Start: 2024-03-05 | End: 2024-03-06

## 2024-03-05 RX ORDER — IOPAMIDOL 755 MG/ML
100 INJECTION, SOLUTION INTRAVASCULAR
Status: COMPLETED | OUTPATIENT
Start: 2024-03-05 | End: 2024-03-05

## 2024-03-05 RX ORDER — SODIUM CHLORIDE 0.9 % (FLUSH) 0.9 %
10 SYRINGE (ML) INJECTION
Status: DISCONTINUED | OUTPATIENT
Start: 2024-03-05 | End: 2024-03-08 | Stop reason: HOSPADM

## 2024-03-05 RX ORDER — HYDROCORTISONE 1 %
CREAM (GRAM) TOPICAL 3 TIMES DAILY PRN
Status: DISCONTINUED | OUTPATIENT
Start: 2024-03-05 | End: 2024-03-08 | Stop reason: HOSPADM

## 2024-03-05 RX ORDER — SODIUM CHLORIDE 0.9 % (FLUSH) 0.9 %
10 SYRINGE (ML) INJECTION AS NEEDED
Status: DISCONTINUED | OUTPATIENT
Start: 2024-03-05 | End: 2024-03-05

## 2024-03-05 RX ORDER — TRAMADOL HYDROCHLORIDE 50 MG/1
50 TABLET ORAL EVERY 6 HOURS PRN
Status: DISCONTINUED | OUTPATIENT
Start: 2024-03-05 | End: 2024-03-07

## 2024-03-05 RX ADMIN — CLOPIDOGREL BISULFATE 75 MG: 75 TABLET ORAL at 09:18

## 2024-03-05 RX ADMIN — DOCUSATE SODIUM 100 MG: 100 CAPSULE, LIQUID FILLED ORAL at 20:24

## 2024-03-05 RX ADMIN — Medication 10 ML: at 02:44

## 2024-03-05 RX ADMIN — INSULIN LISPRO 4 UNITS: 100 INJECTION, SOLUTION INTRAVENOUS; SUBCUTANEOUS at 02:42

## 2024-03-05 RX ADMIN — INSULIN LISPRO 9 UNITS: 100 INJECTION, SOLUTION INTRAVENOUS; SUBCUTANEOUS at 09:20

## 2024-03-05 RX ADMIN — DOCUSATE SODIUM 100 MG: 100 CAPSULE, LIQUID FILLED ORAL at 09:18

## 2024-03-05 RX ADMIN — Medication 10 ML: at 11:27

## 2024-03-05 RX ADMIN — LOSARTAN POTASSIUM 50 MG: 50 TABLET, FILM COATED ORAL at 22:37

## 2024-03-05 RX ADMIN — INSULIN LISPRO 6 UNITS: 100 INJECTION, SOLUTION INTRAVENOUS; SUBCUTANEOUS at 13:16

## 2024-03-05 RX ADMIN — ENOXAPARIN SODIUM 40 MG: 40 INJECTION SUBCUTANEOUS at 18:47

## 2024-03-05 RX ADMIN — ACETAMINOPHEN 975 MG: 325 TABLET ORAL at 23:48

## 2024-03-05 RX ADMIN — ACETAMINOPHEN 975 MG: 325 TABLET ORAL at 18:46

## 2024-03-05 RX ADMIN — HYDROCHLOROTHIAZIDE 12.5 MG: 12.5 TABLET ORAL at 09:18

## 2024-03-05 RX ADMIN — INSULIN LISPRO 9 UNITS: 100 INJECTION, SOLUTION INTRAVENOUS; SUBCUTANEOUS at 13:16

## 2024-03-05 RX ADMIN — AMLODIPINE BESYLATE 5 MG: 5 TABLET ORAL at 09:18

## 2024-03-05 RX ADMIN — GABAPENTIN 300 MG: 300 CAPSULE ORAL at 13:14

## 2024-03-05 RX ADMIN — INSULIN GLARGINE 20 UNITS: 100 INJECTION, SOLUTION SUBCUTANEOUS at 22:41

## 2024-03-05 RX ADMIN — INSULIN LISPRO 4 UNITS: 100 INJECTION, SOLUTION INTRAVENOUS; SUBCUTANEOUS at 18:49

## 2024-03-05 RX ADMIN — GABAPENTIN 300 MG: 300 CAPSULE ORAL at 20:24

## 2024-03-05 RX ADMIN — Medication 10 ML: at 20:24

## 2024-03-05 RX ADMIN — INSULIN LISPRO 4 UNITS: 100 INJECTION, SOLUTION INTRAVENOUS; SUBCUTANEOUS at 22:38

## 2024-03-05 RX ADMIN — GABAPENTIN 300 MG: 300 CAPSULE ORAL at 09:18

## 2024-03-05 RX ADMIN — Medication 6 MG: at 23:48

## 2024-03-05 RX ADMIN — SODIUM CHLORIDE 2 G: 900 INJECTION INTRAVENOUS at 11:28

## 2024-03-05 RX ADMIN — ASPIRIN 81 MG: 81 TABLET, COATED ORAL at 09:18

## 2024-03-05 RX ADMIN — ACETAMINOPHEN 975 MG: 325 TABLET ORAL at 11:27

## 2024-03-05 RX ADMIN — INSULIN LISPRO 9 UNITS: 100 INJECTION, SOLUTION INTRAVENOUS; SUBCUTANEOUS at 18:49

## 2024-03-05 RX ADMIN — IOPAMIDOL 100 ML: 755 INJECTION, SOLUTION INTRAVENOUS at 18:27

## 2024-03-05 RX ADMIN — INSULIN LISPRO 4 UNITS: 100 INJECTION, SOLUTION INTRAVENOUS; SUBCUTANEOUS at 09:20

## 2024-03-05 RX ADMIN — Medication 10 ML: at 18:48

## 2024-03-05 RX ADMIN — LEVOTHYROXINE SODIUM 25 MCG: 0.03 TABLET ORAL at 06:30

## 2024-03-05 RX ADMIN — TRAMADOL HYDROCHLORIDE 50 MG: 50 TABLET, COATED ORAL at 06:30

## 2024-03-05 RX ADMIN — ACETAMINOPHEN 650 MG: 325 TABLET ORAL at 03:19

## 2024-03-05 RX ADMIN — ATORVASTATIN CALCIUM 40 MG: 40 TABLET, FILM COATED ORAL at 18:47

## 2024-03-05 ASSESSMENT — COGNITIVE AND FUNCTIONAL STATUS - GENERAL
AFFECT: WFL
MOVING TO AND FROM BED TO CHAIR: 4 - NONE
CLIMB 3 TO 5 STEPS WITH RAILING: 3 - A LITTLE
STANDING UP FROM CHAIR USING ARMS: 3 - A LITTLE
STANDING UP FROM CHAIR USING ARMS: 4 - NONE
STANDING UP FROM CHAIR USING ARMS: 4 - NONE
WALKING IN HOSPITAL ROOM: 4 - NONE
MOVING TO AND FROM BED TO CHAIR: 3 - A LITTLE
CLIMB 3 TO 5 STEPS WITH RAILING: 2 - A LOT
MOVING TO AND FROM BED TO CHAIR: 4 - NONE
CLIMB 3 TO 5 STEPS WITH RAILING: 4 - NONE
WALKING IN HOSPITAL ROOM: 4 - NONE
WALKING IN HOSPITAL ROOM: 3 - A LITTLE

## 2024-03-05 NOTE — CONSULTS
"   Heme/Onc Consult       SUBJECTIVE     Bebe Burks is a 63 y.o. female with a history of PAD, DM II w/ neuropathy and retinopathy, hx of L toe osteo s/p toe ampuation. Patient admitted for Toe infection [L08.9]  Closed fracture of second metatarsal bone of left foot, physeal involvement unspecified, initial encounter [S92.322A]  Closed fracture of third metatarsal bone of left foot, physeal involvement unspecified, initial encounter [S92.332A]. Bebe was seen in consultation at the request of Lisa Monk DO for recommendations related to leukocytosis     Bebe presented to [facility] on 2/29 for c/f worsening L foot infection. On Jan 18th underwent foot I&D and 2nd toe w/ her podiatrist. She was was treated w/ IV abx then d/c'd w/ continued oral abx. Originally on keflex, then switched to doxy then back to kelfex. She saw ID who then changed regimen to cipro/augmentin. Unfortunately she felt her toes/ foot continued to worsen so presented to [facility]. On presentation her VSS were stable but w/ elevated WBC.     She was seen by podiatry and ID. MRI of L foot showed: \"Plantar wound with multifocal acute osteomyelitis and pathological fractures in the left foot as described in detail below.  Pockets of abscess are seen extending from the plantar wound about the regions of osteomyelitis.\"  She was started on IV abx, now narrowed to ceftriaxone. Wound cx + serratia marcescens and diphtheroids.   Podiatry rec'd ampuation but declined and is opting for continued abx treatment.    In terms of blood counts:   Pt reports she was first noted to have an elevated WBC in sept/oct when she underwent procedure for pilonidal cyst. Per chart review WBC was 21.1k at that time, neutrophil predom.  Her white count did normalize from October-December per chart review.   This admission- WBC 56.92k on admission 2/29/24. Neutrophil predom w/ bands, eos, basophils, myelocytes also elevated.  Since then WBC has fluctuated in 30-40ks. " "42.97k today.     Pt denies any personal or family hx of heme malignancy. No issues with bruising, bleeding, history of clots, new rashes, night sweats/chills, or unintentional weight loss.    Outside records reviewed. Pertinent radiology results reviewed. Pertinent lab results reviewed..  All other systems were reviewed and are negative other than as stated in the HPI.    Past Medical History:   Diagnosis Date   • Abnormal ECG    • Abnormal finding on chest xray 12/2021   • Arthritis    • Colon cancer (CMS/HCC)    • COVID-19 12/2021   • COVID-19 vaccine series completed     Moderna: \" 3 doses\"   • Diabetic neuropathy (CMS/HCC)    • DM (diabetes mellitus), type 2 with ophthalmic complications (CMS/HCC)    • Hypertension    • Hypothyroidism    • Left foot drop    • Lipid disorder    • Type 2 diabetes mellitus treated with insulin (CMS/HCC)      Past Surgical History:   Procedure Laterality Date   • ABCESS DRAINAGE  10/2023   • CATARACT EXTRACTION Bilateral    • COLONOSCOPY     • COMBINED HYSTEROSCOPY DIAGNOSTIC / D&C  2007   • EYE SURGERY Left 2017    cataract   • JOINT REPLACEMENT Right 02/2023    knee replacement   • TOE AMPUTATION     • VITRECTOMY Left 2016     Social History     Socioeconomic History   • Marital status: Single     Spouse name: Not on file   • Number of children: 0   • Years of education: Not on file   • Highest education level: Not on file   Occupational History   • Occupation: Disabilty   Tobacco Use   • Smoking status: Never     Passive exposure: Past   • Smokeless tobacco: Never   Vaping Use   • Vaping Use: Never used   Substance and Sexual Activity   • Alcohol use: Not Currently     Comment: occasional in past, none current   • Drug use: Never   • Sexual activity: Not on file   Other Topics Concern   • Not on file   Social History Narrative    Patient is single and has no children. She     Social Determinants of Health     Financial Resource Strain: Low Risk  (2/27/2023)    Overall Financial " Resource Strain (CARDIA)    • Difficulty of Paying Living Expenses: Not hard at all   Food Insecurity: No Food Insecurity (2/29/2024)    Hunger Vital Sign    • Worried About Running Out of Food in the Last Year: Never true    • Ran Out of Food in the Last Year: Never true   Transportation Needs: No Transportation Needs (3/1/2024)    PRAPARE - Transportation    • Lack of Transportation (Medical): No    • Lack of Transportation (Non-Medical): No   Physical Activity: Not on file   Stress: Not on file   Social Connections: Not on file   Intimate Partner Violence: Not on file   Housing Stability: Low Risk  (3/1/2024)    Housing Stability Vital Sign    • Unable to Pay for Housing in the Last Year: No    • Number of Places Lived in the Last Year: 1    • Unstable Housing in the Last Year: No     Family History   Problem Relation Age of Onset   • Heart disease Biological Mother    • Diabetes Biological Mother    • Hypertension Biological Mother    • Stroke Biological Father      No Known Allergies    Home Medications:  •  amLODIPine, Take 5 mg by mouth every morning.  •  amoxicillin-pot clavulanate, Take 1 tablet by mouth 2 (two) times a day.  •  aspirin, Take 81 mg by mouth daily.  •  atorvastatin, Take 40 mg by mouth nightly.  •  ciprofloxacin, Take 500 mg by mouth 2 (two) times a day.  •  clopidogreL, Take 75 mg by mouth daily.  •  docusate sodium, Take 100 mg by mouth 2 (two) times a day.  •  empagliflozin, Take 10 mg by mouth daily.  •  gabapentin, Take 300 mg by mouth 3 (three) times a day.  •  hydrochlorothiazide, Take 12.5 mg by mouth daily.  •  levothyroxine, Take 25 mcg by mouth daily.  •  losartan, Take 50 mg by mouth nightly.  •  RYBELSUS, Take 14 mg by mouth daily.  •  traMADoL, Take 50 mg by mouth 2 (two) times a day as needed for pain.    Current Medications:  •  acetaminophen, 975 mg, oral, q6h HENOK  •  amLODIPine, 5 mg, oral, Daily  •  aspirin, 81 mg, oral, Daily  •  atorvastatin, 40 mg, oral, Daily (6p)  •   "cefTRIAXone, 2 g, intravenous, q24h INT  •  clopidogreL, 75 mg, oral, Daily  •  docusate sodium, 100 mg, oral, BID  •  enoxaparin, 40 mg, subcutaneous, Daily (6p)  •  gabapentin, 300 mg, oral, TID  •  hydrochlorothiazide, 12.5 mg, oral, Daily  •  hydrocortisone, , Topical, 3x daily PRN  •  insulin glargine U-100, 20 Units, subcutaneous, Nightly  •  insulin lispro U-100, 2-10 Units, subcutaneous, ACHS+3AM  •  insulin lispro U-100, 9 Units, subcutaneous, TID with meals  •  levothyroxine, 25 mcg, oral, Daily (6:30a)  •  losartan, 50 mg, oral, Nightly  •  melatonin, 6 mg, oral, Nightly  •  polyethylene glycol, 17 g, oral, Daily PRN  •  sodium chloride, 10 mL, intravenous, q8h INT  •  sodium chloride, 10 mL, intravenous, PRN  •  sodium chloride, 10 mL, intravenous, q8h INT  •  traMADoL, 50 mg, oral, q6h PRN     OBJECTIVE     Physical Exam  Visit Vitals  /61 (Patient Position: Sitting)   Pulse 85   Temp 36.7 °C (98 °F) (Oral)   Resp 18   Ht 1.702 m (5' 7\")   Wt 86 kg (189 lb 9.5 oz)   SpO2 96%   BMI 29.69 kg/m²       General: Awake and alert.  Skin: Warm and dry.  No rash on examined skin.  HEENT: Normocephalic.  Sclera anicteric.  Conjunctiva pink. Mucous membranes moist. No mucositis.  Heart: Regular rate and rhythm. No murmur.  Lungs: Clear to auscultation.  Abdomen: Soft.  No distension. No tenderness. No hepatomegaly or splenomegaly.   Extremities: L foot w/ guaze/ dressing on toes. +1 b/l LE edema  Lymphatic: No enlarged cervical, supraclavicular, axillary or inguinal nodes.  Psychiatric: Normal affect.  Neurologic: Memory intact. Grossly normal       LABS & IMAGING     Labs:   Labs reviewed   Lab Results   Component Value Date    WBC 42.97 (HH) 03/05/2024    HGB 11.5 (L) 03/05/2024    HCT 36.7 03/05/2024    MCV 88.4 03/05/2024     (H) 03/05/2024     Lab Results   Component Value Date    DIFFTYPE Manu 03/05/2024    SEGSABS 28.36 (H) 03/05/2024    LYMPHOABS 3.87 (H) 03/05/2024    MONOABS 1.72 (H) " 03/05/2024    EOSABS 2.15 (H) 03/05/2024    BASOABS 1.29 (H) 03/05/2024     Lab Results   Component Value Date    GLUCOSE 167 (H) 03/05/2024    CALCIUM 8.9 03/05/2024     03/05/2024    K 4.1 03/05/2024    CO2 29 03/05/2024     03/05/2024    BUN 19 03/05/2024    CREATININE 0.6 03/05/2024     Lab Results   Component Value Date    ALT 17 03/01/2024    AST 17 03/01/2024    ALKPHOS 77 03/01/2024    BILITOT 0.5 03/01/2024       Imaging: I have reviewed the Imaging from the last 24 hrs.  MRI FOOT LEFT WITHOUT CONTRAST   Final Result   IMPRESSION:   Plantar wound with multifocal acute osteomyelitis and pathological fractures in   the left foot as described in detail below.   Pockets of abscess are seen extending from the plantar wound about the regions   of osteomyelitis.   Please see the body of the report for additional findings and descriptions.      COMMENT:      Comparison: X-rays dated 2/29/2024. MRI from January 1624.   Technique:   Noncontrast MRI left forefoot.      Findings:   Since the previous MRI patient has undergone second toe amputation. However,   there is new osteomyelitis in pathological fracture involving the head, neck and   distal to mid shaft of the second metatarsal. There is also nonspecific marrow   changes possibly representing early osteomyelitis extending to the second   metatarsal base.   -   Marked bony erosion, pathological displaced fracture in and osteomyelitis is   present throughout the third metatarsal with the distal shaft/neck fracture   displaced laterally. Nonspecific marrow edema without T1 replacement in the   proximal portions of the third metatarsal shaft are noted.   There is acute osteomyelitis in the third toe and the proximal phalanx and   possibly very small amounts in the middle phalanx but not in the distal phalanx.   -   Acute osteomyelitis in the head and neck of the fourth metatarsal. Erosions and   small osteomyelitis in the base of the fourth proximal  phalanx with septic   arthritis of the fourth metatarsal phalangeal joint. Suspected pathological   fracture through osteomyelitis of the fourth proximal phalanx best seen on   sagittal image 7.   -   In the fifth proximal phalanx there is marrow edema like signal but no   convincing T1 marrow replacement. Findings are nonspecific.   In the fifth metatarsal head medially there is a small amount of bone marrow   edema as well as some early patchy T1 marrow replacement axial image 19 where   early ostomy myelitis cannot be excluded. Otherwise no fifth metatarsal   osteomyelitis.   -   At the first metatarsophalangeal joint there is osteoarthritis with mild areas   of patchy bone marrow edema in the first metatarsal distally as well as the   proximal phalanx. In the lateral aspect of the first metatarsal head there is a   small focus of questionable early bony erosion and T1 marrow replacement on   axial image 13 where small focus of osteitis cannot be excluded.   There is a nonspecific first metatarsophalangeal joint effusion.   Nonspecific bone marrow edema in the sesamoids more so in the fibular sesamoid   with there is some questionable early T1 marrow replacement and small focus of   early isthmus cannot be excluded.   ---   No evidence of osteomyelitis in the imaged tarsal bones noting mild degenerative   joint disease.   -   There is a wound in the plantar forefoot deep to the third metatarsophalangeal   joint with multiple pockets of abscess about the regions of osteomyelitis   described above.   Diffuse abnormal muscle edema could be related to myositis in the appropriate   clinical setting although denervation changes are also probably present.   -   The Lisfranc ligament appears intact.      Diffuse nonspecific soft tissue edema is noted. This should be correlated with   the clinical extent of soft tissue infection.            X-RAY CHEST 1 VIEW   Final Result   IMPRESSION:   No acute disease in chest.       Right rotator cuff calcific tendinitis.      COMMENT:      Comparison: Chest x-ray 12/13/2021.      Technique: A single frontal AP portable upright projection of the chest was   obtained.      FINDINGS:      The lungs are hypoexpanded but grossly clear without focal airspace   consolidation, pleural effusion or pneumothorax. The cardiomediastinal   silhouette is within normal limits. The upper abdomen is unremarkable. There is   no acute osseous abnormality.  There is amorphous calcification adjacent to the   right greater tuberosity which can be seen in the setting of rotator cuff   calcific tendinitis.         X-RAY FOOT LEFT 3+ VIEWS   Final Result   IMPRESSION:   Prior second toe amputation with new acute osteomyelitis throughout the second   metatarsal where there is pathological fracturing.   Acute osteomyelitis and displaced pathological fracturing of the third   metatarsal.   Probable mild osteomyelitis in the base of the proximal third phalanx.   Probable osteomyelitis involving the thi fourth metatarsal head medially as well   as probably in the fourth proximal phalanx.   Suggest MRI for more definitive assessment of the extent of infection.   Plantar calcaneal spurring and mild degenerative disease. Extensive   atherosclerotic vascular calcifications.   Wound overlying the second toe amputation site with irregular soft tissue.   Possible small amounts of gas in the third toe.      4 views of the left foot.   COMMENT:      Comparison: X-rays from 1 1/15/2024.      See impression           ASSESSMENT & PLAN     Principal Problem:    Type 2 diabetes mellitus with left diabetic foot infection   Active Problems:    Type 2 diabetes mellitus with diabetic neuropathy (CMS/HCC)    Hypothyroidism    Primary hypertension    Leukemoid reaction    HLD (hyperlipidemia)    Acute osteomyelitis of left foot (CMS/HCC)      In summary, this is a 62 y/o female w/ uncontrolled DM II presenting w/ L foot osteo being treated  w/ IV abx. Consulted for leukocytosis     # Leukocytosis   - Pt reports she was first noted to have an elevated WBC in sept/oct when she underwent procedure for pilonidal cyst. Per chart review WBC was 21.1k at that time, neutrophil predom.  Her white count did normalize from October-December per chart review.   - This admission- WBC 56.92k on admission 2/29/24. Neutrophil predom w/ bands, eos, basophils, myelocytes also elevated. Since then WBC has fluctuated in 30-40ks.    - Her L foot osteo seems quite severe on MRI, so hopefully her leukocytosis is attributable to this. However hematologic malignancies also on the differential. I have a low concern for an acute leukemia d/t stability of other counts, but a chronic leukemia or MPN is possible   - Rec peripheral smear, flow cytometry, JAK2, and BCR-ABL testing to eval (all ordered)  - Cont to treat L foot osteo w/ IV abx per ID and podiatry  - Reportedly pt is considering transfer to [place]   - CT daily CBC w/ diff    # Anemia  - mild, possibly r/t ongoing infection/ inflammation  - iron/ b12/ folate pending  - CTM daily CBC     # DVT ppx  - ppx acx, SCDs        RADHA Hunter  3/5/2024

## 2024-03-05 NOTE — ASSESSMENT & PLAN NOTE
Patient presenting with worsening left foot wound  - MRI Left foot 3/1/2024: Plantar wound with multifocal acute osteomyelitis   - Wound Cx: Serratia, Diptheroids, Enterobacter, Acinetobacter- sensitivities noted    - Podiatry consult appreciated: rec surgical intervention but patient declining amputation at this time and would like to try abx therapy alone first  - Wound care management per podiatry  - ID consult appreciated: rec 6 weeks of IV rocephin  - Patient requested secondary opinion - vascular Surgery consult appreciated   - Pain control with Tylenol prn, Tramadol prn  - PICC placed  - Weight bearing as tolerated to left leg - concentrate weight on left heel in surgical shoe  - PT/OT consulted for dispo planning, discharge to SNF  - OP f/u with Podiatry for wound care management  - OP f/u with ID, rec weekly blood work (CBC, BMP, Mg, CRP, ESR) while on IV abx

## 2024-03-05 NOTE — ASSESSMENT & PLAN NOTE
"Suspect elevated WBC is a leukemoid reaction from severe diabetic left foot infection  She has no history of leukemia/lymphoma  UA and CXR not consistent with infection  CT abd/pelvis noted a lesion near the coccyx. Surgery team reviewed images and reported it is likely scar tissue related to previous surgery, low suspicion for infection  XR right hip and knee unremarkable    - WBC remains elevated, trending flat  - Abx as above under diabetic foot infection  - Consult Hematology, recs appreciated   - Iron panel and B12 level within normal limits   - Folate level low normal; recommended to start supplementation.    - Peripheral smear reported \"leukocytosis with a neutrophilia and a left shift.  The findings are likely due to the patient's known foot infection/osteomyelitis\".    - Flow cytometry showed \"No evidence of immunophenotypically abnormal T or B-cell population.  CD34 positive cells comprise approximately 8.3% of total cells analysis and show myeloid differentiation.\"  Remaining hematologic workup pending (Jak2, BCR-ABL1) and will be followed up outpatient with hematology/oncologist Dr. Love.    -Patient requested transfer to [place], but was declined due to lack of medical necessity.    - Discussed with Heme/Onc team, no further inpatient work up required  - Dr. Love will follow up on remaining pending work up as an outpatient. If remains elevated and work up otherwise unrevealing, then they may consider a bone marrow biopsy  "

## 2024-03-05 NOTE — PLAN OF CARE
Plan of Care Review  Plan of Care Reviewed With: patient  Progress: improving  Outcome Evaluation: pt oob supervision w/ RW and surgical shoe. Tylenol and tramadol given for pain. Plan for PICC placement for home infusion

## 2024-03-05 NOTE — ASSESSMENT & PLAN NOTE
A1c 1/31/2024 10.1 indicating poor control  She follows with Wanda VILLARREAL Endocrinology at [facility]  Uses mixed insulin 70/30 outpatient    - fair glycemic control while inpatient  - ADA, NCS diet  - Continue Gabapentin  - Rybelsus (non formulary) and Jardiance can be continued on discharge  - Discussed with Dr. Gabriel, recommend basal bolus insulin regimen with glargine 22u qhs, prandial insulin 10u AC, and SSI  - Rec Endocrinology f/u outpatient

## 2024-03-05 NOTE — PROGRESS NOTES
Patient: Bebe Burks  Location: [facility] [id number] [id number]  MRN:  [id number]  Today's date:  3/5/2024    Attempted to see patient for therapy. Unable due to patient at test or procedure (bedside PICC placement, will follow and eval when able).

## 2024-03-05 NOTE — PROGRESS NOTES
Physical Therapy -  Initial Evaluation     Patient: Bebe Burks  Location: [facility] 5PAV [id number]  MRN: [id number]  Today's date: 3/5/2024    HISTORY OF PRESENT ILLNESS     Bebe is a 63 y.o. female admitted on 2/29/2024 with Toe infection [L08.9]  Closed fracture of second metatarsal bone of left foot, physeal involvement unspecified, initial encounter [S92.322A]  Closed fracture of third metatarsal bone of left foot, physeal involvement unspecified, initial encounter [S92.332A]. Principal problem is Type 2 diabetes mellitus with left diabetic foot infection .    Past Medical History  Bebe has a past medical history of Abnormal ECG, Abnormal finding on chest xray (12/2021), Arthritis, Colon cancer (CMS/[facility]), COVID-19 (12/2021), COVID-19 vaccine series completed, Diabetic neuropathy (CMS/[facility]), DM (diabetes mellitus), type 2 with ophthalmic complications (CMS/[facility]), Hypertension, Hypothyroidism, Left foot drop, Lipid disorder, and Type 2 diabetes mellitus treated with insulin (CMS/[facility]).    History of Present Illness   63-year-old adm for osteomyelitis and pathological fractures of the left foot  WBAT with concentration to L heel in surgical shoe    PRIOR LEVEL OF FUNCTION AND LIVING ENVIRONMENT     Prior Level of Function    Flowsheet Row Most Recent Value   Dominant Hand right   Ambulation assistive equipment   Transferring assistive equipment   Toileting independent   Bathing independent   Dressing independent   Eating independent   IADLs independent   Prior Level of Function Comment Pt reports she has been ambulating using a SPC at home, independent for ADLs   Assistive Device Currently Used at Home cane, straight, walker, front-wheeled        Prior Living Environment    Flowsheet Row Most Recent Value   People in Home alone   Current Living Arrangements condominium   Home Accessibility stairs to enter home (Group), railings on outside stairs   Living Environment Comment resides in a 1 floor  condo, no JOSE RAFAEL, stall shower        VITALS AND PAIN     PT Vitals    Date/Time BP BP Method Pt Position [facility]   03/05/24 1345 132/61 Automatic Sitting LA      PT Pain    Date/Time Pain Type Side/Orientation Location Rating: Rest Rating: Activity Interventions [facility]   03/05/24 1345 Pain Assessment left foot 4 - moderate pain 4 - moderate pain care clustered LA           Objective   OBJECTIVE     Start time:  1345  End time:  1400  Session Length: 15 min  Mode of Treatment: individual therapy, physical therapy    General Observations  Patient received upright, in chair. She was no issues or concerns identified by nurse prior to session, agreeable to therapy.      Precautions: fall, weight bearing  Extremity Weight-Bearing Status: left lower extremity  Left LE Weight-Bearing Status: weight-bearing as tolerated (WBAT) (concentration to L heel, in surgical shoe)           PT Eval and Treat - 03/05/24 1345        Cognition    Orientation Status oriented x 4     Affect/Mental Status WFL     Follows Commands WFL     Cognitive Function WFL        Vision Assessment/Intervention    Vision Assessment corrective lenses for reading        Hearing Assessment    Hearing Status WFL        Sensory Assessment    Sensory Assessment sensation intact, lower extremities        Lower Extremity Strength    Hip, Left (Strength) 3/5     Knee, Left (Strength) 4/5     Ankle, Left (Strength) 5/5     Hip, Right (Strength) 3/5     Knee, Right (Strength) 4/5     Ankle, Right (Strength) 5/5        Mobility Belt    Mobility Belt Used for All Out of Bed Activity no     Reason Mobility Belt Not Used other (see comments)     Reason Mobility Belt Not Used independent for mobility        Sit/Stand Transfer    Surface chair with arm rests     [place] modified independence     Assistive Device walker, front-wheeled     Transfer Comments from bedside recliner, no (A) needed, cues for WB to L heel        Gait Training    [place], Gait modified  independence     Assistive Device walker, front-wheeled     Distance in Feet 40 feet     Pattern step-to     Comment (Gait/Stairs) ambulated using RW, no LOB, denies dizziness, good WB to L heel        Stairs Training    [place], Stairs other (see comments)     Comment 1 floor condo, no JOSE RAFAEL        Balance    Static Sitting Balance WFL     Sit to Stand Dynamic Balance WFL     Static Standing Balance WFL     Dynamic Standing Balance WFL     Comment, Balance no balance deficits                                Education Documentation  Unresolved/Worsening Symptoms, taught by Marita Estes PT at 3/5/2024  2:18 PM.  Learner: Patient  Readiness: Acceptance  Method: Explanation, Demonstration  Response: Verbalizes Understanding, Demonstrated Understanding  Comment: reviewed PT POC, role of PT, WB to L heel    Joint Mobility/Strength, taught by Marita Estes PT at 3/5/2024  2:18 PM.  Learner: Patient  Readiness: Acceptance  Method: Explanation, Demonstration  Response: Verbalizes Understanding, Demonstrated Understanding  Comment: reviewed PT POC, role of PT, WB to L heel    Home Safety, taught by Mariat Estes PT at 3/5/2024  2:18 PM.  Learner: Patient  Readiness: Acceptance  Method: Explanation, Demonstration  Response: Verbalizes Understanding, Demonstrated Understanding  Comment: reviewed PT POC, role of PT, WB to L heel    Energy Conservation, taught by Marita Estes PT at 3/5/2024  2:18 PM.  Learner: Patient  Readiness: Acceptance  Method: Explanation, Demonstration  Response: Verbalizes Understanding, Demonstrated Understanding  Comment: reviewed PT POC, role of PT, WB to L heel    Assistive/Adaptive Devices, taught by Marita Estes PT at 3/5/2024  2:18 PM.  Learner: Patient  Readiness: Acceptance  Method: Explanation, Demonstration  Response: Verbalizes Understanding, Demonstrated Understanding  Comment: reviewed PT POC, role of PT, WB to L heel    Signs/Symptoms, taught by Franklyn  OPHELIA Kirkland at 3/5/2024  2:18 PM.  Learner: Patient  Readiness: Acceptance  Method: Explanation, Demonstration  Response: Verbalizes Understanding, Demonstrated Understanding  Comment: reviewed PT POC, role of PT, WB to L heel    Risk Factors, taught by Marita Estes PT at 3/5/2024  2:18 PM.  Learner: Patient  Readiness: Acceptance  Method: Explanation, Demonstration  Response: Verbalizes Understanding, Demonstrated Understanding  Comment: reviewed PT POC, role of PT, WB to L heel        Session Outcome  Patient upright, in chair at end of session, call light in reach, personal items in reach, all needs met, chair alarm on. Nursing notified about patient's performance, patient's position, and patient's response to therapy/activity.    AM-PAC™ - Mobility (Current Function)     Turning form your back to your side while in flat bed without using bedrails 4 - None   Moving from lying on your back to sitting on the side of a flat bed without using bedrails 4 - None   Moving to and from a bed to a chair 4 - None   Standing up from a chair using your arms 4 - None   To walk in a hospital room 4 - None   Climbing 3-5 steps with a railing 4 - None   AM-PAC™ Mobility Score 24      ASSESSMENT AND PLAN     Progress Summary  3/5/24 Pt is a 63 year old female adm for osteomyelitis and pathological fractures of the left foot. Pt seen for PT session. Pt is WBAT to L heel with concentration to L heel in surgical shoe. The patient was educated on the role of PT in the acute care setting. The patient was instructed on proper technique when performing OOB transfers to ensure their safety. Gait training was performed using the necessary AD along with instructing the patient regarding the proper set up and use of the device. All questions and concerns addressed and answered during session. Pt tolerated session well. Completed functional txs without assist, amb using RW and maintains WB to L heel. No balance deficits or strength  deficits noted. No further inpatient PT needs identified. Pt plans to be DC to SNF for IV antibiotics.         PT Plan    Flowsheet Row Most Recent Value   Therapy Frequency evaluation only at 03/05/2024 1345          PT Discharge Recommendations    Flowsheet Row Most Recent Value   PT Recommended Discharge Disposition home with assistance at 03/05/2024 1345   Anticipated Equipment Needs if Discharged Home (PT) none at 03/05/2024 1345

## 2024-03-05 NOTE — HOSPITAL COURSE
Bebe is a 63 y.o. female admitted on 2/29/2024 with Toe infection [L08.9]  Closed fracture of second metatarsal bone of left foot, physeal involvement unspecified, initial encounter [S92.322A]  Closed fracture of third metatarsal bone of left foot, physeal involvement unspecified, initial encounter [S92.332A]. Principal problem is Type 2 diabetes mellitus with left diabetic foot infection .    Past Medical History  Bebe has a past medical history of Abnormal ECG, Abnormal finding on chest xray (12/2021), Arthritis, Colon cancer (CMS/[facility]), COVID-19 (12/2021), COVID-19 vaccine series completed, Diabetic neuropathy (CMS/[facility]), DM (diabetes mellitus), type 2 with ophthalmic complications (CMS/[facility]), Hypertension, Hypothyroidism, Left foot drop, Lipid disorder, and Type 2 diabetes mellitus treated with insulin (CMS/[facility]).    History of Present Illness   63-year-old adm for osteomyelitis and pathological fractures of the left foot  WBAT with concentration to L heel in surgical shoe (Pt has premorbid left foot drop)

## 2024-03-05 NOTE — PROGRESS NOTES
Infectious Disease Progress Note    Patient Name: Bebe Burks  MR#: [id number]  : 1960  Admission Date: 2024  Date: 24   Time: 12:16 PM   Author: Sander Ackerman MD    OBJECTIVE:  Tolerating the antibiotics well with ongoing leukocytosis of which some immature cells such as myelocytes and metamyelocytes are seen  Antibiotics:    Anti-infectives (From admission, onward)    Start     Dose/Rate Route Frequency Ordered Stop    24 1100  cefTRIAXone (ROCEPHIN) IVPB 2 g in 100 mL NSS vial in bag         2 g  200 mL/hr over 30 Minutes intravenous Every 24 hours interval 24 1009 24 2359          ROS  As above  Vital Signs:    Temp:  [36.7 °C (98 °F)-36.9 °C (98.4 °F)] 36.7 °C (98 °F)  Heart Rate:  [84-90] 85  Resp:  [18] 18  BP: (125-165)/(62-73) 125/62    Temp (72hrs), Av.6 °C (97.9 °F), Min:36.2 °C (97.2 °F), Max:36.9 °C (98.4 °F)      Physical Exam    Gen: Aox3  HEENT: OP clear  Neck: Supple  LAD: No cervical LAD  Lungs: CTAB  CV: RRR no murmurs  Abd: Soft NTND +BS  Ext: Foot dressing in place  Skin: no rash  Neuro: II-XII intact        Lines, Drains, Airways, Wounds:  Peripheral IV (Adult) 24 Left;Posterior Forearm (Active)   Number of days: 0       Wound Anterior;Left Toe (2nd) (Active)   Number of days: 49       Wound Other (comment) Anterior;Right Toe (5th) (Active)   Number of days: 3       Labs:    Lab Results   Component Value Date    WBC 42.97 (HH) 2024    HGB 11.5 (L) 2024    HCT 36.7 2024    MCV 88.4 2024     (H) 2024     Lab Results   Component Value Date    GLUCOSE 167 (H) 2024    CALCIUM 8.9 2024     2024    K 4.1 2024    CO2 29 2024     2024    BUN 19 2024    CREATININE 0.6 2024     Lab Results   Component Value Date    ALT 17 2024    AST 17 2024    ALKPHOS 77 2024    BILITOT 0.5 2024         Patient Active Problem List   Diagnosis  Code   • Controlled type 2 diabetes mellitus, with long-term current use of insulin (CMS/[facility]) E11.9, Z79.4   • Essential hypertension I10   • Hyperlipidemia E78.5   • Hypothyroidism E03.9   • Obesity E66.9   • Thyroid nodule E04.1   • COVID-19 virus infection U07.1   • Lung nodule R91.1   • Displacement of lumbar intervertebral disc without myelopathy M51.26   • Disability of walking R26.2   • Polyneuropathy due to type 2 diabetes mellitus (CMS/[facility]) E11.42   • Pre-op examination Z01.818   • Localized osteoarthritis of right knee M17.11   • Type 2 diabetes mellitus treated with insulin (CMS/[facility]) E11.9, Z79.4   • Abnormal finding on EKG R94.31   • Left foot drop M21.372   • Abnormal finding on chest xray R93.89   • Osteoarthritis of right knee, unspecified osteoarthritis type M17.11   • S/P total knee arthroplasty, right Z96.651   • Sepsis (CMS/[facility]) A41.9   • Osteomyelitis of left foot (CMS/[facility]) M86.9   • Peripheral neuropathy G62.9   • Abdominal pain R10.9   • Chronic idiopathic constipation K59.04   • Gangrene (CMS/[facility]) I96   • Tibial artery stenosis, left (CMS/[facility]) I70.202   • H/O pilonidal cyst Z87.2   • Type 2 diabetes mellitus with left diabetic foot infection  E11.628, L08.9   • Type 2 diabetes mellitus with diabetic neuropathy (CMS/[facility]) E11.40   • Hypothyroidism E03.9   • Primary hypertension I10   • Leukemoid reaction D72.823   • HLD (hyperlipidemia) E78.5   • Acute osteomyelitis of left foot (CMS/HCC) M86.172     Acute osteomyelitis of left foot (CMS/HCC)  Assessment & Plan  Continue with Rocephin for 6 weeks  Awaiting for hematology evaluation regarding disproportionate leukocytosis with immature forms      Sander Ackerman MD  3/5/394558:16 PM

## 2024-03-05 NOTE — PROGRESS NOTES
Hospital Medicine Service -  Daily Progress Note       SUBJECTIVE   Interval History: Patient was seen and examined at bedside. No acute events overnight. No fever, chills, cp, sob, nausea, vomiting, abdominal pain, headache or dizziness. Reports no significant left foot pain, persistent right sciatic pain controlled by tramadol. We discussed the concern with her persistently elevated WBCs.     OBJECTIVE      Vital signs in last 24 hours:  Temp:  [36.7 °C (98 °F)-36.9 °C (98.4 °F)] 36.7 °C (98 °F)  Heart Rate:  [84-90] 85  Resp:  [18] 18  BP: (125-165)/(62-73) 125/62    Intake/Output Summary (Last 24 hours) at 3/5/2024 1359  Last data filed at 3/5/2024 1158  Gross per 24 hour   Intake 590 ml   Output --   Net 590 ml       Current Facility-Administered Medications   Medication Dose Route Frequency   • acetaminophen  975 mg oral q6h HENOK   • amLODIPine  5 mg oral Daily   • aspirin  81 mg oral Daily   • atorvastatin  40 mg oral Daily (6p)   • cefTRIAXone  2 g intravenous q24h INT   • clopidogreL  75 mg oral Daily   • docusate sodium  100 mg oral BID   • enoxaparin  40 mg subcutaneous Daily (6p)   • gabapentin  300 mg oral TID   • hydrochlorothiazide  12.5 mg oral Daily   • hydrocortisone   Topical 3x daily PRN   • insulin glargine U-100  20 Units subcutaneous Nightly   • insulin lispro U-100  2-10 Units subcutaneous ACHS+3AM   • insulin lispro U-100  9 Units subcutaneous TID with meals   • levothyroxine  25 mcg oral Daily (6:30a)   • losartan  50 mg oral Nightly   • melatonin  6 mg oral Nightly   • polyethylene glycol  17 g oral Daily PRN   • sodium chloride  10 mL intravenous q8h INT   • sodium chloride  10 mL intravenous PRN   • sodium chloride  10 mL intravenous q8h INT   • traMADoL  50 mg oral q6h PRN        PHYSICAL EXAMINATION      Physical Exam  General: NAD, pleasant, nontoxic, well developed, appears stated age, sitting up in chair comfortably  HEENT: atraumatic, normocephalic, mmm, sclera  anicteric  Cardiovascular: RRR, no murmurs; S1/S2+  Pulmonary: CTAB; no rales, rhonchi or wheezing  Gi: Soft, nontender, nondistended, BS present  Back: healed scar at site of prior pilonidal cyst removed, no open wound, no focal erythema, no fluctuance, nontender, no drainage or bleeding  Ext: trace peripheral edema, left foot wrapped in gauze with minimal staining  Neuro: Alert and oriented x3; sensations and motor function grossly intact  Psych: Calm, cooperative, appropriate answers       LINES, CATHETERS, DRAINS, AIRWAYS, AND WOUNDS   Lines, Drains, and Airways:  Wounds (agree with documentation and present on admission):  Peripheral IV (Adult) 03/04/24 Left;Posterior Forearm (Active)   Number of days: 0       Wound Anterior;Left Toe (2nd) (Active)   Number of days: 49       Wound Other (comment) Anterior;Right Toe (5th) (Active)   Number of days: 3         Comments:      LABS / IMAGING / TELE      Labs  I have reviewed the patient's pertinent labs.  Significant abnormals are as below.  Results from last 7 days   Lab Units 03/05/24  0336 03/04/24  1102 03/03/24  0342   WBC K/uL 42.97* 42.20* 36.79*   HEMOGLOBIN g/dL 11.5* 11.1* 10.4*   HEMATOCRIT % 36.7 35.9 33.8*   PLATELETS K/uL 508* 476* 496*    Results from last 7 days   Lab Units 03/05/24  0336 03/03/24  0342 03/02/24  1053   SODIUM mEQ/L 137 138 138   POTASSIUM mEQ/L 4.1 4.0 4.0   CHLORIDE mEQ/L 103 105 104   CO2 mEQ/L 29 26 27   BUN mg/dL 19 16 14   CREATININE mg/dL 0.6 0.7 0.7   GLUCOSE mg/dL 167* 166* 255*            Results from last 7 days   Lab Units 02/29/24  1830   CRP mg/L 37.00*    Results from last 7 days   Lab Units 02/29/24  1830   SED RATE mm/hr 89*          Results from last 7 days   Lab Units 03/01/24  0335 02/29/24  1830   AST IU/L 17 19   ALT IU/L 17 22   ALK PHOS IU/L 77 93   BILIRUBIN TOTAL mg/dL 0.5 0.5   PROTEIN TOTAL g/dL 5.7* 7.6   ALBUMIN g/dL 3.1* 3.8                   Microbiology Results     Procedure Component Value Units  Date/Time    Anaerobic Culture / Smear (includes Aerobic Culture) Foot, Right [[id number]]  (Abnormal)  (Susceptibility) Collected: 03/01/24 1046    Specimen: Wound Swab from Foot, Right Updated: 03/04/24 0905     Culture **Positive Culture**      3+ Serratia marcescens      4+ Diphtheroids     Gram Stain Result No WBC Seen      2+ Gram positive bacilli    Blood Culture Blood, Venous [[id number]]  (Normal) Collected: 02/29/24 1928    Specimen: Blood, Venous Updated: 03/03/24 2300     Culture No growth at 72 hours    Blood Culture Blood, Venous [[id number]]  (Normal) Collected: 02/29/24 1830    Specimen: Blood, Venous Updated: 03/03/24 2300     Culture No growth at 72 hours    Anaerobic Culture / Smear (includes Aerobic Culture) [[id number]]  (Abnormal)  (Susceptibility) Collected: 02/15/24 1200    Specimen: Wound Swab from Toe, Left Updated: 02/20/24 1035     Culture **Positive Culture**      4+ Enterobacter cloacae Complex      4+ Acinetobacter species      No Anaerobes Recovered     Gram Stain Result No WBC Seen      No organisms seen             Imaging  I have independently reviewed the patient's pertinent imaging for this hospital visit.   MRI FOOT LEFT WITHOUT CONTRAST    Result Date: 3/1/2024  CLINICAL HISTORY: Osteomyelitis, foot Left foot infection/concerns for osteomyelitis     IMPRESSION: Plantar wound with multifocal acute osteomyelitis and pathological fractures in the left foot as described in detail below. Pockets of abscess are seen extending from the plantar wound about the regions of osteomyelitis. Please see the body of the report for additional findings and descriptions. COMMENT: Comparison: X-rays dated 2/29/2024. MRI from January 1624. Technique: Noncontrast MRI left forefoot. Findings: Since the previous MRI patient has undergone second toe amputation. However, there is new osteomyelitis in pathological fracture involving the head, neck and distal to mid shaft of the second metatarsal. There is  also nonspecific marrow changes possibly representing early osteomyelitis extending to the second metatarsal base. - Marked bony erosion, pathological displaced fracture in and osteomyelitis is present throughout the third metatarsal with the distal shaft/neck fracture displaced laterally. Nonspecific marrow edema without T1 replacement in the proximal portions of the third metatarsal shaft are noted. There is acute osteomyelitis in the third toe and the proximal phalanx and possibly very small amounts in the middle phalanx but not in the distal phalanx. - Acute osteomyelitis in the head and neck of the fourth metatarsal. Erosions and small osteomyelitis in the base of the fourth proximal phalanx with septic arthritis of the fourth metatarsal phalangeal joint. Suspected pathological fracture through osteomyelitis of the fourth proximal phalanx best seen on sagittal image 7. - In the fifth proximal phalanx there is marrow edema like signal but no convincing T1 marrow replacement. Findings are nonspecific. In the fifth metatarsal head medially there is a small amount of bone marrow edema as well as some early patchy T1 marrow replacement axial image 19 where early ostomy myelitis cannot be excluded. Otherwise no fifth metatarsal osteomyelitis. - At the first metatarsophalangeal joint there is osteoarthritis with mild areas of patchy bone marrow edema in the first metatarsal distally as well as the proximal phalanx. In the lateral aspect of the first metatarsal head there is a small focus of questionable early bony erosion and T1 marrow replacement on axial image 13 where small focus of osteitis cannot be excluded. There is a nonspecific first metatarsophalangeal joint effusion. Nonspecific bone marrow edema in the sesamoids more so in the fibular sesamoid with there is some questionable early T1 marrow replacement and small focus of early isthmus cannot be excluded. --- No evidence of osteomyelitis in the imaged  tarsal bones noting mild degenerative joint disease. - There is a wound in the plantar forefoot deep to the third metatarsophalangeal joint with multiple pockets of abscess about the regions of osteomyelitis described above. Diffuse abnormal muscle edema could be related to myositis in the appropriate clinical setting although denervation changes are also probably present. - The Lisfranc ligament appears intact. Diffuse nonspecific soft tissue edema is noted. This should be correlated with the clinical extent of soft tissue infection.     X-RAY CHEST 1 VIEW    Result Date: 3/1/2024  CLINICAL HISTORY: Leukocytosis r/o pneumonia     IMPRESSION: No acute disease in chest. Right rotator cuff calcific tendinitis. COMMENT: Comparison: Chest x-ray 12/13/2021. Technique: A single frontal AP portable upright projection of the chest was obtained. FINDINGS: The lungs are hypoexpanded but grossly clear without focal airspace consolidation, pleural effusion or pneumothorax. The cardiomediastinal silhouette is within normal limits. The upper abdomen is unremarkable. There is no acute osseous abnormality.  There is amorphous calcification adjacent to the right greater tuberosity which can be seen in the setting of rotator cuff calcific tendinitis.     X-RAY FOOT LEFT 3+ VIEWS    Result Date: 2/29/2024  CLINICAL HISTORY: r/o gas gangrene     IMPRESSION: Prior second toe amputation with new acute osteomyelitis throughout the second metatarsal where there is pathological fracturing. Acute osteomyelitis and displaced pathological fracturing of the third metatarsal. Probable mild osteomyelitis in the base of the proximal third phalanx. Probable osteomyelitis involving the thi fourth metatarsal head medially as well as probably in the fourth proximal phalanx. Suggest MRI for more definitive assessment of the extent of infection. Plantar calcaneal spurring and mild degenerative disease. Extensive atherosclerotic vascular calcifications.  Wound overlying the second toe amputation site with irregular soft tissue. Possible small amounts of gas in the third toe. 4 views of the left foot. COMMENT: Comparison: X-rays from 1 1/15/2024. See impression        ECG/Telemetry  Not on tele    ASSESSMENT AND PLAN      * Type 2 diabetes mellitus with left diabetic foot infection   Assessment & Plan  Patient presenting with worsening left foot wound  - MRI Left foot 3/1/2024: Plantar wound with multifocal acute osteomyelitis   - Wound Cx: Serratia, Diptheroids, Enterobacter, Acinetobacter- sensitivities noted    - Podiatry consult appreciated: rec surgical intervention but patient declining amputation at this time and would like to try abx therapy alone first  - ID consult appreciated: rec 6 weeks of IV rocephin  - Patient requested secondary opinion - vascular Surgery consult appreciated   - Pain control with Tylenol prn, Tramadol prn  - Patient will require a PICC line for IV abx outpatient  - WBAT to left heel  - PT/OT consulted for dispo planning    Leukemoid reaction  Assessment & Plan  Suspect elevated WBC is a leukemoid reaction from severe diabetic left foot infection  She has no history of leukemia/lymphoma  UA and CXR not consistent with infection    - WBC remains elevated 42, although slightly lower than [facility] admission  - Abx as above under diabetic foot infection  - Follow CBC daily  - Consult Hematology    Type 2 diabetes mellitus with diabetic neuropathy (CMS/HCC)  Assessment & Plan  A1c 1/31/2024 10.1 indicating poor control  She follows with [facility] at [facility]    - fair glycemic control  - continue Accucheks, SSI  - ADA, NCS diet  - Continue Gabapentin  - Rybelsus (non formulary) and Jardiance on hold  - Endocrinology consult to assist with DM management    HLD (hyperlipidemia)  Assessment & Plan  - Continue Atorvastatin    Primary hypertension  Assessment & Plan  BP stable     - Continue home Amlodipine, HCTZ, Losartan with  hold parameters    Hypothyroidism  Assessment & Plan  - Continue Levothyroxine       VTE Assessment: Padua VTE Score: 1  VTE Prophylaxis:  Current anticoagulants:  enoxaparin (LOVENOX) syringe 40 mg, subcutaneous, Daily (6p)      Code Status: Full Code      Estimated Discharge Date: 3/6/2024     Disposition Planning: Heme/Onc consulted, SW assisting with placement, PT/OT consulted, IV team consult for picc prior to dc     Lisa Monk, DO  3/5/2024

## 2024-03-05 NOTE — HOSPITAL COURSE
63 y.o. female with a past medical history of insulin dependent T2DM A1c 10.1%, diabetic neuropathy, diabetic retinopathy, PAD s/p peripheral angiogram with left tibial/peroneal angioplasty, atherectomy and stenting of the left anterior tibial artery due to significant occlusion, on ASA/Plavix, chronic left foot infection with osteomyelitis.    She had a recent hospitalization 1/2024 for management of left foot wound with gangrenous 2nd toe with underlying osteomyelitis and underwent a left second digit amputation 1/18/24 with Dr. Damian, OR cultures grew streptococcus and bacteroides, and she discharged on PO abx. During that admission she was noted to have severely elevated WBCs>50 and recommended to follow up outpatient with Hematology for further work up. She was evaluated outpatient by Podiatry and ID, repeat I&Ds and wound cultures 2/15/24 grew enterobacter and acinetobacter and she was prescribed a prolonged course of ciprofloxacin and Augmentin. She did not follow up with Hematology as recommended.    She returned to the hospital with worsening left foot and toe wounds with failed outpatient treatment.    XR left foot noted:   -prior 2nd toe amputation with new acute osteomyelitis throughout the 2nd metatarsal where there is a pathologic fracturing   -Acute osteomyelitis and displaced pathological fracturing of the third metatarsal.    -Probable mild osteomyelitis in the base of the proximal third phalanx.    -Probable osteomyelitis involving the thi fourth metatarsal head medially as well as probably in the fourth proximal phalanx.    MRI left foot w/o contrast:   -Plantar wound with multifocal acute osteomyelitis and pathological fractures in the left foot   -Pockets of abscess are seen extending from the plantar wound about the regions of osteomyelitis    S/p bedside I&D of the left third metatarsal head. Deep wound cultures obtained and grew serratia marcescens susceptible to IV ceftriaxone.    Patient  "declined amputation or surgical intervention at this time and prefers to pursue conservative management with IV antibiotics for 6 weeks.    Also of note, leukocytosis persisted since 1/2024 ranging 30-50. Last labs performed 9/22/23 WBC 21.1 when she underwent gluteal lesion excision. WBC 44, Hb 11.6, . Predominantly neutrophils, but baso/eo/myelo's elevated. Elevated ESR 89 and CRP 37. Hematology evaluated and started a work up to rule out underlying bone marrow disease. Iron panel and B12 level within normal limits. Folate level low normal; recommended to start supplementation. Peripheral smear reported \"leukocytosis with a neutrophilia and a left shift.  The findings are likely due to the patient's known foot infection/osteomyelitis\". Flow cytometry showed \"No evidence of immunophenotypically abnormal T or B-cell population.  CD34 positive cells comprise approximately 8.3% of total cells analysis and show myeloid differentiation.\"  Remaining hematologic workup pending (Jak2, BCR-ABL1) and will be followed up outpatient with hematology/oncologist Dr. Love. Iron panel pending. BCX drawn 2/29/24 showed no growth. CT abd/pelvis with contrast showed noted abnormal tissue changes tabby-coccyx which Surgery team deemed consistent with scar tissue in the setting of known prior lesion excision. XR right knee showed intact hardware and no acute findings.    The endocrinologist followed in consult for poorly controlled Type 2 Diabetes Mellitus. Recommended to discontinued use of mixed insulin and start a basal bolus insulin regimen.    Patient is now medically stable for discharge to SNF for 6 weeks of IV antibiotics.      Follow up with the following care providers and call to schedule an appointment:  -PCP within 1 week  -Podiatry in 1-2 weeks for wound care management  -Infectious Disease in 4-6 weeks for antibiotic management. Recommend weekly blood work (complete blood count, basic metabolic panel, magnesium, ESR, " CRP)  -Hematology in 1-2 weeks for further evaluation of leukocytosis  -Endocrinology in 2-4 weeks for management of insulin dependent Diabetes mellitus

## 2024-03-05 NOTE — PLAN OF CARE
Plan of Care Review  Plan of Care Reviewed With: patient  Progress: improving  Outcome Evaluation: Pt OOB with assist x1 and walker, ambulates into bathroom, sat up in chair. Currently at CT scan for CT of abdomen/pelvis. PICC line placed today for long term IV antibiotics. Reports pain control with current regimen.

## 2024-03-05 NOTE — DISCHARGE INSTRUCTIONS
63 y.o. female with a past medical history of insulin dependent T2DM A1c 10.1, diabetic neuropathy, diabetic retinopathy, PAD s/p peripheral angiogram with left tibial/peroneal angioplasty, atherectomy and stenting of the left anterior tibial artery due to significant occlusion, on ASA/Plavix, chronic left foot infection with osteomyelitis.    She had a recent hospitalization 1/2024 for management of left foot wound with gangrenous 2nd toe with underlying osteomyelitis and underwent a left second digit amputation 1/18/24 with Dr. Damian, OR cultures grew streptococcus and bacteroides, and she discharged on PO abx. During that admission she was noted to have severely elevated WBCs>50 and recommended to follow up outpatient with Hematology for further work up. She was evaluated outpatient by Podiatry and ID, repeat I&Ds and wound cultures 2/15/24 grew enterobacter and acinetobacter and she was prescribed a prolonged course of ciprofloxacin and Augmentin. She did not follow up with Hematology as recommended.    She returned to the hospital with worsening left foot and toe wounds with failed outpatient treatment.    XR left foot noted:   -prior 2nd toe amputation with new acute osteomyelitis throughout the 2nd metatarsal where there is a pathologic fracturing   -Acute osteomyelitis and displaced pathological fracturing of the third metatarsal.    -Probable mild osteomyelitis in the base of the proximal third phalanx.    -Probable osteomyelitis involving the thi fourth metatarsal head medially as well as probably in the fourth proximal phalanx.    MRI left foot w/o contrast:   -Plantar wound with multifocal acute osteomyelitis and pathological fractures in the left foot   -Pockets of abscess are seen extending from the plantar wound about the regions of osteomyelitis    S/p bedside I&D of the left third metatarsal head. Deep wound cultures obtained and grew serratia marcescens susceptible to IV ceftriaxone.    Patient  "declined amputation or surgical intervention at this time and prefers to pursue conservative management with IV antibiotics for 6 weeks.    Also of note, leukocytosis persisted since 1/2024 ranging 30-50. Last labs performed 9/22/23 WBC 21.1 when she underwent gluteal lesion excision. WBC 44, Hb 11.6, . Predominantly neutrophils, but baso/eo/myelo's elevated. Elevated ESR 89 and CRP 37. Hematology evaluated and started a work up to rule out underlying bone marrow disease. Iron panel and B12 level within normal limits. Folate level low normal; recommended to start supplementation. Peripheral smear reported \"leukocytosis with a neutrophilia and a left shift.  The findings are likely due to the patient's known foot infection/osteomyelitis\". Flow cytometry showed \"No evidence of immunophenotypically abnormal T or B-cell population.  CD34 positive cells comprise approximately 8.3% of total cells analysis and show myeloid differentiation.\"  Remaining hematologic workup pending (Jak2, BCR-ABL1) and will be followed up outpatient with hematology/oncologist Dr. Love. Iron panel is normal. BCX drawn 2/29/24 showed no growth. CT abd/pelvis with contrast showed noted abnormal tissue changes tabby-coccyx which Surgery team deemed consistent with scar tissue in the setting of known prior lesion excision. XR right knee showed intact hardware and no acute findings.    The endocrinologist followed in consult for poorly controlled Type 2 Diabetes Mellitus. Recommended to discontinued use of mixed insulin and start a basal bolus insulin regimen.    Patient is now medically stable for discharge to SNF for 6 weeks of IV antibiotics.      Follow up with the following care providers and call to schedule an appointment:  -PCP within 1 week  -Podiatry in 1-2 weeks for wound care management  -Infectious Disease in 4-6 weeks for antibiotic management. Recommend weekly blood work (complete blood count, basic metabolic panel, magnesium, " ESR, CRP)  -Hematology in 1-2 weeks for further evaluation of leukocytosis  -Endocrinology in 2-4 weeks for management of insulin dependent Diabetes mellitus      Please bring this discharge paperwork to all of your follow up appointments.    If you have any questions regarding your home medications please contact your primary care physician immediately.     If you experience any alarming symptoms, please do not hesitate to call your primary care doctor or present to the emergency department for evaluation.     Thank you for allowing us here at [facility] to be a part of your care.     Sincerely,  [facility]                  Wound Care Discharge Instructions   [facility]   Activity:   · No strenuous exercise or heavy lifting (over 10 pounds) until your follow up with doctor  · No driving until following up with doctor  · Walking is the best exercise but you should keep weight off of your foot to help with healing and avoid pressure to the area. Use a walker or wheel chair.  · Keep your affected foot elevated on two pillows while in bed and sitting in a chair to decrease swelling  Nutrition:   · Eating more protein will help with wound healing, If possible, add protein to your diet to help increase healing factors.  Weight Bearing status:  · Weight bearing as tolerated to left leg - concentrate weight on left heel in surgical shoe  Wound Care Instructions:   · Your wound care dressing may be removed by a home care nurse or healthcare trained family member.  · Remove the dressing, cleanse the wound with wound cleanser or normal saline.   · No soaking your foot until wound is completely healed.  -Dress wound with Betadine, Adaptic, 4 x 4 gauze, Kerlix, light Ace  -Change this daily  Medications:   · Resume all home medications unless otherwise directed by doctor or nurse practitioner  Reasons to Call:   · Severe and persistent shortness of breath not relieved with rest  · Fever above 101.5 oF  ·  Redness, swelling or drainage from incision  Follow Up Appointment:   · Call to schedule an appointment with your physician/surgeon after discharge  · Dr. Getachew Damian  · [facility]- [phone]   Or [facility]

## 2024-03-05 NOTE — PLAN OF CARE
Problem: Adult Inpatient Plan of Care  Goal: Plan of Care Review  Outcome: Progressing  Flowsheets (Taken 3/5/2024 3007)  Outcome Evaluation: 3/5/24 Pt is a 63 year old female adm for osteomyelitis and pathological fractures of the left foot. Pt seen for PT session. Pt is WBAT to L heel with concentration to L heel in surgical shoe. The patient was educated on the role of PT in the acute care setting. The patient was instructed on proper technique when performing OOB transfers to ensure their safety. Gait training was performed using the necessary AD along with instructing the patient regarding the proper set up and use of the device. All questions and concerns addressed and answered during session. Pt tolerated session well. Completed functional txs without assist, amb using RW and maintains WB to L heel. No balance deficits or strength deficits noted. No further inpatient PT needs identified. Pt plans to be DC to SNF for IV antibiotics.  Plan of Care Reviewed With: patient

## 2024-03-05 NOTE — DISCHARGE INSTR - ACTIVITY
PICC Insertion, Care After  Refer to this sheet in the next few weeks. These instructions provide you with information on caring for yourself after your procedure. Your health care provider may also give you more specific instructions. Your treatment has been planned according to current medical practices, but problems sometimes occur. Call your health care provider if you have any problems or questions after your procedure.  What can I expect after the procedure?  After your procedure, it is typical to have the following:  Mild discomfort at the insertion site. This should not last more than a day.  Follow these instructions at home:  Rest at home for the remainder of the day after the procedure.  You may bend your arm and move it freely. If your PICC is near or at the bend of your elbow, avoid activity with repeated motion at the elbow.  Avoid lifting heavy objects as instructed by your health care provider.  Avoid using a crutch with the arm on the same side as your PICC. You may need to use a walker.  Bandage Care  Keep your PICC bandage (dressing) clean and dry to prevent infection.  Ask your health care provider when you may shower. To keep the dressing dry, cover the PICC with plastic wrap and tape before showering. If the dressing does become wet, replace it right after the shower.  Do not soak in the bath, swim, or use hot tubs when you have a PICC.  Change the PICC dressing as instructed by your health care provider.  Change your PICC dressing if it becomes loose or wet.  General PICC Care  Check the PICC insertion site daily for leakage, redness, swelling, or pain.  Flush the PICC as directed by your health care provider. Let your health care provider know right away if the PICC is difficult to flush or does not flush. Do not use force to flush the PICC.  Do not use a syringe that is less than 10 mL to flush the PICC.  Never pull or tug on the PICC.  Avoid blood pressure checks on the arm with the  "PICC.  Keep your PICC identification card with you at all times.  Do not take the PICC out yourself. Only a trained health care professional should remove the PICC.  Contact a health care provider if:  You have pain in your arm, ear, face, or teeth.  You have fever or chills.  You have drainage from the PICC insertion site.  You have redness or palpate a \"cord\" around the PICC insertion site.  You cannot flush the catheter.  Get help right away if:  You have swelling in the arm in which the PICC is inserted.  This information is not intended to replace advice given to you by your health care provider. Make sure you discuss any questions you have with your health care provider.  Document Released: 10/08/2014 Document Revised: 05/25/2017 Document Reviewed: 10/10/2014  [employer] Interactive Patient Education © 2017 [employer]  PICC Information:    Insertion Date: DATE:  3/5/2024    Insertion Site: R Basillic Vein    Device Type: Single Lumen PICC    Length: 42 cm    Arm Circumference: 31 cm    Chest X-Ray Results: ECG Tip Conf System  SVC    External Catheter Length:  0    Last Dressing Change: DATE: 3/5/2024    Power Inject PICC Line? : YES    Last Cap Change: DATE: 3/5/2024    Keep this information in a safe place and share with your provider  and/or infusion nurse as needed.   "

## 2024-03-05 NOTE — PLAN OF CARE
Nursing reported patient stated she wanted to discuss transfer to [place] for a second opinion.  I went to bedside to talk to the patient with her Sister, Anna, over the phone, with nursing present at bedside.  Patient relayed her understanding of her medical illnesses and continued wish to pursue a trial of IV antibiotics to treat the left foot osteomyelitis.  She did state that after discussing with family members, she would wish to pursue a second opinion from a podiatry surgery at a different hospital system to confirm the current treatment course recommendations.  She said ultimately her wish is to go to a skilled nursing facility for the IV antibiotics and then home.  We discussed that as she still does not wish to pursue surgical intervention and wants to leave the hospital soon as possible, it would be reasonable for her to follow-up with a [facility] physician outpatient to obtain the second opinion regarding the management of her foot wound and surgical intervention if that is still recommended after the 6 weeks of IV antibiotics.      We also discussed having the hematologist evaluate while inpatient in the setting of persistent leukocytosis.  Dr. Love stop by during her discussion and provided is input regarding his concerns; he relayed that he has a higher suspicion that the elevated white blood cell counts are predominantly due to her active infection rather than an underlying bone marrow disease.  Hematologic workup has been drawn and is pending, which could take several days to result.  Patient relates that she is glad the workup has been initiated.  The patient and her sister reported concern with pain in her coccyx with a remote history of surgery of a pilonidal cyst October 2024.  They requested further imaging be performed of the pelvis to rule out persisting infection due to that surgical intervention as well.    At this time the patient confirms that she no longer wishes to pursue inpatient transfer to  [facility] and will plan to follow-up outpatient with her podiatry surgeon of choice.  CT pelvis with contrast ordered to rule out pelvic infection at patient's request.  Follow-up hematologic workup as ordered.  Case management to continue search for facility.

## 2024-03-06 ENCOUNTER — APPOINTMENT (INPATIENT)
Dept: RADIOLOGY | Facility: HOSPITAL | Age: 64
DRG: 629 | End: 2024-03-06
Attending: STUDENT IN AN ORGANIZED HEALTH CARE EDUCATION/TRAINING PROGRAM
Payer: MEDICARE

## 2024-03-06 LAB
ANION GAP SERPL CALC-SCNC: 6 MEQ/L (ref 3–15)
ANISOCYTOSIS BLD QL SMEAR: ABNORMAL
BASOPHILS # BLD: 1.31 K/UL (ref 0.01–0.1)
BASOPHILS NFR BLD: 3 %
BUN SERPL-MCNC: 19 MG/DL (ref 7–25)
CALCIUM SERPL-MCNC: 9 MG/DL (ref 8.6–10.3)
CASE RPRT: NORMAL
CHLORIDE SERPL-SCNC: 103 MEQ/L (ref 98–107)
CLINICAL INFO: NORMAL
CO2 SERPL-SCNC: 27 MEQ/L (ref 21–31)
CREAT SERPL-MCNC: 0.6 MG/DL (ref 0.6–1.2)
DIFFERENTIAL METHOD BLD: ABNORMAL
EGFRCR SERPLBLD CKD-EPI 2021: >60 ML/MIN/1.73M*2
EOSINOPHIL # BLD: 2.62 K/UL (ref 0.04–0.36)
EOSINOPHIL NFR BLD: 6 %
ERYTHROCYTE [DISTWIDTH] IN BLOOD BY AUTOMATED COUNT: 15.6 % (ref 11.7–14.4)
FERRITIN SERPL-MCNC: 147 NG/ML (ref 11–250)
FOLATE SERPL-MCNC: 5.4 NG/ML
GLUCOSE BLD-MCNC: 150 MG/DL (ref 70–99)
GLUCOSE BLD-MCNC: 158 MG/DL (ref 70–99)
GLUCOSE BLD-MCNC: 168 MG/DL (ref 70–99)
GLUCOSE BLD-MCNC: 230 MG/DL (ref 70–99)
GLUCOSE BLD-MCNC: 318 MG/DL (ref 70–99)
GLUCOSE SERPL-MCNC: 167 MG/DL (ref 70–99)
GRAM STN SPEC: ABNORMAL
GRAM STN SPEC: ABNORMAL
HCT VFR BLD AUTO: 37.5 % (ref 35–45)
HGB BLD-MCNC: 11.6 G/DL (ref 11.8–15.7)
HYPOCHROMIA BLD QL SMEAR: ABNORMAL
IRON SATN MFR SERPL: 15 % (ref 15–45)
IRON SERPL-MCNC: 40 UG/DL (ref 35–150)
LYMPHOCYTES # BLD: 1.31 K/UL (ref 1.2–3.5)
LYMPHOCYTES NFR BLD: 3 %
MCH RBC QN AUTO: 27.4 PG (ref 28–33.2)
MCHC RBC AUTO-ENTMCNC: 30.9 G/DL (ref 32.2–35.5)
MCV RBC AUTO: 88.7 FL (ref 83–98)
METAMYELOCYTES # BLD MANUAL: 1.31 K/UL
METAMYELOCYTES NFR BLD MANUAL: 3 %
MICROORGANISM SPEC CULT: ABNORMAL
MONOCYTES # BLD: 1.74 K/UL (ref 0.28–0.8)
MONOCYTES NFR BLD: 4 %
MYELOCYTES # BLD MANUAL: 3.49 K/UL
MYELOCYTES NFR BLD MANUAL: 8 %
NEUTS BAND # BLD: 1.31 K/UL (ref 0–0.53)
NEUTS BAND # BLD: 30.51 K/UL (ref 1.7–7)
NEUTS BAND NFR BLD: 3 %
NEUTS SEG NFR BLD: 70 %
PATH INTERP SPEC-IMP: NORMAL
PDW BLD AUTO: 10.2 FL (ref 9.4–12.3)
PLATELET # BLD AUTO: 525 K/UL (ref 150–369)
PLATELET # BLD EST: ABNORMAL 10*3/UL
POCT TEST: ABNORMAL
POTASSIUM SERPL-SCNC: 4 MEQ/L (ref 3.5–5.1)
RBC # BLD AUTO: 4.23 M/UL (ref 3.93–5.22)
SODIUM SERPL-SCNC: 136 MEQ/L (ref 136–145)
TIBC SERPL-MCNC: 272 UG/DL (ref 270–460)
UIBC SERPL-MCNC: 232 UG/DL (ref 180–360)
VIT B12 SERPL-MCNC: 1227 PG/ML (ref 180–914)
WBC # BLD AUTO: 43.59 K/UL (ref 3.8–10.5)

## 2024-03-06 PROCEDURE — 63700000 HC SELF-ADMINISTRABLE DRUG: Performed by: HOSPITALIST

## 2024-03-06 PROCEDURE — 63700000 HC SELF-ADMINISTRABLE DRUG

## 2024-03-06 PROCEDURE — 85025 COMPLETE CBC W/AUTO DIFF WBC: CPT | Performed by: STUDENT IN AN ORGANIZED HEALTH CARE EDUCATION/TRAINING PROGRAM

## 2024-03-06 PROCEDURE — 97165 OT EVAL LOW COMPLEX 30 MIN: CPT | Mod: GO

## 2024-03-06 PROCEDURE — 99233 SBSQ HOSP IP/OBS HIGH 50: CPT | Performed by: STUDENT IN AN ORGANIZED HEALTH CARE EDUCATION/TRAINING PROGRAM

## 2024-03-06 PROCEDURE — 12000000 HC ROOM AND CARE MED/SURG

## 2024-03-06 PROCEDURE — 63600000 HC DRUGS/DETAIL CODE: Mod: JZ | Performed by: STUDENT IN AN ORGANIZED HEALTH CARE EDUCATION/TRAINING PROGRAM

## 2024-03-06 PROCEDURE — 1123F ACP DISCUSS/DSCN MKR DOCD: CPT | Performed by: STUDENT IN AN ORGANIZED HEALTH CARE EDUCATION/TRAINING PROGRAM

## 2024-03-06 PROCEDURE — 73560 X-RAY EXAM OF KNEE 1 OR 2: CPT | Mod: RT

## 2024-03-06 PROCEDURE — 73502 X-RAY EXAM HIP UNI 2-3 VIEWS: CPT | Mod: RT

## 2024-03-06 PROCEDURE — 25800000 HC PHARMACY IV SOLUTIONS: Performed by: STUDENT IN AN ORGANIZED HEALTH CARE EDUCATION/TRAINING PROGRAM

## 2024-03-06 PROCEDURE — 36415 COLL VENOUS BLD VENIPUNCTURE: CPT | Performed by: STUDENT IN AN ORGANIZED HEALTH CARE EDUCATION/TRAINING PROGRAM

## 2024-03-06 PROCEDURE — 80048 BASIC METABOLIC PNL TOTAL CA: CPT | Performed by: STUDENT IN AN ORGANIZED HEALTH CARE EDUCATION/TRAINING PROGRAM

## 2024-03-06 PROCEDURE — 63700000 HC SELF-ADMINISTRABLE DRUG: Performed by: STUDENT IN AN ORGANIZED HEALTH CARE EDUCATION/TRAINING PROGRAM

## 2024-03-06 RX ORDER — INSULIN GLARGINE 100 [IU]/ML
22 INJECTION, SOLUTION SUBCUTANEOUS NIGHTLY
Status: DISCONTINUED | OUTPATIENT
Start: 2024-03-06 | End: 2024-03-08 | Stop reason: HOSPADM

## 2024-03-06 RX ORDER — INSULIN LISPRO 100 [IU]/ML
10 INJECTION, SOLUTION INTRAVENOUS; SUBCUTANEOUS
Status: DISCONTINUED | OUTPATIENT
Start: 2024-03-06 | End: 2024-03-08 | Stop reason: HOSPADM

## 2024-03-06 RX ORDER — LIDOCAINE 560 MG/1
1 PATCH PERCUTANEOUS; TOPICAL; TRANSDERMAL DAILY
Status: DISCONTINUED | OUTPATIENT
Start: 2024-03-06 | End: 2024-03-08 | Stop reason: HOSPADM

## 2024-03-06 RX ORDER — ACETAMINOPHEN 325 MG/1
650 TABLET ORAL EVERY 6 HOURS PRN
Status: DISCONTINUED | OUTPATIENT
Start: 2024-03-06 | End: 2024-03-07

## 2024-03-06 RX ADMIN — HYDROCORTISONE: 1 CREAM TOPICAL at 23:02

## 2024-03-06 RX ADMIN — INSULIN LISPRO 4 UNITS: 100 INJECTION, SOLUTION INTRAVENOUS; SUBCUTANEOUS at 18:20

## 2024-03-06 RX ADMIN — CLOPIDOGREL BISULFATE 75 MG: 75 TABLET ORAL at 08:29

## 2024-03-06 RX ADMIN — LEVOTHYROXINE SODIUM 25 MCG: 0.03 TABLET ORAL at 05:59

## 2024-03-06 RX ADMIN — INSULIN LISPRO 2 UNITS: 100 INJECTION, SOLUTION INTRAVENOUS; SUBCUTANEOUS at 21:45

## 2024-03-06 RX ADMIN — HYDROCHLOROTHIAZIDE 12.5 MG: 12.5 TABLET ORAL at 08:29

## 2024-03-06 RX ADMIN — ATORVASTATIN CALCIUM 40 MG: 40 TABLET, FILM COATED ORAL at 17:37

## 2024-03-06 RX ADMIN — GABAPENTIN 300 MG: 300 CAPSULE ORAL at 08:29

## 2024-03-06 RX ADMIN — Medication 6 MG: at 21:45

## 2024-03-06 RX ADMIN — AMLODIPINE BESYLATE 5 MG: 5 TABLET ORAL at 08:29

## 2024-03-06 RX ADMIN — GABAPENTIN 300 MG: 300 CAPSULE ORAL at 20:00

## 2024-03-06 RX ADMIN — ACETAMINOPHEN 975 MG: 325 TABLET ORAL at 05:58

## 2024-03-06 RX ADMIN — DOCUSATE SODIUM 100 MG: 100 CAPSULE, LIQUID FILLED ORAL at 20:01

## 2024-03-06 RX ADMIN — INSULIN LISPRO 10 UNITS: 100 INJECTION, SOLUTION INTRAVENOUS; SUBCUTANEOUS at 18:20

## 2024-03-06 RX ADMIN — DOCUSATE SODIUM 100 MG: 100 CAPSULE, LIQUID FILLED ORAL at 08:29

## 2024-03-06 RX ADMIN — INSULIN GLARGINE 22 UNITS: 100 INJECTION, SOLUTION SUBCUTANEOUS at 21:45

## 2024-03-06 RX ADMIN — LOSARTAN POTASSIUM 50 MG: 50 TABLET, FILM COATED ORAL at 21:45

## 2024-03-06 RX ADMIN — Medication 10 ML: at 11:43

## 2024-03-06 RX ADMIN — Medication 10 ML: at 11:44

## 2024-03-06 RX ADMIN — TRAMADOL HYDROCHLORIDE 50 MG: 50 TABLET, COATED ORAL at 01:41

## 2024-03-06 RX ADMIN — ACETAMINOPHEN 650 MG: 325 TABLET ORAL at 20:51

## 2024-03-06 RX ADMIN — ASPIRIN 81 MG: 81 TABLET, COATED ORAL at 08:29

## 2024-03-06 RX ADMIN — ENOXAPARIN SODIUM 40 MG: 40 INJECTION SUBCUTANEOUS at 17:37

## 2024-03-06 RX ADMIN — INSULIN LISPRO 8 UNITS: 100 INJECTION, SOLUTION INTRAVENOUS; SUBCUTANEOUS at 13:02

## 2024-03-06 RX ADMIN — ACETAMINOPHEN 975 MG: 325 TABLET ORAL at 11:42

## 2024-03-06 RX ADMIN — LIDOCAINE 1 PATCH: 4 PATCH TOPICAL at 17:37

## 2024-03-06 RX ADMIN — INSULIN LISPRO 9 UNITS: 100 INJECTION, SOLUTION INTRAVENOUS; SUBCUTANEOUS at 13:03

## 2024-03-06 RX ADMIN — TRAMADOL HYDROCHLORIDE 50 MG: 50 TABLET, COATED ORAL at 11:48

## 2024-03-06 RX ADMIN — INSULIN LISPRO 2 UNITS: 100 INJECTION, SOLUTION INTRAVENOUS; SUBCUTANEOUS at 03:14

## 2024-03-06 RX ADMIN — GABAPENTIN 300 MG: 300 CAPSULE ORAL at 14:43

## 2024-03-06 RX ADMIN — SODIUM CHLORIDE 2 G: 900 INJECTION INTRAVENOUS at 11:38

## 2024-03-06 RX ADMIN — Medication 10 ML: at 03:15

## 2024-03-06 RX ADMIN — Medication 10 ML: at 20:01

## 2024-03-06 RX ADMIN — INSULIN LISPRO 9 UNITS: 100 INJECTION, SOLUTION INTRAVENOUS; SUBCUTANEOUS at 08:29

## 2024-03-06 ASSESSMENT — COGNITIVE AND FUNCTIONAL STATUS - GENERAL
DRESSING REGULAR LOWER BODY CLOTHING: 3 - A LITTLE
STANDING UP FROM CHAIR USING ARMS: 4 - NONE
TOILETING: 4 - NONE
CLIMB 3 TO 5 STEPS WITH RAILING: 3 - A LITTLE
CLIMB 3 TO 5 STEPS WITH RAILING: 3 - A LITTLE
EATING MEALS: 4 - NONE
AFFECT: WFL
WALKING IN HOSPITAL ROOM: 4 - NONE
MOVING TO AND FROM BED TO CHAIR: 4 - NONE
DRESSING REGULAR UPPER BODY CLOTHING: 4 - NONE
WALKING IN HOSPITAL ROOM: 3 - A LITTLE
MOVING TO AND FROM BED TO CHAIR: 4 - NONE
HELP NEEDED FOR PERSONAL GROOMING: 4 - NONE
STANDING UP FROM CHAIR USING ARMS: 4 - NONE
HELP NEEDED FOR BATHING: 3 - A LITTLE

## 2024-03-06 NOTE — PROGRESS NOTES
Occupational Therapy -  Initial Evaluation     Patient: Bebe Burks  Location: [facility] 5PAV [id number]  MRN: [id number]  Today's date: 3/6/2024    HISTORY OF PRESENT ILLNESS     Bebe is a 63 y.o. female admitted on 2/29/2024 with Toe infection [L08.9]  Closed fracture of second metatarsal bone of left foot, physeal involvement unspecified, initial encounter [S92.322A]  Closed fracture of third metatarsal bone of left foot, physeal involvement unspecified, initial encounter [S92.332A]. Principal problem is Type 2 diabetes mellitus with left diabetic foot infection .    Past Medical History  Bebe has a past medical history of Abnormal ECG, Abnormal finding on chest xray (12/2021), Arthritis, Colon cancer (CMS/[facility]), COVID-19 (12/2021), COVID-19 vaccine series completed, Diabetic neuropathy (CMS/[facility]), DM (diabetes mellitus), type 2 with ophthalmic complications (CMS/[facility]), Hypertension, Hypothyroidism, Left foot drop, Lipid disorder, and Type 2 diabetes mellitus treated with insulin (CMS/[facility]).    History of Present Illness   63-year-old adm for osteomyelitis and pathological fractures of the left foot  WBAT with concentration to L heel in surgical shoe (Pt has premorbid left foot drop)    PRIOR LEVEL OF FUNCTION AND LIVING ENVIRONMENT     Prior Level of Function    Flowsheet Row Most Recent Value   Dominant Hand right   Ambulation assistive equipment   Transferring assistive equipment   Toileting independent   Bathing independent   Dressing independent   Eating independent   IADLs independent   Prior Level of Function Comment Pt reports she has been ambulating using a SPC at home, independent for ADLs   Assistive Device Currently Used at Home cane, straight, walker, front-wheeled        Prior Living Environment    Flowsheet Row Most Recent Value   People in Home alone   Current Living Arrangements condominium   Home Accessibility stairs to enter home (Group), railings on outside stairs   Living  Environment Comment resides in a 1 floor condo, no JOSE [employer]FAEL, stall shower        Occupational Profile    Flowsheet Row Most Recent Value   Environmental Supports and Barriers supportive family        VITALS AND PAIN     OT Vitals    Date/Time Pulse SpO2 O2 Therapy BP BP Location BP Method Pt Position [facility]   03/06/24 1425 101 97 % None (Room air) 120/55 Left upper arm Automatic Sitting CPW      OT Pain    Date/Time Pain Type Rating: Rest [facility]   03/06/24 1425 Pain Assessment 0 - no pain CPW           Objective   OBJECTIVE     Start time:  1411  End time:  1430  Session Length: 19 min  Mode of Treatment: individual therapy, occupational therapy    General Observations  Patient received in chair, upright. She was agreeable to therapy. Pt w/ surgical shoe on Left foot    Precautions: fall, weight bearing  Extremity Weight-Bearing Status: left lower extremity  Left LE Weight-Bearing Status: weight-bearing as tolerated (WBAT) (concentrate weight in the heel w/ surgical shoe)           OT Eval and Treat - 03/06/24 1411        Cognition    Orientation Status oriented x 4     Affect/Mental Status WFL     Follows Commands WFL     Cognitive Function WFL        Vision Assessment/Intervention    Vision Assessment corrective lenses for distance;corrective lenses for reading   Pt has some issues w/ congenital low vision, but is able to drive, as well as diabetic retinopathy       Hearing Assessment    Hearing Status WFL        Sensory Assessment    Sensory Assessment sensation intact, upper extremities   pt endorses occasional numbness and tingling in hands/fingers       Upper Extremity Assessment    UE Assessment ROM and Strength WFL     General Observations some arthritic changes in fingers        Motor Skills    Coordination WFL;bilateral;opposition        Bed Mobility    Comment pt already out in chair        Mobility Belt    Mobility Belt Used for All Out of Bed Activity no     Reason Mobility Belt Not Used other (see comments)      "Reason Mobility Belt Not Used independent for mobility        Sit/Stand Transfer    Surface chair with arm rests     [place] modified independence     Assistive Device walker, front-wheeled     Transfer Comments with effort, challenging to utilize heel weight bearing due to foot drop/wkns        Toilet Transfer    Transfer Technique sit/stand     [place] modified independence     Assistive Device grab bars/safety frame;walker, front-wheeled     Comment Pt utilizes grab bar to assist w/ lowering and standing        Functional Mobility    Distance in room/bathroom     Functional Mobility [place] modified independence     Assistive Device walker, front-wheeled     Functional Mobility Comments pt reports that podiatry instructed her to stay off her foot as much as possibe, so mobility was only to bathroom and back.  Observed pt attempt to \"heel weight bear\" but due to the wkns/foot drop on that side, effort elicits more of a foot flat weight bearing.  Similar outcome with standing for ADLs.        Upper Body Dressing    Tasks pajama/robe     [place] modified independence        Lower Body Dressing    Tasks shoes/slippers;pants/bottoms     [place] modified independence        Grooming    Tasks washes, rinses and dries hands     [place] modified independence     Comment standing at sink w/ RW, unable to maintain much heel weight bearing during task        Toileting    Tasks perform bladder hygiene;adjust/manage clothing     [place] modified independence     Comment pt completing toileting activities in bathroom w/o assist        Balance    Static Sitting Balance WNL     Dynamic Sitting Balance WNL     Sit to Stand Dynamic Balance WFL     Static Standing Balance WFL     Dynamic Standing Balance WFL        Impairments/Safety Issues    Impairments Affecting Function pain   pt w/ modified weight  bearing status and is now using an AD that is not her usual mode of mobility                     "           Education Documentation  Self-Care, taught by Mojgan Perkins OT at 3/6/2024  2:57 PM.  Learner: Patient  Readiness: Acceptance  Method: Explanation  Response: Verbalizes Understanding  Comment: role of OT, POC, heel weight bearing instruction (limited due to foot drop)        Session Outcome  Patient upright, leg(s) elevated at end of session, chair alarm on, call light in reach, personal items in reach, all needs met. Nursing notified about patient's response to therapy/activity.    AM-PAC™ - ADL (Current Function)     Putting on/taking off regular lower body clothing 3 - A Little   Bathing 3 - A Little   Toileting 4 - None   Putting on/taking off regular upper body clothing 4 - None   Help for taking care of personal grooming 4 - None   Eating meals 4 - None   AM-PAC™ ADL Score 22      ASSESSMENT AND PLAN     Progress Summary  Pt is a 64 y/o female w/ L path. foot fx's and osteo, now w/ PICC line for long term abx in R UE.  Pt has a surgical shoe for the left foot and has been instructed for heel weight bearing, although this is challenging to carryover due to L ankle wkns/foot drop.  Pt was previously independent w/ a SPC.  Pt is now needing to utilize a RW to mobilize and protect left foot.  Pt is Modified independently completing herself care including donning and doffing the surgical shoe.  No further acute OT.  Pt is for SNF for IV abx.  Pt has set goal for herself to advance back to SPC use prior to d/c/ and would like therapy to address that while at SNF.  In agreement with that plan. Encouraged pt to continue to participate in her own care at SNF and sit OOB as allowed.    Patient/Family Therapy Goal Statement: to be able to mobilize w/ SPC again.  I don't want to use the RW at home.    Reason for Discharge: no further needs identified    OT Discharge Recommendations    Flowsheet Row Most Recent Value   OT Recommended Discharge Disposition skilled nursing facility  [rec. therapy evals at SNF  when pt is able to do more] at 03/06/2024 1411   Anticipated Equipment Needs if Discharged Home (OT) --  [TBD at next level of care] at 03/06/2024 1411

## 2024-03-06 NOTE — PLAN OF CARE
"  Problem: Adult Inpatient Plan of Care  Goal: Plan of Care Review  3/6/2024 1352 by Meghana Langford LDN  Flowsheets (Taken 3/6/2024 1352)  Outcome Evaluation: Patient evaluated per LOS, seen over Lunch, eating well, good appetite, expressed some frustration with her BG being elevated despite \"counting all Carbs, only had 45 this morning and I could have ordered 60\".  Patient and Sister bedside discussed being careful with diet, Sister brought in salad for Patient, wanted to make sure was okay.  Patient discussed careful with diet at home.  Per review of meal orders here, Patient has been ordering on average between 3-4 CHO servings/meal that within prescribed diet guidelines.  But at times, not getting in enough protein at meal (this morning ordered Pancakes, diet syrup, sugar free juice).  Generally consumes proteins with Lunch and Dinner meals.  Patient discussed aware needs to eat more protein and would like to get extra/double protein portions with meals, and extra veggies as these may not affect BG levels as much.  Some tips to increase protein intakes, meal tips to help balance BG levels discussed.  Patient verbalized very good understanding.  Note recent BG -318 mg/dL past 24 hours.  Last Hemoglobin A1C 10.1% in 1/2024.  Lantus, Humalog ordered.  Patient also with infection on Antibiotics.  Patient with history of poorly controlled DM PTA given elevated Hemoglobin A1C a few months ago, but Patient has been ordering appropriately for prescribed diet, however at times meals can be CHO-heavy and not enough protein particularly in mornings.  As above, reinforced protein intakes, to have protein with every meal, and enjoying higher fiber CHO choices.  Appreciate Endocrinology has been following, increasing Insulin at this time.  Plan of Care Reviewed With: patient, sibling    Goal: Include high quality protein food with every meal including Breakfast.  3-4 CHO servings/meal at most.  Double veggies " if requests, double proteins if requests to help satiety and help balance BG levels.  Recommendations:  Continue diet as ordered, allow extra protein portions, extra veggies.  As above, tips to increase protein intakes, meal tips to help balance BG levels discussed, encouraged.  Encourage higher fiber CHO choices.    Monitor diet/PO, Labs/BG, weights, skin.

## 2024-03-06 NOTE — PROGRESS NOTES
Infectious Disease Progress Note    Patient Name: Bebe Burks  MR#: [id number]  : 1960  Admission Date: 2024  Date: 24   Time: 3:44 PM   Author: Sander Ackerman MD    OBJECTIVE:  She is concerned that the white count has not declined  Hematology sent some tests yesterday which are pending  Antibiotics:    Anti-infectives (From admission, onward)    Start     Dose/Rate Route Frequency Ordered Stop    24 1100  cefTRIAXone (ROCEPHIN) IVPB 2 g in 100 mL NSS vial in bag         2 g  200 mL/hr over 30 Minutes intravenous Every 24 hours interval 24 1009 24 2359          ROS  As above  Vital Signs:    Temp:  [36.2 °C (97.2 °F)-36.7 °C (98.1 °F)] 36.3 °C (97.4 °F)  Heart Rate:  [] 87  Resp:  [16-18] 18  BP: (120-137)/(55-78) 137/63    Temp (72hrs), Av.6 °C (97.8 °F), Min:36.2 °C (97.2 °F), Max:36.9 °C (98.4 °F)      Physical Exam    Gen: Aox3  HEENT: OP clear  Neck: Supple  LAD: No cervical LAD  Lungs: CTAB  CV: RRR no murmurs  Abd: Soft NTND +BS  Ext: Foot dressing in place  Skin: no rash  Neuro: II-XII intact        Lines, Drains, Airways, Wounds:  Peripheral IV (Adult) 24 Left;Posterior Forearm (Active)   Number of days: 0       Wound Anterior;Left Toe (2nd) (Active)   Number of days: 49       Wound Other (comment) Anterior;Right Toe (5th) (Active)   Number of days: 3       Labs:    Lab Results   Component Value Date    WBC 43.59 (HH) 2024    HGB 11.6 (L) 2024    HCT 37.5 2024    MCV 88.7 2024     (H) 2024     Lab Results   Component Value Date    GLUCOSE 167 (H) 2024    CALCIUM 9.0 2024     2024    K 4.0 2024    CO2 27 2024     2024    BUN 19 2024    CREATININE 0.6 2024     Lab Results   Component Value Date    ALT 17 2024    AST 17 2024    ALKPHOS 77 2024    BILITOT 0.5 2024         Patient Active Problem List   Diagnosis Code   •  Controlled type 2 diabetes mellitus, with long-term current use of insulin (CMS/[facility]) E11.9, Z79.4   • Essential hypertension I10   • Hyperlipidemia E78.5   • Hypothyroidism E03.9   • Obesity E66.9   • Thyroid nodule E04.1   • COVID-19 virus infection U07.1   • Lung nodule R91.1   • Displacement of lumbar intervertebral disc without myelopathy M51.26   • Disability of walking R26.2   • Polyneuropathy due to type 2 diabetes mellitus (CMS/[facility]) E11.42   • Pre-op examination Z01.818   • Localized osteoarthritis of right knee M17.11   • Type 2 diabetes mellitus treated with insulin (CMS/[facility]) E11.9, Z79.4   • Abnormal finding on EKG R94.31   • Left foot drop M21.372   • Abnormal finding on chest xray R93.89   • Osteoarthritis of right knee, unspecified osteoarthritis type M17.11   • S/P total knee arthroplasty, right Z96.651   • Sepsis (CMS/[facility]) A41.9   • Osteomyelitis of left foot (CMS/[facility]) M86.9   • Peripheral neuropathy G62.9   • Abdominal pain R10.9   • Chronic idiopathic constipation K59.04   • Gangrene (CMS/[facility]) I96   • Tibial artery stenosis, left (CMS/[facility]) I70.202   • H/O pilonidal cyst Z87.2   • Type 2 diabetes mellitus with left diabetic foot infection  E11.628, L08.9   • Type 2 diabetes mellitus with diabetic neuropathy (CMS/[facility]) E11.40   • Hypothyroidism E03.9   • Primary hypertension I10   • Leukemoid reaction D72.823   • HLD (hyperlipidemia) E78.5   • Acute osteomyelitis of left foot (CMS/[facility]) M86.172     Acute osteomyelitis of left foot (CMS/[facility])  Assessment & Plan  We discussed the fact that the antibiotics are targeting the Serratia well and the white count is not declining either because this is a surgical problem and the antibiotics have poor penetration into the dead infected bone and/or because there may be a primary bone marrow problem complicating things      Sander Ackerman MD  3/6/69563:44 PM

## 2024-03-06 NOTE — PLAN OF CARE
Plan of Care Review  Plan of Care Reviewed With: patient  Progress: no change  Outcome Evaluation: Pt. OOB assist x1 with surgical shoe. ATC Tylenol and PRN Tramadol for pain. RUE PICC patent for Rocephin daily infusions. LLE dressing changed this shift. WBC continue to be critical, Dr. Padilla made aware of WBC 43.59 this morning.

## 2024-03-06 NOTE — PROGRESS NOTES
Hospital Medicine Service -  Daily Progress Note       SUBJECTIVE   Interval History: Patient was seen and examined at bedside. No acute events overnight. No fever, chills, cp, sob, nausea, vomiting, abdominal pain, headache or dizziness. Denies pain in left foot. Reports discomfort from laying in bed describing as right hip pain radiating to right lateral knee.  We discussed the CT findings and that the surgical team looked at the images and believe it correlates with scar tissue from previous surgery.    Discussed patient medical condition at length with patient, her sister Anna, and her friend Sydney (over the phone). They reiterated their concern for her persistent leukocytosis and request for transfer to [place] for evaluation.    I personally called the [place] transfer center and discussed patient's case and hospital course including pending work up at length with Hospitalist physician, Dr. Carrillo, and relayed the patient's wishes for a second opinion. Dr. Carrillo had the opportunity to have all of his questions answered. He stated that the current hematologic work up is appropriate and in line with what they would do at their facility. He stated that they do not have beds available at this time and declined an inpatient transfer to [place] due to lack of medical necessity. He stated that if the hematologic work up revealed an disease process that requires work up or treatment that is outside of our capabilities then they could reconsider transfer.     OBJECTIVE      Vital signs in last 24 hours:  Temp:  [36.2 °C (97.2 °F)-36.7 °C (98.1 °F)] 36.3 °C (97.4 °F)  Heart Rate:  [] 87  Resp:  [16-18] 18  BP: (120-137)/(55-78) 137/63    Intake/Output Summary (Last 24 hours) at 3/6/2024 1906  Last data filed at 3/6/2024 0830  Gross per 24 hour   Intake 480 ml   Output --   Net 480 ml       Current Facility-Administered Medications   Medication Dose Route Frequency   • acetaminophen  975 mg oral q6h HENOK   • amLODIPine  5  mg oral Daily   • aspirin  81 mg oral Daily   • atorvastatin  40 mg oral Daily (6p)   • cefTRIAXone  2 g intravenous q24h INT   • clopidogreL  75 mg oral Daily   • docusate sodium  100 mg oral BID   • enoxaparin  40 mg subcutaneous Daily (6p)   • gabapentin  300 mg oral TID   • hydrochlorothiazide  12.5 mg oral Daily   • hydrocortisone   Topical 3x daily PRN   • insulin glargine U-100  22 Units subcutaneous Nightly   • insulin lispro U-100  10 Units subcutaneous TID with meals   • insulin lispro U-100  2-10 Units subcutaneous ACHS+3AM   • levothyroxine  25 mcg oral Daily (6:30a)   • lidocaine  1 patch Topical Daily   • losartan  50 mg oral Nightly   • melatonin  6 mg oral Nightly   • polyethylene glycol  17 g oral Daily PRN   • sodium chloride  10 mL intravenous q8h INT   • sodium chloride  10 mL intravenous PRN   • sodium chloride  10 mL intravenous q8h INT   • traMADoL  50 mg oral q6h PRN        PHYSICAL EXAMINATION      Physical Exam  General: NAD, pleasant, nontoxic, well developed, appears stated age, sitting up in chair comfortably  HEENT: atraumatic, normocephalic, mmm, sclera anicteric  Cardiovascular: RRR, no murmurs; S1/S2+  Pulmonary: CTAB; no rales, rhonchi or wheezing  Gi: Soft, nontender, nondistended, BS present  Ext: trace peripheral edema, right knee surgical scar noted, no erythema, no TTP of knee joint. left foot in surgical shoe  Neuro: Alert and oriented x3; sensations and motor function grossly intact  Psych: Calm, cooperative, appropriate answers       LINES, CATHETERS, DRAINS, AIRWAYS, AND WOUNDS   Lines, Drains, and Airways:  Wounds (agree with documentation and present on admission):  Peripheral IV (Adult) 03/04/24 Left;Posterior Forearm (Active)   Number of days: 0       Wound Anterior;Left Toe (2nd) (Active)   Number of days: 49       Wound Other (comment) Anterior;Right Toe (5th) (Active)   Number of days: 3         Comments:      LABS / IMAGING / TELE      Labs  I have reviewed the  patient's pertinent labs.  Significant abnormals are as below.  Results from last 7 days   Lab Units 03/06/24  0306 03/05/24  0336 03/04/24  1102   WBC K/uL 43.59* 42.97* 42.20*   HEMOGLOBIN g/dL 11.6* 11.5* 11.1*   HEMATOCRIT % 37.5 36.7 35.9   PLATELETS K/uL 525* 508* 476*    Results from last 7 days   Lab Units 03/06/24  0306 03/05/24  0336 03/03/24  0342   SODIUM mEQ/L 136 137 138   POTASSIUM mEQ/L 4.0 4.1 4.0   CHLORIDE mEQ/L 103 103 105   CO2 mEQ/L 27 29 26   BUN mg/dL 19 19 16   CREATININE mg/dL 0.6 0.6 0.7   GLUCOSE mg/dL 167* 167* 166*            Results from last 7 days   Lab Units 02/29/24  1830   CRP mg/L 37.00*    Results from last 7 days   Lab Units 02/29/24  1830   SED RATE mm/hr 89*     Results from last 7 days   Lab Units 03/05/24  0336   FERRITIN ng/mL 147      Results from last 7 days   Lab Units 03/01/24  0335 02/29/24  1830   AST IU/L 17 19   ALT IU/L 17 22   ALK PHOS IU/L 77 93   BILIRUBIN TOTAL mg/dL 0.5 0.5   PROTEIN TOTAL g/dL 5.7* 7.6   ALBUMIN g/dL 3.1* 3.8                   Microbiology Results     Procedure Component Value Units Date/Time    Anaerobic Culture / Smear (includes Aerobic Culture) Foot, Right [[id number]]  (Abnormal)  (Susceptibility) Collected: 03/01/24 1046    Specimen: Wound Swab from Foot, Right Updated: 03/04/24 0905     Culture **Positive Culture**      3+ Serratia marcescens      4+ Diphtheroids     Gram Stain Result No WBC Seen      2+ Gram positive bacilli    Blood Culture Blood, Venous [[id number]]  (Normal) Collected: 02/29/24 1928    Specimen: Blood, Venous Updated: 03/03/24 2300     Culture No growth at 72 hours    Blood Culture Blood, Venous [[id number]]  (Normal) Collected: 02/29/24 1830    Specimen: Blood, Venous Updated: 03/03/24 2300     Culture No growth at 72 hours    Anaerobic Culture / Smear (includes Aerobic Culture) [[id number]]  (Abnormal)  (Susceptibility) Collected: 02/15/24 1200    Specimen: Wound Swab from Toe, Left Updated: 02/20/24 1035     Culture  **Positive Culture**      4+ Enterobacter cloacae Complex      4+ Acinetobacter species      No Anaerobes Recovered     Gram Stain Result No WBC Seen      No organisms seen             Imaging  I have independently reviewed the patient's pertinent imaging for this hospital visit.   CT ABDOMEN PELVIS WITH IV CONTRAST    Result Date: 3/5/2024  CLINICAL HISTORY: Coccyx pain, prior surgical removal of pilonidal cyst. Evaluate for underlying persistent infection. CT of the abdomen and pelvis was performed after the administration of 100 cc of Isovue-370 intravenous contrast.  Sagittal and coronal reformatted images were obtained.  Automated exposure control was used. COMPARISON: CT abdomen/pelvis 7/22/2024 COMMENT: There is linear scarring and/or subsegmental atelectasis at the lung bases. The heart is normal in size. There are mitral annular calcifications. There are atherosclerotic coronary artery calcifications. The aorta is normal in caliber with atherosclerotic calcifications. The spleen is enlarged and measures 16.2 cm in AP diameter. The liver, adrenal glands, and kidneys are unremarkable. The pancreas is atrophic. There is cholelithiasis without cholecystitis. There is a moderate stool burden. The appendix is normal. There are no pathologically enlarged lymph nodes by size criteria. The bladder is unremarkable. The uterus is unremarkable. There is a small fat-containing umbilical hernia. There is no abnormality of the coccyx. There is overlying soft tissue thickening extending into the gluteal cleft. There may be a small 1.9 x 1.1 cm fluid collection in this region (series 1, image 182). There are degenerative changes of the spine.     IMPRESSION: 1. No abnormality of the coccyx. There is overlying skin thickening and a possible small 1.9 x 1.1 cm fluid collection in the region of the gluteal cleft. 2. Other findings as detailed above.     MRI FOOT LEFT WITHOUT CONTRAST    Result Date: 3/1/2024  CLINICAL HISTORY:  Osteomyelitis, foot Left foot infection/concerns for osteomyelitis     IMPRESSION: Plantar wound with multifocal acute osteomyelitis and pathological fractures in the left foot as described in detail below. Pockets of abscess are seen extending from the plantar wound about the regions of osteomyelitis. Please see the body of the report for additional findings and descriptions. COMMENT: Comparison: X-rays dated 2/29/2024. MRI from January 1624. Technique: Noncontrast MRI left forefoot. Findings: Since the previous MRI patient has undergone second toe amputation. However, there is new osteomyelitis in pathological fracture involving the head, neck and distal to mid shaft of the second metatarsal. There is also nonspecific marrow changes possibly representing early osteomyelitis extending to the second metatarsal base. - Marked bony erosion, pathological displaced fracture in and osteomyelitis is present throughout the third metatarsal with the distal shaft/neck fracture displaced laterally. Nonspecific marrow edema without T1 replacement in the proximal portions of the third metatarsal shaft are noted. There is acute osteomyelitis in the third toe and the proximal phalanx and possibly very small amounts in the middle phalanx but not in the distal phalanx. - Acute osteomyelitis in the head and neck of the fourth metatarsal. Erosions and small osteomyelitis in the base of the fourth proximal phalanx with septic arthritis of the fourth metatarsal phalangeal joint. Suspected pathological fracture through osteomyelitis of the fourth proximal phalanx best seen on sagittal image 7. - In the fifth proximal phalanx there is marrow edema like signal but no convincing T1 marrow replacement. Findings are nonspecific. In the fifth metatarsal head medially there is a small amount of bone marrow edema as well as some early patchy T1 marrow replacement axial image 19 where early ostomy myelitis cannot be excluded. Otherwise no fifth  metatarsal osteomyelitis. - At the first metatarsophalangeal joint there is osteoarthritis with mild areas of patchy bone marrow edema in the first metatarsal distally as well as the proximal phalanx. In the lateral aspect of the first metatarsal head there is a small focus of questionable early bony erosion and T1 marrow replacement on axial image 13 where small focus of osteitis cannot be excluded. There is a nonspecific first metatarsophalangeal joint effusion. Nonspecific bone marrow edema in the sesamoids more so in the fibular sesamoid with there is some questionable early T1 marrow replacement and small focus of early isthmus cannot be excluded. --- No evidence of osteomyelitis in the imaged tarsal bones noting mild degenerative joint disease. - There is a wound in the plantar forefoot deep to the third metatarsophalangeal joint with multiple pockets of abscess about the regions of osteomyelitis described above. Diffuse abnormal muscle edema could be related to myositis in the appropriate clinical setting although denervation changes are also probably present. - The Lisfranc ligament appears intact. Diffuse nonspecific soft tissue edema is noted. This should be correlated with the clinical extent of soft tissue infection.     X-RAY CHEST 1 VIEW    Result Date: 3/1/2024  CLINICAL HISTORY: Leukocytosis r/o pneumonia     IMPRESSION: No acute disease in chest. Right rotator cuff calcific tendinitis. COMMENT: Comparison: Chest x-ray 12/13/2021. Technique: A single frontal AP portable upright projection of the chest was obtained. FINDINGS: The lungs are hypoexpanded but grossly clear without focal airspace consolidation, pleural effusion or pneumothorax. The cardiomediastinal silhouette is within normal limits. The upper abdomen is unremarkable. There is no acute osseous abnormality.  There is amorphous calcification adjacent to the right greater tuberosity which can be seen in the setting of rotator cuff calcific  tendinitis.     X-RAY FOOT LEFT 3+ VIEWS    Result Date: 2/29/2024  CLINICAL HISTORY: r/o gas gangrene     IMPRESSION: Prior second toe amputation with new acute osteomyelitis throughout the second metatarsal where there is pathological fracturing. Acute osteomyelitis and displaced pathological fracturing of the third metatarsal. Probable mild osteomyelitis in the base of the proximal third phalanx. Probable osteomyelitis involving the thi fourth metatarsal head medially as well as probably in the fourth proximal phalanx. Suggest MRI for more definitive assessment of the extent of infection. Plantar calcaneal spurring and mild degenerative disease. Extensive atherosclerotic vascular calcifications. Wound overlying the second toe amputation site with irregular soft tissue. Possible small amounts of gas in the third toe. 4 views of the left foot. COMMENT: Comparison: X-rays from 1 1/15/2024. See impression        ECG/Telemetry  Not on tele    ASSESSMENT AND PLAN      * Type 2 diabetes mellitus with left diabetic foot infection   Assessment & Plan  Patient presenting with worsening left foot wound  - MRI Left foot 3/1/2024: Plantar wound with multifocal acute osteomyelitis   - Wound Cx: Serratia, Diptheroids, Enterobacter, Acinetobacter- sensitivities noted    - Podiatry consult appreciated: rec surgical intervention but patient declining amputation at this time and would like to try abx therapy alone first  -Wound care management per podiatry  - ID consult appreciated: rec 6 weeks of IV rocephin  - Patient requested secondary opinion - vascular Surgery consult appreciated   - Pain control with Tylenol prn, Tramadol prn  - PICC placed  - WBAT to left heel  - PT/OT consulted for dispo planning    Leukemoid reaction  Assessment & Plan  Suspect elevated WBC is a leukemoid reaction from severe diabetic left foot infection  She has no history of leukemia/lymphoma  UA and CXR not consistent with infection    - WBC remains  elevated, trending flat 43, although slightly lower than [facility] admission  - Abx as above under diabetic foot infection  - Follow CBC daily  - Consult Hematology, recs appreciated  - peripheral smear consistent with acute infection  - Further work up and labs pending (Jak2, BCR-ABL1, and flow cytometry, Iron panel)  - CT abd/pelvis noted a lesion near the coccyx. Surgery team reviewed images and reported it is likely scar tissue related to previous surgery, low suspicion for infection  - Patient today reports knee pain and requests imaging of knee with history of knee replacement. Will obtain right hip and knee XR with reported sciatic pain.    -Patient requested transfer to [place], but was declined due to lack of medical necessity.    Type 2 diabetes mellitus with diabetic neuropathy (CMS/HCC)  Assessment & Plan  A1c 1/31/2024 10.1 indicating poor control  She follows with Wanda [facility]RE[facility] Endocrinology at [facility]    - fair glycemic control  - continue Accucheks, SSI  - ADA, NCS diet  - Continue Gabapentin  - Rybelsus (non formulary) and Jardiance on hold  - Endocrinology consult to assist with DM management    HLD (hyperlipidemia)  Assessment & Plan  - Continue Atorvastatin    Primary hypertension  Assessment & Plan  BP stable     - Continue home Amlodipine, HCTZ, Losartan with hold parameters    Hypothyroidism  Assessment & Plan  - Continue Levothyroxine       VTE Assessment: Padua VTE Score: 1  VTE Prophylaxis:  Current anticoagulants:  enoxaparin (LOVENOX) syringe 40 mg, subcutaneous, Daily (6p)      Code Status: Full Code      Estimated Discharge Date: 3/8/2024     Disposition Planning: Hematologic work up pending, SW assisting with dispo planning     Lisa Monk,   3/6/2024

## 2024-03-06 NOTE — PLAN OF CARE
Care Coordination Discharge Plan Note     Discharge Needs Assessment  Concerns to be Addressed: care coordination/care conferences, discharge planning  Current Discharge Risk: lives alone    Anticipated Discharge Plan  Anticipated Discharge Disposition: home with home health  Type of Home Care Services: home PT, nursing        Patient Choice  Offered/Gave Vendor List: no  Patient's Choice of Community Agency(s): open to [facility]- PTA         ---------------------------------------------------------------------------------------------------------------------    Interdisciplinary Discharge Plan Review:  Participants:     Concerns Comments: CC notified by [facility] has a bed for pt and will have a bed fri if medically clear for d/c CC notified Barbara [facility]derramadell no longer need bed and notified pt of [facility] acceptance    Discharge Plan:   Disposition/Destination: Skilled Nursing Facility - Other  / Skilled Nursing Facility  Discharge Facility:   Destination - Admitted Since 2/29/2024     Service Provider Selected Services Address Phone Fax Patient Preferred Last Updated    [facility] [address], [place] [address] -- [phone] -- Zita Solomon, RN 3/6/2024 1120        Community Resources:      Discharge Transportation:  Is Out of Hospital DNR needed at Discharge: no  Does patient need discharge transport? No (family)

## 2024-03-06 NOTE — PROGRESS NOTES
bg generally stable. Mostly 100s, some 200s can try lantus 22 units and raise lispro to 10 units ac and continue to follow. Sliding scale for additional needs

## 2024-03-06 NOTE — PLAN OF CARE
Care Coordination Discharge Plan Summary    Admission Assessment Summary    General Information  Readmission Within the last 30 days: no previous admission in last 30 days  Does patient have a : No  Patient-Specific Goals (include timeframe): to safely return home with [facility]- for PT and RN (wound care)    Living Arrangements  Arrived From: home  Current Living Arrangements: condominium  People in Home: alone  Home Accessibility: stairs to enter home (Group), railings on outside stairs  Living Arrangement Comments: pt lives alone in a 1st fl condo with 2STE with HR. Walk-in shower.    Social Determinants of Health - Screenings  Housing Stability  In the last 12 months, was there a time when you were not able to pay the mortgage or rent on time?: No  In the last 12 months, how many places have you lived?: 1  In the last 12 months, was there a time when you did not have a steady place to sleep or slept in a shelter (including now)?: No  Utility Access  In the past 12 months has the electric, gas, oil, or water company threatened to shut off services in your home?: No  Transportation Needs  In the past 12 months, has lack of transportation kept you from medical appointments or from getting medications?: No  In the past 12 months, has lack of transportation kept you from meetings, work, or from getting things needed for daily living?: No    Functional Status Prior to Admission  Assistive Device/Animal Currently Used at Home: cane, straight, walker, front-wheeled  Functional Status Comments: Pt reported she recently started using a walker. Prior to walker she was using a cane.  IADL Comments: IND with IADLs and ADLs PTA    Discharge Needs Assessment    Concerns to be Addressed: care coordination/care conferences, discharge planning  Current Discharge Risk: lives alone  Anticipated Changes Related to Illness: none    Discharge Plan Summary    Patient Choice  Offered/Gave Vendor List: no  Patient's Choice  of Community Agency(s): open to [facility]- PTA       Concerns / Comments: Pt for DC to [facility] today. Pts sister to transport pt at 2:00 PM today. Per RN, pt reports she doesn't want to go to [facility]. Pt wants to go to [facility]. Sw called [facility] to confirm no beds avail.    Discharge Plan:  Disposition/Destination: Skilled Nursing Facility - Other  / Skilled Nursing Facility       Connection to Community  Not applicable    Community Resources:      Discharge Transportation:  Is Out of Hospital DNR needed at Discharge: no  Does patient need discharge transport? No (family)     Anticipated DC Plans  SNF-[facility]  Family will transport

## 2024-03-06 NOTE — PLAN OF CARE
Problem: Adult Inpatient Plan of Care  Goal: Plan of Care Review  Outcome: Progressing  Flowsheets (Taken 3/6/2024 4971)  Outcome Evaluation: OT nobleal completed  Plan of Care Reviewed With: patient     Problem: Self-Care Deficit  Goal: Improved Ability to Complete Activities of Daily Living  Outcome: Progressing

## 2024-03-07 LAB
ANISOCYTOSIS BLD QL SMEAR: ABNORMAL
BASOPHILS # BLD: 0.88 K/UL (ref 0.01–0.1)
BASOPHILS NFR BLD: 2 %
DIFFERENTIAL METHOD BLD: ABNORMAL
EOSINOPHIL # BLD: 2.21 K/UL (ref 0.04–0.36)
EOSINOPHIL NFR BLD: 5 %
ERYTHROCYTE [DISTWIDTH] IN BLOOD BY AUTOMATED COUNT: 15.8 % (ref 11.7–14.4)
FERRITIN SERPL-MCNC: 124 NG/ML (ref 11–250)
GIANT PLATELETS BLD QL SMEAR: ABNORMAL
GLUCOSE BLD-MCNC: 127 MG/DL (ref 70–99)
GLUCOSE BLD-MCNC: 135 MG/DL (ref 70–99)
GLUCOSE BLD-MCNC: 169 MG/DL (ref 70–99)
GLUCOSE BLD-MCNC: 169 MG/DL (ref 70–99)
GLUCOSE BLD-MCNC: 237 MG/DL (ref 70–99)
HCT VFR BLD AUTO: 39.2 % (ref 35–45)
HGB BLD-MCNC: 12 G/DL (ref 11.8–15.7)
IRON SATN MFR SERPL: 13 % (ref 15–45)
IRON SERPL-MCNC: 40 UG/DL (ref 35–150)
LYMPHOCYTES # BLD: 3.98 K/UL (ref 1.2–3.5)
LYMPHOCYTES NFR BLD: 9 %
MCH RBC QN AUTO: 27.2 PG (ref 28–33.2)
MCHC RBC AUTO-ENTMCNC: 30.6 G/DL (ref 32.2–35.5)
MCV RBC AUTO: 88.9 FL (ref 83–98)
METAMYELOCYTES # BLD MANUAL: 1.33 K/UL
METAMYELOCYTES NFR BLD MANUAL: 3 %
MONOCYTES # BLD: 2.65 K/UL (ref 0.28–0.8)
MONOCYTES NFR BLD: 6 %
MYELOCYTES # BLD MANUAL: 3.54 K/UL
MYELOCYTES NFR BLD MANUAL: 8 %
NEUTS BAND # BLD: 1.33 K/UL (ref 0–0.53)
NEUTS BAND # BLD: 26.98 K/UL (ref 1.7–7)
NEUTS BAND NFR BLD: 3 %
NEUTS SEG NFR BLD: 61 %
PDW BLD AUTO: 10.4 FL (ref 9.4–12.3)
PLATELET # BLD AUTO: 554 K/UL (ref 150–369)
PLATELET # BLD EST: ABNORMAL 10*3/UL
POCT TEST: ABNORMAL
POLYCHROMASIA BLD QL SMEAR: ABNORMAL
PROMYELOCYTES # BLD MANUAL: 0.44 K/UL
PROMYELOCYTES NFR BLD MANUAL: 1 %
RBC # BLD AUTO: 4.41 M/UL (ref 3.93–5.22)
SARS-COV-2 AG RESP QL IA.RAPID: NORMAL
TIBC SERPL-MCNC: 302 UG/DL (ref 270–460)
UIBC SERPL-MCNC: 262 UG/DL (ref 180–360)
VARIANT LYMPHS # BLD MANUAL: 0.88 K/UL
VARIANT LYMPHS NFR BLD: 2 %
WBC # BLD AUTO: 44.23 K/UL (ref 3.8–10.5)

## 2024-03-07 PROCEDURE — 83540 ASSAY OF IRON: CPT | Performed by: STUDENT IN AN ORGANIZED HEALTH CARE EDUCATION/TRAINING PROGRAM

## 2024-03-07 PROCEDURE — 82728 ASSAY OF FERRITIN: CPT | Performed by: STUDENT IN AN ORGANIZED HEALTH CARE EDUCATION/TRAINING PROGRAM

## 2024-03-07 PROCEDURE — 12000000 HC ROOM AND CARE MED/SURG

## 2024-03-07 PROCEDURE — 36415 COLL VENOUS BLD VENIPUNCTURE: CPT | Performed by: STUDENT IN AN ORGANIZED HEALTH CARE EDUCATION/TRAINING PROGRAM

## 2024-03-07 PROCEDURE — 25800000 HC PHARMACY IV SOLUTIONS: Performed by: STUDENT IN AN ORGANIZED HEALTH CARE EDUCATION/TRAINING PROGRAM

## 2024-03-07 PROCEDURE — 63700000 HC SELF-ADMINISTRABLE DRUG

## 2024-03-07 PROCEDURE — 63600000 HC DRUGS/DETAIL CODE: Mod: JZ | Performed by: STUDENT IN AN ORGANIZED HEALTH CARE EDUCATION/TRAINING PROGRAM

## 2024-03-07 PROCEDURE — 87426 SARSCOV CORONAVIRUS AG IA: CPT | Performed by: STUDENT IN AN ORGANIZED HEALTH CARE EDUCATION/TRAINING PROGRAM

## 2024-03-07 PROCEDURE — 63700000 HC SELF-ADMINISTRABLE DRUG: Performed by: STUDENT IN AN ORGANIZED HEALTH CARE EDUCATION/TRAINING PROGRAM

## 2024-03-07 PROCEDURE — 63700000 HC SELF-ADMINISTRABLE DRUG: Performed by: HOSPITALIST

## 2024-03-07 PROCEDURE — 63600000 HC DRUGS/DETAIL CODE: Performed by: INTERNAL MEDICINE

## 2024-03-07 PROCEDURE — 99233 SBSQ HOSP IP/OBS HIGH 50: CPT | Performed by: STUDENT IN AN ORGANIZED HEALTH CARE EDUCATION/TRAINING PROGRAM

## 2024-03-07 PROCEDURE — 63700000 HC SELF-ADMINISTRABLE DRUG: Performed by: PHYSICIAN ASSISTANT

## 2024-03-07 PROCEDURE — 85025 COMPLETE CBC W/AUTO DIFF WBC: CPT | Performed by: STUDENT IN AN ORGANIZED HEALTH CARE EDUCATION/TRAINING PROGRAM

## 2024-03-07 RX ORDER — INSULIN GLARGINE 100 [IU]/ML
22 INJECTION, SOLUTION SUBCUTANEOUS NIGHTLY
Start: 2024-03-07 | End: 2024-12-10 | Stop reason: HOSPADM

## 2024-03-07 RX ORDER — FOLIC ACID 0.4 MG
400 TABLET ORAL DAILY
Start: 2024-03-08 | End: 2024-12-10 | Stop reason: HOSPADM

## 2024-03-07 RX ORDER — INSULIN LISPRO 100 [IU]/ML
10 INJECTION, SOLUTION INTRAVENOUS; SUBCUTANEOUS
Start: 2024-03-07 | End: 2024-03-07

## 2024-03-07 RX ORDER — IBUPROFEN 200 MG
16-32 TABLET ORAL AS NEEDED
Status: DISCONTINUED | OUTPATIENT
Start: 2024-03-07 | End: 2024-03-08 | Stop reason: HOSPADM

## 2024-03-07 RX ORDER — INSULIN LISPRO 100 [IU]/ML
10 INJECTION, SOLUTION INTRAVENOUS; SUBCUTANEOUS
Start: 2024-03-07 | End: 2024-12-10 | Stop reason: HOSPADM

## 2024-03-07 RX ORDER — TRAMADOL HYDROCHLORIDE 50 MG/1
50 TABLET ORAL EVERY 6 HOURS PRN
Qty: 12 TABLET | Refills: 0 | Status: SHIPPED | OUTPATIENT
Start: 2024-03-07 | End: 2024-03-10

## 2024-03-07 RX ORDER — INSULIN LISPRO 100 [IU]/ML
2-10 INJECTION, SOLUTION INTRAVENOUS; SUBCUTANEOUS
Start: 2024-03-07 | End: 2024-12-10 | Stop reason: HOSPADM

## 2024-03-07 RX ORDER — TRAMADOL HYDROCHLORIDE 50 MG/1
50 TABLET ORAL EVERY 6 HOURS PRN
Status: DISCONTINUED | OUTPATIENT
Start: 2024-03-07 | End: 2024-03-08 | Stop reason: HOSPADM

## 2024-03-07 RX ORDER — DEXTROSE 40 %
15-30 GEL (GRAM) ORAL AS NEEDED
Status: DISCONTINUED | OUTPATIENT
Start: 2024-03-07 | End: 2024-03-08 | Stop reason: HOSPADM

## 2024-03-07 RX ORDER — FOLIC ACID 0.4 MG
400 TABLET ORAL DAILY
Status: DISCONTINUED | OUTPATIENT
Start: 2024-03-07 | End: 2024-03-08 | Stop reason: HOSPADM

## 2024-03-07 RX ORDER — INSULIN ASPART 100 [IU]/ML
INJECTION, SUSPENSION SUBCUTANEOUS
COMMUNITY
End: 2024-03-08 | Stop reason: HOSPADM

## 2024-03-07 RX ORDER — ACETAMINOPHEN 325 MG/1
975 TABLET ORAL EVERY 6 HOURS
Start: 2024-03-07 | End: 2024-03-12

## 2024-03-07 RX ORDER — SODIUM CHLORIDE 0.9 % (FLUSH) 0.9 %
10 SYRINGE (ML) INJECTION
Start: 2024-03-07 | End: 2024-04-12

## 2024-03-07 RX ORDER — DEXTROSE 50 % IN WATER (D50W) INTRAVENOUS SYRINGE
25 AS NEEDED
Status: DISCONTINUED | OUTPATIENT
Start: 2024-03-07 | End: 2024-03-08 | Stop reason: HOSPADM

## 2024-03-07 RX ORDER — IBUPROFEN/PSEUDOEPHEDRINE HCL 200MG-30MG
6 TABLET ORAL NIGHTLY
Start: 2024-03-07 | End: 2024-12-10 | Stop reason: HOSPADM

## 2024-03-07 RX ORDER — LIDOCAINE 560 MG/1
1 PATCH PERCUTANEOUS; TOPICAL; TRANSDERMAL DAILY
Start: 2024-03-08 | End: 2024-12-10 | Stop reason: HOSPADM

## 2024-03-07 RX ORDER — ACETAMINOPHEN 325 MG/1
975 TABLET ORAL EVERY 6 HOURS
Status: DISCONTINUED | OUTPATIENT
Start: 2024-03-07 | End: 2024-03-08 | Stop reason: HOSPADM

## 2024-03-07 RX ADMIN — Medication 10 ML: at 12:31

## 2024-03-07 RX ADMIN — LOSARTAN POTASSIUM 50 MG: 50 TABLET, FILM COATED ORAL at 20:58

## 2024-03-07 RX ADMIN — TRAMADOL HYDROCHLORIDE 50 MG: 50 TABLET, COATED ORAL at 16:38

## 2024-03-07 RX ADMIN — LIDOCAINE 1 PATCH: 4 PATCH TOPICAL at 09:57

## 2024-03-07 RX ADMIN — INSULIN LISPRO 2 UNITS: 100 INJECTION, SOLUTION INTRAVENOUS; SUBCUTANEOUS at 18:09

## 2024-03-07 RX ADMIN — INSULIN LISPRO 10 UNITS: 100 INJECTION, SOLUTION INTRAVENOUS; SUBCUTANEOUS at 12:27

## 2024-03-07 RX ADMIN — SODIUM CHLORIDE 2 G: 900 INJECTION INTRAVENOUS at 10:31

## 2024-03-07 RX ADMIN — INSULIN LISPRO 4 UNITS: 100 INJECTION, SOLUTION INTRAVENOUS; SUBCUTANEOUS at 12:26

## 2024-03-07 RX ADMIN — DOCUSATE SODIUM 100 MG: 100 CAPSULE, LIQUID FILLED ORAL at 21:01

## 2024-03-07 RX ADMIN — ACETAMINOPHEN 975 MG: 325 TABLET ORAL at 11:39

## 2024-03-07 RX ADMIN — GABAPENTIN 300 MG: 300 CAPSULE ORAL at 21:01

## 2024-03-07 RX ADMIN — ATORVASTATIN CALCIUM 40 MG: 40 TABLET, FILM COATED ORAL at 18:07

## 2024-03-07 RX ADMIN — AMLODIPINE BESYLATE 5 MG: 5 TABLET ORAL at 09:54

## 2024-03-07 RX ADMIN — LEVOTHYROXINE SODIUM 25 MCG: 0.03 TABLET ORAL at 06:28

## 2024-03-07 RX ADMIN — GABAPENTIN 300 MG: 300 CAPSULE ORAL at 10:30

## 2024-03-07 RX ADMIN — ACETAMINOPHEN 975 MG: 325 TABLET ORAL at 18:07

## 2024-03-07 RX ADMIN — ACETAMINOPHEN 650 MG: 325 TABLET ORAL at 02:44

## 2024-03-07 RX ADMIN — Medication 10 ML: at 21:22

## 2024-03-07 RX ADMIN — INSULIN GLARGINE 22 UNITS: 100 INJECTION, SOLUTION SUBCUTANEOUS at 22:42

## 2024-03-07 RX ADMIN — HYDROCHLOROTHIAZIDE 12.5 MG: 12.5 TABLET ORAL at 09:54

## 2024-03-07 RX ADMIN — CLOPIDOGREL BISULFATE 75 MG: 75 TABLET ORAL at 09:54

## 2024-03-07 RX ADMIN — INSULIN LISPRO 10 UNITS: 100 INJECTION, SOLUTION INTRAVENOUS; SUBCUTANEOUS at 18:10

## 2024-03-07 RX ADMIN — Medication 10 ML: at 02:44

## 2024-03-07 RX ADMIN — Medication 10 ML: at 10:32

## 2024-03-07 RX ADMIN — ENOXAPARIN SODIUM 40 MG: 40 INJECTION SUBCUTANEOUS at 18:07

## 2024-03-07 RX ADMIN — ASPIRIN 81 MG: 81 TABLET, COATED ORAL at 09:54

## 2024-03-07 RX ADMIN — INSULIN LISPRO 10 UNITS: 100 INJECTION, SOLUTION INTRAVENOUS; SUBCUTANEOUS at 08:00

## 2024-03-07 RX ADMIN — Medication 400 MCG: at 14:47

## 2024-03-07 RX ADMIN — GABAPENTIN 300 MG: 300 CAPSULE ORAL at 14:48

## 2024-03-07 RX ADMIN — DOCUSATE SODIUM 100 MG: 100 CAPSULE, LIQUID FILLED ORAL at 09:54

## 2024-03-07 RX ADMIN — INSULIN LISPRO 2 UNITS: 100 INJECTION, SOLUTION INTRAVENOUS; SUBCUTANEOUS at 22:45

## 2024-03-07 RX ADMIN — TRAMADOL HYDROCHLORIDE 50 MG: 50 TABLET, COATED ORAL at 03:46

## 2024-03-07 ASSESSMENT — COGNITIVE AND FUNCTIONAL STATUS - GENERAL
WALKING IN HOSPITAL ROOM: 3 - A LITTLE
STANDING UP FROM CHAIR USING ARMS: 4 - NONE
MOVING TO AND FROM BED TO CHAIR: 4 - NONE
MOVING TO AND FROM BED TO CHAIR: 4 - NONE
CLIMB 3 TO 5 STEPS WITH RAILING: 3 - A LITTLE
STANDING UP FROM CHAIR USING ARMS: 4 - NONE
WALKING IN HOSPITAL ROOM: 3 - A LITTLE
CLIMB 3 TO 5 STEPS WITH RAILING: 3 - A LITTLE

## 2024-03-07 NOTE — DISCHARGE SUMMARY
Hospital Medicine Service -  Inpatient Discharge Summary        BRIEF OVERVIEW   Patient: Bebe Burks (1960)    Admitting Provider: Maxwell Linda MD  Attending Provider: Luisa Herrera DO Attending phys phone: [phone]    PCP: Paula Walker [phone]    Admission Date: 2/29/2024  Discharge Date: 3/8/2024     DISCHARGE DIAGNOSES      Primary Discharge Diagnosis  Type 2 diabetes mellitus with left diabetic foot infection     Secondary Discharge Diagnoses  Active Hospital Problems    Diagnosis Date Noted   • Type 2 diabetes mellitus with left diabetic foot infection  02/29/2024     Priority: High   • Type 2 diabetes mellitus with diabetic neuropathy (CMS/HCC) 03/01/2024   • Hypothyroidism 03/01/2024   • Primary hypertension 03/01/2024   • Leukemoid reaction 03/01/2024   • HLD (hyperlipidemia) 03/01/2024   • Acute osteomyelitis of left foot (CMS/[facility]) 03/01/2024      Resolved Hospital Problems   No resolved problems to display.       Problem List on Day of Discharge  * Type 2 diabetes mellitus with left diabetic foot infection   Assessment & Plan  Patient presenting with worsening left foot wound  - MRI Left foot 3/1/2024: Plantar wound with multifocal acute osteomyelitis   - Wound Cx: Serratia, Diptheroids, Enterobacter, Acinetobacter- sensitivities noted    - Podiatry consult appreciated: rec surgical intervention but patient declining amputation at this time and would like to try abx therapy alone first  - Wound care management per podiatry  - ID consult appreciated: rec 6 weeks of IV rocephin  - Patient requested secondary opinion - vascular Surgery consult appreciated   - Pain control with Tylenol prn, Tramadol prn  - PICC placed  - Weight bearing as tolerated to left leg - concentrate weight on left heel in surgical shoe  - PT/OT consulted for dispo planning, discharge to SNF  - OP f/u with Podiatry for wound care management  - OP f/u with ID, rec weekly blood work (CBC, BMP, Mg, CRP,  "ESR) while on IV abx    HLD (hyperlipidemia)  Assessment & Plan  - Continue Atorvastatin    Leukemoid reaction  Assessment & Plan  Suspect elevated WBC is a leukemoid reaction from severe diabetic left foot infection  She has no history of leukemia/lymphoma  UA and CXR not consistent with infection  CT abd/pelvis noted a lesion near the coccyx. Surgery team reviewed images and reported it is likely scar tissue related to previous surgery, low suspicion for infection  XR right hip and knee unremarkable    - WBC remains elevated, trending flat  - Abx as above under diabetic foot infection  - Consult Hematology, recs appreciated   - Iron panel and B12 level within normal limits   - Folate level low normal; recommended to start supplementation.    - Peripheral smear reported \"leukocytosis with a neutrophilia and a left shift.  The findings are likely due to the patient's known foot infection/osteomyelitis\".    - Flow cytometry showed \"No evidence of immunophenotypically abnormal T or B-cell population.  CD34 positive cells comprise approximately 8.3% of total cells analysis and show myeloid differentiation.\"  Remaining hematologic workup pending (Jak2, BCR-ABL1) and will be followed up outpatient with hematology/oncologist Dr. Love.    -Patient requested transfer to [facility], but was declined due to lack of medical necessity.    - Discussed with Heme/Onc team, no further inpatient work up required  - Dr. Love will follow up on remaining pending work up as an outpatient. If remains elevated and work up otherwise unrevealing, then they may consider a bone marrow biopsy    Primary hypertension  Assessment & Plan  BP stable     - Continue home Amlodipine, HCTZ, Losartan with hold parameters    Hypothyroidism  Assessment & Plan  - Continue Levothyroxine    Type 2 diabetes mellitus with diabetic neuropathy (CMS/HCC)  Assessment & Plan  A1c 1/31/2024 10.1 indicating poor control  She follows with W[facility] at " [facility]  Uses mixed insulin 70/30 outpatient    - fair glycemic control while inpatient  - ADA, NCS diet  - Continue Gabapentin  - Rybelsus (non formulary) and Jardiance can be continued on discharge  - Discussed with Dr. Gabriel, recommend basal bolus insulin regimen with glargine 22u qhs, prandial insulin 10u AC, and SSI  - Rec Endocrinology f/u outpatient      SUMMARY OF HOSPITALIZATION      Presenting Problem/History of Present Illness  This is a 63 y.o. year-old female admitted on 2/29/2024 with Type 2 diabetes mellitus with left diabetic foot infection .     Hospital Course    63 y.o. female with a past medical history of insulin dependent T2DM A1c 10.1%, diabetic neuropathy, diabetic retinopathy, PAD s/p peripheral angiogram with left tibial/peroneal angioplasty, atherectomy and stenting of the left anterior tibial artery due to significant occlusion, on ASA/Plavix, chronic left foot infection with osteomyelitis.    She had a recent hospitalization 1/2024 for management of left foot wound with gangrenous 2nd toe with underlying osteomyelitis and underwent a left second digit amputation 1/18/24 with Dr. Damian, OR cultures grew streptococcus and bacteroides, and she discharged on PO abx. During that admission she was noted to have severely elevated WBCs>50 and recommended to follow up outpatient with Hematology for further work up. She was evaluated outpatient by Podiatry and ID, repeat I&Ds and wound cultures 2/15/24 grew enterobacter and acinetobacter and she was prescribed a prolonged course of ciprofloxacin and Augmentin. She did not follow up with Hematology as recommended.    She returned to the hospital with worsening left foot and toe wounds with failed outpatient treatment.    XR left foot noted:   -prior 2nd toe amputation with new acute osteomyelitis throughout the 2nd metatarsal where there is a pathologic fracturing   -Acute osteomyelitis and displaced pathological fracturing of the third  "metatarsal.    -Probable mild osteomyelitis in the base of the proximal third phalanx.    -Probable osteomyelitis involving the thi fourth metatarsal head medially as well as probably in the fourth proximal phalanx.    MRI left foot w/o contrast:   -Plantar wound with multifocal acute osteomyelitis and pathological fractures in the left foot   -Pockets of abscess are seen extending from the plantar wound about the regions of osteomyelitis    S/p bedside I&D of the left third metatarsal head. Deep wound cultures obtained and grew serratia marcescens susceptible to IV ceftriaxone.    Patient declined amputation or surgical intervention at this time and prefers to pursue conservative management with IV antibiotics for 6 weeks.    Also of note, leukocytosis persisted since 1/2024 ranging 30-50. Last labs performed 9/22/23 WBC 21.1 when she underwent gluteal lesion excision. WBC 44, Hb 11.6, . Predominantly neutrophils, but baso/eo/myelo's elevated. Elevated ESR 89 and CRP 37. Hematology evaluated and started a work up to rule out underlying bone marrow disease. Iron panel and B12 level within normal limits. Folate level low normal; recommended to start supplementation. Peripheral smear reported \"leukocytosis with a neutrophilia and a left shift.  The findings are likely due to the patient's known foot infection/osteomyelitis\". Flow cytometry showed \"No evidence of immunophenotypically abnormal T or B-cell population.  CD34 positive cells comprise approximately 8.3% of total cells analysis and show myeloid differentiation.\"  Remaining hematologic workup pending (Jak2, BCR-ABL1) and will be followed up outpatient with hematology/oncologist Dr. Love. Iron panel pending. BCX drawn 2/29/24 showed no growth. CT abd/pelvis with contrast showed noted abnormal tissue changes tabby-coccyx which Surgery team deemed consistent with scar tissue in the setting of known prior lesion excision. XR right knee showed intact hardware " and no acute findings.    The endocrinologist followed in consult for poorly controlled Type 2 Diabetes Mellitus. Recommended to discontinued use of mixed insulin and start a basal bolus insulin regimen.    Patient is now medically stable for discharge to SNF for 6 weeks of IV antibiotics.      Follow up with the following care providers and call to schedule an appointment:  -PCP within 1 week  -Podiatry in 1-2 weeks for wound care management  -Infectious Disease in 4-6 weeks for antibiotic management. Recommend weekly blood work (complete blood count, basic metabolic panel, magnesium, ESR, CRP)  -Hematology in 1-2 weeks for further evaluation of leukocytosis  -Endocrinology in 2-4 weeks for management of insulin dependent Diabetes mellitus    Exam on Day of Discharge  GENERAL APPEARANCE Well developed, well nourished female, NAD   MUCUS MEMBRANES Moist   LUNGS CTAB, no signs of respiratory distress   CHEST S1/S2, RRR, no m/r/g appreciated   EXTREMITIES CHOE x4, left foot wrapped in ACE, CDI   SKIN No obvious rash   HEENT  NEURO Pupils equal, Anicteric  Follows commands, no dysarthria or facial asymmetry          Consults During Admission  IP CONSULT TO INFECTIOUS DISEASE  IP CONSULT TO PODIATRY  IP CONSULT TO ENDOCRINOLOGY  IP CONSULT TO VASCULAR SURGERY  IP CONSULT TO IV TEAM  IP CONSULT TO HEMATOLOGY/ONCOLOGY    DISCHARGE MEDICATIONS               Medication List      START taking these medications    acetaminophen 325 mg tablet  Commonly known as: TYLENOL  Take 3 tablets (975 mg total) by mouth every 6 (six) hours for 5 days.  Dose: 975 mg     cefTRIAXone 2 g/100 mL solution IVPB  Commonly known as: ROCEPHIN  Infuse 100 mL (2 g total) into a venous catheter daily for 35 doses Indications: Bone/Joint infection, a bacterial skin infection. Last antibiotic day 4/12/24  Dose: 2 g     folic acid 400 mcg tablet  Commonly known as: FOLVITE  Take 1 tablet (400 mcg total) by mouth daily.  Dose: 400 mcg     insulin  glargine U-100 100 unit/mL (3 mL) pen  Commonly known as: LANTUS/BASAGLAR  Inject 22 Units under the skin nightly.  Dose: 22 Units     * insulin lispro U-100 100 unit/mL pen  Commonly known as: HumaLOG  Inject 2-10 Units under the skin 3 (three) times a day before meals. Give in addition to scheduled dose; 2 units for -200, 4 units for -250, 6 units for -300, 8  units for -350, 10 units for -400. Notify Physician if: BG is </= 70 or >/= 400 mg/dL.  Dose: 2-10 Units     * insulin lispro U-100 100 unit/mL pen  Commonly known as: HumaLOG  Inject 10 Units under the skin 3 (three) times a day with meals. reduce to 5 units if BS<100 units with meals  Dose: 10 Units     lidocaine 4 % adhesive patch,medicated topical patch  Commonly known as: ASPERCREME  Apply 1 patch topically daily for 10 days. Apply to right hip buttock. Remove & discard patch within 12 hours or as directed by prescriber.  Dose: 1 patch     melatonin ODT  Take 2 tablets (6 mg total) by mouth nightly.  Dose: 6 mg     sodium chloride injection  Infuse 10 mL into a venous catheter every 8 (eight) hours Indications: prevent clot from blocking an intravenous catheter. And prn  Dose: 10 mL         * This list has 2 medication(s) that are the same as other medications prescribed for you. Read the directions carefully, and ask your doctor or other care provider to review them with you.            CHANGE how you take these medications    traMADoL 50 mg tablet  Commonly known as: ULTRAM  Take 1 tablet (50 mg total) by mouth every 6 (six) hours as needed for pain for up to 3 days.  Dose: 50 mg  What changed: when to take this        CONTINUE taking these medications    amLODIPine 5 mg tablet  Commonly known as: NORVASC  Take 5 mg by mouth every morning.  Dose: 5 mg     aspirin 81 mg chewable tablet  Take 81 mg by mouth daily.  Dose: 81 mg     atorvastatin 40 mg tablet  Commonly known as: LIPITOR  Take 40 mg by mouth nightly.  Dose: 40  mg     clopidogreL 75 mg tablet  Commonly known as: PLAVIX  Take 75 mg by mouth daily.  Dose: 75 mg     docusate sodium 100 mg capsule  Commonly known as: COLACE  Take 100 mg by mouth 2 (two) times a day.  Dose: 100 mg     gabapentin 300 mg capsule  Commonly known as: NEURONTIN  Take 300 mg by mouth 3 (three) times a day.  Dose: 300 mg     hydrochlorothiazide 12.5 mg tablet  Take 12.5 mg by mouth daily.  Dose: 12.5 mg     JARDIANCE 10 mg tablet  Take 10 mg by mouth daily.  Dose: 10 mg  Generic drug: empagliflozin     levothyroxine 25 mcg tablet  Commonly known as: SYNTHROID  Take 25 mcg by mouth daily.  Dose: 25 mcg     losartan 50 mg tablet  Commonly known as: COZAAR  Take 50 mg by mouth nightly.  Dose: 50 mg     RYBELSUS 14 mg tablet  Take 14 mg by mouth daily.  Dose: 14 mg  Generic drug: semaglutide        STOP taking these medications    amoxicillin-pot clavulanate 875-125 mg per tablet  Commonly known as: AUGMENTIN     ciprofloxacin 500 mg tablet  Commonly known as: CIPRO     insulin asp prt-insulin aspart 100 unit/mL (70-30) pen  Commonly known as: NovoLOG MIX 70/30             Instructions for after discharge     Activity:  As Tolerated      Admission H&P Valid:  Yes      Discharge Condition:  Improving      Discharge diet      Diet Type / Texture:  Regular  Cardiac (Low Sodium, Low Fat)  Diabetic, No Concentrated Sweets       Follow Up:  Update Status Report to Physician Within 1 Week      Follow Up:  With Primary MD within 1 week discharge from facility      Follow-up with Provider:      Follow up with Podiatry for wound care management    Getachew Damian, LONG   [phone]    [address]  [facility]  [place] [address]       Follow-up with Provider:      Follow up with Infectious Disease in 4-6 weeks for antibiotic management. Recommend weekly blood work (complete blood count, basic metabolic panel, magnesium, ESR, CRP)    Sander Ackerman MD   [phone]    Infectious Disease  [address]  [facility]  [address]  [place] [id number]       Follow-up with Provider:      Follow-up with hematology in 1 to 2 weeks    Kayden Loveic FABIAN, DO   [phone]    [address] [facility]  [address]  [place] [id number]       Follow-up with Provider:      Follow up with Endocrinology in 2-4 weeks for management of insulin dependent Diabetes mellitus    Gail Deleon, DO   [phone]    [address] [facility]  [address]  [place] [id number]       Free of Communicable Disease:   Yes      Future Lab Orders at [facility]      Labs include: weekly CBC, BMP, Mg, ESR, CRP    Level of Care:  Skilled      Nutritionist      Occupational Therapy Eval and Treat      Patient Aware of Diagnosis: Yes      Physical Therapy Eval and Treat      Rehab Potential:  Good      Social Work Services      Treatment Options: Full Resuscitation      Vital Signs Per Facility Standard      Weight Bearing Status      Weight bearing as tolerated to left heel in surgical shoe    Weights Per Facility Standard               PROCEDURES / LABS / IMAGING      Operative Procedures  none    Other Procedures  Bedside debridement of left foot wounds    Pertinent Labs  Results from last 7 days   Lab Units 03/07/24  0212 03/06/24  0306 03/05/24  0336   WBC K/uL 44.23* 43.59* 42.97*   HEMOGLOBIN g/dL 12.0 11.6* 11.5*   HEMATOCRIT % 39.2 37.5 36.7   PLATELETS K/uL 554* 525* 508*    Results from last 7 days   Lab Units 03/06/24  0306 03/05/24  0336 03/03/24  0342   SODIUM mEQ/L 136 137 138   POTASSIUM mEQ/L 4.0 4.1 4.0   CHLORIDE mEQ/L 103 103 105   CO2 mEQ/L 27 29 26   BUN mg/dL 19 19 16   CREATININE mg/dL 0.6 0.6 0.7   GLUCOSE mg/dL 167* 167* 166*                   Results from last 7 days   Lab Units 03/07/24  0212 03/05/24  0336   FERRITIN ng/mL 124 147                        Microbiology Results     Procedure Component Value Units Date/Time    SARS-COV-2, ANTIGEN Nares [[id number]]  (Normal) Collected: 03/07/24 1602    Specimen: Nasal Swab from Nares Updated: 03/07/24 1636      SARS-COV-2 (COVID-19), Antigen Presumptive Negative, no Ag detected    Narrative:      This test is approved by FDA under EUA use. The performance of this test is not validated for asymptomatic patients and test results should be correlated with patient's condition.        Anaerobic Culture / Smear (includes Aerobic Culture) Foot, Right [[id number]]  (Abnormal)  (Susceptibility) Collected: 03/01/24 1046    Specimen: Wound Swab from Foot, Right Updated: 03/06/24 0813     Culture **Positive Culture**      3+ Serratia marcescens      4+ Diphtheroids      No Anaerobes Recovered     Gram Stain Result No WBC Seen      2+ Gram positive bacilli    Blood Culture Blood, Venous [[id number]]  (Normal) Collected: 02/29/24 1928    Specimen: Blood, Venous Updated: 03/04/24 2300     Culture No growth at 96 hours    Blood Culture Blood, Venous [[id number]]  (Normal) Collected: 02/29/24 1830    Specimen: Blood, Venous Updated: 03/04/24 2300     Culture No growth at 96 hours    Anaerobic Culture / Smear (includes Aerobic Culture) [[id number]]  (Abnormal)  (Susceptibility) Collected: 02/15/24 1200    Specimen: Wound Swab from Toe, Left Updated: 02/20/24 1035     Culture **Positive Culture**      4+ Enterobacter cloacae Complex      4+ Acinetobacter species      No Anaerobes Recovered     Gram Stain Result No WBC Seen      No organisms seen               Pertinent Imaging  X-RAY KNEE RIGHT 1 OR 2 VIEWS    Result Date: 3/6/2024  IMPRESSION: See comment     X-RAY HIP WITH OR WITHOUT PELVIS 2-3 VW RIGHT    Result Date: 3/6/2024  IMPRESSION: No acute osseous abnormalities.     CT ABDOMEN PELVIS WITH IV CONTRAST    Result Date: 3/5/2024  IMPRESSION: 1. No abnormality of the coccyx. There is overlying skin thickening and a possible small 1.9 x 1.1 cm fluid collection in the region of the gluteal cleft. 2. Other findings as detailed above.     MRI FOOT LEFT WITHOUT CONTRAST    Result Date: 3/1/2024  IMPRESSION: Plantar wound with multifocal  acute osteomyelitis and pathological fractures in the left foot as described in detail below. Pockets of abscess are seen extending from the plantar wound about the regions of osteomyelitis. Please see the body of the report for additional findings and descriptions. COMMENT: Comparison: X-rays dated 2/29/2024. MRI from January 1624. Technique: Noncontrast MRI left forefoot. Findings: Since the previous MRI patient has undergone second toe amputation. However, there is new osteomyelitis in pathological fracture involving the head, neck and distal to mid shaft of the second metatarsal. There is also nonspecific marrow changes possibly representing early osteomyelitis extending to the second metatarsal base. - Marked bony erosion, pathological displaced fracture in and osteomyelitis is present throughout the third metatarsal with the distal shaft/neck fracture displaced laterally. Nonspecific marrow edema without T1 replacement in the proximal portions of the third metatarsal shaft are noted. There is acute osteomyelitis in the third toe and the proximal phalanx and possibly very small amounts in the middle phalanx but not in the distal phalanx. - Acute osteomyelitis in the head and neck of the fourth metatarsal. Erosions and small osteomyelitis in the base of the fourth proximal phalanx with septic arthritis of the fourth metatarsal phalangeal joint. Suspected pathological fracture through osteomyelitis of the fourth proximal phalanx best seen on sagittal image 7. - In the fifth proximal phalanx there is marrow edema like signal but no convincing T1 marrow replacement. Findings are nonspecific. In the fifth metatarsal head medially there is a small amount of bone marrow edema as well as some early patchy T1 marrow replacement axial image 19 where early ostomy myelitis cannot be excluded. Otherwise no fifth metatarsal osteomyelitis. - At the first metatarsophalangeal joint there is osteoarthritis with mild areas of  patchy bone marrow edema in the first metatarsal distally as well as the proximal phalanx. In the lateral aspect of the first metatarsal head there is a small focus of questionable early bony erosion and T1 marrow replacement on axial image 13 where small focus of osteitis cannot be excluded. There is a nonspecific first metatarsophalangeal joint effusion. Nonspecific bone marrow edema in the sesamoids more so in the fibular sesamoid with there is some questionable early T1 marrow replacement and small focus of early isthmus cannot be excluded. --- No evidence of osteomyelitis in the imaged tarsal bones noting mild degenerative joint disease. - There is a wound in the plantar forefoot deep to the third metatarsophalangeal joint with multiple pockets of abscess about the regions of osteomyelitis described above. Diffuse abnormal muscle edema could be related to myositis in the appropriate clinical setting although denervation changes are also probably present. - The Lisfranc ligament appears intact. Diffuse nonspecific soft tissue edema is noted. This should be correlated with the clinical extent of soft tissue infection.     X-RAY CHEST 1 VIEW    Result Date: 3/1/2024  IMPRESSION: No acute disease in chest. Right rotator cuff calcific tendinitis. COMMENT: Comparison: Chest x-ray 12/13/2021. Technique: A single frontal AP portable upright projection of the chest was obtained. FINDINGS: The lungs are hypoexpanded but grossly clear without focal airspace consolidation, pleural effusion or pneumothorax. The cardiomediastinal silhouette is within normal limits. The upper abdomen is unremarkable. There is no acute osseous abnormality.  There is amorphous calcification adjacent to the right greater tuberosity which can be seen in the setting of rotator cuff calcific tendinitis.     X-RAY FOOT LEFT 3+ VIEWS    Result Date: 2/29/2024  IMPRESSION: Prior second toe amputation with new acute osteomyelitis throughout the second  metatarsal where there is pathological fracturing. Acute osteomyelitis and displaced pathological fracturing of the third metatarsal. Probable mild osteomyelitis in the base of the proximal third phalanx. Probable osteomyelitis involving the thi fourth metatarsal head medially as well as probably in the fourth proximal phalanx. Suggest MRI for more definitive assessment of the extent of infection. Plantar calcaneal spurring and mild degenerative disease. Extensive atherosclerotic vascular calcifications. Wound overlying the second toe amputation site with irregular soft tissue. Possible small amounts of gas in the third toe. 4 views of the left foot. COMMENT: Comparison: X-rays from 1 1/15/2024. See impression      OUTPATIENT  FOLLOW-UP / REFERRALS / PENDING TESTS        Outpatient Follow-Up Appointments            In 2 months [facility] in [place]    In 2 months Charles Vaughan MD [facility] in [place]    In 2 months PHIL Carrizales [facility]ocrinology at [facility]          Referrals  No orders of the defined types were placed in this encounter.      Test Results Pending at Discharge  Pending Inpatient Labs     Order Current Status    BCR-ABL1 Gene Rearrangement, Quantitative, PCR In process    JAK2 V617F Cascading Refl to CALR, JAK2 Exon 12, MPL, and CSF3R Blood, Venous In process          Important Issues to Address in Follow-Up  Follow up with the following care providers and call to schedule an appointment:  -PCP within 1 week  -Podiatry in 1-2 weeks for wound care management  -Infectious Disease in 4-6 weeks for antibiotic management. Recommend weekly blood work (complete blood count, basic metabolic panel, magnesium, ESR, CRP)  -Hematology in 1-2 weeks for further evaluation of leukocytosis  -Endocrinology in 2-4 weeks for management of insulin dependent Diabetes mellitus      Please bring this discharge  paperwork to all of your follow up appointments.    If you have any questions regarding your home medications please contact your primary care physician immediately.     If you experience any alarming symptoms, please do not hesitate to call your primary care doctor or present to the emergency department for evaluation.     Thank you for allowing us here at [facility] to be a part of your care.     Sincerely,  [facility] [facility] Care Team      Wound Care Discharge Instructions   [facility]   Activity:   · No strenuous exercise or heavy lifting (over 10 pounds) until your follow up with doctor  · No driving until following up with doctor  · Walking is the best exercise but you should keep weight off of your foot to help with healing and avoid pressure to the area. Use a walker or wheel chair.  · Keep your affected foot elevated on two pillows while in bed and sitting in a chair to decrease swelling  Nutrition:   · Eating more protein will help with wound healing, If possible, add protein to your diet to help increase healing factors.  Weight Bearing status:  · Weight bearing as tolerated to left leg - concentrate weight on left heel in surgical shoe  Wound Care Instructions:   · Your wound care dressing may be removed by a home care nurse or healthcare trained family member.  · Remove the dressing, cleanse the wound with wound cleanser or normal saline.   · No soaking your foot until wound is completely healed.  -Dress wound with Betadine, Adaptic, 4 x 4 gauze, Kerlix, light Ace  -Change this daily  Medications:   · Resume all home medications unless otherwise directed by doctor or nurse practitioner  Reasons to Call:   · Severe and persistent shortness of breath not relieved with rest  · Fever above 101.5 oF  · Redness, swelling or drainage from incision  Follow Up Appointment:   · Call to schedule an appointment with your physician/surgeon after discharge  · Dr. Getachew Damian  · [facility]-  [phone]   [place] [phone]    DISCHARGE DISPOSITION AND DESTINATION      Disposition: Skilled Nursing Facility - Other   Destination: Skilled Nursing Facility                            Code Status At Discharge: Full Code    Physician Order for Life-Sustaining Treatment Document Status      No documents found                 Spent more than 35 minutes in patient evaluation, physical evaluation, and  coordination of discharge and care.

## 2024-03-07 NOTE — PROGRESS NOTES
"Endocrine Daily Progress Note    SUBJECTIVE  Patient planning to go to [facility] to complete her IV anti biotics    Visit Vitals  BP (!) 120/58 (BP Location: Left upper arm, Patient Position: Sitting)   Pulse 88   Temp 36.2 °C (97.1 °F) (Temporal)   Resp 18   Ht 1.702 m (5' 7.01\")   Wt 85 kg (187 lb 6.3 oz) Comment: 3/1   SpO2 97%   BMI 29.34 kg/m²       Physical Exam  Constitutional:       Appearance: Normal appearance.   Musculoskeletal:      Comments: L foot in brace   Neurological:      Mental Status: She is oriented to person, place, and time.   Psychiatric:         Mood and Affect: Mood normal.         OBJECTIVE  Lab Results   Component Value Date    GLUCOSE 167 (H) 03/06/2024    CALCIUM 9.0 03/06/2024     03/06/2024    K 4.0 03/06/2024    CO2 27 03/06/2024     03/06/2024    BUN 19 03/06/2024    CREATININE 0.6 03/06/2024     Lab Results   Component Value Date    ALT 17 03/01/2024    AST 17 03/01/2024    ALKPHOS 77 03/01/2024    BILITOT 0.5 03/01/2024     Lab Results   Component Value Date    HGBA1C 10.1 (H) 01/31/2024     Lab Results   Component Value Date    TSH 0.51 01/31/2024         Radha Gabriel MD  3/7/76053:54 PM        Acute osteomyelitis of left foot (CMS/HCC)  Assessment & Plan  S/p recent left toe amputation, on Ceftriaxone    Primary hypertension  Assessment & Plan  Controlled on HCTZ and losartan    Hypothyroidism  Assessment & Plan  Clinically and biochemically euthyroid on levothyroxine 25 mcg daily. Continue at this dose    * Type 2 diabetes mellitus with left diabetic foot infection   Assessment & Plan  Longstanding history of uncontrolled type 2 diabetes, here with worsening left foot infection after recent toe amputation.  Last A1c on file worsened to 10.1%.  Reports a home regimen of NovoLog 70/30 25 units with breakfast and 30 units with dinner, Rybelsus 14 mg daily, and Jardiance 10 mg daily.    While inpatient:  Basal insulin:  glargine 22 units nightly  Nutritional insulin: "  lispro 10 units TIDAC  Correction insulin: Lispro 2:50 > 150 ACHS     On discharge:  - Continue Lantus 22 units at bedtime and Novolog 10-reduce to 5 units if BS<100 units with meals  - She can resume her home Rybelsus and Jardiance  - She plans to follow up with Wanda Gipson at [facility]

## 2024-03-07 NOTE — PLAN OF CARE
Plan of Care Review  Plan of Care Reviewed With: patient  Progress: no change  Outcome Evaluation: Pt OOB x 1 with RW and heel touch wb in surgical shoe to L foot.  Daily dressing changes. Podiatry did change yesterday and pt did not want RN to undue to assess.  Pain controlled with PRN tylenol and refractory PRN tramadol dose.  STEPHEN PICC in place for long term antibiotics.  Blood sugars better conrolled nighttime/overnight. Plan to dc to [facility] today when cleared.

## 2024-03-07 NOTE — PROGRESS NOTES
Hospital Medicine Service -  Daily Progress Note       SUBJECTIVE   Interval History: Patient was seen and examined at bedside. No acute events overnight. No fever, chills, cp, sob, nausea, vomiting, abdominal pain, headache or dizziness. Denies pain in left foot. Sciatic pain managed with tylenol, tramadol, and gabapentin.     Spoke at length with patient this morning and again this afternoon with her sister, Anna, present at bedside. All of their questions were answered to their satisfaction. She is amenable to discharge tomorrow to [facility]. She reported that she continues to have pressure from friends/family to get a second opinion and plans to follow up with Infectious Disease at [place]. She requests records be sent for their review; nursing to provide medical record release form. She also plans to follow up outpatient with Hematology to review the pending work up and pursue further evaluation/management in the office. She confirms her wish to pursue 6 weeks of IV antibiotics. She did ask if prosthetics are available if she decides to pursue a transmetatarsal amputation as recommended by Podiatry; I deferred her to discuss those specific questions with her Podiatrist. They were notified that she is medically stable for discharge and that case management is coordinating with the [facility] for discharge tomorrow.       OBJECTIVE      Vital signs in last 24 hours:  Temp:  [36.2 °C (97.1 °F)-36.6 °C (97.8 °F)] 36.2 °C (97.1 °F)  Heart Rate:  [87-94] 88  Resp:  [18] 18  BP: (120-144)/(58-65) 120/58    Intake/Output Summary (Last 24 hours) at 3/7/2024 1604  Last data filed at 3/7/2024 0953  Gross per 24 hour   Intake 480 ml   Output --   Net 480 ml       Current Facility-Administered Medications   Medication Dose Route Frequency   • acetaminophen  975 mg oral q6h HENOK   • amLODIPine  5 mg oral Daily   • aspirin  81 mg oral Daily   • atorvastatin  40 mg oral Daily (6p)   • cefTRIAXone  2 g intravenous q24h INT   •  clopidogreL  75 mg oral Daily   • glucose  16-32 g of dextrose oral PRN    Or   • dextrose  15-30 g of dextrose oral PRN    Or   • glucagon  1 mg intramuscular PRN    Or   • dextrose 50 % in water (D50)  25 mL intravenous PRN   • docusate sodium  100 mg oral BID   • enoxaparin  40 mg subcutaneous Daily (6p)   • folic acid  400 mcg oral Daily   • gabapentin  300 mg oral TID   • hydrochlorothiazide  12.5 mg oral Daily   • hydrocortisone   Topical 3x daily PRN   • insulin glargine U-100  22 Units subcutaneous Nightly   • insulin lispro U-100  10 Units subcutaneous TID with meals   • insulin lispro U-100  2-10 Units subcutaneous ACHS+3AM   • levothyroxine  25 mcg oral Daily (6:30a)   • lidocaine  1 patch Topical Daily   • losartan  50 mg oral Nightly   • melatonin  6 mg oral Nightly   • polyethylene glycol  17 g oral Daily PRN   • sodium chloride  10 mL intravenous q8h INT   • sodium chloride  10 mL intravenous PRN   • sodium chloride  10 mL intravenous q8h INT   • traMADoL  50 mg oral q6h PRN        PHYSICAL EXAMINATION      Physical Exam  General: NAD, pleasant, nontoxic, well developed, appears stated age, sitting up in chair comfortably  HEENT: atraumatic, normocephalic, mmm, sclera anicteric  Cardiovascular: RRR, no murmurs; S1/S2+  Pulmonary: CTAB; no rales, rhonchi or wheezing  Gi: Soft, nontender, nondistended, BS present  Ext: trace peripheral edema, right knee surgical scar noted, no erythema, no TTP of knee joint. left foot with gauze dressing with serous staining at toes and plantar aspect of forefoot, erythema of visible toes.  Neuro: Alert and oriented x3; sensations and motor function grossly intact  Psych: Calm, cooperative, appropriate answers       LINES, CATHETERS, DRAINS, AIRWAYS, AND WOUNDS   Lines, Drains, and Airways:  Wounds (agree with documentation and present on admission):  Peripheral IV (Adult) 03/04/24 Left;Posterior Forearm (Active)   Number of days: 0       Wound Anterior;Left Toe (2nd)  (Active)   Number of days: 49       Wound Other (comment) Anterior;Right Toe (5th) (Active)   Number of days: 3         Comments:      LABS / IMAGING / TELE      Labs  I have reviewed the patient's pertinent labs.  Significant abnormals are as below.  Results from last 7 days   Lab Units 03/07/24  0212 03/06/24  0306 03/05/24  0336   WBC K/uL 44.23* 43.59* 42.97*   HEMOGLOBIN g/dL 12.0 11.6* 11.5*   HEMATOCRIT % 39.2 37.5 36.7   PLATELETS K/uL 554* 525* 508*    Results from last 7 days   Lab Units 03/06/24  0306 03/05/24  0336 03/03/24  0342   SODIUM mEQ/L 136 137 138   POTASSIUM mEQ/L 4.0 4.1 4.0   CHLORIDE mEQ/L 103 103 105   CO2 mEQ/L 27 29 26   BUN mg/dL 19 19 16   CREATININE mg/dL 0.6 0.6 0.7   GLUCOSE mg/dL 167* 167* 166*            Results from last 7 days   Lab Units 02/29/24  1830   CRP mg/L 37.00*    Results from last 7 days   Lab Units 02/29/24  1830   SED RATE mm/hr 89*     Results from last 7 days   Lab Units 03/07/24  0212 03/05/24  0336   FERRITIN ng/mL 124 147      Results from last 7 days   Lab Units 03/01/24  0335 02/29/24  1830   AST IU/L 17 19   ALT IU/L 17 22   ALK PHOS IU/L 77 93   BILIRUBIN TOTAL mg/dL 0.5 0.5   PROTEIN TOTAL g/dL 5.7* 7.6   ALBUMIN g/dL 3.1* 3.8                   Microbiology Results     Procedure Component Value Units Date/Time    Anaerobic Culture / Smear (includes Aerobic Culture) Foot, Right [[id number]]  (Abnormal)  (Susceptibility) Collected: 03/01/24 1046    Specimen: Wound Swab from Foot, Right Updated: 03/04/24 0905     Culture **Positive Culture**      3+ Serratia marcescens      4+ Diphtheroids     Gram Stain Result No WBC Seen      2+ Gram positive bacilli    Blood Culture Blood, Venous [[id number]]  (Normal) Collected: 02/29/24 1928    Specimen: Blood, Venous Updated: 03/03/24 2300     Culture No growth at 72 hours    Blood Culture Blood, Venous [[id number]]  (Normal) Collected: 02/29/24 1830    Specimen: Blood, Venous Updated: 03/03/24 2300     Culture No growth at  72 hours    Anaerobic Culture / Smear (includes Aerobic Culture) [[id number]]  (Abnormal)  (Susceptibility) Collected: 02/15/24 1200    Specimen: Wound Swab from Toe, Left Updated: 02/20/24 1035     Culture **Positive Culture**      4+ Enterobacter cloacae Complex      4+ Acinetobacter species      No Anaerobes Recovered     Gram Stain Result No WBC Seen      No organisms seen             Imaging  I have independently reviewed the patient's pertinent imaging for this hospital visit.   X-RAY KNEE RIGHT 1 OR 2 VIEWS    Result Date: 3/6/2024  CLINICAL HISTORY: right knee pain, h/o replacement   . Pain. COMPARISON: Prior available studies. TECHNIQUE: Two views of the right knee COMMENT: No acute fractures are identified. Normal alignment. Right knee prosthesis in place. There is been interval resolution of the previously noted post surgical changes. No evidence of hardware complication. Vascular calcifications.     IMPRESSION: See comment     X-RAY HIP WITH OR WITHOUT PELVIS 2-3 VW RIGHT    Result Date: 3/6/2024  CLINICAL HISTORY: Right hip pain COMPARISON: Prior available studies. TECHNIQUE: AP view of the pelvis. Two views of the right hip. COMMENT: No acute fractures are identified. Normal alignment. Severe vascular calcifications. Degenerative changes of lower lumbar spine.     IMPRESSION: No acute osseous abnormalities.     CT ABDOMEN PELVIS WITH IV CONTRAST    Result Date: 3/5/2024  CLINICAL HISTORY: Coccyx pain, prior surgical removal of pilonidal cyst. Evaluate for underlying persistent infection. CT of the abdomen and pelvis was performed after the administration of 100 cc of Isovue-370 intravenous contrast.  Sagittal and coronal reformatted images were obtained.  Automated exposure control was used. COMPARISON: CT abdomen/pelvis 7/22/2024 COMMENT: There is linear scarring and/or subsegmental atelectasis at the lung bases. The heart is normal in size. There are mitral annular calcifications. There are  atherosclerotic coronary artery calcifications. The aorta is normal in caliber with atherosclerotic calcifications. The spleen is enlarged and measures 16.2 cm in AP diameter. The liver, adrenal glands, and kidneys are unremarkable. The pancreas is atrophic. There is cholelithiasis without cholecystitis. There is a moderate stool burden. The appendix is normal. There are no pathologically enlarged lymph nodes by size criteria. The bladder is unremarkable. The uterus is unremarkable. There is a small fat-containing umbilical hernia. There is no abnormality of the coccyx. There is overlying soft tissue thickening extending into the gluteal cleft. There may be a small 1.9 x 1.1 cm fluid collection in this region (series 1, image 182). There are degenerative changes of the spine.     IMPRESSION: 1. No abnormality of the coccyx. There is overlying skin thickening and a possible small 1.9 x 1.1 cm fluid collection in the region of the gluteal cleft. 2. Other findings as detailed above.     MRI FOOT LEFT WITHOUT CONTRAST    Result Date: 3/1/2024  CLINICAL HISTORY: Osteomyelitis, foot Left foot infection/concerns for osteomyelitis     IMPRESSION: Plantar wound with multifocal acute osteomyelitis and pathological fractures in the left foot as described in detail below. Pockets of abscess are seen extending from the plantar wound about the regions of osteomyelitis. Please see the body of the report for additional findings and descriptions. COMMENT: Comparison: X-rays dated 2/29/2024. MRI from January 1624. Technique: Noncontrast MRI left forefoot. Findings: Since the previous MRI patient has undergone second toe amputation. However, there is new osteomyelitis in pathological fracture involving the head, neck and distal to mid shaft of the second metatarsal. There is also nonspecific marrow changes possibly representing early osteomyelitis extending to the second metatarsal base. - Marked bony erosion, pathological displaced  fracture in and osteomyelitis is present throughout the third metatarsal with the distal shaft/neck fracture displaced laterally. Nonspecific marrow edema without T1 replacement in the proximal portions of the third metatarsal shaft are noted. There is acute osteomyelitis in the third toe and the proximal phalanx and possibly very small amounts in the middle phalanx but not in the distal phalanx. - Acute osteomyelitis in the head and neck of the fourth metatarsal. Erosions and small osteomyelitis in the base of the fourth proximal phalanx with septic arthritis of the fourth metatarsal phalangeal joint. Suspected pathological fracture through osteomyelitis of the fourth proximal phalanx best seen on sagittal image 7. - In the fifth proximal phalanx there is marrow edema like signal but no convincing T1 marrow replacement. Findings are nonspecific. In the fifth metatarsal head medially there is a small amount of bone marrow edema as well as some early patchy T1 marrow replacement axial image 19 where early ostomy myelitis cannot be excluded. Otherwise no fifth metatarsal osteomyelitis. - At the first metatarsophalangeal joint there is osteoarthritis with mild areas of patchy bone marrow edema in the first metatarsal distally as well as the proximal phalanx. In the lateral aspect of the first metatarsal head there is a small focus of questionable early bony erosion and T1 marrow replacement on axial image 13 where small focus of osteitis cannot be excluded. There is a nonspecific first metatarsophalangeal joint effusion. Nonspecific bone marrow edema in the sesamoids more so in the fibular sesamoid with there is some questionable early T1 marrow replacement and small focus of early isthmus cannot be excluded. --- No evidence of osteomyelitis in the imaged tarsal bones noting mild degenerative joint disease. - There is a wound in the plantar forefoot deep to the third metatarsophalangeal joint with multiple pockets of  abscess about the regions of osteomyelitis described above. Diffuse abnormal muscle edema could be related to myositis in the appropriate clinical setting although denervation changes are also probably present. - The Lisfranc ligament appears intact. Diffuse nonspecific soft tissue edema is noted. This should be correlated with the clinical extent of soft tissue infection.     X-RAY CHEST 1 VIEW    Result Date: 3/1/2024  CLINICAL HISTORY: Leukocytosis r/o pneumonia     IMPRESSION: No acute disease in chest. Right rotator cuff calcific tendinitis. COMMENT: Comparison: Chest x-ray 12/13/2021. Technique: A single frontal AP portable upright projection of the chest was obtained. FINDINGS: The lungs are hypoexpanded but grossly clear without focal airspace consolidation, pleural effusion or pneumothorax. The cardiomediastinal silhouette is within normal limits. The upper abdomen is unremarkable. There is no acute osseous abnormality.  There is amorphous calcification adjacent to the right greater tuberosity which can be seen in the setting of rotator cuff calcific tendinitis.     X-RAY FOOT LEFT 3+ VIEWS    Result Date: 2/29/2024  CLINICAL HISTORY: r/o gas gangrene     IMPRESSION: Prior second toe amputation with new acute osteomyelitis throughout the second metatarsal where there is pathological fracturing. Acute osteomyelitis and displaced pathological fracturing of the third metatarsal. Probable mild osteomyelitis in the base of the proximal third phalanx. Probable osteomyelitis involving the thi fourth metatarsal head medially as well as probably in the fourth proximal phalanx. Suggest MRI for more definitive assessment of the extent of infection. Plantar calcaneal spurring and mild degenerative disease. Extensive atherosclerotic vascular calcifications. Wound overlying the second toe amputation site with irregular soft tissue. Possible small amounts of gas in the third toe. 4 views of the left foot. COMMENT:  "Comparison: X-rays from 1 1/15/2024. See impression        ECG/Telemetry  Not on tele    ASSESSMENT AND PLAN      * Type 2 diabetes mellitus with left diabetic foot infection   Assessment & Plan  Patient presenting with worsening left foot wound  - MRI Left foot 3/1/2024: Plantar wound with multifocal acute osteomyelitis   - Wound Cx: Serratia, Diptheroids, Enterobacter, Acinetobacter- sensitivities noted    - Podiatry consult appreciated: rec surgical intervention but patient declining amputation at this time and would like to try abx therapy alone first  - Wound care management per podiatry  - ID consult appreciated: rec 6 weeks of IV rocephin  - Patient requested secondary opinion - vascular Surgery consult appreciated   - Pain control with Tylenol prn, Tramadol prn  - PICC placed  - Weight bearing as tolerated to left leg - concentrate weight on left heel in surgical shoe  - PT/OT consulted for dispo planning, discharge to SNF  - OP f/u with Podiatry for wound care management  - OP f/u with ID, rec weekly blood work (CBC, BMP, Mg, CRP, ESR) while on IV abx    Leukemoid reaction  Assessment & Plan  Suspect elevated WBC is a leukemoid reaction from severe diabetic left foot infection  She has no history of leukemia/lymphoma  UA and CXR not consistent with infection  CT abd/pelvis noted a lesion near the coccyx. Surgery team reviewed images and reported it is likely scar tissue related to previous surgery, low suspicion for infection  XR right hip and knee unremarkable    - WBC remains elevated, trending flat  - Abx as above under diabetic foot infection  - Consult Hematology, recs appreciated   - Iron panel and B12 level within normal limits   - Folate level low normal; recommended to start supplementation.    - Peripheral smear reported \"leukocytosis with a neutrophilia and a left shift.  The findings are likely due to the patient's known foot infection/osteomyelitis\".    - Flow cytometry showed \"No evidence of " "immunophenotypically abnormal T or B-cell population.  CD34 positive cells comprise approximately 8.3% of total cells analysis and show myeloid differentiation.\"  Remaining hematologic workup pending (Jak2, BCR-ABL1) and will be followed up outpatient with hematology/oncologist Dr. Love.    -Patient requested transfer to [facility], but was declined due to lack of medical necessity.    - Discussed with Heme/Onc team, no further inpatient work up required  - Dr. Love will follow up on remaining pending work up as an outpatient. If remains elevated and work up otherwise unrevealing, then they may consider a bone marrow biopsy    Type 2 diabetes mellitus with diabetic neuropathy (CMS/HCC)  Assessment & Plan  A1c 1/31/2024 10.1 indicating poor control  She follows with Wanda VILLARREAL Endocrinology at [facility]  Uses mixed insulin 70/30 outpatient    - fair glycemic control while inpatient  - ADA, NCS diet  - Continue Gabapentin  - Rybelsus (non formulary) and Jardiance can be continued on discharge  - Discussed with Dr. Gabriel, recommend basal bolus insulin regimen with glargine 22u qhs, prandial insulin 10u AC, and SSI  - Rec Endocrinology f/u outpatient    HLD (hyperlipidemia)  Assessment & Plan  - Continue Atorvastatin    Primary hypertension  Assessment & Plan  BP stable     - Continue home Amlodipine, HCTZ, Losartan with hold parameters    Hypothyroidism  Assessment & Plan  - Continue Levothyroxine       VTE Assessment: Padua VTE Score: 1  VTE Prophylaxis:  Current anticoagulants:  enoxaparin (LOVENOX) syringe 40 mg, subcutaneous, Daily (6p)      Code Status: Full Code      Estimated Discharge Date: 3/8/2024     Disposition Planning: stable for discharge, CM arranging for dc to [facility] tomorrow.      Lisa Monk,   3/7/2024     "

## 2024-03-08 VITALS
HEIGHT: 67 IN | HEART RATE: 80 BPM | RESPIRATION RATE: 16 BRPM | DIASTOLIC BLOOD PRESSURE: 52 MMHG | OXYGEN SATURATION: 95 % | BODY MASS INDEX: 29.41 KG/M2 | WEIGHT: 187.39 LBS | TEMPERATURE: 97.9 F | SYSTOLIC BLOOD PRESSURE: 115 MMHG

## 2024-03-08 LAB
GLUCOSE BLD-MCNC: 164 MG/DL (ref 70–99)
GLUCOSE BLD-MCNC: 174 MG/DL (ref 70–99)
GLUCOSE BLD-MCNC: 296 MG/DL (ref 70–99)
POCT TEST: ABNORMAL

## 2024-03-08 PROCEDURE — 63700000 HC SELF-ADMINISTRABLE DRUG: Performed by: HOSPITALIST

## 2024-03-08 PROCEDURE — 25800000 HC PHARMACY IV SOLUTIONS: Performed by: STUDENT IN AN ORGANIZED HEALTH CARE EDUCATION/TRAINING PROGRAM

## 2024-03-08 PROCEDURE — 63700000 HC SELF-ADMINISTRABLE DRUG: Performed by: PHYSICIAN ASSISTANT

## 2024-03-08 PROCEDURE — 63600000 HC DRUGS/DETAIL CODE: Mod: JZ | Performed by: STUDENT IN AN ORGANIZED HEALTH CARE EDUCATION/TRAINING PROGRAM

## 2024-03-08 PROCEDURE — 99239 HOSP IP/OBS DSCHRG MGMT >30: CPT | Performed by: STUDENT IN AN ORGANIZED HEALTH CARE EDUCATION/TRAINING PROGRAM

## 2024-03-08 PROCEDURE — 63700000 HC SELF-ADMINISTRABLE DRUG: Performed by: STUDENT IN AN ORGANIZED HEALTH CARE EDUCATION/TRAINING PROGRAM

## 2024-03-08 RX ADMIN — Medication 10 ML: at 11:00

## 2024-03-08 RX ADMIN — AMLODIPINE BESYLATE 5 MG: 5 TABLET ORAL at 08:50

## 2024-03-08 RX ADMIN — CLOPIDOGREL BISULFATE 75 MG: 75 TABLET ORAL at 08:50

## 2024-03-08 RX ADMIN — TRAMADOL HYDROCHLORIDE 50 MG: 50 TABLET, COATED ORAL at 14:49

## 2024-03-08 RX ADMIN — ASPIRIN 81 MG: 81 TABLET, COATED ORAL at 08:50

## 2024-03-08 RX ADMIN — ACETAMINOPHEN 975 MG: 325 TABLET ORAL at 00:04

## 2024-03-08 RX ADMIN — INSULIN LISPRO 10 UNITS: 100 INJECTION, SOLUTION INTRAVENOUS; SUBCUTANEOUS at 08:58

## 2024-03-08 RX ADMIN — TRAMADOL HYDROCHLORIDE 50 MG: 50 TABLET, COATED ORAL at 08:50

## 2024-03-08 RX ADMIN — LEVOTHYROXINE SODIUM 25 MCG: 0.03 TABLET ORAL at 05:44

## 2024-03-08 RX ADMIN — Medication 6 MG: at 00:04

## 2024-03-08 RX ADMIN — INSULIN LISPRO 2 UNITS: 100 INJECTION, SOLUTION INTRAVENOUS; SUBCUTANEOUS at 08:59

## 2024-03-08 RX ADMIN — INSULIN LISPRO 10 UNITS: 100 INJECTION, SOLUTION INTRAVENOUS; SUBCUTANEOUS at 13:19

## 2024-03-08 RX ADMIN — Medication 10 ML: at 13:26

## 2024-03-08 RX ADMIN — Medication 10 ML: at 03:02

## 2024-03-08 RX ADMIN — Medication 400 MCG: at 08:51

## 2024-03-08 RX ADMIN — ACETAMINOPHEN 975 MG: 325 TABLET ORAL at 12:02

## 2024-03-08 RX ADMIN — LIDOCAINE 1 PATCH: 4 PATCH TOPICAL at 08:51

## 2024-03-08 RX ADMIN — GABAPENTIN 300 MG: 300 CAPSULE ORAL at 08:50

## 2024-03-08 RX ADMIN — INSULIN LISPRO 6 UNITS: 100 INJECTION, SOLUTION INTRAVENOUS; SUBCUTANEOUS at 13:20

## 2024-03-08 RX ADMIN — DOCUSATE SODIUM 100 MG: 100 CAPSULE, LIQUID FILLED ORAL at 08:51

## 2024-03-08 RX ADMIN — SODIUM CHLORIDE 2 G: 900 INJECTION INTRAVENOUS at 12:00

## 2024-03-08 RX ADMIN — INSULIN LISPRO 2 UNITS: 100 INJECTION, SOLUTION INTRAVENOUS; SUBCUTANEOUS at 03:00

## 2024-03-08 RX ADMIN — ACETAMINOPHEN 975 MG: 325 TABLET ORAL at 05:44

## 2024-03-08 RX ADMIN — GABAPENTIN 300 MG: 300 CAPSULE ORAL at 13:26

## 2024-03-08 ASSESSMENT — COGNITIVE AND FUNCTIONAL STATUS - GENERAL
MOVING TO AND FROM BED TO CHAIR: 4 - NONE
WALKING IN HOSPITAL ROOM: 3 - A LITTLE
CLIMB 3 TO 5 STEPS WITH RAILING: 3 - A LITTLE
STANDING UP FROM CHAIR USING ARMS: 4 - NONE

## 2024-03-08 NOTE — PROGRESS NOTES
Subjective:   Pt seen at bedside for  Left foot infection.  Patient seen alongside attending Dr. Damian     .NAD. No overnight events. Pt denies any pedal pain. Dressing clean, dry, and intact. Denies any N/V/F/C/CP/SOB.        Past Medical/Surgical history, Allergies, Meds reviewed in detail as charted  FH/SH reviewed in detail as charted    ROS:  Head and Neck: No complaints  Chest : No complaints  Abdomen: No Complaints  Constitutional: Unremarkable     Vitals: I have reviewed the patient's pertinent vital signs.     Radiology: Reviewed     Labs:   CBC Results       03/03/24 03/02/24 03/01/24     0342 1053 0335    WBC 36.79 39.24 43.45    RBC 3.81 4.03 3.94    HGB 10.4 11.0 10.7    HCT 33.8 36.1 34.5    MCV 88.7 89.6 87.6    MCH 27.3 27.3 27.2    MCHC 30.8 30.5 31.0     542 539         Comment for WBC at 0342 on 03/03/24: ALL RESULTS HAVE BEEN CHECKED This result has been called to larissa cooper by [place] on 03/03/2024 04:34:21, and has been read back.     Comment for WBC at 1053 on 03/02/24: ALL RESULTS HAVE BEEN CHECKEDCONSISTENT WITH PREVIOUS RESULTS This result has been called to marta walters by Yarelis on 03/02/2024 11:11:57, and has been read back.     Comment for WBC at 0335 on 03/01/24: CONSISTENT WITH PREVIOUS RESULTS    Comment for HGB at 0342 on 03/03/24: RESULT CHECKED    Comment for PLT at 0342 on 03/03/24: RESULTS CHECKED    Comment for PLT at 0335 on 03/01/24: RESULTS CHECKED        BMP Results       03/03/24 03/02/24 03/01/24     0342 1053 0335     138 139    K 4.0 4.0 3.5    Cl 105 104 105    CO2 26 27 26    Glucose 166 255 128    BUN 16 14 15    Creatinine 0.7 0.7 0.5    Calcium 8.4 8.6 8.3    Anion Gap 7 7 8    EGFR >60.0 >60.0 >60.0         Comment for EGFR at 0342 on 03/03/24: Calculation based on the Chronic Kidney Disease Epidemiology Collaboration (CKD-EPI) equation refit without adjustment for race.    Comment for EGFR at 1053 on 03/02/24: Calculation based on the Chronic  Kidney Disease Epidemiology Collaboration (CKD-EPI) equation refit without adjustment for race.    Comment for EGFR at 0335 on 03/01/24: Calculation based on the Chronic Kidney Disease Epidemiology Collaboration (CKD-EPI) equation refit without adjustment for race.            LE Objective:   -Vascular: DP/PT 1/4 bilaterally, CRT <3 seconds  -Orthopedic: decreased ROM at ankle jt b/l, +DF/PF all remaining digits  -Neurologic: light touch and epicritic sensation diminished  -Dermatologic:   LLE: Fibrotic and macerated wound of the plantar aspect of the forefoot.  Third digit noted to be necrotic.  Mild fluctuance noted over the third metatarsal head consistent with MRI concern of abscess.  Periwound is macerated.  Interdigital spaces are macerated.  Mild serosanguineous drainage on dressings noted.  Mild malodor noted.  Less than 1 cc of purulence expressed on incision and drainage.             Assessment: 63-year-old being consulted on for osteomyelitis and pathological fractures of the left foot    Plan:     -Patient seen by attending Dr. Damian  -Pt to dc to snf with Picc line and IV abx  -Dressing changed with beta dsd   -Pt will require close follow up outpatient  -Patient high risk for limb loss  --X-ray showing: Acute osteomyelitis of the second metatarsal where there is pathological fracturing.  Acute osteomyelitis and displaced pathological fracture and third metatarsal.  Probable mild osteomyelitis in the base of the proximal third phalanx.  Probable osteomyelitis involving the fourth metatarsal head medially as well as probably in the fourth proximal phalanx.  -MRI: Plantar wound with multifocal acute osteomyelitis and pathological fractures in the left foot.  Pockets of abscess are seen extending from the plantar wound about the regions of osteomyelitis.  See MRI for full description.  -Discussed with patient that recommendation from podiatry is surgical intervention. Patient understands but would like  to do IV antibiotics at this time.   -Appreciate infectious disease input on patient pursuing 6 weeks IV antibiotic course using deep cultures taken down to bone about the third metatarsal head.  - Labs and radiographs reviewed.  -Thank you for this consult, please contact on call podiatry resident with any questions or concerns     Dwight Mills PGY-2  #[id number]

## 2024-03-08 NOTE — PROGRESS NOTES
Subjective:   Pt seen at bedside for  Left foot infection.  Patient seen alongside attending Dr. Damian     .NAD. No overnight events. Pt denies any pedal pain. Dressing clean, dry, and intact. Denies any N/V/F/C/CP/SOB.        Past Medical/Surgical history, Allergies, Meds reviewed in detail as charted  FH/SH reviewed in detail as charted    ROS:  Head and Neck: No complaints  Chest : No complaints  Abdomen: No Complaints  Constitutional: Unremarkable     Vitals: I have reviewed the patient's pertinent vital signs.     Radiology: Reviewed     Labs:   CBC Results       03/07/24 03/06/24 03/05/24     0212 0306 0336    WBC 44.23 43.59 42.97    RBC 4.41 4.23 4.15    HGB 12.0 11.6 11.5    HCT 39.2 37.5 36.7    MCV 88.9 88.7 88.4    MCH 27.2 27.4 27.7    MCHC 30.6 30.9 31.3     525 508         Comment for WBC at 0212 on 03/07/24: This result has been called to Helena Wiggins RN by Renuka on 03/07/2024 04:00:17, and has been read back.     Comment for WBC at 0336 on 03/05/24: CONSISTENT WITH PREVIOUS RESULTS    Comment for PLT at 0336 on 03/05/24: RESULTS CHECKED        BMP Results       03/06/24 03/05/24 03/03/24     0306 0336 0342     137 138    K 4.0 4.1 4.0    Cl 103 103 105    CO2 27 29 26    Glucose 167 167 166    BUN 19 19 16    Creatinine 0.6 0.6 0.7    Calcium 9.0 8.9 8.4    Anion Gap 6 5 7    EGFR >60.0 >60.0 >60.0         Comment for EGFR at 0306 on 03/06/24: Calculation based on the Chronic Kidney Disease Epidemiology Collaboration (CKD-EPI) equation refit without adjustment for race.    Comment for EGFR at 0336 on 03/05/24: Calculation based on the Chronic Kidney Disease Epidemiology Collaboration (CKD-EPI) equation refit without adjustment for race.    Comment for EGFR at 0342 on 03/03/24: Calculation based on the Chronic Kidney Disease Epidemiology Collaboration (CKD-EPI) equation refit without adjustment for race.          LE Objective:   -Vascular: DP/PT 1/4 bilaterally, CRT <3  seconds  -Orthopedic: decreased ROM at ankle jt b/l, +DF/PF all remaining digits  -Neurologic: light touch and epicritic sensation diminished  -Dermatologic:   LLE: Fibrotic and macerated wound of the plantar aspect of the forefoot.  Third digit noted to be necrotic.  Mild fluctuance noted over the third metatarsal head consistent with MRI concern of abscess.  Periwound is macerated.  Interdigital spaces are macerated.  Mild serosanguineous drainage on dressings noted.  Mild malodor noted.  Less than 1 cc of purulence expressed on incision and drainage.             Assessment: 63-year-old being consulted on for osteomyelitis and pathological fractures of the left foot    Plan:     -Patient seen by attending Dr. Damian  -Pt to dc to [facility] with Picc line and IV abx  -Dressing changed with beta dsd   -Discussed with patient that recommendation from podiatry is surgical intervention. Patient understands but would like to do IV antibiotics at this time.   -Patient has denied all soft tissue and osseous surgical procedures including washout, soft tissue debridement, biopsies.  Patient is adamant that she will have no surgical intervention for her foot and that she would like to attempt IV antibiotic therapy ONLY for her foot.  -Patient with significant leukocytosis.  Her level of leukocytosis is not consistent with forefoot osteomyelitis or chronic infection even with small abscesses demonstrated on MRI.  Suspect that there may be a secondary underlying cause for such a significant WBC  -She actually has had some clinical improvement in her foot.  Likely contributory of the antibiotics and her limited activity while in the hospital  -Patient plans to have a second opinion at [place].    -Appreciate infectious disease input on patient pursuing 6 weeks IV antibiotic course using deep cultures taken down to bone about the third metatarsal head.  -An I&D was performed bedside during this admission however we were not aggressive  with th procedure e to avoid introducing infection into her deeper tissues and bone  -Pt will require close follow up outpatient  -Patient high risk for limb loss  -X-ray showing: Acute osteomyelitis of the second metatarsal where there is pathological fracturing.  Acute osteomyelitis and displaced pathological fracture and third metatarsal.  Probable mild osteomyelitis in the base of the proximal third phalanx.  Probable osteomyelitis involving the fourth metatarsal head medially as well as probably in the fourth proximal phalanx.  -MRI: Plantar wound with multifocal acute osteomyelitis and pathological fractures in the left foot.  Pockets of abscess are seen extending from the plantar wound about the regions of osteomyelitis.  See MRI for full description.  - Labs and radiographs reviewed.  -Thank you for this consult, please contact on call podiatry resident with any questions or concerns     Dwight Mills PGY-2  #[id number]

## 2024-03-08 NOTE — PROGRESS NOTES
Infectious Disease Progress Note    Patient Name: Bebe Burks  MR#: [id number]  : 1960  Admission Date: 2024  Date: 24   Time: 12:40 PM   Author: Sander Ackerman MD    OBJECTIVE:  The patient continues with Rocephin no side effects  Antibiotics:    Anti-infectives (From admission, onward)    Start     Dose/Rate Route Frequency Ordered Stop    24 1100  cefTRIAXone (ROCEPHIN) IVPB 2 g in 100 mL NSS vial in bag         2 g  200 mL/hr over 30 Minutes intravenous Every 24 hours interval 24 1009 24 2359          ROS  As above  Vital Signs:    Temp:  [36.2 °C (97.1 °F)-36.6 °C (97.9 °F)] 36.6 °C (97.9 °F)  Heart Rate:  [80-88] 80  Resp:  [16-18] 16  BP: (115-124)/(52-65) 115/52    Temp (72hrs), Av.4 °C (97.5 °F), Min:36.2 °C (97.1 °F), Max:36.7 °C (98.1 °F)      Physical Exam    Gen: Aox3  HEENT: OP clear  Neck: Supple  LAD: No cervical LAD  Lungs: CTAB  CV: RRR no murmurs  Abd: Soft NTND +BS  Ext: Foot dressing in place  Skin: no rash  Neuro: II-XII intact        Lines, Drains, Airways, Wounds:  Peripheral IV (Adult) 24 Left;Posterior Forearm (Active)   Number of days: 0       Wound Anterior;Left Toe (2nd) (Active)   Number of days: 49       Wound Other (comment) Anterior;Right Toe (5th) (Active)   Number of days: 3       Labs:    Lab Results   Component Value Date    WBC 44.23 (HH) 2024    HGB 12.0 2024    HCT 39.2 2024    MCV 88.9 2024     (H) 2024     Lab Results   Component Value Date    GLUCOSE 167 (H) 2024    CALCIUM 9.0 2024     2024    K 4.0 2024    CO2 27 2024     2024    BUN 19 2024    CREATININE 0.6 2024     Lab Results   Component Value Date    ALT 17 2024    AST 17 2024    ALKPHOS 77 2024    BILITOT 0.5 2024         Patient Active Problem List   Diagnosis Code   • Controlled type 2 diabetes mellitus, with long-term current use of  insulin (CMS/HCC) E11.9, Z79.4   • Essential hypertension I10   • Hyperlipidemia E78.5   • Hypothyroidism E03.9   • Obesity E66.9   • Thyroid nodule E04.1   • COVID-19 virus infection U07.1   • Lung nodule R91.1   • Displacement of lumbar intervertebral disc without myelopathy M51.26   • Disability of walking R26.2   • Polyneuropathy due to type 2 diabetes mellitus (CMS/HCC) E11.42   • Pre-op examination Z01.818   • Localized osteoarthritis of right knee M17.11   • Type 2 diabetes mellitus treated with insulin (CMS/HCC) E11.9, Z79.4   • Abnormal finding on EKG R94.31   • Left foot drop M21.372   • Abnormal finding on chest xray R93.89   • Osteoarthritis of right knee, unspecified osteoarthritis type M17.11   • S/P total knee arthroplasty, right Z96.651   • Sepsis (CMS/HCC) A41.9   • Osteomyelitis of left foot (CMS/HCC) M86.9   • Peripheral neuropathy G62.9   • Abdominal pain R10.9   • Chronic idiopathic constipation K59.04   • Gangrene (CMS/HCC) I96   • Tibial artery stenosis, left (CMS/HCC) I70.202   • H/O pilonidal cyst Z87.2   • Type 2 diabetes mellitus with left diabetic foot infection  E11.628, L08.9   • Type 2 diabetes mellitus with diabetic neuropathy (CMS/HCC) E11.40   • Hypothyroidism E03.9   • Primary hypertension I10   • Leukemoid reaction D72.823   • HLD (hyperlipidemia) E78.5   • Acute osteomyelitis of left foot (CMS/HCC) M86.172     Acute osteomyelitis of left foot (CMS/HCC)  Assessment & Plan  I discussed today with Dr. Damian as well as oncology  The flow cytometry was negative but the genetic markers are pending.  I still feel that there may be something underlying in her bone marrow that will require follow-up in the oncology office  From the ID perspective, as it was well-documented by podiatry, she has refused any surgical interventions so at this point the best we can do is to set her up for IV Rocephin for 6 weeks.  Today's day 5 of 42 and she will need weekly CBC and CMP however she is  planning to seek a second opinion with infectious disease and podiatry at the [facility] as an outpatient    I will sign off and please reconsult if needed      Sander Ackerman MD  3/8/670059:40 PM

## 2024-03-08 NOTE — PLAN OF CARE
Plan of Care Review  Outcome Evaluation: Pt oob x1 with RW heel touch wb in surgical shoe to L foot. Dressing changed per doctors order. Pain is controlled with tylenol prn tramadol prn, Picc to RUE intact.Plan to [facility] to [facility] today when cleared

## 2024-03-08 NOTE — PLAN OF CARE
Care Coordination Discharge Plan Summary    Admission Assessment Summary    General Information  Readmission Within the last 30 days: no previous admission in last 30 days  Does patient have a : No  Patient-Specific Goals (include timeframe): to safely return home with [facility]- for PT and RN (wound care)    Living Arrangements  Arrived From: home  Current Living Arrangements: condominium  People in Home: alone  Home Accessibility: stairs to enter home (Group), railings on outside stairs  Living Arrangement Comments: pt lives alone in a 1st fl condo with 2STE with HR. Walk-in shower.    Social Determinants of Health - Screenings  Housing Stability  In the last 12 months, was there a time when you were not able to pay the mortgage or rent on time?: No  In the last 12 months, how many places have you lived?: 1  In the last 12 months, was there a time when you did not have a steady place to sleep or slept in a shelter (including now)?: No  Utility Access  In the past 12 months has the electric, gas, oil, or water company threatened to shut off services in your home?: No  Transportation Needs  In the past 12 months, has lack of transportation kept you from medical appointments or from getting medications?: No  In the past 12 months, has lack of transportation kept you from meetings, work, or from getting things needed for daily living?: No    Functional Status Prior to Admission  Assistive Device/Animal Currently Used at Home: cane, straight, walker, front-wheeled  Functional Status Comments: Pt reported she recently started using a walker. Prior to walker she was using a cane.  IADL Comments: IND with IADLs and ADLs PTA    Discharge Needs Assessment    Concerns to be Addressed: care coordination/care conferences, discharge planning  Current Discharge Risk: lives alone  Anticipated Changes Related to Illness: none    Discharge Plan Summary    Patient Choice  Offered/Gave Vendor List: no  Patient's Choice  of Community Agency(s): [facility]       Concerns / Comments: Pt for DC to [facility] today. Maria Victoria wtih [facility] informed via [employer]. Maria Victoria reported [facility] phones are on and off working.  provided pt RN # in order for [facility] RNs to call for report. RN aware. Pts sonia to transport this afternoon.    Discharge Plan:  Disposition/Destination: Skilled Nursing Facility - Other  / Skilled Nursing Facility  Destination - Admitted Since 2/29/2024     Service Provider Selected Services Address Phone Fax Patient Preferred Last Updated    [facility] [address], [place] [address] -- [phone] -- Zita Solomon, RN 3/6/2024 1120          Connection to Community  Not applicable    Community Resources:      Discharge Transportation:  Is Out of Hospital DNR needed at Discharge: no  Does patient need discharge transport? No (sister transporting)     DC Plans  [facility]  Family will transport

## 2024-03-10 LAB
CALR EXON 9 MUT ANL BLD/T: NOT DETECTED
CLINICAL INFO: NORMAL
CSF3R GENE EXON 14+17 MUT ANL BLD/T: NOT DETECTED
JAK2 EXON 12 MUT ANL BLD/T: NOT DETECTED
JAK2 P.V617F BLD/T QL: NOT DETECTED
MPL P.W515L+W515K+S505N BLD/T QL: NOT DETECTED
NARRATIVE DIAGNOSTIC REPORT-IMP: NORMAL
QUEST BLOCK/SPECIMEN ID: NORMAL
SERVICE CMNT 05-IMP: NORMAL
SPECIMEN SOURCE: NORMAL

## 2024-03-13 LAB
SPECIMEN SOURCE: ABNORMAL
T(9;22)(ABL1,BCR) BLD/T: ABNORMAL
T(9;22)(ABL1,BCR) MAJOR BLD/T QL: DETECTED
T(9;22)(ABL1,BCR) MINOR BLD/T QL: NOT DETECTED
T(ABL1,BCR)P210/CONTROL (IS) BLD/T: 90.79 %
T(ABL1,BCR)P210/CONTROL (IS) BLD/T: ABNORMAL %

## 2024-03-14 ENCOUNTER — LAB REQUISITION (OUTPATIENT)
Dept: LAB | Facility: HOSPITAL | Age: 64
End: 2024-03-14
Attending: INTERNAL MEDICINE
Payer: MEDICARE

## 2024-03-14 DIAGNOSIS — E11.621 TYPE 2 DIABETES MELLITUS WITH FOOT ULCER (CODE): ICD-10-CM

## 2024-03-14 DIAGNOSIS — M86.172 OTHER ACUTE OSTEOMYELITIS, LEFT ANKLE AND FOOT (CMS/HCC): ICD-10-CM

## 2024-03-14 LAB
ANION GAP SERPL CALC-SCNC: 9 MEQ/L (ref 3–15)
BUN SERPL-MCNC: 22 MG/DL (ref 7–25)
CALCIUM SERPL-MCNC: 9.4 MG/DL (ref 8.6–10.3)
CHLORIDE SERPL-SCNC: 103 MEQ/L (ref 98–107)
CO2 SERPL-SCNC: 27 MEQ/L (ref 21–31)
CREAT SERPL-MCNC: 0.7 MG/DL (ref 0.6–1.2)
CRP SERPL-MCNC: 7.4 MG/L
EGFRCR SERPLBLD CKD-EPI 2021: >60 ML/MIN/1.73M*2
ERYTHROCYTE [DISTWIDTH] IN BLOOD BY AUTOMATED COUNT: 16.1 % (ref 11.7–14.4)
ERYTHROCYTE [SEDIMENTATION RATE] IN BLOOD BY WESTERGREN METHOD: 61 MM/HR
GLUCOSE SERPL-MCNC: 176 MG/DL (ref 70–99)
HCT VFR BLD AUTO: 41.9 % (ref 35–45)
HGB BLD-MCNC: 13.2 G/DL (ref 11.8–15.7)
MAGNESIUM SERPL-MCNC: 2.2 MG/DL (ref 1.8–2.5)
MCH RBC QN AUTO: 28.1 PG (ref 28–33.2)
MCHC RBC AUTO-ENTMCNC: 31.5 G/DL (ref 32.2–35.5)
MCV RBC AUTO: 89.1 FL (ref 83–98)
PDW BLD AUTO: 11.6 FL (ref 9.4–12.3)
PLATELET # BLD AUTO: 493 K/UL (ref 150–369)
POTASSIUM SERPL-SCNC: 4.5 MEQ/L (ref 3.5–5.1)
RBC # BLD AUTO: 4.7 M/UL (ref 3.93–5.22)
SODIUM SERPL-SCNC: 139 MEQ/L (ref 136–145)
WBC # BLD AUTO: 34.47 K/UL (ref 3.8–10.5)

## 2024-03-14 PROCEDURE — 36415 COLL VENOUS BLD VENIPUNCTURE: CPT | Performed by: INTERNAL MEDICINE

## 2024-03-14 PROCEDURE — 86140 C-REACTIVE PROTEIN: CPT | Performed by: INTERNAL MEDICINE

## 2024-03-14 PROCEDURE — 85027 COMPLETE CBC AUTOMATED: CPT | Performed by: INTERNAL MEDICINE

## 2024-03-14 PROCEDURE — 83735 ASSAY OF MAGNESIUM: CPT | Performed by: INTERNAL MEDICINE

## 2024-03-14 PROCEDURE — 85652 RBC SED RATE AUTOMATED: CPT | Performed by: INTERNAL MEDICINE

## 2024-03-14 PROCEDURE — 80048 BASIC METABOLIC PNL TOTAL CA: CPT | Performed by: INTERNAL MEDICINE

## 2024-04-17 ENCOUNTER — TELEPHONE (OUTPATIENT)
Dept: ENDOCRINOLOGY | Facility: CLINIC | Age: 64
End: 2024-04-17

## 2024-04-17 NOTE — TELEPHONE ENCOUNTER
Patient left .  She was an inpatient at [facility] and now is at the [facility].  She has questions regarding her Novolog.

## 2024-04-30 RX ORDER — GABAPENTIN 300 MG/1
CAPSULE ORAL
Qty: 360 CAPSULE | Refills: 3 | Status: SHIPPED | OUTPATIENT
Start: 2024-04-30 | End: 2024-12-10 | Stop reason: HOSPADM

## 2024-05-03 ENCOUNTER — TELEPHONE (OUTPATIENT)
Dept: CARDIOLOGY | Facility: CLINIC | Age: 64
End: 2024-05-03
Payer: MEDICARE

## 2024-05-03 NOTE — TELEPHONE ENCOUNTER
Called patient to confirm her appt. Scheduled 5/7/24 for an TTE and OV with Dr. Vaughan. Patient cancelled both appts. And states she will call the office back to reschedule.

## 2024-05-21 ENCOUNTER — TELEPHONE (OUTPATIENT)
Dept: ENDOCRINOLOGY | Facility: CLINIC | Age: 64
End: 2024-05-21

## 2024-05-21 DIAGNOSIS — E11.9 TYPE 2 DIABETES MELLITUS TREATED WITH INSULIN (CMS/HCC): Primary | ICD-10-CM

## 2024-05-21 DIAGNOSIS — Z79.4 TYPE 2 DIABETES MELLITUS TREATED WITH INSULIN (CMS/HCC): Primary | ICD-10-CM

## 2024-05-22 NOTE — TELEPHONE ENCOUNTER
Rn called pt and let pt know labs sent to [facility] and to fast as they include a lipid panel.

## 2024-06-18 ENCOUNTER — TELEPHONE (OUTPATIENT)
Dept: ENDOCRINOLOGY | Facility: CLINIC | Age: 64
End: 2024-06-18

## 2024-06-30 RX ORDER — LOSARTAN POTASSIUM 50 MG/1
50 TABLET ORAL DAILY
Qty: 90 TABLET | Refills: 1 | Status: SHIPPED | OUTPATIENT
Start: 2024-06-30 | End: 2024-12-10 | Stop reason: HOSPADM

## 2024-07-12 DIAGNOSIS — I10 ESSENTIAL (PRIMARY) HYPERTENSION: ICD-10-CM

## 2024-07-13 RX ORDER — AMLODIPINE BESYLATE 5 MG/1
5 TABLET ORAL DAILY
Qty: 90 TABLET | Refills: 1 | Status: SHIPPED | OUTPATIENT
Start: 2024-07-13 | End: 2024-12-10 | Stop reason: HOSPADM

## 2024-07-15 DIAGNOSIS — I10 ESSENTIAL (PRIMARY) HYPERTENSION: ICD-10-CM

## 2024-07-15 RX ORDER — HYDROCHLOROTHIAZIDE 12.5 MG/1
12.5 TABLET ORAL DAILY
Qty: 90 TABLET | Refills: 1 | Status: SHIPPED | OUTPATIENT
Start: 2024-07-15 | End: 2024-12-10 | Stop reason: HOSPADM

## 2024-07-26 ENCOUNTER — BMR PREADMISSION ASSESSMENT (INPATIENT)
Dept: ADMISSIONS | Facility: REHABILITATION | Age: 64
End: 2024-07-26
Payer: MEDICARE

## 2024-08-21 ENCOUNTER — BMR PREADMISSION ASSESSMENT (OUTPATIENT)
Dept: ADMISSIONS | Facility: REHABILITATION | Age: 64
End: 2024-08-21
Payer: MEDICARE

## 2024-08-23 ENCOUNTER — BMR PREADMISSION ASSESSMENT (OUTPATIENT)
Dept: ADMISSIONS | Facility: REHABILITATION | Age: 64
End: 2024-08-23
Payer: MEDICARE

## 2024-10-25 ENCOUNTER — BMR PREADMISSION ASSESSMENT (INPATIENT)
Dept: ADMISSIONS | Facility: REHABILITATION | Age: 64
End: 2024-10-25
Payer: MEDICARE

## 2024-10-30 ENCOUNTER — HOSPITAL ENCOUNTER (OUTPATIENT)
Dept: PHYSICAL MEDICINE AND REHAB | Facility: REHABILITATION | Age: 64
Discharge: HOME | End: 2024-10-30
Payer: MEDICARE

## 2024-11-14 ENCOUNTER — HOSPITAL ENCOUNTER (OUTPATIENT)
Dept: PHYSICAL MEDICINE AND REHAB | Facility: REHABILITATION | Age: 64
Discharge: HOME | End: 2024-11-14
Payer: MEDICARE

## 2024-11-14 ENCOUNTER — BMR PREADMISSION ASSESSMENT (INPATIENT)
Dept: ADMISSIONS | Facility: REHABILITATION | Age: 64
DRG: 560 | End: 2024-11-14
Payer: MEDICARE

## 2024-11-18 VITALS
HEART RATE: 78 BPM | DIASTOLIC BLOOD PRESSURE: 78 MMHG | BODY MASS INDEX: 26.84 KG/M2 | OXYGEN SATURATION: 100 % | SYSTOLIC BLOOD PRESSURE: 130 MMHG | WEIGHT: 171 LBS | HEIGHT: 67 IN

## 2024-11-18 NOTE — HOSPITAL COURSE
History of Present Illness  Bebe is a 64 y.o. 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 ID recommended IV Ceftriaxone and IV Ampicillin until 9/14/24. A midline is in place for continued antibiotic administration at rehab.     Pt with urinary retention requiring lozada and  acute non-occlusive DVT of the R axillary and brachial vein around line (on Lovenox) while in IRF for pre-prosthetic training. Miss Burks was deemed safe for discharge from IRF to SNF on 09/21/24. Upon discharge, patient was performing ADLs, having regular bowel movements, voiding which required straight caths q4h while awake, and pain controlled on PRN medications.    She presents to Amputee clinic on 11/14 for evaluation to begin prosthetic training.  She would benefit from Acute Inpatient prosthetic training.  Specifically, she requires at least 3 hrs PT/OT daily, to learn skills of prosthetic management, donning/doffing, skin inspection, prosthetic wearing time and to improve overall functional mobility with the prosthesis.  She will require 24 hr nursing / medical management for monitoring of integumentary, cardiovascular, and musculoskeletal systems as we progress activity with prosthetic use due to history of shortness of breath and hypertension.  Additionally, with history of diabetes and non-healing wounds, this places her at high risk of skin breakdown if not monitored appropriately and on a daily basis as we progress prosthetic usage and mobility.  This may require adjustments in medications and evaluation by wound care team to prevent further complications. At this time, Mrs. Burks demonstrates an inability to safely manage his/her prosthesis independently  without close monitoring from a skilled clinical team placing them at an increased risk of skin breakdown.      Active comorbid conditions include sacral wounds & wound to RLE, urinary retention, DVT, anemia, CML (on imatinib), type 2 DM    Active Inpatient Medications  Vitamin C   carboxymethylcellulose 1 % ophthalmic gel   Centrum  duloxetine 60 MG DR capsule   enoxaparin 80 MG/0.8ML   ferrous sulfate 325 mg (elemental dose 65 mg) per tablet   furosemide 40 mg tablet   imatinib 400 MG tablet   insulin aspart 100 UNIT/ML PEN injection, Inject 10 Units under the skin 3 times a day with meals   insulin glargine 100 UNIT/ML PEN injection, Inject 5 Units under the skin daily at bedtime  nystatin [id number] UNIT/GM topical cream   gabapentin 300 MG capsule   atorvastatin 40 mg tablet   Jardiance 10 MG tablet   levothyroxine 25 MCG tablet   melatonin 3 MG tablet           Current Plan of Care at Referring Hospital    #Rehabilitation: Multidisciplinary rehabilitation program including the following:   Physical Therapy to focused on improving strength, endurance, balance, functional mobility, and transfers  Occupational Therapy to work on activities of daily living and cognition    #Enterococcus faecalis Bacteremia   #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 c/b open BKA-wound now s/p AKA on 08/19   #Hx of osteomyelitis c/b debridement and TMA in March 2024  - Performed by vascular surgery on 08/03, podiatry on 10/01   - WB status: as tolerated to the left leg   - s/p cefepime (8/1-8/2), clindamycin (8/1-8/2), cefazolin (7/31), metronidazole, vanc (8/4-8/16), meropenem (08/03-08/19)  - S/p ceftriaxone and ampicillin through 09/14       #Urinary retention (improving)   - new found urinary retention overnight on 08/30-08/31 in setting of immobility due to BKA vs opioids   - Straight cath per protocol, replaced lozada on 08/31   - Void trial on 09/05, will continue without lozada for now, pt notes improving with voids on 09/18   -  Continue with flomax to 0.8 mg daily at bedtime, will continue to monitor   - Continue with straight cath q4h while awake     #Pain control  - Standing: duloxetine daily 60 mg daily, gabapentin 600 mg q8h    #HypoK  - Patient with consistently low potassium levels on acute rehab, started standing potassium supplementation on 09/12 and required spot doses of additional potassium since then   PLAN:  - Continue to monitor with BMP     #Pressure injury in right heel   #Stage 2 decubitus ulcer  - Patient has had ulcer for which she has been receiving would care. Since has been having increased bowel movements this has been irritated  Plan  - Continue wound dressing recommendations from wound team  - Continue with boot, heel off bed   - Continue with wound care recommendations   - triad to the yellow   - nystatin cream to the redness then 20% zinc   - secured with foam   - daily dressing changes     #DVT  -PICC-associated DVT started on 8/30 with US showing acute non-occlusive DVT of the right axillary and brachial vein. This has improved since starting therapeutic Lovenox  - Plan for minimum 3 months of AC   - start date 08/30, plan to continue for at least 3 months   - Plan subsequent transition to a direct thrombin inhibitor  - Consider transition to oral DOAC (dabigatran) on discharge (remains on Lovenox)    #Anemia  Has had low hemoglobin since recent admissions starting in June. Iron studies showed low TSAT and low iron, consistent with iron deficiency anemia. Has not had any bleeding per rectum, hemoptysis or any other significant sources of blood loss. Most likely also component of anemia of chronic disease.  - Continue iron supplementation with ferrous sulfate (started on 8/31)    #Hypervolemia  - Upon admission to acute care in August, the patient's weight was 95.9 kg (211 lbs) which is reported to be >40lbs above her baselines weight (77.1 kg, ~170lbs)  - Given IV diuretics and discharged  on PO lasix 40 mg  daily to maintain volume status   - If weight increasing >5lbs in a week, should call PCP or Oncologist for further instructions on managing diuretics    #CML  #Leukocytosis   Dx with chronic phase CML in April 2024. Follows with Dr. Tatianna Burks. Was started on Dasatinib with good response and WBC as low as 4s while on it. Stopped it during past hospitalization in June iso poor wound healing.   Plan:  - continue to hold desatinib   - on imatinib  - f/up with outpatient hematologist      #Bladder Management  - Continue to monitor bladder per protocol  - Continue with flomax 0.8 mg daily at bedtime, continue to monitor for bladder function        #Global  - Code Status: Full Code  - VTE Ppx: enoxaparin 80 MG/0.8ML injection 80 mg [id number] 80 MG/0.8ML injection 80 mg Q12H   - Diet Type: Regular diet      Follow-Up Appointments  As of 11/18/24 the patient has no known scheduled follow-up appointments within the predicted rehab length of stay.

## 2024-11-18 NOTE — BMR PREADMISSION NOTE
[facility]  Preadmission Assessment    Patient Name:   Bebe Burks  YOB: 1960    Referral Date:   11/14/24  Evaluation Date:    11/18/24  Referring Facility Admission Date: 09/21/24  Referring Facility: [facility]  Referring Provider: Yo Betancourt, DO     ASSESSMENT AND PLAN   Bebe Burks is a 64 y.o. 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    She demonstrates impairments in balance, endurance/activity tolerance, range of motion (ROM), mobility, safety awareness, motor dysfunction, self-care, strength, pain, coordination affecting functional independence.    Bebe is at risk for the following complications:     Acute change in mental status due to multiple medications, prolonged hospitalization, and known infections   Cardiac event due to history of cardiac conditions   Constipation due to opioid medication and impaired mobility   Dehydration/malnutrition due to impaired fluid intake and diuretic use   DVT/PE due to impaired mobility, history of DVT/PE/hypercoagulation/clotting disorder, and cancer diagnosis   Falls due to impaired balance       Sepsis due to treatment with antibiotics in the past 90 days   Skin breakdown/pressure ulcers due to impaired mobility and impaired sensation   Uncontrolled pain due to opioid medication   Urinary tract infection due to other (urinary retention)   Wound infection due to surgical incision and pressure injury         In addition to the above noted risks, Bebe requires daily rehabilitation physician oversight to prevent, monitor and manage medication side effects, abnormal vital signs, acute changes in mental status, abnormal lab values, functional decline, complications of infection, recurrent  infection, exacerbation of chronic medical comorbidities, bowel and bladder dysfunction, respiratory insufficiency, organ failure, psychosocial dysfunction, sleep disorders, development of skin breakdown/pressure injury, worsening of wounds, and pain.    Bebe requires and will benefit from a course of comprehensive inpatient rehabilitation including medical consultative services, occupational therapy, physical therapy, prosthetic services, and psychiatry/psychology services.    Anticipated frequency and intensity of services include  Occupational Therapy 1.5 hours per day at a frequency of 5-7 times per week  Physical Therapy 1.5 hours per day at a frequency of 5-7 times per week        Bebe is willing to participate in the rehab program and requires, can tolerate and will benefit from 3 hours of therapy at least 5 days per week  . These needs would not be able to be met at a lower level of care.    By discharge, Bebe is expected to demonstrate functional improvement with goals including Setup or clean-up assistance for self-care, Independent for transfers, Independent for locomotion, Independent for communication, and Independent for social cognition.     The projected length of stay in inpatient rehab is 16 days and she is expected to discharge to home with home health with the following:  Type of Home Care Services: home OT, home PT, home health aide    Pertinent History of Current Functional Problem:  History of Present Illness  Bebe is a 64 y.o. 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 ID recommended IV Ceftriaxone and IV Ampicillin until 9/14/24. A midline is in place for continued antibiotic administration at  rehab.     Pt with urinary retention requiring lozada and  acute non-occlusive DVT of the R axillary and brachial vein around line (on Lovenox) while in IRF for pre-prosthetic training. Miss Burks was deemed safe for discharge from IRF to SNF on 09/21/24. Upon discharge, patient was performing ADLs, having regular bowel movements, voiding which required straight caths q4h while awake, and pain controlled on PRN medications.    She presents to Amputee clinic on 11/14 for evaluation to begin prosthetic training.  She would benefit from Acute Inpatient prosthetic training.  Specifically, she requires at least 3 hrs PT/OT daily, to learn skills of prosthetic management, donning/doffing, skin inspection, prosthetic wearing time and to improve overall functional mobility with the prosthesis.  She will require 24 hr nursing / medical management for monitoring of integumentary, cardiovascular, and musculoskeletal systems as we progress activity with prosthetic use due to history of shortness of breath and hypertension.  Additionally, with history of diabetes and non-healing wounds, this places her at high risk of skin breakdown if not monitored appropriately and on a daily basis as we progress prosthetic usage and mobility.  This may require adjustments in medications and evaluation by wound care team to prevent further complications. At this time, Mrs. Burks demonstrates an inability to safely manage his/her prosthesis independently without close monitoring from a skilled clinical team placing them at an increased risk of skin breakdown.      Active comorbid conditions include sacral wounds & wound to RLE, urinary retention, DVT, anemia, CML (on imatinib), type 2 DM    Active Inpatient Medications  Vitamin C   carboxymethylcellulose 1 % ophthalmic gel   Centrum  duloxetine 60 MG DR capsule   enoxaparin 80 MG/0.8ML   ferrous sulfate 325 mg (elemental dose 65 mg) per tablet   furosemide 40 mg tablet   imatinib 400 MG tablet    insulin aspart 100 UNIT/ML PEN injection, Inject 10 Units under the skin 3 times a day with meals   insulin glargine 100 UNIT/ML PEN injection, Inject 5 Units under the skin daily at bedtime  nystatin [id number] UNIT/GM topical cream   gabapentin 300 MG capsule   atorvastatin 40 mg tablet   Jardiance 10 MG tablet   levothyroxine 25 MCG tablet   melatonin 3 MG tablet           Current Plan of Care at Referring Hospital    #Rehabilitation: Multidisciplinary rehabilitation program including the following:   Physical Therapy to focused on improving strength, endurance, balance, functional mobility, and transfers  Occupational Therapy to work on activities of daily living and cognition    #Enterococcus faecalis Bacteremia   #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 c/b open BKA-wound now s/p AKA on 08/19   #Hx of osteomyelitis c/b debridement and TMA in March 2024  - Performed by vascular surgery on 08/03, podiatry on 10/01   - WB status: as tolerated to the left leg   - s/p cefepime (8/1-8/2), clindamycin (8/1-8/2), cefazolin (7/31), metronidazole, vanc (8/4-8/16), meropenem (08/03-08/19)  - S/p ceftriaxone and ampicillin through 09/14       #Urinary retention (improving)   - new found urinary retention overnight on 08/30-08/31 in setting of immobility due to BKA vs opioids   - Straight cath per protocol, replaced lozada on 08/31   - Void trial on 09/05, will continue without lozada for now, pt notes improving with voids on 09/18   - Continue with flomax to 0.8 mg daily at bedtime, will continue to monitor   - Continue with straight cath q4h while awake     #Pain control  - Standing: duloxetine daily 60 mg daily, gabapentin 600 mg q8h    #HypoK  - Patient with consistently low potassium levels on acute rehab, started standing potassium supplementation on 09/12 and required spot doses of additional potassium since then   PLAN:  - Continue to monitor with BMP     #Pressure injury in right heel   #Stage 2 decubitus ulcer  - Patient  has had ulcer for which she has been receiving would care. Since has been having increased bowel movements this has been irritated  Plan  - Continue wound dressing recommendations from wound team  - Continue with boot, heel off bed   - Continue with wound care recommendations   - triad to the yellow   - nystatin cream to the redness then 20% zinc   - secured with foam   - daily dressing changes     #DVT  -PICC-associated DVT started on 8/30 with US showing acute non-occlusive DVT of the right axillary and brachial vein. This has improved since starting therapeutic Lovenox  - Plan for minimum 3 months of AC   - start date 08/30, plan to continue for at least 3 months   - Plan subsequent transition to a direct thrombin inhibitor  - Consider transition to oral DOAC (dabigatran) on discharge (remains on Lovenox)    #Anemia  Has had low hemoglobin since recent admissions starting in June. Iron studies showed low TSAT and low iron, consistent with iron deficiency anemia. Has not had any bleeding per rectum, hemoptysis or any other significant sources of blood loss. Most likely also component of anemia of chronic disease.  - Continue iron supplementation with ferrous sulfate (started on 8/31)    #Hypervolemia  - Upon admission to acute care in August, the patient's weight was 95.9 kg (211 lbs) which is reported to be >40lbs above her baselines weight (77.1 kg, ~170lbs)  - Given IV diuretics and discharged  on PO lasix 40 mg daily to maintain volume status   - If weight increasing >5lbs in a week, should call PCP or Oncologist for further instructions on managing diuretics    #CML  #Leukocytosis   Dx with chronic phase CML in April 2024. Follows with Dr. Tatianna Burks. Was started on Dasatinib with good response and WBC as low as 4s while on it. Stopped it during past hospitalization in June iso poor wound healing.   Plan:  - continue to hold desatinib   - on imatinib  - f/up with outpatient hematologist      #Bladder  "Management  - Continue to monitor bladder per protocol  - Continue with flomax 0.8 mg daily at bedtime, continue to monitor for bladder function        #Global  - Code Status: Full Code  - VTE Ppx: enoxaparin 80 MG/0.8ML injection 80 mg [id number] 80 MG/0.8ML injection 80 mg Q12H   - Diet Type: Regular diet      Follow-Up Appointments  As of 11/18/24 the patient has no known scheduled follow-up appointments within the predicted rehab length of stay.    OBJECTIVE   Current DVT Prophylaxis  Chemoprophylaxis: enoxaparin (Lovenox)  Dose/Frequency/Route: enoxaparin 80 MG/0.8ML injection 80 mg Q12H  Date/Time of Most Recent Dose: administered in SNF    Vitals:  Height 1.702 m (5' 7\")  Weight 77.6 kg (171 lb)  ,      Date/Time Pulse BP SpO2 Oxygen Therapy [facility]   11/18/24 1435 78 130/78 100 % None (Room air) [facility]          Lab Results:      Prior Living Environment      Flowsheet Row Most Recent Value   People in Home alone   Current Living Arrangements condominium, other (see comments)   Living Environment Comment resides in 1st floor condo, [address], R hand rail, no stairs inside home, Bathroom Layout: Tub/shower unit with shower curtain, Shower stall with glass doors (shower stall in main bedroom), two sisters can provide some assistance, may get HHA assistance          Prior Level of Function      Flowsheet Row Most Recent Value   Ambulation assistive equipment   Transferring assistive equipment   Bathing assistive equipment   Dressing independent   Eating independent   Communication understands/communicates without difficulty   Assistive Device Currently Used at Home cane, straight, walker, front-wheeled, shower chair, grab bar, raised toilet          Current Status:  Current Diet: no diet restrictions   Current Function       Row Name 11/18/24 1439       Cognition    Orientation Status oriented x 4    Cognitive Function memory deficit    Memory Deficit short-term memory    Comment, Short Term Memory forgetful    " Comment, Cognition pleasant, cooperative & motivated       Sensory Assessment    Sensory Assessment sensation intact except    Sensation Impaired Location(s) left LE    Sensory Subjective Reports other (see comments)  pt reports phantom limb pain & sensations       Edema Symptoms/Interventions    Description diffuse       Bed Mobility    Bed Mobility Activities sit to supine;supine to sit    [place] minimum assist (75% or more patient effort)       Sit/Stand Transfer    Surface chair with arm rests    Safety/Cues maximal    Assistive Device walker, front-wheeled;other (see comments)    Transfer Comments with prosthesis       Surface-to-Surface Transfers    Transfer Location wheelchair    [place] minimum assist (75% or more patient effort)    Assistive Device transfer board;wheelchair       Shower/Tub Transfer    [place] not tested       Gait Training    [place], Gait maximum assist (25-49% patient effort)    Safety/Cues tactile cues;verbal cues;proper use of assistive device;technique    Assistive Device walker, rolling platform;other (see comments);left  prosthesis    Distance in Feet 5 feet    Comment Pt demonstrates gait deviations typical of a novice prosthetic use including decreased prosthetic confidence as demonstrated by decreased weight shift and stance time on prosthesis, dec gait speed, uneven timing, and reliance of UEs on RW. Significant scissoring and LOB requiring assist into wheelchair.       Bathing    [place] supervision;set up    Position supported sitting    Comment w/o prosthesis       Upper Body Dressing    [place] set up;supervision       Lower Body Dressing    Tasks doff;don;other (see comments)  liner prosthesis    [place] maximum assist (25-49% patient effort)    Safety/Cues sequencing;verbal cues;problem-solving;initiation;tactile cues;proper use of assistive device;use of adaptive equipment    Position supported sitting       Grooming    [place] set  up;supervision       Balance    Sit to Stand Dynamic Balance moderate impairment                    Special Needs:       Existing Precautions/Restrictions: fall    Active Infections: None

## 2024-11-19 ENCOUNTER — HOSPITAL ENCOUNTER (INPATIENT)
Facility: REHABILITATION | Age: 64
LOS: 21 days | Discharge: HOME HEALTH | DRG: 560 | End: 2024-12-10
Attending: PHYSICAL MEDICINE & REHABILITATION | Admitting: PHYSICAL MEDICINE & REHABILITATION
Payer: MEDICARE

## 2024-11-19 ENCOUNTER — APPOINTMENT (INPATIENT)
Dept: PHYSICAL THERAPY | Facility: REHABILITATION | Age: 64
DRG: 560 | End: 2024-11-19
Payer: MEDICARE

## 2024-11-19 DIAGNOSIS — M25.50 ACUTE PAIN IN JOINT: Primary | ICD-10-CM

## 2024-11-19 DIAGNOSIS — F43.22 ADJUSTMENT DISORDER WITH ANXIETY: ICD-10-CM

## 2024-11-19 DIAGNOSIS — R60.9 EDEMA, UNSPECIFIED TYPE: ICD-10-CM

## 2024-11-19 PROBLEM — Z89.612: Status: ACTIVE | Noted: 2024-11-19

## 2024-11-19 PROBLEM — Z47.89 ENCOUNTER FOR PROSTHETIC GAIT TRAINING: Status: ACTIVE | Noted: 2024-11-19

## 2024-11-19 LAB
GLUCOSE BLD-MCNC: 179 MG/DL (ref 70–99)
GLUCOSE BLD-MCNC: 193 MG/DL (ref 70–99)
POCT TEST: ABNORMAL
POCT TEST: ABNORMAL

## 2024-11-19 PROCEDURE — 97163 PT EVAL HIGH COMPLEX 45 MIN: CPT | Mod: GP | Performed by: PHYSICAL THERAPIST

## 2024-11-19 PROCEDURE — 12800000 HC ROOM AND CARE SEMIPRIVATE REHAB

## 2024-11-19 PROCEDURE — 63600000 HC DRUGS/DETAIL CODE: Performed by: INTERNAL MEDICINE

## 2024-11-19 PROCEDURE — 63700000 HC SELF-ADMINISTRABLE DRUG: Performed by: INTERNAL MEDICINE

## 2024-11-19 RX ORDER — INSULIN LISPRO 100 [IU]/ML
1-12 INJECTION, SOLUTION INTRAVENOUS; SUBCUTANEOUS
Status: DISCONTINUED | OUTPATIENT
Start: 2024-11-19 | End: 2024-11-22

## 2024-11-19 RX ORDER — IMATINIB MESYLATE 400 MG/1
400 TABLET, FILM COATED ORAL DAILY
Status: DISCONTINUED | OUTPATIENT
Start: 2024-11-20 | End: 2024-12-10 | Stop reason: HOSPADM

## 2024-11-19 RX ORDER — INSULIN GLARGINE 100 [IU]/ML
8 INJECTION, SOLUTION SUBCUTANEOUS NIGHTLY
Status: DISCONTINUED | OUTPATIENT
Start: 2024-11-19 | End: 2024-11-20

## 2024-11-19 RX ORDER — METHENAMINE HIPPURATE 1000 MG/1
1 TABLET ORAL 2 TIMES DAILY
Status: DISCONTINUED | OUTPATIENT
Start: 2024-11-19 | End: 2024-12-10 | Stop reason: HOSPADM

## 2024-11-19 RX ORDER — ONDANSETRON 4 MG/1
4 TABLET, ORALLY DISINTEGRATING ORAL EVERY 8 HOURS PRN
Status: DISCONTINUED | OUTPATIENT
Start: 2024-11-19 | End: 2024-12-02

## 2024-11-19 RX ORDER — ASCORBIC ACID 500 MG
500 TABLET ORAL
Status: DISCONTINUED | OUTPATIENT
Start: 2024-11-21 | End: 2024-12-10 | Stop reason: HOSPADM

## 2024-11-19 RX ORDER — SENNOSIDES 8.6 MG/1
2 TABLET ORAL DAILY
Status: DISCONTINUED | OUTPATIENT
Start: 2024-11-20 | End: 2024-12-02

## 2024-11-19 RX ORDER — GABAPENTIN 300 MG/1
300 CAPSULE ORAL 2 TIMES DAILY
Status: DISCONTINUED | OUTPATIENT
Start: 2024-11-19 | End: 2024-11-19

## 2024-11-19 RX ORDER — FLUTICASONE PROPIONATE 50 MCG
1 SPRAY, SUSPENSION (ML) NASAL DAILY PRN
Status: DISCONTINUED | OUTPATIENT
Start: 2024-11-19 | End: 2024-12-02

## 2024-11-19 RX ORDER — HYDROMORPHONE HYDROCHLORIDE 2 MG/1
4 TABLET ORAL EVERY 6 HOURS PRN
Status: DISCONTINUED | OUTPATIENT
Start: 2024-11-19 | End: 2024-12-10 | Stop reason: HOSPADM

## 2024-11-19 RX ORDER — BISACODYL 10 MG/1
10 SUPPOSITORY RECTAL DAILY PRN
Status: DISCONTINUED | OUTPATIENT
Start: 2024-11-19 | End: 2024-12-02

## 2024-11-19 RX ORDER — POTASSIUM CHLORIDE 20 MEQ/1
20 TABLET, EXTENDED RELEASE ORAL DAILY
Status: DISCONTINUED | OUTPATIENT
Start: 2024-11-20 | End: 2024-12-10 | Stop reason: HOSPADM

## 2024-11-19 RX ORDER — TAMSULOSIN HYDROCHLORIDE 0.4 MG/1
0.4 CAPSULE ORAL
Status: DISCONTINUED | OUTPATIENT
Start: 2024-11-19 | End: 2024-12-10 | Stop reason: HOSPADM

## 2024-11-19 RX ORDER — VANCOMYCIN HYDROCHLORIDE 125 MG/1
125 CAPSULE ORAL 2 TIMES DAILY
Status: DISCONTINUED | OUTPATIENT
Start: 2024-11-19 | End: 2024-11-25

## 2024-11-19 RX ORDER — DOCUSATE SODIUM 100 MG/1
100 CAPSULE, LIQUID FILLED ORAL 2 TIMES DAILY
Status: DISCONTINUED | OUTPATIENT
Start: 2024-11-19 | End: 2024-12-09

## 2024-11-19 RX ORDER — FUROSEMIDE 40 MG/1
40 TABLET ORAL
Status: DISCONTINUED | OUTPATIENT
Start: 2024-11-19 | End: 2024-12-10 | Stop reason: HOSPADM

## 2024-11-19 RX ORDER — LEVOTHYROXINE SODIUM 25 UG/1
25 TABLET ORAL
Status: DISCONTINUED | OUTPATIENT
Start: 2024-11-20 | End: 2024-12-10 | Stop reason: HOSPADM

## 2024-11-19 RX ORDER — IBUPROFEN/PSEUDOEPHEDRINE HCL 200MG-30MG
3 TABLET ORAL NIGHTLY
Status: DISCONTINUED | OUTPATIENT
Start: 2024-11-19 | End: 2024-12-10 | Stop reason: HOSPADM

## 2024-11-19 RX ORDER — ATORVASTATIN CALCIUM 40 MG/1
40 TABLET, FILM COATED ORAL
Status: DISCONTINUED | OUTPATIENT
Start: 2024-11-19 | End: 2024-12-10 | Stop reason: HOSPADM

## 2024-11-19 RX ORDER — ALUMINUM HYDROXIDE, MAGNESIUM HYDROXIDE, AND SIMETHICONE 1200; 120; 1200 MG/30ML; MG/30ML; MG/30ML
30 SUSPENSION ORAL EVERY 4 HOURS PRN
Status: DISCONTINUED | OUTPATIENT
Start: 2024-11-19 | End: 2024-12-02

## 2024-11-19 RX ORDER — GABAPENTIN 300 MG/1
600 CAPSULE ORAL 3 TIMES DAILY
Status: DISCONTINUED | OUTPATIENT
Start: 2024-11-19 | End: 2024-12-10 | Stop reason: HOSPADM

## 2024-11-19 RX ORDER — ACETAMINOPHEN 325 MG/1
650 TABLET ORAL EVERY 4 HOURS PRN
Status: DISCONTINUED | OUTPATIENT
Start: 2024-11-19 | End: 2024-12-02

## 2024-11-19 RX ORDER — DULOXETIN HYDROCHLORIDE 30 MG/1
60 CAPSULE, DELAYED RELEASE ORAL NIGHTLY
Status: DISCONTINUED | OUTPATIENT
Start: 2024-11-19 | End: 2024-12-10 | Stop reason: HOSPADM

## 2024-11-19 RX ORDER — METHENAMINE HIPPURATE 1000 MG/1
1 TABLET ORAL 2 TIMES DAILY
Status: DISCONTINUED | OUTPATIENT
Start: 2024-11-19 | End: 2024-11-19

## 2024-11-19 RX ORDER — FERROUS SULFATE 325(65) MG
325 TABLET ORAL
Status: DISCONTINUED | OUTPATIENT
Start: 2024-11-21 | End: 2024-12-10 | Stop reason: HOSPADM

## 2024-11-19 RX ADMIN — APIXABAN 5 MG: 5 TABLET, FILM COATED ORAL at 21:26

## 2024-11-19 RX ADMIN — Medication 3 MG: at 21:25

## 2024-11-19 RX ADMIN — FUROSEMIDE 40 MG: 40 TABLET ORAL at 17:19

## 2024-11-19 RX ADMIN — VANCOMYCIN HYDROCHLORIDE 125 MG: 125 CAPSULE ORAL at 21:26

## 2024-11-19 RX ADMIN — INSULIN GLARGINE 8 UNITS: 100 INJECTION, SOLUTION SUBCUTANEOUS at 21:28

## 2024-11-19 RX ADMIN — METHENAMINE HIPPURATE 1 G: 1000 TABLET ORAL at 21:25

## 2024-11-19 RX ADMIN — INSULIN LISPRO 1 UNITS: 100 INJECTION, SOLUTION INTRAVENOUS; SUBCUTANEOUS at 17:19

## 2024-11-19 RX ADMIN — TAMSULOSIN HYDROCHLORIDE 0.4 MG: 0.4 CAPSULE ORAL at 17:19

## 2024-11-19 RX ADMIN — GABAPENTIN 600 MG: 300 CAPSULE ORAL at 17:20

## 2024-11-19 RX ADMIN — ATORVASTATIN CALCIUM 40 MG: 40 TABLET, FILM COATED ORAL at 17:23

## 2024-11-19 RX ADMIN — GABAPENTIN 600 MG: 300 CAPSULE ORAL at 21:26

## 2024-11-19 RX ADMIN — HYDROMORPHONE HYDROCHLORIDE 4 MG: 2 TABLET ORAL at 22:49

## 2024-11-19 RX ADMIN — VANCOMYCIN HYDROCHLORIDE 125 MG: 125 CAPSULE ORAL at 14:44

## 2024-11-19 RX ADMIN — DULOXETINE HYDROCHLORIDE 60 MG: 30 CAPSULE, DELAYED RELEASE ORAL at 21:26

## 2024-11-19 ASSESSMENT — PATIENT HEALTH QUESTIONNAIRE - PHQ9: SUM OF ALL RESPONSES TO PHQ9 QUESTIONS 1 & 2: 0

## 2024-11-19 ASSESSMENT — BALANCE ASSESSMENTS: TOTAL SCORE: 3

## 2024-11-19 ASSESSMENT — COGNITIVE AND FUNCTIONAL STATUS - GENERAL: DO YOU HAVE SERIOUS DIFFICULTY WALKING OR CLIMBING STAIRS: YES

## 2024-11-19 NOTE — PLAN OF CARE
Care Coordination Admission Assessment Note    General Information:  Readmission Within the last 30 days: unable to assess  Does patient have a : No  Patient-Specific Goals (include timeframe): prosthetic trng    Living Arrangements:  Arrived From: home  Current Living Arrangements: condominium  People in Home: alone  Home Accessibility: stairs to enter home (Group)  Living Arrangement Comments: [address], 1 floor  will go to her sisters in [place] [address], 1 floor    Housing Stability and Utility Access (SDOH):  In the last 12 months, was there a time when you were not able to pay the mortgage or rent on time?: No  In the past 12 months, how many times have you moved?:    At any time in the past 12 months, were you homeless or living in a shelter (including now)?: No  In the past 12 months has the electric, gas, oil, or water company threatened to shut off services in your home?: No    Functional Status Prior to Admission:   Assistive Device/Animal Currently Used at Home:    Functional Status Comments: pt has been in the hospital and snf's for most of 2024  IADL Comments: needed assist  more recently     Supports and Services:  Current Outpatient/Agency/Support Group: none  Type of Current Home Care Services:    History of home care episode or rehab stay: used [facility] and [facility] prior would use [facility] now if need be    Discharge Needs Assessment:   Concerns to be Addressed: discharge planning, caregiver training, care coordination/care conferences  Current Discharge Risk: physical impairment  Anticipated Changes Related to Illness: none    Patient/Family Anticipated Discharge Plan:  Patient/Family Anticipates Transition To: family members' home  Patient/Family Anticipated Services at Transition: home health care    Connection to Community  Not applicable    Patient Choice:   Offered/Gave Vendor List: yes  Patient's Choice of Community Agency(s): [facility]  Patient and/or patient guardian/advocate was made  aware of their right to choose a provider. A list of eligible providers was presented and reviewed with the patient and/or patient guardian/advocate in written and/or verbal form. The list delineates providers in the patient’s desired geographic area who are participating in the Medicare program and/or providers contracted with the patient’s primary insurance. The Medicare list and quality ratings were obtained from the Medicare.gov [medicare.gov] website.    Anticipated Discharge Plan:  Met with patient. Provided education and contact information for Care Coordination services.: yes  Anticipated Discharge Disposition: family member's home with services  Type of Home Care Services: home PT, home OT  Type of Outpatient Services: none    Transportation Needs (SDOH):  Transportation Concerns: none  Transportation Anticipated: family or friend will provide  Is Out of Hospital DNR needed at discharge?: no    In the past 12 months, has lack of transportation kept you from medical appointments or from getting medications?: No  In the past 12 months, has lack of transportation kept you from meetings, work, or from getting things needed for daily living?: No    Concerns - comments:  pt's brother bedside for admit, education and cm contact info provided

## 2024-11-19 NOTE — H&P
Patient Name: Bebe Burks  MRN: [id number]  : 1960  Admission Date: 2024    Chief Complaint:    Dysfunction in ambulation, transfers and self-care status post left transfemoral amputation for prosthetic training, severe peripheral vascular disease, chronic myelocytic leukemia, osteomyelitis left foot, diabetic neuropathy, multiple medical problems. (Patient was admitted to [place] rehabilitation on 24. Records reviewed on 24. Patient evaluated on 24 at about 6:15 PM. Full H&P done on 24.  Plan of care discussed with patient.  Also briefly met her 2 sisters were in to visit her today.  Discussed with nurse.)    History of Present Illness:    Ms. 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 Pura Chaves. ID recommended IV Ceftriaxone and IV Ampicillin until 24.  She had urinary retention managed with indwelling Rowan catheter.  She had right upper extremity swelling present during this admission, ultrasound on 24 demonstrated an acute non-occlusive DVT of the right axillary and brachial vein around line (on Lovenox).  Subsequently she completed an acute inpatient rehabilitation stay for pre-prosthetic training at [facility] between 9/3/24 to 24 and was discharged to [facility] on 24.  She initially required bladder self  "intermittent catheterizations due to urinary retention, but indicates that now she is able to void.  She desired to be a prosthetic ambulator.  A left transfemoral amputation prosthesis was fabricated. and followed-up as outpatient in the amputee clinic at [facility] and she was deemed to be an appropriate candidate for acute inpatient rehabilitation stay. She has been needing assistance for mobility, transfers, self-care.  I have reviewed the preadmission screening and concur with that information and there are no significant changes from patient's preadmission screening. She is transferred to [place] rehabilitation on 11/19/24 from [facility] for further acute inpatient rehabilitation stay for prosthetic training with transfemoral amputation prosthesis.      Past Medical History:    Past Medical History:   Diagnosis Date    Abnormal ECG     Abnormal finding on chest xray 12/2021    Arthritis     Colon cancer (CMS/HCC)     COVID-19 12/2021    COVID-19 vaccine series completed     Moderna: \" 3 doses\"    Diabetic neuropathy (CMS/HCC)     DM (diabetes mellitus), type 2 with ophthalmic complications (CMS/HCC)     Hypertension     Hypothyroidism     Left foot drop     Lipid disorder     Type 2 diabetes mellitus treated with insulin (CMS/HCC)        Past Surgical History:    Past Surgical History   Procedure Laterality Date    Abcess drainage  10/2023    AMPUTATION TOE (RAY RESECTION) Left 1/18/2024    Performed by Getachew Damian DPM at [facility] OR PAV    Angiogram lower extremity bilateral N/A 1/17/2024    Performed by Adam Aguilar MD at [facility] CATH/EP/NEURO INT    Aortogram abdominal with serialography N/A 1/17/2024    Performed by Adam Aguilar MD at [facility] CATH/EP/NEURO INT    Cataract extraction Bilateral     CATARACT PHACO, IOL Right 11/6/2018    Performed by Derrick Jeter MD at [facility]    Colonoscopy      Combined hysteroscopy diagnostic / d&c  2007    Eye surgery Left 2017    " cataract    Joint replacement Right 02/2023    knee replacement    Right Total Knee Repalcement Right 2/27/2023    Performed by Gideon Centeno MD at [facility]    Tib/peroneal angioplasty & atherectomy - unilateral - initial N/A 1/17/2024    Performed by Adam Aguilar MD at [facility] CATH/EP/NEURO INT    Toe amputation      Vitrectomy Left 2016       Medications:    Current Facility-Administered Medications   Medication Dose Route Frequency    acetaminophen  650 mg oral q4h PRN    alum-mag hydroxide-simeth  30 mL oral q4h PRN    apixaban  5 mg oral BID    [START ON 11/21/2024] ascorbic acid  500 mg oral q48h INT    atorvastatin  40 mg oral Daily (6p)    bisacodyL  10 mg rectal Daily PRN    docusate sodium  100 mg oral BID    DULoxetine  60 mg oral Nightly    [START ON 11/20/2024] empagliflozin  10 mg oral Daily    [START ON 11/21/2024] ferrous sulfate  325 mg oral q48h INT    influenza vaccine 2024-25  0.5 mL intramuscular During hospitalization    fluticasone propionate  1 spray Each Nostril Daily PRN    furosemide  40 mg oral BID (am, 4p)    gabapentin  600 mg oral TID    HYDROmorphone  4 mg oral q6h PRN    [START ON 11/20/2024] imatinib  400 mg oral Daily    insulin glargine U-100  8 Units subcutaneous Nightly    insulin lispro U-100  1-12 Units subcutaneous TID with meals    [START ON 11/20/2024] levothyroxine  25 mcg oral Daily (6:30a)    melatonin  3 mg oral Nightly    methenamine  1 g oral BID    [START ON 11/20/2024] multivitamin  1 tablet oral Daily    ondansetron ODT  4 mg oral q8h PRN    [START ON 11/20/2024] potassium  20 mEq oral Daily    [START ON 11/20/2024] senna  2 tablet oral Daily    tamsulosin  0.4 mg oral Daily with dinner    vancomycin  125 mg oral BID       Allergies:    No Known Allergies    Family History:    Family History   Problem Relation Name Age of Onset    Heart disease Biological Mother      Diabetes Biological Mother      Hypertension Biological Mother      Stroke Biological Father          Social History:    The patient lives in a first floor condo in [place], but mentions that at discharge from [place] rehab she will be staying with her sister at her home with a first-floor set up and she will have one step to enter.  She is retired from working at [employer]. She denies smoking, or drinking alcohol and denies using recreational drugs.    Prior Living Arrangement:   People in Home: alone  Current Living Arrangements: condominium  Home Accessibility: stairs to enter home (Group)  Living Environment Comment: resides in 1st floor [facility], 4 (1+2+1) JOSE RAFAEL, R hand rail, no stairs inside home, Bathroom Layout: Tub/shower unit with shower curtain, Shower stall with glass doors (shower stall in main bedroom), two sisters can provide some assistance, may get HHA assistance  Number of Stairs, Main Entrance: four  Surface of Stairs, Main Entrance: concrete  Stair Railings, Main Entrance: railing on right side (ascending)  Stairs Comment, Main Entrance: 1+2+1 JOSE RAFAEL      Premorbid Function:   Dominant Hand: right  Ambulation: assistive equipment  Transferring: assistive equipment  Toileting: independent  Bathing: assistive equipment  Dressing: independent  Eating: independent  IADLs: assistive equipment  Driving/Transportation:   Communication: understands/communicates without difficulty  Assistive Device/Animal Currently Used at Home: cane, straight; walker, front-wheeled; shower chair; grab bar; raised toilet      Functional History:    The patient is a right-hand dominant person.  She reports she has been in hospitals and nursing homes and rehab facilities for most of the year 2024.  She does wheelchair mobility at present.  She is looking forward to begin prosthetic training.    Review Of Systems:    A complete and comprehensive review of systems was performed. The patient denies any chest pain, shortness of breath.  She reports she has diabetic retinopathy and decreased vision.  She indicates  that her last bowel movement was today.  She indicates is able to void. I mentioned to her we will monitor post void residuals.  She does have a sacral decubitus as well as wound on the right foot. Otherwise, a complete and comprehensive review of systems is negative.    Physical Examination:    Vitals:    11/19/24 1215   BP: (!) 126/59   Pulse: 99   Resp: 18   Temp: 36.7 °C (98.1 °F)   SpO2: 96%     Body mass index is 26.78 kg/m².    General Appearance: Not in acute distress  Head/Ear/Nose/Throat: Normocephalic; Atraumatic.   Eye: EOMI; PERRL.   Neck: No JVD; No Bruits.   Respiratory: Decreased breath sounds at bases.   Cardiovascular: RRR; Normal S1, S2.   Gastrointestinal: Soft; NT; +BS.   Extremities: Bilateral lower extremity edema noted.  Left transfemoral amputation stump is stable.  Musculoskeletal: Functional active range of motion in both upper and right lower extremities.  Functional active range of motion in left hip.  She has a left transfemoral amputation.  Neurological: AAO ×3. Speech is fluent. Cranial nerve examination does not reveal any gross facial asymmetry. Strength testing shows about 4+/5 strength in both upper and right lower extremities.  Left hip flexion is 4/5.  She has a left transfemoral amputation.  She is grossly able to localize touch sensation.  She is able to localize position sense in right foot great toe position sense is unable to be tested on left side.  Deep tendon reflexes are hypoactive bilaterally.  Right plantar is flexor, left plantar was unable to be tested. Coordination is functional upper extremities.    Behavior/Emotional: Appropriate; Cooperative.   Skin: Left transfemoral amputation stump stable.  Incision appears to be well-healed.  Some wounds are noted on right anterior lower forearm.  Small wound noted on right foot fifth toe.  Small wound also noted on right foot great toe with some slough in it and scaly skin around it.  Some redness noted on sacrum/coccyx.   Reviewed pictures of wounds in Epic.    Assessment and Plan:    ASSESSMENT PLAN:  1. 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 Pura Chaves. ID recommended IV Ceftriaxone and IV Ampicillin until 9/14/24.  She had urinary retention managed with indwelling Rowan catheter.  She had right upper extremity swelling present during this admission, ultrasound on 8/29/24 demonstrated an acute non-occlusive DVT of the right axillary and brachial vein around line (on Lovenox).  Subsequently she completed an acute inpatient rehabilitation stay for pre-prosthetic training at [facility] between 9/3/24 to 9/21/24 and was discharged to [facility] on 9/21/24.  She initially required bladder self intermittent catheterizations due to urinary retention, but indicates that now she is able to void.  She desired to be a prosthetic ambulator.  A left transfemoral amputation prosthesis was fabricated. and followed-up as outpatient in the amputee clinic at [place] rehab and she was deemed to be an appropriate candidate for acute inpatient rehabilitation stay. She has been needing assistance for mobility, transfers, self-care.  I have reviewed the preadmission screening and concur with that information and there are no significant changes from patient's preadmission screening. She is transferred to [place] rehabilitation on  11/19/24 from [facility] for further acute inpatient rehabilitation stay for prosthetic training with transfemoral amputation prosthesis.    2. DVT prophylaxis/treatment - on Eliquis.    3. Vascular - status post left transfemoral amputation for prosthetic training, severe peripheral vascular disease, chronic myelocytic leukemia, osteomyelitis left foot, diabetic neuropathy, multiple medical problems - continue PT, OT, psychology.  Follow falls precautions, cardiac precautions, monitor pulse oximeter in therapy.  Monitor skin under prosthesis.  Teach donning and doffing of left transfemoral amputation prosthesis.  Do gait training with prosthesis.    4. GI - On Colace, Senokot.  On PRN Maalox.  On PRN Dulcolax suppository.  On PRN Zofran.    5.  -on Hiprex.  On Flomax.  Monitor postvoid residual.    6. CVS - on Lasix.  Monitor for orthostasis.    7. Pulmonary -encourage incentive spirometry.    8. Hematology - monitor hemoglobin, platelets.      9. Pain -on Cymbalta.  On Neurontin.  On PRN Tylenol.  On PRN Dilaudid.    10. Skin - Left transfemoral amputation stump stable.  Incision appears to be well-healed.  Some wounds are noted on right anterior lower forearm.  Small wound noted on right foot fifth toe.  Small wound also noted on right foot great toe with some slough in it and scaly skin around it.  Some redness noted on sacrum/coccyx.  Reviewed pictures of wounds in Epic. Monitor skin under the left transfemoral amputation prosthesis.    11. F/E/N - on Ferrous sulfate.  On vitamin C. on MVI.  On K-Genesis con.    12. Hyperlipidemia -on Lipitor.    13. Diabetes mellitus type 2 - on Lantus insulin.  On sliding scale Humalog coverage.  On Jardiance.    14. Oncology - On Gleevec for chronic myelocytic leukemia.    15. Hypothyroidism - on Synthroid.    16. Insomnia - on Melatonin.    17. Infectious diseases - On oral Vancomycin.      18. Seasonal allergies - on PRN Flonase.    19. Psychiatry - mood stable.   Psychology consulted.    20. Rehabilitation medicine - Continue comprehensive rehabilitation care. Continue PT, OT, speech, psychology.  We will follow falls precautions, cardiac precautions, monitor pulse oximetry in therapy and follow weightbearing precautions.  We will follow spinal precautions as appropriate.    21. Reviewed labs today.    22. Consultations - Dr. Brooke will be consulted from internal medicine standpoint.  Nutrition will be consulted.  Will consult psychology.  She was recommend outpatient follow-up with her primary care physician, endocrinologist, cardiologist, vascular specialist, and other appropriate physicians.    Estimated Length Of Stay:    Will be about 16 days, and will be adjusted in team meetings depending upon functional status and progress in therapy.     Goals Of This Stay:    Goals of this stay would be to increase her strength; improve her endurance; teacher donning and doffing of left transfemoral amputation prosthesis, do gait training with prosthesis, maximize her independence in transfers, ambulation, self care, with left transfemoral amputation prosthesis keeping her weight-bearing as tolerated on both lower extremities using left transfemoral amputation prosthesis ; evaluate the need for assistive devices and adaptive equipment; do patient and family education; do caregiver training as appropriate; monitor her medically, monitor skin under prosthesis and try to control her pain.     Post Admission Physician Evaluation:    Bebe Burks is admitted to [facility] for comprehensive inpatient rehabilitation for amputation status post left transfemoral amputation for prosthetic training, severe peripheral vascular disease, chronic myelocytic leukemia, osteomyelitis left foot, diabetic neuropathy, multiple medical problems with functional deficits in balance; endurance/activity tolerance; range of motion (ROM); mobility; safety awareness; motor dysfunction;  self-care; strength; pain; coordination. Patient is receiving the following services: medical consultative services; occupational therapy; physical therapy; prosthetic services; psychiatry/psychology services, rehabilitation nursing care, case management evaluation.    Active medical management is required for:  Patient Active Problem List   Diagnosis    Controlled type 2 diabetes mellitus, with long-term current use of insulin (CMS/[facility])    Essential hypertension    Hyperlipidemia    Hypothyroidism    Obesity    Thyroid nodule    COVID-19 virus infection    Lung nodule    Displacement of lumbar intervertebral disc without myelopathy    Disability of walking    Polyneuropathy due to type 2 diabetes mellitus (CMS/[facility])    Pre-op examination    Localized osteoarthritis of right knee    Type 2 diabetes mellitus treated with insulin (CMS/[facility])    Abnormal finding on EKG    Left foot drop    Abnormal finding on chest xray    Osteoarthritis of right knee, unspecified osteoarthritis type    S/P total knee arthroplasty, right    Sepsis (CMS/[facility])    Osteomyelitis of left foot (CMS/[facility])    Peripheral neuropathy    Abdominal pain    Chronic idiopathic constipation    Gangrene (CMS/[facility])    Tibial artery stenosis, left (CMS/[facility])    H/O pilonidal cyst    Type 2 diabetes mellitus with left diabetic foot infection (CMS/[facility])    Type 2 diabetes mellitus with diabetic neuropathy (CMS/[facility])    Hypothyroidism    Primary hypertension    Leukemoid reaction    HLD (hyperlipidemia)    Acute osteomyelitis of left foot (CMS/[facility])    History of above knee amputation, left (CMS/[facility])       Premorbid Function  Dominant Hand: right  Ambulation: assistive equipment  Transferring: assistive equipment  Toileting: independent  Bathing: assistive equipment  Dressing: independent  Eating: independent  IADLs: assistive equipment  Driving/Transportation:   Communication: understands/communicates without difficulty  Assistive Device/Animal  Currently Used at Home: cane, straight; walker, front-wheeled; shower chair; grab bar; raised toilet      Current Function  Gait  Mower, Gait: maximum assist (25-49% patient effort)  Assistive Device: walker, rolling platform; other (see comments); left (prosthesis)  Comment: Pt demonstrates gait deviations typical of a novice prosthetic use including decreased prosthetic confidence as demonstrated by decreased weight shift and stance time on prosthesis, dec gait speed, uneven timing, and reliance of UEs on RW. Significant scissoring and LOB requiring assist into wheelchair.    Stairs     Wheelchair     Transfers  Bed Mobility  Bed Mobility Activities: sit to supine; supine to sit    Sit to Stand Transfer  Mower, Sit to Stand Transfer: maximum assist (25-49% patient effort)  Safety/Cues: increased time to complete; verbal cues; hand placement      Shower Transfer  Mower: not tested      Self Care  Bathing  Mower: supervision; set up  Comment: w/o prosthesis    Upper Body Dressing  Mower: set up; supervision    Lower Body Dressing  Tasks: doff; don; other (see comments) (liner prosthesis)  Mower: maximum assist (25-49% patient effort)  Safety/Cues: sequencing; verbal cues; problem-solving; initiation; tactile cues; proper use of assistive device; use of adaptive equipment    Grooming  Mower: set up; supervision    Toileting  No data recorded  Self-Feeding  No data recorded    Cognition  Cognition  Orientation Status: oriented x 4  Cognitive Function: memory deficit  Memory Deficit: short-term memory  Comment, Short Term Memory: forgetful  Comment, Cognition: Pleasant and cooperative. Pt following commands 100% of the time.      Communication     Swallow       Risk for Complications  Bebe is at risk for the following complications:     Acute change in mental status due to multiple medications, prolonged hospitalization, and known infections   Cardiac event due to  history of cardiac conditions   Constipation due to opioid medication and impaired mobility   Dehydration/malnutrition due to impaired fluid intake and diuretic use   DVT/PE due to impaired mobility, history of DVT/PE/hypercoagulation/clotting disorder, and cancer diagnosis   Falls due to impaired balance       Sepsis due to treatment with antibiotics in the past 90 days   Skin breakdown/pressure ulcers due to impaired mobility and impaired sensation   Uncontrolled pain due to opioid medication   Urinary tract infection due to other (urinary retention)   Wound infection due to surgical incision and pressure injury         Expected Level of Function  Self-Care: Setup or clean-up assistance  Transfers: Independent  Locomotion: Independent  Communication: Independent  Social Cognition: Independent      Anticipated Discharge Plan  family member's home with services    I have reviewed the pre-admission screening and there are no relevant changes.    Expected length of stay: 16 days        Alexei Petty MD  11/19/2024        This encounter was completed utilizing the Direct Speech Voice Recognition Software. Grammatical errors, random word insertions, pronoun errors, and incomplete sentences are occasional consequences of the system due to software limitations, ambient noises, and hardware issues. Such errors may be missed prior to affixing electronic signature. Any questions or concerns about the content, text, or information contained within the body of this dictation should be directly addressed to the physician for clarification. If you have any questions or concerns please do not hesitate to call me directly via EPIC chat, page, or email.

## 2024-11-19 NOTE — PLAN OF CARE
Plan of Care Review  Plan of Care Reviewed With: patient  Progress: no change  Outcome Evaluation: Oriented to new care setting.

## 2024-11-19 NOTE — CONSULTS
Consult Note    Reason For Referral: Medical comanagements  Referring Provider: Alexei Burton MD   Outside records reviewed.    CC:L AKA, ambulatory dysfunction    64 y.o. 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 S/p IV Ceftriaxone and IV Ampicillin until 9/14/24. H/o non-occlusive DVT of the R axillary and brachial vein around midline. She presented to [facility] on 11/14 for evaluation to begin prosthetic training. Now she was admitted for inpt prosthetic training on 11/19/24.  Denies nausea, vomiting, abdominal pain or discomfort, dysuria, cough/sputum, running nose, sore throat, chest pain, palpitation, SOB or orthopnea, dizziness or LH,  HA.    Tolerating Diet:regular thin liquid diet       Allergies:    No Known Allergies    Current medication list:  Current Facility-Administered Medications   Medication Dose Route Frequency Provider Last Rate Last Admin    acetaminophen (TYLENOL) tablet 650 mg  650 mg oral q4h PRN Charlette Brooke MD        alum-mag hydroxide-simeth (MAALOX) 200-200-20 mg/5 mL suspension 30 mL  30 mL oral q4h PRN Charlette Brooke MD        apixaban (ELIQUIS) tablet 5 mg  5 mg oral BID Charlette Brooke MD        [START ON 11/21/2024] ascorbic acid (VITAMIN C) tablet 500 mg  500 mg oral q48h INT Charlette Brooke MD        atorvastatin (LIPITOR) tablet 40 mg  40 mg oral Daily (6p) Charlette Brooke MD        bisacodyL (DULCOLAX) 10 mg suppository 10 mg  10 mg rectal Daily PRN Charlette Brooke MD        docusate sodium (COLACE) capsule 100 mg  100 mg oral BID Charlette Brooke MD        [START ON 11/20/2024] DULoxetine (CYMBALTA) capsule,delayed release(DR/EC) 60 mg  60 mg oral Nightly Charlette Brooke MD        [START ON 11/20/2024] empagliflozin  "(JARDIANCE) tablet 10 mg  10 mg oral Daily Charlette Brooke MD        [START ON 11/21/2024] ferrous sulfate tablet 325 mg  325 mg oral q48h INT Charlette Brooke MD        fluticasone propionate (FLONASE) 50 mcg/actuation nasal spray 1 spray  1 spray Each Nostril Daily PRN Charlette Brooke MD        furosemide (LASIX) tablet 40 mg  40 mg oral BID (am, 4p) Charlette Brooke MD        gabapentin (NEURONTIN) capsule 300 mg  300 mg oral BID Charlette Brooke MD        HYDROmorphone (DILAUDID) tablet 4 mg  4 mg oral q6h PRN Charlette Brooke MD        insulin glargine U-100 (LANTUS/BASAGLAR) pen 8 Units  8 Units subcutaneous Nightly Charlette Brooke MD        insulin lispro U-100 (HumaLOG) pen 1-12 Units  1-12 Units subcutaneous TID with meals Charlette Brooke MD        [START ON 11/20/2024] levothyroxine (SYNTHROID) tablet 25 mcg  25 mcg oral Daily (6:30a) Charlette Brooke MD        melatonin ODT 3 mg  3 mg oral Nightly Charlette Brooke MD        methenamine (HIPREX) tablet 1 g  1 g oral BID Charlette Brooke MD        [START ON 11/20/2024] multivitamin tablet 1 tablet  1 tablet oral Daily Charlette Brooke MD        ondansetron ODT (ZOFRAN-ODT) disintegrating tablet 4 mg  4 mg oral q8h PRN Charlette Brooke MD        [START ON 11/20/2024] potassium chloride (KLOR-CON M) ER tablet (particles/crystals) 20 mEq  20 mEq oral Daily Charlette Brooke MD        [START ON 11/20/2024] senna (SENOKOT) tablet 2 tablet  2 tablet oral Daily Charlette Brooke MD        tamsulosin (FLOMAX) 24 hr ER capsule 0.4 mg  0.4 mg oral Daily with dinner Charlette Brooke MD        vancomycin (VANCOCIN) capsule 125 mg  125 mg oral BID Charlette Brooke MD           Past Medical History:   Past Medical History:   Diagnosis Date    Abnormal ECG     Abnormal finding on chest xray 12/2021    Arthritis     Colon cancer (CMS/HCC)     COVID-19 12/2021    COVID-19 vaccine series completed     Moderna: \" 3 doses\"    Diabetic neuropathy (CMS/HCC)     DM (diabetes mellitus), type 2 with ophthalmic " complications (CMS/HCC)     Hypertension     Hypothyroidism     Left foot drop     Lipid disorder     Type 2 diabetes mellitus treated with insulin (CMS/HCC)        Past Surgical History:   Past Surgical History   Procedure Laterality Date    Abcess drainage  10/2023    AMPUTATION TOE (RAY RESECTION) Left 1/18/2024    Performed by Getachew Damian DPM at [facility] OR PAV    Angiogram lower extremity bilateral N/A 1/17/2024    Performed by Adam Aguilar MD at [facility] CATH/EP/NEURO INT    Aortogram abdominal with serialography N/A 1/17/2024    Performed by Adam Aguilar MD at [facility] CATH/EP/NEURO INT    Cataract extraction Bilateral     CATARACT PHACO, IOL Right 11/6/2018    Performed by Derrick Jeter MD at [facility]    Colonoscopy      Combined hysteroscopy diagnostic / d&c  2007    Eye surgery Left 2017    cataract    Joint replacement Right 02/2023    knee replacement    Right Total Knee Repalcement Right 2/27/2023    Performed by Gideon Centeno MD at [facility]    Tib/peroneal angioplasty & atherectomy - unilateral - initial N/A 1/17/2024    Performed by Adam Aguilar MD at [facility] CATH/EP/NEURO INT    Toe amputation      Vitrectomy Left 2016       Social History:   Social History     Socioeconomic History    Marital status: Single     Spouse name: Not on file    Number of children: 0    Years of education: Not on file    Highest education level: Not on file   Occupational History    Occupation: Disabilty   Tobacco Use    Smoking status: Never     Passive exposure: Past    Smokeless tobacco: Never   Vaping Use    Vaping status: Never Used   Substance and Sexual Activity    Alcohol use: Not Currently     Comment: occasional in past, none current    Drug use: Never    Sexual activity: Not on file   Other Topics Concern    Not on file   Social History Narrative    Patient is single and has no children. She     Social Drivers of Health     Financial Resource Strain: Low Risk  (2/27/2023)     "Overall Financial Resource Strain (CARDIA)     Difficulty of Paying Living Expenses: Not hard at all   Food Insecurity: No Food Insecurity (2/29/2024)    Hunger Vital Sign     Worried About Running Out of Food in the Last Year: Never true     Ran Out of Food in the Last Year: Never true   Transportation Needs: No Transportation Needs (11/19/2024)    PRAPARE - Transportation     Lack of Transportation (Medical): No     Lack of Transportation (Non-Medical): No   Physical Activity: Not on file   Stress: Not on file   Social Connections: Not on file   Intimate Partner Violence: Not on file   Housing Stability: Unknown (11/19/2024)    Housing Stability Vital Sign     Unable to Pay for Housing in the Last Year: No     Number of Times Moved in the Last Year: Not on file     Homeless in the Last Year: No       Family History:   Family History   Problem Relation Name Age of Onset    Heart disease Biological Mother      Diabetes Biological Mother      Hypertension Biological Mother      Stroke Biological Father         ROS  Complete Review of Systems:  All other systems reviewed and not remarkable except as noted in the HPI.    Vital signs:  Visit Vitals  BP (!) 126/59 (BP Location: Left upper arm, Patient Position: Sitting)   Pulse 99   Temp 36.7 °C (98.1 °F) (Oral)   Resp 18   Ht 1.702 m (5' 7\")   SpO2 96%   BMI 26.78 kg/m²       Physical Examination:  Head/Ear/Nose/Throat: normocephalic; atraumatic; moisture mouth mm, no oropharyngeal thrush noted.   Eyes: anicteric sclera, EOMI; PERRL.   Neck : supple, no JVD, no carotid bruits appeciated.   Respiratory: no evidence of labored breathing, lung sounds CTA b/l, good aeration bibasilar area, no w/r/c.   Cardiovascular: RRR; normal S1, S2; no m/r/g; no S3 or S4.   Gastrointestinal: soft; NT; BS normal; mildly distended; no CVAT b/l.   Genitourinary: no lozada.   Extremities : S/p L AKA, residual limb healed well .   Neurological: AO x 3, fluent speeches, following commands, CNS " II-XII grossly intact; no focal neurologic deficits.   Behavior/Emotional: in NAD, appropriate; cooperative.   Skin: clean, dry and intact.    Labs:  Lab Results   Component Value Date    WBC 29.66 (H) 07/29/2024    HGB 7.9 (L) 07/29/2024    HCT 26.7 (L) 07/29/2024    MCV 74.8 (L) 07/29/2024     (H) 07/29/2024     Lab Results   Component Value Date    GLUCOSE 142 (H) 07/29/2024    CALCIUM 7.8 (L) 07/29/2024     07/29/2024    K 3.7 07/29/2024    CO2 27 07/29/2024    CL 99 07/29/2024    BUN 22 07/29/2024    CREATININE 0.5 (L) 07/29/2024       Imaging  OSH imaging study reports reviewed      Assessment   CC:L AKA, ambulatory dysfunction    64 y.o. 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 S/p IV Ceftriaxone and IV Ampicillin until 9/14/24. H/o non-occlusive DVT of the R axillary and brachial vein around midline. She presented to [facility] on 11/14 for evaluation to begin prosthetic training. Now she was admitted for inpt prosthetic training on 11/19/24.    A/P:  # A/p L AKA  -inpt prosthetic training, wound care/dermal defense, pain and neuralgia managements, fall precaution    # CML  -cont home imatinib, check CBC     # Type 2 DM, hypothyroidism  - diet control, SSI, plus her home diabetic regimen;  - cont home levothyroxine    #  PICC-associated DVT on 8/30   -S/p SQ Lovenox weight based ;  -cont Eliquis 5 mg po bid     # Urinary retention, h/o recurrent UTI  -timed voiding with cic, adequate oral hydration, monitor PVR with cic, on flomax;  -hiprex 1 g po bid       # Stage 2 decubitus ulcer, pressure injury in right heel   -wound care    # Recent C.diff.  -cont prophylactic oral vanco 125 mg po bid      Orders entered into CPOE  personally  Billing code: [id number]  Diagnoses:  Patient Active Problem List   Diagnosis    Controlled type 2 diabetes mellitus, with long-term current use of insulin (CMS/HCC)    Essential hypertension    Hyperlipidemia    Hypothyroidism    Obesity    Thyroid nodule    COVID-19 virus infection    Lung nodule    Displacement of lumbar intervertebral disc without myelopathy    Disability of walking    Polyneuropathy due to type 2 diabetes mellitus (CMS/HCC)    Pre-op examination    Localized osteoarthritis of right knee    Type 2 diabetes mellitus treated with insulin (CMS/HCC)    Abnormal finding on EKG    Left foot drop    Abnormal finding on chest xray    Osteoarthritis of right knee, unspecified osteoarthritis type    S/P total knee arthroplasty, right    Sepsis (CMS/HCC)    Osteomyelitis of left foot (CMS/HCC)    Peripheral neuropathy    Abdominal pain    Chronic idiopathic constipation    Gangrene (CMS/HCC)    Tibial artery stenosis, left (CMS/HCC)    H/O pilonidal cyst    Type 2 diabetes mellitus with left diabetic foot infection (CMS/HCC)    Type 2 diabetes mellitus with diabetic neuropathy (CMS/HCC)    Hypothyroidism    Primary hypertension    Leukemoid reaction    HLD (hyperlipidemia)    Acute osteomyelitis of left foot (CMS/HCC)    History of above knee amputation, left (CMS/HCC)           Charlette Brooke MD  11/19/2024

## 2024-11-20 ENCOUNTER — APPOINTMENT (INPATIENT)
Dept: PHYSICAL THERAPY | Facility: REHABILITATION | Age: 64
DRG: 560 | End: 2024-11-20
Payer: MEDICARE

## 2024-11-20 ENCOUNTER — APPOINTMENT (INPATIENT)
Dept: OCCUPATIONAL THERAPY | Facility: REHABILITATION | Age: 64
DRG: 560 | End: 2024-11-20
Payer: MEDICARE

## 2024-11-20 ENCOUNTER — APPOINTMENT (OUTPATIENT)
Dept: PSYCHOLOGY | Facility: CLINIC | Age: 64
End: 2024-11-20
Attending: PHYSICAL MEDICINE & REHABILITATION
Payer: MEDICARE

## 2024-11-20 ENCOUNTER — APPOINTMENT (INPATIENT)
Dept: CARDIOLOGY | Facility: REHABILITATION | Age: 64
DRG: 560 | End: 2024-11-20
Attending: PHYSICAL MEDICINE & REHABILITATION
Payer: MEDICARE

## 2024-11-20 LAB
ALBUMIN SERPL-MCNC: 3.7 G/DL (ref 3.5–5.7)
ALP SERPL-CCNC: 74 IU/L (ref 34–125)
ALT SERPL-CCNC: 20 IU/L (ref 7–52)
ANION GAP SERPL CALC-SCNC: 7 MEQ/L (ref 3–15)
AST SERPL-CCNC: 16 IU/L (ref 13–39)
BASOPHILS # BLD: 0.05 K/UL (ref 0.01–0.1)
BASOPHILS NFR BLD: 1 %
BILIRUB SERPL-MCNC: 0.7 MG/DL (ref 0.3–1.2)
BSA FOR ECHO PROCEDURE: 1.91 M2
BUN SERPL-MCNC: 35 MG/DL (ref 7–25)
CALCIUM SERPL-MCNC: 8.7 MG/DL (ref 8.6–10.3)
CHLORIDE SERPL-SCNC: 102 MEQ/L (ref 98–107)
CO2 SERPL-SCNC: 30 MEQ/L (ref 21–31)
CREAT SERPL-MCNC: 0.9 MG/DL (ref 0.6–1.2)
DIFFERENTIAL METHOD BLD: ABNORMAL
EGFRCR SERPLBLD CKD-EPI 2021: >60 ML/MIN/1.73M*2
EOSINOPHIL # BLD: 0.44 K/UL (ref 0.04–0.36)
EOSINOPHIL NFR BLD: 8.8 %
ERYTHROCYTE [DISTWIDTH] IN BLOOD BY AUTOMATED COUNT: 13.5 % (ref 11.7–14.4)
GLUCOSE BLD-MCNC: 212 MG/DL (ref 70–99)
GLUCOSE BLD-MCNC: 244 MG/DL (ref 70–99)
GLUCOSE BLD-MCNC: 320 MG/DL (ref 70–99)
GLUCOSE SERPL-MCNC: 205 MG/DL (ref 70–99)
HCT VFR BLD AUTO: 32.7 % (ref 35–45)
HGB BLD-MCNC: 10.8 G/DL (ref 11.8–15.7)
IMM GRANULOCYTES # BLD AUTO: 0.01 K/UL (ref 0–0.08)
IMM GRANULOCYTES NFR BLD AUTO: 0.2 %
LYMPHOCYTES # BLD: 1.09 K/UL (ref 1.2–3.5)
LYMPHOCYTES NFR BLD: 21.9 %
MAGNESIUM SERPL-MCNC: 2.1 MG/DL (ref 1.8–2.5)
MCH RBC QN AUTO: 30 PG (ref 28–33.2)
MCHC RBC AUTO-ENTMCNC: 33 G/DL (ref 32.2–35.5)
MCV RBC AUTO: 90.8 FL (ref 83–98)
MONOCYTES # BLD: 0.31 K/UL (ref 0.28–0.8)
MONOCYTES NFR BLD: 6.2 %
NEUTROPHILS # BLD: 3.08 K/UL (ref 1.7–7)
NEUTS SEG NFR BLD: 61.9 %
NRBC BLD-RTO: 0 %
PHOSPHATE SERPL-MCNC: 3.7 MG/DL (ref 2.4–4.7)
PLATELET # BLD AUTO: 168 K/UL (ref 150–369)
PMV BLD AUTO: 10.6 FL (ref 9.4–12.3)
POCT TEST: ABNORMAL
POTASSIUM SERPL-SCNC: 3.7 MEQ/L (ref 3.5–5.1)
PREALB SERPL-MCNC: 26.7 MG/DL (ref 17–34)
PROT SERPL-MCNC: 6.1 G/DL (ref 6–8.2)
RBC # BLD AUTO: 3.6 M/UL (ref 3.93–5.22)
SODIUM SERPL-SCNC: 139 MEQ/L (ref 136–145)
WBC # BLD AUTO: 4.98 K/UL (ref 3.8–10.5)

## 2024-11-20 PROCEDURE — 97167 OT EVAL HIGH COMPLEX 60 MIN: CPT | Mod: GO

## 2024-11-20 PROCEDURE — 63700000 HC SELF-ADMINISTRABLE DRUG: Performed by: INTERNAL MEDICINE

## 2024-11-20 PROCEDURE — 97116 GAIT TRAINING THERAPY: CPT | Mod: GP

## 2024-11-20 PROCEDURE — 83735 ASSAY OF MAGNESIUM: CPT | Performed by: INTERNAL MEDICINE

## 2024-11-20 PROCEDURE — 85025 COMPLETE CBC W/AUTO DIFF WBC: CPT | Performed by: INTERNAL MEDICINE

## 2024-11-20 PROCEDURE — 84134 ASSAY OF PREALBUMIN: CPT | Performed by: INTERNAL MEDICINE

## 2024-11-20 PROCEDURE — 90791 PSYCH DIAGNOSTIC EVALUATION: CPT | Performed by: PSYCHOLOGIST

## 2024-11-20 PROCEDURE — 36415 COLL VENOUS BLD VENIPUNCTURE: CPT | Performed by: INTERNAL MEDICINE

## 2024-11-20 PROCEDURE — 93970 EXTREMITY STUDY: CPT | Mod: 50

## 2024-11-20 PROCEDURE — 84100 ASSAY OF PHOSPHORUS: CPT | Performed by: INTERNAL MEDICINE

## 2024-11-20 PROCEDURE — 12800000 HC ROOM AND CARE SEMIPRIVATE REHAB

## 2024-11-20 PROCEDURE — 97112 NEUROMUSCULAR REEDUCATION: CPT | Mod: GP

## 2024-11-20 PROCEDURE — 80053 COMPREHEN METABOLIC PANEL: CPT | Performed by: INTERNAL MEDICINE

## 2024-11-20 PROCEDURE — 97530 THERAPEUTIC ACTIVITIES: CPT | Mod: GP

## 2024-11-20 RX ORDER — INSULIN GLARGINE 100 [IU]/ML
12 INJECTION, SOLUTION SUBCUTANEOUS NIGHTLY
Status: DISCONTINUED | OUTPATIENT
Start: 2024-11-20 | End: 2024-11-24

## 2024-11-20 RX ORDER — INSULIN LISPRO 100 [IU]/ML
8 INJECTION, SOLUTION INTRAVENOUS; SUBCUTANEOUS
Status: DISCONTINUED | OUTPATIENT
Start: 2024-11-20 | End: 2024-11-24

## 2024-11-20 RX ORDER — INSULIN LISPRO 100 [IU]/ML
10 INJECTION, SOLUTION INTRAVENOUS; SUBCUTANEOUS
Status: DISCONTINUED | OUTPATIENT
Start: 2024-11-20 | End: 2024-11-20

## 2024-11-20 RX ADMIN — INSULIN LISPRO 8 UNITS: 100 INJECTION, SOLUTION INTRAVENOUS; SUBCUTANEOUS at 17:13

## 2024-11-20 RX ADMIN — GABAPENTIN 600 MG: 300 CAPSULE ORAL at 08:22

## 2024-11-20 RX ADMIN — TAMSULOSIN HYDROCHLORIDE 0.4 MG: 0.4 CAPSULE ORAL at 16:31

## 2024-11-20 RX ADMIN — INSULIN LISPRO 6 UNITS: 100 INJECTION, SOLUTION INTRAVENOUS; SUBCUTANEOUS at 12:13

## 2024-11-20 RX ADMIN — HYDROMORPHONE HYDROCHLORIDE 4 MG: 2 TABLET ORAL at 21:47

## 2024-11-20 RX ADMIN — METHENAMINE HIPPURATE 1 G: 1000 TABLET ORAL at 21:38

## 2024-11-20 RX ADMIN — POTASSIUM CHLORIDE 20 MEQ: 1500 TABLET, EXTENDED RELEASE ORAL at 08:22

## 2024-11-20 RX ADMIN — VANCOMYCIN HYDROCHLORIDE 125 MG: 125 CAPSULE ORAL at 21:38

## 2024-11-20 RX ADMIN — APIXABAN 5 MG: 5 TABLET, FILM COATED ORAL at 21:37

## 2024-11-20 RX ADMIN — INSULIN LISPRO 8 UNITS: 100 INJECTION, SOLUTION INTRAVENOUS; SUBCUTANEOUS at 12:12

## 2024-11-20 RX ADMIN — THERA TABS 1 TABLET: TAB at 08:22

## 2024-11-20 RX ADMIN — GABAPENTIN 600 MG: 300 CAPSULE ORAL at 21:38

## 2024-11-20 RX ADMIN — IMATINIB MESYLATE 400 MG: 400 TABLET, FILM COATED ORAL at 08:24

## 2024-11-20 RX ADMIN — FUROSEMIDE 40 MG: 40 TABLET ORAL at 08:22

## 2024-11-20 RX ADMIN — INSULIN GLARGINE 12 UNITS: 100 INJECTION, SOLUTION SUBCUTANEOUS at 21:38

## 2024-11-20 RX ADMIN — DOCUSATE SODIUM 100 MG: 100 CAPSULE, LIQUID FILLED ORAL at 21:37

## 2024-11-20 RX ADMIN — Medication 3 MG: at 21:37

## 2024-11-20 RX ADMIN — EMPAGLIFLOZIN 10 MG: 10 TABLET, FILM COATED ORAL at 08:22

## 2024-11-20 RX ADMIN — INSULIN LISPRO 2 UNITS: 100 INJECTION, SOLUTION INTRAVENOUS; SUBCUTANEOUS at 17:13

## 2024-11-20 RX ADMIN — GABAPENTIN 600 MG: 300 CAPSULE ORAL at 16:31

## 2024-11-20 RX ADMIN — FUROSEMIDE 40 MG: 40 TABLET ORAL at 16:31

## 2024-11-20 RX ADMIN — VANCOMYCIN HYDROCHLORIDE 125 MG: 125 CAPSULE ORAL at 08:22

## 2024-11-20 RX ADMIN — LEVOTHYROXINE SODIUM 25 MCG: 0.03 TABLET ORAL at 05:56

## 2024-11-20 RX ADMIN — ATORVASTATIN CALCIUM 40 MG: 40 TABLET, FILM COATED ORAL at 16:31

## 2024-11-20 RX ADMIN — INSULIN LISPRO 2 UNITS: 100 INJECTION, SOLUTION INTRAVENOUS; SUBCUTANEOUS at 08:22

## 2024-11-20 RX ADMIN — APIXABAN 5 MG: 5 TABLET, FILM COATED ORAL at 08:22

## 2024-11-20 RX ADMIN — METHENAMINE HIPPURATE 1 G: 1000 TABLET ORAL at 08:22

## 2024-11-20 RX ADMIN — DULOXETINE HYDROCHLORIDE 60 MG: 30 CAPSULE, DELAYED RELEASE ORAL at 21:37

## 2024-11-20 ASSESSMENT — COGNITIVE AND FUNCTIONAL STATUS - GENERAL
REMOTE MEMORY: WNL
AFFECT: FULL RANGE
SPEECH: REGULAR
MOOD: HOPEFUL;MOTIVATED
THOUGHT_PROCESS: WNL
DELUSIONS: NONE OR AGE APPROPRIATE
THOUGHT_CONTENT: APPROPRIATE
APPETITE: NO CHANGE
INSIGHT: INTACT
PERCEPTUAL FUNCTION: PAIN
EYE_CONTACT: WNL
APPETITE: NO CHANGE
CONCENTRATION: WNL
THOUGHT_PROCESS: WNL
EST. PREMORBID INTELLIGENCE: AVERAGE
AFFECT: FULL RANGE
ORIENTATION: FULLY ORIENTED
CONCENTRATION: WNL
RECENT MEMORY: WNL
DELUSIONS: NONE OR AGE APPROPRIATE
SLEEP_WAKE_CYCLE: NO CHANGE
EYE_CONTACT: WNL
PSYCHOMOTOR FUNCTIONING: WNL
ATTENTION: WNL
APPEARANCE: WELL GROOMED
AROUSAL LEVEL: ALERT
LIBIDO: NON-CONTRIBUTORY
EST. PREMORBID INTELLIGENCE: AVERAGE
REMOTE MEMORY: WNL
MOOD: HOPEFUL;MOTIVATED
RECENT MEMORY: MILD LOSS
PSYCHOMOTOR FUNCTIONING: WNL
ORIENTATION: FULLY ORIENTED
SPEECH: REGULAR
THOUGHT_CONTENT: APPROPRIATE
INSIGHT: INTACT
PERCEPTUAL FUNCTION: PAIN
IMPULSE CONTROL: INTACT
SLEEP_WAKE_CYCLE: NO CHANGE
ATTENTION: WNL
IMPULSE CONTROL: INTACT
AROUSAL LEVEL: ALERT
LIBIDO: NON-CONTRIBUTORY
APPEARANCE: WELL GROOMED

## 2024-11-20 ASSESSMENT — MINI MENTAL STATE EXAM
WHICH SEASON IS THIS?: 1-->CORRECT
WHAT YEAR IS THIS?: 1-->CORRECT
WHAT IS THE NAME OF THIS BUILDING [IN FACILITY]?/WHAT IS THE STREET ADDRESS OF THIS HOUSE [IN HOME]?: 1-->CORRECT
SAY:  READ THE WORDS ON THE PAGE AND THEN DO WHAT IT SAYS.  THEN HAND THE PERSON
THE SHEET WITH CLOSE YOUR EYES ON IT.  IF THE SUBJECT READS AND DOES NOT CLOSE THEIR
EYES, REPEAT UP TO THREE TIMES.  SCORE ONLY IF SUBJECT CLOSES EYES.: 1-->ABLE TO PERFORM
WHAT CITY/TOWN ARE WE IN?: 1-->CORRECT
SAY: PUT THE PAPER DOWN ON THE FLOOR, SCORE IF PAPER IS PLACED BACK ON FLOOR: 3-->ALL CORRECT
SAY: I WOULD LIKE YOU TO REPEAT THIS PHRASE AFTER ME: NO IFS, ANDS, OR BUTS.: 1-->ABLE TO PERFORM
NOW WHAT WERE THE THREE OBJECTS I ASKED YOU TO REMEMBER?: 2-->2 OUT OF 3 CORRECT
HAND THE PERSON A PENCIL AND PAPER. SAY: WRITE ANY COMPLETE SENTENCE ON THAT

PIECE OF PAPER. (NOTE: THE SENTENCE MUST MAKE SENSE. IGNORE SPELLING ERRORS): 1-->ABLE TO PERFORM
WHAT DAY OF THE WEEK IS THIS?: 1-->CORRECT
WHAT STATE [OR PROVINCE] ARE WE IN?: 1-->CORRECT
WHAT IS TODAY'S DATE?: 1-->CORRECT
NOW WHAT WERE THE THREE OBJECTS I ASKED YOU TO REMEMBER?: 3-->ALL CORRECT
WHAT IS THE NAME OF THIS BUILDING [IN FACILITY]?/WHAT IS THE STREET ADDRESS OF THIS HOUSE [IN HOME]?: 1-->CORRECT
WHAT MONTH IS THIS?: 1-->CORRECT
PLEASE NAME 2 OBJECTS? (EX. PEN, WATCH): 2-->NAMES THEM BOTH OBJECTS
SPELL THE WORD WORLD. NOW SPELL IT BACKWARDS.: 5-->DLROW

## 2024-11-20 ASSESSMENT — PATIENT HEALTH QUESTIONNAIRE - PHQ9: SUM OF ALL RESPONSES TO PHQ9 QUESTIONS 1 & 2: 0

## 2024-11-20 NOTE — HOSPITAL COURSE
History of Present Illness  Bebe is a 64 y.o. female admitted on 11/19/2024 with History of above knee amputation, left (CMS/[facility]) [Z89.612]  Encounter for prosthetic gait training [Z47.89]. Principal problem is History of above knee amputation, left (CMS/[facility]).    Pt is a 64 y.o. 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       Past Medical History  Bebe has a past medical history of Abnormal ECG, Abnormal finding on chest xray (12/2021), Arthritis, Colon cancer (CMS/[facility]), COVID-19 (12/2021), COVID-19 vaccine series completed, Diabetic neuropathy (CMS/[facility]), DM (diabetes mellitus), type 2 with ophthalmic complications (CMS/[facility]), Hypertension, Hypothyroidism, Left foot drop, Lipid disorder, and Type 2 diabetes mellitus treated with insulin (CMS/[facility]).

## 2024-11-20 NOTE — PROGRESS NOTES
Patient: Bebe Burks  Location: [facility] [address]  MRN: [id number]  Today's date: 11/19/2024    History of Present Illness  Bebe is a 64 y.o. female admitted on 11/19/2024 with History of above knee amputation, left (CMS/[facility]) [Z89.612]  Encounter for prosthetic gait training [Z47.89]. Principal problem is History of above knee amputation, left (CMS/[facility]).    Pt is a 64 y.o. 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       Past Medical History  Bebe has a past medical history of Abnormal ECG, Abnormal finding on chest xray (12/2021), Arthritis, Colon cancer (CMS/[facility]), COVID-19 (12/2021), COVID-19 vaccine series completed, Diabetic neuropathy (CMS/HCC), DM (diabetes mellitus), type 2 with ophthalmic complications (CMS/HCC), Hypertension, Hypothyroidism, Left foot drop, Lipid disorder, and Type 2 diabetes mellitus treated with insulin (CMS/HCC).    PT Vitals      Date/Time Pulse HR Source Pt Activity O2 Therapy BP BP Location BP Method Pt Position [facility]   11/19/24 1501 94 Monitor At rest None (Room air) 137/54 Left upper arm Automatic Sitting BK   11/19/24 1559 92 Monitor At rest None (Room air) 142/58 Left upper arm Automatic Sitting BK          PT Pain      Date/Time Pain Type Rating: Rest Rating: Activity [facility]   11/19/24 1501 Pain Assessment 0 0 BK   11/19/24 1559 Pain Assessment 0 -- BK               Prior Living Environment      Flowsheet Row Most Recent Value   People in Home alone   Current Living Arrangements condominium   Home Accessibility stairs to enter home (Group)   Living Environment Comment resides in 1st floor condo, 4 (1+2+1) JOSE RAFAEL, R hand rail, no stairs inside home, Bathroom Layout: Tub/shower unit with shower curtain, Shower stall with glass doors (shower stall in main bedroom), two sisters can provide some assistance, may get HHA assistance   Number of Stairs, Main Entrance 4   Surface of  Stairs, Main Entrance concrete   Stair Railings, Main Entrance railing on right side (ascending)   Stairs Comment, Main Entrance 1+2+1 JOSE RAFAEL            Prior Level of Function      Flowsheet Row Most Recent Value   Dominant Hand right   Ambulation assistive equipment   Transferring assistive equipment   Toileting independent   Bathing assistive equipment   Dressing independent   Eating independent   IADLs assistive equipment   Driving/Transportation    Communication understands/communicates without difficulty   Assistive Device Currently Used at Home cane, straight, walker, front-wheeled, shower chair, grab bar, raised toilet               11/19/24 1431   PT Time Calculation   Start Time 1500   Stop Time 1600   Time Calculation (min) 60 min   Session Details   Document Type Initial Evaluation   General Information   General Observations of Patient Pt alert and awake, in NAD. Pt reports no pain. Pt agreeable to therapy session.    Services   Do You Speak a Language Other Than English at Home? no   Prior Level of Function   Dominant Hand right   Ambulation assistive equipment   Transferring assistive equipment   Toileting independent   Bathing assistive equipment   Dressing independent   Eating independent   IADLs assistive equipment   Driving/Transportation    Communication understands/communicates without difficulty   Home Use of Assistive/Adaptive Equipment   Assistive Device/Animal Currently Used at Home cane, straight;walker, front-wheeled;shower chair;grab bar;raised toilet   Living Environment   Current Living Arrangements condominium   Home Accessibility stairs to enter home (Group)   People in Home alone   Primary Care Provided by self   Living Environment Comment resides in 1st floor condo, 4 (1+2+1) JOSE [place]FAEL, R hand rail, no stairs inside home, Bathroom Layout: Tub/shower unit with shower curtain, Shower stall with glass doors (shower stall in main bedroom), two  sisters can provide some assistance, may get HHA assistance   Home Main Entrance   Number of Stairs, Main Entrance 4   Surface of Stairs, Main Entrance concrete   Stair Railings, Main Entrance railing on right side (ascending)   Stairs Comment, Main Entrance 1+2+1 JOSE RAFAEL   Cognition/Psychosocial   Comment, Cognition Pleasant and cooperative. Pt following commands 100% of the time.   Sensory   Additional Documentation Sensory Assessment (Somatosensory) (Group)   Sensory Assessment (Somatosensory)   Sensory Assessment right LE;left LE   Left LE Sensory Assessment light touch awareness;light touch localization;intact  (Proprioception not tested due to L TFA.)   Right LE Sensory Assessment light touch awareness;light touch localization;proprioception;impaired   Strength (Manual Muscle Testing)   Strength (Manual Muscle Testing) left lower extremity;right lower extremity   Left Lower Extremity Strength hip   Hip, Left (Strength) Flexion: 3+/5, Extension: 3-/5, Abduction: 3/5, Adduction: 3+/5.   Knee, Left (Strength) Not tested due to L TFA.   Ankle, Left (Strength) Not tested due to L TFA.   Right Lower Extremity Strength hip;knee;ankle   Hip, Right (Strength) Flexion: 3+/5, Extension: 3-/5, Abduction: 3-/5, Adduction: 3+/5, Internal Rotation: , External Rotation.   Knee, Right (Strength) Extension: 3+/5, Flexion: 3+/5.   Ankle, Right (Strength) Dorsiflexion: 3-/5, Plantarflexion: 3-/5, Inversion: 2+/5, Eversion: 1/5.   Skin   Skin WDL   (Scabbed wound on dorsal aspect of R great toe. Area of scabbing noted on lateral aspect of R foot.)   Edema Assessment & Management   Additional Documentation   (Non-pitting edema noted in L LE. 2/4 pitting edema in R lower leg.)   Bed Mobility   [place], Roll Left modified independence   [place], Roll Right modified independence   [place], Supine to Sit modified independence   [place], Sit to Supine modified independence   Safety/Cues increased time to complete    Assistive Device bed rails   Bed to Chair Transfer   [place], Bed to Chair minimum assist (75% or more patient effort)   Safety/Cues increased time to complete;verbal cues;hand placement;technique;preparatory posture   Assistive Device wheelchair   Comment Min manual cues for increased anterior weight shift, increased pelvic lift and increased lateral shift.   Chair to Bed Transfer   [place], Chair to Bed minimum assist (75% or more patient effort)   Safety/Cues increased time to complete;verbal cues;hand placement;technique;preparatory posture;maintaining center of gravity over base of support   Assistive Device wheelchair   Comment Min manual cues for increased anterior weight shift, increased pelvic lift and increased lateral shift.   Sit to Stand Transfer   [place], Sit to Stand Transfer dependent (less than 25% patient effort);2 person assist   Safety/Cues increased time to complete;verbal cues;hand placement   Assistive Device walker, front-wheeled   Comment Max manual cues for increased anterior weight shift, increased pelvic lift, increased L hip / knee extension and improved balance correction. Min manual cues of 2nd person for increased pelvic lift and increased R hip / knee extension.   Stand to Sit Transfer   [place], Stand to Sit Transfer dependent (less than 25% patient effort);2 person assist   Safety/Cues increased time to complete;verbal cues;hand placement;maintaining center of gravity over base of support;preparatory posture;technique   Assistive Device walker, front-wheeled   Comment Max manual cues for increased anterior weight shift, improved controlled lowering of pelvis and improved balance correction.   Stand Pivot Transfer   [place], Stand Pivot/Stand Step Transfer dependent (less than 25% patient effort);2 person assist   Safety/Cues increased time to complete;verbal cues;hand placement;technique;preparatory posture;maintaining center of gravity over base of  support   Assistive Device walker, front-wheeled   Comment Max manual cues for increased anterior weight shift, increased L weight shift, L LE advancement and improved balance correction. Max manual cues of 2nd person for RW management.   Car Transfer   [place] unable to assess   Comment Unable to assess due to activity being unsafe to assess as a result of pt's decreased strength, impaired balance and impaired postural control / stability.   Gait Training   [place], Gait dependent (less than 25% patient effort);2 person assist;additional person to manage equipment   Safety/Cues increased time to complete;verbal cues;gait deviations;hand placement;preparatory posture;maintaining center of gravity over base of support;sequencing;technique   Assistive Device parallel bars   Distance in Feet 5 feet   Pattern step-to   Deviations/Abnormal Patterns bilateral deviations;left sided deviations;base of support, narrow;delta decreased;scissoring;step length decreased;stride length decreased;weight shifting decreased   Left Sided Gait Deviations   (Decreased weight shift.)   [place], Picking Up Object unable to assess  (Unable to assess due to activity being unsafe to assess as a result of pt's decreased strength, impaired balance and impaired postural control / stability.)   Comment 5' x 1 in parallel bars. Max manual cues for increased L weight shift, increased L LE weightbearing, increased L LE weightbearing during L LE stance phase, increased L hip extension during L LE stance phase and improved balance correction. Max manual cues of 2nd person for increased R hip / knee extension during R LE stance phase, R foot placement at initial contact, increased R hip abduction t/o R LE swing and improved balance correction. Wheelchair follow for improved safety due to decreased B LE strength, decreased postural control / stability and impaired balance.   Rough/Uneven Surface Gait Skills   [place] unable to  assess   Comment Unable to assess due to activity being unsafe to assess as a result of pt's decreased strength, impaired balance and impaired postural control / stability.   Stairs Training   [place], Stairs unable to assess   Comment Unable to assess due to activity being unsafe to assess as a result of pt's decreased strength, impaired balance and impaired postural control / stability.   Wheelchair Mobility/Management   Method of Locomotion bimanual (upper extremity) propulsion   Wheelchair Type manual, lightweight   Functional Mobility Training forward propulsion;backward propulsion;steering;stopping;turning;doorway navigation;weight shifting   [place], Forward Propulsion minimum assist (75% or more patient effort)   [place], Backward Propulsion minimum assist (75% or more patient effort)   [place], Steering Activities minimum assist (75% or more patient effort)   [place], Stopping Activities minimum assist (75% or more patient effort)   [place], Turning Activities minimum assist (75% or more patient effort)   [place], Doorway Navigation minimum assist (75% or more patient effort)   [place], Weight Shifting modified independence   Safety/Cues increased time to complete   Distance Propelled in Feet 150 feet   Comment, Wheelchair Mobility Min manual cues to maintain momentum in all directions as well as navigating her environment.   Balance   Static Sitting Balance WFL;supported;sitting, edge of bed   Dynamic Sitting Balance WFL;unsupported;sitting, edge of bed   Static Standing Balance severe impairment;supported;standing   Dynamic Standing Balance severe impairment;supported;standing   Balance Test Results Person Balance Scale   Person Balance Scale   Sitting to Standing 0   Standing Unsupported 0   Sitting with Back Unsupported but Feet Supported on Floor or on a Stool 3   Standing to Sitting 0   Transfers 0   Standing Unsupported with Eyes Closed 0   Standing Unsupported  with Feet Together 0   Reach Forward with Outstretched Arm While Standing 0    Object from Floor from a Standing Position 0   Turning to Look Behind Over Left and Right Shoulders While Standing 0   Turn 360 Degrees 0   Place Alternate Foot on Step or Stool While Standing Unsupported 0   Standing Unsupported One Foot in Front 0   Standing on One Leg 0   Person Balance Score 3   Motor Skills   Coordination   (Unable to assess LE coordination due to L TFA.)   Functional Endurance Fair, pt limited to walking 5' in parallel bars.   Muscle Tone bilateral;lower extremity(s);WFL   Postural Deviations   Postural Deviations head and neck;shoulder;upper back;low back;pelvis   Head and Neck forward head   Shoulder left shoulder forward;right shoulder forward   Upper Back abducted scapulae   Low Back flattened   Pelvis posterior pelvic tilt   LE Residual Limb   LE Amputation Assessment LE Residual Limb Evaluation (Group)   LE Residual Limb Evaluation   Shape distal and proximal circumference of residual limb are equal   Sensation Issues no sensory issues identified   Skin Assessment no signs of pressure or friction present   Amputation Level transfemoral, standard   Amputation Laterality left side   Incision Management incision line adequately closed for scar management   LE Prosthetic Management   Additional Documentation Prosthetic Orientation (Lower Extremity) (Group);LE Prosthetic Management (Group)   Prosthetic Orientation (Lower Extremity)   Comment, Fit Assessment [place] / [place]: Max A for liner and prosthesis. Wearing time: 30'. Ply: 0, but appeared to benefit from increase to at least 3 ply. Skin check pre / post w/o issue.   Specific Gait Deviations abducted gait;lateral trunk bending;toe off floor in stance;uneven timing, short stance phase involved side   Fit Assessment fit/function of prosthesis are appropriate   IRF PT Goals   Transfer Goal Selection transfers, PT goal 3;transfers, PT goal 4   Wheelchair  Locomotion Goal Selection wheelchair locomotion, PT goal 1;wheelchair locomotion, PT goal 2   Transfer Goal 1   Activity/Assistive Device stand pivot;walker, front-wheeled   [place] other (see comments)  (Dependent (Max A x 1, Mod A x 1))   Time Frame 1 week;short-term goal (STG)   Transfer Goal 2   Activity/Assistive Device stand pivot;walker, front-wheeled   [place] maximum assist (25-49% patient effort)   Time Frame 21 days or less;long-term goal (LTG)   Transfer Goal 3   Time Frame 1 week;short-term goal (STG)   Activity/Assistive Device sit-to-stand/stand-to-sit;walker, front-wheeled   [place] maximum assist (25-49% patient effort);verbal cues required   Transfer Goal 4   Time Frame 21 days or less;long-term goal (LTG)   Activity/Assistive Device sit-to-stand/stand-to-sit;walker, front-wheeled   [place] minimum assist (75% or more patient effort)   Gait/Walking Locomotion Goal 1   Activity/Assistive Device gait (walking locomotion);walker, front-wheeled   Distance 10 feet   [place] other (see comments)  (Dependent (Max A x 1, Mod A x 1))   Time Frame 1 week;short-term goal (STG)   Gait/Walking Locomotion Goal 2   Activity/Assistive Device gait (walking locomotion);walker, front-wheeled   Distance 25 feet   [place] maximum assist (25-49% patient effort)   Time Frame 21 days or less;long-term goal (LTG)   Wheelchair Locomotion Goal 1   Assistive Device manual, ultra lightweight   Distance 150 feet   [place] other (see comments)  (Touching / Steadying Assist)   Time Frame 1 week;short-term goal (STG)   Activity forward propulsion;backward propulsion;steering;stopping;turning;doorway navigation;weight shifting   Wheelchair Locomotion Goal 2   Assistive Device manual, ultra lightweight   Distance 150 feet   [place] modified independence   Time Frame 21 days or less;long-term goal (LTG)   Activity forward propulsion;backward propulsion;steering;stopping;turning;doorway  navigation;weight shifting   Patient/Family Goals   Patient's Goals For Discharge return home;take care of myself at home;return to all previous roles/activities   Therapy Assessment/Plan (PT)   Functional Level at Time of Evaluation (PT) Dependent   PT Diagnosis (PT) Gait dysfunction   Rehab Potential/Prognosis (PT) good, to achieve stated therapy goals   Frequency of Treatment (PT) 5-7 times per week   Intensity of Treatment (PT) 60-90 minutes per day   Estimated Duration of Therapy (PT) 3 weeks   Activity Limitations Related to Problem List unable to ambulate safely;unable to transfer safely;community activities not performed adequately or safely   Planned Therapy Interventions neuromuscular re-education;patient/family education;postural re-education;stair training;strengthening;transfer training;balance training;bed mobility training;gait training   Comment, Therapy Assessment/Plan (PT) Pt is a 64 y.o. female who was admitted to [facility] from SNF and underwent L guillotine amputation 8/3/24 followed by IRAM [facility]UMONT on 8/19/24 due to non-healing wounds. Pt is now admitted to [facility] on 11/19/2024 with dependent level functional deficits. Pt scored a 3/56 on the BBS demonstrating she is at a high risk of falls. She was unable to perform ambulation with RW, ambulation on uneven terrain, negotiation of stairs, picking up an object, car transfer due to these activities not being safe as a result of decreased strength in B LEs, decreased postural control / stability and impaired balance. Pt will benefit from wheelchair evaluation. Pt will benefit from assessment of R LE bracing needs in formal brace rounds. Pt with skin issues noted on her R great toe as well as the lateral border of her R foot. Nursing notified and aware. Pt is currently limited by decreased strength, decrased endurance, impaired balance, impaired postural control / stability, decreased motor control, impaired sensation, impaired proprioception, decreased skin  integrity and increased pain. Pt would benefit from skilled inpatient PT services to resolve her deficits and assist pt in returning to her PLOF. Recommended ELOS is 3 weeks.              Education Documentation  Call Light Use, taught by Emanuel Toledo PT at 11/19/2024  9:07 PM.  Learner: Patient  Readiness: Acceptance  Method: Explanation  Response: Verbalizes Understanding  Comment: Pt educated on role of PT, PT POC, PT discharge goals, use of call bell and acute rehab 3-hour rule.    Treatment Plan, taught by Emanuel Toledo PT at 11/19/2024  9:07 PM.  Learner: Patient  Readiness: Acceptance  Method: Explanation  Response: Verbalizes Understanding  Comment: Pt educated on role of PT, PT POC, PT discharge goals, use of call bell and acute rehab 3-hour rule.          IRF PT Goals      Flowsheet Row Most Recent Value   Transfer Goal 1    Activity/Assistive Device stand pivot, walker, front-wheeled at 11/19/2024 1431   [place] other (see comments)  [Dependent (Max A x 1, Mod A x 1)] at 11/19/2024 1431   Time Frame 1 week, short-term goal (STG) at 11/19/2024 1431   Transfer Goal 2    Activity/Assistive Device stand pivot, walker, front-wheeled at 11/19/2024 1431   [place] maximum assist (25-49% patient effort) at 11/19/2024 1431   Time Frame 21 days or less, long-term goal (LTG) at 11/19/2024 1431   Transfer Goal 3    Activity/Assistive Device sit-to-stand/stand-to-sit, walker, front-wheeled at 11/19/2024 1431   [place] maximum assist (25-49% patient effort), verbal cues required at 11/19/2024 1431   Time Frame 1 week, short-term goal (STG) at 11/19/2024 1431   Transfer Goal 4    Activity/Assistive Device sit-to-stand/stand-to-sit, walker, front-wheeled at 11/19/2024 1431   [place] minimum assist (75% or more patient effort) at 11/19/2024 1431   Time Frame 21 days or less, long-term goal (LTG) at 11/19/2024 1431   Gait/Walking Locomotion Goal 1    Activity/Assistive Device gait (walking  locomotion), walker, front-wheeled at 11/19/2024 1431   Distance 10 feet at 11/19/2024 1431   [place] other (see comments)  [Dependent (Max A x 1, Mod A x 1)] at 11/19/2024 1431   Time Frame 1 week, short-term goal (STG) at 11/19/2024 1431   Gait/Walking Locomotion Goal 2    Activity/Assistive Device gait (walking locomotion), walker, front-wheeled at 11/19/2024 1431   Distance 25 feet at 11/19/2024 1431   [place] maximum assist (25-49% patient effort) at 11/19/2024 1431   Time Frame 21 days or less, long-term goal (LTG) at 11/19/2024 1431   Wheelchair Locomotion Goal 1    Activity forward propulsion, backward propulsion, steering, stopping, turning, doorway navigation, weight shifting at 11/19/2024 1431   Assistive Device manual, ultra lightweight at 11/19/2024 1431   Distance 150 feet at 11/19/2024 1431   [place] other (see comments)  [Touching / Steadying Assist] at 11/19/2024 1431   Time Frame 1 week, short-term goal (STG) at 11/19/2024 1431   Wheelchair Locomotion Goal 2    Activity forward propulsion, backward propulsion, steering, stopping, turning, doorway navigation, weight shifting at 11/19/2024 1431   Assistive Device manual, ultra lightweight at 11/19/2024 1431   Distance 150 feet at 11/19/2024 1431   [place] modified independence at 11/19/2024 1431   Time Frame 21 days or less, long-term goal (LTG) at 11/19/2024 1431

## 2024-11-20 NOTE — PROGRESS NOTES
[facility] Progress Note          Patient was seen and examined at bedside.    Subjective:   Reviewed night shift RN report:  Patient is aao x4, continent of bowel last BM on 11/19, incontinent of bladder overnight.  cc noted and straight cath done for 800cc. Pain mangement with PRN Diluadid. TID accu check. Slept well @ night. SCDs to right leg. Fall and safety precautions maintained. Call bell within reach and bed alarm is on.    Saw and evaluated her in am:  The important medical issues:  # hyperglycemia, resume her prandial short active insulin plus SSI correction, titrated up her lantus dose, diet control, closely monitor her BG levels and po intake, keep good oral hydration;  # urinary retention, regular bladder scan with cic, UTI prevention.    Denies nausea, vomiting, abdominal pain or discomfort, dysuria, cough/sputum, running nose, sore throat, chest pain, palpitation, SOB or orthopnea, dizziness or LH,  HA.     Objective   Vital signs in last 24 hours:  Temp:  [36.6 °C (97.8 °F)-37.1 °C (98.7 °F)] 37.1 °C (98.7 °F)  Heart Rate:  [77-94] 89  Resp:  [18] 18  BP: (120-142)/(54-66) 134/62      Intake/Output Summary (Last 24 hours) at 11/20/2024 1241  Last data filed at 11/20/2024 0515  Gross per 24 hour   Intake --   Output 800 ml   Net -800 ml     Intake/Output this shift:  No intake/output data recorded.   Review of Systems:  All other systems reviewed and negative except as noted in the HPI.   Objective      Labs  Reviewed her labs thoroughly   Lab Results   Component Value Date    WBC 4.98 11/20/2024    HGB 10.8 (L) 11/20/2024    HCT 32.7 (L) 11/20/2024    MCV 90.8 11/20/2024     11/20/2024     Lab Results   Component Value Date    GLUCOSE 205 (H) 11/20/2024    CALCIUM 8.7 11/20/2024     11/20/2024    K 3.7 11/20/2024    CO2 30 11/20/2024     11/20/2024    BUN 35 (H) 11/20/2024    CREATININE 0.9 11/20/2024       Imaging  N/A    Full Code    Physical  Exam:  Head/Ear/Nose/Throat: normocephalic; atraumatic; moisture mouth mm, no oropharyngeal thrush noted.   Eyes: anicteric sclera, EOMI; PERRL.   Neck : supple, no JVD, no carotid bruits appeciated.   Respiratory: no evidence of labored breathing, lung sounds CTA b/l, good aeration bibasilar area, no w/r/c.   Cardiovascular: RRR; normal S1, S2; no m/r/g; no S3 or S4.   Gastrointestinal: soft; NT; BS normal; mildly distended; no CVAT b/l.   Genitourinary: no lozada.   Extremities : S/p L AKA, residual limb healed well .   Neurological: AO x 3, fluent speeches, following commands, CNS II-XII grossly intact; no focal neurologic deficits.   Behavior/Emotional: in NAD, appropriate; cooperative.   Skin: clean, dry and intact.  Plan of care was discussed with patient, RN, and PMR attending     Assessment     CC:L AKA, ambulatory dysfunction     64 y.o. 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 S/p IV Ceftriaxone and IV Ampicillin until 9/14/24. H/o non-occlusive DVT of the R axillary and brachial vein around midline. She presented to [facility] on 11/14 for evaluation to begin prosthetic training. Now she was admitted for inpt prosthetic training on 11/19/24.  Denies nausea, vomiting, abdominal pain or discomfort, dysuria, cough/sputum, running nose, sore throat, chest pain, palpitation, SOB or orthopnea, dizziness or LH,  HA.    A/P:  # A/p L AKA  -inpt prosthetic training, wound care/dermal defense, pain and neuralgia managements, fall precaution     # CML  -cont home imatinib, check CBC      # Type 2 DM, hypothyroidism  - diet control, SSI, plus her home diabetic regimen;  - cont home levothyroxine     #  PICC-associated DVT on 8/30   -S/p SQ Lovenox weight based  ;  -cont Eliquis 5 mg po bid      # Urinary retention, h/o recurrent UTI  -timed voiding with cic, adequate oral hydration, monitor PVR with cic, on flomax;  -hiprex 1 g po bid        # Stage 2 decubitus ulcer, pressure injury in right heel   -wound care     # Recent C.diff.  -cont prophylactic oral vanco 125 mg po bid       Billing code: [id number]  Diagnoses:  Patient Active Problem List   Diagnosis    Controlled type 2 diabetes mellitus, with long-term current use of insulin (CMS/[facility])    Essential hypertension    Hyperlipidemia    Hypothyroidism    Obesity    Thyroid nodule    COVID-19 virus infection    Lung nodule    Displacement of lumbar intervertebral disc without myelopathy    Disability of walking    Polyneuropathy due to type 2 diabetes mellitus (CMS/HCC)    Pre-op examination    Localized osteoarthritis of right knee    Type 2 diabetes mellitus treated with insulin (CMS/[facility])    Abnormal finding on EKG    Left foot drop    Abnormal finding on chest xray    Osteoarthritis of right knee, unspecified osteoarthritis type    S/P total knee arthroplasty, right    Sepsis (CMS/[facility])    Osteomyelitis of left foot (CMS/[facility])    Peripheral neuropathy    Abdominal pain    Chronic idiopathic constipation    Gangrene (CMS/[facility])    Tibial artery stenosis, left (CMS/[facility])    H/O pilonidal cyst    Type 2 diabetes mellitus with left diabetic foot infection (CMS/[facility])    Type 2 diabetes mellitus with diabetic neuropathy (CMS/[facility])    Hypothyroidism    Primary hypertension    Leukemoid reaction    HLD (hyperlipidemia)    Acute osteomyelitis of left foot (CMS/HCC)    History of above knee amputation, left (CMS/[facility])    Encounter for prosthetic gait training           Charlette Brooke MD  11/20/2024

## 2024-11-20 NOTE — PLAN OF CARE
"  Problem: Rehabilitation (IRF) Plan of Care  Goal: Plan of Care Review  Outcome: Progressing  Flowsheets (Taken 11/20/2024 1438)  Progress: improving  Plan of Care Reviewed With: patient  Outcome Evaluation: OT IE completed, POC established. Reviewed goals with patient. Patient reports understanding.     Problem: Rehabilitation (IRF) Plan of Care  Goal: Patient-Specific Goal (Individualized)  Outcome: Progressing  Flowsheets (Taken 11/20/2024 1438)  Patient/Family-Specific Goals (Include Timeframe): \"to walk\"     "

## 2024-11-20 NOTE — PLAN OF CARE
Plan of Care Review  Plan of Care Reviewed With: patient  Progress: no change  Outcome Evaluation: Alert and oriented x 4.    Continent of bowel.    Some urinary retention noted and urinary incontinence.   Wounds assessed.   Encouraged to offload when in bed and wheelchair.   Pain managed using multimodal approach to control.    Bladder scans to continue.     Patient able to void large amount of urine.    Bladder scan post void 67ml.

## 2024-11-20 NOTE — PLAN OF CARE
Plan of Care Review  Plan of Care Reviewed With: patient  Progress: no change  Outcome Evaluation: .

## 2024-11-20 NOTE — PROGRESS NOTES
Patient: Bebe Burks  Location: [facility] [address]  MRN: [id number]  Today's date: 11/20/2024    History of Present Illness  Bebe is a 64 y.o. female admitted on 11/19/2024 with History of above knee amputation, left (CMS/[facility]) [Z89.612]  Encounter for prosthetic gait training [Z47.89]. Principal problem is History of above knee amputation, left (CMS/[facility]).    Pt is a 64 y.o. 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       Past Medical History  Bebe has a past medical history of Abnormal ECG, Abnormal finding on chest xray (12/2021), Arthritis, Colon cancer (CMS/[facility]), COVID-19 (12/2021), COVID-19 vaccine series completed, Diabetic neuropathy (CMS/[facility]), DM (diabetes mellitus), type 2 with ophthalmic complications (CMS/[facility]), Hypertension, Hypothyroidism, Left foot drop, Lipid disorder, and Type 2 diabetes mellitus treated with insulin (CMS/[facility]).    PT Vitals      Date/Time Pulse HR Source BP BP Location BP Method Pt Position [facility]   11/20/24 1001 89 Monitor 134/62 Right upper arm Automatic Sitting SC          PT Pain      Date/Time Pain Type Rating: Rest Rating: Activity [facility]   11/20/24 1001 Pain Assessment 0 - no pain 0 - no pain SC   11/20/24 1059 Pain Reassessment 0 - no pain 0 - no pain SC               Prior Living Environment      Flowsheet Row Most Recent Value   People in Home alone   Current Living Arrangements condominium   Home Accessibility stairs to enter home (Group)   Living Environment Comment resides in 1st floor condo, 4 (1+2+1) JOSE RAFAEL, R hand rail, no stairs inside home, Bathroom Layout: Tub/shower unit with shower curtain, Shower stall with glass doors (shower stall in main bedroom), two sisters can provide some assistance, may get HHA assistance   Number of Stairs, Main Entrance 4   Surface of Stairs, Main Entrance concrete   Stair Railings, Main Entrance railing on right side (ascending)    Stairs Comment, Main Entrance 1+2+1 JOSE RAFAEL            Prior Level of Function      Flowsheet Row Most Recent Value   Dominant Hand right   Ambulation assistive equipment   Transferring assistive equipment   Toileting independent   Bathing assistive equipment   Dressing independent   Eating independent   IADLs assistive equipment   Driving/Transportation    Communication understands/communicates without difficulty   Assistive Device Currently Used at Home cane, straight, walker, front-wheeled, shower chair, grab bar, raised toilet             IRF PT Evaluation and Treatment - 11/20/24 1050          PT Time Calculation    Start Time 1001     Stop Time 1101     Time Calculation (min) 60 min        Session Details    Document Type Daily Treatment/Progress Note        General Information    General Observations of Patient Pt received seated in bed handoff from OT; willing to participate in PT        Mobility Belt    Mobility Belt Used During Session yes        Bed Mobility    [place], Roll Left modified independence     [place], Roll Right modified independence     [place], Supine to Sit modified independence     Comment Luis to roll BL to julian gel liner, increased difficulty supine > sit, however pt able to complete independently        Transfers    Transfers     Comment Gait belt donned        Sit to Stand Transfer    [place], Sit to Stand Transfer dependent (less than 25% patient effort)     Safety/Cues increased time to complete;verbal cues;hand placement;maintaining center of gravity over base of support;technique     Assistive Device parallel bars     Comment from w/c to // bars; maxAx1 at L pelvis for hip/L knee ext, upright balance, & anterior WS; Fay-touch assist of 2nd person on R for hip ext & R knee guard; pt unable to achieve full R knee extension        Stand to Sit Transfer    [place], Stand to Sit Transfer dependent (less than 25% patient effort);maximum assist  (25-49% patient effort)     Safety/Cues increased time to complete;verbal cues;hand placement;maintaining center of gravity over base of support;technique     Assistive Device parallel bars     Comment to w/c from // bars; inidiatially maxAx1 on L & Fay of 2nd on R; progressing to maxAx1 at L pelvis for controlled descent; increased difficulty unlocking prosthesis to achieve knee flexion; PT assisting pt to lift LLE to sit without unlocking knee d/t balance impairments; VC for hand placement        Low Pivot Transfer    [place], Low Pivot Transfer minimum assist (75% or more patient effort)     Safety/Cues increased time to complete;verbal cues;maintaining center of gravity over base of support;technique     Assistive Device wheelchair     Comment incremental LPT EOB > w/c w/o prosthesis; Fay at pelvis for anterior WS, lateral translation, & IT clearance        Wheelchair Mobility/Management    [place], Forward Propulsion supervision     [place], Steering Activities supervision     [place], Turning Activities supervision     Distance Propelled in Feet 200 feet     Comment, Wheelchair Mobility 1x200' from room to gym; S d/t impaired obstacle navigation; decr speed noted, pt able to complete w/o hands on assist        Balance    Balance Interventions standing     Comment, Balance standing balance completed in // bars; 1) lateral WSing, 2x10 2) RLE fwd/bwd stepping in place, 1x10 3) static standing balance no UE support, 2x30s; 4) multiple standing trials complete when trialing sock ply & fit in prosthesis; 3x30s; increased seated rest breaks required for fatigue recovery        Lower Extremity (Therapeutic Exercise)    Exercise Position/Type seated     Reps and Sets 2x10     Comment 1) LLE LAQ 2) LLE AP        Prosthetic Orientation (Lower Extremity)    Comment, Fit Assessment [place] / [place]: Max A to julian liner and prosthesis; maxA to doff prosthesis; pt able to doff sock & liner  independently; skin check pre & post unremarkable; Wear time: 30'. Ply: 3        Daily Progress Summary (PT)    Daily Outcome Statement Focus of session on pre-gait & standing balance activities. Completed LPT this session w/o prosthesis, with pt requiring Fay for balance stability & lateral translation. Increased difficulty to achieve knee flexion with stand to sit transfer with prosthesis donned d/t balance impairments, PT assist required to lift LLE prior to sit w/o knee flexion for safety at this time. Plan to progress standing balance & ambulation in // bars.                               IRF PT Goals      Flowsheet Row Most Recent Value   Transfer Goal 1    Activity/Assistive Device stand pivot, walker, front-wheeled at 11/19/2024 1431   [place] other (see comments)  [Dependent (Max A x 1, Mod A x 1)] at 11/19/2024 1431   Time Frame 1 week, short-term goal (STG) at 11/19/2024 1431   Transfer Goal 2    Activity/Assistive Device stand pivot, walker, front-wheeled at 11/19/2024 1431   [place] maximum assist (25-49% patient effort) at 11/19/2024 1431   Time Frame 21 days or less, long-term goal (LTG) at 11/19/2024 1431   Transfer Goal 3    Activity/Assistive Device sit-to-stand/stand-to-sit, walker, front-wheeled at 11/19/2024 1431   [place] maximum assist (25-49% patient effort), verbal cues required at 11/19/2024 1431   Time Frame 1 week, short-term goal (STG) at 11/19/2024 1431   Transfer Goal 4    Activity/Assistive Device sit-to-stand/stand-to-sit, walker, front-wheeled at 11/19/2024 1431   [place] minimum assist (75% or more patient effort) at 11/19/2024 1431   Time Frame 21 days or less, long-term goal (LTG) at 11/19/2024 1431   Gait/Walking Locomotion Goal 1    Activity/Assistive Device gait (walking locomotion), walker, front-wheeled at 11/19/2024 1431   Distance 10 feet at 11/19/2024 1431   [place] other (see comments)  [Dependent (Max A x 1, Mod A x 1)] at 11/19/2024 1431    Time Frame 1 week, short-term goal (STG) at 11/19/2024 1431   Gait/Walking Locomotion Goal 2    Activity/Assistive Device gait (walking locomotion), walker, front-wheeled at 11/19/2024 1431   Distance 25 feet at 11/19/2024 1431   [place] maximum assist (25-49% patient effort) at 11/19/2024 1431   Time Frame 21 days or less, long-term goal (LTG) at 11/19/2024 1431   Wheelchair Locomotion Goal 1    Activity forward propulsion, backward propulsion, steering, stopping, turning, doorway navigation, weight shifting at 11/19/2024 1431   Assistive Device manual, ultra lightweight at 11/19/2024 1431   Distance 150 feet at 11/19/2024 1431   [place] other (see comments)  [Touching / Steadying Assist] at 11/19/2024 1431   Time Frame 1 week, short-term goal (STG) at 11/19/2024 1431   Wheelchair Locomotion Goal 2    Activity forward propulsion, backward propulsion, steering, stopping, turning, doorway navigation, weight shifting at 11/19/2024 1431   Assistive Device manual, ultra lightweight at 11/19/2024 1431   Distance 150 feet at 11/19/2024 1431   [place] modified independence at 11/19/2024 1431   Time Frame 21 days or less, long-term goal (LTG) at 11/19/2024 1431

## 2024-11-20 NOTE — PROGRESS NOTES
Patient: Bebe Burks  Location: [facility] [address]  MRN: [id number]  Today's date: 11/20/2024    History of Present Illness  Bebe is a 64 y.o. female admitted on 11/19/2024 with History of above knee amputation, left (CMS/[facility]) [Z89.612]  Encounter for prosthetic gait training [Z47.89]. Principal problem is History of above knee amputation, left (CMS/[facility]).    Pt is a 64 y.o. 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       Past Medical History  Bebe has a past medical history of Abnormal ECG, Abnormal finding on chest xray (12/2021), Arthritis, Colon cancer (CMS/[facility]), COVID-19 (12/2021), COVID-19 vaccine series completed, Diabetic neuropathy (CMS/HCC), DM (diabetes mellitus), type 2 with ophthalmic complications (CMS/HCC), Hypertension, Hypothyroidism, Left foot drop, Lipid disorder, and Type 2 diabetes mellitus treated with insulin (CMS/HCC).    PT Vitals      Date/Time Pulse HR Source Pt Activity O2 Therapy BP BP Location BP Method Pt Position [facility]   11/20/24 1311 97 Monitor At rest None (Room air) 139/77 Left upper arm Automatic Sitting SC          PT Pain      Date/Time Pain Type Rating: Rest Rating: Activity [facility]   11/20/24 1311 Pain Assessment 0 - no pain 0 - no pain SC   11/20/24 1359 Pain Reassessment 0 - no pain 0 - no pain SC               Prior Living Environment      Flowsheet Row Most Recent Value   People in Home alone   Current Living Arrangements condominium   Home Accessibility stairs to enter home (Group)   Living Environment Comment resides in 1st floor condo, 4 (1+2+1) JOSE RAFAEL, R hand rail, no stairs inside home, Bathroom Layout: Tub/shower unit with shower curtain, Shower stall with glass doors (shower stall in main bedroom), two sisters can provide some assistance   Number of Stairs, Main Entrance 4   Surface of Stairs, Main Entrance concrete   Stair Railings, Main Entrance railing on  right side (ascending)   Stairs Comment, Main Entrance 1+2+1 JOSE RAFAEL   Location, Bathroom first (main) floor   Bathroom Access tub bath, walk-in shower            Prior Level of Function      Flowsheet Row Most Recent Value   Dominant Hand right   Ambulation assistive equipment   Transferring assistive equipment   Toileting independent   Bathing assistive equipment   Dressing independent   Eating independent   IADLs assistive equipment   Driving/Transportation    Communication understands/communicates without difficulty   Assistive Device Currently Used at Home cane, straight, walker, front-wheeled, shower chair, grab bar, raised toilet             IRF PT Evaluation and Treatment - 11/20/24 1313          PT Time Calculation    Start Time 1300     Stop Time 1400     Time Calculation (min) 60 min        Session Details    Document Type Daily Treatment/Progress Note        General Information    General Observations of Patient Pt received seated upright in w/c in therapy gym; willing to participate in PT        Mobility Belt    Mobility Belt Used During Session yes        Transfers    Comment Gait belt donned        Sit to Stand Transfer    [place], Sit to Stand Transfer dependent (less than 25% patient effort)     Safety/Cues increased time to complete;verbal cues;hand placement;maintaining center of gravity over base of support;technique     Assistive Device parallel bars     Comment from w/c to // bars; maxAx1 at L pelvis for hip & L knee ext & anterior WSing; Fay of 2nd person on R for assist with pelvic rise & R knee guard; pt unable to achieve full R knee ext d/t hx of TKA        Stand to Sit Transfer    [place], Stand to Sit Transfer maximum assist (25-49% patient effort)     Safety/Cues increased time to complete;verbal cues;hand placement;maintaining center of gravity over base of support;technique     Assistive Device parallel bars     Comment to w/c from // bars; maxAx1 on L for  eccentric controlled descent & for LLE lift prior to sit without unlocking knee d/t balance impairments; PT maxA initially to assist to lift LLE prior to sitting with pt progressing to manage LLE lift independently prior to sit with additional trials        Stand Pivot Transfer    Safety/Cues --     Assistive Device --     Comment --        Gait Training    [place], Gait dependent (less than 25% patient effort)     Safety/Cues increased time to complete;verbal cues;maintaining center of gravity over base of support;technique;sequencing     Assistive Device parallel bars     Distance in Feet 7 feet     Pattern step-to;step-through     Deviations/Abnormal Patterns bilateral deviations;left sided deviations;right sided deviations;base of support, narrow;delta decreased;gait speed decreased;step length decreased;weight shifting decreased     Left Sided Gait Deviations --   decr cornelio-lateral WSing    Right Sided Gait Deviations --   scissoring noted    Comment 2x7' in // bars; maxAx1 on L for LLE advance/placement assist, cornelio-lateral WSing, & balance support; min-modA of 2nd therapist on R for step placement assist to prevent scissoring; 3rd person w/c follow for safety; VC t/o for LE sequencing & to advance UE fwd on // bars for balance        Balance    Balance Interventions standing     Comment, Balance standing balance in // bars with modA at L pelvis for balance stability 1) lateral WSing, 2x10 2) RLE fwd/bwd stepping in place, 1x10 3) LLE fwd/bwd stepping in place, 1x10 4) RLE cornelio-lateral WSing, 2x10 5) 2x trials to unlock prosthesis in standing in R cornelio-lateral WS        Therapeutic Interventions    Comment, Therapeutic Intervention 1) Mirror provided with education to complete frequent skin checks to be aware of any changes that may/may not occur, pointed out pink area in postero-lateral skin fold post session for pt to be aware of        Prosthetic Orientation (Lower Extremity)    Comment, Fit  Assessment [place] / [place]: MaxA to julian liner and prosthesis; maxA to doff prosthesis; pt able to doff sock & liner independently; pre skin check unremarkable; post skin check notable for blanchable pinkness on posteo-lateral skin fold; Wear time: 40'. Ply: 3        Daily Progress Summary (PT)    Daily Outcome Statement Focus of session on pre-gait activities, standing balance, & ambulation. Pt progressing to manage LLE lift independently prior to sit as unlocking knee prior to sit was unsafe at this time d/t balance impairments. Pt requiring assist of 2 therapists & w/c follow for safety with ambulation trials for LLE advance & placement assist. Pt benefitting from VC for anterior WSing with pelvis to prevent posterior LOB with activity. Plan to progress standing tolerance & ambulation.                               IRF PT Goals      Flowsheet Row Most Recent Value   Transfer Goal 1    Activity/Assistive Device stand pivot, walker, front-wheeled at 11/19/2024 1431   [place] other (see comments)  [Dependent (Max A x 1, Mod A x 1)] at 11/19/2024 1431   Time Frame 1 week, short-term goal (STG) at 11/19/2024 1431   Transfer Goal 2    Activity/Assistive Device stand pivot, walker, front-wheeled at 11/19/2024 1431   [place] maximum assist (25-49% patient effort) at 11/19/2024 1431   Time Frame 21 days or less, long-term goal (LTG) at 11/19/2024 1431   Transfer Goal 3    Activity/Assistive Device sit-to-stand/stand-to-sit, walker, front-wheeled at 11/19/2024 1431   [place] maximum assist (25-49% patient effort), verbal cues required at 11/19/2024 1431   Time Frame 1 week, short-term goal (STG) at 11/19/2024 1431   Transfer Goal 4    Activity/Assistive Device sit-to-stand/stand-to-sit, walker, front-wheeled at 11/19/2024 1431   [place] minimum assist (75% or more patient effort) at 11/19/2024 1431   Time Frame 21 days or less, long-term goal (LTG) at 11/19/2024 1431   Gait/Walking Locomotion Goal 1     Activity/Assistive Device gait (walking locomotion), walker, front-wheeled at 11/19/2024 1431   Distance 10 feet at 11/19/2024 1431   [place] other (see comments)  [Dependent (Max A x 1, Mod A x 1)] at 11/19/2024 1431   Time Frame 1 week, short-term goal (STG) at 11/19/2024 1431   Gait/Walking Locomotion Goal 2    Activity/Assistive Device gait (walking locomotion), walker, front-wheeled at 11/19/2024 1431   Distance 25 feet at 11/19/2024 1431   [place] maximum assist (25-49% patient effort) at 11/19/2024 1431   Time Frame 21 days or less, long-term goal (LTG) at 11/19/2024 1431   Wheelchair Locomotion Goal 1    Activity forward propulsion, backward propulsion, steering, stopping, turning, doorway navigation, weight shifting at 11/19/2024 1431   Assistive Device manual, ultra lightweight at 11/19/2024 1431   Distance 150 feet at 11/19/2024 1431   [place] other (see comments)  [Touching / Steadying Assist] at 11/19/2024 1431   Time Frame 1 week, short-term goal (STG) at 11/19/2024 1431   Wheelchair Locomotion Goal 2    Activity forward propulsion, backward propulsion, steering, stopping, turning, doorway navigation, weight shifting at 11/19/2024 1431   Assistive Device manual, ultra lightweight at 11/19/2024 1431   Distance 150 feet at 11/19/2024 1431   [place] modified independence at 11/19/2024 1431   Time Frame 21 days or less, long-term goal (LTG) at 11/19/2024 1431

## 2024-11-20 NOTE — PLAN OF CARE
Plan of Care Review  Plan of Care Reviewed With: patient  Progress: improving  Outcome Evaluation: Patient is aao x4, continent of bowel last BM on 11/19, incontinent of bladder overnight.  cc noted and straight cath done for 800cc. Pain mangement with PRN Diluadid. TID accu check. Slept well @ night. SCDs to right leg. Fall and safety precautions  maintained. Call bell within reach and bed alarm is on.

## 2024-11-20 NOTE — PLAN OF CARE
Problem: Rehabilitation ([facility]) Plan of Care  Goal: Plan of Care Review  Outcome: Progressing  Flowsheets (Taken 11/19/2024 2102)  Progress: improving  Plan of Care Reviewed With: patient  Outcome Evaluation: PT evaluation completed. PT short-term goals, long-term goals and plan of care established.

## 2024-11-20 NOTE — PROGRESS NOTES
Subjective    Patient seen and examined on rounds.  Chart reviewed.  Events overnight noted.  History reviewed briefly with patient.    CC:  Deficits in mobility, transfers, self-care status post left transfemoral amputation for prosthetic training, severe peripheral vascular disease, chronic myelocytic leukemia, osteomyelitis left foot, diabetic neuropathy, multiple medical problems    HPI:  Ms. 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 Pura Chaves. ID recommended IV Ceftriaxone and IV Ampicillin until 9/14/24.  She had urinary retention managed with indwelling Rowan catheter.  She had right upper extremity swelling present during this admission, ultrasound on 8/29/24 demonstrated an acute non-occlusive DVT of the right axillary and brachial vein around line (on Lovenox).  Subsequently she completed an acute inpatient rehabilitation stay for pre-prosthetic training at [facility] between 9/3/24 to 9/21/24 and was discharged to [facility] on 9/21/24.  She initially required bladder self intermittent catheterizations due to urinary retention, but indicates that now she is able to void.  She desired to be a prosthetic ambulator.  A left transfemoral amputation prosthesis was fabricated. and followed-up as outpatient in the amputee clinic at [facility] and she was deemed to be an  appropriate candidate for acute inpatient rehabilitation stay. She has been needing assistance for mobility, transfers, self-care.  I have reviewed the preadmission screening and concur with that information and there are no significant changes from patient's preadmission screening. She is transferred to [facility] on 11/19/24 from [facility] for further acute inpatient rehabilitation stay for prosthetic training with transfemoral amputation prosthesis.    SUBJ: Slept well last night.  Tolerating therapies per endurance.  Requests a flu shot which will be ordered.  Working on donning and doffing of prosthesis.  Discussed with the importance of skin checks on the prosthesis.  Able to ambulate 7 feet in parallel bars dependent gait using left transfemoral amputation prosthesis with physical therapy.  Dependent for sit to stand transfers with physical therapy.    ROS: Denies chest pain or shortness of breath. Other ROS negative. Past, family, social history is unchanged.    Current Functional Status:   Bed mobility:   Calvin, Supine to Sit: modified independence   Calvin, Sit to Supine: modified independence   Transfers:    Calvin, Sit to Stand Transfer: dependent (less than 25% patient effort)  Calvin, Stand to Sit Transfer: dependent (less than 25% patient effort)   Calvin, Stand Pivot/Stand Step Transfer: dependent (less than 25% patient effort), 2 person assist   Gait:   Calvin, Gait: dependent (less than 25% patient effort)  Assistive Device: parallel bars   Distance in Feet: 7 feet    Bathing:   Calvin: minimum assist (75% or more patient effort)   Toileting:   Calvin: moderate assist (50-74% patient effort)   Upper body dressing:   Calvin: close supervision   Lower body dressing:   Calvin: dependent (less than 25% patient effort)     Functional Progress:    Functional status reviewed. Overall, patient's functional status is  "improving.      Physical Exam      Blood pressure 139/77, pulse 97, temperature 37.1 °C (98.7 °F), temperature source Oral, resp. rate 18, height 1.702 m (5' 7.01\"), weight 69.4 kg (153 lb), SpO2 95%, not currently breastfeeding.    Body mass index is 23.96 kg/m².    General Appearance: Not in acute distress  Head/Ear/Nose/Throat: Normocephalic; Atraumatic.   Eye: EOMI; PERRL.   Neck: No JVD; No Bruits.   Respiratory: Decreased breath sounds at bases.   Cardiovascular: RRR; Normal S1, S2.   Gastrointestinal: Soft; NT; +BS.   Extremities: Bilateral lower extremity edema noted.  Left transfemoral amputation stump is stable.  Musculoskeletal: Functional active range of motion in both upper and right lower extremities.  Functional active range of motion in left hip.  She has a left transfemoral amputation.  Neurological: AAO ×3. Speech is fluent. Cranial nerve examination does not reveal any gross facial asymmetry. Strength testing shows about 4+/5 strength in both upper and right lower extremities.  Left hip flexion is 4/5.  She has a left transfemoral amputation.  She is grossly able to localize touch sensation.  She is able to localize position sense in right foot great toe position sense is unable to be tested on left side.  Deep tendon reflexes are hypoactive bilaterally.  Right plantar is flexor, left plantar was unable to be tested. Coordination is functional upper extremities.    Behavior/Emotional: Appropriate; Cooperative.   Skin: Left transfemoral amputation stump stable.  Incision appears to be well-healed.  Some wounds are noted on right anterior lower forearm.  Small wound noted on right foot fifth toe.  Small wound also noted on right foot great toe with some slough in it and scaly skin around it.  Some redness noted on sacrum/coccyx.  Reviewed pictures of wounds in Epic.      Current Facility-Administered Medications:     acetaminophen (TYLENOL) tablet 650 mg, 650 mg, oral, q4h PRN, Charlette Brooke MD    " alum-mag hydroxide-simeth (MAALOX) 200-200-20 mg/5 mL suspension 30 mL, 30 mL, oral, q4h PRN, Charlette Brooke MD    apixaban (ELIQUIS) tablet 5 mg, 5 mg, oral, BID, Charlette Brooke MD, 5 mg at 11/20/24 0822    [START ON 11/21/2024] ascorbic acid (VITAMIN C) tablet 500 mg, 500 mg, oral, q48h INT, Charlette Brooke MD    atorvastatin (LIPITOR) tablet 40 mg, 40 mg, oral, Daily (6p), Charlette Brooke MD, 40 mg at 11/19/24 1723    bisacodyL (DULCOLAX) 10 mg suppository 10 mg, 10 mg, rectal, Daily PRN, Charlette Brooke MD    docusate sodium (COLACE) capsule 100 mg, 100 mg, oral, BID, Charlette Brooke MD    DULoxetine (CYMBALTA) capsule,delayed release(DR/EC) 60 mg, 60 mg, oral, Nightly, Charlette Brooke MD, 60 mg at 11/19/24 2126    empagliflozin (JARDIANCE) tablet 10 mg, 10 mg, oral, Daily, Charlette Brooke MD, 10 mg at 11/20/24 0822    [START ON 11/21/2024] ferrous sulfate tablet 325 mg, 325 mg, oral, q48h INT, Charlette Brooke MD    flu vaccine ts 2024-25 (65 yrs&up)(PF) (FLUZONE HIGH-DOSE TRIV) injection 180 mcg, 0.5 mL, intramuscular, During hospitalization, Alexei Petty MD    fluticasone propionate (FLONASE) 50 mcg/actuation nasal spray 1 spray, 1 spray, Each Nostril, Daily PRN, Charlette Brooke MD    furosemide (LASIX) tablet 40 mg, 40 mg, oral, BID (am, 4p), Charlette Brooke MD, 40 mg at 11/20/24 0822    gabapentin (NEURONTIN) capsule 600 mg, 600 mg, oral, TID, Charlette Brooke MD, 600 mg at 11/20/24 0822    HYDROmorphone (DILAUDID) tablet 4 mg, 4 mg, oral, q6h PRN, Charlette Brooke MD, 4 mg at 11/19/24 2249    imatinib (GLEEVEC) chemo tablet 400 mg, 400 mg, oral, Daily, Charlette Brooke MD, 400 mg at 11/20/24 0824    insulin glargine U-100 (LANTUS/BASAGLAR) pen 12 Units, 12 Units, subcutaneous, Nightly, Charlette Brooke MD    insulin lispro U-100 (HumaLOG) pen 1-12 Units, 1-12 Units, subcutaneous, TID with meals, Charlette Brooke MD, 6 Units at 11/20/24 1213    insulin lispro U-100 (HumaLOG) pen 8 Units, 8 Units, subcutaneous, TID with  meals, Charlette Brooke MD, 8 Units at 11/20/24 1212    levothyroxine (SYNTHROID) tablet 25 mcg, 25 mcg, oral, Daily (6:30a), Charlette Brooke MD, 25 mcg at 11/20/24 0556    melatonin ODT 3 mg, 3 mg, oral, Nightly, Charlette Brooke MD, 3 mg at 11/19/24 2125    methenamine (HIPREX) tablet 1 g, 1 g, oral, BID, Charlette Boroke MD, 1 g at 11/20/24 0822    multivitamin tablet 1 tablet, 1 tablet, oral, Daily, Charlette Brooke MD, 1 tablet at 11/20/24 0822    ondansetron ODT (ZOFRAN-ODT) disintegrating tablet 4 mg, 4 mg, oral, q8h PRN, Charlette Brooke MD    potassium chloride (KLOR-CON M) ER tablet (particles/crystals) 20 mEq, 20 mEq, oral, Daily, Charlette Brooke MD, 20 mEq at 11/20/24 0822    senna (SENOKOT) tablet 2 tablet, 2 tablet, oral, Daily, Charlette Brooke MD    tamsulosin (FLOMAX) 24 hr ER capsule 0.4 mg, 0.4 mg, oral, Daily with dinner, Charlette Brooke MD, 0.4 mg at 11/19/24 1719    vancomycin (VANCOCIN) capsule 125 mg, 125 mg, oral, BID, Charlette Brooke MD, 125 mg at 11/20/24 0822       Labs / Radiology    Lab Results   Component Value Date    WBC 4.98 11/20/2024    HGB 10.8 (L) 11/20/2024    HCT 32.7 (L) 11/20/2024    MCV 90.8 11/20/2024     11/20/2024     Lab Results   Component Value Date    GLUCOSE 205 (H) 11/20/2024    CALCIUM 8.7 11/20/2024     11/20/2024    K 3.7 11/20/2024    CO2 30 11/20/2024     11/20/2024    BUN 35 (H) 11/20/2024    CREATININE 0.9 11/20/2024       Assessment and Plan    ASSESSMENT PLAN:  1. 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 Pura Chaves. ID recommended IV Ceftriaxone and IV Ampicillin until 9/14/24.  She had urinary retention managed with indwelling Rowan catheter.  She had right upper extremity swelling present during this admission, ultrasound on 8/29/24 demonstrated an acute non-occlusive DVT of the right axillary and brachial vein around line (on Lovenox).  Subsequently she completed an acute inpatient rehabilitation stay for pre-prosthetic training at [facility] between 9/3/24 to 9/21/24 and was discharged to [facility] on 9/21/24.  She initially required bladder self intermittent catheterizations due to urinary retention, but indicates that now she is able to void.  She desired to be a prosthetic ambulator.  A left transfemoral amputation prosthesis was fabricated. and followed-up as outpatient in the amputee clinic at [facility] and she was deemed to be an appropriate candidate for acute inpatient rehabilitation stay. She has been needing assistance for mobility, transfers, self-care.  I have reviewed the preadmission screening and concur with that information and there are no significant changes from patient's preadmission screening. She is transferred to [facility] on 11/19/24 from [facility] for further acute inpatient rehabilitation stay for prosthetic training with transfemoral amputation prosthesis.     2. DVT prophylaxis/treatment - on Eliquis.  Platelets 168 on 11/20/2024.     3. Vascular - status post left transfemoral amputation for prosthetic training, severe peripheral vascular disease, chronic myelocytic leukemia, osteomyelitis left foot, diabetic neuropathy, multiple medical problems - continue PT, OT, psychology.  Follow falls precautions, cardiac precautions, monitor pulse oximeter in therapy.  Monitor skin under  prosthesis.  Teach donning and doffing of left transfemoral amputation prosthesis.  Do gait training with prosthesis.     4. GI - On Colace, Senokot.  On PRN Maalox.  On PRN Dulcolax suppository.  On PRN Zofran.     5.  -on Hiprex.  On Flomax.  Monitor postvoid residual.     6. CVS - on Lasix.  Monitor for orthostasis.     7. Pulmonary -encourage incentive spirometry.     8. Hematology - monitor hemoglobin, platelets.  Hemoglobin 10.8, WBC 4.98 on 11/20/2024.     9. Pain -on Cymbalta.  On Neurontin.  On PRN Tylenol.  On PRN Dilaudid.     10. Skin - Left transfemoral amputation stump stable.  Incision appears to be well-healed.  Some wounds are noted on right anterior lower forearm.  Small wound noted on right foot fifth toe.  Small wound also noted on right foot great toe with some slough in it and scaly skin around it.  Some redness noted on sacrum/coccyx.  Reviewed pictures of wounds in Epic. Monitor skin under the left transfemoral amputation prosthesis.     11. F/E/N - on Ferrous sulfate.  On vitamin C. on MVI.  On K-Genesis con.     12. Hyperlipidemia -on Lipitor.     13. Diabetes mellitus type 2 - on Lantus insulin.  On sliding scale Humalog coverage.  On Jardiance.     14. Oncology - On Gleevec for chronic myelocytic leukemia.     15. Hypothyroidism - on Synthroid.     16. Insomnia - on Melatonin.     17. Infectious diseases - On oral Vancomycin.       18. Seasonal allergies - on PRN Flonase.     19. Psychiatry - mood stable.  Psychology consulted.     20. Rehabilitation medicine - Continue comprehensive rehabilitation care. Continue PT, OT, speech, psychology.  We will follow falls precautions, cardiac precautions, monitor pulse oximetry in therapy and follow weightbearing precautions.  We will follow spinal precautions as appropriate.Tolerating therapies per endurance on 11/20/2024.  Slept well last night.   Working on donning and doffing of prosthesis.  Discussed with the importance of skin checks on the  prosthesis.  Able to ambulate 7 feet in parallel bars dependent gait using left transfemoral amputation prosthesis with physical therapy.  Dependent for sit to stand transfers with physical therapy on 11/20/2024.     21. Reviewed labs today.  BUN 35, creatinine 0.9, sodium 139, potassium 3.7 on 11/20/2024.           Alexei Petty MD  11/20/2024      This encounter was completed utilizing the Direct Speech Voice Recognition Software. Grammatical errors, random word insertions, pronoun errors, and incomplete sentences are occasional consequences of the system due to software limitations, ambient noises, and hardware issues. Such errors may be missed prior to affixing electronic signature. Any questions or concerns about the content, text, or information contained within the body of this dictation should be directly addressed to the physician for clarification. If you have any questions or concerns please do not hesitate to call me directly via EPIC chat, page, or email.

## 2024-11-20 NOTE — CONSULTS
"     Clinical Course: Patient is a 64 y.o. female who was admitted on 11/19/2024 with a diagnosis of History of above knee amputation, left (CMS/HCC) [Z89.612]  Encounter for prosthetic gait training [Z47.89].     Nutrition Interventions/Recommendations:   1800 CC NCS diet  Pt request no egg entrees (entered in CBORD)  Will offer diet information, education at follow up  Monitor PO, weight, labs, skin, POC  Nutrition  risk level 1      Past Medical History:   Diagnosis Date    Abnormal ECG     Abnormal finding on chest xray 12/2021    Arthritis     Colon cancer (CMS/HCC)     COVID-19 12/2021    COVID-19 vaccine series completed     Moderna: \" 3 doses\"    Diabetic neuropathy (CMS/HCC)     DM (diabetes mellitus), type 2 with ophthalmic complications (CMS/HCC)     Hypertension     Hypothyroidism     Left foot drop     Lipid disorder     Type 2 diabetes mellitus treated with insulin (CMS/HCC)      Past Surgical History   Procedure Laterality Date    Abcess drainage  10/2023    AMPUTATION TOE (RAY RESECTION) Left 1/18/2024    Performed by Getachew Damian DPM at [facility] OR PAV    Angiogram lower extremity bilateral N/A 1/17/2024    Performed by Adam Aguilar MD at [facility] CATH/EP/NEURO INT    Aortogram abdominal with serialography N/A 1/17/2024    Performed by Adam Aguilar MD at [facility] CATH/EP/NEURO INT    Cataract extraction Bilateral     CATARACT PHACO, IOL Right 11/6/2018    Performed by Derrick Jeter MD at [facility]    Colonoscopy      Combined hysteroscopy diagnostic / d&c  2007    Eye surgery Left 2017    cataract    Joint replacement Right 02/2023    knee replacement    Right Total Knee Repalcement Right 2/27/2023    Performed by Gideon Centeno MD [place] [facility]    Tib/peroneal angioplasty & atherectomy - unilateral - initial N/A 1/17/2024    Performed by Adam Aguilar MD at [facility] CATH/EP/NEURO INT    Toe amputation      Vitrectomy Left 2016        Adult Nutrition Assessment - 11/20/24 " 1400          Charting Type    Nutrition Charting Type Nutrition Brief Assessment     RD Assessment Date 11/20/24     Nutrition Status Classification Mild nutritional compromise     Time Spent (Minutes) 25        Reason for Assessment    Reason For Assessment physician consult     Diagnosis --   L-AKA  PROSTHETIC TRAINING       Nutrition/Diet History    Intake (%) 100%     Food Preferences does not like eggs     Supplemental Drinks/Foods/Additives none     Vitamin/Mineral/Herbal Supplements vit C, ferrous sulfate, MVI. KCL     Food Allergies NKFA     Factors Affecting Nutritional Intake --   none       Body Mass Index (BMI)    BMI Assessment BMI 25-29.9: overweight   BMI adjusted for AKA       Labs/Procedures/Meds    Lab Results Reviewed reviewed   7/1/24: A1C 7.1 (improved from 8-10 range)       Medications    Pertinent Medications Reviewed reviewed     Pertinent Medications Comments lipitor, jardiance, lasix, lantus 12U, humalog scale 8 U TID, synthroid, vanco        Physical Findings    Overall Physical Appearance overweight;amputee     Skin --   rash on coccyx       Nutrition Order    Nutrition Order meets nutritional requirements     Nutrition Order Comments 1800 CC, NCS        PES Statement    Nutritional Needs Met? Yes                     CMP Results         11/20/24 07/29/24 04/25/24     0538 0725 0650     136 140    K 3.7 3.7 4.1    Cl 102 99 107    CO2 30 27 28    Glucose 205 142 124    BUN 35 22 26    Creatinine 0.9 0.5 0.5    Calcium 8.7 7.8 9.0    Anion Gap 7 10 5    AST 16 17 23    ALT 20 4 24    Albumin 3.7 2.6 3.8    EGFR >60.0 >60.0 >60.0           Comment for EGFR at 0538 on 11/20/24: Calculation based on the Chronic Kidney Disease Epidemiology Collaboration (CKD-EPI) equation refit without adjustment for race.    Comment for EGFR at 0725 on 07/29/24: Calculation based on the Chronic Kidney Disease Epidemiology Collaboration (CKD-EPI) equation refit without adjustment for race.    Comment for  "EGFR at 0650 on 04/25/24: Calculation based on the Chronic Kidney Disease Epidemiology Collaboration (CKD-EPI) equation refit without adjustment for race.          Lab Results   Component Value Date    ALT 20 11/20/2024    AST 16 11/20/2024    ALKPHOS 74 11/20/2024    BILITOT 0.7 11/20/2024     Lab Results   Component Value Date    OFLXTOKY71 1,227 (H) 03/05/2024     Lab Results   Component Value Date    WBC 4.98 11/20/2024    HGB 10.8 (L) 11/20/2024    HCT 32.7 (L) 11/20/2024    MCV 90.8 11/20/2024     11/20/2024     Lab Results   Component Value Date    CHOL 121 01/31/2024    CHOL 143 10/17/2022    CHOL 157 11/22/2021     Lab Results   Component Value Date    HDL 36 (L) 01/31/2024    HDL 46 (L) 10/17/2022    HDL 38 (L) 11/22/2021     Lab Results   Component Value Date    LDLCALC 59 01/31/2024    LDLCALC 85 10/17/2022    LDLCALC 98 11/22/2021     Lab Results   Component Value Date    TRIG 132 01/31/2024    TRIG 62 10/17/2022    TRIG 106 11/22/2021     No results found for: \"CHOLHDL\"  Lab Results   Component Value Date    CALCIUM 8.7 11/20/2024    PHOS 3.7 11/20/2024     Glucose Results         07/01/24     0720    HBG A1C 7.1           Latest Reference Range & Units 11/19/24 13:11 11/19/24 17:06 11/20/24 07:10 11/20/24 11:55   POC Test  POC POC POC POC   POCT Bedside Glucose 70 - 99 mg/dL 179 (H) 193 (H) 212 (H) 320 (H)   (H): Data is abnormally high     apixaban  5 mg oral BID    [START ON 11/21/2024] ascorbic acid  500 mg oral q48h INT    atorvastatin  40 mg oral Daily (6p)    docusate sodium  100 mg oral BID    DULoxetine  60 mg oral Nightly    empagliflozin  10 mg oral Daily    [START ON 11/21/2024] ferrous sulfate  325 mg oral q48h INT    furosemide  40 mg oral BID (am, 4p)    gabapentin  600 mg oral TID    imatinib  400 mg oral Daily    insulin glargine U-100  12 Units subcutaneous Nightly    insulin lispro U-100  1-12 Units subcutaneous TID with meals    insulin lispro U-100  8 Units subcutaneous TID " "with meals    levothyroxine  25 mcg oral Daily (6:30a)    melatonin  3 mg oral Nightly    methenamine  1 g oral BID    multivitamin  1 tablet oral Daily    potassium  20 mEq oral Daily    senna  2 tablet oral Daily    tamsulosin  0.4 mg oral Daily with dinner    vancomycin  125 mg oral BID           Wt Readings from Last 3 Encounters:   11/20/24 69.4 kg (153 lb)   11/18/24 77.6 kg (171 lb)   03/06/24 85 kg (187 lb 6.3 oz)       Weights (last 7 days)       Date/Time Weight    11/20/24 0900 69.4 kg (153 lb)    11/19/24 1430 77.6 kg (171 lb)            Clinical Comments:  Pt comes for prosthetic training from .  RD explained current 1800 CC diet with  Carb counting. Pt reports high POC at , not as strict diet and pt states \"I cheated.\"  Pt agrees to diet education at follow up. Provided DM/heart healthy alternate list.        Date: 11/20/24  Signature: BANDAR Reynoso    "

## 2024-11-20 NOTE — PROGRESS NOTES
Patient: Bebe Burks  Location: [facility] [address]  MRN: [id number]  Today's date: 11/20/2024    History of Present Illness  Bebe is a 64 y.o. female admitted on 11/19/2024 with History of above knee amputation, left (CMS/[facility]ly [facility]) [Z89.612]  Encounter for prosthetic gait training [Z47.89]. Principal problem is History of above knee amputation, left (CMS/[facility]ly [facility]).    Pt is a 64 y.o. 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       Past Medical History  Bebe has a past medical history of Abnormal ECG, Abnormal finding on chest xray (12/2021), Arthritis, Colon cancer (CMS/[facility]), COVID-19 (12/2021), COVID-19 vaccine series completed, Diabetic neuropathy (CMS/[facility]), DM (diabetes mellitus), type 2 with ophthalmic complications (CMS/[facility]), Hypertension, Hypothyroidism, Left foot drop, Lipid disorder, and Type 2 diabetes mellitus treated with insulin (CMS/[facility]).    OT Vitals      Date/Time Pulse HR Source BP MAP BP Location BP Method Pt Position [facility]   11/20/24 0907 80 Monitor 132/63 91 mmHg Right upper arm Automatic Lying KB   11/20/24 0959 89 Monitor 134/62 89 mmHg Right upper arm Automatic Sitting KB          OT Pain      Date/Time Pain Type Rating: Rest Rating: Activity [facility]   11/20/24 0907 Pain Assessment 0 -- KB   11/20/24 0959 Pain Assessment -- 0 KB               Prior Living Environment      Flowsheet Row Most Recent Value   People in Home alone   Current Living Arrangements condominium   Home Accessibility stairs to enter home (Group)   Living Environment Comment resides in 1st floor condo, 4 (1+2+1) [place]SE RAFAEL, R hand rail, no stairs inside home, Bathroom Layout: Tub/shower unit with shower curtain, Shower stall with glass doors (shower stall in main bedroom), two sisters can provide some assistance   Number of Stairs, Main Entrance 4   Surface of Stairs, Main Entrance concrete   Stair Railings, Main Entrance railing on  right side (ascending)   Stairs Comment, Main Entrance 1+2+1 [place]SE RAFAEL   Location, Bathroom first (main) floor   Bathroom Access tub bath, walk-in shower            Prior Level of Function      Flowsheet Row Most Recent Value   Dominant Hand right   Ambulation assistive equipment   Transferring assistive equipment   Toileting independent   Bathing assistive equipment   Dressing independent   Eating independent   IADLs assistive equipment   Driving/Transportation    Communication understands/communicates without difficulty   Assistive Device Currently Used at Home cane, straight, walker, front-wheeled, shower chair, grab bar, raised toilet            Occupational Profile      Flowsheet Row Most Recent Value   Successful Occupations Pt is very active in her community   Occupational History/Life Experiences 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 no        Prior Level of Function    IADLs assistive equipment     Driving/Transportation         Cognition/Psychosocial    Comment, Cognition Pt observed to have decreased safety awareness; decreased problem-solving with ADL tasks        Confusion Assessment Method (CAM)    Is there evidence of an acute change in mental status from the patient's baseline? No     Inattention Behavior not present     Disorganized thinking Behavior not present     Altered level of consciousness  Behavior not present        Vision Assessment/Intervention    Impact of Vision Impairment on Function appears WFL in ADL context; not formally assessed d/t time constraints        Sensory Assessment    Left UE Sensory Assessment --   TBA    Right UE Sensory Assessment --   TBA       Range of Motion (ROM)    Left Upper Extremity (ROM) --   appears WFL in ADL context    Right Upper Extremity (ROM) --   appears WFL in ADL context       Strength (Manual Muscle Testing)    Left Upper Extremity Strength --   appears WFL in ADL context    Right Upper Extremity Strength --   appears WFL in ADL context       Bed Mobility    Comment OT: min A supine to sit at EOB; A with trunk control; HOB elevated        Transfers    Transfers low pivot transfer        Low Pivot Transfer    [place], Low Pivot Transfer minimum assist (75% or more patient effort)     Safety/Cues increased time to complete     Assistive Device wheelchair     Comment incremental LPT w/c to/from EOB; min A for balance without prosthetic        Toilet Transfer    Transfer Technique --     [place] unable to assess     Safety/Cues --     Assistive Device --     Comment Incremental LPT w/c to/from platform drop-arm commode; min A for balance without prosthetic        Shower Transfer    Transfer Technique low pivot     [place] minimum assist (75% or more patient effort)     Safety/Cues increased time to complete     Assistive Device grab bars/tub rail;tub bench;wheelchair     Comment Incremental LPT w/c to/from tub bench; min A for balance without prosthetic        Impairments/Safety Issues    Comment, Safety Issues/Impairments --        Balance    Comment, Balance OT: cl S sitting without prosthetic; unsafe to stand this date d/t decreased balance without prothestic        Motor Skills    Comment, Motor Skills appears WFL in ADL context; not formally assessed d/t time constraints        Bathing    Shower Provided? Yes     [place] minimum assist  (75% or more patient effort)     Safety/Cues increased time to complete     Position supported sitting     Setup Assistance adaptive equipment setup;adjust water temperature/flow;obtain supplies     Adaptive Equipment grab bar/tub rail;hand-held shower spray hose;tub bench     Comment full wet shower completed while seated on tub bench; required min A with buttocks, lateral leaning        Upper Body Dressing    Tasks don;bra/undergarment;pull over garment     [place] close supervision     Safety/Cues increased time to complete     Position supported sitting     Adaptive Equipment none     Comment donned bra and pullover shirt while seated in w/c; cl S; set up with clothing        Lower Body Dressing    Tasks don;pants/bottoms;shoes/slippers;socks;underwear     [place] dependent (less than 25% patient effort)     Safety/Cues increased time to complete     Position supported sitting;supine     Adaptive Equipment none     [place], Footwear dependent (less than 25% patient effort)     Comment pt able to thread B LE with increased time; pt required A to hike via lateral leans; D with R sock/shoe; recommend trialing AD; per PT report, 2 person assist to don prosthetic on L LE        Grooming    Tasks oral care (brushing teeth, cleaning dentures)     [place] modified independence     Safety/Cues increased time to complete     Position supported sitting     Adaptive Equipment none     [place], Oral Hygiene modified independence     Comment seated in w/c at sink        Toileting    [place] moderate assist (50-74% patient effort)     Adaptive Equipment --   platform drop-arm commode    Comment per nursing report, mod A without prosthetic donned        LE Residual Limb Evaluation    Amputation Level transfemoral, standard     Amputation Laterality left side     Incision Management incision line adequately closed for scar management     Shape distal and proximal circumference of residual limb are  equal     Sensation Issues phantom limb pain   pt reports phantom pain frequently in the afternoon/evening time; reports no phantom pain during session    Skin Assessment no signs of pressure or friction present        Prosthetic Orientation (Lower Extremity)    Comment, Fit Assessment 2* to time constraints did not don prosthetic. Review of prosthetic components and sock ply        Therapy Assessment/Plan (OT)    Rehab Potential/Prognosis (OT) good, to achieve stated therapy goals     Frequency of Treatment (OT) 5-7 times per week     Intensity of Treatment (OT) 60-90 minutes per day     Estimated Duration of Therapy (OT) 3 weeks     Problem List (OT) problems related to;balance;coordination;mobility;motor control;range of motion (ROM);strength;postural control     Activity Limitations Related to Problem List unable to ambulate safely;unable to transfer safely;BADLs not performed adequately or safely;IADLs not performed adequately or safely;community activities not performed adequately or safely;home management activity not performed adequately or safely     Planned Therapy Interventions activity tolerance training;adaptive equipment training;BADL retraining;functional balance retraining;IADL retraining;manual therapy/joint mobilization;neuromuscular control/coordination retraining;occupation/activity based interventions;passive ROM/stretching;patient/caregiver education/training;prosthetic fitting/training;ROM/therapeutic exercise;strengthening exercise;transfer/mobility retraining     Comment, Therapy Assessment/Plan (OT) Bebe is a 64 year old female who presented to [facility] 11/19 s/p hx of L AKA for prosthetic training. Pt required A PTA with ADL tasks. Pt is currently requiring A x 1 with ADL tasks without the prosthetic and A x 2 with prosthetic management. Pt presents with decreased knowledge in prosthetic management, decreased safety awareness, higher risk for skin breakdown, impaired functional mobility  with prosthetic. Pt limited by deficits with balance, endurance, activity tolerance, and mobility. Pt would benefit from continued skilled OT services to maximize functional independence with prosthetic upon d/c.        Daily Progress Summary (OT)    Daily Outcome Statement 2* to time and safety unable to don prosthetic. Will continue to evaluate ADLs/functional transfers with prosthetic donned.     Recommendations (OT) PSFS, BIMS, ADL/transfers with prosthetic donned                          Education Documentation  Treatment Plan, taught by Claudette Londono OT at 11/20/2024  2:40 PM.  Learner: Patient  Readiness: Acceptance  Method: Explanation  Response: Verbalizes Understanding  Comment: Pt educated on purpose of OT and comprehensive rehab approach. Pt reports understanding. Call bell use reviewed.    Orientation to Care Setting, Routine, taught by Claudette Londono OT at 11/20/2024  2:40 PM.  Learner: Patient  Readiness: Acceptance  Method: Explanation  Response: Verbalizes Understanding  Comment: Pt educated on purpose of OT and comprehensive rehab approach. Pt reports understanding. Call bell use reviewed.          IRF OT Goals      Flowsheet Row Most Recent Value   Transfer Goal 1    Activity/Assistive Device toilet at 11/20/2024 0906   [place] maximum assist (25-49% patient effort) at 11/20/2024 0906   Time Frame short-term goal (STG), 5 - 7 days at 11/20/2024 0906   Transfer Goal 2    Activity/Assistive Device toilet at 11/20/2024 0906   [place] minimum assist (75% or more patient effort) at 11/20/2024 0906   Time Frame long-term goal (LTG), 21 days or less at 11/20/2024 0906   Transfer Goal 3    Activity/Assistive Device shower at 11/20/2024 0906   [place] maximum assist (25-49% patient effort) at 11/20/2024 0906   Time Frame short-term goal (STG), 5 - 7 days at 11/20/2024 0906   Transfer Goal 4    Activity/Assistive Device shower at 11/20/2024 0906   [place] minimum assist (75% or more  patient effort) at 11/20/2024 0906   Time Frame long-term goal (LTG), 21 days or less at 11/20/2024 0906   Bathing Goal 1    [place] tactile cues required at 11/20/2024 0906   Time Frame short-term goal (STG), 5 - 7 days at 11/20/2024 0906   Bathing Goal 2    [place] modified independence at 11/20/2024 0906   Time Frame long-term goal (LTG), 21 days or less at 11/20/2024 0906   UB Dressing Goal 1    [place] maximum assist (25-49% patient effort) at 11/20/2024 0906   Time Frame short-term goal (STG), 5 - 7 days at 11/20/2024 0906   Strategies/Barriers with clothing retrieval at 11/20/2024 0906   UB Dressing Goal 2    [place] minimum assist (75% or more patient effort) at 11/20/2024 0906   Time Frame long-term goal (LTG), 21 days or less at 11/20/2024 0906   Strategies/Barriers with clothing retrieval at 11/20/2024 0906   LB Dressing Goal 1    [place] maximum assist (25-49% patient effort) at 11/20/2024 0906   Time Frame short-term goal (STG), 5 - 7 days at 11/20/2024 0906   Strategies/Barriers with clothing retrieval at 11/20/2024 0906   LB Dressing Goal 2    [place] minimum assist (75% or more patient effort) at 11/20/2024 0906   Time Frame long-term goal (LTG), 21 days or less at 11/20/2024 0906   Strategies/Barriers with clothing retrieval at 11/20/2024 0906   Grooming Goal 1    [place] moderate assist (50-74% patient effort) at 11/20/2024 0906   Time Frame short-term goal (STG), 5 - 7 days at 11/20/2024 0906   Strategies/Barriers in stance at sink at 11/20/2024 0906   Grooming Goal 2    [place] supervision required at 11/20/2024 0906   Time Frame long-term goal (LTG), 21 days or less at 11/20/2024 0906   Strategies/Barriers in stance at sink at 11/20/2024 0906   Toileting Goal 1    [place] maximum assist (25-49% patient effort) at 11/20/2024 0906   Time Frame short-term goal (STG), 5 - 7 days at 11/20/2024 0906   Toileting Goal 2    [place] minimum assist (75% or  more patient effort) at 11/20/2024 0906   Time Frame long-term goal (LTG), 21 days or less at 11/20/2024 0906

## 2024-11-21 ENCOUNTER — APPOINTMENT (INPATIENT)
Dept: PHYSICAL THERAPY | Facility: REHABILITATION | Age: 64
DRG: 560 | End: 2024-11-21
Payer: MEDICARE

## 2024-11-21 ENCOUNTER — APPOINTMENT (INPATIENT)
Dept: OCCUPATIONAL THERAPY | Facility: REHABILITATION | Age: 64
DRG: 560 | End: 2024-11-21
Payer: MEDICARE

## 2024-11-21 ENCOUNTER — APPOINTMENT (INPATIENT)
Dept: WOUND CARE | Facility: REHABILITATION | Age: 64
DRG: 560 | End: 2024-11-21
Payer: MEDICARE

## 2024-11-21 LAB
GLUCOSE BLD-MCNC: 129 MG/DL (ref 70–99)
GLUCOSE BLD-MCNC: 156 MG/DL (ref 70–99)
GLUCOSE BLD-MCNC: 203 MG/DL (ref 70–99)
POCT TEST: ABNORMAL

## 2024-11-21 PROCEDURE — 97530 THERAPEUTIC ACTIVITIES: CPT | Mod: GO

## 2024-11-21 PROCEDURE — 90662 IIV NO PRSV INCREASED AG IM: CPT | Performed by: PHYSICAL MEDICINE & REHABILITATION

## 2024-11-21 PROCEDURE — 63600000 HC DRUGS/DETAIL CODE: Performed by: PHYSICAL MEDICINE & REHABILITATION

## 2024-11-21 PROCEDURE — 12800000 HC ROOM AND CARE SEMIPRIVATE REHAB

## 2024-11-21 PROCEDURE — G0008 ADMIN INFLUENZA VIRUS VAC: HCPCS | Performed by: PHYSICAL MEDICINE & REHABILITATION

## 2024-11-21 PROCEDURE — 97763 ORTHC/PROSTC MGMT SBSQ ENC: CPT | Mod: GP

## 2024-11-21 PROCEDURE — 97116 GAIT TRAINING THERAPY: CPT | Mod: GP

## 2024-11-21 PROCEDURE — 63700000 HC SELF-ADMINISTRABLE DRUG: Performed by: INTERNAL MEDICINE

## 2024-11-21 PROCEDURE — 97530 THERAPEUTIC ACTIVITIES: CPT | Mod: GP

## 2024-11-21 RX ADMIN — METHENAMINE HIPPURATE 1 G: 1000 TABLET ORAL at 20:29

## 2024-11-21 RX ADMIN — THERA TABS 1 TABLET: TAB at 08:01

## 2024-11-21 RX ADMIN — LEVOTHYROXINE SODIUM 25 MCG: 0.03 TABLET ORAL at 05:42

## 2024-11-21 RX ADMIN — GABAPENTIN 600 MG: 300 CAPSULE ORAL at 17:18

## 2024-11-21 RX ADMIN — METHENAMINE HIPPURATE 1 G: 1000 TABLET ORAL at 08:00

## 2024-11-21 RX ADMIN — INSULIN GLARGINE 12 UNITS: 100 INJECTION, SOLUTION SUBCUTANEOUS at 20:32

## 2024-11-21 RX ADMIN — INSULIN LISPRO 8 UNITS: 100 INJECTION, SOLUTION INTRAVENOUS; SUBCUTANEOUS at 12:45

## 2024-11-21 RX ADMIN — VANCOMYCIN HYDROCHLORIDE 125 MG: 125 CAPSULE ORAL at 20:29

## 2024-11-21 RX ADMIN — EMPAGLIFLOZIN 10 MG: 10 TABLET, FILM COATED ORAL at 08:01

## 2024-11-21 RX ADMIN — GABAPENTIN 600 MG: 300 CAPSULE ORAL at 08:00

## 2024-11-21 RX ADMIN — Medication 3 MG: at 20:29

## 2024-11-21 RX ADMIN — POTASSIUM CHLORIDE 20 MEQ: 1500 TABLET, EXTENDED RELEASE ORAL at 08:00

## 2024-11-21 RX ADMIN — FUROSEMIDE 40 MG: 40 TABLET ORAL at 17:18

## 2024-11-21 RX ADMIN — ATORVASTATIN CALCIUM 40 MG: 40 TABLET, FILM COATED ORAL at 17:18

## 2024-11-21 RX ADMIN — APIXABAN 5 MG: 5 TABLET, FILM COATED ORAL at 20:29

## 2024-11-21 RX ADMIN — INSULIN LISPRO 8 UNITS: 100 INJECTION, SOLUTION INTRAVENOUS; SUBCUTANEOUS at 17:23

## 2024-11-21 RX ADMIN — FUROSEMIDE 40 MG: 40 TABLET ORAL at 08:02

## 2024-11-21 RX ADMIN — HYDROMORPHONE HYDROCHLORIDE 4 MG: 2 TABLET ORAL at 21:47

## 2024-11-21 RX ADMIN — TAMSULOSIN HYDROCHLORIDE 0.4 MG: 0.4 CAPSULE ORAL at 17:18

## 2024-11-21 RX ADMIN — VANCOMYCIN HYDROCHLORIDE 125 MG: 125 CAPSULE ORAL at 08:01

## 2024-11-21 RX ADMIN — INFLUENZA A VIRUS A/VICTORIA/4897/2022 IVR-238 (H1N1) ANTIGEN (FORMALDEHYDE INACTIVATED), INFLUENZA A VIRUS A/CALIFORNIA/122/2022 SAN-022 (H3N2) ANTIGEN (FORMALDEHYDE INACTIVATED), AND INFLUENZA B VIRUS B/MICHIGAN/01/2021 ANTIGEN (FORMALDEHYDE INACTIVATED) 180 MCG: 60; 60; 60 INJECTION, SUSPENSION INTRAMUSCULAR at 18:53

## 2024-11-21 RX ADMIN — GABAPENTIN 600 MG: 300 CAPSULE ORAL at 20:29

## 2024-11-21 RX ADMIN — INSULIN LISPRO 8 UNITS: 100 INJECTION, SOLUTION INTRAVENOUS; SUBCUTANEOUS at 08:04

## 2024-11-21 RX ADMIN — DOCUSATE SODIUM 100 MG: 100 CAPSULE, LIQUID FILLED ORAL at 20:29

## 2024-11-21 RX ADMIN — INSULIN LISPRO 2 UNITS: 100 INJECTION, SOLUTION INTRAVENOUS; SUBCUTANEOUS at 08:04

## 2024-11-21 RX ADMIN — FERROUS SULFATE TAB 325 MG (65 MG ELEMENTAL FE) 325 MG: 325 (65 FE) TAB at 08:01

## 2024-11-21 RX ADMIN — ACETAMINOPHEN 650 MG: 325 TABLET, FILM COATED ORAL at 17:18

## 2024-11-21 RX ADMIN — APIXABAN 5 MG: 5 TABLET, FILM COATED ORAL at 08:01

## 2024-11-21 RX ADMIN — OXYCODONE HYDROCHLORIDE AND ACETAMINOPHEN 500 MG: 500 TABLET ORAL at 08:01

## 2024-11-21 RX ADMIN — DULOXETINE HYDROCHLORIDE 60 MG: 30 CAPSULE, DELAYED RELEASE ORAL at 20:29

## 2024-11-21 RX ADMIN — IMATINIB MESYLATE 400 MG: 400 TABLET, FILM COATED ORAL at 08:03

## 2024-11-21 NOTE — PROGRESS NOTES
Patient: Bebe Burks  Location: [facility] [address]  MRN: [id number]  Today's date: 11/21/2024    History of Present Illness  Bebe is a 64 y.o. female admitted on 11/19/2024 with History of above knee amputation, left (CMS/[facility]) [Z89.612]  Encounter for prosthetic gait training [Z47.89]. Principal problem is History of above knee amputation, left (CMS/[facility]).    Pt is a 64 y.o. 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       Past Medical History  Bebe has a past medical history of Abnormal ECG, Abnormal finding on chest xray (12/2021), Arthritis, Colon cancer (CMS/[facility]), COVID-19 (12/2021), COVID-19 vaccine series completed, Diabetic neuropathy (CMS/[facility]), DM (diabetes mellitus), type 2 with ophthalmic complications (CMS/[facility]), Hypertension, Hypothyroidism, Left foot drop, Lipid disorder, and Type 2 diabetes mellitus treated with insulin (CMS/[facility]).    PT Vitals      Date/Time Pulse BP BP Location BP Method Pt Position [facility]   11/21/24 1302 89 133/60 Right upper arm Automatic Sitting JG          PT Pain      Date/Time Pain Type Rating: Rest [facility]   11/21/24 1302 Pain Assessment 0 JG   11/21/24 1359 Pain Reassessment 0 JG               Prior Living Environment      Flowsheet Row Most Recent Value   People in Home alone   Current Living Arrangements condominium   Home Accessibility stairs to enter home (Group)   Living Environment Comment resides in 1st floor condo, 4 (1+2+1) JOSE RAFAEL, R hand rail, no stairs inside home, Bathroom Layout: Tub/shower unit with shower curtain, Shower stall with glass doors (shower stall in main bedroom), two sisters can provide some assistance   Number of Stairs, Main Entrance 4   Surface of Stairs, Main Entrance concrete   Stair Railings, Main Entrance railing on right side (ascending)   Stairs Comment, Main Entrance 1+2+1 JOSE RAFAEL   Location, Bathroom first (main) floor   Bathroom Access tub  bath, walk-in shower            Prior Level of Function      Flowsheet Row Most Recent Value   Dominant Hand right   Ambulation assistive equipment   Transferring assistive equipment   Toileting independent   Bathing assistive equipment   Dressing independent   Eating independent   IADLs assistive equipment   Driving/Transportation    Communication understands/communicates without difficulty   Assistive Device Currently Used at Home cane, straight, walker, front-wheeled, shower chair, grab bar, raised toilet             IRF PT Evaluation and Treatment - 11/21/24 1300          PT Time Calculation    Start Time 1300     Stop Time 1400     Time Calculation (min) 60 min        Session Details    Document Type Daily Treatment/Progress Note        General Information    General Observations of Patient Pt upright in [facility], no complaints and eager for therapy        Mobility Belt    Mobility Belt Used During Session yes        Orthotics    Orthotics (Trigger Row) Orthosis trial     Orthosis Trial Trialing R toe off brace        Transfers    Transfers stand pivot transfer     Maintains Weight-Bearing Status able to maintain        Sit to Stand Transfer    [place], Sit to Stand Transfer maximum assist (25-49% patient effort);1 person assist     Safety/Cues technique;sequencing     Assistive Device parallel bars     Comment pull to //bars , max A of 1 for anterior weightshift and engage knee extension of prosthesis + B hip extension        Stand to Sit Transfer    [place], Stand to Sit Transfer maximum assist (25-49% patient effort);1 person assist     Safety/Cues sequencing;technique     Assistive Device walker, front-wheeled     Comment PT assists with unlocking knee - currently pt is able to shift weight to R and bring prosthesis up off ground to deweight, PT pulls up on black cord behind knee to disengage lock to allow knee to flexion        Gait Training    [place], Gait dependent  "(less than 25% patient effort);2 person assist     Safety/Cues increased time to complete;tactile cues;gait deviations;sequencing;technique     Assistive Device walker, thoracic     Distance in Feet 15 feet     Pattern step-through;step-to     Deviations/Abnormal Patterns base of support, narrow     Left Sided Gait Deviations --   decreased anterior WS    Right Sided Gait Deviations foot drop/toe drag     Comment Ambulated 15' x 2 with evawalker , max A of 1 for anterior WS , prosthetic placement on LLE and assist of 2nd at RLE for stance control + assist of 3rd for evawalker management; Ambulated 10' x 3 in// bars, mod A of 1 for weightshifting assistance and prosthetic placement + min A of 2nd at RLE for stance control; pt with increased posterior lean requiring assistance to maintain hip extension and COM over BINTA, pt with body awareness deficits and unaware of LOB posteriorly        Balance    Balance Interventions standing     Comment, Balance Standing with BUE on // brs: lateral weightshifts x 30 reps, emphasis on upright posture and hip extension; RLE tap ups to 4\" block x 15 reps, max A of 1 at L prosthesis to provide hip extension moment and maintain COM over BINTA (excessive weight posteriorly), verbal cueing of \"press toes down/squash the bug\" to bring pylon forward/upright improved stability        Prosthetic Orientation (Lower Extremity)    Fit Assessment fit/function of prosthesis are appropriate     Comment, Fit Assessment Liner - min A for adjustment of proximal liner, but able to julian with minimal assistance; Donning prosthesis - max A to bring pin into shuttle lock and engage pin to turn dial and bring down pin into lock, completed entirely in sitting; Sock ply = increased to lightweight sock (2 ply) this session; [place] schedule = kept prosthesis donned for OT session ~ 2 hours; skin check = redness in groin blanchable     Specific Gait Deviations uneven heel rise;toe stays off floor after heel " strike        Daily Progress Summary (PT)    Daily Outcome Statement Completed extensive education regarding pin lock system, knee mechanism and purpose of temporary prosthesis. Reviewed home set up and potential DME needs for discharge. Pt with improving weightshifting after demo and showed ability to carry over during 2nd overground walk. Continue with upright posture and balance activities, progressing mobility as able.                          Education Documentation  Prosthesis Fit, taught by Paulette Gamble, PT at 11/21/2024  4:35 PM.  Learner: Patient  Readiness: Acceptance  Method: Explanation  Response: Verbalizes Understanding, Needs Reinforcement  Comment: Initiated review of prosthetic liner management, donning/doffing prosthesis and sock ply adjustemnt.    Prosthesis Care, taught by Paulette Gamble, PT at 11/21/2024  4:35 PM.  Learner: Patient  Readiness: Acceptance  Method: Explanation  Response: Verbalizes Understanding, Needs Reinforcement  Comment: Initiated review of prosthetic liner management, donning/doffing prosthesis and sock ply adjustemnt.    Assistive/Adaptive Devices, taught by Paulette Gamble, PT at 11/21/2024  4:35 PM.  Learner: Patient  Readiness: Acceptance  Method: Explanation  Response: Verbalizes Understanding, Needs Reinforcement  Comment: Educated on potential use of suitcase ramp/temporary ramp at discharge. Explained home questionnaire will be provided for family to complete.          IRF PT Goals      Flowsheet Row Most Recent Value   Transfer Goal 1    Activity/Assistive Device stand pivot, walker, front-wheeled at 11/19/2024 1431   [place] other (see comments)  [Dependent (Max A x 1, Mod A x 1)] at 11/19/2024 1431   Time Frame 1 week, short-term goal (STG) at 11/19/2024 1431   Transfer Goal 2    Activity/Assistive Device stand pivot, walker, front-wheeled at 11/19/2024 1431   [place] maximum assist (25-49% patient effort) at 11/19/2024 1431   Time  Frame 21 days or less, long-term goal (LTG) at 11/19/2024 1431   Transfer Goal 3    Activity/Assistive Device sit-to-stand/stand-to-sit, walker, front-wheeled at 11/19/2024 1431   [place] maximum assist (25-49% patient effort), verbal cues required at 11/19/2024 1431   Time Frame 1 week, short-term goal (STG) at 11/19/2024 1431   Transfer Goal 4    Activity/Assistive Device sit-to-stand/stand-to-sit, walker, front-wheeled at 11/19/2024 1431   [place] minimum assist (75% or more patient effort) at 11/19/2024 1431   Time Frame 21 days or less, long-term goal (LTG) at 11/19/2024 1431   Gait/Walking Locomotion Goal 1    Activity/Assistive Device gait (walking locomotion), walker, front-wheeled at 11/19/2024 1431   Distance 10 feet at 11/19/2024 1431   [place] other (see comments)  [Dependent (Max A x 1, Mod A x 1)] at 11/19/2024 1431   Time Frame 1 week, short-term goal (STG) at 11/19/2024 1431   Gait/Walking Locomotion Goal 2    Activity/Assistive Device gait (walking locomotion), walker, front-wheeled at 11/19/2024 1431   Distance 25 feet at 11/19/2024 1431   [place] maximum assist (25-49% patient effort) at 11/19/2024 1431   Time Frame 21 days or less, long-term goal (LTG) at 11/19/2024 1431   Wheelchair Locomotion Goal 1    Activity forward propulsion, backward propulsion, steering, stopping, turning, doorway navigation, weight shifting at 11/19/2024 1431   Assistive Device manual, ultra lightweight at 11/19/2024 1431   Distance 150 feet at 11/19/2024 1431   [place] other (see comments)  [Touching / Steadying Assist] at 11/19/2024 1431   Time Frame 1 week, short-term goal (STG) at 11/19/2024 1431   Wheelchair Locomotion Goal 2    Activity forward propulsion, backward propulsion, steering, stopping, turning, doorway navigation, weight shifting at 11/19/2024 1431   Assistive Device manual, ultra lightweight at 11/19/2024 1431   Distance 150 feet at 11/19/2024 1431   [place] modified  independence at 11/19/2024 1431   Time Frame 21 days or less, long-term goal (LTG) at 11/19/2024 1431

## 2024-11-21 NOTE — PROGRESS NOTES
Subjective    Patient seen and examined on rounds.  Chart reviewed.  Events overnight noted.  History reviewed briefly with patient.    CC:  Deficits in mobility, transfers, self-care status post left transfemoral amputation for prosthetic training, severe peripheral vascular disease, chronic myelocytic leukemia, osteomyelitis left foot, diabetic neuropathy, multiple medical problems    HPI:  Ms. 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 Pura Chaves. ID recommended IV Ceftriaxone and IV Ampicillin until 9/14/24.  She had urinary retention managed with indwelling Rowan catheter.  She had right upper extremity swelling present during this admission, ultrasound on 8/29/24 demonstrated an acute non-occlusive DVT of the right axillary and brachial vein around line (on Lovenox).  Subsequently she completed an acute inpatient rehabilitation stay for pre-prosthetic training at [facility] between 9/3/24 to 9/21/24 and was discharged to [facility] on 9/21/24.  She initially required bladder self intermittent catheterizations due to urinary retention, but indicates that now she is able to void.  She desired to be a prosthetic ambulator.  A left transfemoral amputation prosthesis was fabricated. and followed-up as outpatient in the amputee clinic at [facility] and she was deemed to be an  appropriate candidate for acute inpatient rehabilitation stay. She has been needing assistance for mobility, transfers, self-care.  I have reviewed the preadmission screening and concur with that information and there are no significant changes from patient's preadmission screening. She is transferred to [facility] on 11/19/24 from [facility] for further acute inpatient rehabilitation stay for prosthetic training with transfemoral amputation prosthesis.    SUBJ: Noted input from dermal defense nurse.  Reviewed pictures in Epic.  Working on ADLs with occupational therapy.  Requiring minimal assistance for bathing, dependent for toileting, close supervision for poorly dressing and dependent for lower body dressing with occupational therapy.  She requested a flu shot.  Discussed with nurse.  Continent of bowel and bladder for nursing.    ROS: Denies chest pain or shortness of breath. Other ROS negative. Past, family, social history is unchanged.    Current Functional Status:   Bed mobility:   Glasscock, Supine to Sit: modified independence   Glasscock, Sit to Supine: modified independence   Transfers:    [place]cock, Sit to Stand Transfer: dependent (less than 25% patient effort)  Glasscock, Stand to Sit Transfer: dependent (less than 25% patient effort)   Glasscock, Stand Pivot/Stand Step Transfer: dependent (less than 25% patient effort), 2 person assist   Gait:   Glasscock, Gait: dependent (less than 25% patient effort)  Assistive Device: parallel bars   Distance in Feet: 7 feet    Bathing:   Glasscock: minimum assist (75% or more patient effort)   Toileting:   Glasscock: dependent (less than 25% patient effort)   Upper body dressing:   Glasscock: close supervision   Lower body dressing:   Glasscock: dependent (less than 25% patient effort)     Functional Progress:    Functional status reviewed. Overall, patient's functional status is improving.      Physical Exam       "Blood pressure (!) 146/68, pulse 89, temperature 36.8 °C (98.2 °F), temperature source Oral, resp. rate 18, height 1.702 m (5' 7.01\"), weight 69.4 kg (153 lb), SpO2 96%, not currently breastfeeding.    Body mass index is 23.96 kg/m².    General Appearance: Not in acute distress  Head/Ear/Nose/Throat: Normocephalic; Atraumatic.   Eye: EOMI; PERRL.   Neck: No JVD; No Bruits.   Respiratory: Decreased breath sounds at bases.   Cardiovascular: RRR; Normal S1, S2.   Gastrointestinal: Soft; NT; +BS.   Extremities: Bilateral lower extremity edema noted.  Left transfemoral amputation stump is stable.  Musculoskeletal: Functional active range of motion in both upper and right lower extremities.  Functional active range of motion in left hip.  She has a left transfemoral amputation.  Neurological: AAO ×3. Speech is fluent. Cranial nerve examination does not reveal any gross facial asymmetry. Strength testing shows about 4+/5 strength in both upper and right lower extremities.  Left hip flexion is 4/5.  She has a left transfemoral amputation.  She is grossly able to localize touch sensation.  She is able to localize position sense in right foot great toe position sense is unable to be tested on left side.  Deep tendon reflexes are hypoactive bilaterally.  Right plantar is flexor, left plantar was unable to be tested. Coordination is functional upper extremities.    Behavior/Emotional: Appropriate; Cooperative.   Skin: Left transfemoral amputation stump stable.  Incision appears to be well-healed.  Some wounds are noted on right anterior lower forearm.  Small wound noted on right foot fifth toe.  Small wound also noted on right foot great toe with some slough in it and scaly skin around it.  Some redness noted on sacrum/coccyx.  Reviewed pictures of wounds in Epic.      Current Facility-Administered Medications:     acetaminophen (TYLENOL) tablet 650 mg, 650 mg, oral, q4h PRN, Charlette Brooke MD    alum-mag hydroxide[facility] " (MAALOX) 200-200-20 mg/5 mL suspension 30 mL, 30 mL, oral, q4h PRN, Charlette Brooke MD    apixaban (ELIQUIS) tablet 5 mg, 5 mg, oral, BID, Charlette Brooke MD, 5 mg at 11/21/24 0801    ascorbic acid (VITAMIN C) tablet 500 mg, 500 mg, oral, q48h INT, Charlette Brooke MD, 500 mg at 11/21/24 0801    atorvastatin (LIPITOR) tablet 40 mg, 40 mg, oral, Daily (6p), Charlette Brooke MD, 40 mg at 11/20/24 1631    bisacodyL (DULCOLAX) 10 mg suppository 10 mg, 10 mg, rectal, Daily PRN, Charlette Brooke MD    docusate sodium (COLACE) capsule 100 mg, 100 mg, oral, BID, Charlette Brooke MD, 100 mg at 11/20/24 2137    DULoxetine (CYMBALTA) capsule,delayed release(DR/EC) 60 mg, 60 mg, oral, Nightly, Charlette Brooke MD, 60 mg at 11/20/24 2137    empagliflozin (JARDIANCE) tablet 10 mg, 10 mg, oral, Daily, Charlette Brooke MD, 10 mg at 11/21/24 0801    ferrous sulfate tablet 325 mg, 325 mg, oral, q48h INT, Charlette Brooke MD, 325 mg at 11/21/24 0801    flu vaccine ts 2024-25 (65 yrs&up)(PF) (FLUZONE HIGH-DOSE TRIV) injection 180 mcg, 0.5 mL, intramuscular, During hospitalization, Alexei Petty MD    fluticasone propionate (FLONASE) 50 mcg/actuation nasal spray 1 spray, 1 spray, Each Nostril, Daily PRN, Charlette Brooke MD    furosemide (LASIX) tablet 40 mg, 40 mg, oral, BID (am, 4p), Charlette Brooke MD, 40 mg at 11/21/24 0802    gabapentin (NEURONTIN) capsule 600 mg, 600 mg, oral, TID, Charlette Brooke MD, 600 mg at 11/21/24 0800    HYDROmorphone (DILAUDID) tablet 4 mg, 4 mg, oral, q6h PRN, Charlette Brooke MD, 4 mg at 11/20/24 2147    imatinib (GLEEVEC) chemo tablet 400 mg, 400 mg, oral, Daily, Charlette Brooke MD, 400 mg at 11/21/24 0803    insulin glargine U-100 (LANTUS/BASAGLAR) pen 12 Units, 12 Units, subcutaneous, Nightly, Charlette Brooke MD, 12 Units at 11/20/24 2138    insulin lispro U-100 (HumaLOG) pen 1-12 Units, 1-12 Units, subcutaneous, TID with meals, Charlette Brooke MD, 2 Units at 11/21/24 0804    insulin lispro U-100 (HumaLOG) pen 8 Units,  8 Units, subcutaneous, TID with meals, Charlette Brooke MD, 8 Units at 11/21/24 1245    levothyroxine (SYNTHROID) tablet 25 mcg, 25 mcg, oral, Daily (6:30a), Charlette Brooke MD, 25 mcg at 11/21/24 0542    melatonin ODT 3 mg, 3 mg, oral, Nightly, Charlette Brooke MD, 3 mg at 11/20/24 2137    methenamine (HIPREX) tablet 1 g, 1 g, oral, BID, Charlette Brooke MD, 1 g at 11/21/24 0800    multivitamin tablet 1 tablet, 1 tablet, oral, Daily, Charlette Brooke MD, 1 tablet at 11/21/24 0801    ondansetron ODT (ZOFRAN-ODT) disintegrating tablet 4 mg, 4 mg, oral, q8h PRN, Charlette Brooke MD    potassium chloride (KLOR-CON M) ER tablet (particles/crystals) 20 mEq, 20 mEq, oral, Daily, Charlette Brooke MD, 20 mEq at 11/21/24 0800    semaglutide tablet 14 mg, 1 tablet, oral, Daily, Charlette Brooke MD, 14 mg at 11/21/24 1246    senna (SENOKOT) tablet 2 tablet, 2 tablet, oral, Daily, Charlette Brooke MD    tamsulosin (FLOMAX) 24 hr ER capsule 0.4 mg, 0.4 mg, oral, Daily with dinner, Charlette Brooke MD, 0.4 mg at 11/20/24 1631    vancomycin (VANCOCIN) capsule 125 mg, 125 mg, oral, BID, Charlette Brooke MD, 125 mg at 11/21/24 0801       Labs / Radiology    Lab Results   Component Value Date    WBC 4.98 11/20/2024    HGB 10.8 (L) 11/20/2024    HCT 32.7 (L) 11/20/2024    MCV 90.8 11/20/2024     11/20/2024     Lab Results   Component Value Date    GLUCOSE 205 (H) 11/20/2024    CALCIUM 8.7 11/20/2024     11/20/2024    K 3.7 11/20/2024    CO2 30 11/20/2024     11/20/2024    BUN 35 (H) 11/20/2024    CREATININE 0.9 11/20/2024       Assessment and Plan    ASSESSMENT PLAN:  1. 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 Pura Chaves. ID recommended IV Ceftriaxone and IV Ampicillin until 9/14/24.  She had urinary retention managed with indwelling Rowan catheter.  She had right upper extremity swelling present during this admission, ultrasound on 8/29/24 demonstrated an acute non-occlusive DVT of the right axillary and brachial vein around line (on Lovenox).  Subsequently she completed an acute inpatient rehabilitation stay for pre-prosthetic training at [facility] between 9/3/24 to 9/21/24 and was discharged to [facility] on 9/21/24.  She initially required bladder self intermittent catheterizations due to urinary retention, but indicates that now she is able to void.  She desired to be a prosthetic ambulator.  A left transfemoral amputation prosthesis was fabricated. and followed-up as outpatient in the amputee clinic at [facility] and she was deemed to be an appropriate candidate for acute inpatient rehabilitation stay. She has been needing assistance for mobility, transfers, self-care.  I have reviewed the preadmission screening and concur with that information and there are no significant changes from patient's preadmission screening. She is transferred to [facility] on 11/19/24 from [facility] for further acute inpatient rehabilitation stay for prosthetic training with transfemoral amputation prosthesis.     2. DVT prophylaxis/treatment - on Eliquis.  Platelets 168 on 11/20/2024.     3. Vascular - status post left transfemoral amputation for prosthetic training, severe peripheral vascular disease, chronic myelocytic leukemia, osteomyelitis left foot, diabetic neuropathy, multiple medical problems - continue PT, OT,  psychology.  Follow falls precautions, cardiac precautions, monitor pulse oximeter in therapy.  Monitor skin under prosthesis.  Teach donning and doffing of left transfemoral amputation prosthesis.  Do gait training with prosthesis.     4. GI - On Colace, Senokot.  On PRN Maalox.  On PRN Dulcolax suppository.  On PRN Zofran.     5.  -on Hiprex.  On Flomax.  Monitor postvoid residual.     6. CVS - on Lasix.  Monitor for orthostasis.     7. Pulmonary -encourage incentive spirometry.     8. Hematology - monitor hemoglobin, platelets.  Hemoglobin 10.8, WBC 4.98 on 11/20/2024.     9. Pain -on Cymbalta.  On Neurontin.  On PRN Tylenol.  On PRN Dilaudid.     10. Skin - Left transfemoral amputation stump stable.  Incision appears to be well-healed.  Some wounds are noted on right anterior lower forearm.  Small wound noted on right foot fifth toe.  Small wound also noted on right foot great toe with some slough in it and scaly skin around it.  Some redness noted on sacrum/coccyx.  Reviewed pictures of wounds in Epic. Monitor skin under the left transfemoral amputation prosthesis.Noted input from dermal defense nurse on 11/21/2024.  Reviewed pictures in Epic.      11. F/E/N - on Ferrous sulfate.  On vitamin C. on MVI.  On K-Genesis con.     12. Hyperlipidemia -on Lipitor.     13. Diabetes mellitus type 2 - on Lantus insulin.  On sliding scale Humalog coverage.  On Jardiance.     14. Oncology - On Gleevec for chronic myelocytic leukemia.     15. Hypothyroidism - on Synthroid.     16. Insomnia - on Melatonin.     17. Infectious diseases - On oral Vancomycin.       18. Seasonal allergies - on PRN Flonase.     19. Psychiatry - mood stable.  Psychology consulted.     20. Rehabilitation medicine - Continue comprehensive rehabilitation care. Continue PT, OT, speech, psychology.  We will follow falls precautions, cardiac precautions, monitor pulse oximetry in therapy and follow weightbearing precautions.  We will follow spinal  precautions as appropriate.Tolerating therapies per endurance on 11/20/2024.  Slept well last night.   Working on donning and doffing of prosthesis.  Discussed with the importance of skin checks on the prosthesis.  Able to ambulate 7 feet in parallel bars dependent gait using left transfemoral amputation prosthesis with physical therapy.  Dependent for sit to stand transfers with physical therapy on 11/20/2024. Noted input from dermal defense nurse on 11/21/2024.  Reviewed pictures in Epic.  Working on ADLs with occupational therapy on 11/21/2024.  Requiring minimal assistance for bathing, dependent for toileting, close supervision for poorly dressing and dependent for lower body dressing with occupational therapy.  She requested a flu shot.  Discussed with nurse.  Continent of bowel and bladder for nursing on 11/21/2024.     21. Reviewed labs today.  BUN 35, creatinine 0.9, sodium 139, potassium 3.7 on 11/20/2024.           Alexei Petty MD  11/21/2024      This encounter was completed utilizing the Direct Speech Voice Recognition Software. Grammatical errors, random word insertions, pronoun errors, and incomplete sentences are occasional consequences of the system due to software limitations, ambient noises, and hardware issues. Such errors may be missed prior to affixing electronic signature. Any questions or concerns about the content, text, or information contained within the body of this dictation should be directly addressed to the physician for clarification. If you have any questions or concerns please do not hesitate to call me directly via EPIC chat, page, or email.

## 2024-11-21 NOTE — PROGRESS NOTES
Patient: Bebe Burks  Location: [facility]  MRN: [id number]  Today's date: 11/21/2024    History of Present Illness  Bebe is a 64 y.o. female admitted on 11/19/2024 with History of above knee amputation, left (CMS/[facility]) [Z89.612]  Encounter for prosthetic gait training [Z47.89]. Principal problem is History of above knee amputation, left (CMS/[facility]).    Pt is a 64 y.o. 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       Past Medical History  Bebe has a past medical history of Abnormal ECG, Abnormal finding on chest xray (12/2021), Arthritis, Colon cancer (CMS/[facility]), COVID-19 (12/2021), COVID-19 vaccine series completed, Diabetic neuropathy (CMS/[facility]), DM (diabetes mellitus), type 2 with ophthalmic complications (CMS/[facility]), Hypertension, Hypothyroidism, Left foot drop, Lipid disorder, and Type 2 diabetes mellitus treated with insulin (CMS/[facility]).    OT Vitals      Date/Time Pulse HR Source BP BP Location BP Method Pt Position Who   11/21/24 1403 89 Monitor 146/68 Right upper arm Automatic Sitting VMS          OT Pain      Date/Time Pain Type Rating: Rest Who   11/21/24 1403 Pain Assessment 0 VMS   11/21/24 1458 Post Activity 0 VMS               Prior Living Environment      Flowsheet Row Most Recent Value   People in Home alone   Current Living Arrangements condominium   Home Accessibility stairs to enter home (Group)   Living Environment Comment resides in 1st floor condo, 4 (1+2+1) JOSE RAFAEL, R hand rail, no stairs inside home, Bathroom Layout: Tub/shower unit with shower curtain, Shower stall with glass doors (shower stall in main bedroom), two sisters can provide some assistance   Number of Stairs, Main Entrance 4   Surface of Stairs, Main Entrance concrete   Stair Railings, Main Entrance railing on right side (ascending)   Stairs Comment, Main Entrance 1+2+1 JOSE RAFAEL   Location, Bathroom first (main) floor  "  Bathroom Access tub bath, walk-in shower            Prior Level of Function      Flowsheet Row Most Recent Value   Dominant Hand right   Ambulation assistive equipment   Transferring assistive equipment   Toileting independent   Bathing assistive equipment   Dressing independent   Eating independent   IADLs assistive equipment   Driving/Transportation    Communication understands/communicates without difficulty   Assistive Device Currently Used at Home cane, straight, walker, front-wheeled, shower chair, grab bar, raised toilet            Occupational Profile      Flowsheet Row Most Recent Value   Successful Occupations Pt is very active in her community   Occupational History/Life Experiences 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, attends [employer] weekly   Performance Patterns lives alone   Environmental Supports and Barriers Supportive community   Patient Goals PSFS: Toilet transfer with prosthetic 3/10, toilet transfer without prosthetic 8/10, walking 0/10, getting dressed 5/10, getting in/ out of bed 4/10, getting a prosthetic on/off 1/10             IRF OT Evaluation and Treatment - 11/21/24 1404          OT Time Calculation    Start Time 1400     Stop Time 1500     Time Calculation (min) 60 min        Session Details    Document Type Daily Treatment/Progress Note        General Information    Patient/Family/Caregiver Comments/Observations direct handoff from PT        Mobility Belt    Mobility Belt Used During Session yes        Cognitive Pattern Assessment    Cognitive Pattern Assessment Used BIMS        Brief Interview for Mental Status (BIMS)    Repetition of Three Words (First Attempt) 3     Temporal Orientation: Year Correct     Temporal Orientation: Month Accurate within 5 days     Temporal Orientation: Day Correct     Recall: \"Sock\" Yes, no cue required     Recall: \"Blue\" Yes, no cue required     Recall: \"Bed\" No, could not recall     BIMS Summary Score 13        Sit to Stand Transfer "    [place], Sit to Stand Transfer dependent (less than 25% patient effort)     Safety/Cues technique;increased time to complete;hand placement     Assistive Device parallel bars     Comment assist for hip and knee extension in // bars, max A x 1 and min A of 2nd assist with prosthetic knee extension        Stand to Sit Transfer    [place], Stand to Sit Transfer dependent (less than 25% patient effort)     Safety/Cues technique;increased time to complete;hand placement     Assistive Device parallel bars     Comment 2 person assist max A to lower and 2nd person to unlock knee        Toilet Transfer    [place] dependent (less than 25% patient effort)     Safety/Cues technique;hand placement     Comment pull to stand with prosthetic donned with 2 people and assist for WC/ commode switch        Balance    Comment, Balance Standing balance with 2 person assist for safety. flutuating with min - max A to stand with assist for R knee extension and poor hip extension. pt with poor righting reaction. Trial of midline stance with focus on L weight shift back to midline with max cues and facilitation. Trial of R LE forward step with with poor weight shift and max A for RLE placement and noted scissoring. Trial of lifting LUE for L weight shift and unable to sustain upright posture and poor righting reaction to lift hand and activity terminated.        Lower Extremity (Therapeutic Exercise)    Comment Modified Jerod stretch7 mins on wedge with pillows        Upper Body Dressing    Comment unsafe to trial gathering clothing with prosthetic donned        Grooming    Comment unsafe to trial grooming in standing        Toileting    [place] dependent (less than 25% patient effort)     Adaptive Equipment commode, 3-in-1     Comment 2 person assist with prosthetic  donned to stand        Prosthetic Orientation (Lower Extremity)    Comment, Fit Assessment Pt with prosthetic donned begining of session, wearing light  sock ply. pt with cues and mod A able to doff prosthetic and liner, post skin check unremarkable        Daily Progress Summary (OT)    Daily Outcome Statement Pt with good participation during therapy session. focusing on stance and midline training, noted poor hip and knee extension with cues for upright posture. Pt thankful for therapy session. pt would continue to benefit from skilled OT services. Continue with POC                               IRF OT Goals      Flowsheet Row Most Recent Value   Transfer Goal 1    Activity/Assistive Device toilet at 11/20/2024 0906   [place] maximum assist (25-49% patient effort) at 11/20/2024 0906   Time Frame short-term goal (STG), 5 - 7 days at 11/20/2024 0906   Transfer Goal 2    Activity/Assistive Device toilet at 11/20/2024 0906   [place] minimum assist (75% or more patient effort) at 11/20/2024 0906   Time Frame long-term goal (LTG), 21 days or less at 11/20/2024 0906   Transfer Goal 3    Activity/Assistive Device shower at 11/20/2024 0906   [place] maximum assist (25-49% patient effort) at 11/20/2024 0906   Time Frame short-term goal (STG), 5 - 7 days at 11/20/2024 0906   Transfer Goal 4    Activity/Assistive Device shower at 11/20/2024 0906   [place] minimum assist (75% or more patient effort) at 11/20/2024 0906   Time Frame long-term goal (LTG), 21 days or less at 11/20/2024 0906   Bathing Goal 1    [place] tactile cues required at 11/20/2024 0906   Time Frame short-term goal (STG), 5 - 7 days at 11/20/2024 0906   Bathing Goal 2    [place] modified independence at 11/20/2024 0906   Time Frame long-term goal (LTG), 21 days or less at 11/20/2024 0906   UB Dressing Goal 1    [place] maximum assist (25-49% patient effort) at 11/20/2024 0906   Time Frame short-term goal (STG), 5 - 7 days at 11/20/2024 0906   Strategies/Barriers with clothing retrieval at 11/20/2024 0906   UB Dressing Goal 2    [place] minimum assist (75% or more  patient effort) at 11/20/2024 0906   Time Frame long-term goal (LTG), 21 days or less at 11/20/2024 0906   Strategies/Barriers with clothing retrieval at 11/20/2024 0906   LB Dressing Goal 1    [place] maximum assist (25-49% patient effort) at 11/20/2024 0906   Time Frame short-term goal (STG), 5 - 7 days at 11/20/2024 0906   Strategies/Barriers with clothing retrieval at 11/20/2024 0906   LB Dressing Goal 2    [place] minimum assist (75% or more patient effort) at 11/20/2024 0906   Time Frame long-term goal (LTG), 21 days or less at 11/20/2024 0906   Strategies/Barriers with clothing retrieval at 11/20/2024 0906   Grooming Goal 1    [place] moderate assist (50-74% patient effort) at 11/20/2024 0906   Time Frame short-term goal (STG), 5 - 7 days at 11/20/2024 0906   Strategies/Barriers in stance at sink at 11/20/2024 0906   Grooming Goal 2    [place] supervision required at 11/20/2024 0906   Time Frame long-term goal (LTG), 21 days or less at 11/20/2024 0906   Strategies/Barriers in stance at sink at 11/20/2024 0906   Toileting Goal 1    [place] maximum assist (25-49% patient effort) at 11/20/2024 0906   Time Frame short-term goal (STG), 5 - 7 days at 11/20/2024 0906   Toileting Goal 2    [place] minimum assist (75% or more patient effort) at 11/20/2024 0906   Time Frame long-term goal (LTG), 21 days or less at 11/20/2024 0906

## 2024-11-21 NOTE — PLAN OF CARE
Plan of Care Review  Plan of Care Reviewed With: patient  Progress: improving  Outcome Evaluation: Alert and oriented. Denies discomfort. Patient is able to utilized wheelchair in room. Transfers with bump over from wheelchair to toilet. Safety maintained. Continent of bowel and bladder. Seen by wound nurse this morning. Call bell in reach.

## 2024-11-21 NOTE — CONSULTS
"Wound Ostomy Continence Note    Subjective    HPI Patient is a 64 y.o. female who was admitted on 11/19/2024 with a diagnosis of History of above knee amputation, left (CMS/HCC) [Z89.612]  Encounter for prosthetic gait training [Z47.89].    Problem list Principal Problem:    History of above knee amputation, left (CMS/HCC)  Active Problems:    Encounter for prosthetic gait training     PMH/PSH Past Medical History:   Diagnosis Date    Abnormal ECG     Abnormal finding on chest xray 12/2021    Arthritis     Colon cancer (CMS/HCC)     COVID-19 12/2021    COVID-19 vaccine series completed     Moderna: \" 3 doses\"    Diabetic neuropathy (CMS/HCC)     DM (diabetes mellitus), type 2 with ophthalmic complications (CMS/HCC)     Hypertension     Hypothyroidism     Left foot drop     Lipid disorder     Type 2 diabetes mellitus treated with insulin (CMS/HCC)      Past Surgical History   Procedure Laterality Date    Abcess drainage  10/2023    AMPUTATION TOE (RAY RESECTION) Left 1/18/2024    Performed by Getachew Damian DPM at [facility] OR PAV    Angiogram lower extremity bilateral N/A 1/17/2024    Performed by Adam Aguilar MD at [facility] CATH/EP/NEURO INT    Aortogram abdominal with serialography N/A 1/17/2024    Performed by Adam Aguilar MD at [facility] CATH/EP/NEURO INT    Cataract extraction Bilateral     CATARACT PHACO, IOL Right 11/6/2018    Performed by Derrick Jeter [facility] at [facility]    Colonoscopy      Combined hysteroscopy diagnostic / d&c  2007    Eye surgery Left 2017    cataract    Joint replacement Right 02/2023    knee replacement    Right Total Knee Repalcement Right 2/27/2023    [facility]    Tib/peroneal angioplasty & atherectomy - unilateral - initial N/A 1/17/2024    Performed by Adam Aguilar MD at [facility] CATH/EP/NEURO INT    Toe amputation      Vitrectomy Left 2016      Assessment and Recommendation      sacrum    Right great toe    Right lateral " "foot    Admission photos  Saw patient for new admission wound assessment.  Patient is awake and alert, NAD.     Patient reports mild pain in the coccyx area.  She had a pilonidal cyst surgery some years ago and she has always had some mild pain since, with prolonged pressure. The area is intact, but red with very minimal blanching.  A foam dressing is in place.  I discussed with patient the importance of offloading.  She stated she does feel sore after sitting too long and she knows that it is time to reposition.  In addition to the foam, and repositioning, I will order her a waffle cushion for her bed.  She has a padded cushion on her wheel chair.    She has a small ulcer to the lateral right foot, which Dr. Douglas assessed yesterday.  I will follow his recommendations of foam dressing to protect the area.  The right toe has a small abrasion which can be covered with a Band-Aid.    Wound care will sign off. Please re-consult if needed. Thank you.     The patient current Gelacio score is 16 which makes them at risk for skin breakdown. PREVENT Bundle should be integrated into care of patient.      Maintain Pressure Injury PREVENT Bundle, where applicable.     Wound Prevention Bundle                                                            Positioning- If clinically able:  Reposition at a minimum of 2 hours.  Adjust to avoid lying on medical devices. Use positioning devices to offload bony prominences. When out of bed, use pressure redistribution cushions. Monitor time out of bed and encourage repositioning. Use transfer aids to reduce friction and shear. Use \"turn assist\" function on bed surface if equipped.                  Risk Assessment:   Utilize risk assessment scale per hospital guidelines.  Identify additional risk factors, for example, medications and comorbidities. Match interventions to subscales of hospital's Risk assessment scale.  Monitor laboratory values. Communicate risk to interdisciplinary " "healthcare team. Consider use of prophylactic silicone foam sacral dressing.                  Evaluate Surface/Pressure Redistribution:   Consider specialty sleep surface according to patient need.   Utilize \"less is best\" with linen usage.                Vigilant Skin Inspection:   Inspect skin from head to toe, including areas under removable medical devices and dressings. Communicate skin issues to healthcare team and consult resources as needed.                   Evaluate incontinence/moisture/temperature:   Offer toileting when appropriate. Keep skin clean and dry (microclimate). Use moisture barrier products for incontinence care. Diapers/briefs for out of bed or off unit. Use absorbent under pads that wick away and hold moisture. Intervene for fecal and/or urinary incontinence (consider external containment devices. Use skin emollients (dry skin).                  Nutrition:   Consult dietician for at risk patients. Assist with menu preferences and feeding. Encourage snacks, fluid and supplements with rounding. Accurately record all intake where indicated.           Teaching Patients and Families:   Encourage patients and families to partner with staff in preventing pressure injuries. Give patients and families pressure injury education, assess their understanding of education given, and document education.                                                                 Date: 11/21/24  Signature: Zahida Ribeiro RN                  "

## 2024-11-21 NOTE — PROGRESS NOTES
Patient: Bebe Burks  Location: [facility] [address]  MRN: [id number]  Today's date: 11/21/2024    History of Present Illness  Bebe is a 64 y.o. female admitted on 11/19/2024 with History of above knee amputation, left (CMS/[facility]) [Z89.612]  Encounter for prosthetic gait training [Z47.89]. Principal problem is History of above knee amputation, left (CMS/[facility]).    Pt is a 64 y.o. 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       Past Medical History  Bebe has a past medical history of Abnormal ECG, Abnormal finding on chest xray (12/2021), Arthritis, Colon cancer (CMS/[facility]), COVID-19 (12/2021), COVID-19 vaccine series completed, Diabetic neuropathy (CMS/[facility]), DM (diabetes mellitus), type 2 with ophthalmic complications (CMS/[facility]), Hypertension, Hypothyroidism, Left foot drop, Lipid disorder, and Type 2 diabetes mellitus treated with insulin (CMS/[facility]).    PT Pain      Date/Time Pain Type Rating: Rest [facility]   11/21/24 0959 Pain Reassessment 0 JG               Prior Living Environment      Flowsheet Row Most Recent Value   People in Home alone   Current Living Arrangements condominium   Home Accessibility stairs to enter home (Group)   Living Environment Comment resides in 1st floor condo, 4 (1+2+1) JOSE RAFAEL, R hand rail, no stairs inside home, Bathroom Layout: Tub/shower unit with shower curtain, Shower stall with glass doors (shower stall in main bedroom), two sisters can provide some assistance   Number of Stairs, Main Entrance 4   Surface of Stairs, Main Entrance concrete   Stair Railings, Main Entrance railing on right side (ascending)   Stairs Comment, Main Entrance 1+2+1 JOSE RAFAEL   Location, Bathroom first (main) floor   Bathroom Access tub bath, walk-in shower            Prior Level of Function      Flowsheet Row Most Recent Value   Dominant Hand right   Ambulation assistive equipment   Transferring assistive equipment    Toileting independent   Bathing assistive equipment   Dressing independent   Eating independent   IADLs assistive equipment   Driving/Transportation    Communication understands/communicates without difficulty   Assistive Device Currently Used at Home cane, straight, walker, front-wheeled, shower chair, grab bar, raised toilet             IRF PT Evaluation and Treatment - 11/21/24 0902          PT Time Calculation    Start Time 0903     Stop Time 1003     Time Calculation (min) 60 min        Session Details    Document Type Daily Treatment/Progress Note        General Information    General Observations of Patient Pt upright in MWc; reported feeling slightly nauseas from medication but agreable to therapy after rest        Mobility Belt    Mobility Belt Used During Session yes        Orthotics    Orthotics (Trigger Row) Orthosis trial     Orthosis Trial Trialing R toe off brace        Transfers    Transfers stand pivot transfer     Maintains Weight-Bearing Status able to maintain        Sit to Stand Transfer    [place], Sit to Stand Transfer maximum assist (25-49% patient effort);1 person assist     Safety/Cues technique;sequencing     Assistive Device parallel bars     Comment pull to  // bars with L prosthesis, max A for hip extension and to assist with L knee extension to lock prosthesis        Stand to Sit Transfer    [place], Stand to Sit Transfer maximum assist (25-49% patient effort);1 person assist     Safety/Cues technique;sequencing     Assistive Device parallel bars     Comment max A for eccentric control and to bring L prosthesis out in front and release lock in unweighted position        Gait Training    [place], Gait dependent (less than 25% patient effort);2 person assist     Safety/Cues increased time to complete;sequencing;technique     Assistive Device walker, thoracic     Distance in Feet 10 feet     Pattern step-through;step-to     Deviations/Abnormal  "Patterns base of support, narrow;gait speed decreased;weight shifting decreased     Left Sided Gait Deviations --   decreased anterior/lateral weightshift, increased weight in heel    Right Sided Gait Deviations foot drop/toe drag;heel strike decreased     Comment Ambulated 10' x 2 with evawalker , max A of 1 for anterior WS , prosthetic placement on LLE and assist of 2nd at RLE for stance control + assist of 3rd for evawalker management; Ambulated 10' in // bars, max A of 1 for weightshifting assistance and prosthetic placement + min A of 2nd at RLE for stance control        Balance    Balance Interventions standing     Comment, Balance Standing in // bars: lateral weightshifts x 30 reps, emphasis on engage hip extensors in weightshift to be \"active \" prosthesis and maintain COG over BINTA to prevent posterior lean; repetitive stepping with RLE x 10 reps, max A to maintain upright position of pylon and faciitate an anterior/lateral WS        Lower Extremity (Therapeutic Exercise)    Exercise Position/Type seated;AROM (active range of motion)     Reps and Sets 3 x 30 seconds     Comment R gastroc stretch, 3 x 30 seconds        Prosthetic Orientation (Lower Extremity)    Fit Assessment fit/function of prosthesis are appropriate     Comment, Fit Assessment Donning/[place] liner - mod A for positioning of it and completing rolling the liner up proximally; Donning/doffing - max A prosthesis for pin placement and ratching pin  into place; Sock ply = 0 ply, wearing schedule = PT/OT only; skin check = unremarkable     Specific Gait Deviations circumducted gait;drop-off at end of stance phase        Daily Progress Summary (PT)    Daily Outcome Statement Completed standing in // bars and initiated gait training with deshaun. Pt with increased weakness in RLE and poor prosthetic control requiring increased hands on assistance. Recommending continued standing balance activities and progress mobiity as able.               "                 IRF PT Goals      Flowsheet Row Most Recent Value   Transfer Goal 1    Activity/Assistive Device stand pivot, walker, front-wheeled at 11/19/2024 1431   [place] other (see comments)  [Dependent (Max A x 1, Mod A x 1)] at 11/19/2024 1431   Time Frame 1 week, short-term goal (STG) at 11/19/2024 1431   Transfer Goal 2    Activity/Assistive Device stand pivot, walker, front-wheeled at 11/19/2024 1431   [place] maximum assist (25-49% patient effort) at 11/19/2024 1431   Time Frame 21 days or less, long-term goal (LTG) at 11/19/2024 1431   Transfer Goal 3    Activity/Assistive Device sit-to-stand/stand-to-sit, walker, front-wheeled at 11/19/2024 1431   [place] maximum assist (25-49% patient effort), verbal cues required at 11/19/2024 1431   Time Frame 1 week, short-term goal (STG) at 11/19/2024 1431   Transfer Goal 4    Activity/Assistive Device sit-to-stand/stand-to-sit, walker, front-wheeled at 11/19/2024 1431   [place] minimum assist (75% or more patient effort) at 11/19/2024 1431   Time Frame 21 days or less, long-term goal (LTG) at 11/19/2024 1431   Gait/Walking Locomotion Goal 1    Activity/Assistive Device gait (walking locomotion), walker, front-wheeled at 11/19/2024 1431   Distance 10 feet at 11/19/2024 1431   [place] other (see comments)  [Dependent (Max A x 1, Mod A x 1)] at 11/19/2024 1431   Time Frame 1 week, short-term goal (STG) at 11/19/2024 1431   Gait/Walking Locomotion Goal 2    Activity/Assistive Device gait (walking locomotion), walker, front-wheeled at 11/19/2024 1431   Distance 25 feet at 11/19/2024 1431   [place] maximum assist (25-49% patient effort) at 11/19/2024 1431   Time Frame 21 days or less, long-term goal (LTG) at 11/19/2024 1431   Wheelchair Locomotion Goal 1    Activity forward propulsion, backward propulsion, steering, stopping, turning, doorway navigation, weight shifting at 11/19/2024 1431   Assistive Device manual, ultra lightweight at  11/19/2024 1431   Distance 150 feet at 11/19/2024 1431   [place] other (see comments)  [Touching / Steadying Assist] at 11/19/2024 1431   Time Frame 1 week, short-term goal (STG) at 11/19/2024 1431   Wheelchair Locomotion Goal 2    Activity forward propulsion, backward propulsion, steering, stopping, turning, doorway navigation, weight shifting at 11/19/2024 1431   Assistive Device manual, ultra lightweight at 11/19/2024 1431   Distance 150 feet at 11/19/2024 1431   [place] modified independence at 11/19/2024 1431   Time Frame 21 days or less, long-term goal (LTG) at 11/19/2024 1431

## 2024-11-21 NOTE — PLAN OF CARE
Plan of Care Review  Plan of Care Reviewed With: patient  Progress: improving  Outcome Evaluation: Patient is alert and oriented, room air, continent of b&b, PVR 35cc . Left residual limb pain mangement with PRN Diluadid and repositioning. TID accu check. SCDs to right leg. Meds and care reviewed with patient.  Safety precautions  maintained .Call bell within reach and bed alarm is on.

## 2024-11-21 NOTE — PROGRESS NOTES
[facility] Internal Medicine Progress Note          Patient was seen and examined at bedside.    Subjective:   11/20/24  Reviewed night shift RN report:  Patient is aao x4, continent of bowel last BM on 11/19, incontinent of bladder overnight.  cc noted and straight cath done for 800cc. Pain mangement with PRN Diluadid. TID accu check. Slept well @ night. SCDs to right leg. Fall and safety precautions maintained. Call bell within reach and bed alarm is on.    Saw and evaluated her in am:  The important medical issues:  # hyperglycemia, resume her prandial short active insulin plus SSI correction, titrated up her lantus dose, diet control, closely monitor her BG levels and po intake, keep good oral hydration;  # urinary retention, regular bladder scan with cic, UTI prevention.      11/21/24  No new complaints.  Hyperglycemia, add prandial insulin, cont basal insulin, plus SSI ; optimize diet control; pt brought in home oral semaglutide, ordered 14 mg po daily.    Denies nausea, vomiting, abdominal pain or discomfort, dysuria, cough/sputum, running nose, sore throat, chest pain, palpitation, SOB or orthopnea, dizziness or LH,  HA.     Objective   Vital signs in last 24 hours:  Temp:  [36.4 °C (97.5 °F)-36.8 °C (98.2 °F)] 36.8 °C (98.2 °F)  Heart Rate:  [86-97] 86  Resp:  [18] 18  BP: (118-145)/(62-79) 118/62    No intake or output data in the 24 hours ending 11/21/24 1140    Intake/Output this shift:  No intake/output data recorded.   Review of Systems:  All other systems reviewed and negative except as noted in the HPI.   Objective      Labs  Reviewed her labs thoroughly   Lab Results   Component Value Date    WBC 4.98 11/20/2024    HGB 10.8 (L) 11/20/2024    HCT 32.7 (L) 11/20/2024    MCV 90.8 11/20/2024     11/20/2024     Lab Results   Component Value Date    GLUCOSE 205 (H) 11/20/2024    CALCIUM 8.7 11/20/2024     11/20/2024    K 3.7 11/20/2024    CO2 30 11/20/2024     11/20/2024     BUN 35 (H) 11/20/2024    CREATININE 0.9 11/20/2024       Imaging  N/A    Full Code    Physical Exam:  Head/Ear/Nose/Throat: normocephalic; atraumatic; moisture mouth mm, no oropharyngeal thrush noted.   Eyes: anicteric sclera, EOMI; PERRL.   Neck : supple, no JVD, no carotid bruits appeciated.   Respiratory: no evidence of labored breathing, lung sounds CTA b/l, good aeration bibasilar area, no w/r/c.   Cardiovascular: RRR; normal S1, S2; no m/r/g; no S3 or S4.   Gastrointestinal: soft; NT; BS normal; mildly distended; no CVAT b/l.   Genitourinary: no lozada.   Extremities : S/p L AKA, residual limb healed well .   Neurological: AO x 3, fluent speeches, following commands, CNS II-XII grossly intact; no focal neurologic deficits.   Behavior/Emotional: in NAD, appropriate; cooperative.   Skin: clean, dry and intact.  Plan of care was discussed with patient, RN, and PMR attending     Assessment     CC:L AKA, ambulatory dysfunction     64 y.o. 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 S/p IV Ceftriaxone and IV Ampicillin until 9/14/24. H/o non-occlusive DVT of the R axillary and brachial vein around midline. She presented to [facility] on 11/14 for evaluation to begin prosthetic training. Now she was admitted for inpt prosthetic training on 11/19/24.  Denies nausea, vomiting, abdominal pain or discomfort, dysuria, cough/sputum, running nose, sore throat, chest pain, palpitation, SOB or orthopnea, dizziness or LH,  HA.    A/P:  # A/p L AKA  -inpt prosthetic training, wound care/dermal defense, pain and neuralgia managements, fall precaution     # CML  -cont home imatinib, check CBC      # Type 2 DM, hypothyroidism  - diet control, SSI, plus her home diabetic  regimen;  - cont home levothyroxine     #  PICC-associated DVT on 8/30   -S/p SQ Lovenox weight based ;  -cont Eliquis 5 mg po bid      # Urinary retention, h/o recurrent UTI  -timed voiding with cic, adequate oral hydration, monitor PVR with cic, on flomax;  -hiprex 1 g po bid        # Stage 2 decubitus ulcer, pressure injury in right heel   -wound care     # Recent C.diff.  -cont prophylactic oral vanco 125 mg po bid       Billing code: [id number]  Diagnoses:  Patient Active Problem List   Diagnosis    Controlled type 2 diabetes mellitus, with long-term current use of insulin (CMS/[facility])    Essential hypertension    Hyperlipidemia    Hypothyroidism    Obesity    Thyroid nodule    COVID-19 virus infection    Lung nodule    Displacement of lumbar intervertebral disc without myelopathy    Disability of walking    Polyneuropathy due to type 2 diabetes mellitus (CMS/HCC)    Pre-op examination    Localized osteoarthritis of right knee    Type 2 diabetes mellitus treated with insulin (CMS/HCC)    Abnormal finding on EKG    Left foot drop    Abnormal finding on chest xray    Osteoarthritis of right knee, unspecified osteoarthritis type    S/P total knee arthroplasty, right    Sepsis (CMS/HCC)    Osteomyelitis of left foot (CMS/HCC)    Peripheral neuropathy    Abdominal pain    Chronic idiopathic constipation    Gangrene (CMS/HCC)    Tibial artery stenosis, left (CMS/HCC)    H/O pilonidal cyst    Type 2 diabetes mellitus with left diabetic foot infection (CMS/HCC)    Type 2 diabetes mellitus with diabetic neuropathy (CMS/HCC)    Hypothyroidism    Primary hypertension    Leukemoid reaction    HLD (hyperlipidemia)    Acute osteomyelitis of left foot (CMS/HCC)    History of above knee amputation, left (CMS/[facility])    Encounter for prosthetic gait training           Charlette Brooke MD  11/21/2024

## 2024-11-21 NOTE — CONSULTS
[place]  [id number]  11/21/2024    PODIATRY CONSULT    History of above knee amputation, left (CMS/[facility]) [Z89.612]  Encounter for prosthetic gait training [Z47.89]    Reason for referral: Foot wound    HPI : 64-year-old right handed white female with chronic conditions significant for chronic phase of chronic myelocytic leukemia previously on Desatinib held prior to admission in the setting of poor wound healing, diabetes mellitus type 2 with retinopathy and neuropathy, hypertension and recent osteomyelitis of left plantar foot status post I&D, 3rd amputation then TMA who was admitted to [facility] from [facility] on 8/1/24 with a 40 pound weight gain (managed with 40mg Lasix), underwent left guillotine transtibial amputation on 8/3/24 followed by left transfemoral amputation on 8/19/24 with vascular surgeon Dr. Pura Chaves. ID recommended IV Ceftriaxone and IV Ampicillin until 9/14/24. She had urinary retention managed with indwelling Rowan catheter. She had right upper extremity swelling present during this admission, ultrasound on 8/29/24 demonstrated an acute non-occlusive DVT of the right axillary and brachial vein around line (on Lovenox). Subsequently she completed an acute inpatient rehabilitation stay for pre-prosthetic training at [facility] between 9/3/24 to 9/21/24 and was discharged to [facility] on 9/21/24. She initially required bladder self intermittent catheterizations due to urinary retention, but indicates that now she is able to void. She desired to be a prosthetic ambulator. A left transfemoral amputation prosthesis was fabricated. and followed-up as outpatient in the amputee clinic at [facility] and she was deemed to be an appropriate candidate for acute inpatient rehabilitation stay. She has been needing assistance for mobility, transfers, self-care.  She is transferred to [facility] on 11/19/24 from [facility] for  further acute inpatient rehabilitation stay for prosthetic training with transfemoral amputation prosthesis.         Subjective:  Patient was referred by Dr. Petty for at risk foot care and wound evaluation. Patient is complaining of right foot wound.        Allergies: No Known Allergies      Allergies List Reviewed:  yes      Medications:   Current Facility-Administered Medications:     acetaminophen (TYLENOL) tablet 650 mg, 650 mg, oral, q4h PRN, Charlette Brooke MD    alum-mag hydroxide-simeth (MAALOX) 200-200-20 mg/5 mL suspension 30 mL, 30 mL, oral, q4h PRN, Charlette Brooke MD    apixaban (ELIQUIS) tablet 5 mg, 5 mg, oral, BID, Charlette Brooke MD, 5 mg at 11/20/24 2137    ascorbic acid (VITAMIN C) tablet 500 mg, 500 mg, oral, q48h INT, Charlette Brooke MD    atorvastatin (LIPITOR) tablet 40 mg, 40 mg, oral, Daily (6p), Charlette Brooke MD, 40 mg at 11/20/24 1631    bisacodyL (DULCOLAX) 10 mg suppository 10 mg, 10 mg, rectal, Daily PRN, Charlette Brooke MD    docusate sodium (COLACE) capsule 100 mg, 100 mg, oral, BID, Charlette Brooke MD, 100 mg at 11/20/24 2137    DULoxetine (CYMBALTA) capsule,delayed release(DR/EC) 60 mg, 60 mg, oral, Nightly, Charlette Brooke MD, 60 mg at 11/20/24 2137    empagliflozin (JARDIANCE) tablet 10 mg, 10 mg, oral, Daily, Charlette Brooke MD, 10 mg at 11/20/24 0822    ferrous sulfate tablet 325 mg, 325 mg, oral, q48h INT, Charlette Brooke MD    flu vaccine ts 2024-25 (65 yrs&up)(PF) (FLUZONE HIGH-DOSE TRIV) injection 180 mcg, 0.5 mL, intramuscular, During hospitalization, Alexei Petty MD    fluticasone propionate (FLONASE) 50 mcg/actuation nasal spray 1 spray, 1 spray, Each Nostril, Daily PRN, Charlette Brooke MD    furosemide (LASIX) tablet 40 mg, 40 mg, oral, BID (am, 4p), Charlette Brooke MD, 40 mg at 11/20/24 1631    gabapentin (NEURONTIN) capsule 600 mg, 600 mg, oral, TID, Charlette Brooke MD, 600 mg at 11/20/24 2138    HYDROmorphone (DILAUDID) tablet 4 mg, 4 mg, oral, q6h PRN, Charlette Brooke,  MD, 4 mg at 11/20/24 2147    imatinib (GLEEVEC) chemo tablet 400 mg, 400 mg, oral, Daily, Charlette Brooke MD, 400 mg at 11/20/24 0824    insulin glargine U-100 (LANTUS/BASAGLAR) pen 12 Units, 12 Units, subcutaneous, Nightly, Charlette Brooke MD, 12 Units at 11/20/24 2138    insulin lispro U-100 (HumaLOG) pen 1-12 Units, 1-12 Units, subcutaneous, TID with meals, Charlette Brooke MD, 2 Units at 11/20/24 1713    insulin lispro U-100 (HumaLOG) pen 8 Units, 8 Units, subcutaneous, TID with meals, Charlette Brooke MD, 8 Units at 11/20/24 1713    levothyroxine (SYNTHROID) tablet 25 mcg, 25 mcg, oral, Daily (6:30a), Charlette Brooke MD, 25 mcg at 11/21/24 0542    melatonin ODT 3 mg, 3 mg, oral, Nightly, Charlette Brooke MD, 3 mg at 11/20/24 2137    methenamine (HIPREX) tablet 1 g, 1 g, oral, BID, Charlette Brooke MD, 1 g at 11/20/24 2138    multivitamin tablet 1 tablet, 1 tablet, oral, Daily, Charlette Brooke MD, 1 tablet at 11/20/24 0822    ondansetron ODT (ZOFRAN-ODT) disintegrating tablet 4 mg, 4 mg, oral, q8h PRN, Charlette Brooke MD    potassium chloride (KLOR-CON M) ER tablet (particles/crystals) 20 mEq, 20 mEq, oral, Daily, Charlette Brooke MD, 20 mEq at 11/20/24 0822    senna (SENOKOT) tablet 2 tablet, 2 tablet, oral, Daily, Charlette Brooke MD    tamsulosin (FLOMAX) 24 hr ER capsule 0.4 mg, 0.4 mg, oral, Daily with dinner, Charlette Brooke MD, 0.4 mg at 11/20/24 1631    vancomycin (VANCOCIN) capsule 125 mg, 125 mg, oral, BID, Charlette Brooke MD, 125 mg at 11/20/24 2138      Home Medications Reconcile: yes      Current Medication orders reviewed: yes      Medical History:    Active Ambulatory Problems     Diagnosis Date Noted    Controlled type 2 diabetes mellitus, with long-term current use of insulin (CMS/[facility]) 10/03/2014    Essential hypertension 10/03/2014    Hyperlipidemia 10/03/2014    Hypothyroidism 10/03/2014    Obesity 10/03/2014    Thyroid nodule 10/03/2014    COVID-19 virus infection 12/02/2021    Lung nodule 12/02/2021     Displacement of lumbar intervertebral disc without myelopathy 02/22/2023    Disability of walking 02/22/2023    Polyneuropathy due to type 2 diabetes mellitus (CMS/HCC) 02/22/2023    Pre-op examination 02/23/2023    Localized osteoarthritis of right knee 02/23/2023    Type 2 diabetes mellitus treated with insulin (CMS/[facility])     Abnormal finding on EKG     Left foot drop     Abnormal finding on chest xray 12/2021    Osteoarthritis of right knee, unspecified osteoarthritis type 02/28/2023    S/P total knee arthroplasty, right 04/03/2023    Sepsis (CMS/[facility]) 01/15/2024    Osteomyelitis of left foot (CMS/[facility]) 01/15/2024    Peripheral neuropathy 01/15/2024    Abdominal pain 01/15/2024    Chronic idiopathic constipation 01/17/2024    Gangrene (CMS/[facility]) 01/17/2024    Tibial artery stenosis, left (CMS/[facility]) 01/18/2024    H/O pilonidal cyst 01/19/2024    Type 2 diabetes mellitus with left diabetic foot infection (CMS/[facility]) 02/29/2024    Type 2 diabetes mellitus with diabetic neuropathy (CMS/[facility]) 03/01/2024    Hypothyroidism 03/01/2024    Primary hypertension 03/01/2024    Leukemoid reaction 03/01/2024    HLD (hyperlipidemia) 03/01/2024    Acute osteomyelitis of left foot (CMS/[facility]) 03/01/2024     Resolved Ambulatory Problems     Diagnosis Date Noted    No Resolved Ambulatory Problems     Past Medical History:   Diagnosis Date    Abnormal ECG     Arthritis     Colon cancer (CMS/[facility])     COVID-19 12/2021    COVID-19 vaccine series completed     Diabetic neuropathy (CMS/[facility])     DM (diabetes mellitus), type 2 with ophthalmic complications (CMS/[facility])     Hypertension     Lipid disorder        Social History     Socioeconomic History    Marital status: Single     Spouse name: Not on file    Number of children: 0    Years of education: Not on file    Highest education level: Not on file   Occupational History    Occupation: Disabilty   Tobacco Use    Smoking status: Never     Passive exposure: Past    Smokeless tobacco: Never   Vaping  Use    Vaping status: Never Used   Substance and Sexual Activity    Alcohol use: Not Currently     Comment: occasional in past, none current    Drug use: Never    Sexual activity: Not on file   Other Topics Concern    Not on file   Social History Narrative    Patient is single and has no children. She     Social Drivers of Health     Financial Resource Strain: Low Risk  (2/27/2023)    Overall Financial Resource Strain (CARDIA)     Difficulty of Paying Living Expenses: Not hard at all   Food Insecurity: No Food Insecurity (11/19/2024)    Hunger Vital Sign     Worried About Running Out of Food in the Last Year: Never true     Ran Out of Food in the Last Year: Never true   Transportation Needs: No Transportation Needs (11/19/2024)    PRAPARE - Transportation     Lack of Transportation (Medical): No     Lack of Transportation (Non-Medical): No   Physical Activity: Not on file   Stress: Stress Concern Present (11/19/2024)    [employer] - Occupational Stress Questionnaire     Feeling of Stress : To some extent   Social Connections: Not on file   Intimate Partner Violence: Not on file   Housing Stability: Unknown (11/19/2024)    Housing Stability Vital Sign     Unable to Pay for Housing in the Last Year: No     Number of Times Moved in the Last Year: Not on file     Homeless in the Last Year: No       Past Surgical History   Procedure Laterality Date    Abcess drainage  10/2023    AMPUTATION TOE (RAY RESECTION) Left 1/18/2024    Performed by Getachew Damian DPM at [facility] OR PAV    Angiogram lower extremity bilateral N/A 1/17/2024    Performed by Adam Aguilar MD at [facility] CATH/EP/NEURO INT    Aortogram abdominal with serialography N/A 1/17/2024    Performed by Adam Aguilar MD at [facility] CATH/EP/NEURO INT    Cataract extraction Bilateral     CATARACT PHACO, IOL Right 11/6/2018    Performed by Derrick Jeter MD at [facility]    Colonoscopy      Combined hysteroscopy  diagnostic / d&c  2007    Eye surgery Left 2017    cataract    Joint replacement Right 02/2023    knee replacement    Right Total Knee Repalcement Right 2/27/2023    Performed by Gideon Centeno MD at [facility] OR    Tib/peroneal angioplasty & atherectomy - unilateral - initial N/A 1/17/2024    Performed by Adam Aguilar MD at [facility] CATH/EP/NEURO INT    Toe amputation      Vitrectomy Left 2016         Review of Systems - Negative except noted above.              Physical Exam: nonpalpable DP nonpalpable PT right foot.  Decreased texture, temperature, and turgor right foot.  Decreased digital hair .  Mild edema right lower extremity.  Xerotic skin bilateral .  Right lateral foot with nonblanchable maroon area.  No crepitus no fluctuance.  No erythema.  Right great toe with dorsal scab.  Elongated, thickened, yellow nails with subungual debris  <5.    Digital contracture noted toe 2-5.   Hallux valgus deformity.  Epicritic sensation reduced right foot.        Lab Results   Component Value Date    WBC 4.98 11/20/2024    HGB 10.8 (L) 11/20/2024    HCT 32.7 (L) 11/20/2024    MCV 90.8 11/20/2024     11/20/2024       Lab Results   Component Value Date    GLUCOSE 205 (H) 11/20/2024    CALCIUM 8.7 11/20/2024     11/20/2024    K 3.7 11/20/2024    CO2 30 11/20/2024     11/20/2024    BUN 35 (H) 11/20/2024    CREATININE 0.9 11/20/2024       No results found.    Pathology Results       ** No results found for the last 720 hours. **          Microbiology Results       ** No results found for the last 720 hours. **            Temp:  [36.4 °C (97.5 °F)-36.8 °C (98.2 °F)] 36.8 °C (98.2 °F)  Heart Rate:  [80-97] 86  Resp:  [18] 18  BP: (118-145)/(62-79) 118/62      Pertinent radiology and labs reviewed      Impression: 64-year-old   white female with with pmh chronic myelocytic leukemia, diabetes mellitus type 2 with retinopathy and neuropathy, hypertension and recent left transfemoral amputation now with right  foot DTI and noninfected great toe scab.      Plan: Mepilex appropriate for right lateral foot wound.  Monitor for clinical signs of infection.   Follow-up evaluation 3-5 days. New balance sneakers ok for PT.              Dimitri Douglas DPM    11/21/2024      7:54 AM

## 2024-11-22 ENCOUNTER — APPOINTMENT (INPATIENT)
Dept: PHYSICAL THERAPY | Facility: REHABILITATION | Age: 64
DRG: 560 | End: 2024-11-22
Payer: MEDICARE

## 2024-11-22 ENCOUNTER — APPOINTMENT (INPATIENT)
Dept: OCCUPATIONAL THERAPY | Facility: REHABILITATION | Age: 64
DRG: 560 | End: 2024-11-22
Payer: MEDICARE

## 2024-11-22 LAB
GLUCOSE BLD-MCNC: 183 MG/DL (ref 70–99)
GLUCOSE BLD-MCNC: 217 MG/DL (ref 70–99)
GLUCOSE BLD-MCNC: 276 MG/DL (ref 70–99)
GLUCOSE BLD-MCNC: 80 MG/DL (ref 70–99)
POCT TEST: ABNORMAL
POCT TEST: NORMAL

## 2024-11-22 PROCEDURE — 97530 THERAPEUTIC ACTIVITIES: CPT | Mod: GP

## 2024-11-22 PROCEDURE — 97116 GAIT TRAINING THERAPY: CPT | Mod: GP

## 2024-11-22 PROCEDURE — 97535 SELF CARE MNGMENT TRAINING: CPT | Mod: GO

## 2024-11-22 PROCEDURE — 97110 THERAPEUTIC EXERCISES: CPT | Mod: GO

## 2024-11-22 PROCEDURE — 97110 THERAPEUTIC EXERCISES: CPT | Mod: GP

## 2024-11-22 PROCEDURE — 97763 ORTHC/PROSTC MGMT SBSQ ENC: CPT | Mod: GP

## 2024-11-22 PROCEDURE — 97530 THERAPEUTIC ACTIVITIES: CPT | Mod: GO

## 2024-11-22 PROCEDURE — 63700000 HC SELF-ADMINISTRABLE DRUG: Performed by: INTERNAL MEDICINE

## 2024-11-22 PROCEDURE — 12800000 HC ROOM AND CARE SEMIPRIVATE REHAB

## 2024-11-22 RX ORDER — INSULIN LISPRO 100 [IU]/ML
1-12 INJECTION, SOLUTION INTRAVENOUS; SUBCUTANEOUS
Status: DISCONTINUED | OUTPATIENT
Start: 2024-11-22 | End: 2024-11-27

## 2024-11-22 RX ADMIN — INSULIN GLARGINE 12 UNITS: 100 INJECTION, SOLUTION SUBCUTANEOUS at 21:02

## 2024-11-22 RX ADMIN — APIXABAN 5 MG: 5 TABLET, FILM COATED ORAL at 20:59

## 2024-11-22 RX ADMIN — DULOXETINE HYDROCHLORIDE 60 MG: 30 CAPSULE, DELAYED RELEASE ORAL at 20:58

## 2024-11-22 RX ADMIN — THERA TABS 1 TABLET: TAB at 09:18

## 2024-11-22 RX ADMIN — METHENAMINE HIPPURATE 1 G: 1000 TABLET ORAL at 09:17

## 2024-11-22 RX ADMIN — GABAPENTIN 600 MG: 300 CAPSULE ORAL at 20:57

## 2024-11-22 RX ADMIN — HYDROMORPHONE HYDROCHLORIDE 4 MG: 2 TABLET ORAL at 22:21

## 2024-11-22 RX ADMIN — VANCOMYCIN HYDROCHLORIDE 125 MG: 125 CAPSULE ORAL at 09:18

## 2024-11-22 RX ADMIN — LEVOTHYROXINE SODIUM 25 MCG: 0.03 TABLET ORAL at 05:45

## 2024-11-22 RX ADMIN — ATORVASTATIN CALCIUM 40 MG: 40 TABLET, FILM COATED ORAL at 17:18

## 2024-11-22 RX ADMIN — INSULIN LISPRO 2 UNITS: 100 INJECTION, SOLUTION INTRAVENOUS; SUBCUTANEOUS at 09:19

## 2024-11-22 RX ADMIN — INSULIN LISPRO 1 UNITS: 100 INJECTION, SOLUTION INTRAVENOUS; SUBCUTANEOUS at 12:26

## 2024-11-22 RX ADMIN — INSULIN LISPRO 8 UNITS: 100 INJECTION, SOLUTION INTRAVENOUS; SUBCUTANEOUS at 09:19

## 2024-11-22 RX ADMIN — EMPAGLIFLOZIN 10 MG: 10 TABLET, FILM COATED ORAL at 09:18

## 2024-11-22 RX ADMIN — TAMSULOSIN HYDROCHLORIDE 0.4 MG: 0.4 CAPSULE ORAL at 17:18

## 2024-11-22 RX ADMIN — INSULIN LISPRO 8 UNITS: 100 INJECTION, SOLUTION INTRAVENOUS; SUBCUTANEOUS at 12:27

## 2024-11-22 RX ADMIN — GABAPENTIN 600 MG: 300 CAPSULE ORAL at 17:18

## 2024-11-22 RX ADMIN — Medication 3 MG: at 20:57

## 2024-11-22 RX ADMIN — DOCUSATE SODIUM 100 MG: 100 CAPSULE, LIQUID FILLED ORAL at 20:59

## 2024-11-22 RX ADMIN — POTASSIUM CHLORIDE 20 MEQ: 1500 TABLET, EXTENDED RELEASE ORAL at 09:18

## 2024-11-22 RX ADMIN — VANCOMYCIN HYDROCHLORIDE 125 MG: 125 CAPSULE ORAL at 20:57

## 2024-11-22 RX ADMIN — INSULIN LISPRO 4 UNITS: 100 INJECTION, SOLUTION INTRAVENOUS; SUBCUTANEOUS at 21:00

## 2024-11-22 RX ADMIN — IMATINIB MESYLATE 400 MG: 400 TABLET, FILM COATED ORAL at 09:19

## 2024-11-22 RX ADMIN — METHENAMINE HIPPURATE 1 G: 1000 TABLET ORAL at 20:57

## 2024-11-22 RX ADMIN — GABAPENTIN 600 MG: 300 CAPSULE ORAL at 09:17

## 2024-11-22 RX ADMIN — ACETAMINOPHEN 650 MG: 325 TABLET, FILM COATED ORAL at 16:18

## 2024-11-22 RX ADMIN — FUROSEMIDE 40 MG: 40 TABLET ORAL at 17:18

## 2024-11-22 RX ADMIN — FUROSEMIDE 40 MG: 40 TABLET ORAL at 09:18

## 2024-11-22 RX ADMIN — APIXABAN 5 MG: 5 TABLET, FILM COATED ORAL at 09:18

## 2024-11-22 NOTE — PLAN OF CARE
Problem: Rehabilitation ([facility]) Plan of Care  Goal: Plan of Care Review  Outcome: Progressing  Flowsheets (Taken 11/22/2024 1144)  Progress: improving  Plan of Care Reviewed With: patient  Outcome Evaluation: pt aaox4. continent of b&b. participates in therapies. showered this AM with nursing. min bumpover transfer. L AKA incision healed, ida. R lateral foot pink, foam dressing applied per order. R great toe scab, ida. sacrum pink, dry - foam applied. TID accuchek, breakfast blood glucose 217, insulin administered per MAR. denies pain this AM.

## 2024-11-22 NOTE — PLAN OF CARE
Plan of Care Review  Plan of Care Reviewed With: patient  Progress: improving  Outcome Evaluation: HS meds reviewed withpt. Pt a/o x4, cont b/b; LBM 11/21/24. Supervision bump over, eliquis, TID accu check,safety precautions maintained, call bell in reach.

## 2024-11-22 NOTE — PROGRESS NOTES
Inpatient Psychology Initial Intake    Duration: 60 minutes    Bebe Burks, : 1960, a 64 y.o. female, for initial evaluation visit to discuss Adjustment to Disability.    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 Pura Chaves. ID recommended IV Ceftriaxone and IV Ampicillin until 24. She had urinary retention managed with indwelling Rowan catheter. She had right upper extremity swelling present during this admission, ultrasound on 24 demonstrated an acute non-occlusive DVT of the right axillary and brachial vein around line (on Lovenox). Subsequently she completed an acute inpatient rehabilitation stay for pre-prosthetic training at [facility] between 9/3/24 to 24 and was discharged to [facility] on 24. She initially required bladder self intermittent catheterizations due to urinary retention, but indicates that now she is able to void. She desired to be a prosthetic ambulator. A left transfemoral amputation prosthesis was fabricated. and followed-up as outpatient in the amputee clinic at [facility] and she was deemed to be an appropriate candidate for acute inpatient rehabilitation stay. She has been needing assistance for mobility, transfers, self-care. I have reviewed the preadmission screening and concur with that information and there are no significant changes  "from patient's preadmission screening. She is transferred to [facility] on 11/19/24 from [facility] for further acute inpatient rehabilitation stay for prosthetic training with transfemoral amputation prosthesis.        Past Medical History:   Diagnosis Date    Abnormal ECG     Abnormal finding on chest xray 12/2021    Arthritis     Colon cancer (CMS/HCC)     COVID-19 12/2021    COVID-19 vaccine series completed     Moderna: \" 3 doses\"    Diabetic neuropathy (CMS/HCC)     DM (diabetes mellitus), type 2 with ophthalmic complications (CMS/[facility])     Hypertension     Hypothyroidism     Left foot drop     Lipid disorder     Type 2 diabetes mellitus treated with insulin (CMS/[facility])      Past Surgical History   Procedure Laterality Date    Abcess drainage  10/2023    AMPUTATION TOE (RAY RESECTION) Left 1/18/2024    Performed by Getachew Damian DPM at [facility] OR PAV    Angiogram lower extremity bilateral N/A 1/17/2024    Performed by Adam Aguilar MD at [facility] CATH/EP/NEURO INT    Aortogram abdominal with serialography N/A 1/17/2024    Performed by Adam Aguilar MD at [facility] CATH/EP/NEURO INT    Cataract extraction Bilateral     CATARACT PHACO, IOL Right 11/6/2018    Performed by Derrick Jeter MD at [facility]    Colonoscopy      Combined hysteroscopy diagnostic / d&c  2007    Eye surgery Left 2017    cataract    Joint replacement Right 02/2023    knee replacement    Right Total Knee Repalcement Right 2/27/2023    Performed by Gideon Centeno MD at [facility] OR    Tib/peroneal angioplasty & atherectomy - unilateral - initial N/A 1/17/2024    Performed by Adam Aguilar MD at [facility] CATH/EP/NEURO INT    Toe amputation      Vitrectomy Left 2016     Family History   Problem Relation Name Age of Onset    Heart disease Biological Mother      Diabetes Biological Mother      Hypertension Biological Mother      Stroke Biological Father       Social History     Socioeconomic History    Marital " status: Single     Spouse name: None    Number of children: 0    Years of education: None    Highest education level: None   Occupational History    Occupation: Disabilty   Tobacco Use    Smoking status: Never     Passive exposure: Past    Smokeless tobacco: Never   Vaping Use    Vaping status: Never Used   Substance and Sexual Activity    Alcohol use: Not Currently     Comment: occasional in past, none current    Drug use: Never   Social History Narrative    Patient is single and has no children. She     Social Drivers of Health     Financial Resource Strain: Low Risk  (2/27/2023)    Overall Financial Resource Strain (CARDIA)     Difficulty of Paying Living Expenses: Not hard at all   Food Insecurity: No Food Insecurity (11/19/2024)    Hunger Vital Sign     Worried About Running Out of Food in the Last Year: Never true     Ran Out of Food in the Last Year: Never true   Transportation Needs: No Transportation Needs (11/19/2024)    PRAPARE - Transportation     Lack of Transportation (Medical): No     Lack of Transportation (Non-Medical): No   Stress: Stress Concern Present (11/19/2024)    [employer] [place] [employer] - Occupational Stress Questionnaire     Feeling of Stress : To some extent   Housing Stability: Unknown (11/19/2024)    Housing Stability Vital Sign     Unable to Pay for Housing in the Last Year: No     Homeless in the Last Year: No       Previous Mental Health History:   Previous Mental Health Treatment:  N/A  Previous Suicidal Behavior:  N/A  Previous Self-Injurious Behavior:  N/A  Previous Homicidal Behavior:  N/A  Previous Substance Abuse Treatment: N/A    Current Facility-Administered Medications   Medication Dose Route Frequency Provider Last Rate Last Admin    acetaminophen (TYLENOL) tablet 650 mg  650 mg oral q4h PRN Charlette Brooke MD   650 mg at 11/21/24 1718    alum-mag hydroxide-simeth (MAALOX) 200-200-20 mg/5 mL suspension 30 mL  30 mL oral q4h PRN Charlette Brooke MD         apixaban (ELIQUIS) tablet 5 mg  5 mg oral BID Charlette Brooke MD   5 mg at 11/21/24 2029    ascorbic acid (VITAMIN C) tablet 500 mg  500 mg oral q48h INT Charlette Brooke MD   500 mg at 11/21/24 0801    atorvastatin (LIPITOR) tablet 40 mg  40 mg oral Daily (6p) Charlette Brooke MD   40 mg at 11/21/24 1718    bisacodyL (DULCOLAX) 10 mg suppository 10 mg  10 mg rectal Daily PRN Charlette Brooke MD        docusate sodium (COLACE) capsule 100 mg  100 mg oral BID Charlette Brooke MD   100 mg at 11/21/24 2029    DULoxetine (CYMBALTA) capsule,delayed release(DR/EC) 60 mg  60 mg oral Nightly Charlette Brooke MD   60 mg at 11/21/24 2029    empagliflozin (JARDIANCE) tablet 10 mg  10 mg oral Daily Charlette Brooke MD   10 mg at 11/21/24 0801    ferrous sulfate tablet 325 mg  325 mg oral q48h INT Charlette Brooke MD   325 mg at 11/21/24 0801    fluticasone propionate (FLONASE) 50 mcg/actuation nasal spray 1 spray  1 spray Each Nostril Daily PRN Charlette Brooke MD        furosemide (LASIX) tablet 40 mg  40 mg oral BID (am, 4p) Charlette Brooke MD   40 mg at 11/21/24 1718    gabapentin (NEURONTIN) capsule 600 mg  600 mg oral TID Charlette Brooke MD   600 mg at 11/21/24 2029    HYDROmorphone (DILAUDID) tablet 4 mg  4 mg oral q6h PRN Charlette Brooke MD   4 mg at 11/20/24 2147    imatinib (GLEEVEC) chemo tablet 400 mg  400 mg oral Daily Charlette Brooke MD   400 mg at 11/21/24 0803    insulin glargine U-100 (LANTUS/BASAGLAR) pen 12 Units  12 Units subcutaneous Nightly Charlette Brooke MD   12 Units at 11/21/24 2032    insulin lispro U-100 (HumaLOG) pen 1-12 Units  1-12 Units subcutaneous TID with meals Charlette Brooke MD   2 Units at 11/21/24 0804    insulin lispro U-100 (HumaLOG) pen 8 Units  8 Units subcutaneous TID with meals Charlette Brooke MD   8 Units at 11/21/24 1723    levothyroxine (SYNTHROID) tablet 25 mcg  25 mcg oral Daily (6:30a) Charlette Brooke MD   25 mcg at 11/21/24 0542    melatonin ODT 3 mg  3 mg oral Nightly Charlette Brooke MD   3 mg at  11/21/24 2029    methenamine (HIPREX) tablet 1 g  1 g oral BID Charlette Brooke MD   1 g at 11/21/24 2029    multivitamin tablet 1 tablet  1 tablet oral Daily Charlette Brooke MD   1 tablet at 11/21/24 0801    ondansetron ODT (ZOFRAN-ODT) disintegrating tablet 4 mg  4 mg oral q8h PRN Charlette Brooke MD        potassium chloride (KLOR-CON M) ER tablet (particles/crystals) 20 mEq  20 mEq oral Daily Charlette Brooke MD   20 mEq at 11/21/24 0800    semaglutide tablet 14 mg  1 tablet oral Daily Charlette Brooke MD   14 mg at 11/21/24 1246    senna (SENOKOT) tablet 2 tablet  2 tablet oral Daily Chralette Brooke MD        tamsulosin (FLOMAX) 24 hr ER capsule 0.4 mg  0.4 mg oral Daily with dinner Charlette Brooke MD   0.4 mg at 11/21/24 1718    vancomycin (VANCOCIN) capsule 125 mg  125 mg oral BID Charlette Brooke MD   125 mg at 11/21/24 2029       Current Evaluation:   Mental Status Exam:  Arousal Level: Alert  Appearance: Well Groomed  Speech: Regular  Psychomotor Functioning: WNL (reports that it has improved)  Eye Contact: WNL  Est. Premorbid Intelligence: Average  Orientation: Fully oriented  Attention: WNL  Concentration: WNL  Recent Memory: Mild Loss  Remote Memory: WNL  Thought Content: Appropriate  Thought Process: WNL  Insight: Intact  Perceptual Function: Pain  Delusions: None or age appropriate  Sleeping: No Change  Appetite: No Change  Libido: Non-Contributory  Affect: Full Range  Mood: Hopeful, Motivated (dostressed about missing wedding and Thanksgiving)    Assessments done this visit:   MMSE= 27/28    [place]offee Suicide Severity Rating Scale:  Done today      [place] Suicide Severity Rating Scale  1. Within the past month, have you wished you were dead or wished you could go to sleep and not wake up?: No  2. Within the past month, have you actually had any thoughts of killing yourself?: No  6. Have you ever done anything, started to do anything, or prepared to do anything to end your life?: No    Safe-T Assessment:  Not  indicated        Comments:     Risk Assessment:   Suicidal Ideation: Based on [place] Suicide Screen and current clinical assessment, patient is determined Low Risk.  Self Injurious Behavior:  Not Present  Irritability:  Not Present  Homicidal Behavior: Not Present  Estimate of Risk:  None  If risk identified have suicide precautions been implemented? No     Goals Addressed                   This Visit's Progress     Maximize adjustment and acceptance of limitations               Interventions  Acceptance & Adjustment  Monitoring of Symptoms  Psychoeducation    Psychoeducation provided on:  Amputation and Recovery     Recommendations:      Individual Therapy and Group Therapy  30 minutesweekly      Visit Diagnosis:     Adjustment disorder with Anxiety    Diagnostic Impression: Ms. Burks is a 64-year-old woman who is admitted for prosthetic training.  She underwent left guillotine transtibial amputation on 8/3/24 followed by left transfemoral amputation on 8/19/24.  She provides an accurate history.  She is single and lives alone.  She reports she has many supports and friends.  She reports she has 2 sisters and a brother.  She describes herself as very social person and is very active.  She reports she was independent in self-care including driving.  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  She denies previous mental health treatment she denies the use of tobacco reports she has an occasional glass of wine and denies the use of drugs.  She displays a full range of affect.  She reports she has struggled with infection and notes she was distressed at the loss of her leg she reports she feels better.  She reports she is hopeful and motivated for recovery she denies significant emotional distress she does report some mild struggle with phantom pain.  She is alert and oriented and correctly completes attention tasks.  Recall was 2 out of 3 after a brief  delay and cues help further.  Psychology provided education and support on prosthetic training.  Encouraged her to attend amputee support group.  Will follow for support during this inpatient admission.        PAT [email or web address].D

## 2024-11-22 NOTE — PROGRESS NOTES
Patient: Bebe Burks  Location: [facility] [address]  MRN: [id number]  Today's date: 11/22/2024    History of Present Illness  Bebe is a 64 y.o. female admitted on 11/19/2024 with History of above knee amputation, left (CMS/[facility]) [Z89.612]  Encounter for prosthetic gait training [Z47.89]. Principal problem is History of above knee amputation, left (CMS/[facility]).    Pt is a 64 y.o. 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       Past Medical History  Bebe has a past medical history of Abnormal ECG, Abnormal finding on chest xray (12/2021), Arthritis, Colon cancer (CMS/[facility]), COVID-19 (12/2021), COVID-19 vaccine series completed, Diabetic neuropathy (CMS/[facility]), DM (diabetes mellitus), type 2 with ophthalmic complications (CMS/[facility]), Hypertension, Hypothyroidism, Left foot drop, Lipid disorder, and Type 2 diabetes mellitus treated with insulin (CMS/[facility]).    OT Vitals      Date/Time Pulse HR Source BP BP Location BP Method Pt Position Who   11/22/24 1303 92 Monitor 135/63 Left upper arm Automatic Sitting VMS          OT Pain      Date/Time Pain Type Rating: [employer]   11/22/24 1303 Pain Assessment 0 - no pain VMS   11/22/24 1358 Post Activity 0 - no pain VMS               Prior Living Environment      Flowsheet Row Most Recent Value   People in Home alone   Current Living Arrangements condominium   Home Accessibility stairs to enter home (Group)   Living Environment Comment resides in 1st floor condo, 4 (1+2+1) JOSE RAFAEL, R hand rail, no stairs inside home, Bathroom Layout: Tub/shower unit with shower curtain, Shower stall with glass doors (shower stall in main bedroom), two sisters can provide some assistance   Number of Stairs, Main Entrance 4   Surface of Stairs, Main Entrance concrete   Stair Railings, Main Entrance railing on right side (ascending)   Stairs Comment, Main Entrance 1+2+1 JOSE RAFAEL   Location, Bathroom first  (main) floor   Bathroom Access tub bath, walk-in shower            Prior Level of Function      Flowsheet Row Most Recent Value   Dominant Hand right   Ambulation assistive equipment   Transferring assistive equipment   Toileting independent   Bathing assistive equipment   Dressing independent   Eating independent   IADLs assistive equipment   Driving/Transportation    Communication understands/communicates without difficulty   Assistive Device Currently Used at Home cane, straight, walker, front-wheeled, shower chair, grab bar, raised toilet            Occupational Profile      Flowsheet Row Most Recent Value   Successful Occupations Pt is very active in her community   Occupational History/Life Experiences 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, attends [employer] weekly   Performance Patterns lives alone   Environmental Supports and Barriers Supportive community   Patient Goals PSFS: Toilet transfer with prosthetic 3/10, toilet transfer without prosthetic 8/10, walking 0/10, getting dressed 5/10, getting in/ out of bed 4/10, getting a prosthetic on/off 1/10             IRF OT Evaluation and Treatment - 11/22/24 1304          OT Time Calculation    Start Time 1300     Stop Time 1400     Time Calculation (min) 60 min        Session Details    Document Type Daily Treatment/Progress Note        Mobility Belt    Mobility Belt Used During Session yes        Low Pivot Transfer    [place], Low Pivot Transfer supervision     Safety/Cues increased time to complete     Assistive Device wheelchair     Comment incremental LPT        Balance    Comment, Balance sitting EOM with large ball forward reach and diagonal for lower back stretch, lateral lupillo to lebow with focus on lateral flexion b/l direction with min A, and trunk rotation b/l hands on 1 side of body and looking over shoulder 5 x        Lower Extremity (Therapeutic Exercise)    Comment modified Jerod stretch for 6 mins with wedge and 3 pillows.  trial side of R side lying tactile and verbal cues for hip extension 10x, and AB duction with cues for maintaining nutral hip and not going into flexion. Supine supported with wedge, assist for stretch for knee extension of R knee. Then trial of powder board for hip abduction with pt with difficulty performing with just performing hip external rotation. AAROM for hip abduction, pt able to perform adduction        Lower Body Dressing    Comment Pt able to sit with modifed figure 4 to untie shoe, using shoe horn to doff shoe, dressing pal to doff sock, donning sock with soft sock aid with cues for set up and tech. Donning shoe trial with shoe horn with cues and min A, pt able to bring shoe up to side shift figure 4        Daily Progress Summary (OT)    Daily Outcome Statement Adjustments to prosthetic being made during session. Focusing on LE dressing with AE and differing techniques. stretching and and ther ex. Pt would continue to benefit from skilled OT services to increase indep, decrease caregiver burden and increase quality of life. Continue with POC                               IRF OT Goals      Flowsheet Row Most Recent Value   Transfer Goal 1    Activity/Assistive Device toilet at 11/20/2024 0906   [place] maximum assist (25-49% patient effort) at 11/20/2024 0906   Time Frame short-term goal (STG), 5 - 7 days at 11/20/2024 0906   Transfer Goal 2    Activity/Assistive Device toilet at 11/20/2024 0906   [place] minimum assist (75% or more patient effort) at 11/20/2024 0906   Time Frame long-term goal (LTG), 21 days or less at 11/20/2024 0906   Transfer Goal 3    Activity/Assistive Device shower at 11/20/2024 0906   [place] maximum assist (25-49% patient effort) at 11/20/2024 0906   Time Frame short-term goal (STG), 5 - 7 days at 11/20/2024 0906   Transfer Goal 4    Activity/Assistive Device shower at 11/20/2024 0906   [place] minimum assist (75% or more patient effort) at 11/20/2024 0906    Time Frame long-term goal (LTG), 21 days or less at 11/20/2024 0906   Bathing Goal 1    [place] tactile cues required at 11/20/2024 0906   Time Frame short-term goal (STG), 5 - 7 days at 11/20/2024 0906   Bathing Goal 2    [place] modified independence at 11/20/2024 0906   Time Frame long-term goal (LTG), 21 days or less at 11/20/2024 0906   UB Dressing Goal 1    [place] maximum assist (25-49% patient effort) at 11/20/2024 0906   Time Frame short-term goal (STG), 5 - 7 days at 11/20/2024 0906   Strategies/Barriers with clothing retrieval at 11/20/2024 0906   UB Dressing Goal 2    [place] minimum assist (75% or more patient effort) at 11/20/2024 0906   Time Frame long-term goal (LTG), 21 days or less at 11/20/2024 0906   Strategies/Barriers with clothing retrieval at 11/20/2024 0906   LB Dressing Goal 1    [place] maximum assist (25-49% patient effort) at 11/20/2024 0906   Time Frame short-term goal (STG), 5 - 7 days at 11/20/2024 0906   Strategies/Barriers with clothing retrieval at 11/20/2024 0906   LB Dressing Goal 2    [place] minimum assist (75% or more patient effort) at 11/20/2024 0906   Time Frame long-term goal (LTG), 21 days or less at 11/20/2024 0906   Strategies/Barriers with clothing retrieval at 11/20/2024 0906   Grooming Goal 1    [place] moderate assist (50-74% patient effort) at 11/20/2024 0906   Time Frame short-term goal (STG), 5 - 7 days at 11/20/2024 0906   Strategies/Barriers in stance at sink at 11/20/2024 0906   Grooming Goal 2    [place] supervision required at 11/20/2024 0906   Time Frame long-term goal (LTG), 21 days or less at 11/20/2024 0906   Strategies/Barriers in stance at sink at 11/20/2024 0906   Toileting Goal 1    [place] maximum assist (25-49% patient effort) at 11/20/2024 0906   Time Frame short-term goal (STG), 5 - 7 days at 11/20/2024 0906   Toileting Goal 2    [place] minimum assist (75% or more patient effort) at 11/20/2024  0906   Time Frame long-term goal (LTG), 21 days or less at 11/20/2024 0906

## 2024-11-22 NOTE — PROGRESS NOTES
Subjective    Patient seen and examined on rounds.  Chart reviewed.  Events overnight noted.  History reviewed briefly with patient.    CC:  Deficits in mobility, transfers, self-care status post left transfemoral amputation for prosthetic training, severe peripheral vascular disease, chronic myelocytic leukemia, osteomyelitis left foot, diabetic neuropathy, multiple medical problems    HPI:  Ms. 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 Pura Chaves. ID recommended IV Ceftriaxone and IV Ampicillin until 9/14/24.  She had urinary retention managed with indwelling Rowan catheter.  She had right upper extremity swelling present during this admission, ultrasound on 8/29/24 demonstrated an acute non-occlusive DVT of the right axillary and brachial vein around line (on Lovenox).  Subsequently she completed an acute inpatient rehabilitation stay for pre-prosthetic training at [facility] between 9/3/24 to 9/21/24 and was discharged to [facility] on 9/21/24.  She initially required bladder self intermittent catheterizations due to urinary retention, but indicates that now she is able to void.  She desired to be a prosthetic ambulator.  A left transfemoral amputation prosthesis was fabricated. and followed-up as outpatient in the amputee clinic at [facility] and she was deemed to be an  appropriate candidate for acute inpatient rehabilitation stay. She has been needing assistance for mobility, transfers, self-care.  I have reviewed the preadmission screening and concur with that information and there are no significant changes from patient's preadmission screening. She is transferred to [facility] on 11/19/24 from [facility] for further acute inpatient rehabilitation stay for prosthetic training with transfemoral amputation prosthesis.    SUBJ: Dependent for sit to stand transfers with physical therapy.  Able to ambulate 15 feet with thoracic walker dependent gait with physical therapy.  Skin under prosthesis is stable.  Denies pain today.  She says she got flu shot yesterday.    ROS: Denies chest pain or shortness of breath. Other ROS negative. Past, family, social history is unchanged.    Current Functional Status:   Bed mobility:   [place], Supine to Sit: moderate assist (50-74% patient effort) (at trunk)   [place], Sit to Supine: modified independence   Transfers:    [place], Sit to Stand Transfer: dependent (less than 25% patient effort)  [place], Stand to Sit Transfer: dependent (less than 25% patient effort)   [place], Stand Pivot/Stand Step Transfer: dependent (less than 25% patient effort), 2 person assist   Gait:   [place], Gait: dependent (less than 25% patient effort), 2 person assist  Assistive Device: walker, thoracic   Distance in Feet: 15 feet    Bathing:   [place]: minimum assist (75% or more patient effort)   Toileting:   [place]: dependent (less than 25% patient effort)   Upper body dressing:   [place]: close supervision   Lower body dressing:   [place]: dependent (less than 25% patient effort)     Functional Progress:    Functional status reviewed. Overall, patient's functional status is improving.      Physical Exam      Blood pressure (!) 158/75, pulse 90, temperature 36.6 °C (97.8 °F), temperature source Oral,  "resp. rate 16, height 1.702 m (5' 7.01\"), weight 69.4 kg (153 lb), SpO2 100%, not currently breastfeeding.    Body mass index is 23.96 kg/m².    General Appearance: Not in acute distress  Head/Ear/Nose/Throat: Normocephalic; Atraumatic.   Eye: EOMI; PERRL.   Neck: No JVD; No Bruits.   Respiratory: Decreased breath sounds at bases.   Cardiovascular: RRR; Normal S1, S2.   Gastrointestinal: Soft; NT; +BS.   Extremities: Bilateral lower extremity edema noted.  Left transfemoral amputation stump is stable.  Musculoskeletal: Functional active range of motion in both upper and right lower extremities.  Functional active range of motion in left hip.  She has a left transfemoral amputation.  Neurological: AAO ×3. Speech is fluent. Cranial nerve examination does not reveal any gross facial asymmetry. Strength testing shows about 4+/5 strength in both upper and right lower extremities.  Left hip flexion is 4/5.  She has a left transfemoral amputation.  She is grossly able to localize touch sensation.  She is able to localize position sense in right foot great toe position sense is unable to be tested on left side.  Deep tendon reflexes are hypoactive bilaterally.  Right plantar is flexor, left plantar was unable to be tested. Coordination is functional upper extremities.    Behavior/Emotional: Appropriate; Cooperative.   Skin: Left transfemoral amputation stump stable.  Incision appears to be well-healed.  Some wounds are noted on right anterior lower forearm.  Small wound noted on right foot fifth toe.  Small wound also noted on right foot great toe with some slough in it and scaly skin around it.  Some redness noted on sacrum/coccyx.  Reviewed pictures of wounds in Epic.      Current Facility-Administered Medications:     acetaminophen (TYLENOL) tablet 650 mg, 650 mg, oral, q4h PRN, Charlette Brooke MD, 650 mg at 11/22/24 5588    alum-mag hydroxide-simeth (MAALOX) 200-200-20 mg/5 mL suspension 30 mL, 30 mL, oral, q4h PRN, " Charlette Brooke MD    apixaban (ELIQUIS) tablet 5 mg, 5 mg, oral, BID, Charlette Brooke MD, 5 mg at 11/22/24 0918    ascorbic acid (VITAMIN C) tablet 500 mg, 500 mg, oral, q48h INT, Charlette Brooke MD, 500 mg at 11/21/24 0801    atorvastatin (LIPITOR) tablet 40 mg, 40 mg, oral, Daily (6p), Charlette Brooke MD, 40 mg at 11/22/24 1718    bisacodyL (DULCOLAX) 10 mg suppository 10 mg, 10 mg, rectal, Daily PRN, Charlette Brooke MD    docusate sodium (COLACE) capsule 100 mg, 100 mg, oral, BID, Charlette Brooke MD, 100 mg at 11/21/24 2029    DULoxetine (CYMBALTA) capsule,delayed release(DR/EC) 60 mg, 60 mg, oral, Nightly, Charlette Brooke MD, 60 mg at 11/21/24 2029    empagliflozin (JARDIANCE) tablet 10 mg, 10 mg, oral, Daily, Charlette Brooke MD, 10 mg at 11/22/24 0918    ferrous sulfate tablet 325 mg, 325 mg, oral, q48h INT, Charlette Brooke MD, 325 mg at 11/21/24 0801    fluticasone propionate (FLONASE) 50 mcg/actuation nasal spray 1 spray, 1 spray, Each Nostril, Daily PRN, Charlette Brooke MD    furosemide (LASIX) tablet 40 mg, 40 mg, oral, BID (am, 4p), Charlette Brooke MD, 40 mg at 11/22/24 1718    gabapentin (NEURONTIN) capsule 600 mg, 600 mg, oral, TID, Charlette Brooke MD, 600 mg at 11/22/24 1718    HYDROmorphone (DILAUDID) tablet 4 mg, 4 mg, oral, q6h PRN, Charlette Brooke MD, 4 mg at 11/21/24 2147    imatinib (GLEEVEC) chemo tablet 400 mg, 400 mg, oral, Daily, Charlette Brooke MD, 400 mg at 11/22/24 0919    insulin glargine U-100 (LANTUS/BASAGLAR) pen 12 Units, 12 Units, subcutaneous, Nightly, Charlette Brooke MD, 12 Units at 11/21/24 2032    insulin lispro U-100 (HumaLOG) pen 1-12 Units, 1-12 Units, subcutaneous, With meals & nightly, Alexei Petty MD    insulin lispro U-100 (HumaLOG) pen 8 Units, 8 Units, subcutaneous, TID with meals, Charlette Brooke MD, 8 Units at 11/22/24 1227    levothyroxine (SYNTHROID) tablet 25 mcg, 25 mcg, oral, Daily (6:30a), Charlette Brooke MD, 25 mcg at 11/22/24 0545    melatonin ODT 3 mg, 3 mg, oral,  Nightly, Charlette Brooke MD, 3 mg at 11/21/24 2029    methenamine (HIPREX) tablet 1 g, 1 g, oral, BID, Charlette Brooke MD, 1 g at 11/22/24 0917    multivitamin tablet 1 tablet, 1 tablet, oral, Daily, Charlette Brooke MD, 1 tablet at 11/22/24 0918    ondansetron ODT (ZOFRAN-ODT) disintegrating tablet 4 mg, 4 mg, oral, q8h PRN, Charlette Brooke MD    potassium chloride (KLOR-CON M) ER tablet (particles/crystals) 20 mEq, 20 mEq, oral, Daily, Charlette Brooke MD, 20 mEq at 11/22/24 0918    semaglutide tablet 14 mg, 1 tablet, oral, Daily, Charlette Brooke MD, 14 mg at 11/22/24 0919    senna (SENOKOT) tablet 2 tablet, 2 tablet, oral, Daily, Charlette Brooke MD    tamsulosin (FLOMAX) 24 hr ER capsule 0.4 mg, 0.4 mg, oral, Daily with dinner, Charlette Brooke MD, 0.4 mg at 11/22/24 1718    vancomycin (VANCOCIN) capsule 125 mg, 125 mg, oral, BID, Charlette Brooke MD, 125 mg at 11/22/24 0918       Labs / Radiology    Lab Results   Component Value Date    WBC 4.98 11/20/2024    HGB 10.8 (L) 11/20/2024    HCT 32.7 (L) 11/20/2024    MCV 90.8 11/20/2024     11/20/2024     Lab Results   Component Value Date    GLUCOSE 205 (H) 11/20/2024    CALCIUM 8.7 11/20/2024     11/20/2024    K 3.7 11/20/2024    CO2 30 11/20/2024     11/20/2024    BUN 35 (H) 11/20/2024    CREATININE 0.9 11/20/2024       Assessment and Plan    ASSESSMENT PLAN:  1. 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 Pura Chaves. ID recommended IV Ceftriaxone and IV Ampicillin until 9/14/24.  She had urinary retention managed with indwelling Rowan catheter.  She had right upper extremity swelling present during this admission, ultrasound on 8/29/24 demonstrated an acute non-occlusive DVT of the right axillary and brachial vein around line (on Lovenox).  Subsequently she completed an acute inpatient rehabilitation stay for pre-prosthetic training at [facility] between 9/3/24 to 9/21/24 and was discharged to [facility] on 9/21/24.  She initially required bladder self intermittent catheterizations due to urinary retention, but indicates that now she is able to void.  She desired to be a prosthetic ambulator.  A left transfemoral amputation prosthesis was fabricated. and followed-up as outpatient in the amputee clinic at [facility] and she was deemed to be an appropriate candidate for acute inpatient rehabilitation stay. She has been needing assistance for mobility, transfers, self-care.  I have reviewed the preadmission screening and concur with that information and there are no significant changes from patient's preadmission screening. She is transferred to [facility] on 11/19/24 from [facility] for further acute inpatient rehabilitation stay for prosthetic training with transfemoral amputation prosthesis.     2. DVT prophylaxis/treatment - on Eliquis.  Platelets 168 on 11/20/2024.     3. Vascular - status post left transfemoral amputation for prosthetic training, severe peripheral vascular disease, chronic myelocytic leukemia, osteomyelitis left foot, diabetic neuropathy, multiple medical problems - continue PT, OT, psychology.  Follow falls precautions, cardiac precautions, monitor pulse oximeter in therapy.  Monitor skin under prosthesis.  Teach donning and doffing of left transfemoral amputation prosthesis.  Do gait training with prosthesis.      4. GI - On Colace, Senokot.  On PRN Maalox.  On PRN Dulcolax suppository.  On PRN Zofran.     5.  -on Hiprex.  On Flomax.  Monitor postvoid residual.     6. CVS - on Lasix.  Monitor for orthostasis.     7. Pulmonary -encourage incentive spirometry.     8. Hematology - monitor hemoglobin, platelets.  Hemoglobin 10.8, WBC 4.98 on 11/20/2024.     9. Pain -on Cymbalta.  On Neurontin.  On PRN Tylenol.  On PRN Dilaudid.     10. Skin - Left transfemoral amputation stump stable.  Incision appears to be well-healed.  Some wounds are noted on right anterior lower forearm.  Small wound noted on right foot fifth toe.  Small wound also noted on right foot great toe with some slough in it and scaly skin around it.  Some redness noted on sacrum/coccyx.  Reviewed pictures of wounds in Epic. Monitor skin under the left transfemoral amputation prosthesis.Noted input from dermal defense nurse on 11/21/2024.  Reviewed pictures in Epic.      11. F/E/N - on Ferrous sulfate.  On vitamin C. on MVI.  On K-Genesis con.     12. Hyperlipidemia -on Lipitor.     13. Diabetes mellitus type 2 - on Lantus insulin.  On sliding scale Humalog coverage.  On Jardiance.     14. Oncology - On Gleevec for chronic myelocytic leukemia.     15. Hypothyroidism - on Synthroid.     16. Insomnia - on Melatonin.     17. Infectious diseases - On oral Vancomycin.       18. Seasonal allergies - on PRN Flonase.     19. Psychiatry - mood stable.  Psychology consulted.     20. Rehabilitation medicine - Continue comprehensive rehabilitation care. Continue PT, OT, speech, psychology.  We will follow falls precautions, cardiac precautions, monitor pulse oximetry in therapy and follow weightbearing precautions.  We will follow spinal precautions as appropriate.Tolerating therapies per endurance on 11/20/2024.  Slept well last night.   Working on donning and doffing of prosthesis.  Discussed with the importance of skin checks on the prosthesis.  Able to ambulate 7 feet in  parallel bars dependent gait using left transfemoral amputation prosthesis with physical therapy.  Dependent for sit to stand transfers with physical therapy on 11/20/2024. Noted input from dermal defense nurse on 11/21/2024.  Reviewed pictures in Epic.  Working on ADLs with occupational therapy on 11/21/2024.  Requiring minimal assistance for bathing, dependent for toileting, close supervision for poorly dressing and dependent for lower body dressing with occupational therapy.  She requested a flu shot.  Discussed with nurse.  Continent of bowel and bladder for nursing on 11/21/2024.Dependent for sit to stand transfers with physical therapy on 11/22/2024.  Able to ambulate 15 feet with thoracic walker dependent gait with physical therapy on 11/22/2024.  Skin under prosthesis is stable.  Denies pain today.  She says she got flu shot yesterday.     21. Reviewed labs today.  BUN 35, creatinine 0.9, sodium 139, potassium 3.7 on 11/20/2024.           Alexei Petty MD  11/22/2024      This encounter was completed utilizing the Direct Speech Voice Recognition Software. Grammatical errors, random word insertions, pronoun errors, and incomplete sentences are occasional consequences of the system due to software limitations, ambient noises, and hardware issues. Such errors may be missed prior to affixing electronic signature. Any questions or concerns about the content, text, or information contained within the body of this dictation should be directly addressed to the physician for clarification. If you have any questions or concerns please do not hesitate to call me directly via EPIC chat, page, or email.

## 2024-11-22 NOTE — PROGRESS NOTES
Patient: Bebe Burks  Location: [facility] [address]  MRN: [id number]  Today's date: 11/22/2024    History of Present Illness  Bebe is a 64 y.o. female admitted on 11/19/2024 with History of above knee amputation, left (CMS/[facility]) [Z89.612]  Encounter for prosthetic gait training [Z47.89]. Principal problem is History of above knee amputation, left (CMS/[facility]).    Pt is a 64 y.o. 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       Past Medical History  Bebe has a past medical history of Abnormal ECG, Abnormal finding on chest xray (12/2021), Arthritis, Colon cancer (CMS/[facility]), COVID-19 (12/2021), COVID-19 vaccine series completed, Diabetic neuropathy (CMS/[facility]), DM (diabetes mellitus), type 2 with ophthalmic complications (CMS/[facility]), Hypertension, Hypothyroidism, Left foot drop, Lipid disorder, and Type 2 diabetes mellitus treated with insulin (CMS/[facility]).    PT Vitals      Date/Time Pulse BP BP Location BP Method Pt Position [facility]   11/22/24 1552 91 157/72 Right upper arm Automatic Sitting LB          PT Pain      Date/Time Location Rating: Rest Interventions [facility]   11/22/24 1505 back 4 - moderate pain prescribed exercises encouraged LB   11/22/24 1552 back 0 - no pain -- LB               Prior Living Environment      Flowsheet Row Most Recent Value   People in Home alone   Current Living Arrangements condominium   Home Accessibility stairs to enter home (Group)   Living Environment Comment resides in 1st floor condo, 4 (1+2+1) JOSE RAFAEL, R hand rail, no stairs inside home, Bathroom Layout: Tub/shower unit with shower curtain, Shower stall with glass doors (shower stall in main bedroom), two sisters can provide some assistance   Number of Stairs, Main Entrance 4   Surface of Stairs, Main Entrance concrete   Stair Railings, Main Entrance railing on right side (ascending)   Stairs Comment, Main Entrance 1+2+1 JOSE RAFAEL   Location,  Bathroom first (main) floor   Bathroom Access tub bath, walk-in shower            Prior Level of Function      Flowsheet Row Most Recent Value   Dominant Hand right   Ambulation assistive equipment   Transferring assistive equipment   Toileting independent   Bathing assistive equipment   Dressing independent   Eating independent   IADLs assistive equipment   Driving/Transportation    Communication understands/communicates without difficulty   Assistive Device Currently Used at Home cane, straight, walker, front-wheeled, shower chair, grab bar, raised toilet             IRF PT Evaluation and Treatment - 11/22/24 1537          PT Time Calculation    Start Time 1502     Stop Time 1602     Time Calculation (min) 60 min        Session Details    Document Type Daily Treatment/Progress Note        General Information    General Observations of Patient Prosthesis taken by prosthetist earlier today for adjustment and not available for use at time of PT tx.        Mobility Belt    Mobility Belt Used During Session yes        Bed Mobility    [place], Supine to Sit moderate assist (50-74% patient effort)   at trunk    [place], Sit to Supine modified independence     Comment Sit to/from supine and roll to/from prone (Mod A) performed on therapy mat        Low Pivot Transfer    [place], Low Pivot Transfer supervision     Safety/Cues increased time to complete     Assistive Device wheelchair     Comment Incremental LPT W/C to/from mat        Wheelchair Mobility/Management    Method of Locomotion bimanual (upper extremity) propulsion     Wheelchair Type manual, lightweight     [place], Forward Propulsion distant supervision     [place], Steering Activities distant supervision     [place], Stopping Activities distant supervision     Comment, Wheelchair Mobility Propels self to exit at end of session; expresses interest in obtaining WC license in future        Lower Extremity  (Therapeutic Exercise)    Exercise Position/Type prone;supine     Comment PRONE: Prone lying x15 minutes for hip stretching while incorporating glute sets (3 sec hold, 10x2) and R knee flex (10x2)   SUPINE: R SKTC with physioball x30 reps, LTR in R hooklying x2 minutes, R SAQ (3 sec hold 10x2), L hip flex SLR 10x2, R hip ABD/ADD using Beasy board for facilitation of movement with PT facilitating neutral hip rotation 10x3        Daily Progress Summary (PT)    Daily Outcome Statement Prosthesis not available for use at time of session (taken for adjustments) so session focused on BLE flexibility and strengthening. Will continue to benefit from mobility training with prosthesis when available.                        IRF PT Goals      Flowsheet Row Most Recent Value   Transfer Goal 1    Activity/Assistive Device stand pivot, walker, front-wheeled at 11/19/2024 1431   [place] other (see comments)  [Dependent (Max A x 1, Mod A x 1)] at 11/19/2024 1431   Time Frame 1 week, short-term goal (STG) at 11/19/2024 1431   Transfer Goal 2    Activity/Assistive Device stand pivot, walker, front-wheeled at 11/19/2024 1431   [place] maximum assist (25-49% patient effort) at 11/19/2024 1431   Time Frame 21 days or less, long-term goal (LTG) at 11/19/2024 1431   Transfer Goal 3    Activity/Assistive Device sit-to-stand/stand-to-sit, walker, front-wheeled at 11/19/2024 1431   [place] maximum assist (25-49% patient effort), verbal cues required at 11/19/2024 1431   Time Frame 1 week, short-term goal (STG) at 11/19/2024 1431   Transfer Goal 4    Activity/Assistive Device sit-to-stand/stand-to-sit, walker, front-wheeled at 11/19/2024 1431   [place] minimum assist (75% or more patient effort) at 11/19/2024 1431   Time Frame 21 days or less, long-term goal (LTG) at 11/19/2024 1431   Gait/Walking Locomotion Goal 1    Activity/Assistive Device gait (walking locomotion), walker, front-wheeled at 11/19/2024 1431   Distance 10  feet at 11/19/2024 1431   [place] other (see comments)  [Dependent (Max A x 1, Mod A x 1)] at 11/19/2024 1431   Time Frame 1 week, short-term goal (STG) at 11/19/2024 1431   Gait/Walking Locomotion Goal 2    Activity/Assistive Device gait (walking locomotion), walker, front-wheeled at 11/19/2024 1431   Distance 25 feet at 11/19/2024 1431   [place] maximum assist (25-49% patient effort) at 11/19/2024 1431   Time Frame 21 days or less, long-term goal (LTG) at 11/19/2024 1431   Wheelchair Locomotion Goal 1    Activity forward propulsion, backward propulsion, steering, stopping, turning, doorway navigation, weight shifting at 11/19/2024 1431   Assistive Device manual, ultra lightweight at 11/19/2024 1431   Distance 150 feet at 11/19/2024 1431   [place] other (see comments)  [Touching / Steadying Assist] at 11/19/2024 1431   Time Frame 1 week, short-term goal (STG) at 11/19/2024 1431   Wheelchair Locomotion Goal 2    Activity forward propulsion, backward propulsion, steering, stopping, turning, doorway navigation, weight shifting at 11/19/2024 1431   Assistive Device manual, ultra lightweight at 11/19/2024 1431   Distance 150 feet at 11/19/2024 1431   [place] modified independence at 11/19/2024 1431   Time Frame 21 days or less, long-term goal (LTG) at 11/19/2024 1431

## 2024-11-22 NOTE — PLAN OF CARE
Individualized Plan of Care    Bebe Burks is admitted to [facility] for comprehensive inpatient rehabilitation for amputation status post left transfemoral amputation for prosthetic training, severe peripheral vascular disease, chronic myelocytic leukemia, osteomyelitis left foot, diabetic neuropathy, multiple medical problems with functional deficits in balance; endurance/activity tolerance; range of motion (ROM); mobility; safety awareness; motor dysfunction; self-care; strength; pain; coordination. Patient is receiving the following services: medical consultative services; occupational therapy; physical therapy; prosthetic services; psychiatry/psychology services, rehabilitation nursing care, case management evaluation.     Frequency of Treatment (OT): 5-7 times per week  Intensity of Treatment (OT): 60-90 minutes per day  Frequency of Treatment (PT): 5-7 times per week  Intensity of Treatment (PT): 60-90 minutes per day       Active medical management is required for  Patient Active Problem List   Diagnosis    Controlled type 2 diabetes mellitus, with long-term current use of insulin (CMS/[facility])    Essential hypertension    Hyperlipidemia    Hypothyroidism    Obesity    Thyroid nodule    COVID-19 virus infection    Lung nodule    Displacement of lumbar intervertebral disc without myelopathy    Disability of walking    Polyneuropathy due to type 2 diabetes mellitus (CMS/HCC)    Pre-op examination    Localized osteoarthritis of right knee    Type 2 diabetes mellitus treated with insulin (CMS/HCC)    Abnormal finding on EKG    Left foot drop    Abnormal finding on chest xray    Osteoarthritis of right knee, unspecified osteoarthritis type    S/P total knee arthroplasty, right    Sepsis (CMS/HCC)    Osteomyelitis of left foot (CMS/HCC)    Peripheral neuropathy    Abdominal pain    Chronic idiopathic constipation    Gangrene (CMS/HCC)    Tibial artery stenosis, left (CMS/[facility])    H/O pilonidal  cyst    Type 2 diabetes mellitus with left diabetic foot infection (CMS/HCC)    Type 2 diabetes mellitus with diabetic neuropathy (CMS/HCC)    Hypothyroidism    Primary hypertension    Leukemoid reaction    HLD (hyperlipidemia)    Acute osteomyelitis of left foot (CMS/HCC)    History of above knee amputation, left (CMS/HCC)    Encounter for prosthetic gait training       Risk for Complications  Bebe is at risk for the following complications:     Acute change in mental status due to multiple medications, prolonged hospitalization, and known infections   Cardiac event due to history of cardiac conditions   Constipation due to opioid medication and impaired mobility   Dehydration/malnutrition due to impaired fluid intake and diuretic use   DVT/PE due to impaired mobility, history of DVT/PE/hypercoagulation/clotting disorder, and cancer diagnosis   Falls due to impaired balance       Sepsis due to treatment with antibiotics in the past 90 days   Skin breakdown/pressure ulcers due to impaired mobility and impaired sensation   Uncontrolled pain due to opioid medication   Urinary tract infection due to other (urinary retention)   Wound infection due to surgical incision and pressure injury         [place] medical prognosis is good to achieve the stated goals below.    Expected Level of Function  Self-Care: Setup or clean-up assistance  Transfers: Independent  Locomotion: Independent  Communication: Independent  Social Cognition: Independent      Goals  IRF PT Goals      Flowsheet Row Most Recent Value   Transfer Goal 1    Activity/Assistive Device stand pivot, walker, front-wheeled at 11/19/2024 1431   [place] other (see comments)  [Dependent (Max A x 1, Mod A x 1)] at 11/19/2024 1431   Time Frame 1 week, short-term goal (STG) at 11/19/2024 1431   Transfer Goal 2    Activity/Assistive Device stand pivot, walker, front-wheeled at 11/19/2024 1431   [place] maximum assist (25-49% patient effort) at  11/19/2024 1431   Time Frame 21 days or less, long-term goal (LTG) at 11/19/2024 1431   Transfer Goal 3    Activity/Assistive Device sit-to-stand/stand-to-sit, walker, front-wheeled at 11/19/2024 1431   [place] maximum assist (25-49% patient effort), verbal cues required at 11/19/2024 1431   Time Frame 1 week, short-term goal (STG) at 11/19/2024 1431   Transfer Goal 4    Activity/Assistive Device sit-to-stand/stand-to-sit, walker, front-wheeled at 11/19/2024 1431   [place] minimum assist (75% or more patient effort) at 11/19/2024 1431   Time Frame 21 days or less, long-term goal (LTG) at 11/19/2024 1431   Gait/Walking Locomotion Goal 1    Activity/Assistive Device gait (walking locomotion), walker, front-wheeled at 11/19/2024 1431   Distance 10 feet at 11/19/2024 1431   [place] other (see comments)  [Dependent (Max A x 1, Mod A x 1)] at 11/19/2024 1431   Time Frame 1 week, short-term goal (STG) at 11/19/2024 1431   Gait/Walking Locomotion Goal 2    Activity/Assistive Device gait (walking locomotion), walker, front-wheeled at 11/19/2024 1431   Distance 25 feet at 11/19/2024 1431   [place] maximum assist (25-49% patient effort) at 11/19/2024 1431   Time Frame 21 days or less, long-term goal (LTG) at 11/19/2024 1431   Wheelchair Locomotion Goal 1    Activity forward propulsion, backward propulsion, steering, stopping, turning, doorway navigation, weight shifting at 11/19/2024 1431   Assistive Device manual, ultra lightweight at 11/19/2024 1431   Distance 150 feet at 11/19/2024 1431   [place] other (see comments)  [Touching / Steadying Assist] at 11/19/2024 1431   Time Frame 1 week, short-term goal (STG) at 11/19/2024 1431   Wheelchair Locomotion Goal 2    Activity forward propulsion, backward propulsion, steering, stopping, turning, doorway navigation, weight shifting at 11/19/2024 1431   Assistive Device manual, ultra lightweight at 11/19/2024 1431   Distance 150 feet at 11/19/2024 1431    [place] modified independence at 11/19/2024 1431   Time Frame 21 days or less, long-term goal (LTG) at 11/19/2024 1431          IRF OT Goals      Flowsheet Row Most Recent Value   Transfer Goal 1    Activity/Assistive Device toilet at 11/20/2024 0906   [place] maximum assist (25-49% patient effort) at 11/20/2024 0906   Time Frame short-term goal (STG), 5 - 7 days at 11/20/2024 0906   Transfer Goal 2    Activity/Assistive Device toilet at 11/20/2024 0906   [place] minimum assist (75% or more patient effort) at 11/20/2024 0906   Time Frame long-term goal (LTG), 21 days or less at 11/20/2024 0906   Transfer Goal 3    Activity/Assistive Device shower at 11/20/2024 0906   [place] maximum assist (25-49% patient effort) at 11/20/2024 0906   Time Frame short-term goal (STG), 5 - 7 days at 11/20/2024 0906   Transfer Goal 4    Activity/Assistive Device shower at 11/20/2024 0906   [place] minimum assist (75% or more patient effort) at 11/20/2024 0906   Time Frame long-term goal (LTG), 21 days or less at 11/20/2024 0906   Bathing Goal 1    [place] tactile cues required at 11/20/2024 0906   Time Frame short-term goal (STG), 5 - 7 days at 11/20/2024 0906   Bathing Goal 2    [place] modified independence at 11/20/2024 0906   Time Frame long-term goal (LTG), 21 days or less at 11/20/2024 0906   UB Dressing Goal 1    [place] maximum assist (25-49% patient effort) at 11/20/2024 0906   Time Frame short-term goal (STG), 5 - 7 days at 11/20/2024 0906   Strategies/Barriers with clothing retrieval at 11/20/2024 0906   UB Dressing Goal 2    [place] minimum assist (75% or more patient effort) at 11/20/2024 0906   Time Frame long-term goal (LTG), 21 days or less at 11/20/2024 0906   Strategies/Barriers with clothing retrieval at 11/20/2024 0906   LB Dressing Goal 1    [place] maximum assist (25-49% patient effort) at 11/20/2024 0906   Time Frame short-term goal (STG), 5 - 7 days at  11/20/2024 0906   Strategies/Barriers with clothing retrieval at 11/20/2024 0906   LB Dressing Goal 2    [place] minimum assist (75% or more patient effort) at 11/20/2024 0906   Time Frame long-term goal (LTG), 21 days or less at 11/20/2024 0906   Strategies/Barriers with clothing retrieval at 11/20/2024 0906   Grooming Goal 1    [place] moderate assist (50-74% patient effort) at 11/20/2024 0906   Time Frame short-term goal (STG), 5 - 7 days at 11/20/2024 0906   Strategies/Barriers in stance at sink at 11/20/2024 0906   Grooming Goal 2    [place] supervision required at 11/20/2024 0906   Time Frame long-term goal (LTG), 21 days or less at 11/20/2024 0906   Strategies/Barriers in stance at sink at 11/20/2024 0906   Toileting Goal 1    [place] maximum assist (25-49% patient effort) at 11/20/2024 0906   Time Frame short-term goal (STG), 5 - 7 days at 11/20/2024 0906   Toileting Goal 2    [place] minimum assist (75% or more patient effort) at 11/20/2024 0906   Time Frame long-term goal (LTG), 21 days or less at 11/20/2024 0906            Anticipated Discharge Plan  family member's home with services    Expected length of stay: 16 days

## 2024-11-23 ENCOUNTER — APPOINTMENT (OUTPATIENT)
Dept: OTHER | Facility: REHABILITATION | Age: 64
End: 2024-11-23
Payer: MEDICARE

## 2024-11-23 ENCOUNTER — APPOINTMENT (INPATIENT)
Dept: OCCUPATIONAL THERAPY | Facility: REHABILITATION | Age: 64
DRG: 560 | End: 2024-11-23
Payer: MEDICARE

## 2024-11-23 LAB
GLUCOSE BLD-MCNC: 123 MG/DL (ref 70–99)
GLUCOSE BLD-MCNC: 152 MG/DL (ref 70–99)
GLUCOSE BLD-MCNC: 164 MG/DL (ref 70–99)
GLUCOSE BLD-MCNC: 267 MG/DL (ref 70–99)
POCT TEST: ABNORMAL

## 2024-11-23 PROCEDURE — 12800000 HC ROOM AND CARE SEMIPRIVATE REHAB

## 2024-11-23 PROCEDURE — 63700000 HC SELF-ADMINISTRABLE DRUG: Performed by: INTERNAL MEDICINE

## 2024-11-23 PROCEDURE — 97530 THERAPEUTIC ACTIVITIES: CPT | Mod: GO

## 2024-11-23 RX ADMIN — DOCUSATE SODIUM 100 MG: 100 CAPSULE, LIQUID FILLED ORAL at 21:03

## 2024-11-23 RX ADMIN — METHENAMINE HIPPURATE 1 G: 1000 TABLET ORAL at 08:39

## 2024-11-23 RX ADMIN — LEVOTHYROXINE SODIUM 25 MCG: 0.03 TABLET ORAL at 06:47

## 2024-11-23 RX ADMIN — FUROSEMIDE 40 MG: 40 TABLET ORAL at 16:20

## 2024-11-23 RX ADMIN — HYDROMORPHONE HYDROCHLORIDE 4 MG: 2 TABLET ORAL at 21:06

## 2024-11-23 RX ADMIN — INSULIN LISPRO 4 UNITS: 100 INJECTION, SOLUTION INTRAVENOUS; SUBCUTANEOUS at 21:10

## 2024-11-23 RX ADMIN — IMATINIB MESYLATE 400 MG: 400 TABLET, FILM COATED ORAL at 08:39

## 2024-11-23 RX ADMIN — METHENAMINE HIPPURATE 1 G: 1000 TABLET ORAL at 21:03

## 2024-11-23 RX ADMIN — EMPAGLIFLOZIN 10 MG: 10 TABLET, FILM COATED ORAL at 08:40

## 2024-11-23 RX ADMIN — FUROSEMIDE 40 MG: 40 TABLET ORAL at 08:40

## 2024-11-23 RX ADMIN — ACETAMINOPHEN 650 MG: 325 TABLET, FILM COATED ORAL at 11:48

## 2024-11-23 RX ADMIN — GABAPENTIN 600 MG: 300 CAPSULE ORAL at 21:05

## 2024-11-23 RX ADMIN — APIXABAN 5 MG: 5 TABLET, FILM COATED ORAL at 21:06

## 2024-11-23 RX ADMIN — FERROUS SULFATE TAB 325 MG (65 MG ELEMENTAL FE) 325 MG: 325 (65 FE) TAB at 08:39

## 2024-11-23 RX ADMIN — GABAPENTIN 600 MG: 300 CAPSULE ORAL at 08:40

## 2024-11-23 RX ADMIN — INSULIN LISPRO 8 UNITS: 100 INJECTION, SOLUTION INTRAVENOUS; SUBCUTANEOUS at 12:43

## 2024-11-23 RX ADMIN — DULOXETINE HYDROCHLORIDE 60 MG: 30 CAPSULE, DELAYED RELEASE ORAL at 21:04

## 2024-11-23 RX ADMIN — OXYCODONE HYDROCHLORIDE AND ACETAMINOPHEN 500 MG: 500 TABLET ORAL at 08:39

## 2024-11-23 RX ADMIN — THERA TABS 1 TABLET: TAB at 08:40

## 2024-11-23 RX ADMIN — APIXABAN 5 MG: 5 TABLET, FILM COATED ORAL at 08:39

## 2024-11-23 RX ADMIN — DOCUSATE SODIUM 100 MG: 100 CAPSULE, LIQUID FILLED ORAL at 08:39

## 2024-11-23 RX ADMIN — POTASSIUM CHLORIDE 20 MEQ: 1500 TABLET, EXTENDED RELEASE ORAL at 08:40

## 2024-11-23 RX ADMIN — Medication 3 MG: at 21:06

## 2024-11-23 RX ADMIN — GABAPENTIN 600 MG: 300 CAPSULE ORAL at 16:20

## 2024-11-23 RX ADMIN — VANCOMYCIN HYDROCHLORIDE 125 MG: 125 CAPSULE ORAL at 21:05

## 2024-11-23 RX ADMIN — INSULIN LISPRO 8 UNITS: 100 INJECTION, SOLUTION INTRAVENOUS; SUBCUTANEOUS at 08:41

## 2024-11-23 RX ADMIN — ATORVASTATIN CALCIUM 40 MG: 40 TABLET, FILM COATED ORAL at 17:49

## 2024-11-23 RX ADMIN — TAMSULOSIN HYDROCHLORIDE 0.4 MG: 0.4 CAPSULE ORAL at 17:49

## 2024-11-23 RX ADMIN — VANCOMYCIN HYDROCHLORIDE 125 MG: 125 CAPSULE ORAL at 08:39

## 2024-11-23 RX ADMIN — SENNOSIDES 2 TABLET: 8.6 TABLET, FILM COATED ORAL at 11:46

## 2024-11-23 RX ADMIN — INSULIN LISPRO 8 UNITS: 100 INJECTION, SOLUTION INTRAVENOUS; SUBCUTANEOUS at 17:49

## 2024-11-23 RX ADMIN — INSULIN GLARGINE 12 UNITS: 100 INJECTION, SOLUTION SUBCUTANEOUS at 21:11

## 2024-11-23 NOTE — PROGRESS NOTES
Patient: Bebe Burks  Location: [facility]  MRN: [id number]  Today's date: 11/22/2024    History of Present Illness  Bebe is a 64 y.o. female admitted on 11/19/2024 with History of above knee amputation, left (CMS/[facility]) [Z89.612]  Encounter for prosthetic gait training [Z47.89]. Principal problem is History of above knee amputation, left (CMS/[facility]).    Pt is a 64 y.o. 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       Past Medical History  Bebe has a past medical history of Abnormal ECG, Abnormal finding on chest xray (12/2021), Arthritis, Colon cancer (CMS/[facility]), COVID-19 (12/2021), COVID-19 vaccine series completed, Diabetic neuropathy (CMS/[facility]), DM (diabetes mellitus), type 2 with ophthalmic complications (CMS/[facility]), Hypertension, Hypothyroidism, Left foot drop, Lipid disorder, and Type 2 diabetes mellitus treated with insulin (CMS/[facility]).         11/22/24 0930 11/22/24 1025   Pain/Comfort/Sleep   Pain Charting Type Pain Assessment Pain Reassessment   Preferred Pain Scale number (Numeric Rating Pain Scale) number (Numeric Rating Pain Scale)   Word Pain Rating: Rest 0 - no pain 0 - no pain   Vitals   Heart Rate 89  --    /81  --    BP Location Left upper arm  --    BP Method Automatic  --    Patient Position Sitting  --            Prior Living Environment      Flowsheet Row Most Recent Value   People in Home alone   Current Living Arrangements condominium   Home Accessibility stairs to enter home (Group)   Living Environment Comment resides in 1st floor condo, 4 (1+2+1) JOSE RAFAEL, R hand rail, no stairs inside home, Bathroom Layout: Tub/shower unit with shower curtain, Shower stall with glass doors (shower stall in main bedroom), two sisters can provide some assistance   Number of Stairs, Main Entrance 4   Surface of Stairs, Main Entrance concrete   Stair Railings, Main Entrance railing on right side  (ascending)   Stairs Comment, Main Entrance 1+2+1 JOSE RAFAEL   Location, Bathroom first (main) floor   Bathroom Access tub bath, walk-in shower            Prior Level of Function      Flowsheet Row Most Recent Value   Dominant Hand right   Ambulation assistive equipment   Transferring assistive equipment   Toileting independent   Bathing assistive equipment   Dressing independent   Eating independent   IADLs assistive equipment   Driving/Transportation    Communication understands/communicates without difficulty   Assistive Device Currently Used at Home cane, straight, walker, front-wheeled, shower chair, grab bar, raised toilet           11/22/24 0930   PT Time Calculation   Start Time 0930   Stop Time 1030   Time Calculation (min) 60 min   Session Details   Document Type Daily Treatment/Progress Note   General Information   General Observations of Patient Pt upright in MWC, agreeable to therapy   Mobility Belt   Mobility Belt Used During Session yes   Bed Mobility   [place], Supine to Sit moderate assist (50-74% patient effort)   [place], Sit to Supine modified independence   Safety/Cues increased time to complete   Assistive Device none   Comment sit to supine, mod I; supine to sit, mod A for trunk assistance; donned prosthesis in supine to engage pin   Transfers   Transfers low pivot transfer;stand pivot transfer   Bed to Chair Transfer   [place], Bed to Chair touching/steadying assist   Safety/Cues sequencing;technique   Assistive Device wheelchair   Comment incremental LPT from wheelchair <> mat, steadying assistance at trunk   Chair to Bed Transfer   [place], Chair to Bed touching/steadying assist   Safety/Cues sequencing;technique   Assistive Device wheelchair   Comment incremental LPT from wheelchair <> mat, steadying assistance at trunk   Sit to Stand Transfer   [place], Sit to Stand Transfer dependent (less than 25% patient effort);2 person assist   Safety/Cues  technique;sequencing   Assistive Device parallel bars   Comment sit <>  // bars (pull to stand), mod/max A for hip extension and intermittent assist to engage prosthetic knee extension; sit <> stand to evawlaker, mod A x 2 for BLE hip/knee extension + lifting at evawlaker   Stand to Sit Transfer   [place], Stand to Sit Transfer dependent (less than 25% patient effort);2 person assist   Safety/Cues technique;sequencing   Assistive Device parallel bars   Comment mod A for eccentric control; D for unlocking prosthetic knee   Gait Training   [place], Gait dependent (less than 25% patient effort);2 person assist   Safety/Cues sequencing;technique;increased time to complete   Assistive Device walker, thoracic   Distance in Feet 30 feet   Pattern step-through;step-to   Deviations/Abnormal Patterns base of support, narrow;weight shifting decreased   Bilateral Gait Deviations   (decreased anterior WS)   Left Sided Gait Deviations   (assist with prosthetic placement)   Comment Ambulated 30' with evawalker, mod A at LLE for hip extension and prosthetic placement + mod A at RLE for R stance control + 3rd for evawalker management; much improved gait pattern and decreased assistance required with focus on anterior WS and preventing posterior LOB   Balance   Balance Interventions standing   Comment, Balance standing in // bars: lateral weightshift x 30 reps, emphasis on keeping weight through midfoot vs. heel; reaching with RUE for bean bags x 12 reps, encouraging anterior WS, mod/max A to facilitate movement and maintain upright trunk; Ambulated 10' x 2 with bue support, mod/max A of 1 for L prosthetic control and R stance control, focusing on anterior WS   Prosthetic Orientation (Lower Extremity)   Comment, Fit Assessment Difficulty donning prosthesis in sitting, requires pt to scoot far forward on edge of chair and lean back to allow posterior socket to clear sitting surface, this allows limb to fall far  enough into socket to engage pin; complete supine to decrease effort and speed; max A to julian ; sock ply = 0 ply; wearing schedule = 60~ minutes; skin check - unremarkable   Specific Gait Deviations toe stays off floor after heel strike   Fit Assessment fit/function of prosthesis are appropriate   Comment Prosthetist took prosthesis on 11/22 to adjust toggle for locking mechanism. To unlock knee, have patient shift weight to RLE to unweight LLE, and pull up on toggle. If toggle is not working, pull up on black cord from behind knee, this will allow knee to flex.     For donning leg - easier donning in supine to allow posterior brim of socket to clear sitting surface to allow pin to engage. Once pin is in hole, turn the black knob to the right to draw the pin in to fully julian -   Daily Progress Summary (PT)   Daily Outcome Statement Increased time to complete donning process in chair. Able to julian in supine position with minimal difficulty. COntinue with standin gbalance activities, focus on anterior WS to prevent posterior LOB . ADditionally, pt could benefit from core strengthening and RLE strengthening with and without prosthesis. Progress as able.                   IRF PT Goals      Flowsheet Row Most Recent Value   Transfer Goal 1    Activity/Assistive Device stand pivot, walker, front-wheeled at 11/19/2024 1431   [place] other (see comments)  [Dependent (Max A x 1, Mod A x 1)] at 11/19/2024 1431   Time Frame 1 week, short-term goal (STG) at 11/19/2024 1431   Transfer Goal 2    Activity/Assistive Device stand pivot, walker, front-wheeled at 11/19/2024 1431   [place] maximum assist (25-49% patient effort) at 11/19/2024 1431   Time Frame 21 days or less, long-term goal (LTG) at 11/19/2024 1431   Transfer Goal 3    Activity/Assistive Device sit-to-stand/stand-to-sit, walker, front-wheeled at 11/19/2024 1431   [place] maximum assist (25-49% patient effort), verbal cues required at 11/19/2024 1431    Time Frame 1 week, short-term goal (STG) at 11/19/2024 1431   Transfer Goal 4    Activity/Assistive Device sit-to-stand/stand-to-sit, walker, front-wheeled at 11/19/2024 1431   [place] minimum assist (75% or more patient effort) at 11/19/2024 1431   Time Frame 21 days or less, long-term goal (LTG) at 11/19/2024 1431   Gait/Walking Locomotion Goal 1    Activity/Assistive Device gait (walking locomotion), walker, front-wheeled at 11/19/2024 1431   Distance 10 feet at 11/19/2024 1431   [place] other (see comments)  [Dependent (Max A x 1, Mod A x 1)] at 11/19/2024 1431   Time Frame 1 week, short-term goal (STG) at 11/19/2024 1431   Gait/Walking Locomotion Goal 2    Activity/Assistive Device gait (walking locomotion), walker, front-wheeled at 11/19/2024 1431   Distance 25 feet at 11/19/2024 1431   [place] maximum assist (25-49% patient effort) at 11/19/2024 1431   Time Frame 21 days or less, long-term goal (LTG) at 11/19/2024 1431   Wheelchair Locomotion Goal 1    Activity forward propulsion, backward propulsion, steering, stopping, turning, doorway navigation, weight shifting at 11/19/2024 1431   Assistive Device manual, ultra lightweight at 11/19/2024 1431   Distance 150 feet at 11/19/2024 1431   [place] other (see comments)  [Touching / Steadying Assist] at 11/19/2024 1431   Time Frame 1 week, short-term goal (STG) at 11/19/2024 1431   Wheelchair Locomotion Goal 2    Activity forward propulsion, backward propulsion, steering, stopping, turning, doorway navigation, weight shifting at 11/19/2024 1431   Assistive Device manual, ultra lightweight at 11/19/2024 1431   Distance 150 feet at 11/19/2024 1431   [place] modified independence at 11/19/2024 1431   Time Frame 21 days or less, long-term goal (LTG) at 11/19/2024 1431

## 2024-11-23 NOTE — NURSING NOTE
Discussed 1700 blood sugar result of 80 with attending. Scheduled Insulin dose of 8 units was held. Sliding scale and accuchecks are changed to 4 times daily with meals and nightly.

## 2024-11-23 NOTE — PROGRESS NOTES
11/22/24 0930   PT Time Calculation   Start Time 0930   Stop Time 1030   Time Calculation (min) 60 min   Session Details   Document Type Daily Treatment/Progress Note   General Information   General Observations of Patient Pt upright in MWC, agreeable to therapy   Mobility Belt   Mobility Belt Used During Session yes   Bed Mobility   [place], Supine to Sit moderate assist (50-74% patient effort)   [place], Sit to Supine modified independence   Safety/Cues increased time to complete   Assistive Device none   Comment sit to supine, mod I; supine to sit, mod A for trunk assistance; donned prosthesis in supine to engage pin   Transfers   Transfers low pivot transfer;stand pivot transfer   Bed to Chair Transfer   [place], Bed to Chair touching/steadying assist   Safety/Cues sequencing;technique   Assistive Device wheelchair   Comment incremental LPT from wheelchair <> mat, steadying assistance at trunk   Chair to Bed Transfer   [place], Chair to Bed touching/steadying assist   Safety/Cues sequencing;technique   Assistive Device wheelchair   Comment incremental LPT from wheelchair <> mat, steadying assistance at trunk   Sit to Stand Transfer   [place], Sit to Stand Transfer dependent (less than 25% patient effort);2 person assist   Safety/Cues technique;sequencing   Assistive Device parallel bars   Comment sit <>  // bars (pull to stand), mod/max A for hip extension and intermittent assist to engage prosthetic knee extension; sit <> stand to evawlaker, mod A x 2 for BLE hip/knee extension + lifting at evawlaker   Stand to Sit Transfer   [place], Stand to Sit Transfer dependent (less than 25% patient effort);2 person assist   Safety/Cues technique;sequencing   Assistive Device parallel bars   Comment mod A for eccentric control; D for unlocking prosthetic knee   Gait Training   [place], Gait dependent (less than 25% patient effort);2 person assist   Safety/Cues  sequencing;technique;increased time to complete   Assistive Device walker, thoracic   Distance in Feet 30 feet   Pattern step-through;step-to   Deviations/Abnormal Patterns base of support, narrow;weight shifting decreased   Bilateral Gait Deviations   (decreased anterior WS)   Left Sided Gait Deviations   (assist with prosthetic placement)   Comment Ambulated 30' with evawalker, mod A at LLE for hip extension and prosthetic placement + mod A at RLE for R stance control + 3rd for evawalker management; much improved gait pattern and decreased assistance required with focus on anterior WS and preventing posterior LOB   Balance   Balance Interventions standing   Comment, Balance standing in // bars: lateral weightshift x 30 reps, emphasis on keeping weight through midfoot vs. heel; reaching with RUE for bean bags x 12 reps, encouraging anterior WS, mod/max A to facilitate movement and maintain upright trunk; Ambulated 10' x 2 with bue support, mod/max A of 1 for L prosthetic control and R stance control, focusing on anterior WS   Prosthetic Orientation (Lower Extremity)   Comment, Fit Assessment Difficulty donning prosthesis in sitting, requires pt to scoot far forward on edge of chair and lean back to allow posterior socket to clear sitting surface, this allows limb to fall far enough into socket to engage pin; complete supine to decrease effort and speed; max A to julian ; sock ply = 0 ply; wearing schedule = 60~ minutes; skin check - unremarkable   Specific Gait Deviations toe stays off floor after heel strike   Fit Assessment fit/function of prosthesis are appropriate   Comment Prosthetist took prosthesis on 11/22 to adjust toggle for locking mechanism. To unlock knee, have patient shift weight to RLE to unweight LLE, and pull up on toggle. If toggle is not working, pull up on black cord from behind knee, this will allow knee to flex.     For donning leg - easier donning in supine to allow posterior brim of socket to  clear sitting surface to allow pin to engage. Once pin is in hole, turn the black knob to the right to draw the pin in to fully julian -   Daily Progress Summary (PT)   Daily Outcome Statement Increased time to complete donning process in chair. Able to julian in supine position with minimal difficulty. COntinue with standin gbalance activities, focus on anterior WS to prevent posterior LOB . ADditionally, pt could benefit from core strengthening and RLE strengthening with and without prosthesis. Progress as able.

## 2024-11-23 NOTE — PROGRESS NOTES
Patient: Bebe Burks  Location: [facility] [address]  MRN: [id number]  Today's date: 11/23/2024    History of Present Illness  Bebe is a 64 y.o. female admitted on 11/19/2024 with History of above knee amputation, left (CMS/Formerly [facility]) [Z89.612]  Encounter for prosthetic gait training [Z47.89]. Principal problem is History of above knee amputation, left (CMS/Formerly [facility]).    Pt is a 64 y.o. 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       Past Medical History  Bebe has a past medical history of Abnormal ECG, Abnormal finding on chest xray (12/2021), Arthritis, Colon cancer (CMS/[facility]), COVID-19 (12/2021), COVID-19 vaccine series completed, Diabetic neuropathy (CMS/[facility]), DM (diabetes mellitus), type 2 with ophthalmic complications (CMS/[facility]ly [facility]), Hypertension, Hypothyroidism, Left foot drop, Lipid disorder, and Type 2 diabetes mellitus treated with insulin (CMS/[facility]ly [facility]).    OT Vitals      Date/Time Pulse SpO2 BP BP Location BP Method Pt Position [facility]   11/23/24 1035 85 100 % 140/65 Left upper arm Automatic Sitting CK          OT Pain      Date/Time Pain Type Side/Orientation Rating: Rest Rating: Activity Interventions [facility]   11/23/24 1035 Pain Assessment -- 0 -- -- CK   11/23/24 1130 Pain Reassessment residual limb -- 1 position adjusted CK               Prior Living Environment      Flowsheet Row Most Recent Value   People in Home alone   Current Living Arrangements condominium   Home Accessibility stairs to enter home (Group)   Living Environment Comment resides in 1st floor condo, 4 (1+2+1) JOSE RAFAEL, R hand rail, no stairs inside home, Bathroom Layout: Tub/shower unit with shower curtain, Shower stall with glass doors (shower stall in main bedroom), two sisters can provide some assistance   Number of Stairs, Main Entrance 4   Surface of Stairs, Main Entrance concrete   Stair Railings, Main Entrance railing on right side (ascending)    Stairs Comment, Main Entrance 1+2+1 JOSE RAFAEL   Location, Bathroom first (main) floor   Bathroom Access tub bath, walk-in shower            Prior Level of Function      Flowsheet Row Most Recent Value   Dominant Hand right   Ambulation assistive equipment   Transferring assistive equipment   Toileting independent   Bathing assistive equipment   Dressing independent   Eating independent   IADLs assistive equipment   Driving/Transportation    Communication understands/communicates without difficulty   Assistive Device Currently Used at Home cane, straight, walker, front-wheeled, shower chair, grab bar, raised toilet            Occupational Profile      Flowsheet Row Most Recent Value   Successful Occupations Pt is very active in her community   Occupational History/Life Experiences 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, attends [employer] weekly   Performance Patterns lives alone   Environmental Supports and Barriers Supportive community   Patient Goals PSFS: Toilet transfer with prosthetic 3/10, toilet transfer without prosthetic 8/10, walking 0/10, getting dressed 5/10, getting in/ out of bed 4/10, getting a prosthetic on/off 1/10             IRF OT Evaluation and Treatment - 11/23/24 1029          OT Time Calculation    Start Time 1030     Stop Time 1130     Time Calculation (min) 60 min        Session Details    Document Type Daily Treatment/Progress Note        General Information    General Observations of Patient Pt. seated in w/c at time of OT session. Pt. reported prosthetist had taken prosthetic and it had not returned. Therapist advised would check closet in case it had returned. Upon therapist checking closet, prosthetist noted to be present.        Mobility Belt    Mobility Belt Used During Session yes        Bed to Chair Transfer    [place], Bed to Chair touching/steadying assist;1 person assist   from EOM to w/c.    Safety/Cues increased time to complete;minimal;hand placement;verbal  cues;preparatory posture;technique     Assistive Device wheelchair     Comment Steady A x 1 LPT from EOM to w/c with L prosthesis donned. Pt. educated on technique (nose over knees/toes to lift IT and prevent shearing of buttocks).        Chair to Bed Transfer    [place], Chair to Bed touching/steadying assist;1 person assist     Safety/Cues increased time to complete;minimal;verbal cues;hand placement;preparatory posture;technique     Assistive Device wheelchair     Comment Steady A x 1 from w/c to EOM without prosthesis donned and with L prosthesis donned.        Lower Extremity (Therapeutic Exercise)    Exercise Position/Type supine;AAROM (active assistive range of motion)     General Exercise left     Range of Motion Exercises left;hip flexion/extension;hip abduction/adduction     Reps and Sets 10 reps, 1 sets (10 sec hold at end range)     Comment supine on EOM with verbal cues.        LE Residual Limb Evaluation    Amputation Level transfemoral, standard     Amputation Laterality left side     Incision Management incision line adequately closed for scar management     Skin Assessment no signs of pressure or friction present        Prosthetic Orientation (Lower Extremity)    Comment, Fit Assessment Pre-donning prosthesis: Pt. skin intact and no concerns noted. Donning gel liner: min a x 1 to don gel liner with verbal cues for placement while pt. supine on mat in gym. 0 sock ply donned. During OT session, therapist educated pt. on donning/doffing L prostheis (unlocking knee to assist with pulling prosthesis onto L residual limb, turning know to secure pin etc). Pt. required min A x 1 making sure pin is correctly aligned and to initiated turning black knob to secure. Pt. also educated on doffing L prosthesis, gel liner and to wear  following use of prosthesis. Pt. reported had a  in pt. room and would don following session. Pt. requested to practice donning/doffing prosthesis while seated.  Therapist agreed. Following practice pt. required Min A x 1 donning gel liner, and Min A x 1 for donning/doffing L prosthesis. Recommend ongoing education to increase pt. independence and safety. At end of session, prosthesis doffed and skin intact and no concerns noted.     Comment Pt. had a toe off  brace for R foot. Pt. reported podiatrist had thought brace was pressing into R lateral side of foot and placed 2 mediplex along lateral side of foot (4th/5th toe/along lateral side of foot to heel). Pt. also noted to have healed on top of toeand blancheable reddness noted on 1st metatarsal but intact. Total a x 1 to don toe off brace on RLE. Max A x 1 to doff brace. Pt. skin intact and no new concerns noted.        Daily Progress Summary (OT)    Daily Outcome Statement OT session focused on LPT without L prosthesis and with L prosthesis donned, education on donning/doffing gel liner, and L prosthesis. Pt. would continue to benefit from education to increase pt. independence and safety. Cont. with POC.                          Education Documentation  Opioid Pain Medicine Management, taught by Debbie Howell, OT at 11/23/2024 12:37 PM.  Learner: Patient  Readiness: Acceptance  Method: Demonstration, Explanation  Response: Verbalizes Understanding, Demonstrated Understanding, Needs Reinforcement  Comment: Pt. educated on mechanisms on L prosthesis and donning/doffing prosthesis. Pt. would benefit from ongoing education to increase pt. safety and independence.    Safety in the Hospital, taught by Debbie Howell, OT at 11/23/2024 12:37 PM.  Learner: Patient  Readiness: Acceptance  Method: Demonstration, Explanation  Response: Verbalizes Understanding, Demonstrated Understanding, Needs Reinforcement  Comment: Pt. educated on mechanisms on L prosthesis and donning/doffing prosthesis. Pt. would benefit from ongoing education to increase pt. safety and independence.    Preventing Healthcare-Associated Infections,  taught by Debbie Howell OT at 11/23/2024 12:37 PM.  Learner: Patient  Readiness: Acceptance  Method: Demonstration, Explanation  Response: Verbalizes Understanding, Demonstrated Understanding, Needs Reinforcement  Comment: Pt. educated on mechanisms on L prosthesis and donning/doffing prosthesis. Pt. would benefit from ongoing education to increase pt. safety and independence.    Pain Scale Information, Adult, taught by Debbie Howell OT at 11/23/2024 12:37 PM.  Learner: Patient  Readiness: Acceptance  Method: Demonstration, Explanation  Response: Verbalizes Understanding, Demonstrated Understanding, Needs Reinforcement  Comment: Pt. educated on mechanisms on L prosthesis and donning/doffing prosthesis. Pt. would benefit from ongoing education to increase pt. safety and independence.    Pain Medicine Instructions, taught by Debbie Howell OT at 11/23/2024 12:37 PM.  Learner: Patient  Readiness: Acceptance  Method: Demonstration, Explanation  Response: Verbalizes Understanding, Demonstrated Understanding, Needs Reinforcement  Comment: Pt. educated on mechanisms on L prosthesis and donning/doffing prosthesis. Pt. would benefit from ongoing education to increase pt. safety and independence.    Hand Washing, taught by Debbie Howell OT at 11/23/2024 12:37 PM.  Learner: Patient  Readiness: Acceptance  Method: Demonstration, Explanation  Response: Verbalizes Understanding, Demonstrated Understanding, Needs Reinforcement  Comment: Pt. educated on mechanisms on L prosthesis and donning/doffing prosthesis. Pt. would benefit from ongoing education to increase pt. safety and independence.    Fall Prevention in Hospitals, Adult, taught by Debbie Howell OT at 11/23/2024 12:37 PM.  Learner: Patient  Readiness: Acceptance  Method: Demonstration, Explanation  Response: Verbalizes Understanding, Demonstrated Understanding, Needs Reinforcement  Comment: Pt. educated on mechanisms on L prosthesis and  donning/doffing prosthesis. Pt. would benefit from ongoing education to increase pt. safety and independence.    Advance Directive, taught by Debbie Howell OT at 11/23/2024 12:37 PM.  Learner: Patient  Readiness: Acceptance  Method: Demonstration, Explanation  Response: Verbalizes Understanding, Demonstrated Understanding, Needs Reinforcement  Comment: Pt. educated on mechanisms on L prosthesis and donning/doffing prosthesis. Pt. would benefit from ongoing education to increase pt. safety and independence.    Resources for Support, taught by Debbie Howell OT at 11/23/2024 12:37 PM.  Learner: Patient  Readiness: Acceptance  Method: Demonstration, Explanation  Response: Verbalizes Understanding, Demonstrated Understanding, Needs Reinforcement  Comment: Pt. educated on mechanisms on L prosthesis and donning/doffing prosthesis. Pt. would benefit from ongoing education to increase pt. safety and independence.    Personal Health Information, taught by Debbie Howell OT at 11/23/2024 12:37 PM.  Learner: Patient  Readiness: Acceptance  Method: Demonstration, Explanation  Response: Verbalizes Understanding, Demonstrated Understanding, Needs Reinforcement  Comment: Pt. educated on mechanisms on L prosthesis and donning/doffing prosthesis. Pt. would benefit from ongoing education to increase pt. safety and independence.    MDRO (Multidrug-Resistant Organism) Care, taught by Debbie Howell OT at 11/23/2024 12:37 PM.  Learner: Patient  Readiness: Acceptance  Method: Demonstration, Explanation  Response: Verbalizes Understanding, Demonstrated Understanding, Needs Reinforcement  Comment: Pt. educated on mechanisms on L prosthesis and donning/doffing prosthesis. Pt. would benefit from ongoing education to increase pt. safety and independence.    Infection Prevention, taught by Debbie Howell OT at 11/23/2024 12:37 PM.  Learner: Patient  Readiness: Acceptance  Method: Demonstration, Explanation  Response:  Verbalizes Understanding, Demonstrated Understanding, Needs Reinforcement  Comment: Pt. educated on mechanisms on L prosthesis and donning/doffing prosthesis. Pt. would benefit from ongoing education to increase pt. safety and independence.    Harm Prevention, taught by Debbie oHwell OT at 11/23/2024 12:37 PM.  Learner: Patient  Readiness: Acceptance  Method: Demonstration, Explanation  Response: Verbalizes Understanding, Demonstrated Understanding, Needs Reinforcement  Comment: Pt. educated on mechanisms on L prosthesis and donning/doffing prosthesis. Pt. would benefit from ongoing education to increase pt. safety and independence.    Fall Prevention, taught by Debbie Howell OT at 11/23/2024 12:37 PM.  Learner: Patient  Readiness: Acceptance  Method: Demonstration, Explanation  Response: Verbalizes Understanding, Demonstrated Understanding, Needs Reinforcement  Comment: Pt. educated on mechanisms on L prosthesis and donning/doffing prosthesis. Pt. would benefit from ongoing education to increase pt. safety and independence.    Equipment/Home Supplies, taught by Debbie Howell OT at 11/23/2024 12:37 PM.  Learner: Patient  Readiness: Acceptance  Method: Demonstration, Explanation  Response: Verbalizes Understanding, Demonstrated Understanding, Needs Reinforcement  Comment: Pt. educated on mechanisms on L prosthesis and donning/doffing prosthesis. Pt. would benefit from ongoing education to increase pt. safety and independence.    Call Light Use, taught by Debbie Howell OT at 11/23/2024 12:37 PM.  Learner: Patient  Readiness: Acceptance  Method: Demonstration, Explanation  Response: Verbalizes Understanding, Demonstrated Understanding, Needs Reinforcement  Comment: Pt. educated on mechanisms on L prosthesis and donning/doffing prosthesis. Pt. would benefit from ongoing education to increase pt. safety and independence.    Treatment Plan, taught by Debbie Howell OT at 11/23/2024 12:37  PM.  Learner: Patient  Readiness: Acceptance  Method: Demonstration, Explanation  Response: Verbalizes Understanding, Demonstrated Understanding, Needs Reinforcement  Comment: Pt. educated on mechanisms on L prosthesis and donning/doffing prosthesis. Pt. would benefit from ongoing education to increase pt. safety and independence.    Tobacco Use, Smoke Exposure, taught by Debbie Howell OT at 11/23/2024 12:37 PM.  Learner: Patient  Readiness: Acceptance  Method: Demonstration, Explanation  Response: Verbalizes Understanding, Demonstrated Understanding, Needs Reinforcement  Comment: Pt. educated on mechanisms on L prosthesis and donning/doffing prosthesis. Pt. would benefit from ongoing education to increase pt. safety and independence.    Safe Medication Disposal, taught by Debbie Howell OT at 11/23/2024 12:37 PM.  Learner: Patient  Readiness: Acceptance  Method: Demonstration, Explanation  Response: Verbalizes Understanding, Demonstrated Understanding, Needs Reinforcement  Comment: Pt. educated on mechanisms on L prosthesis and donning/doffing prosthesis. Pt. would benefit from ongoing education to increase pt. safety and independence.    Pain Assessment Process, taught by Debbie Howell OT at 11/23/2024 12:37 PM.  Learner: Patient  Readiness: Acceptance  Method: Demonstration, Explanation  Response: Verbalizes Understanding, Demonstrated Understanding, Needs Reinforcement  Comment: Pt. educated on mechanisms on L prosthesis and donning/doffing prosthesis. Pt. would benefit from ongoing education to increase pt. safety and independence.    Medication Management, taught by Debbie Howell OT at 11/23/2024 12:37 PM.  Learner: Patient  Readiness: Acceptance  Method: Demonstration, Explanation  Response: Verbalizes Understanding, Demonstrated Understanding, Needs Reinforcement  Comment: Pt. educated on mechanisms on L prosthesis and donning/doffing prosthesis. Pt. would benefit from ongoing  education to increase pt. safety and independence.    Oral Health, taught by Debbie Howell, OT at 11/23/2024 12:37 PM.  Learner: Patient  Readiness: Acceptance  Method: Demonstration, Explanation  Response: Verbalizes Understanding, Demonstrated Understanding, Needs Reinforcement  Comment: Pt. educated on mechanisms on L prosthesis and donning/doffing prosthesis. Pt. would benefit from ongoing education to increase pt. safety and independence.    Opioid Medication Management, taught by Debbie Howell OT at 11/23/2024 12:37 PM.  Learner: Patient  Readiness: Acceptance  Method: Demonstration, Explanation  Response: Verbalizes Understanding, Demonstrated Understanding, Needs Reinforcement  Comment: Pt. educated on mechanisms on L prosthesis and donning/doffing prosthesis. Pt. would benefit from ongoing education to increase pt. safety and independence.    Diet Modification, taught by Debbie Howell OT at 11/23/2024 12:37 PM.  Learner: Patient  Readiness: Acceptance  Method: Demonstration, Explanation  Response: Verbalizes Understanding, Demonstrated Understanding, Needs Reinforcement  Comment: Pt. educated on mechanisms on L prosthesis and donning/doffing prosthesis. Pt. would benefit from ongoing education to increase pt. safety and independence.    Diagnostic Tests/Procedures, taught by Debbie Howell OT at 11/23/2024 12:37 PM.  Learner: Patient  Readiness: Acceptance  Method: Demonstration, Explanation  Response: Verbalizes Understanding, Demonstrated Understanding, Needs Reinforcement  Comment: Pt. educated on mechanisms on L prosthesis and donning/doffing prosthesis. Pt. would benefit from ongoing education to increase pt. safety and independence.    Advance Care Planning, taught by Debbie Howell OT at 11/23/2024 12:37 PM.  Learner: Patient  Readiness: Acceptance  Method: Demonstration, Explanation  Response: Verbalizes Understanding, Demonstrated Understanding, Needs  Reinforcement  Comment: Pt. educated on mechanisms on L prosthesis and donning/doffing prosthesis. Pt. would benefit from ongoing education to increase pt. safety and independence.    Orientation to Care Setting, Routine, taught by Debbie Howell OT at 11/23/2024 12:37 PM.  Learner: Patient  Readiness: Acceptance  Method: Demonstration, Explanation  Response: Verbalizes Understanding, Demonstrated Understanding, Needs Reinforcement  Comment: Pt. educated on mechanisms on L prosthesis and donning/doffing prosthesis. Pt. would benefit from ongoing education to increase pt. safety and independence.    Admission, Transition of Care, taught by Debbie Howell OT at 11/23/2024 12:37 PM.  Learner: Patient  Readiness: Acceptance  Method: Demonstration, Explanation  Response: Verbalizes Understanding, Demonstrated Understanding, Needs Reinforcement  Comment: Pt. educated on mechanisms on L prosthesis and donning/doffing prosthesis. Pt. would benefit from ongoing education to increase pt. safety and independence.          IRF OT Goals      Flowsheet Row Most Recent Value   Transfer Goal 1    Activity/Assistive Device toilet at 11/20/2024 0906   [place] maximum assist (25-49% patient effort) at 11/20/2024 0906   Time Frame short-term goal (STG), 5 - 7 days at 11/20/2024 0906   Transfer Goal 2    Activity/Assistive Device toilet at 11/20/2024 0906   [place] minimum assist (75% or more patient effort) at 11/20/2024 0906   Time Frame long-term goal (LTG), 21 days or less at 11/20/2024 0906   Transfer Goal 3    Activity/Assistive Device shower at 11/20/2024 0906   [place] maximum assist (25-49% patient effort) at 11/20/2024 0906   Time Frame short-term goal (STG), 5 - 7 days at 11/20/2024 0906   Transfer Goal 4    Activity/Assistive Device shower at 11/20/2024 0906   [place] minimum assist (75% or more patient effort) at 11/20/2024 0906   Time Frame long-term goal (LTG), 21 days or less at 11/20/2024  0906   Bathing Goal 1    [place] tactile cues required at 11/20/2024 0906   Time Frame short-term goal (STG), 5 - 7 days at 11/20/2024 0906   Bathing Goal 2    [place] modified independence at 11/20/2024 0906   Time Frame long-term goal (LTG), 21 days or less at 11/20/2024 0906   UB Dressing Goal 1    [place] maximum assist (25-49% patient effort) at 11/20/2024 0906   Time Frame short-term goal (STG), 5 - 7 days at 11/20/2024 0906   Strategies/Barriers with clothing retrieval at 11/20/2024 0906   UB Dressing Goal 2    [place] minimum assist (75% or more patient effort) at 11/20/2024 0906   Time Frame long-term goal (LTG), 21 days or less at 11/20/2024 0906   Strategies/Barriers with clothing retrieval at 11/20/2024 0906   LB Dressing Goal 1    [place] maximum assist (25-49% patient effort) at 11/20/2024 0906   Time Frame short-term goal (STG), 5 - 7 days at 11/20/2024 0906   Strategies/Barriers with clothing retrieval at 11/20/2024 0906   LB Dressing Goal 2    [place] minimum assist (75% or more patient effort) at 11/20/2024 0906   Time Frame long-term goal (LTG), 21 days or less at 11/20/2024 0906   Strategies/Barriers with clothing retrieval at 11/20/2024 0906   Grooming Goal 1    [place] moderate assist (50-74% patient effort) at 11/20/2024 0906   Time Frame short-term goal (STG), 5 - 7 days at 11/20/2024 0906   Strategies/Barriers in stance at sink at 11/20/2024 0906   Grooming Goal 2    [place] supervision required at 11/20/2024 0906   Time Frame long-term goal (LTG), 21 days or less at 11/20/2024 0906   Strategies/Barriers in stance at sink at 11/20/2024 0906   Toileting Goal 1    [place] maximum assist (25-49% patient effort) at 11/20/2024 0906   Time Frame short-term goal (STG), 5 - 7 days at 11/20/2024 0906   Toileting Goal 2    [place] minimum assist (75% or more patient effort) at 11/20/2024 0906   Time Frame long-term goal (LTG), 21 days or less at  11/20/2024 0906

## 2024-11-23 NOTE — PROGRESS NOTES
Subjective    Patient seen and examined on rounds.  Chart reviewed.  Events overnight noted.  History reviewed briefly with patient.    CC:  Deficits in mobility, transfers, self-care status post left transfemoral amputation for prosthetic training, severe peripheral vascular disease, chronic myelocytic leukemia, osteomyelitis left foot, diabetic neuropathy, multiple medical problems    HPI:  Ms. 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 Pura Chaves. ID recommended IV Ceftriaxone and IV Ampicillin until 9/14/24.  She had urinary retention managed with indwelling Rowan catheter.  She had right upper extremity swelling present during this admission, ultrasound on 8/29/24 demonstrated an acute non-occlusive DVT of the right axillary and brachial vein around line (on Lovenox).  Subsequently she completed an acute inpatient rehabilitation stay for pre-prosthetic training at [facility] between 9/3/24 to 9/21/24 and was discharged to [facility] on 9/21/24.  She initially required bladder self intermittent catheterizations due to urinary retention, but indicates that now she is able to void.  She desired to be a prosthetic ambulator.  A left transfemoral amputation prosthesis was fabricated. and followed-up as outpatient in the amputee clinic at [place] [facility] and she was deemed to be an  appropriate candidate for acute inpatient rehabilitation stay. She has been needing assistance for mobility, transfers, self-care.  I have reviewed the preadmission screening and concur with that information and there are no significant changes from patient's preadmission screening. She is transferred to [facility] on 11/19/24 from [facility] for further acute inpatient rehabilitation stay for prosthetic training with transfemoral amputation prosthesis.    SUBJ: Feels better today.  Denies increased pain.  She says that she took her medications after meals and did not have any GI symptoms today.  Continent of bowel and bladder for nursing.  Working on donning and doffing of prosthesis, sock ply management with occupational therapy.    ROS: Denies chest pain or shortness of breath. Other ROS negative. Past, family, social history is unchanged.    Current Functional Status:   Bed mobility:   [place], Supine to Sit: moderate assist (50-74% patient effort) (at trunk)   [place], Sit to Supine: modified independence   Transfers:    [place], Sit to Stand Transfer: dependent (less than 25% patient effort), 2 person assist  [place], Stand to Sit Transfer: dependent (less than 25% patient effort), 2 person assist   [place], Stand Pivot/Stand Step Transfer: dependent (less than 25% patient effort), 2 person assist   Gait:   [place], Gait: dependent (less than 25% patient effort), 2 person assist  Assistive Device: walker, thoracic   Distance in Feet: 30 feet    Bathing:   [place]: minimum assist (75% or more patient effort)   Toileting:   [place]: dependent (less than 25% patient effort)   Upper body dressing:   [place]: close supervision   Lower body dressing:   [place]: dependent (less than 25% patient effort)     Functional Progress:    Functional status reviewed. Overall, patient's functional status is improving.      Physical Exam      Blood pressure (!)  "140/65, pulse 85, temperature 36.7 °C (98.1 °F), temperature source Oral, resp. rate 16, height 1.702 m (5' 7.01\"), weight 69.4 kg (153 lb), SpO2 100%, not currently breastfeeding.    Body mass index is 23.96 kg/m².    General Appearance: Not in acute distress  Head/Ear/Nose/Throat: Normocephalic; Atraumatic.   Eye: EOMI; PERRL.   Neck: No JVD; No Bruits.   Respiratory: Decreased breath sounds at bases.   Cardiovascular: RRR; Normal S1, S2.   Gastrointestinal: Soft; NT; +BS.   Extremities: Bilateral lower extremity edema noted.  Left transfemoral amputation stump is stable.  Musculoskeletal: Functional active range of motion in both upper and right lower extremities.  Functional active range of motion in left hip.  She has a left transfemoral amputation.  Neurological: AAO ×3. Speech is fluent. Cranial nerve examination does not reveal any gross facial asymmetry. Strength testing shows about 4+/5 strength in both upper and right lower extremities.  Left hip flexion is 4/5.  She has a left transfemoral amputation.  She is grossly able to localize touch sensation.  She is able to localize position sense in right foot great toe position sense is unable to be tested on left side.  Deep tendon reflexes are hypoactive bilaterally.  Right plantar is flexor, left plantar was unable to be tested. Coordination is functional upper extremities.    Behavior/Emotional: Appropriate; Cooperative.   Skin: Left transfemoral amputation stump stable.  Incision appears to be well-healed.  Some wounds are noted on right anterior lower forearm.  Small wound noted on right foot fifth toe.  Small wound also noted on right foot great toe with some slough in it and scaly skin around it.  Some redness noted on sacrum/coccyx.  Reviewed pictures of wounds in Epic.      Current Facility-Administered Medications:     acetaminophen (TYLENOL) tablet 650 mg, 650 mg, oral, q4h PRN, Charlette Brooke MD, 650 mg at 11/23/24 1148    alum-mag " hydroxide-simeth (MAALOX) 200-200-20 mg/5 mL suspension 30 mL, 30 mL, oral, q4h PRN, Charlette Brooke MD    apixaban (ELIQUIS) tablet 5 mg, 5 mg, oral, BID, Charlette Brooke MD, 5 mg at 11/23/24 0839    ascorbic acid (VITAMIN C) tablet 500 mg, 500 mg, oral, q48h INT, Charlette Brooke MD, 500 mg at 11/23/24 0839    atorvastatin (LIPITOR) tablet 40 mg, 40 mg, oral, Daily (6p), Charlette Brooke MD, 40 mg at 11/22/24 1718    bisacodyL (DULCOLAX) 10 mg suppository 10 mg, 10 mg, rectal, Daily PRN, Charlette Brooke MD    docusate sodium (COLACE) capsule 100 mg, 100 mg, oral, BID, Charlette Brooke MD, 100 mg at 11/23/24 0839    DULoxetine (CYMBALTA) capsule,delayed release(DR/EC) 60 mg, 60 mg, oral, Nightly, Charlette Brooke MD, 60 mg at 11/22/24 2058    empagliflozin (JARDIANCE) tablet 10 mg, 10 mg, oral, Daily, Charlette Brooke MD, 10 mg at 11/23/24 0840    ferrous sulfate tablet 325 mg, 325 mg, oral, q48h INT, Charlette Brooke MD, 325 mg at 11/23/24 0839    fluticasone propionate (FLONASE) 50 mcg/actuation nasal spray 1 spray, 1 spray, Each Nostril, Daily PRN, Charlette Brooke MD    furosemide (LASIX) tablet 40 mg, 40 mg, oral, BID (am, 4p), Charlette Brooke MD, 40 mg at 11/23/24 0840    gabapentin (NEURONTIN) capsule 600 mg, 600 mg, oral, TID, Charlette Brooke MD, 600 mg at 11/23/24 0840    HYDROmorphone (DILAUDID) tablet 4 mg, 4 mg, oral, q6h PRN, Charlette Brooke MD, 4 mg at 11/22/24 2221    imatinib (GLEEVEC) chemo tablet 400 mg, 400 mg, oral, Daily, Charlette Brooke MD, 400 mg at 11/23/24 0839    insulin glargine U-100 (LANTUS/BASAGLAR) pen 12 Units, 12 Units, subcutaneous, Nightly, Charlette Brooke MD, 12 Units at 11/22/24 2102    insulin lispro U-100 (HumaLOG) pen 1-12 Units, 1-12 Units, subcutaneous, With meals & nightly, Alexei Petty MD, 4 Units at 11/22/24 2100    insulin lispro U-100 (HumaLOG) pen 8 Units, 8 Units, subcutaneous, TID with meals, Charlette Brooke MD, 8 Units at 11/23/24 0841    levothyroxine (SYNTHROID) tablet 25 mcg,  25 mcg, oral, Daily (6:30a), Charlette Brooke MD, 25 mcg at 11/23/24 0647    melatonin ODT 3 mg, 3 mg, oral, Nightly, Charlette Brooke MD, 3 mg at 11/22/24 2057    methenamine (HIPREX) tablet 1 g, 1 g, oral, BID, Charlette Brooke MD, 1 g at 11/23/24 0839    multivitamin tablet 1 tablet, 1 tablet, oral, Daily, Charlette Brooke MD, 1 tablet at 11/23/24 0840    ondansetron ODT (ZOFRAN-ODT) disintegrating tablet 4 mg, 4 mg, oral, q8h PRN, Charlette Brooke MD    potassium chloride (KLOR-CON M) ER tablet (particles/crystals) 20 mEq, 20 mEq, oral, Daily, Charlette Brooke MD, 20 mEq at 11/23/24 0840    semaglutide tablet 14 mg, 1 tablet, oral, Daily, Charlette Brooke MD, 14 mg at 11/23/24 0841    senna (SENOKOT) tablet 2 tablet, 2 tablet, oral, Daily, Charlette Brooke MD, 2 tablet at 11/23/24 1146    tamsulosin (FLOMAX) 24 hr ER capsule 0.4 mg, 0.4 mg, oral, Daily with dinner, Charlette Brooke MD, 0.4 mg at 11/22/24 1718    vancomycin (VANCOCIN) capsule 125 mg, 125 mg, oral, BID, Charlette Brooke MD, 125 mg at 11/23/24 0839       Labs / Radiology    Lab Results   Component Value Date    WBC 4.98 11/20/2024    HGB 10.8 (L) 11/20/2024    HCT 32.7 (L) 11/20/2024    MCV 90.8 11/20/2024     11/20/2024     Lab Results   Component Value Date    GLUCOSE 205 (H) 11/20/2024    CALCIUM 8.7 11/20/2024     11/20/2024    K 3.7 11/20/2024    CO2 30 11/20/2024     11/20/2024    BUN 35 (H) 11/20/2024    CREATININE 0.9 11/20/2024       Assessment and Plan    ASSESSMENT PLAN:  1. 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 Pura Chaves. ID recommended IV Ceftriaxone and IV Ampicillin until 9/14/24.  She had urinary retention managed with indwelling Rowan catheter.  She had right upper extremity swelling present during this admission, ultrasound on 8/29/24 demonstrated an acute non-occlusive DVT of the right axillary and brachial vein around line (on Lovenox).  Subsequently she completed an acute inpatient rehabilitation stay for pre-prosthetic training at [facility] between 9/3/24 to 9/21/24 and was discharged to [facility] on 9/21/24.  She initially required bladder self intermittent catheterizations due to urinary retention, but indicates that now she is able to void.  She desired to be a prosthetic ambulator.  A left transfemoral amputation prosthesis was fabricated. and followed-up as outpatient [facility]put[facility] and she was deemed to be an appropriate candidate for acute inpatient rehabilitation stay. She has been needing assistance for mobility, transfers, self-care.  I have reviewed the preadmission screening and concur with that information and there are no significant changes from patient's preadmission screening. She is transferred to [facility] on 11/19/24 from [facility] for further acute inpatient rehabilitation stay for prosthetic training with transfemoral amputation prosthesis.     2. DVT prophylaxis/treatment - on Eliquis.  Platelets 168 on 11/20/2024.     3. Vascular - status post left transfemoral amputation for prosthetic training, severe peripheral vascular disease, chronic myelocytic leukemia, osteomyelitis left foot, diabetic neuropathy, multiple medical problems - continue PT, OT, psychology.  Follow falls precautions, cardiac precautions, monitor pulse oximeter in therapy.  Monitor  skin under prosthesis.  Teach donning and doffing of left transfemoral amputation prosthesis.  Do gait training with prosthesis.     4. GI - On Colace, Senokot.  On PRN Maalox.  On PRN Dulcolax suppository.  On PRN Zofran.     5.  -on Hiprex.  On Flomax.  Monitor postvoid residual.     6. CVS - on Lasix.  Monitor for orthostasis.     7. Pulmonary -encourage incentive spirometry.     8. Hematology - monitor hemoglobin, platelets.  Hemoglobin 10.8, WBC 4.98 on 11/20/2024.     9. Pain -on Cymbalta.  On Neurontin.  On PRN Tylenol.  On PRN Dilaudid.     10. Skin - Left transfemoral amputation stump stable.  Incision appears to be well-healed.  Some wounds are noted on right anterior lower forearm.  Small wound noted on right foot fifth toe.  Small wound also noted on right foot great toe with some slough in it and scaly skin around it.  Some redness noted on sacrum/coccyx.  Reviewed pictures of wounds in Epic. Monitor skin under the left transfemoral amputation prosthesis.Noted input from dermal defense nurse on 11/21/2024.  Reviewed pictures in Epic.      11. F/E/N - on Ferrous sulfate.  On vitamin C. on MVI.  On K-Genesis con.     12. Hyperlipidemia -on Lipitor.     13. Diabetes mellitus type 2 - on Lantus insulin.  On sliding scale Humalog coverage.  On Jardiance.     14. Oncology - On Gleevec for chronic myelocytic leukemia.     15. Hypothyroidism - on Synthroid.     16. Insomnia - on Melatonin.     17. Infectious diseases - On oral Vancomycin.       18. Seasonal allergies - on PRN Flonase.     19. Psychiatry - mood stable.  Psychology consulted.     20. Rehabilitation medicine - Continue comprehensive rehabilitation care. Continue PT, OT, speech, psychology.  We will follow falls precautions, cardiac precautions, monitor pulse oximetry in therapy and follow weightbearing precautions.  We will follow spinal precautions as appropriate.Tolerating therapies per endurance on 11/20/2024.  Slept well last night.   Working on  donning and doffing of prosthesis.  Discussed with the importance of skin checks on the prosthesis.  Able to ambulate 7 feet in parallel bars dependent gait using left transfemoral amputation prosthesis with physical therapy.  Dependent for sit to stand transfers with physical therapy on 11/20/2024. Noted input from dermal defense nurse on 11/21/2024.  Reviewed pictures in Epic.  Working on ADLs with occupational therapy on 11/21/2024.  Requiring minimal assistance for bathing, dependent for toileting, close supervision for poorly dressing and dependent for lower body dressing with occupational therapy.  She requested a flu shot.  Discussed with nurse.  Continent of bowel and bladder for nursing on 11/21/2024.Dependent for sit to stand transfers with physical therapy on 11/22/2024.  Able to ambulate 15 feet with thoracic walker dependent gait with physical therapy on 11/22/2024.  Skin under prosthesis is stable.  Denies pain today.  She says she got flu shot yesterday.Feels better on 11/23/2024.  Denies increased pain.  She says that she took her medications after meals and did not have any GI symptoms today.  Continent of bowel and bladder for nursing.  Working on donning and doffing of prosthesis, sock ply management with occupational therapy on 11/23/2024.     21. Reviewed labs today.  BUN 35, creatinine 0.9, sodium 139, potassium 3.7 on 11/20/2024.           Alexei Petty MD  11/23/2024      This encounter was completed utilizing the Direct Speech Voice Recognition Software. Grammatical errors, random word insertions, pronoun errors, and incomplete sentences are occasional consequences of the system due to software limitations, ambient noises, and hardware issues. Such errors may be missed prior to affixing electronic signature. Any questions or concerns about the content, text, or information contained within the body of this dictation should be directly addressed to the physician for clarification. If  you have any questions or concerns please do not hesitate to call me directly via EPIC chat, page, or email.

## 2024-11-23 NOTE — PLAN OF CARE
Plan of Care Review  Plan of Care Reviewed With: patient  Progress: improving  Outcome Evaluation: alert and orientedx 4; continent bowel and bladder; no complaints of pain overnight; call bell in reach; bed alarm set; no reports of concerns at this time.

## 2024-11-24 ENCOUNTER — APPOINTMENT (INPATIENT)
Dept: PHYSICAL THERAPY | Facility: REHABILITATION | Age: 64
DRG: 560 | End: 2024-11-24
Payer: MEDICARE

## 2024-11-24 ENCOUNTER — APPOINTMENT (INPATIENT)
Dept: OCCUPATIONAL THERAPY | Facility: REHABILITATION | Age: 64
DRG: 560 | End: 2024-11-24
Payer: MEDICARE

## 2024-11-24 LAB
GLUCOSE BLD-MCNC: 137 MG/DL (ref 70–99)
GLUCOSE BLD-MCNC: 206 MG/DL (ref 70–99)
GLUCOSE BLD-MCNC: 211 MG/DL (ref 70–99)
GLUCOSE BLD-MCNC: 281 MG/DL (ref 70–99)
POCT TEST: ABNORMAL

## 2024-11-24 PROCEDURE — 97116 GAIT TRAINING THERAPY: CPT | Mod: GP

## 2024-11-24 PROCEDURE — 97530 THERAPEUTIC ACTIVITIES: CPT | Mod: GO

## 2024-11-24 PROCEDURE — 12800000 HC ROOM AND CARE SEMIPRIVATE REHAB

## 2024-11-24 PROCEDURE — 97763 ORTHC/PROSTC MGMT SBSQ ENC: CPT | Mod: GP

## 2024-11-24 PROCEDURE — 97530 THERAPEUTIC ACTIVITIES: CPT | Mod: GP

## 2024-11-24 PROCEDURE — 63700000 HC SELF-ADMINISTRABLE DRUG: Performed by: INTERNAL MEDICINE

## 2024-11-24 RX ORDER — INSULIN GLARGINE 100 [IU]/ML
14 INJECTION, SOLUTION SUBCUTANEOUS NIGHTLY
Status: DISCONTINUED | OUTPATIENT
Start: 2024-11-24 | End: 2024-11-29

## 2024-11-24 RX ORDER — INSULIN LISPRO 100 [IU]/ML
6 INJECTION, SOLUTION INTRAVENOUS; SUBCUTANEOUS
Status: DISCONTINUED | OUTPATIENT
Start: 2024-11-24 | End: 2024-11-30

## 2024-11-24 RX ADMIN — TAMSULOSIN HYDROCHLORIDE 0.4 MG: 0.4 CAPSULE ORAL at 17:27

## 2024-11-24 RX ADMIN — VANCOMYCIN HYDROCHLORIDE 125 MG: 125 CAPSULE ORAL at 21:10

## 2024-11-24 RX ADMIN — FUROSEMIDE 40 MG: 40 TABLET ORAL at 08:02

## 2024-11-24 RX ADMIN — GABAPENTIN 600 MG: 300 CAPSULE ORAL at 08:02

## 2024-11-24 RX ADMIN — ACETAMINOPHEN 650 MG: 325 TABLET, FILM COATED ORAL at 12:37

## 2024-11-24 RX ADMIN — LEVOTHYROXINE SODIUM 25 MCG: 0.03 TABLET ORAL at 05:52

## 2024-11-24 RX ADMIN — GABAPENTIN 600 MG: 300 CAPSULE ORAL at 21:10

## 2024-11-24 RX ADMIN — HYDROMORPHONE HYDROCHLORIDE 4 MG: 2 TABLET ORAL at 21:38

## 2024-11-24 RX ADMIN — INSULIN LISPRO 4 UNITS: 100 INJECTION, SOLUTION INTRAVENOUS; SUBCUTANEOUS at 12:33

## 2024-11-24 RX ADMIN — DOCUSATE SODIUM 100 MG: 100 CAPSULE, LIQUID FILLED ORAL at 08:02

## 2024-11-24 RX ADMIN — APIXABAN 5 MG: 5 TABLET, FILM COATED ORAL at 21:12

## 2024-11-24 RX ADMIN — VANCOMYCIN HYDROCHLORIDE 125 MG: 125 CAPSULE ORAL at 08:02

## 2024-11-24 RX ADMIN — METHENAMINE HIPPURATE 1 G: 1000 TABLET ORAL at 08:02

## 2024-11-24 RX ADMIN — EMPAGLIFLOZIN 10 MG: 10 TABLET, FILM COATED ORAL at 08:02

## 2024-11-24 RX ADMIN — INSULIN LISPRO 6 UNITS: 100 INJECTION, SOLUTION INTRAVENOUS; SUBCUTANEOUS at 12:32

## 2024-11-24 RX ADMIN — ONDANSETRON 4 MG: 4 TABLET, ORALLY DISINTEGRATING ORAL at 09:51

## 2024-11-24 RX ADMIN — INSULIN GLARGINE 14 UNITS: 100 INJECTION, SOLUTION SUBCUTANEOUS at 21:14

## 2024-11-24 RX ADMIN — GABAPENTIN 600 MG: 300 CAPSULE ORAL at 17:27

## 2024-11-24 RX ADMIN — ATORVASTATIN CALCIUM 40 MG: 40 TABLET, FILM COATED ORAL at 17:27

## 2024-11-24 RX ADMIN — METHENAMINE HIPPURATE 1 G: 1000 TABLET ORAL at 21:11

## 2024-11-24 RX ADMIN — INSULIN LISPRO 2 UNITS: 100 INJECTION, SOLUTION INTRAVENOUS; SUBCUTANEOUS at 21:14

## 2024-11-24 RX ADMIN — APIXABAN 5 MG: 5 TABLET, FILM COATED ORAL at 08:02

## 2024-11-24 RX ADMIN — INSULIN LISPRO 8 UNITS: 100 INJECTION, SOLUTION INTRAVENOUS; SUBCUTANEOUS at 08:05

## 2024-11-24 RX ADMIN — FUROSEMIDE 40 MG: 40 TABLET ORAL at 17:27

## 2024-11-24 RX ADMIN — INSULIN LISPRO 6 UNITS: 100 INJECTION, SOLUTION INTRAVENOUS; SUBCUTANEOUS at 17:25

## 2024-11-24 RX ADMIN — INSULIN LISPRO 2 UNITS: 100 INJECTION, SOLUTION INTRAVENOUS; SUBCUTANEOUS at 08:06

## 2024-11-24 RX ADMIN — IMATINIB MESYLATE 400 MG: 400 TABLET, FILM COATED ORAL at 08:02

## 2024-11-24 RX ADMIN — POTASSIUM CHLORIDE 20 MEQ: 1500 TABLET, EXTENDED RELEASE ORAL at 08:02

## 2024-11-24 RX ADMIN — DULOXETINE HYDROCHLORIDE 60 MG: 30 CAPSULE, DELAYED RELEASE ORAL at 21:10

## 2024-11-24 RX ADMIN — THERA TABS 1 TABLET: TAB at 08:02

## 2024-11-24 NOTE — PROGRESS NOTES
[facility] Progress Note          Patient was seen and examined at bedside.    Subjective:   11/20/24  Reviewed night shift RN report:  Patient is aao x4, continent of bowel last BM on 11/19, incontinent of bladder overnight.  cc noted and straight cath done for 800cc. Pain mangement with PRN Diluadid. TID accu check. Slept well @ night. SCDs to right leg. Fall and safety precautions maintained. Call bell within reach and bed alarm is on.    Saw and evaluated her in am:  The important medical issues:  # hyperglycemia, resume her prandial short active insulin plus SSI correction, titrated up her lantus dose, diet control, closely monitor her BG levels and po intake, keep good oral hydration;  # urinary retention, regular bladder scan with cic, UTI prevention.      11/21/24  No new complaints.  Hyperglycemia, add prandial insulin, cont basal insulin, plus SSI ; optimize diet control; pt brought in home oral semaglutide, ordered 14 mg po daily.    Denies nausea, vomiting, abdominal pain or discomfort, dysuria, cough/sputum, running nose, sore throat, chest pain, palpitation, SOB or orthopnea, dizziness or LH,  HA.     11/24/24  No O/N events. No new complaints. Her paresthetic training is progressing well. Her BG levels are mostly acceptable.    Objective   Vital signs in last 24 hours:  Temp:  [36.7 °C (98 °F)-36.8 °C (98.2 °F)] 36.8 °C (98.2 °F)  Heart Rate:  [85-96] 96  Resp:  [17] 17  BP: (134-140)/(60-65) 134/60    No intake or output data in the 24 hours ending 11/24/24 0950    Intake/Output this shift:  No intake/output data recorded.   Review of Systems:  All other systems reviewed and negative except as noted in the HPI.   Objective      Labs  Reviewed her labs thoroughly   Lab Results   Component Value Date    WBC 4.98 11/20/2024    HGB 10.8 (L) 11/20/2024    HCT 32.7 (L) 11/20/2024    MCV 90.8 11/20/2024     11/20/2024     Lab Results   Component Value Date    GLUCOSE  205 (H) 11/20/2024    CALCIUM 8.7 11/20/2024     11/20/2024    K 3.7 11/20/2024    CO2 30 11/20/2024     11/20/2024    BUN 35 (H) 11/20/2024    CREATININE 0.9 11/20/2024       Imaging  N/A    Full Code    Physical Exam:  Head/Ear/Nose/Throat: normocephalic; atraumatic; moisture mouth mm, no oropharyngeal thrush noted.   Eyes: anicteric sclera, EOMI; PERRL.   Neck : supple, no JVD, no carotid bruits appeciated.   Respiratory: no evidence of labored breathing, lung sounds CTA b/l, good aeration bibasilar area, no w/r/c.   Cardiovascular: RRR; normal S1, S2; no m/r/g; no S3 or S4.   Gastrointestinal: soft; NT; BS normal; mildly distended; no CVAT b/l.   Genitourinary: no lozada.   Extremities : S/p L AKA, residual limb healed well .   Neurological: AO x 3, fluent speeches, following commands, CNS II-XII grossly intact; no focal neurologic deficits.   Behavior/Emotional: in NAD, appropriate; cooperative.   Skin: clean, dry and intact.  Plan of care was discussed with patient, RN, and PMR attending     Assessment     CC:L AKA, ambulatory dysfunction     64 y.o. 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 S/p IV Ceftriaxone and IV Ampicillin until 9/14/24. H/o non-occlusive DVT of the R axillary and brachial vein around midline. She presented to [facility]putee clinic on 11/14 for evaluation to begin prosthetic training. Now she was admitted for inpt prosthetic training on 11/19/24.  Denies nausea, vomiting, abdominal pain or discomfort, dysuria, cough/sputum, running nose, sore throat, chest pain, palpitation, SOB or orthopnea, dizziness or LH,  HA.    A/P:  # A/p L AKA  -inpt prosthetic training, wound care/dermal defense, pain and neuralgia managements, fall  precaution     # CML  -cont home imatinib, check CBC      # Type 2 DM, hypothyroidism  - diet control, SSI, plus her home diabetic regimen;  - cont home levothyroxine     #  PICC-associated DVT on 8/30   -S/p SQ Lovenox weight based ;  -cont Eliquis 5 mg po bid      # Urinary retention, h/o recurrent UTI  -timed voiding with cic, adequate oral hydration, monitor PVR with cic, on flomax;  -hiprex 1 g po bid        # Stage 2 decubitus ulcer, pressure injury in right heel   -wound care     # Recent C.diff.  -cont prophylactic oral vanco 125 mg po bid       Billing code: [id number]  Diagnoses:  Patient Active Problem List   Diagnosis    Controlled type 2 diabetes mellitus, with long-term current use of insulin (CMS/HCC)    Essential hypertension    Hyperlipidemia    Hypothyroidism    Obesity    Thyroid nodule    COVID-19 virus infection    Lung nodule    Displacement of lumbar intervertebral disc without myelopathy    Disability of walking    Polyneuropathy due to type 2 diabetes mellitus (CMS/HCC)    Pre-op examination    Localized osteoarthritis of right knee    Type 2 diabetes mellitus treated with insulin (CMS/HCC)    Abnormal finding on EKG    Left foot drop    Abnormal finding on chest xray    Osteoarthritis of right knee, unspecified osteoarthritis type    S/P total knee arthroplasty, right    Sepsis (CMS/HCC)    Osteomyelitis of left foot (CMS/HCC)    Peripheral neuropathy    Abdominal pain    Chronic idiopathic constipation    Gangrene (CMS/HCC)    Tibial artery stenosis, left (CMS/HCC)    H/O pilonidal cyst    Type 2 diabetes mellitus with left diabetic foot infection (CMS/HCC)    Type 2 diabetes mellitus with diabetic neuropathy (CMS/HCC)    Hypothyroidism    Primary hypertension    Leukemoid reaction    HLD (hyperlipidemia)    Acute osteomyelitis of left foot (CMS/HCC)    History of above knee amputation, left (CMS/HCC)    Encounter for prosthetic gait training           Charlette Brooke MD  11/24/2024

## 2024-11-24 NOTE — PLAN OF CARE
Plan of Care Review  Plan of Care Reviewed With: patient  Progress: improving  Outcome Evaluation: alert and orientedx 4; continent of bowel and bladder; dilaudid given for pain, discussed non-pharm pain relief ideas for phantom limb pain; call bell in reach; bed alarm set; no reports of concerns at this time.

## 2024-11-24 NOTE — PROGRESS NOTES
New Patient Intake Form   Patient Details   Gagandeep Ivory     2001     [id number]     Insurance Information   Name of Eric  [id number]    Does the patient need an insurance referral? no   If patient has Pitney Garth, please ask if they will be using their Shirley Garth  Appointment Information   Who is calling to schedule? If not patient, what is callers name? Patient   Referring Provider Angel Richards     Reason for Appt (Diagnosis) M32 9 (ICD-10-CM) - Systemic lupus erythematosus, unspecified SLE type, unspecified organ involvement status ([facility] [address] )   Does Patient have labs/urine done at [facility]? If not, where do they go? List the date of last lab / urine  *Please try to get labs 2 years back if not at  yes   Has patient been hospitalized recently? If yes, list name and location of hospital they were in no   Has patient been seen by a Nephrologist before? If yes, list name, location and phone number Yes  Dr Kayleigh Pulido she thinks is a [facility] provider    Has the patient had renal imaging done? If so, list the most recent date and type of imaging yes    Does patient have a history of Kidney Stones? no   Appointment Details   Is there a referral on file? yes    Appointment Date 9-12-23 @ 9:00 in [address]   Dr Kayleigh Rust Just wanted to re locate to [place] [id number]  Podiatry Progress Note    Subjective follow up evaluation for right lateral foot DTI and great toe scab.      Interval History: none.       Medications:    Current Facility-Administered Medications:     acetaminophen (TYLENOL) tablet 650 mg, 650 mg, oral, q4h PRN, Charlette Brooke MD, 650 mg at 11/24/24 1237    alum-mag hydroxide-simeth (MAALOX) 200-200-20 mg/5 mL suspension 30 mL, 30 mL, oral, q4h PRN, Charlette Brooke MD    apixaban (ELIQUIS) tablet 5 mg, 5 mg, oral, BID, Charlette Brooke MD, 5 mg at 11/24/24 0802    ascorbic acid (VITAMIN C) tablet 500 mg, 500 mg, oral, q48h INT, Charlette Brooke MD, 500 mg at 11/23/24 0839    atorvastatin (LIPITOR) tablet 40 mg, 40 mg, oral, Daily (6p), Charlette Brooke MD, 40 mg at 11/23/24 1749    bisacodyL (DULCOLAX) 10 mg suppository 10 mg, 10 mg, rectal, Daily PRN, Charlette Brooke MD    docusate sodium (COLACE) capsule 100 mg, 100 mg, oral, BID, Charlette Brooke MD, 100 mg at 11/24/24 0802    DULoxetine (CYMBALTA) capsule,delayed release(DR/EC) 60 mg, 60 mg, oral, Nightly, Charlette Brooke MD, 60 mg at 11/23/24 2104    empagliflozin (JARDIANCE) tablet 10 mg, 10 mg, oral, Daily, Charlette Brooke MD, 10 mg at 11/24/24 0802    ferrous sulfate tablet 325 mg, 325 mg, oral, q48h INT, Charlette Brooke MD, 325 mg at 11/23/24 0839    fluticasone propionate (FLONASE) 50 mcg/actuation nasal spray 1 spray, 1 spray, Each Nostril, Daily PRN, Charlette Brooke MD    furosemide (LASIX) tablet 40 mg, 40 mg, oral, BID (am, 4p), Charlette Brooke MD, 40 mg at 11/24/24 0802    gabapentin (NEURONTIN) capsule 600 mg, 600 mg, oral, TID, Charlette Brooke MD, 600 mg at 11/24/24 0802    HYDROmorphone (DILAUDID) tablet 4 mg, 4 mg, oral, q6h PRN, Charlette Brooke MD, 4 mg at 11/23/24 2106    imatinib (GLEEVEC) chemo tablet 400 mg, 400 mg, oral, Daily, Charlette Brooke MD, 400 mg at 11/24/24 0802    insulin glargine U-100 (LANTUS/BASAGLAR) pen 14 Units, 14 Units, subcutaneous, Nightly, Charlette Brooke MD    insulin lispro U-100  (HumaLOG) pen 1-12 Units, 1-12 Units, subcutaneous, With meals & nightly, Alexei Petty MD, 4 Units at 11/24/24 1233    insulin lispro U-100 (HumaLOG) pen 6 Units, 6 Units, subcutaneous, TID with meals, Charlette Brooke MD, 6 Units at 11/24/24 1232    levothyroxine (SYNTHROID) tablet 25 mcg, 25 mcg, oral, Daily (6:30a), Charlette Brooke MD, 25 mcg at 11/24/24 0552    melatonin ODT 3 mg, 3 mg, oral, Nightly, Charlette Brooke MD, 3 mg at 11/23/24 2106    methenamine (HIPREX) tablet 1 g, 1 g, oral, BID, Charlette Brooek MD, 1 g at 11/24/24 0802    multivitamin tablet 1 tablet, 1 tablet, oral, Daily, Charlette Brooke MD, 1 tablet at 11/24/24 0802    ondansetron ODT (ZOFRAN-ODT) disintegrating tablet 4 mg, 4 mg, oral, q8h PRN, Charlette Brooke MD, 4 mg at 11/24/24 0951    potassium chloride (KLOR-CON M) ER tablet (particles/crystals) 20 mEq, 20 mEq, oral, Daily, Charlette Brooke MD, 20 mEq at 11/24/24 0802    semaglutide tablet 14 mg, 1 tablet, oral, Daily, Charlette Brooke MD, 14 mg at 11/24/24 0801    senna (SENOKOT) tablet 2 tablet, 2 tablet, oral, Daily, Charlette Brooke MD, 2 tablet at 11/23/24 1146    tamsulosin (FLOMAX) 24 hr ER capsule 0.4 mg, 0.4 mg, oral, Daily with dinner, Charlette Brooke MD, 0.4 mg at 11/23/24 1749    vancomycin (VANCOCIN) capsule 125 mg, 125 mg, oral, BID, Charlette Brooke MD, 125 mg at 11/24/24 0802    Labs:  Lab Results   Component Value Date    GLUCOSE 205 (H) 11/20/2024    CALCIUM 8.7 11/20/2024     11/20/2024    K 3.7 11/20/2024    CO2 30 11/20/2024     11/20/2024    BUN 35 (H) 11/20/2024    CREATININE 0.9 11/20/2024     Lab Results   Component Value Date    WBC 4.98 11/20/2024    HGB 10.8 (L) 11/20/2024    HCT 32.7 (L) 11/20/2024    MCV 90.8 11/20/2024     11/20/2024       Pathology Results       ** No results found for the last 720 hours. **          Microbiology Results       ** No results found for the last 720 hours. **                  Imaging  No results found.        Vital  signs in last 24 hours:  Temp:  [36.7 °C (98 °F)-36.8 °C (98.2 °F)] 36.8 °C (98.2 °F)  Heart Rate:  [92-96] 94  Resp:  [17] 17  BP: (134-161)/(60-72) 161/72      No intake or output data in the 24 hours ending 11/24/24 1656      Review of Systems - Negative fever, chills, night sweats.    Objective     PE: nonpalpable DP nonpalpable PT right foot.  Decreased texture, temperature, and turgor right foot.  Decreased digital hair .  Mild edema right lower extremity.  Xerotic skin bilateral .  Right lateral foot with nonblanchable maroon area.  No crepitus no fluctuance.  No erythema.  Right great toe with dorsal scab.  thickened, yellow nails with subungual debris  <5.    Digital contracture noted toe 2-5.   Hallux valgus deformity.  Epicritic sensation reduced right foot.          A/P   Assessment   diabetes mellitus type 2, neuropathy,  left transfemoral amputation,  right foot DTI and noninfected great toe scab.   Plan   Continue mepilex right lateral foot.  Follow up 3-5 days.    Any change in the foot since last evaluation? No significant change.            Dimitri Douglas DPM  11/24/2024  4:56 PM

## 2024-11-24 NOTE — PLAN OF CARE
Plan of Care Review  Plan of Care Reviewed With: patient  Progress: improving  Outcome Evaluation: AAOx4, makes needs known. L residual limb TENA, no breakdown noted. Showered this AM. Had some nausea after morning meds and breakfast. Cool cloth, ginger ale with little relief. Zofran given with relief. Participated in therapies. Tylenol for phantom pain relief. QID accuchecks monitored. Appetite fair for lunch. Meds reviewed.

## 2024-11-24 NOTE — PROGRESS NOTES
Patient: Bebe Burks  Location: [facility] [address]  MRN: [id number]  Today's date: 11/24/2024    History of Present Illness  Bebe is a 64 y.o. female admitted on 11/19/2024 with History of above knee amputation, left (CMS/[facility]ly [facility]) [Z89.612]  Encounter for prosthetic gait training [Z47.89]. Principal problem is History of above knee amputation, left (CMS/[facility]ly [facility]).    Pt is a 64 y.o. 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       Past Medical History  Bebe has a past medical history of Abnormal ECG, Abnormal finding on chest xray (12/2021), Arthritis, Colon cancer (CMS/[facility]ly [facility]), COVID-19 (12/2021), COVID-19 vaccine series completed, Diabetic neuropathy (CMS/[facility]ly [facility]), DM (diabetes mellitus), type 2 with ophthalmic complications (CMS/[facility]ly [facility]), Hypertension, Hypothyroidism, Left foot drop, Lipid disorder, and Type 2 diabetes mellitus treated with insulin (CMS/[facility]ly [facility]).    OT Vitals      Date/Time Pulse HR Source BP BP Location BP Method Pt Position Who   11/24/24 1104 94 Monitor 161/72 Left upper arm Automatic Sitting VMS          OT Pain      Date/Time Pain Type Rating: Rest Who   11/24/24 1104 Pain Assessment 0 VMS   11/24/24 1158 Post Activity 0 VMS               Prior Living Environment      Flowsheet Row Most Recent Value   People in Home alone   Current Living Arrangements condominium   Home Accessibility stairs to enter home (Group)   Living Environment Comment resides in 1st floor condo, 4 (1+2+1) JOSE RAFAEL, R hand rail, no stairs inside home, Bathroom Layout: Tub/shower unit with shower curtain, Shower stall with glass doors (shower stall in main bedroom), two sisters can provide some assistance   Number of Stairs, Main Entrance 4   Surface of Stairs, Main Entrance concrete   Stair Railings, Main Entrance railing on right side (ascending)   Stairs Comment, Main Entrance 1+2+1 JOSE RAFAEL   Location, Bathroom first (main) floor    Bathroom Access tub bath, walk-in shower            Prior Level of Function      Flowsheet Row Most Recent Value   Dominant Hand right   Ambulation assistive equipment   Transferring assistive equipment   Toileting independent   Bathing assistive equipment   Dressing independent   Eating independent   IADLs assistive equipment   Driving/Transportation    Communication understands/communicates without difficulty   Assistive Device Currently Used at Home cane, straight, walker, front-wheeled, shower chair, grab bar, raised toilet            Occupational Profile      Flowsheet Row Most Recent Value   Successful Occupations Pt is very active in her community   Occupational History/Life Experiences 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 weekly   Performance Patterns lives alone   Environmental Supports and Barriers Supportive community   Patient Goals PSFS: Toilet transfer with prosthetic 3/10, toilet transfer without prosthetic 8/10, walking 0/10, getting dressed 5/10, getting in/ out of bed 4/10, getting a prosthetic on/off 1/10             IRF OT Evaluation and Treatment - 11/24/24 1105          OT Time Calculation    Start Time 1102     Stop Time 1202     Time Calculation (min) 60 min        Session Details    Document Type Daily Treatment/Progress Note        Mobility Belt    Mobility Belt Used During Session yes        Sit to Stand Transfer    [place], Sit to Stand Transfer moderate assist (50-74% patient effort)     Safety/Cues technique;hand placement;sequencing;increased time to complete     Assistive Device parallel bars        Stand to Sit Transfer    [place], Stand to Sit Transfer moderate assist (50-74% patient effort)     Safety/Cues technique;hand placement;sequencing;increased time to complete     Assistive Device parallel bars     Comment assist for controlled decent, inconsistant for unlocking knee, pt today able to lift hand to try toggle        Balance     Comment, Balance standing balance midline training min - mod A lateral weight shifts. trial lifting 1 UE at a time able to manage pants around hip. Toe tap RLE to 2 inch block, 10 x with cue to reset stance between reps. Trial of RLE hip abduction step back to midline with focus on foot placement. Trial of lift of LLE and place back in alignment. in stance trial of L superior lateral leaning with poor L weight shift and noted posterior lean and poor trunk lateral flexion. Then trial in sitting for increased reps for superior lateral reach to L for L weight shift and R lateral trunk flexion 15 x 2        Prosthetic Orientation (Lower Extremity)    Comment, Fit Assessment direct handoff from PT session, prosthetic donned with 0 sock ply. Pt able to doff prosthetic with increased time, donning prosthetic for practice, pt able to recall steps, cues for problemsolving, assist for liner positioning and for getting pin engaged pt able to turn knob, pt inconsistant for unlocking prosthetic and performing R weight shift.        Daily Progress Summary (OT)    Daily Outcome Statement Pt improving with donning/ doffing prosthetic, standing balance, endurance, weight shifts, functional transfers. Pt would continue to benefit from skilled OT services. Trial of abduction pillow for sitting in WC for improved positioning of RLE.  Continue with POC                               IRF OT Goals      Flowsheet Row Most Recent Value   Transfer Goal 1    Activity/Assistive Device toilet at 11/20/2024 0906   [place] maximum assist (25-49% patient effort) at 11/20/2024 0906   Time Frame short-term goal (STG), 5 - 7 days at 11/20/2024 0906   Transfer Goal 2    Activity/Assistive Device toilet at 11/20/2024 0906   [place] minimum assist (75% or more patient effort) at 11/20/2024 0906   Time Frame long-term goal (LTG), 21 days or less at 11/20/2024 0906   Transfer Goal 3    Activity/Assistive Device shower at 11/20/2024 0906    [place] maximum assist (25-49% patient effort) at 11/20/2024 0906   Time Frame short-term goal (STG), 5 - 7 days at 11/20/2024 0906   Transfer Goal 4    Activity/Assistive Device shower at 11/20/2024 0906   [place] minimum assist (75% or more patient effort) at 11/20/2024 0906   Time Frame long-term goal (LTG), 21 days or less at 11/20/2024 0906   Bathing Goal 1    [place] tactile cues required at 11/20/2024 0906   Time Frame short-term goal (STG), 5 - 7 days at 11/20/2024 0906   Bathing Goal 2    [place] modified independence at 11/20/2024 0906   Time Frame long-term goal (LTG), 21 days or less at 11/20/2024 0906   UB Dressing Goal 1    [place] maximum assist (25-49% patient effort) at 11/20/2024 0906   Time Frame short-term goal (STG), 5 - 7 days at 11/20/2024 0906   Strategies/Barriers with clothing retrieval at 11/20/2024 0906   UB Dressing Goal 2    [place] minimum assist (75% or more patient effort) at 11/20/2024 0906   Time Frame long-term goal (LTG), 21 days or less at 11/20/2024 0906   Strategies/Barriers with clothing retrieval at 11/20/2024 0906   LB Dressing Goal 1    [place] maximum assist (25-49% patient effort) at 11/20/2024 0906   Time Frame short-term goal (STG), 5 - 7 days at 11/20/2024 0906   Strategies/Barriers with clothing retrieval at 11/20/2024 0906   LB Dressing Goal 2    [place] minimum assist (75% or more patient effort) at 11/20/2024 0906   Time Frame long-term goal (LTG), 21 days or less at 11/20/2024 0906   Strategies/Barriers with clothing retrieval at 11/20/2024 0906   Grooming Goal 1    [place] moderate assist (50-74% patient effort) at 11/20/2024 0906   Time Frame short-term goal (STG), 5 - 7 days at 11/20/2024 0906   Strategies/Barriers in stance at sink at 11/20/2024 0906   Grooming Goal 2    [place] supervision required at 11/20/2024 0906   Time Frame long-term goal (LTG), 21 days or less at 11/20/2024 0906   Strategies/Barriers  in stance at sink at 11/20/2024 0906   Toileting Goal 1    [place] maximum assist (25-49% patient effort) at 11/20/2024 0906   Time Frame short-term goal (STG), 5 - 7 days at 11/20/2024 0906   Toileting Goal 2    [place] minimum assist (75% or more patient effort) at 11/20/2024 0906   Time Frame long-term goal (LTG), 21 days or less at 11/20/2024 0906

## 2024-11-25 ENCOUNTER — APPOINTMENT (INPATIENT)
Dept: PHYSICAL THERAPY | Facility: REHABILITATION | Age: 64
DRG: 560 | End: 2024-11-25
Payer: MEDICARE

## 2024-11-25 ENCOUNTER — APPOINTMENT (INPATIENT)
Dept: OCCUPATIONAL THERAPY | Facility: REHABILITATION | Age: 64
DRG: 560 | End: 2024-11-25
Payer: MEDICARE

## 2024-11-25 LAB
GLUCOSE BLD-MCNC: 111 MG/DL (ref 70–99)
GLUCOSE BLD-MCNC: 154 MG/DL (ref 70–99)
GLUCOSE BLD-MCNC: 180 MG/DL (ref 70–99)
GLUCOSE BLD-MCNC: 220 MG/DL (ref 70–99)
POCT TEST: ABNORMAL

## 2024-11-25 PROCEDURE — 97530 THERAPEUTIC ACTIVITIES: CPT | Mod: GO

## 2024-11-25 PROCEDURE — 63700000 HC SELF-ADMINISTRABLE DRUG: Performed by: INTERNAL MEDICINE

## 2024-11-25 PROCEDURE — 97763 ORTHC/PROSTC MGMT SBSQ ENC: CPT | Mod: GP

## 2024-11-25 PROCEDURE — 97530 THERAPEUTIC ACTIVITIES: CPT | Mod: GP

## 2024-11-25 PROCEDURE — 12800000 HC ROOM AND CARE SEMIPRIVATE REHAB

## 2024-11-25 PROCEDURE — 97535 SELF CARE MNGMENT TRAINING: CPT | Mod: GO

## 2024-11-25 PROCEDURE — 97116 GAIT TRAINING THERAPY: CPT | Mod: GP

## 2024-11-25 RX ORDER — LOSARTAN POTASSIUM 25 MG/1
25 TABLET ORAL
Status: DISCONTINUED | OUTPATIENT
Start: 2024-11-25 | End: 2024-12-10 | Stop reason: HOSPADM

## 2024-11-25 RX ORDER — AMLODIPINE BESYLATE 2.5 MG/1
2.5 TABLET ORAL DAILY
Status: DISCONTINUED | OUTPATIENT
Start: 2024-11-25 | End: 2024-12-02

## 2024-11-25 RX ADMIN — FERROUS SULFATE TAB 325 MG (65 MG ELEMENTAL FE) 325 MG: 325 (65 FE) TAB at 12:29

## 2024-11-25 RX ADMIN — TAMSULOSIN HYDROCHLORIDE 0.4 MG: 0.4 CAPSULE ORAL at 17:05

## 2024-11-25 RX ADMIN — INSULIN LISPRO 2 UNITS: 100 INJECTION, SOLUTION INTRAVENOUS; SUBCUTANEOUS at 21:18

## 2024-11-25 RX ADMIN — THERA TABS 1 TABLET: TAB at 12:28

## 2024-11-25 RX ADMIN — ATORVASTATIN CALCIUM 40 MG: 40 TABLET, FILM COATED ORAL at 17:05

## 2024-11-25 RX ADMIN — GABAPENTIN 600 MG: 300 CAPSULE ORAL at 21:21

## 2024-11-25 RX ADMIN — IMATINIB MESYLATE 400 MG: 400 TABLET, FILM COATED ORAL at 08:28

## 2024-11-25 RX ADMIN — METHENAMINE HIPPURATE 1 G: 1000 TABLET ORAL at 08:26

## 2024-11-25 RX ADMIN — GABAPENTIN 600 MG: 300 CAPSULE ORAL at 17:05

## 2024-11-25 RX ADMIN — INSULIN LISPRO 6 UNITS: 100 INJECTION, SOLUTION INTRAVENOUS; SUBCUTANEOUS at 17:07

## 2024-11-25 RX ADMIN — APIXABAN 5 MG: 5 TABLET, FILM COATED ORAL at 20:22

## 2024-11-25 RX ADMIN — POTASSIUM CHLORIDE 20 MEQ: 1500 TABLET, EXTENDED RELEASE ORAL at 12:28

## 2024-11-25 RX ADMIN — APIXABAN 5 MG: 5 TABLET, FILM COATED ORAL at 08:25

## 2024-11-25 RX ADMIN — INSULIN LISPRO 1 UNITS: 100 INJECTION, SOLUTION INTRAVENOUS; SUBCUTANEOUS at 17:07

## 2024-11-25 RX ADMIN — INSULIN GLARGINE 14 UNITS: 100 INJECTION, SOLUTION SUBCUTANEOUS at 21:20

## 2024-11-25 RX ADMIN — INSULIN LISPRO 6 UNITS: 100 INJECTION, SOLUTION INTRAVENOUS; SUBCUTANEOUS at 08:31

## 2024-11-25 RX ADMIN — LEVOTHYROXINE SODIUM 25 MCG: 0.03 TABLET ORAL at 06:00

## 2024-11-25 RX ADMIN — HYDROMORPHONE HYDROCHLORIDE 4 MG: 2 TABLET ORAL at 20:21

## 2024-11-25 RX ADMIN — METHENAMINE HIPPURATE 1 G: 1000 TABLET ORAL at 20:22

## 2024-11-25 RX ADMIN — DULOXETINE HYDROCHLORIDE 60 MG: 30 CAPSULE, DELAYED RELEASE ORAL at 20:22

## 2024-11-25 RX ADMIN — AMLODIPINE BESYLATE 2.5 MG: 2.5 TABLET ORAL at 09:11

## 2024-11-25 RX ADMIN — INSULIN LISPRO 6 UNITS: 100 INJECTION, SOLUTION INTRAVENOUS; SUBCUTANEOUS at 12:30

## 2024-11-25 RX ADMIN — OXYCODONE HYDROCHLORIDE AND ACETAMINOPHEN 500 MG: 500 TABLET ORAL at 12:29

## 2024-11-25 RX ADMIN — Medication 3 MG: at 21:21

## 2024-11-25 RX ADMIN — FUROSEMIDE 40 MG: 40 TABLET ORAL at 08:25

## 2024-11-25 RX ADMIN — LOSARTAN POTASSIUM 25 MG: 25 TABLET, FILM COATED ORAL at 17:05

## 2024-11-25 RX ADMIN — FUROSEMIDE 40 MG: 40 TABLET ORAL at 17:05

## 2024-11-25 RX ADMIN — GABAPENTIN 600 MG: 300 CAPSULE ORAL at 08:25

## 2024-11-25 RX ADMIN — EMPAGLIFLOZIN 10 MG: 10 TABLET, FILM COATED ORAL at 08:25

## 2024-11-25 RX ADMIN — VANCOMYCIN HYDROCHLORIDE 125 MG: 125 CAPSULE ORAL at 08:26

## 2024-11-25 NOTE — PROGRESS NOTES
Patient: Bebe Burks  Location: [facility] [address]  MRN: [id number]  Today's date: 11/25/2024    History of Present Illness  Bebe is a 64 y.o. female admitted on 11/19/2024 with History of above knee amputation, left (CMS/[facility]) [Z89.612]  Encounter for prosthetic gait training [Z47.89]. Principal problem is History of above knee amputation, left (CMS/[facility]).    Pt is a 64 y.o. 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       Past Medical History  Bebe has a past medical history of Abnormal ECG, Abnormal finding on chest xray (12/2021), Arthritis, Colon cancer (CMS/[facility]), COVID-19 (12/2021), COVID-19 vaccine series completed, Diabetic neuropathy (CMS/[facility]), DM (diabetes mellitus), type 2 with ophthalmic complications (CMS/[facility]), Hypertension, Hypothyroidism, Left foot drop, Lipid disorder, and Type 2 diabetes mellitus treated with insulin (CMS/[facility]).    PT Vitals      Date/Time Pulse BP BP Location BP Method Pt Position [facility]   11/25/24 1103 87 135/65 Right upper arm Automatic Sitting JG          PT Pain      Date/Time Pain Type Location Rating: Rest Interventions [facility]   11/25/24 1103 Pain Assessment back 3 heat applied JG   11/25/24 1201 Pain Reassessment -- 0 -- JG               Prior Living Environment      Flowsheet Row Most Recent Value   People in Home alone   Current Living Arrangements condominium   Home Accessibility stairs to enter home (Group)   Living Environment Comment resides in 1st floor condo, 4 (1+2+1) JOSE RAFAEL, R hand rail, no stairs inside home, Bathroom Layout: Tub/shower unit with shower curtain, Shower stall with glass doors (shower stall in main bedroom), two sisters can provide some assistance   Number of Stairs, Main Entrance 4   Surface of Stairs, Main Entrance concrete   Stair Railings, Main Entrance railing on right side (ascending)   Stairs Comment, Main Entrance 1+2+1 JOSE RAFAEL   Location,  Bathroom first (main) floor   Bathroom Access tub bath, walk-in shower            Prior Level of Function      Flowsheet Row Most Recent Value   Dominant Hand right   Ambulation assistive equipment   Transferring assistive equipment   Toileting independent   Bathing assistive equipment   Dressing independent   Eating independent   IADLs assistive equipment   Driving/Transportation    Communication understands/communicates without difficulty   Assistive Device Currently Used at Home cane, straight, walker, front-wheeled, shower chair, grab bar, raised toilet             IRF PT Evaluation and Treatment - 11/25/24 1100          PT Time Calculation    Start Time 1100     Stop Time 1202     Time Calculation (min) 62 min        Session Details    Document Type Daily Treatment/Progress Note        General Information    General Observations of Patient Handoff from OT session, additional care provided by Marita Marcano, PT, DPT        Mobility Belt    Mobility Belt Used During Session yes        Orthotics    Orthotics (Trigger Row) Orthosis trial     Orthosis Trial Trialing R toe off brace        Transfers    Transfers stand pivot transfer     Maintains Weight-Bearing Status able to maintain        Sit to Stand Transfer    [place], Sit to Stand Transfer moderate assist (50-74% patient effort);1 person assist     Safety/Cues technique;hand placement     Assistive Device parallel bars;other (see comments)   evawalker    Comment sit <> stand to evawalker and // bars, pushing up from WC with Cher on AD, mod A for hip extension and engaging prosthetic knee extension        Stand to Sit Transfer    [place], Stand to Sit Transfer moderate assist (50-74% patient effort);1 person assist     Safety/Cues technique;sequencing     Assistive Device parallel bars   evawalker    Comment stand to sit , focusing on R weightshift and bringing L prosthesis out in front to unweight it, able to get it to unlock  while sitting x 3 reps in a row at end of session        Gait Training    [place], Gait dependent (less than 25% patient effort);2 person assist     Safety/Cues technique;sequencing     Assistive Device walker, thoracic;parallel bars     Distance in Feet 35 feet     Pattern step-through     Deviations/Abnormal Patterns base of support, narrow;step length decreased;weight shifting decreased     Left Sided Gait Deviations hip hiking;hip circumduction   intermittent adduction at heel strike    Right Sided Gait Deviations --   decreased knee extension    Comment Ambulated 35' x 2 with deshaun, mod A of 1 at LLE for prosthetic placement and facilitating anterior WS, improved WS and upright posture on 2nd trial after pre-gait activity; Ambulated 10' in // bars with BUE, mod A of 1 for hip extension and anterior WS over prosthesis        Balance    Balance Interventions standing     Comment, Balance in // bars: lateral weightshift x 10 reps; RLE repetitive stepping x 15 reps, mod A for L prosthetic anterior/lateral weightshift        Motor Skills    Comment, Motor Skills Repetitive sit <> stand practice to improve independence with unlocking knee unit x 6 reps, pt with most success when she stands tall, shifts weight onto RLE, brings prosthesis out in front to unweight it and then pull up on toggle to release lock and sit down        Modality Intervention    Additional Documentation Modality Intervention (Group)        Modality Treatment    Treatment Location 1 (Modality) low back     Modality Performed hydrotherapy     Modality Treatment Results improved stiffness     Comment, Modality Treatment hot pack on low back x 20 minutes during rest breaks from standing activities        Prosthetic Orientation (Lower Extremity)    Fit Assessment fit/function of prosthesis are appropriate     Comment, Fit Assessment recieved donned from OT session and removed mid session to add sock ply with therapist donning for patient due  to time constraints; sock ply = increased from 0 to 3 ply; wearing schedule - 2 hours; skin check = unremarkable , mild blanchable redness in groin area     Specific Gait Deviations toe stays off floor after heel strike        Daily Progress Summary (PT)    Daily Outcome Statement Pt showing improvement with carry over for gait mecahnics and weightshifting technique. Adjusted the weight activation sensitivty on the limb to decrease effort for stand to sit transfer once she engages the toggle to release the knee. Conitnue with transfer training and standing balance. Progress asa ble.                               IRF PT Goals      Flowsheet Row Most Recent Value   Transfer Goal 1    Activity/Assistive Device stand pivot, walker, front-wheeled at 11/19/2024 1431   [place] other (see comments)  [Dependent (Max A x 1, Mod A x 1)] at 11/19/2024 1431   Time Frame 1 week, short-term goal (STG) at 11/19/2024 1431   Transfer Goal 2    Activity/Assistive Device stand pivot, walker, front-wheeled at 11/19/2024 1431   [place] maximum assist (25-49% patient effort) at 11/19/2024 1431   Time Frame 21 days or less, long-term goal (LTG) at 11/19/2024 1431   Transfer Goal 3    Activity/Assistive Device sit-to-stand/stand-to-sit, walker, front-wheeled at 11/19/2024 1431   [place] maximum assist (25-49% patient effort), verbal cues required at 11/19/2024 1431   Time Frame 1 week, short-term goal (STG) at 11/19/2024 1431   Transfer Goal 4    Activity/Assistive Device sit-to-stand/stand-to-sit, walker, front-wheeled at 11/19/2024 1431   [place] minimum assist (75% or more patient effort) at 11/19/2024 1431   Time Frame 21 days or less, long-term goal (LTG) at 11/19/2024 1431   Gait/Walking Locomotion Goal 1    Activity/Assistive Device gait (walking locomotion), walker, front-wheeled at 11/19/2024 1431   Distance 10 feet at 11/19/2024 1431   [place] other (see comments)  [Dependent (Max A x 1, Mod A x 1)] at  11/19/2024 1431   Time Frame 1 week, short-term goal (STG) at 11/19/2024 1431   Gait/Walking Locomotion Goal 2    Activity/Assistive Device gait (walking locomotion), walker, front-wheeled at 11/19/2024 1431   Distance 25 feet at 11/19/2024 1431   [place] maximum assist (25-49% patient effort) at 11/19/2024 1431   Time Frame 21 days or less, long-term goal (LTG) at 11/19/2024 1431   Wheelchair Locomotion Goal 1    Activity forward propulsion, backward propulsion, steering, stopping, turning, doorway navigation, weight shifting at 11/19/2024 1431   Assistive Device manual, ultra lightweight at 11/19/2024 1431   Distance 150 feet at 11/19/2024 1431   [place] other (see comments)  [Touching / Steadying Assist] at 11/19/2024 1431   Time Frame 1 week, short-term goal (STG) at 11/19/2024 1431   Wheelchair Locomotion Goal 2    Activity forward propulsion, backward propulsion, steering, stopping, turning, doorway navigation, weight shifting at 11/19/2024 1431   Assistive Device manual, ultra lightweight at 11/19/2024 1431   Distance 150 feet at 11/19/2024 1431   [place] modified independence at 11/19/2024 1431   Time Frame 21 days or less, long-term goal (LTG) at 11/19/2024 1431

## 2024-11-25 NOTE — PROGRESS NOTES
Patient: Bebe Burks  Location: [facility] [address]  MRN: [id number]  Today's date: 11/25/2024    History of Present Illness  Bebe is a 64 y.o. female admitted on 11/19/2024 with History of above knee amputation, left (CMS/[facility]) [Z89.612]  Encounter for prosthetic gait training [Z47.89]. Principal problem is History of above knee amputation, left (CMS/[facility]).    Pt is a 64 y.o. 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       Past Medical History  Bebe has a past medical history of Abnormal ECG, Abnormal finding on chest xray (12/2021), Arthritis, Colon cancer (CMS/[facility]), COVID-19 (12/2021), COVID-19 vaccine series completed, Diabetic neuropathy (CMS/[facility]), DM (diabetes mellitus), type 2 with ophthalmic complications (CMS/[facility]), Hypertension, Hypothyroidism, Left foot drop, Lipid disorder, and Type 2 diabetes mellitus treated with insulin (CMS/[facility]).    PT Vitals      Date/Time Pulse BP BP Location BP Method Pt Position [facility]   11/25/24 1302 89 133/69 Right upper arm Automatic Sitting JG          PT Pain      Date/Time Pain Type Rating: Rest [facility]   11/25/24 1302 Pain Assessment 0 JG   11/25/24 1359 Pain Reassessment 0 JG               Prior Living Environment      Flowsheet Row Most Recent Value   People in Home alone   Current Living Arrangements condominium   Home Accessibility stairs to enter home (Group)   Living Environment Comment resides in 1st floor condo, 4 (1+2+1) JOSE RAFAEL, R hand rail, no stairs inside home, Bathroom Layout: Tub/shower unit with shower curtain, Shower stall with glass doors (shower stall in main bedroom), two sisters can provide some assistance   Number of Stairs, Main Entrance 4   Surface of Stairs, Main Entrance concrete   Stair Railings, Main Entrance railing on right side (ascending)   Stairs Comment, Main Entrance 1+2+1 JOSE RAFAEL   Location, Bathroom first (main) floor   Bathroom Access tub  bath, walk-in shower            Prior Level of Function      Flowsheet Row Most Recent Value   Dominant Hand right   Ambulation assistive equipment   Transferring assistive equipment   Toileting independent   Bathing assistive equipment   Dressing independent   Eating independent   IADLs assistive equipment   Driving/Transportation    Communication understands/communicates without difficulty   Assistive Device Currently Used at Home cane, straight, walker, front-wheeled, shower chair, grab bar, raised toilet             IRF PT Evaluation and Treatment - 11/25/24 1300          PT Time Calculation    Start Time 1300     Stop Time 1400     Time Calculation (min) 60 min        Session Details    Document Type Daily Treatment/Progress Note        General Information    General Observations of Patient Met patient and sister in room        Mobility Belt    Mobility Belt Used During Session yes        Orthotics    Orthotics (Trigger Row) Orthosis trial     Orthosis Trial Trialing R toe off brace        Caregiver Training    Caregiver(s) to be Trained sibling(s)     Caregiver Training Plan ambulation using assistive device;transfer training     Comment, Caregiver Training Plan Sister , Anna, attended PT session to observe currently moblity. Reviewed prosthetic management, she observed standing transfers and ambulation. PT issued home questionnaire and asked sister to take pictures of patients entrance and apartment. DIscussed disccharge goals of mod I at wheelchair level with intermittent use of prosthesis as safe/appropriate. Will continue with more formal training closer to discharge.        Bed Mobility    [place], Supine to Sit touching/steadying assist     [place], Sit to Supine modified independence     Safety/Cues increased time to complete     Assistive Device none     Comment improved momentum for supine to sit, steayding for trunk mid transfer; sit to supine, mod I        Transfers     Transfers low pivot transfer;stand pivot transfer     Maintains Weight-Bearing Status able to maintain        Chair to Bed Transfer    [place], Chair to Bed supervision     Safety/Cues sequencing;technique     Assistive Device none     Comment LPT from wheelchair <> mat, S for set up of wheelchair and verbal cueing        Sit to Stand Transfer    [place], Sit to Stand Transfer moderate assist (50-74% patient effort)     Safety/Cues sequencing;technique     Assistive Device walker, front-wheeled     Comment sit <> stand to RW, mod A for hip extension and to ensure L prosthetic knee extension        Stand to Sit Transfer    [place], Stand to Sit Transfer moderate assist (50-74% patient effort);1 person assist     Safety/Cues sequencing;technique     Assistive Device walker, front-wheeled     Comment mod A for stand to sit; improved technique with cueings for 1. bring L prosthesis out in front, shift to the right, take L hand off and pull up on toggle, sit down        Gait Training    [place], Gait dependent (less than 25% patient effort);2 person assist     Safety/Cues sequencing;technique     Assistive Device walker, front-wheeled     Distance in Feet 30 feet     Pattern step-through     Deviations/Abnormal Patterns base of support, narrow;gait speed decreased;weight shifting decreased     Left Sided Gait Deviations hip circumduction   adduction in swing causing NBOS    Right Sided Gait Deviations heel strike decreased     Comment Ambulated 35' x 2 with evawalker, min/mod A for L prosthetic foot placement and hip extension assistance + assist of 2nd for evawalker management; Ambulated 30' with RW, mod a of 1 psoterior for L prosthetic placement, facilitating anterior WS and min A of 2nd at RLE for RW management and balance        Balance    Balance Interventions standing     Comment, Balance standing at // bars 3 x 30 seconds, Cl S for verbal cueing ; repeated sit <> stand for trialing unlocking  prosthesis x 5 reps, mod A for hip extension and stability , but improved technique for unlocking knee        Prosthetic Orientation (Lower Extremity)    Fit Assessment fit/function of prosthesis are appropriate     Comment, Fit Assessment Increased effort required for donning of prosthesis this session; completed supine due to difficulty donning socket in sitting; donned liner - Cl S for verbal cueing for positioning; sock ply = 3 ply sock, wearing schedule = 1 hour, will begin progressing as ble; blanchable redness in groin area pre/post unchanged        Daily Progress Summary (PT)    Daily Outcome Statement Pts sister attended therapy session to observe mobility. Sister pleased with progress, reporting much improved mobility since initial eval. Continue with prosthetic training, progressing independence as able.                          Education Documentation  Home Safety, taught by Paulette Gamble, PT at 11/25/2024  3:58 PM.  Learner: Other, Patient  Readiness: Acceptance  Method: Explanation  Response: Verbalizes Understanding  Comment: Discussed potential discharge plan and goals. Reviewed home entry, wheelchair mobility, DME recommended (including ramping) and she observed transfers and ambulation with prosthesis.          IRF PT Goals      Flowsheet Row Most Recent Value   Transfer Goal 1    Activity/Assistive Device stand pivot, walker, front-wheeled at 11/19/2024 1431   [place] other (see comments)  [Dependent (Max A x 1, Mod A x 1)] at 11/19/2024 1431   Time Frame 1 week, short-term goal (STG) at 11/19/2024 1431   Transfer Goal 2    Activity/Assistive Device stand pivot, walker, front-wheeled at 11/19/2024 1431   [place] maximum assist (25-49% patient effort) at 11/19/2024 1431   Time Frame 21 days or less, long-term goal (LTG) at 11/19/2024 1431   Transfer Goal 3    Activity/Assistive Device sit-to-stand/stand-to-sit, walker, front-wheeled at 11/19/2024 1431   [place] maximum assist  (25-49% patient effort), verbal cues required at 11/19/2024 1431   Time Frame 1 week, short-term goal (STG) at 11/19/2024 1431   Transfer Goal 4    Activity/Assistive Device sit-to-stand/stand-to-sit, walker, front-wheeled at 11/19/2024 1431   [place] minimum assist (75% or more patient effort) at 11/19/2024 1431   Time Frame 21 days or less, long-term goal (LTG) at 11/19/2024 1431   Gait/Walking Locomotion Goal 1    Activity/Assistive Device gait (walking locomotion), walker, front-wheeled at 11/19/2024 1431   Distance 10 feet at 11/19/2024 1431   [place] other (see comments)  [Dependent (Max A x 1, Mod A x 1)] at 11/19/2024 1431   Time Frame 1 week, short-term goal (STG) at 11/19/2024 1431   Gait/Walking Locomotion Goal 2    Activity/Assistive Device gait (walking locomotion), walker, front-wheeled at 11/19/2024 1431   Distance 25 feet at 11/19/2024 1431   [place] maximum assist (25-49% patient effort) at 11/19/2024 1431   Time Frame 21 days or less, long-term goal (LTG) at 11/19/2024 1431   Wheelchair Locomotion Goal 1    Activity forward propulsion, backward propulsion, steering, stopping, turning, doorway navigation, weight shifting at 11/19/2024 1431   Assistive Device manual, ultra lightweight at 11/19/2024 1431   Distance 150 feet at 11/19/2024 1431   [place] other (see comments)  [Touching / Steadying Assist] at 11/19/2024 1431   Time Frame 1 week, short-term goal (STG) at 11/19/2024 1431   Wheelchair Locomotion Goal 2    Activity forward propulsion, backward propulsion, steering, stopping, turning, doorway navigation, weight shifting at 11/19/2024 1431   Assistive Device manual, ultra lightweight at 11/19/2024 1431   Distance 150 feet at 11/19/2024 1431   [place] modified independence at 11/19/2024 1431   Time Frame 21 days or less, long-term goal (LTG) at 11/19/2024 6332

## 2024-11-25 NOTE — PLAN OF CARE
Plan of Care Review  Plan of Care Reviewed With: patient  Progress: improving  Outcome Evaluation: alert and orientedx 4; continent; dilaudid given for pain; call bell in reach; bed alarm set; right scd on; heel elevated; no reports of concerns at this time.

## 2024-11-25 NOTE — CONSULTS
Pt is in good spirits and is well supported by family.  provided blessings and orientation to spiritual care services.Will follow up as needed.

## 2024-11-25 NOTE — PROGRESS NOTES
[facility] Progress Note          Patient was seen and examined at bedside.    Subjective:   Her BP on high side, historically she was on CCB and ARB, resume her amlodipine and losartan from lower dose, monitor her BP.    No O/N events. No new complaints. Her paresthetic training is progressing well. Her BG levels are mostly acceptable.    Objective   Vital signs in last 24 hours:  Temp:  [36.4 °C (97.5 °F)-37.1 °C (98.7 °F)] 36.4 °C (97.5 °F)  Heart Rate:  [87-94] 87  Resp:  [18] 18  BP: (144-161)/(65-73) 154/73    No intake or output data in the 24 hours ending 11/25/24 0838    Intake/Output this shift:  No intake/output data recorded.   Review of Systems:  All other systems reviewed and negative except as noted in the HPI.   Objective      Labs  Reviewed her labs thoroughly   Lab Results   Component Value Date    WBC 4.98 11/20/2024    HGB 10.8 (L) 11/20/2024    HCT 32.7 (L) 11/20/2024    MCV 90.8 11/20/2024     11/20/2024     Lab Results   Component Value Date    GLUCOSE 205 (H) 11/20/2024    CALCIUM 8.7 11/20/2024     11/20/2024    K 3.7 11/20/2024    CO2 30 11/20/2024     11/20/2024    BUN 35 (H) 11/20/2024    CREATININE 0.9 11/20/2024       Imaging  N/A    Full Code    Physical Exam:  Head/Ear/Nose/Throat: normocephalic; atraumatic; moisture mouth mm, no oropharyngeal thrush noted.   Eyes: anicteric sclera, EOMI; PERRL.   Neck : supple, no JVD, no carotid bruits appeciated.   Respiratory: no evidence of labored breathing, lung sounds CTA b/l, good aeration bibasilar area, no w/r/c.   Cardiovascular: RRR; normal S1, S2; no m/r/g; no S3 or S4.   Gastrointestinal: soft; NT; BS normal; mildly distended; no CVAT b/l.   Genitourinary: no lozada.   Extremities : S/p L AKA, residual limb healed well .   Neurological: AO x 3, fluent speeches, following commands, CNS II-XII grossly intact; no focal neurologic deficits.   Behavior/Emotional: in NAD, appropriate; cooperative.    Skin: clean, dry and intact.  Plan of care was discussed with patient, RN, and PMR attending     Assessment     CC:L AKA, ambulatory dysfunction     64 y.o. 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 S/p IV Ceftriaxone and IV Ampicillin until 9/14/24. H/o non-occlusive DVT of the R axillary and brachial vein around midline. She presented to [facility]putee clinic on 11/14 for evaluation to begin prosthetic training. Now she was admitted for inpt prosthetic training on 11/19/24.  Denies nausea, vomiting, abdominal pain or discomfort, dysuria, cough/sputum, running nose, sore throat, chest pain, palpitation, SOB or orthopnea, dizziness or LH,  HA.    A/P:  # A/p L AKA  -inpt prosthetic training, wound care/dermal defense, pain and neuralgia managements, fall precaution     # CML  -cont home imatinib, check CBC      # Type 2 DM, hypothyroidism  - diet control, SSI, plus her home diabetic regimen;  - cont home levothyroxine     #  PICC-associated DVT on 8/30   -S/p SQ Lovenox weight based ;  -cont Eliquis 5 mg po bid      # Urinary retention, h/o recurrent UTI  -timed voiding with cic, adequate oral hydration, monitor PVR with cic, on flomax;  -hiprex 1 g po bid        # Stage 2 decubitus ulcer, pressure injury in right heel   -wound care     # Recent C.diff.  -cont prophylactic oral vanco 125 mg po bid       Billing code: [id number]  Diagnoses:  Patient Active Problem List   Diagnosis    Controlled type 2 diabetes mellitus, with long-term current use of insulin (CMS/[facility])    Essential hypertension    Hyperlipidemia    Hypothyroidism    Obesity    Thyroid nodule    COVID-19 virus infection    Lung nodule    Displacement of lumbar intervertebral disc without myelopathy     Disability of walking    Polyneuropathy due to type 2 diabetes mellitus (CMS/HCC)    Pre-op examination    Localized osteoarthritis of right knee    Type 2 diabetes mellitus treated with insulin (CMS/HCC)    Abnormal finding on EKG    Left foot drop    Abnormal finding on chest xray    Osteoarthritis of right knee, unspecified osteoarthritis type    S/P total knee arthroplasty, right    Sepsis (CMS/HCC)    Osteomyelitis of left foot (CMS/HCC)    Peripheral neuropathy    Abdominal pain    Chronic idiopathic constipation    Gangrene (CMS/HCC)    Tibial artery stenosis, left (CMS/HCC)    H/O pilonidal cyst    Type 2 diabetes mellitus with left diabetic foot infection (CMS/HCC)    Type 2 diabetes mellitus with diabetic neuropathy (CMS/HCC)    Hypothyroidism    Primary hypertension    Leukemoid reaction    HLD (hyperlipidemia)    Acute osteomyelitis of left foot (CMS/HCC)    History of above knee amputation, left (CMS/HCC)    Encounter for prosthetic gait training           Charlette Brooke MD  11/25/2024

## 2024-11-25 NOTE — PROGRESS NOTES
Subjective    Patient seen and examined on rounds.  Chart reviewed.  Events overnight noted.  History reviewed briefly with patient.    CC:  Deficits in mobility, transfers, self-care status post left transfemoral amputation for prosthetic training, severe peripheral vascular disease, chronic myelocytic leukemia, osteomyelitis left foot, diabetic neuropathy, multiple medical problems    HPI:  Ms. 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 on 8/1/24 with a 40 pound weight gain (managed with 40mg Lasix), underwent left guillotine transtibial amputation on 8/3/24 followed by left transfemoral amputation on 8/19/24 with vascular surgeon Dr. Pura Chaves. ID recommended IV Ceftriaxone and IV Ampicillin until 9/14/24.  She had urinary retention managed with indwelling Rowan catheter.  She had right upper extremity swelling present during this admission, ultrasound on 8/29/24 demonstrated an acute non-occlusive DVT of the right axillary and brachial vein around line (on Lovenox).  Subsequently she completed an acute inpatient rehabilitation stay for pre-prosthetic training at [facility] between 9/3/24 to 9/21/24 and was discharged to [facility] on 9/21/24.  She initially required bladder self intermittent catheterizations due to urinary retention, but indicates that now she is able to void.  She desired to be a prosthetic ambulator.  A left transfemoral amputation prosthesis was fabricated. and followed-up as outpatient in the amputee clinic at [facility] and she was deemed to be an  appropriate candidate for acute inpatient rehabilitation stay. She has been needing assistance for mobility, transfers, self-care.  I have reviewed the preadmission screening and concur with that information and there are no significant changes from patient's preadmission screening. She is transferred to [facility] on 11/19/24 from [facility] for further acute inpatient rehabilitation stay for prosthetic training with transfemoral amputation prosthesis.    SUBJ: Due to elevated blood pressure, internist restarted amlodipine and Losartan.  Denies increased pain.  Continent of bowel and bladder for nursing.  Skin on left transfemoral amputation residual limb remains stable.  Discussed with patient.  Discussed with nurse.    ROS: Denies chest pain or shortness of breath. Other ROS negative. Past, family, social history is unchanged.    Current Functional Status:   Bed mobility:   [place], Supine to Sit: touching/steadying assist   [place], Sit to Supine: modified independence   Transfers:    [place], Sit to Stand Transfer: moderate assist (50-74% patient effort)  [place]es, Stand to Sit Transfer: moderate assist (50-74% patient effort), 1 person assist   [place], Stand Pivot/Stand Step Transfer: dependent (less than 25% patient effort), 2 person assist   Gait:   [place], Gait: dependent (less than 25% patient effort), 2 person assist  Assistive Device: walker, front-wheeled   Distance in Feet: 30 feet    Bathing:   Ciales: minimum assist (75% or more patient effort)   Toileting:   Ciales: dependent (less than 25% patient effort)   Upper body dressing:   Ciales: moderate assist (50-74% patient effort)   Lower body dressing:   Ciales: maximum assist (25-49% patient effort)     Functional Progress:    Functional status reviewed. Overall, patient's functional status is improving.      Physical Exam      Blood pressure (!) 146/61, pulse 91, temperature 36.9  "°C (98.4 °F), temperature source Oral, resp. rate 18, height 1.702 m (5' 7.01\"), weight 69.4 kg (153 lb), SpO2 98%, not currently breastfeeding.    Body mass index is 23.96 kg/m².    General Appearance: Not in acute distress  Head/Ear/Nose/Throat: Normocephalic; Atraumatic.   Eye: EOMI; PERRL.   Neck: No JVD; No Bruits.   Respiratory: Decreased breath sounds at bases.   Cardiovascular: RRR; Normal S1, S2.   Gastrointestinal: Soft; NT; +BS.   Extremities: Bilateral lower extremity edema noted.  Left transfemoral amputation stump is stable.  Musculoskeletal: Functional active range of motion in both upper and right lower extremities.  Functional active range of motion in left hip.  She has a left transfemoral amputation.  Neurological: AAO ×3. Speech is fluent. Cranial nerve examination does not reveal any gross facial asymmetry. Strength testing shows about 4+/5 strength in both upper and right lower extremities.  Left hip flexion is 4/5.  She has a left transfemoral amputation.  She is grossly able to localize touch sensation.  She is able to localize position sense in right foot great toe position sense is unable to be tested on left side.  Deep tendon reflexes are hypoactive bilaterally.  Right plantar is flexor, left plantar was unable to be tested. Coordination is functional upper extremities.    Behavior/Emotional: Appropriate; Cooperative.   Skin: Left transfemoral amputation stump stable.  Incision appears to be well-healed.  Some wounds are noted on right anterior lower forearm.  Small wound noted on right foot fifth toe.  Small wound also noted on right foot great toe with some slough in it and scaly skin around it.  Some redness noted on sacrum/coccyx.  Reviewed pictures of wounds in Epic.      Current Facility-Administered Medications:     acetaminophen (TYLENOL) tablet 650 mg, 650 mg, oral, q4h PRN, Charlette Brooke MD, 650 mg at 11/24/24 1237    alum-mag hydroxide-simeth (MAALOX) 200-200-20 mg/5 mL " suspension 30 mL, 30 mL, oral, q4h PRN, Charlette Brooke MD    amLODIPine (NORVASC) tablet 2.5 mg, 2.5 mg, oral, Daily, Charlette Brooke MD, 2.5 mg at 11/25/24 0911    apixaban (ELIQUIS) tablet 5 mg, 5 mg, oral, BID, Charlette Brooke MD, 5 mg at 11/25/24 0825    ascorbic acid (VITAMIN C) tablet 500 mg, 500 mg, oral, q48h INT, Charlette Brooke MD, 500 mg at 11/25/24 1229    atorvastatin (LIPITOR) tablet 40 mg, 40 mg, oral, Daily (6p), Charlette Brooke MD, 40 mg at 11/25/24 1705    bisacodyL (DULCOLAX) 10 mg suppository 10 mg, 10 mg, rectal, Daily PRN, Charlette Brooke MD    docusate sodium (COLACE) capsule 100 mg, 100 mg, oral, BID, Charlette Brooke MD, 100 mg at 11/24/24 0802    DULoxetine (CYMBALTA) capsule,delayed release(DR/EC) 60 mg, 60 mg, oral, Nightly, Charlette Brooke MD, 60 mg at 11/24/24 2110    empagliflozin (JARDIANCE) tablet 10 mg, 10 mg, oral, Daily, Charlette Brooke MD, 10 mg at 11/25/24 0825    ferrous sulfate tablet 325 mg, 325 mg, oral, q48h INT, Charlette Brooke MD, 325 mg at 11/25/24 1229    fluticasone propionate (FLONASE) 50 mcg/actuation nasal spray 1 spray, 1 spray, Each Nostril, Daily PRN, Charlette Brooke MD    furosemide (LASIX) tablet 40 mg, 40 mg, oral, BID (am, 4p), Charlette Brooke MD, 40 mg at 11/25/24 1705    gabapentin (NEURONTIN) capsule 600 mg, 600 mg, oral, TID, Charlette Brooke MD, 600 mg at 11/25/24 1705    HYDROmorphone (DILAUDID) tablet 4 mg, 4 mg, oral, q6h PRN, Charlette Brooke MD, 4 mg at 11/24/24 2138    imatinib (GLEEVEC) chemo tablet 400 mg, 400 mg, oral, Daily, Charlette Brooke MD, 400 mg at 11/25/24 0828    insulin glargine U-100 (LANTUS/BASAGLAR) pen 14 Units, 14 Units, subcutaneous, Nightly, Charlette Brooke MD, 14 Units at 11/24/24 2114    insulin lispro U-100 (HumaLOG) pen 1-12 Units, 1-12 Units, subcutaneous, With meals & nightly, Alexei Petty MD, 1 Units at 11/25/24 1707    insulin lispro U-100 (HumaLOG) pen 6 Units, 6 Units, subcutaneous, TID with meals, Charlette Brooke MD, 6 Units at  11/25/24 1707    levothyroxine (SYNTHROID) tablet 25 mcg, 25 mcg, oral, Daily (6:30a), Charlette Brooke MD, 25 mcg at 11/25/24 0600    losartan (COZAAR) tablet 25 mg, 25 mg, oral, Daily with dinner, Charlette Brooke MD, 25 mg at 11/25/24 1705    melatonin ODT 3 mg, 3 mg, oral, Nightly, Charlette Brooke MD, 3 mg at 11/23/24 2106    methenamine (HIPREX) tablet 1 g, 1 g, oral, BID, Charlette Brooke MD, 1 g at 11/25/24 0826    multivitamin tablet 1 tablet, 1 tablet, oral, Daily, Charlette Brooke MD, 1 tablet at 11/25/24 1228    ondansetron ODT (ZOFRAN-ODT) disintegrating tablet 4 mg, 4 mg, oral, q8h PRN, Charlette Brooke MD, 4 mg at 11/24/24 0951    potassium chloride (KLOR-CON M) ER tablet (particles/crystals) 20 mEq, 20 mEq, oral, Daily, Charlette Brooke MD, 20 mEq at 11/25/24 1228    semaglutide tablet 14 mg, 1 tablet, oral, Daily, Charlette Brooke MD, 14 mg at 11/25/24 0827    senna (SENOKOT) tablet 2 tablet, 2 tablet, oral, Daily, Charlette Brooke MD, 2 tablet at 11/23/24 1146    tamsulosin (FLOMAX) 24 hr ER capsule 0.4 mg, 0.4 mg, oral, Daily with dinner, Charlette Brooke MD, 0.4 mg at 11/25/24 1705       Labs / Radiology    Lab Results   Component Value Date    WBC 4.98 11/20/2024    HGB 10.8 (L) 11/20/2024    HCT 32.7 (L) 11/20/2024    MCV 90.8 11/20/2024     11/20/2024     Lab Results   Component Value Date    GLUCOSE 205 (H) 11/20/2024    CALCIUM 8.7 11/20/2024     11/20/2024    K 3.7 11/20/2024    CO2 30 11/20/2024     11/20/2024    BUN 35 (H) 11/20/2024    CREATININE 0.9 11/20/2024       Assessment and Plan    ASSESSMENT PLAN:  1. 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 Pura Chaves. ID recommended IV Ceftriaxone and IV Ampicillin until 9/14/24.  She had urinary retention managed with indwelling Rowan catheter.  She had right upper extremity swelling present during this admission, ultrasound on 8/29/24 demonstrated an acute non-occlusive DVT of the right axillary and brachial vein around line (on Lovenox).  Subsequently she completed an acute inpatient rehabilitation stay for pre-prosthetic training at [facility] between 9/3/24 to 9/21/24 and was discharged to [facility] on 9/21/24.  She initially required bladder self intermittent catheterizations due to urinary retention, but indicates that now she is able to void.  She desired to be a prosthetic ambulator.  A left transfemoral amputation prosthesis was fabricated. and followed-up as outpatient in the amputee clinic at [facility] and she was deemed to be an appropriate candidate for acute inpatient rehabilitation stay. She has been needing assistance for mobility, transfers, self-care.  I have reviewed the preadmission screening and concur with that information and there are no significant changes from patient's preadmission screening. She is transferred to [facility] on 11/19/24 from [facility] for further acute inpatient rehabilitation stay for prosthetic training with transfemoral amputation prosthesis.     2. DVT prophylaxis/treatment - on Eliquis.  Platelets 168 on 11/20/2024.     3. Vascular - status post left transfemoral amputation for prosthetic training, severe peripheral vascular disease, chronic myelocytic leukemia, osteomyelitis left foot, diabetic neuropathy, multiple medical problems - continue PT, OT, psychology.   Follow falls precautions, cardiac precautions, monitor pulse oximeter in therapy.  Monitor skin under prosthesis.  Teach donning and doffing of left transfemoral amputation prosthesis.  Do gait training with prosthesis.     4. GI - On Colace, Senokot.  On PRN Maalox.  On PRN Dulcolax suppository.  On PRN Zofran.     5.  -on Hiprex.  On Flomax.  Monitor postvoid residual.     6. CVS - on Lasix.  Monitor for orthostasis.     7. Pulmonary -encourage incentive spirometry.     8. Hematology - monitor hemoglobin, platelets.  Hemoglobin 10.8, WBC 4.98 on 11/20/2024.     9. Pain -on Cymbalta.  On Neurontin.  On PRN Tylenol.  On PRN Dilaudid.     10. Skin - Left transfemoral amputation stump stable.  Incision appears to be well-healed.  Some wounds are noted on right anterior lower forearm.  Small wound noted on right foot fifth toe.  Small wound also noted on right foot great toe with some slough in it and scaly skin around it.  Some redness noted on sacrum/coccyx.  Reviewed pictures of wounds in Epic. Monitor skin under the left transfemoral amputation prosthesis.Noted input from dermal defense nurse on 11/21/2024.  Reviewed pictures in Epic.      11. F/E/N - on Ferrous sulfate.  On vitamin C. on MVI.  On K-Genesis con.     12. Hyperlipidemia -on Lipitor.     13. Diabetes mellitus type 2 - on Lantus insulin.  On sliding scale Humalog coverage.  On Jardiance.     14. Oncology - On Gleevec for chronic myelocytic leukemia.     15. Hypothyroidism - on Synthroid.     16. Insomnia - on Melatonin.     17. Infectious diseases - On oral Vancomycin.       18. Seasonal allergies - on PRN Flonase.     19. Psychiatry - mood stable.  Psychology consulted.     20. Rehabilitation medicine - Continue comprehensive rehabilitation care. Continue PT, OT, speech, psychology.  We will follow falls precautions, cardiac precautions, monitor pulse oximetry in therapy and follow weightbearing precautions.  We will follow spinal precautions as  appropriate.Tolerating therapies per endurance on 11/20/2024.  Slept well last night.   Working on donning and doffing of prosthesis.  Discussed with the importance of skin checks on the prosthesis.  Able to ambulate 7 feet in parallel bars dependent gait using left transfemoral amputation prosthesis with physical therapy.  Dependent for sit to stand transfers with physical therapy on 11/20/2024. Noted input from dermal defense nurse on 11/21/2024.  Reviewed pictures in Epic.  Working on ADLs with occupational therapy on 11/21/2024.  Requiring minimal assistance for bathing, dependent for toileting, close supervision for poorly dressing and dependent for lower body dressing with occupational therapy.  She requested a flu shot.  Discussed with nurse.  Continent of bowel and bladder for nursing on 11/21/2024.Dependent for sit to stand transfers with physical therapy on 11/22/2024.  Able to ambulate 15 feet with thoracic walker dependent gait with physical therapy on 11/22/2024.  Skin under prosthesis is stable.  Denies pain today.  She says she got flu shot yesterday.Feels better on 11/23/2024.  Denies increased pain.  She says that she took her medications after meals and did not have any GI symptoms today.  Continent of bowel and bladder for nursing.  Working on donning and doffing of prosthesis, sock ply management with occupational therapy on 11/23/2024. Due to elevated blood pressure, internist restarted Amlodipine and Losartan on 11/25/2024.  Denies increased pain.  Continent of bowel and bladder for nursing on 11/25/2024.Skin on left transfemoral amputation residual limb remains stable.  Discussed with patient.  Discussed with nurse on 11/25/2024.     21. Reviewed labs today.  BUN 35, creatinine 0.9, sodium 139, potassium 3.7 on 11/20/2024.           Alexei Petty MD  11/25/2024      This encounter was completed utilizing the Direct Speech Voice Recognition Software. Grammatical errors, random word  insertions, pronoun errors, and incomplete sentences are occasional consequences of the system due to software limitations, ambient noises, and hardware issues. Such errors may be missed prior to affixing electronic signature. Any questions or concerns about the content, text, or information contained within the body of this dictation should be directly addressed to the physician for clarification. If you have any questions or concerns please do not hesitate to call me directly via EPIC chat, page, or email.

## 2024-11-25 NOTE — PROGRESS NOTES
Patient: Bebe Bursk  Location: [facility] [address]  MRN: [id number]  Today's date: 11/24/2024    History of Present Illness  Bebe is a 64 y.o. female admitted on 11/19/2024 with History of above knee amputation, left (CMS/[facility]) [Z89.612]  Encounter for prosthetic gait training [Z47.89]. Principal problem is History of above knee amputation, left (CMS/[facility]).    Pt is a 64 y.o. 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       Past Medical History  Bebe has a past medical history of Abnormal ECG, Abnormal finding on chest xray (12/2021), Arthritis, Colon cancer (CMS/[facility]), COVID-19 (12/2021), COVID-19 vaccine series completed, Diabetic neuropathy (CMS/[facility]), DM (diabetes mellitus), type 2 with ophthalmic complications (CMS/[facility]), Hypertension, Hypothyroidism, Left foot drop, Lipid disorder, and Type 2 diabetes mellitus treated with insulin (CMS/[facility]).    PT Vitals      Date/Time Pulse BP BP Location BP Method [facility]   11/24/24 1455 91 144/65 Left upper arm Automatic JG          PT Pain      Date/Time Pain Type Location Rating: [facility]   11/24/24 1455 Pain Assessment residual limb 2 JG   11/24/24 1550 Pain Reassessment residual limb 2 JG               Prior Living Environment      Flowsheet Row Most Recent Value   People in Home alone   Current Living Arrangements condominium   Home Accessibility stairs to enter home (Group)   Living Environment Comment resides in 1st floor condo, 4 (1+2+1) JOSE RAFAEL, R hand rail, no stairs inside home, Bathroom Layout: Tub/shower unit with shower curtain, Shower stall with glass doors (shower stall in main bedroom), two sisters can provide some assistance   Number of Stairs, Main Entrance 4   Surface of Stairs, Main Entrance concrete   Stair Railings, Main Entrance railing on right side (ascending)   Stairs Comment, Main Entrance 1+2+1 [place]SE RAFAEL   Location, Bathroom first (main) floor    Bathroom Access tub bath, walk-in shower            Prior Level of Function      Flowsheet Row Most Recent Value   Dominant Hand right   Ambulation assistive equipment   Transferring assistive equipment   Toileting independent   Bathing assistive equipment   Dressing independent   Eating independent   IADLs assistive equipment   Driving/Transportation    Communication understands/communicates without difficulty   Assistive Device Currently Used at Home cane, straight, walker, front-wheeled, shower chair, grab bar, raised toilet             IRF PT Evaluation and Treatment - 11/24/24 1455          PT Time Calculation    Start Time 1455     Stop Time 1555     Time Calculation (min) 60 min        Session Details    Document Type Daily Treatment/Progress Note        General Information    General Observations of Patient Pt upright in MW        Mobility Belt    Mobility Belt Used During Session yes        Orthotics    Orthotics (Trigger Row) Orthosis trial     Orthosis Trial Trialing R toe off brace        Transfers    Transfers stand pivot transfer     Maintains Weight-Bearing Status able to maintain        Sit to Stand Transfer    [facility], Sit to Stand Transfer moderate assist (50-74% patient effort);1 person assist     Safety/Cues sequencing;technique     Assistive Device walker, front-wheeled     Comment sit <> stand to RW, mod A for RLE hip/knee extension + intermittent assist to lock prosthetic knee        Stand to Sit Transfer    [facility], Stand to Sit Transfer moderate assist (50-74% patient effort);1 person assist     Safety/Cues sequencing;technique     Assistive Device walker, front-wheeled     Comment stand to sit from RW, mod A to maintain upright position while shifting weight onto RLE and unlocking L prosthetic knee        Stand Pivot Transfer    [facility], Stand Pivot/Stand Step Transfer dependent (less than 25% patient effort);2 person assist     Safety/Cues  technique;sequencing     Assistive Device walker, front-wheeled     Comment SPT from wheelchair <> mat, mod A of 1 for \RLE stance control and intermittent assistance with prosthetic placement (difficulty bringing L prosthetic back underneath BINTA) + SBA of 2nd for safety        Gait Training    [place], Gait dependent (less than 25% patient effort);2 person assist     Safety/Cues sequencing;technique     Assistive Device walker, front-wheeled     Distance in Feet 15 feet     Pattern step-through     Deviations/Abnormal Patterns base of support, narrow;weight shifting decreased     Left Sided Gait Deviations --   decreased anterior WS    Right Sided Gait Deviations --   delayed knee extension in stance    Comment Ambulated 15' with RW, mod/max A of  1 for anterior WS and assist with prosthetic placement + assist of 2nd at RLE for steadying at trunk and intermittent assistance with RW; Ambulated 30' with evawalker, mod A of 1 for hip extension and assist with anterior/lateral weightshift; one incidental L knee flexion in stance during RW trial, possibly due to hitting toggle incidentally, required asistance to extend hip and regain balance        Balance    Balance Interventions sit to stand     Comment, Balance Blocked practice for sit <> stand transfers ~ 12 reps emphasizing anterior WS during stand and R weightshift to unweight prosthesis while unlocking via toggle; Able to get smooth transition to prosthetic knee flexion into sitting position 2 reps; Static standing balance to RW 5 x 30 seconds, steadying assistance at gait belt        Prosthetic Orientation (Lower Extremity)    Fit Assessment fit/function of prosthesis are appropriate     Comment, Fit Assessment Donned liner and prosthesis in sitting with min A for pin position; Sock ply = 0 ply increasing to 3 ply at end of session; wearing scedule = 2 hours; skin ceck - unremarkable        Daily Progress Summary (PT)    Daily Outcome Statement Session  focused on sit to stand transfers to RW with emphasis on release prosthtetic knee prior to sitting. Continues to have difficulty with consistncy for stand to sit and success of unlocking prostheic knee. PT to reach to prosthetist for guidance on proper technique to decrease effort. Continue with POC and progress as able.                               IRF PT Goals      Flowsheet Row Most Recent Value   Transfer Goal 1    Activity/Assistive Device stand pivot, walker, front-wheeled at 11/19/2024 1431   [place] other (see comments)  [Dependent (Max A x 1, Mod A x 1)] at 11/19/2024 1431   Time Frame 1 week, short-term goal (STG) at 11/19/2024 1431   Transfer Goal 2    Activity/Assistive Device stand pivot, walker, front-wheeled at 11/19/2024 1431   [place] maximum assist (25-49% patient effort) at 11/19/2024 1431   Time Frame 21 days or less, long-term goal (LTG) at 11/19/2024 1431   Transfer Goal 3    Activity/Assistive Device sit-to-stand/stand-to-sit, walker, front-wheeled at 11/19/2024 1431   [place] maximum assist (25-49% patient effort), verbal cues required at 11/19/2024 1431   Time Frame 1 week, short-term goal (STG) at 11/19/2024 1431   Transfer Goal 4    Activity/Assistive Device sit-to-stand/stand-to-sit, walker, front-wheeled at 11/19/2024 1431   [place] minimum assist (75% or more patient effort) at 11/19/2024 1431   Time Frame 21 days or less, long-term goal (LTG) at 11/19/2024 1431   Gait/Walking Locomotion Goal 1    Activity/Assistive Device gait (walking locomotion), walker, front-wheeled at 11/19/2024 1431   Distance 10 feet at 11/19/2024 1431   [place] other (see comments)  [Dependent (Max A x 1, Mod A x 1)] at 11/19/2024 1431   Time Frame 1 week, short-term goal (STG) at 11/19/2024 1431   Gait/Walking Locomotion Goal 2    Activity/Assistive Device gait (walking locomotion), walker, front-wheeled at 11/19/2024 1431   Distance 25 feet at 11/19/2024 1431   [place] maximum  assist (25-49% patient effort) at 11/19/2024 1431   Time Frame 21 days or less, long-term goal (LTG) at 11/19/2024 1431   Wheelchair Locomotion Goal 1    Activity forward propulsion, backward propulsion, steering, stopping, turning, doorway navigation, weight shifting at 11/19/2024 1431   Assistive Device manual, ultra lightweight at 11/19/2024 1431   Distance 150 feet at 11/19/2024 1431   [place] other (see comments)  [Touching / Steadying Assist] at 11/19/2024 1431   Time Frame 1 week, short-term goal (STG) at 11/19/2024 1431   Wheelchair Locomotion Goal 2    Activity forward propulsion, backward propulsion, steering, stopping, turning, doorway navigation, weight shifting at 11/19/2024 1431   Assistive Device manual, ultra lightweight at 11/19/2024 1431   Distance 150 feet at 11/19/2024 1431   [place] modified independence at 11/19/2024 1431   Time Frame 21 days or less, long-term goal (LTG) at 11/19/2024 1431

## 2024-11-25 NOTE — PLAN OF CARE
Plan of Care Review  Plan of Care Reviewed With: patient  Progress: improving  Outcome Evaluation: Patient AAOx4, pleasant, cooperative. Safety precautions maintained. Medications reviewed. Appetite good. Cont of b/b. Patient able to make needs known, call bell within reach. .No nursing concerns at the moment.

## 2024-11-25 NOTE — PROGRESS NOTES
Patient: Bebe Burks  Location: [facility] [address]  MRN: [id number]  Today's date: 11/25/2024    History of Present Illness  Bebe is a 64 y.o. female admitted on 11/19/2024 with History of above knee amputation, left (CMS/[facility]) [Z89.612]  Encounter for prosthetic gait training [Z47.89]. Principal problem is History of above knee amputation, left (CMS/[facility]).    Pt is a 64 y.o. 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       Past Medical History  Bebe has a past medical history of Abnormal ECG, Abnormal finding on chest xray (12/2021), Arthritis, Colon cancer (CMS/[facility]), COVID-19 (12/2021), COVID-19 vaccine series completed, Diabetic neuropathy (CMS/[facility]), DM (diabetes mellitus), type 2 with ophthalmic complications (CMS/[facility]), Hypertension, Hypothyroidism, Left foot drop, Lipid disorder, and Type 2 diabetes mellitus treated with insulin (CMS/[facility]).    OT Vitals      Date/Time Pulse HR Source BP BP Location BP Method Pt Position Who   11/25/24 1008 88 Monitor 129/60 Right upper arm Automatic Sitting VMS          OT Pain      Date/Time Pain Type Side/Orientation Rating: [facility]   11/25/24 1008 Pain Assessment back 3 diversional activity provided VMS   11/25/24 1058 Post Activity back 3 diversional activity provided VMS               Prior Living Environment      Flowsheet Row Most Recent Value   People in Home alone   Current Living Arrangements condominium   Home Accessibility stairs to enter home (Group)   Living Environment Comment resides in 1st floor condo, 4 (1+2+1) JOSE RAFAEL, R hand rail, no stairs inside home, Bathroom Layout: Tub/shower unit with shower curtain, Shower stall with glass doors (shower stall in main bedroom), two sisters can provide some assistance   Number of Stairs, Main Entrance 4   Surface of Stairs, Main Entrance concrete   Stair Railings, Main Entrance railing on right side  (ascending)   Stairs Comment, Main Entrance 1+2+1 JOSE RAFAEL   Location, Bathroom first (main) floor   Bathroom Access tub bath, walk-in shower            Prior Level of Function      Flowsheet Row Most Recent Value   Dominant Hand right   Ambulation assistive equipment   Transferring assistive equipment   Toileting independent   Bathing assistive equipment   Dressing independent   Eating independent   IADLs assistive equipment   Driving/Transportation    Communication understands/communicates without difficulty   Assistive Device Currently Used at Home cane, straight, walker, front-wheeled, shower chair, grab bar, raised toilet            Occupational Profile      Flowsheet Row Most Recent Value   Successful Occupations Pt is very active in her community   Occupational History/Life Experiences 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, attends [employer] weekly   Performance Patterns lives alone   Environmental Supports and Barriers Supportive community   Patient Goals PSFS: Toilet transfer with prosthetic 3/10, toilet transfer without prosthetic 8/10, walking 0/10, getting dressed 5/10, getting in/ out of bed 4/10, getting a prosthetic on/off 1/10             IRF OT Evaluation and Treatment - 11/25/24 1000          OT Time Calculation    Start Time 1002     Stop Time 1102     Time Calculation (min) 60 min        Session Details    Document Type Daily Treatment/Progress Note        Mobility Belt    Mobility Belt Used During Session yes        Sit to Stand Transfer    [place], Sit to Stand Transfer moderate assist (50-74% patient effort)     Safety/Cues technique;hand placement;sequencing     Assistive Device parallel bars     Comment assist with hip and knee extension and locking prosthetic        Stand to Sit Transfer    [place], Stand to Sit Transfer moderate assist (50-74% patient effort)     Safety/Cues technique;increased time to complete     Assistive Device parallel bars     Comment assist for  unlocking L prosthetic knee with assist for weight shift. Pt able to trial inconsistant performance        Balance    Comment, Balance standing balance lateral weight shifts to L. midline stance and cues for RLE placement. mass practice of sit/ stand inconsistant ability to manage prosthetic to unlock knee. Trial of placing lift under RLE with questionable ability to improve prosthetic mgnt. sitting balance performing L superior lateral reach with R lateral flexion R hand weight bearing through knee 10 x 2 with noted improved RLE and R lateral trunk engement        Therapeutic Interventions    Comment, Therapeutic Intervention therapy ball for forward flexion and diagonal reach for stretching lower back        Bathing    [place] minimum assist (75% or more patient effort)     Comment per last trial        Upper Body Dressing    [place] moderate assist (50-74% patient effort)     Comment mod A to stand to gather clothing, S to don        Lower Body Dressing    [place] maximum assist (25-49% patient effort)     [place], Footwear moderate assist (50-74% patient effort)     Comment Pt able to stand with mod A to gather clothing , able to thread LE, Mod A to stand and to trial to hike, assist to fully hike. Mod A to don prosthetic        Grooming    [place], Oral Hygiene modified independence     Comment in sitting for safety        Prosthetic Orientation (Lower Extremity)    Comment pre skin check unremarkable, pt able to don Liner, assist to adjust liner for better placement, assist for fully getting prosthetic to blip, pt able to turn knob to engage pin        Daily Progress Summary (OT)    Daily Outcome Statement Pt continues to require assist with prosthetic mgnt, direct handoff to PT. trial of differing techniques for unlocking prosthetic, pt with decreased weight shift. Improving balance, endurance, and functional transfers. Pt would continue to benefit from skilled OT services. Continue  with POC                               IRF OT Goals      Flowsheet Row Most Recent Value   Transfer Goal 1    Activity/Assistive Device toilet at 11/20/2024 0906   [place] maximum assist (25-49% patient effort) at 11/20/2024 0906   Time Frame short-term goal (STG), 5 - 7 days at 11/20/2024 0906   Transfer Goal 2    Activity/Assistive Device toilet at 11/20/2024 0906   [place] minimum assist (75% or more patient effort) at 11/20/2024 0906   Time Frame long-term goal (LTG), 21 days or less at 11/20/2024 0906   Transfer Goal 3    Activity/Assistive Device shower at 11/20/2024 0906   [place] maximum assist (25-49% patient effort) at 11/20/2024 0906   Time Frame short-term goal (STG), 5 - 7 days at 11/20/2024 0906   Transfer Goal 4    Activity/Assistive Device shower at 11/20/2024 0906   [place] minimum assist (75% or more patient effort) at 11/20/2024 0906   Time Frame long-term goal (LTG), 21 days or less at 11/20/2024 0906   Bathing Goal 1    [place] tactile cues required at 11/20/2024 0906   Time Frame short-term goal (STG), 5 - 7 days at 11/20/2024 0906   Bathing Goal 2    [place] modified independence at 11/20/2024 0906   Time Frame long-term goal (LTG), 21 days or less at 11/20/2024 0906   UB Dressing Goal 1    [place] maximum assist (25-49% patient effort) at 11/20/2024 0906   Time Frame short-term goal (STG), 5 - 7 days at 11/20/2024 0906   Strategies/Barriers with clothing retrieval at 11/20/2024 0906   UB Dressing Goal 2    [place] minimum assist (75% or more patient effort) at 11/20/2024 0906   Time Frame long-term goal (LTG), 21 days or less at 11/20/2024 0906   Strategies/Barriers with clothing retrieval at 11/20/2024 0906   LB Dressing Goal 1    [place] maximum assist (25-49% patient effort) at 11/20/2024 0906   Time Frame short-term goal (STG), 5 - 7 days at 11/20/2024 0906   Strategies/Barriers with clothing retrieval at 11/20/2024 0906   LB Dressing Goal 2     [place] minimum assist (75% or more patient effort) at 11/20/2024 0906   Time Frame long-term goal (LTG), 21 days or less at 11/20/2024 0906   Strategies/Barriers with clothing retrieval at 11/20/2024 0906   Grooming Goal 1    [place] moderate assist (50-74% patient effort) at 11/20/2024 0906   Time Frame short-term goal (STG), 5 - 7 days at 11/20/2024 0906   Strategies/Barriers in stance at sink at 11/20/2024 0906   Grooming Goal 2    [place] supervision required at 11/20/2024 0906   Time Frame long-term goal (LTG), 21 days or less at 11/20/2024 0906   Strategies/Barriers in stance at sink at 11/20/2024 0906   Toileting Goal 1    [place] maximum assist (25-49% patient effort) at 11/20/2024 0906   Time Frame short-term goal (STG), 5 - 7 days at 11/20/2024 0906   Toileting Goal 2    [place] minimum assist (75% or more patient effort) at 11/20/2024 0906   Time Frame long-term goal (LTG), 21 days or less at 11/20/2024 0906

## 2024-11-26 ENCOUNTER — APPOINTMENT (OUTPATIENT)
Dept: OTHER | Facility: REHABILITATION | Age: 64
End: 2024-11-26
Payer: MEDICARE

## 2024-11-26 ENCOUNTER — APPOINTMENT (INPATIENT)
Dept: PHYSICAL THERAPY | Facility: REHABILITATION | Age: 64
DRG: 560 | End: 2024-11-26
Payer: MEDICARE

## 2024-11-26 ENCOUNTER — APPOINTMENT (INPATIENT)
Dept: OCCUPATIONAL THERAPY | Facility: REHABILITATION | Age: 64
DRG: 560 | End: 2024-11-26
Payer: MEDICARE

## 2024-11-26 LAB
GLUCOSE BLD-MCNC: 120 MG/DL (ref 70–99)
GLUCOSE BLD-MCNC: 131 MG/DL (ref 70–99)
GLUCOSE BLD-MCNC: 132 MG/DL (ref 70–99)
GLUCOSE BLD-MCNC: 136 MG/DL (ref 70–99)
POCT TEST: ABNORMAL

## 2024-11-26 PROCEDURE — 97530 THERAPEUTIC ACTIVITIES: CPT | Mod: GP

## 2024-11-26 PROCEDURE — 63700000 HC SELF-ADMINISTRABLE DRUG: Performed by: INTERNAL MEDICINE

## 2024-11-26 PROCEDURE — 97535 SELF CARE MNGMENT TRAINING: CPT | Mod: GO

## 2024-11-26 PROCEDURE — 97116 GAIT TRAINING THERAPY: CPT | Mod: GP

## 2024-11-26 PROCEDURE — 97763 ORTHC/PROSTC MGMT SBSQ ENC: CPT | Mod: GP

## 2024-11-26 PROCEDURE — 63600000 HC DRUGS/DETAIL CODE: Performed by: INTERNAL MEDICINE

## 2024-11-26 PROCEDURE — 12800000 HC ROOM AND CARE SEMIPRIVATE REHAB

## 2024-11-26 PROCEDURE — 97542 WHEELCHAIR MNGMENT TRAINING: CPT | Mod: GP

## 2024-11-26 PROCEDURE — 97530 THERAPEUTIC ACTIVITIES: CPT | Mod: GO

## 2024-11-26 RX ADMIN — HYDROMORPHONE HYDROCHLORIDE 4 MG: 2 TABLET ORAL at 21:05

## 2024-11-26 RX ADMIN — GABAPENTIN 600 MG: 300 CAPSULE ORAL at 16:57

## 2024-11-26 RX ADMIN — TAMSULOSIN HYDROCHLORIDE 0.4 MG: 0.4 CAPSULE ORAL at 16:57

## 2024-11-26 RX ADMIN — APIXABAN 5 MG: 5 TABLET, FILM COATED ORAL at 21:05

## 2024-11-26 RX ADMIN — APIXABAN 5 MG: 5 TABLET, FILM COATED ORAL at 08:17

## 2024-11-26 RX ADMIN — METHENAMINE HIPPURATE 1 G: 1000 TABLET ORAL at 21:05

## 2024-11-26 RX ADMIN — INSULIN LISPRO 6 UNITS: 100 INJECTION, SOLUTION INTRAVENOUS; SUBCUTANEOUS at 12:34

## 2024-11-26 RX ADMIN — THERA TABS 1 TABLET: TAB at 12:32

## 2024-11-26 RX ADMIN — LOSARTAN POTASSIUM 25 MG: 25 TABLET, FILM COATED ORAL at 16:57

## 2024-11-26 RX ADMIN — FUROSEMIDE 40 MG: 40 TABLET ORAL at 08:18

## 2024-11-26 RX ADMIN — EMPAGLIFLOZIN 10 MG: 10 TABLET, FILM COATED ORAL at 08:17

## 2024-11-26 RX ADMIN — Medication 3 MG: at 21:05

## 2024-11-26 RX ADMIN — ACETAMINOPHEN 650 MG: 325 TABLET, FILM COATED ORAL at 08:22

## 2024-11-26 RX ADMIN — POTASSIUM CHLORIDE 20 MEQ: 1500 TABLET, EXTENDED RELEASE ORAL at 12:32

## 2024-11-26 RX ADMIN — GABAPENTIN 600 MG: 300 CAPSULE ORAL at 21:05

## 2024-11-26 RX ADMIN — INSULIN GLARGINE 14 UNITS: 100 INJECTION, SOLUTION SUBCUTANEOUS at 21:08

## 2024-11-26 RX ADMIN — INSULIN LISPRO 6 UNITS: 100 INJECTION, SOLUTION INTRAVENOUS; SUBCUTANEOUS at 18:18

## 2024-11-26 RX ADMIN — ATORVASTATIN CALCIUM 40 MG: 40 TABLET, FILM COATED ORAL at 16:57

## 2024-11-26 RX ADMIN — METHENAMINE HIPPURATE 1 G: 1000 TABLET ORAL at 08:17

## 2024-11-26 RX ADMIN — INSULIN LISPRO 6 UNITS: 100 INJECTION, SOLUTION INTRAVENOUS; SUBCUTANEOUS at 08:25

## 2024-11-26 RX ADMIN — DULOXETINE HYDROCHLORIDE 60 MG: 30 CAPSULE, DELAYED RELEASE ORAL at 21:05

## 2024-11-26 RX ADMIN — GABAPENTIN 600 MG: 300 CAPSULE ORAL at 08:17

## 2024-11-26 RX ADMIN — IMATINIB MESYLATE 400 MG: 400 TABLET, FILM COATED ORAL at 08:24

## 2024-11-26 RX ADMIN — LEVOTHYROXINE SODIUM 25 MCG: 0.03 TABLET ORAL at 05:41

## 2024-11-26 RX ADMIN — FUROSEMIDE 40 MG: 40 TABLET ORAL at 16:57

## 2024-11-26 NOTE — PROGRESS NOTES
Patient: Bebe Burks  Location: [facility]  MRN: [id number]  Today's date: 11/26/2024    History of Present Illness  Bebe is a 64 y.o. female admitted on 11/19/2024 with History of above knee amputation, left (CMS/[facility]) [Z89.612]  Encounter for prosthetic gait training [Z47.89]. Principal problem is History of above knee amputation, left (CMS/[facility]).    Pt is a 64 y.o. 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       Past Medical History  Bebe has a past medical history of Abnormal ECG, Abnormal finding on chest xray (12/2021), Arthritis, Colon cancer (CMS/[facility]), COVID-19 (12/2021), COVID-19 vaccine series completed, Diabetic neuropathy (CMS/[facility]), DM (diabetes mellitus), type 2 with ophthalmic complications (CMS/[facility]), Hypertension, Hypothyroidism, Left foot drop, Lipid disorder, and Type 2 diabetes mellitus treated with insulin (CMS/[facility]).    OT Pain      Date/Time Pain Type Rating: Rest Who   11/26/24 1310 Pain Assessment 0 VMS   11/26/24 1358 Post Activity 0 VMS               Prior Living Environment      Flowsheet Row Most Recent Value   People in Home alone   Current Living Arrangements condominium   Home Accessibility stairs to enter home (Group)   Living Environment Comment resides in 1st floor condo, 4 (1+2+1) JOSE RAFAEL, R hand rail, no stairs inside home, Bathroom Layout: Tub/shower unit with shower curtain, Shower stall with glass doors (shower stall in main bedroom), two sisters can provide some assistance   Number of Stairs, Main Entrance 4   Surface of Stairs, Main Entrance concrete   Stair Railings, Main Entrance railing on right side (ascending)   Stairs Comment, Main Entrance 1+2+1 JOSE RAFAEL   Location, Bathroom first (main) floor   Bathroom Access tub bath, walk-in shower            Prior Level of Function      Flowsheet Row Most Recent Value   Dominant Hand right   Ambulation assistive equipment    Transferring assistive equipment   Toileting independent   Bathing assistive equipment   Dressing independent   Eating independent   IADLs assistive equipment   Driving/Transportation    Communication understands/communicates without difficulty   Assistive Device Currently Used at Home cane, straight, walker, front-wheeled, shower chair, grab bar, raised toilet            Occupational Profile      Flowsheet Row Most Recent Value   Successful Occupations Pt is very active in her community   Occupational History/Life Experiences 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, attends [employer] weekly   Performance Patterns lives alone   Environmental Supports and Barriers Supportive community    + handicap parking placard   Patient Goals PSFS: Toilet transfer with prosthetic 3/10, toilet transfer without prosthetic 8/10, walking 0/10, getting dressed 5/10, getting in/ out of bed 4/10, getting a prosthetic on/off 1/10             IRF OT Evaluation and Treatment - 11/26/24 1312          OT Time Calculation    Start Time 1300     Stop Time 1400     Time Calculation (min) 60 min        Session Details    Document Type Daily Treatment/Progress Note        Mobility Belt    Mobility Belt Used During Session yes        Sit to Stand Transfer    [place], Sit to Stand Transfer moderate assist (50-74% patient effort)     Safety/Cues technique;hand placement;sequencing     Assistive Device parallel bars;walker, front-wheeled     Comment improving with sit/ stand with cues for anterior weight shift, improving and inconsistant for knee extension lock.        Stand to Sit Transfer    [place], Stand to Sit Transfer moderate assist (50-74% patient effort)     Safety/Cues technique;increased time to complete     Assistive Device walker, front-wheeled;parallel bars     Comment Pt improving with sequencing and able to manage to unlock knee, not 100% consistant but improving // bars vs RW        Low Pivot Transfer     [place], Low Pivot Transfer supervision     Assistive Device wheelchair     Comment LPT with prosthetic donned, trial for LPT        Stand Pivot Transfer    [place], Stand Pivot/Stand Step Transfer maximum assist (25-49% patient effort)     Safety/Cues technique;increased time to complete     Assistive Device walker, front-wheeled     Comment SPT to LE with cues for LE placement, assist for RW mgnt, assist for ulocking knee joint for sit        Toilet Transfer    Comment supervision LPT with prosthetic to platform drop arm commode        Balance    Comment, Balance standing balance midline stance. Lateral weight shift. Then performing L hip hike difficult to perfomr, then with manual cues pt able to perform hike on L trial focus on hip hike with extension to align LLE with mod A.        Toileting    [place] dependent (less than 25% patient effort)     Adaptive Equipment commode, 3-in-1     Comment Pt with incont BM. pt with noted increased anxiety while performing. standing with mod - max A with poor balance with RW. 2nd person assist with clothing mgnt. Pt able to have cont BM and void. Standing with mod - max A and assist for clothing mgnt in stance to hike        Prosthetic Orientation (Lower Extremity)    Comment, Fit Assessment Pt able to don liner with cues for alignment, able to don 3 ply increased difficulty donning socket in WC with assist, once pin engaged pt able to turn knob to engage pin        Daily Progress Summary (OT)    Daily Outcome Statement Pt improving with body awearness with tx, sit. stand with unlocking knee. pt with noted anxiety around toilet and increased assist required. Pt would continue to benefit from skilled OT services to increase indep, decrease caregiver burden and increase quality of life. Continue with POC                               IRF OT Goals      Flowsheet Row Most Recent Value   Transfer Goal 1    Activity/Assistive Device toilet at 11/20/2024 0906    [place] maximum assist (25-49% patient effort) at 11/20/2024 0906   Time Frame short-term goal (STG), 5 - 7 days at 11/26/2024 1000   Progress/Outcome goal ongoing at 11/26/2024 1000   Transfer Goal 2    Activity/Assistive Device toilet at 11/20/2024 0906   [place] minimum assist (75% or more patient effort) at 11/20/2024 0906   Time Frame long-term goal (LTG), 21 days or less at 11/20/2024 0906   Progress/Outcome goal ongoing at 11/26/2024 1000   Transfer Goal 3    Activity/Assistive Device shower at 11/20/2024 0906   [place] maximum assist (25-49% patient effort) at 11/20/2024 0906   Time Frame short-term goal (STG), 5 - 7 days at 11/26/2024 1000   Progress/Outcome goal ongoing at 11/26/2024 1000   Transfer Goal 4    Activity/Assistive Device shower at 11/20/2024 0906   [place] minimum assist (75% or more patient effort) at 11/20/2024 0906   Time Frame long-term goal (LTG), 21 days or less at 11/20/2024 0906   Progress/Outcome goal ongoing at 11/26/2024 1000   Bathing Goal 1    [place] tactile cues required at 11/20/2024 0906   Time Frame short-term goal (STG), 5 - 7 days at 11/26/2024 1000   Progress/Outcome goal ongoing at 11/26/2024 1000   Bathing Goal 2    [place] modified independence at 11/20/2024 0906   Time Frame long-term goal (LTG), 21 days or less at 11/20/2024 0906   Progress/Outcome goal ongoing at 11/26/2024 1000   UB Dressing Goal 1    [place] minimum assist (75% or more patient effort) at 11/26/2024 1000   Time Frame short-term goal (STG), 5 - 7 days at 11/26/2024 1000   Strategies/Barriers with clothing retrieval at 11/20/2024 0906   Progress/Outcome goal ongoing at 11/26/2024 1000   UB Dressing Goal 2    [place] minimum assist (75% or more patient effort) at 11/20/2024 0906   Time Frame long-term goal (LTG), 21 days or less at 11/20/2024 0906   Strategies/Barriers with clothing retrieval at 11/20/2024 0906   Progress/Outcome goal ongoing at 11/26/2024  1000   LB Dressing Goal 1    [place] minimum assist (75% or more patient effort) at 11/26/2024 1000   Time Frame short-term goal (STG), 5 - 7 days at 11/26/2024 1000   Strategies/Barriers with clothing retrieval at 11/20/2024 0906   Progress/Outcome goal ongoing at 11/26/2024 1000   LB Dressing Goal 2    [place] minimum assist (75% or more patient effort) at 11/20/2024 0906   Time Frame long-term goal (LTG), 21 days or less at 11/20/2024 0906   Strategies/Barriers with clothing retrieval at 11/20/2024 0906   Progress/Outcome goal ongoing at 11/26/2024 1000   Grooming Goal 1    [place] moderate assist (50-74% patient effort) at 11/20/2024 0906   Time Frame short-term goal (STG), 5 - 7 days at 11/26/2024 1000   Strategies/Barriers in stance at sink at 11/20/2024 0906   Progress/Outcome goal ongoing at 11/26/2024 1000   Grooming Goal 2    [place] supervision required at 11/20/2024 0906   Time Frame long-term goal (LTG), 21 days or less at 11/20/2024 0906   Strategies/Barriers in stance at sink at 11/20/2024 0906   Progress/Outcome goal ongoing at 11/26/2024 1000   Toileting Goal 1    [place] maximum assist (25-49% patient effort) at 11/20/2024 0906   Time Frame short-term goal (STG), 5 - 7 days at 11/26/2024 1000   Progress/Outcome goal ongoing at 11/26/2024 1000   Toileting Goal 2    [place] minimum assist (75% or more patient effort) at 11/20/2024 0906   Time Frame long-term goal (LTG), 21 days or less at 11/20/2024 0906   Progress/Outcome goal ongoing at 11/26/2024 1000

## 2024-11-26 NOTE — PROGRESS NOTES
Patient: Bebe Burks  Location: [facility] [address]  MRN: [id number]  Today's date: 11/26/2024    History of Present Illness  Bebe is a 64 y.o. female admitted on 11/19/2024 with History of above knee amputation, left (CMS/[facility]) [Z89.612]  Encounter for prosthetic gait training [Z47.89]. Principal problem is History of above knee amputation, left (CMS/[facility]).    Pt is a 64 y.o. 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       Past Medical History  Bebe has a past medical history of Abnormal ECG, Abnormal finding on chest xray (12/2021), Arthritis, Colon cancer (CMS/[facility]), COVID-19 (12/2021), COVID-19 vaccine series completed, Diabetic neuropathy (CMS/[facility]), DM (diabetes mellitus), type 2 with ophthalmic complications (CMS/[facility]), Hypertension, Hypothyroidism, Left foot drop, Lipid disorder, and Type 2 diabetes mellitus treated with insulin (CMS/[facility]).    PT Vitals      Date/Time Pulse BP BP Location BP Method Pt Position [facility]   11/26/24 1132 87 115/61 Left upper arm Automatic Sitting JG          PT Pain      Date/Time Pain Type Rating: Rest [facility]   11/26/24 1132 Pain Assessment 0 JG   11/26/24 1201 Pain Reassessment 0 JG               Prior Living Environment      Flowsheet Row Most Recent Value   People in Home alone   Current Living Arrangements condominium   Home Accessibility stairs to enter home (Group)   Living Environment Comment resides in 1st floor condo, 4 (1+2+1) JOSE RAFAEL, R hand rail, no stairs inside home, Bathroom Layout: Tub/shower unit with shower curtain, Shower stall with glass doors (shower stall in main bedroom), two sisters can provide some assistance   Number of Stairs, Main Entrance 4   Surface of Stairs, Main Entrance concrete   Stair Railings, Main Entrance railing on right side (ascending)   Stairs Comment, Main Entrance 1+2+1 JOSE RAFAEL   Location, Bathroom first (main) floor   Bathroom Access tub  bath, walk-in shower            Prior Level of Function      Flowsheet Row Most Recent Value   Dominant Hand right   Ambulation assistive equipment   Transferring assistive equipment   Toileting independent   Bathing assistive equipment   Dressing independent   Eating independent   IADLs assistive equipment   Driving/Transportation    Communication understands/communicates without difficulty   Assistive Device Currently Used at Home cane, straight, walker, front-wheeled, shower chair, grab bar, raised toilet             IRF PT Evaluation and Treatment - 11/26/24 1132          PT Time Calculation    Start Time 1132     Stop Time 1202     Time Calculation (min) 30 min        Session Details    Document Type Daily Treatment/Progress Note        General Information    General Observations of Patient WC evaluation completed with SEBASTIÁN Prather from [facility]        Mobility Belt    Mobility Belt Used During Session yes        Orthotics    Orthotics (Trigger Row) Orthosis trial     Orthosis Trial R toe off brace donned        Transfers    Transfers stand pivot transfer     Maintains Weight-Bearing Status able to maintain        Sit to Stand Transfer    [place], Sit to Stand Transfer moderate assist (50-74% patient effort);1 person assist     Safety/Cues sequencing;technique     Assistive Device walker, front-wheeled     Comment mod A for sit <> stand to RW, assist for L hip extension and ensuring L prosthetic knee extension lock is engaged        Stand to Sit Transfer    [place], Stand to Sit Transfer moderate assist (50-74% patient effort);1 person assist     Safety/Cues sequencing;technique     Assistive Device walker, front-wheeled     Comment stand to sit, mod A for pelvic stabiilty and balance while pt unlocks knee unit        Stand Pivot Transfer    [place], Stand Pivot/Stand Step Transfer moderate assist (50-74% patient effort);1 person assist     Safety/Cues sequencing;technique      Assistive Device walker, front-wheeled     Comment SPT from mat to wheelchair, improving technique with mod A for pelvic stability and assistance with L prosthetic placement; pt with tendency to take large step with prosthesis during SPT causing LOB        Wheelchair Mobility/Management    Method of Locomotion bimanual (upper extremity) propulsion     Wheelchair Type manual, standard     [place], Forward Propulsion modified independence     [place], Backward Propulsion modified independence     [place], Steering Activities modified independence     [place], Stopping Activities modified independence     [place], Turning Activities modified independence     Distance Propelled in Feet 150 feet     Comment, Wheelchair Mobility Self propelled in TiLite X K5 ultralight weight chair at mod I level with improved propulsion and effficiency noted        Daily Progress Summary (PT)    Daily Outcome Statement Completed WC evaluation with SEBASTIÁN Prather from [facility]. Pt to receive TiLite X K5 MWC to maximize independence and efficiency with wheelchair propulsion. Loaner chair requrested prior to discharge.                          Education Documentation  Assistive/Adaptive Devices, taught by Paulette Gamble, PT at 11/26/2024  4:00 PM.  Learner: Patient  Readiness: Acceptance  Method: Explanation  Response: Verbalizes Understanding  Comment: Educated on custom wheelchair prescription process, use of loaner MWC and process of fabrication of custom chair.          IRF PT Goals      Flowsheet Row Most Recent Value   Transfer Goal 1    Activity/Assistive Device stand pivot, walker, front-wheeled at 11/19/2024 1431   [place] moderate assist (50-74% patient effort) at 11/26/2024 0855   Time Frame short-term goal (STG), 5 - 7 days at 11/26/2024 0855   Progress/Outcome goal met, goal ongoing at 11/26/2024 0855   Transfer Goal 2    Activity/Assistive Device stand pivot, walker, front-wheeled at 11/19/2024  1431   [place] minimum assist (75% or more patient effort) at 11/26/2024 0855   Time Frame 14 days or less, long-term goal (LTG) at 11/26/2024 0855   Progress/Outcome goal revised this date, goal ongoing, goal met at 11/26/2024 0855   Transfer Goal 3    Activity/Assistive Device sit-to-stand/stand-to-sit, walker, front-wheeled at 11/19/2024 1431   [place] minimum assist (75% or more patient effort) at 11/26/2024 0855   Time Frame short-term goal (STG), 5 - 7 days at 11/26/2024 0855   Progress/Outcome goal met, goal revised this date at 11/26/2024 0855   Transfer Goal 4    Activity/Assistive Device sit-to-stand/stand-to-sit, walker, front-wheeled at 11/19/2024 1431   [place] tactile cues required at 11/26/2024 0855   Time Frame long-term goal (LTG), 14 days or less at 11/26/2024 0855   Progress/Outcome goal met, goal revised this date at 11/26/2024 0855   Gait/Walking Locomotion Goal 1    Activity/Assistive Device gait (walking locomotion), walker, front-wheeled at 11/19/2024 1431   Distance 10 feet at 11/19/2024 1431   [place] moderate assist (50-74% patient effort) at 11/26/2024 0855   Time Frame short-term goal (STG), 5 - 7 days at 11/26/2024 0855   Strategies/Barriers 50 at 11/26/2024 0855   Progress/Outcome goal revised this date, goal ongoing at 11/26/2024 0855   Gait/Walking Locomotion Goal 2    Activity/Assistive Device gait (walking locomotion), assistive device use, walker, front-wheeled at 11/26/2024 0855   Distance 50 feet at 11/26/2024 0855   [place] minimum assist (75% or more patient effort) at 11/26/2024 0855   Time Frame long-term goal (LTG), 14 days or less at 11/26/2024 0855   Wheelchair Locomotion Goal 1    Activity forward propulsion, backward propulsion, steering, stopping, turning, doorway navigation, weight shifting at 11/19/2024 1431   Assistive Device manual, ultra lightweight at 11/19/2024 1431   Distance 150 feet at 11/19/2024 1431   [place] other (see  comments)  [Touching / Steadying Assist] at 11/19/2024 1431   Time Frame 1 week, short-term goal (STG) at 11/19/2024 1431   Progress/Outcome goal met at 11/26/2024 0855   Wheelchair Locomotion Goal 2    Activity forward propulsion, backward propulsion, steering, stopping, turning, doorway navigation, weight shifting at 11/19/2024 1431   Assistive Device manual, ultra lightweight at 11/19/2024 1431   Distance 150 feet at 11/19/2024 1431   [place] modified independence at 11/19/2024 1431   Time Frame 21 days or less, long-term goal (LTG) at 11/19/2024 1431   Progress/Outcome goal ongoing at 11/26/2024 0855

## 2024-11-26 NOTE — PROGRESS NOTES
[facility] Progress Note          Patient was seen and examined at bedside.    Subjective:   Her BP on high side, historically she was on CCB and ARB, resume her amlodipine and losartan from lower dose, monitor her BP.    No O/N events. No new complaints. Her paresthetic training is progressing well. Her BG levels are mostly acceptable.    Objective   Vital signs in last 24 hours:  Temp:  [36.9 °C (98.4 °F)-37.1 °C (98.7 °F)] 36.9 °C (98.4 °F)  Heart Rate:  [83-94] 83  Resp:  [18-20] 18  BP: (117-146)/(56-69) 117/56    No intake or output data in the 24 hours ending 11/26/24 1243    Intake/Output this shift:  No intake/output data recorded.   Review of Systems:  All other systems reviewed and negative except as noted in the HPI.   Objective      Labs  Reviewed her labs thoroughly   Lab Results   Component Value Date    WBC 4.98 11/20/2024    HGB 10.8 (L) 11/20/2024    HCT 32.7 (L) 11/20/2024    MCV 90.8 11/20/2024     11/20/2024     Lab Results   Component Value Date    GLUCOSE 205 (H) 11/20/2024    CALCIUM 8.7 11/20/2024     11/20/2024    K 3.7 11/20/2024    CO2 30 11/20/2024     11/20/2024    BUN 35 (H) 11/20/2024    CREATININE 0.9 11/20/2024       Imaging  N/A    Full Code    Physical Exam:  Head/Ear/Nose/Throat: normocephalic; atraumatic; moisture mouth mm, no oropharyngeal thrush noted.   Eyes: anicteric sclera, EOMI; PERRL.   Neck : supple, no JVD, no carotid bruits appeciated.   Respiratory: no evidence of labored breathing, lung sounds CTA b/l, good aeration bibasilar area, no w/r/c.   Cardiovascular: RRR; normal S1, S2; no m/r/g; no S3 or S4.   Gastrointestinal: soft; NT; BS normal; mildly distended; no CVAT b/l.   Genitourinary: no lozada.   Extremities : S/p L AKA, residual limb healed well .   Neurological: AO x 3, fluent speeches, following commands, CNS II-XII grossly intact; no focal neurologic deficits.   Behavior/Emotional: in NAD, appropriate;  cooperative.   Skin: clean, dry and intact.  Plan of care was discussed with patient, RN, and PMR attending     Assessment     CC:L AKA, ambulatory dysfunction     64 y.o. 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 S/p IV Ceftriaxone and IV Ampicillin until 9/14/24. H/o non-occlusive DVT of the R axillary and brachial vein around midline. She presented to [facility] clinic on 11/14 for evaluation to begin prosthetic training. Now she was admitted for inpt prosthetic training on 11/19/24.  Denies nausea, vomiting, abdominal pain or discomfort, dysuria, cough/sputum, running nose, sore throat, chest pain, palpitation, SOB or orthopnea, dizziness or LH,  HA.    A/P:  # A/p L AKA  -inpt prosthetic training, wound care/dermal defense, pain and neuralgia managements, fall precaution     # CML  -cont home imatinib, check CBC      # Type 2 DM, hypothyroidism  - diet control, SSI, plus her home diabetic regimen;  - cont home levothyroxine     #  PICC-associated DVT on 8/30   -S/p SQ Lovenox weight based ;  -cont Eliquis 5 mg po bid      # Urinary retention, h/o recurrent UTI  -timed voiding with cic, adequate oral hydration, monitor PVR with cic, on flomax;  -hiprex 1 g po bid        # Stage 2 decubitus ulcer, pressure injury in right heel   -wound care     # Recent C.diff.  -cont prophylactic oral vanco 125 mg po bid       Billing code: [id number]  Diagnoses:  Patient Active Problem List   Diagnosis    Controlled type 2 diabetes mellitus, with long-term current use of insulin (CMS/[facility])    Essential hypertension    Hyperlipidemia    Hypothyroidism    Obesity    Thyroid nodule    COVID-19 virus infection    Lung nodule    Displacement of lumbar intervertebral disc without myelopathy     Disability of walking    Polyneuropathy due to type 2 diabetes mellitus (CMS/HCC)    Pre-op examination    Localized osteoarthritis of right knee    Type 2 diabetes mellitus treated with insulin (CMS/HCC)    Abnormal finding on EKG    Left foot drop    Abnormal finding on chest xray    Osteoarthritis of right knee, unspecified osteoarthritis type    S/P total knee arthroplasty, right    Sepsis (CMS/HCC)    Osteomyelitis of left foot (CMS/HCC)    Peripheral neuropathy    Abdominal pain    Chronic idiopathic constipation    Gangrene (CMS/HCC)    Tibial artery stenosis, left (CMS/HCC)    H/O pilonidal cyst    Type 2 diabetes mellitus with left diabetic foot infection (CMS/HCC)    Type 2 diabetes mellitus with diabetic neuropathy (CMS/HCC)    Hypothyroidism    Primary hypertension    Leukemoid reaction    HLD (hyperlipidemia)    Acute osteomyelitis of left foot (CMS/HCC)    History of above knee amputation, left (CMS/HCC)    Encounter for prosthetic gait training           Charlette Brooke MD  11/26/2024

## 2024-11-26 NOTE — PROGRESS NOTES
Subjective    Patient seen and examined on rounds.  Chart reviewed.  Events overnight noted.  History reviewed briefly with patient.    CC:  Deficits in mobility, transfers, self-care status post left transfemoral amputation for prosthetic training, severe peripheral vascular disease, chronic myelocytic leukemia, osteomyelitis left foot, diabetic neuropathy, multiple medical problems    HPI:  Ms. 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 Pura Chaves. ID recommended IV Ceftriaxone and IV Ampicillin until 9/14/24.  She had urinary retention managed with indwelling Rowan catheter.  She had right upper extremity swelling present during this admission, ultrasound on 8/29/24 demonstrated an acute non-occlusive DVT of the right axillary and brachial vein around line (on Lovenox).  Subsequently she completed an acute inpatient rehabilitation stay for pre-prosthetic training at [facility] between 9/3/24 to 9/21/24 and was discharged to [facility] on 9/21/24.  She initially required bladder self intermittent catheterizations due to urinary retention, but indicates that now she is able to void.  She desired to be a prosthetic ambulator.  A left transfemoral amputation prosthesis was fabricated. and followed-up as outpatient in the amputee clinic at [facility] and she was deemed to be an  appropriate candidate for acute inpatient rehabilitation stay. She has been needing assistance for mobility, transfers, self-care.  I have reviewed the preadmission screening and concur with that information and there are no significant changes from patient's preadmission screening. She is transferred to [facility] on 11/19/24 from [facility] for further acute inpatient rehabilitation stay for prosthetic training with transfemoral amputation prosthesis.    SUBJ: Discussed patients progress in therapies with therapists in team meeting today. Discussed in PCC. See PCC documentation.  Face-to-face evaluation was done for a right carbon fiber toe off brace. Ms. Bebe Burks has a history of left transfemoral amputation and history of diabetic neuropathy with prolonged hospitalization causing deconditioning and she has a right foot drop . She requires a right carbon fiber toe off brace to improve toe clearance in swing and knee extension support in stance. This brace is necessary to decrease risk for falling in the home and the community. This condition is expected to last more than 6 months.  Discussed with patient.  Discussed with physical therapist.    ROS: Denies chest pain or shortness of breath. Other ROS negative. Past, family, social history is unchanged.    Current Functional Status:   Bed mobility:   [place], Supine to Sit: close supervision   [place], Sit to Supine: close supervision   Transfers:    [place], Sit to Stand Transfer: moderate assist (50-74% patient effort)  [place], Stand to Sit Transfer: moderate assist (50-74% patient effort)   [place], Stand Pivot/Stand Step Transfer: maximum assist (25-49% patient effort)   Gait:   [place], Gait: dependent (less than 25% patient effort), 2 person assist  Assistive Device: walker, front-wheeled   Distance in Feet: 30 feet    Bathing:   [place]: minimum assist (75% or more patient effort)   Toileting:  "  [place]: dependent (less than 25% patient effort)   Upper body dressing:   [place]: moderate assist (50-74% patient effort)   Lower body dressing:   [place]: maximum assist (25-49% patient effort)     Functional Progress:    Functional status reviewed. Overall, patient's functional status is improving.      Physical Exam      Blood pressure 115/61, pulse 87, temperature 36.9 °C (98.4 °F), temperature source Oral, resp. rate 18, height 1.702 m (5' 7.01\"), weight 69.4 kg (153 lb), SpO2 96%, not currently breastfeeding.    Body mass index is 23.96 kg/m².    General Appearance: Not in acute distress  Head/Ear/Nose/Throat: Normocephalic; Atraumatic.   Eye: EOMI; PERRL.   Neck: No JVD; No Bruits.   Respiratory: Decreased breath sounds at bases.   Cardiovascular: RRR; Normal S1, S2.   Gastrointestinal: Soft; NT; +BS.   Extremities: Bilateral lower extremity edema noted.  Left transfemoral amputation stump is stable.  Musculoskeletal: Functional active range of motion in both upper and right lower extremities.  Functional active range of motion in left hip.  She has a left transfemoral amputation.  Neurological: AAO ×3. Speech is fluent. Cranial nerve examination does not reveal any gross facial asymmetry. Strength testing shows about 4+/5 strength in both upper and right lower extremities.  Left hip flexion is 4/5.  She has a left transfemoral amputation.  She is grossly able to localize touch sensation.  She is able to localize position sense in right foot great toe position sense is unable to be tested on left side.  Deep tendon reflexes are hypoactive bilaterally.  Right plantar is flexor, left plantar was unable to be tested. Coordination is functional upper extremities.    Behavior/Emotional: Appropriate; Cooperative.   Skin: Left transfemoral amputation stump stable.  Incision appears to be well-healed.  Some wounds are noted on right anterior lower forearm.  Small wound noted on right foot fifth " toe.  Small wound also noted on right foot great toe with some slough in it and scaly skin around it.  Some redness noted on sacrum/coccyx.  Reviewed pictures of wounds in Epic.      Current Facility-Administered Medications:     acetaminophen (TYLENOL) tablet 650 mg, 650 mg, oral, q4h PRN, Charlette Brooke MD, 650 mg at 11/26/24 0822    alum-mag hydroxide-simeth (MAALOX) 200-200-20 mg/5 mL suspension 30 mL, 30 mL, oral, q4h PRN, Charlette Brooke MD    amLODIPine (NORVASC) tablet 2.5 mg, 2.5 mg, oral, Daily, Charlette Brooke MD, 2.5 mg at 11/25/24 0911    apixaban (ELIQUIS) tablet 5 mg, 5 mg, oral, BID, Charlette Brooke MD, 5 mg at 11/26/24 0817    ascorbic acid (VITAMIN C) tablet 500 mg, 500 mg, oral, q48h INT, Charlette Brooke MD, 500 mg at 11/25/24 1229    atorvastatin (LIPITOR) tablet 40 mg, 40 mg, oral, Daily (6p), Charlette Brooke MD, 40 mg at 11/25/24 1705    bisacodyL (DULCOLAX) 10 mg suppository 10 mg, 10 mg, rectal, Daily PRN, Charlette Brooke MD    docusate sodium (COLACE) capsule 100 mg, 100 mg, oral, BID, Charlette Brooke MD, 100 mg at 11/24/24 0802    DULoxetine (CYMBALTA) capsule,delayed release(DR/EC) 60 mg, 60 mg, oral, Nightly, Charlette Brooke MD, 60 mg at 11/25/24 2022    empagliflozin (JARDIANCE) tablet 10 mg, 10 mg, oral, Daily, Charlette Brooke MD, 10 mg at 11/26/24 0817    ferrous sulfate tablet 325 mg, 325 mg, oral, q48h INT, Charlette Brooke MD, 325 mg at 11/25/24 1229    fluticasone propionate (FLONASE) 50 mcg/actuation nasal spray 1 spray, 1 spray, Each Nostril, Daily PRN, Charlette Brooke MD    furosemide (LASIX) tablet 40 mg, 40 mg, oral, BID (am, 4p), Charlette Brooke MD, 40 mg at 11/26/24 0818    gabapentin (NEURONTIN) capsule 600 mg, 600 mg, oral, TID, Charlette Brooke MD, 600 mg at 11/26/24 0817    HYDROmorphone (DILAUDID) tablet 4 mg, 4 mg, oral, q6h PRN, Charlette Brooke MD, 4 mg at 11/25/24 2021    imatinib (GLEEVEC) chemo tablet 400 mg, 400 mg, oral, Daily, Charlette Brooke MD, 400 mg at 11/26/24 0824     insulin glargine U-100 (LANTUS/BASAGLAR) pen 14 Units, 14 Units, subcutaneous, Nightly, Charlette Brooke MD, 14 Units at 11/25/24 2120    insulin lispro U-100 (HumaLOG) pen 1-12 Units, 1-12 Units, subcutaneous, With meals & nightly, Alexei Petty MD, 2 Units at 11/25/24 2118    insulin lispro U-100 (HumaLOG) pen 6 Units, 6 Units, subcutaneous, TID with meals, Charlette Brooke MD, 6 Units at 11/26/24 1234    levothyroxine (SYNTHROID) tablet 25 mcg, 25 mcg, oral, Daily (6:30a), Charlette Brooke MD, 25 mcg at 11/26/24 0541    losartan (COZAAR) tablet 25 mg, 25 mg, oral, Daily with dinner, Charlette Brooke MD, 25 mg at 11/25/24 1705    melatonin ODT 3 mg, 3 mg, oral, Nightly, Charlette Brooke MD, 3 mg at 11/25/24 2121    methenamine (HIPREX) tablet 1 g, 1 g, oral, BID, Charlette Brooke MD, 1 g at 11/26/24 0817    multivitamin tablet 1 tablet, 1 tablet, oral, Daily, Charlette Brooke MD, 1 tablet at 11/26/24 1232    ondansetron ODT (ZOFRAN-ODT) disintegrating tablet 4 mg, 4 mg, oral, q8h PRN, Charlette Brooke MD, 4 mg at 11/24/24 0951    potassium chloride (KLOR-CON M) ER tablet (particles/crystals) 20 mEq, 20 mEq, oral, Daily, Charlette Brooke MD, 20 mEq at 11/26/24 1232    semaglutide tablet 14 mg, 1 tablet, oral, Daily, Charlette Brooke MD, 14 mg at 11/26/24 0707    senna (SENOKOT) tablet 2 tablet, 2 tablet, oral, Daily, Charlette Brooke MD, 2 tablet at 11/23/24 1146    tamsulosin (FLOMAX) 24 hr ER capsule 0.4 mg, 0.4 mg, oral, Daily with dinner, Charlette Brooke MD, 0.4 mg at 11/25/24 1705       Labs / Radiology    Lab Results   Component Value Date    WBC 4.98 11/20/2024    HGB 10.8 (L) 11/20/2024    HCT 32.7 (L) 11/20/2024    MCV 90.8 11/20/2024     11/20/2024     Lab Results   Component Value Date    GLUCOSE 205 (H) 11/20/2024    CALCIUM 8.7 11/20/2024     11/20/2024    K 3.7 11/20/2024    CO2 30 11/20/2024     11/20/2024    BUN 35 (H) 11/20/2024    CREATININE 0.9 11/20/2024       Assessment and Plan    ASSESSMENT  PLAN:  1. 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 Pura Chaves. ID recommended IV Ceftriaxone and IV Ampicillin until 9/14/24.  She had urinary retention managed with indwelling Rowan catheter.  She had right upper extremity swelling present during this admission, ultrasound on 8/29/24 demonstrated an acute non-occlusive DVT of the right axillary and brachial vein around line (on Lovenox).  Subsequently she completed an acute inpatient rehabilitation stay for pre-prosthetic training at [facility] between 9/3/24 to 9/21/24 and was discharged to [facility] on 9/21/24.  She initially required bladder self intermittent catheterizations due to urinary retention, but indicates that now she is able to void.  She desired to be a prosthetic ambulator.  A left transfemoral amputation prosthesis was fabricated. and followed-up as outpatient in [facility]put[facility] and she was deemed to be an appropriate candidate for acute inpatient rehabilitation stay. She has been needing assistance for mobility, transfers, self-care.  I have reviewed the preadmission screening and concur with that information and there are no significant changes from patient's preadmission screening. She is transferred to [facility] on 11/19/24 from [facility] for further acute inpatient rehabilitation stay for  prosthetic training with transfemoral amputation prosthesis.     2. DVT prophylaxis/treatment - on Eliquis.  Platelets 168 on 11/20/2024.     3. Vascular - status post left transfemoral amputation for prosthetic training, severe peripheral vascular disease, chronic myelocytic leukemia, osteomyelitis left foot, diabetic neuropathy, multiple medical problems - continue PT, OT, psychology.  Follow falls precautions, cardiac precautions, monitor pulse oximeter in therapy.  Monitor skin under prosthesis.  Teach donning and doffing of left transfemoral amputation prosthesis.  Do gait training with prosthesis.     4. GI - On Colace, Senokot.  On PRN Maalox.  On PRN Dulcolax suppository.  On PRN Zofran.     5.  -on Hiprex.  On Flomax.  Monitor postvoid residual.     6. CVS - on Lasix.  Monitor for orthostasis.     7. Pulmonary -encourage incentive spirometry.     8. Hematology - monitor hemoglobin, platelets.  Hemoglobin 10.8, WBC 4.98 on 11/20/2024.     9. Pain -on Cymbalta.  On Neurontin.  On PRN Tylenol.  On PRN Dilaudid.     10. Skin - Left transfemoral amputation stump stable.  Incision appears to be well-healed.  Some wounds are noted on right anterior lower forearm.  Small wound noted on right foot fifth toe.  Small wound also noted on right foot great toe with some slough in it and scaly skin around it.  Some redness noted on sacrum/coccyx.  Reviewed pictures of wounds in Epic. Monitor skin under the left transfemoral amputation prosthesis.Noted input from dermal defense nurse on 11/21/2024.  Reviewed pictures in Epic.      11. F/E/N - on Ferrous sulfate.  On vitamin C. on MVI.  On K-Genesis con.     12. Hyperlipidemia -on Lipitor.     13. Diabetes mellitus type 2 - on Lantus insulin.  On sliding scale Humalog coverage.  On Jardiance.     14. Oncology - On Gleevec for chronic myelocytic leukemia.     15. Hypothyroidism - on Synthroid.     16. Insomnia - on Melatonin.     17. Infectious diseases - On oral Vancomycin.        18. Seasonal allergies - on PRN Flonase.     19. Psychiatry - mood stable.  Psychology consulted.     20. Rehabilitation medicine - Continue comprehensive rehabilitation care. Continue PT, OT, speech, psychology.  We will follow falls precautions, cardiac precautions, monitor pulse oximetry in therapy and follow weightbearing precautions.  We will follow spinal precautions as appropriate.Tolerating therapies per endurance on 11/20/2024.  Slept well last night.   Working on donning and doffing of prosthesis.  Discussed with the importance of skin checks on the prosthesis.  Able to ambulate 7 feet in parallel bars dependent gait using left transfemoral amputation prosthesis with physical therapy.  Dependent for sit to stand transfers with physical therapy on 11/20/2024. Noted input from dermal defense nurse on 11/21/2024.  Reviewed pictures in Epic.  Working on ADLs with occupational therapy on 11/21/2024.  Requiring minimal assistance for bathing, dependent for toileting, close supervision for poorly dressing and dependent for lower body dressing with occupational therapy.  She requested a flu shot.  Discussed with nurse.  Continent of bowel and bladder for nursing on 11/21/2024.Dependent for sit to stand transfers with physical therapy on 11/22/2024.  Able to ambulate 15 feet with thoracic walker dependent gait with physical therapy on 11/22/2024.  Skin under prosthesis is stable.  Denies pain today.  She says she got flu shot yesterday.Feels better on 11/23/2024.  Denies increased pain.  She says that she took her medications after meals and did not have any GI symptoms today.  Continent of bowel and bladder for nursing.  Working on donning and doffing of prosthesis, sock ply management with occupational therapy on 11/23/2024. Due to elevated blood pressure, internist restarted Amlodipine and Losartan on 11/25/2024.  Denies increased pain.  Continent of bowel and bladder for nursing on 11/25/2024.Skin on left  transfemoral amputation residual limb remains stable.  Discussed with patient.  Discussed with nurse on 11/25/2024.Discussed patients progress in therapies with therapists in team meeting on 11/26/2024.  Discussed in PCC. See PCC documentation.  Face-to-face evaluation was done for a right carbon fiber toe off brace on 11/26/2024. Ms. Bebe Burks has a history of left transfemoral amputation and history of diabetic neuropathy with prolonged hospitalization causing deconditioning and she has a right foot drop . She requires a right carbon fiber toe off brace to improve toe clearance in swing and knee extension support in stance. This brace is necessary to decrease risk for falling in the home and the community. This condition is expected to last more than 6 months.  Discussed with patient.  Discussed with physical therapist on 11/26/2024.     21. Reviewed labs today.  BUN 35, creatinine 0.9, sodium 139, potassium 3.7 on 11/20/2024.           Alexei Petty MD  11/26/2024      This encounter was completed utilizing the Direct Speech Voice Recognition Software. Grammatical errors, random word insertions, pronoun errors, and incomplete sentences are occasional consequences of the system due to software limitations, ambient noises, and hardware issues. Such errors may be missed prior to affixing electronic signature. Any questions or concerns about the content, text, or information contained within the body of this dictation should be directly addressed to the physician for clarification. If you have any questions or concerns please do not hesitate to call me directly via EPIC chat, page, or email.

## 2024-11-26 NOTE — PROGRESS NOTES
Patient: Bebe Burks  Location: [facility] [address]  MRN: [id number]  Today's date: 11/26/2024    History of Present Illness  Bebe is a 64 y.o. female admitted on 11/19/2024 with History of above knee amputation, left (CMS/[facility]) [Z89.612]  Encounter for prosthetic gait training [Z47.89]. Principal problem is History of above knee amputation, left (CMS/[facility]).    Pt is a 64 y.o. 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       Past Medical History  Bebe has a past medical history of Abnormal ECG, Abnormal finding on chest xray (12/2021), Arthritis, Colon cancer (CMS/[facility]), COVID-19 (12/2021), COVID-19 vaccine series completed, Diabetic neuropathy (CMS/[facility]), DM (diabetes mellitus), type 2 with ophthalmic complications (CMS/[facility]), Hypertension, Hypothyroidism, Left foot drop, Lipid disorder, and Type 2 diabetes mellitus treated with insulin (CMS/[facility]).    PT Vitals      Date/Time Pulse BP BP Location BP Method Pt Position [facility]   11/26/24 1405 88 133/65 Left upper arm Automatic Sitting JG          PT Pain      Date/Time Pain Type Rating: Rest [facility]   11/26/24 1405 Pain Assessment 0 JG   11/26/24 1502 Pain Reassessment 0 JG               Prior Living Environment      Flowsheet Row Most Recent Value   People in Home alone   Current Living Arrangements condominium   Home Accessibility stairs to enter home (Group)   Living Environment Comment resides in 1st floor condo, 4 (1+2+1) JOSE RAFAEL, R hand rail, no stairs inside home, Bathroom Layout: Tub/shower unit with shower curtain, Shower stall with glass doors (shower stall in main bedroom), two sisters can provide some assistance   Number of Stairs, Main Entrance 4   Surface of Stairs, Main Entrance concrete   Stair Railings, Main Entrance railing on right side (ascending)   Stairs Comment, Main Entrance 1+2+1 JOSE RAFAEL   Location, Bathroom first (main) floor   Bathroom Access tub  bath, walk-in shower            Prior Level of Function      Flowsheet Row Most Recent Value   Dominant Hand right   Ambulation assistive equipment   Transferring assistive equipment   Toileting independent   Bathing assistive equipment   Dressing independent   Eating independent   IADLs assistive equipment   Driving/Transportation    Communication understands/communicates without difficulty   Assistive Device Currently Used at Home cane, straight, walker, front-wheeled, shower chair, grab bar, raised toilet             IRF PT Evaluation and Treatment - 11/26/24 1405          PT Time Calculation    Start Time 1405     Stop Time 1505     Time Calculation (min) 60 min        Session Details    Document Type Daily Treatment/Progress Note        General Information    General Observations of Patient handoff from RN in patient bathroom after toileting completed with OT; reports of fatigue but motivated to participate in upright activities with prosthesis        Mobility Belt    Mobility Belt Used During Session yes        Orthotics    Orthotics (Trigger Row) Orthosis trial     Orthosis Trial R toe off brace donned        Bed Mobility    [place], Supine to Sit touching/steadying assist     [place], Sit to Supine close supervision     Safety/Cues increased time to complete     Assistive Device none     Comment supine to sit, steadying assistance at trunk due to increased fatigue; sit to supine, Cl S        Transfers    Transfers stand pivot transfer;low pivot transfer     Maintains Weight-Bearing Status able to maintain        Sit to Stand Transfer    [place], Sit to Stand Transfer moderate assist (50-74% patient effort);1 person assist     Safety/Cues technique;sequencing     Assistive Device walker, front-wheeled;parallel bars     Comment sit <> stand to RW, mod A for LLE hip/knee extension at prosthesis; sit <> stand to // bars, min A for hip extension assistance and ensuring L  prosthetic knee locks        Stand to Sit Transfer    [place], Stand to Sit Transfer moderate assist (50-74% patient effort)     Safety/Cues technique;sequencing     Assistive Device walker, front-wheeled     Comment much improved consistency with unlocking prosthetic knee ; shifting weight to RLE, bringing LLE out in front and michael gup on toggle; mod A for pelvic stability and eccentric control        Low Pivot Transfer    [place], Low Pivot Transfer supervision     Safety/Cues sequencing;technique     Assistive Device wheelchair     Comment LPT from wheelchair <> mat, S for verbal cueing of wheelchair placement        Stand Pivot Transfer    [place], Stand Pivot/Stand Step Transfer moderate assist (50-74% patient effort);1 person assist     Safety/Cues technique;sequencing     Assistive Device walker, front-wheeled     Comment SPT to the left, mod A for pelvic stability and assistance with prosthetic placement, improving control going to left        Gait Training    [place], Gait moderate assist (50-74% patient effort);1 person assist     Safety/Cues sequencing;technique     Assistive Device parallel bars     Distance in Feet 10 feet     Pattern step-through     Deviations/Abnormal Patterns base of support, narrow;gait speed decreased     Left Sided Gait Deviations hip circumduction     Right Sided Gait Deviations heel strike decreased     Comment Ambulated in // bars 10' x 2 (pt stands at end of bars with PT retreiving wheelchair to sit vs. turning around); min/mod A for prostheic placement adn assistance with weightshifting        Balance    Balance Interventions standing     Comment, Balance standing in // bars: reaching with RUE up overhead with clothespin activity 2  x 12 (~1 minute of standing), steadying at hips to maintain upright position; reaching with LUE across body + clothespin activity x 12 reps, steadying for balance        Lower Extremity (Therapeutic Exercise)    Exercise  Position/Type supine;static stretching     Reps and Sets 3 x 1 minute     Comment Supine ther ex without prosthesis at end of session: R hamstring stretch, 3 x 1 minute; R gastorc stretch, 3 x 30 seconds; L hip extension stretch, x 2 minutes, adductor stretch, x 2 minute holds        Prosthetic Orientation (Lower Extremity)    Fit Assessment fit/function of prosthesis are appropriate     Comment, Fit Assessment prosthesis donned from OT session; removed on edge of mat with min A; sock ply = 3 ply; wearing shcedule 2 hours, recommending trialing progressing to 3 hours if able; skin check = unremarkable        Daily Progress Summary (PT)    Daily Outcome Statement Pt with increased fatigue after toileting in OT session but able to complete standing and ambulation in // bars. Tolerated supine ther ex/stretching well and recommending continued stretching and core-strengthening in addition to prosthetic training activities. Progress as able.                               IRF PT Goals      Flowsheet Row Most Recent Value   Transfer Goal 1    Activity/Assistive Device stand pivot, walker, front-wheeled at 11/19/2024 1431   [place] moderate assist (50-74% patient effort) at 11/26/2024 0855   Time Frame short-term goal (STG), 5 - 7 days at 11/26/2024 0855   Progress/Outcome goal met, goal ongoing at 11/26/2024 0855   Transfer Goal 2    Activity/Assistive Device stand pivot, walker, front-wheeled at 11/19/2024 1431   [place] minimum assist (75% or more patient effort) at 11/26/2024 0855   Time Frame 14 days or less, long-term goal (LTG) at 11/26/2024 0855   Progress/Outcome goal revised this date, goal ongoing, goal met at 11/26/2024 0855   Transfer Goal 3    Activity/Assistive Device sit-to-stand/stand-to-sit, walker, front-wheeled at 11/19/2024 1431   [place] minimum assist (75% or more patient effort) at 11/26/2024 0855   Time Frame short-term goal (STG), 5 - 7 days at 11/26/2024 0855   Progress/Outcome goal  met, goal revised this date at 11/26/2024 0855   Transfer Goal 4    Activity/Assistive Device sit-to-stand/stand-to-sit, walker, front-wheeled at 11/19/2024 1431   [place] tactile cues required at 11/26/2024 0855   Time Frame long-term goal (LTG), 14 days or less at 11/26/2024 0855   Progress/Outcome goal met, goal revised this date at 11/26/2024 0855   Gait/Walking Locomotion Goal 1    Activity/Assistive Device gait (walking locomotion), walker, front-wheeled at 11/19/2024 1431   Distance 10 feet at 11/19/2024 1431   [place] moderate assist (50-74% patient effort) at 11/26/2024 0855   Time Frame short-term goal (STG), 5 - 7 days at 11/26/2024 0855   Strategies/Barriers 50 at 11/26/2024 0855   Progress/Outcome goal revised this date, goal ongoing at 11/26/2024 0855   Gait/Walking Locomotion Goal 2    Activity/Assistive Device gait (walking locomotion), assistive device use, walker, front-wheeled at 11/26/2024 0855   Distance 50 feet at 11/26/2024 0855   [place] minimum assist (75% or more patient effort) at 11/26/2024 0855   Time Frame long-term goal (LTG), 14 days or less at 11/26/2024 0855   Wheelchair Locomotion Goal 1    Activity forward propulsion, backward propulsion, steering, stopping, turning, doorway navigation, weight shifting at 11/19/2024 1431   Assistive Device manual, ultra lightweight at 11/19/2024 1431   Distance 150 feet at 11/19/2024 1431   [place] other (see comments)  [Touching / Steadying Assist] at 11/19/2024 1431   Time Frame 1 week, short-term goal (STG) at 11/19/2024 1431   Progress/Outcome goal met at 11/26/2024 0855   Wheelchair Locomotion Goal 2    Activity forward propulsion, backward propulsion, steering, stopping, turning, doorway navigation, weight shifting at 11/19/2024 1431   Assistive Device manual, ultra lightweight at 11/19/2024 1431   Distance 150 feet at 11/19/2024 1431   [place] modified independence at 11/19/2024 1431   Time Frame 21 days or less,  long-term goal (LTG) at 11/19/2024 1431   Progress/Outcome goal ongoing at 11/26/2024 [id number]

## 2024-11-26 NOTE — PLAN OF CARE
Plan of Care Review  Plan of Care Reviewed With: patient  Progress: improving  Outcome Evaluation: Patient AAOx4, pleasant, cooperative. Safety precautions maintained. Medications reviewed. Appetite good. Cont of bladder. Showered today. Foam on R lateral foot for protection. QID accucheck. Pain managed with PRN Tylenol. Patient able to make needs known, call bell within reach. No nursing concerns at the moment.

## 2024-11-26 NOTE — PLAN OF CARE
Problem: Rehabilitation ([facility]) Plan of Care  Goal: Plan of Care Review  Flowsheets (Taken 11/26/2024 1200)  Plan of Care Reviewed With: patient  Outcome Evaluation: met with pt to give team report. she supports goals, plan and elos of 12/10. PT to take pt on home eval to determine if pt goes to her sisters or her own home at [place]. will use [facility] at [place]. oniel moncada. cm to follow.

## 2024-11-26 NOTE — PLAN OF CARE
Plan of Care Review  Plan of Care Reviewed With: patient  Progress: improving  Outcome Evaluation: Meds reviewed, phantom pain managed wtih Dilaudid. Continent of bladder, one episode of bowel incontinence, skin care prn. Glucose monitored TID, asymptomatic, HS snack provided. Safety reviewed, call bell in reach. Sleeping well. Continue POC.

## 2024-11-26 NOTE — PATIENT CARE CONFERENCE
Inpatient Rehabilitation Team Conference Note  Date: 11/26/2024  Time: 10:43 AM       Patient Name: Bebe Burks     Medical Record Number: [id number]   YOB: 1960  Sex: Female      Room/Bed: 109/109W  Payor Info: Payor: MEDICARE / Plan: MEDICARE PART A & B / Product Type: Medicare /     Admitting Diagnosis: History of above knee amputation, left (CMS/HCC) [Z89.612]  Encounter for prosthetic gait training [Z47.89]   Admit Date/Time: 11/19/2024 12:06 PM    Principal Problem: History of above knee amputation, left (CMS/HCC)    Patient Active Problem List    Diagnosis Date Noted    History of above knee amputation, left (CMS/HCC) 11/19/2024    Encounter for prosthetic gait training 11/19/2024    Type 2 diabetes mellitus with diabetic neuropathy (CMS/HCC) 03/01/2024    Hypothyroidism 03/01/2024    Primary hypertension 03/01/2024    Leukemoid reaction 03/01/2024    HLD (hyperlipidemia) 03/01/2024    Acute osteomyelitis of left foot (CMS/HCC) 03/01/2024    Type 2 diabetes mellitus with left diabetic foot infection (CMS/HCC) 02/29/2024    H/O pilonidal cyst 01/19/2024    Tibial artery stenosis, left (CMS/HCC) 01/18/2024    Chronic idiopathic constipation 01/17/2024    Gangrene (CMS/HCC) 01/17/2024    Sepsis (CMS/HCC) 01/15/2024    Osteomyelitis of left foot (CMS/HCC) 01/15/2024    Peripheral neuropathy 01/15/2024    Abdominal pain 01/15/2024    S/P total knee arthroplasty, right 04/03/2023    Osteoarthritis of right knee, unspecified osteoarthritis type 02/28/2023    Pre-op examination 02/23/2023    Localized osteoarthritis of right knee 02/23/2023    Type 2 diabetes mellitus treated with insulin (CMS/HCC)     Abnormal finding on EKG     Left foot drop     Displacement of lumbar intervertebral disc without myelopathy 02/22/2023    Disability of walking 02/22/2023    Polyneuropathy due to type 2 diabetes mellitus (CMS/HCC) 02/22/2023    COVID-19 virus infection 12/02/2021    Lung nodule 12/02/2021     Abnormal finding on chest xray 12/2021    Controlled type 2 diabetes mellitus, with long-term current use of insulin (CMS/[facility]) 10/03/2014    Essential hypertension 10/03/2014    Hyperlipidemia 10/03/2014    Hypothyroidism 10/03/2014    Obesity 10/03/2014    Thyroid nodule 10/03/2014       Premorbid Status:  Prior Level of Function      Flowsheet Row Most Recent Value   Dominant Hand right   Ambulation assistive equipment   Transferring assistive equipment   Toileting independent   Bathing assistive equipment   Dressing independent   Eating independent   IADLs assistive equipment   Driving/Transportation    Communication understands/communicates without difficulty   Assistive Device Currently Used at Home cane, straight, walker, front-wheeled, shower chair, grab bar, raised toilet            Current Functional Status:  Mobility  Gait  [place], Gait: dependent (less than 25% patient effort); 2 person assist  Assistive Device: walker, front-wheeled  Comment: Ambulated 35' x 2 with evawalker, min/mod A for L prosthetic foot placement and hip extension assistance + assist of 2nd for evawalker management; Ambulated 30' with RW, mod a of 1 psoterior for L prosthetic placement, facilitating anterior WS and min A of 2nd at RLE for RW management and balance    Stairs  [place], Stairs: unable to assess  Comment: Unable to assess due to activity being unsafe to assess as a result of pt's decreased strength, impaired balance and impaired postural control / stability.    Wheelchair  Method of Locomotion: bimanual (upper extremity) propulsion  Functional Mobility Training: forward propulsion; backward propulsion; steering; stopping; turning; doorway navigation; weight shifting  [place], Forward Propulsion: distant supervision  [place], Backward Propulsion: minimum assist (75% or more patient effort)  [place], Steering Activities: distant supervision  [place], Stopping Activities:  distant supervision  [employer], Turning Activities: supervision  [employer], Doorway Navigation: minimum assist (75% or more patient effort)  [employer], Weight Shifting: modified independence  Distance Propelled in Feet: 200 feet  Comment, Wheelchair Mobility: Propels self to exit at end of session; expresses interest in obtaining WC license in future    Transfers  Bed Mobility  [employer], Roll Left: modified independence  [employer], Roll Right: modified independence  [employer], Supine to Sit: touching/steadying assist  [employer], Sit to Supine: modified independence  Safety/Cues: increased time to complete  Assistive Device: none  Comment: improved momentum for supine to sit, steayding for trunk mid transfer; sit to supine, mod I    Bed to Chair Transfer  [employer], Bed to Chair: touching/steadying assist; 1 person assist (from EOM to w/c.)  Safety/Cues: increased time to complete; minimal; hand placement; verbal cues; preparatory posture; technique  Assistive Device: wheelchair  Comment: Steady A x 1 LPT from EOM to w/c with L prosthesis donned. Pt. educated on technique (nose over knees/toes to lift IT and prevent shearing of buttocks).    Chair to Bed Transfer  [employer], Chair to Bed: supervision  Safety/Cues: sequencing; technique  Assistive Device: none  Comment: LPT from wheelchair <> mat, S for set up of wheelchair and verbal cueing    Sit to Stand Transfer  [employer], Sit to Stand Transfer: moderate assist (50-74% patient effort)  Safety/Cues: sequencing; technique  Assistive Device: walker, front-wheeled  Comment: sit <> stand to RW, mod A for hip extension and to ensure L prosthetic knee extension    Stand to Sit Transfer  [employer], Stand to Sit Transfer: moderate assist (50-74% patient effort); 1 person assist  Safety/Cues: sequencing; technique  Assistive Device: walker, front-wheeled  Comment: mod A for stand to sit; improved technique with cueings for 1. bring L  prosthesis out in front, shift to the right, take L hand off and pull up on toggle, sit down    Low Pivot Transfer  [place], Low Pivot Transfer: supervision  Safety/Cues: increased time to complete  Assistive Device: wheelchair  Comment: Incremental LPT W/C to/from mat    Stand Pivot Transfer  [place], Stand Pivot/Stand Step Transfer: dependent (less than 25% patient effort); 2 person assist  Safety/Cues: technique; sequencing  Assistive Device: walker, front-wheeled  Comment: SPT from wheelchair <> mat, mod A of 1 for \RLE stance control and intermittent assistance with prosthetic placement (difficulty bringing L prosthetic back underneath BINTA) + SBA of 2nd for safety    Car Transfer  [place]: unable to assess  Comment: Unable to assess due to activity being unsafe to assess as a result of pt's decreased strength, impaired balance and impaired postural control / stability.      Toilet Transfer  [place]: dependent (less than 25% patient effort)  Safety/Cues: technique; hand placement  Comment: pull to stand with prosthetic donned with 2 people and assist for WC/ commode switch    Shower Transfer  Transfer Technique: low pivot  [place]: minimum assist (75% or more patient effort)  Safety/Cues: increased time to complete  Assistive Device: grab bars/tub rail; tub bench; wheelchair  Comment: Incremental LPT w/c to/from tub bench; min A for balance without prosthetic      Self Care  Bathing  [place]: minimum assist (75% or more patient effort)  Safety/Cues: increased time to complete  Setup Assistance: adaptive equipment setup; adjust water temperature/flow; obtain supplies  Adaptive Equipment: grab bar/tub rail; hand-held shower spray hose; tub bench  Comment: per last trial    Upper Body Dressing  Tasks: don; bra/undergarment; pull over garment  [place]: moderate assist (50-74% patient effort)  Safety/Cues: increased time to complete  Adaptive Equipment: none  Comment: mod A to stand  to gather clothing, S to don    Lower Body Dressing  Tasks: don; pants/bottoms; shoes/slippers; socks; underwear  [place]: maximum assist (25-49% patient effort)  Safety/Cues: increased time to complete  Adaptive Equipment: none  Comment: Pt able to stand with mod A to gather clothing , able to thread LE, Mod A to stand and to trial to hike, assist to fully hike. Mod A to don prosthetic    Grooming  Tasks: oral care (brushing teeth, cleaning dentures)  [place]: modified independence  Safety/Cues: increased time to complete  Adaptive Equipment: none  Comment: in sitting for safety    Toileting  [place]: dependent (less than 25% patient effort)  Setup Assistance: adaptive equipment setup; change pad/brief  Adaptive Equipment: commode, 3-in-1  Comment: 2 person assist with prosthetic  donned to stand    Self-Feeding  No data recorded    Cognition  Cognition  Comment, Cognition: Pt observed to have decreased safety awareness; decreased problem-solving with ADL tasks      Communication       Frequency of Treatment (OT): 5-7 times per week  Intensity of Treatment (OT): 60-90 minutes per day  Frequency of Treatment (PT): 5-7 times per week  Intensity of Treatment (PT): 60-90 minutes per day          Weekly Outcome Summaries:  Weekly Progress Summary (PT)  Progress Toward Functional Goals (PT): progressing toward functional goals as expected  Weekly Outcome Statement: Pt progressing well with overall prosthetic management, with ability to julian with min A in supine position. She has progressed to min A/mod A for sit to stands to RW and mod A for SPT with RW and L prosthesis. Ambulating with evawalker and RW with mod A but progressing with overall anteiror WS and prosthetic control. Would benefit from continued IP rehab for ongoning progressing of independence with ambulation and mod I for wheelchair monbility. Recommending home assessment with patient .  Impairments Continuing to Limit Function: transfemoral  lower extremity amputation; impaired balance; decreased strength  Recommendations: home eval and family training    Weekly Progress Summary (OT)  Progress Toward Functional Goals (OT): progressing toward functional goals as expected  Weekly Outcome Statement: Pt is progressing with prosthetic training, decreased balance, endurance functional transfers, weight shifts, ADLs. Improving with transfers, continues to focus on these areas. plan for family training. Pt would continue to benefit from skilled OT services to increase indep, decrease caregiver burden and increase quality of life. Continue with POC  Impairments Continuing to Limit Function: abnormal muscle tone; decreased flexibility; decreased ROM; decreased strength; impaired balance; impaired coordination; impaired motor control; impaired postural control; impaired sensation; impaired safety awareness; pain; transfemoral lower extremity amputation  Recommendations: Continue with skilled OT services 5-7 days per week 60-90 mins per day        Problem Resolution:  Team Weekly Outcome Statement: oriented, room air, cont b&b, monitoring right foot, pain management  Comment, Barriers to Discharge: motor planning with right leg and weakness  Comment, Facilitators for Discharge: getting toe off for right leg, sister in for trng yesterday, do home eval, prosthetic trng/education, , amp group         Expected Level of Function  Self-Care: Independent  Sphincter Control: Independent  Transfers: Independent  Locomotion: Independent  Communication: Independent  Social Cognition: Independent      Goals/Support System:  Patient/Family Goals  Patient's Goals For Discharge: return home, take care of myself at home, return to all previous roles/activities  Family/Support System  Caregiver Engagement: advocates for the patient  Family/Support System Care: self-care encouraged  Caregiver Training  Caregiver(s) to be Trained: sibling(s)  Caregiver Training Plan: ambulation using  assistive device, transfer training  Comment, Caregiver Training Plan: Sister , Anna, attended PT session to observe currently moblity. Reviewed prosthetic management, she observed standing transfers and ambulation. PT issued home questionnaire and asked sister to take pictures of patients entrance and apartment. DIscussed disccharge goals of mod I at wheelchair level with intermittent use of prosthesis as safe/appropriate. Will continue with more formal training closer to discharge.    IRF PT Goals      Flowsheet Row Most Recent Value   Transfer Goal 1    Activity/Assistive Device stand pivot, walker, front-wheeled at 11/19/2024 1431   [place] moderate assist (50-74% patient effort) at 11/26/2024 0855   Time Frame short-term goal (STG), 5 - 7 days at 11/26/2024 0855   Progress/Outcome goal met, goal ongoing at 11/26/2024 0855   Transfer Goal 2    Activity/Assistive Device stand pivot, walker, front-wheeled at 11/19/2024 1431   [place] minimum assist (75% or more patient effort) at 11/26/2024 0855   Time Frame 14 days or less, long-term goal (LTG) at 11/26/2024 0855   Progress/Outcome goal revised this date, goal ongoing, goal met at 11/26/2024 0855   Transfer Goal 3    Activity/Assistive Device sit-to-stand/stand-to-sit, walker, front-wheeled at 11/19/2024 1431   [place] minimum assist (75% or more patient effort) at 11/26/2024 0855   Time Frame short-term goal (STG), 5 - 7 days at 11/26/2024 0855   Progress/Outcome goal met, goal revised this date at 11/26/2024 0855   Transfer Goal 4    Activity/Assistive Device sit-to-stand/stand-to-sit, walker, front-wheeled at 11/19/2024 1431   [place] tactile cues required at 11/26/2024 0855   Time Frame long-term goal (LTG), 14 days or less at 11/26/2024 0855   Progress/Outcome goal met, goal revised this date at 11/26/2024 0855   Gait/Walking Locomotion Goal 1    Activity/Assistive Device gait (walking locomotion), walker, front-wheeled at 11/19/2024 1431    Distance 10 feet at 11/19/2024 1431   [place] moderate assist (50-74% patient effort) at 11/26/2024 0855   Time Frame short-term goal (STG), 5 - 7 days at 11/26/2024 0855   Strategies/Barriers 50 at 11/26/2024 0855   Progress/Outcome goal revised this date, goal ongoing at 11/26/2024 0855   Gait/Walking Locomotion Goal 2    Activity/Assistive Device gait (walking locomotion), assistive device use, walker, front-wheeled at 11/26/2024 0855   Distance 50 feet at 11/26/2024 0855   [place] minimum assist (75% or more patient effort) at 11/26/2024 0855   Time Frame long-term goal (LTG), 14 days or less at 11/26/2024 0855   Wheelchair Locomotion Goal 1    Activity forward propulsion, backward propulsion, steering, stopping, turning, doorway navigation, weight shifting at 11/19/2024 1431   Assistive Device manual, ultra lightweight at 11/19/2024 1431   Distance 150 feet at 11/19/2024 1431   [place] other (see comments)  [Touching / Steadying Assist] at 11/19/2024 1431   Time Frame 1 week, short-term goal (STG) at 11/19/2024 1431   Progress/Outcome goal met at 11/26/2024 0855   Wheelchair Locomotion Goal 2    Activity forward propulsion, backward propulsion, steering, stopping, turning, doorway navigation, weight shifting at 11/19/2024 1431   Assistive Device manual, ultra lightweight at 11/19/2024 1431   Distance 150 feet at 11/19/2024 1431   [place] modified independence at 11/19/2024 1431   Time Frame 21 days or less, long-term goal (LTG) at 11/19/2024 1431   Progress/Outcome goal ongoing at 11/26/2024 0855          IRF OT Goals      Flowsheet Row Most Recent Value   Transfer Goal 1    Activity/Assistive Device toilet at 11/20/2024 0906   [place] maximum assist (25-49% patient effort) at 11/20/2024 0906   Time Frame short-term goal (STG), 5 - 7 days at 11/26/2024 1000   Progress/Outcome goal ongoing at 11/26/2024 1000   Transfer Goal 2    Activity/Assistive Device toilet at 11/20/2024 0962    [place] minimum assist (75% or more patient effort) at 11/20/2024 0906   Time Frame long-term goal (LTG), 21 days or less at 11/20/2024 0906   Progress/Outcome goal ongoing at 11/26/2024 1000   Transfer Goal 3    Activity/Assistive Device shower at 11/20/2024 0906   [place] maximum assist (25-49% patient effort) at 11/20/2024 0906   Time Frame short-term goal (STG), 5 - 7 days at 11/26/2024 1000   Progress/Outcome goal ongoing at 11/26/2024 1000   Transfer Goal 4    Activity/Assistive Device shower at 11/20/2024 0906   [place] minimum assist (75% or more patient effort) at 11/20/2024 0906   Time Frame long-term goal (LTG), 21 days or less at 11/20/2024 0906   Progress/Outcome goal ongoing at 11/26/2024 1000   Bathing Goal 1    [place] tactile cues required at 11/20/2024 0906   Time Frame short-term goal (STG), 5 - 7 days at 11/26/2024 1000   Progress/Outcome goal ongoing at 11/26/2024 1000   Bathing Goal 2    [place] modified independence at 11/20/2024 0906   Time Frame long-term goal (LTG), 21 days or less at 11/20/2024 0906   Progress/Outcome goal ongoing at 11/26/2024 1000   UB Dressing Goal 1    [place] minimum assist (75% or more patient effort) at 11/26/2024 1000   Time Frame short-term goal (STG), 5 - 7 days at 11/26/2024 1000   Strategies/Barriers with clothing retrieval at 11/20/2024 0906   Progress/Outcome goal ongoing at 11/26/2024 1000   UB Dressing Goal 2    [place] minimum assist (75% or more patient effort) at 11/20/2024 0906   Time Frame long-term goal (LTG), 21 days or less at 11/20/2024 0906   Strategies/Barriers with clothing retrieval at 11/20/2024 0906   Progress/Outcome goal ongoing at 11/26/2024 1000   LB Dressing Goal 1    [place] minimum assist (75% or more patient effort) at 11/26/2024 1000   Time Frame short-term goal (STG), 5 - 7 days at 11/26/2024 1000   Strategies/Barriers with clothing retrieval at 11/20/2024 0906   Progress/Outcome goal  ongoing at 11/26/2024 1000   LB Dressing Goal 2    [place] minimum assist (75% or more patient effort) at 11/20/2024 0906   Time Frame long-term goal (LTG), 21 days or less at 11/20/2024 0906   Strategies/Barriers with clothing retrieval at 11/20/2024 0906   Progress/Outcome goal ongoing at 11/26/2024 1000   Grooming Goal 1    [place] moderate assist (50-74% patient effort) at 11/20/2024 0906   Time Frame short-term goal (STG), 5 - 7 days at 11/26/2024 1000   Strategies/Barriers in stance at sink at 11/20/2024 0906   Progress/Outcome goal ongoing at 11/26/2024 1000   Grooming Goal 2    [place] supervision required at 11/20/2024 0906   Time Frame long-term goal (LTG), 21 days or less at 11/20/2024 0906   Strategies/Barriers in stance at sink at 11/20/2024 0906   Progress/Outcome goal ongoing at 11/26/2024 1000   Toileting Goal 1    [place] maximum assist (25-49% patient effort) at 11/20/2024 0906   Time Frame short-term goal (STG), 5 - 7 days at 11/26/2024 1000   Progress/Outcome goal ongoing at 11/26/2024 1000   Toileting Goal 2    [place] minimum assist (75% or more patient effort) at 11/20/2024 0906   Time Frame long-term goal (LTG), 21 days or less at 11/20/2024 0906   Progress/Outcome goal ongoing at 11/26/2024 1000            Discharge Planning:  Anticipated Discharge Disposition: family member's home with services  Type of Home Care Services: home PT;home OT  Home Access/Discharge Environment  Identified Home Modification Needs: wc, ramp  Equipment/Device Needs at Discharge  Assistive Device/Animal Currently Used at Home: cane, straight, walker, front-wheeled, shower chair, grab bar, raised toilet    Anticipated Discharge Date: 12/10/2024    Needs Identified:       Team Members Present:     Rehab Attending Present:  Alexei Petty MD    Care Coordinator Present:  Cherry Del [place] LSW    Nurse Present:  Coreen Davenport RN    OT Present:  Kristy Quinones OT    PT  Present:  Paulette Gamble, PT    Psychologist Present:  Anna Cody PSY.D        Next Team Conference Date: 12/03/24

## 2024-11-26 NOTE — PROGRESS NOTES
Patient: Bebe Burks  Location: [facility]  MRN: [id number]  Today's date: 11/26/2024    History of Present Illness  Bebe is a 64 y.o. female admitted on 11/19/2024 with History of above knee amputation, left (CMS/[facility]) [Z89.612]  Encounter for prosthetic gait training [Z47.89]. Principal problem is History of above knee amputation, left (CMS/[facility]).    Pt is a 64 y.o. 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       Past Medical History  Bebe has a past medical history of Abnormal ECG, Abnormal finding on chest xray (12/2021), Arthritis, Colon cancer (CMS/[facility]), COVID-19 (12/2021), COVID-19 vaccine series completed, Diabetic neuropathy (CMS/[facility]), DM (diabetes mellitus), type 2 with ophthalmic complications (CMS/[facility]), Hypertension, Hypothyroidism, Left foot drop, Lipid disorder, and Type 2 diabetes mellitus treated with insulin (CMS/[facility]).    PT Vitals      Date/Time Pulse BP BP Location BP Method Pt Position [facility]   11/26/24 1002 85 133/61 Left upper arm Automatic Sitting JG          PT Pain      Date/Time Pain Type Rating: Rest [facility]   11/26/24 1002 Pain Assessment 0 JG   11/26/24 1059 Pain Reassessment 0 JG               Prior Living Environment      Flowsheet Row Most Recent Value   People in Home alone   Current Living Arrangements condominium   Home Accessibility stairs to enter home (Group)   Living Environment Comment resides in 1st floor condo, 4 (1+2+1) JOSE RAFAEL, R hand rail, no stairs inside home, Bathroom Layout: Tub/shower unit with shower curtain, Shower stall with glass doors (shower stall in main bedroom), two sisters can provide some assistance   Number of Stairs, Main Entrance 4   Surface of Stairs, Main Entrance concrete   Stair Railings, Main Entrance railing on right side (ascending)   Stairs Comment, Main Entrance 1+2+1 JOSE RAFAEL   Location, Bathroom first (main) floor   Bathroom Access tub  bath, walk-in shower            Prior Level of Function      Flowsheet Row Most Recent Value   Dominant Hand right   Ambulation assistive equipment   Transferring assistive equipment   Toileting independent   Bathing assistive equipment   Dressing independent   Eating independent   IADLs assistive equipment   Driving/Transportation    Communication understands/communicates without difficulty   Assistive Device Currently Used at Home cane, straight, walker, front-wheeled, shower chair, grab bar, raised toilet             IRF PT Evaluation and Treatment - 11/26/24 1002          PT Time Calculation    Start Time 1000     Stop Time 1100     Time Calculation (min) 60 min        Session Details    Document Type Daily Treatment/Progress Note        General Information    General Observations of Patient Care also provided by Jane Squires PT, DPT        Orthotics    Orthotics (Trigger Row) Orthosis trial     Orthosis Trial Assessed in brace rounds for R toe off brace. [place] to order toe off brace and deliver prior to dishcarge. Continue with trial brace for upright mobility at this time        Bed Mobility    [place], Supine to Sit close supervision     [place], Sit to Supine close supervision     Safety/Cues increased time to complete     Assistive Device none     Comment sit <> supine on/off mat, Cl S with improved supine to sit with momentum        Transfers    Transfers low pivot transfer;stand pivot transfer     Maintains Weight-Bearing Status able to maintain        Sit to Stand Transfer    [place], Sit to Stand Transfer moderate assist (50-74% patient effort)     Safety/Cues sequencing;technique     Assistive Device walker, front-wheeled     Comment sit <> stand to RW, mod A for LLE hip extension and assist with locking prosthetic knee unit        Stand to Sit Transfer    [place], Stand to Sit Transfer moderate assist (50-74% patient effort);1 person assist     Safety/Cues  sequencing;technique     Assistive Device walker, front-wheeled     Comment improved sequence with standing upright, bringing L prosthesis slightly out in front, taking LUE and pulling up on toggle to release knee and then sit into chair (make sure wc is close behind patient, we have not trialed reaching back for chair prior to sitting yet)        Low Pivot Transfer    [place], Low Pivot Transfer supervision     Safety/Cues sequencing;technique     Assistive Device wheelchair     Comment incremental LPT from wheelchair <> mat, S for verbal cueing and positioning        Stand Pivot Transfer    [place], Stand Pivot/Stand Step Transfer maximum assist (25-49% patient effort);1 person assist     Safety/Cues technique;sequencing     Assistive Device walker, front-wheeled     Comment SPT from wheelchair <> mat, max A of 1 , initial stand min/mod A but required max A for stability due to decreased prosthetic control with foot placement causing increased step and difficulty with recovering, assist for completing turn and sitting        Gait Training    [place], Gait dependent (less than 25% patient effort);2 person assist     Safety/Cues technique;sequencing     Assistive Device walker, front-wheeled     Distance in Feet 30 feet     Pattern step-through     Deviations/Abnormal Patterns base of support, narrow;gait speed decreased;step length decreased     Left Sided Gait Deviations hip circumduction     Right Sided Gait Deviations heel strike decreased   decreased R knee extension    Comment Ambulated 30' with evawalker, mod A of 1 posteriorly for LLE prosthetic placement and weightshifting assitance + assist of 2nd for evawalker management; Ambulating 10' x 2 in // bars, min A of 1 at prosthesis for placement and intermittent assistance with weightshifting; pt with slowed and controlled steps , focusing on weightshifting prior to stepping with contralateral limb        Motor Skills    Comment, Motor Skills 1)  "Standing at // bars with BUE support - RLE toe taps to 4\" block; PT Josefina at pelvis and L knee for controlled WS, hip extension, balance, and stance stability; VCs for technique with WS and proximal activiation; 2x8        Prosthetic Orientation (Lower Extremity)    Fit Assessment fit/function of prosthesis are appropriate     Comment, Fit Assessment Donned liner in supine, Cl S; donned prosthesis in supine - mod A for postiioning of socket and to help align pin into shuttle lock, pt able to use knob to pull self into socket completely; Sock ply  = 3 ply sock; wearing schedule = 2 hours, recommend increasing to 3 hours 11/27     Specific Gait Deviations toe stays off floor after heel strike        Daily Progress Summary (PT)    Daily Outcome Statement Pt with improving sit to stand transfers and consistency with unlocking prosthetic knee prior to sitting this session. Continue with standing balance in // bars and gait training, progressing as able                               IRF PT Goals      Flowsheet Row Most Recent Value   Transfer Goal 1    Activity/Assistive Device stand pivot, walker, front-wheeled at 11/19/2024 1431   [place] moderate assist (50-74% patient effort) at 11/26/2024 0855   Time Frame short-term goal (STG), 5 - 7 days at 11/26/2024 0855   Progress/Outcome goal met, goal ongoing at 11/26/2024 0855   Transfer Goal 2    Activity/Assistive Device stand pivot, walker, front-wheeled at 11/19/2024 1431   [place] minimum assist (75% or more patient effort) at 11/26/2024 0855   Time Frame 14 days or less, long-term goal (LTG) at 11/26/2024 0855   Progress/Outcome goal revised this date, goal ongoing, goal met at 11/26/2024 0855   Transfer Goal 3    Activity/Assistive Device sit-to-stand/stand-to-sit, walker, front-wheeled at 11/19/2024 1431   [place] minimum assist (75% or more patient effort) at 11/26/2024 0855   Time Frame short-term goal (STG), 5 - 7 days at 11/26/2024 0855 "   Progress/Outcome goal met, goal revised this date at 11/26/2024 0855   Transfer Goal 4    Activity/Assistive Device sit-to-stand/stand-to-sit, walker, front-wheeled at 11/19/2024 1431   [place] tactile cues required at 11/26/2024 0855   Time Frame long-term goal (LTG), 14 days or less at 11/26/2024 0855   Progress/Outcome goal met, goal revised this date at 11/26/2024 0855   Gait/Walking Locomotion Goal 1    Activity/Assistive Device gait (walking locomotion), walker, front-wheeled at 11/19/2024 1431   Distance 10 feet at 11/19/2024 1431   [place] moderate assist (50-74% patient effort) at 11/26/2024 0855   Time Frame short-term goal (STG), 5 - 7 days at 11/26/2024 0855   Strategies/Barriers 50 at 11/26/2024 0855   Progress/Outcome goal revised this date, goal ongoing at 11/26/2024 0855   Gait/Walking Locomotion Goal 2    Activity/Assistive Device gait (walking locomotion), assistive device use, walker, front-wheeled at 11/26/2024 0855   Distance 50 feet at 11/26/2024 0855   [place] minimum assist (75% or more patient effort) at 11/26/2024 0855   Time Frame long-term goal (LTG), 14 days or less at 11/26/2024 0855   Wheelchair Locomotion Goal 1    Activity forward propulsion, backward propulsion, steering, stopping, turning, doorway navigation, weight shifting at 11/19/2024 1431   Assistive Device manual, ultra lightweight at 11/19/2024 1431   Distance 150 feet at 11/19/2024 1431   [place] other (see comments)  [Touching / Steadying Assist] at 11/19/2024 1431   Time Frame 1 week, short-term goal (STG) at 11/19/2024 1431   Progress/Outcome goal met at 11/26/2024 0855   Wheelchair Locomotion Goal 2    Activity forward propulsion, backward propulsion, steering, stopping, turning, doorway navigation, weight shifting at 11/19/2024 1431   Assistive Device manual, ultra lightweight at 11/19/2024 1431   Distance 150 feet at 11/19/2024 1431   [place] modified independence at 11/19/2024 1431   Time  Frame 21 days or less, long-term goal (LTG) at 11/19/2024 1431   Progress/Outcome goal ongoing at 11/26/2024 8146

## 2024-11-27 ENCOUNTER — APPOINTMENT (INPATIENT)
Dept: PHYSICAL THERAPY | Facility: REHABILITATION | Age: 64
DRG: 560 | End: 2024-11-27
Payer: MEDICARE

## 2024-11-27 ENCOUNTER — APPOINTMENT (INPATIENT)
Dept: OCCUPATIONAL THERAPY | Facility: REHABILITATION | Age: 64
DRG: 560 | End: 2024-11-27
Payer: MEDICARE

## 2024-11-27 ENCOUNTER — APPOINTMENT (OUTPATIENT)
Dept: PSYCHOLOGY | Facility: CLINIC | Age: 64
End: 2024-11-27
Payer: MEDICARE

## 2024-11-27 PROBLEM — F43.22 ADJUSTMENT DISORDER WITH ANXIETY: Status: ACTIVE | Noted: 2024-11-27

## 2024-11-27 LAB
GLUCOSE BLD-MCNC: 107 MG/DL (ref 70–99)
GLUCOSE BLD-MCNC: 145 MG/DL (ref 70–99)
GLUCOSE BLD-MCNC: 252 MG/DL (ref 70–99)
POCT TEST: ABNORMAL

## 2024-11-27 PROCEDURE — 97116 GAIT TRAINING THERAPY: CPT | Mod: GP,CQ

## 2024-11-27 PROCEDURE — 97530 THERAPEUTIC ACTIVITIES: CPT | Mod: GO

## 2024-11-27 PROCEDURE — 97112 NEUROMUSCULAR REEDUCATION: CPT | Mod: GP,CQ

## 2024-11-27 PROCEDURE — 90853 GROUP PSYCHOTHERAPY: CPT | Performed by: PSYCHOLOGIST

## 2024-11-27 PROCEDURE — 63700000 HC SELF-ADMINISTRABLE DRUG: Performed by: INTERNAL MEDICINE

## 2024-11-27 PROCEDURE — 97535 SELF CARE MNGMENT TRAINING: CPT | Mod: GO

## 2024-11-27 PROCEDURE — 97530 THERAPEUTIC ACTIVITIES: CPT | Mod: GP,CQ

## 2024-11-27 PROCEDURE — 97110 THERAPEUTIC EXERCISES: CPT | Mod: GP,CQ

## 2024-11-27 PROCEDURE — 12800000 HC ROOM AND CARE SEMIPRIVATE REHAB

## 2024-11-27 RX ORDER — INSULIN LISPRO 100 [IU]/ML
1-12 INJECTION, SOLUTION INTRAVENOUS; SUBCUTANEOUS
Status: DISCONTINUED | OUTPATIENT
Start: 2024-11-27 | End: 2024-12-10 | Stop reason: HOSPADM

## 2024-11-27 RX ADMIN — LOSARTAN POTASSIUM 25 MG: 25 TABLET, FILM COATED ORAL at 16:52

## 2024-11-27 RX ADMIN — FUROSEMIDE 40 MG: 40 TABLET ORAL at 08:36

## 2024-11-27 RX ADMIN — THERA TABS 1 TABLET: TAB at 12:31

## 2024-11-27 RX ADMIN — INSULIN GLARGINE 14 UNITS: 100 INJECTION, SOLUTION SUBCUTANEOUS at 20:46

## 2024-11-27 RX ADMIN — GABAPENTIN 600 MG: 300 CAPSULE ORAL at 16:52

## 2024-11-27 RX ADMIN — INSULIN LISPRO 6 UNITS: 100 INJECTION, SOLUTION INTRAVENOUS; SUBCUTANEOUS at 17:12

## 2024-11-27 RX ADMIN — OXYCODONE HYDROCHLORIDE AND ACETAMINOPHEN 500 MG: 500 TABLET ORAL at 12:30

## 2024-11-27 RX ADMIN — APIXABAN 5 MG: 5 TABLET, FILM COATED ORAL at 20:44

## 2024-11-27 RX ADMIN — HYDROMORPHONE HYDROCHLORIDE 4 MG: 2 TABLET ORAL at 20:44

## 2024-11-27 RX ADMIN — FERROUS SULFATE TAB 325 MG (65 MG ELEMENTAL FE) 325 MG: 325 (65 FE) TAB at 12:31

## 2024-11-27 RX ADMIN — GABAPENTIN 600 MG: 300 CAPSULE ORAL at 20:43

## 2024-11-27 RX ADMIN — TAMSULOSIN HYDROCHLORIDE 0.4 MG: 0.4 CAPSULE ORAL at 16:52

## 2024-11-27 RX ADMIN — ONDANSETRON 4 MG: 4 TABLET, ORALLY DISINTEGRATING ORAL at 09:46

## 2024-11-27 RX ADMIN — INSULIN LISPRO 6 UNITS: 100 INJECTION, SOLUTION INTRAVENOUS; SUBCUTANEOUS at 12:32

## 2024-11-27 RX ADMIN — GABAPENTIN 600 MG: 300 CAPSULE ORAL at 08:35

## 2024-11-27 RX ADMIN — METHENAMINE HIPPURATE 1 G: 1000 TABLET ORAL at 20:43

## 2024-11-27 RX ADMIN — IMATINIB MESYLATE 400 MG: 400 TABLET, FILM COATED ORAL at 08:36

## 2024-11-27 RX ADMIN — FUROSEMIDE 40 MG: 40 TABLET ORAL at 16:52

## 2024-11-27 RX ADMIN — ACETAMINOPHEN 650 MG: 325 TABLET, FILM COATED ORAL at 09:45

## 2024-11-27 RX ADMIN — APIXABAN 5 MG: 5 TABLET, FILM COATED ORAL at 08:35

## 2024-11-27 RX ADMIN — Medication 3 MG: at 20:44

## 2024-11-27 RX ADMIN — POTASSIUM CHLORIDE 20 MEQ: 1500 TABLET, EXTENDED RELEASE ORAL at 12:31

## 2024-11-27 RX ADMIN — INSULIN LISPRO 6 UNITS: 100 INJECTION, SOLUTION INTRAVENOUS; SUBCUTANEOUS at 08:36

## 2024-11-27 RX ADMIN — INSULIN LISPRO 4 UNITS: 100 INJECTION, SOLUTION INTRAVENOUS; SUBCUTANEOUS at 12:32

## 2024-11-27 RX ADMIN — ATORVASTATIN CALCIUM 40 MG: 40 TABLET, FILM COATED ORAL at 16:55

## 2024-11-27 RX ADMIN — METHENAMINE HIPPURATE 1 G: 1000 TABLET ORAL at 08:35

## 2024-11-27 RX ADMIN — LEVOTHYROXINE SODIUM 25 MCG: 0.03 TABLET ORAL at 05:45

## 2024-11-27 RX ADMIN — DULOXETINE HYDROCHLORIDE 60 MG: 30 CAPSULE, DELAYED RELEASE ORAL at 20:43

## 2024-11-27 RX ADMIN — AMLODIPINE BESYLATE 2.5 MG: 2.5 TABLET ORAL at 08:35

## 2024-11-27 RX ADMIN — EMPAGLIFLOZIN 10 MG: 10 TABLET, FILM COATED ORAL at 08:36

## 2024-11-27 NOTE — GROUP NOTE
Date: 2024  Start Time:  1530   End Time:  1600  Number of Participants: 3    Group Type:  Amputee Group    Group Therapy    Summary of group:  The focus of today's session was coping with limb loss. There was one attendee who is a new amputee and 2 prosthetic limb trainers. Discussion themes included managing phantom limb sensations/pain, recovery timelines, and adjusting to wearing a prosthetic limb. Provider offered psychoeducation about phantom limb sensations, emotional changes after limb loss, and information on prosthetists.     Individual Participation: Bebe Burks, : 1960, was an active group participant. She was alert and engaged in group. She shared information regarding events leading to her surgery, as well as her therapy progress, mood, and coping. Pt stated that she almost refused having her limb amputated, and was disappointed that she ultimately required an above the limb amputation. Pt noted that she felt more confident today in the gym, as she wore pants and her prosthetic limb was hidden from others. Pt also provided support to other group members by validating their shared experiences of phantom limb pain, and informing others of the importance of skin checks and wearing a  to prepare for a prosthetic limb. Pt was receptive to support from provider and other group members. She was open to continuing  Amputee Group throughout her inpatient rehab stay.     Visit Diagnosis:  1. Edema, unspecified type    2. Adjustment disorder with anxiety [F43.22]        Plan: Pt to attend  Amputee Group next week.     Sarah Moncada, PhD  24 4:21 PM

## 2024-11-27 NOTE — PROGRESS NOTES
[facility] Progress Note          Patient was seen and examined at bedside.    Subjective:   Her BP on high side, historically she was on CCB and ARB, resume her amlodipine and losartan from lower dose, now her BP is fine.  Her BG levels are normal.   No O/N events. No new complaints. Her paresthetic training is progressing well.     Objective   Vital signs in last 24 hours:  Temp:  [36.7 °C (98 °F)-37.1 °C (98.7 °F)] 36.7 °C (98.1 °F)  Heart Rate:  [78-94] 78  Resp:  [18-20] 18  BP: (115-157)/(57-69) 157/69    No intake or output data in the 24 hours ending 11/27/24 1030    Intake/Output this shift:  No intake/output data recorded.   Review of Systems:  All other systems reviewed and negative except as noted in the HPI.   Objective      Labs  Reviewed her labs thoroughly   Lab Results   Component Value Date    WBC 4.98 11/20/2024    HGB 10.8 (L) 11/20/2024    HCT 32.7 (L) 11/20/2024    MCV 90.8 11/20/2024     11/20/2024     Lab Results   Component Value Date    GLUCOSE 205 (H) 11/20/2024    CALCIUM 8.7 11/20/2024     11/20/2024    K 3.7 11/20/2024    CO2 30 11/20/2024     11/20/2024    BUN 35 (H) 11/20/2024    CREATININE 0.9 11/20/2024       Imaging  N/A    Full Code    Physical Exam:  Head/Ear/Nose/Throat: normocephalic; atraumatic; moisture mouth mm, no oropharyngeal thrush noted.   Eyes: anicteric sclera, EOMI; PERRL.   Neck : supple, no JVD, no carotid bruits appeciated.   Respiratory: no evidence of labored breathing, lung sounds CTA b/l, good aeration bibasilar area, no w/r/c.   Cardiovascular: RRR; normal S1, S2; no m/r/g; no S3 or S4.   Gastrointestinal: soft; NT; BS normal; mildly distended; no CVAT b/l.   Genitourinary: no lozada.   Extremities : S/p L AKA, residual limb healed well .   Neurological: AO x 3, fluent speeches, following commands, CNS II-XII grossly intact; no focal neurologic deficits.   Behavior/Emotional: in NAD, appropriate; cooperative.    Skin: clean, dry and intact.  Plan of care was discussed with patient, RN, and PMR attending     Assessment     CC:L AKA, ambulatory dysfunction     64 y.o. 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 S/p IV Ceftriaxone and IV Ampicillin until 9/14/24. H/o non-occlusive DVT of the R axillary and brachial vein around midline. She [facility] to [facility] Am[facility]ee clinic on 11/14 for evaluation to begin prosthetic training. Now she was admitted for inpt prosthetic training on 11/19/24.  Denies nausea, vomiting, abdominal pain or discomfort, dysuria, cough/sputum, running nose, sore throat, chest pain, palpitation, SOB or orthopnea, dizziness or LH,  HA.    A/P:  # A/p L AKA  -inpt prosthetic training, wound care/dermal defense, pain and neuralgia managements, fall precaution     # CML  -cont home imatinib, check CBC      # Type 2 DM, hypothyroidism  - diet control, SSI, plus her home diabetic regimen;  - cont home levothyroxine     #  PICC-associated DVT on 8/30   -S/p SQ Lovenox weight based ;  -cont Eliquis 5 mg po bid      # Urinary retention, h/o recurrent UTI  -timed voiding with cic, adequate oral hydration, monitor PVR with cic, on flomax;  -hiprex 1 g po bid        # Stage 2 decubitus ulcer, pressure injury in right heel   -wound care     # Recent C.diff.  -cont prophylactic oral vanco 125 mg po bid       Billing code: [id number]  Diagnoses:  Patient Active Problem List   Diagnosis    Controlled type 2 diabetes mellitus, with long-term current use of insulin (CMS/[facility])    Essential hypertension    Hyperlipidemia    Hypothyroidism    Obesity    Thyroid nodule    COVID-19 virus infection    Lung nodule    Displacement of lumbar intervertebral disc without myelopathy     Disability of walking    Polyneuropathy due to type 2 diabetes mellitus (CMS/HCC)    Pre-op examination    Localized osteoarthritis of right knee    Type 2 diabetes mellitus treated with insulin (CMS/HCC)    Abnormal finding on EKG    Left foot drop    Abnormal finding on chest xray    Osteoarthritis of right knee, unspecified osteoarthritis type    S/P total knee arthroplasty, right    Sepsis (CMS/HCC)    Osteomyelitis of left foot (CMS/HCC)    Peripheral neuropathy    Abdominal pain    Chronic idiopathic constipation    Gangrene (CMS/HCC)    Tibial artery stenosis, left (CMS/HCC)    H/O pilonidal cyst    Type 2 diabetes mellitus with left diabetic foot infection (CMS/HCC)    Type 2 diabetes mellitus with diabetic neuropathy (CMS/HCC)    Hypothyroidism    Primary hypertension    Leukemoid reaction    HLD (hyperlipidemia)    Acute osteomyelitis of left foot (CMS/HCC)    History of above knee amputation, left (CMS/HCC)    Encounter for prosthetic gait training           Charlette Brooke MD  11/27/2024

## 2024-11-27 NOTE — PROGRESS NOTES
Patient: Bebe Burks  Location: [facility] [address]  MRN: [id number]  Today's date: 11/27/2024    History of Present Illness  Bebe is a 64 y.o. female admitted on 11/19/2024 with History of above knee amputation, left (CMS/[facility]) [Z89.612]  Encounter for prosthetic gait training [Z47.89]. Principal problem is History of above knee amputation, left (CMS/[facility]).    Pt is a 64 y.o. 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       Past Medical History  Bebe has a past medical history of Abnormal ECG, Abnormal finding on chest xray (12/2021), Arthritis, Colon cancer (CMS/[facility]), COVID-19 (12/2021), COVID-19 vaccine series completed, Diabetic neuropathy (CMS/HCC), DM (diabetes mellitus), type 2 with ophthalmic complications (CMS/HCC), Hypertension, Hypothyroidism, Left foot drop, Lipid disorder, and Type 2 diabetes mellitus treated with insulin (CMS/HCC).    PT Vitals      Date/Time Pulse HR Source BP BP Location BP Method Pt Position [facility]   11/27/24 1107 91 Monitor 133/69 Left upper arm Automatic Sitting    11/27/24 1158 93 Monitor 127/60 Right upper arm Automatic Sitting [facility]          PT Pain      Date/Time Pain Type Side/Orientation Location Rating: Rest Rating: Activity Description Interventions [facility]   11/27/24 1107 Pain Assessment -- -- 0 -- -- --    11/27/24 1158 Pain Assessment lower back 0 5 sore diversional activity provided [facility]               Prior Living Environment      Flowsheet Row Most Recent Value   People in Home alone   Current Living Arrangements condominium   Home Accessibility stairs to enter home (Group)   Living Environment Comment resides in 1st floor condo, 4 (1+2+1) JOSE RAFAEL, R hand rail, no stairs inside home, Bathroom Layout: Tub/shower unit with shower curtain, Shower stall with glass doors (shower stall in main bedroom), two sisters can provide some assistance   Number of Stairs, Main Entrance  4   Surface of Stairs, Main Entrance concrete   Stair Railings, Main Entrance railing on right side (ascending)   Stairs Comment, Main Entrance 1+2+1 JOSE RAFAEL   Location, Bathroom first (main) floor   Bathroom Access tub bath, walk-in shower            Prior Level of Function      Flowsheet Row Most Recent Value   Dominant Hand right   Ambulation assistive equipment   Transferring assistive equipment   Toileting independent   Bathing assistive equipment   Dressing independent   Eating independent   IADLs assistive equipment   Driving/Transportation    Communication understands/communicates without difficulty   Assistive Device Currently Used at Home cane, straight, walker, front-wheeled, shower chair, grab bar, raised toilet             IRF PT Evaluation and Treatment - 11/27/24 1107          PT Time Calculation    Start Time 1100     Stop Time 1200     Time Calculation (min) 60 min        Session Details    Document Type Daily Treatment/Progress Note        General Information    Patient Profile Reviewed yes     General Observations of Patient Direct handoff from prior OT session, pt agreeabl to PT.        Mobility Belt    Mobility Belt Used During Session yes        Sit to Stand Transfer    [place], Sit to Stand Transfer moderate assist (50-74% patient effort);touching/steadying assist     Safety/Cues technique     Assistive Device parallel bars     Comment from WC to stance in parallel bars, modA at pelvis for ant WS and rise from chair, occasioanlly requiring Fay to lock prosthetic knee into extension        Stand to Sit Transfer    [place], Stand to Sit Transfer moderate assist (50-74% patient effort)     Safety/Cues technique;sequencing     Assistive Device parallel bars     Comment to WC from stance, modA at epelvis forWS to R for pt to kick prosthesis forward and unlock knee prior to sitting, modA for controlled lowering        Gait Training    [place], Gait moderate assist  (50-74% patient effort)     Safety/Cues sequencing;technique     Assistive Device parallel bars     Distance in Feet 10 feet     Pattern step-through     Deviations/Abnormal Patterns base of support, narrow;gait speed decreased     Bilateral Gait Deviations --   decr anterior WS    Left Sided Gait Deviations hip circumduction     Right Sided Gait Deviations heel strike decreased     Comment 10'x3 in parallel bars with modA at L pelvis for ant WS, pt with improved consistentcy of ant WS and toe off on prostheisis side after intial gait trial        Balance    Comment, Balance 1)  static standing in // bars VC/TC to facilitate ant WS, pt with tendency to put majority of weight through heels resulting in posterior lean, multiple trials of stadnign with B UE support, finding midline and then decreasing UE support while trying to maintain midline position, used mirror on side of pt to add visual cues with improved response but noted icnreased posterior lean as pt fatigued. 2) beach volley ball taps with B UEs to work on balance and ant WS with no UE support, modA at pelvis for balance and to promote ant WS, pt with increased difficulty maintaining midline with dynamic balance task        Motor Skills    Comment, Motor Skills 1) repeated anterior stepping with R LE to promote L WS 2x10 with min/modA at pelvis 2) repeated anterior stepping with L prostheisis with VC to step into tile square for improved accuracy and consistency with step length, Fay at pelvis. B UE support on // bars for above exercises.        Prosthetic Orientation (Lower Extremity)    Fit Assessment fit/function of prosthesis are appropriate     Comment, Fit Assessment pt arrived to PT session with gel liner, 3 sock ply, prosthesis donned, no complaints of discomfort duirng session.        Daily Progress Summary (PT)    Daily Outcome Statement Session focused on stadning balance and gait training. Time spent attempting to reorient pt to midline in  stance due to increased posterior lean during static standing. Pt benefitted from use of mirror for visual feedback but became inconsistent with WS and being able to maintain position as fatigue set in. Pt donned prosthesis at 1000 will stop by pt's room during lunch to doff and perform skin check.                               IRF PT Goals      Flowsheet Row Most Recent Value   Transfer Goal 1    Activity/Assistive Device stand pivot, walker, front-wheeled at 11/19/2024 1431   [place] moderate assist (50-74% patient effort) at 11/26/2024 0855   Time Frame short-term goal (STG), 5 - 7 days at 11/26/2024 0855   Progress/Outcome goal met, goal ongoing at 11/26/2024 0855   Transfer Goal 2    Activity/Assistive Device stand pivot, walker, front-wheeled at 11/19/2024 1431   [place] minimum assist (75% or more patient effort) at 11/26/2024 0855   Time Frame 14 days or less, long-term goal (LTG) at 11/26/2024 0855   Progress/Outcome goal revised this date, goal ongoing, goal met at 11/26/2024 0855   Transfer Goal 3    Activity/Assistive Device sit-to-stand/stand-to-sit, walker, front-wheeled at 11/19/2024 1431   [place] minimum assist (75% or more patient effort) at 11/26/2024 0855   Time Frame short-term goal (STG), 5 - 7 days at 11/26/2024 0855   Progress/Outcome goal met, goal revised this date at 11/26/2024 0855   Transfer Goal 4    Activity/Assistive Device sit-to-stand/stand-to-sit, walker, front-wheeled at 11/19/2024 1431   [place] tactile cues required at 11/26/2024 0855   Time Frame long-term goal (LTG), 14 days or less at 11/26/2024 0855   Progress/Outcome goal met, goal revised this date at 11/26/2024 0855   Gait/Walking Locomotion Goal 1    Activity/Assistive Device gait (walking locomotion), walker, front-wheeled at 11/19/2024 1431   Distance 10 feet at 11/19/2024 1431   [place] moderate assist (50-74% patient effort) at 11/26/2024 0855   Time Frame short-term goal (STG), 5 - 7 days at  11/26/2024 0855   Strategies/Barriers 50 at 11/26/2024 0855   Progress/Outcome goal revised this date, goal ongoing at 11/26/2024 0855   Gait/Walking Locomotion Goal 2    Activity/Assistive Device gait (walking locomotion), assistive device use, walker, front-wheeled at 11/26/2024 0855   Distance 50 feet at 11/26/2024 0855   [place] minimum assist (75% or more patient effort) at 11/26/2024 0855   Time Frame long-term goal (LTG), 14 days or less at 11/26/2024 0855   Wheelchair Locomotion Goal 1    Activity forward propulsion, backward propulsion, steering, stopping, turning, doorway navigation, weight shifting at 11/19/2024 1431   Assistive Device manual, ultra lightweight at 11/19/2024 1431   Distance 150 feet at 11/19/2024 1431   [place] other (see comments)  [Touching / Steadying Assist] at 11/19/2024 1431   Time Frame 1 week, short-term goal (STG) at 11/19/2024 1431   Progress/Outcome goal met at 11/26/2024 0855   Wheelchair Locomotion Goal 2    Activity forward propulsion, backward propulsion, steering, stopping, turning, doorway navigation, weight shifting at 11/19/2024 1431   Assistive Device manual, ultra lightweight at 11/19/2024 1431   Distance 150 feet at 11/19/2024 1431   [place] modified independence at 11/19/2024 1431   Time Frame 21 days or less, long-term goal (LTG) at 11/19/2024 1431   Progress/Outcome goal ongoing at 11/26/2024 0855

## 2024-11-27 NOTE — PLAN OF CARE
Plan of Care Review  Plan of Care Reviewed With: patient  Progress: improving  Outcome Evaluation: Patient AAOx4, pleasant, cooperative. Appetite good. Medications reviewed. Cont of bladder, no BM yet this shift. Foam to R lateral foot. TID accucheck. Pain managed with PRN Tylenol. Some nausea in morning, resolved with PRN Zofran. Patient able to make needs known, call bell within reach. No nursing concerns at the moment.

## 2024-11-27 NOTE — PLAN OF CARE
Plan of Care Review  Plan of Care Reviewed With: patient  Progress: improving  Outcome Evaluation: Patient is aaox4, continent of bladder, no BM overnight. QID accucheck. Pain managed with Tylenol and Diluadid . Slept well. Safety precautions maintained. Call bell within reach and bed alarm is on.

## 2024-11-27 NOTE — PROGRESS NOTES
Patient: Bebe Burks  Location: [facility] [address]  MRN: [id number]  Today's date: 11/27/2024    History of Present Illness  Bebe is a 64 y.o. female admitted on 11/19/2024 with History of above knee amputation, left (CMS/[facility]) [Z89.612]  Encounter for prosthetic gait training [Z47.89]. Principal problem is History of above knee amputation, left (CMS/[facility]).    Pt is a 64 y.o. 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       Past Medical History  Bebe has a past medical history of Abnormal ECG, Abnormal finding on chest xray (12/2021), Arthritis, Colon cancer (CMS/[facility]), COVID-19 (12/2021), COVID-19 vaccine series completed, Diabetic neuropathy (CMS/[facility]), DM (diabetes mellitus), type 2 with ophthalmic complications (CMS/[facility]), Hypertension, Hypothyroidism, Left foot drop, Lipid disorder, and Type 2 diabetes mellitus treated with insulin (CMS/[facility]).    OT Vitals      Date/Time Pulse HR Source BP BP Location BP Method Pt Position Who   11/27/24 1048 93 Monitor 127/57 Right upper arm Automatic Sitting [facility]          OT Pain      Date/Time Pain Type Rating: Rest Who   11/27/24 1030 Pain Reassessment 0 TG   11/27/24 1048 Pain Assessment 0 VMS   11/27/24 1058 Post Activity 0 VMS               Prior Living Environment      Flowsheet Row Most Recent Value   People in Home alone   Current Living Arrangements condominium   Home Accessibility stairs to enter home (Group)   Living Environment Comment resides in 1st floor condo, 4 (1+2+1) JOSE RAFAEL, R hand rail, no stairs inside home, Bathroom Layout: Tub/shower unit with shower curtain, Shower stall with glass doors (shower stall in main bedroom), two sisters can provide some assistance   Number of Stairs, Main Entrance 4   Surface of Stairs, Main Entrance concrete   Stair Railings, Main Entrance railing on right side (ascending)   Stairs Comment, Main Entrance 1+2+1 JOSE RAFAEL    Location, Bathroom first (main) floor   Bathroom Access tub bath, walk-in shower            Prior Level of Function      Flowsheet Row Most Recent Value   Dominant Hand right   Ambulation assistive equipment   Transferring assistive equipment   Toileting independent   Bathing assistive equipment   Dressing independent   Eating independent   IADLs assistive equipment   Driving/Transportation    Communication understands/communicates without difficulty   Assistive Device Currently Used at Home cane, straight, walker, front-wheeled, shower chair, grab bar, raised toilet            Occupational Profile      Flowsheet Row Most Recent Value   Successful Occupations Pt is very active in her community   Occupational History/Life Experiences 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, attends [employer] weekly   Performance Patterns lives alone   Environmental Supports and Barriers Supportive community    + handicap parking placard   Patient Goals PSFS: Toilet transfer with prosthetic 3/10, toilet transfer without prosthetic 8/10, walking 0/10, getting dressed 5/10, getting in/ out of bed 4/10, getting a prosthetic on/off 1/10             IRF OT Evaluation and Treatment - 11/27/24 1050          OT Time Calculation    Start Time 1000     Stop Time 1100     Time Calculation (min) 60 min        Session Details    Document Type Daily Treatment/Progress Note        Mobility Belt    Mobility Belt Used During Session yes        Orthotics    Orthosis Trial R toe off brace donned for tx        Transfers    Comment mass practice sit/ stand        Sit to Stand Transfer    [place], Sit to Stand Transfer moderate assist (50-74% patient effort)     Safety/Cues technique;sequencing;hand placement;increased time to complete     Assistive Device parallel bars;walker, front-wheeled     Comment standing with mod A with cues for sequencing and hand placement and cues for locking prosthetic knee, cues for anterior lean         Stand to Sit Transfer    [place], Stand to Sit Transfer moderate assist (50-74% patient effort)     Safety/Cues technique;hand placement;sequencing     Assistive Device parallel bars;walker, front-wheeled     Comment cues for sequencing and hand placement , moving L prosthetic leg forward, reaching back to arm rest with R hand, weight shift to R and use L hand to unlock prosthetic to bend knee to sit        Low Pivot Transfer    [place], Low Pivot Transfer supervision     Safety/Cues technique;sequencing;hand placement     Assistive Device wheelchair     Comment incremental LPT with cues for prosthetic mgnt        Balance    Comment, Balance standing balance stand with focus on midline stance and decreased posterior lean        Lower Body Dressing    [place] maximum assist (25-49% patient effort)     Comment continues to require mod A to don prosthetic with cues for sequencing. education on donning long pants over prosthetic with plastic bag method. Standing with mod A- max A with cues for upright posture and decreased posterior lean with RW with lifting 1 hand at a time to hike pants        Prosthetic Orientation (Lower Extremity)    Comment, Fit Assessment donning prosthetic liner, cues for orientation. donning 3 ply, cues for sequencing steps, improved with leaning back in bed to engage pin, pt able to turn knob to fully engage.        Daily Progress Summary (OT)    Daily Outcome Statement Focus on donning prosthetic, sequencing with ransfers. Discussion of home set up. Donning long pants. Continue to focus on standing balance and dual tasking. donning/ doffing prosthetic, endurance, balance midline, weight shifts. Pt would continue to benefit from skilled OT services to increase indep, decrease caregiver burden and increase quality of life. Continue with POC                               IRF OT Goals      Flowsheet Row Most Recent Value   Transfer Goal 1    Activity/Assistive Device toilet at  11/20/2024 0906   [place] maximum assist (25-49% patient effort) at 11/20/2024 0906   Time Frame short-term goal (STG), 5 - 7 days at 11/26/2024 1000   Progress/Outcome goal ongoing at 11/26/2024 1000   Transfer Goal 2    Activity/Assistive Device toilet at 11/20/2024 0906   [place] minimum assist (75% or more patient effort) at 11/20/2024 0906   Time Frame long-term goal (LTG), 21 days or less at 11/20/2024 0906   Progress/Outcome goal ongoing at 11/26/2024 1000   Transfer Goal 3    Activity/Assistive Device shower at 11/20/2024 0906   [place] maximum assist (25-49% patient effort) at 11/20/2024 0906   Time Frame short-term goal (STG), 5 - 7 days at 11/26/2024 1000   Progress/Outcome goal ongoing at 11/26/2024 1000   Transfer Goal 4    Activity/Assistive Device shower at 11/20/2024 0906   [place] minimum assist (75% or more patient effort) at 11/20/2024 0906   Time Frame long-term goal (LTG), 21 days or less at 11/20/2024 0906   Progress/Outcome goal ongoing at 11/26/2024 1000   Bathing Goal 1    [place] tactile cues required at 11/20/2024 0906   Time Frame short-term goal (STG), 5 - 7 days at 11/26/2024 1000   Progress/Outcome goal ongoing at 11/26/2024 1000   Bathing Goal 2    [place] modified independence at 11/20/2024 0906   Time Frame long-term goal (LTG), 21 days or less at 11/20/2024 0906   Progress/Outcome goal ongoing at 11/26/2024 1000   UB Dressing Goal 1    [place] minimum assist (75% or more patient effort) at 11/26/2024 1000   Time Frame short-term goal (STG), 5 - 7 days at 11/26/2024 1000   Strategies/Barriers with clothing retrieval at 11/20/2024 0906   Progress/Outcome goal ongoing at 11/26/2024 1000   UB Dressing Goal 2    [place] minimum assist (75% or more patient effort) at 11/20/2024 0906   Time Frame long-term goal (LTG), 21 days or less at 11/20/2024 0906   Strategies/Barriers with clothing retrieval at 11/20/2024 0906   Progress/Outcome goal ongoing  at 11/26/2024 1000   LB Dressing Goal 1    [place] minimum assist (75% or more patient effort) at 11/26/2024 1000   Time Frame short-term goal (STG), 5 - 7 days at 11/26/2024 1000   Strategies/Barriers with clothing retrieval at 11/20/2024 0906   Progress/Outcome goal ongoing at 11/26/2024 1000   LB Dressing Goal 2    [place] minimum assist (75% or more patient effort) at 11/20/2024 0906   Time Frame long-term goal (LTG), 21 days or less at 11/20/2024 0906   Strategies/Barriers with clothing retrieval at 11/20/2024 0906   Progress/Outcome goal ongoing at 11/26/2024 1000   Grooming Goal 1    [place] moderate assist (50-74% patient effort) at 11/20/2024 0906   Time Frame short-term goal (STG), 5 - 7 days at 11/26/2024 1000   Strategies/Barriers in stance at sink at 11/20/2024 0906   Progress/Outcome goal ongoing at 11/26/2024 1000   Grooming Goal 2    [place] supervision required at 11/20/2024 0906   Time Frame long-term goal (LTG), 21 days or less at 11/20/2024 0906   Strategies/Barriers in stance at sink at 11/20/2024 0906   Progress/Outcome goal ongoing at 11/26/2024 1000   Toileting Goal 1    [place] maximum assist (25-49% patient effort) at 11/20/2024 0906   Time Frame short-term goal (STG), 5 - 7 days at 11/26/2024 1000   Progress/Outcome goal ongoing at 11/26/2024 1000   Toileting Goal 2    [place] minimum assist (75% or more patient effort) at 11/20/2024 0906   Time Frame long-term goal (LTG), 21 days or less at 11/20/2024 0906   Progress/Outcome goal ongoing at 11/26/2024 1000

## 2024-11-27 NOTE — PROGRESS NOTES
Subjective    Patient seen and examined on rounds.  Chart reviewed.  Events overnight noted.  History reviewed briefly with patient.    CC:  Deficits in mobility, transfers, self-care status post left transfemoral amputation for prosthetic training, severe peripheral vascular disease, chronic myelocytic leukemia, osteomyelitis left foot, diabetic neuropathy, multiple medical problems    HPI:  Ms. 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 Pura Chaves. ID recommended IV Ceftriaxone and IV Ampicillin until 9/14/24.  She had urinary retention managed with indwelling Rowan catheter.  She had right upper extremity swelling present during this admission, ultrasound on 8/29/24 demonstrated an acute non-occlusive DVT of the right axillary and brachial vein around line (on Lovenox).  Subsequently she completed an acute inpatient rehabilitation stay for pre-prosthetic training at [facility] between 9/3/24 to 9/21/24 and was discharged to [facility] on 9/21/24.  She initially required bladder self intermittent catheterizations due to urinary retention, but indicates that now she is able to void.  She desired to be a prosthetic ambulator.  A left transfemoral amputation prosthesis was fabricated. and followed-up as outpatient in the amputee clinic at [facility] and she was deemed to be an  "appropriate candidate for acute inpatient rehabilitation stay. She has been needing assistance for mobility, transfers, self-care.  I have reviewed the preadmission screening and concur with that information and there are no significant changes from patient's preadmission screening. She is transferred to [facility] on 11/19/24 from [facility] for further acute inpatient rehabilitation stay for prosthetic training with transfemoral amputation prosthesis.    SUBJ: Discussed recommendations of PCC with patient.  She was measured for a right AFO brace yesterday.  Discussed with nursing.  Skin on residual limb remains stable.  Continent of bladder for nursing.    ROS: Denies chest pain or shortness of breath. Other ROS negative. Past, family, social history is unchanged.    Current Functional Status:   Bed mobility:   [place], Supine to Sit: touching/steadying assist   [place], Sit to Supine: close supervision   Transfers:    [place], Sit to Stand Transfer: moderate assist (50-74% patient effort), touching/steadying assist  [place], Stand to Sit Transfer: moderate assist (50-74% patient effort)   [place], Stand Pivot/Stand Step Transfer: moderate assist (50-74% patient effort), 1 person assist   Gait:   [place], Gait: moderate assist (50-74% patient effort)  Assistive Device: parallel bars   Distance in Feet: 10 feet    Bathing:   [place]: minimum assist (75% or more patient effort)   Toileting:   [place]: dependent (less than 25% patient effort)   Upper body dressing:   [place]: moderate assist (50-74% patient effort)   Lower body dressing:   [place]: maximum assist (25-49% patient effort)     Functional Progress:    Functional status reviewed. Overall, patient's functional status is improving.      Physical Exam      Blood pressure 127/60, pulse 93, temperature 36.7 °C (98.1 °F), temperature source Oral, resp. rate 18, height 1.702 m (5' 7.01\"), weight " 69.4 kg (153 lb), SpO2 98%, not currently breastfeeding.    Body mass index is 23.96 kg/m².    General Appearance: Not in acute distress  Head/Ear/Nose/Throat: Normocephalic; Atraumatic.   Eye: EOMI; PERRL.   Neck: No JVD; No Bruits.   Respiratory: Decreased breath sounds at bases.   Cardiovascular: RRR; Normal S1, S2.   Gastrointestinal: Soft; NT; +BS.   Extremities: Bilateral lower extremity edema noted.  Left transfemoral amputation stump is stable.  Musculoskeletal: Functional active range of motion in both upper and right lower extremities.  Functional active range of motion in left hip.  She has a left transfemoral amputation.  Neurological: AAO ×3. Speech is fluent. Cranial nerve examination does not reveal any gross facial asymmetry. Strength testing shows about 4+/5 strength in both upper and right lower extremities.  Left hip flexion is 4/5.  She has a left transfemoral amputation.  She is grossly able to localize touch sensation.  She is able to localize position sense in right foot great toe position sense is unable to be tested on left side.  Deep tendon reflexes are hypoactive bilaterally.  Right plantar is flexor, left plantar was unable to be tested. Coordination is functional upper extremities.    Behavior/Emotional: Appropriate; Cooperative.   Skin: Left transfemoral amputation stump stable.  Incision appears to be well-healed.  Some wounds are noted on right anterior lower forearm.  Small wound noted on right foot fifth toe.  Small wound also noted on right foot great toe with some slough in it and scaly skin around it.  Some redness noted on sacrum/coccyx.  Reviewed pictures of wounds in Epic.      Current Facility-Administered Medications:     acetaminophen (TYLENOL) tablet 650 mg, 650 mg, oral, q4h PRN, Charlette Brooke MD, 650 mg at 11/27/24 0945    alum-mag hydroxide-simeth (MAALOX) 200-200-20 mg/5 mL suspension 30 mL, 30 mL, oral, q4h PRN, Charlette Brooke MD    amLODIPine (NORVASC) tablet 2.5  mg, 2.5 mg, oral, Daily, Charlette Brooke MD, 2.5 mg at 11/27/24 0835    apixaban (ELIQUIS) tablet 5 mg, 5 mg, oral, BID, Charlette Brooke MD, 5 mg at 11/27/24 0835    ascorbic acid (VITAMIN C) tablet 500 mg, 500 mg, oral, q48h INT, Charlette Brooke MD, 500 mg at 11/27/24 1230    atorvastatin (LIPITOR) tablet 40 mg, 40 mg, oral, Daily (6p), Charlette Brooke MD, 40 mg at 11/26/24 1657    bisacodyL (DULCOLAX) 10 mg suppository 10 mg, 10 mg, rectal, Daily PRN, Charlette Brooke MD    docusate sodium (COLACE) capsule 100 mg, 100 mg, oral, BID, Charlette Brooke MD, 100 mg at 11/24/24 0802    DULoxetine (CYMBALTA) capsule,delayed release(DR/EC) 60 mg, 60 mg, oral, Nightly, Charlette Brooke MD, 60 mg at 11/26/24 2105    empagliflozin (JARDIANCE) tablet 10 mg, 10 mg, oral, Daily, Charlette Brooke MD, 10 mg at 11/27/24 0836    ferrous sulfate tablet 325 mg, 325 mg, oral, q48h INT, Charlette Brooke MD, 325 mg at 11/27/24 1231    fluticasone propionate (FLONASE) 50 mcg/actuation nasal spray 1 spray, 1 spray, Each Nostril, Daily PRN, Charlette Brooke MD    furosemide (LASIX) tablet 40 mg, 40 mg, oral, BID (am, 4p), Charlette Brooke MD, 40 mg at 11/27/24 0836    gabapentin (NEURONTIN) capsule 600 mg, 600 mg, oral, TID, Charlette Brooke MD, 600 mg at 11/27/24 0835    HYDROmorphone (DILAUDID) tablet 4 mg, 4 mg, oral, q6h PRN, Charlette Brooke MD, 4 mg at 11/26/24 2105    imatinib (GLEEVEC) chemo tablet 400 mg, 400 mg, oral, Daily, Charlette Brooke MD, 400 mg at 11/27/24 0836    insulin glargine U-100 (LANTUS/BASAGLAR) pen 14 Units, 14 Units, subcutaneous, Nightly, Charlette Brooke MD, 14 Units at 11/26/24 2108    insulin lispro U-100 (HumaLOG) pen 1-12 Units, 1-12 Units, subcutaneous, TID with meals, Charlette Brooke MD, 4 Units at 11/27/24 1232    insulin lispro U-100 (HumaLOG) pen 6 Units, 6 Units, subcutaneous, TID with meals, Charlette Brooke MD, 6 Units at 11/27/24 1232    levothyroxine (SYNTHROID) tablet 25 mcg, 25 mcg, oral, Daily (6:30a), Charlette Brooke MD,  25 mcg at 11/27/24 0545    losartan (COZAAR) tablet 25 mg, 25 mg, oral, Daily with dinner, Charlette Brooke MD, 25 mg at 11/26/24 1657    melatonin ODT 3 mg, 3 mg, oral, Nightly, Charlette Brooke MD, 3 mg at 11/26/24 2105    methenamine (HIPREX) tablet 1 g, 1 g, oral, BID, Charlette Brooke MD, 1 g at 11/27/24 0835    multivitamin tablet 1 tablet, 1 tablet, oral, Daily, Charlette Brooke MD, 1 tablet at 11/27/24 1231    ondansetron ODT (ZOFRAN-ODT) disintegrating tablet 4 mg, 4 mg, oral, q8h PRN, Charlette Brooke MD, 4 mg at 11/27/24 0946    potassium chloride (KLOR-CON M) ER tablet (particles/crystals) 20 mEq, 20 mEq, oral, Daily, Charlette Brooke MD, 20 mEq at 11/27/24 1231    semaglutide tablet 14 mg, 1 tablet, oral, Daily, Charlette Brooke MD, 14 mg at 11/27/24 0702    senna (SENOKOT) tablet 2 tablet, 2 tablet, oral, Daily, Charlette Brooke MD, 2 tablet at 11/23/24 1146    tamsulosin (FLOMAX) 24 hr ER capsule 0.4 mg, 0.4 mg, oral, Daily with dinner, Charlette Brooke MD, 0.4 mg at 11/26/24 1657       Labs / Radiology    Lab Results   Component Value Date    WBC 4.98 11/20/2024    HGB 10.8 (L) 11/20/2024    HCT 32.7 (L) 11/20/2024    MCV 90.8 11/20/2024     11/20/2024     Lab Results   Component Value Date    GLUCOSE 205 (H) 11/20/2024    CALCIUM 8.7 11/20/2024     11/20/2024    K 3.7 11/20/2024    CO2 30 11/20/2024     11/20/2024    BUN 35 (H) 11/20/2024    CREATININE 0.9 11/20/2024       Assessment and Plan    ASSESSMENT PLAN:  1. 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 Pura Chaves. ID recommended IV Ceftriaxone and IV Ampicillin until 9/14/24.  She had urinary retention managed with indwelling Rowan catheter.  She had right upper extremity swelling present during this admission, ultrasound on 8/29/24 demonstrated an acute non-occlusive DVT of the right axillary and brachial vein around line (on Lovenox).  Subsequently she completed an acute inpatient rehabilitation stay for pre-prosthetic training at [facility] between 9/3/24 to 9/21/24 and was discharged to [facility] on 9/21/24.  She initially required bladder self intermittent catheterizations due to urinary retention, but indicates that now she is able to void.  She desired to be a prosthetic ambulator.  A left transfemoral amputation prosthesis was fabricated. and followed-up as outpatient in the amputee clinic at [facility] and she was deemed to be an appropriate candidate for acute inpatient rehabilitation stay. She has been needing assistance for mobility, transfers, self-care.  I have reviewed the preadmission screening and concur with that information and there are no significant changes from patient's preadmission screening. She is transferred to [facility] on 11/19/24 from [facility] for further acute inpatient rehabilitation stay for prosthetic training with transfemoral amputation prosthesis.     2. DVT prophylaxis/treatment - on Eliquis.  Platelets 168 on 11/20/2024.     3. Vascular - status post left transfemoral amputation for prosthetic training, severe peripheral vascular disease, chronic myelocytic leukemia, osteomyelitis left foot, diabetic neuropathy, multiple medical problems - continue PT, OT, psychology.  Follow falls precautions, cardiac precautions, monitor pulse oximeter in therapy.  Monitor skin under  prosthesis.  Teach donning and doffing of left transfemoral amputation prosthesis.  Do gait training with prosthesis.     4. GI - On Colace, Senokot.  On PRN Maalox.  On PRN Dulcolax suppository.  On PRN Zofran.     5.  -on Hiprex.  On Flomax.  Monitor postvoid residual.     6. CVS - on Lasix.  Monitor for orthostasis.     7. Pulmonary -encourage incentive spirometry.     8. Hematology - monitor hemoglobin, platelets.  Hemoglobin 10.8, WBC 4.98 on 11/20/2024.     9. Pain -on Cymbalta.  On Neurontin.  On PRN Tylenol.  On PRN Dilaudid.     10. Skin - Left transfemoral amputation stump stable.  Incision appears to be well-healed.  Some wounds are noted on right anterior lower forearm.  Small wound noted on right foot fifth toe.  Small wound also noted on right foot great toe with some slough in it and scaly skin around it.  Some redness noted on sacrum/coccyx.  Reviewed pictures of wounds in Epic. Monitor skin under the left transfemoral amputation prosthesis.Noted input from dermal defense nurse on 11/21/2024.  Reviewed pictures in Epic.      11. F/E/N - on Ferrous sulfate.  On vitamin C. on MVI.  On K-Genesis con.     12. Hyperlipidemia -on Lipitor.     13. Diabetes mellitus type 2 - on Lantus insulin.  On sliding scale Humalog coverage.  On Jardiance.     14. Oncology - On Gleevec for chronic myelocytic leukemia.     15. Hypothyroidism - on Synthroid.     16. Insomnia - on Melatonin.     17. Infectious diseases - On oral Vancomycin.       18. Seasonal allergies - on PRN Flonase.     19. Psychiatry - mood stable.  Psychology consulted.     20. Rehabilitation medicine - Continue comprehensive rehabilitation care. Continue PT, OT, speech, psychology.  We will follow falls precautions, cardiac precautions, monitor pulse oximetry in therapy and follow weightbearing precautions.  We will follow spinal precautions as appropriate.Tolerating therapies per endurance on 11/20/2024.  Slept well last night.   Working on donning  and doffing of prosthesis.  Discussed with the importance of skin checks on the prosthesis.  Able to ambulate 7 feet in parallel bars dependent gait using left transfemoral amputation prosthesis with physical therapy.  Dependent for sit to stand transfers with physical therapy on 11/20/2024. Noted input from dermal defense nurse on 11/21/2024.  Reviewed pictures in Epic.  Working on ADLs with occupational therapy on 11/21/2024.  Requiring minimal assistance for bathing, dependent for toileting, close supervision for poorly dressing and dependent for lower body dressing with occupational therapy.  She requested a flu shot.  Discussed with nurse.  Continent of bowel and bladder for nursing on 11/21/2024.Dependent for sit to stand transfers with physical therapy on 11/22/2024.  Able to ambulate 15 feet with thoracic walker dependent gait with physical therapy on 11/22/2024.  Skin under prosthesis is stable.  Denies pain today.  She says she got flu shot yesterday.Feels better on 11/23/2024.  Denies increased pain.  She says that she took her medications after meals and did not have any GI symptoms today.  Continent of bowel and bladder for nursing.  Working on donning and doffing of prosthesis, sock ply management with occupational therapy on 11/23/2024. Due to elevated blood pressure, internist restarted Amlodipine and Losartan on 11/25/2024.  Denies increased pain.  Continent of bowel and bladder for nursing on 11/25/2024.Skin on left transfemoral amputation residual limb remains stable.  Discussed with patient.  Discussed with nurse on 11/25/2024.Discussed patients progress in therapies with therapists in team meeting on 11/26/2024.  Discussed in PCC. See PCC documentation.  Face-to-face evaluation was done for a right carbon fiber toe off brace on 11/26/2024.  Discussed with patient.  Discussed with physical therapist on 11/26/2024.Discussed recommendations of PCC with patient on 11/27/2024.  She was measured for a  right carbon fiber Toe off AFO brace yesterday.  Discussed with nursing.  Skin on residual limb remains stable.  Continent of bladder for nursing on 11/27/2024     21. Reviewed labs today.  BUN 35, creatinine 0.9, sodium 139, potassium 3.7 on 11/20/2024.           Alexei Petty MD  11/27/2024      This encounter was completed utilizing the Direct Speech Voice Recognition Software. Grammatical errors, random word insertions, pronoun errors, and incomplete sentences are occasional consequences of the system due to software limitations, ambient noises, and hardware issues. Such errors may be missed prior to affixing electronic signature. Any questions or concerns about the content, text, or information contained within the body of this dictation should be directly addressed to the physician for clarification. If you have any questions or concerns please do not hesitate to call me directly via EPIC chat, page, or email.

## 2024-11-27 NOTE — PROGRESS NOTES
Patient: Bebe Burks  Location: [facility]  MRN: [id number]  Today's date: 11/27/2024    History of Present Illness  Bebe is a 64 y.o. female admitted on 11/19/2024 with History of above knee amputation, left (CMS/[facility]) [Z89.612]  Encounter for prosthetic gait training [Z47.89]. Principal problem is History of above knee amputation, left (CMS/[facility]).    Pt is a 64 y.o. 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       Past Medical History  Bebe has a past medical history of Abnormal ECG, Abnormal finding on chest xray (12/2021), Arthritis, Colon cancer (CMS/[facility]), COVID-19 (12/2021), COVID-19 vaccine series completed, Diabetic neuropathy (CMS/[facility]), DM (diabetes mellitus), type 2 with ophthalmic complications (CMS/[facility]), Hypertension, Hypothyroidism, Left foot drop, Lipid disorder, and Type 2 diabetes mellitus treated with insulin (CMS/[facility]).    PT Vitals      Date/Time Pulse HR Source BP BP Location BP Method Pt Position [facility]   11/27/24 1434 87 Monitor 134/63 Left upper arm Automatic Sitting [facility]          PT Pain      Date/Time Pain Type Side/Orientation Location Rating: Rest Rating: Activity Description Interventions [facility]   11/27/24 1434 Pain Assessment lower back 0 4 sore diversional activity provided [facility]               Prior Living Environment      Flowsheet Row Most Recent Value   People in Home alone   Current Living Arrangements condominium   Home Accessibility stairs to enter home (Group)   Living Environment Comment resides in 1st floor condo, 4 (1+2+1) JOSE RAFAEL, R hand rail, no stairs inside home, Bathroom Layout: Tub/shower unit with shower curtain, Shower stall with glass doors (shower stall in main bedroom), two sisters can provide some assistance   Number of Stairs, Main Entrance 4   Surface of Stairs, Main Entrance concrete   Stair Railings, Main Entrance railing on right side (ascending)   Stairs  Comment, Main Entrance 1+2+1 JOSE RAFAEL   Location, Bathroom first (main) floor   Bathroom Access tub bath, walk-in shower            Prior Level of Function      Flowsheet Row Most Recent Value   Dominant Hand right   Ambulation assistive equipment   Transferring assistive equipment   Toileting independent   Bathing assistive equipment   Dressing independent   Eating independent   IADLs assistive equipment   Driving/Transportation    Communication understands/communicates without difficulty   Assistive Device Currently Used at Home cane, straight, walker, front-wheeled, shower chair, grab bar, raised toilet             IRF PT Evaluation and Treatment - 11/27/24 1433          PT Time Calculation    Start Time 1430     Stop Time 1530     Time Calculation (min) 60 min        Session Details    Document Type Daily Treatment/Progress Note        General Information    Patient Profile Reviewed yes     General Observations of Patient met pt in gym seated in [place], agreeable to session.        Mobility Belt    Mobility Belt Used During Session yes        Bed Mobility    [place], Supine to Sit close supervision     [place], Sit to Supine close supervision     Safety/Cues increased time to complete     Assistive Device none     Comment completed on therapy mat        Low Pivot Transfer    [place], Low Pivot Transfer supervision     Safety/Cues technique     Assistive Device wheelchair     Comment incremental LPT from WC<>EOM with S for safety and technique        Lower Extremity (Therapeutic Exercise)    Exercise Position/Type seated     General Exercise left     Reps and Sets 2x10     Comment seated EOM: isometric hip ABD holds 2x10 5 sec holds, R hip ABD with pink tb around B LEs completed with prosthesis donned. supine: R LE single KTC for low back stretch with assist from PTA        Core Strength (Therapeutic Exercise)    Core Strength, Position seated     Core Strength abdominal bracing     Reps  and Sets 2x10     Comment 1) partial sit up from red wedge pillow sitting on EOM, Vc for breathing techniques 2) trunk rotations with 3# dumbbell holding with elbows bent 90 deg        Prosthetic Orientation (Lower Extremity)    Fit Assessment fit/function of prosthesis are appropriate     Comment, Fit Assessment pt dons gel liner and 3 ply sock with S sitting on edge of therapy mat, VC for proper alignment. min/modA to don TFA  and to engage pin. Fay to doff prosthesis at end of session, pt independently doffing sock and gel liner and donning . wearing prosthesis for 45 mins this session. post skin check unremarkable.     Comment Pt wore prosthesis ~ 3 hrs this AM reporting she donned it around 1000, PTA stopped by at end of lunch to assistw ith doffing and performing skin check. pt reported no discomfort, post skin check found blanchable redness at groin        Daily Progress Summary (PT)    Daily Outcome Statement Session focused on core and proximal hip strengthening with pt tolerating well. Pt with tendency to hold her breath during exercises, provided cues to link breath to movement with good reception from pt. Plan to continue with POC, progressing as able.                               IRF PT Goals      Flowsheet Row Most Recent Value   Transfer Goal 1    Activity/Assistive Device stand pivot, walker, front-wheeled at 11/19/2024 1431   [place] moderate assist (50-74% patient effort) at 11/26/2024 0855   Time Frame short-term goal (STG), 5 - 7 days at 11/26/2024 0855   Progress/Outcome goal met, goal ongoing at 11/26/2024 0855   Transfer Goal 2    Activity/Assistive Device stand pivot, walker, front-wheeled at 11/19/2024 1431   [place] minimum assist (75% or more patient effort) at 11/26/2024 0855   Time Frame 14 days or less, long-term goal (LTG) at 11/26/2024 0855   Progress/Outcome goal revised this date, goal ongoing, goal met at 11/26/2024 0855   Transfer Goal 3    Activity/Assistive  Device sit-to-stand/stand-to-sit, walker, front-wheeled at 11/19/2024 1431   [place] minimum assist (75% or more patient effort) at 11/26/2024 0855   Time Frame short-term goal (STG), 5 - 7 days at 11/26/2024 0855   Progress/Outcome goal met, goal revised this date at 11/26/2024 0855   Transfer Goal 4    Activity/Assistive Device sit-to-stand/stand-to-sit, walker, front-wheeled at 11/19/2024 1431   [place] tactile cues required at 11/26/2024 0855   Time Frame long-term goal (LTG), 14 days or less at 11/26/2024 0855   Progress/Outcome goal met, goal revised this date at 11/26/2024 0855   Gait/Walking Locomotion Goal 1    Activity/Assistive Device gait (walking locomotion), walker, front-wheeled at 11/19/2024 1431   Distance 10 feet at 11/19/2024 1431   [place] moderate assist (50-74% patient effort) at 11/26/2024 0855   Time Frame short-term goal (STG), 5 - 7 days at 11/26/2024 0855   Strategies/Barriers 50 at 11/26/2024 0855   Progress/Outcome goal revised this date, goal ongoing at 11/26/2024 0855   Gait/Walking Locomotion Goal 2    Activity/Assistive Device gait (walking locomotion), assistive device use, walker, front-wheeled at 11/26/2024 0855   Distance 50 feet at 11/26/2024 0855   [place] minimum assist (75% or more patient effort) at 11/26/2024 0855   Time Frame long-term goal (LTG), 14 days or less at 11/26/2024 0855   Wheelchair Locomotion Goal 1    Activity forward propulsion, backward propulsion, steering, stopping, turning, doorway navigation, weight shifting at 11/19/2024 1431   Assistive Device manual, ultra lightweight at 11/19/2024 1431   Distance 150 feet at 11/19/2024 1431   [place] other (see comments)  [Touching / Steadying Assist] at 11/19/2024 1431   Time Frame 1 week, short-term goal (STG) at 11/19/2024 1431   Progress/Outcome goal met at 11/26/2024 7964   Wheelchair Locomotion Goal 2    Activity forward propulsion, backward propulsion, steering, stopping, turning,  doorway navigation, weight shifting at 11/19/2024 1431   Assistive Device manual, ultra lightweight at 11/19/2024 1431   Distance 150 feet at 11/19/2024 1431   [place] modified independence at 11/19/2024 1431   Time Frame 21 days or less, long-term goal (LTG) at 11/19/2024 1431   Progress/Outcome goal ongoing at 11/26/2024 0823

## 2024-11-28 LAB
GLUCOSE BLD-MCNC: 127 MG/DL (ref 70–99)
GLUCOSE BLD-MCNC: 198 MG/DL (ref 70–99)
GLUCOSE BLD-MCNC: 265 MG/DL (ref 70–99)
POCT TEST: ABNORMAL

## 2024-11-28 PROCEDURE — 12800000 HC ROOM AND CARE SEMIPRIVATE REHAB

## 2024-11-28 PROCEDURE — 63700000 HC SELF-ADMINISTRABLE DRUG: Performed by: INTERNAL MEDICINE

## 2024-11-28 RX ADMIN — APIXABAN 5 MG: 5 TABLET, FILM COATED ORAL at 22:00

## 2024-11-28 RX ADMIN — ATORVASTATIN CALCIUM 40 MG: 40 TABLET, FILM COATED ORAL at 16:42

## 2024-11-28 RX ADMIN — INSULIN LISPRO 4 UNITS: 100 INJECTION, SOLUTION INTRAVENOUS; SUBCUTANEOUS at 16:43

## 2024-11-28 RX ADMIN — GABAPENTIN 600 MG: 300 CAPSULE ORAL at 08:41

## 2024-11-28 RX ADMIN — FUROSEMIDE 40 MG: 40 TABLET ORAL at 16:41

## 2024-11-28 RX ADMIN — LOSARTAN POTASSIUM 25 MG: 25 TABLET, FILM COATED ORAL at 16:42

## 2024-11-28 RX ADMIN — TAMSULOSIN HYDROCHLORIDE 0.4 MG: 0.4 CAPSULE ORAL at 16:41

## 2024-11-28 RX ADMIN — APIXABAN 5 MG: 5 TABLET, FILM COATED ORAL at 08:41

## 2024-11-28 RX ADMIN — EMPAGLIFLOZIN 10 MG: 10 TABLET, FILM COATED ORAL at 08:41

## 2024-11-28 RX ADMIN — THERA TABS 1 TABLET: TAB at 12:16

## 2024-11-28 RX ADMIN — AMLODIPINE BESYLATE 2.5 MG: 2.5 TABLET ORAL at 08:41

## 2024-11-28 RX ADMIN — INSULIN LISPRO 6 UNITS: 100 INJECTION, SOLUTION INTRAVENOUS; SUBCUTANEOUS at 08:45

## 2024-11-28 RX ADMIN — GABAPENTIN 600 MG: 300 CAPSULE ORAL at 22:00

## 2024-11-28 RX ADMIN — INSULIN GLARGINE 14 UNITS: 100 INJECTION, SOLUTION SUBCUTANEOUS at 22:00

## 2024-11-28 RX ADMIN — INSULIN LISPRO 6 UNITS: 100 INJECTION, SOLUTION INTRAVENOUS; SUBCUTANEOUS at 16:42

## 2024-11-28 RX ADMIN — HYDROMORPHONE HYDROCHLORIDE 4 MG: 2 TABLET ORAL at 22:05

## 2024-11-28 RX ADMIN — IMATINIB MESYLATE 400 MG: 400 TABLET, FILM COATED ORAL at 08:43

## 2024-11-28 RX ADMIN — Medication 3 MG: at 22:04

## 2024-11-28 RX ADMIN — INSULIN LISPRO 6 UNITS: 100 INJECTION, SOLUTION INTRAVENOUS; SUBCUTANEOUS at 12:34

## 2024-11-28 RX ADMIN — GABAPENTIN 600 MG: 300 CAPSULE ORAL at 16:41

## 2024-11-28 RX ADMIN — POTASSIUM CHLORIDE 20 MEQ: 1500 TABLET, EXTENDED RELEASE ORAL at 12:16

## 2024-11-28 RX ADMIN — ACETAMINOPHEN 650 MG: 325 TABLET, FILM COATED ORAL at 22:06

## 2024-11-28 RX ADMIN — DULOXETINE HYDROCHLORIDE 60 MG: 30 CAPSULE, DELAYED RELEASE ORAL at 22:04

## 2024-11-28 RX ADMIN — METHENAMINE HIPPURATE 1 G: 1000 TABLET ORAL at 22:00

## 2024-11-28 RX ADMIN — INSULIN LISPRO 1 UNITS: 100 INJECTION, SOLUTION INTRAVENOUS; SUBCUTANEOUS at 12:35

## 2024-11-28 RX ADMIN — FUROSEMIDE 40 MG: 40 TABLET ORAL at 08:42

## 2024-11-28 RX ADMIN — LEVOTHYROXINE SODIUM 25 MCG: 0.03 TABLET ORAL at 05:44

## 2024-11-28 RX ADMIN — METHENAMINE HIPPURATE 1 G: 1000 TABLET ORAL at 08:41

## 2024-11-28 NOTE — PLAN OF CARE
Plan of Care Review  Plan of Care Reviewed With: patient  Progress: improving  Outcome Evaluation: Alert and oriented; cooperative with all care. Accu checks tid; insulin given as per order. Appetite fair. Denied any pain this morning. Continent of b&b. Showered. Assessment as documented. All safety precautions maintained.

## 2024-11-28 NOTE — PROGRESS NOTES
Subjective    Patient seen and examined on rounds.  Chart reviewed.  Events overnight noted.  History reviewed briefly with patient.    CC:  Deficits in mobility, transfers, self-care status post left transfemoral amputation for prosthetic training, severe peripheral vascular disease, chronic myelocytic leukemia, osteomyelitis left foot, diabetic neuropathy, multiple medical problems    HPI:  Ms. 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 from SNF on 8/1/24 with a 40 pound weight gain (managed with 40mg Lasix), underwent left guillotine transtibial amputation on 8/3/24 followed by left transfemoral amputation on 8/19/24 with vascular surgeon Dr. Pura Chaves. ID recommended IV Ceftriaxone and IV Ampicillin until 9/14/24.  She had urinary retention managed with indwelling Rowan catheter.  She had right upper extremity swelling present during this admission, ultrasound on 8/29/24 demonstrated an acute non-occlusive DVT of the right axillary and brachial vein around line (on Lovenox).  Subsequently she completed an acute inpatient rehabilitation stay for pre-prosthetic training at [facility] between 9/3/24 to 9/21/24 and was discharged to [facility] on 9/21/24.  She initially required bladder self intermittent catheterizations due to urinary retention, but indicates that now she is able to void.  She desired to be a prosthetic ambulator.  A left transfemoral amputation prosthesis was fabricated. and followed-up as outpatient in the amputee clinic at [facility] and she was deemed to be an  appropriate candidate for acute inpatient rehabilitation stay. She has been needing assistance for mobility, transfers, self-care.  I have reviewed the preadmission screening and concur with that information and there are no significant changes from patient's preadmission screening. She is transferred to [facility] on 11/19/24 from [facility] for further acute inpatient rehabilitation stay for prosthetic training with transfemoral amputation prosthesis.    SUBJ: Able to ambulate 10 feet with moderate assistance with balance but using left transfemoral amputation prosthesis.  Requiring moderate assistance for sit to stand transfer with physical therapy using left transfemoral amputation prosthesis.    ROS: Denies chest pain or shortness of breath. Other ROS negative. Past, family, social history is unchanged.    Current Functional Status:   Bed mobility:   [place], Supine to Sit: close supervision   [place], Sit to Supine: close supervision   Transfers:    [place], Sit to Stand Transfer: moderate assist (50-74% patient effort), touching/steadying assist  [place], Stand to Sit Transfer: moderate assist (50-74% patient effort)   [place], Stand Pivot/Stand Step Transfer: moderate assist (50-74% patient effort), 1 person assist   Gait:   [place], Gait: moderate assist (50-74% patient effort)  Assistive Device: parallel bars   Distance in Feet: 10 feet    Bathing:   [place]: minimum assist (75% or more patient effort)   Toileting:   [place]: dependent (less than 25% patient effort)   Upper body dressing:   [place]: moderate assist (50-74% patient effort)   Lower body dressing:   [place]: maximum assist (25-49% patient effort)     Functional Progress:    Functional status reviewed. Overall, patient's functional status is improving.      Physical Exam      Blood pressure (!) 131/57, pulse 92, temperature 36.7 °C (98.1 °F), temperature source Oral, resp. rate  "18, height 1.702 m (5' 7.01\"), weight 70.4 kg (155 lb 4.8 oz), SpO2 99%, not currently breastfeeding.    Body mass index is 24.32 kg/m².    General Appearance: Not in acute distress  Head/Ear/Nose/Throat: Normocephalic; Atraumatic.   Eye: EOMI; PERRL.   Neck: No JVD; No Bruits.   Respiratory: Decreased breath sounds at bases.   Cardiovascular: RRR; Normal S1, S2.   Gastrointestinal: Soft; NT; +BS.   Extremities: Bilateral lower extremity edema noted.  Left transfemoral amputation stump is stable.  Musculoskeletal: Functional active range of motion in both upper and right lower extremities.  Functional active range of motion in left hip.  She has a left transfemoral amputation.  Neurological: AAO ×3. Speech is fluent. Cranial nerve examination does not reveal any gross facial asymmetry. Strength testing shows about 4+/5 strength in both upper and right lower extremities.  Left hip flexion is 4/5.  She has a left transfemoral amputation.  She is grossly able to localize touch sensation.  She is able to localize position sense in right foot great toe position sense is unable to be tested on left side.  Deep tendon reflexes are hypoactive bilaterally.  Right plantar is flexor, left plantar was unable to be tested. Coordination is functional upper extremities.    Behavior/Emotional: Appropriate; Cooperative.   Skin: Left transfemoral amputation stump stable.  Incision appears to be well-healed.  Some wounds are noted on right anterior lower forearm.  Small wound noted on right foot fifth toe.  Small wound also noted on right foot great toe with some slough in it and scaly skin around it.  Some redness noted on sacrum/coccyx.  Reviewed pictures of wounds in Epic.      Current Facility-Administered Medications:     acetaminophen (TYLENOL) tablet 650 mg, 650 mg, oral, q4h PRN, Charlette Brooke MD, 650 mg at 11/27/24 0945    alum-mag hydroxide-simeth (MAALOX) 200-200-20 mg/5 mL suspension 30 mL, 30 mL, oral, q4h PRN, Edwardo, " MD Charlette    amLODIPine (NORVASC) tablet 2.5 mg, 2.5 mg, oral, Daily, Charlette Brooke MD, 2.5 mg at 11/28/24 0841    apixaban (ELIQUIS) tablet 5 mg, 5 mg, oral, BID, Charlette Brooke MD, 5 mg at 11/28/24 0841    ascorbic acid (VITAMIN C) tablet 500 mg, 500 mg, oral, q48h INT, Charlette Brooke MD, 500 mg at 11/27/24 1230    atorvastatin (LIPITOR) tablet 40 mg, 40 mg, oral, Daily (6p), Charlette Brooke MD, 40 mg at 11/27/24 1655    bisacodyL (DULCOLAX) 10 mg suppository 10 mg, 10 mg, rectal, Daily PRN, Charlette Brooke MD    docusate sodium (COLACE) capsule 100 mg, 100 mg, oral, BID, Charlette Brooke MD, 100 mg at 11/24/24 0802    DULoxetine (CYMBALTA) capsule,delayed release(DR/EC) 60 mg, 60 mg, oral, Nightly, Charlette Brooke MD, 60 mg at 11/27/24 2043    empagliflozin (JARDIANCE) tablet 10 mg, 10 mg, oral, Daily, Charlette Brooke MD, 10 mg at 11/28/24 0841    ferrous sulfate tablet 325 mg, 325 mg, oral, q48h INT, Charlette Brooke MD, 325 mg at 11/27/24 1231    fluticasone propionate (FLONASE) 50 mcg/actuation nasal spray 1 spray, 1 spray, Each Nostril, Daily PRN, Charlette Brooke MD    furosemide (LASIX) tablet 40 mg, 40 mg, oral, BID (am, 4p), Charlette Brooke MD, 40 mg at 11/28/24 0842    gabapentin (NEURONTIN) capsule 600 mg, 600 mg, oral, TID, Charlette Brooke MD, 600 mg at 11/28/24 0841    HYDROmorphone (DILAUDID) tablet 4 mg, 4 mg, oral, q6h PRN, Charlette Brooke MD, 4 mg at 11/27/24 2044    imatinib (GLEEVEC) chemo tablet 400 mg, 400 mg, oral, Daily, Charlette Brooke MD, 400 mg at 11/28/24 0843    insulin glargine U-100 (LANTUS/BASAGLAR) pen 14 Units, 14 Units, subcutaneous, Nightly, Charlette Brooke MD, 14 Units at 11/27/24 2046    insulin lispro U-100 (HumaLOG) pen 1-12 Units, 1-12 Units, subcutaneous, TID with meals, Charlette Brooke MD, 1 Units at 11/28/24 1235    insulin lispro U-100 (HumaLOG) pen 6 Units, 6 Units, subcutaneous, TID with meals, Charlette Brooke MD, 6 Units at 11/28/24 1234    levothyroxine (SYNTHROID) tablet 25 mcg,  25 mcg, oral, Daily (6:30a), Charlette Brooke MD, 25 mcg at 11/28/24 0544    losartan (COZAAR) tablet 25 mg, 25 mg, oral, Daily with dinner, Charlette Brooke MD, 25 mg at 11/27/24 1652    melatonin ODT 3 mg, 3 mg, oral, Nightly, Charlette Brooke MD, 3 mg at 11/27/24 2044    methenamine (HIPREX) tablet 1 g, 1 g, oral, BID, Charlette Brooke MD, 1 g at 11/28/24 0841    multivitamin tablet 1 tablet, 1 tablet, oral, Daily, Charlette Brooke MD, 1 tablet at 11/28/24 1216    ondansetron ODT (ZOFRAN-ODT) disintegrating tablet 4 mg, 4 mg, oral, q8h PRN, Charlette Brooke MD, 4 mg at 11/27/24 0946    potassium chloride (KLOR-CON M) ER tablet (particles/crystals) 20 mEq, 20 mEq, oral, Daily, Charlette Brooke MD, 20 mEq at 11/28/24 1216    semaglutide tablet 14 mg, 1 tablet, oral, Daily, Charlette Brooke MD, 14 mg at 11/28/24 0844    senna (SENOKOT) tablet 2 tablet, 2 tablet, oral, Daily, Charlette Brooke MD, 2 tablet at 11/23/24 1146    tamsulosin (FLOMAX) 24 hr ER capsule 0.4 mg, 0.4 mg, oral, Daily with dinner, Charlette Brooke MD, 0.4 mg at 11/27/24 1652       Labs / Radiology    Lab Results   Component Value Date    WBC 4.98 11/20/2024    HGB 10.8 (L) 11/20/2024    HCT 32.7 (L) 11/20/2024    MCV 90.8 11/20/2024     11/20/2024     Lab Results   Component Value Date    GLUCOSE 205 (H) 11/20/2024    CALCIUM 8.7 11/20/2024     11/20/2024    K 3.7 11/20/2024    CO2 30 11/20/2024     11/20/2024    BUN 35 (H) 11/20/2024    CREATININE 0.9 11/20/2024       Assessment and Plan    ASSESSMENT PLAN:  1. 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 from SNF on  8/1/24 with a 40 pound weight gain (managed with 40mg Lasix), underwent left guillotine transtibial amputation on 8/3/24 followed by left transfemoral amputation on 8/19/24 with vascular surgeon Dr. Pura Chaves. ID recommended IV Ceftriaxone and IV Ampicillin until 9/14/24.  She had urinary retention managed with indwelling Rowan catheter.  She had right upper extremity swelling present during this admission, ultrasound on 8/29/24 demonstrated an acute non-occlusive DVT of the right axillary and brachial vein around line (on Lovenox).  Subsequently she completed an acute inpatient rehabilitation stay for pre-prosthetic training at [facility] between 9/3/24 to 9/21/24 and was discharged to [facility] on 9/21/24.  She initially required bladder self intermittent catheterizations due to urinary retention, but indicates that now she is able to void.  She desired to be a prosthetic ambulator.  A left transfemoral amputation prosthesis was fabricated. and followed-up as outpatient in the amputee clinic at [facility] and she was deemed to be an appropriate candidate for acute inpatient rehabilitation stay. She has been needing assistance for mobility, transfers, self-care.  I have reviewed the preadmission screening and concur with that information and there are no significant changes from patient's preadmission screening. She is transferred to [facility] on 11/19/24 from [facility] for further acute inpatient rehabilitation stay for prosthetic training with transfemoral amputation prosthesis.     2. DVT prophylaxis/treatment - on Eliquis.  Platelets 168 on 11/20/2024.     3. Vascular - status post left transfemoral amputation for prosthetic training, severe peripheral vascular disease, chronic myelocytic leukemia, osteomyelitis left foot, diabetic neuropathy, multiple medical problems - continue PT, OT, psychology.  Follow falls precautions, cardiac precautions, monitor  pulse oximeter in therapy.  Monitor skin under prosthesis.  Teach donning and doffing of left transfemoral amputation prosthesis.  Do gait training with prosthesis.     4. GI - On Colace, Senokot.  On PRN Maalox.  On PRN Dulcolax suppository.  On PRN Zofran.     5.  -on Hiprex.  On Flomax.  Monitor postvoid residual.     6. CVS - on Lasix.  Monitor for orthostasis.     7. Pulmonary -encourage incentive spirometry.     8. Hematology - monitor hemoglobin, platelets.  Hemoglobin 10.8, WBC 4.98 on 11/20/2024.     9. Pain -on Cymbalta.  On Neurontin.  On PRN Tylenol.  On PRN Dilaudid.     10. Skin - Left transfemoral amputation stump stable.  Incision appears to be well-healed.  Some wounds are noted on right anterior lower forearm.  Small wound noted on right foot fifth toe.  Small wound also noted on right foot great toe with some slough in it and scaly skin around it.  Some redness noted on sacrum/coccyx.  Reviewed pictures of wounds in Epic. Monitor skin under the left transfemoral amputation prosthesis.Noted input from dermal defense nurse on 11/21/2024.  Reviewed pictures in Epic.      11. F/E/N - on Ferrous sulfate.  On vitamin C. on MVI.  On K-Genesis con.     12. Hyperlipidemia -on Lipitor.     13. Diabetes mellitus type 2 - on Lantus insulin.  On sliding scale Humalog coverage.  On Jardiance.     14. Oncology - On Gleevec for chronic myelocytic leukemia.     15. Hypothyroidism - on Synthroid.     16. Insomnia - on Melatonin.     17. Infectious diseases - On oral Vancomycin.       18. Seasonal allergies - on PRN Flonase.     19. Psychiatry - mood stable.  Psychology consulted.     20. Rehabilitation medicine - Continue comprehensive rehabilitation care. Continue PT, OT, speech, psychology.  We will follow falls precautions, cardiac precautions, monitor pulse oximetry in therapy and follow weightbearing precautions.  We will follow spinal precautions as appropriate.Tolerating therapies per endurance on 11/20/2024.   Slept well last night.   Working on donning and doffing of prosthesis.  Discussed with the importance of skin checks on the prosthesis.  Able to ambulate 7 feet in parallel bars dependent gait using left transfemoral amputation prosthesis with physical therapy.  Dependent for sit to stand transfers with physical therapy on 11/20/2024. Noted input from dermal defense nurse on 11/21/2024.  Reviewed pictures in Epic.  Working on ADLs with occupational therapy on 11/21/2024.  Requiring minimal assistance for bathing, dependent for toileting, close supervision for poorly dressing and dependent for lower body dressing with occupational therapy.  She requested a flu shot.  Discussed with nurse.  Continent of bowel and bladder for nursing on 11/21/2024.Dependent for sit to stand transfers with physical therapy on 11/22/2024.  Able to ambulate 15 feet with thoracic walker dependent gait with physical therapy on 11/22/2024.  Skin under prosthesis is stable.  Denies pain today.  She says she got flu shot yesterday.Feels better on 11/23/2024.  Denies increased pain.  She says that she took her medications after meals and did not have any GI symptoms today.  Continent of bowel and bladder for nursing.  Working on donning and doffing of prosthesis, sock ply management with occupational therapy on 11/23/2024. Due to elevated blood pressure, internist restarted Amlodipine and Losartan on 11/25/2024.  Denies increased pain.  Continent of bowel and bladder for nursing on 11/25/2024.Skin on left transfemoral amputation residual limb remains stable.  Discussed with patient.  Discussed with nurse on 11/25/2024.Discussed patients progress in therapies with therapists in team meeting on 11/26/2024.  Discussed in PCC. See PCC documentation.  Face-to-face evaluation was done for a right carbon fiber toe off brace on 11/26/2024.  Discussed with patient.  Discussed with physical therapist on 11/26/2024.Discussed recommendations of PCC with  patient on 11/27/2024.  She was measured for a right carbon fiber Toe off AFO brace yesterday.  Discussed with nursing.  Skin on residual limb remains stable.  Continent of bladder for nursing on 11/27/2024. Able to ambulate 10 feet with moderate assistance with balance but using left transfemoral amputation prosthesis on 11/28/2024.  Requiring moderate assistance for sit to stand transfer with physical therapy using left transfemoral amputation prosthesis on 11/28/2024.     21. Reviewed labs today.  BUN 35, creatinine 0.9, sodium 139, potassium 3.7 on 11/20/2024.      Alexei Petty MD  11/28/2024      This encounter was completed utilizing the Direct Speech Voice Recognition Software. Grammatical errors, random word insertions, pronoun errors, and incomplete sentences are occasional consequences of the system due to software limitations, ambient noises, and hardware issues. Such errors may be missed prior to affixing electronic signature. Any questions or concerns about the content, text, or information contained within the body of this dictation should be directly addressed to the physician for clarification. If you have any questions or concerns please do not hesitate to call me directly via EPIC chat, page, or email.

## 2024-11-28 NOTE — PLAN OF CARE
Plan of Care Review  Plan of Care Reviewed With: patient  Progress: improving  Outcome Evaluation: patient aaox4. reporting phantom pain to residual limb. pain managed with multimodal approach. mepilex to right lateral foot replaced. continent of bowel and bladder. rings appropriately for assistance. resting between care.

## 2024-11-29 ENCOUNTER — APPOINTMENT (INPATIENT)
Dept: PHYSICAL THERAPY | Facility: REHABILITATION | Age: 64
DRG: 560 | End: 2024-11-29
Payer: MEDICARE

## 2024-11-29 ENCOUNTER — APPOINTMENT (OUTPATIENT)
Dept: OTHER | Facility: REHABILITATION | Age: 64
End: 2024-11-29
Payer: MEDICARE

## 2024-11-29 ENCOUNTER — APPOINTMENT (INPATIENT)
Dept: OCCUPATIONAL THERAPY | Facility: REHABILITATION | Age: 64
DRG: 560 | End: 2024-11-29
Payer: MEDICARE

## 2024-11-29 LAB
GLUCOSE BLD-MCNC: 127 MG/DL (ref 70–99)
GLUCOSE BLD-MCNC: 150 MG/DL (ref 70–99)
GLUCOSE BLD-MCNC: 99 MG/DL (ref 70–99)
POCT TEST: ABNORMAL
POCT TEST: ABNORMAL
POCT TEST: NORMAL

## 2024-11-29 PROCEDURE — 63700000 HC SELF-ADMINISTRABLE DRUG: Performed by: INTERNAL MEDICINE

## 2024-11-29 PROCEDURE — 97530 THERAPEUTIC ACTIVITIES: CPT | Mod: GP

## 2024-11-29 PROCEDURE — 12800000 HC ROOM AND CARE SEMIPRIVATE REHAB

## 2024-11-29 PROCEDURE — 97530 THERAPEUTIC ACTIVITIES: CPT | Mod: GO

## 2024-11-29 PROCEDURE — 97116 GAIT TRAINING THERAPY: CPT | Mod: GP

## 2024-11-29 PROCEDURE — 97763 ORTHC/PROSTC MGMT SBSQ ENC: CPT | Mod: GP

## 2024-11-29 RX ORDER — INSULIN GLARGINE 100 [IU]/ML
15 INJECTION, SOLUTION SUBCUTANEOUS NIGHTLY
Status: DISCONTINUED | OUTPATIENT
Start: 2024-11-29 | End: 2024-12-10 | Stop reason: HOSPADM

## 2024-11-29 RX ADMIN — HYDROMORPHONE HYDROCHLORIDE 4 MG: 2 TABLET ORAL at 08:39

## 2024-11-29 RX ADMIN — GABAPENTIN 600 MG: 300 CAPSULE ORAL at 21:27

## 2024-11-29 RX ADMIN — OXYCODONE HYDROCHLORIDE AND ACETAMINOPHEN 500 MG: 500 TABLET ORAL at 12:55

## 2024-11-29 RX ADMIN — INSULIN LISPRO 6 UNITS: 100 INJECTION, SOLUTION INTRAVENOUS; SUBCUTANEOUS at 08:30

## 2024-11-29 RX ADMIN — HYDROMORPHONE HYDROCHLORIDE 4 MG: 2 TABLET ORAL at 21:27

## 2024-11-29 RX ADMIN — APIXABAN 5 MG: 5 TABLET, FILM COATED ORAL at 08:35

## 2024-11-29 RX ADMIN — IMATINIB MESYLATE 400 MG: 400 TABLET, FILM COATED ORAL at 08:42

## 2024-11-29 RX ADMIN — LOSARTAN POTASSIUM 25 MG: 25 TABLET, FILM COATED ORAL at 17:36

## 2024-11-29 RX ADMIN — GABAPENTIN 600 MG: 300 CAPSULE ORAL at 17:00

## 2024-11-29 RX ADMIN — INSULIN GLARGINE 15 UNITS: 100 INJECTION, SOLUTION SUBCUTANEOUS at 21:28

## 2024-11-29 RX ADMIN — APIXABAN 5 MG: 5 TABLET, FILM COATED ORAL at 21:28

## 2024-11-29 RX ADMIN — FUROSEMIDE 40 MG: 40 TABLET ORAL at 17:00

## 2024-11-29 RX ADMIN — FUROSEMIDE 40 MG: 40 TABLET ORAL at 08:35

## 2024-11-29 RX ADMIN — METHENAMINE HIPPURATE 1 G: 1000 TABLET ORAL at 08:35

## 2024-11-29 RX ADMIN — TAMSULOSIN HYDROCHLORIDE 0.4 MG: 0.4 CAPSULE ORAL at 17:36

## 2024-11-29 RX ADMIN — INSULIN LISPRO 6 UNITS: 100 INJECTION, SOLUTION INTRAVENOUS; SUBCUTANEOUS at 12:20

## 2024-11-29 RX ADMIN — POTASSIUM CHLORIDE 20 MEQ: 1500 TABLET, EXTENDED RELEASE ORAL at 12:55

## 2024-11-29 RX ADMIN — LEVOTHYROXINE SODIUM 25 MCG: 0.03 TABLET ORAL at 05:44

## 2024-11-29 RX ADMIN — METHENAMINE HIPPURATE 1 G: 1000 TABLET ORAL at 21:27

## 2024-11-29 RX ADMIN — Medication 3 MG: at 21:28

## 2024-11-29 RX ADMIN — DULOXETINE HYDROCHLORIDE 60 MG: 30 CAPSULE, DELAYED RELEASE ORAL at 21:27

## 2024-11-29 RX ADMIN — EMPAGLIFLOZIN 10 MG: 10 TABLET, FILM COATED ORAL at 08:35

## 2024-11-29 RX ADMIN — THERA TABS 1 TABLET: TAB at 12:55

## 2024-11-29 RX ADMIN — AMLODIPINE BESYLATE 2.5 MG: 2.5 TABLET ORAL at 08:35

## 2024-11-29 RX ADMIN — ATORVASTATIN CALCIUM 40 MG: 40 TABLET, FILM COATED ORAL at 17:37

## 2024-11-29 RX ADMIN — GABAPENTIN 600 MG: 300 CAPSULE ORAL at 08:34

## 2024-11-29 RX ADMIN — INSULIN LISPRO 6 UNITS: 100 INJECTION, SOLUTION INTRAVENOUS; SUBCUTANEOUS at 17:37

## 2024-11-29 RX ADMIN — FERROUS SULFATE TAB 325 MG (65 MG ELEMENTAL FE) 325 MG: 325 (65 FE) TAB at 12:55

## 2024-11-29 NOTE — PROGRESS NOTES
Patient: Bebe Burks  Location: [facility] [address]  MRN: [id number]  Today's date: 11/29/2024    History of Present Illness  Bebe is a 64 y.o. female admitted on 11/19/2024 with History of above knee amputation, left (CMS/[facility]) [Z89.612]  Encounter for prosthetic gait training [Z47.89]. Principal problem is History of above knee amputation, left (CMS/[facility]).    Pt is a 64 y.o. 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       Past Medical History  Bebe has a past medical history of Abnormal ECG, Abnormal finding on chest xray (12/2021), Arthritis, Colon cancer (CMS/[facility]), COVID-19 (12/2021), COVID-19 vaccine series completed, Diabetic neuropathy (CMS/[facility]), DM (diabetes mellitus), type 2 with ophthalmic complications (CMS/[facility]), Hypertension, Hypothyroidism, Left foot drop, Lipid disorder, and Type 2 diabetes mellitus treated with insulin (CMS/[facility]).    PT Vitals      Date/Time Pulse BP BP Location BP Method Pt Position [facility]   11/29/24 1102 86 133/59 Right upper arm Automatic Sitting JG          PT Pain      Date/Time Pain Type Rating: Rest [facility]   11/29/24 1102 Pain Assessment 0 JG   11/29/24 1159 Pain Reassessment 0 JG               Prior Living Environment      Flowsheet Row Most Recent Value   People in Home alone   Current Living Arrangements condominium   Home Accessibility stairs to enter home (Group)   Living Environment Comment resides in 1st floor condo, 4 (1+2+1) JOSE RAFAEL, R hand rail, no stairs inside home, Bathroom Layout: Tub/shower unit with shower curtain, Shower stall with glass doors (shower stall in main bedroom), two sisters can provide some assistance   Number of Stairs, Main Entrance 4   Surface of Stairs, Main Entrance concrete   Stair Railings, Main Entrance railing on right side (ascending)   Stairs Comment, Main Entrance 1+2+1 JOSE RAFAEL   Location, Bathroom first (main) floor   Bathroom Access tub  bath, walk-in shower            Prior Level of Function      Flowsheet Row Most Recent Value   Dominant Hand right   Ambulation assistive equipment   Transferring assistive equipment   Toileting independent   Bathing assistive equipment   Dressing independent   Eating independent   IADLs assistive equipment   Driving/Transportation    Communication understands/communicates without difficulty   Assistive Device Currently Used at Home cane, straight, walker, front-wheeled, shower chair, grab bar, raised toilet             IRF PT Evaluation and Treatment - 11/29/24 1100          PT Time Calculation    Start Time 1100     Stop Time 1200     Time Calculation (min) 60 min        Session Details    Document Type Daily Treatment/Progress Note        General Information    General Observations of Patient Pt upright in MWC, handoff from OT session        Mobility Belt    Mobility Belt Used During Session yes        Orthotics    Orthotics (Trigger Row) Orthosis trial     Orthosis Trial R toe off brace donned        Transfers    Transfers stand pivot transfer     Maintains Weight-Bearing Status able to maintain        Sit to Stand Transfer    [place], Sit to Stand Transfer moderate assist (50-74% patient effort);1 person assist     Safety/Cues sequencing;technique     Assistive Device walker, front-wheeled     Comment times of min A  for hip extension at LLE and assist with locking prosthetic knee, required mod A toward end of session due to fatigue for initial hip extension        Stand to Sit Transfer    [place], Stand to Sit Transfer moderate assist (50-74% patient effort);1 person assist     Safety/Cues sequencing;technique     Assistive Device walker, front-wheeled     Comment min/mod A for R weightshift and pelvic stability , pt able to bring LLE out in front to unweight and pull up on toggle, able to get ~90% of the time        Gait Training    [place], Gait moderate assist (50-74%  "patient effort);1 person assist     Safety/Cues sequencing;technique     Assistive Device walker, front-wheeled     Distance in Feet 20 feet     Pattern step-through     Deviations/Abnormal Patterns base of support, narrow;weight shifting decreased     Left Sided Gait Deviations hip circumduction     Right Sided Gait Deviations heel strike decreased     Comment Ambulated 15' x 2 + 20' with RW, min/mod A for LLE anterior/lateral weightshift and intermittent assistance for prosthetic control/placement; assist for bringing WC up at end of distance        Stairs Training    [place], Stairs dependent (less than 25% patient effort);2 person assist     Safety/Cues sequencing;technique     Assistive Device railing     Handrail Location (Stairs) both sides     Number of Stairs 3     Stair Height 4 inches     Ascending Stairs Technique step-to-step   RLE    Descending Stairs Technique step-to-step   backwards with L prosthesis    Comment up forward/down backwards 3 4\" steps, mod A of 1 at RLE for hip/knee extension and eccentric control + min A of 2nd for LLE placement and assist with bringing it back when ascending        Balance    Balance Interventions standing;sit to stand     Comment, Balance Sit <> stand to RW x 5 reps with 30 second standing balance with BUE with each reps, steadying assistance for static staniding balance; Trialed no UE support standing ~ 5 seconds x 3 reps, emphasis on anterior WS to prevent posterior LOB        Prosthetic Orientation (Lower Extremity)    Fit Assessment fit/function of prosthesis are appropriate     Comment, Fit Assessment Removed prosthesis, S for verbal cueing; skin check = unremarkable; sock ply = 3ply, pt reporting feeling comfortable with 3 ply with on pain; wearing schedule = 3 hours     Specific Gait Deviations toe stays off floor after heel strike        Daily Progress Summary (PT)    Daily Outcome Statement Session focused on mobility with RW and assessed elevations. Pt " with improving gait pattern and carry over for ambulating with RW, however, continues to require asistance for anterior WS over LLE. Pt does well with increased time for her to initiate stepping bilaterally and talking thorugh gait cycle. Continue with standing balance activities and ambulation in // bars and RW. Progress as able.                        IRF PT Goals      Flowsheet Row Most Recent Value   Transfer Goal 1    Activity/Assistive Device stand pivot, walker, front-wheeled at 11/19/2024 1431   [place] moderate assist (50-74% patient effort) at 11/26/2024 0855   Time Frame short-term goal (STG), 5 - 7 days at 11/26/2024 0855   Progress/Outcome goal met, goal ongoing at 11/26/2024 0855   Transfer Goal 2    Activity/Assistive Device stand pivot, walker, front-wheeled at 11/19/2024 1431   [place] minimum assist (75% or more patient effort) at 11/26/2024 0855   Time Frame 14 days or less, long-term goal (LTG) at 11/26/2024 0855   Progress/Outcome goal revised this date, goal ongoing, goal met at 11/26/2024 0855   Transfer Goal 3    Activity/Assistive Device sit-to-stand/stand-to-sit, walker, front-wheeled at 11/19/2024 1431   [place] minimum assist (75% or more patient effort) at 11/26/2024 0855   Time Frame short-term goal (STG), 5 - 7 days at 11/26/2024 0855   Progress/Outcome goal met, goal revised this date at 11/26/2024 0855   Transfer Goal 4    Activity/Assistive Device sit-to-stand/stand-to-sit, walker, front-wheeled at 11/19/2024 1431   [place] tactile cues required at 11/26/2024 0855   Time Frame long-term goal (LTG), 14 days or less at 11/26/2024 0855   Progress/Outcome goal met, goal revised this date at 11/26/2024 0855   Gait/Walking Locomotion Goal 1    Activity/Assistive Device gait (walking locomotion), walker, front-wheeled at 11/19/2024 1431   Distance 10 feet at 11/19/2024 1431   [place] moderate assist (50-74% patient effort) at 11/26/2024 0855   Time Frame short-term  "goal (STG), 5 - 7 days at 11/26/2024 0855   Strategies/Barriers 50 at 11/26/2024 0855   Progress/Outcome goal revised this date, goal ongoing at 11/26/2024 0855   Gait/Walking Locomotion Goal 2    Activity/Assistive Device gait (walking locomotion), assistive device use, walker, front-wheeled at 11/26/2024 0855   Distance 50 feet at 11/26/2024 0855   [place] minimum assist (75% or more patient effort) at 11/26/2024 0855   Time Frame long-term goal (LTG), 14 days or less at 11/26/2024 0855   Wheelchair Locomotion Goal 1    Activity forward propulsion, backward propulsion, steering, stopping, turning, doorway navigation, weight shifting at 11/19/2024 1431   Assistive Device manual, ultra lightweight at 11/19/2024 1431   Distance 150 feet at 11/19/2024 1431   [place] other (see comments)  [Touching / Steadying Assist] at 11/19/2024 1431   Time Frame 1 week, short-term goal (STG) at 11/19/2024 1431   Progress/Outcome goal met at 11/26/2024 0855   Wheelchair Locomotion Goal 2    Activity forward propulsion, backward propulsion, steering, stopping, turning, doorway navigation, weight shifting at 11/19/2024 1431   Assistive Device manual, ultra lightweight at 11/19/2024 1431   Distance 150 feet at 11/19/2024 1431   [place] modified independence at 11/19/2024 1431   Time Frame 21 days or less, long-term goal (LTG) at 11/19/2024 1431   Progress/Outcome goal ongoing at 11/26/2024 0855   Stairs Goal 1    Activity/Assistive Device ascending stairs, descending stairs, using handrail, left, using handrail, right, step-to-step at 11/29/2024 1100   Number of Stairs 4 at 11/29/2024 1100   [place] moderate assist (50-74% patient effort) at 11/29/2024 1100   Time Frame short-term goal (STG), 5 - 7 days at 11/29/2024 1100   Strategies/Barriers 6\" at 11/29/2024 1100   Stairs Goal 2    Activity/Assistive Device ascending stairs, descending stairs, using handrail, left, using handrail, right, step-to-step at 11/29/2024 " "1100   Number of Stairs 4 at 11/29/2024 1100   [place] minimum assist (75% or more patient effort) at 11/29/2024 1100   Time Frame long-term goal (LTG), 14 days or less at 11/29/2024 1100   Strategies/Barriers 6\" at 11/29/2024 1100          "

## 2024-11-29 NOTE — PLAN OF CARE
Problem: Rehabilitation (IRF) Plan of Care  Goal: Plan of Care Review  Outcome: Progressing  Flowsheets (Taken 11/29/2024 0415)  Progress: improving  Plan of Care Reviewed With: patient  Outcome Evaluation: AAOx4, cooperative with care. c/o 9/10 residual limb pain at approximately 2200, Tylenol and Dilaudid administered, which decreased pain and allowed patient to fall asleep. Slept well with minimal interruptions, estimated sleep time 8 hours. Continent of bladder, no BM during shift thus far. R foot Mepilex in place with scabbing noted. Refused colace. TID Accucheck, scheduled Lantus given per orders. Safety precautions maintained and call bell within reach.   Plan of Care Review  Plan of Care Reviewed With: patient  Progress: improving  Outcome Evaluation: AAOx4, cooperative with care. c/o 9/10 residual limb pain at approximately 2200, Tylenol and Dilaudid administered, which decreased pain and allowed patient to fall asleep. Slept well with minimal interruptions, estimated sleep time 8 hours. Continent of bladder, no BM during shift thus far. R foot Mepilex in place with scabbing noted. Refused colace. TID Accucheck, scheduled Lantus given per orders. Safety precautions maintained and call bell within reach.

## 2024-11-29 NOTE — PROGRESS NOTES
Patient: Bebe Burks  Location: [facility] [address]  MRN: [id number]  Today's date: 11/29/2024    History of Present Illness  Bebe is a 64 y.o. female admitted on 11/19/2024 with History of above knee amputation, left (CMS/[facility]) [Z89.612]  Encounter for prosthetic gait training [Z47.89]. Principal problem is History of above knee amputation, left (CMS/[facility]).    Pt is a 64 y.o. 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       Past Medical History  Bebe has a past medical history of Abnormal ECG, Abnormal finding on chest xray (12/2021), Arthritis, Colon cancer (CMS/[facility]), COVID-19 (12/2021), COVID-19 vaccine series completed, Diabetic neuropathy (CMS/[facility]), DM (diabetes mellitus), type 2 with ophthalmic complications (CMS/[facility]), Hypertension, Hypothyroidism, Left foot drop, Lipid disorder, and Type 2 diabetes mellitus treated with insulin (CMS/[facility]).    PT Vitals      Date/Time Pulse BP BP Location Pt Position Observations Who   11/29/24 0901 85 96/55 Left upper arm Sitting denies pain, states she has phantom pain overnight JG          PT Pain      Date/Time Pain Type Rating: Rest [facility]   11/29/24 0901 Pain Assessment 0 JG   11/29/24 0959 Pain Reassessment 0 JG               Prior Living Environment      Flowsheet Row Most Recent Value   People in Home alone   Current Living Arrangements condominium   Home Accessibility stairs to enter home (Group)   Living Environment Comment resides in 1st floor condo, 4 (1+2+1) JOSE RAFAEL, R hand rail, no stairs inside home, Bathroom Layout: Tub/shower unit with shower curtain, Shower stall with glass doors (shower stall in main bedroom), two sisters can provide some assistance   Number of Stairs, Main Entrance 4   Surface of Stairs, Main Entrance concrete   Stair Railings, Main Entrance railing on right side (ascending)   Stairs Comment, Main Entrance 1+2+1 JOSE RAFAEL   Location, Bathroom  first (main) floor   Bathroom Access tub bath, walk-in shower            Prior Level of Function      Flowsheet Row Most Recent Value   Dominant Hand right   Ambulation assistive equipment   Transferring assistive equipment   Toileting independent   Bathing assistive equipment   Dressing independent   Eating independent   IADLs assistive equipment   Driving/Transportation    Communication understands/communicates without difficulty   Assistive Device Currently Used at Home cane, straight, walker, front-wheeled, shower chair, grab bar, raised toilet             IRF PT Evaluation and Treatment - 11/29/24 0901          PT Time Calculation    Start Time 0900     Stop Time 1000     Time Calculation (min) 60 min        Session Details    Document Type Daily Treatment/Progress Note        General Information    General Observations of Patient Pt upright in MWC, asked to julian pants over prosthesis , completed sitting in wheelchair        Orthotics    Orthotics (Trigger Row) Orthosis trial     Orthosis Trial R toe off brace donned        Transfers    Transfers stand pivot transfer     Maintains Weight-Bearing Status able to maintain        Sit to Stand Transfer    [place], Sit to Stand Transfer moderate assist (50-74% patient effort)     Safety/Cues sequencing;technique     Assistive Device walker, front-wheeled     Comment sit <> stand to RW, min/mod A for L knee extension to engage locking mechanisum        Stand to Sit Transfer    [place], Stand to Sit Transfer moderate assist (50-74% patient effort);1 person assist     Safety/Cues sequencing;technique     Assistive Device walker, front-wheeled     Comment stand to sit, mod A for pelvic stability with weightshifting off prosthesis  + assist for unlocking knee due to toggle not engaging consistently        Stand Pivot Transfer    [place], Stand Pivot/Stand Step Transfer moderate assist (50-74% patient effort);1 person assist     Safety/Cues  "sequencing;technique     Assistive Device walker, front-wheeled     Comment SPT min/mod A for pelvic stability and and intermittent placement of L prosthesis to prevent excessive forward step during turns, max verbal cueing for technique and sequence        Gait Training    [place], Gait dependent (less than 25% patient effort);2 person assist     Safety/Cues sequencing;technique     Assistive Device walker, front-wheeled     Distance in Feet 15 feet     Pattern step-through     Deviations/Abnormal Patterns gait speed decreased     Left Sided Gait Deviations hip circumduction     Right Sided Gait Deviations heel strike decreased     Comment Ambulated 15' with RW, mod A for L anterior WS and intermittent assistance with placement + WC follow        Balance    Balance Interventions standing     Comment, Balance standing in // bars: RLE tap ups to 6\" block, 2 x 10 reps, min A for LLE hip extension and assist with anterior WS prior to lifting RLE; resisted D2 UE with blue theraband x 15 reps each direction, min A for LLE stance control and hip extension; repeated sit <> stands to RW, min A for hip extension and steadying with weightshift when unlocking prosthetic knee to sit, able to unlock 3/3 reps        Motor Skills    Comment, Motor Skills Ambulated 10' x 2 in // bars, min A steadying at gait belt, improved LLE prosthetic control        Prosthetic Orientation (Lower Extremity)    Fit Assessment fit/function of prosthesis are appropriate     Comment, Fit Assessment Donned liner independently sitting upright in wheelchair; prosthesis donned - mod A to assist with placement of socket and aligning pin to engage in shuttle lock; sock ply = 3 ply; wearing schedule = 3 hours     Comment Donned long pants through prostehsis first, then donned socket + stood in// bars to pull up pants        Daily Progress Summary (PT)    Daily Outcome Statement Pt progressing with overall upright tolerance and prosthetic control, " progressing to use of RW with decreased assistance required. Continue with pre-gait and balance activities in // bars, progressing independence as able.                               IRF PT Goals      Flowsheet Row Most Recent Value   Transfer Goal 1    Activity/Assistive Device stand pivot, walker, front-wheeled at 11/19/2024 1431   [place] moderate assist (50-74% patient effort) at 11/26/2024 0855   Time Frame short-term goal (STG), 5 - 7 days at 11/26/2024 0855   Progress/Outcome goal met, goal ongoing at 11/26/2024 0855   Transfer Goal 2    Activity/Assistive Device stand pivot, walker, front-wheeled at 11/19/2024 1431   [place] minimum assist (75% or more patient effort) at 11/26/2024 0855   Time Frame 14 days or less, long-term goal (LTG) at 11/26/2024 0855   Progress/Outcome goal revised this date, goal ongoing, goal met at 11/26/2024 0855   Transfer Goal 3    Activity/Assistive Device sit-to-stand/stand-to-sit, walker, front-wheeled at 11/19/2024 1431   [place] minimum assist (75% or more patient effort) at 11/26/2024 0855   Time Frame short-term goal (STG), 5 - 7 days at 11/26/2024 0855   Progress/Outcome goal met, goal revised this date at 11/26/2024 0855   Transfer Goal 4    Activity/Assistive Device sit-to-stand/stand-to-sit, walker, front-wheeled at 11/19/2024 1431   [place] tactile cues required at 11/26/2024 0855   Time Frame long-term goal (LTG), 14 days or less at 11/26/2024 0855   Progress/Outcome goal met, goal revised this date at 11/26/2024 0855   Gait/Walking Locomotion Goal 1    Activity/Assistive Device gait (walking locomotion), walker, front-wheeled at 11/19/2024 1431   Distance 10 feet at 11/19/2024 1431   [place] moderate assist (50-74% patient effort) at 11/26/2024 0855   Time Frame short-term goal (STG), 5 - 7 days at 11/26/2024 0855   Strategies/Barriers 50 at 11/26/2024 0855   Progress/Outcome goal revised this date, goal ongoing at 11/26/2024 0855    Gait/Walking Locomotion Goal 2    Activity/Assistive Device gait (walking locomotion), assistive device use, walker, front-wheeled at 11/26/2024 0855   Distance 50 feet at 11/26/2024 0855   [place] minimum assist (75% or more patient effort) at 11/26/2024 0855   Time Frame long-term goal (LTG), 14 days or less at 11/26/2024 0855   Wheelchair Locomotion Goal 1    Activity forward propulsion, backward propulsion, steering, stopping, turning, doorway navigation, weight shifting at 11/19/2024 1431   Assistive Device manual, ultra lightweight at 11/19/2024 1431   Distance 150 feet at 11/19/2024 1431   [place] other (see comments)  [Touching / Steadying Assist] at 11/19/2024 1431   Time Frame 1 week, short-term goal (STG) at 11/19/2024 1431   Progress/Outcome goal met at 11/26/2024 0855   Wheelchair Locomotion Goal 2    Activity forward propulsion, backward propulsion, steering, stopping, turning, doorway navigation, weight shifting at 11/19/2024 1431   Assistive Device manual, ultra lightweight at 11/19/2024 1431   Distance 150 feet at 11/19/2024 1431   [place] modified independence at 11/19/2024 1431   Time Frame 21 days or less, long-term goal (LTG) at 11/19/2024 1431   Progress/Outcome goal ongoing at 11/26/2024 0855

## 2024-11-29 NOTE — PROGRESS NOTES
Subjective    Patient seen and examined on rounds.  Chart reviewed.  Events overnight noted.  History reviewed briefly with patient.    CC:  Deficits in mobility, transfers, self-care status post left transfemoral amputation for prosthetic training, severe peripheral vascular disease, chronic myelocytic leukemia, osteomyelitis left foot, diabetic neuropathy, multiple medical problems    HPI:  Ms. 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 Pura Chaves. ID recommended IV Ceftriaxone and IV Ampicillin until 9/14/24.  She had urinary retention managed with indwelling Rowan catheter.  She had right upper extremity swelling present during this admission, ultrasound on 8/29/24 demonstrated an acute non-occlusive DVT of the right axillary and brachial vein around line (on Lovenox).  Subsequently she completed an acute inpatient rehabilitation stay for pre-prosthetic training at [facility] between 9/3/24 to 9/21/24 and was discharged to [facility] on 9/21/24.  She initially required bladder self intermittent catheterizations due to urinary retention, but indicates that now she is able to void.  She desired to be a prosthetic ambulator.  A left transfemoral amputation prosthesis was fabricated. and followed-up as outpatient in the amputee clinic at [place] [facility] and she was deemed to be an  appropriate candidate for acute inpatient rehabilitation stay. She has been needing assistance for mobility, transfers, self-care.  I have reviewed the preadmission screening and concur with that information and there are no significant changes from patient's preadmission screening. She is transferred to [facility] on 11/19/24 from [facility] for further acute inpatient rehabilitation stay for prosthetic training with transfemoral amputation prosthesis.    SUBJ: Denies phantom limb pain sensation.  Discussed with patient and with her family with her at bedside.  Noted podiatry input.  She feels she is improving functionally.  Discussed with her that with practice she will continue to improve with prosthetic use.    ROS: Denies chest pain or shortness of breath. Other ROS negative. Past, family, social history is unchanged.    Current Functional Status:   Bed mobility:   [place], Supine to Sit: close supervision   [place], Sit to Supine: close supervision   Transfers:    [place], Sit to Stand Transfer: moderate assist (50-74% patient effort), 1 person assist  [place], Stand to Sit Transfer: moderate assist (50-74% patient effort), 1 person assist   [place], Stand Pivot/Stand Step Transfer: moderate assist (50-74% patient effort), 1 person assist   Gait:   [place], Gait: moderate assist (50-74% patient effort), 1 person assist  Assistive Device: walker, front-wheeled   Distance in Feet: 20 feet    Bathing:   [place]: minimum assist (75% or more patient effort)   Toileting:   [place]: dependent (less than 25% patient effort)   Upper body dressing:   [place]: moderate assist (50-74% patient effort)   Lower body dressing:   [place]: maximum assist (25-49% patient effort)     Functional Progress:    Functional status reviewed. Overall, patient's functional status is improving.      Physical Exam      Blood pressure 133/69, pulse 90, temperature 36.9 °C  "(98.4 °F), temperature source Oral, resp. rate 18, height 1.702 m (5' 7.01\"), weight 70.4 kg (155 lb 4.8 oz), SpO2 100%, not currently breastfeeding.    Body mass index is 24.32 kg/m².    General Appearance: Not in acute distress  Head/Ear/Nose/Throat: Normocephalic; Atraumatic.   Eye: EOMI; PERRL.   Neck: No JVD; No Bruits.   Respiratory: Decreased breath sounds at bases.   Cardiovascular: RRR; Normal S1, S2.   Gastrointestinal: Soft; NT; +BS.   Extremities: Bilateral lower extremity edema noted.  Left transfemoral amputation stump is stable.  Musculoskeletal: Functional active range of motion in both upper and right lower extremities.  Functional active range of motion in left hip.  She has a left transfemoral amputation.  Neurological: AAO ×3. Speech is fluent. Cranial nerve examination does not reveal any gross facial asymmetry. Strength testing shows about 4+/5 strength in both upper and right lower extremities.  Left hip flexion is 4/5.  She has a left transfemoral amputation.  She is grossly able to localize touch sensation.  She is able to localize position sense in right foot great toe position sense is unable to be tested on left side.  Deep tendon reflexes are hypoactive bilaterally.  Right plantar is flexor, left plantar was unable to be tested. Coordination is functional upper extremities.    Behavior/Emotional: Appropriate; Cooperative.   Skin: Left transfemoral amputation stump stable.  Incision appears to be well-healed.  Some wounds are noted on right anterior lower forearm.  Small wound noted on right foot fifth toe.  Small wound also noted on right foot great toe with some slough in it and scaly skin around it.  Some redness noted on sacrum/coccyx.  Reviewed pictures of wounds in Epic.      Current Facility-Administered Medications:     acetaminophen (TYLENOL) tablet 650 mg, 650 mg, oral, q4h PRN, Charlette Brooke MD, 650 mg at 11/28/24 2206    alum-mag hydroxide-simeth (MAALOX) 200-200-20 mg/5 mL " suspension 30 mL, 30 mL, oral, q4h PRN, Charlette Brooke MD    amLODIPine (NORVASC) tablet 2.5 mg, 2.5 mg, oral, Daily, Charlette Brooke MD, 2.5 mg at 11/29/24 0835    apixaban (ELIQUIS) tablet 5 mg, 5 mg, oral, BID, Charlette Brooke MD, 5 mg at 11/29/24 0835    ascorbic acid (VITAMIN C) tablet 500 mg, 500 mg, oral, q48h INT, Charlette Brooke MD, 500 mg at 11/29/24 1255    atorvastatin (LIPITOR) tablet 40 mg, 40 mg, oral, Daily (6p), Charlette Brooke MD, 40 mg at 11/29/24 1737    bisacodyL (DULCOLAX) 10 mg suppository 10 mg, 10 mg, rectal, Daily PRN, Charlette Brooke MD    docusate sodium (COLACE) capsule 100 mg, 100 mg, oral, BID, Charlette Brooke MD, 100 mg at 11/24/24 0802    DULoxetine (CYMBALTA) capsule,delayed release(DR/EC) 60 mg, 60 mg, oral, Nightly, Charlette Brooke MD, 60 mg at 11/28/24 2204    empagliflozin (JARDIANCE) tablet 10 mg, 10 mg, oral, Daily, Charlette Brooke MD, 10 mg at 11/29/24 0835    ferrous sulfate tablet 325 mg, 325 mg, oral, q48h INT, Charlette Brooke MD, 325 mg at 11/29/24 1255    fluticasone propionate (FLONASE) 50 mcg/actuation nasal spray 1 spray, 1 spray, Each Nostril, Daily PRN, Charlette Brooke MD    furosemide (LASIX) tablet 40 mg, 40 mg, oral, BID (am, 4p), Charlette Brooke MD, 40 mg at 11/29/24 1700    gabapentin (NEURONTIN) capsule 600 mg, 600 mg, oral, TID, Charlette Brooke MD, 600 mg at 11/29/24 1700    HYDROmorphone (DILAUDID) tablet 4 mg, 4 mg, oral, q6h PRN, Charlette Brooke MD, 4 mg at 11/29/24 0839    imatinib (GLEEVEC) chemo tablet 400 mg, 400 mg, oral, Daily, Charlette Brooke MD, 400 mg at 11/29/24 0842    insulin glargine U-100 (LANTUS/BASAGLAR) pen 15 Units, 15 Units, subcutaneous, Nightly, Charlette Brooke MD    insulin lispro U-100 (HumaLOG) pen 1-12 Units, 1-12 Units, subcutaneous, TID with meals, Charlette Brooke MD, 4 Units at 11/28/24 1643    insulin lispro U-100 (HumaLOG) pen 6 Units, 6 Units, subcutaneous, TID with meals, Charlette Brooke MD, 6 Units at 11/29/24 1147    levothyroxine  (SYNTHROID) tablet 25 mcg, 25 mcg, oral, Daily (6:30a), Charlette Brooke MD, 25 mcg at 11/29/24 0544    losartan (COZAAR) tablet 25 mg, 25 mg, oral, Daily with dinner, Charlette Brooke MD, 25 mg at 11/29/24 1736    melatonin ODT 3 mg, 3 mg, oral, Nightly, Charlette Brooke MD, 3 mg at 11/28/24 2204    methenamine (HIPREX) tablet 1 g, 1 g, oral, BID, Charlette Brooke MD, 1 g at 11/29/24 0835    multivitamin tablet 1 tablet, 1 tablet, oral, Daily, Charlette Brooke MD, 1 tablet at 11/29/24 1255    ondansetron ODT (ZOFRAN-ODT) disintegrating tablet 4 mg, 4 mg, oral, q8h PRN, Charlette Brooke MD, 4 mg at 11/27/24 0946    potassium chloride (KLOR-CON M) ER tablet (particles/crystals) 20 mEq, 20 mEq, oral, Daily, Charlette Brooke MD, 20 mEq at 11/29/24 1255    semaglutide tablet 14 mg, 1 tablet, oral, Daily, Charlette Brooke MD, 14 mg at 11/29/24 0629    senna (SENOKOT) tablet 2 tablet, 2 tablet, oral, Daily, Charlette Brooke MD, 2 tablet at 11/23/24 1146    tamsulosin (FLOMAX) 24 hr ER capsule 0.4 mg, 0.4 mg, oral, Daily with dinner, Charlette Brooke MD, 0.4 mg at 11/29/24 1736       Labs / Radiology    Lab Results   Component Value Date    WBC 4.98 11/20/2024    HGB 10.8 (L) 11/20/2024    HCT 32.7 (L) 11/20/2024    MCV 90.8 11/20/2024     11/20/2024     Lab Results   Component Value Date    GLUCOSE 205 (H) 11/20/2024    CALCIUM 8.7 11/20/2024     11/20/2024    K 3.7 11/20/2024    CO2 30 11/20/2024     11/20/2024    BUN 35 (H) 11/20/2024    CREATININE 0.9 11/20/2024       Assessment and Plan    ASSESSMENT PLAN:  1. 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 Pura Chaves. ID recommended IV Ceftriaxone and IV Ampicillin until 9/14/24.  She had urinary retention managed with indwelling Rowan catheter.  She had right upper extremity swelling present during this admission, ultrasound on 8/29/24 demonstrated an acute non-occlusive DVT of the right axillary and brachial vein around line (on Lovenox).  Subsequently she completed an acute inpatient rehabilitation stay for pre-prosthetic training at [facility] between 9/3/24 to 9/21/24 and was discharged to [facility] on 9/21/24.  She initially required bladder self intermittent catheterizations due to urinary retention, but indicates that now she is able to void.  She desired to be a prosthetic ambulator.  A left transfemoral amputation prosthesis was fabricated. and followed-up as outpatient in the amputee clinic at [facility] and she was deemed to be an appropriate candidate for acute inpatient rehabilitation stay. She has been needing assistance for mobility, transfers, self-care.  I have reviewed the preadmission screening and concur with that information and there are no significant changes from patient's preadmission screening. She is transferred to [facility] on 11/19/24 from [facility] for further acute inpatient rehabilitation stay for prosthetic training with transfemoral amputation prosthesis.     2. DVT prophylaxis/treatment - on Eliquis.  Platelets 168 on 11/20/2024.     3. Vascular - status post left transfemoral amputation for prosthetic training, severe peripheral vascular disease, chronic myelocytic leukemia, osteomyelitis left foot, diabetic neuropathy, multiple medical problems - continue PT, OT, psychology.  Follow falls precautions, cardiac  precautions, monitor pulse oximeter in therapy.  Monitor skin under prosthesis.  Teach donning and doffing of left transfemoral amputation prosthesis.  Do gait training with prosthesis.     4. GI - On Colace, Senokot.  On PRN Maalox.  On PRN Dulcolax suppository.  On PRN Zofran.     5.  -on Hiprex.  On Flomax.  Monitor postvoid residual.     6. CVS - on Lasix.  Monitor for orthostasis.     7. Pulmonary -encourage incentive spirometry.     8. Hematology - monitor hemoglobin, platelets.  Hemoglobin 10.8, WBC 4.98 on 11/20/2024.     9. Pain -on Cymbalta.  On Neurontin.  On PRN Tylenol.  On PRN Dilaudid.     10. Skin - Left transfemoral amputation stump stable.  Incision appears to be well-healed.  Some wounds are noted on right anterior lower forearm.  Small wound noted on right foot fifth toe.  Small wound also noted on right foot great toe with some slough in it and scaly skin around it.  Some redness noted on sacrum/coccyx.  Reviewed pictures of wounds in Epic. Monitor skin under the left transfemoral amputation prosthesis.Noted input from dermal defense nurse on 11/21/2024.  Reviewed pictures in Epic.      11. F/E/N - on Ferrous sulfate.  On vitamin C. on MVI.  On K-Genesis con.     12. Hyperlipidemia -on Lipitor.     13. Diabetes mellitus type 2 - on Lantus insulin.  On sliding scale Humalog coverage.  On Jardiance.     14. Oncology - On Gleevec for chronic myelocytic leukemia.     15. Hypothyroidism - on Synthroid.     16. Insomnia - on Melatonin.     17. Infectious diseases - On oral Vancomycin.       18. Seasonal allergies - on PRN Flonase.     19. Psychiatry - mood stable.  Psychology consulted.     20. Rehabilitation medicine - Continue comprehensive rehabilitation care. Continue PT, OT, speech, psychology.  We will follow falls precautions, cardiac precautions, monitor pulse oximetry in therapy and follow weightbearing precautions.  We will follow spinal precautions as appropriate.Tolerating therapies per  endurance on 11/20/2024.  Slept well last night.   Working on donning and doffing of prosthesis.  Discussed with the importance of skin checks on the prosthesis.  Able to ambulate 7 feet in parallel bars dependent gait using left transfemoral amputation prosthesis with physical therapy.  Dependent for sit to stand transfers with physical therapy on 11/20/2024. Noted input from dermal defense nurse on 11/21/2024.  Reviewed pictures in Epic.  Working on ADLs with occupational therapy on 11/21/2024.  Requiring minimal assistance for bathing, dependent for toileting, close supervision for poorly dressing and dependent for lower body dressing with occupational therapy.  She requested a flu shot.  Discussed with nurse.  Continent of bowel and bladder for nursing on 11/21/2024.Dependent for sit to stand transfers with physical therapy on 11/22/2024.  Able to ambulate 15 feet with thoracic walker dependent gait with physical therapy on 11/22/2024.  Skin under prosthesis is stable.  Denies pain today.  She says she got flu shot yesterday.Feels better on 11/23/2024.  Denies increased pain.  She says that she took her medications after meals and did not have any GI symptoms today.  Continent of bowel and bladder for nursing.  Working on donning and doffing of prosthesis, sock ply management with occupational therapy on 11/23/2024. Due to elevated blood pressure, internist restarted Amlodipine and Losartan on 11/25/2024.  Denies increased pain.  Continent of bowel and bladder for nursing on 11/25/2024.Skin on left transfemoral amputation residual limb remains stable.  Discussed with patient.  Discussed with nurse on 11/25/2024.Discussed patients progress in therapies with therapists in team meeting on 11/26/2024.  Discussed in PCC. See PCC documentation.  Face-to-face evaluation was done for a right carbon fiber toe off brace on 11/26/2024.  Discussed with patient.  Discussed with physical therapist on 11/26/2024.Discussed  recommendations of PCC with patient on 11/27/2024.  She was measured for a right carbon fiber Toe off AFO brace yesterday.  Discussed with nursing.  Skin on residual limb remains stable.  Continent of bladder for nursing on 11/27/2024. Able to ambulate 10 feet with moderate assistance with balance but using left transfemoral amputation prosthesis on 11/28/2024.  Requiring moderate assistance for sit to stand transfer with physical therapy using left transfemoral amputation prosthesis on 11/28/2024.Denies phantom limb pain sensation on 11/29/2024.  Discussed with patient and with her family with her at bedside on 11/29/2024.  Noted podiatry input.  She feels she is improving functionally.  Discussed with her that with practice she will continue to improve with prosthetic use.     21. Reviewed labs today.  BUN 35, creatinine 0.9, sodium 139, potassium 3.7 on 11/20/2024.      Alexei Petty MD  11/29/2024      This encounter was completed utilizing the Direct Speech Voice Recognition Software. Grammatical errors, random word insertions, pronoun errors, and incomplete sentences are occasional consequences of the system due to software limitations, ambient noises, and hardware issues. Such errors may be missed prior to affixing electronic signature. Any questions or concerns about the content, text, or information contained within the body of this dictation should be directly addressed to the physician for clarification. If you have any questions or concerns please do not hesitate to call me directly via EPIC chat, page, or email.

## 2024-11-29 NOTE — PLAN OF CARE
Problem: Rehabilitation ([facility]) Plan of Care  Goal: Plan of Care Review  Outcome: Progressing  Flowsheets (Taken 11/29/2024 2568)  Progress: improving  Plan of Care Reviewed With: patient  Outcome Evaluation: Patient received sitting up in wheelchair at bedside. Patient has a left AKA. Patient bump over to toilet and voided without difficulty. Patient complains of phatom pain and medicated with dilaudid 4 mg po with effect. Accucheck 99 and appetite good. Patient tolerating diet.

## 2024-11-29 NOTE — PROGRESS NOTES
Patient: Bebe Burks  Location: [facility] [address]  MRN: [id number]  Today's date: 11/29/2024    History of Present Illness  Bebe is a 64 y.o. female admitted on 11/19/2024 with History of above knee amputation, left (CMS/[facility]) [Z89.612]  Encounter for prosthetic gait training [Z47.89]. Principal problem is History of above knee amputation, left (CMS/[facility]).    Pt is a 64 y.o. 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       Past Medical History  Bebe has a past medical history of Abnormal ECG, Abnormal finding on chest xray (12/2021), Arthritis, Colon cancer (CMS/[facility]), COVID-19 (12/2021), COVID-19 vaccine series completed, Diabetic neuropathy (CMS/[facility]), DM (diabetes mellitus), type 2 with ophthalmic complications (CMS/[facility]), Hypertension, Hypothyroidism, Left foot drop, Lipid disorder, and Type 2 diabetes mellitus treated with insulin (CMS/[facility]).    OT Vitals      Date/Time Pulse HR Source BP BP Location BP Method Pt Position Who   11/29/24 1006 89 Monitor 135/61 Right upper arm Automatic Sitting VMS          OT Pain      Date/Time Pain Type Side/Orientation Rating: Rest Interventions [facility]   11/29/24 1006 Pain Assessment residual limb 3 diversional activity provided VMS   11/29/24 1056 Post Activity -- 0 -- VMS               Prior Living Environment      Flowsheet Row Most Recent Value   People in Home alone   Current Living Arrangements condominium   Home Accessibility stairs to enter home (Group)   Living Environment Comment resides in 1st floor condo, 4 (1+2+1) JOSE RAFAEL, R hand rail, no stairs inside home, Bathroom Layout: Tub/shower unit with shower curtain, Shower stall with glass doors (shower stall in main bedroom), two sisters can provide some assistance   Number of Stairs, Main Entrance 4   Surface of Stairs, Main Entrance concrete   Stair Railings, Main Entrance railing on right side (ascending)   Stairs  Comment, Main Entrance 1+2+1 JOSE RAFAEL   Location, Bathroom first (main) floor   Bathroom Access tub bath, walk-in shower            Prior Level of Function      Flowsheet Row Most Recent Value   Dominant Hand right   Ambulation assistive equipment   Transferring assistive equipment   Toileting independent   Bathing assistive equipment   Dressing independent   Eating independent   IADLs assistive equipment   Driving/Transportation    Communication understands/communicates without difficulty   Assistive Device Currently Used at Home cane, straight, walker, front-wheeled, shower chair, grab bar, raised toilet            Occupational Profile      Flowsheet Row Most Recent Value   Successful Occupations Pt is very active in her community   Occupational History/Life Experiences 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, attends [employer] weekly   Performance Patterns lives alone   Environmental Supports and Barriers Supportive community    + handicap parking placard   Patient Goals PSFS: Toilet transfer with prosthetic 3/10, toilet transfer without prosthetic 8/10, walking 0/10, getting dressed 5/10, getting in/ out of bed 4/10, getting a prosthetic on/off 1/10             IRF OT Evaluation and Treatment - 11/29/24 1005          OT Time Calculation    Start Time 1000     Stop Time 1100     Time Calculation (min) 60 min        Session Details    Document Type Daily Treatment/Progress Note        Mobility Belt    Mobility Belt Used During Session yes        Orthotics    Orthosis Trial R toe off brace donned        Sit to Stand Transfer    [place], Sit to Stand Transfer moderate assist (50-74% patient effort)     Safety/Cues technique;hand placement;sequencing     Assistive Device walker, front-wheeled     Comment min - mod A to stand with assist for knee extension        Stand to Sit Transfer    [place], Stand to Sit Transfer moderate assist (50-74% patient effort)     Safety/Cues technique;hand  "placement;sequencing     Assistive Device walker, front-wheeled     Comment stand to sit with weight shifting off prosthetic with assist for toggle, pt inconsistant with unlocking        Balance    Comment, Balance standing in mindline with RW with cues for shifts, trial reaching for objects static stand with min A with cues for decreased posterior lean. trial of shifting outside BINTA with decreased midline and noted posterior lean at times max A        Motor Skills    Comment, Motor Skills sitting perched sit 2 hands on ball diagonal reaching to target with cues for anterior weight shift. 10 x each directions        Core Strength (Therapeutic Exercise)    Comment perched sit sitting edge of chair. performing modified sit ups with press of ball over head        Prosthetic Orientation (Lower Extremity)    Comment, Fit Assessment Pt wearing prosthetic from PT session, 3 ply, goal to wear for 3 hours today        Daily Progress Summary (OT)    Daily Outcome Statement Pt with good participation, pt reports \" feeling more normal\" being able to wear long pants with prosthetic. Continues to require cues for midline and decreased posterior lean. Improving balance endurance, weight shift, mgnt of knee to unlock. Pt would continue to benefit from skilled OT services to increase indep, decrease caregiver burden and increase quality of life. Continue with POC. discussion of need for platform co                               IRF OT Goals      Flowsheet Row Most Recent Value   Transfer Goal 1    Activity/Assistive Device toilet at 11/20/2024 0906   [place] maximum assist (25-49% patient effort) at 11/20/2024 0906   Time Frame short-term goal (STG), 5 - 7 days at 11/26/2024 1000   Progress/Outcome goal ongoing at 11/26/2024 1000   Transfer Goal 2    Activity/Assistive Device toilet at 11/20/2024 0906   [place] minimum assist (75% or more patient effort) at 11/20/2024 0906   Time Frame long-term goal (LTG), 21 days or less " at 11/20/2024 0906   Progress/Outcome goal ongoing at 11/26/2024 1000   Transfer Goal 3    Activity/Assistive Device shower at 11/20/2024 0906   [place] maximum assist (25-49% patient effort) at 11/20/2024 0906   Time Frame short-term goal (STG), 5 - 7 days at 11/26/2024 1000   Progress/Outcome goal ongoing at 11/26/2024 1000   Transfer Goal 4    Activity/Assistive Device shower at 11/20/2024 0906   [place] minimum assist (75% or more patient effort) at 11/20/2024 0906   Time Frame long-term goal (LTG), 21 days or less at 11/20/2024 0906   Progress/Outcome goal ongoing at 11/26/2024 1000   Bathing Goal 1    [place] tactile cues required at 11/20/2024 0906   Time Frame short-term goal (STG), 5 - 7 days at 11/26/2024 1000   Progress/Outcome goal ongoing at 11/26/2024 1000   Bathing Goal 2    [place] modified independence at 11/20/2024 0906   Time Frame long-term goal (LTG), 21 days or less at 11/20/2024 0906   Progress/Outcome goal ongoing at 11/26/2024 1000   UB Dressing Goal 1    [place] minimum assist (75% or more patient effort) at 11/26/2024 1000   Time Frame short-term goal (STG), 5 - 7 days at 11/26/2024 1000   Strategies/Barriers with clothing retrieval at 11/20/2024 0906   Progress/Outcome goal ongoing at 11/26/2024 1000   UB Dressing Goal 2    [place] minimum assist (75% or more patient effort) at 11/20/2024 0906   Time Frame long-term goal (LTG), 21 days or less at 11/20/2024 0906   Strategies/Barriers with clothing retrieval at 11/20/2024 0906   Progress/Outcome goal ongoing at 11/26/2024 1000   LB Dressing Goal 1    [place] minimum assist (75% or more patient effort) at 11/26/2024 1000   Time Frame short-term goal (STG), 5 - 7 days at 11/26/2024 1000   Strategies/Barriers with clothing retrieval at 11/20/2024 0906   Progress/Outcome goal ongoing at 11/26/2024 1000   LB Dressing Goal 2    [place] minimum assist (75% or more patient effort) at 11/20/2024 0906   Time  Frame long-term goal (LTG), 21 days or less at 11/20/2024 0906   Strategies/Barriers with clothing retrieval at 11/20/2024 0906   Progress/Outcome goal ongoing at 11/26/2024 1000   Grooming Goal 1    [place] moderate assist (50-74% patient effort) at 11/20/2024 0906   Time Frame short-term goal (STG), 5 - 7 days at 11/26/2024 1000   Strategies/Barriers in stance at sink at 11/20/2024 0906   Progress/Outcome goal ongoing at 11/26/2024 1000   Grooming Goal 2    [place] supervision required at 11/20/2024 0906   Time Frame long-term goal (LTG), 21 days or less at 11/20/2024 0906   Strategies/Barriers in stance at sink at 11/20/2024 0906   Progress/Outcome goal ongoing at 11/26/2024 1000   Toileting Goal 1    [place] maximum assist (25-49% patient effort) at 11/20/2024 0906   Time Frame short-term goal (STG), 5 - 7 days at 11/26/2024 1000   Progress/Outcome goal ongoing at 11/26/2024 1000   Toileting Goal 2    [place] minimum assist (75% or more patient effort) at 11/20/2024 0906   Time Frame long-term goal (LTG), 21 days or less at 11/20/2024 0906   Progress/Outcome goal ongoing at 11/26/2024 1000

## 2024-11-29 NOTE — PROGRESS NOTES
[facility] Progress Note          Patient was seen and examined at bedside.    Subjective:      No O/N events. No new complaints. Her paresthetic training is progressing well.     Objective   Vital signs in last 24 hours:  Temp:  [36.6 °C (97.9 °F)-36.8 °C (98.2 °F)] 36.7 °C (98.1 °F)  Heart Rate:  [] 89  Resp:  [18] 18  BP: ()/(55-81) 135/61    No intake or output data in the 24 hours ending 11/29/24 1148    Intake/Output this shift:  No intake/output data recorded.   Review of Systems:  All other systems reviewed and negative except as noted in the HPI.   Objective      Labs  Reviewed her labs thoroughly   Lab Results   Component Value Date    WBC 4.98 11/20/2024    HGB 10.8 (L) 11/20/2024    HCT 32.7 (L) 11/20/2024    MCV 90.8 11/20/2024     11/20/2024     Lab Results   Component Value Date    GLUCOSE 205 (H) 11/20/2024    CALCIUM 8.7 11/20/2024     11/20/2024    K 3.7 11/20/2024    CO2 30 11/20/2024     11/20/2024    BUN 35 (H) 11/20/2024    CREATININE 0.9 11/20/2024       Imaging  N/A    Full Code    Physical Exam:  Head/Ear/Nose/Throat: normocephalic; atraumatic; moisture mouth mm, no oropharyngeal thrush noted.   Eyes: anicteric sclera, EOMI; PERRL.   Neck : supple, no JVD, no carotid bruits appeciated.   Respiratory: no evidence of labored breathing, lung sounds CTA b/l, good aeration bibasilar area, no w/r/c.   Cardiovascular: RRR; normal S1, S2; no m/r/g; no S3 or S4.   Gastrointestinal: soft; NT; BS normal; mildly distended; no CVAT b/l.   Genitourinary: no lozada.   Extremities : S/p L AKA, residual limb healed well .   Neurological: AO x 3, fluent speeches, following commands, CNS II-XII grossly intact; no focal neurologic deficits.   Behavior/Emotional: in NAD, appropriate; cooperative.   Skin: clean, dry and intact.  Plan of care was discussed with patient, RN, and PMR attending     Assessment     CC:L AKA, ambulatory dysfunction     64 y.o.  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 S/p IV Ceftriaxone and IV Ampicillin until 9/14/24. H/o non-occlusive DVT of the R axillary and brachial vein around midline. She presented to [facility] on 11/14 for evaluation to begin prosthetic training. Now she was admitted for inpt prosthetic training on 11/19/24.  Denies nausea, vomiting, abdominal pain or discomfort, dysuria, cough/sputum, running nose, sore throat, chest pain, palpitation, SOB or orthopnea, dizziness or LH,  HA.    A/P:  # A/p L AKA  -inpt prosthetic training, wound care/dermal defense, pain and neuralgia managements, fall precaution     # CML  -cont home imatinib, check CBC      # Type 2 DM, hypothyroidism  - diet control, SSI, plus her home diabetic regimen;  - cont home levothyroxine     #  PICC-associated DVT on 8/30   -S/p SQ Lovenox weight based ;  -cont Eliquis 5 mg po bid      # Urinary retention, h/o recurrent UTI  -timed voiding with cic, adequate oral hydration, monitor PVR with cic, on flomax;  -hiprex 1 g po bid        # Stage 2 decubitus ulcer, pressure injury in right heel   -wound care     # Recent C.diff.  -cont prophylactic oral vanco 125 mg po bid       Billing code: [id number]  Diagnoses:  Patient Active Problem List   Diagnosis    Controlled type 2 diabetes mellitus, with long-term current use of insulin (CMS/[facility])    Essential hypertension    Hyperlipidemia    Hypothyroidism    Obesity    Thyroid nodule    COVID-19 virus infection    Lung nodule    Displacement of lumbar intervertebral disc without myelopathy    Disability of walking    Polyneuropathy due to type 2 diabetes mellitus (CMS/HCC)    Pre-op examination    Localized osteoarthritis of right knee    Type 2 diabetes  mellitus treated with insulin (CMS/HCC)    Abnormal finding on EKG    Left foot drop    Abnormal finding on chest xray    Osteoarthritis of right knee, unspecified osteoarthritis type    S/P total knee arthroplasty, right    Sepsis (CMS/HCC)    Osteomyelitis of left foot (CMS/HCC)    Peripheral neuropathy    Abdominal pain    Chronic idiopathic constipation    Gangrene (CMS/HCC)    Tibial artery stenosis, left (CMS/HCC)    H/O pilonidal cyst    Type 2 diabetes mellitus with left diabetic foot infection (CMS/HCC)    Type 2 diabetes mellitus with diabetic neuropathy (CMS/HCC)    Hypothyroidism    Primary hypertension    Leukemoid reaction    HLD (hyperlipidemia)    Acute osteomyelitis of left foot (CMS/HCC)    History of above knee amputation, left (CMS/HCC)    Encounter for prosthetic gait training    Adjustment disorder with anxiety           Charlette Brooke MD  11/29/2024

## 2024-11-30 LAB
GLUCOSE BLD-MCNC: 115 MG/DL (ref 70–99)
GLUCOSE BLD-MCNC: 171 MG/DL (ref 70–99)
GLUCOSE BLD-MCNC: 195 MG/DL (ref 70–99)
POCT TEST: ABNORMAL

## 2024-11-30 PROCEDURE — 12800000 HC ROOM AND CARE SEMIPRIVATE REHAB

## 2024-11-30 PROCEDURE — 63700000 HC SELF-ADMINISTRABLE DRUG: Performed by: INTERNAL MEDICINE

## 2024-11-30 RX ORDER — INSULIN LISPRO 100 [IU]/ML
5 INJECTION, SOLUTION INTRAVENOUS; SUBCUTANEOUS
Status: DISCONTINUED | OUTPATIENT
Start: 2024-11-30 | End: 2024-12-10 | Stop reason: HOSPADM

## 2024-11-30 RX ADMIN — HYDROMORPHONE HYDROCHLORIDE 4 MG: 2 TABLET ORAL at 21:07

## 2024-11-30 RX ADMIN — DULOXETINE HYDROCHLORIDE 60 MG: 30 CAPSULE, DELAYED RELEASE ORAL at 20:56

## 2024-11-30 RX ADMIN — FUROSEMIDE 40 MG: 40 TABLET ORAL at 17:00

## 2024-11-30 RX ADMIN — Medication 3 MG: at 20:57

## 2024-11-30 RX ADMIN — IMATINIB MESYLATE 400 MG: 400 TABLET, FILM COATED ORAL at 08:01

## 2024-11-30 RX ADMIN — GABAPENTIN 600 MG: 300 CAPSULE ORAL at 08:01

## 2024-11-30 RX ADMIN — FUROSEMIDE 40 MG: 40 TABLET ORAL at 08:01

## 2024-11-30 RX ADMIN — HYDROMORPHONE HYDROCHLORIDE 4 MG: 2 TABLET ORAL at 12:14

## 2024-11-30 RX ADMIN — APIXABAN 5 MG: 5 TABLET, FILM COATED ORAL at 20:56

## 2024-11-30 RX ADMIN — INSULIN LISPRO 1 UNITS: 100 INJECTION, SOLUTION INTRAVENOUS; SUBCUTANEOUS at 17:53

## 2024-11-30 RX ADMIN — APIXABAN 5 MG: 5 TABLET, FILM COATED ORAL at 08:01

## 2024-11-30 RX ADMIN — POTASSIUM CHLORIDE 20 MEQ: 1500 TABLET, EXTENDED RELEASE ORAL at 12:14

## 2024-11-30 RX ADMIN — INSULIN GLARGINE 15 UNITS: 100 INJECTION, SOLUTION SUBCUTANEOUS at 21:07

## 2024-11-30 RX ADMIN — INSULIN LISPRO 5 UNITS: 100 INJECTION, SOLUTION INTRAVENOUS; SUBCUTANEOUS at 17:52

## 2024-11-30 RX ADMIN — TAMSULOSIN HYDROCHLORIDE 0.4 MG: 0.4 CAPSULE ORAL at 17:51

## 2024-11-30 RX ADMIN — EMPAGLIFLOZIN 10 MG: 10 TABLET, FILM COATED ORAL at 08:00

## 2024-11-30 RX ADMIN — AMLODIPINE BESYLATE 2.5 MG: 2.5 TABLET ORAL at 08:00

## 2024-11-30 RX ADMIN — INSULIN LISPRO 5 UNITS: 100 INJECTION, SOLUTION INTRAVENOUS; SUBCUTANEOUS at 12:47

## 2024-11-30 RX ADMIN — LEVOTHYROXINE SODIUM 25 MCG: 0.03 TABLET ORAL at 06:25

## 2024-11-30 RX ADMIN — GABAPENTIN 600 MG: 300 CAPSULE ORAL at 17:00

## 2024-11-30 RX ADMIN — METHENAMINE HIPPURATE 1 G: 1000 TABLET ORAL at 08:01

## 2024-11-30 RX ADMIN — ATORVASTATIN CALCIUM 40 MG: 40 TABLET, FILM COATED ORAL at 17:50

## 2024-11-30 RX ADMIN — METHENAMINE HIPPURATE 1 G: 1000 TABLET ORAL at 20:56

## 2024-11-30 RX ADMIN — INSULIN LISPRO 6 UNITS: 100 INJECTION, SOLUTION INTRAVENOUS; SUBCUTANEOUS at 07:45

## 2024-11-30 RX ADMIN — THERA TABS 1 TABLET: TAB at 12:14

## 2024-11-30 RX ADMIN — GABAPENTIN 600 MG: 300 CAPSULE ORAL at 20:57

## 2024-11-30 RX ADMIN — LOSARTAN POTASSIUM 25 MG: 25 TABLET, FILM COATED ORAL at 17:50

## 2024-11-30 NOTE — PROGRESS NOTES
Podiatry Progress Note    Subjective follow up evaluation for right lateral foot DTI and great toe scab.          Interval History: none.       Medications:    Current Facility-Administered Medications:     acetaminophen (TYLENOL) tablet 650 mg, 650 mg, oral, q4h PRN, Charlette Brooke MD, 650 mg at 11/28/24 2206    alum-mag hydroxide-simeth (MAALOX) 200-200-20 mg/5 mL suspension 30 mL, 30 mL, oral, q4h PRN, Charlette Brooke MD    amLODIPine (NORVASC) tablet 2.5 mg, 2.5 mg, oral, Daily, Charlette Brooke MD, 2.5 mg at 11/30/24 0800    apixaban (ELIQUIS) tablet 5 mg, 5 mg, oral, BID, Charlette Brooke MD, 5 mg at 11/30/24 0801    ascorbic acid (VITAMIN C) tablet 500 mg, 500 mg, oral, q48h INT, Charlette Brooke MD, 500 mg at 11/29/24 1255    atorvastatin (LIPITOR) tablet 40 mg, 40 mg, oral, Daily (6p), Charlette Brooke MD, 40 mg at 11/29/24 1737    bisacodyL (DULCOLAX) 10 mg suppository 10 mg, 10 mg, rectal, Daily PRN, Charlette Brooke MD    docusate sodium (COLACE) capsule 100 mg, 100 mg, oral, BID, Charlette Brooke MD, 100 mg at 11/24/24 0802    DULoxetine (CYMBALTA) capsule,delayed release(DR/EC) 60 mg, 60 mg, oral, Nightly, Charlette Brooke MD, 60 mg at 11/29/24 2127    empagliflozin (JARDIANCE) tablet 10 mg, 10 mg, oral, Daily, Charlette Brooke MD, 10 mg at 11/30/24 0800    ferrous sulfate tablet 325 mg, 325 mg, oral, q48h INT, Charlette Brooke MD, 325 mg at 11/29/24 1255    fluticasone propionate (FLONASE) 50 mcg/actuation nasal spray 1 spray, 1 spray, Each Nostril, Daily PRN, Charlette Brooke MD    furosemide (LASIX) tablet 40 mg, 40 mg, oral, BID (am, 4p), Charlette Brooke MD, 40 mg at 11/30/24 0801    gabapentin (NEURONTIN) capsule 600 mg, 600 mg, oral, TID, Charlette Brooke MD, 600 mg at 11/30/24 0801    HYDROmorphone (DILAUDID) tablet 4 mg, 4 mg, oral, q6h PRN, Charlette Brooke MD, 4 mg at 11/29/24 2127    imatinib (GLEEVEC) chemo tablet 400 mg, 400 mg, oral, Daily, Charlette Brooke MD, 400 mg at 11/30/24 0801    insulin glargine U-100  (LANTUS/BASAGLAR) pen 15 Units, 15 Units, subcutaneous, Nightly, Charlette Brooke MD, 15 Units at 11/29/24 2128    insulin lispro U-100 (HumaLOG) pen 1-12 Units, 1-12 Units, subcutaneous, TID with meals, Charlette Brooke MD, 4 Units at 11/28/24 1643    insulin lispro U-100 (HumaLOG) pen 6 Units, 6 Units, subcutaneous, TID with meals, Charlette Brooke MD, 6 Units at 11/30/24 0745    levothyroxine (SYNTHROID) tablet 25 mcg, 25 mcg, oral, Daily (6:30a), Charlette Brooke MD, 25 mcg at 11/30/24 0625    losartan (COZAAR) tablet 25 mg, 25 mg, oral, Daily with dinner, Charlette Brooke MD, 25 mg at 11/29/24 1736    melatonin ODT 3 mg, 3 mg, oral, Nightly, Charlette Brooke MD, 3 mg at 11/29/24 2128    methenamine (HIPREX) tablet 1 g, 1 g, oral, BID, Charlette Brooke MD, 1 g at 11/30/24 0801    multivitamin tablet 1 tablet, 1 tablet, oral, Daily, Charlette Brooke MD, 1 tablet at 11/29/24 1255    ondansetron ODT (ZOFRAN-ODT) disintegrating tablet 4 mg, 4 mg, oral, q8h PRN, Charlette Brooke MD, 4 mg at 11/27/24 0946    potassium chloride (KLOR-CON M) ER tablet (particles/crystals) 20 mEq, 20 mEq, oral, Daily, Charlette Brooke MD, 20 mEq at 11/29/24 1255    semaglutide tablet 14 mg, 1 tablet, oral, Daily, Charlette Brooke MD, 14 mg at 11/30/24 0803    senna (SENOKOT) tablet 2 tablet, 2 tablet, oral, Daily, Charlette Brooke MD, 2 tablet at 11/23/24 1146    tamsulosin (FLOMAX) 24 hr ER capsule 0.4 mg, 0.4 mg, oral, Daily with dinner, Charlette Brooke MD, 0.4 mg at 11/29/24 1736    Labs:  Lab Results   Component Value Date    GLUCOSE 205 (H) 11/20/2024    CALCIUM 8.7 11/20/2024     11/20/2024    K 3.7 11/20/2024    CO2 30 11/20/2024     11/20/2024    BUN 35 (H) 11/20/2024    CREATININE 0.9 11/20/2024     Lab Results   Component Value Date    WBC 4.98 11/20/2024    HGB 10.8 (L) 11/20/2024    HCT 32.7 (L) 11/20/2024    MCV 90.8 11/20/2024     11/20/2024       Pathology Results       ** No results found for the last 720 hours. **           Microbiology Results       ** No results found for the last 720 hours. **                  Imaging  No results found.        Vital signs in last 24 hours:  Temp:  [36.6 °C (97.9 °F)-36.9 °C (98.4 °F)] 36.6 °C (97.9 °F)  Heart Rate:  [] 102  Resp:  [18] 18  BP: ()/(55-69) 136/67      No intake or output data in the 24 hours ending 11/30/24 0817      Review of Systems - Negative fever, chills, night sweats.    Objective     PE: nonpalpable DP nonpalpable PT right foot.  Decreased texture, temperature, and turgor right foot.  Decreased digital hair .  Mild edema right lower extremity.  Xerotic skin bilateral .  Right lateral foot with nonblanchable maroon scabbed area.  No crepitus no fluctuance.  No erythema.  Right great toe with dorsal scab.  thickened, yellow nails with subungual debris  <5.    Digital contracture noted toe 2-5.   Hallux valgus deformity.  Epicritic sensation reduced right foot.           A/P   Assessment   diabetes mellitus type 2, neuropathy,  left transfemoral amputation,  right foot DTI and noninfected great toe scab.       Plan   Continue mepilex right lateral foot.  Follow up 3-5 days.        Any change in the foot since last evaluation? No significant change.          Dimitri Douglas DPM  11/30/2024  8:17 AM

## 2024-11-30 NOTE — PROGRESS NOTES
"Room: 109W    Clinical Course: Patient is a 64 y.o. female who was admitted on 11/19/2024 with a diagnosis of History of above knee amputation, left (CMS/HCC) [Z89.612]  Encounter for prosthetic gait training [Z47.89].     Nutrition Interventions/Recommendations:   Continue 1800CC, NCS diet  Provided diet education. Handouts used: Healthy eating Plate, Guidelines for NCS, and eat this, not that for diabetics.   Preferences: no eggs  Glycemic control 140-180mg/dL  Monitor PO, weight, labs, skin, POC  Nutrition Risk Level: 1     Past Medical History:   Diagnosis Date    Abnormal ECG     Abnormal finding on chest xray 12/2021    Arthritis     Colon cancer (CMS/HCC)     COVID-19 12/2021    COVID-19 vaccine series completed     Moderna: \" 3 doses\"    Diabetic neuropathy (CMS/HCC)     DM (diabetes mellitus), type 2 with ophthalmic complications (CMS/HCC)     Hypertension     Hypothyroidism     Left foot drop     Lipid disorder     Type 2 diabetes mellitus treated with insulin (CMS/HCC)      Past Surgical History   Procedure Laterality Date    Abcess drainage  10/2023    AMPUTATION TOE (RAY RESECTION) Left 1/18/2024    Performed by Getachew Damian DPM at [facility] OR PAV    Angiogram lower extremity bilateral N/A 1/17/2024    Performed by Adam Aguilar MD at [facility] CATH/EP/NEURO INT    Aortogram abdominal with serialography N/A 1/17/2024    Performed by Adam Aguilar MD at [facility] CATH/EP/NEURO INT    Cataract extraction Bilateral     CATARACT PHACO, IOL Right 11/6/2018    Performed by Derrick Jeter MD at [facility]    Colonoscopy      Combined hysteroscopy diagnostic / d&c  2007    Eye surgery Left 2017    cataract    Joint replacement Right 02/2023    knee replacement    Right Total Knee Repalcement Right 2/27/2023    Performed by Gideon Centeno MD at [facility] [place]    Tib/peroneal angioplasty & atherectomy - unilateral - initial N/A 1/17/2024    Performed by Adam Aguilar MD at [facility] CATH/EP/NEURO " INT    Toe amputation      Vitrectomy Left 2016      Adult Nutrition Assessment - 11/30/24 0800          Charting Type    Nutrition Charting Type Nutrition Brief Assessment     RD Assessment Date 11/30/24     Nutrition Status Classification Mild nutritional compromise     RD Preferred Follow Up Date 12/10-12/14     Time Spent (Minutes) 35        Reason for Assessment    Reason For Assessment per organizational policy     Diagnosis --   history of above the knee amputation, prosthetic training    Identified At Risk by Screening Criteria no indicators present        Nutrition/Diet History    Typical Food/Fluid Intake regular     Diet Prior to Admission Regular     Appetite Prior to Admission Diiioclnc-%     Food Preferences does not like eggs     Cultural/Anabaptism Preferences None     Meal/Snack Patterns 3 meals/day     Supplemental Drinks/Foods/Additives none     Vitamin/Mineral/Herbal Supplements Vit C, FeSO4, MVI, Kcl     Typical Activity Patterns sedentary      Strength increased  strength     Food Allergies NKFA        Admit Weight    Admit Weight Method estimated     Admit Weight 69.4 kg (153 lb)        Current Weight    Weight Method Bed scale     Weight 70.3 kg (155 lb)        Body Mass Index (BMI)    BMI (Calculated) 24.3     BMI Assessment BMI 18.5-24.9: normal     Nutritional Status/Malnutrition Does not meet criteria for malnutrition        Labs/Procedures/Meds    Lab Results Reviewed reviewed, pertinent     Lab Results Comments -566-        Diagnostic Tests/Procedures    Diagnostic Test/Procedure Reviewed reviewed        Medications    Pertinent Medications Reviewed reviewed, pertinent     Pertinent Medications Comments norvasc, eliquis, lipitor, colace, cymbalta, insulin, synthroid, lasix, cozaar, semaglutide        Physical Findings    Overall Physical Appearance amputee   nourished    Gastrointestinal --   denies GI issues    Skin --   Wound R lower anterior arm (3x1x0), 5th  Toe, Great Toe       Mouth/Teeth WDL    Mouth/Teeth WDL WDL        KCAL/KG    20 Kcal/Kg (kcal) 1406.16     25 Kcal/Kg (kcal) 1757.7     30 Kcal/Kg (kcal) 2109.24     35 Kcal/Kg (kcal) 2460.78     40 Kcal/Kg (kcal) 2812.32     45 Kcal/Kg (kcal) 3163.86     50 Kcal/Kg (kcal) 3515.4        [place]-St. Jeor Equation    RMR ([place]-St. Jeor Equation) 1275.83        Fluid Requirements    [place]-Segar Method (over 20 kg) 2906.16        Nutrition Order    Nutrition Order meets nutritional requirements     Nutrition Order Comments 1800 CC, NCS        PES Statement    Nutritional Needs Met? Yes                   CMP Results         11/20/24 07/29/24 04/25/24     0538 0725 0650     136 140    K 3.7 3.7 4.1    Cl 102 99 107    CO2 30 27 28    Glucose 205 142 124    BUN 35 22 26    Creatinine 0.9 0.5 0.5    Calcium 8.7 7.8 9.0    Anion Gap 7 10 5    AST 16 17 23    ALT 20 4 24    Albumin 3.7 2.6 3.8    EGFR >60.0 >60.0 >60.0           Comment for EGFR at 0538 on 11/20/24: Calculation based on the Chronic Kidney Disease Epidemiology Collaboration (CKD-EPI) equation refit without adjustment for race.    Comment for EGFR at 0725 on 07/29/24: Calculation based on the Chronic Kidney Disease Epidemiology Collaboration (CKD-EPI) equation refit without adjustment for race.    Comment for EGFR at 0650 on 04/25/24: Calculation based on the Chronic Kidney Disease Epidemiology Collaboration (CKD-EPI) equation refit without adjustment for race.          Lab Results   Component Value Date    ALT 20 11/20/2024    AST 16 11/20/2024    ALKPHOS 74 11/20/2024    BILITOT 0.7 11/20/2024     Lab Results   Component Value Date    ZGZJJPPJ53 1,227 (H) 03/05/2024     Lab Results   Component Value Date    WBC 4.98 11/20/2024    HGB 10.8 (L) 11/20/2024    HCT 32.7 (L) 11/20/2024    MCV 90.8 11/20/2024     11/20/2024     Lab Results   Component Value Date    CHOL 121 01/31/2024    CHOL 143 10/17/2022    CHOL 157 11/22/2021     Lab Results  "  Component Value Date    HDL 36 (L) 01/31/2024    HDL 46 (L) 10/17/2022    HDL 38 (L) 11/22/2021     Lab Results   Component Value Date    LDLCALC 59 01/31/2024    LDLCALC 85 10/17/2022    LDLCALC 98 11/22/2021     Lab Results   Component Value Date    TRIG 132 01/31/2024    TRIG 62 10/17/2022    TRIG 106 11/22/2021     No results found for: \"CHOLHDL\"  Lab Results   Component Value Date    CALCIUM 8.7 11/20/2024    PHOS 3.7 11/20/2024     Glucose Results         07/01/24     0720    HBG A1C 7.1           amLODIPine  2.5 mg oral Daily    apixaban  5 mg oral BID    ascorbic acid  500 mg oral q48h INT    atorvastatin  40 mg oral Daily (6p)    docusate sodium  100 mg oral BID    DULoxetine  60 mg oral Nightly    empagliflozin  10 mg oral Daily    ferrous sulfate  325 mg oral q48h INT    furosemide  40 mg oral BID (am, 4p)    gabapentin  600 mg oral TID    imatinib  400 mg oral Daily    insulin glargine U-100  15 Units subcutaneous Nightly    insulin lispro U-100  1-12 Units subcutaneous TID with meals    insulin lispro U-100  6 Units subcutaneous TID with meals    levothyroxine  25 mcg oral Daily (6:30a)    losartan  25 mg oral Daily with dinner    melatonin  3 mg oral Nightly    methenamine  1 g oral BID    multivitamin  1 tablet oral Daily    potassium  20 mEq oral Daily    semaglutide  1 tablet oral Daily    senna  2 tablet oral Daily    tamsulosin  0.4 mg oral Daily with dinner     Wt Readings from Last 3 Encounters:   11/30/24 70.3 kg (155 lb)   11/18/24 77.6 kg (171 lb)   03/06/24 85 kg (187 lb 6.3 oz)     Weights (last 7 days)       Date/Time Weight    11/30/24 0800 70.3 kg (155 lb)    11/27/24 2245 70.4 kg (155 lb 4.8 oz)          Clinical Comments:    Pt is being seen for a nutritional f/u assessment and diet education. Pt admit for prosthetic training. Current diet is 1800CC/NCS. Weight is stable since admission at 155#. Note pt is receiving lasix and semiglutide which may impact weights. Pt declines GI " issues. Pt reports enjoying facility food, no new preferences. PO intake remains >75% all meals.     RD provided diet education regarding CC and NCS. Handouts used: Healthy eating Plate, Guidelines for NCS, and eat this, not that for diabetics. RD advised pt to look on food label to find foods that have <7gm added sugar per serving. Pt had questions regarding pb+ strawberry jelly. RD and pt reviewed food label and portion sizes together as an example. Pt v/u.     current diet order appropriate  provide resources for weight management     Date: 11/30/24  Signature: BANDAR Ochoa

## 2024-11-30 NOTE — PROGRESS NOTES
[facility] Progress Note          Patient was seen and examined at bedside.    Subjective:   Saw her in am:  No O/N events. No new complaints. Her paresthetic training is progressing well. Her phantom feeling has improved.     Objective   Vital signs in last 24 hours:  Temp:  [36.6 °C (97.9 °F)-36.9 °C (98.4 °F)] 36.6 °C (97.9 °F)  Heart Rate:  [] 102  Resp:  [18] 18  BP: (109-147)/(57-69) 136/67    No intake or output data in the 24 hours ending 11/30/24 1012    Intake/Output this shift:  No intake/output data recorded.   Review of Systems:  All other systems reviewed and negative except as noted in the HPI.   Objective      Labs  Reviewed her labs thoroughly   Lab Results   Component Value Date    WBC 4.98 11/20/2024    HGB 10.8 (L) 11/20/2024    HCT 32.7 (L) 11/20/2024    MCV 90.8 11/20/2024     11/20/2024     Lab Results   Component Value Date    GLUCOSE 205 (H) 11/20/2024    CALCIUM 8.7 11/20/2024     11/20/2024    K 3.7 11/20/2024    CO2 30 11/20/2024     11/20/2024    BUN 35 (H) 11/20/2024    CREATININE 0.9 11/20/2024       Imaging  N/A    Full Code    Physical Exam:  Head/Ear/Nose/Throat: normocephalic; atraumatic; moisture mouth mm, no oropharyngeal thrush noted.   Eyes: anicteric sclera, EOMI; PERRL.   Neck : supple, no JVD, no carotid bruits appeciated.   Respiratory: no evidence of labored breathing, lung sounds CTA b/l, good aeration bibasilar area, no w/r/c.   Cardiovascular: RRR; normal S1, S2; no m/r/g; no S3 or S4.   Gastrointestinal: soft; NT; BS normal; mildly distended; no CVAT b/l.   Genitourinary: no lozada.   Extremities : S/p L AKA, residual limb healed well .   Neurological: AO x 3, fluent speeches, following commands, CNS II-XII grossly intact; no focal neurologic deficits.   Behavior/Emotional: in NAD, appropriate; cooperative.   Skin: clean, dry and intact.  Plan of care was discussed with patient, RN, and PMR attending     Assessment      CC:L AKA, ambulatory dysfunction     64 y.o. 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 S/p IV Ceftriaxone and IV Ampicillin until 9/14/24. H/o non-occlusive DVT of the R axillary and brachial vein around midline. She presented to [facility] on 11/14 for evaluation to begin prosthetic training. Now she was admitted for inpt prosthetic training on 11/19/24.  Denies nausea, vomiting, abdominal pain or discomfort, dysuria, cough/sputum, running nose, sore throat, chest pain, palpitation, SOB or orthopnea, dizziness or LH,  HA.    A/P:  # A/p L AKA  -inpt prosthetic training, wound care/dermal defense, pain and neuralgia managements, fall precaution     # CML  -cont home imatinib, check CBC      # Type 2 DM, hypothyroidism  - diet control, SSI, plus her home diabetic regimen;  - cont home levothyroxine     #  PICC-associated DVT on 8/30   -S/p SQ Lovenox weight based ;  -cont Eliquis 5 mg po bid      # Urinary retention, h/o recurrent UTI  -timed voiding with cic, adequate oral hydration, monitor PVR with cic, on flomax;  -hiprex 1 g po bid        # Stage 2 decubitus ulcer, pressure injury in right heel   -wound care     # Recent C.diff.  -cont prophylactic oral vanco 125 mg po bid       Billing code: [id number]  Diagnoses:  Patient Active Problem List   Diagnosis    Controlled type 2 diabetes mellitus, with long-term current use of insulin (CMS/[facility])    Essential hypertension    Hyperlipidemia    Hypothyroidism    Obesity    Thyroid nodule    COVID-19 virus infection    Lung nodule    Displacement of lumbar intervertebral disc without myelopathy    Disability of walking    Polyneuropathy due to type 2 diabetes mellitus (CMS/HCC)    Pre-op examination    Localized  osteoarthritis of right knee    Type 2 diabetes mellitus treated with insulin (CMS/HCC)    Abnormal finding on EKG    Left foot drop    Abnormal finding on chest xray    Osteoarthritis of right knee, unspecified osteoarthritis type    S/P total knee arthroplasty, right    Sepsis (CMS/HCC)    Osteomyelitis of left foot (CMS/HCC)    Peripheral neuropathy    Abdominal pain    Chronic idiopathic constipation    Gangrene (CMS/HCC)    Tibial artery stenosis, left (CMS/HCC)    H/O pilonidal cyst    Type 2 diabetes mellitus with left diabetic foot infection (CMS/HCC)    Type 2 diabetes mellitus with diabetic neuropathy (CMS/HCC)    Hypothyroidism    Primary hypertension    Leukemoid reaction    HLD (hyperlipidemia)    Acute osteomyelitis of left foot (CMS/HCC)    History of above knee amputation, left (CMS/[facility])    Encounter for prosthetic gait training    Adjustment disorder with anxiety           Charlette Brooke MD  11/30/2024

## 2024-11-30 NOTE — PLAN OF CARE
Problem: Rehabilitation ([facility]) Plan of Care  Goal: Plan of Care Review  Outcome: Progressing  Flowsheets (Taken 11/30/2024 1614)  Progress: improving  Plan of Care Reviewed With: patient  Outcome Evaluation: Patient assisted with a shower by staff. Patient complains of phantom pain and medicated with dilaudid 4 mg po with effect. Family at bedside. Accucheck 115 and appetite good and tolerating diet. Sacral area pink and meplex applied. Right foot later area meplex applied for protection.

## 2024-11-30 NOTE — PLAN OF CARE
Nutrition Interventions/Recommendations:   Continue 1800CC, NCS diet  Provided diet education. Handouts used: Healthy eating Plate, Guidelines for NCS, and eat this, not that for diabetics.   Preferences: no eggs  Glycemic control 140-180mg/dL  Monitor PO, weight, labs, skin, POC  Nutrition Risk Level: 1

## 2024-11-30 NOTE — PLAN OF CARE
Plan of Care Review  Plan of Care Reviewed With: patient  Progress: improving  Outcome Evaluation: alert and oriented x4; continent; dilaudid given for pain; R SCD on; call bell in reach; bed alarm set; no reports of concerns at this time.

## 2024-12-01 ENCOUNTER — APPOINTMENT (INPATIENT)
Dept: PHYSICAL THERAPY | Facility: REHABILITATION | Age: 64
DRG: 560 | End: 2024-12-01
Payer: MEDICARE

## 2024-12-01 ENCOUNTER — APPOINTMENT (INPATIENT)
Dept: OCCUPATIONAL THERAPY | Facility: REHABILITATION | Age: 64
DRG: 560 | End: 2024-12-01
Payer: MEDICARE

## 2024-12-01 LAB
GLUCOSE BLD-MCNC: 142 MG/DL (ref 70–99)
GLUCOSE BLD-MCNC: 255 MG/DL (ref 70–99)
GLUCOSE BLD-MCNC: 88 MG/DL (ref 70–99)
POCT TEST: ABNORMAL
POCT TEST: ABNORMAL
POCT TEST: NORMAL

## 2024-12-01 PROCEDURE — 97530 THERAPEUTIC ACTIVITIES: CPT | Mod: GP

## 2024-12-01 PROCEDURE — 12800000 HC ROOM AND CARE SEMIPRIVATE REHAB

## 2024-12-01 PROCEDURE — 97112 NEUROMUSCULAR REEDUCATION: CPT | Mod: GP

## 2024-12-01 PROCEDURE — 97530 THERAPEUTIC ACTIVITIES: CPT | Mod: GO,CO

## 2024-12-01 PROCEDURE — 97116 GAIT TRAINING THERAPY: CPT | Mod: GP

## 2024-12-01 PROCEDURE — 63700000 HC SELF-ADMINISTRABLE DRUG: Performed by: INTERNAL MEDICINE

## 2024-12-01 PROCEDURE — 97542 WHEELCHAIR MNGMENT TRAINING: CPT | Mod: GP

## 2024-12-01 RX ADMIN — TAMSULOSIN HYDROCHLORIDE 0.4 MG: 0.4 CAPSULE ORAL at 17:40

## 2024-12-01 RX ADMIN — APIXABAN 5 MG: 5 TABLET, FILM COATED ORAL at 21:11

## 2024-12-01 RX ADMIN — HYDROMORPHONE HYDROCHLORIDE 4 MG: 2 TABLET ORAL at 08:35

## 2024-12-01 RX ADMIN — GABAPENTIN 600 MG: 300 CAPSULE ORAL at 17:00

## 2024-12-01 RX ADMIN — INSULIN LISPRO 5 UNITS: 100 INJECTION, SOLUTION INTRAVENOUS; SUBCUTANEOUS at 17:40

## 2024-12-01 RX ADMIN — INSULIN LISPRO 5 UNITS: 100 INJECTION, SOLUTION INTRAVENOUS; SUBCUTANEOUS at 07:46

## 2024-12-01 RX ADMIN — DOCUSATE SODIUM 100 MG: 100 CAPSULE, LIQUID FILLED ORAL at 12:50

## 2024-12-01 RX ADMIN — INSULIN GLARGINE 15 UNITS: 100 INJECTION, SOLUTION SUBCUTANEOUS at 21:15

## 2024-12-01 RX ADMIN — FUROSEMIDE 40 MG: 40 TABLET ORAL at 08:23

## 2024-12-01 RX ADMIN — FERROUS SULFATE TAB 325 MG (65 MG ELEMENTAL FE) 325 MG: 325 (65 FE) TAB at 12:46

## 2024-12-01 RX ADMIN — DULOXETINE HYDROCHLORIDE 60 MG: 30 CAPSULE, DELAYED RELEASE ORAL at 21:10

## 2024-12-01 RX ADMIN — LOSARTAN POTASSIUM 25 MG: 25 TABLET, FILM COATED ORAL at 17:40

## 2024-12-01 RX ADMIN — INSULIN LISPRO 5 UNITS: 100 INJECTION, SOLUTION INTRAVENOUS; SUBCUTANEOUS at 12:30

## 2024-12-01 RX ADMIN — GABAPENTIN 600 MG: 300 CAPSULE ORAL at 21:11

## 2024-12-01 RX ADMIN — INSULIN LISPRO 4 UNITS: 100 INJECTION, SOLUTION INTRAVENOUS; SUBCUTANEOUS at 07:46

## 2024-12-01 RX ADMIN — Medication 3 MG: at 21:10

## 2024-12-01 RX ADMIN — IMATINIB MESYLATE 400 MG: 400 TABLET, FILM COATED ORAL at 08:24

## 2024-12-01 RX ADMIN — METHENAMINE HIPPURATE 1 G: 1000 TABLET ORAL at 21:11

## 2024-12-01 RX ADMIN — GABAPENTIN 600 MG: 300 CAPSULE ORAL at 08:23

## 2024-12-01 RX ADMIN — ATORVASTATIN CALCIUM 40 MG: 40 TABLET, FILM COATED ORAL at 17:40

## 2024-12-01 RX ADMIN — EMPAGLIFLOZIN 10 MG: 10 TABLET, FILM COATED ORAL at 08:23

## 2024-12-01 RX ADMIN — LEVOTHYROXINE SODIUM 25 MCG: 0.03 TABLET ORAL at 06:03

## 2024-12-01 RX ADMIN — DOCUSATE SODIUM 100 MG: 100 CAPSULE, LIQUID FILLED ORAL at 21:10

## 2024-12-01 RX ADMIN — METHENAMINE HIPPURATE 1 G: 1000 TABLET ORAL at 08:23

## 2024-12-01 RX ADMIN — APIXABAN 5 MG: 5 TABLET, FILM COATED ORAL at 08:23

## 2024-12-01 RX ADMIN — FUROSEMIDE 40 MG: 40 TABLET ORAL at 17:00

## 2024-12-01 RX ADMIN — OXYCODONE HYDROCHLORIDE AND ACETAMINOPHEN 500 MG: 500 TABLET ORAL at 12:46

## 2024-12-01 RX ADMIN — THERA TABS 1 TABLET: TAB at 12:46

## 2024-12-01 RX ADMIN — POTASSIUM CHLORIDE 20 MEQ: 1500 TABLET, EXTENDED RELEASE ORAL at 12:46

## 2024-12-01 RX ADMIN — HYDROMORPHONE HYDROCHLORIDE 4 MG: 2 TABLET ORAL at 21:10

## 2024-12-01 NOTE — PLAN OF CARE
Plan of Care Review  Plan of Care Reviewed With: patient  Progress: improving  Outcome Evaluation: Patient is aaox4, meds whole, room air, continent of b&b, TID accucheck. Pain managed with Tylenol and Diluadid. Safety precautions maintained. Call bell within reach and bed alarm is on.

## 2024-12-01 NOTE — PLAN OF CARE
Problem: Rehabilitation ([facility]) Plan of Care  Goal: Plan of Care Review  Outcome: Progressing  Flowsheets (Taken 12/1/2024 1812)  Progress: improving  Plan of Care Reviewed With: patient  Outcome Evaluation: Patient sitting up in wheelchair and bump over to toilet and voided without difficulty. Family at bedside. Patient complains of phatom pain and medicated with dilaudid 4 mg po with effect. Accucheck 88 and appetite good and tolerating diet.

## 2024-12-01 NOTE — PROGRESS NOTES
Patient: Bebe Burks  Location: [facility] [address]  MRN: [id number]  Today's date: 12/1/2024    History of Present Illness  Bebe is a 64 y.o. female admitted on 11/19/2024 with History of above knee amputation, left (CMS/Formerly [facility]) [Z89.612]  Encounter for prosthetic gait training [Z47.89]. Principal problem is History of above knee amputation, left (CMS/Formerly [facility]).    Pt is a 64 y.o. 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       Past Medical History  Bebe has a past medical history of Abnormal ECG, Abnormal finding on chest xray (12/2021), Arthritis, Colon cancer (CMS/Formerly [facility]), COVID-19 (12/2021), COVID-19 vaccine series completed, Diabetic neuropathy (CMS/HCC), DM (diabetes mellitus), type 2 with ophthalmic complications (CMS/HCC), Hypertension, Hypothyroidism, Left foot drop, Lipid disorder, and Type 2 diabetes mellitus treated with insulin (CMS/HCC).    PT Vitals      Date/Time Pulse HR Source Pt Activity O2 Therapy BP MAP BP Location BP Method Pt Position [facility]   12/01/24 1320 94 Monitor At rest None (Room air) 140/65 93 mmHg Left upper arm Automatic Sitting JRL          PT Pain      Date/Time Pain Type Location Rating: Rest Rating: Activity [facility]   12/01/24 1320 Pain Assessment residual limb 0 - no pain 0 - no pain JRL   12/01/24 1359 Pain Reassessment residual limb 0 - no pain 0 - no pain JRL               Prior Living Environment      Flowsheet Row Most Recent Value   People in Home alone   Current Living Arrangements condominium   Home Accessibility stairs to enter home (Group)   Living Environment Comment resides in 1st floor condo, 4 (1+2+1) JOSE RAFAEL, R hand rail, no stairs inside home, Bathroom Layout: Tub/shower unit with shower curtain, Shower stall with glass doors (shower stall in main bedroom), two sisters can provide some assistance   Number of Stairs, Main Entrance 4   Surface of Stairs, Main Entrance  concrete   Stair Railings, Main Entrance railing on right side (ascending)   Stairs Comment, Main Entrance 1+2+1 [place]SE RAFAEL   Location, Bathroom first (main) floor   Bathroom Access tub bath, walk-in shower            Prior Level of Function      Flowsheet Row Most Recent Value   Dominant Hand right   Ambulation assistive equipment   Transferring assistive equipment   Toileting independent   Bathing assistive equipment   Dressing independent   Eating independent   IADLs assistive equipment   Driving/Transportation    Communication understands/communicates without difficulty   Assistive Device Currently Used at Home cane, straight, walker, front-wheeled, shower chair, grab bar, raised toilet             IRF PT Evaluation and Treatment - 12/01/24 1321          PT Time Calculation    Start Time 1300     Stop Time 1400     Time Calculation (min) 60 min        Session Details    Document Type Daily Treatment/Progress Note        General Information    Patient Profile Reviewed yes     General Observations of Patient Pt received sitting in w/c in bathroom with PCT. Agreeable to participate in therapy session.     Existing Precautions/Restrictions aspiration;cardiac;fall        Mobility Belt    Mobility Belt Used During Session yes        Bed Mobility    [place], Roll Left close supervision     [place], Roll Right close supervision     [place], Supine to Sit close supervision     [place], Sit to Supine close supervision     Comment PT: bed mobility performed on firm therapy mat with close S for safety secondary to reports of baseline vertigo; min A provided at hips to complete rotation supine>prone due to novelty of task        Transfers    Transfers stand pivot transfer;low pivot transfer     Comment mobility belt donned        Sit to Stand Transfer    [place], Sit to Stand Transfer moderate assist (50-74% patient effort)     Safety/Cues increased time to complete;verbal  cues;sequencing;technique     Assistive Device parallel bars     Comment w/c to stance with RW and in // bars with mod A for force production, balance, LLE hip extension, and assist with locking prosthetic knee        Stand to Sit Transfer    [place], Stand to Sit Transfer moderate assist (50-74% patient effort)     Safety/Cues increased time to complete;verbal cues;sequencing;technique     Assistive Device parallel bars     Comment stance to w/c with RW and in // bars with mod A for RLE weightshift, stance stability, and controlled descent; pt able to  bring L prosthesis out in front, shift to the right and pull up on toggle with LUE to release the knee prior to sitting ~90% of the time, however, often impulsively sits once knee is unlocked        Low Pivot Transfer    [place], Low Pivot Transfer supervision     Safety/Cues increased time to complete;technique     Assistive Device wheelchair     Comment Incremental LPT from w/c <> EOM with supervision for safety and technique        Stand Pivot Transfer    [place], Stand Pivot/Stand Step Transfer moderate assist (50-74% patient effort)     Safety/Cues increased time to complete;maintaining center of gravity over base of support;sequencing;technique     Assistive Device other (see comments)   // bars    Comment via amb approach in the // bars with mod A for controlled weightshift, pelvic stability and balance secondary to +posterior lean; VCs for technique and sequence        Gait Training    [place], Gait moderate assist (50-74% patient effort)     Safety/Cues sequencing;technique     Assistive Device parallel bars     Distance in Feet 10 feet     Pattern step-through     Deviations/Abnormal Patterns base of support, narrow;gait speed decreased;weight shifting decreased     Bilateral Gait Deviations --   decreased anterior weightshift    Left Sided Gait Deviations hip circumduction   decreased weightshift    Right Sided Gait Deviations heel  "strike decreased     Comment Pt amb 10ft x 8 in // bars with mod A for controlled LLE anterior/lateral weightshift and intermittent assistance for prosthetic placement; tactile cues for L glute activation to improve stance stability        Wheelchair Mobility/Management    Method of Locomotion bimanual (upper extremity) propulsion     Wheelchair Type manual, lightweight     Functional Mobility Training forward propulsion;backward propulsion;steering;stopping;turning;doorway navigation     [place], Forward Propulsion modified independence     [place], Backward Propulsion modified independence     [place], Steering Activities modified independence     [place], Stopping Activities modified independence     [place], Turning Activities modified independence     [place], Doorway Navigation modified independence     [place], Weight Shifting modified independence     Safety/Cues increased time to complete     Distance Propelled in Feet 250 feet     Activity Tolerance able to tolerate short community activity distances (150 to 500 feet)     Comment, Wheelchair Mobility Pt self propels MWC with BUEs around hallway and therapy gym, including navigation of doorways and uneven surfaces, and cone weaving; pt progresses to a MOD I level and is educated on all rules/procedures for a \"wheelchair license\" including the sign-out sheet, use of mirrors for safety, and was given the [facility] telephone number. Pt was provided with her license at end of session, demonstrating MOD I level for all w/c mobility skills and verbalizing understanding of all rules.        Lower Extremity (Therapeutic Exercise)    Exercise Position/Type side lying;prone;PROM (passive range of motion)     General Exercise left     Range of Motion Exercises left;hip flexion/extension     Comment (1) PT completes 2x60\" L hip flexion stretch in sidelying; (2) pt tolerates prone positioning x 2 minutes        Prosthetic " "Orientation (Lower Extremity)    Fit Assessment fit/function of prosthesis are appropriate     Comment, Fit Assessment Removed prosthesis, S for verbal cueing; skin check = unremarkable with very mild blanchable redness on anterior quad and groin, pt reporting no pain and using mirror to self-check skin; sock ply = 3ply, wearing schedule ~ 4 hours today     Specific Gait Deviations circumducted gait;toe stays off floor after heel strike        Daily Progress Summary (PT)    Daily Outcome Statement Pt tolerates session well with a focus on wheelchair mobility, gait training, and LE stretching. Pt received her \"wheelchair license\" today, demonstrating safe use of a wheelchair for mobility around the rehab center. Pt also demonstrates improved anterior/lateral weightshifting during ambulation in the parallel bars, requiring less assistance for hip extension when compared to the morning session. Initiated trials of prone positioning on therapy mat to improve/maintain flexibility and ROM. Continue with POC to progress functional mobility and maximize independence.                           IRF PT Goals      Flowsheet Row Most Recent Value   Transfer Goal 1    Activity/Assistive Device stand pivot, walker, front-wheeled at 11/19/2024 1431   [place] moderate assist (50-74% patient effort) at 11/26/2024 0855   Time Frame short-term goal (STG), 5 - 7 days at 11/26/2024 0855   Progress/Outcome goal met, goal ongoing at 11/26/2024 0855   Transfer Goal 2    Activity/Assistive Device stand pivot, walker, front-wheeled at 11/19/2024 1431   [place] minimum assist (75% or more patient effort) at 11/26/2024 0855   Time Frame 14 days or less, long-term goal (LTG) at 11/26/2024 0855   Progress/Outcome goal revised this date, goal ongoing, goal met at 11/26/2024 0855   Transfer Goal 3    Activity/Assistive Device sit-to-stand/stand-to-sit, walker, front-wheeled at 11/19/2024 1431   [place] minimum assist (75% or more " patient effort) at 11/26/2024 0855   Time Frame short-term goal (STG), 5 - 7 days at 11/26/2024 0855   Progress/Outcome goal met, goal revised this date at 11/26/2024 0855   Transfer Goal 4    Activity/Assistive Device sit-to-stand/stand-to-sit, walker, front-wheeled at 11/19/2024 1431   [place] tactile cues required at 11/26/2024 0855   Time Frame long-term goal (LTG), 14 days or less at 11/26/2024 0855   Progress/Outcome goal met, goal revised this date at 11/26/2024 0855   Gait/Walking Locomotion Goal 1    Activity/Assistive Device gait (walking locomotion), walker, front-wheeled at 11/19/2024 1431   Distance 10 feet at 11/19/2024 1431   [place] moderate assist (50-74% patient effort) at 11/26/2024 0855   Time Frame short-term goal (STG), 5 - 7 days at 11/26/2024 0855   Strategies/Barriers 50 at 11/26/2024 0855   Progress/Outcome goal revised this date, goal ongoing at 11/26/2024 0855   Gait/Walking Locomotion Goal 2    Activity/Assistive Device gait (walking locomotion), assistive device use, walker, front-wheeled at 11/26/2024 0855   Distance 50 feet at 11/26/2024 0855   [place] minimum assist (75% or more patient effort) at 11/26/2024 0855   Time Frame long-term goal (LTG), 14 days or less at 11/26/2024 0855   Wheelchair Locomotion Goal 1    Activity forward propulsion, backward propulsion, steering, stopping, turning, doorway navigation, weight shifting at 11/19/2024 1431   Assistive Device manual, ultra lightweight at 11/19/2024 1431   Distance 150 feet at 11/19/2024 1431   [place] other (see comments)  [Touching / Steadying Assist] at 11/19/2024 1431   Time Frame 1 week, short-term goal (STG) at 11/19/2024 1431   Progress/Outcome goal met at 11/26/2024 0855   Wheelchair Locomotion Goal 2    Activity forward propulsion, backward propulsion, steering, stopping, turning, doorway navigation, weight shifting at 11/19/2024 1431   Assistive Device manual, ultra lightweight at 11/19/2024 1431  "  Distance 150 feet at 11/19/2024 1431   [place] modified independence at 11/19/2024 1431   Time Frame 21 days or less, long-term goal (LTG) at 11/19/2024 1431   Progress/Outcome goal ongoing at 11/26/2024 0855   Stairs Goal 1    Activity/Assistive Device ascending stairs, descending stairs, using handrail, left, using handrail, right, step-to-step at 11/29/2024 1100   Number of Stairs 4 at 11/29/2024 1100   [place] moderate assist (50-74% patient effort) at 11/29/2024 1100   Time Frame short-term goal (STG), 5 - 7 days at 11/29/2024 1100   Strategies/Barriers 6\" at 11/29/2024 1100   Stairs Goal 2    Activity/Assistive Device ascending stairs, descending stairs, using handrail, left, using handrail, right, step-to-step at 11/29/2024 1100   Number of Stairs 4 at 11/29/2024 1100   [place] minimum assist (75% or more patient effort) at 11/29/2024 1100   Time Frame long-term goal (LTG), 14 days or less at 11/29/2024 1100   Strategies/Barriers 6\" at 11/29/2024 1100          "

## 2024-12-01 NOTE — PROGRESS NOTES
Patient: Bebe Burks  Location: [facility] [address]  MRN: [id number]  Today's date: 12/1/2024    History of Present Illness  Bebe is a 64 y.o. female admitted on 11/19/2024 with History of above knee amputation, left (CMS/Formerly [facility]) [Z89.612]  Encounter for prosthetic gait training [Z47.89]. Principal problem is History of above knee amputation, left (CMS/Formerly [facility]).    Pt is a 64 y.o. 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       Past Medical History  Bebe has a past medical history of Abnormal ECG, Abnormal finding on chest xray (12/2021), Arthritis, Colon cancer (CMS/[facility]), COVID-19 (12/2021), COVID-19 vaccine series completed, Diabetic neuropathy (CMS/[facility]ly [facility]), DM (diabetes mellitus), type 2 with ophthalmic complications (CMS/[facility]ly [facility]), Hypertension, Hypothyroidism, Left foot drop, Lipid disorder, and Type 2 diabetes mellitus treated with insulin (CMS/Formerly [facility]).    OT Vitals      Date/Time Pulse HR Source Pt Activity O2 Therapy BP BP Location BP Method Pt Position [facility]   12/01/24 1111 91 Monitor At rest None (Room air) 124/80 Left upper arm Automatic Sitting CS   12/01/24 1158 93 Monitor At rest None (Room air) 128/76 Left upper arm Automatic Sitting CS          OT Pain      Date/Time Pain Type Acceptable Pain Level Side/Orientation Rating: Rest Rating: Activity Rating: Rest Rating: Activity Description Interventions Response To Interventions [facility]   12/01/24 1111 Pain Assessment -- -- -- -- 0 - no pain -- -- -- -- CS   12/01/24 1158 Pain Reassessment -- -- -- -- 0 - no pain -- -- -- -- CS               Prior Living Environment      Flowsheet Row Most Recent Value   People in Home alone   Current Living Arrangements condominium   Home Accessibility stairs to enter home (Group)   Living Environment Comment resides in 1st floor condo, 4 (1+2+1) JOSE RAFAEL, R hand rail, no stairs inside home, Bathroom Layout: Tub/shower unit with shower  curtain, Shower stall with glass doors (shower stall in main bedroom), two sisters can provide some assistance   Number of Stairs, Main Entrance 4   Surface of Stairs, Main Entrance concrete   Stair Railings, Main Entrance railing on right side (ascending)   Stairs Comment, Main Entrance 1+2+1 JOSE RAFAEL   Location, Bathroom first (main) floor   Bathroom Access tub bath, walk-in shower            Prior Level of Function      Flowsheet Row Most Recent Value   Dominant Hand right   Ambulation assistive equipment   Transferring assistive equipment   Toileting independent   Bathing assistive equipment   Dressing independent   Eating independent   IADLs assistive equipment   Driving/Transportation    Communication understands/communicates without difficulty   Assistive Device Currently Used at Home cane, straight, walker, front-wheeled, shower chair, grab bar, raised toilet            Occupational Profile      Flowsheet Row Most Recent Value   Successful Occupations Pt is very active in her community   Occupational History/Life Experiences 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, attends [employer] weekly   Performance Patterns lives alone   Environmental Supports and Barriers Supportive community    + handicap parking placard   Patient Goals PSFS: Toilet transfer with prosthetic 3/10, toilet transfer without prosthetic 8/10, walking 0/10, getting dressed 5/10, getting in/ out of bed 4/10, getting a prosthetic on/off 1/10             IRF OT Evaluation and Treatment - 12/01/24 1112          OT Time Calculation    Start Time 1101     Stop Time 1201     Time Calculation (min) 60 min        Session Details    Document Type Daily Treatment/Progress Note        General Information    Patient Profile Reviewed yes     General Observations of Patient Pt received sitting w/c, agreeable to session        Mobility Belt    Mobility Belt Used During Session yes        Sit to Stand Transfer    [place], Sit to Stand Transfer  moderate assist (50-74% patient effort)     Safety/Cues increased time to complete;verbal cues;sequencing;technique     Assistive Device parallel bars     Comment from w/c to stance in parallel bars, Mod A for balance and assist for knee extension        Stand to Sit Transfer    [place], Stand to Sit Transfer moderate assist (50-74% patient effort)     Safety/Cues increased time to complete;verbal cues;sequencing;technique     Assistive Device parallel bars     Comment to w/c from stance in parallel bars, Mod A for balance and weight shift, pt able to pull up on toggle to release knee prior to sitting 80% of time, cues to unlock knee before sitting.        Balance    Comment, Balance OT: 1. standing in parallel bars, lateral weight shifts to L in prep for activity.  2. in static stance in parallel bars cues midline stance and cues for RLE placement. pt able to correct with cueing. Pt placed colored pegs in peg board placed on L to focus on lateral weight shifting and dual tasking. 3. in stance in parallel bars, pt reached for bean bags outside [place] while naming grocery items, focusing on lateral weight shift cues for lateral weight shift vs posterior lean, occasional cues for standing midline.  4. Blocked practice STS in parallel bars x 5 reps focusing on locking prosthesis once in stance and the sequencing for releasing knee before sitting, pt required occasional cues for unlocking knee before sitting.        Prosthetic Orientation (Lower Extremity)    Comment, Fit Assessment Pt wearing prosthetic from PT session, 3 ply, goal to wear for 3 hours today        Daily Progress Summary (OT)    Daily Outcome Statement Pt tolerated session well, session focused on balance, endurance, dual tasking and functional transfers. Pt Mod A for functional transfers cues for management of knee to unlock. pt requires cues for midline and anterior weight shift. Noted decreased eccentric control when knee is unlocked. Pt would  continue to benefit from skilled OT services to increase independence. Cont with OT POC.                               IRF OT Goals      Flowsheet Row Most Recent Value   Transfer Goal 1    Activity/Assistive Device toilet at 11/20/2024 0906   [place] maximum assist (25-49% patient effort) at 11/20/2024 0906   Time Frame short-term goal (STG), 5 - 7 days at 11/26/2024 1000   Progress/Outcome goal ongoing at 11/26/2024 1000   Transfer Goal 2    Activity/Assistive Device toilet at 11/20/2024 0906   [place] minimum assist (75% or more patient effort) at 11/20/2024 0906   Time Frame long-term goal (LTG), 21 days or less at 11/20/2024 0906   Progress/Outcome goal ongoing at 11/26/2024 1000   Transfer Goal 3    Activity/Assistive Device shower at 11/20/2024 0906   [place] maximum assist (25-49% patient effort) at 11/20/2024 0906   Time Frame short-term goal (STG), 5 - 7 days at 11/26/2024 1000   Progress/Outcome goal ongoing at 11/26/2024 1000   Transfer Goal 4    Activity/Assistive Device shower at 11/20/2024 0906   [place] minimum assist (75% or more patient effort) at 11/20/2024 0906   Time Frame long-term goal (LTG), 21 days or less at 11/20/2024 0906   Progress/Outcome goal ongoing at 11/26/2024 1000   Bathing Goal 1    [place] tactile cues required at 11/20/2024 0906   Time Frame short-term goal (STG), 5 - 7 days at 11/26/2024 1000   Progress/Outcome goal ongoing at 11/26/2024 1000   Bathing Goal 2    [place] modified independence at 11/20/2024 0906   Time Frame long-term goal (LTG), 21 days or less at 11/20/2024 0906   Progress/Outcome goal ongoing at 11/26/2024 1000   UB Dressing Goal 1    [place] minimum assist (75% or more patient effort) at 11/26/2024 1000   Time Frame short-term goal (STG), 5 - 7 days at 11/26/2024 1000   Strategies/Barriers with clothing retrieval at 11/20/2024 0906   Progress/Outcome goal ongoing at 11/26/2024 1000   UB Dressing Goal 2    [place]  minimum assist (75% or more patient effort) at 11/20/2024 0906   Time Frame long-term goal (LTG), 21 days or less at 11/20/2024 0906   Strategies/Barriers with clothing retrieval at 11/20/2024 0906   Progress/Outcome goal ongoing at 11/26/2024 1000   LB Dressing Goal 1    [place] minimum assist (75% or more patient effort) at 11/26/2024 1000   Time Frame short-term goal (STG), 5 - 7 days at 11/26/2024 1000   Strategies/Barriers with clothing retrieval at 11/20/2024 0906   Progress/Outcome goal ongoing at 11/26/2024 1000   LB Dressing Goal 2    [place] minimum assist (75% or more patient effort) at 11/20/2024 0906   Time Frame long-term goal (LTG), 21 days or less at 11/20/2024 0906   Strategies/Barriers with clothing retrieval at 11/20/2024 0906   Progress/Outcome goal ongoing at 11/26/2024 1000   Grooming Goal 1    [place] moderate assist (50-74% patient effort) at 11/20/2024 0906   Time Frame short-term goal (STG), 5 - 7 days at 11/26/2024 1000   Strategies/Barriers in stance at sink at 11/20/2024 0906   Progress/Outcome goal ongoing at 11/26/2024 1000   Grooming Goal 2    [place] supervision required at 11/20/2024 0906   Time Frame long-term goal (LTG), 21 days or less at 11/20/2024 0906   Strategies/Barriers in stance at sink at 11/20/2024 0906   Progress/Outcome goal ongoing at 11/26/2024 1000   Toileting Goal 1    [place] maximum assist (25-49% patient effort) at 11/20/2024 0906   Time Frame short-term goal (STG), 5 - 7 days at 11/26/2024 1000   Progress/Outcome goal ongoing at 11/26/2024 1000   Toileting Goal 2    [place] minimum assist (75% or more patient effort) at 11/20/2024 0906   Time Frame long-term goal (LTG), 21 days or less at 11/20/2024 0906   Progress/Outcome goal ongoing at 11/26/2024 1000

## 2024-12-01 NOTE — PROGRESS NOTES
Patient: Bebe Burks  Location: [facility] [address]  MRN: [id number]  Today's date: 12/1/2024    History of Present Illness  Bebe is a 64 y.o. female admitted on 11/19/2024 with History of above knee amputation, left (CMS/[facility]) [Z89.612]  Encounter for prosthetic gait training [Z47.89]. Principal problem is History of above knee amputation, left (CMS/[facility]).    Pt is a 64 y.o. 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       Past Medical History  Bebe has a past medical history of Abnormal ECG, Abnormal finding on chest xray (12/2021), Arthritis, Colon cancer (CMS/[facility]), COVID-19 (12/2021), COVID-19 vaccine series completed, Diabetic neuropathy (CMS/HCC), DM (diabetes mellitus), type 2 with ophthalmic complications (CMS/HCC), Hypertension, Hypothyroidism, Left foot drop, Lipid disorder, and Type 2 diabetes mellitus treated with insulin (CMS/HCC).    PT Vitals      Date/Time Pulse HR Source Pt Activity O2 Therapy BP MAP BP Location BP Method Pt Position [facility]   12/01/24 0934 91 Monitor At rest None (Room air) 118/56 78 mmHg Left upper arm Automatic Sitting JRL          PT Pain      Date/Time Pain Type Location Rating: Rest Rating: Activity [facility]   12/01/24 0934 Pain Assessment residual limb 0 - no pain 0 - no pain JRL   12/01/24 1029 Pain Reassessment residual limb 0 - no pain 0 - no pain JRL               Prior Living Environment      Flowsheet Row Most Recent Value   People in Home alone   Current Living Arrangements condominium   Home Accessibility stairs to enter home (Group)   Living Environment Comment resides in 1st floor condo, 4 (1+2+1) JOSE RAFAEL, R hand rail, no stairs inside home, Bathroom Layout: Tub/shower unit with shower curtain, Shower stall with glass doors (shower stall in main bedroom), two sisters can provide some assistance   Number of Stairs, Main Entrance 4   Surface of Stairs, Main Entrance  concrete   Stair Railings, Main Entrance railing on right side (ascending)   Stairs Comment, Main Entrance 1+2+1 JOSE RAFAEL   Location, Bathroom first (main) floor   Bathroom Access tub bath, walk-in shower            Prior Level of Function      Flowsheet Row Most Recent Value   Dominant Hand right   Ambulation assistive equipment   Transferring assistive equipment   Toileting independent   Bathing assistive equipment   Dressing independent   Eating independent   IADLs assistive equipment   Driving/Transportation    Communication understands/communicates without difficulty   Assistive Device Currently Used at Home cane, straight, walker, front-wheeled, shower chair, grab bar, raised toilet             IRF PT Evaluation and Treatment - 12/01/24 0935          PT Time Calculation    Start Time 0932     Stop Time 1032     Time Calculation (min) 60 min        Session Details    Document Type Daily Treatment/Progress Note        General Information    Patient Profile Reviewed yes     General Observations of Patient Pt received sitting in w/c in gym. Agreeable to participate in therapy session.     Existing Precautions/Restrictions aspiration;cardiac;fall        Mobility Belt    Mobility Belt Used During Session yes        Bed Mobility    [place], Supine to Sit close supervision     [place], Sit to Supine close supervision     Safety/Cues increased time to complete     Assistive Device none     Comment PT: sit <> supine on firm therapy mat with wedge + pillows for comfort while donning prosthesis        Transfers    Transfers stand pivot transfer     Maintains Weight-Bearing Status able to maintain     Comment mobility belt donned        Sit to Stand Transfer    [place], Sit to Stand Transfer moderate assist (50-74% patient effort)     Safety/Cues increased time to complete;verbal cues;sequencing;technique     Assistive Device walker, front-wheeled;parallel bars     Comment w/c to stance with RW  and in // bars with mod A for force production, balance, LLE hip extension, and assist with locking prosthetic knee        Stand to Sit Transfer    [place], Stand to Sit Transfer moderate assist (50-74% patient effort)     Safety/Cues increased time to complete;verbal cues;sequencing;technique     Assistive Device walker, front-wheeled;parallel bars     Comment stance to w/c with RW and in // bars with mod A for RLE weightshift, stance stability, and controlled descent; pt able to  bring L prosthesis out in front, shift to the right and pull up on toggle with LUE to release the knee prior to sitting ~90% of the time, however, often impulsively sits once knee is unlocked        Low Pivot Transfer    [place], Low Pivot Transfer supervision     Safety/Cues increased time to complete;technique     Assistive Device wheelchair     Comment Incremental LPT from w/c > EOM with supervision for safety and technique        Stand Pivot Transfer    [place], Stand Pivot/Stand Step Transfer moderate assist (50-74% patient effort)     Safety/Cues increased time to complete;maintaining center of gravity over base of support;sequencing;technique     Assistive Device walker, front-wheeled     Comment SPT EOM > w/c with mod A for controlled weightshift, pelvic stability and balance secondary to +posterior lean; VCs for technique and sequence        Gait Training    [place], Gait moderate assist (50-74% patient effort)     Safety/Cues sequencing;technique     Assistive Device parallel bars     Distance in Feet 10 feet     Pattern step-through     Deviations/Abnormal Patterns base of support, narrow;gait speed decreased;weight shifting decreased     Bilateral Gait Deviations --   decreased anterior weightshift    Left Sided Gait Deviations hip circumduction     Right Sided Gait Deviations heel strike decreased     Comment Pt amb 10ft x 4 in // bars with mod A for controlled LLE anterior/lateral weightshift and  "intermittent assistance for prosthetic placement; tactile cues for L glute activation to improve stance stability        Balance    Balance Interventions standing;sit to stand;dynamic;weight shifting activity     Comment, Balance (1) Blocked practice STS in // bars x 7 reps, with a focus on LLE hip extension to lock prosthesis once standing and detailed practice of the sequencing for releasing knee prior to sitting (as outlined in transfer section above); (2) 3x10 RLE toe taps onto 6\" block with tactile/verbal cues to improve LLE stance stability; (3) static standing with RLE elevated on 6\" block and 5x15\" of UUE support on bars, with emphasis on anterior weightshift to prevent posterior LOB        Lower Extremity (Therapeutic Exercise)    Exercise Position/Type supine;PROM (passive range of motion)     Comment Pt completes modified Jerod stretch x 3 mins with wedge and 2 pillows prior to donning prosthesis        Prosthetic Orientation (Lower Extremity)    Fit Assessment fit/function of prosthesis are appropriate     Comment, Fit Assessment Donned liner independently lying supine on therapy mat with wedge + pillows for trunk support; prosthesis donned with mod A to assist with placement of socket and aligning pin to engage in shuttle lock; sock ply = 3 ply; wearing schedule ~ 3 hours     Specific Gait Deviations circumducted gait;toe stays off floor after heel strike     Comment Donned long pants after prosthesis + stood in // bars to pull up pants        Daily Progress Summary (PT)    Daily Outcome Statement Pt tolerates session well with a focus on functional transfers and balance activities in the parallel bars. Pt continues to demonstrate a mild posterior lean in stance and benefits from verbal and tactile cues for anterior weightshift, especially when challenged with decreased UE support. Pt demonstrates good carryover of preparatory posture for sit<>stands and the appropriate sequencing for stand > sit " transfers, but lacks the eccentric control once unlocking her L prosthetic leg. Continue with POC to progress functional mobility and maximize independence.                           IRF PT Goals      Flowsheet Row Most Recent Value   Transfer Goal 1    Activity/Assistive Device stand pivot, walker, front-wheeled at 11/19/2024 1431   [place] moderate assist (50-74% patient effort) at 11/26/2024 0855   Time Frame short-term goal (STG), 5 - 7 days at 11/26/2024 0855   Progress/Outcome goal met, goal ongoing at 11/26/2024 0855   Transfer Goal 2    Activity/Assistive Device stand pivot, walker, front-wheeled at 11/19/2024 1431   [place] minimum assist (75% or more patient effort) at 11/26/2024 0855   Time Frame 14 days or less, long-term goal (LTG) at 11/26/2024 0855   Progress/Outcome goal revised this date, goal ongoing, goal met at 11/26/2024 0855   Transfer Goal 3    Activity/Assistive Device sit-to-stand/stand-to-sit, walker, front-wheeled at 11/19/2024 1431   [place] minimum assist (75% or more patient effort) at 11/26/2024 0855   Time Frame short-term goal (STG), 5 - 7 days at 11/26/2024 0855   Progress/Outcome goal met, goal revised this date at 11/26/2024 0855   Transfer Goal 4    Activity/Assistive Device sit-to-stand/stand-to-sit, walker, front-wheeled at 11/19/2024 1431   [place] tactile cues required at 11/26/2024 0855   Time Frame long-term goal (LTG), 14 days or less at 11/26/2024 0855   Progress/Outcome goal met, goal revised this date at 11/26/2024 0855   Gait/Walking Locomotion Goal 1    Activity/Assistive Device gait (walking locomotion), walker, front-wheeled at 11/19/2024 1431   Distance 10 feet at 11/19/2024 1431   [place] moderate assist (50-74% patient effort) at 11/26/2024 0855   Time Frame short-term goal (STG), 5 - 7 days at 11/26/2024 0855   Strategies/Barriers 50 at 11/26/2024 0855   Progress/Outcome goal revised this date, goal ongoing at 11/26/2024 0820  "  Gait/Walking Locomotion Goal 2    Activity/Assistive Device gait (walking locomotion), assistive device use, walker, front-wheeled at 11/26/2024 0855   Distance 50 feet at 11/26/2024 0855   [place] minimum assist (75% or more patient effort) at 11/26/2024 0855   Time Frame long-term goal (LTG), 14 days or less at 11/26/2024 0855   Wheelchair Locomotion Goal 1    Activity forward propulsion, backward propulsion, steering, stopping, turning, doorway navigation, weight shifting at 11/19/2024 1431   Assistive Device manual, ultra lightweight at 11/19/2024 1431   Distance 150 feet at 11/19/2024 1431   [place] other (see comments)  [Touching / Steadying Assist] at 11/19/2024 1431   Time Frame 1 week, short-term goal (STG) at 11/19/2024 1431   Progress/Outcome goal met at 11/26/2024 0855   Wheelchair Locomotion Goal 2    Activity forward propulsion, backward propulsion, steering, stopping, turning, doorway navigation, weight shifting at 11/19/2024 1431   Assistive Device manual, ultra lightweight at 11/19/2024 1431   Distance 150 feet at 11/19/2024 1431   [place] modified independence at 11/19/2024 1431   Time Frame 21 days or less, long-term goal (LTG) at 11/19/2024 1431   Progress/Outcome goal ongoing at 11/26/2024 0855   Stairs Goal 1    Activity/Assistive Device ascending stairs, descending stairs, using handrail, left, using handrail, right, step-to-step at 11/29/2024 1100   Number of Stairs 4 at 11/29/2024 1100   [place] moderate assist (50-74% patient effort) at 11/29/2024 1100   Time Frame short-term goal (STG), 5 - 7 days at 11/29/2024 1100   Strategies/Barriers 6\" at 11/29/2024 1100   Stairs Goal 2    Activity/Assistive Device ascending stairs, descending stairs, using handrail, left, using handrail, right, step-to-step at 11/29/2024 1100   Number of Stairs 4 at 11/29/2024 1100   [place] minimum assist (75% or more patient effort) at 11/29/2024 1100   Time Frame long-term goal (LTG), 14 " "days or less at 11/29/2024 1100   Strategies/Barriers 6\" at 11/29/2024 1100          "

## 2024-12-02 ENCOUNTER — APPOINTMENT (INPATIENT)
Dept: OCCUPATIONAL THERAPY | Facility: REHABILITATION | Age: 64
DRG: 560 | End: 2024-12-02
Payer: MEDICARE

## 2024-12-02 ENCOUNTER — APPOINTMENT (INPATIENT)
Dept: PHYSICAL THERAPY | Facility: REHABILITATION | Age: 64
DRG: 560 | End: 2024-12-02
Payer: MEDICARE

## 2024-12-02 LAB
GLUCOSE BLD-MCNC: 138 MG/DL (ref 70–99)
GLUCOSE BLD-MCNC: 166 MG/DL (ref 70–99)
GLUCOSE BLD-MCNC: 174 MG/DL (ref 70–99)
POCT TEST: ABNORMAL

## 2024-12-02 PROCEDURE — 63700000 HC SELF-ADMINISTRABLE DRUG: Performed by: INTERNAL MEDICINE

## 2024-12-02 PROCEDURE — 97530 THERAPEUTIC ACTIVITIES: CPT | Mod: GP

## 2024-12-02 PROCEDURE — 97116 GAIT TRAINING THERAPY: CPT | Mod: GP

## 2024-12-02 PROCEDURE — 97535 SELF CARE MNGMENT TRAINING: CPT | Mod: GO

## 2024-12-02 PROCEDURE — 97530 THERAPEUTIC ACTIVITIES: CPT | Mod: GO

## 2024-12-02 PROCEDURE — 97763 ORTHC/PROSTC MGMT SBSQ ENC: CPT | Mod: GP

## 2024-12-02 PROCEDURE — 12800000 HC ROOM AND CARE SEMIPRIVATE REHAB

## 2024-12-02 PROCEDURE — 97763 ORTHC/PROSTC MGMT SBSQ ENC: CPT | Mod: GP | Performed by: PHYSICAL THERAPIST

## 2024-12-02 RX ORDER — ACETAMINOPHEN 325 MG/1
650 TABLET ORAL EVERY 4 HOURS PRN
Status: DISCONTINUED | OUTPATIENT
Start: 2024-12-02 | End: 2024-12-10 | Stop reason: HOSPADM

## 2024-12-02 RX ADMIN — ATORVASTATIN CALCIUM 40 MG: 40 TABLET, FILM COATED ORAL at 17:26

## 2024-12-02 RX ADMIN — FUROSEMIDE 40 MG: 40 TABLET ORAL at 08:05

## 2024-12-02 RX ADMIN — EMPAGLIFLOZIN 10 MG: 10 TABLET, FILM COATED ORAL at 08:05

## 2024-12-02 RX ADMIN — FUROSEMIDE 40 MG: 40 TABLET ORAL at 17:26

## 2024-12-02 RX ADMIN — INSULIN LISPRO 5 UNITS: 100 INJECTION, SOLUTION INTRAVENOUS; SUBCUTANEOUS at 08:02

## 2024-12-02 RX ADMIN — IMATINIB MESYLATE 400 MG: 400 TABLET, FILM COATED ORAL at 08:07

## 2024-12-02 RX ADMIN — LEVOTHYROXINE SODIUM 25 MCG: 0.03 TABLET ORAL at 05:37

## 2024-12-02 RX ADMIN — APIXABAN 5 MG: 5 TABLET, FILM COATED ORAL at 21:29

## 2024-12-02 RX ADMIN — GABAPENTIN 600 MG: 300 CAPSULE ORAL at 21:30

## 2024-12-02 RX ADMIN — THERA TABS 1 TABLET: TAB at 12:38

## 2024-12-02 RX ADMIN — DULOXETINE HYDROCHLORIDE 60 MG: 30 CAPSULE, DELAYED RELEASE ORAL at 21:29

## 2024-12-02 RX ADMIN — INSULIN LISPRO 5 UNITS: 100 INJECTION, SOLUTION INTRAVENOUS; SUBCUTANEOUS at 12:15

## 2024-12-02 RX ADMIN — Medication 3 MG: at 21:29

## 2024-12-02 RX ADMIN — GABAPENTIN 600 MG: 300 CAPSULE ORAL at 17:26

## 2024-12-02 RX ADMIN — METHENAMINE HIPPURATE 1 G: 1000 TABLET ORAL at 08:04

## 2024-12-02 RX ADMIN — POTASSIUM CHLORIDE 20 MEQ: 1500 TABLET, EXTENDED RELEASE ORAL at 12:38

## 2024-12-02 RX ADMIN — METHENAMINE HIPPURATE 1 G: 1000 TABLET ORAL at 21:29

## 2024-12-02 RX ADMIN — INSULIN GLARGINE 15 UNITS: 100 INJECTION, SOLUTION SUBCUTANEOUS at 21:31

## 2024-12-02 RX ADMIN — APIXABAN 5 MG: 5 TABLET, FILM COATED ORAL at 08:04

## 2024-12-02 RX ADMIN — LOSARTAN POTASSIUM 25 MG: 25 TABLET, FILM COATED ORAL at 17:26

## 2024-12-02 RX ADMIN — INSULIN LISPRO 5 UNITS: 100 INJECTION, SOLUTION INTRAVENOUS; SUBCUTANEOUS at 17:25

## 2024-12-02 RX ADMIN — HYDROMORPHONE HYDROCHLORIDE 4 MG: 2 TABLET ORAL at 21:30

## 2024-12-02 RX ADMIN — TAMSULOSIN HYDROCHLORIDE 0.4 MG: 0.4 CAPSULE ORAL at 17:26

## 2024-12-02 RX ADMIN — GABAPENTIN 600 MG: 300 CAPSULE ORAL at 08:04

## 2024-12-02 RX ADMIN — DOCUSATE SODIUM 100 MG: 100 CAPSULE, LIQUID FILLED ORAL at 12:38

## 2024-12-02 NOTE — PROGRESS NOTES
Subjective    Patient seen and examined on rounds.  Chart reviewed.  Events overnight noted.  History reviewed briefly with patient.    CC:  Deficits in mobility, transfers, self-care status post left transfemoral amputation for prosthetic training, severe peripheral vascular disease, chronic myelocytic leukemia, osteomyelitis left foot, diabetic neuropathy, multiple medical problems    HPI:  Ms. 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 Pura Chaves. ID recommended IV Ceftriaxone and IV Ampicillin until 9/14/24.  She had urinary retention managed with indwelling Rowan catheter.  She had right upper extremity swelling present during this admission, ultrasound on 8/29/24 demonstrated an acute non-occlusive DVT of the right axillary and brachial vein around line (on Lovenox).  Subsequently she completed an acute inpatient rehabilitation stay for pre-prosthetic training at [facility] between 9/3/24 to 9/21/24 and was discharged to [facility] on 9/21/24.  She initially required bladder self intermittent catheterizations due to urinary retention, but indicates that now she is able to void.  She desired to be a prosthetic ambulator.  A left transfemoral amputation prosthesis was fabricated. and followed-up as outpatient in the amputee clinic at [facility] and she was deemed to be an  appropriate candidate for acute inpatient rehabilitation stay. She has been needing assistance for mobility, transfers, self-care.  I have reviewed the preadmission screening and concur with that information and there are no significant changes from patient's preadmission screening. She is transferred to [facility] on 11/19/24 from [facility] for further acute inpatient rehabilitation stay for prosthetic training with transfemoral amputation prosthesis.    SUBJ: She feels she is making progress in therapy.  Per case management, her CMG ends on 12/3/2024 and she was extended for a week for discharge on 12/10/2024 to allow for more time in therapy here.  Now she is asking if she could get 1 more week beyond that time.  Discussed with case management regarding patient's request.  Working on ADLs with occupational therapy.  Requiring minimal assistance for bathing, dependent for toileting, minimal assistance for upper body dressing, moderate assistance for lower body dressing.  Discussed with patient and physical therapist working with her in the parallel bar earlier today.  Again reviewed with her importance of monitoring skin under prosthesis with prosthetic use.    ROS: Denies chest pain or shortness of breath. Other ROS negative. Past, family, social history is unchanged.    Current Functional Status:   Bed mobility:   Ary, Supine to Sit: close supervision   Ary, Sit to Supine: close supervision   Transfers:    Ary, Sit to Stand Transfer: minimum assist (75% or more patient effort)  Ary, Stand to Sit Transfer: minimum assist (75% or more patient effort)   Ary, Stand Pivot/Stand Step Transfer: moderate assist (50-74% patient effort)   Gait:   Ary, Gait: moderate assist (50-74% patient effort)  Assistive Device: parallel bars   Distance in Feet: 10 feet    Bathing:   Ary: minimum assist (75% or more patient effort)   Toileting:  "  [place]: dependent (less than 25% patient effort)   Upper body dressing:   [place]: minimum assist (75% or more patient effort)   Lower body dressing:   [place]: moderate assist (50-74% patient effort)     Functional Progress:    Functional status reviewed. Overall, patient's functional status is improving.      Physical Exam      Blood pressure 126/71, pulse (!) 111, temperature 36.4 °C (97.5 °F), temperature source Oral, resp. rate 18, height 1.702 m (5' 7.01\"), weight 70.3 kg (155 lb), SpO2 99%, not currently breastfeeding.    Body mass index is 24.27 kg/m².    General Appearance: Not in acute distress  Head/Ear/Nose/Throat: Normocephalic; Atraumatic.   Eye: EOMI; PERRL.   Neck: No JVD; No Bruits.   Respiratory: Decreased breath sounds at bases.   Cardiovascular: RRR; Normal S1, S2.   Gastrointestinal: Soft; NT; +BS.   Extremities: Bilateral lower extremity edema noted.  Left transfemoral amputation stump is stable.  Musculoskeletal: Functional active range of motion in both upper and right lower extremities.  Functional active range of motion in left hip.  She has a left transfemoral amputation.  Neurological: AAO ×3. Speech is fluent. Cranial nerve examination does not reveal any gross facial asymmetry. Strength testing shows about 4+/5 strength in both upper and right lower extremities.  Left hip flexion is 4/5.  She has a left transfemoral amputation.  She is grossly able to localize touch sensation.  She is able to localize position sense in right foot great toe position sense is unable to be tested on left side.  Deep tendon reflexes are hypoactive bilaterally.  Right plantar is flexor, left plantar was unable to be tested. Coordination is functional upper extremities.    Behavior/Emotional: Appropriate; Cooperative.   Skin: Left transfemoral amputation stump stable.  Incision appears to be well-healed.  Some wounds are noted on right anterior lower forearm.  Small wound noted on right foot " fifth toe.  Small wound also noted on right foot great toe with some slough in it and scaly skin around it.  Some redness noted on sacrum/coccyx.  Reviewed pictures of wounds in Epic.      Current Facility-Administered Medications:     acetaminophen (TYLENOL) tablet 650 mg, 650 mg, oral, q4h PRN, Charlette Brooke MD    apixaban (ELIQUIS) tablet 5 mg, 5 mg, oral, BID, Charlette Brooke MD, 5 mg at 12/02/24 0804    ascorbic acid (VITAMIN C) tablet 500 mg, 500 mg, oral, q48h INT, Charlette Brooke MD, 500 mg at 12/01/24 1246    atorvastatin (LIPITOR) tablet 40 mg, 40 mg, oral, Daily (6p), Charlette Brooke MD, 40 mg at 12/01/24 1740    docusate sodium (COLACE) capsule 100 mg, 100 mg, oral, BID, Charlette Brooke MD, 100 mg at 12/02/24 1238    DULoxetine (CYMBALTA) capsule,delayed release(DR/EC) 60 mg, 60 mg, oral, Nightly, Charlette Brooke MD, 60 mg at 12/01/24 2110    empagliflozin (JARDIANCE) tablet 10 mg, 10 mg, oral, Daily, Charlette Brooke MD, 10 mg at 12/02/24 0805    ferrous sulfate tablet 325 mg, 325 mg, oral, q48h INT, Charlette Brooke MD, 325 mg at 12/01/24 1246    furosemide (LASIX) tablet 40 mg, 40 mg, oral, BID (am, 4p), Charlette Brooke MD, 40 mg at 12/02/24 0805    gabapentin (NEURONTIN) capsule 600 mg, 600 mg, oral, TID, Charlette Brooke MD, 600 mg at 12/02/24 0804    HYDROmorphone (DILAUDID) tablet 4 mg, 4 mg, oral, q6h PRN, Charlette Brooke MD, 4 mg at 12/01/24 2110    imatinib (GLEEVEC) chemo tablet 400 mg, 400 mg, oral, Daily, Charlette Brooke MD, 400 mg at 12/02/24 0807    insulin glargine U-100 (LANTUS/BASAGLAR) pen 15 Units, 15 Units, subcutaneous, Nightly, Charlette Brooke MD, 15 Units at 12/01/24 2115    insulin lispro U-100 (HumaLOG) pen 1-12 Units, 1-12 Units, subcutaneous, TID with meals, Charlette Brooke MD, 4 Units at 12/01/24 0746    insulin lispro U-100 (HumaLOG) pen 5 Units, 5 Units, subcutaneous, TID with meals, Charlette Brooke MD, 5 Units at 12/02/24 1215    levothyroxine (SYNTHROID) tablet 25 mcg, 25 mcg, oral,  Daily (6:30a), Charlette Brooke MD, 25 mcg at 12/02/24 0537    losartan (COZAAR) tablet 25 mg, 25 mg, oral, Daily with dinner, Charlette Brooke MD, 25 mg at 12/01/24 1740    melatonin ODT 3 mg, 3 mg, oral, Nightly, Charlette Brooke MD, 3 mg at 12/01/24 2110    methenamine (HIPREX) tablet 1 g, 1 g, oral, BID, Charlette Brooke MD, 1 g at 12/02/24 0804    multivitamin tablet 1 tablet, 1 tablet, oral, Daily, Charlette Brooke MD, 1 tablet at 12/02/24 1238    potassium chloride (KLOR-CON M) ER tablet (particles/crystals) 20 mEq, 20 mEq, oral, Daily, Charlette Brooke MD, 20 mEq at 12/02/24 1238    semaglutide tablet 14 mg, 1 tablet, oral, Daily, Charlette Brooke MD, 14 mg at 12/02/24 0805    tamsulosin (FLOMAX) 24 hr ER capsule 0.4 mg, 0.4 mg, oral, Daily with dinner, Charletet Brooke MD, 0.4 mg at 12/01/24 1740       Labs / Radiology    Lab Results   Component Value Date    WBC 4.98 11/20/2024    HGB 10.8 (L) 11/20/2024    HCT 32.7 (L) 11/20/2024    MCV 90.8 11/20/2024     11/20/2024     Lab Results   Component Value Date    GLUCOSE 205 (H) 11/20/2024    CALCIUM 8.7 11/20/2024     11/20/2024    K 3.7 11/20/2024    CO2 30 11/20/2024     11/20/2024    BUN 35 (H) 11/20/2024    CREATININE 0.9 11/20/2024       Assessment and Plan    ASSESSMENT PLAN:  1. 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 Pura Chaves. ID recommended IV  Ceftriaxone and IV Ampicillin until 9/14/24.  She had urinary retention managed with indwelling Rowan catheter.  She had right upper extremity swelling present during this admission, ultrasound on 8/29/24 demonstrated an acute non-occlusive DVT of the right axillary and brachial vein around line (on Lovenox).  Subsequently she completed an acute inpatient rehabilitation stay for pre-prosthetic training at [facility] between 9/3/24 to 9/21/24 and was discharged to [facility] on 9/21/24.  She initially required bladder self intermittent catheterizations due to urinary retention, but indicates that now she is able to void.  She desired to be a prosthetic ambulator.  A left transfemoral amputation prosthesis was fabricated. and followed-up as outpatient in [facility] amputee [facility] and she was deemed to be an appropriate candidate for acute inpatient rehabilitation stay. She has been needing assistance for mobility, transfers, self-care.  I have reviewed the preadmission screening and concur with that information and there are no significant changes from patient's preadmission screening. She is transferred to [facility] on 11/19/24 from [facility] for further acute inpatient rehabilitation stay for prosthetic training with transfemoral amputation prosthesis.     2. DVT prophylaxis/treatment - on Eliquis.  Platelets 168 on 11/20/2024.     3. Vascular - status post left transfemoral amputation for prosthetic training, severe peripheral vascular disease, chronic myelocytic leukemia, osteomyelitis left foot, diabetic neuropathy, multiple medical problems - continue PT, OT, psychology.  Follow falls precautions, cardiac precautions, monitor pulse oximeter in therapy.  Monitor skin under prosthesis.  Teach donning and doffing of left transfemoral amputation prosthesis.  Do gait training with prosthesis.     4. GI - On Colace, Senokot.  On PRN Maalox.  On PRN Dulcolax  suppository.  On PRN Zofran.     5.  -on Hiprex.  On Flomax.  Monitor postvoid residual.     6. CVS - on Lasix.  Monitor for orthostasis.     7. Pulmonary -encourage incentive spirometry.     8. Hematology - monitor hemoglobin, platelets.  Hemoglobin 10.8, WBC 4.98 on 11/20/2024.     9. Pain -on Cymbalta.  On Neurontin.  On PRN Tylenol.  On PRN Dilaudid.     10. Skin - Left transfemoral amputation stump stable.  Incision appears to be well-healed.  Some wounds are noted on right anterior lower forearm.  Small wound noted on right foot fifth toe.  Small wound also noted on right foot great toe with some slough in it and scaly skin around it.  Some redness noted on sacrum/coccyx.  Reviewed pictures of wounds in Epic. Monitor skin under the left transfemoral amputation prosthesis.Noted input from dermal defense nurse on 11/21/2024.  Reviewed pictures in Epic.      11. F/E/N - on Ferrous sulfate.  On vitamin C. on MVI.  On K-Genesis con.     12. Hyperlipidemia -on Lipitor.     13. Diabetes mellitus type 2 - on Lantus insulin.  On sliding scale Humalog coverage.  On Jardiance.     14. Oncology - On Gleevec for chronic myelocytic leukemia.     15. Hypothyroidism - on Synthroid.     16. Insomnia - on Melatonin.     17. Infectious diseases - On oral Vancomycin.       18. Seasonal allergies - on PRN Flonase.     19. Psychiatry - mood stable.  Psychology consulted.     20. Rehabilitation medicine - Continue comprehensive rehabilitation care. Continue PT, OT, speech, psychology.  We will follow falls precautions, cardiac precautions, monitor pulse oximetry in therapy and follow weightbearing precautions.  We will follow spinal precautions as appropriate.Tolerating therapies per endurance on 11/20/2024.  Slept well last night.   Working on donning and doffing of prosthesis.  Discussed with the importance of skin checks on the prosthesis.  Able to ambulate 7 feet in parallel bars dependent gait using left transfemoral amputation  prosthesis with physical therapy.  Dependent for sit to stand transfers with physical therapy on 11/20/2024. Noted input from dermal defense nurse on 11/21/2024.  Reviewed pictures in Epic.  Working on ADLs with occupational therapy on 11/21/2024.  Requiring minimal assistance for bathing, dependent for toileting, close supervision for poorly dressing and dependent for lower body dressing with occupational therapy.  She requested a flu shot.  Discussed with nurse.  Continent of bowel and bladder for nursing on 11/21/2024.Dependent for sit to stand transfers with physical therapy on 11/22/2024.  Able to ambulate 15 feet with thoracic walker dependent gait with physical therapy on 11/22/2024.  Skin under prosthesis is stable.  Denies pain today.  She says she got flu shot yesterday.Feels better on 11/23/2024.  Denies increased pain.  She says that she took her medications after meals and did not have any GI symptoms today.  Continent of bowel and bladder for nursing.  Working on donning and doffing of prosthesis, sock ply management with occupational therapy on 11/23/2024. Due to elevated blood pressure, internist restarted Amlodipine and Losartan on 11/25/2024.  Denies increased pain.  Continent of bowel and bladder for nursing on 11/25/2024.Skin on left transfemoral amputation residual limb remains stable.  Discussed with patient.  Discussed with nurse on 11/25/2024.Discussed patients progress in therapies with therapists in team meeting on 11/26/2024.  Discussed in PCC. See PCC documentation.  Face-to-face evaluation was done for a right carbon fiber toe off brace on 11/26/2024.  Discussed with patient.  Discussed with physical therapist on 11/26/2024.Discussed recommendations of PCC with patient on 11/27/2024.  She was measured for a right carbon fiber Toe off AFO brace yesterday.  Discussed with nursing.  Skin on residual limb remains stable.  Continent of bladder for nursing on 11/27/2024. Able to ambulate 10  feet with moderate assistance with balance but using left transfemoral amputation prosthesis on 11/28/2024.  Requiring moderate assistance for sit to stand transfer with physical therapy using left transfemoral amputation prosthesis on 11/28/2024.Denies phantom limb pain sensation on 11/29/2024.  Discussed with patient and with her family with her at bedside on 11/29/2024.  Noted podiatry input.  She feels she is improving functionally.  Discussed with her that with practice she will continue to improve with prosthetic use.She feels she is making progress in therapy on 12/2/2024.  Per case management, her CMG ends on 12/3/2024 and she was extended for a week for discharge on 12/10/2024 to allow for more time in therapy here.  Now she is asking if she could get 1 more week beyond that time.  Discussed with case management regarding patient's request.  Working on ADLs with occupational therapy.  Requiring minimal assistance for bathing, dependent for toileting, minimal assistance for upper body dressing, moderate assistance for lower body dressing.  Discussed with patient and physical therapist working with her in the parallel bar earlier today.  Again reviewed with her importance of monitoring skin under prosthesis with prosthetic use.     21. Reviewed labs today.  BUN 35, creatinine 0.9, sodium 139, potassium 3.7 on 11/20/2024.      Alexei Petty MD  12/2/2024      This encounter was completed utilizing the Direct Speech Voice Recognition Software. Grammatical errors, random word insertions, pronoun errors, and incomplete sentences are occasional consequences of the system due to software limitations, ambient noises, and hardware issues. Such errors may be missed prior to affixing electronic signature. Any questions or concerns about the content, text, or information contained within the body of this dictation should be directly addressed to the physician for clarification. If you have any questions or  concerns please do not hesitate to call me directly via EPIC chat, page, or email.

## 2024-12-02 NOTE — PROGRESS NOTES
[facility] Progress Note          Patient was seen and examined at bedside.    Subjective:   Saw her in am:  No O/N events. No new complaints. Her paresthetic training is progressing well. Her phantom feeling has improved.  She requested to help her find a PCP, notified CM.   Objective   Vital signs in last 24 hours:  Temp:  [36.4 °C (97.5 °F)-36.8 °C (98.3 °F)] 36.4 °C (97.5 °F)  Heart Rate:  [] 83  Resp:  [18] 18  BP: (119-145)/(58-87) 119/58    No intake or output data in the 24 hours ending 12/02/24 0938    Intake/Output this shift:  No intake/output data recorded.   Review of Systems:  All other systems reviewed and negative except as noted in the HPI.   Objective      Labs  Reviewed her labs thoroughly   Lab Results   Component Value Date    WBC 4.98 11/20/2024    HGB 10.8 (L) 11/20/2024    HCT 32.7 (L) 11/20/2024    MCV 90.8 11/20/2024     11/20/2024     Lab Results   Component Value Date    GLUCOSE 205 (H) 11/20/2024    CALCIUM 8.7 11/20/2024     11/20/2024    K 3.7 11/20/2024    CO2 30 11/20/2024     11/20/2024    BUN 35 (H) 11/20/2024    CREATININE 0.9 11/20/2024       Imaging  N/A    Full Code    Physical Exam:  Head/Ear/Nose/Throat: normocephalic; atraumatic; moisture mouth mm, no oropharyngeal thrush noted.   Eyes: anicteric sclera, EOMI; PERRL.   Neck : supple, no JVD, no carotid bruits appeciated.   Respiratory: no evidence of labored breathing, lung sounds CTA b/l, good aeration bibasilar area, no w/r/c.   Cardiovascular: RRR; normal S1, S2; no m/r/g; no S3 or S4.   Gastrointestinal: soft; NT; BS normal; mildly distended; no CVAT b/l.   Genitourinary: no lozada.   Extremities : S/p L AKA, residual limb healed well .   Neurological: AO x 3, fluent speeches, following commands, CNS II-XII grossly intact; no focal neurologic deficits.   Behavior/Emotional: in NAD, appropriate; cooperative.   Skin: clean, dry and intact.  Plan of care was discussed with  patient, RN, and PMR attending     Assessment     CC:L AKA, ambulatory dysfunction     64 y.o. 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 S/p IV Ceftriaxone and IV Ampicillin until 9/14/24. H/o non-occlusive DVT of the R axillary and brachial vein around midline. She presented to [facility] Amputee clinic on 11/14 for evaluation to begin prosthetic training. Now she was admitted for inpt prosthetic training on 11/19/24.  Denies nausea, vomiting, abdominal pain or discomfort, dysuria, cough/sputum, running nose, sore throat, chest pain, palpitation, SOB or orthopnea, dizziness or LH,  HA.    A/P:  # A/p L AKA  -inpt prosthetic training, wound care/dermal defense, pain and neuralgia managements, fall precaution     # CML  -cont home imatinib, check CBC      # Type 2 DM, hypothyroidism  - diet control, SSI, plus her home diabetic regimen;  - cont home levothyroxine     #  PICC-associated DVT on 8/30   -S/p SQ Lovenox weight based ;  -cont Eliquis 5 mg po bid      # Urinary retention, h/o recurrent UTI  -timed voiding with cic, adequate oral hydration, monitor PVR with cic, on flomax;  -hiprex 1 g po bid        # Stage 2 decubitus ulcer, pressure injury in right heel   -wound care     # Recent C.diff.  -cont prophylactic oral vanco 125 mg po bid       Billing code: [id number]  Diagnoses:  Patient Active Problem List   Diagnosis    Controlled type 2 diabetes mellitus, with long-term current use of insulin (CMS/[facility])    Essential hypertension    Hyperlipidemia    Hypothyroidism    Obesity    Thyroid nodule    COVID-19 virus infection    Lung nodule    Displacement of lumbar intervertebral disc without myelopathy    Disability of walking    Polyneuropathy due to type 2 diabetes mellitus  (CMS/HCC)    Pre-op examination    Localized osteoarthritis of right knee    Type 2 diabetes mellitus treated with insulin (CMS/HCC)    Abnormal finding on EKG    Left foot drop    Abnormal finding on chest xray    Osteoarthritis of right knee, unspecified osteoarthritis type    S/P total knee arthroplasty, right    Sepsis (CMS/HCC)    Osteomyelitis of left foot (CMS/HCC)    Peripheral neuropathy    Abdominal pain    Chronic idiopathic constipation    Gangrene (CMS/HCC)    Tibial artery stenosis, left (CMS/HCC)    H/O pilonidal cyst    Type 2 diabetes mellitus with left diabetic foot infection (CMS/HCC)    Type 2 diabetes mellitus with diabetic neuropathy (CMS/HCC)    Hypothyroidism    Primary hypertension    Leukemoid reaction    HLD (hyperlipidemia)    Acute osteomyelitis of left foot (CMS/HCC)    History of above knee amputation, left (CMS/HCC)    Encounter for prosthetic gait training    Adjustment disorder with anxiety           Charlette Brooke MD  12/2/2024

## 2024-12-02 NOTE — PLAN OF CARE
Plan of Care Review  Plan of Care Reviewed With: patient  Progress: improving  Outcome Evaluation: AAOx4, makes needs known. L residual limb skin intact. Showered this AM. New foam applied to coccyx, and R lateral foot. Seen by podiatry today.TID accuchecks monitored. Appetite good. Denies pain at this time. Participated in therapies.

## 2024-12-02 NOTE — PROGRESS NOTES
Patient: Bebe Burks  Location: [facility] [address]  MRN: [id number]  Today's date: 12/2/2024    History of Present Illness  Bebe is a 64 y.o. female admitted on 11/19/2024 with History of above knee amputation, left (CMS/[facility]) [Z89.612]  Encounter for prosthetic gait training [Z47.89]. Principal problem is History of above knee amputation, left (CMS/[facility]).    Pt is a 64 y.o. 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       Past Medical History  Bebe has a past medical history of Abnormal ECG, Abnormal finding on chest xray (12/2021), Arthritis, Colon cancer (CMS/[facility]), COVID-19 (12/2021), COVID-19 vaccine series completed, Diabetic neuropathy (CMS/HCC), DM (diabetes mellitus), type 2 with ophthalmic complications (CMS/HCC), Hypertension, Hypothyroidism, Left foot drop, Lipid disorder, and Type 2 diabetes mellitus treated with insulin (CMS/HCC).    OT Vitals      Date/Time Pulse HR Source Pt Activity O2 Therapy BP BP Location BP Method Pt Position [facility]   12/02/24 1101 107 Monitor At rest None (Room air) 142/72 Left upper arm Automatic Sitting BK          OT Pain      Date/Time Pain Type Rating: Rest Rating: Activity [facility]   12/02/24 1011 Pain Assessment 0 - no pain -- VMS   12/02/24 1058 Post Activity 0 - no pain -- VMS               Prior Living Environment      Flowsheet Row Most Recent Value   People in Home alone   Current Living Arrangements condominium   Home Accessibility stairs to enter home (Group)   Living Environment Comment resides in 1st floor condo, 4 (1+2+1) JOSE RAFAEL, R hand rail, no stairs inside home, Bathroom Layout: Tub/shower unit with shower curtain, Shower stall with glass doors (shower stall in main bedroom), two sisters can provide some assistance   Number of Stairs, Main Entrance 4   Surface of Stairs, Main Entrance concrete   Stair Railings, Main Entrance railing on right side (ascending)    Stairs Comment, Main Entrance 1+2+1 JOSE RAFAEL   Location, Bathroom first (main) floor   Bathroom Access tub bath, walk-in shower            Prior Level of Function      Flowsheet Row Most Recent Value   Dominant Hand right   Ambulation assistive equipment   Transferring assistive equipment   Toileting independent   Bathing assistive equipment   Dressing independent   Eating independent   IADLs assistive equipment   Driving/Transportation    Communication understands/communicates without difficulty   Assistive Device Currently Used at Home cane, straight, walker, front-wheeled, shower chair, grab bar, raised toilet            Occupational Profile      Flowsheet Row Most Recent Value   Successful Occupations Pt is very active in her community   Occupational History/Life Experiences 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, attends [employer] weekly   Performance Patterns lives alone   Environmental Supports and Barriers Supportive community    + handicap parking placard   Patient Goals PSFS: Toilet transfer with prosthetic 3/10, toilet transfer without prosthetic 8/10, walking 0/10, getting dressed 5/10, getting in/ out of bed 4/10, getting a prosthetic on/off 1/10             IRF OT Evaluation and Treatment - 12/02/24 1011          OT Time Calculation    Start Time 1001     Stop Time 1101     Time Calculation (min) 60 min        Session Details    Document Type Daily Treatment/Progress Note        General Information    General Observations of Patient OT team        Mobility Belt    Mobility Belt Used During Session yes        Orthotics    Orthosis Trial R tiral Toe off donned        Transfers    Transfers tub transfer        Low Pivot Transfer    [place], Low Pivot Transfer supervision     Safety/Cues increased time to complete     Assistive Device wheelchair     Comment incremental LPT with and without prosthetic donned        Tub Transfer    Comment Pt with tub shower and stall shower at home.  Discussion of differing shower chair and to trial when performing home eval        Wheelchair Mobility/Management    Comment, Wheelchair Mobility focusing on WC mobility for improving endurance and set up of W C for tx        Balance    Comment, Balance standing in // bars with focus on side shifting and midline with focus on foot placement. Lateral weight shifts. Standing for simmulated LE dressing over hips/ up down with improving balance        Bathing    [place] minimum assist (75% or more patient effort)     Comment per nursing min A        Upper Body Dressing    [place] minimum assist (75% or more patient effort)     Comment min A standing with RW to gather clothing        Lower Body Dressing    [place] moderate assist (50-74% patient effort)     [place], Footwear moderate assist (50-74% patient effort)     Comment Pt able to thread LE into pants, standing with increased time to hike over hips with cues with mod A. mod A for doning prosthetic, improving. Pt able to don R socks with mod A to don trial toe off and shoe        Grooming    [place], Oral Hygiene modified independence     Comment sitting for safety        Toileting    Comment discussion of time voids to decrease urgancy, especially when wearing prosthetic        Prosthetic Orientation (Lower Extremity)    Comment, Fit Assessment Pt with increased time to don liner and 3 ply initally then decreased to 0, leaning back on wedge and pillows. increased time and assist of therapist to engage pin and turn knob today. direct handoff to PT, Pre skin check unremarkable        Weekly Progress Summary (OT)    Progress Toward Functional Goals (OT) progressing toward functional goals as expected     Weekly Outcome Statement Pt is progressing with prosthetic training, decreased balance, endurance functional transfers, weight shifts, ADLs. Improving with transfers and ADLs, endurance, balance, continues to focus on these areas. Pt’s sister  has been present for some education. plan for home evaluation. Pt would continue to benefit from skilled OT services to increase indep, decrease caregiver burden and increase quality of life. Continue with POC. Drop arm commode has been ordered     Impairments Continuing to Limit Function abnormal muscle tone;decreased flexibility;decreased ROM;decreased strength;impaired balance;impaired coordination;impaired motor control;impaired postural control;impaired sensation;impaired safety awareness;pain;transfemoral lower extremity amputation     Recommendations Continue with skilled OT services 5-7 days per week 60-90 mins per day                               IRF OT Goals      Flowsheet Row Most Recent Value   Transfer Goal 1    Activity/Assistive Device toilet at 11/20/2024 0906   [place] moderate assist (50-74% patient effort) at 12/02/2024 1011   Time Frame short-term goal (STG), 5 - 7 days at 12/02/2024 1011   Progress/Outcome goal revised this date at 12/02/2024 1011   Transfer Goal 2    Activity/Assistive Device toilet at 11/20/2024 0906   [place] minimum assist (75% or more patient effort) at 11/20/2024 0906   Time Frame long-term goal (LTG), 21 days or less at 11/20/2024 0906   Progress/Outcome goal ongoing at 12/02/2024 1011   Transfer Goal 3    Activity/Assistive Device shower at 11/20/2024 0906   [place] moderate assist (50-74% patient effort) at 12/02/2024 1011   Time Frame short-term goal (STG), 5 - 7 days at 12/02/2024 1011   Progress/Outcome goal revised this date at 12/02/2024 1011   Transfer Goal 4    Activity/Assistive Device shower at 11/20/2024 0906   [place] minimum assist (75% or more patient effort) at 11/20/2024 0906   Time Frame long-term goal (LTG), 21 days or less at 11/20/2024 0906   Progress/Outcome goal ongoing at 12/02/2024 1011   Bathing Goal 1    [place] tactile cues required at 11/20/2024 0906   Time Frame short-term goal (STG), 5 - 7 days at 12/02/2024 1011    Progress/Outcome goal ongoing at 12/02/2024 1011   Bathing Goal 2    [place] modified independence at 11/20/2024 0906   Time Frame long-term goal (LTG), 21 days or less at 11/20/2024 0906   Progress/Outcome goal ongoing at 12/02/2024 1011   UB Dressing Goal 1    [place] minimum assist (75% or more patient effort) at 11/26/2024 1000   Time Frame short-term goal (STG), 5 - 7 days at 12/02/2024 1011   Strategies/Barriers with clothing retrieval at 11/20/2024 0906   Progress/Outcome goal ongoing at 12/02/2024 1011   UB Dressing Goal 2    [place] minimum assist (75% or more patient effort) at 11/20/2024 0906   Time Frame long-term goal (LTG), 21 days or less at 11/20/2024 0906   Strategies/Barriers with clothing retrieval at 11/20/2024 0906   Progress/Outcome goal ongoing at 12/02/2024 1011   LB Dressing Goal 1    [place] minimum assist (75% or more patient effort) at 11/26/2024 1000   Time Frame short-term goal (STG), 5 - 7 days at 12/02/2024 1011   Strategies/Barriers with clothing retrieval at 11/20/2024 0906   Progress/Outcome goal ongoing at 12/02/2024 1011   LB Dressing Goal 2    [place] minimum assist (75% or more patient effort) at 11/20/2024 0906   Time Frame long-term goal (LTG), 21 days or less at 11/20/2024 0906   Strategies/Barriers with clothing retrieval at 11/20/2024 0906   Progress/Outcome goal ongoing at 12/02/2024 1011   Grooming Goal 1    [place] moderate assist (50-74% patient effort) at 11/20/2024 0906   Time Frame short-term goal (STG), 5 - 7 days at 12/02/2024 1011   Strategies/Barriers in stance at sink at 11/20/2024 0906   Progress/Outcome goal ongoing at 12/02/2024 1011   Grooming Goal 2    [place] supervision required at 11/20/2024 0906   Time Frame long-term goal (LTG), 21 days or less at 11/20/2024 0906   Strategies/Barriers in stance at sink at 11/20/2024 0906   Progress/Outcome goal ongoing at 12/02/2024 1011   Toileting Goal 1    [place] moderate  assist (50-74% patient effort) at 12/02/2024 1011   Time Frame short-term goal (STG), 5 - 7 days at 12/02/2024 1011   Progress/Outcome goal ongoing at 12/02/2024 1011   Toileting Goal 2    [place] minimum assist (75% or more patient effort) at 11/20/2024 0906   Time Frame long-term goal (LTG), 21 days or less at 11/20/2024 0906   Progress/Outcome goal ongoing at 12/02/2024 1011

## 2024-12-02 NOTE — PLAN OF CARE
Plan of Care Review  Plan of Care Reviewed With: patient  Progress: improving  Outcome Evaluation: Patient is aaox4, bump over to WC, continent of b&b, voided without difficulty, no BM overnight. TID accucheck. Pain management  with Tylenol and Diluadid. Slept well.Call bell within reach and bed alarm is on.

## 2024-12-02 NOTE — PROGRESS NOTES
Patient: Bebe Burks  Location: [facility] [address]  MRN: [id number]  Today's date: 12/2/2024    History of Present Illness  Bebe is a 64 y.o. female admitted on 11/19/2024 with History of above knee amputation, left (CMS/[facility]) [Z89.612]  Encounter for prosthetic gait training [Z47.89]. Principal problem is History of above knee amputation, left (CMS/[facility]).    Pt is a 64 y.o. 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       Past Medical History  Bebe has a past medical history of Abnormal ECG, Abnormal finding on chest xray (12/2021), Arthritis, Colon cancer (CMS/[facility]), COVID-19 (12/2021), COVID-19 vaccine series completed, Diabetic neuropathy (CMS/[facility]), DM (diabetes mellitus), type 2 with ophthalmic complications (CMS/HCC), Hypertension, Hypothyroidism, Left foot drop, Lipid disorder, and Type 2 diabetes mellitus treated with insulin (CMS/[facility]).    PT Vitals      Date/Time Pulse HR Source SpO2 Pt Activity [facility]   12/02/24 1359 105 Monitor 100 % At rest JG          PT Pain      Date/Time Pain Type Rating: Rest Rating: Activity [facility]   12/02/24 1359 Pain Reassessment 0 - no pain 0 - no pain JG               Prior Living Environment      Flowsheet Row Most Recent Value   People in Home alone   Current Living Arrangements condominium   Home Accessibility stairs to enter home (Group)   Living Environment Comment resides in 1st floor condo, 4 (1+2+1) JOSE RAFAEL, R hand rail, no stairs inside home, Bathroom Layout: Tub/shower unit with shower curtain, Shower stall with glass doors (shower stall in main bedroom), two sisters can provide some assistance   Number of Stairs, Main Entrance 4   Surface of Stairs, Main Entrance concrete   Stair Railings, Main Entrance railing on right side (ascending)   Stairs Comment, Main Entrance 1+2+1 JOSE RAFAEL   Location, Bathroom first (main) floor   Bathroom Access tub bath, walk-in shower             Prior Level of Function      Flowsheet Row Most Recent Value   Dominant Hand right   Ambulation assistive equipment   Transferring assistive equipment   Toileting independent   Bathing assistive equipment   Dressing independent   Eating independent   IADLs assistive equipment   Driving/Transportation    Communication understands/communicates without difficulty   Assistive Device Currently Used at Home cane, straight, walker, front-wheeled, shower chair, grab bar, raised toilet             IRF PT Evaluation and Treatment - 12/02/24 1314          PT Time Calculation    Start Time 1302     Stop Time 1402     Time Calculation (min) 60 min        Session Details    Document Type Daily Treatment/Progress Note        General Information    General Observations of Patient Pt received upright in [facility] after using bathroom with RN, no complaints        Mobility Belt    Mobility Belt Used During Session yes        Orthotics    Orthotics (Trigger Row) Orthosis trial     Orthosis Trial R toe off brace donned throughout session        Transfers    Transfers stand pivot transfer     Maintains Weight-Bearing Status able to maintain        Sit to Stand Transfer    [place], Sit to Stand Transfer moderate assist (50-74% patient effort)     Safety/Cues technique;sequencing     Assistive Device walker, front-wheeled     Comment min A for hip/knee extension at LLE, allowing increased time to engage without hands on intervention; increases to moderate assistance with fatigue and repeated trials        Stand to Sit Transfer    [place], Stand to Sit Transfer minimum assist (75% or more patient effort)     Safety/Cues sequencing;technique     Assistive Device walker, front-wheeled     Comment min A for pelvic stability while weightshifting over to RLE to unlock prosthesis and control descent        Stand Pivot Transfer    [place], Stand Pivot/Stand Step Transfer moderate assist (50-74% patient effort);1  "person assist     Safety/Cues sequencing;technique     Assistive Device walker, front-wheeled     Comment SPT with RW from mat <> wheelchair, mod A for pelvic stability and assistance with LLE prosthesis, difficulty bringing prosthesis back underneath BINTA        Gait Training    [place], Gait moderate assist (50-74% patient effort)     Safety/Cues sequencing;technique     Assistive Device walker, front-wheeled     Distance in Feet 15 feet     Pattern step-through     Deviations/Abnormal Patterns base of support, narrow;gait speed decreased;scissoring;weight shifting decreased     Left Sided Gait Deviations --   adduction in stance    Right Sided Gait Deviations heel strike decreased     Comment Ambulated 15' x 4 with RW (wheelchair to mat), min/mod A with increased assistance during later trials due to fatigue, assistance for LLE prosthetic placement to prevent excessive step length and for anterior WS over prosthesis; Ambulated 10' x4 with BUE support in// bars, min A for prosthetic placement        Stairs Training    [place], Stairs dependent (less than 25% patient effort);2 person assist     Safety/Cues technique;sequencing     Assistive Device railing     Handrail Location (Stairs) both sides     Number of Stairs 3     Stair Height 6 inches     Ascending Stairs Technique step-to-step   RLE    Descending Stairs Technique step-to-step   backwards with L prosthesis    Comment up forward/down backwards 3 6\" stairs with BUE support on HR, mod A of 1 for R hip/knee extension + min A of 2nd for placement of prosthesis; up/down 4 4\" stairs with BHR, mod/max A of 1 for RLE hip/knee extension and eccentric control + assistance with LLE placement when descending, assist to maintain anterior WS over prosthesis while stepping up with RLE        Balance    Balance Interventions standing     Comment, Balance standing in // bars: RLE on soccer ball for increased L prosthetic weightshift, 3 x 30 seconds with BUE " support, steadying assistance to maintain upright posture and anterior WS; alternating ball taps x 25 reps, steadyng assistance at hips for balance and maintain upright posture        Prosthetic Orientation (Lower Extremity)    Fit Assessment fit/function of prosthesis are appropriate     Comment, Fit Assessment Had prosthesis on from previous PT session ~ 3 hours wear time; sock ply = 3 ply sock; doffed - Cl S for increased time; skin check - unremarkable     Specific Gait Deviations toe stays off floor after heel strike        Daily Progress Summary (PT)    Daily Outcome Statement Session focused on ambulatory mobility with short distance ambulation and elevation training. Pt flucuates between min to mod A for mobility due to fatigue and difficulty with prosthetic placement when distracted. Continue with pre-gait activities focusing on prosthetic control and prximal sstrengthening. Progress as able.                               IRF PT Goals      Flowsheet Row Most Recent Value   Transfer Goal 1    Activity/Assistive Device stand pivot, walker, front-wheeled at 11/19/2024 1431   [place] moderate assist (50-74% patient effort) at 11/26/2024 0855   Time Frame short-term goal (STG), 5 - 7 days at 11/26/2024 0855   Progress/Outcome goal met, goal ongoing at 11/26/2024 0855   Transfer Goal 2    Activity/Assistive Device stand pivot, walker, front-wheeled at 11/19/2024 1431   [place] minimum assist (75% or more patient effort) at 11/26/2024 0855   Time Frame 14 days or less, long-term goal (LTG) at 11/26/2024 0855   Progress/Outcome goal revised this date, goal ongoing, goal met at 11/26/2024 0855   Transfer Goal 3    Activity/Assistive Device sit-to-stand/stand-to-sit, walker, front-wheeled at 11/19/2024 1431   [place] minimum assist (75% or more patient effort) at 11/26/2024 0855   Time Frame short-term goal (STG), 5 - 7 days at 11/26/2024 0855   Progress/Outcome goal met, goal revised this date at  11/26/2024 0855   Transfer Goal 4    Activity/Assistive Device sit-to-stand/stand-to-sit, walker, front-wheeled at 11/19/2024 1431   [place] tactile cues required at 11/26/2024 0855   Time Frame long-term goal (LTG), 14 days or less at 11/26/2024 0855   Progress/Outcome goal met, goal revised this date at 11/26/2024 0855   Gait/Walking Locomotion Goal 1    Activity/Assistive Device gait (walking locomotion), walker, front-wheeled at 11/19/2024 1431   Distance 10 feet at 11/19/2024 1431   [place] moderate assist (50-74% patient effort) at 11/26/2024 0855   Time Frame short-term goal (STG), 5 - 7 days at 11/26/2024 0855   Strategies/Barriers 50 at 11/26/2024 0855   Progress/Outcome goal revised this date, goal ongoing at 11/26/2024 0855   Gait/Walking Locomotion Goal 2    Activity/Assistive Device gait (walking locomotion), assistive device use, walker, front-wheeled at 11/26/2024 0855   Distance 50 feet at 11/26/2024 0855   [place] minimum assist (75% or more patient effort) at 11/26/2024 0855   Time Frame long-term goal (LTG), 14 days or less at 11/26/2024 0855   Wheelchair Locomotion Goal 1    Activity forward propulsion, backward propulsion, steering, stopping, turning, doorway navigation, weight shifting at 11/19/2024 1431   Assistive Device manual, ultra lightweight at 11/19/2024 1431   Distance 150 feet at 11/19/2024 1431   [place] other (see comments)  [Touching / Steadying Assist] at 11/19/2024 1431   Time Frame 1 week, short-term goal (STG) at 11/19/2024 1431   Progress/Outcome goal met at 11/26/2024 0855   Wheelchair Locomotion Goal 2    Activity forward propulsion, backward propulsion, steering, stopping, turning, doorway navigation, weight shifting at 11/19/2024 1431   Assistive Device manual, ultra lightweight at 11/19/2024 1431   Distance 150 feet at 11/19/2024 1431   [place] modified independence at 11/19/2024 1431   Time Frame 21 days or less, long-term goal (LTG) at  "11/19/2024 1431   Progress/Outcome goal ongoing at 11/26/2024 0855   Stairs Goal 1    Activity/Assistive Device ascending stairs, descending stairs, using handrail, left, using handrail, right, step-to-step at 11/29/2024 1100   Number of Stairs 4 at 11/29/2024 1100   [place] moderate assist (50-74% patient effort) at 11/29/2024 1100   Time Frame short-term goal (STG), 5 - 7 days at 11/29/2024 1100   Strategies/Barriers 6\" at 11/29/2024 1100   Stairs Goal 2    Activity/Assistive Device ascending stairs, descending stairs, using handrail, left, using handrail, right, step-to-step at 11/29/2024 1100   Number of Stairs 4 at 11/29/2024 1100   [place] minimum assist (75% or more patient effort) at 11/29/2024 1100   Time Frame long-term goal (LTG), 14 days or less at 11/29/2024 1100   Strategies/Barriers 6\" at 11/29/2024 1100          "

## 2024-12-03 ENCOUNTER — APPOINTMENT (INPATIENT)
Dept: PHYSICAL THERAPY | Facility: REHABILITATION | Age: 64
DRG: 560 | End: 2024-12-03
Payer: MEDICARE

## 2024-12-03 ENCOUNTER — APPOINTMENT (INPATIENT)
Dept: OCCUPATIONAL THERAPY | Facility: REHABILITATION | Age: 64
DRG: 560 | End: 2024-12-03
Payer: MEDICARE

## 2024-12-03 LAB
GLUCOSE BLD-MCNC: 156 MG/DL (ref 70–99)
GLUCOSE BLD-MCNC: 172 MG/DL (ref 70–99)
GLUCOSE BLD-MCNC: 295 MG/DL (ref 70–99)
POCT TEST: ABNORMAL

## 2024-12-03 PROCEDURE — 97535 SELF CARE MNGMENT TRAINING: CPT | Mod: GO

## 2024-12-03 PROCEDURE — 97530 THERAPEUTIC ACTIVITIES: CPT | Mod: GO

## 2024-12-03 PROCEDURE — 63700000 HC SELF-ADMINISTRABLE DRUG: Performed by: INTERNAL MEDICINE

## 2024-12-03 PROCEDURE — 97763 ORTHC/PROSTC MGMT SBSQ ENC: CPT | Mod: GP

## 2024-12-03 PROCEDURE — 97530 THERAPEUTIC ACTIVITIES: CPT | Mod: GP

## 2024-12-03 PROCEDURE — 12800000 HC ROOM AND CARE SEMIPRIVATE REHAB

## 2024-12-03 PROCEDURE — 97116 GAIT TRAINING THERAPY: CPT | Mod: GP

## 2024-12-03 RX ADMIN — INSULIN LISPRO 5 UNITS: 100 INJECTION, SOLUTION INTRAVENOUS; SUBCUTANEOUS at 08:12

## 2024-12-03 RX ADMIN — INSULIN LISPRO 5 UNITS: 100 INJECTION, SOLUTION INTRAVENOUS; SUBCUTANEOUS at 16:28

## 2024-12-03 RX ADMIN — METHENAMINE HIPPURATE 1 G: 1000 TABLET ORAL at 08:09

## 2024-12-03 RX ADMIN — FERROUS SULFATE TAB 325 MG (65 MG ELEMENTAL FE) 325 MG: 325 (65 FE) TAB at 12:44

## 2024-12-03 RX ADMIN — INSULIN GLARGINE 15 UNITS: 100 INJECTION, SOLUTION SUBCUTANEOUS at 21:14

## 2024-12-03 RX ADMIN — GABAPENTIN 600 MG: 300 CAPSULE ORAL at 21:09

## 2024-12-03 RX ADMIN — THERA TABS 1 TABLET: TAB at 12:44

## 2024-12-03 RX ADMIN — LEVOTHYROXINE SODIUM 25 MCG: 0.03 TABLET ORAL at 06:13

## 2024-12-03 RX ADMIN — EMPAGLIFLOZIN 10 MG: 10 TABLET, FILM COATED ORAL at 08:09

## 2024-12-03 RX ADMIN — INSULIN LISPRO 5 UNITS: 100 INJECTION, SOLUTION INTRAVENOUS; SUBCUTANEOUS at 12:45

## 2024-12-03 RX ADMIN — Medication 3 MG: at 21:09

## 2024-12-03 RX ADMIN — HYDROMORPHONE HYDROCHLORIDE 4 MG: 2 TABLET ORAL at 21:09

## 2024-12-03 RX ADMIN — IMATINIB MESYLATE 400 MG: 400 TABLET, FILM COATED ORAL at 08:11

## 2024-12-03 RX ADMIN — APIXABAN 5 MG: 5 TABLET, FILM COATED ORAL at 08:09

## 2024-12-03 RX ADMIN — METHENAMINE HIPPURATE 1 G: 1000 TABLET ORAL at 21:09

## 2024-12-03 RX ADMIN — TAMSULOSIN HYDROCHLORIDE 0.4 MG: 0.4 CAPSULE ORAL at 16:28

## 2024-12-03 RX ADMIN — OXYCODONE HYDROCHLORIDE AND ACETAMINOPHEN 500 MG: 500 TABLET ORAL at 12:44

## 2024-12-03 RX ADMIN — GABAPENTIN 600 MG: 300 CAPSULE ORAL at 08:09

## 2024-12-03 RX ADMIN — ATORVASTATIN CALCIUM 40 MG: 40 TABLET, FILM COATED ORAL at 16:28

## 2024-12-03 RX ADMIN — FUROSEMIDE 40 MG: 40 TABLET ORAL at 08:09

## 2024-12-03 RX ADMIN — POTASSIUM CHLORIDE 20 MEQ: 1500 TABLET, EXTENDED RELEASE ORAL at 12:44

## 2024-12-03 RX ADMIN — INSULIN LISPRO 4 UNITS: 100 INJECTION, SOLUTION INTRAVENOUS; SUBCUTANEOUS at 16:28

## 2024-12-03 RX ADMIN — GABAPENTIN 600 MG: 300 CAPSULE ORAL at 15:55

## 2024-12-03 RX ADMIN — LOSARTAN POTASSIUM 25 MG: 25 TABLET, FILM COATED ORAL at 16:28

## 2024-12-03 RX ADMIN — FUROSEMIDE 40 MG: 40 TABLET ORAL at 15:55

## 2024-12-03 RX ADMIN — DULOXETINE HYDROCHLORIDE 60 MG: 30 CAPSULE, DELAYED RELEASE ORAL at 21:09

## 2024-12-03 RX ADMIN — APIXABAN 5 MG: 5 TABLET, FILM COATED ORAL at 21:09

## 2024-12-03 NOTE — PLAN OF CARE
Problem: Rehabilitation (IRF) Plan of Care  Goal: Plan of Care Review  Outcome: Progressing  Flowsheets (Taken 12/3/2024 6470)  Progress: improving  Plan of Care Reviewed With: patient  Outcome Evaluation: pt aaox4. continent b&b. participates in therapies. TID accuchek. denies pain. R lateral foot scab, foam intact. min/mod bumpover transfer.

## 2024-12-03 NOTE — PROGRESS NOTES
[facility] Progress Note          Patient was seen and examined at bedside.    Subjective:   Saw her in am:  No O/N events. No new complaints. Her paresthetic training is progressing well. Her phantom feeling has improved.  She requested to help her find a PCP, notified CM.   Objective   Vital signs in last 24 hours:  Temp:  [36.6 °C (97.9 °F)-36.8 °C (98.2 °F)] 36.7 °C (98.1 °F)  Heart Rate:  [] 104  Resp:  [16-19] 16  BP: (126-133)/(59-71) 133/63    No intake or output data in the 24 hours ending 12/03/24 1119    Intake/Output this shift:  No intake/output data recorded.   Review of Systems:  All other systems reviewed and negative except as noted in the HPI.   Objective      Labs  Reviewed her labs thoroughly   Lab Results   Component Value Date    WBC 4.98 11/20/2024    HGB 10.8 (L) 11/20/2024    HCT 32.7 (L) 11/20/2024    MCV 90.8 11/20/2024     11/20/2024     Lab Results   Component Value Date    GLUCOSE 205 (H) 11/20/2024    CALCIUM 8.7 11/20/2024     11/20/2024    K 3.7 11/20/2024    CO2 30 11/20/2024     11/20/2024    BUN 35 (H) 11/20/2024    CREATININE 0.9 11/20/2024       Imaging  N/A    Full Code    Physical Exam:  Head/Ear/Nose/Throat: normocephalic; atraumatic; moisture mouth mm, no oropharyngeal thrush noted.   Eyes: anicteric sclera, EOMI; PERRL.   Neck : supple, no JVD, no carotid bruits appeciated.   Respiratory: no evidence of labored breathing, lung sounds CTA b/l, good aeration bibasilar area, no w/r/c.   Cardiovascular: RRR; normal S1, S2; no m/r/g; no S3 or S4.   Gastrointestinal: soft; NT; BS normal; mildly distended; no CVAT b/l.   Genitourinary: no lozada.   Extremities : S/p L AKA, residual limb healed well .   Neurological: AO x 3, fluent speeches, following commands, CNS II-XII grossly intact; no focal neurologic deficits.   Behavior/Emotional: in NAD, appropriate; cooperative.   Skin: clean, dry and intact.  Plan of care was discussed  with patient, RN, and PMR attending     Assessment     CC:L AKA, ambulatory dysfunction     64 y.o. 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 S/p IV Ceftriaxone and IV Ampicillin until 9/14/24. H/o non-occlusive DVT of the R axillary and brachial vein around midline. She presented to [facility] on 11/14 for evaluation to begin prosthetic training. Now she was admitted for inpt prosthetic training on 11/19/24.  Denies nausea, vomiting, abdominal pain or discomfort, dysuria, cough/sputum, running nose, sore throat, chest pain, palpitation, SOB or orthopnea, dizziness or LH,  HA.    A/P:  # A/p L AKA  -inpt prosthetic training, wound care/dermal defense, pain and neuralgia managements, fall precaution     # CML  -cont home imatinib, check CBC      # Type 2 DM, hypothyroidism  - diet control, SSI, plus her home diabetic regimen;  - cont home levothyroxine     #  PICC-associated DVT on 8/30   -S/p SQ Lovenox weight based ;  -cont Eliquis 5 mg po bid      # Urinary retention, h/o recurrent UTI  -timed voiding with cic, adequate oral hydration, monitor PVR with cic, on flomax;  -hiprex 1 g po bid        # Stage 2 decubitus ulcer, pressure injury in right heel   -wound care     # Recent C.diff.  -cont prophylactic oral vanco 125 mg po bid       Billing code: [id number]  Diagnoses:  Patient Active Problem List   Diagnosis    Controlled type 2 diabetes mellitus, with long-term current use of insulin (CMS/[facility])    Essential hypertension    Hyperlipidemia    Hypothyroidism    Obesity    Thyroid nodule    COVID-19 virus infection    Lung nodule    Displacement of lumbar intervertebral disc without myelopathy    Disability of walking    Polyneuropathy due to type 2 diabetes  mellitus (CMS/HCC)    Pre-op examination    Localized osteoarthritis of right knee    Type 2 diabetes mellitus treated with insulin (CMS/HCC)    Abnormal finding on EKG    Left foot drop    Abnormal finding on chest xray    Osteoarthritis of right knee, unspecified osteoarthritis type    S/P total knee arthroplasty, right    Sepsis (CMS/HCC)    Osteomyelitis of left foot (CMS/HCC)    Peripheral neuropathy    Abdominal pain    Chronic idiopathic constipation    Gangrene (CMS/HCC)    Tibial artery stenosis, left (CMS/HCC)    H/O pilonidal cyst    Type 2 diabetes mellitus with left diabetic foot infection (CMS/HCC)    Type 2 diabetes mellitus with diabetic neuropathy (CMS/HCC)    Hypothyroidism    Primary hypertension    Leukemoid reaction    HLD (hyperlipidemia)    Acute osteomyelitis of left foot (CMS/HCC)    History of above knee amputation, left (CMS/HCC)    Encounter for prosthetic gait training    Adjustment disorder with anxiety           Charlette Brooke MD  12/3/2024

## 2024-12-03 NOTE — PATIENT CARE CONFERENCE
Inpatient Rehabilitation Team Conference Note  Date: 12/3/2024  Time: 10:38 AM       Patient Name: Bebe Burks     Medical Record Number: [id number]   YOB: 1960  Sex: Female      Room/Bed: 109/109W  Payor Info: Payor: MEDICARE / Plan: MEDICARE PART A & B / Product Type: Medicare /     Admitting Diagnosis: History of above knee amputation, left (CMS/HCC) [Z89.612]  Encounter for prosthetic gait training [Z47.89]   Admit Date/Time: 11/19/2024 12:06 PM    Principal Problem: History of above knee amputation, left (CMS/HCC)    Patient Active Problem List    Diagnosis Date Noted    Adjustment disorder with anxiety 11/27/2024    History of above knee amputation, left (CMS/HCC) 11/19/2024    Encounter for prosthetic gait training 11/19/2024    Type 2 diabetes mellitus with diabetic neuropathy (CMS/HCC) 03/01/2024    Hypothyroidism 03/01/2024    Primary hypertension 03/01/2024    Leukemoid reaction 03/01/2024    HLD (hyperlipidemia) 03/01/2024    Acute osteomyelitis of left foot (CMS/HCC) 03/01/2024    Type 2 diabetes mellitus with left diabetic foot infection (CMS/HCC) 02/29/2024    H/O pilonidal cyst 01/19/2024    Tibial artery stenosis, left (CMS/HCC) 01/18/2024    Chronic idiopathic constipation 01/17/2024    Gangrene (CMS/HCC) 01/17/2024    Sepsis (CMS/HCC) 01/15/2024    Osteomyelitis of left foot (CMS/HCC) 01/15/2024    Peripheral neuropathy 01/15/2024    Abdominal pain 01/15/2024    S/P total knee arthroplasty, right 04/03/2023    Osteoarthritis of right knee, unspecified osteoarthritis type 02/28/2023    Pre-op examination 02/23/2023    Localized osteoarthritis of right knee 02/23/2023    Type 2 diabetes mellitus treated with insulin (CMS/HCC)     Abnormal finding on EKG     Left foot drop     Displacement of lumbar intervertebral disc without myelopathy 02/22/2023    Disability of walking 02/22/2023    Polyneuropathy due to type 2 diabetes mellitus (CMS/HCC) 02/22/2023    COVID-19 virus infection  "12/02/2021    Lung nodule 12/02/2021    Abnormal finding on chest xray 12/2021    Controlled type 2 diabetes mellitus, with long-term current use of insulin (CMS/[facility]) 10/03/2014    Essential hypertension 10/03/2014    Hyperlipidemia 10/03/2014    Hypothyroidism 10/03/2014    Obesity 10/03/2014    Thyroid nodule 10/03/2014       Premorbid Status:  Prior Level of Function      Flowsheet Row Most Recent Value   Dominant Hand right   Ambulation assistive equipment   Transferring assistive equipment   Toileting independent   Bathing assistive equipment   Dressing independent   Eating independent   IADLs assistive equipment   Driving/Transportation    Communication understands/communicates without difficulty   Assistive Device Currently Used at Home cane, straight, walker, front-wheeled, shower chair, grab bar, raised toilet            Current Functional Status:  Mobility  Gait  [place], Gait: minimum assist (75% or more patient effort); 1 person assist  Assistive Device: walker, front-wheeled  Comment: Ambulated 15' x 4 with RW (wheelchair to mat), min/mod A with increased assistance during later trials due to fatigue, assistance for LLE prosthetic placement to prevent excessive step length and for anterior WS over prosthesis; Ambulated 10' x4 with BUE support in// bars, min A for prosthetic placement    Stairs  [place], Stairs: dependent (less than 25% patient effort); 2 person assist  Assistive Device: railing  Handrail Location (Stairs): both sides  Number of Stairs: 3  Stair Height: 6 inches  Comment: up forward/down backwards 3 6\" stairs with BUE support on HR, mod A of 1 for R hip/knee extension + min A of 2nd for placement of prosthesis; up/down 4 4\" stairs with BHR, mod/max A of 1 for RLE hip/knee extension and eccentric control + assistance with LLE placement when descending, assist to maintain anterior WS over prosthesis while stepping up with RLE    Wheelchair  Method of Locomotion: " bimanual (upper extremity) propulsion  Functional Mobility Training: forward propulsion; backward propulsion; steering; stopping; turning; doorway navigation  [place], Forward Propulsion: modified independence  [place], Backward Propulsion: modified independence  [place], Steering Activities: modified independence  [place], Stopping Activities: modified independence  [place], Turning Activities: modified independence  [place], Doorway Navigation: modified independence  [place], Weight Shifting: modified independence  Distance Propelled in Feet: 250 feet  Activity Tolerance: able to tolerate short community activity distances (150 to 500 feet)  Comment, Wheelchair Mobility: focusing on WC mobility for improving endurance and set up of W C for tx    Transfers  Bed Mobility  [place], Roll Left: close supervision  [place], Roll Right: close supervision  [place], Supine to Sit: supervision  [place], Sit to Supine: touching/steadying assist  Safety/Cues: increased time to complete  Assistive Device: none  Comment: sit to supine, S; supine to sit, still requires steadying at trunk to complete sitting up, discussed options for home including bedrail and/or hospital bed    Bed to Chair Transfer  [place], Bed to Chair: supervision  Safety/Cues: increased time to complete; technique  Assistive Device: wheelchair  Comment: LPT from wheelchair to mat for donning of prosthesis, S for safety and ensure proper WC positioning    Sit to Stand Transfer  [place], Sit to Stand Transfer: minimum assist (75% or more patient effort)  Safety/Cues: technique; sequencing  Assistive Device: walker, front-wheeled  Comment: min A at L prosthesis for hip extension and intermittent assistance ot ensure L prosthetic knee is locked in full stand    Stand to Sit Transfer  [place], Stand to Sit Transfer: minimum assist (75% or more patient effort); 1 person assist  Safety/Cues:  technique; sequencing  Assistive Device: walker, front-wheeled  Comment: min A for pelvic stability while shifting to RLE and unlocking L prosthesis prior to sitting; verbal cueing for positioning to ensure she is close enough to the chair    Low Pivot Transfer  [place], Low Pivot Transfer: supervision  Safety/Cues: increased time to complete  Assistive Device: wheelchair  Comment: incremental LPT with and without prosthetic donned    Stand Pivot Transfer  [place], Stand Pivot/Stand Step Transfer: moderate assist (50-74% patient effort); 1 person assist  Safety/Cues: sequencing; technique  Assistive Device: walker, front-wheeled  Comment: SPT with RW from mat <> wheelchair, mod A for pelvic stability and assistance with LLE prosthesis, difficulty bringing prosthesis back underneath BINTA      Toilet Transfer  Comment: supervision LPT with prosthetic to platform drop arm commode    Tub Transfer  Comment: Pt with tub shower and stall shower at home. Discussion of differing shower chair and to trial when performing home eval      Self Care  Bathing  [place]: minimum assist (75% or more patient effort)  Comment: per nursing min A    Upper Body Dressing  [place]: minimum assist (75% or more patient effort)  Comment: min A standing with RW to gather clothing    Lower Body Dressing  [place]: moderate assist (50-74% patient effort)  Comment: Pt able to thread LE into pants, standing with increased time to hike over hips with cues with mod A. mod A for doning prosthetic, improving. Pt able to don R socks with mod A to don trial toe off and shoe    Grooming  Comment: sitting for safety    Toileting  [place]: dependent (less than 25% patient effort)  Adaptive Equipment: commode, 3-in-1  Comment: discussion of time voids to decrease urgancy, especially when wearing prosthetic    Self-Feeding  No data recorded    Cognition     Communication       Frequency of Treatment (OT): 5-7 times per  week  Intensity of Treatment (OT): 60-90 minutes per day  Frequency of Treatment (PT): 5-7 times per week  Intensity of Treatment (PT): 60-90 minutes per day          Weekly Outcome Summaries:  Weekly Progress Summary (PT)  Progress Toward Functional Goals (PT): progressing toward functional goals as expected  Weekly Outcome Statement: Pt making progress with overall mobility. Progressing to mod I for donning prosthesis , withi ncreased time allowed. Still limited by impaired balance, impaired endurance, impaired RLE strength and decreased proximal strength reuqiring min/mod A for standing mobility with prosthesis. Home evaluation to be completed 12/5 with family training as well. WC evaluation completed with [place] WC being delivered prior to discharge. Continue with POC  Impairments Continuing to Limit Function: transfemoral lower extremity amputation; impaired balance; decreased strength  Recommendations: Continue with POC and home eval    Weekly Progress Summary (OT)  Progress Toward Functional Goals (OT): progressing toward functional goals as expected  Weekly Outcome Statement: Pt is progressing with prosthetic training, decreased balance, endurance functional transfers, weight shifts, ADLs. Improving with transfers and ADLs, endurance, balance, continues to focus on these areas. Pt’s sister has been present for some education. plan for home evaluation. Pt would continue to benefit from skilled OT services to increase indep, decrease caregiver burden and increase quality of life. Continue with POC. Drop arm commode has been ordered  Impairments Continuing to Limit Function: abnormal muscle tone; decreased flexibility; decreased ROM; decreased strength; impaired balance; impaired coordination; impaired motor control; impaired postural control; impaired sensation; impaired safety awareness; pain; transfemoral lower extremity amputation  Recommendations: Continue with skilled OT services 5-7 days per week 60-90  mins per day        Problem Resolution:  Team Weekly Outcome Statement: oriented, cont b&b, eliquis, room air, followed by Dr. Douglas, pain management  Comment, Barriers to Discharge: decreased balance and endurance  Comment, Facilitators for Discharge: sister in for trng, home eval thurs, ongoing strengthrening and balance exercises         Expected Level of Function  Self-Care: Independent  Sphincter Control: Independent  Transfers: Independent  Locomotion: Independent  Communication: Independent  Social Cognition: Independent      Goals/Support System:  Patient/Family Goals  Patient's Goals For Discharge: return home, take care of myself at home, return to all previous roles/activities  Family/Support System  Caregiver Engagement: advocates for the patient  Family/Support System Care: self-care encouraged  Caregiver Training  Caregiver(s) to be Trained: sibling(s)  Caregiver Training Plan: ambulation using assistive device, transfer training  Comment, Caregiver Training Plan: Sister Anna, attended PT session to observe currently moblity. Reviewed prosthetic management, she observed standing transfers and ambulation. PT issued home questionnaire and asked sister to take pictures of patients entrance and apartment. DIscussed disccharge goals of mod I at wheelchair level with intermittent use of prosthesis as safe/appropriate. Will continue with more formal training closer to discharge.    IRF PT Goals      Flowsheet Row Most Recent Value   Transfer Goal 1    Activity/Assistive Device stand pivot, walker, front-wheeled at 11/19/2024 1431   [place] moderate assist (50-74% patient effort) at 11/26/2024 0855   Time Frame short-term goal (STG), 5 - 7 days at 12/03/2024 0855   Progress/Outcome goal ongoing at 12/03/2024 0855   Transfer Goal 2    Activity/Assistive Device stand pivot, walker, front-wheeled at 11/19/2024 1431   [place] minimum assist (75% or more patient effort) at 11/26/2024 0855   Time Frame  14 days or less, long-term goal (LTG) at 11/26/2024 0855   Progress/Outcome goal ongoing at 12/03/2024 0855   Transfer Goal 3    Activity/Assistive Device sit-to-stand/stand-to-sit, walker, front-wheeled at 11/19/2024 1431   [place] minimum assist (75% or more patient effort) at 11/26/2024 0855   Time Frame short-term goal (STG), 5 - 7 days at 12/03/2024 0855   Progress/Outcome goal ongoing at 12/03/2024 0855   Transfer Goal 4    Activity/Assistive Device sit-to-stand/stand-to-sit, walker, front-wheeled at 11/19/2024 1431   [place] tactile cues required at 11/26/2024 0855   Time Frame long-term goal (LTG), 14 days or less at 11/26/2024 0855   Progress/Outcome goal ongoing at 12/03/2024 0855   Gait/Walking Locomotion Goal 1    Activity/Assistive Device gait (walking locomotion), walker, front-wheeled at 11/19/2024 1431   Distance 10 feet at 11/19/2024 1431   [place] moderate assist (50-74% patient effort) at 11/26/2024 0855   Time Frame short-term goal (STG), 5 - 7 days at 12/03/2024 0855   Strategies/Barriers 50 at 11/26/2024 0855   Progress/Outcome goal met at 12/03/2024 0855   Gait/Walking Locomotion Goal 2    Activity/Assistive Device gait (walking locomotion), assistive device use, walker, front-wheeled at 11/26/2024 0855   Distance 25 feet at 12/03/2024 0855   [place] minimum assist (75% or more patient effort) at 11/26/2024 0855   Time Frame long-term goal (LTG), 14 days or less at 11/26/2024 0855   Progress/Outcome goal ongoing, goal revised this date at 12/03/2024 0855   Wheelchair Locomotion Goal 1    Activity forward propulsion, backward propulsion, steering, stopping, turning, doorway navigation, weight shifting at 11/19/2024 1431   Assistive Device manual, ultra lightweight at 11/19/2024 1431   Distance 150 feet at 11/19/2024 1431   [place] other (see comments)  [Touching / Steadying Assist] at 11/19/2024 1431   Time Frame 1 week, short-term goal (STG) at 11/19/2024 1431  "  Progress/Outcome goal met at 11/26/2024 0855   Wheelchair Locomotion Goal 2    Activity forward propulsion, backward propulsion, steering, stopping, turning, doorway navigation, weight shifting at 11/19/2024 1431   Assistive Device manual, ultra lightweight at 11/19/2024 1431   Distance 150 feet at 11/19/2024 1431   [place] modified independence at 11/19/2024 1431   Time Frame 21 days or less, long-term goal (LTG) at 11/19/2024 1431   Progress/Outcome goal met at 12/03/2024 0855   Stairs Goal 1    Activity/Assistive Device ascending stairs, descending stairs, using handrail, left, using handrail, right, step-to-step at 11/29/2024 1100   Number of Stairs 4 at 11/29/2024 1100   [place] moderate assist (50-74% patient effort) at 11/29/2024 1100   Time Frame short-term goal (STG), 5 - 7 days at 11/29/2024 1100   Strategies/Barriers 6\" at 11/29/2024 1100   Progress/Outcome goal ongoing at 12/03/2024 0855   Stairs Goal 2    Activity/Assistive Device ascending stairs, descending stairs, using handrail, left, using handrail, right, step-to-step at 11/29/2024 1100   Number of Stairs 4 at 11/29/2024 1100   [place] minimum assist (75% or more patient effort) at 11/29/2024 1100   Time Frame long-term goal (LTG), 14 days or less at 11/29/2024 1100   Strategies/Barriers 6\" at 11/29/2024 1100   Progress/Outcome goal ongoing at 12/03/2024 0855          IRF OT Goals      Flowsheet Row Most Recent Value   Transfer Goal 1    Activity/Assistive Device toilet at 11/20/2024 0906   [place] moderate assist (50-74% patient effort) at 12/02/2024 1011   Time Frame short-term goal (STG), 5 - 7 days at 12/02/2024 1011   Progress/Outcome goal revised this date at 12/02/2024 1011   Transfer Goal 2    Activity/Assistive Device toilet at 11/20/2024 0906   [place] minimum assist (75% or more patient effort) at 11/20/2024 0906   Time Frame long-term goal (LTG), 21 days or less at 11/20/2024 0906   Progress/Outcome goal " ongoing at 12/02/2024 1011   Transfer Goal 3    Activity/Assistive Device shower at 11/20/2024 0906   [place] moderate assist (50-74% patient effort) at 12/02/2024 1011   Time Frame short-term goal (STG), 5 - 7 days at 12/02/2024 1011   Progress/Outcome goal revised this date at 12/02/2024 1011   Transfer Goal 4    Activity/Assistive Device shower at 11/20/2024 0906   [place] minimum assist (75% or more patient effort) at 11/20/2024 0906   Time Frame long-term goal (LTG), 21 days or less at 11/20/2024 0906   Progress/Outcome goal ongoing at 12/02/2024 1011   Bathing Goal 1    [place] tactile cues required at 11/20/2024 0906   Time Frame short-term goal (STG), 5 - 7 days at 12/02/2024 1011   Progress/Outcome goal ongoing at 12/02/2024 1011   Bathing Goal 2    [place] modified independence at 11/20/2024 0906   Time Frame long-term goal (LTG), 21 days or less at 11/20/2024 0906   Progress/Outcome goal ongoing at 12/02/2024 1011   UB Dressing Goal 1    [place] minimum assist (75% or more patient effort) at 11/26/2024 1000   Time Frame short-term goal (STG), 5 - 7 days at 12/02/2024 1011   Strategies/Barriers with clothing retrieval at 11/20/2024 0906   Progress/Outcome goal ongoing at 12/02/2024 1011   UB Dressing Goal 2    [place] minimum assist (75% or more patient effort) at 11/20/2024 0906   Time Frame long-term goal (LTG), 21 days or less at 11/20/2024 0906   Strategies/Barriers with clothing retrieval at 11/20/2024 0906   Progress/Outcome goal ongoing at 12/02/2024 1011   LB Dressing Goal 1    [place] minimum assist (75% or more patient effort) at 11/26/2024 1000   Time Frame short-term goal (STG), 5 - 7 days at 12/02/2024 1011   Strategies/Barriers with clothing retrieval at 11/20/2024 0906   Progress/Outcome goal ongoing at 12/02/2024 1011   LB Dressing Goal 2    [place] minimum assist (75% or more patient effort) at 11/20/2024 0906   Time Frame long-term goal (LTG), 21  days or less at 11/20/2024 0906   Strategies/Barriers with clothing retrieval at 11/20/2024 0906   Progress/Outcome goal ongoing at 12/02/2024 1011   Grooming Goal 1    [place] moderate assist (50-74% patient effort) at 11/20/2024 0906   Time Frame short-term goal (STG), 5 - 7 days at 12/02/2024 1011   Strategies/Barriers in stance at sink at 11/20/2024 0906   Progress/Outcome goal ongoing at 12/02/2024 1011   Grooming Goal 2    [place] supervision required at 11/20/2024 0906   Time Frame long-term goal (LTG), 21 days or less at 11/20/2024 0906   Strategies/Barriers in stance at sink at 11/20/2024 0906   Progress/Outcome goal ongoing at 12/02/2024 1011   Toileting Goal 1    [place] moderate assist (50-74% patient effort) at 12/02/2024 1011   Time Frame short-term goal (STG), 5 - 7 days at 12/02/2024 1011   Progress/Outcome goal ongoing at 12/02/2024 1011   Toileting Goal 2    [place] minimum assist (75% or more patient effort) at 11/20/2024 0906   Time Frame long-term goal (LTG), 21 days or less at 11/20/2024 0906   Progress/Outcome goal ongoing at 12/02/2024 1011            Discharge Planning:  Anticipated Discharge Disposition: family member's home with services  Type of Home Care Services: home PT;home OT  Home Access/Discharge Environment  Identified Home Modification Needs: ordered bedside commode  Equipment/Device Needs at Discharge  Assistive Device/Animal Currently Used at Home: cane, straight, walker, front-wheeled, shower chair, grab bar, raised toilet    Anticipated Discharge Date: 12/10/2024    Needs Identified:       Team Members Present:     Rehab Attending Present:  Alexei Petty MD    Care Coordinator Present:  Cherry Del Rio LSW    Nurse Present:  Coreen Davenport, RN    OT Present:  Claudette Cui OT    PT Present:  Paulette Gamble PT    Psychologist Present:  Anna Cody PSY.D        Next Team Conference Date: 12/10/24

## 2024-12-03 NOTE — PROGRESS NOTES
Patient: Bebe Burks  Location: [facility] [address]  MRN: [id number]  Today's date: 12/3/2024    History of Present Illness  Bebe is a 64 y.o. female admitted on 11/19/2024 with History of above knee amputation, left (CMS/[facility]) [Z89.612]  Encounter for prosthetic gait training [Z47.89]. Principal problem is History of above knee amputation, left (CMS/[facility]).    Pt is a 64 y.o. 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       Past Medical History  Bebe has a past medical history of Abnormal ECG, Abnormal finding on chest xray (12/2021), Arthritis, Colon cancer (CMS/[facility]), COVID-19 (12/2021), COVID-19 vaccine series completed, Diabetic neuropathy (CMS/[facility]), DM (diabetes mellitus), type 2 with ophthalmic complications (CMS/[facility]), Hypertension, Hypothyroidism, Left foot drop, Lipid disorder, and Type 2 diabetes mellitus treated with insulin (CMS/[facility]).    OT Vitals      Date/Time Pulse HR Source BP BP Location BP Method Pt Position [facility]   12/03/24 1004 104 Monitor 133/63 Right upper arm Automatic Sitting [facility]          OT Pain      Date/Time Pain Type Rating: Rest Rating: Activity [facility]   12/03/24 1004 Pain Assessment 0 - no pain 0 - no pain [facility]   12/03/24 1058 Pain Reassessment 0 - no pain 0 - no pain [facility]               Prior Living Environment      Flowsheet Row Most Recent Value   People in Home alone   Current Living Arrangements condominium   Home Accessibility stairs to enter home (Group)   Living Environment Comment resides in 1st floor condo, 4 (1+2+1) JOSE RAFAEL, R hand rail, no stairs inside home, Bathroom Layout: Tub/shower unit with shower curtain, Shower stall with glass doors (shower stall in main bedroom), two sisters can provide some assistance   Number of Stairs, Main Entrance 4   Surface of Stairs, Main Entrance concrete   Stair Railings, Main Entrance railing on right side (ascending)   Stairs Comment, Main  Entrance 1+2+1 JOSE RAFAEL   Location, Bathroom first (main) floor   Bathroom Access tub bath, walk-in shower            Prior Level of Function      Flowsheet Row Most Recent Value   Dominant Hand right   Ambulation assistive equipment   Transferring assistive equipment   Toileting independent   Bathing assistive equipment   Dressing independent   Eating independent   IADLs assistive equipment   Driving/Transportation    Communication understands/communicates without difficulty   Assistive Device Currently Used at Home cane, straight, walker, front-wheeled, shower chair, grab bar, raised toilet            Occupational Profile      Flowsheet Row Most Recent Value   Successful Occupations Pt is very active in her community   Occupational History/Life Experiences 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, attends [employer] weekly   Performance Patterns lives alone   Environmental Supports and Barriers Supportive community    + handicap parking placard   Patient Goals PSFS: Toilet transfer with prosthetic 3/10, toilet transfer without prosthetic 8/10, walking 0/10, getting dressed 5/10, getting in/ out of bed 4/10, getting a prosthetic on/off 1/10             IRF OT Evaluation and Treatment - 12/03/24 1001          OT Time Calculation    Start Time 1000     Stop Time 1100     Time Calculation (min) 60 min        Session Details    Document Type Daily Treatment/Progress Note        General Information    General Observations of Patient Pt received in w/c. Direct handoff from PT. Pleasant and agreeable to therapy. Additional treatment and documentation provided by JIM Pelletier, OTR/L and Germaine Keita MS, OTR/L.        Mobility Belt    Mobility Belt Used During Session yes        Orthotics    Orthosis Trial R toe off brace donned.        Orthosis Ankle Right AFO    Orthosis Properties Date Obtained: 12/03/24 Location: Ankle Laterality: Right Type: AFO Features: Carbon fiber Description: R toe off carbon fiber  "brace Therapeutic Indications: compensation for lost function Wearing Schedule: wear when out of bed       Sit to Stand Transfer    [place], Sit to Stand Transfer minimum assist (75% or more patient effort)     Safety/Cues technique     Assistive Device other (see comments)     Comment from wc to anterior bed rail with assist on L for hip extension and balance and to ensure L prosthetic knee is locked in full stance and from w/c to // bars        Stand to Sit Transfer    [place], Stand to Sit Transfer minimum assist (75% or more patient effort)     Safety/Cues increased time to complete;technique     Assistive Device other (see comments)     Comment to wc from stance at anterior bed rail, min A for pelvic stability while shifting to RLE and unlocking L prosthesis prior to sitting and from // bars to w/c.        Balance    Comment, Balance 1) Standing in // bars with min A for balance while lateral weight shfiting x10 per side. 2) Standing in // bars with min A for balance while performing unilateral PNF D1 diagonals with pink theraband for x10 per side. 3) Standing in // bars with min A for balance while engaging in RLE step ups to 4\" block for 2x10 with vc's for L glute engagement and upright posture.        Lower Body Dressing    Comment Standing with unilateral UE support on anterior bed rail pt lowered shorts with steady assist for balance then while seated required assist to remove shorts from BLE d/t getting stick on sneakers. Pt able to thread L LE into pants utilizing plastic bag tied over L shoe for ease of donning. Assist to fully thread R LE into pants. Min A in stance for balance and support on anterior bedrail while therapist assisted to manage pants over hips.        Prosthetic Orientation (Lower Extremity)    Fit Assessment fit/function of prosthesis are appropriate     Comment, Fit Assessment Pt arrived to session with prosthesis donned. Per PT pt to remain wearing prosthesis throughout " session. Total wear time during OT session= 60 minutes. Skin check to be performed in PT session.        Daily Progress Summary (OT)    Daily Outcome Statement Session focused on balance, weight shifting and donning long pants over prosthesis. Min A overall in stance with cues for L glute activation/engagement. Educated on dressing technique to don long pants over prosthesis. Continue with transfer training and functional balance.                               IRF OT Goals      Flowsheet Row Most Recent Value   Transfer Goal 1    Activity/Assistive Device toilet at 11/20/2024 0906   [place] moderate assist (50-74% patient effort) at 12/02/2024 1011   Time Frame short-term goal (STG), 5 - 7 days at 12/02/2024 1011   Progress/Outcome goal revised this date at 12/02/2024 1011   Transfer Goal 2    Activity/Assistive Device toilet at 11/20/2024 0906   [place] minimum assist (75% or more patient effort) at 11/20/2024 0906   Time Frame long-term goal (LTG), 21 days or less at 11/20/2024 0906   Progress/Outcome goal ongoing at 12/02/2024 1011   Transfer Goal 3    Activity/Assistive Device shower at 11/20/2024 0906   [place] moderate assist (50-74% patient effort) at 12/02/2024 1011   Time Frame short-term goal (STG), 5 - 7 days at 12/02/2024 1011   Progress/Outcome goal revised this date at 12/02/2024 1011   Transfer Goal 4    Activity/Assistive Device shower at 11/20/2024 0906   [place] minimum assist (75% or more patient effort) at 11/20/2024 0906   Time Frame long-term goal (LTG), 21 days or less at 11/20/2024 0906   Progress/Outcome goal ongoing at 12/02/2024 1011   Bathing Goal 1    [place] tactile cues required at 11/20/2024 0906   Time Frame short-term goal (STG), 5 - 7 days at 12/02/2024 1011   Progress/Outcome goal ongoing at 12/02/2024 1011   Bathing Goal 2    [place] modified independence at 11/20/2024 0906   Time Frame long-term goal (LTG), 21 days or less at 11/20/2024 0906    Progress/Outcome goal ongoing at 12/02/2024 1011   UB Dressing Goal 1    [place] minimum assist (75% or more patient effort) at 11/26/2024 1000   Time Frame short-term goal (STG), 5 - 7 days at 12/02/2024 1011   Strategies/Barriers with clothing retrieval at 11/20/2024 0906   Progress/Outcome goal ongoing at 12/02/2024 1011   UB Dressing Goal 2    [place] minimum assist (75% or more patient effort) at 11/20/2024 0906   Time Frame long-term goal (LTG), 21 days or less at 11/20/2024 0906   Strategies/Barriers with clothing retrieval at 11/20/2024 0906   Progress/Outcome goal ongoing at 12/02/2024 1011   LB Dressing Goal 1    [place] minimum assist (75% or more patient effort) at 11/26/2024 1000   Time Frame short-term goal (STG), 5 - 7 days at 12/02/2024 1011   Strategies/Barriers with clothing retrieval at 11/20/2024 0906   Progress/Outcome goal ongoing at 12/02/2024 1011   LB Dressing Goal 2    [place] minimum assist (75% or more patient effort) at 11/20/2024 0906   Time Frame long-term goal (LTG), 21 days or less at 11/20/2024 0906   Strategies/Barriers with clothing retrieval at 11/20/2024 0906   Progress/Outcome goal ongoing at 12/02/2024 1011   Grooming Goal 1    [place] moderate assist (50-74% patient effort) at 11/20/2024 0906   Time Frame short-term goal (STG), 5 - 7 days at 12/02/2024 1011   Strategies/Barriers in stance at sink at 11/20/2024 0906   Progress/Outcome goal ongoing at 12/02/2024 1011   Grooming Goal 2    [place] supervision required at 11/20/2024 0906   Time Frame long-term goal (LTG), 21 days or less at 11/20/2024 0906   Strategies/Barriers in stance at sink at 11/20/2024 0906   Progress/Outcome goal ongoing at 12/02/2024 1011   Toileting Goal 1    [place] moderate assist (50-74% patient effort) at 12/02/2024 1011   Time Frame short-term goal (STG), 5 - 7 days at 12/02/2024 1011   Progress/Outcome goal ongoing at 12/02/2024 1011   Toileting Goal 2     [place] minimum assist (75% or more patient effort) at 11/20/2024 0906   Time Frame long-term goal (LTG), 21 days or less at 11/20/2024 0906   Progress/Outcome goal ongoing at 12/02/2024 1011

## 2024-12-03 NOTE — PROGRESS NOTES
Patient: Bebe Burks  Location: [facility] [address]  MRN: [id number]  Today's date: 12/2/2024    History of Present Illness  Bebe is a 64 y.o. female admitted on 11/19/2024 with History of above knee amputation, left (CMS/[facility]) [Z89.612]  Encounter for prosthetic gait training [Z47.89]. Principal problem is History of above knee amputation, left (CMS/[facility]).    Pt is a 64 y.o. 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       Past Medical History  Bebe has a past medical history of Abnormal ECG, Abnormal finding on chest xray (12/2021), Arthritis, Colon cancer (CMS/[facility]), COVID-19 (12/2021), COVID-19 vaccine series completed, Diabetic neuropathy (CMS/[facility]), DM (diabetes mellitus), type 2 with ophthalmic complications (CMS/[facility]), Hypertension, Hypothyroidism, Left foot drop, Lipid disorder, and Type 2 diabetes mellitus treated with insulin (CMS/[facility]).       12/02/24 1101 12/02/24 1159   Pain/Comfort/Sleep   Pain Charting Type Pain Assessment Pain Reassessment   Preferred Pain Scale word (verbal rating pain scale) word (verbal rating pain scale)   Word Pain Rating: Rest 0 - no pain 0 - no pain   Word Pain Rating: Activity 0 - no pain  --    Vitals   Heart Rate (!) 107  --    Heart Rate Source Monitor  --    Patient Activity At rest  --    Oxygen Therapy None (Room air)  --    BP (!) 142/72  --    BP Location Left upper arm  --    BP Method Automatic  --    Patient Position Sitting  --               Prior Living Environment      Flowsheet Row Most Recent Value   People in Home alone   Current Living Arrangements condominium   Home Accessibility stairs to enter home (Group)   Living Environment Comment resides in 1st floor condo, 4 (1+2+1) JOSE RAFAEL, R hand rail, no stairs inside home, Bathroom Layout: Tub/shower unit with shower curtain, Shower stall with glass doors (shower stall in main bedroom), two sisters can provide  some assistance   Number of Stairs, Main Entrance 4   Surface of Stairs, Main Entrance concrete   Stair Railings, Main Entrance railing on right side (ascending)   Stairs Comment, Main Entrance 1+2+1 JOSE RAFAEL   Location, Bathroom first (main) floor   Bathroom Access tub bath, walk-in shower            Prior Level of Function      Flowsheet Row Most Recent Value   Dominant Hand right   Ambulation assistive equipment   Transferring assistive equipment   Toileting independent   Bathing assistive equipment   Dressing independent   Eating independent   IADLs assistive equipment   Driving/Transportation    Communication understands/communicates without difficulty   Assistive Device Currently Used at Home cane, straight, walker, front-wheeled, shower chair, grab bar, raised toilet               12/02/24 1101   PT Time Calculation   Start Time 1101   Stop Time 1201   Time Calculation (min) 60 min   Session Details   Document Type Daily Treatment/Progress Note   General Information   General Observations of Patient Pt alert and awake, in NAD. Pt reports no pain. Pt agreeable to therapy session.   Mobility Belt   Mobility Belt Used During Session yes   Sit to Stand Transfer   [place], Sit to Stand Transfer moderate assist (50-74% patient effort)   Safety/Cues increased time to complete;verbal cues;hand placement;maintaining center of gravity over base of support;preparatory posture;technique   Assistive Device walker, front-wheeled   Comment Min progressing to Mod manual cues for increased anterior weight shift, increased L hip / knee extension and increased pelvic lift.   Stand to Sit Transfer   [place], Stand to Sit Transfer minimum assist (75% or more patient effort)   Safety/Cues increased time to complete;verbal cues;hand placement;technique;preparatory posture   Assistive Device walker, front-wheeled   Comment Min manaul cues for improved balance correction when disengaging locked knee.   Gait  "Training   [place], Gait moderate assist (50-74% patient effort)   Safety/Cues increased time to complete;verbal cues;gait deviations;technique;preparatory posture   Assistive Device walker, front-wheeled   Distance in Feet 15 feet   Pattern step-through   Deviations/Abnormal Patterns base of support, narrow;delta decreased;step length decreased;stride length decreased;weight shifting decreased   Bilateral Gait Deviations   (Decreased anterolateral weightshift.)   Left Sided Gait Deviations   (Increased adduction in stance.)   Comment 15' x 1 with RW. Mod manual cues for improved prosthetic placement, decreased L step length, increased R hip extension and improved balance correction.   Motor Skills   Motor Control/Coordination Interventions neuro-muscular re-education   Neuromuscular Re-education   Comment Min A standing weight shift x 15, R LE 4\" box taps x 15 with B UE support on parallel bars. Min manual cues for increased L hip / knee extension and increased L weight shift.   Prosthetic Orientation (Lower Extremity)   Comment, Fit Assessment Donning: Min A for liner in sitting, I sock ply, Mod A prosthesis to pull socket on as well as for alignment of pin into shuttle lock. [place]: I for prosthesis, sock and liner. Wear time: 2 hours (to progress to 4 hour today), Skin check unremarkable, Ply: 0 to 3.   Specific Gait Deviations toe off floor in stance   Fit Assessment fit/function of prosthesis are appropriate   Daily Progress Summary (PT)   Daily Outcome Statement Pt demonstrates improving activation of L hip extensors in L LE stance but continues with increased L hip flexion in L LE stance phase.                     IRF PT Goals      Flowsheet Row Most Recent Value   Transfer Goal 1    Activity/Assistive Device stand pivot, walker, front-wheeled at 11/19/2024 1431   [place] moderate assist (50-74% patient effort) at 11/26/2024 0855   Time Frame short-term goal (STG), 5 - 7 days at 11/26/2024 0855 "   Progress/Outcome goal met, goal ongoing at 11/26/2024 0855   Transfer Goal 2    Activity/Assistive Device stand pivot, walker, front-wheeled at 11/19/2024 1431   [place] minimum assist (75% or more patient effort) at 11/26/2024 0855   Time Frame 14 days or less, long-term goal (LTG) at 11/26/2024 0855   Progress/Outcome goal revised this date, goal ongoing, goal met at 11/26/2024 0855   Transfer Goal 3    Activity/Assistive Device sit-to-stand/stand-to-sit, walker, front-wheeled at 11/19/2024 1431   [place] minimum assist (75% or more patient effort) at 11/26/2024 0855   Time Frame short-term goal (STG), 5 - 7 days at 11/26/2024 0855   Progress/Outcome goal met, goal revised this date at 11/26/2024 0855   Transfer Goal 4    Activity/Assistive Device sit-to-stand/stand-to-sit, walker, front-wheeled at 11/19/2024 1431   [place] tactile cues required at 11/26/2024 0855   Time Frame long-term goal (LTG), 14 days or less at 11/26/2024 0855   Progress/Outcome goal met, goal revised this date at 11/26/2024 0855   Gait/Walking Locomotion Goal 1    Activity/Assistive Device gait (walking locomotion), walker, front-wheeled at 11/19/2024 1431   Distance 10 feet at 11/19/2024 1431   [place] moderate assist (50-74% patient effort) at 11/26/2024 0855   Time Frame short-term goal (STG), 5 - 7 days at 11/26/2024 0855   Strategies/Barriers 50 at 11/26/2024 0855   Progress/Outcome goal revised this date, goal ongoing at 11/26/2024 0855   Gait/Walking Locomotion Goal 2    Activity/Assistive Device gait (walking locomotion), assistive device use, walker, front-wheeled at 11/26/2024 0855   Distance 50 feet at 11/26/2024 0855   [place] minimum assist (75% or more patient effort) at 11/26/2024 0855   Time Frame long-term goal (LTG), 14 days or less at 11/26/2024 0855   Wheelchair Locomotion Goal 1    Activity forward propulsion, backward propulsion, steering, stopping, turning, doorway navigation, weight  "shifting at 11/19/2024 1431   Assistive Device manual, ultra lightweight at 11/19/2024 1431   Distance 150 feet at 11/19/2024 1431   [place] other (see comments)  [Touching / Steadying Assist] at 11/19/2024 1431   Time Frame 1 week, short-term goal (STG) at 11/19/2024 1431   Progress/Outcome goal met at 11/26/2024 0855   Wheelchair Locomotion Goal 2    Activity forward propulsion, backward propulsion, steering, stopping, turning, doorway navigation, weight shifting at 11/19/2024 1431   Assistive Device manual, ultra lightweight at 11/19/2024 1431   Distance 150 feet at 11/19/2024 1431   [place] modified independence at 11/19/2024 1431   Time Frame 21 days or less, long-term goal (LTG) at 11/19/2024 1431   Progress/Outcome goal ongoing at 11/26/2024 0855   Stairs Goal 1    Activity/Assistive Device ascending stairs, descending stairs, using handrail, left, using handrail, right, step-to-step at 11/29/2024 1100   Number of Stairs 4 at 11/29/2024 1100   [place] moderate assist (50-74% patient effort) at 11/29/2024 1100   Time Frame short-term goal (STG), 5 - 7 days at 11/29/2024 1100   Strategies/Barriers 6\" at 11/29/2024 1100   Stairs Goal 2    Activity/Assistive Device ascending stairs, descending stairs, using handrail, left, using handrail, right, step-to-step at 11/29/2024 1100   Number of Stairs 4 at 11/29/2024 1100   [place] minimum assist (75% or more patient effort) at 11/29/2024 1100   Time Frame long-term goal (LTG), 14 days or less at 11/29/2024 1100   Strategies/Barriers 6\" at 11/29/2024 1100          "

## 2024-12-03 NOTE — PLAN OF CARE
Plan of Care Review  Plan of Care Reviewed With: patient  Progress: improving  Outcome Evaluation: alert and orientedx 4; continent; dilaudid given for residual limb pain; wearing RLE scd overnight; call bell in reach; bed alarm set; no reports of concerns at this time.

## 2024-12-03 NOTE — PROGRESS NOTES
Subjective    Patient seen and examined on rounds.  Chart reviewed.  Events overnight noted.  History reviewed briefly with patient.    CC:  Deficits in mobility, transfers, self-care status post left transfemoral amputation for prosthetic training, severe peripheral vascular disease, chronic myelocytic leukemia, osteomyelitis left foot, diabetic neuropathy, multiple medical problems    HPI:  Ms. 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 from SNF on 8/1/24 with a 40 pound weight gain (managed with 40mg Lasix), underwent left guillotine transtibial amputation on 8/3/24 followed by left transfemoral amputation on 8/19/24 with vascular surgeon Dr. Pura Chaves. ID recommended IV Ceftriaxone and IV Ampicillin until 9/14/24.  She had urinary retention managed with indwelling Rowan catheter.  She had right upper extremity swelling present during this admission, ultrasound on 8/29/24 demonstrated an acute non-occlusive DVT of the right axillary and brachial vein around line (on Lovenox).  Subsequently she completed an acute inpatient rehabilitation stay for pre-prosthetic training at [facility] between 9/3/24 to 9/21/24 and was discharged to [facility] on 9/21/24.  She initially required bladder self intermittent catheterizations due to urinary retention, but indicates that now she is able to void.  She desired to be a prosthetic ambulator.  A left transfemoral amputation prosthesis was fabricated. and followed-up as outpatient in the amputee clinic at [facility] and she was deemed to be an  appropriate candidate for acute inpatient rehabilitation stay. She has been needing assistance for mobility, transfers, self-care.  I have reviewed the preadmission screening and concur with that information and there are no significant changes from patient's preadmission screening. She is transferred to [facility] on 11/19/24 from [facility] for further acute inpatient rehabilitation stay for prosthetic training with transfemoral amputation prosthesis.    SUBJ: Discussed patients progress in therapies with therapists in team meeting today. Discussed in PCC. See PCC documentation.  Continent of bowel and bladder for nursing.  Discussed with nurse.  Skin under prosthesis stable.    ROS: Denies chest pain or shortness of breath. Other ROS negative. Past, family, social history is unchanged.    Current Functional Status:   Bed mobility:   [place], Supine to Sit: supervision   [place], Sit to Supine: touching/steadying assist   Transfers:    [place], Sit to Stand Transfer: minimum assist (75% or more patient effort)  [place], Stand to Sit Transfer: minimum assist (75% or more patient effort)   [place], Stand Pivot/Stand Step Transfer: moderate assist (50-74% patient effort), 1 person assist   Gait:   [place], Gait: minimum assist (75% or more patient effort), 1 person assist  Assistive Device: walker, front-wheeled   Distance in Feet: 15 feet    Bathing:   [place]: minimum assist (75% or more patient effort)   Toileting:   [place]: dependent (less than 25% patient effort)   Upper body dressing:   [place]: minimum assist (75% or more patient effort)   Lower body dressing:   [place]: moderate assist (50-74% patient effort)     Functional Progress:    Functional status reviewed. Overall, patient's functional status is improving.      Physical Exam      Blood pressure 133/63, pulse (!) 104, temperature 36.7 °C (98.1 °F), temperature source Oral, resp. rate  "16, height 1.702 m (5' 7.01\"), weight 70.3 kg (155 lb), SpO2 96%, not currently breastfeeding.    Body mass index is 24.27 kg/m².    General Appearance: Not in acute distress  Head/Ear/Nose/Throat: Normocephalic; Atraumatic.   Eye: EOMI; PERRL.   Neck: No JVD; No Bruits.   Respiratory: Decreased breath sounds at bases.   Cardiovascular: RRR; Normal S1, S2.   Gastrointestinal: Soft; NT; +BS.   Extremities: Bilateral lower extremity edema noted.  Left transfemoral amputation stump is stable.  Musculoskeletal: Functional active range of motion in both upper and right lower extremities.  Functional active range of motion in left hip.  She has a left transfemoral amputation.  Neurological: AAO ×3. Speech is fluent. Cranial nerve examination does not reveal any gross facial asymmetry. Strength testing shows about 4+/5 strength in both upper and right lower extremities.  Left hip flexion is 4/5.  She has a left transfemoral amputation.  She is grossly able to localize touch sensation.  She is able to localize position sense in right foot great toe position sense is unable to be tested on left side.  Deep tendon reflexes are hypoactive bilaterally.  Right plantar is flexor, left plantar was unable to be tested. Coordination is functional upper extremities.    Behavior/Emotional: Appropriate; Cooperative.   Skin: Left transfemoral amputation stump stable.  Incision appears to be well-healed.  Some wounds are noted on right anterior lower forearm.  Small wound noted on right foot fifth toe.  Small wound also noted on right foot great toe with some slough in it and scaly skin around it.  Some redness noted on sacrum/coccyx.  Reviewed pictures of wounds in Epic.      Current Facility-Administered Medications:     acetaminophen (TYLENOL) tablet 650 mg, 650 mg, oral, q4h PRN, Charlette Brooke MD    apixaban (ELIQUIS) tablet 5 mg, 5 mg, oral, BID, Charlette Brooke MD, 5 mg at 12/03/24 0809    ascorbic acid (VITAMIN C) tablet 500 mg, " 500 mg, oral, q48h INT, Charlette Brooke MD, 500 mg at 12/01/24 1246    atorvastatin (LIPITOR) tablet 40 mg, 40 mg, oral, Daily (6p), Charlette Brooke MD, 40 mg at 12/02/24 1726    docusate sodium (COLACE) capsule 100 mg, 100 mg, oral, BID, Charlette Brooke MD, 100 mg at 12/02/24 1238    DULoxetine (CYMBALTA) capsule,delayed release(DR/EC) 60 mg, 60 mg, oral, Nightly, Charlette Brooke MD, 60 mg at 12/02/24 2129    empagliflozin (JARDIANCE) tablet 10 mg, 10 mg, oral, Daily, Charlette Brooke MD, 10 mg at 12/03/24 0809    ferrous sulfate tablet 325 mg, 325 mg, oral, q48h INT, Charlette Brooke MD, 325 mg at 12/01/24 1246    furosemide (LASIX) tablet 40 mg, 40 mg, oral, BID (am, 4p), Charlette Brooke MD, 40 mg at 12/03/24 0809    gabapentin (NEURONTIN) capsule 600 mg, 600 mg, oral, TID, Charlette Brooke MD, 600 mg at 12/03/24 0809    HYDROmorphone (DILAUDID) tablet 4 mg, 4 mg, oral, q6h PRN, Charlette Brooke MD, 4 mg at 12/02/24 2130    imatinib (GLEEVEC) chemo tablet 400 mg, 400 mg, oral, Daily, Charlette Brooke MD, 400 mg at 12/03/24 0811    insulin glargine U-100 (LANTUS/BASAGLAR) pen 15 Units, 15 Units, subcutaneous, Nightly, Charlette Brooke MD, 15 Units at 12/02/24 2131    insulin lispro U-100 (HumaLOG) pen 1-12 Units, 1-12 Units, subcutaneous, TID with meals, Charlette Brooke MD, 4 Units at 12/01/24 0746    insulin lispro U-100 (HumaLOG) pen 5 Units, 5 Units, subcutaneous, TID with meals, Charlette Brooke MD, 5 Units at 12/03/24 0812    levothyroxine (SYNTHROID) tablet 25 mcg, 25 mcg, oral, Daily (6:30a), Charlette Brooke MD, 25 mcg at 12/03/24 0613    losartan (COZAAR) tablet 25 mg, 25 mg, oral, Daily with dinner, Charlette Brooke MD, 25 mg at 12/02/24 1726    melatonin ODT 3 mg, 3 mg, oral, Nightly, Charlette Brooke MD, 3 mg at 12/02/24 2129    methenamine (HIPREX) tablet 1 g, 1 g, oral, BID, Charlette Brooke MD, 1 g at 12/03/24 0809    multivitamin tablet 1 tablet, 1 tablet, oral, Daily, Charlette Brokoe MD, 1 tablet at 12/02/24 1238    potassium  chloride (KLOR-CON M) ER tablet (particles/crystals) 20 mEq, 20 mEq, oral, Daily, Charlette Brooke MD, 20 mEq at 12/02/24 1238    semaglutide tablet 14 mg, 1 tablet, oral, Daily, Charlette Brooke MD, 14 mg at 12/03/24 0811    tamsulosin (FLOMAX) 24 hr ER capsule 0.4 mg, 0.4 mg, oral, Daily with dinner, Charlette Brooke MD, 0.4 mg at 12/02/24 1726       Labs / Radiology    Lab Results   Component Value Date    WBC 4.98 11/20/2024    HGB 10.8 (L) 11/20/2024    HCT 32.7 (L) 11/20/2024    MCV 90.8 11/20/2024     11/20/2024     Lab Results   Component Value Date    GLUCOSE 205 (H) 11/20/2024    CALCIUM 8.7 11/20/2024     11/20/2024    K 3.7 11/20/2024    CO2 30 11/20/2024     11/20/2024    BUN 35 (H) 11/20/2024    CREATININE 0.9 11/20/2024       Assessment and Plan    ASSESSMENT PLAN:  1. 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 Pura Chaves. ID recommended IV Ceftriaxone and IV Ampicillin until 9/14/24.  She had urinary retention managed with indwelling Rowan catheter.  She had right upper extremity swelling present during this admission, ultrasound on 8/29/24 demonstrated an acute non-occlusive DVT of the right axillary and brachial vein around line (on Lovenox).  Subsequently she completed an acute inpatient rehabilitation stay for pre-prosthetic training at [facility] between 9/3/24 to 9/21/24  and was discharged to [facility] on 9/21/24.  She initially required bladder self intermittent catheterizations due to urinary retention, but indicates that now she is able to void.  She desired to be a prosthetic ambulator.  A left transfemoral amputation prosthesis was fabricated. and followed-up as outpatient in the amputee clinic at [facility] and she was deemed to be an appropriate candidate for acute inpatient rehabilitation stay. She has been needing assistance for mobility, transfers, self-care.  I have reviewed the preadmission screening and concur with that information and there are no significant changes from patient's preadmission screening. She is transferred to [facility] on 11/19/24 from [facility] for further acute inpatient rehabilitation stay for prosthetic training with transfemoral amputation prosthesis.     2. DVT prophylaxis/treatment - on Eliquis.  Platelets 168 on 11/20/2024.     3. Vascular - status post left transfemoral amputation for prosthetic training, severe peripheral vascular disease, chronic myelocytic leukemia, osteomyelitis left foot, diabetic neuropathy, multiple medical problems - continue PT, OT, psychology.  Follow falls precautions, cardiac precautions, monitor pulse oximeter in therapy.  Monitor skin under prosthesis.  Teach donning and doffing of left transfemoral amputation prosthesis.  Do gait training with prosthesis.     4. GI - On Colace, Senokot.  On PRN Maalox.  On PRN Dulcolax suppository.  On PRN Zofran.     5.  -on Hiprex.  On Flomax.  Monitor postvoid residual.     6. CVS - on Lasix.  Monitor for orthostasis.     7. Pulmonary -encourage incentive spirometry.     8. Hematology - monitor hemoglobin, platelets.  Hemoglobin 10.8, WBC 4.98 on 11/20/2024.     9. Pain -on Cymbalta.  On Neurontin.  On PRN Tylenol.  On PRN Dilaudid.     10. Skin - Left transfemoral amputation stump stable.  Incision appears to be well-healed.  Some wounds are  noted on right anterior lower forearm.  Small wound noted on right foot fifth toe.  Small wound also noted on right foot great toe with some slough in it and scaly skin around it.  Some redness noted on sacrum/coccyx.  Reviewed pictures of wounds in Epic. Monitor skin under the left transfemoral amputation prosthesis.Noted input from dermal defense nurse on 11/21/2024.  Reviewed pictures in Epic.      11. F/E/N - on Ferrous sulfate.  On vitamin C. on MVI.  On K-Genesis con.     12. Hyperlipidemia -on Lipitor.     13. Diabetes mellitus type 2 - on Lantus insulin.  On sliding scale Humalog coverage.  On Jardiance.     14. Oncology - On Gleevec for chronic myelocytic leukemia.     15. Hypothyroidism - on Synthroid.     16. Insomnia - on Melatonin.     17. Infectious diseases - On oral Vancomycin.       18. Seasonal allergies - on PRN Flonase.     19. Psychiatry - mood stable.  Psychology consulted.     20. Rehabilitation medicine - Continue comprehensive rehabilitation care. Continue PT, OT, speech, psychology.  We will follow falls precautions, cardiac precautions, monitor pulse oximetry in therapy and follow weightbearing precautions.  We will follow spinal precautions as appropriate.Tolerating therapies per endurance on 11/20/2024.  Slept well last night.   Working on donning and doffing of prosthesis.  Discussed with the importance of skin checks on the prosthesis.  Able to ambulate 7 feet in parallel bars dependent gait using left transfemoral amputation prosthesis with physical therapy.  Dependent for sit to stand transfers with physical therapy on 11/20/2024. Noted input from dermal defense nurse on 11/21/2024.  Reviewed pictures in Epic.  Working on ADLs with occupational therapy on 11/21/2024.  Requiring minimal assistance for bathing, dependent for toileting, close supervision for poorly dressing and dependent for lower body dressing with occupational therapy.  She requested a flu shot.  Discussed with nurse.   Continent of bowel and bladder for nursing on 11/21/2024.Dependent for sit to stand transfers with physical therapy on 11/22/2024.  Able to ambulate 15 feet with thoracic walker dependent gait with physical therapy on 11/22/2024.  Skin under prosthesis is stable.  Denies pain today.  She says she got flu shot yesterday.Feels better on 11/23/2024.  Denies increased pain.  She says that she took her medications after meals and did not have any GI symptoms today.  Continent of bowel and bladder for nursing.  Working on donning and doffing of prosthesis, sock ply management with occupational therapy on 11/23/2024. Due to elevated blood pressure, internist restarted Amlodipine and Losartan on 11/25/2024.  Denies increased pain.  Continent of bowel and bladder for nursing on 11/25/2024.Skin on left transfemoral amputation residual limb remains stable.  Discussed with patient.  Discussed with nurse on 11/25/2024.Discussed patients progress in therapies with therapists in team meeting on 11/26/2024.  Discussed in PCC. See PCC documentation.  Face-to-face evaluation was done for a right carbon fiber toe off brace on 11/26/2024.  Discussed with patient.  Discussed with physical therapist on 11/26/2024.Discussed recommendations of PCC with patient on 11/27/2024.  She was measured for a right carbon fiber Toe off AFO brace yesterday.  Discussed with nursing.  Skin on residual limb remains stable.  Continent of bladder for nursing on 11/27/2024. Able to ambulate 10 feet with moderate assistance with balance but using left transfemoral amputation prosthesis on 11/28/2024.  Requiring moderate assistance for sit to stand transfer with physical therapy using left transfemoral amputation prosthesis on 11/28/2024.Denies phantom limb pain sensation on 11/29/2024.  Discussed with patient and with her family with her at bedside on 11/29/2024.  Noted podiatry input.  She feels she is improving functionally.  Discussed with her that with  practice she will continue to improve with prosthetic use.She feels she is making progress in therapy on 12/2/2024.  Per case management, her CMG ends on 12/3/2024 and she was extended for a week for discharge on 12/10/2024 to allow for more time in therapy here.  Now she is asking if she could get 1 more week beyond that time.  Discussed with case management regarding patient's request.  Working on ADLs with occupational therapy.  Requiring minimal assistance for bathing, dependent for toileting, minimal assistance for upper body dressing, moderate assistance for lower body dressing.  Discussed with patient and physical therapist working with her in the parallel bar earlier today.  Again reviewed with her importance of monitoring skin under prosthesis with prosthetic use.Discussed patients progress in therapies with therapists in team meeting on 12/3/2024. Discussed in PCC. See PCC documentation.  Continent of bowel and bladder for nursing.  Discussed with nurse.  Skin under prosthesis stable on 12/3/2024.     21. Reviewed labs today.  BUN 35, creatinine 0.9, sodium 139, potassium 3.7 on 11/20/2024.      Alexei Petty MD  12/3/2024      This encounter was completed utilizing the Direct Speech Voice Recognition Software. Grammatical errors, random word insertions, pronoun errors, and incomplete sentences are occasional consequences of the system due to software limitations, ambient noises, and hardware issues. Such errors may be missed prior to affixing electronic signature. Any questions or concerns about the content, text, or information contained within the body of this dictation should be directly addressed to the physician for clarification. If you have any questions or concerns please do not hesitate to call me directly via EPIC chat, page, or email.

## 2024-12-03 NOTE — PLAN OF CARE
Problem: Rehabilitation (IRF) Plan of Care  Goal: Plan of Care Review  Flowsheets (Taken 12/3/2024 [id number])  Plan of Care Reviewed With: patient  Outcome Evaluation: met with pt to give team report. confirmed elos for 12/10. pt did want extension and cm explained team feels pt will meet her dc goals and also that we have already gone over cmg x1. pt understood. she did want info on how to obtain a [facility] pcp which cm provided. she won't change her current pcp until she has a new one. pt also wanted cm to find out if she could get transport to outpt here at [facility] once hc is complete. she reports she came here for PT after a knee replacement and had transport. cm said she would f/u. cm to follow. reteam tues if not dc. Met with pt to let her know about transport options. Will write this on dc doc.

## 2024-12-03 NOTE — PROGRESS NOTES
Patient: Bebe Burks  Location: [facility]  MRN: [id number]  Today's date: 12/3/2024    History of Present Illness  Bebe is a 64 y.o. female admitted on 11/19/2024 with History of above knee amputation, left (CMS/[facility]) [Z89.612]  Encounter for prosthetic gait training [Z47.89]. Principal problem is History of above knee amputation, left (CMS/[facility]).    Pt is a 64 y.o. 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       Past Medical History  Bebe has a past medical history of Abnormal ECG, Abnormal finding on chest xray (12/2021), Arthritis, Colon cancer (CMS/[facility]), COVID-19 (12/2021), COVID-19 vaccine series completed, Diabetic neuropathy (CMS/[facility]), DM (diabetes mellitus), type 2 with ophthalmic complications (CMS/[facility]), Hypertension, Hypothyroidism, Left foot drop, Lipid disorder, and Type 2 diabetes mellitus treated with insulin (CMS/[facility]).    PT Vitals      Date/Time Pulse BP BP Location BP Method Pt Position [facility]   12/03/24 0901 104 129/65 Right upper arm Automatic Sitting JG          PT Pain      Date/Time Pain Type Rating: Rest Rating: Activity [facility]   12/03/24 0901 Pain Assessment 0 - no pain 0 - no pain JG   12/03/24 0959 Pain Reassessment 0 - no pain 0 - no pain JG               Prior Living Environment      Flowsheet Row Most Recent Value   People in Home alone   Current Living Arrangements condominium   Home Accessibility stairs to enter home (Group)   Living Environment Comment resides in 1st floor condo, 4 (1+2+1) JOSE RAFAEL, R hand rail, no stairs inside home, Bathroom Layout: Tub/shower unit with shower curtain, Shower stall with glass doors (shower stall in main bedroom), two sisters can provide some assistance   Number of Stairs, Main Entrance 4   Surface of Stairs, Main Entrance concrete   Stair Railings, Main Entrance railing on right side (ascending)   Stairs Comment, Main Entrance 1+2+1 JOSE RAFAEL    Location, Bathroom first (main) floor   Bathroom Access tub bath, walk-in shower            Prior Level of Function      Flowsheet Row Most Recent Value   Dominant Hand right   Ambulation assistive equipment   Transferring assistive equipment   Toileting independent   Bathing assistive equipment   Dressing independent   Eating independent   IADLs assistive equipment   Driving/Transportation    Communication understands/communicates without difficulty   Assistive Device Currently Used at Home cane, straight, walker, front-wheeled, shower chair, grab bar, raised toilet             IRF PT Evaluation and Treatment - 12/03/24 0900          PT Time Calculation    Start Time 0900     Stop Time 1000     Time Calculation (min) 60 min        Session Details    Document Type Daily Treatment/Progress Note        General Information    General Observations of Patient Pt upright in [facility], no complaints; Jose L Driscoll PT, DPT assisted with treatment session        Mobility Belt    Mobility Belt Used During Session yes        Orthotics    Orthotics (Trigger Row) Orthosis trial     Orthosis Trial R toe off brace donned        Bed Mobility    [place], Supine to Sit supervision     [place], Sit to Supine touching/steadying assist     Safety/Cues increased time to complete     Assistive Device none     Comment sit to supine, S; supine to sit, still requires steadying at trunk to complete sitting up, discussed options for home including bedrail and/or hospital bed        Transfers    Transfers stand pivot transfer     Maintains Weight-Bearing Status able to maintain        Bed to Chair Transfer    [place], Bed to Chair supervision     Safety/Cues increased time to complete;technique     Assistive Device wheelchair     Comment LPT from wheelchair to mat for donning of prosthesis, S for safety and ensure proper WC positioning        Sit to Stand Transfer    [place], Sit to Stand Transfer minimum  assist (75% or more patient effort)     Safety/Cues technique;sequencing     Assistive Device walker, front-wheeled     Comment min A at L prosthesis for hip extension and intermittent assistance ot ensure L prosthetic knee is locked in full stand        Stand to Sit Transfer    [place], Stand to Sit Transfer minimum assist (75% or more patient effort);1 person assist     Safety/Cues technique;sequencing     Assistive Device walker, front-wheeled     Comment min A for pelvic stability while shifting to RLE and unlocking L prosthesis prior to sitting; verbal cueing for positioning to ensure she is close enough to the chair        Gait Training    [place], Gait minimum assist (75% or more patient effort);1 person assist     Safety/Cues sequencing;technique     Assistive Device walker, front-wheeled     Distance in Feet 20 feet     Pattern 4-point     Deviations/Abnormal Patterns base of support, narrow;weight shifting decreased;step length decreased     Left Sided Gait Deviations --   decreased hip extension in stance    Comment Ambulated 20' with RW, min A for pelvic stability and tactile cueing for hip extension on LLE; much improved prosthetic foot placement decreasing assistance required for swing on L prosthesis.        Balance    Balance Interventions standing     Comment, Balance standing in // bars: static standing without UE support x 30 seconds, min A to maintain upright posture to engage glutes; static standing without UE support: raising BUE shoulder flexion x 15 reps; repetitive stepping over target with RLE x 20 reps, steadying assistance at hips with much improved anterior/lateral WS; repetitive stepping to target on floor x 20 reps, steadying assistance for WS with improving prosthetic control in swing        Core Strength (Therapeutic Exercise)    Core Strength, Position seated     Comment Seated with back away from backrest: scapular retraction with green theraband x 20 reps, chest press  with 5# dumbbell x 20 reps        Prosthetic Orientation (Lower Extremity)    Fit Assessment fit/function of prosthesis are appropriate     Comment, Fit Assessment Donning liner supine in bed, mod I with increased time to complete; donned prosthesis seated on EOM, steadying for pin placement with pt able to push on and use knob to fully julian; Sock ply = 3 ply; wearing schedule = 5 hours; skin check = unremarkable     Specific Gait Deviations toe stays off floor after heel strike        Daily Progress Summary (PT)    Daily Outcome Statement Pt demonstrating improving assistance level for ambulation this session. Pt may require increased assistance with fatigue throughout the day. Continue with balance and pre-gait activities, progressing independence as able.                               IRF PT Goals      Flowsheet Row Most Recent Value   Transfer Goal 1    Activity/Assistive Device stand pivot, walker, front-wheeled at 11/19/2024 1431   [place] moderate assist (50-74% patient effort) at 11/26/2024 0855   Time Frame short-term goal (STG), 5 - 7 days at 12/03/2024 0855   Progress/Outcome goal ongoing at 12/03/2024 0855   Transfer Goal 2    Activity/Assistive Device stand pivot, walker, front-wheeled at 11/19/2024 1431   [place] minimum assist (75% or more patient effort) at 11/26/2024 0855   Time Frame 14 days or less, long-term goal (LTG) at 11/26/2024 0855   Progress/Outcome goal ongoing at 12/03/2024 0855   Transfer Goal 3    Activity/Assistive Device sit-to-stand/stand-to-sit, walker, front-wheeled at 11/19/2024 1431   [place] minimum assist (75% or more patient effort) at 11/26/2024 0855   Time Frame short-term goal (STG), 5 - 7 days at 12/03/2024 0855   Progress/Outcome goal ongoing at 12/03/2024 0855   Transfer Goal 4    Activity/Assistive Device sit-to-stand/stand-to-sit, walker, front-wheeled at 11/19/2024 1431   [place] tactile cues required at 11/26/2024 0855   Time Frame long-term goal  (LTG), 14 days or less at 11/26/2024 0855   Progress/Outcome goal ongoing at 12/03/2024 0855   Gait/Walking Locomotion Goal 1    Activity/Assistive Device gait (walking locomotion), walker, front-wheeled at 11/19/2024 1431   Distance 10 feet at 11/19/2024 1431   [place] moderate assist (50-74% patient effort) at 11/26/2024 0855   Time Frame short-term goal (STG), 5 - 7 days at 12/03/2024 0855   Strategies/Barriers 50 at 11/26/2024 0855   Progress/Outcome goal met at 12/03/2024 0855   Gait/Walking Locomotion Goal 2    Activity/Assistive Device gait (walking locomotion), assistive device use, walker, front-wheeled at 11/26/2024 0855   Distance 25 feet at 12/03/2024 0855   [place] minimum assist (75% or more patient effort) at 11/26/2024 0855   Time Frame long-term goal (LTG), 14 days or less at 11/26/2024 0855   Progress/Outcome goal ongoing, goal revised this date at 12/03/2024 0855   Wheelchair Locomotion Goal 1    Activity forward propulsion, backward propulsion, steering, stopping, turning, doorway navigation, weight shifting at 11/19/2024 1431   Assistive Device manual, ultra lightweight at 11/19/2024 1431   Distance 150 feet at 11/19/2024 1431   [place] other (see comments)  [Touching / Steadying Assist] at 11/19/2024 1431   Time Frame 1 week, short-term goal (STG) at 11/19/2024 1431   Progress/Outcome goal met at 11/26/2024 0855   Wheelchair Locomotion Goal 2    Activity forward propulsion, backward propulsion, steering, stopping, turning, doorway navigation, weight shifting at 11/19/2024 1431   Assistive Device manual, ultra lightweight at 11/19/2024 1431   Distance 150 feet at 11/19/2024 1431   [place] modified independence at 11/19/2024 1431   Time Frame 21 days or less, long-term goal (LTG) at 11/19/2024 1431   Progress/Outcome goal met at 12/03/2024 0855   Stairs Goal 1    Activity/Assistive Device ascending stairs, descending stairs, using handrail, left, using handrail, right,  "step-to-step at 11/29/2024 1100   Number of Stairs 4 at 11/29/2024 1100   [place] moderate assist (50-74% patient effort) at 11/29/2024 1100   Time Frame short-term goal (STG), 5 - 7 days at 11/29/2024 1100   Strategies/Barriers 6\" at 11/29/2024 1100   Progress/Outcome goal ongoing at 12/03/2024 0855   Stairs Goal 2    Activity/Assistive Device ascending stairs, descending stairs, using handrail, left, using handrail, right, step-to-step at 11/29/2024 1100   Number of Stairs 4 at 11/29/2024 1100   [place] minimum assist (75% or more patient effort) at 11/29/2024 1100   Time Frame long-term goal (LTG), 14 days or less at 11/29/2024 1100   Strategies/Barriers 6\" at 11/29/2024 1100   Progress/Outcome goal ongoing at 12/03/2024 0855          "

## 2024-12-03 NOTE — PROGRESS NOTES
Podiatry Progress Note    Subjective follow up evaluation for right lateral foot DTI and great toe scab.          Interval History: none.       Medications:    Current Facility-Administered Medications:     acetaminophen (TYLENOL) tablet 650 mg, 650 mg, oral, q4h PRN, Charlette Brooke MD    apixaban (ELIQUIS) tablet 5 mg, 5 mg, oral, BID, Charlette Brooke MD, 5 mg at 12/02/24 0804    ascorbic acid (VITAMIN C) tablet 500 mg, 500 mg, oral, q48h INT, Charlette Brooke MD, 500 mg at 12/01/24 1246    atorvastatin (LIPITOR) tablet 40 mg, 40 mg, oral, Daily (6p), Charlette Brooke MD, 40 mg at 12/02/24 1726    docusate sodium (COLACE) capsule 100 mg, 100 mg, oral, BID, Charlette Brooke MD, 100 mg at 12/02/24 1238    DULoxetine (CYMBALTA) capsule,delayed release(DR/EC) 60 mg, 60 mg, oral, Nightly, Charlette Brooke MD, 60 mg at 12/01/24 2110    empagliflozin (JARDIANCE) tablet 10 mg, 10 mg, oral, Daily, Charlette Brooke MD, 10 mg at 12/02/24 0805    ferrous sulfate tablet 325 mg, 325 mg, oral, q48h INT, Charlette Brooke MD, 325 mg at 12/01/24 1246    furosemide (LASIX) tablet 40 mg, 40 mg, oral, BID (am, 4p), Charlette Brooke MD, 40 mg at 12/02/24 1726    gabapentin (NEURONTIN) capsule 600 mg, 600 mg, oral, TID, Charlette Brooke MD, 600 mg at 12/02/24 1726    HYDROmorphone (DILAUDID) tablet 4 mg, 4 mg, oral, q6h PRN, Charlette Brooke MD, 4 mg at 12/01/24 2110    imatinib (GLEEVEC) chemo tablet 400 mg, 400 mg, oral, Daily, Charlette Brooke MD, 400 mg at 12/02/24 0807    insulin glargine U-100 (LANTUS/BASAGLAR) pen 15 Units, 15 Units, subcutaneous, Nightly, Charlette Brooke MD, 15 Units at 12/01/24 2115    insulin lispro U-100 (HumaLOG) pen 1-12 Units, 1-12 Units, subcutaneous, TID with meals, Charlette Brooke MD, 4 Units at 12/01/24 0746    insulin lispro U-100 (HumaLOG) pen 5 Units, 5 Units, subcutaneous, TID with meals, Charlette Brooke MD, 5 Units at 12/02/24 1725    levothyroxine (SYNTHROID) tablet 25 mcg, 25 mcg, oral, Daily (6:30a), Charlette Brooke MD,  25 mcg at 12/02/24 0537    losartan (COZAAR) tablet 25 mg, 25 mg, oral, Daily with dinner, Charlette Brooke MD, 25 mg at 12/02/24 1726    melatonin ODT 3 mg, 3 mg, oral, Nightly, Charlette Brooke MD, 3 mg at 12/01/24 2110    methenamine (HIPREX) tablet 1 g, 1 g, oral, BID, Charlette Brooke MD, 1 g at 12/02/24 0804    multivitamin tablet 1 tablet, 1 tablet, oral, Daily, Charlette Brooke MD, 1 tablet at 12/02/24 1238    potassium chloride (KLOR-CON M) ER tablet (particles/crystals) 20 mEq, 20 mEq, oral, Daily, Charlette Brooke MD, 20 mEq at 12/02/24 1238    semaglutide tablet 14 mg, 1 tablet, oral, Daily, Charlette Brooke MD, 14 mg at 12/02/24 0805    tamsulosin (FLOMAX) 24 hr ER capsule 0.4 mg, 0.4 mg, oral, Daily with dinner, Charlette Brooke MD, 0.4 mg at 12/02/24 1726    Labs:  Lab Results   Component Value Date    GLUCOSE 205 (H) 11/20/2024    CALCIUM 8.7 11/20/2024     11/20/2024    K 3.7 11/20/2024    CO2 30 11/20/2024     11/20/2024    BUN 35 (H) 11/20/2024    CREATININE 0.9 11/20/2024     Lab Results   Component Value Date    WBC 4.98 11/20/2024    HGB 10.8 (L) 11/20/2024    HCT 32.7 (L) 11/20/2024    MCV 90.8 11/20/2024     11/20/2024       Pathology Results       ** No results found for the last 720 hours. **          Microbiology Results       ** No results found for the last 720 hours. **                  Imaging  No results found.        Vital signs in last 24 hours:  Temp:  [36.4 °C (97.5 °F)-36.8 °C (98.3 °F)] 36.8 °C (98.2 °F)  Heart Rate:  [] 100  Resp:  [18-19] 19  BP: (119-142)/(58-87) 129/59      No intake or output data in the 24 hours ending 12/02/24 2009      Review of Systems - Negative fever, chills, night sweats.    Objective     PE: nonpalpable DP nonpalpable PT right foot.  Decreased texture, temperature, and turgor right foot.  Decreased digital hair .  Mild edema right lower extremity.  Xerotic skin bilateral .  Right lateral foot with nonblanchable maroon scabbed area.  No  crepitus no fluctuance.  No erythema.  Right great toe with dorsal scab.  thickened, yellow nails with subungual debris  <5.    Digital contracture noted toe 2-5.   Hallux valgus deformity.  Epicritic sensation reduced right foot.            A/P   Assessment   diabetes mellitus type 2, neuropathy,  left transfemoral amputation,  right foot DTI and noninfected great toe scab.       Plan   Continue mepilex right lateral foot.  Follow up 3-5 days.        Any change in the foot since last evaluation? improvement          Dimitri Douglas DPM  12/2/2024  8:09 PM

## 2024-12-04 ENCOUNTER — APPOINTMENT (INPATIENT)
Dept: PHYSICAL THERAPY | Facility: REHABILITATION | Age: 64
DRG: 560 | End: 2024-12-04
Payer: MEDICARE

## 2024-12-04 ENCOUNTER — APPOINTMENT (INPATIENT)
Dept: OCCUPATIONAL THERAPY | Facility: REHABILITATION | Age: 64
DRG: 560 | End: 2024-12-04
Payer: MEDICARE

## 2024-12-04 LAB
GLUCOSE BLD-MCNC: 138 MG/DL (ref 70–99)
GLUCOSE BLD-MCNC: 167 MG/DL (ref 70–99)
GLUCOSE BLD-MCNC: 271 MG/DL (ref 70–99)
POCT TEST: ABNORMAL

## 2024-12-04 PROCEDURE — 97530 THERAPEUTIC ACTIVITIES: CPT | Mod: GO

## 2024-12-04 PROCEDURE — 97535 SELF CARE MNGMENT TRAINING: CPT | Mod: GO

## 2024-12-04 PROCEDURE — 97116 GAIT TRAINING THERAPY: CPT | Mod: GP

## 2024-12-04 PROCEDURE — 63700000 HC SELF-ADMINISTRABLE DRUG: Performed by: PHYSICAL MEDICINE & REHABILITATION

## 2024-12-04 PROCEDURE — 97112 NEUROMUSCULAR REEDUCATION: CPT | Mod: GP

## 2024-12-04 PROCEDURE — 97530 THERAPEUTIC ACTIVITIES: CPT | Mod: GP

## 2024-12-04 PROCEDURE — 12800000 HC ROOM AND CARE SEMIPRIVATE REHAB

## 2024-12-04 PROCEDURE — 63700000 HC SELF-ADMINISTRABLE DRUG: Performed by: INTERNAL MEDICINE

## 2024-12-04 RX ORDER — FLUTICASONE PROPIONATE 50 MCG
1 SPRAY, SUSPENSION (ML) NASAL 2 TIMES DAILY
Status: DISCONTINUED | OUTPATIENT
Start: 2024-12-04 | End: 2024-12-10 | Stop reason: HOSPADM

## 2024-12-04 RX ADMIN — METHENAMINE HIPPURATE 1 G: 1000 TABLET ORAL at 22:08

## 2024-12-04 RX ADMIN — INSULIN LISPRO 5 UNITS: 100 INJECTION, SOLUTION INTRAVENOUS; SUBCUTANEOUS at 17:22

## 2024-12-04 RX ADMIN — THERA TABS 1 TABLET: TAB at 12:12

## 2024-12-04 RX ADMIN — FUROSEMIDE 40 MG: 40 TABLET ORAL at 17:23

## 2024-12-04 RX ADMIN — FLUTICASONE PROPIONATE 1 SPRAY: 50 SPRAY, METERED NASAL at 22:12

## 2024-12-04 RX ADMIN — INSULIN LISPRO 4 UNITS: 100 INJECTION, SOLUTION INTRAVENOUS; SUBCUTANEOUS at 17:22

## 2024-12-04 RX ADMIN — APIXABAN 5 MG: 5 TABLET, FILM COATED ORAL at 08:23

## 2024-12-04 RX ADMIN — DULOXETINE HYDROCHLORIDE 60 MG: 30 CAPSULE, DELAYED RELEASE ORAL at 22:09

## 2024-12-04 RX ADMIN — HYDROMORPHONE HYDROCHLORIDE 4 MG: 2 TABLET ORAL at 22:08

## 2024-12-04 RX ADMIN — GABAPENTIN 600 MG: 300 CAPSULE ORAL at 08:23

## 2024-12-04 RX ADMIN — TAMSULOSIN HYDROCHLORIDE 0.4 MG: 0.4 CAPSULE ORAL at 17:23

## 2024-12-04 RX ADMIN — INSULIN GLARGINE 15 UNITS: 100 INJECTION, SOLUTION SUBCUTANEOUS at 22:10

## 2024-12-04 RX ADMIN — Medication 3 MG: at 22:09

## 2024-12-04 RX ADMIN — LOSARTAN POTASSIUM 25 MG: 25 TABLET, FILM COATED ORAL at 17:23

## 2024-12-04 RX ADMIN — FUROSEMIDE 40 MG: 40 TABLET ORAL at 08:23

## 2024-12-04 RX ADMIN — INSULIN LISPRO 5 UNITS: 100 INJECTION, SOLUTION INTRAVENOUS; SUBCUTANEOUS at 08:26

## 2024-12-04 RX ADMIN — APIXABAN 5 MG: 5 TABLET, FILM COATED ORAL at 22:09

## 2024-12-04 RX ADMIN — METHENAMINE HIPPURATE 1 G: 1000 TABLET ORAL at 08:23

## 2024-12-04 RX ADMIN — IMATINIB MESYLATE 400 MG: 400 TABLET, FILM COATED ORAL at 08:26

## 2024-12-04 RX ADMIN — GABAPENTIN 600 MG: 300 CAPSULE ORAL at 22:09

## 2024-12-04 RX ADMIN — EMPAGLIFLOZIN 10 MG: 10 TABLET, FILM COATED ORAL at 08:23

## 2024-12-04 RX ADMIN — LEVOTHYROXINE SODIUM 25 MCG: 0.03 TABLET ORAL at 05:36

## 2024-12-04 RX ADMIN — ATORVASTATIN CALCIUM 40 MG: 40 TABLET, FILM COATED ORAL at 17:23

## 2024-12-04 RX ADMIN — GABAPENTIN 600 MG: 300 CAPSULE ORAL at 17:23

## 2024-12-04 RX ADMIN — INSULIN LISPRO 5 UNITS: 100 INJECTION, SOLUTION INTRAVENOUS; SUBCUTANEOUS at 12:11

## 2024-12-04 RX ADMIN — POTASSIUM CHLORIDE 20 MEQ: 1500 TABLET, EXTENDED RELEASE ORAL at 12:12

## 2024-12-04 NOTE — PROGRESS NOTES
Patient: Bebe Burks  Location: [facility] [address]  MRN: [id number]  Today's date: 12/3/2024    History of Present Illness  Bebe is a 64 y.o. female admitted on 11/19/2024 with History of above knee amputation, left (CMS/[facility]) [Z89.612]  Encounter for prosthetic gait training [Z47.89]. Principal problem is History of above knee amputation, left (CMS/[facility]).    Pt is a 64 y.o. 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       Past Medical History  Bebe has a past medical history of Abnormal ECG, Abnormal finding on chest xray (12/2021), Arthritis, Colon cancer (CMS/[facility]), COVID-19 (12/2021), COVID-19 vaccine series completed, Diabetic neuropathy (CMS/[facility]), DM (diabetes mellitus), type 2 with ophthalmic complications (CMS/[facility]), Hypertension, Hypothyroidism, Left foot drop, Lipid disorder, and Type 2 diabetes mellitus treated with insulin (CMS/[facility]).    PT Vitals      Date/Time Pulse BP BP Location BP Method Pt Position [facility]   12/03/24 1301 92 129/77 Right upper arm Automatic Sitting JG          PT Pain      Date/Time Pain Type Rating: Rest [facility]   12/03/24 1301 Pain Assessment 0 - no pain JG   12/03/24 1359 Pain Reassessment 0 - no pain JG               Prior Living Environment      Flowsheet Row Most Recent Value   People in Home alone   Current Living Arrangements condominium   Home Accessibility stairs to enter home (Group)   Living Environment Comment resides in 1st floor condo, 4 (1+2+1) JOSE RAFAEL, R hand rail, no stairs inside home, Bathroom Layout: Tub/shower unit with shower curtain, Shower stall with glass doors (shower stall in main bedroom), two sisters can provide some assistance   Number of Stairs, Main Entrance 4   Surface of Stairs, Main Entrance concrete   Stair Railings, Main Entrance railing on right side (ascending)   Stairs Comment, Main Entrance 1+2+1 JOSE RAFAEL   Location, Bathroom first (main) floor    Bathroom Access tub bath, walk-in shower            Prior Level of Function      Flowsheet Row Most Recent Value   Dominant Hand right   Ambulation assistive equipment   Transferring assistive equipment   Toileting independent   Bathing assistive equipment   Dressing independent   Eating independent   IADLs assistive equipment   Driving/Transportation    Communication understands/communicates without difficulty   Assistive Device Currently Used at Home cane, straight, walker, front-wheeled, shower chair, grab bar, raised toilet             [facility] PT Evaluation and Treatment - 12/03/24 1301          PT Time Calculation    Start Time 1301     Stop Time 1401     Time Calculation (min) 60 min        Session Details    Document Type Daily Treatment/Progress Note        General Information    General Observations of Patient Pt bringing self down to gym in [facility]        Mobility Belt    Mobility Belt Used During Session yes        Orthotics    Orthotics (Trigger Row) Add Orthosis LDA        Orthosis Ankle Right AFO    Orthosis Properties Date Obtained: 12/03/24 Location: Ankle Laterality: Right Type: AFO Features: Carbon fiber Description: R toe off carbon fiber brace Therapeutic Indications: compensation for lost function Wearing Schedule: wear when out of bed       Transfers    Transfers stand pivot transfer     Maintains Weight-Bearing Status able to maintain        Sit to Stand Transfer    [place], Sit to Stand Transfer minimum assist (75% or more patient effort)     Safety/Cues technique     Assistive Device walker, front-wheeled     Comment min A for hip extension at L hip and assistance with locking L proshtetic knee in full stand        Stand to Sit Transfer    [place], Stand to Sit Transfer minimum assist (75% or more patient effort)     Safety/Cues sequencing;technique     Assistive Device walker, front-wheeled     Comment stand to sit, min A for pelvic stability while pt shifts weight  "over RLE, bring L prosthesis out in front and lifts up on toggle        Stand Pivot Transfer    [place], Stand Pivot/Stand Step Transfer minimum assist (75% or more patient effort)     Safety/Cues sequencing;technique     Assistive Device walker, front-wheeled     Comment min A for pelvic stability at hips, improved proshthetic control and placement with turns , decreasing assistance rqeuired        Gait Training    [place], Gait minimum assist (75% or more patient effort);1 person assist     Safety/Cues sequencing;technique     Assistive Device walker, front-wheeled     Distance in Feet 25 feet     Pattern step-through     Deviations/Abnormal Patterns base of support, narrow;weight shifting decreased;step length decreased;gait speed decreased     Left Sided Gait Deviations hip circumduction;heel strike decreased     Comment Ambulated 25' x 2 with RW, min A for hip extension assitance at LLE with pt demonstrating improved foot placement on L prosthesis; Ambulated 15' on carpet with RW and L prosthesis, assistance for hip extension at L prosthesis, and weightshifting assistance        Stairs Training    [place], Stairs moderate assist (50-74% patient effort);1 person assist     Safety/Cues sequencing;technique     Assistive Device railing     Handrail Location (Stairs) both sides     Number of Stairs 6     Stair Height 4 inches     Ascending Stairs Technique step-to-step   RLE    Descending Stairs Technique step-to-step   backwards with L prosthesis    Comment up forwards/down backwards 6 4\" stairs with BHR support, mod A at L prosthesis side, min A ascending to ensure foot placement of prosthesis, increased assistance descending for hip extension assistance to prevent posterior LOB and leaning posteriorly when descending        Balance    Balance Interventions sit to stand     Comment, Balance Repeated sit <> stands with pushing up BUE support from [facility] [place] x 5 reps, min A for L hip extension and support " while transitioning UE to RW and to ensure L prosthetic knee locks into extension        Prosthetic Orientation (Lower Extremity)    Fit Assessment fit/function of prosthesis are appropriate     Comment, Fit Assessment Pt doffed prosthesis sitting in WC, mod I level; wearing time - 5 hours; skin check  unremarkable; sock ply = 3 ply fit     Specific Gait Deviations toe stays off floor after heel strike        Daily Progress Summary (PT)    Daily Outcome Statement Session focused on functional mobiolity training with multiple gait trials, transfers from standard chair and elevation training. Continue with POC and progress independence, focusing on balance activities and pre-gait activities in // bars.                               [facility] PT Goals      Flowsheet Row Most Recent Value   Transfer Goal 1    Activity/Assistive Device stand pivot, walker, front-wheeled at 11/19/2024 1431   [place] moderate assist (50-74% patient effort) at 11/26/2024 0855   Time Frame short-term goal (STG), 5 - 7 days at 12/03/2024 0855   Progress/Outcome goal ongoing at 12/03/2024 0855   Transfer Goal 2    Activity/Assistive Device stand pivot, walker, front-wheeled at 11/19/2024 1431   [place] minimum assist (75% or more patient effort) at 11/26/2024 0855   Time Frame 14 days or less, long-term goal (LTG) at 11/26/2024 0855   Progress/Outcome goal ongoing at 12/03/2024 0855   Transfer Goal 3    Activity/Assistive Device sit-to-stand/stand-to-sit, walker, front-wheeled at 11/19/2024 1431   [place] minimum assist (75% or more patient effort) at 11/26/2024 0855   Time Frame short-term goal (STG), 5 - 7 days at 12/03/2024 0855   Progress/Outcome goal ongoing at 12/03/2024 0855   Transfer Goal 4    Activity/Assistive Device sit-to-stand/stand-to-sit, walker, front-wheeled at 11/19/2024 1431   [place] tactile cues required at 11/26/2024 0855   Time Frame long-term goal (LTG), 14 days or less at 11/26/2024 0855    Progress/Outcome goal ongoing at 12/03/2024 0855   Gait/Walking Locomotion Goal 1    Activity/Assistive Device gait (walking locomotion), walker, front-wheeled at 11/19/2024 1431   Distance 10 feet at 11/19/2024 1431   [place] moderate assist (50-74% patient effort) at 11/26/2024 0855   Time Frame short-term goal (STG), 5 - 7 days at 12/03/2024 0855   Strategies/Barriers 50 at 11/26/2024 0855   Progress/Outcome goal met at 12/03/2024 0855   Gait/Walking Locomotion Goal 2    Activity/Assistive Device gait (walking locomotion), assistive device use, walker, front-wheeled at 11/26/2024 0855   Distance 25 feet at 12/03/2024 0855   [place] minimum assist (75% or more patient effort) at 11/26/2024 0855   Time Frame long-term goal (LTG), 14 days or less at 11/26/2024 0855   Progress/Outcome goal ongoing, goal revised this date at 12/03/2024 0855   Wheelchair Locomotion Goal 1    Activity forward propulsion, backward propulsion, steering, stopping, turning, doorway navigation, weight shifting at 11/19/2024 1431   Assistive Device manual, ultra lightweight at 11/19/2024 1431   Distance 150 feet at 11/19/2024 1431   [place] other (see comments)  [Touching / Steadying Assist] at 11/19/2024 1431   Time Frame 1 week, short-term goal (STG) at 11/19/2024 1431   Progress/Outcome goal met at 11/26/2024 0855   Wheelchair Locomotion Goal 2    Activity forward propulsion, backward propulsion, steering, stopping, turning, doorway navigation, weight shifting at 11/19/2024 1431   Assistive Device manual, ultra lightweight at 11/19/2024 1431   Distance 150 feet at 11/19/2024 1431   [place] modified independence at 11/19/2024 1431   Time Frame 21 days or less, long-term goal (LTG) at 11/19/2024 1431   Progress/Outcome goal met at 12/03/2024 0855   Stairs Goal 1    Activity/Assistive Device ascending stairs, descending stairs, using handrail, left, using handrail, right, step-to-step at 11/29/2024 1100   Number of  "Stairs 4 at 11/29/2024 1100   [place] moderate assist (50-74% patient effort) at 11/29/2024 1100   Time Frame short-term goal (STG), 5 - 7 days at 11/29/2024 1100   Strategies/Barriers 6\" at 11/29/2024 1100   Progress/Outcome goal ongoing at 12/03/2024 0855   Stairs Goal 2    Activity/Assistive Device ascending stairs, descending stairs, using handrail, left, using handrail, right, step-to-step at 11/29/2024 1100   Number of Stairs 4 at 11/29/2024 1100   [place] minimum assist (75% or more patient effort) at 11/29/2024 1100   Time Frame long-term goal (LTG), 14 days or less at 11/29/2024 1100   Strategies/Barriers 6\" at 11/29/2024 1100   Progress/Outcome goal ongoing at 12/03/2024 0855          "

## 2024-12-04 NOTE — PROGRESS NOTES
Subjective    Patient seen and examined on rounds.  Chart reviewed.  Events overnight noted.  History reviewed briefly with patient.    CC:  Deficits in mobility, transfers, self-care status post left transfemoral amputation for prosthetic training, severe peripheral vascular disease, chronic myelocytic leukemia, osteomyelitis left foot, diabetic neuropathy, multiple medical problems    HPI:  Ms. 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 Pura Chaves. ID recommended IV Ceftriaxone and IV Ampicillin until 9/14/24.  She had urinary retention managed with indwelling Rowan catheter.  She had right upper extremity swelling present during this admission, ultrasound on 8/29/24 demonstrated an acute non-occlusive DVT of the right axillary and brachial vein around line (on Lovenox).  Subsequently she completed an acute inpatient rehabilitation stay for pre-prosthetic training at [facility] between 9/3/24 to 9/21/24 and was discharged to [facility] on 9/21/24.  She initially required bladder self intermittent catheterizations due to urinary retention, but indicates that now she is able to void.  She desired to be a prosthetic ambulator.  A left transfemoral amputation prosthesis was fabricated. and followed-up as outpatient in the amputee clinic at [facility] and she was deemed to be an  "appropriate candidate for acute inpatient rehabilitation stay. She has been needing assistance for mobility, transfers, self-care.  I have reviewed the preadmission screening and concur with that information and there are no significant changes from patient's preadmission screening. She is transferred to [facility] on 11/19/24 from [facility] for further acute inpatient rehabilitation stay for prosthetic training with transfemoral amputation prosthesis.    SUBJ: Discussed recommendations of PCC with patient.  She reports seasonal allergies are bothering her.  Ordered Flonase spray to nostrils twice daily.    ROS: Denies chest pain or shortness of breath. Other ROS negative. Past, family, social history is unchanged.    Current Functional Status:   Bed mobility:   [place], Supine to Sit: supervision   [place], Sit to Supine: touching/steadying assist   Transfers:    [place], Sit to Stand Transfer: minimum assist (75% or more patient effort)  [place], Stand to Sit Transfer: minimum assist (75% or more patient effort)   [place], Stand Pivot/Stand Step Transfer: minimum assist (75% or more patient effort)   Gait:   [place], Gait: minimum assist (75% or more patient effort)  Assistive Device: parallel bars, walker, front-wheeled   Distance in Feet: 20 feet    Bathing:   [place]: close supervision   Toileting:   [place]: dependent (less than 25% patient effort)   Upper body dressing:   [place]: minimum assist (75% or more patient effort)   Lower body dressing:   [place]: moderate assist (50-74% patient effort)     Functional Progress:    Functional status reviewed. Overall, patient's functional status is improving.      Physical Exam      Blood pressure (!) 144/65, pulse 94, temperature 36.8 °C (98.3 °F), temperature source Oral, resp. rate 18, height 1.702 m (5' 7.01\"), weight 70.3 kg (155 lb), SpO2 98%, not currently breastfeeding.    Body mass index is " 24.27 kg/m².    General Appearance: Not in acute distress  Head/Ear/Nose/Throat: Normocephalic; Atraumatic.   Eye: EOMI; PERRL.   Neck: No JVD; No Bruits.   Respiratory: Decreased breath sounds at bases.   Cardiovascular: RRR; Normal S1, S2.   Gastrointestinal: Soft; NT; +BS.   Extremities: Bilateral lower extremity edema noted.  Left transfemoral amputation stump is stable.  Musculoskeletal: Functional active range of motion in both upper and right lower extremities.  Functional active range of motion in left hip.  She has a left transfemoral amputation.  Neurological: AAO ×3. Speech is fluent. Cranial nerve examination does not reveal any gross facial asymmetry. Strength testing shows about 4+/5 strength in both upper and right lower extremities.  Left hip flexion is 4/5.  She has a left transfemoral amputation.  She is grossly able to localize touch sensation.  She is able to localize position sense in right foot great toe position sense is unable to be tested on left side.  Deep tendon reflexes are hypoactive bilaterally.  Right plantar is flexor, left plantar was unable to be tested. Coordination is functional upper extremities.    Behavior/Emotional: Appropriate; Cooperative.   Skin: Left transfemoral amputation stump stable.  Incision appears to be well-healed.  Some wounds are noted on right anterior lower forearm.  Small wound noted on right foot fifth toe.  Small wound also noted on right foot great toe with some slough in it and scaly skin around it.  Some redness noted on sacrum/coccyx.  Reviewed pictures of wounds in Epic.      Current Facility-Administered Medications:     acetaminophen (TYLENOL) tablet 650 mg, 650 mg, oral, q4h PRN, Charlette Brooke MD    apixaban (ELIQUIS) tablet 5 mg, 5 mg, oral, BID, Charlette Brooke MD, 5 mg at 12/04/24 0823    ascorbic acid (VITAMIN C) tablet 500 mg, 500 mg, oral, q48h INT, Charlette Brooke MD, 500 mg at 12/03/24 1244    atorvastatin (LIPITOR) tablet 40 mg, 40 mg,  oral, Daily (6p), Charlette Brooke MD, 40 mg at 12/04/24 1723    docusate sodium (COLACE) capsule 100 mg, 100 mg, oral, BID, Charlette Brooke MD, 100 mg at 12/02/24 1238    DULoxetine (CYMBALTA) capsule,delayed release(DR/EC) 60 mg, 60 mg, oral, Nightly, Charlette Brooke MD, 60 mg at 12/03/24 2109    empagliflozin (JARDIANCE) tablet 10 mg, 10 mg, oral, Daily, Charlette Brooke MD, 10 mg at 12/04/24 0823    ferrous sulfate tablet 325 mg, 325 mg, oral, q48h INT, Charlette Brooke MD, 325 mg at 12/03/24 1244    fluticasone propionate (FLONASE) 50 mcg/actuation nasal spray 1 spray, 1 spray, Each Nostril, BID, Alexei Petty MD    furosemide (LASIX) tablet 40 mg, 40 mg, oral, BID (am, 4p), Charlette Brooke MD, 40 mg at 12/04/24 1723    gabapentin (NEURONTIN) capsule 600 mg, 600 mg, oral, TID, Charlette Brooke MD, 600 mg at 12/04/24 1723    HYDROmorphone (DILAUDID) tablet 4 mg, 4 mg, oral, q6h PRN, Charlette Brooke MD, 4 mg at 12/03/24 2109    imatinib (GLEEVEC) chemo tablet 400 mg, 400 mg, oral, Daily, Charlette Brooke MD, 400 mg at 12/04/24 0826    insulin glargine U-100 (LANTUS/BASAGLAR) pen 15 Units, 15 Units, subcutaneous, Nightly, Charlette Brooke MD, 15 Units at 12/03/24 2114    insulin lispro U-100 (HumaLOG) pen 1-12 Units, 1-12 Units, subcutaneous, TID with meals, Charlette Brooke MD, 4 Units at 12/04/24 1722    insulin lispro U-100 (HumaLOG) pen 5 Units, 5 Units, subcutaneous, TID with meals, Charlette Brooke MD, 5 Units at 12/04/24 1722    levothyroxine (SYNTHROID) tablet 25 mcg, 25 mcg, oral, Daily (6:30a), Charlette Brooke MD, 25 mcg at 12/04/24 0536    losartan (COZAAR) tablet 25 mg, 25 mg, oral, Daily with dinner, Charlette Brooke MD, 25 mg at 12/04/24 1723    melatonin ODT 3 mg, 3 mg, oral, Nightly, Charlette Brooke MD, 3 mg at 12/03/24 2109    methenamine (HIPREX) tablet 1 g, 1 g, oral, BID, Charlette Brooke MD, 1 g at 12/04/24 0823    multivitamin tablet 1 tablet, 1 tablet, oral, Daily, Charlette Brooke MD, 1 tablet at 12/04/24 1212     potassium chloride (KLOR-CON M) ER tablet (particles/crystals) 20 mEq, 20 mEq, oral, Daily, Charlette Brooke MD, 20 mEq at 12/04/24 1212    semaglutide tablet 14 mg, 1 tablet, oral, Daily, Charlette Brooke MD, 14 mg at 12/04/24 0824    tamsulosin (FLOMAX) 24 hr ER capsule 0.4 mg, 0.4 mg, oral, Daily with dinner, Charlette Brooke MD, 0.4 mg at 12/04/24 1723       Labs / Radiology    Lab Results   Component Value Date    WBC 4.98 11/20/2024    HGB 10.8 (L) 11/20/2024    HCT 32.7 (L) 11/20/2024    MCV 90.8 11/20/2024     11/20/2024     Lab Results   Component Value Date    GLUCOSE 205 (H) 11/20/2024    CALCIUM 8.7 11/20/2024     11/20/2024    K 3.7 11/20/2024    CO2 30 11/20/2024     11/20/2024    BUN 35 (H) 11/20/2024    CREATININE 0.9 11/20/2024       Assessment and Plan    ASSESSMENT PLAN:  1. 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 on 8/1/24 with a 40 pound weight gain (managed with 40mg Lasix), underwent left guillotine transtibial amputation on 8/3/24 followed by left transfemoral amputation on 8/19/24 with vascular surgeon Dr. Pura Chaves. ID recommended IV Ceftriaxone and IV Ampicillin until 9/14/24.  She had urinary retention managed with indwelling Rowan catheter.  She had right upper extremity swelling present during this admission, ultrasound on 8/29/24 demonstrated an acute non-occlusive DVT of the right axillary and brachial vein around line (on Lovenox).  Subsequently she completed an acute inpatient rehabilitation stay for pre-prosthetic training at [facility] between 9/3/24 to  9/21/24 and was discharged to [facility] on 9/21/24.  She initially required bladder self intermittent catheterizations due to urinary retention, but indicates that now she is able to void.  She desired to be a prosthetic ambulator.  A left transfemoral amputation prosthesis was fabricated. and followed-up as outpatient in the amputee clinic at [facility] and she was deemed to be an appropriate candidate for acute inpatient rehabilitation stay. She has been needing assistance for mobility, transfers, self-care.  I have reviewed the preadmission screening and concur with that information and there are no significant changes from patient's preadmission screening. She is transferred to [facility] on 11/19/24 from [facility] for further acute inpatient rehabilitation stay for prosthetic training with transfemoral amputation prosthesis.     2. DVT prophylaxis/treatment - on Eliquis.  Platelets 168 on 11/20/2024.     3. Vascular - status post left transfemoral amputation for prosthetic training, severe peripheral vascular disease, chronic myelocytic leukemia, osteomyelitis left foot, diabetic neuropathy, multiple medical problems - continue PT, OT, psychology.  Follow falls precautions, cardiac precautions, monitor pulse oximeter in therapy.  Monitor skin under prosthesis.  Teach donning and doffing of left transfemoral amputation prosthesis.  Do gait training with prosthesis.     4. GI - On Colace, Senokot.  On PRN Maalox.  On PRN Dulcolax suppository.  On PRN Zofran.     5.  -on Hiprex.  On Flomax.  Monitor postvoid residual.     6. CVS - on Lasix.  Monitor for orthostasis.     7. Pulmonary -encourage incentive spirometry.     8. Hematology - monitor hemoglobin, platelets.  Hemoglobin 10.8, WBC 4.98 on 11/20/2024.     9. Pain -on Cymbalta.  On Neurontin.  On PRN Tylenol.  On PRN Dilaudid.     10. Skin - Left transfemoral amputation stump stable.  Incision appears to be well-healed.  Some wounds  are noted on right anterior lower forearm.  Small wound noted on right foot fifth toe.  Small wound also noted on right foot great toe with some slough in it and scaly skin around it.  Some redness noted on sacrum/coccyx.  Reviewed pictures of wounds in Epic. Monitor skin under the left transfemoral amputation prosthesis.Noted input from dermal defense nurse on 11/21/2024.  Reviewed pictures in Epic.      11. F/E/N - on Ferrous sulfate.  On vitamin C. on MVI.  On K-Genesis con.     12. Hyperlipidemia -on Lipitor.     13. Diabetes mellitus type 2 - on Lantus insulin.  On sliding scale Humalog coverage.  On Jardiance.     14. Oncology - On Gleevec for chronic myelocytic leukemia.     15. Hypothyroidism - on Synthroid.     16. Insomnia - on Melatonin.     17. Infectious diseases - On oral Vancomycin.       18. Seasonal allergies - on PRN Flonase.     19. Psychiatry - mood stable.  Psychology consulted.     20. Rehabilitation medicine - Continue comprehensive rehabilitation care. Continue PT, OT, speech, psychology.  We will follow falls precautions, cardiac precautions, monitor pulse oximetry in therapy and follow weightbearing precautions.  We will follow spinal precautions as appropriate.Tolerating therapies per endurance on 11/20/2024.  Slept well last night.   Working on donning and doffing of prosthesis.  Discussed with the importance of skin checks on the prosthesis.  Able to ambulate 7 feet in parallel bars dependent gait using left transfemoral amputation prosthesis with physical therapy.  Dependent for sit to stand transfers with physical therapy on 11/20/2024. Noted input from dermal defense nurse on 11/21/2024.  Reviewed pictures in Epic.  Working on ADLs with occupational therapy on 11/21/2024.  Requiring minimal assistance for bathing, dependent for toileting, close supervision for poorly dressing and dependent for lower body dressing with occupational therapy.  She requested a flu shot.  Discussed with nurse.   Continent of bowel and bladder for nursing on 11/21/2024.Dependent for sit to stand transfers with physical therapy on 11/22/2024.  Able to ambulate 15 feet with thoracic walker dependent gait with physical therapy on 11/22/2024.  Skin under prosthesis is stable.  Denies pain today.  She says she got flu shot yesterday.Feels better on 11/23/2024.  Denies increased pain.  She says that she took her medications after meals and did not have any GI symptoms today.  Continent of bowel and bladder for nursing.  Working on donning and doffing of prosthesis, sock ply management with occupational therapy on 11/23/2024. Due to elevated blood pressure, internist restarted Amlodipine and Losartan on 11/25/2024.  Denies increased pain.  Continent of bowel and bladder for nursing on 11/25/2024.Skin on left transfemoral amputation residual limb remains stable.  Discussed with patient.  Discussed with nurse on 11/25/2024.Discussed patients progress in therapies with therapists in team meeting on 11/26/2024.  Discussed in PCC. See PCC documentation.  Face-to-face evaluation was done for a right carbon fiber toe off brace on 11/26/2024.  Discussed with patient.  Discussed with physical therapist on 11/26/2024.Discussed recommendations of PCC with patient on 11/27/2024.  She was measured for a right carbon fiber Toe off AFO brace yesterday.  Discussed with nursing.  Skin on residual limb remains stable.  Continent of bladder for nursing on 11/27/2024. Able to ambulate 10 feet with moderate assistance with balance but using left transfemoral amputation prosthesis on 11/28/2024.  Requiring moderate assistance for sit to stand transfer with physical therapy using left transfemoral amputation prosthesis on 11/28/2024.Denies phantom limb pain sensation on 11/29/2024.  Discussed with patient and with her family with her at bedside on 11/29/2024.  Noted podiatry input.  She feels she is improving functionally.  Discussed with her that with  practice she will continue to improve with prosthetic use.She feels she is making progress in therapy on 12/2/2024.  Per case management, her CMG ends on 12/3/2024 and she was extended for a week for discharge on 12/10/2024 to allow for more time in therapy here.  Now she is asking if she could get 1 more week beyond that time.  Discussed with case management regarding patient's request.  Working on ADLs with occupational therapy.  Requiring minimal assistance for bathing, dependent for toileting, minimal assistance for upper body dressing, moderate assistance for lower body dressing.  Discussed with patient and physical therapist working with her in the parallel bar earlier today.  Again reviewed with her importance of monitoring skin under prosthesis with prosthetic use..Discussed patients progress in therapies with therapists in team meeting on 12/3/2024. Discussed in PCC. See PCC documentation.  Continent of bowel and bladder for nursing.  Discussed with nurse.  Skin under prosthesis stable on 12/3/2024.Discussed recommendations of PCC with patient on 12/4/2024.  She reports seasonal allergies are bothering her.  Ordered Flonase spray to nostrils twice daily on 12/4/2024.    21. Reviewed labs today.  BUN 35, creatinine 0.9, sodium 139, potassium 3.7 on 11/20/2024.      Alexei Petty MD  12/4/2024      This encounter was completed utilizing the Direct Speech Voice Recognition Software. Grammatical errors, random word insertions, pronoun errors, and incomplete sentences are occasional consequences of the system due to software limitations, ambient noises, and hardware issues. Such errors may be missed prior to affixing electronic signature. Any questions or concerns about the content, text, or information contained within the body of this dictation should be directly addressed to the physician for clarification. If you have any questions or concerns please do not hesitate to call me directly via EPIC chat,  page, or email.

## 2024-12-04 NOTE — PROGRESS NOTES
Patient: Bebe Burks  Location: [facility] [address]  MRN: [id number]  Today's date: 12/4/2024    History of Present Illness  Bebe is a 64 y.o. female admitted on 11/19/2024 with History of above knee amputation, left (CMS/[facility]ly [facility]) [Z89.612]  Encounter for prosthetic gait training [Z47.89]. Principal problem is History of above knee amputation, left (CMS/[facility]ly [facility]).    Pt is a 64 y.o. 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       Past Medical History  Bebe has a past medical history of Abnormal ECG, Abnormal finding on chest xray (12/2021), Arthritis, Colon cancer (CMS/[facility]), COVID-19 (12/2021), COVID-19 vaccine series completed, Diabetic neuropathy (CMS/[facility]), DM (diabetes mellitus), type 2 with ophthalmic complications (CMS/[facility]), Hypertension, Hypothyroidism, Left foot drop, Lipid disorder, and Type 2 diabetes mellitus treated with insulin (CMS/[facility]).    OT Pain      Date/Time Pain Type Rating: Rest Who   12/04/24 0830 Pain Assessment 0 - no pain VMS   12/04/24 0930 Post Activity 0 - no pain VMS               Prior Living Environment      Flowsheet Row Most Recent Value   People in Home alone   Current Living Arrangements condominium   Home Accessibility stairs to enter home (Group)   Living Environment Comment resides in 1st floor condo, 4 (1+2+1) [place]SE [place]FAEL, R hand rail, no stairs inside home, Bathroom Layout: Tub/shower unit with shower curtain, Shower stall with glass doors (shower stall in main bedroom), two sisters can provide some assistance   Number of Stairs, Main Entrance 4   Surface of Stairs, Main Entrance concrete   Stair Railings, Main Entrance railing on right side (ascending)   Stairs Comment, Main Entrance 1+2+1 JOSE RAFAEL   Location, Bathroom first (main) floor   Bathroom Access tub bath, walk-in shower            Prior Level of Function      Flowsheet Row Most Recent Value   Dominant Hand right   Ambulation  assistive equipment   Transferring assistive equipment   Toileting independent   Bathing assistive equipment   Dressing independent   Eating independent   IADLs assistive equipment   Driving/Transportation    Communication understands/communicates without difficulty   Assistive Device Currently Used at Home cane, straight, walker, front-wheeled, shower chair, grab bar, raised toilet            Occupational Profile      Flowsheet Row Most Recent Value   Successful Occupations Pt is very active in her community   Occupational History/Life Experiences 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, attends [employer] weekly   Performance Patterns lives alone   Environmental Supports and Barriers Supportive community    + handicap parking placard   Patient Goals PSFS: Toilet transfer with prosthetic 3/10, toilet transfer without prosthetic 8/10, walking 0/10, getting dressed 5/10, getting in/ out of bed 4/10, getting a prosthetic on/off 1/10             IRF OT Evaluation and Treatment - 12/04/24 0848          OT Time Calculation    Start Time 0830     Stop Time 0930     Time Calculation (min) 60 min        Session Details    Document Type Daily Treatment/Progress Note        General Information    General Observations of Patient pt requesting full wet shower        Mobility Belt    Mobility Belt Used During Session yes        Orthotics    Orthosis Trial R toe off brace        Orthosis Ankle Right AFO    Orthosis Properties Date Obtained: 12/03/24 Location: Ankle Laterality: Right Type: AFO Features: Carbon fiber Description: R toe off carbon fiber brace Therapeutic Indications: compensation for lost function Wearing Schedule: wear when out of bed       Bed Mobility    Comment supine to sit with HOB raised after donning prosthetic with min A        Sit to Stand Transfer    [place], Sit to Stand Transfer minimum assist (75% or more patient effort)     Safety/Cues technique;increased time to complete      Assistive Device walker, front-wheeled        Stand to Sit Transfer    [place], Stand to Sit Transfer minimum assist (75% or more patient effort)     Safety/Cues technique;increased time to complete     Assistive Device walker, front-wheeled        Low Pivot Transfer    [place], Low Pivot Transfer supervision     Safety/Cues increased time to complete     Assistive Device walker, front-wheeled     Comment incremental LPT        Stand Pivot Transfer    [place], Stand Pivot/Stand Step Transfer minimum assist (75% or more patient effort)     Safety/Cues technique;increased time to complete;sequencing     Assistive Device walker, front-wheeled     Comment SPT with RW, cues to decreased posterior lean        Toilet Transfer    Comment LPT with WC with S to platform drop arm commode        Shower Transfer    [place] supervision     Safety/Cues increased time to complete     Assistive Device grab bars/tub rail;tub bench;walker, front-wheeled     Comment Incremental LPT        Bathing    Shower Provided? Yes     [place] close supervision     Comment Full wet shower sitting on tub bench, set up, with CS with side shift to wash buttock        Upper Body Dressing    Comment Pt had clothing set up prior to shower, able to don pull over shirt        Lower Body Dressing    Comment prior to donning prosthetic in supine donning pull up side shifting to hike. min A to don prosthetic. Pt able to use sock aid to don sock. Pt able to don R slip in shoe, to don Toe off assist to don shoe and to tie        Grooming    [place], Oral Hygiene modified independence     Comment sitting for safety        Toileting    Comment without prosthetic donned CS on platform commode        Prosthetic Orientation (Lower Extremity)    Comment, Fit Assessment pt wearing  begining of session, pre skin check unremarkable, supine in bed to don liner, 3 ply, assit to lift prosthetic while pt in supine, pt able to engage  pin and turn knob        Daily Progress Summary (OT)    Daily Outcome Statement Pt with good participation during therapy session. performing full wet shower, discussion of performing some activitites at WC level for safety at d/c and others with donning prosthetic. Plan for home eval tomorrow. pt improving with prosthetic. Pt would continue to benefit from skilled OT services to increase indep, decrease caregiver burden and increase quality of life. Continue with POC                               IRF OT Goals      Flowsheet Row Most Recent Value   Transfer Goal 1    Activity/Assistive Device toilet at 11/20/2024 0906   [place] moderate assist (50-74% patient effort) at 12/02/2024 1011   Time Frame short-term goal (STG), 5 - 7 days at 12/02/2024 1011   Progress/Outcome goal revised this date at 12/02/2024 1011   Transfer Goal 2    Activity/Assistive Device toilet at 11/20/2024 0906   [place] minimum assist (75% or more patient effort) at 11/20/2024 0906   Time Frame long-term goal (LTG), 21 days or less at 11/20/2024 0906   Progress/Outcome goal ongoing at 12/02/2024 1011   Transfer Goal 3    Activity/Assistive Device shower at 11/20/2024 0906   [place] moderate assist (50-74% patient effort) at 12/02/2024 1011   Time Frame short-term goal (STG), 5 - 7 days at 12/02/2024 1011   Progress/Outcome goal revised this date at 12/02/2024 1011   Transfer Goal 4    Activity/Assistive Device shower at 11/20/2024 0906   [place] minimum assist (75% or more patient effort) at 11/20/2024 0906   Time Frame long-term goal (LTG), 21 days or less at 11/20/2024 0906   Progress/Outcome goal ongoing at 12/02/2024 1011   Bathing Goal 1    [place] tactile cues required at 11/20/2024 0906   Time Frame short-term goal (STG), 5 - 7 days at 12/02/2024 1011   Progress/Outcome goal ongoing at 12/02/2024 1011   Bathing Goal 2    [place] modified independence at 11/20/2024 0906   Time Frame long-term goal (LTG), 21  days or less at 11/20/2024 0906   Progress/Outcome goal ongoing at 12/02/2024 1011   UB Dressing Goal 1    [place] minimum assist (75% or more patient effort) at 11/26/2024 1000   Time Frame short-term goal (STG), 5 - 7 days at 12/02/2024 1011   Strategies/Barriers with clothing retrieval at 11/20/2024 0906   Progress/Outcome goal ongoing at 12/02/2024 1011   UB Dressing Goal 2    [place] minimum assist (75% or more patient effort) at 11/20/2024 0906   Time Frame long-term goal (LTG), 21 days or less at 11/20/2024 0906   Strategies/Barriers with clothing retrieval at 11/20/2024 0906   Progress/Outcome goal ongoing at 12/02/2024 1011   LB Dressing Goal 1    [place] minimum assist (75% or more patient effort) at 11/26/2024 1000   Time Frame short-term goal (STG), 5 - 7 days at 12/02/2024 1011   Strategies/Barriers with clothing retrieval at 11/20/2024 0906   Progress/Outcome goal ongoing at 12/02/2024 1011   LB Dressing Goal 2    [place] minimum assist (75% or more patient effort) at 11/20/2024 0906   Time Frame long-term goal (LTG), 21 days or less at 11/20/2024 0906   Strategies/Barriers with clothing retrieval at 11/20/2024 0906   Progress/Outcome goal ongoing at 12/02/2024 1011   Grooming Goal 1    [place] moderate assist (50-74% patient effort) at 11/20/2024 0906   Time Frame short-term goal (STG), 5 - 7 days at 12/02/2024 1011   Strategies/Barriers in stance at sink at 11/20/2024 0906   Progress/Outcome goal ongoing at 12/02/2024 1011   Grooming Goal 2    [place] supervision required at 11/20/2024 0906   Time Frame long-term goal (LTG), 21 days or less at 11/20/2024 0906   Strategies/Barriers in stance at sink at 11/20/2024 0906   Progress/Outcome goal ongoing at 12/02/2024 1011   Toileting Goal 1    [place] moderate assist (50-74% patient effort) at 12/02/2024 1011   Time Frame short-term goal (STG), 5 - 7 days at 12/02/2024 1011   Progress/Outcome goal ongoing at 12/02/2024  1011   Toileting Goal 2    [place] minimum assist (75% or more patient effort) at 11/20/2024 0906   Time Frame long-term goal (LTG), 21 days or less at 11/20/2024 0906   Progress/Outcome goal ongoing at 12/02/2024 1011

## 2024-12-04 NOTE — PLAN OF CARE
Plan of Care Review  Plan of Care Reviewed With: patient  Progress: improving  Outcome Evaluation: AAOx4, makes needs known. Skin intact to L residual limb. Dressing changed to R foot, pictures taken. Foam applied to pink sacrum. Appetite fair. TID accuchecks monited. Continent bowel and bladder. Denies pain. Participated in therapies.

## 2024-12-04 NOTE — PROGRESS NOTES
Patient: Bebe Burks  Location: [facility]  MRN: [id number]  Today's date: 12/4/2024    History of Present Illness  Bebe is a 64 y.o. female admitted on 11/19/2024 with History of above knee amputation, left (CMS/[facility]) [Z89.612]  Encounter for prosthetic gait training [Z47.89]. Principal problem is History of above knee amputation, left (CMS/[facility]).    Pt is a 64 y.o. 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       Past Medical History  eBbe has a past medical history of Abnormal ECG, Abnormal finding on chest xray (12/2021), Arthritis, Colon cancer (CMS/[facility]), COVID-19 (12/2021), COVID-19 vaccine series completed, Diabetic neuropathy (CMS/[facility]), DM (diabetes mellitus), type 2 with ophthalmic complications (CMS/[facility]), Hypertension, Hypothyroidism, Left foot drop, Lipid disorder, and Type 2 diabetes mellitus treated with insulin (CMS/[facility]).    PT Vitals      Date/Time Pulse HR Source BP BP Location BP Method Pt Position [facility]   12/04/24 1302 92 Monitor 169/74 Left upper arm Automatic Sitting SV          PT Pain      Date/Time Pain Type Rating: Rest Rating: Activity [facility]   12/04/24 1302 Pain Assessment 0 -- SV   12/04/24 1359 Post Activity;Pain Reassessment 0 0 SV               Prior Living Environment      Flowsheet Row Most Recent Value   People in Home alone   Current Living Arrangements condominium   Home Accessibility stairs to enter home (Group)   Living Environment Comment resides in 1st floor condo, 4 (1+2+1) JOSE RAFAEL, R hand rail, no stairs inside home, Bathroom Layout: Tub/shower unit with shower curtain, Shower stall with glass doors (shower stall in main bedroom), two sisters can provide some assistance   Number of Stairs, Main Entrance 4   Surface of Stairs, Main Entrance concrete   Stair Railings, Main Entrance railing on right side (ascending)   Stairs Comment, Main Entrance 1+2+1 JOSE RAFAEL   Location,  Bathroom first (main) floor   Bathroom Access tub bath, walk-in shower            Prior Level of Function      Flowsheet Row Most Recent Value   Dominant Hand right   Ambulation assistive equipment   Transferring assistive equipment   Toileting independent   Bathing assistive equipment   Dressing independent   Eating independent   IADLs assistive equipment   Driving/Transportation    Communication understands/communicates without difficulty   Assistive Device Currently Used at Home cane, straight, walker, front-wheeled, shower chair, grab bar, raised toilet             IRF PT Evaluation and Treatment - 12/04/24 1300          PT Time Calculation    Start Time 1300     Stop Time 1400     Time Calculation (min) 60 min        Session Details    Document Type Daily Treatment/Progress Note        General Information    Patient/Family/Caregiver Comments/Observations Pt's friend present for portion of session        Mobility Belt    Mobility Belt Used During Session yes        Orthotics    Orthosis Trial R toe off brace donned t/o session        Orthosis Ankle Right AFO    Orthosis Properties Date Obtained: 12/03/24 Location: Ankle Laterality: Right Type: AFO Features: Carbon fiber Description: R toe off carbon fiber brace Therapeutic Indications: compensation for lost function Wearing Schedule: wear when out of bed       Sit to Stand Transfer    [place], Sit to Stand Transfer minimum assist (75% or more patient effort)     Safety/Cues technique     Assistive Device parallel bars;walker, front-wheeled     Comment min A for improved hip ext, mod A to RW for ant wt shift        Stand to Sit Transfer    [place], Stand to Sit Transfer minimum assist (75% or more patient effort)     Safety/Cues technique     Assistive Device parallel bars;walker, front-wheeled     Comment min A for eccentric control, pt able to independent control toggle to unlock prosthetic knee        Stand Pivot Transfer     "[place], Stand Pivot/Stand Step Transfer minimum assist (75% or more patient effort)     Safety/Cues technique     Assistive Device walker, front-wheeled     Comment via amb approach with RW min A for improved L wt shift and balance        Gait Training    [place], Gait minimum assist (75% or more patient effort)     Safety/Cues technique     Assistive Device parallel bars;walker, front-wheeled     Distance in Feet 20 feet     Pattern step-through     Deviations/Abnormal Patterns base of support, narrow;delta decreased;gait speed decreased;step length decreased;stride length decreased;weight shifting decreased     Comment Within parallel bars min A for improved L wt shift and balance, vcs for inc R step length. 30' with RW min-mod A for lateral wt shift and balance, vcs for inc R step length        Neuromuscular Re-education    Comment Within parallel bars: 1) lateral wt shifts x20 2) ant/post stepping iwth RLE only 4\" asuncion to stepping stone x10 2) RLE cone taps to 5\" cone 2x10        Prosthetic Orientation (Lower Extremity)    Fit Assessment fit/function of prosthesis are appropriate     Comment, Fit Assessment Received with prosthesis and 3 sock ply donned, removed at end of session skin check = unremarkable. Completed 5 hours of consecutive wear time.        Daily Progress Summary (PT)    Daily Outcome Statement Session focused on improving L weight shift in stance and concluded session with gait trial at . Pt with dec R step length required vcs to correct however only able to maintain for 2-3 steps likely d/t fatigue. No skin issues with 5 hours of wear time.                               IRF PT Goals      Flowsheet Row Most Recent Value   Transfer Goal 1    Activity/Assistive Device stand pivot, walker, front-wheeled at 11/19/2024 1431   [place] moderate assist (50-74% patient effort) at 11/26/2024 0855   Time Frame short-term goal (STG), 5 - 7 days at 12/03/2024 0855   Progress/Outcome " goal ongoing at 12/03/2024 0855   Transfer Goal 2    Activity/Assistive Device stand pivot, walker, front-wheeled at 11/19/2024 1431   [place] minimum assist (75% or more patient effort) at 11/26/2024 0855   Time Frame 14 days or less, long-term goal (LTG) at 11/26/2024 0855   Progress/Outcome goal ongoing at 12/03/2024 0855   Transfer Goal 3    Activity/Assistive Device sit-to-stand/stand-to-sit, walker, front-wheeled at 11/19/2024 1431   [place] minimum assist (75% or more patient effort) at 11/26/2024 0855   Time Frame short-term goal (STG), 5 - 7 days at 12/03/2024 0855   Progress/Outcome goal ongoing at 12/03/2024 0855   Transfer Goal 4    Activity/Assistive Device sit-to-stand/stand-to-sit, walker, front-wheeled at 11/19/2024 1431   [place] tactile cues required at 11/26/2024 0855   Time Frame long-term goal (LTG), 14 days or less at 11/26/2024 0855   Progress/Outcome goal ongoing at 12/03/2024 0855   Gait/Walking Locomotion Goal 1    Activity/Assistive Device gait (walking locomotion), walker, front-wheeled at 11/19/2024 1431   Distance 10 feet at 11/19/2024 1431   [place] moderate assist (50-74% patient effort) at 11/26/2024 0855   Time Frame short-term goal (STG), 5 - 7 days at 12/03/2024 0855   Strategies/Barriers 50 at 11/26/2024 0855   Progress/Outcome goal met at 12/03/2024 0855   Gait/Walking Locomotion Goal 2    Activity/Assistive Device gait (walking locomotion), assistive device use, walker, front-wheeled at 11/26/2024 0855   Distance 25 feet at 12/03/2024 0855   [place] minimum assist (75% or more patient effort) at 11/26/2024 0855   Time Frame long-term goal (LTG), 14 days or less at 11/26/2024 0855   Progress/Outcome goal ongoing, goal revised this date at 12/03/2024 0855   Wheelchair Locomotion Goal 1    Activity forward propulsion, backward propulsion, steering, stopping, turning, doorway navigation, weight shifting at 11/19/2024 1431   Assistive Device manual, ultra  "lightweight at 11/19/2024 1431   Distance 150 feet at 11/19/2024 1431   [place] other (see comments)  [Touching / Steadying Assist] at 11/19/2024 1431   Time Frame 1 week, short-term goal (STG) at 11/19/2024 1431   Progress/Outcome goal met at 11/26/2024 0855   Wheelchair Locomotion Goal 2    Activity forward propulsion, backward propulsion, steering, stopping, turning, doorway navigation, weight shifting at 11/19/2024 1431   Assistive Device manual, ultra lightweight at 11/19/2024 1431   Distance 150 feet at 11/19/2024 1431   [place] modified independence at 11/19/2024 1431   Time Frame 21 days or less, long-term goal (LTG) at 11/19/2024 1431   Progress/Outcome goal met at 12/03/2024 0855   Stairs Goal 1    Activity/Assistive Device ascending stairs, descending stairs, using handrail, left, using handrail, right, step-to-step at 11/29/2024 1100   Number of Stairs 4 at 11/29/2024 1100   [place] moderate assist (50-74% patient effort) at 11/29/2024 1100   Time Frame short-term goal (STG), 5 - 7 days at 11/29/2024 1100   Strategies/Barriers 6\" at 11/29/2024 1100   Progress/Outcome goal ongoing at 12/03/2024 0855   Stairs Goal 2    Activity/Assistive Device ascending stairs, descending stairs, using handrail, left, using handrail, right, step-to-step at 11/29/2024 1100   Number of Stairs 4 at 11/29/2024 1100   [place] minimum assist (75% or more patient effort) at 11/29/2024 1100   Time Frame long-term goal (LTG), 14 days or less at 11/29/2024 1100   Strategies/Barriers 6\" at 11/29/2024 1100   Progress/Outcome goal ongoing at 12/03/2024 0855          "

## 2024-12-04 NOTE — PLAN OF CARE
Plan of Care Review  Plan of Care Reviewed With: patient  Progress: improving  Outcome Evaluation: Alert and oriented x4. Continent of bowel and bladder. PRN Dilaudid given at HS for residual limb pain, effective.  worn to L AKA site. Accuchecks TID, no s/s of hyper/hypo-glycemia noted. Sleeping quietly in bed overnight. Fall and safety measures maintained.

## 2024-12-04 NOTE — PROGRESS NOTES
[facility] Progress Note          Patient was seen and examined at bedside.    Subjective:   Reviewed night shift RN report. No new nursing issues.   No O/N events. No new complaints. Her paresthetic training is progressing well. Her phantom feeling has improved.     Objective   Vital signs in last 24 hours:  Temp:  [36.6 °C (97.9 °F)-36.8 °C (98.2 °F)] 36.8 °C (98.2 °F)  Heart Rate:  [] 92  Resp:  [18-19] 18  BP: (110-129)/(56-77) 110/56    No intake or output data in the 24 hours ending 12/04/24 1104    Intake/Output this shift:  No intake/output data recorded.   Review of Systems:  All other systems reviewed and negative except as noted in the HPI.   Objective      Labs  Reviewed her labs thoroughly   Lab Results   Component Value Date    WBC 4.98 11/20/2024    HGB 10.8 (L) 11/20/2024    HCT 32.7 (L) 11/20/2024    MCV 90.8 11/20/2024     11/20/2024     Lab Results   Component Value Date    GLUCOSE 205 (H) 11/20/2024    CALCIUM 8.7 11/20/2024     11/20/2024    K 3.7 11/20/2024    CO2 30 11/20/2024     11/20/2024    BUN 35 (H) 11/20/2024    CREATININE 0.9 11/20/2024       Imaging  N/A    Full Code    Physical Exam:  Head/Ear/Nose/Throat: normocephalic; atraumatic; moisture mouth mm, no oropharyngeal thrush noted.   Eyes: anicteric sclera, EOMI; PERRL.   Neck : supple, no JVD, no carotid bruits appeciated.   Respiratory: no evidence of labored breathing, lung sounds CTA b/l, good aeration bibasilar area, no w/r/c.   Cardiovascular: RRR; normal S1, S2; no m/r/g; no S3 or S4.   Gastrointestinal: soft; NT; BS normal; mildly distended; no CVAT b/l.   Genitourinary: no lozada.   Extremities : S/p L AKA, residual limb healed well .   Neurological: AO x 3, fluent speeches, following commands, CNS II-XII grossly intact; no focal neurologic deficits.   Behavior/Emotional: in NAD, appropriate; cooperative.   Skin: clean, dry and intact.  Plan of care was discussed with  patient, RN, and PMR attending     Assessment     CC:L AKA, ambulatory dysfunction     64 y.o. 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 S/p IV Ceftriaxone and IV Ampicillin until 9/14/24. H/o non-occlusive DVT of the R axillary and brachial vein around midline. She presented to [facility] clinic on 11/14 for evaluation to begin prosthetic training. Now she was admitted for inpt prosthetic training on 11/19/24.  Denies nausea, vomiting, abdominal pain or discomfort, dysuria, cough/sputum, running nose, sore throat, chest pain, palpitation, SOB or orthopnea, dizziness or LH,  HA.    A/P:  # A/p L AKA  -inpt prosthetic training, wound care/dermal defense, pain and neuralgia managements, fall precaution     # CML  -cont home imatinib, check CBC      # Type 2 DM, hypothyroidism  - diet control, SSI, plus her home diabetic regimen;  - cont home levothyroxine     #  PICC-associated DVT on 8/30   -S/p SQ Lovenox weight based ;  -cont Eliquis 5 mg po bid      # Urinary retention, h/o recurrent UTI  -timed voiding with cic, adequate oral hydration, monitor PVR with cic, on flomax;  -hiprex 1 g po bid        # Stage 2 decubitus ulcer, pressure injury in right heel   -wound care     # Recent C.diff.  -cont prophylactic oral vanco 125 mg po bid       Billing code: [id number]  Diagnoses:  Patient Active Problem List   Diagnosis    Controlled type 2 diabetes mellitus, with long-term current use of insulin (CMS/[facility])    Essential hypertension    Hyperlipidemia    Hypothyroidism    Obesity    Thyroid nodule    COVID-19 virus infection    Lung nodule    Displacement of lumbar intervertebral disc without myelopathy    Disability of walking    Polyneuropathy due to type 2 diabetes mellitus  (CMS/HCC)    Pre-op examination    Localized osteoarthritis of right knee    Type 2 diabetes mellitus treated with insulin (CMS/HCC)    Abnormal finding on EKG    Left foot drop    Abnormal finding on chest xray    Osteoarthritis of right knee, unspecified osteoarthritis type    S/P total knee arthroplasty, right    Sepsis (CMS/HCC)    Osteomyelitis of left foot (CMS/HCC)    Peripheral neuropathy    Abdominal pain    Chronic idiopathic constipation    Gangrene (CMS/HCC)    Tibial artery stenosis, left (CMS/HCC)    H/O pilonidal cyst    Type 2 diabetes mellitus with left diabetic foot infection (CMS/HCC)    Type 2 diabetes mellitus with diabetic neuropathy (CMS/HCC)    Hypothyroidism    Primary hypertension    Leukemoid reaction    HLD (hyperlipidemia)    Acute osteomyelitis of left foot (CMS/HCC)    History of above knee amputation, left (CMS/HCC)    Encounter for prosthetic gait training    Adjustment disorder with anxiety           Charlette Brooke MD  12/4/2024

## 2024-12-05 ENCOUNTER — APPOINTMENT (INPATIENT)
Dept: OCCUPATIONAL THERAPY | Facility: REHABILITATION | Age: 64
DRG: 560 | End: 2024-12-05
Payer: MEDICARE

## 2024-12-05 ENCOUNTER — APPOINTMENT (INPATIENT)
Dept: PHYSICAL THERAPY | Facility: REHABILITATION | Age: 64
DRG: 560 | End: 2024-12-05
Payer: MEDICARE

## 2024-12-05 ENCOUNTER — APPOINTMENT (OUTPATIENT)
Dept: PSYCHOLOGY | Facility: CLINIC | Age: 64
End: 2024-12-05
Payer: MEDICARE

## 2024-12-05 LAB
GLUCOSE BLD-MCNC: 144 MG/DL (ref 70–99)
GLUCOSE BLD-MCNC: 176 MG/DL (ref 70–99)
GLUCOSE BLD-MCNC: 211 MG/DL (ref 70–99)
POCT TEST: ABNORMAL

## 2024-12-05 PROCEDURE — 97530 THERAPEUTIC ACTIVITIES: CPT | Mod: GP

## 2024-12-05 PROCEDURE — 97530 THERAPEUTIC ACTIVITIES: CPT | Mod: GO

## 2024-12-05 PROCEDURE — 90832 PSYTX W PT 30 MINUTES: CPT | Performed by: PSYCHOLOGIST

## 2024-12-05 PROCEDURE — 63700000 HC SELF-ADMINISTRABLE DRUG: Performed by: INTERNAL MEDICINE

## 2024-12-05 PROCEDURE — 97763 ORTHC/PROSTC MGMT SBSQ ENC: CPT | Mod: GP

## 2024-12-05 PROCEDURE — 97116 GAIT TRAINING THERAPY: CPT | Mod: GP

## 2024-12-05 PROCEDURE — 12800000 HC ROOM AND CARE SEMIPRIVATE REHAB

## 2024-12-05 RX ADMIN — ACETAMINOPHEN 650 MG: 325 TABLET, FILM COATED ORAL at 02:59

## 2024-12-05 RX ADMIN — EMPAGLIFLOZIN 10 MG: 10 TABLET, FILM COATED ORAL at 08:09

## 2024-12-05 RX ADMIN — APIXABAN 5 MG: 5 TABLET, FILM COATED ORAL at 08:09

## 2024-12-05 RX ADMIN — INSULIN GLARGINE 15 UNITS: 100 INJECTION, SOLUTION SUBCUTANEOUS at 21:59

## 2024-12-05 RX ADMIN — GABAPENTIN 600 MG: 300 CAPSULE ORAL at 21:59

## 2024-12-05 RX ADMIN — INSULIN LISPRO 5 UNITS: 100 INJECTION, SOLUTION INTRAVENOUS; SUBCUTANEOUS at 17:29

## 2024-12-05 RX ADMIN — DOCUSATE SODIUM 100 MG: 100 CAPSULE, LIQUID FILLED ORAL at 12:28

## 2024-12-05 RX ADMIN — FUROSEMIDE 40 MG: 40 TABLET ORAL at 08:09

## 2024-12-05 RX ADMIN — Medication 3 MG: at 21:59

## 2024-12-05 RX ADMIN — LOSARTAN POTASSIUM 25 MG: 25 TABLET, FILM COATED ORAL at 17:29

## 2024-12-05 RX ADMIN — TAMSULOSIN HYDROCHLORIDE 0.4 MG: 0.4 CAPSULE ORAL at 17:29

## 2024-12-05 RX ADMIN — DULOXETINE HYDROCHLORIDE 60 MG: 30 CAPSULE, DELAYED RELEASE ORAL at 21:59

## 2024-12-05 RX ADMIN — GABAPENTIN 600 MG: 300 CAPSULE ORAL at 17:00

## 2024-12-05 RX ADMIN — FLUTICASONE PROPIONATE 1 SPRAY: 50 SPRAY, METERED NASAL at 08:16

## 2024-12-05 RX ADMIN — FUROSEMIDE 40 MG: 40 TABLET ORAL at 17:00

## 2024-12-05 RX ADMIN — ATORVASTATIN CALCIUM 40 MG: 40 TABLET, FILM COATED ORAL at 17:29

## 2024-12-05 RX ADMIN — INSULIN LISPRO 5 UNITS: 100 INJECTION, SOLUTION INTRAVENOUS; SUBCUTANEOUS at 08:10

## 2024-12-05 RX ADMIN — POTASSIUM CHLORIDE 20 MEQ: 1500 TABLET, EXTENDED RELEASE ORAL at 12:28

## 2024-12-05 RX ADMIN — LEVOTHYROXINE SODIUM 25 MCG: 0.03 TABLET ORAL at 05:58

## 2024-12-05 RX ADMIN — METHENAMINE HIPPURATE 1 G: 1000 TABLET ORAL at 08:09

## 2024-12-05 RX ADMIN — APIXABAN 5 MG: 5 TABLET, FILM COATED ORAL at 21:59

## 2024-12-05 RX ADMIN — IMATINIB MESYLATE 400 MG: 400 TABLET, FILM COATED ORAL at 08:09

## 2024-12-05 RX ADMIN — INSULIN LISPRO 2 UNITS: 100 INJECTION, SOLUTION INTRAVENOUS; SUBCUTANEOUS at 17:30

## 2024-12-05 RX ADMIN — OXYCODONE HYDROCHLORIDE AND ACETAMINOPHEN 500 MG: 500 TABLET ORAL at 12:28

## 2024-12-05 RX ADMIN — FLUTICASONE PROPIONATE 1 SPRAY: 50 SPRAY, METERED NASAL at 22:02

## 2024-12-05 RX ADMIN — INSULIN LISPRO 5 UNITS: 100 INJECTION, SOLUTION INTRAVENOUS; SUBCUTANEOUS at 12:20

## 2024-12-05 RX ADMIN — GABAPENTIN 600 MG: 300 CAPSULE ORAL at 08:09

## 2024-12-05 RX ADMIN — HYDROMORPHONE HYDROCHLORIDE 4 MG: 2 TABLET ORAL at 06:23

## 2024-12-05 RX ADMIN — THERA TABS 1 TABLET: TAB at 12:28

## 2024-12-05 RX ADMIN — HYDROMORPHONE HYDROCHLORIDE 4 MG: 2 TABLET ORAL at 21:59

## 2024-12-05 RX ADMIN — METHENAMINE HIPPURATE 1 G: 1000 TABLET ORAL at 21:59

## 2024-12-05 RX ADMIN — FERROUS SULFATE TAB 325 MG (65 MG ELEMENTAL FE) 325 MG: 325 (65 FE) TAB at 12:28

## 2024-12-05 NOTE — PROGRESS NOTES
Patient: Bebe Burks  Location: [facility] [address]  MRN: [id number]  Today's date: 12/5/2024    History of Present Illness  Bebe is a 64 y.o. female admitted on 11/19/2024 with History of above knee amputation, left (CMS/[facility]) [Z89.612]  Encounter for prosthetic gait training [Z47.89]. Principal problem is History of above knee amputation, left (CMS/[facility]).    Pt is a 64 y.o. 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       Past Medical History  Bebe has a past medical history of Abnormal ECG, Abnormal finding on chest xray (12/2021), Arthritis, Colon cancer (CMS/[facility]), COVID-19 (12/2021), COVID-19 vaccine series completed, Diabetic neuropathy (CMS/HCC), DM (diabetes mellitus), type 2 with ophthalmic complications (CMS/HCC), Hypertension, Hypothyroidism, Left foot drop, Lipid disorder, and Type 2 diabetes mellitus treated with insulin (CMS/HCC).    PT Vitals      Date/Time Pulse BP BP Location BP Method Pt Position [facility]   12/05/24 0830 104 122/56 Left upper arm Automatic Sitting JG          PT Pain      Date/Time Pain Type Acceptable Pain Level Rating: Rest Rating: Activity Rating: Rest Rating: Activity Interventions Response To Interventions [facility]   12/05/24 0830 Pain Assessment -- 0 0 -- -- -- -- JG   12/05/24 0945 Pain Assessment 1 0 0 0 - no pain 0 - no pain pain management plan reviewed with patient/caregiver;medication offered but refused pain goal met [facility]   12/05/24 0959 Pain Reassessment -- -- -- 0 - no pain 0 - no pain -- -- JG               Prior Living Environment      Flowsheet Row Most Recent Value   People in Home alone   Current Living Arrangements condominium   Home Accessibility stairs to enter home (Group)   Living Environment Comment resides in 1st floor condo, 4 (1+2+1) JOSE RAFAEL, R hand rail, no stairs inside home, Bathroom Layout: Tub/shower unit with shower curtain, Shower stall with glass  doors (shower stall in main bedroom), two sisters can provide some assistance   Number of Stairs, Main Entrance 4   Surface of Stairs, Main Entrance concrete   Stair Railings, Main Entrance railing on right side (ascending)   Stairs Comment, Main Entrance 1+2+1 JOSE RAFAEL   Location, Bathroom first (main) floor   Bathroom Access tub bath, walk-in shower            Prior Level of Function      Flowsheet Row Most Recent Value   Dominant Hand right   Ambulation assistive equipment   Transferring assistive equipment   Toileting independent   Bathing assistive equipment   Dressing independent   Eating independent   IADLs assistive equipment   Driving/Transportation    Communication understands/communicates without difficulty   Assistive Device Currently Used at Home cane, straight, walker, front-wheeled, shower chair, grab bar, raised toilet             IRF PT Evaluation and Treatment - 12/05/24 0830          PT Time Calculation    Start Time 0830     Stop Time 1000     Time Calculation (min) 90 min        Session Details    Document Type Daily Treatment/Progress Note        General Information    Patient/Family/Caregiver Comments/Observations Pts sister Anna present during home evaluation and observed mobility.     General Observations of Patient Pt upright in [facility] in room        Mobility Belt    Mobility Belt Used During Session yes        Orthosis Ankle Right AFO    Orthosis Properties Date Obtained: 12/03/24 Location: Ankle Laterality: Right Type: AFO Features: Carbon fiber Description: R toe off carbon fiber brace Therapeutic Indications: compensation for lost function Wearing Schedule: wear when out of bed    Compliance/Wearing Issues compliant with wearing schedule        Caregiver Training    Caregiver(s) to be Trained sibling(s)     Caregiver Training Plan ambulation using assistive device;bed mobility training;transfer training     Comment, Caregiver Training Plan Pts sister Anna, present for home  evaluation today. Observed transfers, ambulation, elevation training and reviewed home set up recommendations. Discussed wheelchair ordered for home, reviewed recommended DME to be ordered by patient and discussed safest way for home entry, due to difficulty with navigating elevations with prosthesis.        Bed Mobility    [place], Supine to Sit touching/steadying assist     [place], Sit to Supine modified independence     Safety/Cues increased time to complete        Transfers    Transfers low pivot transfer;stand pivot transfer;car transfer;wheelchair transfer     Maintains Weight-Bearing Status able to maintain        Bed to Chair Transfer    [place], Bed to Chair supervision     Safety/Cues sequencing;technique     Assistive Device wheelchair     Comment Completed bed to wheelchair transfer in personal bed at home, S for wheelchair set up and transfer technique        Chair to Bed Transfer    [place], Chair to Bed minimum assist (75% or more patient effort)     Safety/Cues sequencing;technique     Assistive Device walker, front-wheeled     Comment min A for SPT from wheelchair to personal twin bed in home with RW and L prosthesis, min A for hip extension assistance        Sit to Stand Transfer    [place], Sit to Stand Transfer minimum assist (75% or more patient effort)     Safety/Cues technique;sequencing     Assistive Device walker, front-wheeled     Comment min A for hip extension assistance and ensuring locking of prosthetic knee        Stand to Sit Transfer    [place], Stand to Sit Transfer minimum assist (75% or more patient effort)     Safety/Cues technique;sequencing     Assistive Device walker, front-wheeled     Comment min A for eccentric control and assistance at pelvis for weightshift to R while unlocking prosthesis        Stand Pivot Transfer    [place], Stand Pivot/Stand Step Transfer minimum assist (75% or more patient effort)     Safety/Cues  "technique;sequencing     Assistive Device walker, front-wheeled     Comment min A for pelvic stability with intermittent verbal cueing for L prosthetic foot placement        Car Transfer    Transfer Technique lateral     [place] set up     Safety/Cues technique;sequencing     Assistive Device wheelchair;transfer board     Comment Transferboard transfer in/out of sedan, set up assistance for placement of transfer board with pt able to complete transfer without hands on assistance, including bringing BLE in/out of car; completed one SPT from wheelchair into car with RW requiring min A for SPT and assistance with eccentric control due to lower surface, recommended patient keeping prosthesis locked when sitting into car to allow for BUE support to help keep B ITs back onto seet        Gait Training    [place], Gait minimum assist (75% or more patient effort)     Safety/Cues sequencing;technique     Assistive Device walker, front-wheeled     Distance in Feet 10 feet     Pattern step-through     Deviations/Abnormal Patterns base of support, narrow;weight shifting decreased     Left Sided Gait Deviations hip circumduction   intermittent increased step length    Right Sided Gait Deviations heel strike decreased     Comment Ambulated 10' on plush carpet in bedroom with RW, steadying/min A for pelvic stability and assistance with weightshifting as needed.        Curb Negotiation    [place] moderate assist (50-74% patient effort);1 person assist     Assistive Device walker, front-wheeled     Curb Height 6 inches     Comment up 6\" curb onto sidewalk to access home, mod A for hip extension assistance and assist with lifting/placement of RW, hip extension assistance at RLE to stand fully onto step and ensure clearance L prosthesis        Rough/Uneven Surface Gait Skills    [place] minimum assist (75% or more patient effort);1 person assist     Assistive Device walker, front-wheeled     Distance in Feet 30 feet " "    Comment Ambulated 30' with RW and L prosthesis over uneven sidewalk to entry of apartment building, min A for pelvic stability and verbal cueing to bring RW over uneven thresholds        Stairs Training    [place], Stairs dependent (less than 25% patient effort);2 person assist     Safety/Cues sequencing;technique;proper use of assistive device     Assistive Device railing     Handrail Location (Stairs) right side (ascending)     Number of Stairs 2     Stair Height 7 inches     Ascending Stairs Technique step-to-step   RLE    Comment up 2 6\" stairs with RHR and LUE over therapist, mod/max A of 1 for RLE hip extension for ascending and assistance with LLE prosthetic placement to clear step; once at the top of the steps , pt with increased fatigue and required max A to prevent from sitting/buckling. Sat at platform on outside steps on chair for rest break x 5 minutes , mod/max A for standing from chair to RW to complete one step up into entryway; assist of 2nd for min A/SBA for safety throughout due to increased fatigue        Wheelchair Mobility/Management    [place], Forward Propulsion touching/steadying assist     [place], Backward Propulsion touching/steadying assist     [place], Steering Activities touching/steadying assist     [place], Stopping Activities touching/steadying assist     [place], Turning Activities touching/steadying assist     [place], Doorway Navigation touching/steadying assist     Distance Propelled in Feet 100 feet     Comment, Wheelchair Mobility Self propelled in lightweight MWC throughout home, able to push short distances at mod I with increased time; intermittent steadying in WC due to plush carpets        Prosthetic Orientation (Lower Extremity)    Fit Assessment fit/function of prosthesis are appropriate     Comment, Fit Assessment Pt donned liner and prosthesis supine in hospital bed, set up assist for giving pt prosthesis/liner. able to align " into socket without issues; sock ply = 3 ply; wearing schedule = 6 hours        Daily Progress Summary (PT)    Daily Outcome Statement Home evaluation completed this session with OT. Completed outdoor ambulation and navigation of two + one steps up into entry way. Pt with increased fatigue after walking the walkway, requiring increased assistance for elevations. Discussed with pt and family and confirmed safest method is to use suitcase/portable ramping and MWC for entry/exit into home for near future until endurance and mechanics on elevations improves. Placed 3' portable ramp at top step and recommended 5' ramp for remaining 2 steps to complete the entry way from pavement. Pt and sister agreed with plan. Completed bed to chair transfers with RW and via LPT. Self propelled in MWC throughout apartment, with increased difficulty with turns due to plush carpet. PT to reach out to [employer] to adjust wheelchair order for larger wheels to improve navigation in apartment. Plan for discharge to d/c home via MW to maximze safety and julian prosthesis each morning, complete standing and ambulation with aides and/or therapy. Continue with POC and progress as able.                          Education Documentation  Assistive Device: Home/Community Mgmt, taught by Paulette Gamble, PT at 12/5/2024  2:52 PM.  Learner: Family, Patient  Readiness: Acceptance  Method: Explanation, Demonstration  Response: Needs Reinforcement    Guarding Techniques: Stairs, taught by Paulette Gamble, PT at 12/5/2024  2:52 PM.  Learner: Family, Patient  Readiness: Acceptance  Method: Explanation, Demonstration  Response: Needs Reinforcement          IRF PT Goals      Flowsheet Row Most Recent Value   Transfer Goal 1    Activity/Assistive Device stand pivot, walker, front-wheeled at 11/19/2024 1431   [place] moderate assist (50-74% patient effort) at 11/26/2024 0855   Time Frame short-term goal (STG), 5 - 7 days at 12/03/2024 0855    Progress/Outcome goal ongoing at 12/03/2024 0855   Transfer Goal 2    Activity/Assistive Device stand pivot, walker, front-wheeled at 11/19/2024 1431   [place] minimum assist (75% or more patient effort) at 11/26/2024 0855   Time Frame 14 days or less, long-term goal (LTG) at 11/26/2024 0855   Progress/Outcome goal ongoing at 12/03/2024 0855   Transfer Goal 3    Activity/Assistive Device sit-to-stand/stand-to-sit, walker, front-wheeled at 11/19/2024 1431   [place] minimum assist (75% or more patient effort) at 11/26/2024 0855   Time Frame short-term goal (STG), 5 - 7 days at 12/03/2024 0855   Progress/Outcome goal ongoing at 12/03/2024 0855   Transfer Goal 4    Activity/Assistive Device sit-to-stand/stand-to-sit, walker, front-wheeled at 11/19/2024 1431   [place] tactile cues required at 11/26/2024 0855   Time Frame long-term goal (LTG), 14 days or less at 11/26/2024 0855   Progress/Outcome goal ongoing at 12/03/2024 0855   Gait/Walking Locomotion Goal 1    Activity/Assistive Device gait (walking locomotion), walker, front-wheeled at 11/19/2024 1431   Distance 10 feet at 11/19/2024 1431   [place] moderate assist (50-74% patient effort) at 11/26/2024 0855   Time Frame short-term goal (STG), 5 - 7 days at 12/03/2024 0855   Strategies/Barriers 50 at 11/26/2024 0855   Progress/Outcome goal met at 12/03/2024 0855   Gait/Walking Locomotion Goal 2    Activity/Assistive Device gait (walking locomotion), assistive device use, walker, front-wheeled at 11/26/2024 0855   Distance 25 feet at 12/03/2024 0855   [place] minimum assist (75% or more patient effort) at 11/26/2024 0855   Time Frame long-term goal (LTG), 14 days or less at 11/26/2024 0855   Progress/Outcome goal ongoing, goal revised this date at 12/03/2024 0806   Wheelchair Locomotion Goal 1    Activity forward propulsion, backward propulsion, steering, stopping, turning, doorway navigation, weight shifting at 11/19/2024 1431   Assistive  "Device manual, ultra lightweight at 11/19/2024 1431   Distance 150 feet at 11/19/2024 1431   [place] other (see comments)  [Touching / Steadying Assist] at 11/19/2024 1431   Time Frame 1 week, short-term goal (STG) at 11/19/2024 1431   Progress/Outcome goal met at 11/26/2024 0855   Wheelchair Locomotion Goal 2    Activity forward propulsion, backward propulsion, steering, stopping, turning, doorway navigation, weight shifting at 11/19/2024 1431   Assistive Device manual, ultra lightweight at 11/19/2024 1431   Distance 150 feet at 11/19/2024 1431   [place] modified independence at 11/19/2024 1431   Time Frame 21 days or less, long-term goal (LTG) at 11/19/2024 1431   Progress/Outcome goal met at 12/03/2024 0855   Stairs Goal 1    Activity/Assistive Device ascending stairs, descending stairs, using handrail, left, using handrail, right, step-to-step at 11/29/2024 1100   Number of Stairs 4 at 11/29/2024 1100   [place] moderate assist (50-74% patient effort) at 11/29/2024 1100   Time Frame short-term goal (STG), 5 - 7 days at 11/29/2024 1100   Strategies/Barriers 6\" at 11/29/2024 1100   Progress/Outcome goal ongoing at 12/03/2024 0855   Stairs Goal 2    Activity/Assistive Device ascending stairs, descending stairs, using handrail, left, using handrail, right, step-to-step at 11/29/2024 1100   Number of Stairs 4 at 11/29/2024 1100   [place] minimum assist (75% or more patient effort) at 11/29/2024 1100   Time Frame long-term goal (LTG), 14 days or less at 11/29/2024 1100   Strategies/Barriers 6\" at 11/29/2024 1100   Progress/Outcome goal ongoing at 12/03/2024 0855          "

## 2024-12-05 NOTE — PROGRESS NOTES
Subjective    Patient seen and examined on rounds.  Chart reviewed.  Events overnight noted.  History reviewed briefly with patient.    CC:  Deficits in mobility, transfers, self-care status post left transfemoral amputation for prosthetic training, severe peripheral vascular disease, chronic myelocytic leukemia, osteomyelitis left foot, diabetic neuropathy, multiple medical problems    HPI:  Ms. 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 Pura Chaves. ID recommended IV Ceftriaxone and IV Ampicillin until 9/14/24.  She had urinary retention managed with indwelling Rowan catheter.  She had right upper extremity swelling present during this admission, ultrasound on 8/29/24 demonstrated an acute non-occlusive DVT of the right axillary and brachial vein around line (on Lovenox).  Subsequently she completed an acute inpatient rehabilitation stay for pre-prosthetic training at [facility] between 9/3/24 to 9/21/24 and was discharged to [facility] on 9/21/24.  She initially required bladder self intermittent catheterizations due to urinary retention, but indicates that now she is able to void.  She desired to be a prosthetic ambulator.  A left transfemoral amputation prosthesis was fabricated. and followed-up as outpatient in the amputee clinic at [facility] and she was deemed to be an  appropriate candidate for acute inpatient rehabilitation stay. She has been needing assistance for mobility, transfers, self-care.  I have reviewed the preadmission screening and concur with that information and there are no significant changes from patient's preadmission screening. She is transferred to [facility] on 11/19/24 from [facility] for further acute inpatient rehabilitation stay for prosthetic training with transfemoral amputation prosthesis.    SUBJ: Patient went for home evaluation today with therapist.  Discussed with patient earlier in the morning today.  Discussed with her to maximize independence at wheelchair level and manage at wheelchair level at home and only use prosthesis with trained therapist with her for now until she becomes more functional prosthetic ambulator in future.  Has some phantom limb sensation which can hopefully decrease with prosthetic use in future.    ROS: Denies chest pain or shortness of breath. Other ROS negative. Past, family, social history is unchanged.    Current Functional Status:   Bed mobility:   [place], Supine to Sit: touching/steadying assist   [place], Sit to Supine: modified independence   Transfers:    [place], Sit to Stand Transfer: minimum assist (75% or more patient effort)  [place], Stand to Sit Transfer: minimum assist (75% or more patient effort)   [place], Stand Pivot/Stand Step Transfer: minimum assist (75% or more patient effort)   Gait:   [place], Gait: minimum assist (75% or more patient effort)  Assistive Device: walker, front-wheeled   Distance in Feet: 10 feet    Bathing:   [place]: close supervision   Toileting:   [place]: dependent (less than 25% patient effort)   Upper body dressing:   [place]: minimum assist (75% or more patient effort)   Lower body dressing:   [place]: moderate assist (50-74% patient effort)     Functional Progress:    Functional status reviewed. Overall,  "patient's functional status is improving.      Physical Exam      Blood pressure 119/63, pulse 88, temperature 36.6 °C (97.9 °F), temperature source Oral, resp. rate 18, height 1.702 m (5' 7.01\"), weight 73.1 kg (161 lb 2 oz), SpO2 99%, not currently breastfeeding.    Body mass index is 25.23 kg/m².    General Appearance: Not in acute distress  Head/Ear/Nose/Throat: Normocephalic; Atraumatic.   Eye: EOMI; PERRL.   Neck: No JVD; No Bruits.   Respiratory: Decreased breath sounds at bases.   Cardiovascular: RRR; Normal S1, S2.   Gastrointestinal: Soft; NT; +BS.   Extremities: Bilateral lower extremity edema noted.  Left transfemoral amputation stump is stable.  Musculoskeletal: Functional active range of motion in both upper and right lower extremities.  Functional active range of motion in left hip.  She has a left transfemoral amputation.  Neurological: AAO ×3. Speech is fluent. Cranial nerve examination does not reveal any gross facial asymmetry. Strength testing shows about 4+/5 strength in both upper and right lower extremities.  Left hip flexion is 4/5.  She has a left transfemoral amputation.  She is grossly able to localize touch sensation.  She is able to localize position sense in right foot great toe position sense is unable to be tested on left side.  Deep tendon reflexes are hypoactive bilaterally.  Right plantar is flexor, left plantar was unable to be tested. Coordination is functional upper extremities.    Behavior/Emotional: Appropriate; Cooperative.   Skin: Left transfemoral amputation stump stable.  Incision appears to be well-healed.  Some wounds are noted on right anterior lower forearm.  Small wound noted on right foot fifth toe.  Small wound also noted on right foot great toe with some slough in it and scaly skin around it.  Some redness noted on sacrum/coccyx.  Reviewed pictures of wounds in Epic.      Current Facility-Administered Medications:     acetaminophen (TYLENOL) tablet 650 mg, 650 mg, " oral, q4h PRN, Charlette Brooke MD, 650 mg at 12/05/24 0259    apixaban (ELIQUIS) tablet 5 mg, 5 mg, oral, BID, Charlette Brooke MD, 5 mg at 12/05/24 0809    ascorbic acid (VITAMIN C) tablet 500 mg, 500 mg, oral, q48h INT, Charlette Brooke MD, 500 mg at 12/05/24 1228    atorvastatin (LIPITOR) tablet 40 mg, 40 mg, oral, Daily (6p), Charlette Brooke MD, 40 mg at 12/05/24 1729    docusate sodium (COLACE) capsule 100 mg, 100 mg, oral, BID, Charlette Brooke MD, 100 mg at 12/05/24 1228    DULoxetine (CYMBALTA) capsule,delayed release(DR/EC) 60 mg, 60 mg, oral, Nightly, Charlette Brooke MD, 60 mg at 12/04/24 2209    empagliflozin (JARDIANCE) tablet 10 mg, 10 mg, oral, Daily, Charlette Brooke MD, 10 mg at 12/05/24 0809    ferrous sulfate tablet 325 mg, 325 mg, oral, q48h INT, Charlette Brooke MD, 325 mg at 12/05/24 1228    fluticasone propionate (FLONASE) 50 mcg/actuation nasal spray 1 spray, 1 spray, Each Nostril, BID, Alexei Petty MD, 1 spray at 12/05/24 0816    furosemide (LASIX) tablet 40 mg, 40 mg, oral, BID (am, 4p), Charlette Brooke MD, 40 mg at 12/05/24 1700    gabapentin (NEURONTIN) capsule 600 mg, 600 mg, oral, TID, Charlette Brooke MD, 600 mg at 12/05/24 1700    HYDROmorphone (DILAUDID) tablet 4 mg, 4 mg, oral, q6h PRN, Charlette Brooke MD, 4 mg at 12/05/24 0623    imatinib (GLEEVEC) chemo tablet 400 mg, 400 mg, oral, Daily, Charlette Brooke MD, 400 mg at 12/05/24 0809    insulin glargine U-100 (LANTUS/BASAGLAR) pen 15 Units, 15 Units, subcutaneous, Nightly, Charlette Brooke MD, 15 Units at 12/04/24 2210    insulin lispro U-100 (HumaLOG) pen 1-12 Units, 1-12 Units, subcutaneous, TID with meals, Charlette Brooke MD, 2 Units at 12/05/24 1730    insulin lispro U-100 (HumaLOG) pen 5 Units, 5 Units, subcutaneous, TID with meals, Charlette Brooke MD, 5 Units at 12/05/24 1729    levothyroxine (SYNTHROID) tablet 25 mcg, 25 mcg, oral, Daily (6:30a), Charlette Brooke MD, 25 mcg at 12/05/24 0558    losartan (COZAAR) tablet 25 mg, 25 mg, oral, Daily  with dinner, Charlette Brooke MD, 25 mg at 12/05/24 1729    melatonin ODT 3 mg, 3 mg, oral, Nightly, Charlette Brooke MD, 3 mg at 12/04/24 2209    methenamine (HIPREX) tablet 1 g, 1 g, oral, BID, Charlette Brooke MD, 1 g at 12/05/24 0809    multivitamin tablet 1 tablet, 1 tablet, oral, Daily, Charlette Brooke MD, 1 tablet at 12/05/24 1228    potassium chloride (KLOR-CON M) ER tablet (particles/crystals) 20 mEq, 20 mEq, oral, Daily, Charlette Brooke MD, 20 mEq at 12/05/24 1228    semaglutide tablet 14 mg, 1 tablet, oral, Daily, Charlette Brooke MD, 14 mg at 12/05/24 0809    tamsulosin (FLOMAX) 24 hr ER capsule 0.4 mg, 0.4 mg, oral, Daily with dinner, Charlette Brooke MD, 0.4 mg at 12/05/24 1729       Labs / Radiology    Lab Results   Component Value Date    WBC 4.98 11/20/2024    HGB 10.8 (L) 11/20/2024    HCT 32.7 (L) 11/20/2024    MCV 90.8 11/20/2024     11/20/2024     Lab Results   Component Value Date    GLUCOSE 205 (H) 11/20/2024    CALCIUM 8.7 11/20/2024     11/20/2024    K 3.7 11/20/2024    CO2 30 11/20/2024     11/20/2024    BUN 35 (H) 11/20/2024    CREATININE 0.9 11/20/2024       Assessment and Plan    ASSESSMENT PLAN:  1. 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 Pura Chaves. ID recommended IV Ceftriaxone and IV Ampicillin until 9/14/24.  She had urinary retention managed with indwelling Rowan catheter.  She had  right upper extremity swelling present during this admission, ultrasound on 8/29/24 demonstrated an acute non-occlusive DVT of the right axillary and brachial vein around line (on Lovenox).  Subsequently she completed an acute inpatient rehabilitation stay for pre-prosthetic training at [facility] between 9/3/24 to 9/21/24 and was discharged to [facility] on 9/21/24.  She initially required bladder self intermittent catheterizations due to urinary retention, but indicates that now she is able to void.  She desired to be a prosthetic ambulator.  A left transfemoral amputation prosthesis was fabricated. and followed-up as outpatient in the amputee clinic at [facility] and she was deemed to be an appropriate candidate for acute inpatient rehabilitation stay. She has been needing assistance for mobility, transfers, self-care.  I have reviewed the preadmission screening and concur with that information and there are no significant changes from patient's preadmission screening. She is transferred to [facility] on 11/19/24 from [facility] for further acute inpatient rehabilitation stay for prosthetic training with transfemoral amputation prosthesis.     2. DVT prophylaxis/treatment - on Eliquis.  Platelets 168 on 11/20/2024.     3. Vascular - status post left transfemoral amputation for prosthetic training, severe peripheral vascular disease, chronic myelocytic leukemia, osteomyelitis left foot, diabetic neuropathy, multiple medical problems - continue PT, OT, psychology.  Follow falls precautions, cardiac precautions, monitor pulse oximeter in therapy.  Monitor skin under prosthesis.  Teach donning and doffing of left transfemoral amputation prosthesis.  Do gait training with prosthesis.     4. GI - On Colace, Senokot.  On PRN Maalox.  On PRN Dulcolax suppository.  On PRN Zofran.     5.  -on Hiprex.  On Flomax.  Monitor postvoid residual.     6. CVS - on Lasix.  Monitor for  orthostasis.     7. Pulmonary -encourage incentive spirometry.     8. Hematology - monitor hemoglobin, platelets.  Hemoglobin 10.8, WBC 4.98 on 11/20/2024.     9. Pain -on Cymbalta.  On Neurontin.  On PRN Tylenol.  On PRN Dilaudid.     10. Skin - Left transfemoral amputation stump stable.  Incision appears to be well-healed.  Some wounds are noted on right anterior lower forearm.  Small wound noted on right foot fifth toe.  Small wound also noted on right foot great toe with some slough in it and scaly skin around it.  Some redness noted on sacrum/coccyx.  Reviewed pictures of wounds in Epic. Monitor skin under the left transfemoral amputation prosthesis.Noted input from dermal defense nurse on 11/21/2024.  Reviewed pictures in Epic.      11. F/E/N - on Ferrous sulfate.  On vitamin C. on MVI.  On K-Genesis con.     12. Hyperlipidemia -on Lipitor.     13. Diabetes mellitus type 2 - on Lantus insulin.  On sliding scale Humalog coverage.  On Jardiance.     14. Oncology - On Gleevec for chronic myelocytic leukemia.     15. Hypothyroidism - on Synthroid.     16. Insomnia - on Melatonin.     17. Infectious diseases - On oral Vancomycin.       18. Seasonal allergies - She reports seasonal allergies are bothering her.  Ordered Flonase spray to nostrils twice daily on 12/4/2024.     19. Psychiatry - mood stable.  Psychology consulted.     20. Rehabilitation medicine - Continue comprehensive rehabilitation care. Continue PT, OT, speech, psychology.  We will follow falls precautions, cardiac precautions, monitor pulse oximetry in therapy and follow weightbearing precautions.  We will follow spinal precautions as appropriate.Tolerating therapies per endurance on 11/20/2024.  Slept well last night.   Working on donning and doffing of prosthesis.  Discussed with the importance of skin checks on the prosthesis.  Able to ambulate 7 feet in parallel bars dependent gait using left transfemoral amputation prosthesis with physical therapy.   Dependent for sit to stand transfers with physical therapy on 11/20/2024. Noted input from dermal defense nurse on 11/21/2024.  Reviewed pictures in Epic.  Working on ADLs with occupational therapy on 11/21/2024.  Requiring minimal assistance for bathing, dependent for toileting, close supervision for poorly dressing and dependent for lower body dressing with occupational therapy.  She requested a flu shot.  Discussed with nurse.  Continent of bowel and bladder for nursing on 11/21/2024.Dependent for sit to stand transfers with physical therapy on 11/22/2024.  Able to ambulate 15 feet with thoracic walker dependent gait with physical therapy on 11/22/2024.  Skin under prosthesis is stable.  Denies pain today.  She says she got flu shot yesterday.Feels better on 11/23/2024.  Denies increased pain.  She says that she took her medications after meals and did not have any GI symptoms today.  Continent of bowel and bladder for nursing.  Working on donning and doffing of prosthesis, sock ply management with occupational therapy on 11/23/2024. Due to elevated blood pressure, internist restarted Amlodipine and Losartan on 11/25/2024.  Denies increased pain.  Continent of bowel and bladder for nursing on 11/25/2024.Skin on left transfemoral amputation residual limb remains stable.  Discussed with patient.  Discussed with nurse on 11/25/2024.Discussed patients progress in therapies with therapists in team meeting on 11/26/2024.  Discussed in PCC. See PCC documentation.  Face-to-face evaluation was done for a right carbon fiber toe off brace on 11/26/2024.  Discussed with patient.  Discussed with physical therapist on 11/26/2024.Discussed recommendations of PCC with patient on 11/27/2024.  She was measured for a right carbon fiber Toe off AFO brace yesterday.  Discussed with nursing.  Skin on residual limb remains stable.  Continent of bladder for nursing on 11/27/2024. Able to ambulate 10 feet with moderate assistance with  balance but using left transfemoral amputation prosthesis on 11/28/2024.  Requiring moderate assistance for sit to stand transfer with physical therapy using left transfemoral amputation prosthesis on 11/28/2024.Denies phantom limb pain sensation on 11/29/2024.  Discussed with patient and with her family with her at bedside on 11/29/2024.  Noted podiatry input.  She feels she is improving functionally.  Discussed with her that with practice she will continue to improve with prosthetic use.She feels she is making progress in therapy on 12/2/2024.  Per case management, her CMG ends on 12/3/2024 and she was extended for a week for discharge on 12/10/2024 to allow for more time in therapy here.  Now she is asking if she could get 1 more week beyond that time.  Discussed with case management regarding patient's request.  Working on ADLs with occupational therapy.  Requiring minimal assistance for bathing, dependent for toileting, minimal assistance for upper body dressing, moderate assistance for lower body dressing.  Discussed with patient and physical therapist working with her in the parallel bar earlier today.  Again reviewed with her importance of monitoring skin under prosthesis with prosthetic use..Discussed patients progress in therapies with therapists in team meeting on 12/3/2024. Discussed in PCC. See PCC documentation.  Continent of bowel and bladder for nursing.  Discussed with nurse.  Skin under prosthesis stable on 12/3/2024.Discussed recommendations of PCC with patient on 12/4/2024.  She reports seasonal allergies are bothering her.  Ordered Flonase spray to nostrils twice daily on 12/4/2024.Patient went for home evaluation on 12/5/2024 with therapist.  Discussed with patient earlier in the morning today.  Discussed with her to maximize independence at wheelchair level and manage at wheelchair level at home and only use prosthesis with trained therapist with her for now until she becomes more functional  prosthetic ambulator in future.  Has some phantom limb sensation which can hopefully decrease with prosthetic use in future.     21. Reviewed labs today.  BUN 35, creatinine 0.9, sodium 139, potassium 3.7 on 11/20/2024.      Alexei Petty MD  12/5/2024      This encounter was completed utilizing the Direct Speech Voice Recognition Software. Grammatical errors, random word insertions, pronoun errors, and incomplete sentences are occasional consequences of the system due to software limitations, ambient noises, and hardware issues. Such errors may be missed prior to affixing electronic signature. Any questions or concerns about the content, text, or information contained within the body of this dictation should be directly addressed to the physician for clarification. If you have any questions or concerns please do not hesitate to call me directly via EPIC chat, page, or email.

## 2024-12-05 NOTE — PROGRESS NOTES
Patient: Bebe Burks  Location: [facility] [address]  MRN: [id number]  Today's date: 12/5/2024    History of Present Illness  Bebe is a 64 y.o. female admitted on 11/19/2024 with History of above knee amputation, left (CMS/[facility]) [Z89.612]  Encounter for prosthetic gait training [Z47.89]. Principal problem is History of above knee amputation, left (CMS/[facility]).    Pt is a 64 y.o. 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       Past Medical History  Bebe has a past medical history of Abnormal ECG, Abnormal finding on chest xray (12/2021), Arthritis, Colon cancer (CMS/[facility]), COVID-19 (12/2021), COVID-19 vaccine series completed, Diabetic neuropathy (CMS/[facility]), DM (diabetes mellitus), type 2 with ophthalmic complications (CMS/[facility]), Hypertension, Hypothyroidism, Left foot drop, Lipid disorder, and Type 2 diabetes mellitus treated with insulin (CMS/[facility]).    No pain during session          Prior Living Environment      Flowsheet Row Most Recent Value   People in Home alone   Current Living Arrangements condominium   Home Accessibility stairs to enter home (Group)   Living Environment Comment resides in 1st floor condo, 4 (1+2+1) JOSE RAFAEL, R hand rail, no stairs inside home, Bathroom Layout: Tub/shower unit with shower curtain, Shower stall with glass doors (shower stall in main bedroom), two sisters can provide some assistance   Number of Stairs, Main Entrance 4   Surface of Stairs, Main Entrance concrete   Stair Railings, Main Entrance railing on right side (ascending)   Stairs Comment, Main Entrance 1+2+1 JOSE RAFAEL   Location, Bathroom first (main) floor   Bathroom Access tub bath, walk-in shower            Prior Level of Function      Flowsheet Row Most Recent Value   Dominant Hand right   Ambulation assistive equipment   Transferring assistive equipment   Toileting independent   Bathing assistive equipment   Dressing  independent   Eating independent   IADLs assistive equipment   Driving/Transportation    Communication understands/communicates without difficulty   Assistive Device Currently Used at Home cane, straight, walker, front-wheeled, shower chair, grab bar, raised toilet            Occupational Profile      Flowsheet Row Most Recent Value   Successful Occupations Pt is very active in her community   Occupational History/Life Experiences 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, attends [employer] weekly   Performance Patterns lives alone   Environmental Supports and Barriers Supportive community    + handicap parking placard   Patient Goals PSFS: Toilet transfer with prosthetic 3/10, toilet transfer without prosthetic 8/10, walking 0/10, getting dressed 5/10, getting in/ out of bed 4/10, getting a prosthetic on/off 1/10             IRF OT Evaluation and Treatment - 12/05/24 1002          OT Time Calculation    Start Time 1000     Stop Time 1100     Time Calculation (min) 60 min        Session Details    Document Type Daily Treatment/Progress Note        General Information    Patient/Family/Caregiver Comments/Observations Pt's sister Anna Present for home eval, PT present for home eval. formal home eval and recs to be uploaded.        Orthotics    Orthosis Trial R toe off brace donned        Orthosis Ankle Right AFO    Orthosis Properties Date Obtained: 12/03/24 Location: Ankle Laterality: Right Type: AFO Features: Carbon fiber Description: R toe off carbon fiber brace Therapeutic Indications: compensation for lost function Wearing Schedule: wear when out of bed       Sit to Stand Transfer    [place], Sit to Stand Transfer minimum assist (75% or more patient effort)     Safety/Cues technique;increased time to complete;hand placement     Assistive Device walker, front-wheeled        Stand to Sit Transfer    [place], Stand to Sit Transfer minimum assist (75% or more patient effort)     Safety/Cues  technique;increased time to complete     Assistive Device walker, front-wheeled        Low Pivot Transfer    [place], Low Pivot Transfer supervision     Safety/Cues increased time to complete;technique;hand placement     Assistive Device wheelchair     Comment LPT WC to/ from bed        Stand Pivot Transfer    [place], Stand Pivot/Stand Step Transfer minimum assist (75% or more patient effort)     Safety/Cues technique;increased time to complete     Assistive Device walker, 4-wheeled     Comment SPT        Toilet Transfer    Comment drop arm commode next to bed, with prosthetic donned, incremental LPT with cues for placement of hands.        Wheelchair Mobility/Management    Comment, Wheelchair Mobility Pt in lightweight manual WC trial. Pt with some difficulty at times over high carpet with turning, to discuss with WC vendor best wheel for pt, Pt on hardwood and tile charlie indep. Practice WC mobility in open kitchen, opening cabinets and gathering items        Prosthetic Orientation (Lower Extremity)    Comment, Fit Assessment Pt with prosthetic donned with PT, wearing 3 ply goal for 6 hours        Daily Progress Summary (OT)    Daily Outcome Statement Home eval at pt’s home today. Once pt in apartment everything is on 1 level. Pt unable to access ramirez bathroom 2/2 door width with WC. Pt able to access living room, dining room, kitchen and bedroom WC level. Recommending pt use guest room now 2/2 lower height of bed. Pt had drop arm commode ordered through [facility]. Pt planning to order platform drop arm commode for bedtime and have drop arm commode over toilet. Plan for in main bedroom to move bed over to give more space for wheelchair. Plan to remove door from bathroom and glass sliding doors off of shower for access with wheelchair. Will need a to purchase a shower ray and curtain, has a shower chair and could use transfer board, but could benefit from tub transfer bench. Pt’s sister and Pt receptive to  education. Plan for full write up. Pt plans on having an aid or family/ friend assist at home.                          Education Documentation  Proper Body Mechanics, Strategies, taught by Kristy Quinones, OT at 12/5/2024  3:39 PM.  Learner: Family, Patient  Readiness: Acceptance  Method: Explanation, Demonstration  Response: Verbalizes Understanding  Comment: Pt's sister present for home eval    Precautions/Limitations, Understanding/Management, taught by Kristy Quinones, OT at 12/5/2024  3:39 PM.  Learner: Family, Patient  Readiness: Acceptance  Method: Explanation, Demonstration  Response: Verbalizes Understanding  Comment: Pt's sister present for home eval    Guarding Techniques, Assist With Transfers/Ambulation, taught by Kristy Quinones, OT at 12/5/2024  3:39 PM.  Learner: Family, Patient  Readiness: Acceptance  Method: Explanation, Demonstration  Response: Verbalizes Understanding  Comment: Pt's sister present for home eval          IRF OT Goals      Flowsheet Row Most Recent Value   Transfer Goal 1    Activity/Assistive Device toilet at 11/20/2024 0906   [place] moderate assist (50-74% patient effort) at 12/02/2024 1011   Time Frame short-term goal (STG), 5 - 7 days at 12/02/2024 1011   Progress/Outcome goal revised this date at 12/02/2024 1011   Transfer Goal 2    Activity/Assistive Device toilet at 11/20/2024 0906   [place] minimum assist (75% or more patient effort) at 11/20/2024 0906   Time Frame long-term goal (LTG), 21 days or less at 11/20/2024 0906   Progress/Outcome goal ongoing at 12/02/2024 1011   Transfer Goal 3    Activity/Assistive Device shower at 11/20/2024 0906   [place] moderate assist (50-74% patient effort) at 12/02/2024 1011   Time Frame short-term goal (STG), 5 - 7 days at 12/02/2024 1011   Progress/Outcome goal revised this date at 12/02/2024 1011   Transfer Goal 4    Activity/Assistive Device shower at 11/20/2024 0906   [place] minimum  assist (75% or more patient effort) at 11/20/2024 0906   Time Frame long-term goal (LTG), 21 days or less at 11/20/2024 0906   Progress/Outcome goal ongoing at 12/02/2024 1011   Bathing Goal 1    [place] tactile cues required at 11/20/2024 0906   Time Frame short-term goal (STG), 5 - 7 days at 12/02/2024 1011   Progress/Outcome goal ongoing at 12/02/2024 1011   Bathing Goal 2    [place] modified independence at 11/20/2024 0906   Time Frame long-term goal (LTG), 21 days or less at 11/20/2024 0906   Progress/Outcome goal ongoing at 12/02/2024 1011   UB Dressing Goal 1    [place] minimum assist (75% or more patient effort) at 11/26/2024 1000   Time Frame short-term goal (STG), 5 - 7 days at 12/02/2024 1011   Strategies/Barriers with clothing retrieval at 11/20/2024 0906   Progress/Outcome goal ongoing at 12/02/2024 1011   UB Dressing Goal 2    [place] minimum assist (75% or more patient effort) at 11/20/2024 0906   Time Frame long-term goal (LTG), 21 days or less at 11/20/2024 0906   Strategies/Barriers with clothing retrieval at 11/20/2024 0906   Progress/Outcome goal ongoing at 12/02/2024 1011   LB Dressing Goal 1    [place] minimum assist (75% or more patient effort) at 11/26/2024 1000   Time Frame short-term goal (STG), 5 - 7 days at 12/02/2024 1011   Strategies/Barriers with clothing retrieval at 11/20/2024 0906   Progress/Outcome goal ongoing at 12/02/2024 1011   LB Dressing Goal 2    [place] minimum assist (75% or more patient effort) at 11/20/2024 0906   Time Frame long-term goal (LTG), 21 days or less at 11/20/2024 0906   Strategies/Barriers with clothing retrieval at 11/20/2024 0906   Progress/Outcome goal ongoing at 12/02/2024 1011   Grooming Goal 1    [place] moderate assist (50-74% patient effort) at 11/20/2024 0906   Time Frame short-term goal (STG), 5 - 7 days at 12/02/2024 1011   Strategies/Barriers in stance at sink at 11/20/2024 0906   Progress/Outcome goal ongoing  at 12/02/2024 1011   Grooming Goal 2    [place] supervision required at 11/20/2024 0906   Time Frame long-term goal (LTG), 21 days or less at 11/20/2024 0906   Strategies/Barriers in stance at sink at 11/20/2024 0906   Progress/Outcome goal ongoing at 12/02/2024 1011   Toileting Goal 1    [place] moderate assist (50-74% patient effort) at 12/02/2024 1011   Time Frame short-term goal (STG), 5 - 7 days at 12/02/2024 1011   Progress/Outcome goal ongoing at 12/02/2024 1011   Toileting Goal 2    [place] minimum assist (75% or more patient effort) at 11/20/2024 0906   Time Frame long-term goal (LTG), 21 days or less at 11/20/2024 0906   Progress/Outcome goal ongoing at 12/02/2024 1011

## 2024-12-05 NOTE — PLAN OF CARE
Plan of Care Review  Plan of Care Reviewed With: patient  Progress: improving  Outcome Evaluation: Alert and oriented x4. Continent of bladder, patient had one incontinent BM this shift. PRN Dilaudid and PRN Tylenol provded this shift, effective. Accuchecks TID, no s/s of hyper/hypo-glycemia noted. Sleeping quietly in bed overnight. Fall and safety measures maintained.

## 2024-12-05 NOTE — PROGRESS NOTES
Patient: Bebe Burks  Location: [facility] [address]  MRN: [id number]  Today's date: 12/5/2024    History of Present Illness  Bebe is a 64 y.o. female admitted on 11/19/2024 with History of above knee amputation, left (CMS/[facility]) [Z89.612]  Encounter for prosthetic gait training [Z47.89]. Principal problem is History of above knee amputation, left (CMS/[facility]).    Pt is a 64 y.o. 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       Past Medical History  Bebe has a past medical history of Abnormal ECG, Abnormal finding on chest xray (12/2021), Arthritis, Colon cancer (CMS/[facility]), COVID-19 (12/2021), COVID-19 vaccine series completed, Diabetic neuropathy (CMS/[facility]), DM (diabetes mellitus), type 2 with ophthalmic complications (CMS/[facility]), Hypertension, Hypothyroidism, Left foot drop, Lipid disorder, and Type 2 diabetes mellitus treated with insulin (CMS/[facility]).    PT Pain      Date/Time Pain Type Rating: Rest Rating: Activity [facility]   12/05/24 1134 Pain Assessment 0 - no pain 0 - no pain J   12/05/24 1201 Pain Reassessment 0 - no pain 0 - no pain JG               Prior Living Environment      Flowsheet Row Most Recent Value   People in Home alone   Current Living Arrangements condominium   Home Accessibility stairs to enter home (Group)   Living Environment Comment resides in 1st floor condo, 4 (1+2+1) JOSE RAFAEL, R hand rail, no stairs inside home, Bathroom Layout: Tub/shower unit with shower curtain, Shower stall with glass doors (shower stall in main bedroom), two sisters can provide some assistance   Number of Stairs, Main Entrance 4   Surface of Stairs, Main Entrance concrete   Stair Railings, Main Entrance railing on right side (ascending)   Stairs Comment, Main Entrance 1+2+1 JOSE RAFAEL   Location, Bathroom first (main) floor   Bathroom Access tub bath, walk-in shower            Prior Level of Function      Flowsheet Row Most Recent  Value   Dominant Hand right   Ambulation assistive equipment   Transferring assistive equipment   Toileting independent   Bathing assistive equipment   Dressing independent   Eating independent   IADLs assistive equipment   Driving/Transportation    Communication understands/communicates without difficulty   Assistive Device Currently Used at Home cane, straight, walker, front-wheeled, shower chair, grab bar, raised toilet             IRF PT Evaluation and Treatment - 12/05/24 1130          PT Time Calculation    Start Time 1130     Stop Time 1204     Time Calculation (min) 34 min        Session Details    Document Type Daily Treatment/Progress Note        General Information    General Observations of Patient Returned from home evaluation        Mobility Belt    Mobility Belt Used During Session yes        Orthosis Ankle Right AFO    Orthosis Properties Date Obtained: 12/03/24 Location: Ankle Laterality: Right Type: AFO Features: Carbon fiber Description: R toe off carbon fiber brace Therapeutic Indications: compensation for lost function Wearing Schedule: wear when out of bed       Transfers    Transfers stand pivot transfer;car transfer     Maintains Weight-Bearing Status able to maintain        Stand Pivot Transfer    [place], Stand Pivot/Stand Step Transfer minimum assist (75% or more patient effort)     Safety/Cues sequencing;technique     Assistive Device walker, front-wheeled     Comment SPT from wheelchair to hospital wheelchair, min A for pelvic stability and initiating hip extension to stand        Car Transfer    Transfer Technique lateral     [place] set up     Safety/Cues technique;sequencing     Assistive Device wheelchair;transfer board     Comment Transferboard transfer from personal sedan to wheelchair with transferboard, set up for placement of board        Stairs Training    Comment Completed D wheelchair bumping down curb and 2 steps in entry of [facility]. Discussed  technique and reviewed with sister. Sister comfortable with portable ramps but verbalized understanding if needed to use technique in near future.        Daily Progress Summary (PT)    Daily Outcome Statement Completed car tranfers and dependent elevations with wheelchair for return of home evaluation. See previous note for more detail on home evaluation.                               IRF PT Goals      Flowsheet Row Most Recent Value   Transfer Goal 1    Activity/Assistive Device stand pivot, walker, front-wheeled at 11/19/2024 1431   [place] moderate assist (50-74% patient effort) at 11/26/2024 0855   Time Frame short-term goal (STG), 5 - 7 days at 12/03/2024 0855   Progress/Outcome goal ongoing at 12/03/2024 0855   Transfer Goal 2    Activity/Assistive Device stand pivot, walker, front-wheeled at 11/19/2024 1431   [place] minimum assist (75% or more patient effort) at 11/26/2024 0855   Time Frame 14 days or less, long-term goal (LTG) at 11/26/2024 0855   Progress/Outcome goal ongoing at 12/03/2024 0855   Transfer Goal 3    Activity/Assistive Device sit-to-stand/stand-to-sit, walker, front-wheeled at 11/19/2024 1431   [place] minimum assist (75% or more patient effort) at 11/26/2024 0855   Time Frame short-term goal (STG), 5 - 7 days at 12/03/2024 0855   Progress/Outcome goal ongoing at 12/03/2024 0855   Transfer Goal 4    Activity/Assistive Device sit-to-stand/stand-to-sit, walker, front-wheeled at 11/19/2024 1431   [place] tactile cues required at 11/26/2024 0855   Time Frame long-term goal (LTG), 14 days or less at 11/26/2024 0855   Progress/Outcome goal ongoing at 12/03/2024 0855   Gait/Walking Locomotion Goal 1    Activity/Assistive Device gait (walking locomotion), walker, front-wheeled at 11/19/2024 1431   Distance 10 feet at 11/19/2024 1431   [place] moderate assist (50-74% patient effort) at 11/26/2024 0855   Time Frame short-term goal (STG), 5 - 7 days at 12/03/2024 0855  "  Strategies/Barriers 50 at 11/26/2024 0855   Progress/Outcome goal met at 12/03/2024 0855   Gait/Walking Locomotion Goal 2    Activity/Assistive Device gait (walking locomotion), assistive device use, walker, front-wheeled at 11/26/2024 0855   Distance 25 feet at 12/03/2024 0855   [place] minimum assist (75% or more patient effort) at 11/26/2024 0855   Time Frame long-term goal (LTG), 14 days or less at 11/26/2024 0855   Progress/Outcome goal ongoing, goal revised this date at 12/03/2024 0855   Wheelchair Locomotion Goal 1    Activity forward propulsion, backward propulsion, steering, stopping, turning, doorway navigation, weight shifting at 11/19/2024 1431   Assistive Device manual, ultra lightweight at 11/19/2024 1431   Distance 150 feet at 11/19/2024 1431   [place] other (see comments)  [Touching / Steadying Assist] at 11/19/2024 1431   Time Frame 1 week, short-term goal (STG) at 11/19/2024 1431   Progress/Outcome goal met at 11/26/2024 0855   Wheelchair Locomotion Goal 2    Activity forward propulsion, backward propulsion, steering, stopping, turning, doorway navigation, weight shifting at 11/19/2024 1431   Assistive Device manual, ultra lightweight at 11/19/2024 1431   Distance 150 feet at 11/19/2024 1431   [place] modified independence at 11/19/2024 1431   Time Frame 21 days or less, long-term goal (LTG) at 11/19/2024 1431   Progress/Outcome goal met at 12/03/2024 0855   Stairs Goal 1    Activity/Assistive Device ascending stairs, descending stairs, using handrail, left, using handrail, right, step-to-step at 11/29/2024 1100   Number of Stairs 4 at 11/29/2024 1100   [place] moderate assist (50-74% patient effort) at 11/29/2024 1100   Time Frame short-term goal (STG), 5 - 7 days at 11/29/2024 1100   Strategies/Barriers 6\" at 11/29/2024 1100   Progress/Outcome goal ongoing at 12/03/2024 0855   Stairs Goal 2    Activity/Assistive Device ascending stairs, descending stairs, using handrail, " "left, using handrail, right, step-to-step at 11/29/2024 1100   Number of Stairs 4 at 11/29/2024 1100   [place] minimum assist (75% or more patient effort) at 11/29/2024 1100   Time Frame long-term goal (LTG), 14 days or less at 11/29/2024 1100   Strategies/Barriers 6\" at 11/29/2024 1100   Progress/Outcome goal ongoing at 12/03/2024 0855          "

## 2024-12-05 NOTE — PROGRESS NOTES
Patient: Bebe Burks  Location: [facility] [address]  MRN: [id number]  Today's date: 12/4/2024    History of Present Illness  Bebe is a 64 y.o. female admitted on 11/19/2024 with History of above knee amputation, left (CMS/[facility]) [Z89.612]  Encounter for prosthetic gait training [Z47.89]. Principal problem is History of above knee amputation, left (CMS/[facility]).    Pt is a 64 y.o. 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       Past Medical History  Bebe has a past medical history of Abnormal ECG, Abnormal finding on chest xray (12/2021), Arthritis, Colon cancer (CMS/[facility]), COVID-19 (12/2021), COVID-19 vaccine series completed, Diabetic neuropathy (CMS/[facility]), DM (diabetes mellitus), type 2 with ophthalmic complications (CMS/HCC), Hypertension, Hypothyroidism, Left foot drop, Lipid disorder, and Type 2 diabetes mellitus treated with insulin (CMS/[facility]).              12/04/24 0945 12/04/24 1043   Pain/Comfort/Sleep   Pain Charting Type Pain Assessment Pain Reassessment   Preferred Pain Scale number (Numeric Rating Pain Scale) number (Numeric Rating Pain Scale)   Word Pain Rating: Rest 0 - no pain 0 - no pain   Vitals   Heart Rate (!) 102  --    BP (!) 145/65  --    BP Location Left upper arm  --    BP Method Automatic  --    Patient Position Sitting  --      Prior Living Environment      Flowsheet Row Most Recent Value   People in Home alone   Current Living Arrangements condominium   Home Accessibility stairs to enter home (Group)   Living Environment Comment resides in 1st floor condo, 4 (1+2+1) JOSE RAFAEL, R hand rail, no stairs inside home, Bathroom Layout: Tub/shower unit with shower curtain, Shower stall with glass doors (shower stall in main bedroom), two sisters can provide some assistance   Number of Stairs, Main Entrance 4   Surface of Stairs, Main Entrance concrete   Stair Railings, Main Entrance railing on right  side (ascending)   Stairs Comment, Main Entrance 1+2+1 JOSE RAFAEL   Location, Bathroom first (main) floor   Bathroom Access tub bath, walk-in shower            Prior Level of Function      Flowsheet Row Most Recent Value   Dominant Hand right   Ambulation assistive equipment   Transferring assistive equipment   Toileting independent   Bathing assistive equipment   Dressing independent   Eating independent   IADLs assistive equipment   Driving/Transportation    Communication understands/communicates without difficulty   Assistive Device Currently Used at Home cane, straight, walker, front-wheeled, shower chair, grab bar, raised toilet               12/04/24 0945   PT Time Calculation   Start Time 0945   Stop Time 1045   Time Calculation (min) 60 min   Session Details   Document Type Daily Treatment/Progress Note   General Information   General Observations of Patient Recieved upright in [facility] finishing her mornning routine. Returned to room once completed.   Mobility Belt   Mobility Belt Used During Session yes   Orthosis Ankle Right AFO   Date Obtained: 12/03/24   Location: Ankle  Laterality: Right  Type: AFO  Features: Carbon fiber  Description: R toe off carbon fiber brace  Therapeutic Indications: compensation for lost function  Wearing Schedule: wear when out of bed   Compliance/Wearing Issues compliant with wearing schedule   Transfers   Transfers stand pivot transfer   Maintains Weight-Bearing Status able to maintain   Bed to Chair Transfer   [place], Bed to Chair supervision   Safety/Cues increased time to complete;technique   Assistive Device wheelchair   Comment incremental LPT from wheelchair <> standard ILU bed, S for technique; prosthesis donned for LPT   Chair to Bed Transfer   [place], Chair to Bed supervision   Safety/Cues technique;sequencing   Assistive Device none   Comment incremental LPT from wheelchair <> standard ILU bed, S for technique; prosthesis donned for LPT   Sit to  "Stand Transfer   [place], Sit to Stand Transfer minimum assist (75% or more patient effort)   Safety/Cues technique;sequencing   Assistive Device walker, front-wheeled   Comment min A for hip extension at L prosthesis, assistance for locking L prosthetic knee   Stand to Sit Transfer   [place], Stand to Sit Transfer minimum assist (75% or more patient effort)   Safety/Cues sequencing;technique   Assistive Device walker, front-wheeled   Comment min A for steadying at hips while patient completes R weightshift and unlocking prosthetic knee   Stand Pivot Transfer   [place], Stand Pivot/Stand Step Transfer minimum assist (75% or more patient effort)   Safety/Cues sequencing;technique   Assistive Device walker, front-wheeled   Comment min A for steadying at hips, improved prosthetic control and foot placement noted   Gait Training   [place], Gait minimum assist (75% or more patient effort)   Safety/Cues sequencing;technique   Assistive Device walker, front-wheeled   Distance in Feet 25 feet   Pattern step-through   Deviations/Abnormal Patterns base of support, narrow;weight shifting decreased   Left Sided Gait Deviations hip circumduction   Right Sided Gait Deviations heel strike decreased   Comment Ambulated 25' x 2 with RW on carpet in ILU and 20' with RW on tile surfaces, min A for each gait trial for pelvic stability, pt able to control foot placement of L prosthesis and initial anteiror WS consistently, decreasing assistanc required   Curb Negotiation   [place] dependent (less than 25% patient effort);2 person assist   Assistive Device walker, front-wheeled   Curb Height 6 inches   Comment up/down 6\" curb with RW, mod A x 2 for pelvic stability and weightshifting assistance while lifting RW , assistance for RW management; assistance iof 2nd required for descending to bring RW down   Stairs Training   [place], Stairs dependent (less than 25% patient effort);2 person assist   Safety/Cues " sequencing;technique   Assistive Device railing   Handrail Location (Stairs) right side (ascending);right side (descending)   Number of Stairs 4   Stair Height 6 inches   Ascending Stairs Technique step-to-step  (RLE)   Descending Stairs Technique step-to-step  (LLE)   Comment up forwards/down backwards with LUE over therapist due to only having R handrail at home; mod A ascending but increased difficulty descending due to unable to bring LLE back to lower down requiring assist of 2nd   Prosthetic Orientation (Lower Extremity)   Comment, Fit Assessment Pt with prosthesis donned from OT session; sock ply - 3 ply; wearing schedule  = 5 hours; skin check - unremarkable   Specific Gait Deviations toe stays off floor after heel strike   Fit Assessment fit/function of prosthesis are appropriate   Daily Progress Summary (PT)   Daily Outcome Statement Pt with improved independence with ambulatory functional mobility. Consistently functioned at min A level . Home evaluation to be completed on 12/5 to continue improving independence with prosthetic use and independence at ambulatory and wheelchair level.            IRF PT Goals      Flowsheet Row Most Recent Value   Transfer Goal 1    Activity/Assistive Device stand pivot, walker, front-wheeled at 11/19/2024 1431   [place] moderate assist (50-74% patient effort) at 11/26/2024 0855   Time Frame short-term goal (STG), 5 - 7 days at 12/03/2024 0855   Progress/Outcome goal ongoing at 12/03/2024 0855   Transfer Goal 2    Activity/Assistive Device stand pivot, walker, front-wheeled at 11/19/2024 1431   [place] minimum assist (75% or more patient effort) at 11/26/2024 0855   Time Frame 14 days or less, long-term goal (LTG) at 11/26/2024 0855   Progress/Outcome goal ongoing at 12/03/2024 0855   Transfer Goal 3    Activity/Assistive Device sit-to-stand/stand-to-sit, walker, front-wheeled at 11/19/2024 1431   [place] minimum assist (75% or more patient effort) at  11/26/2024 0855   Time Frame short-term goal (STG), 5 - 7 days at 12/03/2024 0855   Progress/Outcome goal ongoing at 12/03/2024 0855   Transfer Goal 4    Activity/Assistive Device sit-to-stand/stand-to-sit, walker, front-wheeled at 11/19/2024 1431   Routt tactile cues required at 11/26/2024 0855   Time Frame long-term goal (LTG), 14 days or less at 11/26/2024 0855   Progress/Outcome goal ongoing at 12/03/2024 0855   Gait/Walking Locomotion Goal 1    Activity/Assistive Device gait (walking locomotion), walker, front-wheeled at 11/19/2024 1431   Distance 10 feet at 11/19/2024 1431   [place] moderate assist (50-74% patient effort) at 11/26/2024 0855   Time Frame short-term goal (STG), 5 - 7 days at 12/03/2024 0855   Strategies/Barriers 50 at 11/26/2024 0855   Progress/Outcome goal met at 12/03/2024 0855   Gait/Walking Locomotion Goal 2    Activity/Assistive Device gait (walking locomotion), assistive device use, walker, front-wheeled at 11/26/2024 0855   Distance 25 feet at 12/03/2024 0855   [place] minimum assist (75% or more patient effort) at 11/26/2024 0855   Time Frame long-term goal (LTG), 14 days or less at 11/26/2024 0855   Progress/Outcome goal ongoing, goal revised this date at 12/03/2024 0855   Wheelchair Locomotion Goal 1    Activity forward propulsion, backward propulsion, steering, stopping, turning, doorway navigation, weight shifting at 11/19/2024 1431   Assistive Device manual, ultra lightweight at 11/19/2024 1431   Distance 150 feet at 11/19/2024 1431   Routt other (see comments)  [Touching / Steadying Assist] at 11/19/2024 1431   Time Frame 1 week, short-term goal (STG) at 11/19/2024 1431   Progress/Outcome goal met at 11/26/2024 0855   Wheelchair Locomotion Goal 2    Activity forward propulsion, backward propulsion, steering, stopping, turning, doorway navigation, weight shifting at 11/19/2024 1431   Assistive Device manual, ultra lightweight at 11/19/2024 1431   Distance 150  "feet at 11/19/2024 1431   [place] modified independence at 11/19/2024 1431   Time Frame 21 days or less, long-term goal (LTG) at 11/19/2024 1431   Progress/Outcome goal met at 12/03/2024 0855   Stairs Goal 1    Activity/Assistive Device ascending stairs, descending stairs, using handrail, left, using handrail, right, step-to-step at 11/29/2024 1100   Number of Stairs 4 at 11/29/2024 1100   [place] moderate assist (50-74% patient effort) at 11/29/2024 1100   Time Frame short-term goal (STG), 5 - 7 days at 11/29/2024 1100   Strategies/Barriers 6\" at 11/29/2024 1100   Progress/Outcome goal ongoing at 12/03/2024 0855   Stairs Goal 2    Activity/Assistive Device ascending stairs, descending stairs, using handrail, left, using handrail, right, step-to-step at 11/29/2024 1100   Number of Stairs 4 at 11/29/2024 1100   [place] minimum assist (75% or more patient effort) at 11/29/2024 1100   Time Frame long-term goal (LTG), 14 days or less at 11/29/2024 1100   Strategies/Barriers 6\" at 11/29/2024 1100   Progress/Outcome goal ongoing at 12/03/2024 0855          "

## 2024-12-05 NOTE — PLAN OF CARE
Problem: Rehabilitation (IRF) Plan of Care  Goal: Plan of Care Review  Outcome: Progressing  Flowsheets (Taken 12/5/2024 1619)  Progress: improving  Plan of Care Reviewed With: patient  Outcome Evaluation: Patient sitting up in wheelchair at bedside. Patient assisted with bump over to toilet and voided without difficulty. No complaints of pain. Patient had home evaluation today. Appetite good and tolerating diet.

## 2024-12-05 NOTE — PROGRESS NOTES
[facility] Progress Note          Patient was seen and examined at bedside.    Subjective:     Reviewed night shift RN report. No new nursing issues.   Saw her in am: very pleasant, no new complaints or concerns.   No O/N events. No new complaints. Her paresthetic training is progressing well.      Objective   Vital signs in last 24 hours:  Temp:  [36.5 °C (97.7 °F)-36.8 °C (98.3 °F)] 36.5 °C (97.7 °F)  Heart Rate:  [67-98] 67  Resp:  [18] 18  BP: (105-169)/(55-74) 105/55    No intake or output data in the 24 hours ending 12/05/24 1203    Intake/Output this shift:  No intake/output data recorded.   Review of Systems:  All other systems reviewed and negative except as noted in the HPI.   Objective      Labs  Reviewed her labs thoroughly   Lab Results   Component Value Date    WBC 4.98 11/20/2024    HGB 10.8 (L) 11/20/2024    HCT 32.7 (L) 11/20/2024    MCV 90.8 11/20/2024     11/20/2024     Lab Results   Component Value Date    GLUCOSE 205 (H) 11/20/2024    CALCIUM 8.7 11/20/2024     11/20/2024    K 3.7 11/20/2024    CO2 30 11/20/2024     11/20/2024    BUN 35 (H) 11/20/2024    CREATININE 0.9 11/20/2024       Imaging  N/A    Full Code    Physical Exam:  Head/Ear/Nose/Throat: normocephalic; atraumatic; moisture mouth mm, no oropharyngeal thrush noted.   Eyes: anicteric sclera, EOMI; PERRL.   Neck : supple, no JVD, no carotid bruits appeciated.   Respiratory: no evidence of labored breathing, lung sounds CTA b/l, good aeration bibasilar area, no w/r/c.   Cardiovascular: RRR; normal S1, S2; no m/r/g; no S3 or S4.   Gastrointestinal: soft; NT; BS normal; mildly distended; no CVAT b/l.   Genitourinary: no lozada.   Extremities : S/p L AKA, residual limb healed well .   Neurological: AO x 3, fluent speeches, following commands, CNS II-XII grossly intact; no focal neurologic deficits.   Behavior/Emotional: in NAD, appropriate; cooperative.   Skin: clean, dry and intact.  Plan of  care was discussed with patient, RN, and PMR attending     Assessment     CC:L AKA, ambulatory dysfunction     64 y.o. 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 S/p IV Ceftriaxone and IV Ampicillin until 9/14/24. H/o non-occlusive DVT of the R axillary and brachial vein around midline. She presented to [facility] on 11/14 for evaluation to begin prosthetic training. Now she was admitted for inpt prosthetic training on 11/19/24.  Denies nausea, vomiting, abdominal pain or discomfort, dysuria, cough/sputum, running nose, sore throat, chest pain, palpitation, SOB or orthopnea, dizziness or LH,  HA.    A/P:  # A/p L AKA  -inpt prosthetic training, wound care/dermal defense, pain and neuralgia managements, fall precaution     # CML  -cont home imatinib, check CBC      # Type 2 DM, hypothyroidism  - diet control, SSI, plus her home diabetic regimen;  - cont home levothyroxine     #  PICC-associated DVT on 8/30   -S/p SQ Lovenox weight based ;  -cont Eliquis 5 mg po bid      # Urinary retention, h/o recurrent UTI  -timed voiding with cic, adequate oral hydration, monitor PVR with cic, on flomax;  -hiprex 1 g po bid        # Stage 2 decubitus ulcer, pressure injury in right heel   -wound care     # Recent C.diff.  -cont prophylactic oral vanco 125 mg po bid       Billing code: [id number]  Diagnoses:  Patient Active Problem List   Diagnosis    Controlled type 2 diabetes mellitus, with long-term current use of insulin (CMS/[facility])    Essential hypertension    Hyperlipidemia    Hypothyroidism    Obesity    Thyroid nodule    COVID-19 virus infection    Lung nodule    Displacement of lumbar intervertebral disc without myelopathy    Disability of walking    Polyneuropathy due to  type 2 diabetes mellitus (CMS/HCC)    Pre-op examination    Localized osteoarthritis of right knee    Type 2 diabetes mellitus treated with insulin (CMS/HCC)    Abnormal finding on EKG    Left foot drop    Abnormal finding on chest xray    Osteoarthritis of right knee, unspecified osteoarthritis type    S/P total knee arthroplasty, right    Sepsis (CMS/HCC)    Osteomyelitis of left foot (CMS/HCC)    Peripheral neuropathy    Abdominal pain    Chronic idiopathic constipation    Gangrene (CMS/HCC)    Tibial artery stenosis, left (CMS/HCC)    H/O pilonidal cyst    Type 2 diabetes mellitus with left diabetic foot infection (CMS/HCC)    Type 2 diabetes mellitus with diabetic neuropathy (CMS/HCC)    Hypothyroidism    Primary hypertension    Leukemoid reaction    HLD (hyperlipidemia)    Acute osteomyelitis of left foot (CMS/HCC)    History of above knee amputation, left (CMS/HCC)    Encounter for prosthetic gait training    Adjustment disorder with anxiety           Charlette Brooke MD  12/5/2024

## 2024-12-06 ENCOUNTER — APPOINTMENT (INPATIENT)
Dept: OCCUPATIONAL THERAPY | Facility: REHABILITATION | Age: 64
DRG: 560 | End: 2024-12-06
Payer: MEDICARE

## 2024-12-06 ENCOUNTER — APPOINTMENT (INPATIENT)
Dept: PHYSICAL THERAPY | Facility: REHABILITATION | Age: 64
DRG: 560 | End: 2024-12-06
Payer: MEDICARE

## 2024-12-06 LAB
GLUCOSE BLD-MCNC: 116 MG/DL (ref 70–99)
GLUCOSE BLD-MCNC: 192 MG/DL (ref 70–99)
GLUCOSE BLD-MCNC: 281 MG/DL (ref 70–99)
POCT TEST: ABNORMAL

## 2024-12-06 PROCEDURE — 97110 THERAPEUTIC EXERCISES: CPT | Mod: GO

## 2024-12-06 PROCEDURE — 63600000 HC DRUGS/DETAIL CODE: Performed by: INTERNAL MEDICINE

## 2024-12-06 PROCEDURE — 63700000 HC SELF-ADMINISTRABLE DRUG: Performed by: INTERNAL MEDICINE

## 2024-12-06 PROCEDURE — 97763 ORTHC/PROSTC MGMT SBSQ ENC: CPT | Mod: GP

## 2024-12-06 PROCEDURE — 97530 THERAPEUTIC ACTIVITIES: CPT | Mod: GP

## 2024-12-06 PROCEDURE — 97530 THERAPEUTIC ACTIVITIES: CPT | Mod: GO

## 2024-12-06 PROCEDURE — 12800000 HC ROOM AND CARE SEMIPRIVATE REHAB

## 2024-12-06 PROCEDURE — 97116 GAIT TRAINING THERAPY: CPT | Mod: GP

## 2024-12-06 RX ORDER — METOCLOPRAMIDE 5 MG/1
5 TABLET ORAL 3 TIMES DAILY PRN
Status: DISCONTINUED | OUTPATIENT
Start: 2024-12-06 | End: 2024-12-10 | Stop reason: HOSPADM

## 2024-12-06 RX ADMIN — APIXABAN 5 MG: 5 TABLET, FILM COATED ORAL at 20:56

## 2024-12-06 RX ADMIN — METOCLOPRAMIDE 5 MG: 5 TABLET ORAL at 09:21

## 2024-12-06 RX ADMIN — ATORVASTATIN CALCIUM 40 MG: 40 TABLET, FILM COATED ORAL at 17:18

## 2024-12-06 RX ADMIN — IMATINIB MESYLATE 400 MG: 400 TABLET, FILM COATED ORAL at 09:44

## 2024-12-06 RX ADMIN — APIXABAN 5 MG: 5 TABLET, FILM COATED ORAL at 09:40

## 2024-12-06 RX ADMIN — INSULIN LISPRO 5 UNITS: 100 INJECTION, SOLUTION INTRAVENOUS; SUBCUTANEOUS at 08:41

## 2024-12-06 RX ADMIN — GABAPENTIN 600 MG: 300 CAPSULE ORAL at 20:56

## 2024-12-06 RX ADMIN — TAMSULOSIN HYDROCHLORIDE 0.4 MG: 0.4 CAPSULE ORAL at 17:19

## 2024-12-06 RX ADMIN — INSULIN LISPRO 5 UNITS: 100 INJECTION, SOLUTION INTRAVENOUS; SUBCUTANEOUS at 18:00

## 2024-12-06 RX ADMIN — Medication 3 MG: at 20:56

## 2024-12-06 RX ADMIN — FLUTICASONE PROPIONATE 1 SPRAY: 50 SPRAY, METERED NASAL at 20:59

## 2024-12-06 RX ADMIN — METHENAMINE HIPPURATE 1 G: 1000 TABLET ORAL at 09:40

## 2024-12-06 RX ADMIN — LOSARTAN POTASSIUM 25 MG: 25 TABLET, FILM COATED ORAL at 17:18

## 2024-12-06 RX ADMIN — INSULIN GLARGINE 15 UNITS: 100 INJECTION, SOLUTION SUBCUTANEOUS at 20:59

## 2024-12-06 RX ADMIN — GABAPENTIN 600 MG: 300 CAPSULE ORAL at 17:18

## 2024-12-06 RX ADMIN — GABAPENTIN 600 MG: 300 CAPSULE ORAL at 09:40

## 2024-12-06 RX ADMIN — LEVOTHYROXINE SODIUM 25 MCG: 0.03 TABLET ORAL at 06:15

## 2024-12-06 RX ADMIN — FLUTICASONE PROPIONATE 1 SPRAY: 50 SPRAY, METERED NASAL at 09:41

## 2024-12-06 RX ADMIN — METHENAMINE HIPPURATE 1 G: 1000 TABLET ORAL at 20:56

## 2024-12-06 RX ADMIN — HYDROMORPHONE HYDROCHLORIDE 4 MG: 2 TABLET ORAL at 17:18

## 2024-12-06 RX ADMIN — POTASSIUM CHLORIDE 20 MEQ: 1500 TABLET, EXTENDED RELEASE ORAL at 12:29

## 2024-12-06 RX ADMIN — FUROSEMIDE 40 MG: 40 TABLET ORAL at 09:40

## 2024-12-06 RX ADMIN — DULOXETINE HYDROCHLORIDE 60 MG: 30 CAPSULE, DELAYED RELEASE ORAL at 20:56

## 2024-12-06 RX ADMIN — INSULIN LISPRO 4 UNITS: 100 INJECTION, SOLUTION INTRAVENOUS; SUBCUTANEOUS at 08:52

## 2024-12-06 RX ADMIN — FUROSEMIDE 40 MG: 40 TABLET ORAL at 17:18

## 2024-12-06 RX ADMIN — INSULIN LISPRO 5 UNITS: 100 INJECTION, SOLUTION INTRAVENOUS; SUBCUTANEOUS at 12:30

## 2024-12-06 RX ADMIN — THERA TABS 1 TABLET: TAB at 12:29

## 2024-12-06 RX ADMIN — EMPAGLIFLOZIN 10 MG: 10 TABLET, FILM COATED ORAL at 09:40

## 2024-12-06 NOTE — PROGRESS NOTES
Subjective    Patient seen and examined on rounds.  Chart reviewed.  Events overnight noted.  History reviewed briefly with patient.    CC:  Deficits in mobility, transfers, self-care status post left transfemoral amputation for prosthetic training, severe peripheral vascular disease, chronic myelocytic leukemia, osteomyelitis left foot, diabetic neuropathy, multiple medical problems    HPI:  Ms. 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 Pura Chaves. ID recommended IV Ceftriaxone and IV Ampicillin until 9/14/24.  She had urinary retention managed with indwelling Rowan catheter.  She had right upper extremity swelling present during this admission, ultrasound on 8/29/24 demonstrated an acute non-occlusive DVT of the right axillary and brachial vein around line (on Lovenox).  Subsequently she completed an acute inpatient rehabilitation stay for pre-prosthetic training at [facility] between 9/3/24 to 9/21/24 and was discharged to [facility] on 9/21/24.  She initially required bladder self intermittent catheterizations due to urinary retention, but indicates that now she is able to void.  She desired to be a prosthetic ambulator.  A left transfemoral amputation prosthesis was fabricated. and followed-up as outpatient in the amputee clinic at [facility] and she was deemed to be an  appropriate candidate for acute inpatient rehabilitation stay. She has been needing assistance for mobility, transfers, self-care.  I have reviewed the preadmission screening and concur with that information and there are no significant changes from patient's preadmission screening. She is transferred to [facility] on 11/19/24 from [facility] for further acute inpatient rehabilitation stay for prosthetic training with transfemoral amputation prosthesis.    SUBJ: Feels better today.  She reports home evaluation went well yesterday.  Discussed with physical therapist Madalyn who also mentioned that open evaluation was extremely useful for patient to plan things around her home.  Able to ambulate 10 feet with front wheeled walker with minimum assistance using left transfemoral amputation prosthesis.  Requiring minimal assistance for sit to stand transfer with left transfemoral amputation prosthesis.  Discussed with her she can learn biomechanics of gait training for the upcoming months with current prosthesis and hopefully she can qualify for a microprocessor knee in future.  She is also hopeful for that.    ROS: Denies chest pain or shortness of breath. Other ROS negative. Past, family, social history is unchanged.    Current Functional Status:   Bed mobility:   Jewett, Supine to Sit: touching/steadying assist   Jewett, Sit to Supine: modified independence   Transfers:    Jewett, Sit to Stand Transfer: minimum assist (75% or more patient effort)  Jewett, Stand to Sit Transfer: minimum assist (75% or more patient effort)   Jewett, Stand Pivot/Stand Step Transfer: minimum assist (75% or more patient effort)   Gait:   Jewett, Gait: minimum assist (75% or more patient effort)  Assistive Device: walker, front-wheeled   Distance in Feet: 10 feet    Bathing:   Jewett: close supervision   Toileting:   Jewett: dependent (less than 25% patient effort)   Upper body  "dressing:   Moody: minimum assist (75% or more patient effort)   Lower body dressing:   Moody: moderate assist (50-74% patient effort)     Functional Progress:    Functional status reviewed. Overall, patient's functional status is improving.      Physical Exam      Blood pressure 137/72, pulse 87, temperature 36.7 °C (98.1 °F), temperature source Oral, resp. rate 18, height 1.702 m (5' 7.01\"), weight 73.1 kg (161 lb 2 oz), SpO2 96%, not currently breastfeeding.    Body mass index is 25.23 kg/m².    General Appearance: Not in acute distress  Head/Ear/Nose/Throat: Normocephalic; Atraumatic.   Eye: EOMI; PERRL.   Neck: No JVD; No Bruits.   Respiratory: Decreased breath sounds at bases.   Cardiovascular: RRR; Normal S1, S2.   Gastrointestinal: Soft; NT; +BS.   Extremities: Bilateral lower extremity edema noted.  Left transfemoral amputation stump is stable.  Musculoskeletal: Functional active range of motion in both upper and right lower extremities.  Functional active range of motion in left hip.  She has a left transfemoral amputation.  Neurological: AAO ×3. Speech is fluent. Cranial nerve examination does not reveal any gross facial asymmetry. Strength testing shows about 4+/5 strength in both upper and right lower extremities.  Left hip flexion is 4/5.  She has a left transfemoral amputation.  She is grossly able to localize touch sensation.  She is able to localize position sense in right foot great toe position sense is unable to be tested on left side.  Deep tendon reflexes are hypoactive bilaterally.  Right plantar is flexor, left plantar was unable to be tested. Coordination is functional upper extremities.    Behavior/Emotional: Appropriate; Cooperative.   Skin: Left transfemoral amputation stump stable.  Incision appears to be well-healed.  Some wounds are noted on right anterior lower forearm.  Small wound noted on right foot fifth toe.  Small wound also noted on right foot great toe with some " slough in it and scaly skin around it.  Some redness noted on sacrum/coccyx.  Reviewed pictures of wounds in Epic.      Current Facility-Administered Medications:     acetaminophen (TYLENOL) tablet 650 mg, 650 mg, oral, q4h PRN, Charlette Brooke MD, 650 mg at 12/05/24 0259    apixaban (ELIQUIS) tablet 5 mg, 5 mg, oral, BID, Charlette Brooke MD, 5 mg at 12/06/24 0940    ascorbic acid (VITAMIN C) tablet 500 mg, 500 mg, oral, q48h INT, Charlette Brooke MD, 500 mg at 12/05/24 1228    atorvastatin (LIPITOR) tablet 40 mg, 40 mg, oral, Daily (6p), Charlette Brooke MD, 40 mg at 12/05/24 1729    docusate sodium (COLACE) capsule 100 mg, 100 mg, oral, BID, Charlette Brooke MD, 100 mg at 12/05/24 1228    DULoxetine (CYMBALTA) capsule,delayed release(DR/EC) 60 mg, 60 mg, oral, Nightly, Charlette Brooke MD, 60 mg at 12/05/24 2159    empagliflozin (JARDIANCE) tablet 10 mg, 10 mg, oral, Daily, Charlette Brooke MD, 10 mg at 12/06/24 0940    ferrous sulfate tablet 325 mg, 325 mg, oral, q48h INT, Charlette Brooke MD, 325 mg at 12/05/24 1228    fluticasone propionate (FLONASE) 50 mcg/actuation nasal spray 1 spray, 1 spray, Each Nostril, BID, Alexei Petty MD, 1 spray at 12/06/24 0941    furosemide (LASIX) tablet 40 mg, 40 mg, oral, BID (am, 4p), Charlette Brooke MD, 40 mg at 12/06/24 0940    gabapentin (NEURONTIN) capsule 600 mg, 600 mg, oral, TID, Charlette Brooke MD, 600 mg at 12/06/24 0940    HYDROmorphone (DILAUDID) tablet 4 mg, 4 mg, oral, q6h PRN, Charlette Brooke MD, 4 mg at 12/05/24 2159    imatinib (GLEEVEC) chemo tablet 400 mg, 400 mg, oral, Daily, Charlette Brooke MD, 400 mg at 12/06/24 0944    insulin glargine U-100 (LANTUS/BASAGLAR) pen 15 Units, 15 Units, subcutaneous, Nightly, Charlette Brooke MD, 15 Units at 12/05/24 2159    insulin lispro U-100 (HumaLOG) pen 1-12 Units, 1-12 Units, subcutaneous, TID with meals, Charlette Brooke MD, 4 Units at 12/06/24 0852    insulin lispro U-100 (HumaLOG) pen 5 Units, 5 Units, subcutaneous, TID with meals,  Charlette Brooke MD, 5 Units at 12/06/24 0841    levothyroxine (SYNTHROID) tablet 25 mcg, 25 mcg, oral, Daily (6:30a), Charlette Brooke MD, 25 mcg at 12/06/24 0615    losartan (COZAAR) tablet 25 mg, 25 mg, oral, Daily with dinner, Charlette Brooke MD, 25 mg at 12/05/24 1729    melatonin ODT 3 mg, 3 mg, oral, Nightly, Charlette Brooke MD, 3 mg at 12/05/24 2159    methenamine (HIPREX) tablet 1 g, 1 g, oral, BID, Charlette Brooke MD, 1 g at 12/06/24 0940    metoclopramide (REGLAN) tablet 5 mg, 5 mg, oral, 3x daily PRN, Charlette Brooke MD, 5 mg at 12/06/24 0921    multivitamin tablet 1 tablet, 1 tablet, oral, Daily, Charlette Brooke MD, 1 tablet at 12/05/24 1228    potassium chloride (KLOR-CON M) ER tablet (particles/crystals) 20 mEq, 20 mEq, oral, Daily, Charlette Brooke MD, 20 mEq at 12/05/24 1228    semaglutide tablet 14 mg, 1 tablet, oral, Daily, Charlette Brooke MD, 14 mg at 12/06/24 0728    tamsulosin (FLOMAX) 24 hr ER capsule 0.4 mg, 0.4 mg, oral, Daily with dinner, Charlette Brooke MD, 0.4 mg at 12/05/24 1729       Labs / Radiology    Lab Results   Component Value Date    WBC 4.98 11/20/2024    HGB 10.8 (L) 11/20/2024    HCT 32.7 (L) 11/20/2024    MCV 90.8 11/20/2024     11/20/2024     Lab Results   Component Value Date    GLUCOSE 205 (H) 11/20/2024    CALCIUM 8.7 11/20/2024     11/20/2024    K 3.7 11/20/2024    CO2 30 11/20/2024     11/20/2024    BUN 35 (H) 11/20/2024    CREATININE 0.9 11/20/2024       Assessment and Plan    ASSESSMENT PLAN:  1. 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 uPra Chaves. ID recommended IV Ceftriaxone and IV Ampicillin until 9/14/24.  She had urinary retention managed with indwelling Rowan catheter.  She had right upper extremity swelling present during this admission, ultrasound on 8/29/24 demonstrated an acute non-occlusive DVT of the right axillary and brachial vein around line (on Lovenox).  Subsequently she completed an acute inpatient rehabilitation stay for pre-prosthetic training at [facility] between 9/3/24 to 9/21/24 and was discharged to [facility] on 9/21/24.  She initially required bladder self intermittent catheterizations due to urinary retention, but indicates that now she is able to void.  She desired to be a prosthetic ambulator.  A left transfemoral amputation prosthesis was fabricated. and followed-up as outpatient in the amputee clinic at [facility] and she was deemed to be an appropriate candidate for acute inpatient rehabilitation stay. She has been needing assistance for mobility, transfers, self-care.  I have reviewed the preadmission screening and concur with that information and there are no significant changes from patient's preadmission screening. She is transferred to [facility] on 11/19/24 from [facility] for further acute inpatient rehabilitation stay for prosthetic training with transfemoral amputation prosthesis.     2. DVT prophylaxis/treatment - on Eliquis.  Platelets 168 on 11/20/2024.     3. Vascular - status post left transfemoral amputation for prosthetic training, severe peripheral vascular disease, chronic myelocytic leukemia, osteomyelitis left foot, diabetic neuropathy, multiple medical problems - continue PT, OT, psychology.  Follow falls precautions, cardiac precautions, monitor pulse oximeter in therapy.  Monitor  skin under prosthesis.  Teach donning and doffing of left transfemoral amputation prosthesis.  Do gait training with prosthesis.     4. GI - On Colace, Senokot.  On PRN Maalox.  On PRN Dulcolax suppository.  On PRN Zofran.     5.  -on Hiprex.  On Flomax.  Monitor postvoid residual.     6. CVS - on Lasix.  Monitor for orthostasis.     7. Pulmonary -encourage incentive spirometry.     8. Hematology - monitor hemoglobin, platelets.  Hemoglobin 10.8, WBC 4.98 on 11/20/2024.     9. Pain -on Cymbalta.  On Neurontin.  On PRN Tylenol.  On PRN Dilaudid.     10. Skin - Left transfemoral amputation stump stable.  Incision appears to be well-healed.  Some wounds are noted on right anterior lower forearm.  Small wound noted on right foot fifth toe.  Small wound also noted on right foot great toe with some slough in it and scaly skin around it.  Some redness noted on sacrum/coccyx.  Reviewed pictures of wounds in Epic. Monitor skin under the left transfemoral amputation prosthesis.Noted input from dermal defense nurse on 11/21/2024.  Reviewed pictures in Epic.      11. F/E/N - on Ferrous sulfate.  On vitamin C. on MVI.  On K-Genesis con.     12. Hyperlipidemia -on Lipitor.     13. Diabetes mellitus type 2 - on Lantus insulin.  On sliding scale Humalog coverage.  On Jardiance.     14. Oncology - On Gleevec for chronic myelocytic leukemia.     15. Hypothyroidism - on Synthroid.     16. Insomnia - on Melatonin.     17. Infectious diseases - On oral Vancomycin.       18. Seasonal allergies - She reports seasonal allergies are bothering her.  Ordered Flonase spray to nostrils twice daily on 12/4/2024.     19. Psychiatry - mood stable.  Psychology consulted.     20. Rehabilitation medicine - Continue comprehensive rehabilitation care. Continue PT, OT, speech, psychology.  We will follow falls precautions, cardiac precautions, monitor pulse oximetry in therapy and follow weightbearing precautions.  We will follow spinal precautions as  appropriate.Tolerating therapies per endurance on 11/20/2024.  Slept well last night.   Working on donning and doffing of prosthesis.  Discussed with the importance of skin checks on the prosthesis.  Able to ambulate 7 feet in parallel bars dependent gait using left transfemoral amputation prosthesis with physical therapy.  Dependent for sit to stand transfers with physical therapy on 11/20/2024. Noted input from dermal defense nurse on 11/21/2024.  Reviewed pictures in Epic.  Working on ADLs with occupational therapy on 11/21/2024.  Requiring minimal assistance for bathing, dependent for toileting, close supervision for poorly dressing and dependent for lower body dressing with occupational therapy.  She requested a flu shot.  Discussed with nurse.  Continent of bowel and bladder for nursing on 11/21/2024.Dependent for sit to stand transfers with physical therapy on 11/22/2024.  Able to ambulate 15 feet with thoracic walker dependent gait with physical therapy on 11/22/2024.  Skin under prosthesis is stable.  Denies pain today.  She says she got flu shot yesterday.Feels better on 11/23/2024.  Denies increased pain.  She says that she took her medications after meals and did not have any GI symptoms today.  Continent of bowel and bladder for nursing.  Working on donning and doffing of prosthesis, sock ply management with occupational therapy on 11/23/2024. Due to elevated blood pressure, internist restarted Amlodipine and Losartan on 11/25/2024.  Denies increased pain.  Continent of bowel and bladder for nursing on 11/25/2024.Skin on left transfemoral amputation residual limb remains stable.  Discussed with patient.  Discussed with nurse on 11/25/2024.Discussed patients progress in therapies with therapists in team meeting on 11/26/2024.  Discussed in PCC. See PCC documentation.  Face-to-face evaluation was done for a right carbon fiber toe off brace on 11/26/2024.  Discussed with patient.  Discussed with physical  therapist on 11/26/2024.Discussed recommendations of PCC with patient on 11/27/2024.  She was measured for a right carbon fiber Toe off AFO brace yesterday.  Discussed with nursing.  Skin on residual limb remains stable.  Continent of bladder for nursing on 11/27/2024. Able to ambulate 10 feet with moderate assistance with balance but using left transfemoral amputation prosthesis on 11/28/2024.  Requiring moderate assistance for sit to stand transfer with physical therapy using left transfemoral amputation prosthesis on 11/28/2024.Denies phantom limb pain sensation on 11/29/2024.  Discussed with patient and with her family with her at bedside on 11/29/2024.  Noted podiatry input.  She feels she is improving functionally.  Discussed with her that with practice she will continue to improve with prosthetic use.She feels she is making progress in therapy on 12/2/2024.  Per case management, her CMG ends on 12/3/2024 and she was extended for a week for discharge on 12/10/2024 to allow for more time in therapy here.  Now she is asking if she could get 1 more week beyond that time.  Discussed with case management regarding patient's request.  Working on ADLs with occupational therapy.  Requiring minimal assistance for bathing, dependent for toileting, minimal assistance for upper body dressing, moderate assistance for lower body dressing.  Discussed with patient and physical therapist working with her in the parallel bar earlier today.  Again reviewed with her importance of monitoring skin under prosthesis with prosthetic use..Discussed patients progress in therapies with therapists in team meeting on 12/3/2024. Discussed in PCC. See PCC documentation.  Continent of bowel and bladder for nursing.  Discussed with nurse.  Skin under prosthesis stable on 12/3/2024.Discussed recommendations of PCC with patient on 12/4/2024.  She reports seasonal allergies are bothering her.  Ordered Flonase spray to nostrils twice daily on  12/4/2024.Patient went for home evaluation on 12/5/2024 with therapist.  Discussed with patient earlier in the morning today.  Discussed with her to maximize independence at wheelchair level and manage at wheelchair level at home and only use prosthesis with trained therapist with her for now until she becomes more functional prosthetic ambulator in future.  Has some phantom limb sensation which can hopefully decrease with prosthetic use in future.Feels better on 12/6/2024.  She reports home evaluation went well yesterday.  Discussed with physical therapist Madalyn who also mentioned that open evaluation was extremely useful for patient to plan things around her home.  Able to ambulate 10 feet with front wheeled walker with minimum assistance using left transfemoral amputation prosthesis on 12/6/2024.  Requiring minimal assistance for sit to stand transfer with left transfemoral amputation prosthesis on 12/6/2024.  Discussed with her she can learn biomechanics of gait training for the upcoming months with current prosthesis and hopefully she can qualify for a microprocessor knee in future.  She is also hopeful for that.     21. Reviewed labs today.  BUN 35, creatinine 0.9, sodium 139, potassium 3.7 on 11/20/2024.      Alexei Petty MD  12/6/2024      This encounter was completed utilizing the Direct Speech Voice Recognition Software. Grammatical errors, random word insertions, pronoun errors, and incomplete sentences are occasional consequences of the system due to software limitations, ambient noises, and hardware issues. Such errors may be missed prior to affixing electronic signature. Any questions or concerns about the content, text, or information contained within the body of this dictation should be directly addressed to the physician for clarification. If you have any questions or concerns please do not hesitate to call me directly via EPIC chat, page, or email.

## 2024-12-06 NOTE — PROGRESS NOTES
Patient: Bebe Burks  Location: [facility] [address]  MRN: [id number]  Today's date: 12/6/2024    History of Present Illness  Bebe is a 64 y.o. female admitted on 11/19/2024 with History of above knee amputation, left (CMS/[facility]) [Z89.612]  Encounter for prosthetic gait training [Z47.89]. Principal problem is History of above knee amputation, left (CMS/[facility]).    Pt is a 64 y.o. 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       Past Medical History  Bebe has a past medical history of Abnormal ECG, Abnormal finding on chest xray (12/2021), Arthritis, Colon cancer (CMS/[facility]), COVID-19 (12/2021), COVID-19 vaccine series completed, Diabetic neuropathy (CMS/HCC), DM (diabetes mellitus), type 2 with ophthalmic complications (CMS/HCC), Hypertension, Hypothyroidism, Left foot drop, Lipid disorder, and Type 2 diabetes mellitus treated with insulin (CMS/HCC).    OT Vitals      Date/Time Pulse HR Source BP BP Location BP Method Pt Position Observations [facility]   12/06/24 1104 87 Monitor 137/72 Left upper arm Automatic Sitting mild nausea [facility]   12/06/24 1159 -- -- -- -- -- -- mild nausea [facility]          OT Pain      Date/Time Pain Type Rating: Rest Rating: Activity [facility]   12/06/24 1104 Pain Assessment 0 - no pain 0 - no pain [facility]   12/06/24 1159 Pain Reassessment 0 - no pain 0 - no pain [facility]               Prior Living Environment      Flowsheet Row Most Recent Value   People in Home alone   Current Living Arrangements condominium   Home Accessibility stairs to enter home (Group)   Living Environment Comment resides in 1st floor condo, 4 (1+2+1) JOSE RAFAEL, R hand rail, no stairs inside home, Bathroom Layout: Tub/shower unit with shower curtain, Shower stall with glass doors (shower stall in main bedroom), two sisters can provide some assistance   Number of Stairs, Main Entrance 4   Surface of Stairs, Main Entrance concrete   Stair Railings,  Main Entrance railing on right side (ascending)   Stairs Comment, Main Entrance 1+2+1 JOSE RAFAEL   Location, Bathroom first (main) floor   Bathroom Access tub bath, walk-in shower            Prior Level of Function      Flowsheet Row Most Recent Value   Dominant Hand right   Ambulation assistive equipment   Transferring assistive equipment   Toileting independent   Bathing assistive equipment   Dressing independent   Eating independent   IADLs assistive equipment   Driving/Transportation    Communication understands/communicates without difficulty   Assistive Device Currently Used at Home cane, straight, walker, front-wheeled, shower chair, grab bar, raised toilet            Occupational Profile      Flowsheet Row Most Recent Value   Successful Occupations Pt is very active in her community   Occupational History/Life Experiences 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, attends [employer] weekly   Performance Patterns lives alone   Environmental Supports and Barriers Supportive community    + handicap parking placard   Patient Goals PSFS: Toilet transfer with prosthetic 3/10, toilet transfer without prosthetic 8/10, walking 0/10, getting dressed 5/10, getting in/ out of bed 4/10, getting a prosthetic on/off 1/10             [facility] OT Evaluation and Treatment - 12/06/24 1101          OT Time Calculation    Start Time 1100     Stop Time 1200     Time Calculation (min) 60 min        Session Details    Document Type Daily Treatment/Progress Note        General Information    General Observations of Patient Pt received in w/c. Pleasant and agreeable to therapy.        Mobility Belt    Mobility Belt Used During Session yes        Orthosis Ankle Right AFO    Orthosis Properties Date Obtained: 12/03/24 Location: Ankle Laterality: Right Type: AFO Features: Carbon fiber Description: R toe off carbon fiber brace Therapeutic Indications: compensation for lost function Wearing Schedule: wear when out of bed       Transfers     Transfers toilet transfer        Sit to Stand Transfer    Carter, Sit to Stand Transfer minimum assist (75% or more patient effort);moderate assist (50-74% patient effort)     Safety/Cues increased time to complete;minimal;verbal cues;hand placement;sequencing;technique     Assistive Device walker, front-wheeled     Comment From commode to RW. Assist for ensuring locking of prosthesis in extension.        Stand to Sit Transfer    Carter, Stand to Sit Transfer minimum assist (75% or more patient effort)     Safety/Cues increased time to complete;minimal;verbal cues;hand placement;sequencing;technique     Assistive Device walker, front-wheeled     Comment RW to commode. Assist for eccentric control.        Toilet Transfer    Transfer Technique low pivot     Carter close supervision     Safety/Cues increased time to complete;minimal;verbal cues;hand placement;technique     Assistive Device other (see comments)   standard drop arm commode    Comment LPT to/from w/c to standard drop arm commode.        Wheelchair Mobility/Management    Comment, Wheelchair Mobility Issued w/c gloves for improved comfort with w/c mobility. Trialed w/c functional mobility in simulated living environment with set up comparable to home bathroom set up.        Upper Extremity (Therapeutic Exercise)    Exercise Position/Type seated     General Exercise bilateral     Range of Motion Exercises shoulder flexion/extension;elbow flexion/extension;other (see comments)   chest press    Weight/Resistance 3 pounds     Reps and Sets x10     Comment Completed UE exercises unilaterally seated in w/c.        Lower Body Dressing    Tasks don;underwear;pants/bottoms     Carter maximum assist (25-49% patient effort)     Safety/Cues increased time to complete     Position supported sitting;supported standing     Adaptive Equipment reacher     Comment Mod-Max A overall for threading RLE and prosthesis in brief and pants using reacher. Mod  vc's for technique.  Min A in stance for balance with assist to fully manage over hips.        Grooming    Comment Washed hands seated in w/c at sink.        Toileting    Tasks adjust/manage clothing;perform bladder hygiene;perform bowel hygiene     [place] moderate assist (50-74% patient effort)     Position supported sitting     Adaptive Equipment commode, drop-arm     Comment Continent of bowel and bladder. Able to perform hygiene seated. Assist for clothing management up/down.        Prosthetic Orientation (Lower Extremity)    Fit Assessment fit/function of prosthesis are appropriate     Comment Pt arrived to session with prosthesis donned from PT session. Reported no skin issues from morning. Per PT pt to maintaind prosthesis donned throughtout session and until afternoon PT session.        Daily Progress Summary (OT)    Daily Outcome Statement Session focused on functional w/c mobility and set up for transfers, LPTs to drop arm commode with prosthesis, and toileting. Assist for clothng management with prosthesis donned. Increased time for sequencing steps for STS transfers. Continue with balance and functional transfers.                               [facility] OT Goals      Flowsheet Row Most Recent Value   Transfer Goal 1    Activity/Assistive Device toilet at 11/20/2024 0906   [place] moderate assist (50-74% patient effort) at 12/02/2024 1011   Time Frame short-term goal (STG), 5 - 7 days at 12/02/2024 1011   Progress/Outcome goal revised this date at 12/02/2024 1011   Transfer Goal 2    Activity/Assistive Device toilet at 11/20/2024 0906   [place] minimum assist (75% or more patient effort) at 11/20/2024 0906   Time Frame long-term goal (LTG), 21 days or less at 11/20/2024 0906   Progress/Outcome goal ongoing at 12/02/2024 1011   Transfer Goal 3    Activity/Assistive Device shower at 11/20/2024 0906   [place] moderate assist (50-74% patient effort) at 12/02/2024 1011   Time Frame short-term  goal (STG), 5 - 7 days at 12/02/2024 1011   Progress/Outcome goal revised this date at 12/02/2024 1011   Transfer Goal 4    Activity/Assistive Device shower at 11/20/2024 0906   [place] minimum assist (75% or more patient effort) at 11/20/2024 0906   Time Frame long-term goal (LTG), 21 days or less at 11/20/2024 0906   Progress/Outcome goal ongoing at 12/02/2024 1011   Bathing Goal 1    [place] tactile cues required at 11/20/2024 0906   Time Frame short-term goal (STG), 5 - 7 days at 12/02/2024 1011   Progress/Outcome goal ongoing at 12/02/2024 1011   Bathing Goal 2    [place] modified independence at 11/20/2024 0906   Time Frame long-term goal (LTG), 21 days or less at 11/20/2024 0906   Progress/Outcome goal ongoing at 12/02/2024 1011   UB Dressing Goal 1    [place] minimum assist (75% or more patient effort) at 11/26/2024 1000   Time Frame short-term goal (STG), 5 - 7 days at 12/02/2024 1011   Strategies/Barriers with clothing retrieval at 11/20/2024 0906   Progress/Outcome goal ongoing at 12/02/2024 1011   UB Dressing Goal 2    [place] minimum assist (75% or more patient effort) at 11/20/2024 0906   Time Frame long-term goal (LTG), 21 days or less at 11/20/2024 0906   Strategies/Barriers with clothing retrieval at 11/20/2024 0906   Progress/Outcome goal ongoing at 12/02/2024 1011   LB Dressing Goal 1    [place] minimum assist (75% or more patient effort) at 11/26/2024 1000   Time Frame short-term goal (STG), 5 - 7 days at 12/02/2024 1011   Strategies/Barriers with clothing retrieval at 11/20/2024 0906   Progress/Outcome goal ongoing at 12/02/2024 1011   LB Dressing Goal 2    [place] minimum assist (75% or more patient effort) at 11/20/2024 0906   Time Frame long-term goal (LTG), 21 days or less at 11/20/2024 0906   Strategies/Barriers with clothing retrieval at 11/20/2024 0906   Progress/Outcome goal ongoing at 12/02/2024 1011   Grooming Goal 1    [place] moderate assist  (50-74% patient effort) at 11/20/2024 0906   Time Frame short-term goal (STG), 5 - 7 days at 12/02/2024 1011   Strategies/Barriers in stance at sink at 11/20/2024 0906   Progress/Outcome goal ongoing at 12/02/2024 1011   Grooming Goal 2    [place]ips supervision required at 11/20/2024 0906   Time Frame long-term goal (LTG), 21 days or less at 11/20/2024 0906   Strategies/Barriers in stance at sink at 11/20/2024 0906   Progress/Outcome goal ongoing at 12/02/2024 1011   Toileting Goal 1    [place] moderate assist (50-74% patient effort) at 12/02/2024 1011   Time Frame short-term goal (STG), 5 - 7 days at 12/02/2024 1011   Progress/Outcome goal ongoing at 12/02/2024 1011   Toileting Goal 2    [place] minimum assist (75% or more patient effort) at 11/20/2024 0906   Time Frame long-term goal (LTG), 21 days or less at 11/20/2024 0906   Progress/Outcome goal ongoing at 12/02/2024 1011

## 2024-12-06 NOTE — PROGRESS NOTES
[facility] Progress Note          Patient was seen and examined at bedside.    Subjective:     Reviewed night shift RN report. No new nursing issues.   Saw her in am: very pleasant, no new complaints or concerns.   No O/N events. No new complaints. Her paresthetic training is progressing well.      Objective   Vital signs in last 24 hours:  Temp:  [36.6 °C (97.9 °F)-36.8 °C (98.3 °F)] 36.7 °C (98.1 °F)  Heart Rate:  [] 87  Resp:  [18] 18  BP: (101-138)/(57-72) 137/72    No intake or output data in the 24 hours ending 12/06/24 1150    Intake/Output this shift:  No intake/output data recorded.   Review of Systems:  All other systems reviewed and negative except as noted in the HPI.   Objective      Labs  Reviewed her labs thoroughly   Lab Results   Component Value Date    WBC 4.98 11/20/2024    HGB 10.8 (L) 11/20/2024    HCT 32.7 (L) 11/20/2024    MCV 90.8 11/20/2024     11/20/2024     Lab Results   Component Value Date    GLUCOSE 205 (H) 11/20/2024    CALCIUM 8.7 11/20/2024     11/20/2024    K 3.7 11/20/2024    CO2 30 11/20/2024     11/20/2024    BUN 35 (H) 11/20/2024    CREATININE 0.9 11/20/2024       Imaging  N/A    Full Code    Physical Exam:  Head/Ear/Nose/Throat: normocephalic; atraumatic; moisture mouth mm, no oropharyngeal thrush noted.   Eyes: anicteric sclera, EOMI; PERRL.   Neck : supple, no JVD, no carotid bruits appeciated.   Respiratory: no evidence of labored breathing, lung sounds CTA b/l, good aeration bibasilar area, no w/r/c.   Cardiovascular: RRR; normal S1, S2; no m/r/g; no S3 or S4.   Gastrointestinal: soft; NT; BS normal; mildly distended; no CVAT b/l.   Genitourinary: no lozada.   Extremities : S/p L AKA, residual limb healed well .   Neurological: AO x 3, fluent speeches, following commands, CNS II-XII grossly intact; no focal neurologic deficits.   Behavior/Emotional: in NAD, appropriate; cooperative.   Skin: clean, dry and intact.  Plan of  care was discussed with patient, RN, and PMR attending     Assessment     CC:L AKA, ambulatory dysfunction     64 y.o. 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 S/p IV Ceftriaxone and IV Ampicillin until 9/14/24. H/o non-occlusive DVT of the R axillary and brachial vein around midline. She presented to [facility] on 11/14 for evaluation to begin prosthetic training. Now she was admitted for inpt prosthetic training on 11/19/24.  Denies nausea, vomiting, abdominal pain or discomfort, dysuria, cough/sputum, running nose, sore throat, chest pain, palpitation, SOB or orthopnea, dizziness or LH,  HA.    A/P:  # A/p L AKA  -inpt prosthetic training, wound care/dermal defense, pain and neuralgia managements, fall precaution     # CML  -cont home imatinib, check CBC      # Type 2 DM, hypothyroidism  - diet control, SSI, plus her home diabetic regimen;  - cont home levothyroxine     #  PICC-associated DVT on 8/30   -S/p SQ Lovenox weight based ;  -cont Eliquis 5 mg po bid      # Urinary retention, h/o recurrent UTI  -timed voiding with cic, adequate oral hydration, monitor PVR with cic, on flomax;  -hiprex 1 g po bid        # Stage 2 decubitus ulcer, pressure injury in right heel   -wound care     # Recent C.diff.  -cont prophylactic oral vanco 125 mg po bid       Billing code: [id number]  Diagnoses:  Patient Active Problem List   Diagnosis    Controlled type 2 diabetes mellitus, with long-term current use of insulin (CMS/[facility])    Essential hypertension    Hyperlipidemia    Hypothyroidism    Obesity    Thyroid nodule    COVID-19 virus infection    Lung nodule    Displacement of lumbar intervertebral disc without myelopathy    Disability of walking    Polyneuropathy due to  type 2 diabetes mellitus (CMS/HCC)    Pre-op examination    Localized osteoarthritis of right knee    Type 2 diabetes mellitus treated with insulin (CMS/HCC)    Abnormal finding on EKG    Left foot drop    Abnormal finding on chest xray    Osteoarthritis of right knee, unspecified osteoarthritis type    S/P total knee arthroplasty, right    Sepsis (CMS/HCC)    Osteomyelitis of left foot (CMS/HCC)    Peripheral neuropathy    Abdominal pain    Chronic idiopathic constipation    Gangrene (CMS/HCC)    Tibial artery stenosis, left (CMS/HCC)    H/O pilonidal cyst    Type 2 diabetes mellitus with left diabetic foot infection (CMS/HCC)    Type 2 diabetes mellitus with diabetic neuropathy (CMS/HCC)    Hypothyroidism    Primary hypertension    Leukemoid reaction    HLD (hyperlipidemia)    Acute osteomyelitis of left foot (CMS/HCC)    History of above knee amputation, left (CMS/HCC)    Encounter for prosthetic gait training    Adjustment disorder with anxiety           Charlette Brooke MD  12/6/2024

## 2024-12-06 NOTE — PROGRESS NOTES
Patient: Bebe Burks  Location: [facility]  MRN: [id number]  Today's date: 12/6/2024    History of Present Illness  Bebe is a 64 y.o. female admitted on 11/19/2024 with History of above knee amputation, left (CMS/[facility]) [Z89.612]  Encounter for prosthetic gait training [Z47.89]. Principal problem is History of above knee amputation, left (CMS/[facility]).    Pt is a 64 y.o. 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       Past Medical History  Bebe has a past medical history of Abnormal ECG, Abnormal finding on chest xray (12/2021), Arthritis, Colon cancer (CMS/[facility]), COVID-19 (12/2021), COVID-19 vaccine series completed, Diabetic neuropathy (CMS/[facility]), DM (diabetes mellitus), type 2 with ophthalmic complications (CMS/[facility]), Hypertension, Hypothyroidism, Left foot drop, Lipid disorder, and Type 2 diabetes mellitus treated with insulin (CMS/[facility]).    PT Vitals      Date/Time Pulse BP BP Location BP Method Pt Position [facility]   12/06/24 1312 88 104/63 Left upper arm Automatic Sitting JG          PT Pain      Date/Time Pain Type Location Rating: Rest Rating: Rest Rating: Activity [facility]   12/06/24 1312 Pain Assessment back 2 -- -- JG   12/06/24 1358 Pain Reassessment -- -- 0 - no pain 0 - no pain JG               Prior Living Environment      Flowsheet Row Most Recent Value   People in Home alone   Current Living Arrangements condominium   Home Accessibility stairs to enter home (Group)   Living Environment Comment resides in 1st floor condo, 4 (1+2+1) JOSE RAFAEL, R hand rail, no stairs inside home, Bathroom Layout: Tub/shower unit with shower curtain, Shower stall with glass doors (shower stall in main bedroom), two sisters can provide some assistance   Number of Stairs, Main Entrance 4   Surface of Stairs, Main Entrance concrete   Stair Railings, Main Entrance railing on right side (ascending)   Stairs Comment, Main  Entrance 1+2+1 [place] RAFAEL   Location, Bathroom first (main) floor   Bathroom Access tub bath, walk-in shower            Prior Level of Function      Flowsheet Row Most Recent Value   Dominant Hand right   Ambulation assistive equipment   Transferring assistive equipment   Toileting independent   Bathing assistive equipment   Dressing independent   Eating independent   IADLs assistive equipment   Driving/Transportation    Communication understands/communicates without difficulty   Assistive Device Currently Used at Home cane, straight, walker, front-wheeled, shower chair, grab bar, raised toilet             IRF PT Evaluation and Treatment - 12/06/24 1302          PT Time Calculation    Start Time 1302     Stop Time 1402     Time Calculation (min) 60 min        Session Details    Document Type Daily Treatment/Progress Note        General Information    General Observations of Patient Pt upright in [facility], reporting feeling better        Mobility Belt    Mobility Belt Used During Session yes        Orthosis Ankle Right AFO    Orthosis Properties Date Obtained: 12/03/24 Location: Ankle Laterality: Right Type: AFO Features: Carbon fiber Description: R toe off carbon fiber brace Therapeutic Indications: compensation for lost function Wearing Schedule: wear when out of bed       Transfers    Transfers stand pivot transfer        Sit to Stand Transfer    [place], Sit to Stand Transfer minimum assist (75% or more patient effort)     Safety/Cues technique;sequencing     Assistive Device walker, front-wheeled     Comment min A for hip extension, recommending patient puts RUE on RW (vs pushing up with BUE) due to difficulty maintaining BLE stance control when transitioning both UE to RW        Stand to Sit Transfer    [place], Stand to Sit Transfer minimum assist (75% or more patient effort)     Safety/Cues increased time to complete;technique     Assistive Device walker, front-wheeled     Comment min A for  "pelvic stability when weightshifing off prosthesis to release toggle and unlock knee        Stand Pivot Transfer    [place], Stand Pivot/Stand Step Transfer minimum assist (75% or more patient effort)     Safety/Cues sequencing;technique     Assistive Device walker, front-wheeled     Comment min A for pelvic stability when weightshifing off prosthesis to release toggle and unlock knee        Gait Training    [place], Gait minimum assist (75% or more patient effort)     Safety/Cues sequencing;technique     Assistive Device walker, front-wheeled     Distance in Feet 25 feet     Pattern step-through     Deviations/Abnormal Patterns base of support, narrow;gait speed decreased;step length decreased     Left Sided Gait Deviations hip circumduction     Right Sided Gait Deviations heel strike decreased     Comment Ambulated 25' x 2 with RW, min A for balance and pelvic stability , improving prosthetic control        Stairs Training    Comment Part practice elevation training in // bars: 6\" step ups with BUE support with RLE, kicking L prosthesis back to step down backwards, mod A of 1 for RLE hip/knee extension to ascend and intemittent assist of 2nd to assist with placement when stepping back down x 4 reps        Wheelchair Mobility/Management    Comment, Wheelchair Mobility Trialed loaner Quickie 2 for fit in prep for discharge Tuesday x 150' mod I with pt reporting easy propulsion but uncomfortable at 90 degree back angle, unable to adjust. [facility] will deliver personal loaner on Monday        Balance    Balance Interventions standing     Comment, Balance in // bars: ambulating up/back in bars with BUE support 6 x 10', steadying assistance at gait belt for balance        Prosthetic Orientation (Lower Extremity)    Fit Assessment fit/function of prosthesis are appropriate     Comment, Fit Assessment Pt with prosthesis donned from AM session. To remove in room independently at 4PM; sock ply = 3 ply' skin check = " unremarkable        Daily Progress Summary (PT)    Daily Outcome Statement Pt with improving overall prosthetic control and confidence. Increased speed andimproved step length in // bars, recommending continued pre-gait and gait training in // bars to maximize confidence and carry over into overground ambulation RW. Would benefit from proximal strengthening and core strengthening. Progress independence as able.                               IRF PT Goals      Flowsheet Row Most Recent Value   Transfer Goal 1    Activity/Assistive Device stand pivot, walker, front-wheeled at 11/19/2024 1431   [place] moderate assist (50-74% patient effort) at 11/26/2024 0855   Time Frame short-term goal (STG), 5 - 7 days at 12/03/2024 0855   Progress/Outcome goal ongoing at 12/03/2024 0855   Transfer Goal 2    Activity/Assistive Device stand pivot, walker, front-wheeled at 11/19/2024 1431   [place] minimum assist (75% or more patient effort) at 11/26/2024 0855   Time Frame 14 days or less, long-term goal (LTG) at 11/26/2024 0855   Progress/Outcome goal ongoing at 12/03/2024 0855   Transfer Goal 3    Activity/Assistive Device sit-to-stand/stand-to-sit, walker, front-wheeled at 11/19/2024 1431   [place] minimum assist (75% or more patient effort) at 11/26/2024 0855   Time Frame short-term goal (STG), 5 - 7 days at 12/03/2024 0855   Progress/Outcome goal ongoing at 12/03/2024 0855   Transfer Goal 4    Activity/Assistive Device sit-to-stand/stand-to-sit, walker, front-wheeled at 11/19/2024 1431   [place] tactile cues required at 11/26/2024 0855   Time Frame long-term goal (LTG), 14 days or less at 11/26/2024 0855   Progress/Outcome goal ongoing at 12/03/2024 0855   Gait/Walking Locomotion Goal 1    Activity/Assistive Device gait (walking locomotion), walker, front-wheeled at 11/19/2024 1431   Distance 10 feet at 11/19/2024 1431   [place] moderate assist (50-74% patient effort) at 11/26/2024 0855   Time Frame  "short-term goal (STG), 5 - 7 days at 12/03/2024 0855   Strategies/Barriers 50 at 11/26/2024 0855   Progress/Outcome goal met at 12/03/2024 0855   Gait/Walking Locomotion Goal 2    Activity/Assistive Device gait (walking locomotion), assistive device use, walker, front-wheeled at 11/26/2024 0855   Distance 25 feet at 12/03/2024 0855   [place] minimum assist (75% or more patient effort) at 11/26/2024 0855   Time Frame long-term goal (LTG), 14 days or less at 11/26/2024 0855   Progress/Outcome goal ongoing, goal revised this date at 12/03/2024 0855   Wheelchair Locomotion Goal 1    Activity forward propulsion, backward propulsion, steering, stopping, turning, doorway navigation, weight shifting at 11/19/2024 1431   Assistive Device manual, ultra lightweight at 11/19/2024 1431   Distance 150 feet at 11/19/2024 1431   [place] other (see comments)  [Touching / Steadying Assist] at 11/19/2024 1431   Time Frame 1 week, short-term goal (STG) at 11/19/2024 1431   Progress/Outcome goal met at 11/26/2024 0855   Wheelchair Locomotion Goal 2    Activity forward propulsion, backward propulsion, steering, stopping, turning, doorway navigation, weight shifting at 11/19/2024 1431   Assistive Device manual, ultra lightweight at 11/19/2024 1431   Distance 150 feet at 11/19/2024 1431   [place] modified independence at 11/19/2024 1431   Time Frame 21 days or less, long-term goal (LTG) at 11/19/2024 1431   Progress/Outcome goal met at 12/03/2024 0855   Stairs Goal 1    Activity/Assistive Device ascending stairs, descending stairs, using handrail, left, using handrail, right, step-to-step at 11/29/2024 1100   Number of Stairs 4 at 11/29/2024 1100   [place] moderate assist (50-74% patient effort) at 11/29/2024 1100   Time Frame short-term goal (STG), 5 - 7 days at 11/29/2024 1100   Strategies/Barriers 6\" at 11/29/2024 1100   Progress/Outcome goal ongoing at 12/03/2024 0855   Stairs Goal 2    Activity/Assistive Device " "ascending stairs, descending stairs, using handrail, left, using handrail, right, step-to-step at 11/29/2024 1100   Number of Stairs 4 at 11/29/2024 1100   [place] minimum assist (75% or more patient effort) at 11/29/2024 1100   Time Frame long-term goal (LTG), 14 days or less at 11/29/2024 1100   Strategies/Barriers 6\" at 11/29/2024 1100   Progress/Outcome goal ongoing at 12/03/2024 0855          "

## 2024-12-06 NOTE — PROGRESS NOTES
Patient: Bebe Burks  Location: [facility] [address]  MRN: [id number]  Today's date: 12/6/2024    History of Present Illness  Bebe is a 64 y.o. female admitted on 11/19/2024 with History of above knee amputation, left (CMS/[facility]) [Z89.612]  Encounter for prosthetic gait training [Z47.89]. Principal problem is History of above knee amputation, left (CMS/[facility]).    Pt is a 64 y.o. 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       Past Medical History  Bebe has a past medical history of Abnormal ECG, Abnormal finding on chest xray (12/2021), Arthritis, Colon cancer (CMS/[facility]), COVID-19 (12/2021), COVID-19 vaccine series completed, Diabetic neuropathy (CMS/HCC), DM (diabetes mellitus), type 2 with ophthalmic complications (CMS/HCC), Hypertension, Hypothyroidism, Left foot drop, Lipid disorder, and Type 2 diabetes mellitus treated with insulin (CMS/HCC).    PT Vitals      Date/Time Pulse BP BP Location BP Method Pt Position Observations [facility]   12/06/24 1001 83 138/62 Left upper arm Automatic Sitting mild nauseas feeling JG          PT Pain      Date/Time Pain Type Acceptable Pain Level Location Rating: Rest Rating: Rest [facility]   12/06/24 1001 Pain Assessment -- abdomen 2 -- JG                     12/06/24 1059 Pain Reassessment -- -- -- 0 - no pain JG               Prior Living Environment      Flowsheet Row Most Recent Value   People in Home alone   Current Living Arrangements condominium   Home Accessibility stairs to enter home (Group)   Living Environment Comment resides in 1st floor condo, 4 (1+2+1) JOSE RAFAEL, R hand rail, no stairs inside home, Bathroom Layout: Tub/shower unit with shower curtain, Shower stall with glass doors (shower stall in main bedroom), two sisters can provide some assistance   Number of Stairs, Main Entrance 4   Surface of Stairs, Main Entrance concrete   Stair Railings, Main Entrance railing on  right side (ascending)   Stairs Comment, Main Entrance 1+2+1 JOSE RAFAEL   Location, Bathroom first (main) floor   Bathroom Access tub bath, walk-in shower            Prior Level of Function      Flowsheet Row Most Recent Value   Dominant Hand right   Ambulation assistive equipment   Transferring assistive equipment   Toileting independent   Bathing assistive equipment   Dressing independent   Eating independent   IADLs assistive equipment   Driving/Transportation    Communication understands/communicates without difficulty   Assistive Device Currently Used at Home cane, straight, walker, front-wheeled, shower chair, grab bar, raised toilet             IRF PT Evaluation and Treatment - 12/06/24 1004          PT Time Calculation    Start Time 1000     Stop Time 1100     Time Calculation (min) 60 min        Session Details    Document Type Daily Treatment/Progress Note        General Information    General Observations of Patient Pt upright [place] [facility]; reporting she had episode of nausea this morning but feels okay to participate        Mobility Belt    Mobility Belt Used During Session yes        Orthosis Ankle Right AFO    Orthosis Properties Date Obtained: 12/03/24 Location: Ankle Laterality: Right Type: AFO Features: Carbon fiber Description: R toe off carbon fiber brace Therapeutic Indications: compensation for lost function Wearing Schedule: wear when out of bed       Bed Mobility    [place], Supine to Sit supervision     [place], Sit to Supine modified independence     Safety/Cues increased time to complete     Assistive Device bed rails     Comment sit to supine, mod I ; supine to sit, able to complete without hands on assistance using bed rail to push self up into upright position        Transfers    Transfers tub transfer;stand pivot transfer;low pivot transfer     Maintains Weight-Bearing Status able to maintain        Sit to Stand Transfer    [place], Sit to Stand Transfer minimum  assist (75% or more patient effort);1 person assist     Safety/Cues sequencing;technique     Assistive Device walker, front-wheeled     Comment min A for hip extension, recommending patient puts RUE on RW  (vs pushing up with BUE) due to difficulty maintaining BLE stance control when transitioning both UE to RW        Stand to Sit Transfer    [place], Stand to Sit Transfer minimum assist (75% or more patient effort)     Safety/Cues sequencing;technique     Assistive Device walker, front-wheeled     Comment min A for pelvic stability when weightshifing off prosthesis to release toggle and unlock knee        Low Pivot Transfer    [place], Low Pivot Transfer supervision     Safety/Cues sequencing;technique     Assistive Device wheelchair     Comment LPT from wheelchair to bed, S for verbal cueing        Stand Pivot Transfer    [place], Stand Pivot/Stand Step Transfer minimum assist (75% or more patient effort);1 person assist     Safety/Cues sequencing;technique     Assistive Device walker, front-wheeled     Comment min A for pelvic stablity at hips for balance , improved prosthetic control for foot placement        Tub Transfer    Comment Simulated tub transfer from wheelchair to tub bench from home eval set up, steadying assistance for positioning of WC and ensuring clearance of gap to tub bench        Gait Training    [place], Gait minimum assist (75% or more patient effort)     Safety/Cues technique;sequencing     Assistive Device walker, front-wheeled     Distance in Feet 20 feet     Pattern step-through     Deviations/Abnormal Patterns base of support, narrow;weight shifting decreased;stride length decreased     Left Sided Gait Deviations hip circumduction     Right Sided Gait Deviations heel strike decreased     Comment Ambulated 20' + 10 ' with RW, min A for steadying at hips for balance        Curb Negotiation    [place] 1 person assist;dependent (less than 25% patient effort);2 person  assist     Assistive Device walker, front-wheeled     Curb Height 6 inches     Comment up forwards with RW, mod A for R hip/knee extension; descending forwards mod A for eccentric control and assist of 2nd for RW management        Prosthetic Orientation (Lower Extremity)    Fit Assessment fit/function of prosthesis are appropriate     Comment, Fit Assessment Donned liner and prosthesis supine in bed to simulate home set up; set up assistance; sock ply = 3 ply; wearing schedule = 7 hours; skin check - unremarkable        Daily Progress Summary (PT)    Daily Outcome Statement Reviewed results from home evaluation and simulated transfer techniques discussed. Continue with standing balance activities in // bars, pre-gait activities and gait training. Progress as able.                               IRF PT Goals      Flowsheet Row Most Recent Value   Transfer Goal 1    Activity/Assistive Device stand pivot, walker, front-wheeled at 11/19/2024 1431   [place] moderate assist (50-74% patient effort) at 11/26/2024 0855   Time Frame short-term goal (STG), 5 - 7 days at 12/03/2024 0855   Progress/Outcome goal ongoing at 12/03/2024 0855   Transfer Goal 2    Activity/Assistive Device stand pivot, walker, front-wheeled at 11/19/2024 1431   [place] minimum assist (75% or more patient effort) at 11/26/2024 0855   Time Frame 14 days or less, long-term goal (LTG) at 11/26/2024 0855   Progress/Outcome goal ongoing at 12/03/2024 0855   Transfer Goal 3    Activity/Assistive Device sit-to-stand/stand-to-sit, walker, front-wheeled at 11/19/2024 1431   [place] minimum assist (75% or more patient effort) at 11/26/2024 0855   Time Frame short-term goal (STG), 5 - 7 days at 12/03/2024 0855   Progress/Outcome goal ongoing at 12/03/2024 0855   Transfer Goal 4    Activity/Assistive Device sit-to-stand/stand-to-sit, walker, front-wheeled at 11/19/2024 1431   [place] tactile cues required at 11/26/2024 0855   Time Frame  long-term goal (LTG), 14 days or less at 11/26/2024 0855   Progress/Outcome goal ongoing at 12/03/2024 0855   Gait/Walking Locomotion Goal 1    Activity/Assistive Device gait (walking locomotion), walker, front-wheeled at 11/19/2024 1431   Distance 10 feet at 11/19/2024 1431   [place] moderate assist (50-74% patient effort) at 11/26/2024 0855   Time Frame short-term goal (STG), 5 - 7 days at 12/03/2024 0855   Strategies/Barriers 50 at 11/26/2024 0855   Progress/Outcome goal met at 12/03/2024 0855   Gait/Walking Locomotion Goal 2    Activity/Assistive Device gait (walking locomotion), assistive device use, walker, front-wheeled at 11/26/2024 0855   Distance 25 feet at 12/03/2024 0855   [place] minimum assist (75% or more patient effort) at 11/26/2024 0855   Time Frame long-term goal (LTG), 14 days or less at 11/26/2024 0855   Progress/Outcome goal ongoing, goal revised this date at 12/03/2024 0855   Wheelchair Locomotion Goal 1    Activity forward propulsion, backward propulsion, steering, stopping, turning, doorway navigation, weight shifting at 11/19/2024 1431   Assistive Device manual, ultra lightweight at 11/19/2024 1431   Distance 150 feet at 11/19/2024 1431   [place] other (see comments)  [Touching / Steadying Assist] at 11/19/2024 1431   Time Frame 1 week, short-term goal (STG) at 11/19/2024 1431   Progress/Outcome goal met at 11/26/2024 0855   Wheelchair Locomotion Goal 2    Activity forward propulsion, backward propulsion, steering, stopping, turning, doorway navigation, weight shifting at 11/19/2024 1431   Assistive Device manual, ultra lightweight at 11/19/2024 1431   Distance 150 feet at 11/19/2024 1431   [place] modified independence at 11/19/2024 1431   Time Frame 21 days or less, long-term goal (LTG) at 11/19/2024 1431   Progress/Outcome goal met at 12/03/2024 0855   Stairs Goal 1    Activity/Assistive Device ascending stairs, descending stairs, using handrail, left, using handrail,  "right, step-to-step at 11/29/2024 1100   Number of Stairs 4 at 11/29/2024 1100   [place] moderate assist (50-74% patient effort) at 11/29/2024 1100   Time Frame short-term goal (STG), 5 - 7 days at 11/29/2024 1100   Strategies/Barriers 6\" at 11/29/2024 1100   Progress/Outcome goal ongoing at 12/03/2024 0855   Stairs Goal 2    Activity/Assistive Device ascending stairs, descending stairs, using handrail, left, using handrail, right, step-to-step at 11/29/2024 1100   Number of Stairs 4 at 11/29/2024 1100   [place] minimum assist (75% or more patient effort) at 11/29/2024 1100   Time Frame long-term goal (LTG), 14 days or less at 11/29/2024 1100   Strategies/Barriers 6\" at 11/29/2024 1100   Progress/Outcome goal ongoing at 12/03/2024 0855          "

## 2024-12-06 NOTE — PROGRESS NOTES
Inpatient Psychology Progress Note    Duration:  16  minutes  Bebe : 1960, a 64 y.o. female, for follow up visit.  Reason:  She has been experiencing .Adjustment Issues    HPI: Prosthetic training      Current Evaluation:     Mental Status Evaluation:  Arousal Level: Alert  Appearance: Well Groomed  Speech: Regular  Psychomotor Functioning: WNL (reports that it has improved)  Eye Contact: WNL  Est. Premorbid Intelligence: Average  Orientation: Fully oriented  Attention: WNL  Concentration: WNL  Recent Memory: Mild Loss  Remote Memory: WNL  Thought Content: Appropriate  Thought Process: WNL  Insight: Intact  Perceptual Function: Pain  Delusions: None or age appropriate  Sleeping: No Change  Appetite: No Change  Libido: Non-Contributory  Affect: Full Range  Mood: Hopeful, Motivated (dostressed about missing wedding and gi)         [place] Suicide Severity Rating Scale:  Not indicated         Safe-T Assessment:  Not indicated           Goals Addressed                   This Visit's Progress     Maximize adjustment and acceptance of limitations               Interventions  Acceptance & Adjustment  Cognitive Behavior Training  Monitoring of Symptoms  Psychoeducation    Psychoeducation provided on:  Amputation and Recovery     Recommendations:      No Further sessions; team will contact Psychology as needed.        Visit Diagnosis:     1. Adjustment disorder with anxiety [F43.22]        Diagnostic Impression: She reports she had her home visit today and it went very well.  She reports she will discharge home on Tuesday.  She reports she is pleased that she will begin to go home.  She reports that if she feels is not working out she can always go stay with her sister.  She is pleased with how she is doing in her progress.  She reports her mood is good and she is coping well.  She requested session end as she is tired but otherwise doing well.  Will discharge from psychology as she has no further  needed and discharge is pending      Psychological condition is generally improving       Anna Cody, PSY.D

## 2024-12-06 NOTE — PLAN OF CARE
Plan of Care Review  Plan of Care Reviewed With: patient  Progress: improving  Outcome Evaluation: Alert and oriented x4. Continent of bladder, mostly continent of bowel. PRN Dilaudid given at HS for pain to residual limb, effective. Accuchecks TID, no s/s of hyper/hypo-glycemia noted. Sleeping quietly in bed overnight. Fall and safety measures maintained.

## 2024-12-07 ENCOUNTER — APPOINTMENT (INPATIENT)
Dept: OCCUPATIONAL THERAPY | Facility: REHABILITATION | Age: 64
DRG: 560 | End: 2024-12-07
Payer: MEDICARE

## 2024-12-07 LAB
GLUCOSE BLD-MCNC: 203 MG/DL (ref 70–99)
GLUCOSE BLD-MCNC: 211 MG/DL (ref 70–99)
GLUCOSE BLD-MCNC: 248 MG/DL (ref 70–99)
POCT TEST: ABNORMAL

## 2024-12-07 PROCEDURE — 63700000 HC SELF-ADMINISTRABLE DRUG: Performed by: INTERNAL MEDICINE

## 2024-12-07 PROCEDURE — 97530 THERAPEUTIC ACTIVITIES: CPT | Mod: GO

## 2024-12-07 PROCEDURE — 12800000 HC ROOM AND CARE SEMIPRIVATE REHAB

## 2024-12-07 RX ADMIN — GABAPENTIN 600 MG: 300 CAPSULE ORAL at 20:39

## 2024-12-07 RX ADMIN — THERA TABS 1 TABLET: TAB at 12:19

## 2024-12-07 RX ADMIN — POTASSIUM CHLORIDE 20 MEQ: 1500 TABLET, EXTENDED RELEASE ORAL at 12:19

## 2024-12-07 RX ADMIN — FLUTICASONE PROPIONATE 1 SPRAY: 50 SPRAY, METERED NASAL at 20:42

## 2024-12-07 RX ADMIN — FERROUS SULFATE TAB 325 MG (65 MG ELEMENTAL FE) 325 MG: 325 (65 FE) TAB at 12:19

## 2024-12-07 RX ADMIN — ATORVASTATIN CALCIUM 40 MG: 40 TABLET, FILM COATED ORAL at 16:15

## 2024-12-07 RX ADMIN — INSULIN GLARGINE 15 UNITS: 100 INJECTION, SOLUTION SUBCUTANEOUS at 20:42

## 2024-12-07 RX ADMIN — EMPAGLIFLOZIN 10 MG: 10 TABLET, FILM COATED ORAL at 08:36

## 2024-12-07 RX ADMIN — DULOXETINE HYDROCHLORIDE 60 MG: 30 CAPSULE, DELAYED RELEASE ORAL at 20:40

## 2024-12-07 RX ADMIN — INSULIN LISPRO 5 UNITS: 100 INJECTION, SOLUTION INTRAVENOUS; SUBCUTANEOUS at 08:42

## 2024-12-07 RX ADMIN — INSULIN LISPRO 5 UNITS: 100 INJECTION, SOLUTION INTRAVENOUS; SUBCUTANEOUS at 12:21

## 2024-12-07 RX ADMIN — LEVOTHYROXINE SODIUM 25 MCG: 0.03 TABLET ORAL at 05:52

## 2024-12-07 RX ADMIN — APIXABAN 5 MG: 5 TABLET, FILM COATED ORAL at 20:39

## 2024-12-07 RX ADMIN — HYDROMORPHONE HYDROCHLORIDE 4 MG: 2 TABLET ORAL at 00:26

## 2024-12-07 RX ADMIN — GABAPENTIN 600 MG: 300 CAPSULE ORAL at 08:37

## 2024-12-07 RX ADMIN — FUROSEMIDE 40 MG: 40 TABLET ORAL at 08:36

## 2024-12-07 RX ADMIN — INSULIN LISPRO 5 UNITS: 100 INJECTION, SOLUTION INTRAVENOUS; SUBCUTANEOUS at 17:32

## 2024-12-07 RX ADMIN — IMATINIB MESYLATE 400 MG: 400 TABLET, FILM COATED ORAL at 08:40

## 2024-12-07 RX ADMIN — METOCLOPRAMIDE 5 MG: 5 TABLET ORAL at 08:12

## 2024-12-07 RX ADMIN — INSULIN LISPRO 2 UNITS: 100 INJECTION, SOLUTION INTRAVENOUS; SUBCUTANEOUS at 12:22

## 2024-12-07 RX ADMIN — TAMSULOSIN HYDROCHLORIDE 0.4 MG: 0.4 CAPSULE ORAL at 16:15

## 2024-12-07 RX ADMIN — INSULIN LISPRO 2 UNITS: 100 INJECTION, SOLUTION INTRAVENOUS; SUBCUTANEOUS at 08:42

## 2024-12-07 RX ADMIN — GABAPENTIN 600 MG: 300 CAPSULE ORAL at 16:15

## 2024-12-07 RX ADMIN — APIXABAN 5 MG: 5 TABLET, FILM COATED ORAL at 08:37

## 2024-12-07 RX ADMIN — INSULIN LISPRO 2 UNITS: 100 INJECTION, SOLUTION INTRAVENOUS; SUBCUTANEOUS at 17:31

## 2024-12-07 RX ADMIN — FUROSEMIDE 40 MG: 40 TABLET ORAL at 16:15

## 2024-12-07 RX ADMIN — FLUTICASONE PROPIONATE 1 SPRAY: 50 SPRAY, METERED NASAL at 08:37

## 2024-12-07 RX ADMIN — METHENAMINE HIPPURATE 1 G: 1000 TABLET ORAL at 20:40

## 2024-12-07 RX ADMIN — OXYCODONE HYDROCHLORIDE AND ACETAMINOPHEN 500 MG: 500 TABLET ORAL at 12:19

## 2024-12-07 RX ADMIN — HYDROMORPHONE HYDROCHLORIDE 4 MG: 2 TABLET ORAL at 16:15

## 2024-12-07 RX ADMIN — METHENAMINE HIPPURATE 1 G: 1000 TABLET ORAL at 08:36

## 2024-12-07 RX ADMIN — Medication 3 MG: at 20:40

## 2024-12-07 RX ADMIN — LOSARTAN POTASSIUM 25 MG: 25 TABLET, FILM COATED ORAL at 16:15

## 2024-12-07 NOTE — PLAN OF CARE
Problem: Rehabilitation (IRF) Plan of Care  Goal: Plan of Care Review  Outcome: Progressing  Flowsheets (Taken 12/7/2024 2914)  Progress: improving  Plan of Care Reviewed With: patient  Outcome Evaluation: pt received at 2300. dilaudid given for pain. incontinence care given after pt incontinent of stool. barrier cream to tabby area and buttocks redness. R SCD on. sacral mepilex intact. call light within reach. bed alarm on.

## 2024-12-07 NOTE — PROGRESS NOTES
Podiatry Progress Note    Subjective follow up evaluation for right lateral foot DTI and great toe scab.        Interval History: none.       Medications:    Current Facility-Administered Medications:     acetaminophen (TYLENOL) tablet 650 mg, 650 mg, oral, q4h PRN, Charlette Brooke MD, 650 mg at 12/05/24 0259    apixaban (ELIQUIS) tablet 5 mg, 5 mg, oral, BID, Charlette Brooke MD, 5 mg at 12/07/24 0837    ascorbic acid (VITAMIN C) tablet 500 mg, 500 mg, oral, q48h INT, Charlette Brooke MD, 500 mg at 12/05/24 1228    atorvastatin (LIPITOR) tablet 40 mg, 40 mg, oral, Daily (6p), Charlette Brooke MD, 40 mg at 12/06/24 1718    docusate sodium (COLACE) capsule 100 mg, 100 mg, oral, BID, Charlette Brooke MD, 100 mg at 12/05/24 1228    DULoxetine (CYMBALTA) capsule,delayed release(DR/EC) 60 mg, 60 mg, oral, Nightly, Charlette Brooke MD, 60 mg at 12/06/24 2056    empagliflozin (JARDIANCE) tablet 10 mg, 10 mg, oral, Daily, Charlette Brooke MD, 10 mg at 12/07/24 0836    ferrous sulfate tablet 325 mg, 325 mg, oral, q48h INT, Charlette Brooke MD, 325 mg at 12/05/24 1228    fluticasone propionate (FLONASE) 50 mcg/actuation nasal spray 1 spray, 1 spray, Each Nostril, BID, Alexei Petty MD, 1 spray at 12/07/24 0837    furosemide (LASIX) tablet 40 mg, 40 mg, oral, BID (am, 4p), Charlette Brooke MD, 40 mg at 12/07/24 0836    gabapentin (NEURONTIN) capsule 600 mg, 600 mg, oral, TID, Charlette Brooke MD, 600 mg at 12/07/24 0837    HYDROmorphone (DILAUDID) tablet 4 mg, 4 mg, oral, q6h PRN, Charlette Brooke MD, 4 mg at 12/07/24 0026    imatinib (GLEEVEC) chemo tablet 400 mg, 400 mg, oral, Daily, Charlette Brooke MD, 400 mg at 12/07/24 0840    insulin glargine U-100 (LANTUS/BASAGLAR) pen 15 Units, 15 Units, subcutaneous, Nightly, Charlette Brooke MD, 15 Units at 12/06/24 2059    insulin lispro U-100 (HumaLOG) pen 1-12 Units, 1-12 Units, subcutaneous, TID with meals, Charlette Brooke MD, 2 Units at 12/07/24 0842    insulin lispro U-100 (HumaLOG) pen 5 Units,  5 Units, subcutaneous, TID with meals, Charlette Brooke MD, 5 Units at 12/07/24 0842    levothyroxine (SYNTHROID) tablet 25 mcg, 25 mcg, oral, Daily (6:30a), Charlette Brooke MD, 25 mcg at 12/07/24 0552    losartan (COZAAR) tablet 25 mg, 25 mg, oral, Daily with dinner, Charlette Brooke MD, 25 mg at 12/06/24 1718    melatonin ODT 3 mg, 3 mg, oral, Nightly, Charlette Brooke MD, 3 mg at 12/06/24 2056    methenamine (HIPREX) tablet 1 g, 1 g, oral, BID, Charlette Brooke MD, 1 g at 12/07/24 0836    metoclopramide (REGLAN) tablet 5 mg, 5 mg, oral, 3x daily PRN, Charlette Brooke MD, 5 mg at 12/07/24 0812    multivitamin tablet 1 tablet, 1 tablet, oral, Daily, Charlette Brooke MD, 1 tablet at 12/06/24 1229    potassium chloride (KLOR-CON M) ER tablet (particles/crystals) 20 mEq, 20 mEq, oral, Daily, Charlette Brooke MD, 20 mEq at 12/06/24 1229    semaglutide tablet 14 mg, 1 tablet, oral, Daily, Charlette Brooke MD, 14 mg at 12/07/24 0627    tamsulosin (FLOMAX) 24 hr ER capsule 0.4 mg, 0.4 mg, oral, Daily with dinner, Charlette Brooke MD, 0.4 mg at 12/06/24 1719    Labs:  Lab Results   Component Value Date    GLUCOSE 205 (H) 11/20/2024    CALCIUM 8.7 11/20/2024     11/20/2024    K 3.7 11/20/2024    CO2 30 11/20/2024     11/20/2024    BUN 35 (H) 11/20/2024    CREATININE 0.9 11/20/2024     Lab Results   Component Value Date    WBC 4.98 11/20/2024    HGB 10.8 (L) 11/20/2024    HCT 32.7 (L) 11/20/2024    MCV 90.8 11/20/2024     11/20/2024       Pathology Results       ** No results found for the last 720 hours. **          Microbiology Results       ** No results found for the last 720 hours. **                  Imaging  No results found.        Vital signs in last 24 hours:  Temp:  [36.8 °C (98.2 °F)-37 °C (98.6 °F)] 37 °C (98.6 °F)  Heart Rate:  [] 99  Resp:  [18] 18  BP: (104-137)/(57-72) 110/57      No intake or output data in the 24 hours ending 12/07/24 1051      Review of Systems - Negative fever, chills, night  sweats.    Objective     PE: nonpalpable DP nonpalpable PT right foot.  Decreased texture, temperature, and turgor right foot.  Decreased digital hair .  Mild edema right lower extremity.  Xerotic skin bilateral .  Right lateral foot with nonblanchable maroon scabbed area.  No crepitus no fluctuance.  No erythema.  Right great toe with dorsal scab.  thickened, yellow nails with subungual debris  <5.    Digital contracture noted toe 2-5.   Hallux valgus deformity.  Epicritic sensation reduced right foot.             A/P   Assessment   diabetes mellitus type 2, neuropathy,  left transfemoral amputation,  right foot DTI and noninfected great toe scab.       Plan   Continue current wound care.  Continue to monitor for breakdown and infection.    Any change in the foot since last evaluation? No significant change          Dimitri Douglas DPM  12/7/2024  10:51 AM

## 2024-12-07 NOTE — PROGRESS NOTES
Subjective    Patient seen and examined on rounds.  Chart reviewed.  Events overnight noted.  History reviewed briefly with patient.    CC:  Deficits in mobility, transfers, self-care status post left transfemoral amputation for prosthetic training, severe peripheral vascular disease, chronic myelocytic leukemia, osteomyelitis left foot, diabetic neuropathy, multiple medical problems    HPI:  Ms. 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 Pura Chaves. ID recommended IV Ceftriaxone and IV Ampicillin until 9/14/24.  She had urinary retention managed with indwelling Rowan catheter.  She had right upper extremity swelling present during this admission, ultrasound on 8/29/24 demonstrated an acute non-occlusive DVT of the right axillary and brachial vein around line (on Lovenox).  Subsequently she completed an acute inpatient rehabilitation stay for pre-prosthetic training at [facility] between 9/3/24 to 9/21/24 and was discharged to [facility] on 9/21/24.  She initially required bladder self intermittent catheterizations due to urinary retention, but indicates that now she is able to void.  She desired to be a prosthetic ambulator.  A left transfemoral amputation prosthesis was fabricated. and followed-up as outpatient in the amputee clinic at [facility] and she was deemed to be an  appropriate candidate for acute inpatient rehabilitation stay. She has been needing assistance for mobility, transfers, self-care.  I have reviewed the preadmission screening and concur with that information and there are no significant changes from patient's preadmission screening. She is transferred to [facility] on 11/19/24 from [facility] for further acute inpatient rehabilitation stay for prosthetic training with transfemoral amputation prosthesis.    SUBJ: She reports stomach upset and nausea at times, especially in the morning.  Discussed with nurse.  Patient requested administration of Semaglutide in the afternoon.  Nursing will discuss with pharmacy to change administration time.  Also will add Pepcid for GI prophylaxis.  Discussed with patient and with nurse.    ROS: Denies chest pain or shortness of breath. Other ROS negative. Past, family, social history is unchanged.    Current Functional Status:   Bed mobility:   [place], Supine to Sit: supervision   [place], Sit to Supine: modified independence   Transfers:    [place], Sit to Stand Transfer: minimum assist (75% or more patient effort)  [place], Stand to Sit Transfer: minimum assist (75% or more patient effort)   [place], Stand Pivot/Stand Step Transfer: minimum assist (75% or more patient effort)   Gait:   [place], Gait: minimum assist (75% or more patient effort)  Assistive Device: walker, front-wheeled   Distance in Feet: 25 feet    Bathing:   [place]: close supervision   Toileting:   [place]: moderate assist (50-74% patient effort)   Upper body dressing:   [place]: minimum assist (75% or more patient effort)   Lower body dressing:   [place]: maximum assist (25-49% patient effort)     Functional Progress:    Functional status reviewed. Overall, patient's functional status is improving.      Physical Exam      Blood pressure (!) 153/68, pulse (!) 105, temperature 36.8 °C (98.2 °F),  "temperature source Oral, resp. rate 18, height 1.702 m (5' 7.01\"), weight 73.1 kg (161 lb 2 oz), SpO2 100%, not currently breastfeeding.    Body mass index is 25.23 kg/m².    General Appearance: Not in acute distress  Head/Ear/Nose/Throat: Normocephalic; Atraumatic.   Eye: EOMI; PERRL.   Neck: No JVD; No Bruits.   Respiratory: Decreased breath sounds at bases.   Cardiovascular: RRR; Normal S1, S2.   Gastrointestinal: Soft; NT; +BS.   Extremities: Bilateral lower extremity edema noted.  Left transfemoral amputation stump is stable.  Musculoskeletal: Functional active range of motion in both upper and right lower extremities.  Functional active range of motion in left hip.  She has a left transfemoral amputation.  Neurological: AAO ×3. Speech is fluent. Cranial nerve examination does not reveal any gross facial asymmetry. Strength testing shows about 4+/5 strength in both upper and right lower extremities.  Left hip flexion is 4/5.  She has a left transfemoral amputation.  She is grossly able to localize touch sensation.  She is able to localize position sense in right foot great toe position sense is unable to be tested on left side.  Deep tendon reflexes are hypoactive bilaterally.  Right plantar is flexor, left plantar was unable to be tested. Coordination is functional upper extremities.    Behavior/Emotional: Appropriate; Cooperative.   Skin: Left transfemoral amputation stump stable.  Incision appears to be well-healed.  Some wounds are noted on right anterior lower forearm.  Small wound noted on right foot fifth toe.  Small wound also noted on right foot great toe with some slough in it and scaly skin around it.  Some redness noted on sacrum/coccyx.  Reviewed pictures of wounds in Epic.      Current Facility-Administered Medications:     acetaminophen (TYLENOL) tablet 650 mg, 650 mg, oral, q4h PRN, Charlette Brooke MD, 650 mg at 12/05/24 0259    apixaban (ELIQUIS) tablet 5 mg, 5 mg, oral, BID, Charlette Brooke MD, " 5 mg at 12/07/24 0837    ascorbic acid (VITAMIN C) tablet 500 mg, 500 mg, oral, q48h INT, Charlette Brooke MD, 500 mg at 12/07/24 1219    atorvastatin (LIPITOR) tablet 40 mg, 40 mg, oral, Daily (6p), Charlette Brooke MD, 40 mg at 12/07/24 1615    docusate sodium (COLACE) capsule 100 mg, 100 mg, oral, BID, Charlette Brooke MD, 100 mg at 12/05/24 1228    DULoxetine (CYMBALTA) capsule,delayed release(DR/EC) 60 mg, 60 mg, oral, Nightly, Charlette Brooke MD, 60 mg at 12/06/24 2056    empagliflozin (JARDIANCE) tablet 10 mg, 10 mg, oral, Daily, Charlette Brooke MD, 10 mg at 12/07/24 0836    ferrous sulfate tablet 325 mg, 325 mg, oral, q48h INT, Charlette Brooke MD, 325 mg at 12/07/24 1219    fluticasone propionate (FLONASE) 50 mcg/actuation nasal spray 1 spray, 1 spray, Each Nostril, BID, Alexei Petty MD, 1 spray at 12/07/24 0837    furosemide (LASIX) tablet 40 mg, 40 mg, oral, BID (am, 4p), Charlette Brooke MD, 40 mg at 12/07/24 1615    gabapentin (NEURONTIN) capsule 600 mg, 600 mg, oral, TID, Charlette Brooke MD, 600 mg at 12/07/24 1615    HYDROmorphone (DILAUDID) tablet 4 mg, 4 mg, oral, q6h PRN, Charlette Brooke MD, 4 mg at 12/07/24 1615    imatinib (GLEEVEC) chemo tablet 400 mg, 400 mg, oral, Daily, Charlette Brooke MD, 400 mg at 12/07/24 0840    insulin glargine U-100 (LANTUS/BASAGLAR) pen 15 Units, 15 Units, subcutaneous, Nightly, Charlette Brooke MD, 15 Units at 12/06/24 2059    insulin lispro U-100 (HumaLOG) pen 1-12 Units, 1-12 Units, subcutaneous, TID with meals, Charlette Brooke MD, 2 Units at 12/07/24 1731    insulin lispro U-100 (HumaLOG) pen 5 Units, 5 Units, subcutaneous, TID with meals, Charlette Brooke MD, 5 Units at 12/07/24 1732    levothyroxine (SYNTHROID) tablet 25 mcg, 25 mcg, oral, Daily (6:30a), Charlette Brooke MD, 25 mcg at 12/07/24 0552    losartan (COZAAR) tablet 25 mg, 25 mg, oral, Daily with dinner, Charlette Brooke MD, 25 mg at 12/07/24 1615    melatonin ODT 3 mg, 3 mg, oral, Nightly, Charlette Brooke MD, 3 mg at  12/06/24 2056    methenamine (HIPREX) tablet 1 g, 1 g, oral, BID, Charlette Brooke MD, 1 g at 12/07/24 0836    metoclopramide (REGLAN) tablet 5 mg, 5 mg, oral, 3x daily PRN, Charlette Brooke MD, 5 mg at 12/07/24 0812    multivitamin tablet 1 tablet, 1 tablet, oral, Daily, Charlette Brooke MD, 1 tablet at 12/07/24 1219    potassium chloride (KLOR-CON M) ER tablet (particles/crystals) 20 mEq, 20 mEq, oral, Daily, Charlette Brooke MD, 20 mEq at 12/07/24 1219    semaglutide tablet 14 mg, 1 tablet, oral, Daily, Charlette Brooke MD, 14 mg at 12/07/24 0627    tamsulosin (FLOMAX) 24 hr ER capsule 0.4 mg, 0.4 mg, oral, Daily with dinner, Charlette Brooke MD, 0.4 mg at 12/07/24 1615       Labs / Radiology    Lab Results   Component Value Date    WBC 4.98 11/20/2024    HGB 10.8 (L) 11/20/2024    HCT 32.7 (L) 11/20/2024    MCV 90.8 11/20/2024     11/20/2024     Lab Results   Component Value Date    GLUCOSE 205 (H) 11/20/2024    CALCIUM 8.7 11/20/2024     11/20/2024    K 3.7 11/20/2024    CO2 30 11/20/2024     11/20/2024    BUN 35 (H) 11/20/2024    CREATININE 0.9 11/20/2024       Assessment and Plan    ASSESSMENT PLAN:  1. 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 Pura Chaves. ID recommended IV Ceftriaxone and IV Ampicillin until 9/14/24.  She had urinary retention managed with indwelling Rowan catheter.  She had right upper  extremity swelling present during this admission, ultrasound on 8/29/24 demonstrated an acute non-occlusive DVT of the right axillary and brachial vein around line (on Lovenox).  Subsequently she completed an acute inpatient rehabilitation stay for pre-prosthetic training at [facility] between 9/3/24 to 9/21/24 and was discharged to [facility] on 9/21/24.  She initially required bladder self intermittent catheterizations due to urinary retention, but indicates that now she is able to void.  She desired to be a prosthetic ambulator.  A left transfemoral amputation prosthesis was fabricated. and followed-up as outpatient in the amputee clinic at [facility] and she was deemed to be an appropriate candidate for acute inpatient rehabilitation stay. She has been needing assistance for mobility, transfers, self-care.  I have reviewed the preadmission screening and concur with that information and there are no significant changes from patient's preadmission screening. She is transferred to [facility] on 11/19/24 from [facility] for further acute inpatient rehabilitation stay for prosthetic training with transfemoral amputation prosthesis.     2. DVT prophylaxis/treatment - on Eliquis.  Platelets 168 on 11/20/2024.     3. Vascular - status post left transfemoral amputation for prosthetic training, severe peripheral vascular disease, chronic myelocytic leukemia, osteomyelitis left foot, diabetic neuropathy, multiple medical problems - continue PT, OT, psychology.  Follow falls precautions, cardiac precautions, monitor pulse oximeter in therapy.  Monitor skin under prosthesis.  Teach donning and doffing of left transfemoral amputation prosthesis.  Do gait training with prosthesis.     4. GI - On Colace, Senokot.  On PRN Maalox.  On PRN Dulcolax suppository.  On PRN Zofran.     5.  -on Hiprex.  On Flomax.  Monitor postvoid residual.     6. CVS - on Lasix.  Monitor for orthostasis.      7. Pulmonary -encourage incentive spirometry.     8. Hematology - monitor hemoglobin, platelets.  Hemoglobin 10.8, WBC 4.98 on 11/20/2024.     9. Pain -on Cymbalta.  On Neurontin.  On PRN Tylenol.  On PRN Dilaudid.     10. Skin - Left transfemoral amputation stump stable.  Incision appears to be well-healed.  Some wounds are noted on right anterior lower forearm.  Small wound noted on right foot fifth toe.  Small wound also noted on right foot great toe with some slough in it and scaly skin around it.  Some redness noted on sacrum/coccyx.  Reviewed pictures of wounds in Epic. Monitor skin under the left transfemoral amputation prosthesis.Noted input from dermal defense nurse on 11/21/2024.  Reviewed pictures in Epic.      11. F/E/N - on Ferrous sulfate.  On vitamin C. on MVI.  On K-Genesis con.     12. Hyperlipidemia -on Lipitor.     13. Diabetes mellitus type 2 - on Lantus insulin.  On sliding scale Humalog coverage.  On Jardiance.     14. Oncology - On Gleevec for chronic myelocytic leukemia.     15. Hypothyroidism - on Synthroid.     16. Insomnia - on Melatonin.     17. Infectious diseases - On oral Vancomycin.       18. Seasonal allergies - She reports seasonal allergies are bothering her.  Ordered Flonase spray to nostrils twice daily on 12/4/2024.     19. Psychiatry - mood stable.  Psychology consulted.     20. Rehabilitation medicine - Continue comprehensive rehabilitation care. Continue PT, OT, speech, psychology.  We will follow falls precautions, cardiac precautions, monitor pulse oximetry in therapy and follow weightbearing precautions.  We will follow spinal precautions as appropriate.Tolerating therapies per endurance on 11/20/2024.  Slept well last night.   Working on donning and doffing of prosthesis.  Discussed with the importance of skin checks on the prosthesis.  Able to ambulate 7 feet in parallel bars dependent gait using left transfemoral amputation prosthesis with physical therapy.  Dependent for  sit to stand transfers with physical therapy on 11/20/2024. Noted input from dermal defense nurse on 11/21/2024.  Reviewed pictures in Epic.  Working on ADLs with occupational therapy on 11/21/2024.  Requiring minimal assistance for bathing, dependent for toileting, close supervision for poorly dressing and dependent for lower body dressing with occupational therapy.  She requested a flu shot.  Discussed with nurse.  Continent of bowel and bladder for nursing on 11/21/2024.Dependent for sit to stand transfers with physical therapy on 11/22/2024.  Able to ambulate 15 feet with thoracic walker dependent gait with physical therapy on 11/22/2024.  Skin under prosthesis is stable.  Denies pain today.  She says she got flu shot yesterday.Feels better on 11/23/2024.  Denies increased pain.  She says that she took her medications after meals and did not have any GI symptoms today.  Continent of bowel and bladder for nursing.  Working on donning and doffing of prosthesis, sock ply management with occupational therapy on 11/23/2024. Due to elevated blood pressure, internist restarted Amlodipine and Losartan on 11/25/2024.  Denies increased pain.  Continent of bowel and bladder for nursing on 11/25/2024.Skin on left transfemoral amputation residual limb remains stable.  Discussed with patient.  Discussed with nurse on 11/25/2024.Discussed patients progress in therapies with therapists in team meeting on 11/26/2024.  Discussed in PCC. See PCC documentation.  Face-to-face evaluation was done for a right carbon fiber toe off brace on 11/26/2024.  Discussed with patient.  Discussed with physical therapist on 11/26/2024.Discussed recommendations of PCC with patient on 11/27/2024.  She was measured for a right carbon fiber Toe off AFO brace yesterday.  Discussed with nursing.  Skin on residual limb remains stable.  Continent of bladder for nursing on 11/27/2024. Able to ambulate 10 feet with moderate assistance with balance but  using left transfemoral amputation prosthesis on 11/28/2024.  Requiring moderate assistance for sit to stand transfer with physical therapy using left transfemoral amputation prosthesis on 11/28/2024.Denies phantom limb pain sensation on 11/29/2024.  Discussed with patient and with her family with her at bedside on 11/29/2024.  Noted podiatry input.  She feels she is improving functionally.  Discussed with her that with practice she will continue to improve with prosthetic use.She feels she is making progress in therapy on 12/2/2024.  Per case management, her CMG ends on 12/3/2024 and she was extended for a week for discharge on 12/10/2024 to allow for more time in therapy here.  Now she is asking if she could get 1 more week beyond that time.  Discussed with case management regarding patient's request.  Working on ADLs with occupational therapy.  Requiring minimal assistance for bathing, dependent for toileting, minimal assistance for upper body dressing, moderate assistance for lower body dressing.  Discussed with patient and physical therapist working with her in the parallel bar earlier today.  Again reviewed with her importance of monitoring skin under prosthesis with prosthetic use..Discussed patients progress in therapies with therapists in team meeting on 12/3/2024. Discussed in PCC. See PCC documentation.  Continent of bowel and bladder for nursing.  Discussed with nurse.  Skin under prosthesis stable on 12/3/2024.Discussed recommendations of PCC with patient on 12/4/2024.  She reports seasonal allergies are bothering her.  Ordered Flonase spray to nostrils twice daily on 12/4/2024.Patient went for home evaluation on 12/5/2024 with therapist.  Discussed with patient earlier in the morning today.  Discussed with her to maximize independence at wheelchair level and manage at wheelchair level at home and only use prosthesis with trained therapist with her for now until she becomes more functional prosthetic  ambulator in future.  Has some phantom limb sensation which can hopefully decrease with prosthetic use in future.Feels better on 12/6/2024.  She reports home evaluation went well yesterday.  Discussed with physical therapist Madalyn who also mentioned that open evaluation was extremely useful for patient to plan things around her home.  Able to ambulate 10 feet with front wheeled walker with minimum assistance using left transfemoral amputation prosthesis on 12/6/2024.  Requiring minimal assistance for sit to stand transfer with left transfemoral amputation prosthesis on 12/6/2024.  Discussed with her she can learn biomechanics of gait training for the upcoming months with current prosthesis and hopefully she can qualify for a microprocessor knee in future.  She is also hopeful for that.She reports stomach upset and nausea at times on 12/7/2024, especially in the mornings after medications and she feels it is the Semaglutide most likely making her nauseous when she is takes on an empty stomach.  Discussed with nurse.  Patient requested administration of Semaglutide in the afternoon.  Nursing will discuss with pharmacy to change administration time.  Also will add Pepcid for GI prophylaxis.  Discussed with patient and with nurse on 12/7/2024.     21. Reviewed labs today.  BUN 35, creatinine 0.9, sodium 139, potassium 3.7 on 11/20/2024.      Alexei Petty MD  12/7/2024      This encounter was completed utilizing the Direct Speech Voice Recognition Software. Grammatical errors, random word insertions, pronoun errors, and incomplete sentences are occasional consequences of the system due to software limitations, ambient noises, and hardware issues. Such errors may be missed prior to affixing electronic signature. Any questions or concerns about the content, text, or information contained within the body of this dictation should be directly addressed to the physician for clarification. If you have any questions or  concerns please do not hesitate to call me directly via EPIC chat, page, or email.

## 2024-12-07 NOTE — PROGRESS NOTES
Patient: Bebe Burks  Location: [facility] [address]  MRN: [id number]  Today's date: 12/7/2024    History of Present Illness  Bebe is a 64 y.o. female admitted on 11/19/2024 with History of above knee amputation, left (CMS/[facility]) [Z89.612]  Encounter for prosthetic gait training [Z47.89]. Principal problem is History of above knee amputation, left (CMS/[facility]).    Pt is a 64 y.o. 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       Past Medical History  Bebe has a past medical history of Abnormal ECG, Abnormal finding on chest xray (12/2021), Arthritis, Colon cancer (CMS/[facility]), COVID-19 (12/2021), COVID-19 vaccine series completed, Diabetic neuropathy (CMS/[facility]), DM (diabetes mellitus), type 2 with ophthalmic complications (CMS/[facility]), Hypertension, Hypothyroidism, Left foot drop, Lipid disorder, and Type 2 diabetes mellitus treated with insulin (CMS/[facility]).    OT Vitals      Date/Time HR Source BP BP Location BP Method Pt Position [facility]   12/07/24 1101 Monitor 104/59 Right upper arm Automatic Sitting [facility]          OT Pain      Date/Time Pain Type Rating: Rest Rating: Activity [facility]   12/07/24 1101 Pain Assessment 0 - no pain 0 - no pain [facility]   12/07/24 1158 Pain Reassessment 0 - no pain 0 - no pain [facility]               Prior Living Environment      Flowsheet Row Most Recent Value   People in Home alone   Current Living Arrangements condominium   Home Accessibility stairs to enter home (Group)   Living Environment Comment resides in 1st floor condo, 4 (1+2+1) JOSE RAFAEL, R hand rail, no stairs inside home, Bathroom Layout: Tub/shower unit with shower curtain, Shower stall with glass doors (shower stall in main bedroom), two sisters can provide some assistance   Number of Stairs, Main Entrance 4   Surface of Stairs, Main Entrance concrete   Stair Railings, Main Entrance railing on right side (ascending)   Stairs Comment, Main Entrance  1+2+1 JOSE RAFAEL   Location, Bathroom first (main) floor   Bathroom Access tub bath, walk-in shower            Prior Level of Function      Flowsheet Row Most Recent Value   Dominant Hand right   Ambulation assistive equipment   Transferring assistive equipment   Toileting independent   Bathing assistive equipment   Dressing independent   Eating independent   IADLs assistive equipment   Driving/Transportation    Communication understands/communicates without difficulty   Assistive Device Currently Used at Home cane, straight, walker, front-wheeled, shower chair, grab bar, raised toilet            Occupational Profile      Flowsheet Row Most Recent Value   Successful Occupations Pt is very active in her community   Occupational History/Life Experiences 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, attends [employer] weekly   Performance Patterns lives alone   Environmental Supports and Barriers Supportive community    + handicap parking placard   Patient Goals PSFS: Toilet transfer with prosthetic 3/10, toilet transfer without prosthetic 8/10, walking 0/10, getting dressed 5/10, getting in/ out of bed 4/10, getting a prosthetic on/off 1/10               12/07/24 1101   OT Time Calculation   Start Time 1100   Stop Time 1200   Time Calculation (min) 60 min   Session Details   Document Type Daily Treatment/Progress Note   General Information   General Observations of Patient Pt received in w/c. Pleasant and agreeable to therapy.   Mobility Belt   Mobility Belt Used During Session yes   Transfers   Transfers low pivot transfer;tub transfer   Sit to Stand Transfer   [place], Sit to Stand Transfer minimum assist (75% or more patient effort)   Safety/Cues increased time to complete;minimal;verbal cues;hand placement;sequencing;technique   Assistive Device walker, front-wheeled   Comment From w/c to RW. Assist for L hip extension to lock prosthesis.   Stand to Sit Transfer   [place], Stand to Sit Transfer minimum  assist (75% or more patient effort)   Safety/Cues increased time to complete;minimal;verbal cues;hand placement;sequencing;technique   Assistive Device walker, front-wheeled   Comment From RW to w/c. Assist for eccentric control and vc's for hand placement.   Low Pivot Transfer   [place], Low Pivot Transfer supervision   Comment To/from w/c to EOM.   Assistive Device wheelchair   Safety/Cues increased time to complete   Tub Transfer   Comment Lateral transfer to/from w/c to tub bench.   [place], Tub Transfer supervision   Assistive Device tub bench   Transfer Technique low pivot   Safety/Cues increased time to complete;minimal;verbal cues;technique   Balance   Comment, Balance 1) Standing at RW with min A while simulating managment of waistband in lower body dressing using theraband. Managed theraband to thighs and back to high waist x3 trials. Educated on technique of maintaining unilateral support on RW. 2) Standing at kitchen counter with min A while retrieving item overhead in cabinets.   Prosthetic Orientation (Lower Extremity)   Comment Skin check unremarkable at start of session. Donned gel liner and sock ply with sup and increased time while supine on mat. Min A overall for donning prosthesis. Sock ply=3. Total wear time during session 50 minutes. Pt to continuing wearing until afternoon with removal after discussed wear time.   Daily Progress Summary (OT)   Daily Outcome Statement Session focused on transfer training, simulated LB dressing management, and donning prosthesis. Min A for transfers with prosthesis donned. Sup for LPTs. Improving with balance with unilateral support. Continue with balance and functional transfer training.                 IRF OT Goals      Flowsheet Row Most Recent Value   Transfer Goal 1    Activity/Assistive Device toilet at 11/20/2024 0906   [place] moderate assist (50-74% patient effort) at 12/02/2024 1011   Time Frame short-term goal (STG), 5 - 7 days at  12/02/2024 1011   Progress/Outcome goal revised this date at 12/02/2024 1011   Transfer Goal 2    Activity/Assistive Device toilet at 11/20/2024 0906   [place] minimum assist (75% or more patient effort) at 11/20/2024 0906   Time Frame long-term goal (LTG), 21 days or less at 11/20/2024 0906   Progress/Outcome goal ongoing at 12/02/2024 1011   Transfer Goal 3    Activity/Assistive Device shower at 11/20/2024 0906   [place] moderate assist (50-74% patient effort) at 12/02/2024 1011   Time Frame short-term goal (STG), 5 - 7 days at 12/02/2024 1011   Progress/Outcome goal revised this date at 12/02/2024 1011   Transfer Goal 4    Activity/Assistive Device shower at 11/20/2024 0906   [place] minimum assist (75% or more patient effort) at 11/20/2024 0906   Time Frame long-term goal (LTG), 21 days or less at 11/20/2024 0906   Progress/Outcome goal ongoing at 12/02/2024 1011   Bathing Goal 1    [place] tactile cues required at 11/20/2024 0906   Time Frame short-term goal (STG), 5 - 7 days at 12/02/2024 1011   Progress/Outcome goal ongoing at 12/02/2024 1011   Bathing Goal 2    [place] modified independence at 11/20/2024 0906   Time Frame long-term goal (LTG), 21 days or less at 11/20/2024 0906   Progress/Outcome goal ongoing at 12/02/2024 1011   UB Dressing Goal 1    [place] minimum assist (75% or more patient effort) at 11/26/2024 1000   Time Frame short-term goal (STG), 5 - 7 days at 12/02/2024 1011   Strategies/Barriers with clothing retrieval at 11/20/2024 0906   Progress/Outcome goal ongoing at 12/02/2024 1011   UB Dressing Goal 2    [place] minimum assist (75% or more patient effort) at 11/20/2024 0906   Time Frame long-term goal (LTG), 21 days or less at 11/20/2024 0906   Strategies/Barriers with clothing retrieval at 11/20/2024 0906   Progress/Outcome goal ongoing at 12/02/2024 1011   LB Dressing Goal 1    [place] minimum assist (75% or more patient effort) at 11/26/2024 1000    Time Frame short-term goal (STG), 5 - 7 days at 12/02/2024 1011   Strategies/Barriers with clothing retrieval at 11/20/2024 0906   Progress/Outcome goal ongoing at 12/02/2024 1011   LB Dressing Goal 2    [place] minimum assist (75% or more patient effort) at 11/20/2024 0906   Time Frame long-term goal (LTG), 21 days or less at 11/20/2024 0906   Strategies/Barriers with clothing retrieval at 11/20/2024 0906   Progress/Outcome goal ongoing at 12/02/2024 1011   Grooming Goal 1    [place] moderate assist (50-74% patient effort) at 11/20/2024 0906   Time Frame short-term goal (STG), 5 - 7 days at 12/02/2024 1011   Strategies/Barriers in stance at sink at 11/20/2024 0906   Progress/Outcome goal ongoing at 12/02/2024 1011   Grooming Goal 2    [place] supervision required at 11/20/2024 0906   Time Frame long-term goal (LTG), 21 days or less at 11/20/2024 0906   Strategies/Barriers in stance at sink at 11/20/2024 0906   Progress/Outcome goal ongoing at 12/02/2024 1011   Toileting Goal 1    [place] moderate assist (50-74% patient effort) at 12/02/2024 1011   Time Frame short-term goal (STG), 5 - 7 days at 12/02/2024 1011   Progress/Outcome goal ongoing at 12/02/2024 1011   Toileting Goal 2    [place] minimum assist (75% or more patient effort) at 11/20/2024 0906   Time Frame long-term goal (LTG), 21 days or less at 11/20/2024 0906   Progress/Outcome goal ongoing at 12/02/2024 1011           Speaking Coherently

## 2024-12-07 NOTE — PLAN OF CARE
Plan of Care Review  Plan of Care Reviewed With: patient  Progress: improving  Outcome Evaluation: patient remains free from falls, using call bell appropriately, c/o of n/v this am, reglan given with relief, +BM, bloods sugars elevated , blood sugar at noon - 248

## 2024-12-08 ENCOUNTER — APPOINTMENT (INPATIENT)
Dept: PHYSICAL THERAPY | Facility: REHABILITATION | Age: 64
DRG: 560 | End: 2024-12-08
Payer: MEDICARE

## 2024-12-08 LAB
GLUCOSE BLD-MCNC: 152 MG/DL (ref 70–99)
GLUCOSE BLD-MCNC: 224 MG/DL (ref 70–99)
GLUCOSE BLD-MCNC: 285 MG/DL (ref 70–99)
POCT TEST: ABNORMAL

## 2024-12-08 PROCEDURE — 63700000 HC SELF-ADMINISTRABLE DRUG: Performed by: INTERNAL MEDICINE

## 2024-12-08 PROCEDURE — 63700000 HC SELF-ADMINISTRABLE DRUG: Performed by: PHYSICAL MEDICINE & REHABILITATION

## 2024-12-08 PROCEDURE — 97530 THERAPEUTIC ACTIVITIES: CPT | Mod: GP

## 2024-12-08 PROCEDURE — 97763 ORTHC/PROSTC MGMT SBSQ ENC: CPT | Mod: GP

## 2024-12-08 PROCEDURE — 97116 GAIT TRAINING THERAPY: CPT | Mod: GP

## 2024-12-08 PROCEDURE — 12800000 HC ROOM AND CARE SEMIPRIVATE REHAB

## 2024-12-08 RX ORDER — FAMOTIDINE 20 MG/1
20 TABLET, FILM COATED ORAL 2 TIMES DAILY
Status: DISCONTINUED | OUTPATIENT
Start: 2024-12-08 | End: 2024-12-10 | Stop reason: HOSPADM

## 2024-12-08 RX ADMIN — HYDROMORPHONE HYDROCHLORIDE 4 MG: 2 TABLET ORAL at 17:22

## 2024-12-08 RX ADMIN — ATORVASTATIN CALCIUM 40 MG: 40 TABLET, FILM COATED ORAL at 16:23

## 2024-12-08 RX ADMIN — INSULIN LISPRO 2 UNITS: 100 INJECTION, SOLUTION INTRAVENOUS; SUBCUTANEOUS at 12:43

## 2024-12-08 RX ADMIN — LEVOTHYROXINE SODIUM 25 MCG: 0.03 TABLET ORAL at 06:31

## 2024-12-08 RX ADMIN — Medication 3 MG: at 20:48

## 2024-12-08 RX ADMIN — DULOXETINE HYDROCHLORIDE 60 MG: 30 CAPSULE, DELAYED RELEASE ORAL at 20:48

## 2024-12-08 RX ADMIN — GABAPENTIN 600 MG: 300 CAPSULE ORAL at 08:17

## 2024-12-08 RX ADMIN — POTASSIUM CHLORIDE 20 MEQ: 1500 TABLET, EXTENDED RELEASE ORAL at 12:40

## 2024-12-08 RX ADMIN — LOSARTAN POTASSIUM 25 MG: 25 TABLET, FILM COATED ORAL at 16:24

## 2024-12-08 RX ADMIN — INSULIN GLARGINE 15 UNITS: 100 INJECTION, SOLUTION SUBCUTANEOUS at 20:49

## 2024-12-08 RX ADMIN — METHENAMINE HIPPURATE 1 G: 1000 TABLET ORAL at 08:17

## 2024-12-08 RX ADMIN — FLUTICASONE PROPIONATE 1 SPRAY: 50 SPRAY, METERED NASAL at 08:26

## 2024-12-08 RX ADMIN — GABAPENTIN 600 MG: 300 CAPSULE ORAL at 20:48

## 2024-12-08 RX ADMIN — INSULIN LISPRO 4 UNITS: 100 INJECTION, SOLUTION INTRAVENOUS; SUBCUTANEOUS at 17:24

## 2024-12-08 RX ADMIN — INSULIN LISPRO 5 UNITS: 100 INJECTION, SOLUTION INTRAVENOUS; SUBCUTANEOUS at 08:18

## 2024-12-08 RX ADMIN — GABAPENTIN 600 MG: 300 CAPSULE ORAL at 16:24

## 2024-12-08 RX ADMIN — EMPAGLIFLOZIN 10 MG: 10 TABLET, FILM COATED ORAL at 08:17

## 2024-12-08 RX ADMIN — IMATINIB MESYLATE 400 MG: 400 TABLET, FILM COATED ORAL at 08:18

## 2024-12-08 RX ADMIN — FUROSEMIDE 40 MG: 40 TABLET ORAL at 08:17

## 2024-12-08 RX ADMIN — APIXABAN 5 MG: 5 TABLET, FILM COATED ORAL at 08:17

## 2024-12-08 RX ADMIN — FLUTICASONE PROPIONATE 1 SPRAY: 50 SPRAY, METERED NASAL at 20:49

## 2024-12-08 RX ADMIN — THERA TABS 1 TABLET: TAB at 12:41

## 2024-12-08 RX ADMIN — FUROSEMIDE 40 MG: 40 TABLET ORAL at 16:24

## 2024-12-08 RX ADMIN — INSULIN LISPRO 5 UNITS: 100 INJECTION, SOLUTION INTRAVENOUS; SUBCUTANEOUS at 12:43

## 2024-12-08 RX ADMIN — FAMOTIDINE 20 MG: 20 TABLET, FILM COATED ORAL at 20:48

## 2024-12-08 RX ADMIN — METHENAMINE HIPPURATE 1 G: 1000 TABLET ORAL at 20:48

## 2024-12-08 RX ADMIN — APIXABAN 5 MG: 5 TABLET, FILM COATED ORAL at 20:48

## 2024-12-08 RX ADMIN — INSULIN LISPRO 5 UNITS: 100 INJECTION, SOLUTION INTRAVENOUS; SUBCUTANEOUS at 17:24

## 2024-12-08 RX ADMIN — TAMSULOSIN HYDROCHLORIDE 0.4 MG: 0.4 CAPSULE ORAL at 16:23

## 2024-12-08 NOTE — PLAN OF CARE
Plan of Care Review  Plan of Care Reviewed With: patient  Progress: improving  Outcome Evaluation: patient remains free from falls, using call bell appropriately, blood sugar 152 this am, appetite good, no complaints of N/V this am

## 2024-12-08 NOTE — PLAN OF CARE
Plan of Care Review  Plan of Care Reviewed With: patient  Progress: improving  Outcome Evaluation: Patient oriented x 4. Denies discomfort. Voids in bathroom. Incontinent of stool prior to removing the brief and sitting on toilet. Call bell in reach.

## 2024-12-08 NOTE — PROGRESS NOTES
Patient: Bebe Burks  Location: [facility] [address]  MRN: [id number]  Today's date: 12/8/2024    History of Present Illness  Bebe is a 64 y.o. female admitted on 11/19/2024 with History of above knee amputation, left (CMS/[facility]) [Z89.612]  Encounter for prosthetic gait training [Z47.89]. Principal problem is History of above knee amputation, left (CMS/[facility]).    Pt is a 64 y.o. 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       Past Medical History  Bebe has a past medical history of Abnormal ECG, Abnormal finding on chest xray (12/2021), Arthritis, Colon cancer (CMS/[facility]), COVID-19 (12/2021), COVID-19 vaccine series completed, Diabetic neuropathy (CMS/[facility]), DM (diabetes mellitus), type 2 with ophthalmic complications (CMS/[facility]), Hypertension, Hypothyroidism, Left foot drop, Lipid disorder, and Type 2 diabetes mellitus treated with insulin (CMS/[facility]).    PT Vitals      Date/Time Pulse HR Source SpO2 BP BP Location BP Method Pt Position [facility]   12/08/24 1135 88 Monitor 97 % 110/58 Left upper arm Automatic Sitting [facility]          PT Pain      Date/Time Pain Type Rating: Rest Rating: Activity Interventions [facility]   12/08/24 1135 Pain Assessment 0 - no pain 0 - no pain pain management plan reviewed with patient/caregiver LPM   12/08/24 1220 Post Activity 0 - no pain 0 - no pain pain management plan reviewed with patient/caregiver LPM               Prior Living Environment      Flowsheet Row Most Recent Value   People in Home alone   Current Living Arrangements condominium   Home Accessibility stairs to enter home (Group)   Living Environment Comment resides in 1st floor condo, 4 (1+2+1) JOSE RAFAEL, R hand rail, no stairs inside home, Bathroom Layout: Tub/shower unit with shower curtain, Shower stall with glass doors (shower stall in main bedroom), two sisters can provide some assistance   Number of Stairs, Main Entrance 4   Surface  of Stairs, Main Entrance concrete   Stair Railings, Main Entrance railing on right side (ascending)   Stairs Comment, Main Entrance 1+2+1 JOSE RAFAEL   Location, Bathroom first (main) floor   Bathroom Access tub bath, walk-in shower            Prior Level of Function      Flowsheet Row Most Recent Value   Dominant Hand right   Ambulation assistive equipment   Transferring assistive equipment   Toileting independent   Bathing assistive equipment   Dressing independent   Eating independent   IADLs assistive equipment   Driving/Transportation    Communication understands/communicates without difficulty   Assistive Device Currently Used at Home cane, straight, walker, front-wheeled, shower chair, grab bar, raised toilet             IRF PT Evaluation and Treatment - 12/08/24 1135          PT Time Calculation    Start Time 1135     Stop Time 1235     Time Calculation (min) 60 min        Session Details    Document Type Daily Treatment/Progress Note        General Information    Patient Profile Reviewed yes     General Observations of Patient received in , engaged in session, denies pain        Mobility Belt    Mobility Belt Used During Session yes        Orthosis Ankle Right AFO    Orthosis Properties Date Obtained: 12/03/24 Location: Ankle Laterality: Right Type: AFO Features: Carbon fiber Description: R toe off carbon fiber brace Therapeutic Indications: compensation for lost function Wearing Schedule: wear when out of bed    Compliance/Wearing Issues patient;compliant with wearing schedule        Bed Mobility    [place], Supine to Sit supervision     [place], Sit to Supine supervision     Comment performed on therapy mat for prosthetic management and donning        Sit to Stand Transfer    [place], Sit to Stand Transfer minimum assist (75% or more patient effort)     Safety/Cues increased time to complete;minimal;verbal cues;tactile cues;technique;sequencing;preparatory posture     Assistive  Device wheelchair;walker, front-wheeled     Comment performed from EOM and WC with unilateral UE on surface and AD and BUE on surface trials x2 (per pt request), min cues for L TFP management for setup and during transition for L TFP weight bearing        Stand to Sit Transfer    [place], Stand to Sit Transfer minimum assist (75% or more patient effort)     Safety/Cues increased time to complete;minimal;sequencing     Assistive Device walker, front-wheeled;wheelchair     Comment assist for controlled descent and balance while pt performing L TFP management for unlocking;when weightshifing off prosthesis to release toggle and unlock knee        Low Pivot Transfer    [place], Low Pivot Transfer set up     Safety/Cues increased time to complete     Assistive Device wheelchair     Comment start of session, WC to EOM for prosthetic management and donning        Stand Pivot Transfer    [place], Stand Pivot/Stand Step Transfer minimum assist (75% or more patient effort)     Safety/Cues proper use of assistive device;sequencing;technique     Assistive Device walker, front-wheeled     Comment cues for turning radius within RW, and Min A on pelvis and L TFP proximal stability for balance        Gait Training    [place], Gait minimum assist (75% or more patient effort)     Safety/Cues technique;sequencing     Assistive Device walker, front-wheeled     Distance in Feet 25 feet     Comment 25ft x2, 30ft, 20+20ft with two turns; Min A for balance and L TFP weight shift andcues for contraction within socket        Curb Negotiation    [place] moderate assist (50-74% patient effort);2 person assist;additional person to manage equipment     Assistive Device walker, front-wheeled     Curb Height 6 inches     Comment up forwards with RW, mod A for R hip/knee extension; descending forwards mod A for eccentric control and assist of 2nd for RW management        Balance    Comment, Balance Static STanding at RW  "with L TFP donned 1 min x3, touching assistance        Neuromuscular Re-education    Equipment Used // bars     Comment lateral weight shifts 2x20, FWD/BWD pregait stepping- Min A 3x10 R and L; Step taps RLE 2x10 with Min A on proximal L hip        Prosthetic Orientation (Lower Extremity)    Fit Assessment fit/function of prosthesis are appropriate     Comment, Fit Assessment Liner: Setup Sock ply: setup and worn 3ply; socket: Mod A for alignment of pin, cues for positioning ( scoot forard and lift up); wear time 55 min in session, donned at end of session per wearing schedule     Specific Gait Deviations toe stays off floor after heel strike;uneven timing, short stance phase involved side     Comment skin intact        Daily Progress Summary (PT)    Daily Outcome Statement Pt progressing carryover and independence for prosthetic management, performs activities with self initiated verbal cues and sequencing steps. Pt educated for \"turning within box/RW\" during SPT and turning with improved carryover by end of session.                               IRF PT Goals      Flowsheet Row Most Recent Value   Transfer Goal 1    Activity/Assistive Device stand pivot, walker, front-wheeled at 11/19/2024 1431   [place] moderate assist (50-74% patient effort) at 11/26/2024 0855   Time Frame short-term goal (STG), 5 - 7 days at 12/03/2024 0855   Progress/Outcome goal ongoing at 12/03/2024 0855   Transfer Goal 2    Activity/Assistive Device stand pivot, walker, front-wheeled at 11/19/2024 1431   [place] minimum assist (75% or more patient effort) at 11/26/2024 0855   Time Frame 14 days or less, long-term goal (LTG) at 11/26/2024 0855   Progress/Outcome goal ongoing at 12/03/2024 0855   Transfer Goal 3    Activity/Assistive Device sit-to-stand/stand-to-sit, walker, front-wheeled at 11/19/2024 1431   [place] minimum assist (75% or more patient effort) at 11/26/2024 0855   Time Frame short-term goal (STG), 5 - 7 days at " 12/03/2024 0855   Progress/Outcome goal ongoing at 12/03/2024 0855   Transfer Goal 4    Activity/Assistive Device sit-to-stand/stand-to-sit, walker, front-wheeled at 11/19/2024 1431   [place] tactile cues required at 11/26/2024 0855   Time Frame long-term goal (LTG), 14 days or less at 11/26/2024 0855   Progress/Outcome goal ongoing at 12/03/2024 0855   Gait/Walking Locomotion Goal 1    Activity/Assistive Device gait (walking locomotion), walker, front-wheeled at 11/19/2024 1431   Distance 10 feet at 11/19/2024 1431   [place] moderate assist (50-74% patient effort) at 11/26/2024 08   Time Frame short-term goal (STG), 5 - 7 days at 12/03/2024 0855   Strategies/Barriers 50 at 11/26/2024 0855   Progress/Outcome goal met at 12/03/2024 55   Gait/Walking Locomotion Goal 2    Activity/Assistive Device gait (walking locomotion), assistive device use, walker, front-wheeled at 11/26/2024 0855   Distance 25 feet at 12/03/2024 0855   [place] minimum assist (75% or more patient effort) at 11/26/2024 08   Time Frame long-term goal (LTG), 14 days or less at 11/26/2024 0855   Progress/Outcome goal ongoing, goal revised this date at 12/03/2024 0855   Wheelchair Locomotion Goal 1    Activity forward propulsion, backward propulsion, steering, stopping, turning, doorway navigation, weight shifting at 11/19/2024 1431   Assistive Device manual, ultra lightweight at 11/19/2024 1431   Distance 150 feet at 11/19/2024 1431   [place] other (see comments)  [Touching / Steadying Assist] at 11/19/2024 1431   Time Frame 1 week, short-term goal (STG) at 11/19/2024 1431   Progress/Outcome goal met at 11/26/2024 0855   Wheelchair Locomotion Goal 2    Activity forward propulsion, backward propulsion, steering, stopping, turning, doorway navigation, weight shifting at 11/19/2024 1431   Assistive Device manual, ultra lightweight at 11/19/2024 1431   Distance 150 feet at 11/19/2024 1431   [place] modified independence at  "11/19/2024 1431   Time Frame 21 days or less, long-term goal (LTG) at 11/19/2024 1431   Progress/Outcome goal met at 12/03/2024 0855   Stairs Goal 1    Activity/Assistive Device ascending stairs, descending stairs, using handrail, left, using handrail, right, step-to-step at 11/29/2024 1100   Number of Stairs 4 at 11/29/2024 1100   [place] moderate assist (50-74% patient effort) at 11/29/2024 1100   Time Frame short-term goal (STG), 5 - 7 days at 11/29/2024 1100   Strategies/Barriers 6\" at 11/29/2024 1100   Progress/Outcome goal ongoing at 12/03/2024 0855   Stairs Goal 2    Activity/Assistive Device ascending stairs, descending stairs, using handrail, left, using handrail, right, step-to-step at 11/29/2024 1100   Number of Stairs 4 at 11/29/2024 1100   [place] minimum assist (75% or more patient effort) at 11/29/2024 1100   Time Frame long-term goal (LTG), 14 days or less at 11/29/2024 1100   Strategies/Barriers 6\" at 11/29/2024 1100   Progress/Outcome goal ongoing at 12/03/2024 0855          "

## 2024-12-09 ENCOUNTER — APPOINTMENT (INPATIENT)
Dept: WOUND CARE | Facility: REHABILITATION | Age: 64
DRG: 560 | End: 2024-12-09
Payer: MEDICARE

## 2024-12-09 ENCOUNTER — APPOINTMENT (INPATIENT)
Dept: PHYSICAL THERAPY | Facility: REHABILITATION | Age: 64
DRG: 560 | End: 2024-12-09
Payer: MEDICARE

## 2024-12-09 ENCOUNTER — APPOINTMENT (INPATIENT)
Dept: OCCUPATIONAL THERAPY | Facility: REHABILITATION | Age: 64
DRG: 560 | End: 2024-12-09
Payer: MEDICARE

## 2024-12-09 PROBLEM — M25.50: Status: ACTIVE | Noted: 2024-12-09

## 2024-12-09 LAB
GLUCOSE BLD-MCNC: 132 MG/DL (ref 70–99)
GLUCOSE BLD-MCNC: 145 MG/DL (ref 70–99)
GLUCOSE BLD-MCNC: 189 MG/DL (ref 70–99)
POCT TEST: ABNORMAL

## 2024-12-09 PROCEDURE — 63700000 HC SELF-ADMINISTRABLE DRUG: Performed by: PHYSICAL MEDICINE & REHABILITATION

## 2024-12-09 PROCEDURE — 12800000 HC ROOM AND CARE SEMIPRIVATE REHAB

## 2024-12-09 PROCEDURE — 97116 GAIT TRAINING THERAPY: CPT | Mod: GP

## 2024-12-09 PROCEDURE — 97763 ORTHC/PROSTC MGMT SBSQ ENC: CPT | Mod: GP

## 2024-12-09 PROCEDURE — 97535 SELF CARE MNGMENT TRAINING: CPT | Mod: GO

## 2024-12-09 PROCEDURE — 63700000 HC SELF-ADMINISTRABLE DRUG: Performed by: INTERNAL MEDICINE

## 2024-12-09 PROCEDURE — 97530 THERAPEUTIC ACTIVITIES: CPT | Mod: GP

## 2024-12-09 RX ORDER — GABAPENTIN 300 MG/1
600 CAPSULE ORAL 3 TIMES DAILY
Qty: 180 CAPSULE | Refills: 0 | Status: SHIPPED | OUTPATIENT
Start: 2024-12-09 | End: 2025-01-08

## 2024-12-09 RX ORDER — LEVOTHYROXINE SODIUM 25 UG/1
25 TABLET ORAL
Qty: 30 TABLET | Refills: 0 | Status: SHIPPED | OUTPATIENT
Start: 2024-12-09 | End: 2025-01-08

## 2024-12-09 RX ORDER — METHENAMINE HIPPURATE 1000 MG/1
1 TABLET ORAL 2 TIMES DAILY
COMMUNITY
Start: 2024-12-09 | End: 2024-12-11

## 2024-12-09 RX ORDER — ASCORBIC ACID 500 MG
500 TABLET ORAL
COMMUNITY
Start: 2024-12-09 | End: 2025-01-08

## 2024-12-09 RX ORDER — INSULIN ASPART INJECTION 100 [IU]/ML
1-12 INJECTION, SOLUTION SUBCUTANEOUS
Qty: 20 ML | Refills: 0 | Status: SHIPPED | OUTPATIENT
Start: 2024-12-09 | End: 2025-01-08

## 2024-12-09 RX ORDER — FERROUS SULFATE 325(65) MG
325 TABLET ORAL
COMMUNITY
Start: 2024-12-09 | End: 2025-01-08

## 2024-12-09 RX ORDER — ACETAMINOPHEN 325 MG/1
650 TABLET ORAL EVERY 6 HOURS PRN
COMMUNITY
Start: 2024-12-09 | End: 2025-01-08

## 2024-12-09 RX ORDER — FAMOTIDINE 20 MG/1
20 TABLET, FILM COATED ORAL 2 TIMES DAILY
COMMUNITY
Start: 2024-12-09 | End: 2025-01-08

## 2024-12-09 RX ORDER — FUROSEMIDE 40 MG/1
40 TABLET ORAL
COMMUNITY
Start: 2024-12-09 | End: 2024-12-11

## 2024-12-09 RX ORDER — DULOXETIN HYDROCHLORIDE 60 MG/1
60 CAPSULE, DELAYED RELEASE ORAL NIGHTLY
Qty: 30 CAPSULE | Refills: 0 | Status: SHIPPED | OUTPATIENT
Start: 2024-12-09 | End: 2025-01-08

## 2024-12-09 RX ORDER — LOSARTAN POTASSIUM 25 MG/1
25 TABLET ORAL
Qty: 30 TABLET | Refills: 0 | Status: SHIPPED | OUTPATIENT
Start: 2024-12-09 | End: 2025-01-08

## 2024-12-09 RX ORDER — INSULIN GLARGINE 100 [IU]/ML
16 INJECTION, SOLUTION SUBCUTANEOUS NIGHTLY
Qty: 4.8 ML | Refills: 0 | Status: SHIPPED | OUTPATIENT
Start: 2024-12-09 | End: 2025-01-25 | Stop reason: CLARIF

## 2024-12-09 RX ORDER — INSULIN ASPART INJECTION 100 [IU]/ML
5 INJECTION, SOLUTION SUBCUTANEOUS
Qty: 4.5 ML | Refills: 0 | Status: SHIPPED | OUTPATIENT
Start: 2024-12-09 | End: 2025-01-25 | Stop reason: CLARIF

## 2024-12-09 RX ORDER — IBUPROFEN/PSEUDOEPHEDRINE HCL 200MG-30MG
3 TABLET ORAL NIGHTLY
COMMUNITY
Start: 2024-12-09 | End: 2025-01-08

## 2024-12-09 RX ORDER — ATORVASTATIN CALCIUM 40 MG/1
40 TABLET, FILM COATED ORAL
Qty: 30 TABLET | Refills: 0 | Status: SHIPPED | OUTPATIENT
Start: 2024-12-09 | End: 2025-01-08

## 2024-12-09 RX ORDER — HYDROMORPHONE HYDROCHLORIDE 4 MG/1
4 TABLET ORAL EVERY 6 HOURS PRN
Qty: 20 TABLET | Refills: 0 | Status: SHIPPED | OUTPATIENT
Start: 2024-12-09 | End: 2024-12-14

## 2024-12-09 RX ORDER — IMATINIB MESYLATE 400 MG/1
400 TABLET, FILM COATED ORAL DAILY
COMMUNITY
Start: 2024-12-10

## 2024-12-09 RX ADMIN — HYDROMORPHONE HYDROCHLORIDE 4 MG: 2 TABLET ORAL at 17:01

## 2024-12-09 RX ADMIN — THERA TABS 1 TABLET: TAB at 12:25

## 2024-12-09 RX ADMIN — ATORVASTATIN CALCIUM 40 MG: 40 TABLET, FILM COATED ORAL at 17:00

## 2024-12-09 RX ADMIN — FUROSEMIDE 40 MG: 40 TABLET ORAL at 17:00

## 2024-12-09 RX ADMIN — POTASSIUM CHLORIDE 20 MEQ: 1500 TABLET, EXTENDED RELEASE ORAL at 12:24

## 2024-12-09 RX ADMIN — INSULIN LISPRO 5 UNITS: 100 INJECTION, SOLUTION INTRAVENOUS; SUBCUTANEOUS at 17:44

## 2024-12-09 RX ADMIN — OXYCODONE HYDROCHLORIDE AND ACETAMINOPHEN 500 MG: 500 TABLET ORAL at 12:24

## 2024-12-09 RX ADMIN — METHENAMINE HIPPURATE 1 G: 1000 TABLET ORAL at 20:49

## 2024-12-09 RX ADMIN — HYDROMORPHONE HYDROCHLORIDE 4 MG: 2 TABLET ORAL at 23:44

## 2024-12-09 RX ADMIN — EMPAGLIFLOZIN 10 MG: 10 TABLET, FILM COATED ORAL at 08:57

## 2024-12-09 RX ADMIN — APIXABAN 5 MG: 5 TABLET, FILM COATED ORAL at 08:57

## 2024-12-09 RX ADMIN — LOSARTAN POTASSIUM 25 MG: 25 TABLET, FILM COATED ORAL at 17:00

## 2024-12-09 RX ADMIN — FUROSEMIDE 40 MG: 40 TABLET ORAL at 08:57

## 2024-12-09 RX ADMIN — FAMOTIDINE 20 MG: 20 TABLET, FILM COATED ORAL at 08:57

## 2024-12-09 RX ADMIN — METOCLOPRAMIDE 5 MG: 5 TABLET ORAL at 07:39

## 2024-12-09 RX ADMIN — GABAPENTIN 600 MG: 300 CAPSULE ORAL at 17:00

## 2024-12-09 RX ADMIN — GABAPENTIN 600 MG: 300 CAPSULE ORAL at 20:49

## 2024-12-09 RX ADMIN — DULOXETINE HYDROCHLORIDE 60 MG: 30 CAPSULE, DELAYED RELEASE ORAL at 20:48

## 2024-12-09 RX ADMIN — FAMOTIDINE 20 MG: 20 TABLET, FILM COATED ORAL at 20:49

## 2024-12-09 RX ADMIN — GABAPENTIN 600 MG: 300 CAPSULE ORAL at 08:57

## 2024-12-09 RX ADMIN — IMATINIB MESYLATE 400 MG: 400 TABLET, FILM COATED ORAL at 08:58

## 2024-12-09 RX ADMIN — INSULIN LISPRO 5 UNITS: 100 INJECTION, SOLUTION INTRAVENOUS; SUBCUTANEOUS at 12:26

## 2024-12-09 RX ADMIN — INSULIN LISPRO 5 UNITS: 100 INJECTION, SOLUTION INTRAVENOUS; SUBCUTANEOUS at 09:00

## 2024-12-09 RX ADMIN — FLUTICASONE PROPIONATE 1 SPRAY: 50 SPRAY, METERED NASAL at 08:58

## 2024-12-09 RX ADMIN — FERROUS SULFATE TAB 325 MG (65 MG ELEMENTAL FE) 325 MG: 325 (65 FE) TAB at 12:25

## 2024-12-09 RX ADMIN — Medication 3 MG: at 20:49

## 2024-12-09 RX ADMIN — INSULIN LISPRO 1 UNITS: 100 INJECTION, SOLUTION INTRAVENOUS; SUBCUTANEOUS at 09:00

## 2024-12-09 RX ADMIN — LEVOTHYROXINE SODIUM 25 MCG: 0.03 TABLET ORAL at 06:31

## 2024-12-09 RX ADMIN — INSULIN GLARGINE 15 UNITS: 100 INJECTION, SOLUTION SUBCUTANEOUS at 20:51

## 2024-12-09 RX ADMIN — METHENAMINE HIPPURATE 1 G: 1000 TABLET ORAL at 08:57

## 2024-12-09 RX ADMIN — APIXABAN 5 MG: 5 TABLET, FILM COATED ORAL at 20:49

## 2024-12-09 ASSESSMENT — PATIENT HEALTH QUESTIONNAIRE - PHQ9: SUM OF ALL RESPONSES TO PHQ9 QUESTIONS 1 & 2: 0

## 2024-12-09 NOTE — PLAN OF CARE
"Care Coordination Discharge Plan Summary    Admission Assessment Summary    General Information  Readmission Within the last 30 days: unable to assess  Does patient have a : No  Patient-Specific Goals (include timeframe): \"to walk\"    Living Arrangements  Arrived From: subacute/long-term acute care  Current Living Arrangements: condominium  People in Home: alone  Home Accessibility: stairs to enter home (Group)  Living Arrangement Comments: [address], 1 floor  will go to her sisters in [place] [address], 1 floor    Social Drivers of Health - Screenings  Housing Stability  In the last 12 months, was there a time when you were not able to pay the mortgage or rent on time?: No  At any time in the past 12 months, were you homeless or living in a shelter (including now)?: No  Utility Access  In the past 12 months has the electric, gas, oil, or water company threatened to shut off services in your home?: No  Transportation Needs  In the past 12 months, has lack of transportation kept you from medical appointments or from getting medications?: No  In the past 12 months, has lack of transportation kept you from meetings, work, or from getting things needed for daily living?: No    Functional Status Prior to Admission  Assistive Device/Animal Currently Used at Home: cane, straight, walker, front-wheeled, shower chair, grab bar, raised toilet  Functional Status Comments: pt has been in the hospital and snf's for most of 2024  IADL Comments: needed assist  more recently    Discharge Needs Assessment    Concerns to be Addressed: discharge planning  Current Discharge Risk: physical impairment  Anticipated Changes Related to Illness: none    Discharge Plan Summary    Patient Choice  Offered/Gave Vendor List: yes  Patient's Choice of Community Agency(s): [facility]  Patient and/or patient guardian/advocate was made aware of their right to choose a provider. A list of eligible providers was presented and reviewed with the " patient and/or patient guardian/advocate in written and/or verbal form. The list delineates providers in the patient’s desired geographic area who are participating in the Medicare program and/or providers contracted with the patient’s primary insurance. The Medicare list and quality ratings were obtained from the Medicare.gov [medicare.gov] website.    Concerns / Comments: met with pt to go over dc info and process. Her sister and brother will pick her up at dc and stay with her in her home for about a week for transition.     Discharge Plan:  Disposition/Destination: Home Health Care - [facility] / Home       Connection to Community  Not applicable  Community Resources:N/A      Discharge Transportation:  Is Out of Hospital DNR needed at Discharge: no  Does patient need discharge transport? Yes  Discharge Transportation Vendor: other (see comment) (family/car)  What day is the transport expected?: 12/10/24  What time is the transport expected?: 1200

## 2024-12-09 NOTE — PROGRESS NOTES
Patient: Bebe Burks  Location: [facility] [address]  MRN: [id number]  Today's date: 12/9/2024    History of Present Illness  Bebe is a 64 y.o. female admitted on 11/19/2024 with History of above knee amputation, left (CMS/[facility]) [Z89.612]  Encounter for prosthetic gait training [Z47.89]. Principal problem is History of above knee amputation, left (CMS/[facility]).    Pt is a 64 y.o. 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       Past Medical History  Bebe has a past medical history of Abnormal ECG, Abnormal finding on chest xray (12/2021), Arthritis, Colon cancer (CMS/[facility]), COVID-19 (12/2021), COVID-19 vaccine series completed, Diabetic neuropathy (CMS/[facility]), DM (diabetes mellitus), type 2 with ophthalmic complications (CMS/[facility]), Hypertension, Hypothyroidism, Left foot drop, Lipid disorder, and Type 2 diabetes mellitus treated with insulin (CMS/[facility]).    PT Pain      Date/Time Pain Type Rating: Rest Rating: Activity [facility]   12/09/24 1156 Pain Reassessment 0 0 JG              12/09/24 1102   Pain/Comfort/Sleep   Pain Charting Type Pain Assessment   Preferred Pain Scale number (Numeric Rating Pain Scale)   Pain Goal/Acceptable Level 0   Vitals   Heart Rate 97   BP (!) 142/55   BP Location Left upper arm   BP Method Automatic   Patient Position Sitting       Prior Living Environment      Flowsheet Row Most Recent Value   People in Home alone   Current Living Arrangements condominium   Home Accessibility stairs to enter home (Group)   Living Environment Comment resides in 1st floor condo, 4 (1+2+1) [address], R hand rail, no stairs inside home, Bathroom Layout: Tub/shower unit with shower curtain, Shower stall with glass doors (shower stall in main bedroom), two sisters can provide some assistance   Number of Stairs, Main Entrance 4   Surface of Stairs, Main Entrance concrete   Stair Railings, Main Entrance railing on right  side (ascending)   Stairs Comment, Main Entrance 1+2+1 [address]   Location, Bathroom first (main) floor   Bathroom Access tub bath, walk-in shower            Prior Level of Function      Flowsheet Row Most Recent Value   Dominant Hand right   Ambulation assistive equipment   Transferring assistive equipment   Toileting independent   Bathing assistive equipment   Dressing independent   Eating independent   IADLs assistive equipment   Driving/Transportation    Communication understands/communicates without difficulty   Assistive Device Currently Used at Home cane, straight, walker, front-wheeled, shower chair, grab bar, raised toilet             IRF PT Evaluation and Treatment - 12/09/24 1101          PT Time Calculation    Start Time 1102     Stop Time 1202     Time Calculation (min) 60 min        Session Details    Document Type Discharge Evaluation        General Information    General Observations of Patient Pt upright in [facility] finishing with OT session        Mobility Belt    Mobility Belt Used During Session yes        Orthosis Ankle Right AFO    Orthosis Properties Date Obtained: 12/03/24 Location: Ankle Laterality: Right Type: AFO Features: Carbon fiber Description: R toe off carbon fiber brace Therapeutic Indications: compensation for lost function Wearing Schedule: wear when out of bed    Skin Assessment intact        Sensory Assessment (Somatosensory)    Left LE Sensory Assessment light touch localization;light touch awareness;intact   LT intact on residual limb; prop intact at hip flexion    Right LE Sensory Assessment light touch awareness;light touch localization;proprioception;impaired   LT impaired in stocking formation; prop impaired 2/5 at great toe       Range of Motion (ROM)    Left Lower Extremity (ROM) hip   - 10 degrees flexion    Hip, Left (ROM) - 10 degrees flexion        Bed Mobility    [place], Supine to Sit modified independence     [place], Sit to Supine modified  independence     Safety/Cues increased time to complete     Assistive Device bed rails     Comment sit <> supine on/off bed with bedrails, mod I for safety concerns        Transfers    Transfers low pivot transfer;stand pivot transfer;car transfer     Maintains Weight-Bearing Status able to maintain        Bed to Chair Transfer    [place], Bed to Chair modified independence     Safety/Cues technique;sequencing     Assistive Device wheelchair     Comment completed bed <> wheelchair via low pivot, mod I for safety concerns        Chair to Bed Transfer    [place], Chair to Bed modified independence     Safety/Cues technique;sequencing     Assistive Device none     Comment completed bed <> wheelchair via low pivot, mod I for safety concerns        Sit to Stand Transfer    [place], Sit to Stand Transfer minimum assist (75% or more patient effort)     Safety/Cues technique;sequencing     Assistive Device walker, front-wheeled     Comment min A for hip extension at LLE and assistance with locking prosthetic knee in standing        Stand to Sit Transfer    [place], Stand to Sit Transfer minimum assist (75% or more patient effort)     Safety/Cues technique;sequencing     Assistive Device walker, front-wheeled     Comment min A for pelvic stablity when shifting to R for unweighting prosthetic to unlock        Low Pivot Transfer    [place], Low Pivot Transfer modified independence     Safety/Cues sequencing;technique     Assistive Device walker, front-wheeled     Comment mod I for low pivot transfers        Stand Pivot Transfer    [place], Stand Pivot/Stand Step Transfer minimum assist (75% or more patient effort);1 person assist     Safety/Cues sequencing;technique     Assistive Device walker, front-wheeled     Comment min A for hip extension assistance and intermittent assist to lock prosthetic knee in standin        Car Transfer    Transfer Technique lateral     [place] set up      "Safety/Cues technique     Assistive Device wheelchair;transfer board     Comment Set up for placement and positioning of transfer board for lateral transfer in/out of car        Gait Training    [place], Gait minimum assist (75% or more patient effort)     Safety/Cues sequencing;technique     Assistive Device walker, front-wheeled     Distance in Feet 55 feet     Pattern step-through     Deviations/Abnormal Patterns base of support, narrow;weight shifting decreased     Bilateral Gait Deviations heel strike decreased     Left Sided Gait Deviations hip circumduction     Right Sided Gait Deviations heel strike decreased     Advanced Gait Activity 10 Meter Walk Test (Self-Selected Velocity)     [place], Picking Up Object touching/steadying assist     Comment Ambulated 10' with RW and L prosthesis, steadying assistance at gait belt for balance; Ambulated 55' + 30' with RW, min A for pelvic stability and intermittent weightshifting over prosthesis; increased assist due to fatigue with increased distance        Curb Negotiation    [place] moderate assist (50-74% patient effort);1 person assist     Assistive Device walker, front-wheeled     Curb Height 6 inches     Comment up/down 6\" curb with RW, mod A for hip extension assistance and pelvic stability when bringing RW up/down curb        Rough/Uneven Surface Gait Skills    [place] minimum assist (75% or more patient effort)     Comment Ambulated 30' on uneven pavement on 12/5, min A for usual performance        10 Meter Walk Test (Self-Selected Velocity)    Trial One: Ten Meter Walk Test (sec) 60.45 seconds     Trial One: Gait Speed (m/s) 0.1 m/s        Stairs Training    [place], Stairs dependent (less than 25% patient effort)     Safety/Cues sequencing;technique     Assistive Device railing     Handrail Location (Stairs) both sides     Number of Stairs 4     Stair Height 4 inches     Ascending Stairs Technique step-to-step   RLE    Descending " Stairs Technique step-to-step   down backwards    Comment up forwards/down backwards with BUE support on HR, min A for ascending for LLE prosthetic placement, mod A for descending due to increased assistance for upright posture while kicking L prosthesis back to lower onto step + assist of 2nd for steadying and safety        Wheelchair Mobility/Management    Method of Locomotion bimanual (upper extremity) propulsion     [place], Forward Propulsion modified independence     [place], Backward Propulsion modified independence     [place], Steering Activities modified independence     [place], Stopping Activities modified independence     [place], Turning Activities modified independence     [place], Doorway Navigation modified independence     Distance Propelled in Feet 150 feet     Comment, Wheelchair Mobility Self propel in MWC x 150' on tile surfaces, mod I for safety concerns        Balance    Static Sitting Balance WFL     Dynamic Sitting Balance WFL     Sit to Stand Dynamic Balance mild impairment     Static Standing Balance mild impairment     Dynamic Standing Balance severe impairment        Motor Skills    Coordination minimal impairment   due to L prosthesis    Functional Endurance Fair  +        Postural Deviations    Shoulder left shoulder forward;right shoulder forward     Low Back flattened     Pelvis posterior pelvic tilt     Comment, Hip slight windswept deformity to the right with increased itnernal rotation in sitting        Prosthetic Orientation (Lower Extremity)    Fit Assessment fit/function of prosthesis are appropriate     Comment, Fit Assessment Donned prosthesis and liner in OT session; wearing schedule= ~ 8 hours; skin check - unremarkable; sock ply = 3 ply     Specific Gait Deviations toe stays off floor after heel strike        Discharge Summary (PT)    Outcomes Achieved/Progress Made Upon Discharge (PT) goals partially achieved prior to discharge;progress was  made toward goals     Transfer to Another Level of Care or Facility (PT) recommend therapy via home health     Discharge Summary Statement (PT) Pt is a 64 year old female who underwent a L AKA in August 2024 and has been hospitalized prior due to non healing infection since April. She was at SNF for past 5-6 months. She was seen in amputee clinic after fabrication of her temporary prosthesis and was admitted on 11/19 for inpatient rehab. She has progressed to mod I level for wheelcair mobility and low pivot transfers. She has progressed to being able to julian prosthesis supine in bed with increased time at mod I level. Recommending minimal assistance for standing and short distance ambulation due to continued impaired balance, RLE weakness, decreased weightshifting and gait deivations requiring hands on assistance to decrease risk for falls. Home evaluation completed with aptient and sister present. Ordered recommended DME including portable ramp for safe entry, MWC and R toe off brace. Pt to d/c home with homecare therapy and PT/OT to conitnue improving her upright tolerance and ambulatory independence.                              IRF PT Goals      Flowsheet Row Most Recent Value   Transfer Goal 1    Activity/Assistive Device stand pivot, walker, front-wheeled at 11/19/2024 1431   [place] moderate assist (50-74% patient effort) at 11/26/2024 0855   Time Frame short-term goal (STG), 5 - 7 days at 12/03/2024 0855   Progress/Outcome goal met at 12/09/2024 1101   Transfer Goal 2    Activity/Assistive Device stand pivot, walker, front-wheeled at 11/19/2024 1431   [place] minimum assist (75% or more patient effort) at 11/26/2024 0855   Time Frame 14 days or less, long-term goal (LTG) at 11/26/2024 0855   Progress/Outcome goal met at 12/09/2024 1101   Transfer Goal 3    Activity/Assistive Device sit-to-stand/stand-to-sit, walker, front-wheeled at 11/19/2024 1431   [place] minimum assist (75% or more patient  effort) at 11/26/2024 0855   Time Frame short-term goal (STG), 5 - 7 days at 12/03/2024 0855   Progress/Outcome goal met at 12/09/2024 1101   Transfer Goal 4    Activity/Assistive Device sit-to-stand/stand-to-sit, walker, front-wheeled at 11/19/2024 1431   [place] tactile cues required at 11/26/2024 0855   Time Frame long-term goal (LTG), 14 days or less at 11/26/2024 0855   Progress/Outcome goal met at 12/09/2024 1101   Gait/Walking Locomotion Goal 1    Activity/Assistive Device gait (walking locomotion), walker, front-wheeled at 11/19/2024 1431   Distance 10 feet at 11/19/2024 1431   [place] moderate assist (50-74% patient effort) at 11/26/2024 0855   Time Frame short-term goal (STG), 5 - 7 days at 12/03/2024 0855   Strategies/Barriers 50 at 11/26/2024 0855   Progress/Outcome goal met at 12/09/2024 1101   Gait/Walking Locomotion Goal 2    Activity/Assistive Device gait (walking locomotion), assistive device use, walker, front-wheeled at 11/26/2024 0855   Distance 25 feet at 12/03/2024 0855   [place] minimum assist (75% or more patient effort) at 11/26/2024 0855   Time Frame long-term goal (LTG), 14 days or less at 11/26/2024 0855   Progress/Outcome goal met at 12/09/2024 1101   Wheelchair Locomotion Goal 1    Activity forward propulsion, backward propulsion, steering, stopping, turning, doorway navigation, weight shifting at 11/19/2024 1431   Assistive Device manual, ultra lightweight at 11/19/2024 1431   Distance 150 feet at 11/19/2024 1431   [place] other (see comments)  [Touching / Steadying Assist] at 11/19/2024 1431   Time Frame 1 week, short-term goal (STG) at 11/19/2024 1431   Progress/Outcome goal met at 11/26/2024 0855   Wheelchair Locomotion Goal 2    Activity forward propulsion, backward propulsion, steering, stopping, turning, doorway navigation, weight shifting at 11/19/2024 1431   Assistive Device manual, ultra lightweight at 11/19/2024 1431   Distance 150 feet at 11/19/2024 1431  "  [place] modified independence at 11/19/2024 1431   Time Frame 21 days or less, long-term goal (LTG) at 11/19/2024 1431   Progress/Outcome goal met at 12/03/2024 0855   Stairs Goal 1    Activity/Assistive Device ascending stairs, descending stairs, using handrail, left, using handrail, right, step-to-step at 11/29/2024 1100   Number of Stairs 4 at 11/29/2024 1100   [place] moderate assist (50-74% patient effort) at 11/29/2024 1100   Time Frame short-term goal (STG), 5 - 7 days at 11/29/2024 1100   Strategies/Barriers still requires assist of two at 12/09/2024 1101   Progress/Outcome goal not met at 12/09/2024 1101   Stairs Goal 2    Activity/Assistive Device ascending stairs, descending stairs, using handrail, left, using handrail, right, step-to-step at 11/29/2024 1100   Number of Stairs 4 at 11/29/2024 1100   [place] minimum assist (75% or more patient effort) at 11/29/2024 1100   Time Frame long-term goal (LTG), 14 days or less at 11/29/2024 1100   Strategies/Barriers 6\" at 11/29/2024 1100   Progress/Outcome goal not met at 12/09/2024 1101          "

## 2024-12-09 NOTE — DISCHARGE INSTR - ACTIVITY
Occupational Therapy:    Toilet Transfers: Supervision for low pivot transfers to drop arm commode.     Tub Transfers: Supervision for lateral low pivot transfer to tub bench from wheelchair.    Upper Body Dressing: Modified independent for upper body dressing while seated.    Lower Body Dressing: Minimal assistance for lower body dressing in bed with assist to don prosthesis.    Bathing: Supervision for bathing seated on tub bench.    Toileting: Modified independent for toileting. Lateral weight shifting to manage clothing.    Grooming: Modified independent for grooming while seated.     Household Mobility/Household Activity: Modified independent for wheelchair mobility.    Entered by: CHAVA Sandoval on: 12/9/2024      Physical Therapy:      Bed mobility: Bebe has demonstrated ability to complete sit <> supine on standard bed with use of bedrails at mod I level.      Transfers: Low pivot transfers: Bebe is able to complete low pivot /lateral transfers from wheelchair to various surfaces at mod I level. Recommending supervision initially due to novice environment, but anticipate quick transition to mod I level for wheelchair level transfers.    With Prosthesis: Bebe requires minimal assistance for sit to stand transfers. Stand on her left side (prosthetic side) and help by giving her a slight boost up when initiating the stand. Once she is upright, assist her by ensuring her prosthetic knee is locked into extension by pushing back on front of the socket. Allow her time to complete herself initially and just provide as needed.      Ambulation: Bebe is able to ambulate with rolling walker and minimal assistance provided at the gait belt. You can just hold onto the gait belt for balance and stability.      Elevations: Recommending use of portable ramps to access home in wheelchair at this time. Continue working with homecare therapy for negotiating of curb step and two steps to enter. Please see  home evaluation handout provided to Bebe for details on potential addition of handrail.      Wheelchair Mobility: Bebe is mod I for wheelchair mobility       Entered by: Paulette Gamble PT, DPT on: 12/10/24       Additional:     Weight-Bearing Status: WBAT        Braces/Prosthesis:  Patient was issued right toe off brace from orthotist [employer] at [facility].  Please call [phone] with any issues or questions.     Durable Medical Equipment:    Patient was issued rental wheelchair and transferboard (to be delivered to home)  from [employer] Please call [phone] with any issues or questions.    You were also evaluated and fitted for a definitive ultralight weight wheelchair by [employer]. Please contact them at [phone] ext. [phone]  for details or updates on timeline for definitive chair.

## 2024-12-09 NOTE — PROGRESS NOTES
Subjective    Patient seen and examined on rounds.  Chart reviewed.  Events overnight noted.  History reviewed briefly with patient.    CC:  Deficits in mobility, transfers, self-care status post left transfemoral amputation for prosthetic training, severe peripheral vascular disease, chronic myelocytic leukemia, osteomyelitis left foot, diabetic neuropathy, multiple medical problems    HPI:  Ms. 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 Pura Chaves. ID recommended IV Ceftriaxone and IV Ampicillin until 9/14/24.  She had urinary retention managed with indwelling Rowan catheter.  She had right upper extremity swelling present during this admission, ultrasound on 8/29/24 demonstrated an acute non-occlusive DVT of the right axillary and brachial vein around line (on Lovenox).  Subsequently she completed an acute inpatient rehabilitation stay for pre-prosthetic training at [facility] between 9/3/24 to 9/21/24 and was discharged to [facility] on 9/21/24.  She initially required bladder self intermittent catheterizations due to urinary retention, but indicates that now she is able to void.  She desired to be a prosthetic ambulator.  A left transfemoral amputation prosthesis was fabricated. and followed-up as outpatient in the amputee clinic at [facility] and she was deemed to be an  appropriate candidate for acute inpatient rehabilitation stay. She has been needing assistance for mobility, transfers, self-care.  I have reviewed the preadmission screening and concur with that information and there are no significant changes from patient's preadmission screening. She is transferred to [facility] on 11/19/24 from [facility] for further acute inpatient rehabilitation stay for prosthetic training with transfemoral amputation prosthesis.    SUBJ: Performance day today.  Feels comfortable with discharge plans to home for tomorrow.  Discussed follow-up instructions with her.  Detailed discharge instructions will be put on discharge AVS.  Face to Face evaluation performed for ultralight weight manual wheelchair today. Ms. Burks is unable to walk independently with any type of ambulatory assistive device. She has a mobility deficit which cannot be remediated with a cane or walker because of left above knee amputation and R foot drop. She requires an optimally-configured light-weight manual wheelchair in order to move around within her home to access her MRADLS. She requires back cane height and seat to floor height that cannot be accommodated by a standard height manual wheelchair. Specifically, Ms. Bradley needs rehab seating to address postural asymmetries of posterior pelvic tilt, hip flexion contractures and right windswept deformity due to her left above knee amputation and resultant right lower extremity weakness due to prolonged hospitalizations. She is unable to self-propel a standard wheelchair with the axle in the standard position and after-market back seating to position her trunk effectively for optimal wheelchair propulsion.  Discussed with patient.  Discussed with physical therapist earlier today.    ROS: Denies chest pain or shortness of breath. Other ROS negative. Past, family, social history is unchanged.    Current Functional Status:   Bed mobility:   [place], Supine  "to Sit: modified independence   [place], Sit to Supine: modified independence   Transfers:    [place], Sit to Stand Transfer: minimum assist (75% or more patient effort)  [place], Stand to Sit Transfer: minimum assist (75% or more patient effort)   [place], Stand Pivot/Stand Step Transfer: minimum assist (75% or more patient effort)   Gait:   [place], Gait: minimum assist (75% or more patient effort)  Assistive Device: walker, front-wheeled   Distance in Feet: 25 feet    Bathing:   [place]: supervision   Toileting:   [place]: modified independence   Upper body dressing:   [place]: modified independence   Lower body dressing:   [place]: minimum assist (75% or more patient effort)     Functional Progress:    Functional status reviewed. Overall, patient's functional status is improving.      Physical Exam      Blood pressure (!) 135/56, pulse 94, temperature 36.7 °C (98 °F), temperature source Oral, resp. rate 18, height 1.702 m (5' 7.01\"), weight 73.1 kg (161 lb 2 oz), SpO2 97%, not currently breastfeeding.    Body mass index is 25.23 kg/m².    General Appearance: Not in acute distress  Head/Ear/Nose/Throat: Normocephalic; Atraumatic.   Eye: EOMI; PERRL.   Neck: No JVD; No Bruits.   Respiratory: Decreased breath sounds at bases.   Cardiovascular: RRR; Normal S1, S2.   Gastrointestinal: Soft; NT; +BS.   Extremities: Bilateral lower extremity edema noted.  Left transfemoral amputation stump is stable.  Musculoskeletal: Functional active range of motion in both upper and right lower extremities.  Functional active range of motion in left hip.  She has a left transfemoral amputation.  Neurological: AAO ×3. Speech is fluent. Cranial nerve examination does not reveal any gross facial asymmetry. Strength testing shows about 4+/5 strength in both upper and right lower extremities.  Left hip flexion is 4/5.  She has a left transfemoral amputation.  She is grossly able to localize " touch sensation.  She is able to localize position sense in right foot great toe position sense is unable to be tested on left side.  Deep tendon reflexes are hypoactive bilaterally.  Right plantar is flexor, left plantar was unable to be tested. Coordination is functional upper extremities.    Behavior/Emotional: Appropriate; Cooperative.   Skin: Left transfemoral amputation stump stable.  Incision appears to be well-healed.  Some wounds are noted on right anterior lower forearm.  Small wound noted on right foot fifth toe.  Small wound also noted on right foot great toe with some slough in it and scaly skin around it.  Some redness noted on sacrum/coccyx.  Reviewed pictures of wounds in Epic.      Current Facility-Administered Medications:     acetaminophen (TYLENOL) tablet 650 mg, 650 mg, oral, q4h PRN, Charlette Brooke MD, 650 mg at 12/05/24 0259    apixaban (ELIQUIS) tablet 5 mg, 5 mg, oral, BID, Charlette Brooke MD, 5 mg at 12/09/24 0857    ascorbic acid (VITAMIN C) tablet 500 mg, 500 mg, oral, q48h INT, Charlette Brooke MD, 500 mg at 12/09/24 1224    atorvastatin (LIPITOR) tablet 40 mg, 40 mg, oral, Daily (6p), Charlette Brooke MD, 40 mg at 12/08/24 1623    DULoxetine (CYMBALTA) capsule,delayed release(DR/EC) 60 mg, 60 mg, oral, Nightly, Charlette Brooke MD, 60 mg at 12/08/24 2048    empagliflozin (JARDIANCE) tablet 10 mg, 10 mg, oral, Daily, Charlette Brooke MD, 10 mg at 12/09/24 0857    famotidine (PEPCID) tablet 20 mg, 20 mg, oral, BID, Alexei Petty MD, 20 mg at 12/09/24 0857    ferrous sulfate tablet 325 mg, 325 mg, oral, q48h INT, Charlette Brooke MD, 325 mg at 12/09/24 1225    fluticasone propionate (FLONASE) 50 mcg/actuation nasal spray 1 spray, 1 spray, Each Nostril, BID, Alexei Petty MD, 1 spray at 12/09/24 0858    furosemide (LASIX) tablet 40 mg, 40 mg, oral, BID (am, 4p), Charlette Brooke MD, 40 mg at 12/09/24 0857    gabapentin (NEURONTIN) capsule 600 mg, 600 mg, oral, TID, Charlette Brooke MD, 600 mg  at 12/09/24 0857    HYDROmorphone (DILAUDID) tablet 4 mg, 4 mg, oral, q6h PRN, Charlette Brooke MD, 4 mg at 12/08/24 1722    imatinib (GLEEVEC) chemo tablet 400 mg, 400 mg, oral, Daily, Charlette Brooke MD, 400 mg at 12/09/24 0858    insulin glargine U-100 (LANTUS/BASAGLAR) pen 15 Units, 15 Units, subcutaneous, Nightly, Charlette Brooke MD, 15 Units at 12/08/24 2049    insulin lispro U-100 (HumaLOG) pen 1-12 Units, 1-12 Units, subcutaneous, TID with meals, Charlette Brooke MD, 1 Units at 12/09/24 0900    insulin lispro U-100 (HumaLOG) pen 5 Units, 5 Units, subcutaneous, TID with meals, Charlette Brooke MD, 5 Units at 12/09/24 1226    levothyroxine (SYNTHROID) tablet 25 mcg, 25 mcg, oral, Daily (6:30a), Charlette Brooke MD, 25 mcg at 12/09/24 0631    losartan (COZAAR) tablet 25 mg, 25 mg, oral, Daily with dinner, Charlette Brooke MD, 25 mg at 12/08/24 1624    melatonin ODT 3 mg, 3 mg, oral, Nightly, Charlette Brooke MD, 3 mg at 12/08/24 2048    methenamine (HIPREX) tablet 1 g, 1 g, oral, BID, Charlette Brooke MD, 1 g at 12/09/24 0857    metoclopramide (REGLAN) tablet 5 mg, 5 mg, oral, 3x daily PRN, Charlette Brooke MD, 5 mg at 12/09/24 0739    multivitamin tablet 1 tablet, 1 tablet, oral, Daily, Charlette Brooke MD, 1 tablet at 12/09/24 1225    potassium chloride (KLOR-CON M) ER tablet (particles/crystals) 20 mEq, 20 mEq, oral, Daily, Charlette Brooke MD, 20 mEq at 12/09/24 1224    semaglutide tablet 14 mg, 1 tablet, oral, Daily, Charlette Brooke MD, 14 mg at 12/09/24 0631    tamsulosin (FLOMAX) 24 hr ER capsule 0.4 mg, 0.4 mg, oral, Daily with dinner, Charlette Brooke MD, 0.4 mg at 12/08/24 1623       Labs / Radiology    Lab Results   Component Value Date    WBC 4.98 11/20/2024    HGB 10.8 (L) 11/20/2024    HCT 32.7 (L) 11/20/2024    MCV 90.8 11/20/2024     11/20/2024     Lab Results   Component Value Date    GLUCOSE 205 (H) 11/20/2024    CALCIUM 8.7 11/20/2024     11/20/2024    K 3.7 11/20/2024    CO2 30 11/20/2024      11/20/2024    BUN 35 (H) 11/20/2024    CREATININE 0.9 11/20/2024       Assessment and Plan    ASSESSMENT PLAN:  1. 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 Pura Chaves. ID recommended IV Ceftriaxone and IV Ampicillin until 9/14/24.  She had urinary retention managed with indwelling Rowan catheter.  She had right upper extremity swelling present during this admission, ultrasound on 8/29/24 demonstrated an acute non-occlusive DVT of the right axillary and brachial vein around line (on Lovenox).  Subsequently she completed an acute inpatient rehabilitation stay for pre-prosthetic training at [facility] between 9/3/24 to 9/21/24 and was discharged to [facility] on 9/21/24.  She initially required bladder self intermittent catheterizations due to urinary retention, but indicates that now she is able to void.  She desired to be a prosthetic ambulator.  A left transfemoral amputation prosthesis was fabricated. and followed-up as outpatient in the amputee clinic at [facility] and she was deemed to be an appropriate candidate for acute inpatient rehabilitation stay. She has been needing assistance for mobility, transfers, self-care.  I have reviewed the preadmission screening and concur with that information and there are no significant changes from patient's preadmission screening. She is transferred to  [facility] on 11/19/24 from [facility] for further acute inpatient rehabilitation stay for prosthetic training with transfemoral amputation prosthesis.     2. DVT prophylaxis/treatment - on Eliquis.  Platelets 168 on 11/20/2024.     3. Vascular - status post left transfemoral amputation for prosthetic training, severe peripheral vascular disease, chronic myelocytic leukemia, osteomyelitis left foot, diabetic neuropathy, multiple medical problems - continue PT, OT, psychology.  Follow falls precautions, cardiac precautions, monitor pulse oximeter in therapy.  Monitor skin under prosthesis.  Teach donning and doffing of left transfemoral amputation prosthesis.  Do gait training with prosthesis.     4. GI - On Colace, Senokot.  On PRN Maalox.  On PRN Dulcolax suppository.  On PRN Zofran.She reports stomach upset and nausea at times on 12/7/2024, especially in the mornings after medications and she feels it is the Semaglutide most likely making her nauseous when she is takes on an empty stomach.  Discussed with nurse.  Patient requested administration of Semaglutide in the afternoon.  Nursing will discuss with pharmacy to change administration time.  Also will add Pepcid for GI prophylaxis.  Discussed with patient and with nurse on 12/7/2024.     5.  -on Hiprex.  On Flomax.  Monitor postvoid residual.     6. CVS - on Lasix.  Monitor for orthostasis.     7. Pulmonary -encourage incentive spirometry.     8. Hematology - monitor hemoglobin, platelets.  Hemoglobin 10.8, WBC 4.98 on 11/20/2024.     9. Pain -on Cymbalta.  On Neurontin.  On PRN Tylenol.  On PRN Dilaudid.     10. Skin - Left transfemoral amputation stump stable.  Incision appears to be well-healed.  Some wounds are noted on right anterior lower forearm.  Small wound noted on right foot fifth toe.  Small wound also noted on right foot great toe with some slough in it and scaly skin around it.  Some redness noted on sacrum/coccyx.  Reviewed  pictures of wounds in Epic. Monitor skin under the left transfemoral amputation prosthesis.Noted input from dermal defense nurse on 11/21/2024.  Reviewed pictures in Epic.      11. F/E/N - on Ferrous sulfate.  On vitamin C. on MVI.  On K-Genesis con.     12. Hyperlipidemia -on Lipitor.     13. Diabetes mellitus type 2 - on Lantus insulin.  On sliding scale Humalog coverage.  On Jardiance.     14. Oncology - On Gleevec for chronic myelocytic leukemia.     15. Hypothyroidism - on Synthroid.     16. Insomnia - on Melatonin.     17. Infectious diseases - On oral Vancomycin.       18. Seasonal allergies - She reports seasonal allergies are bothering her.  Ordered Flonase spray to nostrils twice daily on 12/4/2024.     19. Psychiatry - mood stable.  Psychology consulted.     20. Rehabilitation medicine - Continue comprehensive rehabilitation care. Continue PT, OT, speech, psychology.  We will follow falls precautions, cardiac precautions, monitor pulse oximetry in therapy and follow weightbearing precautions.  We will follow spinal precautions as appropriate.Tolerating therapies per endurance on 11/20/2024.  Slept well last night.   Working on donning and doffing of prosthesis.  Discussed with the importance of skin checks on the prosthesis.  Able to ambulate 7 feet in parallel bars dependent gait using left transfemoral amputation prosthesis with physical therapy.  Dependent for sit to stand transfers with physical therapy on 11/20/2024. Noted input from dermal defense nurse on 11/21/2024.  Reviewed pictures in Epic.  Working on ADLs with occupational therapy on 11/21/2024.  Requiring minimal assistance for bathing, dependent for toileting, close supervision for poorly dressing and dependent for lower body dressing with occupational therapy.  She requested a flu shot.  Discussed with nurse.  Continent of bowel and bladder for nursing on 11/21/2024.Dependent for sit to stand transfers with physical therapy on 11/22/2024.   Able to ambulate 15 feet with thoracic walker dependent gait with physical therapy on 11/22/2024.  Skin under prosthesis is stable.  Denies pain today.  She says she got flu shot yesterday.Feels better on 11/23/2024.  Denies increased pain.  She says that she took her medications after meals and did not have any GI symptoms today.  Continent of bowel and bladder for nursing.  Working on donning and doffing of prosthesis, sock ply management with occupational therapy on 11/23/2024. Due to elevated blood pressure, internist restarted Amlodipine and Losartan on 11/25/2024.  Denies increased pain.  Continent of bowel and bladder for nursing on 11/25/2024.Skin on left transfemoral amputation residual limb remains stable.  Discussed with patient.  Discussed with nurse on 11/25/2024.Discussed patients progress in therapies with therapists in team meeting on 11/26/2024.  Discussed in PCC. See PCC documentation.  Face-to-face evaluation was done for a right carbon fiber toe off brace on 11/26/2024.  Discussed with patient.  Discussed with physical therapist on 11/26/2024.Discussed recommendations of PCC with patient on 11/27/2024.  She was measured for a right carbon fiber Toe off AFO brace yesterday.  Discussed with nursing.  Skin on residual limb remains stable.  Continent of bladder for nursing on 11/27/2024. Able to ambulate 10 feet with moderate assistance with balance but using left transfemoral amputation prosthesis on 11/28/2024.  Requiring moderate assistance for sit to stand transfer with physical therapy using left transfemoral amputation prosthesis on 11/28/2024.Denies phantom limb pain sensation on 11/29/2024.  Discussed with patient and with her family with her at bedside on 11/29/2024.  Noted podiatry input.  She feels she is improving functionally.  Discussed with her that with practice she will continue to improve with prosthetic use.She feels she is making progress in therapy on 12/2/2024.  Per case  management, her CMG ends on 12/3/2024 and she was extended for a week for discharge on 12/10/2024 to allow for more time in therapy here.  Now she is asking if she could get 1 more week beyond that time.  Discussed with case management regarding patient's request.  Working on ADLs with occupational therapy.  Requiring minimal assistance for bathing, dependent for toileting, minimal assistance for upper body dressing, moderate assistance for lower body dressing.  Discussed with patient and physical therapist working with her in the parallel bar earlier today.  Again reviewed with her importance of monitoring skin under prosthesis with prosthetic use..Discussed patients progress in therapies with therapists in team meeting on 12/3/2024. Discussed in PCC. See PCC documentation.  Continent of bowel and bladder for nursing.  Discussed with nurse.  Skin under prosthesis stable on 12/3/2024.Discussed recommendations of PCC with patient on 12/4/2024.  She reports seasonal allergies are bothering her.  Ordered Flonase spray to nostrils twice daily on 12/4/2024.Patient went for home evaluation on 12/5/2024 with therapist.  Discussed with patient earlier in the morning today.  Discussed with her to maximize independence at wheelchair level and manage at wheelchair level at home and only use prosthesis with trained therapist with her for now until she becomes more functional prosthetic ambulator in future.  Has some phantom limb sensation which can hopefully decrease with prosthetic use in future.She reports stomach upset and nausea at times on 12/7/2024, especially in the mornings after medications and she feels it is the Semaglutide most likely making her nauseous when she is takes on an empty stomach.  Discussed with nurse.  Patient requested administration of Semaglutide in the afternoon.  Nursing will discuss with pharmacy to change administration time.  Also will add Pepcid for GI prophylaxis.  Discussed with patient and  with nurse on 12/7/2024. Performance day today, 12/9/2024.  Feels comfortable with discharge plans to home for tomorrow.  Discussed follow-up instructions with her on 12/9/2024.  Detailed discharge instructions will be put on discharge AVS.  Face to Face evaluation performed for ultralight weight manual wheelchair today. Ms. Burks is unable to walk independently with any type of ambulatory assistive device. She has a mobility deficit which cannot be remediated with a cane or walker because of left above knee amputation and R foot drop. She requires an optimally-configured light-weight manual wheelchair in order to move around within her home to access her MRADLS. She requires back cane height and seat to floor height that cannot be accommodated by a standard height manual wheelchair. Specifically, Ms. Bradley needs rehab seating to address postural asymmetries of posterior pelvic tilt, hip flexion contractures and right windswept deformity due to her left above knee amputation and resultant right lower extremity weakness due to prolonged hospitalizations. She is unable to self-propel a standard wheelchair with the axle in the standard position and after-market back seating to position her trunk effectively for optimal wheelchair propulsion.  Discussed with patient.  Discussed with physical therapist earlier on 12/9/2024.     21. Reviewed labs today.  BUN 35, creatinine 0.9, sodium 139, potassium 3.7 on 11/20/2024.      Alexei Petty MD  12/9/2024      This encounter was completed utilizing the Direct Speech Voice Recognition Software. Grammatical errors, random word insertions, pronoun errors, and incomplete sentences are occasional consequences of the system due to software limitations, ambient noises, and hardware issues. Such errors may be missed prior to affixing electronic signature. Any questions or concerns about the content, text, or information contained within the body of this dictation should be  directly addressed to the physician for clarification. If you have any questions or concerns please do not hesitate to call me directly via EPIC chat, page, or email.

## 2024-12-09 NOTE — DISCHARGE INSTRUCTIONS
Sliding scale insulin recommended:  Dose = 1 units for -200  Dose = 2 units for -250  Dose = 4 units for -300  Dose = 6 units for -350  Dose = 8 units for -400  Dose = 10 units for -450  Dose = 12 units for BG >=451  Notify your PCP if your BG levels often </= 60 or >/= 450 mg/dL.

## 2024-12-09 NOTE — PLAN OF CARE
Plan of Care Review  Plan of Care Reviewed With: patient  Progress: improving  Outcome Evaluation: Patient AAOx4, pleasant, cooperative. Performance day today. Safety precautions maintained. Medications reviewed. Appetite good. Cont of b/b. Wound nurse assessed today, coccyx area to get hydrofiber Ag and foam. Supervision bumpover for transfer. TID accucheck. Pain managed with PRN dilaudid. Patient able to make needs known, call bell within reach. No nursing concerns at the moment.

## 2024-12-09 NOTE — PLAN OF CARE
Plan of Care Review  Plan of Care Reviewed With: patient  Progress: improving  Outcome Evaluation: Oriented x 4. Patient expressing her plans for upcoming discharge. Denies discomfort. Voiding in bathroom. Call bell in reach.

## 2024-12-09 NOTE — PROGRESS NOTES
[facility] Internal Medicine Progress Note          Patient was seen and examined at bedside.    Subjective:     Reviewed night shift RN report. No new nursing issues.   Saw her in am: very pleasant, no new complaints or concerns.   No O/N events. No new complaints. Her paresthetic training is progressing well.     Spent quite long time to review her home medication list with RN, consulted pharmacist for her d/c medication evaluation.    Objective   Vital signs in last 24 hours:  Temp:  [36.4 °C (97.5 °F)-36.8 °C (98.2 °F)] 36.7 °C (98 °F)  Heart Rate:  [88-95] 94  Resp:  [18] 18  BP: (103-151)/(56-70) 135/56    No intake or output data in the 24 hours ending 12/09/24 1043    Intake/Output this shift:  No intake/output data recorded.   Review of Systems:  All other systems reviewed and negative except as noted in the HPI.   Objective      Labs  Reviewed her labs thoroughly   Lab Results   Component Value Date    WBC 4.98 11/20/2024    HGB 10.8 (L) 11/20/2024    HCT 32.7 (L) 11/20/2024    MCV 90.8 11/20/2024     11/20/2024     Lab Results   Component Value Date    GLUCOSE 205 (H) 11/20/2024    CALCIUM 8.7 11/20/2024     11/20/2024    K 3.7 11/20/2024    CO2 30 11/20/2024     11/20/2024    BUN 35 (H) 11/20/2024    CREATININE 0.9 11/20/2024       Imaging  N/A    Full Code    Physical Exam:  Head/Ear/Nose/Throat: normocephalic; atraumatic; moisture mouth mm, no oropharyngeal thrush noted.   Eyes: anicteric sclera, EOMI; PERRL.   Neck : supple, no JVD, no carotid bruits appeciated.   Respiratory: no evidence of labored breathing, lung sounds CTA b/l, good aeration bibasilar area, no w/r/c.   Cardiovascular: RRR; normal S1, S2; no m/r/g; no S3 or S4.   Gastrointestinal: soft; NT; BS normal; mildly distended; no CVAT b/l.   Genitourinary: no lozada.   Extremities : S/p L AKA, residual limb healed well .   Neurological: AO x 3, fluent speeches, following commands, CNS II-XII grossly intact; no  focal neurologic deficits.   Behavior/Emotional: in NAD, appropriate; cooperative.   Skin: clean, dry and intact.  Plan of care was discussed with patient, RN, and PMR attending     Assessment     CC:L AKA, ambulatory dysfunction     64 y.o. 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 S/p IV Ceftriaxone and IV Ampicillin until 9/14/24. H/o non-occlusive DVT of the R axillary and brachial vein around midline. She presented to [facility] Amput[facility] clinic on 11/14 for evaluation to begin prosthetic training. Now she was admitted for inpt prosthetic training on 11/19/24.  Denies nausea, vomiting, abdominal pain or discomfort, dysuria, cough/sputum, running nose, sore throat, chest pain, palpitation, SOB or orthopnea, dizziness or LH,  HA.    A/P:  # A/p L AKA  -inpt prosthetic training, wound care/dermal defense, pain and neuralgia managements, fall precaution     # CML  -cont home imatinib, check CBC      # Type 2 DM, hypothyroidism  - diet control, SSI, plus her home diabetic regimen;  - cont home levothyroxine     #  PICC-associated DVT on 8/30   -S/p SQ Lovenox weight based ;  -cont Eliquis 5 mg po bid      # Urinary retention, h/o recurrent UTI  -timed voiding with cic, adequate oral hydration, monitor PVR with cic, on flomax;  -hiprex 1 g po bid        # Stage 2 decubitus ulcer, pressure injury in right heel   -wound care     # Recent C.diff.  -cont prophylactic oral vanco 125 mg po bid       Billing code: [id number]  Diagnoses:  Patient Active Problem List   Diagnosis    Controlled type 2 diabetes mellitus, with long-term current use of insulin (CMS/[facility])    Essential hypertension    Hyperlipidemia    Hypothyroidism    Obesity    Thyroid nodule    COVID-19 virus infection    Lung  nodule    Displacement of lumbar intervertebral disc without myelopathy    Disability of walking    Polyneuropathy due to type 2 diabetes mellitus (CMS/HCC)    Pre-op examination    Localized osteoarthritis of right knee    Type 2 diabetes mellitus treated with insulin (CMS/HCC)    Abnormal finding on EKG    Left foot drop    Abnormal finding on chest xray    Osteoarthritis of right knee, unspecified osteoarthritis type    S/P total knee arthroplasty, right    Sepsis (CMS/HCC)    Osteomyelitis of left foot (CMS/HCC)    Peripheral neuropathy    Abdominal pain    Chronic idiopathic constipation    Gangrene (CMS/HCC)    Tibial artery stenosis, left (CMS/HCC)    H/O pilonidal cyst    Type 2 diabetes mellitus with left diabetic foot infection (CMS/HCC)    Type 2 diabetes mellitus with diabetic neuropathy (CMS/HCC)    Hypothyroidism    Primary hypertension    Leukemoid reaction    HLD (hyperlipidemia)    Acute osteomyelitis of left foot (CMS/HCC)    History of above knee amputation, left (CMS/HCC)    Encounter for prosthetic gait training    Adjustment disorder with anxiety    Acute pain in joint           Charlette Brooke MD  12/9/2024

## 2024-12-09 NOTE — PROGRESS NOTES
Patient: Bebe Burks  Location: [facility] [address]  MRN: [id number]  Today's date: 12/9/2024    History of Present Illness  Bebe is a 64 y.o. female admitted on 11/19/2024 with History of above knee amputation, left (CMS/[facility]) [Z89.612]  Encounter for prosthetic gait training [Z47.89]. Principal problem is History of above knee amputation, left (CMS/[facility]).    Pt is a 64 y.o. 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       Past Medical History  Bebe has a past medical history of Abnormal ECG, Abnormal finding on chest xray (12/2021), Arthritis, Colon cancer (CMS/[facility]), COVID-19 (12/2021), COVID-19 vaccine series completed, Diabetic neuropathy (CMS/HCC), DM (diabetes mellitus), type 2 with ophthalmic complications (CMS/HCC), Hypertension, Hypothyroidism, Left foot drop, Lipid disorder, and Type 2 diabetes mellitus treated with insulin (CMS/[facility]).    OT Vitals      Date/Time Pulse HR Source BP BP Location BP Method Pt Position [facility]   12/09/24 1003 94 Monitor 135/56 Left upper arm Automatic Sitting [facility]          OT Pain      Date/Time Pain Type Rating: Rest Rating: Activity Rating: Rest Rating: Activity [facility]   12/09/24 1003 Pain Assessment -- -- 0 - no pain 0 - no pain [facility]   12/09/24 1058 Pain Reassessment 0 0 -- -- [facility]               Prior Living Environment      Flowsheet Row Most Recent Value   People in Home alone   Current Living Arrangements condominium   Home Accessibility stairs to enter home (Group)   Living Environment Comment resides in 1st floor condo, 4 (1+2+1) JOSE RAFAEL, R hand rail, no stairs inside home, Bathroom Layout: Tub/shower unit with shower curtain, Shower stall with glass doors (shower stall in main bedroom), two sisters can provide some assistance   Number of Stairs, Main Entrance 4   Surface of Stairs, Main Entrance concrete   Stair Railings, Main Entrance railing on right side (ascending)    Stairs Comment, Main Entrance 1+2+1 JOSE RAFAEL   Location, Bathroom first (main) floor   Bathroom Access tub bath, walk-in shower            Prior Level of Function      Flowsheet Row Most Recent Value   Dominant Hand right   Ambulation assistive equipment   Transferring assistive equipment   Toileting independent   Bathing assistive equipment   Dressing independent   Eating independent   IADLs assistive equipment   Driving/Transportation    Communication understands/communicates without difficulty   Assistive Device Currently Used at Home cane, straight, walker, front-wheeled, shower chair, grab bar, raised toilet            Occupational Profile      Flowsheet Row Most Recent Value   Successful Occupations Pt is very active in her community   Occupational History/Life Experiences 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 PSFS reassessment: Toilet transfer with prosthetic 6/10, toilet transfer without prosthetic 9/10, walking 4/10, getting dressed 8/10, getting in/ out of bed 9/10, getting a prosthetic on/off 8/10.             IRF OT Evaluation and Treatment - 12/09/24 1001          OT Time Calculation    Start Time 1000     Stop Time 1100     Time Calculation (min) 60 min        Session Details    Document Type Discharge Evaluation        General Information    General Observations of Patient Pt received in w/c. Pleasant and agreeable to therapy.        Mobility Belt    Mobility Belt Used During Session yes        Orthosis Ankle Right AFO    Orthosis Properties Date Obtained: 12/03/24 Location: Ankle Laterality: Right Type: AFO Features: Carbon  fiber Description: R toe off carbon fiber brace Therapeutic Indications: compensation for lost function Wearing Schedule: wear when out of bed       Bed Mobility    Comment OT: Mod I for bed mobility.        Transfers    Transfers toilet transfer;shower transfer;low pivot transfer        Low Pivot Transfer    [place], Low Pivot Transfer modified independence     Safety/Cues increased time to complete     Assistive Device wheelchair     Comment To/from w/c to bed.        Toilet Transfer    Transfer Technique low pivot     [place] supervision     Safety/Cues increased time to complete     Assistive Device other (see comments)   Drop arm commode    Comment LPT to/from w/c to drop arm commode.        Shower Transfer    Transfer Technique low pivot     [place] supervision     Safety/Cues increased time to complete     Assistive Device grab bars/tub rail;tub bench     Comment LPT to/from w/c to tub bench.        Bathing    Shower Provided? Yes     Tasks chest;left arm;right arm;abdomen;front perineal area;left upper leg;buttocks;right upper leg;right lower leg, including foot     [place] supervision     Safety/Cues increased time to complete;compensatory techniques     Position supported sitting     Adaptive Equipment grab bar/tub rail;hand-held shower spray hose;tub bench     Comment Completed full shower seated in tub bench. Sup upon lateral weightshifting to wash buttocks.        Upper Body Dressing    Tasks don;pull over garment     [place] modified independence     Position supported sitting     Adaptive Equipment none     Comment Mod I for donning shirt and clothing retrieval at w/c level.        Lower Body Dressing    Tasks don;underwear;pants/bottoms;socks;shoes/slippers     [place] minimum assist (75% or more patient effort)     Safety/Cues increased time to complete;compensatory techniques     Position supine;edge of bed sitting     [place], Footwear modified independence      Comment Able to thread BLEs in underwear/pants while in bed followed by lateral weight shifting/rolling to manage over hips. Mod I for donning R sock/shoe/orthosis. Min A for donning prosthesis.        Grooming    Tasks washes, rinses and dries face;washes, rinses and dries hands;brushes/neal hair;oral care (brushing teeth, cleaning dentures);applies deodorant     [place] modified independence     Position supported sitting     Adaptive Equipment none     [place], Oral Hygiene modified independence     Comment Completed sitting at sink in w/c.        Toileting    Tasks adjust/manage clothing;perform bladder hygiene;perform bowel hygiene     [place] modified independence     Position supported sitting     Adaptive Equipment commode, drop-arm     Comment Continent of bowel and bladder. Mod I for toileting needs. Able to lateral weight shift for toileting and clothing management.        Prosthetic Orientation (Lower Extremity)    Fit Assessment fit/function of prosthesis are appropriate     Comment Skin check=unremarkable. Donned gel liner and sock aide supine in bed. Min A for donning prosthesis. Sock ply=3. Continued wear into PT session.        Discharge Summary (OT)    Outcomes Achieved Upon Discharge (OT) progress was made toward goals;goals partially achieved prior to discharge     Discharge Summary Statement (OT) Pt is a 64 year old female who was admitted on 11/19/2024 s/p  L AKA. Since admission,  pt has made gains in ADLs progressing to mod I-sup at w/c level with min A for LB dressing for prosthetic management. Pt completes transfers with sup via LPT and min A with prosthesis donned. Pt has demonstrated improvements in balance, functional endurance and strength. Recommending tub bench and drop arm commode. Plan is for d/c home with assist with continued OT homecare services to maximize independence.     Transfer to Another Level of Care or Facility (OT) recommend therapy via home health                        IRF OT Goals      Flowsheet Row Most Recent Value   Transfer Goal 1    Activity/Assistive Device toilet at 11/20/2024 0906   [place] moderate assist (50-74% patient effort) at 12/02/2024 1011   Time Frame short-term goal (STG), 5 - 7 days at 12/02/2024 1011   Progress/Outcome goal met at 12/09/2024 1001   Transfer Goal 2    Activity/Assistive Device toilet at 11/20/2024 0906   [place] minimum assist (75% or more patient effort) at 11/20/2024 0906   Time Frame long-term goal (LTG), 21 days or less at 11/20/2024 0906   Progress/Outcome goal met at 12/09/2024 1001   Transfer Goal 3    Activity/Assistive Device shower at 11/20/2024 0906   [place] moderate assist (50-74% patient effort) at 12/02/2024 1011   Time Frame short-term goal (STG), 5 - 7 days at 12/02/2024 1011   Progress/Outcome goal met at 12/09/2024 1001   Transfer Goal 4    Activity/Assistive Device shower at 11/20/2024 0906   [place] minimum assist (75% or more patient effort) at 11/20/2024 0906   Time Frame long-term goal (LTG), 21 days or less at 11/20/2024 0906   Progress/Outcome goal met at 12/09/2024 1001   Bathing Goal 1    [place] tactile cues required at 11/20/2024 0906   Time Frame short-term goal (STG), 5 - 7 days at 12/02/2024 1011   Progress/Outcome goal met at 12/09/2024 1001   Bathing Goal 2    [place] modified independence at 11/20/2024 0906   Time Frame long-term goal (LTG), 21 days or less at 11/20/2024 0906   Progress/Outcome goal not met at 12/09/2024 1001   UB Dressing Goal 1    [place] minimum assist (75% or more patient effort) at 11/26/2024 1000   Time Frame short-term goal (STG), 5 - 7 days at 12/02/2024 1011   Strategies/Barriers with clothing retrieval at 11/20/2024 0906   Progress/Outcome goal met at 12/09/2024 1001   UB Dressing Goal 2    [place] minimum assist (75% or more patient effort) at 11/20/2024 0906   Time Frame long-term goal (LTG), 21 days or less at 11/20/2024  0906   Strategies/Barriers with clothing retrieval at 11/20/2024 0906   Progress/Outcome goal met at 12/09/2024 1001   LB Dressing Goal 1    [place] minimum assist (75% or more patient effort) at 11/26/2024 1000   Time Frame short-term goal (STG), 5 - 7 days at 12/02/2024 1011   Strategies/Barriers with clothing retrieval at 11/20/2024 0906   Progress/Outcome goal met at 12/09/2024 1001   LB Dressing Goal 2    [place] minimum assist (75% or more patient effort) at 11/20/2024 0906   Time Frame long-term goal (LTG), 21 days or less at 11/20/2024 0906   Strategies/Barriers with clothing retrieval at 11/20/2024 0906   Progress/Outcome goal met at 12/09/2024 1001   Grooming Goal 1    [place] moderate assist (50-74% patient effort) at 11/20/2024 0906   Time Frame short-term goal (STG), 5 - 7 days at 12/02/2024 1011   Strategies/Barriers in stance at sink at 11/20/2024 0906   Progress/Outcome goal not met at 12/09/2024 1001   Grooming Goal 2    [place] supervision required at 11/20/2024 0906   Time Frame long-term goal (LTG), 21 days or less at 11/20/2024 0906   Strategies/Barriers in stance at sink at 11/20/2024 0906   Progress/Outcome goal not met at 12/09/2024 1001   Toileting Goal 1    [place] moderate assist (50-74% patient effort) at 12/02/2024 1011   Time Frame short-term goal (STG), 5 - 7 days at 12/02/2024 1011   Progress/Outcome goal met at 12/09/2024 1001   Toileting Goal 2    [place] minimum assist (75% or more patient effort) at 11/20/2024 0906   Time Frame long-term goal (LTG), 21 days or less at 11/20/2024 0906   Progress/Outcome goal met at 12/09/2024 1001

## 2024-12-09 NOTE — CONSULTS
"Wound Ostomy Continence Note    Subjective    HPI Patient is a 64 y.o. female who was admitted on 11/19/2024 with a diagnosis of History of above knee amputation, left (CMS/[facility]) [Z89.612]  Encounter for prosthetic gait training [Z47.89].    Problem list Principal Problem:    History of above knee amputation, left (CMS/HCC)  Active Problems:    Encounter for prosthetic gait training    Adjustment disorder with anxiety    Acute pain in joint     PMH/PSH Past Medical History:   Diagnosis Date    Abnormal ECG     Abnormal finding on chest xray 12/2021    Arthritis     Colon cancer (CMS/HCC)     COVID-19 12/2021    COVID-19 vaccine series completed     Moderna: \" 3 doses\"    Diabetic neuropathy (CMS/[facility])     DM (diabetes mellitus), type 2 with ophthalmic complications (CMS/[facility])     Hypertension     Hypothyroidism     Left foot drop     Lipid disorder     Type 2 diabetes mellitus treated with insulin (CMS/[facility])      Past Surgical History   Procedure Laterality Date    Abcess drainage  10/2023    AMPUTATION TOE (RAY RESECTION) Left 1/18/2024    Performed by Getachew Damian DPM at [facility] OR PAV    Angiogram lower extremity bilateral N/A 1/17/2024    Performed by Adam Aguilar MD at [facility] CATH/EP/NEURO INT    Aortogram abdominal with serialography N/A 1/17/2024    Performed by Adam Aguilar MD at [facility] CATH/EP/NEURO INT    Cataract extraction Bilateral     CATARACT PHACO, IOL Right 11/6/2018    Performed by Derrick Jeter MD at [facility]    Colonoscopy      Combined hysteroscopy diagnostic / d&c  2007    Eye surgery Left 2017    cataract    Joint replacement Right 02/2023    knee replacement    Right Total Knee Repalcement Right 2/27/2023    Performed by Gideon Centeno MD at [facility] OR    Tib/peroneal angioplasty & atherectomy - unilateral - initial N/A 1/17/2024    Performed by Adam Aguilar MD at [facility] CATH/EP/NEURO INT    Toe amputation      Vitrectomy Left 2016      Assessment and " "Recommendation              12/09/24 0800   Rash 11/19/24 1758 coccyx   Date First Assessed/Time First Assessed: 11/19/24 1758   Location: coccyx   Distribution localized   Characteristics moist;other (see comments)   Color   (tan tissue)   Rash Care cleansed with;sterile normal saline   Recommendation hydrofiber with silver, foam     Received report, history of pilonidal cyst, moist tan tissue, admits to pain when palpated. NSS, hydrofiber with silver, foam applied.       While transferring to bed from wheelchair for skin assessment, patient leaned back into right arm rest, Skin tear noted, small bruising, measures .5cm x .7cm, NSS, foam applied.       Suggest: send patient home with foam dressings for back skin tear, NSS, gauze. Change q 2 days a week or prn.   Send patient home with hydrofiber with silver, gauze and foam for sacral tan tissue, history of pilonidal cyst. Change every 2 days or prn.     Maintain the PREVENT bundle for PI prevention. The most recent Gelacio score is a 17. This patient is at risk for the development of a PI. Offload right heel from bed, turn q 2 hours in bed, weight shift in wheelchair, q 30 minutes.     Wound Prevention Bundle                                                            Positioning- If clinically able:  Reposition at a minimum of 2 hours.  Adjust to avoid lying on medical devices. Use positioning devices to offload bony prominences. When out of bed, use pressure redistribution cushions. Monitor time out of bed and encourage repositioning. Use transfer aids to reduce friction and shear. Use \"turn assist\" function on bed surface if equipped.                  Risk Assessment:   Utilize risk assessment scale per hospital guidelines.  Identify additional risk factors, for example, medications and comorbidities. Match interventions to subscales of hospital's Risk assessment scale.  Monitor laboratory values. Communicate risk to interdisciplinary healthcare team. Consider use " "of prophylactic silicone foam sacral dressing.                  Evaluate Surface/Pressure Redistribution:   Utilize \"less is best\" with linen usage.                Vigilant Skin Inspection:   Inspect skin from head to toe, including areas under removable medical devices and dressings. Communicate skin issues to healthcare team and consult resources as needed.                   Evaluate incontinence/moisture/temperature:   Offer toileting when appropriate. Keep skin clean and dry (microclimate). Use moisture barrier products for incontinence care. Diapers/briefs for out of bed or off unit. Use absorbent under pads that wick away and hold moisture. Intervene for fecal and/or urinary incontinence (consider external containment devices. Use skin emollients (dry skin).                  Nutrition:   Consult dietician for at risk patients. Assist with menu preferences and feeding. Encourage snacks, fluid and supplements with rounding. Accurately record all intake where indicated.           Teaching Patients and Families:   Encourage patients and families to partner with staff in preventing pressure injuries. Give patients and families pressure injury education, assess their understanding of education given, and document education.        Tamra Fowler RN  12/9/2024  2:05 PM                                                                          WO consult is completed. Signing off.       Date: 12/09/24  Signature: Tamra Fowler RN                  "

## 2024-12-10 VITALS
BODY MASS INDEX: 25.29 KG/M2 | HEART RATE: 95 BPM | SYSTOLIC BLOOD PRESSURE: 110 MMHG | HEIGHT: 67 IN | TEMPERATURE: 97.8 F | OXYGEN SATURATION: 94 % | DIASTOLIC BLOOD PRESSURE: 59 MMHG | RESPIRATION RATE: 18 BRPM | WEIGHT: 161.13 LBS

## 2024-12-10 LAB
ANION GAP SERPL CALC-SCNC: 6 MEQ/L (ref 3–15)
BUN SERPL-MCNC: 27 MG/DL (ref 7–25)
CALCIUM SERPL-MCNC: 8.4 MG/DL (ref 8.6–10.3)
CHLORIDE SERPL-SCNC: 103 MEQ/L (ref 98–107)
CO2 SERPL-SCNC: 32 MEQ/L (ref 21–31)
CREAT SERPL-MCNC: 0.8 MG/DL (ref 0.6–1.2)
EGFRCR SERPLBLD CKD-EPI 2021: >60 ML/MIN/1.73M*2
ERYTHROCYTE [DISTWIDTH] IN BLOOD BY AUTOMATED COUNT: 14.8 % (ref 11.7–14.4)
GLUCOSE BLD-MCNC: 111 MG/DL (ref 70–99)
GLUCOSE BLD-MCNC: 159 MG/DL (ref 70–99)
GLUCOSE SERPL-MCNC: 104 MG/DL (ref 70–99)
HCT VFR BLD AUTO: 32.7 % (ref 35–45)
HGB BLD-MCNC: 10.8 G/DL (ref 11.8–15.7)
MAGNESIUM SERPL-MCNC: 2.1 MG/DL (ref 1.8–2.5)
MCH RBC QN AUTO: 30.9 PG (ref 28–33.2)
MCHC RBC AUTO-ENTMCNC: 33 G/DL (ref 32.2–35.5)
MCV RBC AUTO: 93.7 FL (ref 83–98)
PLATELET # BLD AUTO: 184 K/UL (ref 150–369)
PMV BLD AUTO: 11.2 FL (ref 9.4–12.3)
POCT TEST: ABNORMAL
POCT TEST: ABNORMAL
POTASSIUM SERPL-SCNC: 3.7 MEQ/L (ref 3.5–5.1)
RBC # BLD AUTO: 3.49 M/UL (ref 3.93–5.22)
SODIUM SERPL-SCNC: 141 MEQ/L (ref 136–145)
WBC # BLD AUTO: 7.01 K/UL (ref 3.8–10.5)

## 2024-12-10 PROCEDURE — 80048 BASIC METABOLIC PNL TOTAL CA: CPT | Performed by: INTERNAL MEDICINE

## 2024-12-10 PROCEDURE — 85027 COMPLETE CBC AUTOMATED: CPT | Performed by: INTERNAL MEDICINE

## 2024-12-10 PROCEDURE — 63700000 HC SELF-ADMINISTRABLE DRUG: Performed by: INTERNAL MEDICINE

## 2024-12-10 PROCEDURE — 36415 COLL VENOUS BLD VENIPUNCTURE: CPT | Performed by: INTERNAL MEDICINE

## 2024-12-10 PROCEDURE — 83735 ASSAY OF MAGNESIUM: CPT | Performed by: INTERNAL MEDICINE

## 2024-12-10 PROCEDURE — 63700000 HC SELF-ADMINISTRABLE DRUG: Performed by: PHYSICAL MEDICINE & REHABILITATION

## 2024-12-10 RX ADMIN — FLUTICASONE PROPIONATE 1 SPRAY: 50 SPRAY, METERED NASAL at 08:05

## 2024-12-10 RX ADMIN — IMATINIB MESYLATE 400 MG: 400 TABLET, FILM COATED ORAL at 07:58

## 2024-12-10 RX ADMIN — INSULIN LISPRO 5 UNITS: 100 INJECTION, SOLUTION INTRAVENOUS; SUBCUTANEOUS at 08:00

## 2024-12-10 RX ADMIN — GABAPENTIN 600 MG: 300 CAPSULE ORAL at 07:59

## 2024-12-10 RX ADMIN — POTASSIUM CHLORIDE 20 MEQ: 1500 TABLET, EXTENDED RELEASE ORAL at 12:18

## 2024-12-10 RX ADMIN — FUROSEMIDE 40 MG: 40 TABLET ORAL at 07:59

## 2024-12-10 RX ADMIN — APIXABAN 5 MG: 5 TABLET, FILM COATED ORAL at 07:59

## 2024-12-10 RX ADMIN — THERA TABS 1 TABLET: TAB at 12:18

## 2024-12-10 RX ADMIN — METHENAMINE HIPPURATE 1 G: 1000 TABLET ORAL at 07:59

## 2024-12-10 RX ADMIN — LEVOTHYROXINE SODIUM 25 MCG: 0.03 TABLET ORAL at 05:35

## 2024-12-10 RX ADMIN — INSULIN LISPRO 5 UNITS: 100 INJECTION, SOLUTION INTRAVENOUS; SUBCUTANEOUS at 12:19

## 2024-12-10 RX ADMIN — EMPAGLIFLOZIN 10 MG: 10 TABLET, FILM COATED ORAL at 07:59

## 2024-12-10 RX ADMIN — FAMOTIDINE 20 MG: 20 TABLET, FILM COATED ORAL at 07:59

## 2024-12-10 NOTE — NURSING NOTE
D/c to home at this time. Justyna blank and tx board taken. AVS reviewed with her and family. All personal belongings in room taken with patient. Meds returned.

## 2024-12-10 NOTE — DISCHARGE INSTR - OTHER ORDERS
Cleanse back skin tear with saline, add gauze and foam. Change every 2 days a week or as needed.   Cleanse coccyx wound with saline, add hydrofiber with silver, gauze and foam for sacral tan tissue, history of pilonidal cyst. Change every 2 days or as needed.  Change foam to R foot every 2 days or as needed.   Monitor skin daily. Notify nurse or doctor with any changes such as reddness, soreness, or open areas.

## 2024-12-10 NOTE — DISCHARGE SUMMARY
Rehab Discharge Summary      Admitting Provider: Alexei Petty MD  Discharge Provider: Alexei Petty MD  Primary Care Physician at Discharge: Paula Walker [phone]     Admission Date: 11/19/2024     Discharge Date: 12/10/2024    Discharge Disposition  Home     Code Status at Discharge: Full Code    Discharge Medications     Medication List        START taking these medications      acetaminophen 325 mg tablet  Commonly known as: TYLENOL  Take 2 tablets (650 mg total) by mouth every 6 (six) hours as needed for pain (mild/moderate pain or oral temp > 100.4).  Dose: 650 mg     apixaban 5 mg tablet  Commonly known as: ELIQUIS  Take 1 tablet (5 mg total) by mouth 2 (two) times a day Indications: blood clot in a deep vein of the extremities.  Dose: 5 mg     ascorbic acid 500 mg tablet  Commonly known as: VITAMIN C  Take 1 tablet (500 mg total) by mouth every other day.  Dose: 500 mg     DULoxetine 60 mg capsule  Commonly known as: CYMBALTA  Take 1 capsule (60 mg total) by mouth nightly.  Dose: 60 mg     famotidine 20 mg tablet  Commonly known as: PEPCID  Take 1 tablet (20 mg total) by mouth 2 (two) times a day Indications: stress ulcer prevention.  Dose: 20 mg     ferrous sulfate 325 mg (65 mg iron) tablet  Take 1 tablet (325 mg total) by mouth every other day.  Dose: 325 mg     * FIASP U-100 INSULIN 100 unit/mL injection  Inject 5 Units under the skin 3 (three) times a day before meals.  Dose: 5 Units  Generic drug: insulin aspart (niacinamide)     * FIASP U-100 INSULIN 100 unit/mL injection  Inject 1-12 Units under the skin 3 (three) times a day before meals. Following sliding scale instruction from AVS  Dose: 1-12 Units  Generic drug: insulin aspart (niacinamide)     furosemide 40 mg tablet  Commonly known as: LASIX  Take 1 tablet (40 mg total) by mouth.  Dose: 40 mg     GLEEVEC 400 mg chemo tablet  Take  1 tablet (400 mg total) daily  Do not take on empty stomach.  Can be dissolved in water or  juice.  Dose: 400 mg  Generic drug: imatinib     HYDROmorphone 4 mg tablet  Commonly known as: DILAUDID  Take 1 tablet (4 mg total) by mouth every 6 (six) hours as needed for pain for up to 5 days Indications: excessive pain.  Dose: 4 mg     methenamine 1 gram tablet  Commonly known as: HIPREX  Take 1 tablet (1 g total) by mouth 2 (two) times a day Indications: urinary tract infection prevention.  Dose: 1 g           * This list has 2 medication(s) that are the same as other medications prescribed for you. Read the directions carefully, and ask your doctor or other care provider to review them with you.                CHANGE how you take these medications      atorvastatin 40 mg tablet  Commonly known as: LIPITOR  Take 1 tablet (40 mg total) by mouth daily.  Dose: 40 mg  What changed: when to take this     gabapentin 300 mg capsule  Commonly known as: NEURONTIN  Take 2 capsules (600 mg total) by mouth 3 (three) times a day.  Dose: 600 mg  What changed: See the new instructions.     insulin glargine U-100 100 unit/mL (3 mL) pen  Commonly known as: LANTUS/BASAGLAR  Inject 16 Units under the skin nightly Indications: type 2 diabetes mellitus.  Dose: 16 Units  What changed: how much to take     losartan 25 mg tablet  Commonly known as: COZAAR  Take 1 tablet (25 mg total) by mouth daily with dinner.  Dose: 25 mg  What changed:   medication strength  how much to take  when to take this     melatonin ODT  Take 1 tablet (3 mg total) by mouth nightly.  Dose: 3 mg  What changed: how much to take            CONTINUE taking these medications      JARDIANCE 10 mg tablet  Take 10 mg by mouth daily.  Dose: 10 mg  Generic drug: empagliflozin     levothyroxine 25 mcg tablet  Commonly known as: SYNTHROID  Take 1 tablet (25 mcg total) by mouth daily.  Dose: 25 mcg     RYBELSUS 14 mg tablet  Take 14 mg by mouth daily.  Dose: 14 mg  Generic drug: semaglutide            STOP taking these medications      amLODIPine 5 mg tablet  Commonly  known as: NORVASC     aspirin 81 mg chewable tablet     clopidogreL 75 mg tablet  Commonly known as: PLAVIX     docusate sodium 100 mg capsule  Commonly known as: COLACE     folic acid 400 mcg tablet  Commonly known as: FOLVITE     hydrochlorothiazide 12.5 mg tablet     insulin lispro U-100 100 unit/mL pen  Commonly known as: HumaLOG     lidocaine 4 % adhesive patch,medicated topical patch  Commonly known as: ASPERCREME                Reason for Hospitalization    Deficits in mobility, transfers, self-care status post left transfemoral amputation for prosthetic training, severe peripheral vascular disease, chronic myelocytic leukemia, osteomyelitis left foot, diabetic neuropathy, multiple medical problems     History of Present Illness    Ms. 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 Pura Chaves. ID recommended IV Ceftriaxone and IV Ampicillin until 9/14/24. She had urinary retention managed with indwelling Rowan catheter. She had right upper extremity swelling present during this admission, ultrasound on 8/29/24 demonstrated an acute non-occlusive DVT of the right axillary and brachial vein around line (on Lovenox). Subsequently she completed an acute inpatient rehabilitation stay for pre-prosthetic training at [facility] between 9/3/24 to 9/21/24 and was discharged to [facility] on  9/21/24. She initially required bladder self intermittent catheterizations due to urinary retention, but indicates that now she is able to void. She desired to be a prosthetic ambulator. A left transfemoral amputation prosthesis was fabricated. and followed-up as outpatient in the amputee clinic at [facility] and she was deemed to be an appropriate candidate for acute inpatient rehabilitation stay. She has been needing assistance for mobility, transfers, self-care. I have reviewed the preadmission screening and concur with that information and there are no significant changes from patient's preadmission screening. She is transferred to [place] rehabilitation on 11/19/24 from [facility] for further acute inpatient rehabilitation stay for prosthetic training with transfemoral amputation prosthesis.     Pertinent Physical Findings on discharge    The patient had stable vital signs.  General Appearance: Not in acute distress  Head/Ear/Nose/Throat: Normocephalic; Atraumatic.   Eye: EOMI; PERRL.   Neck: No JVD; No Bruits.   Respiratory: Decreased breath sounds at bases.   Cardiovascular: RRR; Normal S1, S2.   Gastrointestinal: Soft; NT; +BS.   Extremities: Bilateral lower extremity edema noted.  Left transfemoral amputation stump is stable.  Musculoskeletal: Functional active range of motion in both upper and right lower extremities.  Functional active range of motion in left hip.  She has a left transfemoral amputation.  Neurological: AAO ×3. Speech is fluent. Cranial nerve examination does not reveal any gross facial asymmetry. Strength testing shows about 4+/5 strength in both upper and right lower extremities.  Left hip flexion is 4/5.  She has a left transfemoral amputation.  She is grossly able to localize touch sensation.  She is able to localize position sense in right foot great toe position sense is unable to be tested on left side.  Deep tendon reflexes are hypoactive bilaterally.  Right plantar is  flexor, left plantar was unable to be tested. Coordination is functional upper extremities.    Behavior/Emotional: Appropriate; Cooperative.   Skin: Left transfemoral amputation stump stable.  Incision appears to be well-healed.  Some wounds are noted on right anterior lower forearm.  Small wound noted on right foot fifth toe.  Small wound also noted on right foot great toe with some slough in it and scaly skin around it.  Some redness noted on sacrum/coccyx.  Reviewed pictures of wounds in Epic.    Hospital Course    1.The patient was admitted to [facility] for a comprehensive inpatient rehabilitation program to include physical therapy, occupational therapy, speech therapy, rehabilitation nursing, case management evaluation.  Dr. Brooke was consulted to follow from an internal medicine standpoint.     2. DVT prophylaxis/treatment - on Eliquis.  Platelets 168 on 11/20/2024.     3. Vascular - status post left transfemoral amputation for prosthetic training, severe peripheral vascular disease, chronic myelocytic leukemia, osteomyelitis left foot, diabetic neuropathy, multiple medical problems - continue PT, OT, psychology.  Follow falls precautions, cardiac precautions, monitor pulse oximeter in therapy.  Monitor skin under prosthesis.  Teach donning and doffing of left transfemoral amputation prosthesis.  Do gait training with prosthesis.     4. GI - On Colace, Senokot.  On PRN Maalox.  On PRN Dulcolax suppository.  On PRN Zofran.She reports stomach upset and nausea at times on 12/7/2024, especially in the mornings after medications and she feels it is the Semaglutide most likely making her nauseous when she is takes on an empty stomach.  Discussed with nurse.  Patient requested administration of Semaglutide in the afternoon.  Nursing will discuss with pharmacy to change administration time.  Also will add Pepcid for GI prophylaxis.  Discussed with patient and with nurse on 12/7/2024.     5.  -on  Hiprex.  On Flomax.  Monitor postvoid residual.     6. CVS - on Lasix.  Monitor for orthostasis.     7. Pulmonary -encourage incentive spirometry.     8. Hematology - monitor hemoglobin, platelets.  Hemoglobin 10.8, WBC 4.98 on 11/20/2024.     9. Pain -on Cymbalta.  On Neurontin.  On PRN Tylenol.  On PRN Dilaudid.     10. Skin - Left transfemoral amputation stump stable.  Incision appears to be well-healed.  Some wounds are noted on right anterior lower forearm.  Small wound noted on right foot fifth toe.  Small wound also noted on right foot great toe with some slough in it and scaly skin around it.  Some redness noted on sacrum/coccyx.  Reviewed pictures of wounds in Epic. Monitor skin under the left transfemoral amputation prosthesis.Noted input from dermal defense nurse on 11/21/2024.  Reviewed pictures in Epic.      11. F/E/N - on Ferrous sulfate.  On vitamin C. on MVI.  On K-Genesis con.     12. Hyperlipidemia -on Lipitor.     13. Diabetes mellitus type 2 - on Lantus insulin.  On sliding scale Humalog coverage.  On Jardiance.     14. Oncology - On Gleevec for chronic myelocytic leukemia.     15. Hypothyroidism - on Synthroid.     16. Insomnia - on Melatonin.     17. Infectious diseases - On oral Vancomycin.       18. Seasonal allergies - She reports seasonal allergies are bothering her.  Ordered Flonase spray to nostrils twice daily on 12/4/2024.     19. Psychiatry - mood stable.  Psychology consulted.      20. Rehabilitation medicine - Continue comprehensive rehabilitation care. Continue PT, OT, speech, psychology.  We will follow falls precautions, cardiac precautions, monitor pulse oximetry in therapy and follow weightbearing precautions.  We will follow spinal precautions as appropriate.Tolerating therapies per endurance on 11/20/2024.  Slept well last night.   Working on donning and doffing of prosthesis.  Discussed with the importance of skin checks on the prosthesis.  Able to ambulate 7 feet in parallel  bars dependent gait using left transfemoral amputation prosthesis with physical therapy.  Dependent for sit to stand transfers with physical therapy on 11/20/2024. Noted input from dermal defense nurse on 11/21/2024.  Reviewed pictures in Epic.  Working on ADLs with occupational therapy on 11/21/2024.  Requiring minimal assistance for bathing, dependent for toileting, close supervision for poorly dressing and dependent for lower body dressing with occupational therapy.  She requested a flu shot.  Discussed with nurse.  Continent of bowel and bladder for nursing on 11/21/2024.Dependent for sit to stand transfers with physical therapy on 11/22/2024.  Able to ambulate 15 feet with thoracic walker dependent gait with physical therapy on 11/22/2024.  Skin under prosthesis is stable.  Denies pain today.  She says she got flu shot yesterday.Feels better on 11/23/2024.  Denies increased pain.  She says that she took her medications after meals and did not have any GI symptoms today.  Continent of bowel and bladder for nursing.  Working on donning and doffing of prosthesis, sock ply management with occupational therapy on 11/23/2024. Due to elevated blood pressure, internist restarted Amlodipine and Losartan on 11/25/2024.  Denies increased pain.  Continent of bowel and bladder for nursing on 11/25/2024.Skin on left transfemoral amputation residual limb remains stable.  Discussed with patient.  Discussed with nurse on 11/25/2024.Discussed patients progress in therapies with therapists in team meeting on 11/26/2024.  Discussed in PCC. See PCC documentation.  Face-to-face evaluation was done for a right carbon fiber toe off brace on 11/26/2024.  Discussed with patient.  Discussed with physical therapist on 11/26/2024.Discussed recommendations of PCC with patient on 11/27/2024.  She was measured for a right carbon fiber Toe off AFO brace yesterday.  Discussed with nursing.  Skin on residual limb remains stable.  Continent of  bladder for nursing on 11/27/2024. Able to ambulate 10 feet with moderate assistance with balance but using left transfemoral amputation prosthesis on 11/28/2024.  Requiring moderate assistance for sit to stand transfer with physical therapy using left transfemoral amputation prosthesis on 11/28/2024.Denies phantom limb pain sensation on 11/29/2024.  Discussed with patient and with her family with her at bedside on 11/29/2024.  Noted podiatry input.  She feels she is improving functionally.  Discussed with her that with practice she will continue to improve with prosthetic use.She feels she is making progress in therapy on 12/2/2024.  Per case management, her CMG ends on 12/3/2024 and she was extended for a week for discharge on 12/10/2024 to allow for more time in therapy here.  Now she is asking if she could get 1 more week beyond that time.  Discussed with case management regarding patient's request.  Working on ADLs with occupational therapy.  Requiring minimal assistance for bathing, dependent for toileting, minimal assistance for upper body dressing, moderate assistance for lower body dressing.  Discussed with patient and physical therapist working with her in the parallel bar earlier today.  Again reviewed with her importance of monitoring skin under prosthesis with prosthetic use..Discussed patients progress in therapies with therapists in team meeting on 12/3/2024. Discussed in PCC. See PCC documentation.  Continent of bowel and bladder for nursing.  Discussed with nurse.  Skin under prosthesis stable on 12/3/2024.Discussed recommendations of PCC with patient on 12/4/2024.  She reports seasonal allergies are bothering her.  Ordered Flonase spray to nostrils twice daily on 12/4/2024.Patient went for home evaluation on 12/5/2024 with therapist.  Discussed with patient earlier in the morning today.  Discussed with her to maximize independence at wheelchair level and manage at wheelchair level at home and only  use prosthesis with trained therapist with her for now until she becomes more functional prosthetic ambulator in future.  Has some phantom limb sensation which can hopefully decrease with prosthetic use in future.She reports stomach upset and nausea at times on 12/7/2024, especially in the mornings after medications and she feels it is the Semaglutide most likely making her nauseous when she is takes on an empty stomach.  Discussed with nurse.  Patient requested administration of Semaglutide in the afternoon.  Nursing will discuss with pharmacy to change administration time.  Also will add Pepcid for GI prophylaxis.  Discussed with patient and with nurse on 12/7/2024. Performance day today, 12/9/2024.  Feels comfortable with discharge plans to home for tomorrow.  Discussed follow-up instructions with her on 12/9/2024.  Detailed discharge instructions will be put on discharge AVS.  Face to Face evaluation performed for ultralight weight manual wheelchair on 12/9/2024. Discussed with patient.  Discussed with physical therapist earlier on 12/9/2024.For discharge today. No C/O today. Appreciative of care here.  Discussed follow-up instructions with her on 12/10/2024.  Detailed discharge instructions on discharge AVS.     21. Reviewed labs today.  BUN 35, creatinine 0.9, sodium 139, potassium 3.7 on 11/20/2024.    22.  Discharge to home on 12/10/2024. Discussed discharge plans. Discharge summary will be completed. Follow up with PCP, vascular surgeon Dr Chaves as needed, heme oncologist, cardiologist, urologist as needed, endocrinologist regarding treatment of diabetes, ophthalmologist, podiatrist as needed, [facility], prosthetist and other appropriate physicians. Further therapies set up after discharge. Discharge instructions on discharge form.  Discussed with patient.  Discussed with nurse.     23. Otherwise she tolerated therapies well and made progress in her functional status. She had no  further medical complications and was subsequently discharged to home. Her functional status at discharge from [facility] is noted in detail on discharge AVS from [facility]. She will need close follow-up with her PCP, vascular surgeon Dr Chaves as needed, heme oncologist, cardiologist, urologist as needed, endocrinologist regarding treatment of diabetes, ophthalmologist, podiatrist as needed, [facility], prosthetist and other appropriate physicians and should continue further therapies after discharge from [facility].

## 2024-12-10 NOTE — PROGRESS NOTES
[facility] Progress Note          Patient was seen and examined at bedside.    Subjective:     Clinically she remains stable, pleasant, in NAD; she has finished her inpt acute rehab with good functional recovery, plan to d/c home today, provided with no new complaints or concerns, no O/N acute events, reviewed her recent labs, spent 35 min to prepare for her d/c home today.     Objective   Vital signs in last 24 hours:  Temp:  [36.6 °C (97.8 °F)-37.2 °C (99 °F)] 36.6 °C (97.8 °F)  Heart Rate:  [] 95  Resp:  [16-18] 18  BP: (110-142)/(59-66) 110/59    No intake or output data in the 24 hours ending 12/10/24 1215    Intake/Output this shift:  No intake/output data recorded.   Review of Systems:  All other systems reviewed and negative except as noted in the HPI.   Objective      Labs  Reviewed her labs thoroughly   Lab Results   Component Value Date    WBC 7.01 12/10/2024    HGB 10.8 (L) 12/10/2024    HCT 32.7 (L) 12/10/2024    MCV 93.7 12/10/2024     12/10/2024     Lab Results   Component Value Date    GLUCOSE 104 (H) 12/10/2024    CALCIUM 8.4 (L) 12/10/2024     12/10/2024    K 3.7 12/10/2024    CO2 32 (H) 12/10/2024     12/10/2024    BUN 27 (H) 12/10/2024    CREATININE 0.8 12/10/2024       Imaging  N/A    Full Code    Physical Exam:  Head/Ear/Nose/Throat: normocephalic; atraumatic; moisture mouth mm, no oropharyngeal thrush noted.   Eyes: anicteric sclera, EOMI; PERRL.   Neck : supple, no JVD, no carotid bruits appeciated.   Respiratory: no evidence of labored breathing, lung sounds CTA b/l, good aeration bibasilar area, no w/r/c.   Cardiovascular: RRR; normal S1, S2; no m/r/g; no S3 or S4.   Gastrointestinal: soft; NT; BS normal; mildly distended; no CVAT b/l.   Genitourinary: no lozada.   Extremities : S/p L AKA, residual limb healed well .   Neurological: AO x 3, fluent speeches, following commands, CNS II-XII grossly intact; no focal neurologic deficits.    Behavior/Emotional: in NAD, appropriate; cooperative.   Skin: clean, dry and intact.  Plan of care was discussed with patient, RN, and PMR attending     Assessment     CC:L AKA, ambulatory dysfunction     64 y.o. 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 S/p IV Ceftriaxone and IV Ampicillin until 9/14/24. H/o non-occlusive DVT of the R axillary and brachial vein around midline. She presented to [facility]ee clinic on 11/14 for evaluation to begin prosthetic training. Now she was admitted for inpt prosthetic training on 11/19/24.  Denies nausea, vomiting, abdominal pain or discomfort, dysuria, cough/sputum, running nose, sore throat, chest pain, palpitation, SOB or orthopnea, dizziness or LH,  HA.    A/P:  # A/p L AKA  -inpt prosthetic training, wound care/dermal defense, pain and neuralgia managements, fall precaution     # CML  -cont home imatinib, check CBC      # Type 2 DM, hypothyroidism  - diet control, SSI, plus her home diabetic regimen;  - cont home levothyroxine     #  PICC-associated DVT on 8/30   -S/p SQ Lovenox weight based ;  -cont Eliquis 5 mg po bid      # Urinary retention, h/o recurrent UTI  -timed voiding with cic, adequate oral hydration, monitor PVR with cic, on flomax;  -hiprex 1 g po bid        # Stage 2 decubitus ulcer, pressure injury in right heel   -wound care     # Recent C.diff.  -cont prophylactic oral vanco 125 mg po bid       Billing code: [id number]  Diagnoses:  Patient Active Problem List   Diagnosis    Controlled type 2 diabetes mellitus, with long-term current use of insulin (CMS/[facility])    Essential hypertension    Hyperlipidemia    Hypothyroidism    Obesity    Thyroid nodule    COVID-19 virus infection    Lung nodule    Displacement of  lumbar intervertebral disc without myelopathy    Disability of walking    Polyneuropathy due to type 2 diabetes mellitus (CMS/HCC)    Pre-op examination    Localized osteoarthritis of right knee    Type 2 diabetes mellitus treated with insulin (CMS/HCC)    Abnormal finding on EKG    Left foot drop    Abnormal finding on chest xray    Osteoarthritis of right knee, unspecified osteoarthritis type    S/P total knee arthroplasty, right    Sepsis (CMS/HCC)    Osteomyelitis of left foot (CMS/HCC)    Peripheral neuropathy    Abdominal pain    Chronic idiopathic constipation    Gangrene (CMS/HCC)    Tibial artery stenosis, left (CMS/HCC)    H/O pilonidal cyst    Type 2 diabetes mellitus with left diabetic foot infection (CMS/HCC)    Type 2 diabetes mellitus with diabetic neuropathy (CMS/HCC)    Hypothyroidism    Primary hypertension    Leukemoid reaction    HLD (hyperlipidemia)    Acute osteomyelitis of left foot (CMS/HCC)    History of above knee amputation, left (CMS/HCC)    Encounter for prosthetic gait training    Adjustment disorder with anxiety    Acute pain in joint           Charlette Brooke MD  12/10/2024

## 2024-12-10 NOTE — PLAN OF CARE
Plan of Care Review  Plan of Care Reviewed With: patient  Progress: improving  Outcome Evaluation: alert and orientedx4; continent; dilaudid given for pain; call bell in reach; RLE scd on; ready for discharge in AM; no reports of concerns at this time

## 2024-12-10 NOTE — PLAN OF CARE
Plan of Care Review  Plan of Care Reviewed With: patient  Progress: improving  Outcome Evaluation: D/c to home today around noon. Reviewed medications prescribed and wound care. Reviewed use of insulin pens. Denies pain at this time.

## 2024-12-11 RX ORDER — METHENAMINE HIPPURATE 1000 MG/1
1 TABLET ORAL 2 TIMES DAILY
Qty: 60 TABLET | Refills: 0 | Status: SHIPPED | OUTPATIENT
Start: 2024-12-11 | End: 2025-01-10

## 2024-12-11 RX ORDER — FUROSEMIDE 40 MG/1
40 TABLET ORAL DAILY
Qty: 30 TABLET | Refills: 0 | Status: SHIPPED | OUTPATIENT
Start: 2024-12-11 | End: 2025-01-10

## 2024-12-11 NOTE — PROGRESS NOTES
Podiatry Progress Note    Subjective follow up evaluation for right lateral foot DTI and great toe scab.          Interval History: none.       Medications:  No current facility-administered medications for this encounter.    Current Outpatient Medications:     acetaminophen (TYLENOL) 325 mg tablet, Take 2 tablets (650 mg total) by mouth every 6 (six) hours as needed for pain (mild/moderate pain or oral temp > 100.4)., Disp: , Rfl:     apixaban (ELIQUIS) 5 mg tablet, Take 1 tablet (5 mg total) by mouth 2 (two) times a day Indications: blood clot in a deep vein of the extremities., Disp: , Rfl:     ascorbic acid (VITAMIN C) 500 mg tablet, Take 1 tablet (500 mg total) by mouth every other day., Disp: , Rfl:     atorvastatin (LIPITOR) 40 mg tablet, Take 1 tablet (40 mg total) by mouth daily., Disp: 30 tablet, Rfl: 0    DULoxetine (CYMBALTA) 60 mg capsule, Take 1 capsule (60 mg total) by mouth nightly., Disp: 30 capsule, Rfl: 0    famotidine (PEPCID) 20 mg tablet, Take 1 tablet (20 mg total) by mouth 2 (two) times a day Indications: stress ulcer prevention., Disp: , Rfl:     ferrous sulfate 325 mg (65 mg iron) tablet, Take 1 tablet (325 mg total) by mouth every other day., Disp: , Rfl:     furosemide (LASIX) 40 mg tablet, Take 1 tablet (40 mg total) by mouth., Disp: , Rfl:     gabapentin (NEURONTIN) 300 mg capsule, Take 2 capsules (600 mg total) by mouth 3 (three) times a day., Disp: 180 capsule, Rfl: 0    HYDROmorphone (DILAUDID) 4 mg tablet, Take 1 tablet (4 mg total) by mouth every 6 (six) hours as needed for pain for up to 5 days Indications: excessive pain., Disp: 20 tablet, Rfl: 0    imatinib (GLEEVEC) 400 mg chemo tablet, Take  1 tablet (400 mg total) daily  Do not take on empty stomach.  Can be dissolved in water or juice., Disp: , Rfl:     insulin aspart, niacinamide, (FIASP U-100 INSULIN) 100 unit/mL injection, Inject 5 Units under the skin 3 (three) times a day before meals., Disp: 4.5 mL, Rfl: 0    insulin  aspart, niacinamide, (FIASP U-100 INSULIN) 100 unit/mL injection, Inject 1-12 Units under the skin 3 (three) times a day before meals. Following sliding scale instruction from AVS, Disp: 20 mL, Rfl: 0    insulin glargine U-100 (LANTUS/BASAGLAR) 100 unit/mL (3 mL) pen, Inject 16 Units under the skin nightly Indications: type 2 diabetes mellitus., Disp: 4.8 mL, Rfl: 0    levothyroxine (SYNTHROID) 25 mcg tablet, Take 1 tablet (25 mcg total) by mouth daily., Disp: 30 tablet, Rfl: 0    losartan (COZAAR) 25 mg tablet, Take 1 tablet (25 mg total) by mouth daily with dinner., Disp: 30 tablet, Rfl: 0    melatonin ODT, Take 1 tablet (3 mg total) by mouth nightly., Disp: , Rfl:     methenamine (HIPREX) 1 gram tablet, Take 1 tablet (1 g total) by mouth 2 (two) times a day Indications: urinary tract infection prevention., Disp: , Rfl:     empagliflozin (JARDIANCE) 10 mg tablet, Take 10 mg by mouth daily., Disp: , Rfl:     semaglutide (RYBELSUS) 14 mg tablet, Take 14 mg by mouth daily., Disp: , Rfl:     Labs:  Lab Results   Component Value Date    GLUCOSE 104 (H) 12/10/2024    CALCIUM 8.4 (L) 12/10/2024     12/10/2024    K 3.7 12/10/2024    CO2 32 (H) 12/10/2024     12/10/2024    BUN 27 (H) 12/10/2024    CREATININE 0.8 12/10/2024     Lab Results   Component Value Date    WBC 7.01 12/10/2024    HGB 10.8 (L) 12/10/2024    HCT 32.7 (L) 12/10/2024    MCV 93.7 12/10/2024     12/10/2024       Pathology Results       ** No results found for the last 720 hours. **          Microbiology Results       ** No results found for the last 720 hours. **                  Imaging  No results found.        Vital signs in last 24 hours:  Temp:  [36.6 °C (97.8 °F)] 36.6 °C (97.8 °F)  Heart Rate:  [95] 95  Resp:  [18] 18  BP: (110)/(59) 110/59      No intake or output data in the 24 hours ending 12/10/24 2024      Review of Systems - Negative fever, chills, night sweats.    Objective     PE: nonpalpable DP nonpalpable PT right foot.  Decreased texture, temperature, and turgor right foot. Decreased digital hair . Mild edema right lower extremity. Xerotic skin bilateral . Right lateral foot with nonblanchable maroon scabbed area. No crepitus no fluctuance. No erythema. Right great toe with dorsal scab. thickened, yellow nails with subungual debris <5. Digital contracture noted toe 2-5. Hallux valgus deformity. Epicritic sensation reduced right foot.          A/P   Assessment   diabetes mellitus type 2, neuropathy,  left transfemoral amputation,  right foot DTI and noninfected great toe scab.       Plan   Continue current wound care.  Continue to monitor for breakdown and infection.        Any change in the foot since last evaluation? improvement          Dimitri Douglas DPM  12/10/2024  8:24 PM

## 2024-12-11 NOTE — PROGRESS NOTES
"Physical Therapy Evaluation for Mobility-Assistive Device   [facility]  Medicare Provider Number: [id number]      Patient: Bebe Burks   MRN: [id number]  : 1960 64 y.o.  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      Assessment:  Bebe Burks is a 64 y.o. female with a diagnosis of History of above knee amputation, left (CMS/HCC) who does not walk and requires a manual wheelchair for their mobility and access to mobility related to ADL's within the home.      Plan of Care:   Pursue documentation and funding for new equipment.  Follow up at [facility] once new equipment has arrived.        24     Therapist Signature:_________________________________________________________      Paulette Gamble, PT      Date    Medical History:     Past Medical History:   Diagnosis Date    Abnormal ECG     Abnormal finding on chest xray 2021    Arthritis     Colon cancer (CMS/HCC)     COVID-19 2021    COVID-19 vaccine series completed     Moderna: \" 3 doses\"    Diabetic neuropathy (CMS/HCC)     DM (diabetes mellitus), type 2 with ophthalmic complications (CMS/HCC)     Hypertension     Hypothyroidism     Left foot drop     Lipid disorder     Type 2 diabetes mellitus treated with insulin (CMS/HCC)      Past Surgical History   Procedure Laterality Date    Abcess drainage  " "10/2023    AMPUTATION TOE (RAY RESECTION) Left 1/18/2024    Performed by Getachew Damian DPM at [facility] OR [facility]    Angiogram lower extremity bilateral N/A 1/17/2024    Performed by Adam Aguilar MD at [facility] CATH/EP/NEURO INT    Aortogram abdominal with serialography N/A 1/17/2024    Performed by Adam Aguilar MD at [facility] CATH/EP/NEURO INT    Cataract extraction Bilateral     CATARACT PHACO, IOL Right 11/6/2018    Performed by Derrick Jeter MD at [facility]    Colonoscopy      Combined hysteroscopy diagnostic / d&c  2007    Eye surgery Left 2017    cataract    Joint replacement Right 02/2023    knee replacement    Right Total Knee Repalcement Right 2/27/2023    Performed by Gideon Centeno MD at [facility]    Tib/peroneal angioplasty & atherectomy - unilateral - initial N/A 1/17/2024    Performed by Adam Aguilar MD at [facility] CATH/EP/NEURO INT    Toe amputation      Vitrectomy Left 2016       Height: 170.2 cm (5' 7.01\")   Weight: 73.1 kg (161 lb 2 oz)     Social:  Current Living Arrangements: condominium  Home Accessibility: stairs to enter home (Group)  People in Home: alone  Primary Care Provided by: self  Living Environment Comment: resides in 1st floor condo, 4 (1+2+1) JOSE RAFAEL, R hand rail, no stairs inside home, Bathroom Layout: Tub/shower unit with shower curtain, Shower stall with glass doors (shower stall in main bedroom), two sisters can provide some assistance    Neuromuscular Status:   Upper extremity ROM Left ROM Right Strength Left Strength Right    Shoulder flex  WFL WFL WFL WFL   Shoulder abd  WFL WFL WFL WFL   Elbow flex WFL WFL WFL WFL   Elbow ext WFL WFL WFL WFL   Wrist flex WFL WFL WFL WFL   Wrist ext WFL WFL WFL WFL   Fingers WFL WFL Impaired  strength Impaired  strength     Lower extremity  ROM Left ROM Right Strength Left Strength Right   Hip flex WFL WFL 3+/5 3+/5   Hip abd WFL WFL 3+/5 3+/5   Knee ext N/A WFL N/A 3+/5   Knee flex N/A WFL N/A 3+/5   Ankle DF N/A - " 5 degrees N/A 3/5   Ankle PF N/A WFL N/A 3-/5     Anatomical Measurements Inches   Hip Width 16   Seat Depth 18   Lower Leg 18   Top of Shoulder 21   Top of Head 29   Chest Width 14   Shoulder Width 16      Posture: Ms. Burks sits with increased posterior pelvic tilt and slight windswept deformity to the right with increased internal rotation in sitting; rounded shoulders and kyphosis with decreased cervical rotation range of motion impacting shoulder extension range of motion   Tone: WFL  Sensation: Right lower extremity decreased sensation in stocking formation with impaired proprioception at right great toe; left residual limb intact  Skin Integrity: 0.5cm x 0.5cm scab on great toe on left lower extremity; excoriation noted on sarcum   Coordination: Impaired coordination due to left transfemoral amputation with use of prosthesis impacting coordination and motor control   Trunk Control: Intact in sitting    Patient Reported Pain Level: 5/10 in low back when sitting unsupported    Activities of Daily Living (ADLs) Level of [place]   Toileting Set Up   Bathing Supervision   Dressing Modified Independent, with use of optimally configured wheelchair   Grooming Modified Independent, with use of optimally configured wheelchair   Feeding Independent   Transfers Modified Independent, with use of optimally configured wheelchair   Bed Mobility Modified Independent   Ambulation unable to ambulate   Wheelchair mobility Modified Independent     Current Wheelchair: No wheelchair at this time    Assessment:   Due to left transfemoral amputation and prolonged hospitalization causing deconditioning, Bebe Burks has a mobility deficit which cannot be remediated with cane or walker as she cannot stand or walk safely and independently due to right foot drop, left transfemoral amputation . Bebe is highly active in the community, travelling to doctor and therapy appointments, restaurants, and family and friends' homes. As a  "part of this high activity level, she encounters rough terrain, curbs and curb cuts, uneven sidewalks, and ramps. Bebe requires the forward axle placement, 1+\" forward of the standard position, to allow independent navigation over these types of terrain and place the axle in a more ergonomic position to minimize stress on the shoulders due to postural asymmetries and decreased shoulder extension range of motion due to kyphosis. The forward axle position minimizes the repetitive strain and lessens the chance of overuse type injuries common with long term wheelchair use. 1+\" of seat slope is necessary to provide trunk stability to promote a stable base to propel the wheelchair with bilateral upper extremities. Standard wheelchairs, lightweight wheelchairs, and high-strength lightweight wheelchairs do not have adjustable axle position or sufficient seat slope so they cannot be configured to meet her needs. Bebe requires an optimally-configured manual wheelchair in order to access all areas of the home; including the bedroom for sleeping, the bathroom for toileting and bathing, and to the kitchen and eating area for meals.    Perry Greco, [employer] was present at this evaluation appointment and participated in the selection of equipment.  [facility] has no financial relationship with this DME provider.    Recommendations:  TI Lite X Specifications    TI Lite X folding frame ultra-lightweight manual wheelchair, this wheelchair can be configured for Bebe's highly active mobility needs to give her safe and independent access to all areas of her home and the community.  Blue Mist frame color  Seat size 17\" W X `7\" D for body size hip width 16\" and seat depth 18\"  18\" front seat height, 17\" rear seat height  80o front frame angle  Angle adjustable footrest set at 16\" to accommodate for lack of ankle movement on prosthesis and decreased ankle range of motion on right lower " "extremity  13.5-18.5\" standard  (pair) for footrest   Fixed Angle, Adjustable height seat back with integrated push handles   16\"-20\" range seat back height, set at 16\"  AIR Breathable back upholstery- adjustable tension backrest to provide posterior trunk support and adjustability to accommodate for postural asymmetries and improved comfort when sitting in wheelchair  Center of gravity set 2\" forward of the standard position to place Bebe's bilateral upper extremities in optimal position to propel wheelchair which will improve efficiency and ease of propulsion over her plush carpet inside the home , as well as allow her to propel in the community without increased fatigue or shoulder strain  0° camber  Aluminum camber tube  6\" x 1.5\" Litespeed plastic wheel with soft roll tile ana maria wheel for best shock absorption and to lessen the impact of bumps  Tilite standard rear wheels, 24\"  Stainless quick release axles  Treaded tires w/ airless insert rear tires to decrease need for maintenance as Ms. Burks would be unable to fill a tire herself  Aluminum black anodized handrims  Short tabs for appropriate push rim spacing and to keep wheelchair compact for better maneuverability within her apartment  Alumninum push to lock  wheel locks to independently secure the wheelchair for transfers  T-Style 2 point flip back Height Adjustable arm rests - 8 to 12\" height range bilaterally to support Ms. Bradley upper extremities when sitting and be able to flip back to allow for low pivot transfers   Rear anti-tippers for safety on inclines and to prevent the wheelchair from tipping backwards  2\" wide push-button buckle (auto style) pelvic positioning belt for safety and proper pelvic posture in the seating system  Acta-embrace seat cushion with moldable insert, 17\" W x 17\" D to provide support and skin protection under the chato prominences of Bebe's pelvis to address postural asymmetries of posterior pelvic tilt and " right windswept deformity with increased internal rotation at the hip  Solid seat insert is necessary to provide a stable base for the seat cushion to prevent slinging of the seat cushion on the seat upholstery which can lead to worsening of Bebe's postural asymmetries.

## 2024-12-13 ENCOUNTER — TELEPHONE (OUTPATIENT)
Dept: ENDOCRINOLOGY | Facility: CLINIC | Age: 64
End: 2024-12-13

## 2024-12-13 DIAGNOSIS — E11.9 TYPE 2 DIABETES MELLITUS TREATED WITH INSULIN (CMS/HCC): Primary | ICD-10-CM

## 2024-12-13 DIAGNOSIS — Z79.4 TYPE 2 DIABETES MELLITUS TREATED WITH INSULIN (CMS/HCC): Primary | ICD-10-CM

## 2024-12-13 RX ORDER — ORAL SEMAGLUTIDE 14 MG/1
14 TABLET ORAL DAILY
Qty: 30 TABLET | Refills: 1 | Status: SHIPPED | OUTPATIENT
Start: 2024-12-13 | End: 2025-01-25 | Stop reason: SDUPTHER

## 2024-12-13 NOTE — TELEPHONE ENCOUNTER
Pt recently dc from hospital. Looking for refill on synthroid. Please send to [facility] on [address]. Thanks in advance.

## 2024-12-13 NOTE — TELEPHONE ENCOUNTER
Spoke to the pt - she wanted Rybelsus refilled, not Synthroid. Refill sent.    Pt recently D/C'd from hospital and needs appt 12/18/24 to be changed to a Telemedicine appt.

## 2024-12-16 ENCOUNTER — TELEMEDICINE (OUTPATIENT)
Dept: ENDOCRINOLOGY | Facility: CLINIC | Age: 64
End: 2024-12-16
Payer: MEDICARE

## 2024-12-16 DIAGNOSIS — E78.5 HYPERLIPIDEMIA, UNSPECIFIED HYPERLIPIDEMIA TYPE: ICD-10-CM

## 2024-12-16 DIAGNOSIS — Z79.4 TYPE 2 DIABETES MELLITUS WITH DIABETIC POLYNEUROPATHY, WITH LONG-TERM CURRENT USE OF INSULIN (CMS/HCC): Primary | ICD-10-CM

## 2024-12-16 DIAGNOSIS — E03.9 HYPOTHYROIDISM, UNSPECIFIED TYPE: ICD-10-CM

## 2024-12-16 DIAGNOSIS — I10 ESSENTIAL HYPERTENSION: ICD-10-CM

## 2024-12-16 DIAGNOSIS — E11.42 TYPE 2 DIABETES MELLITUS WITH DIABETIC POLYNEUROPATHY, WITH LONG-TERM CURRENT USE OF INSULIN (CMS/HCC): Primary | ICD-10-CM

## 2024-12-16 PROCEDURE — 99443 PR PHYS/QHP TELEPHONE EVALUATION 21-30 MIN: CPT | Mod: 95 | Performed by: NURSE PRACTITIONER

## 2024-12-16 NOTE — PATIENT INSTRUCTIONS
Type 2 diabetes mellitus with diabetic polyneuropathy, with long-term current use of insulin (CMS/[facility])  1. continue with  Jardiance 10 mg per day - increase fluids   2.  continue with Rybelsus 14 mg per day    3. Continue with  Fiasp 5 units with meals - and Lantus 16 units per day    4. Rotate injection sites   5.DEXCOM   6. A1c 7.4             Essential hypertension  Continue with Cozaar            Hyperlipidemia  Continue with Lipitor            Hypothyroidism  Continue with Synthroid 25 mcg per day

## 2024-12-16 NOTE — PROGRESS NOTES
Endocrinology  Note       PATIENTS: Bebe Burks  YOB: 1960  DATE: December 16, 2024  VISIT TYPE: mychart- virtual - audio      Verification of Patient Location:  The patient affirms they are currently located in the following state:[place]     Are you in your home or a private residence? Yes    Audio Only Telemedicine Visit    Request for Consent:  You and I are about to have a telemedicine check-in or visit. This is allowed because you are already my patient, and you have requested it.  This telemedicine visit will be billed to your health insurance or you, if you are self-insured.  You understand you will be responsible for any copayments or coinsurances that apply to your telemedicine visit.  Before starting our telemedicine visit, I am required to get your consent for this virtual check-in or visit by telemedicine. Do you consent?      Patient Response to Request for Consent: Yes    The following have been reviewed and updated as appropriate in this visit:   Allergies  Meds  Problems         Visit Documentation:  25 min       Time Spent:  I spent 25 minutes on this date of service performing the following activities: obtaining history, entering orders, documenting, preparing for visit, obtaining / reviewing records, providing counseling and education, independently reviewing study/studies, communicating results, and coordinating care.            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  She remains on Lantus 16 units per day - Fiasp 5 untis wrh meals  Jardiance  10 mg a day and Rybelsus 14 mg daily.  She is now on the DEXCOM G7 and states her blood sugars have been under great control. .  She states she has few episodes of hypoglycemia. She  continues on Synthroid 25 ?g day. She  continues  "on Lipitor for hyperlipidemia. She  does have a history of thyroid nodules reports no difficulty with swallowing or breathing.      Medical History:  Past Medical History:   Diagnosis Date    Abnormal ECG     Abnormal finding on chest xray 12/2021    Arthritis     Colon cancer (CMS/HCC)     COVID-19 12/2021    COVID-19 vaccine series completed     Moderna: \" 3 doses\"    Diabetic neuropathy (CMS/HCC)     DM (diabetes mellitus), type 2 with ophthalmic complications (CMS/HCC)     Hypertension     Hypothyroidism     Left foot drop     Lipid disorder     Type 2 diabetes mellitus treated with insulin (CMS/HCC)        Surgical History:   Past Surgical History   Procedure Laterality Date    Abcess drainage  10/2023    AMPUTATION TOE (RAY RESECTION) Left 1/18/2024    Performed by Getachew Damian DPM at [facility] OR PAV    Angiogram lower extremity bilateral N/A 1/17/2024    Performed by Adam Aguilar MD at [facility] CATH/EP/NEURO INT    Aortogram abdominal with serialography N/A 1/17/2024    Performed by Adam Aguilar MD at [facility] CATH/EP/NEURO INT    Cataract extraction Bilateral     CATARACT PHACO, IOL Right 11/6/2018    Performed by Derrick Jeter MD at [facility]    Colonoscopy      Combined hysteroscopy diagnostic / d&c  2007    Eye surgery Left 2017    cataract    Joint replacement Right 02/2023    knee replacement    Right Total Knee Repalcement Right 2/27/2023    Performed by Gideon Centeno MD at [facility] OR    Tib/peroneal angioplasty & atherectomy - unilateral - initial N/A 1/17/2024    Performed by Adam Aguialr MD at [facility] CATH/EP/NEURO INT    Toe amputation      Vitrectomy Left 2016       Family History:  Family History   Problem Relation Name Age of Onset    Heart disease Biological Mother      Diabetes Biological Mother      Hypertension Biological Mother      Stroke Biological Father         Social history:  Social History     Socioeconomic History    Marital status: Single     Spouse " name: Not on file    Number of children: 0    Years of education: Not on file    Highest education level: Not on file   Occupational History    Occupation: Disabilty   Tobacco Use    Smoking status: Never     Passive exposure: Past    Smokeless tobacco: Never   Vaping Use    Vaping status: Never Used   Substance and Sexual Activity    Alcohol use: Not Currently     Comment: occasional in past, none current    Drug use: Never    Sexual activity: Not on file   Other Topics Concern    Not on file   Social History Narrative    Patient is single and has no children. She     Social Drivers of Health     Financial Resource Strain: Low Risk  (2/27/2023)    Overall Financial Resource Strain (CARDIA)     Difficulty of Paying Living Expenses: Not hard at all   Food Insecurity: No Food Insecurity (11/19/2024)    Hunger Vital Sign     Worried About Running Out of Food in the Last Year: Never true     Ran Out of Food in the Last Year: Never true   Transportation Needs: No Transportation Needs (12/9/2024)    PRAPARE - Transportation     Lack of Transportation (Medical): No     Lack of Transportation (Non-Medical): No   Physical Activity: Not on file   Stress: Stress Concern Present (11/19/2024)    [employer] [employer] [place] [employer] - Occupational Stress Questionnaire     Feeling of Stress : To some extent   Social Connections: Not on file   Intimate Partner Violence: Not on file   Housing Stability: Unknown (11/19/2024)    Housing Stability Vital Sign     Unable to Pay for Housing in the Last Year: No     Number of Times Moved in the Last Year: Not on file     Homeless in the Last Year: No       Medication(active prior to today):  Current Medications[1]    Allergies:  Patient has no known allergies.    Review of Systems:   Constitutional: Negative for fatigue and unexpected weight change. + weight loss   HENT: Negative for postnasal drip, trouble swallowing and voice change.    Eyes: Negative for visual disturbance.    Respiratory: Negative for apnea.    Cardiovascular: Negative for leg swelling.   Gastrointestinal: Negative for constipation, diarrhea and nausea.   Endocrine: Negative for polydipsia, polyphagia and polyuria.   Musculoskeletal: Negative for arthralgias, back pain, neck pain and neck stiffness.   Neurological: Negative for numbness and headaches.   Psychiatric/Behavioral: Negative for sleep disturbance.     Vitals Signs:none   There were no vitals filed for this visit.  There is no height or weight on file to calculate BMI.      Physical Exam:not applicable   Constitutional: Patient is oriented to person, place, and time.  Appears well-developed and well-nourished.   Eyes: EOM are normal. Pupils are equal, round, and reactive to light.   Neck: Normal range of motion. Neck supple.   Cardiovascular: Normal rate, regular rhythm and normal heart sounds.    Pulmonary/Chest: Effort normal and breath sounds normal.   Abdominal: Soft.   Musculoskeletal: Normal range of motion.   Neurological:  Alert and oriented to person, place, and time.   Skin: Skin is warm and dry.   Psychiatric:  Normal mood and affect. Behavior is normal. Judgment and thought content normal.   Foot Exam: Normal exam, no swelling, tenderness, instability; ligaments intact, full range of motion of all ankle/foot joints    Labs:  Lab Results   Component Value Date    WBC 7.01 12/10/2024    HGB 10.8 (L) 12/10/2024    HCT 32.7 (L) 12/10/2024     12/10/2024    CHOL 121 01/31/2024    TRIG 132 01/31/2024    HDL 36 (L) 01/31/2024    ALT 20 11/20/2024    AST 16 11/20/2024     12/10/2024    K 3.7 12/10/2024     12/10/2024    CREATININE 0.8 12/10/2024    BUN 27 (H) 12/10/2024    CO2 32 (H) 12/10/2024    TSH 0.51 01/31/2024    INR 1.2 12/03/2021    HGBA1C 7.1 (H) 07/01/2024    MICROALBUR 22.0 (H) 10/17/2022       Assessment/Plan:  Assessment & Plan  Type 2 diabetes mellitus with diabetic polyneuropathy, with long-term current use of insulin  (CMS/[facility])  1. continue with  Jardiance 10 mg per day - increase fluids   2.  continue with Rybelsus 14 mg per day    3. Continue with  Fiasp 5 units with meals - and Lantus 16 units per day    4. Rotate injection sites   5.DEXCOM   6. A1c 7.4           Essential hypertension  Continue with Cozaar          Hyperlipidemia, unspecified hyperlipidemia type  Continue with Lipitor          Hypothyroidism, unspecified type  Continue with Synthroid 25 mcg per day              I spent  25 minutes  on this date of service performing the following activities: obtaining history entering orders, documenting, preparing for visit, obtaining / reviewing records, providing counseling and education, independently reviewing study/studies, and coordinating care.  I discussed the goals of diet and exercise with the patient. I discussed the goals of A1c, blood pressure, and cholesterol. I discussed the goals of blood sugar pre- and post meals with the patient.I encouraged the patient to rotate the injection sites.  I reviewed the patient's blood sugars.  I reviewed the patient's labs/imaging/medications/allergies/problem list.       Electronically signed by: PHIL Carrizales on 12/16/2024 4:10 PM                               [1]   Current Outpatient Medications   Medication Sig Dispense Refill    acetaminophen (TYLENOL) 325 mg tablet Take 2 tablets (650 mg total) by mouth every 6 (six) hours as needed for pain (mild/moderate pain or oral temp > 100.4).      apixaban (ELIQUIS) 5 mg tablet Take 1 tablet (5 mg total) by mouth 2 (two) times a day Indications: blood clot in a deep vein of the extremities. 60 tablet 0    ascorbic acid (VITAMIN C) 500 mg tablet Take 1 tablet (500 mg total) by mouth every other day.      atorvastatin (LIPITOR) 40 mg tablet Take 1 tablet (40 mg total) by mouth daily. 30 tablet 0    DULoxetine (CYMBALTA) 60 mg capsule Take 1 capsule (60 mg total) by mouth nightly. 30 capsule 0    empagliflozin (JARDIANCE) 10  mg tablet Take 1 tablet (10 mg total) by mouth daily. 30 tablet 0    famotidine (PEPCID) 20 mg tablet Take 1 tablet (20 mg total) by mouth 2 (two) times a day Indications: stress ulcer prevention.      ferrous sulfate 325 mg (65 mg iron) tablet Take 1 tablet (325 mg total) by mouth every other day.      furosemide (LASIX) 40 mg tablet Take 1 tablet (40 mg total) by mouth daily. 30 tablet 0    gabapentin (NEURONTIN) 300 mg capsule Take 2 capsules (600 mg total) by mouth 3 (three) times a day. 180 capsule 0    imatinib (GLEEVEC) 400 mg chemo tablet Take  1 tablet (400 mg total) daily  Do not take on empty stomach.  Can be dissolved in water or juice.      insulin aspart, niacinamide, (FIASP U-100 INSULIN) 100 unit/mL injection Inject 5 Units under the skin 3 (three) times a day before meals. 4.5 mL 0    insulin aspart, niacinamide, (FIASP U-100 INSULIN) 100 unit/mL injection Inject 1-12 Units under the skin 3 (three) times a day before meals. Following sliding scale instruction from AVS 20 mL 0    insulin glargine U-100 (LANTUS/BASAGLAR) 100 unit/mL (3 mL) pen Inject 16 Units under the skin nightly Indications: type 2 diabetes mellitus. 4.8 mL 0    levothyroxine (SYNTHROID) 25 mcg tablet Take 1 tablet (25 mcg total) by mouth daily. 30 tablet 0    losartan (COZAAR) 25 mg tablet Take 1 tablet (25 mg total) by mouth daily with dinner. 30 tablet 0    melatonin ODT Take 1 tablet (3 mg total) by mouth nightly.      methenamine (HIPREX) 1 gram tablet Take 1 tablet (1 g total) by mouth 2 (two) times a day Indications: urinary tract infection prevention. 60 tablet 0    semaglutide (RYBELSUS) 14 mg tablet Take 1 tablet (14 mg total) by mouth daily. 30 tablet 1     No current facility-administered medications for this visit.

## 2024-12-16 NOTE — ASSESSMENT & PLAN NOTE
1. continue with  Jardiance 10 mg per day - increase fluids   2.  continue with Rybelsus 14 mg per day    3. Continue with  Fiasp 5 units with meals - and Lantus 16 units per day    4. Rotate injection sites   5.DEXCOM   6. A1c 7.4

## 2024-12-16 NOTE — ASSESSMENT & PLAN NOTE
Continue with Synthroid 25 mcg per day         How Severe Are Your Spot(S)?: mild What Type Of Note Output Would You Prefer (Optional)?: Bullet Format What Is The Reason For Today's Visit?: Full Body Skin Examination What Is The Reason For Today's Visit? (Being Monitored For X): concerning skin lesions on an annual basis Additional History: Patient presents to clinic with rash on the buttocks but does not report any other concerning lesions.

## 2025-01-07 ENCOUNTER — TELEPHONE (OUTPATIENT)
Dept: ENDOCRINOLOGY | Facility: CLINIC | Age: 65
End: 2025-01-07

## 2025-01-07 NOTE — TELEPHONE ENCOUNTER
Pt is calling for refills for the following medications:  Lasix-40 mg  Cymbalta-60 mg  Cozaar-25 mg    Please send to [facility] in [place].  Thanks in advance

## 2025-01-15 ENCOUNTER — HOSPITAL ENCOUNTER (OUTPATIENT)
Dept: PHYSICAL MEDICINE AND REHAB | Facility: REHABILITATION | Age: 65
Discharge: HOME | End: 2025-01-15
Payer: MEDICARE

## 2025-01-16 RX ORDER — ASPIRIN 81 MG/1
81 TABLET ORAL DAILY
Qty: 30 TABLET | Refills: 0 | OUTPATIENT
Start: 2025-01-16

## 2025-01-23 ENCOUNTER — TELEPHONE (OUTPATIENT)
Dept: ENDOCRINOLOGY | Facility: CLINIC | Age: 65
End: 2025-01-23
Payer: MEDICARE

## 2025-01-23 NOTE — TELEPHONE ENCOUNTER
Do you have enough medication for the next 5 days?: NO    Did you request this medication through your pharmacy or our patient portal in the last day or two?     Name of medication requested: Jardiance, Rybelsus, gabapentin, insulin glargine , Insulin aspart   Medication Strength:   Mediation Directions:   Quantity Requested (example 30/90):  Number of refills requested:     System Generated Preferred Pharmacy(s)  are as follows.  If more than one pharmacy displays please identify which one should be used for this call    Has the pharmacy information below been confirmed with the patient?  Yes  [facility]  [address] [place] [address]     If necessary, provide pharmacy details below.     Is this pharmacy a mail order pharmacy?: pharmacy   Pharmacy Name: [facility]   Pharmacy City: [place]   Pharmacy Telephone: [phone]    Additional Comments: [facility] no longer states patients,    Next Encounter with this provider: 4/21/2025

## 2025-01-25 DIAGNOSIS — Z79.4 TYPE 2 DIABETES MELLITUS TREATED WITH INSULIN (CMS/HCC): ICD-10-CM

## 2025-01-25 DIAGNOSIS — E11.9 TYPE 2 DIABETES MELLITUS TREATED WITH INSULIN (CMS/HCC): ICD-10-CM

## 2025-01-25 RX ORDER — INSULIN ASPART 100 [IU]/ML
INJECTION, SOLUTION INTRAVENOUS; SUBCUTANEOUS
Qty: 15 ML | Refills: 1 | Status: SHIPPED | OUTPATIENT
Start: 2025-01-25

## 2025-01-25 RX ORDER — ORAL SEMAGLUTIDE 14 MG/1
14 TABLET ORAL DAILY
Qty: 30 TABLET | Refills: 1 | Status: SHIPPED | OUTPATIENT
Start: 2025-01-25 | End: 2025-03-21 | Stop reason: SDUPTHER

## 2025-01-25 RX ORDER — INSULIN GLARGINE 100 [IU]/ML
INJECTION, SOLUTION SUBCUTANEOUS
Qty: 15 ML | Refills: 1 | Status: SHIPPED | OUTPATIENT
Start: 2025-01-25 | End: 2025-02-27 | Stop reason: CLARIF

## 2025-02-13 ENCOUNTER — TRANSCRIBE ORDERS (OUTPATIENT)
Dept: REGISTRATION | Facility: REHABILITATION | Age: 65
End: 2025-02-13

## 2025-02-13 DIAGNOSIS — Z89.612 ACQUIRED ABSENCE OF LEFT LEG ABOVE KNEE (CMS/HCC): Primary | ICD-10-CM

## 2025-02-24 DIAGNOSIS — Z79.4 TYPE 2 DIABETES MELLITUS TREATED WITH INSULIN (CMS/HCC): ICD-10-CM

## 2025-02-24 DIAGNOSIS — E11.9 TYPE 2 DIABETES MELLITUS TREATED WITH INSULIN (CMS/HCC): ICD-10-CM

## 2025-02-24 RX ORDER — EMPAGLIFLOZIN 10 MG/1
10 TABLET, FILM COATED ORAL DAILY
Qty: 30 TABLET | Refills: 0 | Status: SHIPPED | OUTPATIENT
Start: 2025-02-24 | End: 2025-03-27

## 2025-02-25 ENCOUNTER — HOSPITAL ENCOUNTER (OUTPATIENT)
Dept: PHYSICAL THERAPY | Facility: REHABILITATION | Age: 65
Setting detail: THERAPIES SERIES
Discharge: HOME | End: 2025-02-25
Attending: PHYSICAL MEDICINE & REHABILITATION
Payer: MEDICARE

## 2025-02-25 DIAGNOSIS — Z89.612 ACQUIRED ABSENCE OF LEFT LEG ABOVE KNEE (CMS/HCC): ICD-10-CM

## 2025-02-25 DIAGNOSIS — R26.9 GAIT ABNORMALITY: Primary | ICD-10-CM

## 2025-02-25 DIAGNOSIS — Z47.89 ENCOUNTER FOR PROSTHETIC GAIT TRAINING: ICD-10-CM

## 2025-02-25 PROCEDURE — 97163 PT EVAL HIGH COMPLEX 45 MIN: CPT | Mod: GP

## 2025-02-25 ASSESSMENT — GAIT ASSESSMENTS
TUG TIME: 67.3
TUG TIME: WITH RW

## 2025-02-25 NOTE — OP PT TREATMENT LOG
Prosthetic Training PT FLOWSHEET    Exercises Current Session Time Done y/n   MODALITIES- HEAT/ICE  CPT 23690 TOTAL TIME FOR SESSION Not performed    Heat     Ice/Vasocompression     MODALITIES - E-STIM  CPT 40490   TOTAL TIME FOR SESSION Not performed    E-stim/H-Wave     THER ACT  CPT 43150 TOTAL TIME FOR SESSION 0-7 Minutes    Bed mobility Fay needed to go from sup to sitting - pt has a grab bar at home Yes    Floor txf     Transfer training With/without AD    Prosthetic Info Prosthetist: Catarino  @ [employer]  Socket:Ischial Containment  Suspension:Pin  Knee: OFM2  Foot: K2  Liner:Alpha  Wear Time: 13 hours a day  Donning and doffing - (I) but with increased time      Prosthetic Outcomes PLUS  SSV NV  NV   Education POC, role of PT, safety, discussion of asking Catarino if he wants her to wear the  at night, if he can provide more 1 and 3ply socks to pt, discussion of adding ply t/o the day , especially if she feels down in the socket or short Yes to all          THER EX  CPT 66139 TOTAL TIME FOR SESSION 0-7 Minutes    CARDIOVASCULAR      Nu Step     Stationary Bike     Elliptical     Treadmill     UBE     STRENGTHENING     Supine ther-ex Prox hip/knee    Core strength     Seated ther-ex     Standing ther-ex Prox hip/knee    STRETCHING     LE stretching Hip flex  Adductor  HS    Other stretching     REPEATED MOVEMENTS     NEURO RE-ED  CPT 30327 TOTAL TIME FOR SESSION Not performed    COORDINATION     POSTURAL RE-ED        PRE-GAIT ACTIVITIES      BALANCE TRAINING      Sitting balance     Standing balance Floor  Complaint surface    GAIT/Prosthetic TRAINING  CPT 79244  CPT 14086 TOTAL TIME FOR SESSION Not performed    Ambulation  // bars  With AD    Dynamic gait      Stair negotiation     Curb negotiation     Ramp negotiation     Outdoor ambulation     BWS/vector training     MANUAL  CPT 65676 TOTAL TIME FOR SESSION Not performed    Stretching     Mobilization      Massage     Taping

## 2025-02-26 ENCOUNTER — TRANSCRIBE ORDERS (OUTPATIENT)
Dept: SCHEDULING | Age: 65
End: 2025-02-26

## 2025-02-26 DIAGNOSIS — M79.671 PAIN IN RIGHT FOOT: Primary | ICD-10-CM

## 2025-02-27 ENCOUNTER — HOSPITAL ENCOUNTER (OUTPATIENT)
Dept: RADIOLOGY | Facility: CLINIC | Age: 65
Discharge: HOME | End: 2025-02-27
Attending: PODIATRIST
Payer: MEDICARE

## 2025-02-27 ENCOUNTER — TELEPHONE (OUTPATIENT)
Dept: ENDOCRINOLOGY | Facility: CLINIC | Age: 65
End: 2025-02-27
Payer: MEDICARE

## 2025-02-27 ENCOUNTER — HOSPITAL ENCOUNTER (OUTPATIENT)
Dept: RADIOLOGY | Age: 65
Discharge: HOME | End: 2025-02-27
Attending: PODIATRIST
Payer: MEDICARE

## 2025-02-27 VITALS — BODY MASS INDEX: 25.05 KG/M2 | WEIGHT: 160 LBS

## 2025-02-27 DIAGNOSIS — M79.671 PAIN IN RIGHT FOOT: ICD-10-CM

## 2025-02-27 DIAGNOSIS — Z79.4 TYPE 2 DIABETES MELLITUS TREATED WITH INSULIN (CMS/HCC): ICD-10-CM

## 2025-02-27 DIAGNOSIS — Z79.4 TYPE 2 DIABETES MELLITUS WITH DIABETIC POLYNEUROPATHY, WITH LONG-TERM CURRENT USE OF INSULIN (CMS/HCC): Primary | ICD-10-CM

## 2025-02-27 DIAGNOSIS — E11.42 TYPE 2 DIABETES MELLITUS WITH DIABETIC POLYNEUROPATHY, WITH LONG-TERM CURRENT USE OF INSULIN (CMS/HCC): Primary | ICD-10-CM

## 2025-02-27 DIAGNOSIS — E11.9 TYPE 2 DIABETES MELLITUS TREATED WITH INSULIN (CMS/HCC): ICD-10-CM

## 2025-02-27 PROCEDURE — 73630 X-RAY EXAM OF FOOT: CPT | Mod: RT

## 2025-02-27 RX ORDER — GADOBUTROL 604.72 MG/ML
0.1 INJECTION INTRAVENOUS ONCE
Status: COMPLETED | OUTPATIENT
Start: 2025-02-27 | End: 2025-02-27

## 2025-02-27 RX ORDER — INSULIN DEGLUDEC 100 U/ML
INJECTION, SOLUTION SUBCUTANEOUS
Qty: 15 ML | Refills: 2 | Status: SHIPPED | OUTPATIENT
Start: 2025-02-27

## 2025-02-27 RX ADMIN — GADOBUTROL 7.3 ML: 604.72 INJECTION INTRAVENOUS at 16:42

## 2025-02-27 NOTE — PROGRESS NOTES
"Physical Therapy Evaluation    R PT and OT Fax: [phone]    PT EVALUATION FOR OUTPATIENT THERAPY    Patient: Bebe Burks    MRN: [id number]  : 1960 64 y.o.     Referring Physician: Yo Betancourt DO  Date of Visit: 2025      Certification Dates:  25 through 25         Recommended Frequency & Duration:  2 times/week for up to 3 months     Diagnosis:   1. Gait abnormality    2. 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 Encounter for prosthetic gait training        Chief Complaints:   Chief Complaint   Patient presents with    Difficulty Walking    Balance Deficits    Dec Coordination    Decreased Endurance    Abnormality Of Gait    Decreased Trunk Control    Dec Strength    Speech Problem       Precautions: fall  Precautions additional comments:      Past Medical History:   Past Medical History:   Diagnosis Date    Abnormal ECG     Abnormal finding on chest xray 2021    Arthritis     Colon cancer (CMS/HCC)     COVID-19 2021    COVID-19 vaccine series completed     Moderna: \" 3 doses\"    Diabetic neuropathy (CMS/HCC)     DM (diabetes mellitus), type 2 with ophthalmic complications (CMS/HCC)     Hypertension     Hypothyroidism     Left foot drop     Lipid disorder     Type 2 diabetes mellitus treated with insulin (CMS/HCC)        Past Surgical History:   Past Surgical History   Procedure Laterality Date    Abcess drainage  10/2023    AMPUTATION TOE (RAY RESECTION) Left 2024    Performed by Getachew Damian DPM at [facility] OR PAV    Angiogram lower extremity bilateral N/A 2024    Performed by Adam Aguilar MD at [facility] CATH/EP/NEURO INT    Aortogram abdominal with serialography N/A 2024    Performed by Adam Aguilar MD at [facility] CATH/EP/NEURO INT    Cataract extraction Bilateral     CATARACT PHACO, IOL Right 2018    Performed by Derrick Jeter MD at [facility]    Colonoscopy      Combined hysteroscopy diagnostic / d&c   "    Eye surgery Left 2017    cataract    Joint replacement Right 02/2023    knee replacement    Right Total Knee Repalcement Right 2/27/2023    Performed by Gideon Centeno MD at [facility] OR    Tib/peroneal angioplasty & atherectomy - unilateral - initial N/A 1/17/2024    Performed by Adam Aguilar MD at [facility] CATH/EP/NEURO INT    Toe amputation      Vitrectomy Left 2016         LEARNING ASSESSMENT    Assessment completed:  Yes    Learner name:  Bebe    Learner: Patient    Learning Barriers:  Learning barriers: No Barriers    Preferred Language: English     Needed: No    Education Provided:   Method: Discussion  Readiness: acceptance  Response: Demonstrated understanding and will need further amp and prosthetic education       CO-LEARNER ASSESSMENT:    Completed: Yes:   Co-Learner name:  Anna     Learner: Family sister    Learning Barriers:  Learning barriers: No Barriers    Preferred Language: English     Needed: No    Education Provided:   Method:  Discussion, Handout, and Demonstration  Readiness:   acceptance  Response:   Demonstrated understanding  Comments: she brought back pt's transfer board       Welcome letter discussed: Yes Patient provided with Welcome Letter, which includes attendance policy. Provided education regarding cancellation and no-show policy. Education regarding the importance of participation and regular attendance to maximize goal attainment.       OBJECTIVE MEASUREMENTS/DATA:    Time In Session:  Start Time: 1313  Stop Time: 1402  Time Calculation (min): 49 min   Assessment and Plan - 02/25/25 1534          Assessment    Plan of Care reviewed and patient/family in agreement Yes     System Pathology/Pathophysiology Noted cardiovascular;musculoskeletal;neuromuscular;integumentary     Functional Limitations in Following Categories (PT Eval) self-care;home management;community/leisure     Rehab Potential/Prognosis good, to achieve stated therapy goals;re-evaluate  goals as necessary     Problem List decreased endurance;decreased flexibility;decreased strength;impaired balance;transfemoral lower extremity amputation     Demonstrates Need for Referral to Another Service orthotics/prosthetics services     Actions taken pt to call prosthetist - needs more ply at home , only has 4 and 5sock ply     Clinical Assessment Bebe is a 65 yo F who presents to PT initial evaluation with excellent work ethic and participation. Pt is a motivated individual who lives alone, and would like to get back to full (I) without use of WC, walking around, and be able to change speed, complete elevations. She is an excellent PT candidate to work on prosthetic training for ply and sock management, skin check education, gait training, elevation training, balance training, postural re-education, strengthening, and endurance training. Pt is limited by decreased gross strength, impaired ROM and inflexibility, R foot drop, R neuropathy, L phantom limb pain, excess UE reliance, impaired activity tolerance with less UE support and without WC for long distances. Pt is not able to complete 6MWT at this time due to impaired endurance. She is at a fall risk per TUG scores. Sess below for planned further testing. Pt may benefit from OT services as well but she wanted to begin with PT to start. Will continue to assess. Cont with OPPT to maximize functional (I), maximize QOL and reduce risk of falls.     Plan and Recommendations Assess subjective tests and gait speed. Mat (sup/prone) stretching and strengthening. Gait training. Ply / sock management     Planned Services CPT 10350 Gait training;CPT 45051 Manual therapy;CPT 27329 Neuromuscular Reeducation;CPT 11117 Prosthetics training (Initial encounter);CPT 93691 Orthotics/Prosthetics management and training (Subsequent encounters);CPT 78497 Therapeutic activities;CPT 41632 Therapeutic exercises;CPT 88408 Hot/Cold Packs therapy     Comments/Additional Services  elise                    General Information - 02/25/25 0857          Session Details    Document Type initial evaluation     Mode of Treatment individual therapy        General Information    Referring Physician Dr. Betancourt     History of present illness/functional impairment Ms. 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 Pura Chaves. ID recommended IV Ceftriaxone and IV Ampicillin until 9/14/24. She had urinary retention managed with indwelling Rowan catheter. She had right upper extremity swelling present during this admission, ultrasound on 8/29/24 demonstrated an acute non-occlusive DVT of the right axillary and brachial vein around line (on Lovenox). Subsequently she completed an acute inpatient rehabilitation stay for pre-prosthetic training at [facility] between 9/3/24 to 9/21/24 and was discharged to [facility] on 9/21/24. She initially required bladder self intermittent catheterizations due to urinary retention, but indicates that now she is able to void. She desired to be a prosthetic ambulator. A left transfemoral amputation prosthesis was fabricated. and followed-up as outpatient in the amputee clinic at [facility] and she was deemed to be an appropriate candidate for acute inpatient rehabilitation stay. She has been needing assistance for mobility, transfers,  self-care. I have reviewed the preadmission screening and concur with that information and there are no significant changes from patient's preadmission screening. She is transferred to [place] [facility] on 11/19/24 from [facility] for further acute inpatient rehabilitation stay for prosthetic training with. Pt [facility] from [facility]/10/24.  PMH of  HTN, Type II DM, retinopathy, neuropathy. Pt wears a R carbon fiber toe off.     Patient/Family/Caregiver Comments/Observations Pt arrives 10 min late to eval, with brother and sister. sister came back during the eval , brother stayed in waiting room. returned the transfer board from the [employer] program     Existing Precautions/Restrictions fall                    Pain/Vitals - 02/25/25 1534          Pain Assessment    Currently in pain No/Denies                    Falls/Food Screening - 02/25/25 1315          Initial Falls Assessment    One or more falls in the last year Yes     How many times 2 or more     Was the patient injured in any fall No     Recommended plan to address falls start PT   trying to push the vaccuum, with PT in the bathroom at home.       Food Insecurity    Within the past 12 months, you worried that your food would run out before you got the money to buy more. Never true     Within the past 12 months, the food you bought just didn't last and you didn't have money to get more. Never true                    PT - 02/25/25 1315          Physical Therapy    Physical Therapy Specialty Amputee Program PT        PT Plan    Frequency of treatment 2 times/week     PT Duration 3 months     PT Cert From 02/25/25     PT Cert To 05/26/25     Date PT POC was sent to provider 02/25/25                       Living Environment    Living Environment - 02/25/25 1315          Living Environment    People in Home alone     Living Arrangements apartment     Living Environment Comment apartment , hardwood floors, lives alone. has sisters nearby to assist . does not take a  shower yet by herself. she has a tub bench. aide for 2 hours mon-fri. aide assists with light household tasks and managment. RW, commode over toilet, tubbench, manual WC, transport chair.     Transportation Concerns none                   PLOF:    Prior Level of Function - 02/25/25 1534          OTHER    Previous level of function enjoys going out to eat with family, spending time with friends and family, traveling, attending [employer]                   Sensory Tests    Sensory Testing - 02/25/25 1315          Sensory Assessment    Sensory Assessment --   goes to her podiatrist today (Dr Douglas) to assess R lateral foot wound - being followed. not visualized today. L residual limb, well healed  and no skin issues       Hearing Assessment    Hearing Status WFL        Vision Assessment/Intervention    Vision Assessment WFL                   Skin    Skin Assessment - 02/25/25 1534          LE Skin    Skin Condition Intact   appropriate limb shape, conical, not firm incisions, excessive skin post, no pink or redness or irritation.                  ROM    Range of Motion - 02/25/25 1546          RIGHT: Lower Extremity PROM Assessment    Knee Extension  -9        LEFT: Lower Extremity PROM Assessment    Hip Flexion  -5 degrees   initially at -20 but with prolonged stretch while speaking about education pt able to get down to 5deg to neutral.                  MMT    Manual Muscle Tests - 02/25/25 1534          RIGHT: Lower Extremity Manual Muscle Test Assessment    Right LE MMT comments: R foot drop        LEFT: Lower Extremity Manual Muscle Test Assessment    Hip Flexion gross movement (4-/5) good minus     Hip Extension gross movement (3+/5) fair plus     Hip Abduction gross movement (3+/5) fair plus     Hip Adduction gross movement (3+/5) fair plus                   Transfers    Transfers - 02/25/25 1534          Bed Mobility    Bed Mobility Activities --   without bedrail pt needed Fay to go from supine to sitting.  "sitting to supine at (I) level       Sit to Stand Transfer    Fisher, Sit to Stand Transfer modified independence        Stand to Sit Transfer    Fisher, Stand to Sit Transfer modified independence        Stand Pivot Transfer    Fisher, Stand Pivot/Stand Step Transfer modified independence                   Gait and Mobility    Gait and Mobility - 02/25/25 1534          Gait Training    Fisher, Gait supervision     Variable surfaces Flat surface     Assistive Device walker, front-wheeled     Distance in Feet 36 feet     Pattern 3-point     Comment RW too far ant prior to LLE advancing. steppage gait. no L knee flx in swing, lack of L weight shift in stance, excessive BUE support , foot drop on R (no brace brought in today but she reports she will bring in toe off next time)        Stairs Assessment    Fisher, Stairs --   NT - has none at home but will be a goal to eventually work on                  Neuromuscular/Motor    Neuromuscular/Motor - 02/25/25 1534          Motor Skills    Motor Skills --   gross coordination intact BLE and fine motor control intact B hands                  Balance/Posture    Balance and Posture - 02/25/25 1534          Balance Assessment    Balance Assessment --   static with UE support good , without UE support fair. dynamic balance with UE support good -                  Wheelchair Assessment    Wheelchair Assessment - 02/25/25 1534          Wheelchair Seating System    Back system in [facility] with cushion - awaiting own chair that was ordered when pt was in rehab.                   Outcome Measures    PT Outcome Measures - 02/25/25 1534          Objective Outcome Measures    2 Minute Walk Test 36   RW, S       Timed Up and Go Test    Trial One: Timed Up and Go Test 67.3     Results, Timed Up and Go Test (Balance) with RW                      Goals         Mutually agreed upon pain goal       PT: Prosthetic Training (pt-stated)       \" I want to walk more " "and in and out of the bathroom\"  \"I want to stop using the wheelchair in the house\"  Short Term Goals Time Frame Result Comment/Progress   Pt will ascend/descend 4 6\" steps with MICHELLE Aopnte 4-6 weeks     Assess subjective measures. Assess gait speed. 4-6 weeks     Pt will tolerate at least 10 minutes of seated cardiovascular endurance training with stable vital signs, to improve pt's activity tolerance. 4-6 weeks     Progress gait speed by 0.05 m/sec without decline in safety or quality while using  LRAD. 4-6 weeks     Pt and caregiver will be mod I with HEP 4-6 weeks       Long Term Goals Time Frame Result Comment/Progress   Pt will ascend/descend full flight of stairs reciprocally with HR, at MOD I level.  10-12 weeks     Pt will improve to be able to walk 6MWT.      Pt will improve 6MWT by at least 75' 10-12 weeks     Pt will improve by at least 0.1 m/s during 10MWT to indicate clinically significant improvement in walking speed  10-12 weeks     Pt will be mod I with updated HEP 10-12 weeks     Pt will ascend/descend 6\" curb and mod grade ramp, S  10-12 weeks     Pt will tolerate at least 15 minutes of seated cardiovascular endurance training with stable vital signs, to improve pt's activity tolerance.  10-12 weeks                        TREATMENT PLAN:    Prosthetic Training PT FLOWSHEET    Exercises Current Session Time Done y/n   MODALITIES- HEAT/ICE  CPT 48712 TOTAL TIME FOR SESSION Not performed    Heat     Ice/Vasocompression     MODALITIES - E-STIM  CPT 90997   TOTAL TIME FOR SESSION Not performed    E-stim/H-Wave     THER ACT  CPT 18480 TOTAL TIME FOR SESSION 0-7 Minutes    Bed mobility Fay needed to go from sup to sitting - pt has a grab bar at home Yes    Floor txf     Transfer training With/without AD    Prosthetic Info Prosthetist: Catarino  @ [employer]  Socket:Ischial Containment  Suspension:Pin  Knee: OFM2  Foot: K2  Liner:Alpha  Wear Time: 13 hours a day  Donning and doffing - (I) but with increased " time      Prosthetic Outcomes PLUS  SSV NV  NV   Education POC, role of PT, safety, discussion of asking Catarino if he wants her to wear the  at night, if he can provide more 1 and 3ply socks to pt, discussion of adding ply t/o the day , especially if she feels down in the socket or short Yes to all          THER EX  CPT 14556 TOTAL TIME FOR SESSION 0-7 Minutes    CARDIOVASCULAR      Nu Step     Stationary Bike     Elliptical     Treadmill     UBE     STRENGTHENING     Supine ther-ex Prox hip/knee    Core strength     Seated ther-ex     Standing ther-ex Prox hip/knee    STRETCHING     LE stretching Hip flex  Adductor  HS    Other stretching     REPEATED MOVEMENTS     NEURO RE-ED  CPT 15919 TOTAL TIME FOR SESSION Not performed    COORDINATION     POSTURAL RE-ED        PRE-GAIT ACTIVITIES      BALANCE TRAINING      Sitting balance     Standing balance Floor  Complaint surface    GAIT/Prosthetic TRAINING  CPT 49905  CPT 84023 TOTAL TIME FOR SESSION Not performed    Ambulation  // bars  With AD    Dynamic gait      Stair negotiation     Curb negotiation     Ramp negotiation     Outdoor ambulation     BWS/vector training     MANUAL  CPT 59069 TOTAL TIME FOR SESSION Not performed    Stretching     Mobilization      Massage     Taping           ASSESSMENT:    This 64 y.o. year old female presents to PT with above stated diagnosis. Physical Therapy evaluation reveals decreased endurance, decreased flexibility, decreased strength, impaired balance, transfemoral lower extremity amputation resulting in self-care, home management, community/leisure limitations. Bebe Burks will benefit from skilled PT services to address limitation, work towards rehab and patient goals and maximize PLOF of chosen ADLs.     Planned Services: The patient's treatment will include CPT 09229 Gait training, CPT 10204 Manual therapy, CPT 11748 Neuromuscular Reeducation, CPT 64480 Prosthetics training (Initial encounter), CPT 61724  Orthotics/Prosthetics management and training (Subsequent encounters), CPT 19909 Therapeutic activities, CPT 25951 Therapeutic exercises, CPT 95222 Hot/Cold Packs therapy,vector.     Jarod Freeman, PT

## 2025-02-27 NOTE — TELEPHONE ENCOUNTER
Is pt continuing on Lantus 16 units daily - not listed in last appointment. Will need to switch to formulary degludec this year.  
Pt called,  Tristen states a prior auth is required for the Lantus looking for an alternative also needs a freestyle vin 3 sensor sent to [facility] in [place]  
Yes pt in on Lantus   
immune

## 2025-03-11 ENCOUNTER — BMR PREADMISSION ASSESSMENT (OUTPATIENT)
Dept: ADMISSIONS | Facility: REHABILITATION | Age: 65
End: 2025-03-11
Payer: MEDICARE

## 2025-03-21 DIAGNOSIS — Z79.4 TYPE 2 DIABETES MELLITUS TREATED WITH INSULIN (CMS/HCC): ICD-10-CM

## 2025-03-21 DIAGNOSIS — E11.9 TYPE 2 DIABETES MELLITUS TREATED WITH INSULIN (CMS/HCC): ICD-10-CM

## 2025-03-21 RX ORDER — ORAL SEMAGLUTIDE 14 MG/1
14 TABLET ORAL DAILY
Qty: 30 TABLET | Refills: 0 | Status: SHIPPED | OUTPATIENT
Start: 2025-03-21 | End: 2025-04-23

## 2025-03-27 DIAGNOSIS — Z79.4 TYPE 2 DIABETES MELLITUS TREATED WITH INSULIN (CMS/HCC): ICD-10-CM

## 2025-03-27 DIAGNOSIS — E11.9 TYPE 2 DIABETES MELLITUS TREATED WITH INSULIN (CMS/HCC): ICD-10-CM

## 2025-03-27 RX ORDER — EMPAGLIFLOZIN 10 MG/1
10 TABLET, FILM COATED ORAL DAILY
Qty: 30 TABLET | Refills: 0 | Status: SHIPPED | OUTPATIENT
Start: 2025-03-27 | End: 2025-04-29

## 2025-04-11 ENCOUNTER — TELEPHONE (OUTPATIENT)
Dept: ENDOCRINOLOGY | Facility: CLINIC | Age: 65
End: 2025-04-11
Payer: MEDICARE

## 2025-04-11 NOTE — TELEPHONE ENCOUNTER
[facility] Appointment Request   Provider: Wanda Gipson  Appointment Type: MyChart VV  Reason for Visit: 3MFU   Available Day and Time: week of 4/28/25  Best Contact Number: [phone]    The practice will reach out to schedule your appointment within the next 2 business days.

## 2025-04-18 NOTE — PLAN OF CARE
Ira El DO        epoetin iglesia-epbx (RETACRIT) [id number] Plan of Care Review  Plan of Care Reviewed With: patient  Progress: improving  Outcome Evaluation: Patient is alert and oriented, able to make needs known verbally. Continent of bowel and bladder during shift. Slough to wound bed to sacrum noted, provider aware. Accuchecks performed TID, no S&S of hyper/hypoglycemia noted. Tolerated medications and meals well on shift. Call bell placed within reach, safety measures maintained.   Son Figueroa MD  Nephrology

## 2025-04-22 DIAGNOSIS — E11.9 TYPE 2 DIABETES MELLITUS TREATED WITH INSULIN (CMS/HCC): ICD-10-CM

## 2025-04-22 DIAGNOSIS — Z79.4 TYPE 2 DIABETES MELLITUS TREATED WITH INSULIN (CMS/HCC): ICD-10-CM

## 2025-04-22 NOTE — TELEPHONE ENCOUNTER
Reached out to patient, left vm to call office to schedule an appointment/My Chart . Waiting for a return visit

## 2025-04-23 DIAGNOSIS — E11.8 TYPE 2 DIABETES MELLITUS WITH COMPLICATION (CMS/HCC): ICD-10-CM

## 2025-04-23 RX ORDER — ORAL SEMAGLUTIDE 14 MG/1
14 TABLET ORAL DAILY
Qty: 30 TABLET | Refills: 0 | Status: SHIPPED | OUTPATIENT
Start: 2025-04-23 | End: 2025-05-24

## 2025-04-23 RX ORDER — PEN NEEDLE, DIABETIC 32GX 5/32"
NEEDLE, DISPOSABLE MISCELLANEOUS
Qty: 400 EACH | Refills: 3 | Status: SHIPPED | OUTPATIENT
Start: 2025-04-23

## 2025-04-29 DIAGNOSIS — Z79.4 TYPE 2 DIABETES MELLITUS TREATED WITH INSULIN (CMS/HCC): ICD-10-CM

## 2025-04-29 DIAGNOSIS — E11.9 TYPE 2 DIABETES MELLITUS TREATED WITH INSULIN (CMS/HCC): ICD-10-CM

## 2025-04-29 RX ORDER — EMPAGLIFLOZIN 10 MG/1
10 TABLET, FILM COATED ORAL DAILY
Qty: 30 TABLET | Refills: 0 | Status: SHIPPED | OUTPATIENT
Start: 2025-04-29

## 2025-05-19 ENCOUNTER — TELEPHONE (OUTPATIENT)
Dept: PHYSICAL THERAPY | Facility: REHABILITATION | Age: 65
End: 2025-05-19
Payer: MEDICARE

## 2025-05-22 DIAGNOSIS — Z79.4 TYPE 2 DIABETES MELLITUS TREATED WITH INSULIN (CMS/HCC): ICD-10-CM

## 2025-05-22 DIAGNOSIS — E11.9 TYPE 2 DIABETES MELLITUS TREATED WITH INSULIN (CMS/HCC): ICD-10-CM

## 2025-05-24 RX ORDER — ORAL SEMAGLUTIDE 14 MG/1
14 TABLET ORAL DAILY
Qty: 90 TABLET | Refills: 1 | Status: SHIPPED | OUTPATIENT
Start: 2025-05-24

## 2025-05-27 DIAGNOSIS — E11.9 TYPE 2 DIABETES MELLITUS TREATED WITH INSULIN (CMS/HCC): ICD-10-CM

## 2025-05-27 DIAGNOSIS — Z79.4 TYPE 2 DIABETES MELLITUS TREATED WITH INSULIN (CMS/HCC): ICD-10-CM

## 2025-05-29 RX ORDER — INSULIN ASPART 100 [IU]/ML
INJECTION, SOLUTION INTRAVENOUS; SUBCUTANEOUS
Qty: 15 ML | Refills: 0 | Status: SHIPPED | OUTPATIENT
Start: 2025-05-29

## 2025-06-03 ENCOUNTER — HOSPITAL ENCOUNTER (OUTPATIENT)
Dept: PHYSICAL THERAPY | Facility: REHABILITATION | Age: 65
Setting detail: THERAPIES SERIES
Discharge: HOME | End: 2025-06-03
Attending: PHYSICAL MEDICINE & REHABILITATION
Payer: MEDICARE

## 2025-06-03 DIAGNOSIS — Z89.612: Primary | ICD-10-CM

## 2025-06-03 DIAGNOSIS — R26.9 GAIT DISORDER: ICD-10-CM

## 2025-06-03 PROCEDURE — 97163 PT EVAL HIGH COMPLEX 45 MIN: CPT | Mod: GP

## 2025-06-03 NOTE — OP PT TREATMENT LOG
DELIVERY NOTE for ASSISTIVE TECHNOLOGY    Long Term Goals Time Frame Result Comment/Progress   Independent mobility in new ultralightweight manual wheelchair  24 weeks MET      Good postural support in new wheelchair seating system 24 weeks MET    Good skin pressure management in new seating system 24 weeks MET      Diagnosis:   1. History of above knee amputation, left (CMS/HCC)    2. Gait disorder           Intervention and results:Bebe Burks is a 64 y.o. female who was seen today for fitting and delivery of the new TiLite MWC wheelchair, delivered as ordered by Perry Greco of [employer] .  Bebe Burks was transferred into the new wheelchair and adjustments were made to optimize posture and alignment.  Adjustments included the height/position of the armrests, footrests, back support, moving center of gravity forward for optimal UE position and pelvic belt fit to ensure optimal support and alignment in the seating system during independent MWC propulsion. The axle position was assessed for optimal push rim access to maximize the push stroke efficiency to provide shoulder joint protection.  Following adjustments, Bebe Burks demonstrated optimal self propulsion with bilateral upper extremities over level indoor surfaces.      Education was provided regarding:  Weight shifting  Skin pressure management  Care of the new wheelchair  Wheelchair breakdown and set-up  How to get service/repairs for mobility device      Bebe Burks verbalized understanding and returned demonstration of appropriate educational topics covered.    Plan: No further follow-up needed at this time, Bebe Burks took the new wheelchair home.  Follow-up as medically necessary in outpatient wheelchair clinic.

## 2025-06-03 NOTE — PROGRESS NOTES
"Physical Therapy Evaluation    [employer] Fax: [phone]    PT EVALUATION FOR OUTPATIENT THERAPY    Patient: Bebe Burks    MRN: [id number]  : 1960 64 y.o.     Referring Physician: Alexei Petty MD  Date of Visit: 6/3/2025      Certification Dates:  25 through 25  as needed in AT clinic for wheelchair adjustments      Recommended Frequency & Duration:  1 time/week for up to 3 months     Diagnosis:   1. History of above knee amputation, left (CMS/HCC)    2. Gait disorder        Chief Complaints:   Chief Complaint   Patient presents with    Difficulty Walking       Precautions: fall  Precautions additional comments: L healing shoe , R prosthesis donned    Past Medical History:   Past Medical History:   Diagnosis Date    Abnormal ECG     Abnormal finding on chest xray 2021    Arthritis     Colon cancer (CMS/HCC)     COVID-19 2021    COVID-19 vaccine series completed     Moderna: \" 3 doses\"    Diabetic neuropathy (CMS/HCC)     DM (diabetes mellitus), type 2 with ophthalmic complications (CMS/HCC)     Hypertension     Hypothyroidism     Left foot drop     Lipid disorder     Type 2 diabetes mellitus treated with insulin (CMS/HCC)        Past Surgical History:   Past Surgical History   Procedure Laterality Date    Abcess drainage  10/2023    AMPUTATION TOE (RAY RESECTION) Left 2024    Performed by Getachew Damian DPM at [facility] OR [facility]    Angiogram lower extremity bilateral N/A 2024    Performed by Adam Aguilar MD at [facility] CATH/EP/NEURO INT    Aortogram abdominal with serialography N/A 2024    Performed by Adam Aguilar MD at [facility] CATH/EP/NEURO INT    Cataract extraction Bilateral     CATARACT PHACO, IOL Right 2018    Performed by Derrick Jeter MD at [facility]    Colonoscopy      Combined hysteroscopy diagnostic / d&c  2007    Eye surgery Left 2017    cataract    Joint replacement Right 2023    knee replacement    Right " Total Knee Repalcement Right 2/27/2023    [place]formed [facility] Gideon Centeno [facility] [place] [facility] [place]    Tib/peroneal angioplasty & atherectomy - unilateral - initial N/A 1/17/2024    Performed by Adam Aguilar MD at [facility] CATH/EP/NEURO INT    Toe amputation      Vitrectomy Left 2016             OBJECTIVE MEASUREMENTS/DATA:    Time In Session:      Assessment and Plan - 06/03/25 1322          Assessment    Plan of Care reviewed and patient/family in agreement Yes     Clinical Assessment Pt fitted in custom ultra lightweight wheelchair. Adjustments made to improve fit and optimize propulsion.     Plan and Recommendations Follow up in AT clinic as needed.               General Information - 06/03/25 1322          Session Details    Document Type initial evaluation     Mode of Treatment individual therapy        General Information    Onset of Illness/Injury or Date of Surgery 08/19/24     Referring Physician Dr. Alexei Petty MD     History of present illness/functional impairment Ms. 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 Pura Chaves. ID recommended IV Ceftriaxone and IV Ampicillin until 9/14/24. She had urinary retention managed with indwelling Rowan catheter. She had right upper extremity swelling present during this admission, ultrasound on 8/29/24 demonstrated an acute non-occlusive DVT of the right axillary  and brachial vein around line (on Lovenox). Subsequently she completed an acute inpatient rehabilitation stay for pre-prosthetic training at [facility] between 9/3/24 to 9/21/24 and was discharged to [facility] on 9/21/24. She initially required bladder self intermittent catheterizations due to urinary retention, but indicates that now she is able to void. She desired to be a prosthetic ambulator. A left transfemoral amputation prosthesis was fabricated. and followed-up as outpatient [facility] and she was deemed to be an appropriate candidate for acute inpatient rehabilitation stay. She has been needing assistance for mobility, transfers, self-care. I have reviewed the preadmission screening and concur with that information and there are no significant changes from patient's preadmission screening. She is transferred to [facility] on 11/19/24 from [facility] for further acute inpatient rehabilitation stay for prosthetic training with. Pt DC from [facility] 12/10/24.  PMH of  HTN, Type II DM, retinopathy, neuropathy. Pt wears a R carbon fiber toe off.     Patient/Family/Caregiver Comments/Observations Pt arrived with aide in loaner wheelchair. Reports still unable to resume prosthetic training due to wound on L foot but is doing well overall. Eager to recieve her custom wheelchair.     Existing Precautions/Restrictions fall     Precautions comments L healing shoe , R prosthesis donned         Services    Do You Speak a Language Other Than English at Home? no        Behavioral Health Related Communication    Suicidal Ideation No               Pain/Vitals - 06/03/25 1322          Pain Assessment    Currently in pain No/Denies        Pre Activity Vital Signs    Pulse 97     Oxygen Therapy None (Room air)     /72     BP Location Right upper arm     BP Method Automatic     Patient Position Sitting                 PT - 06/03/25 1322           Physical Therapy    Physical Therapy [employer] PT        PT Plan    Frequency of treatment 1 time/week     PT Duration 3 months     PT Custom Frequency and Duration as needed in AT clinic for wheelchair adjustments     PT Cert From 06/03/25     PT Cert To 11/30/25     Date PT POC was sent to provider 06/03/25                  TREATMENT PLAN:    DELIVERY NOTE for ASSISTIVE TECHNOLOGY    Long Term Goals Time Frame Result Comment/Progress   Independent mobility in new ultralightweight manual wheelchair  24 weeks MET      Good postural support in new wheelchair seating system 24 weeks MET    Good skin pressure management in new seating system 24 weeks MET      Diagnosis:   1. History of above knee amputation, left (CMS/HCC)    2. Gait disorder           Intervention and results:Bebe Burks is a 64 y.o. female who was seen today for fitting and delivery of the new TiLite MWC wheelchair, delivered as ordered by Perry Greco of [employer] .  Bebe Burks was transferred into the new wheelchair and adjustments were made to optimize posture and alignment.  Adjustments included the height/position of the armrests, footrests, back support, moving center of gravity forward for optimal UE position and pelvic belt fit to ensure optimal support and alignment in the seating system during independent MWC propulsion. The axle position was assessed for optimal push rim access to maximize the push stroke efficiency to provide shoulder joint protection.  Following adjustments, Bebe Burks demonstrated optimal self propulsion with bilateral upper extremities over level indoor surfaces.      Education was provided regarding:  Weight shifting  Skin pressure management  Care of the new wheelchair  Wheelchair breakdown and set-up  How to get service/repairs for mobility device      Bebe Burks verbalized understanding and returned demonstration of appropriate educational topics covered.    Plan:  No further follow-up needed at this time, Bebe Burks took the new wheelchair home.  Follow-up as medically necessary in outpatient wheelchair clinic.        ASSESSMENT:    This 64 y.o. year old female presents to PT with above stated diagnosis. Physical Therapy evaluation reveals   resulting in   limitations. Bebe Burks will benefit from skilled PT services to address limitation, work towards rehab and patient goals and maximize PLOF of chosen ADLs.     Planned Services: The patient's treatment will include  , .     Paulette Gamble, PT

## 2025-06-03 NOTE — ADDENDUM NOTE
Encounter addended by: Paulette Gamble, PT on: 6/3/2025 4:53 PM   Actions taken: Flowsheet accepted, Charge Capture section accepted

## 2025-08-12 ENCOUNTER — TELEPHONE (OUTPATIENT)
Dept: REGISTRATION | Facility: REHABILITATION | Age: 65
End: 2025-08-12
Payer: MEDICARE

## 2025-08-22 ENCOUNTER — APPOINTMENT (EMERGENCY)
Dept: RADIOLOGY | Facility: HOSPITAL | Age: 65
End: 2025-08-22
Payer: MEDICARE

## 2025-08-22 ENCOUNTER — HOSPITAL ENCOUNTER (EMERGENCY)
Facility: HOSPITAL | Age: 65
Discharge: HOME | End: 2025-08-22
Attending: EMERGENCY MEDICINE
Payer: MEDICARE

## 2025-08-22 VITALS
OXYGEN SATURATION: 97 % | TEMPERATURE: 97 F | DIASTOLIC BLOOD PRESSURE: 77 MMHG | RESPIRATION RATE: 20 BRPM | HEART RATE: 104 BPM | SYSTOLIC BLOOD PRESSURE: 145 MMHG
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
## 2025-08-22 DIAGNOSIS — S30.0XXA TRAUMATIC ECCHYMOSIS OF LOWER BACK, INITIAL ENCOUNTER: ICD-10-CM

## 2025-08-22 DIAGNOSIS — E04.1 THYROID NODULE: ICD-10-CM

## 2025-08-22 DIAGNOSIS — K80.20 CALCULUS OF GALLBLADDER WITHOUT CHOLECYSTITIS WITHOUT OBSTRUCTION: ICD-10-CM

## 2025-08-22 DIAGNOSIS — T14.8XXA ABRASION: ICD-10-CM

## 2025-08-22 LAB
ALBUMIN SERPL-MCNC: 4.1 G/DL (ref 3.5–5.7)
ALP SERPL-CCNC: 65 IU/L (ref 34–125)
ALT SERPL-CCNC: 20 IU/L (ref 7–52)
ANION GAP SERPL CALC-SCNC: 8 MEQ/L (ref 3–15)
AST SERPL-CCNC: 36 IU/L (ref 13–39)
BASOPHILS # BLD: 0.05 K/UL (ref 0.01–0.1)
BASOPHILS NFR BLD: 0.5 %
BILIRUB SERPL-MCNC: 0.7 MG/DL (ref 0.3–1.2)
BUN SERPL-MCNC: 32 MG/DL (ref 7–25)
CALCIUM SERPL-MCNC: 8.5 MG/DL (ref 8.6–10.3)
CHLORIDE SERPL-SCNC: 100 MEQ/L (ref 98–107)
CO2 SERPL-SCNC: 28 MEQ/L (ref 21–31)
CREAT SERPL-MCNC: 1.2 MG/DL (ref 0.6–1.2)
DIFFERENTIAL METHOD BLD: ABNORMAL
EGFRCR SERPLBLD CKD-EPI 2021: 50 ML/MIN/1.73M*2
EOSINOPHIL # BLD: 0.37 K/UL (ref 0.04–0.36)
EOSINOPHIL NFR BLD: 3.8 %
ERYTHROCYTE [DISTWIDTH] IN BLOOD BY AUTOMATED COUNT: 13.3 % (ref 11.7–14.4)
GLUCOSE SERPL-MCNC: 387 MG/DL (ref 70–99)
HCT VFR BLD AUTO: 31.7 % (ref 35–45)
HGB BLD-MCNC: 10.7 G/DL (ref 11.8–15.7)
IMM GRANULOCYTES # BLD AUTO: 0.03 K/UL (ref 0–0.08)
IMM GRANULOCYTES NFR BLD AUTO: 0.3 %
LYMPHOCYTES # BLD: 0.96 K/UL (ref 1.2–3.5)
LYMPHOCYTES NFR BLD: 9.8 %
MCH RBC QN AUTO: 30.9 PG (ref 28–33.2)
MCHC RBC AUTO-ENTMCNC: 33.8 G/DL (ref 32.2–35.5)
MCV RBC AUTO: 91.6 FL (ref 83–98)
MONOCYTES # BLD: 0.38 K/UL (ref 0.28–0.8)
MONOCYTES NFR BLD: 3.9 %
NEUTROPHILS # BLD: 8 K/UL (ref 1.7–7)
NEUTS SEG NFR BLD: 81.7 %
NRBC BLD-RTO: 0 %
PLATELET # BLD AUTO: 170 K/UL (ref 150–369)
PMV BLD AUTO: 11.1 FL (ref 9.4–12.3)
POTASSIUM SERPL-SCNC: 3.5 MEQ/L (ref 3.5–5.1)
PROT SERPL-MCNC: 6.7 G/DL (ref 6–8.2)
RBC # BLD AUTO: 3.46 M/UL (ref 3.93–5.22)
SODIUM SERPL-SCNC: 136 MEQ/L (ref 136–145)
WBC # BLD AUTO: 9.79 K/UL (ref 3.8–10.5)

## 2025-08-22 PROCEDURE — 99284 EMERGENCY DEPT VISIT MOD MDM: CPT | Mod: 25

## 2025-08-22 PROCEDURE — 36415 COLL VENOUS BLD VENIPUNCTURE: CPT

## 2025-08-22 PROCEDURE — 63600105 HC IODINE BASED CONTRAST: Mod: JZ

## 2025-08-22 PROCEDURE — 74177 CT ABD & PELVIS W/CONTRAST: CPT

## 2025-08-22 PROCEDURE — 80053 COMPREHEN METABOLIC PANEL: CPT

## 2025-08-22 PROCEDURE — 71260 CT THORAX DX C+: CPT

## 2025-08-22 PROCEDURE — 3E0234Z INTRODUCTION OF SERUM, TOXOID AND VACCINE INTO MUSCLE, PERCUTANEOUS APPROACH: ICD-10-PCS | Performed by: EMERGENCY MEDICINE

## 2025-08-22 PROCEDURE — 90471 IMMUNIZATION ADMIN: CPT | Performed by: EMERGENCY MEDICINE

## 2025-08-22 PROCEDURE — 90715 TDAP VACCINE 7 YRS/> IM: CPT | Performed by: EMERGENCY MEDICINE

## 2025-08-22 PROCEDURE — 63600000 HC DRUGS/DETAIL CODE: Performed by: EMERGENCY MEDICINE

## 2025-08-22 PROCEDURE — 85025 COMPLETE CBC W/AUTO DIFF WBC: CPT

## 2025-08-22 RX ORDER — IOPAMIDOL 755 MG/ML
100 INJECTION, SOLUTION INTRAVASCULAR
Status: COMPLETED | OUTPATIENT
Start: 2025-08-22 | End: 2025-08-22

## 2025-08-22 RX ADMIN — TETANUS TOXOID, REDUCED DIPHTHERIA TOXOID AND ACELLULAR PERTUSSIS VACCINE, ADSORBED 0.5 ML: 5; 2.5; 8; 8; 2.5 SUSPENSION INTRAMUSCULAR at 19:01

## 2025-08-22 RX ADMIN — IOPAMIDOL 100 ML: 755 INJECTION, SOLUTION INTRAVENOUS at 19:56

## 2025-08-22 ASSESSMENT — ENCOUNTER SYMPTOMS
NECK STIFFNESS: 0
ABDOMINAL PAIN: 0
LIGHT-HEADEDNESS: 0
VOMITING: 0
HEADACHES: 0
DIZZINESS: 0
NUMBNESS: 0
NECK PAIN: 0
CONFUSION: 0
BACK PAIN: 1
WEAKNESS: 0
WOUND: 1
NAUSEA: 0
SHORTNESS OF BREATH: 0

## 2025-08-29 DIAGNOSIS — E11.9 TYPE 2 DIABETES MELLITUS TREATED WITH INSULIN (CMS/HCC): ICD-10-CM

## 2025-08-29 DIAGNOSIS — Z79.4 TYPE 2 DIABETES MELLITUS TREATED WITH INSULIN (CMS/HCC): ICD-10-CM

## 2025-08-29 RX ORDER — INSULIN ASPART 100 [IU]/ML
INJECTION, SOLUTION INTRAVENOUS; SUBCUTANEOUS
Qty: 15 ML | Refills: 0 | Status: SHIPPED | OUTPATIENT
Start: 2025-08-29

## 2025-09-03 ENCOUNTER — HOSPITAL ENCOUNTER (OUTPATIENT)
Dept: PHYSICAL THERAPY | Facility: REHABILITATION | Age: 65
Setting detail: THERAPIES SERIES
Discharge: HOME | End: 2025-09-03
Attending: PHYSICAL MEDICINE & REHABILITATION
Payer: MEDICARE

## 2025-09-03 DIAGNOSIS — Z89.612 ACQUIRED ABSENCE OF LEFT LEG ABOVE KNEE (CMS/HCC): ICD-10-CM

## 2025-09-03 DIAGNOSIS — R26.9 GAIT ABNORMALITY: Primary | ICD-10-CM

## 2025-09-03 DIAGNOSIS — Z89.612 ACQUIRED ABSENCE OF LEFT LOWER EXTREMITY ABOVE KNEE (CMS/HCC): ICD-10-CM

## 2025-09-03 PROCEDURE — 97530 THERAPEUTIC ACTIVITIES: CPT | Mod: GP

## 2025-09-03 PROCEDURE — 97162 PT EVAL MOD COMPLEX 30 MIN: CPT | Mod: GP

## (undated) DEVICE — MANIFOLD SINGLE PORT NEPTUNE

## (undated) DEVICE — SLEEVE SCD COMFORT KNEE LENGTH MED

## (undated) DEVICE — DRESSING AQUACEL ADVA ANTIMCROBIAL 3.5 X 10IN

## (undated) DEVICE — SOLN IRRIG STER WATER 1000ML

## (undated) DEVICE — KIT MINI FLARED 45 DEGREE TIP

## (undated) DEVICE — ***USE 121412***PACK DEVICE IMPLANT TLH

## (undated) DEVICE — SYRINGE DISP LUER-LOK  1 CC

## (undated) DEVICE — BLADE RECIPROCATING 1MM

## (undated) DEVICE — CATHETER ST.TAPER GLIDECATH

## (undated) DEVICE — PACK RFID ORTHO MINOR

## (undated) DEVICE — KIT CATH LAB ANGIO

## (undated) DEVICE — BLADE TONGUE DEPRESSOR 6 IN

## (undated) DEVICE — CUFF TOURNIQUET DISP 34 X 4

## (undated) DEVICE — SUTURE MONOSOF 4-0 BLACK 1X18 P-12

## (undated) DEVICE — CONTROLLER ACIST PREMIUM HAND

## (undated) DEVICE — KNIFE OPHT CLEARCUT SLIT DB 2.75MM

## (undated) DEVICE — CUFF TOURNIQUET REPROCESSED 24IN

## (undated) DEVICE — HEALON .55 10MG

## (undated) DEVICE — MANIFOLD FOUR PORT NEPTUNE

## (undated) DEVICE — GLOVE PROTEXIS LATEX MICRO 7

## (undated) DEVICE — PAD GROUND ELECTROSURGICAL W/CORD

## (undated) DEVICE — TUBING SMOKE EVAC PENCIL COATED

## (undated) DEVICE — GOWN SIRUS FABRNF RAGLAN XL ST 28/CS

## (undated) DEVICE — KIT ACIST MANIFOLD TRANSDR

## (undated) DEVICE — CATH SUPPORT QUICKCROSS .018 X 18

## (undated) DEVICE — STOCKINETTE TUBE 6 X 72 MEDC

## (undated) DEVICE — CASSETTE INFINITY 0.9MM 30 DEG TIP

## (undated) DEVICE — BBL CULTURE SWAB PLUS ANAEROBIC

## (undated) DEVICE — HOVERMATT 34IN FULL SINGLE PATI

## (undated) DEVICE — GUIDEWIRE J 3.0MM, .035", 150CM, FIXED CORE

## (undated) DEVICE — CAST PADDING 4IN

## (undated) DEVICE — ***USE 138642*** SUTURE VICRYL 2-0  J466H

## (undated) DEVICE — APPLICATOR CHLORAPREP 26ML ORANGE TINT

## (undated) DEVICE — DRESSING SPONGE ALL GAUZE 4X4 STER 10PK

## (undated) DEVICE — GLIDEWIRE ADVANTAGE 260CM

## (undated) DEVICE — GLOVE PROTEXIS PI ORTHO 8.5

## (undated) DEVICE — ***USE 56952*** SUTURE VICRYL 1 J371H CTX 36IN VIOLET

## (undated) DEVICE — CATH BALLOON PTA SERRANATOR 3.0MM X 120MM

## (undated) DEVICE — STIRRUP STRAP DISPOSABLE

## (undated) DEVICE — SUTURE VICRYL 3-0  J497G

## (undated) DEVICE — HOOD T7 PLUS W/PEEL AWAY SHIELD

## (undated) DEVICE — CATH OMNI FLUSH 4FR 65CM

## (undated) DEVICE — LABELS CUSTOM EYE MEDICATION STERILE TLH SC

## (undated) DEVICE — GLOVE SZ 8 PROTEXIS PI

## (undated) DEVICE — BRUSH SCRUB WITH HIBICLENS

## (undated) DEVICE — GUIDEWIRE ASAHI MONGO 300CM .014 STRAIGHT

## (undated) DEVICE — CATH LASER 0.9 EXTREME 150CM RX

## (undated) DEVICE — SHEATH ULTIMUM 6FR 12CM 10/BX

## (undated) DEVICE — PACK RFID TOTAL KNEE

## (undated) DEVICE — COVER LIGHTHANDLE (STERILE SINGLE PA

## (undated) DEVICE — CARTRIDGE D IOL MONARCH DELIVERY SYSTEM

## (undated) DEVICE — SYRINGE DISP LUER-LOK 10 CC

## (undated) DEVICE — KNIFE OPHT CRESCENT

## (undated) DEVICE — DRESSING ADAPTIC 3INX8IN

## (undated) DEVICE — TIP COAXIAL FEMORAL CANAL

## (undated) DEVICE — BANDAGE ACE XL 6 X 15

## (undated) DEVICE — GUIDEWIRE CONTROLWIRE V18 .018IN 8CM 300CM

## (undated) DEVICE — GLOVE SZ 8.5 LINER PROTEXIS PI BL

## (undated) DEVICE — SOLUTION PROV-IODINE .75 OZ

## (undated) DEVICE — NEEDLE DISP HYPO 25GX1-1/2IN

## (undated) DEVICE — BLADE SCALPEL #15

## (undated) DEVICE — SUTURE POLYSORB 2-0 UNDYED 1X30 GS-10

## (undated) DEVICE — DRAPE EXTREMITY UNIVERSAL

## (undated) DEVICE — BLANKET UPPER BODY BAIR HUGGER

## (undated) DEVICE — HEALON 5 .6ML 23MG

## (undated) DEVICE — PACK EYE CUSTOM TLH

## (undated) DEVICE — GUIDEWIRE ASAHI GRAND SLAM 300CM

## (undated) DEVICE — GUIDEWIRE ASAHI CONFIANZA PRO 12 300CM

## (undated) DEVICE — KIT MICROACCESS PRECISION 5FR

## (undated) DEVICE — BLADE SAGITTAL DUAL CUT 4125-127-090

## (undated) DEVICE — KIT INFLATION DEVICE ENCORE 26 ADVANTAGE

## (undated) DEVICE — BANDAGE COMPRESSION W4INXL5.5YD ELASTIC LF PREMIUM DOUBLE CL

## (undated) DEVICE — DRAPE STERI 39IN

## (undated) DEVICE — MIX-EVAC HIGH VACUUM KIT

## (undated) DEVICE — DRAPE-U-1015

## (undated) DEVICE — SOLN BAL SALT 500ML

## (undated) DEVICE — SOLN IRRIG .9%SOD 1000ML

## (undated) DEVICE — SHEATH DESTINATION RENAL 6FR 65CM